# Patient Record
Sex: MALE | Race: WHITE | NOT HISPANIC OR LATINO | Employment: FULL TIME | URBAN - METROPOLITAN AREA
[De-identification: names, ages, dates, MRNs, and addresses within clinical notes are randomized per-mention and may not be internally consistent; named-entity substitution may affect disease eponyms.]

---

## 2023-03-08 DIAGNOSIS — Z00.00 HEALTH CARE MAINTENANCE: Primary | ICD-10-CM

## 2023-03-08 RX ORDER — ALBUTEROL SULFATE 0.83 MG/ML
2.5 SOLUTION RESPIRATORY (INHALATION) EVERY 4 HOURS PRN
COMMUNITY
Start: 2014-06-23 | End: 2023-03-08 | Stop reason: SDUPTHER

## 2023-03-11 RX ORDER — ALBUTEROL SULFATE 0.83 MG/ML
2.5 SOLUTION RESPIRATORY (INHALATION) EVERY 4 HOURS PRN
Qty: 75 ML | Refills: 1 | Status: ON HOLD | OUTPATIENT
Start: 2023-03-11 | End: 2024-03-12 | Stop reason: WASHOUT

## 2023-03-13 DIAGNOSIS — Z00.00 HEALTH CARE MAINTENANCE: Primary | ICD-10-CM

## 2023-03-13 RX ORDER — IPRATROPIUM BROMIDE AND ALBUTEROL SULFATE 2.5; .5 MG/3ML; MG/3ML
3 SOLUTION RESPIRATORY (INHALATION)
COMMUNITY
Start: 2023-03-05 | End: 2023-03-13 | Stop reason: SDUPTHER

## 2023-03-14 RX ORDER — IPRATROPIUM BROMIDE AND ALBUTEROL SULFATE 2.5; .5 MG/3ML; MG/3ML
3 SOLUTION RESPIRATORY (INHALATION)
Qty: 180 ML | Refills: 1 | Status: SHIPPED | OUTPATIENT
Start: 2023-03-14 | End: 2024-02-22 | Stop reason: SDUPTHER

## 2023-05-19 ENCOUNTER — TELEMEDICINE (OUTPATIENT)
Dept: PRIMARY CARE | Facility: CLINIC | Age: 68
End: 2023-05-19
Payer: MEDICARE

## 2023-05-19 DIAGNOSIS — J43.9 PULMONARY EMPHYSEMA, UNSPECIFIED EMPHYSEMA TYPE (MULTI): ICD-10-CM

## 2023-05-19 DIAGNOSIS — E66.01 MORBID OBESITY (MULTI): ICD-10-CM

## 2023-05-19 DIAGNOSIS — E11.9 TYPE 2 DIABETES MELLITUS WITHOUT COMPLICATION, WITHOUT LONG-TERM CURRENT USE OF INSULIN (MULTI): Primary | ICD-10-CM

## 2023-05-19 PROCEDURE — 99442 PR PHYS/QHP TELEPHONE EVALUATION 11-20 MIN: CPT | Performed by: INTERNAL MEDICINE

## 2023-05-19 NOTE — PROGRESS NOTES
Subjective   Patient ID: Alo Glass is a 67 y.o. male who presents for No chief complaint on file..    HPI patient initiated tele health visit this visit was completed via telephone secondary to the restrictions of the COVID-19 pandemic all medical issues were addressed and discussed with patient patient was not otherwise examined if it was felt that the patient needed to be seen in the office they would have been referred to do so patient verbally consented to visit   sick visit no chest pain always somewhat shortness of breath still smoking no polyuria polydipsia struggles with losing weight interested in medication to help with above bowels okay no dysuria    Review of Systems    Objective   There were no vitals taken for this visit.    Physical Examalert and oriented x3 breathing unlabored not otherwise examined    Assessment/Plan impression obesity copd dm   Plan dyspnea pulmonary in an office setting as walking in to a hospital complex is too far for him Cont w trelegy inhaler  Nebulizer use as needed discussed with no tobacco he would like to begin [facility] see EMR after review of laboratory results diet exercise weight loss through nonpharmacological means also with some pain in his feet at bedtime may consider a compounded medication for pain relief May begin with Aspercreme and general foot care and foot soaks good shoe wear elevate legs recheck in office after beginning medication and follow-up on A1c lipids and blood pressure

## 2023-05-20 RX ORDER — TIRZEPATIDE 2.5 MG/.5ML
2.5 INJECTION, SOLUTION SUBCUTANEOUS
Qty: 2 ML | Refills: 1 | Status: SHIPPED | OUTPATIENT
Start: 2023-05-20 | End: 2023-06-22 | Stop reason: SDUPTHER

## 2023-10-26 DIAGNOSIS — M10.9 GOUT, UNSPECIFIED: ICD-10-CM

## 2023-10-26 DIAGNOSIS — Z00.00 ENCOUNTER FOR GENERAL ADULT MEDICAL EXAMINATION WITHOUT ABNORMAL FINDINGS: Primary | ICD-10-CM

## 2023-10-26 DIAGNOSIS — E03.9 HYPOTHYROIDISM, UNSPECIFIED: ICD-10-CM

## 2023-10-26 RX ORDER — INDOMETHACIN 50 MG/1
50 CAPSULE ORAL 2 TIMES DAILY PRN
Qty: 90 CAPSULE | Refills: 0 | Status: SHIPPED | OUTPATIENT
Start: 2023-10-26 | End: 2023-11-17 | Stop reason: SDUPTHER

## 2023-10-26 RX ORDER — ALLOPURINOL 300 MG/1
300 TABLET ORAL DAILY
Qty: 30 TABLET | Refills: 0 | Status: CANCELLED | OUTPATIENT
Start: 2023-10-26 | End: 2023-11-25

## 2023-10-26 RX ORDER — LEVOTHYROXINE SODIUM 125 UG/1
125 TABLET ORAL DAILY
Qty: 30 TABLET | Refills: 0 | Status: CANCELLED | OUTPATIENT
Start: 2023-10-26

## 2023-10-26 RX ORDER — INDOMETHACIN 50 MG/1
50 CAPSULE ORAL
COMMUNITY
End: 2023-10-26 | Stop reason: SDUPTHER

## 2023-10-26 RX ORDER — OLMESARTAN MEDOXOMIL 40 MG/1
40 TABLET ORAL DAILY
Qty: 30 TABLET | Refills: 0 | Status: CANCELLED | OUTPATIENT
Start: 2023-10-26

## 2023-11-08 DIAGNOSIS — M10.9 GOUT, UNSPECIFIED: ICD-10-CM

## 2023-11-08 RX ORDER — ALLOPURINOL 300 MG/1
300 TABLET ORAL DAILY
Qty: 30 TABLET | Refills: 0 | Status: SHIPPED | OUTPATIENT
Start: 2023-11-08 | End: 2023-12-06

## 2023-11-15 DIAGNOSIS — M10.9 GOUT, UNSPECIFIED: ICD-10-CM

## 2023-11-15 DIAGNOSIS — Z00.00 ENCOUNTER FOR GENERAL ADULT MEDICAL EXAMINATION WITHOUT ABNORMAL FINDINGS: ICD-10-CM

## 2023-11-15 DIAGNOSIS — E03.9 HYPOTHYROIDISM, UNSPECIFIED: ICD-10-CM

## 2023-11-16 RX ORDER — OLMESARTAN MEDOXOMIL 40 MG/1
40 TABLET ORAL DAILY
Qty: 30 TABLET | Refills: 0 | Status: SHIPPED | OUTPATIENT
Start: 2023-11-16 | End: 2023-12-15 | Stop reason: SDUPTHER

## 2023-11-16 RX ORDER — ALLOPURINOL 300 MG/1
300 TABLET ORAL DAILY
Qty: 30 TABLET | Refills: 0 | Status: SHIPPED | OUTPATIENT
Start: 2023-11-16 | End: 2024-01-05

## 2023-11-16 RX ORDER — LEVOTHYROXINE SODIUM 125 UG/1
125 TABLET ORAL DAILY
Qty: 30 TABLET | Refills: 0 | Status: SHIPPED | OUTPATIENT
Start: 2023-11-16 | End: 2023-12-15 | Stop reason: SDUPTHER

## 2023-11-17 DIAGNOSIS — Z00.00 ENCOUNTER FOR GENERAL ADULT MEDICAL EXAMINATION WITHOUT ABNORMAL FINDINGS: ICD-10-CM

## 2023-11-18 RX ORDER — INDOMETHACIN 50 MG/1
50 CAPSULE ORAL 2 TIMES DAILY PRN
Qty: 30 CAPSULE | Refills: 0 | Status: SHIPPED | OUTPATIENT
Start: 2023-11-18 | End: 2023-12-15 | Stop reason: SDUPTHER

## 2023-11-20 ENCOUNTER — TELEPHONE (OUTPATIENT)
Dept: PRIMARY CARE | Facility: CLINIC | Age: 68
End: 2023-11-20

## 2023-11-23 RX ORDER — INDOMETHACIN 50 MG/1
CAPSULE ORAL
Qty: 30 CAPSULE | Refills: 1 | OUTPATIENT
Start: 2023-11-23

## 2023-11-24 DIAGNOSIS — E11.9 TYPE 2 DIABETES MELLITUS WITHOUT COMPLICATION, WITHOUT LONG-TERM CURRENT USE OF INSULIN (MULTI): ICD-10-CM

## 2023-11-24 DIAGNOSIS — E66.01 MORBID OBESITY (MULTI): Primary | ICD-10-CM

## 2023-11-28 ENCOUNTER — APPOINTMENT (OUTPATIENT)
Dept: PRIMARY CARE | Facility: CLINIC | Age: 68
End: 2023-11-28
Payer: MEDICARE

## 2023-12-05 DIAGNOSIS — M10.9 GOUT, UNSPECIFIED: ICD-10-CM

## 2023-12-06 RX ORDER — ALLOPURINOL 300 MG/1
300 TABLET ORAL DAILY
Qty: 90 TABLET | Refills: 1 | Status: SHIPPED | OUTPATIENT
Start: 2023-12-06 | End: 2024-05-10 | Stop reason: SDUPTHER

## 2023-12-15 DIAGNOSIS — M10.9 GOUT, UNSPECIFIED: ICD-10-CM

## 2023-12-15 DIAGNOSIS — Z00.00 ENCOUNTER FOR GENERAL ADULT MEDICAL EXAMINATION WITHOUT ABNORMAL FINDINGS: ICD-10-CM

## 2023-12-15 DIAGNOSIS — E03.9 HYPOTHYROIDISM, UNSPECIFIED: ICD-10-CM

## 2023-12-15 RX ORDER — ALLOPURINOL 300 MG/1
300 TABLET ORAL DAILY
Qty: 30 TABLET | Refills: 0 | Status: CANCELLED | OUTPATIENT
Start: 2023-12-15 | End: 2024-01-14

## 2023-12-16 RX ORDER — OLMESARTAN MEDOXOMIL 40 MG/1
40 TABLET ORAL DAILY
Qty: 30 TABLET | Refills: 0 | Status: SHIPPED | OUTPATIENT
Start: 2023-12-16 | End: 2024-02-15

## 2023-12-16 RX ORDER — LEVOTHYROXINE SODIUM 125 UG/1
125 TABLET ORAL DAILY
Qty: 30 TABLET | Refills: 0 | Status: SHIPPED | OUTPATIENT
Start: 2023-12-16 | End: 2024-01-10

## 2023-12-16 RX ORDER — INDOMETHACIN 50 MG/1
50 CAPSULE ORAL 2 TIMES DAILY PRN
Qty: 30 CAPSULE | Refills: 0 | Status: ON HOLD | OUTPATIENT
Start: 2023-12-16 | End: 2024-04-09 | Stop reason: SDUPTHER

## 2023-12-28 DIAGNOSIS — E03.9 HYPOTHYROIDISM, UNSPECIFIED: ICD-10-CM

## 2023-12-28 RX ORDER — LEVOTHYROXINE SODIUM 125 UG/1
125 TABLET ORAL DAILY
Qty: 30 TABLET | Refills: 0 | OUTPATIENT
Start: 2023-12-28

## 2024-01-05 DIAGNOSIS — J43.9 EMPHYSEMA, UNSPECIFIED (MULTI): ICD-10-CM

## 2024-01-05 DIAGNOSIS — M10.9 GOUT, UNSPECIFIED: ICD-10-CM

## 2024-01-05 DIAGNOSIS — E03.9 HYPOTHYROIDISM, UNSPECIFIED: ICD-10-CM

## 2024-01-05 DIAGNOSIS — E11.9 TYPE 2 DIABETES MELLITUS WITHOUT COMPLICATION, WITHOUT LONG-TERM CURRENT USE OF INSULIN (MULTI): ICD-10-CM

## 2024-01-05 RX ORDER — ALBUTEROL SULFATE 90 UG/1
2 AEROSOL, METERED RESPIRATORY (INHALATION) EVERY 4 HOURS PRN
Qty: 8.5 G | Refills: 3 | Status: SHIPPED | OUTPATIENT
Start: 2024-01-05

## 2024-01-05 RX ORDER — ALLOPURINOL 300 MG/1
300 TABLET ORAL DAILY
Qty: 30 TABLET | Refills: 0 | Status: SHIPPED | OUTPATIENT
Start: 2024-01-05 | End: 2024-02-04

## 2024-01-08 ENCOUNTER — TELEMEDICINE (OUTPATIENT)
Dept: PRIMARY CARE | Facility: CLINIC | Age: 69
End: 2024-01-08
Payer: MEDICARE

## 2024-01-08 DIAGNOSIS — G47.33 OSA (OBSTRUCTIVE SLEEP APNEA): ICD-10-CM

## 2024-01-08 DIAGNOSIS — M25.559 HIP PAIN, UNSPECIFIED LATERALITY: ICD-10-CM

## 2024-01-08 DIAGNOSIS — J44.9 CHRONIC OBSTRUCTIVE PULMONARY DISEASE, UNSPECIFIED COPD TYPE (MULTI): ICD-10-CM

## 2024-01-08 DIAGNOSIS — E11.9 TYPE 2 DIABETES MELLITUS WITHOUT COMPLICATION, WITHOUT LONG-TERM CURRENT USE OF INSULIN (MULTI): ICD-10-CM

## 2024-01-08 DIAGNOSIS — I89.0 LYMPHEDEMA: ICD-10-CM

## 2024-01-08 DIAGNOSIS — M10.9 GOUT, UNSPECIFIED CAUSE, UNSPECIFIED CHRONICITY, UNSPECIFIED SITE: Primary | ICD-10-CM

## 2024-01-08 DIAGNOSIS — Z79.899 MEDICATION MANAGEMENT: ICD-10-CM

## 2024-01-08 PROCEDURE — 99443 PR PHYS/QHP TELEPHONE EVALUATION 21-30 MIN: CPT | Performed by: INTERNAL MEDICINE

## 2024-01-08 NOTE — PROGRESS NOTES
Subjective   Patient ID: Alo Glass is a 68 y.o. male who presents for No chief complaint on file..    HPI patient initiated telehealth visit this visit was completed via telephone secondary to the restrictions of the COVID-19 pandemic all medical issues were addressed and discussed with patient patient was not otherwise examined if it was felt that the patient needed to be seen in the office that would have been referred to do so patient verbally consented to visit  Sick visit has had multiple issues with his CPAP machine may need another does not want to have a sleep study no chest pain no shortness of breath had an asthma or COPD flareup still smoking questions about vascular disease has some weeping in his leg given the lymphedema has not had gout but has only been on 300 mg of the allopurinol bowels okay no dysuria    Review of Systems    Objective   There were no vitals taken for this visit.    Physical Exam alert and oriented x 3 breathing unlabored not otherwise examined    Assessment/Plan  impression gout eriberto copd neuropathy hip pain (gabapentin) weeping leg/lymphedema (vascular disease questions?)  Plan new cpap  Continue with 300 mg only of allopurinol  Rx updated  Plan he also has questions about using gabapentin on a as needed basis will follow-up in the office previous blood work orders were reviewed uric acid level was added urine medication screen test was added risk-benefit side effect of the Neurontin reviewed with him and may be prescribed after he is evaluated for that as well as for the Mounjaro as well as the rest of the blood work that had previously been placed in the meantime good diet regular exercise increase water consumption elevate legs local wound care stop smoking altogether and recheck based on above and in office TT 40 cc 21

## 2024-01-10 RX ORDER — TIRZEPATIDE 2.5 MG/.5ML
2.5 INJECTION, SOLUTION SUBCUTANEOUS
Qty: 2 ML | Refills: 0 | Status: ON HOLD | OUTPATIENT
Start: 2024-01-10 | End: 2024-04-09 | Stop reason: SDUPTHER

## 2024-01-10 RX ORDER — LEVOTHYROXINE SODIUM 125 UG/1
125 TABLET ORAL DAILY
Qty: 30 TABLET | Refills: 0 | Status: SHIPPED | OUTPATIENT
Start: 2024-01-10 | End: 2024-05-22

## 2024-01-10 RX ORDER — TIRZEPATIDE 2.5 MG/.5ML
2.5 INJECTION, SOLUTION SUBCUTANEOUS
Qty: 2 ML | Refills: 0 | Status: SHIPPED | OUTPATIENT
Start: 2024-01-10 | End: 2024-03-23 | Stop reason: HOSPADM

## 2024-01-19 DIAGNOSIS — Z00.00 ENCOUNTER FOR GENERAL ADULT MEDICAL EXAMINATION WITHOUT ABNORMAL FINDINGS: ICD-10-CM

## 2024-01-19 RX ORDER — METFORMIN HYDROCHLORIDE 1000 MG/1
1000 TABLET ORAL 2 TIMES DAILY
Qty: 180 TABLET | Refills: 0 | Status: SHIPPED | OUTPATIENT
Start: 2024-01-19

## 2024-01-31 DIAGNOSIS — Z00.00 ENCOUNTER FOR GENERAL ADULT MEDICAL EXAMINATION WITHOUT ABNORMAL FINDINGS: ICD-10-CM

## 2024-02-01 RX ORDER — MOMETASONE FUROATE AND FORMOTEROL FUMARATE DIHYDRATE 100; 5 UG/1; UG/1
2 AEROSOL RESPIRATORY (INHALATION) 2 TIMES DAILY
Qty: 13 G | Refills: 1 | Status: SHIPPED | OUTPATIENT
Start: 2024-02-01 | End: 2024-03-23 | Stop reason: HOSPADM

## 2024-02-13 DIAGNOSIS — Z00.00 ENCOUNTER FOR GENERAL ADULT MEDICAL EXAMINATION WITHOUT ABNORMAL FINDINGS: ICD-10-CM

## 2024-02-15 RX ORDER — OLMESARTAN MEDOXOMIL 40 MG/1
40 TABLET ORAL DAILY
Qty: 30 TABLET | Refills: 0 | Status: SHIPPED | OUTPATIENT
Start: 2024-02-15 | End: 2024-03-02

## 2024-02-20 ENCOUNTER — LAB (OUTPATIENT)
Dept: LAB | Facility: LAB | Age: 69
End: 2024-02-20
Payer: MEDICARE

## 2024-02-20 DIAGNOSIS — M10.9 GOUT, UNSPECIFIED CAUSE, UNSPECIFIED CHRONICITY, UNSPECIFIED SITE: ICD-10-CM

## 2024-02-20 DIAGNOSIS — E66.01 MORBID OBESITY (MULTI): ICD-10-CM

## 2024-02-20 DIAGNOSIS — Z79.899 MEDICATION MANAGEMENT: ICD-10-CM

## 2024-02-20 DIAGNOSIS — E11.9 TYPE 2 DIABETES MELLITUS WITHOUT COMPLICATION, WITHOUT LONG-TERM CURRENT USE OF INSULIN (MULTI): ICD-10-CM

## 2024-02-20 LAB
ALBUMIN SERPL BCP-MCNC: 4.2 G/DL (ref 3.4–5)
ALP SERPL-CCNC: 71 U/L (ref 33–136)
ALT SERPL W P-5'-P-CCNC: 24 U/L (ref 10–52)
ANION GAP SERPL CALC-SCNC: 16 MMOL/L (ref 10–20)
AST SERPL W P-5'-P-CCNC: 20 U/L (ref 9–39)
BILIRUB SERPL-MCNC: 1 MG/DL (ref 0–1.2)
BUN SERPL-MCNC: 22 MG/DL (ref 6–23)
CALCIUM SERPL-MCNC: 9.4 MG/DL (ref 8.6–10.6)
CHLORIDE SERPL-SCNC: 107 MMOL/L (ref 98–107)
CHOLEST SERPL-MCNC: 178 MG/DL (ref 0–199)
CHOLESTEROL/HDL RATIO: 3
CO2 SERPL-SCNC: 27 MMOL/L (ref 21–32)
CREAT SERPL-MCNC: 0.84 MG/DL (ref 0.5–1.3)
EGFRCR SERPLBLD CKD-EPI 2021: >90 ML/MIN/1.73M*2
EST. AVERAGE GLUCOSE BLD GHB EST-MCNC: 120 MG/DL
GLUCOSE SERPL-MCNC: 110 MG/DL (ref 74–99)
HBA1C MFR BLD: 5.8 %
HDLC SERPL-MCNC: 59.5 MG/DL
LDLC SERPL CALC-MCNC: 102 MG/DL
NON HDL CHOLESTEROL: 119 MG/DL (ref 0–149)
POTASSIUM SERPL-SCNC: 4.4 MMOL/L (ref 3.5–5.3)
PROT SERPL-MCNC: 6.4 G/DL (ref 6.4–8.2)
SODIUM SERPL-SCNC: 146 MMOL/L (ref 136–145)
TRIGL SERPL-MCNC: 81 MG/DL (ref 0–149)
URATE SERPL-MCNC: 7.1 MG/DL (ref 4–7.5)
VLDL: 16 MG/DL (ref 0–40)

## 2024-02-20 PROCEDURE — 36415 COLL VENOUS BLD VENIPUNCTURE: CPT

## 2024-02-20 PROCEDURE — 80061 LIPID PANEL: CPT

## 2024-02-20 PROCEDURE — 84550 ASSAY OF BLOOD/URIC ACID: CPT

## 2024-02-20 PROCEDURE — 83036 HEMOGLOBIN GLYCOSYLATED A1C: CPT

## 2024-02-20 PROCEDURE — 80053 COMPREHEN METABOLIC PANEL: CPT

## 2024-02-22 DIAGNOSIS — Z00.00 HEALTH CARE MAINTENANCE: ICD-10-CM

## 2024-02-22 RX ORDER — IPRATROPIUM BROMIDE AND ALBUTEROL SULFATE 2.5; .5 MG/3ML; MG/3ML
3 SOLUTION RESPIRATORY (INHALATION)
Qty: 180 ML | Refills: 1 | Status: SHIPPED | OUTPATIENT
Start: 2024-02-22 | End: 2024-04-16

## 2024-02-29 DIAGNOSIS — Z00.00 ENCOUNTER FOR GENERAL ADULT MEDICAL EXAMINATION WITHOUT ABNORMAL FINDINGS: ICD-10-CM

## 2024-03-02 RX ORDER — OLMESARTAN MEDOXOMIL 40 MG/1
40 TABLET ORAL DAILY
Qty: 30 TABLET | Refills: 0 | Status: SHIPPED | OUTPATIENT
Start: 2024-03-02 | End: 2024-03-23 | Stop reason: HOSPADM

## 2024-03-02 RX ORDER — FLUTICASONE FUROATE, UMECLIDINIUM BROMIDE AND VILANTEROL TRIFENATATE 100; 62.5; 25 UG/1; UG/1; UG/1
POWDER RESPIRATORY (INHALATION)
Qty: 28 EACH | Refills: 1 | Status: SHIPPED | OUTPATIENT
Start: 2024-03-02 | End: 2024-05-10 | Stop reason: SDUPTHER

## 2024-03-08 ENCOUNTER — APPOINTMENT (OUTPATIENT)
Dept: RADIOLOGY | Facility: HOSPITAL | Age: 69
DRG: 871 | End: 2024-03-08
Payer: MEDICARE

## 2024-03-08 ENCOUNTER — APPOINTMENT (OUTPATIENT)
Dept: CARDIOLOGY | Facility: HOSPITAL | Age: 69
DRG: 871 | End: 2024-03-08
Payer: MEDICARE

## 2024-03-08 ENCOUNTER — OFFICE VISIT (OUTPATIENT)
Dept: PRIMARY CARE | Facility: CLINIC | Age: 69
End: 2024-03-08
Payer: MEDICARE

## 2024-03-08 ENCOUNTER — HOSPITAL ENCOUNTER (OUTPATIENT)
Dept: CARDIOLOGY | Facility: HOSPITAL | Age: 69
Discharge: HOME | DRG: 871 | End: 2024-03-08
Payer: MEDICARE

## 2024-03-08 ENCOUNTER — HOSPITAL ENCOUNTER (INPATIENT)
Facility: HOSPITAL | Age: 69
LOS: 15 days | Discharge: NURSING, REHAB OR LONG-TERM CARE | DRG: 871 | End: 2024-03-23
Attending: STUDENT IN AN ORGANIZED HEALTH CARE EDUCATION/TRAINING PROGRAM | Admitting: INTERNAL MEDICINE
Payer: MEDICARE

## 2024-03-08 VITALS
OXYGEN SATURATION: 88 % | SYSTOLIC BLOOD PRESSURE: 143 MMHG | HEART RATE: 105 BPM | DIASTOLIC BLOOD PRESSURE: 76 MMHG | RESPIRATION RATE: 20 BRPM

## 2024-03-08 DIAGNOSIS — T17.500A MUCUS PLUGGING OF BRONCHI: ICD-10-CM

## 2024-03-08 DIAGNOSIS — J96.01 ACUTE HYPOXIC RESPIRATORY FAILURE (MULTI): ICD-10-CM

## 2024-03-08 DIAGNOSIS — I50.21 HEART FAILURE, ACUTE SYSTOLIC (MULTI): ICD-10-CM

## 2024-03-08 DIAGNOSIS — I50.9 ACUTE ON CHRONIC CONGESTIVE HEART FAILURE, UNSPECIFIED HEART FAILURE TYPE (MULTI): ICD-10-CM

## 2024-03-08 DIAGNOSIS — R06.09 DYSPNEA ON EXERTION: ICD-10-CM

## 2024-03-08 DIAGNOSIS — J44.9 CHRONIC OBSTRUCTIVE PULMONARY DISEASE, UNSPECIFIED COPD TYPE (MULTI): Primary | ICD-10-CM

## 2024-03-08 DIAGNOSIS — R09.02 HYPOXIA: ICD-10-CM

## 2024-03-08 DIAGNOSIS — I50.9 ACUTE ON CHRONIC CONGESTIVE HEART FAILURE, UNSPECIFIED HEART FAILURE TYPE (MULTI): Primary | ICD-10-CM

## 2024-03-08 DIAGNOSIS — R94.31 PROLONGED Q-T INTERVAL ON ECG: ICD-10-CM

## 2024-03-08 DIAGNOSIS — I50.9 HEART FAILURE EXACERBATED BY SOTALOL (MULTI): ICD-10-CM

## 2024-03-08 DIAGNOSIS — E11.9 TYPE 2 DIABETES MELLITUS WITHOUT COMPLICATION, WITHOUT LONG-TERM CURRENT USE OF INSULIN (MULTI): ICD-10-CM

## 2024-03-08 DIAGNOSIS — I50.21 SYSTOLIC CHF, ACUTE (MULTI): ICD-10-CM

## 2024-03-08 DIAGNOSIS — R79.89 ELEVATED D-DIMER: ICD-10-CM

## 2024-03-08 LAB
ALBUMIN SERPL BCP-MCNC: 3.7 G/DL (ref 3.4–5)
ALP SERPL-CCNC: 77 U/L (ref 33–136)
ALT SERPL W P-5'-P-CCNC: 20 U/L (ref 10–52)
ANION GAP SERPL CALC-SCNC: 17 MMOL/L (ref 10–20)
AST SERPL W P-5'-P-CCNC: 18 U/L (ref 9–39)
BASOPHILS # BLD AUTO: 0.04 X10*3/UL (ref 0–0.1)
BASOPHILS NFR BLD AUTO: 0.4 %
BILIRUB SERPL-MCNC: 1.1 MG/DL (ref 0–1.2)
BNP SERPL-MCNC: 415 PG/ML (ref 0–99)
BUN SERPL-MCNC: 14 MG/DL (ref 6–23)
CALCIUM SERPL-MCNC: 8.6 MG/DL (ref 8.6–10.3)
CARDIAC TROPONIN I PNL SERPL HS: 71 NG/L (ref 0–20)
CARDIAC TROPONIN I PNL SERPL HS: 73 NG/L (ref 0–20)
CHLORIDE SERPL-SCNC: 106 MMOL/L (ref 98–107)
CO2 SERPL-SCNC: 25 MMOL/L (ref 21–32)
CREAT SERPL-MCNC: 0.68 MG/DL (ref 0.5–1.3)
D DIMER PPP FEU-MCNC: 678 NG/ML FEU
EGFRCR SERPLBLD CKD-EPI 2021: >90 ML/MIN/1.73M*2
EOSINOPHIL # BLD AUTO: 0.07 X10*3/UL (ref 0–0.7)
EOSINOPHIL NFR BLD AUTO: 0.7 %
ERYTHROCYTE [DISTWIDTH] IN BLOOD BY AUTOMATED COUNT: 14 % (ref 11.5–14.5)
FLUAV RNA RESP QL NAA+PROBE: NOT DETECTED
FLUBV RNA RESP QL NAA+PROBE: NOT DETECTED
GLUCOSE BLD MANUAL STRIP-MCNC: 138 MG/DL (ref 74–99)
GLUCOSE SERPL-MCNC: 104 MG/DL (ref 74–99)
HCT VFR BLD AUTO: 57.5 % (ref 41–52)
HGB BLD-MCNC: 18.7 G/DL (ref 13.5–17.5)
IMM GRANULOCYTES # BLD AUTO: 0.04 X10*3/UL (ref 0–0.7)
IMM GRANULOCYTES NFR BLD AUTO: 0.4 % (ref 0–0.9)
LYMPHOCYTES # BLD AUTO: 1.23 X10*3/UL (ref 1.2–4.8)
LYMPHOCYTES NFR BLD AUTO: 11.7 %
MAGNESIUM SERPL-MCNC: 1.79 MG/DL (ref 1.6–2.4)
MCH RBC QN AUTO: 33.8 PG (ref 26–34)
MCHC RBC AUTO-ENTMCNC: 32.5 G/DL (ref 32–36)
MCV RBC AUTO: 104 FL (ref 80–100)
MONOCYTES # BLD AUTO: 0.8 X10*3/UL (ref 0.1–1)
MONOCYTES NFR BLD AUTO: 7.6 %
NEUTROPHILS # BLD AUTO: 8.36 X10*3/UL (ref 1.2–7.7)
NEUTROPHILS NFR BLD AUTO: 79.2 %
NRBC BLD-RTO: 0 /100 WBCS (ref 0–0)
PLATELET # BLD AUTO: 170 X10*3/UL (ref 150–450)
POTASSIUM SERPL-SCNC: 4 MMOL/L (ref 3.5–5.3)
PROT SERPL-MCNC: 6.2 G/DL (ref 6.4–8.2)
RBC # BLD AUTO: 5.53 X10*6/UL (ref 4.5–5.9)
RSV RNA RESP QL NAA+PROBE: NOT DETECTED
SARS-COV-2 RNA RESP QL NAA+PROBE: NOT DETECTED
SODIUM SERPL-SCNC: 144 MMOL/L (ref 136–145)
WBC # BLD AUTO: 10.5 X10*3/UL (ref 4.4–11.3)

## 2024-03-08 PROCEDURE — 99291 CRITICAL CARE FIRST HOUR: CPT | Mod: 25 | Performed by: NURSE PRACTITIONER

## 2024-03-08 PROCEDURE — 83880 ASSAY OF NATRIURETIC PEPTIDE: CPT | Performed by: INTERNAL MEDICINE

## 2024-03-08 PROCEDURE — 3044F HG A1C LEVEL LT 7.0%: CPT | Performed by: INTERNAL MEDICINE

## 2024-03-08 PROCEDURE — 83880 ASSAY OF NATRIURETIC PEPTIDE: CPT | Performed by: NURSE PRACTITIONER

## 2024-03-08 PROCEDURE — 5A09357 ASSISTANCE WITH RESPIRATORY VENTILATION, LESS THAN 24 CONSECUTIVE HOURS, CONTINUOUS POSITIVE AIRWAY PRESSURE: ICD-10-PCS | Performed by: NURSE PRACTITIONER

## 2024-03-08 PROCEDURE — 87637 SARSCOV2&INF A&B&RSV AMP PRB: CPT | Performed by: STUDENT IN AN ORGANIZED HEALTH CARE EDUCATION/TRAINING PROGRAM

## 2024-03-08 PROCEDURE — 1126F AMNT PAIN NOTED NONE PRSNT: CPT | Performed by: INTERNAL MEDICINE

## 2024-03-08 PROCEDURE — 85025 COMPLETE CBC W/AUTO DIFF WBC: CPT | Performed by: NURSE PRACTITIONER

## 2024-03-08 PROCEDURE — 2500000001 HC RX 250 WO HCPCS SELF ADMINISTERED DRUGS (ALT 637 FOR MEDICARE OP): Performed by: NURSE PRACTITIONER

## 2024-03-08 PROCEDURE — 4010F ACE/ARB THERAPY RXD/TAKEN: CPT | Performed by: INTERNAL MEDICINE

## 2024-03-08 PROCEDURE — 3049F LDL-C 100-129 MG/DL: CPT | Performed by: INTERNAL MEDICINE

## 2024-03-08 PROCEDURE — 2500000004 HC RX 250 GENERAL PHARMACY W/ HCPCS (ALT 636 FOR OP/ED): Performed by: NURSE PRACTITIONER

## 2024-03-08 PROCEDURE — 99214 OFFICE O/P EST MOD 30 MIN: CPT | Performed by: INTERNAL MEDICINE

## 2024-03-08 PROCEDURE — 1159F MED LIST DOCD IN RCRD: CPT | Performed by: INTERNAL MEDICINE

## 2024-03-08 PROCEDURE — 1036F TOBACCO NON-USER: CPT | Performed by: INTERNAL MEDICINE

## 2024-03-08 PROCEDURE — 82947 ASSAY GLUCOSE BLOOD QUANT: CPT

## 2024-03-08 PROCEDURE — 36415 COLL VENOUS BLD VENIPUNCTURE: CPT | Performed by: NURSE PRACTITIONER

## 2024-03-08 PROCEDURE — 93005 ELECTROCARDIOGRAM TRACING: CPT

## 2024-03-08 PROCEDURE — 3078F DIAST BP <80 MM HG: CPT | Performed by: INTERNAL MEDICINE

## 2024-03-08 PROCEDURE — 80053 COMPREHEN METABOLIC PANEL: CPT | Performed by: NURSE PRACTITIONER

## 2024-03-08 PROCEDURE — 99223 1ST HOSP IP/OBS HIGH 75: CPT | Performed by: INTERNAL MEDICINE

## 2024-03-08 PROCEDURE — 71046 X-RAY EXAM CHEST 2 VIEWS: CPT

## 2024-03-08 PROCEDURE — 85379 FIBRIN DEGRADATION QUANT: CPT | Performed by: NURSE PRACTITIONER

## 2024-03-08 PROCEDURE — 2500000002 HC RX 250 W HCPCS SELF ADMINISTERED DRUGS (ALT 637 FOR MEDICARE OP, ALT 636 FOR OP/ED): Performed by: NURSE PRACTITIONER

## 2024-03-08 PROCEDURE — 3077F SYST BP >= 140 MM HG: CPT | Performed by: INTERNAL MEDICINE

## 2024-03-08 PROCEDURE — 84484 ASSAY OF TROPONIN QUANT: CPT | Performed by: NURSE PRACTITIONER

## 2024-03-08 PROCEDURE — 1100000001 HC PRIVATE ROOM DAILY

## 2024-03-08 PROCEDURE — 83735 ASSAY OF MAGNESIUM: CPT | Performed by: NURSE PRACTITIONER

## 2024-03-08 PROCEDURE — 96374 THER/PROPH/DIAG INJ IV PUSH: CPT

## 2024-03-08 PROCEDURE — 82805 BLOOD GASES W/O2 SATURATION: CPT | Performed by: NURSE PRACTITIONER

## 2024-03-08 PROCEDURE — 94640 AIRWAY INHALATION TREATMENT: CPT

## 2024-03-08 PROCEDURE — 71046 X-RAY EXAM CHEST 2 VIEWS: CPT | Performed by: RADIOLOGY

## 2024-03-08 RX ORDER — PREDNISONE 20 MG/1
40 TABLET ORAL ONCE
Status: COMPLETED | OUTPATIENT
Start: 2024-03-08 | End: 2024-03-08

## 2024-03-08 RX ORDER — FUROSEMIDE 10 MG/ML
20 INJECTION INTRAMUSCULAR; INTRAVENOUS EVERY 12 HOURS
Status: DISCONTINUED | OUTPATIENT
Start: 2024-03-09 | End: 2024-03-08

## 2024-03-08 RX ORDER — DOCUSATE SODIUM 100 MG/1
100 CAPSULE, LIQUID FILLED ORAL 2 TIMES DAILY
Status: DISCONTINUED | OUTPATIENT
Start: 2024-03-08 | End: 2024-03-09

## 2024-03-08 RX ORDER — LANOLIN ALCOHOL/MO/W.PET/CERES
400 CREAM (GRAM) TOPICAL DAILY
Status: COMPLETED | OUTPATIENT
Start: 2024-03-08 | End: 2024-03-09

## 2024-03-08 RX ORDER — IPRATROPIUM BROMIDE AND ALBUTEROL SULFATE 2.5; .5 MG/3ML; MG/3ML
3 SOLUTION RESPIRATORY (INHALATION) ONCE
Status: COMPLETED | OUTPATIENT
Start: 2024-03-08 | End: 2024-03-08

## 2024-03-08 RX ORDER — POTASSIUM CHLORIDE 20 MEQ/1
20 TABLET, EXTENDED RELEASE ORAL DAILY
Status: DISCONTINUED | OUTPATIENT
Start: 2024-03-09 | End: 2024-03-12

## 2024-03-08 RX ORDER — FUROSEMIDE 10 MG/ML
40 INJECTION INTRAMUSCULAR; INTRAVENOUS ONCE
Status: COMPLETED | OUTPATIENT
Start: 2024-03-08 | End: 2024-03-08

## 2024-03-08 RX ORDER — ENOXAPARIN SODIUM 100 MG/ML
40 INJECTION SUBCUTANEOUS EVERY 12 HOURS SCHEDULED
Status: DISCONTINUED | OUTPATIENT
Start: 2024-03-08 | End: 2024-03-17

## 2024-03-08 RX ADMIN — PREDNISONE 40 MG: 20 TABLET ORAL at 17:22

## 2024-03-08 RX ADMIN — ENOXAPARIN SODIUM 40 MG: 40 INJECTION SUBCUTANEOUS at 21:21

## 2024-03-08 RX ADMIN — IPRATROPIUM BROMIDE AND ALBUTEROL SULFATE 3 ML: .5; 3 SOLUTION RESPIRATORY (INHALATION) at 17:22

## 2024-03-08 RX ADMIN — Medication 400 MG: at 21:21

## 2024-03-08 RX ADMIN — FUROSEMIDE 40 MG: 10 INJECTION, SOLUTION INTRAMUSCULAR; INTRAVENOUS at 17:22

## 2024-03-08 SDOH — SOCIAL STABILITY: SOCIAL INSECURITY: DOES ANYONE TRY TO KEEP YOU FROM HAVING/CONTACTING OTHER FRIENDS OR DOING THINGS OUTSIDE YOUR HOME?: NO

## 2024-03-08 SDOH — SOCIAL STABILITY: SOCIAL INSECURITY: HAVE YOU HAD THOUGHTS OF HARMING ANYONE ELSE?: NO

## 2024-03-08 SDOH — SOCIAL STABILITY: SOCIAL INSECURITY: HAS ANYONE EVER THREATENED TO HURT YOUR FAMILY OR YOUR PETS?: NO

## 2024-03-08 SDOH — SOCIAL STABILITY: SOCIAL INSECURITY: WERE YOU ABLE TO COMPLETE ALL THE BEHAVIORAL HEALTH SCREENINGS?: YES

## 2024-03-08 SDOH — SOCIAL STABILITY: SOCIAL INSECURITY: DO YOU FEEL ANYONE HAS EXPLOITED OR TAKEN ADVANTAGE OF YOU FINANCIALLY OR OF YOUR PERSONAL PROPERTY?: NO

## 2024-03-08 SDOH — SOCIAL STABILITY: SOCIAL INSECURITY: ABUSE: ADULT

## 2024-03-08 SDOH — SOCIAL STABILITY: SOCIAL INSECURITY: ARE THERE ANY APPARENT SIGNS OF INJURIES/BEHAVIORS THAT COULD BE RELATED TO ABUSE/NEGLECT?: NO

## 2024-03-08 SDOH — SOCIAL STABILITY: SOCIAL INSECURITY: DO YOU FEEL UNSAFE GOING BACK TO THE PLACE WHERE YOU ARE LIVING?: NO

## 2024-03-08 SDOH — SOCIAL STABILITY: SOCIAL INSECURITY: ARE YOU OR HAVE YOU BEEN THREATENED OR ABUSED PHYSICALLY, EMOTIONALLY, OR SEXUALLY BY ANYONE?: NO

## 2024-03-08 ASSESSMENT — ACTIVITIES OF DAILY LIVING (ADL)
LACK_OF_TRANSPORTATION: PATIENT DECLINED
HEARING - LEFT EAR: FUNCTIONAL
GROOMING: NEEDS ASSISTANCE
ADEQUATE_TO_COMPLETE_ADL: YES
JUDGMENT_ADEQUATE_SAFELY_COMPLETE_DAILY_ACTIVITIES: YES
LACK_OF_TRANSPORTATION: PATIENT DECLINED
DRESSING YOURSELF: NEEDS ASSISTANCE
BATHING: NEEDS ASSISTANCE
HEARING - RIGHT EAR: FUNCTIONAL
ASSISTIVE_DEVICE: WALKER
FEEDING YOURSELF: NEEDS ASSISTANCE
TOILETING: NEEDS ASSISTANCE
PATIENT'S MEMORY ADEQUATE TO SAFELY COMPLETE DAILY ACTIVITIES?: YES
WALKS IN HOME: NEEDS ASSISTANCE

## 2024-03-08 ASSESSMENT — LIFESTYLE VARIABLES
SUBSTANCE_ABUSE_PAST_12_MONTHS: NO
AUDIT-C TOTAL SCORE: 0
AUDIT-C TOTAL SCORE: 0
HOW OFTEN DO YOU HAVE A DRINK CONTAINING ALCOHOL: NEVER
EVER FELT BAD OR GUILTY ABOUT YOUR DRINKING: NO
HOW OFTEN DO YOU HAVE 6 OR MORE DRINKS ON ONE OCCASION: NEVER
SKIP TO QUESTIONS 9-10: 1
HAVE YOU EVER FELT YOU SHOULD CUT DOWN ON YOUR DRINKING: NO
PRESCIPTION_ABUSE_PAST_12_MONTHS: NO
HAVE PEOPLE ANNOYED YOU BY CRITICIZING YOUR DRINKING: NO
HOW MANY STANDARD DRINKS CONTAINING ALCOHOL DO YOU HAVE ON A TYPICAL DAY: PATIENT DOES NOT DRINK
EVER HAD A DRINK FIRST THING IN THE MORNING TO STEADY YOUR NERVES TO GET RID OF A HANGOVER: NO

## 2024-03-08 ASSESSMENT — COGNITIVE AND FUNCTIONAL STATUS - GENERAL
DAILY ACTIVITIY SCORE: 16
DRESSING REGULAR LOWER BODY CLOTHING: A LOT
TOILETING: A LITTLE
DRESSING REGULAR UPPER BODY CLOTHING: A LOT
DRESSING REGULAR UPPER BODY CLOTHING: A LOT
MOBILITY SCORE: 15
MOVING TO AND FROM BED TO CHAIR: A LITTLE
MOVING FROM LYING ON BACK TO SITTING ON SIDE OF FLAT BED WITH BEDRAILS: A LITTLE
CLIMB 3 TO 5 STEPS WITH RAILING: A LOT
MOVING FROM LYING ON BACK TO SITTING ON SIDE OF FLAT BED WITH BEDRAILS: A LITTLE
HELP NEEDED FOR BATHING: A LOT
DAILY ACTIVITIY SCORE: 16
PATIENT BASELINE BEDBOUND: NO
MOVING TO AND FROM BED TO CHAIR: A LITTLE
WALKING IN HOSPITAL ROOM: A LOT
TURNING FROM BACK TO SIDE WHILE IN FLAT BAD: A LITTLE
STANDING UP FROM CHAIR USING ARMS: A LOT
TURNING FROM BACK TO SIDE WHILE IN FLAT BAD: A LITTLE
DRESSING REGULAR LOWER BODY CLOTHING: A LOT
PERSONAL GROOMING: A LITTLE
CLIMB 3 TO 5 STEPS WITH RAILING: A LOT
MOBILITY SCORE: 15
HELP NEEDED FOR BATHING: A LOT
STANDING UP FROM CHAIR USING ARMS: A LOT
WALKING IN HOSPITAL ROOM: A LOT
PERSONAL GROOMING: A LITTLE
TOILETING: A LITTLE

## 2024-03-08 ASSESSMENT — COLUMBIA-SUICIDE SEVERITY RATING SCALE - C-SSRS
2. HAVE YOU ACTUALLY HAD ANY THOUGHTS OF KILLING YOURSELF?: NO
1. IN THE PAST MONTH, HAVE YOU WISHED YOU WERE DEAD OR WISHED YOU COULD GO TO SLEEP AND NOT WAKE UP?: NO
6. HAVE YOU EVER DONE ANYTHING, STARTED TO DO ANYTHING, OR PREPARED TO DO ANYTHING TO END YOUR LIFE?: NO

## 2024-03-08 ASSESSMENT — PAIN - FUNCTIONAL ASSESSMENT
PAIN_FUNCTIONAL_ASSESSMENT: 0-10
PAIN_FUNCTIONAL_ASSESSMENT: 0-10

## 2024-03-08 ASSESSMENT — PATIENT HEALTH QUESTIONNAIRE - PHQ9
2. FEELING DOWN, DEPRESSED OR HOPELESS: NOT AT ALL
1. LITTLE INTEREST OR PLEASURE IN DOING THINGS: NOT AT ALL
SUM OF ALL RESPONSES TO PHQ9 QUESTIONS 1 & 2: 0

## 2024-03-08 ASSESSMENT — PAIN SCALES - GENERAL
PAINLEVEL_OUTOF10: 0 - NO PAIN
PAINLEVEL_OUTOF10: 0 - NO PAIN

## 2024-03-08 NOTE — ED PROVIDER NOTES
HPI   Chief Complaint   Patient presents with    Shortness of Breath     Pt was seen at his PCP this morning for a follow up and he was noted to be having difficulty breathing. Pt was told to come to the ER because the dr was concerned the patient had fluid on his lungs        68-year-old male presents today with concern for dyspnea.  He is unable to lay down without being acutely short of breath.  He was tachypneic with respirations 25 in triage.  He was hypertensive 140/90.  His pulse ox was 89% on room air and he is typically at 94% per patient.  He was sent in by his primary care physician Dr. Beverly with concern for fluid overload.  It was appreciated that he had audible wheezing.  Patient denies history of pulmonary embolism.  He endorses lower extremity edema.  He typically does not come to the emergency room.  Patient is afraid to lay backwards as he becomes acutely more short of breath.  He denies fever or chills.  He denies cough.  He denies melena or hematochezia.  He denies hematuria or dysuria.  He denies abdominal pain.  He endorses chronic lower extremity edema.      History provided by:  Patient   used: No                        [place] Coma Scale Score: 15                     Patient History   Past Medical History:   Diagnosis Date    Personal history of other specified conditions 01/08/2023    History of impaired glucose tolerance     Past Surgical History:   Procedure Laterality Date    HERNIA REPAIR  11/07/2013    Hernia Repair    OTHER SURGICAL HISTORY  11/07/2013    Oral Surgery     No family history on file.  Social History     Tobacco Use    Smoking status: Never    Smokeless tobacco: Never   Substance Use Topics    Alcohol use: Not on file    Drug use: Not on file       Physical Exam   ED Triage Vitals [03/08/24 1702]   Temperature Heart Rate Respirations BP   36 °C (96.8 °F) 88 (!) 25 (!) 149/104      Pulse Ox Temp src Heart Rate Source Patient Position   (!) 89 % -- --  --      BP Location FiO2 (%)     -- --       Physical Exam  Constitutional:       Appearance: He is obese.   HENT:      Head: Normocephalic and atraumatic.   Cardiovascular:      Rate and Rhythm: Normal rate and regular rhythm.   Pulmonary:      Effort: Respiratory distress present.      Breath sounds: Examination of the right-upper field reveals decreased breath sounds, rhonchi and rales. Examination of the left-upper field reveals decreased breath sounds, rhonchi and rales. Examination of the right-middle field reveals decreased breath sounds, rhonchi and rales. Examination of the left-middle field reveals decreased breath sounds, rhonchi and rales. Examination of the right-lower field reveals decreased breath sounds, rhonchi and rales. Examination of the left-lower field reveals decreased breath sounds, rhonchi and rales. Decreased breath sounds, rhonchi and rales present.   Chest:      Chest wall: No mass, deformity, tenderness, crepitus or edema. There is no dullness to percussion.   Abdominal:      General: Bowel sounds are normal.      Palpations: Abdomen is soft.   Musculoskeletal:         General: Normal range of motion.      Cervical back: Normal range of motion and neck supple.   Skin:     General: Skin is warm.      Capillary Refill: Capillary refill takes less than 2 seconds.   Neurological:      General: No focal deficit present.      Mental Status: He is alert.   Psychiatric:         Mood and Affect: Mood normal.         Behavior: Behavior normal.         ED Course & [facility]   ED Course as of 03/09/24 1904   Fri Mar 08, 2024   1820 This is a 68-year-old male present with chief complaint of shortness of breath.  Upon arrival here was hypoxic 89% on room air been placed on 2 L nasal cannula and had improvement.  States over the past 6 weeks she has had progressive swelling in his lower extremities along with proximal dyspnea and short of breath worse with exertion.  He is on furosemide at home but states he  has not been taking it and just takes it as needed.  On exam shallow breath sounds in lung bases.  Was initially given a DuoNeb and states he had improvement to his shortness of breath. [WL]   1858 XR chest 2 views  Moderate right pleural effusion and right lung base consolidation.  Findings could be due to pneumonia. Other underlying process not  excluded. Follow-up recommended.      Trace left pleural effusion and cardiomegaly. Possible interstitial  edema.   [WL]   1955 Patient here refusing to lie flat   For CT despite stating waiting to have 1 placed on CPAP as he wears this nightly at home anyways but he is refusing.  Calls made to hospitalist service as he is found to have effusions in the lungs along with elevated BNP but cannot rule out pneumonia at this time but has no white count.  Has +3 pitting edema bilateral lower extremities.  Hospital service okay with admission and will defer blood clot prophylaxis to the hospitalist service.  Troponin up at 73 which may be secondary to demand ischemia/hypoxia.  Doing well after given the DuoNeb here and lung sounds improved but distant in lung bases. [WL]      ED Course User Index  [WL] Alo Menon,          Diagnoses as of 03/09/24 1904   Acute on chronic congestive heart failure, unspecified heart failure type (CMS/HCC)   Prolonged Q-T interval on ECG   Dyspnea on exertion   Hypoxia   Elevated d-dimer       Medical Decision Making  Patient was in obvious respiratory distress.  He was tachypneic.  I considered putting patient on a BiPAP but he responded well to nasal cannula at 4 L/min went to 95%.  He seemed to relax after he was placed on oxygen.  He was morbidly obese with a very large pannus and he had bilateral lower extremity dependent edema.  He received 40 mg IV Lasix.  I staffed with attending.  Venous blood gas was ordered.  A VTE D-dimer was ordered to help rule out pulmonary embolism.  VTE D-dimer was borderline I ordered a CT chest.  EKG was  sinus rhythm with first-degree AV block with widening QRS and QT corrected was now 495.  His prior EKG the QT corrected was only 440 ms.  His CT interval was also widened at 210 and patient is in a first-degree AV block.  He has a left axis.  There was no ST elevation or depression.  EKG was interpreted both by myself and attending.  Attending will follow for CT and patient will probably require admission for CHF exacerbation.  The second troponin was stable at 73.  Chest x-ray showed moderate right pleural effusion and right lung base consolidation.  Attending who took over care canceled the CT PE study and admitted patient with same findings of CHF exacerbation and pleural effusion.    Amount and/or Complexity of Data Reviewed  Labs: ordered.     Details: CBC no leukocytosis or left shift hemoglobin hematocrit were 18 and 57.  His magnesium was normal.  Metabolic panel was essentially normal.  His first troponin was elevated to 71.  I ordered a second troponin.  His BNP was elevated to 415 and he received 40 mg IV Lasix.  Radiology: ordered and independent interpretation performed.  ECG/medicine tests: ordered and independent interpretation performed.     Details: EKG was sinus rhythm with first-degree AV block with widening QRS and QT corrected was now 495.  His prior EKG the QT corrected was only 440 ms.  His CT interval was also widened at 210 and patient is in a first-degree AV block.  He has a left axis.  There was no ST elevation or depression.  EKG was interpreted both by myself and attending.        Procedure  Critical Care    Performed by: PARAM Rodriguez-CNP  Authorized by: Alo Menon DO    Critical care provider statement:     Critical care time (minutes):  30    Critical care time was exclusive of:  Separately billable procedures and treating other patients and teaching time    Critical care was necessary to treat or prevent imminent or life-threatening deterioration of the following  conditions:  Respiratory failure    Critical care was time spent personally by me on the following activities:  Blood draw for specimens, development of treatment plan with patient or surrogate, discussions with consultants, discussions with primary provider, examination of patient, interpretation of cardiac output measurements, ordering and performing treatments and interventions, ordering and review of laboratory studies, ordering and review of radiographic studies, pulse oximetry, re-evaluation of patient's condition and review of old charts    Care discussed with: admitting provider         PARAM Rodriguez-ANSHUL  03/08/24 1848       PARAM Rodriguez-CNP  03/09/24 1015          This patient was seen by the resident/midlevel.  I have personally performed a substantive portion of the encounter.  I have seen and examined the patient; agree with the workup, evaluation, MDM, management and diagnosis.  The care plan has been discussed.      I personally saw the patient and made/approved the management plan and take responsibility for the patient management, unless otherwise noted below    History: see ed course  Exam:   Heart: RRR, no murmur, rhonchi  Lungs: Decreased breath sounds in lung bases MDM: see ED course    I independently interpreted all diagnostic tests    I agree with EKG interpretation, if performed.        ED Course as of 03/09/24 1904   Fri Mar 08, 2024   1820 This is a 68-year-old male present with chief complaint of shortness of breath.  Upon arrival here was hypoxic 89% on room air been placed on 2 L nasal cannula and had improvement.  States over the past 6 weeks she has had progressive swelling in his lower extremities along with proximal dyspnea and short of breath worse with exertion.  He is on furosemide at home but states he has not been taking it and just takes it as needed.  On exam shallow breath sounds in lung bases.  Was initially given a DuoNeb and states he had improvement to  his shortness of breath. [WL]   1858 XR chest 2 views  Moderate right pleural effusion and right lung base consolidation.  Findings could be due to pneumonia. Other underlying process not  excluded. Follow-up recommended.      Trace left pleural effusion and cardiomegaly. Possible interstitial  edema.   [WL]   1955 Patient here refusing to lie flat   For CT despite stating waiting to have 1 placed on CPAP as he wears this nightly at home anyways but he is refusing.  Calls made to hospitalist service as he is found to have effusions in the lungs along with elevated BNP but cannot rule out pneumonia at this time but has no white count.  Has +3 pitting edema bilateral lower extremities.  Hospital service okay with admission and will defer blood clot prophylaxis to the hospitalist service.  Troponin up at 73 which may be secondary to demand ischemia/hypoxia.  Doing well after given the DuoNeb here and lung sounds improved but distant in lung bases. [WL]      ED Course User Index  [WL] Alo Menon DO         Diagnoses as of 03/09/24 1904   Acute on chronic congestive heart failure, unspecified heart failure type (CMS/HCC)   Prolonged Q-T interval on ECG   Dyspnea on exertion   Hypoxia   Elevated d-dimer             Patient at bedside and discussed results and they are agreeable with the plan.        Note: This note was dictated by speech recognition. Minor errors in transcription may be present           Alo Menon DO  03/09/24 1904

## 2024-03-08 NOTE — PROGRESS NOTES
Subjective   Patient ID: Alo Glass is a 68 y.o. male who presents for No chief complaint on file..    HPI follow up visit and sick visit has been more short of breath over the past several days starting each day out with pulse oximetry in the 80s has been wearing CPAP still smoking approximately 1 pack/day some recent blood work reviewed has been coughing some appears to have lost some weight from Mounjaro but is actually retaining more fluid in his legs some wheezing some coughing no fever    Review of Systems    Objective   There were no vitals taken for this visit.    Physical Exam vital signs noted alert and oriented x 3 NCAT tachypneic tachycardic no to trace JVD no bruit chest clear to auscultation with decreased breath sounds at the bases minimal wheezing CV regular rate and rhythm S1-S2 without new murmur gallop or rub abdomen obese nontender normal active bowel sounds extremities 1-2+ pitting edema bilaterally left greater than right intact distal pulses    Assessment/Plan    impression cor pulmonale COPD acute CHF other diagnoses HTN hyperlipidemia DM  Plan does not have oxygen at home home saturations have been lower he is symptomatic and breathless despite medication attempts at weight loss still smoking no polyuria polydipsia or hypoglycemic events was able to manage at home until now  advised on going to the hospital directly has ride the same is getting to the office will go directly to [facility] for evaluation imaging blood work for further diuresis perhaps as well as any other inhaler treatment that may be helpful and then will follow-up based on above post visit    Review laboratory results (check)  Tt40 cc21

## 2024-03-09 LAB
ANION GAP SERPL CALC-SCNC: 11 MMOL/L (ref 10–20)
BNP SERPL-MCNC: 440 PG/ML (ref 0–99)
BUN SERPL-MCNC: 17 MG/DL (ref 6–23)
CALCIUM SERPL-MCNC: 8.4 MG/DL (ref 8.6–10.3)
CARDIAC TROPONIN I PNL SERPL HS: 49 NG/L (ref 0–20)
CHLORIDE SERPL-SCNC: 106 MMOL/L (ref 98–107)
CO2 SERPL-SCNC: 29 MMOL/L (ref 21–32)
CREAT SERPL-MCNC: 0.72 MG/DL (ref 0.5–1.3)
EGFRCR SERPLBLD CKD-EPI 2021: >90 ML/MIN/1.73M*2
GLUCOSE BLD MANUAL STRIP-MCNC: 123 MG/DL (ref 74–99)
GLUCOSE BLD MANUAL STRIP-MCNC: 124 MG/DL (ref 74–99)
GLUCOSE BLD MANUAL STRIP-MCNC: 144 MG/DL (ref 74–99)
GLUCOSE SERPL-MCNC: 118 MG/DL (ref 74–99)
MAGNESIUM SERPL-MCNC: 1.93 MG/DL (ref 1.6–2.4)
POTASSIUM SERPL-SCNC: 4 MMOL/L (ref 3.5–5.3)
SODIUM SERPL-SCNC: 142 MMOL/L (ref 136–145)
TSH SERPL-ACNC: 2.64 MIU/L (ref 0.44–3.98)

## 2024-03-09 PROCEDURE — 2500000004 HC RX 250 GENERAL PHARMACY W/ HCPCS (ALT 636 FOR OP/ED): Performed by: INTERNAL MEDICINE

## 2024-03-09 PROCEDURE — 94760 N-INVAS EAR/PLS OXIMETRY 1: CPT

## 2024-03-09 PROCEDURE — 99223 1ST HOSP IP/OBS HIGH 75: CPT | Performed by: NURSE PRACTITIONER

## 2024-03-09 PROCEDURE — 1100000001 HC PRIVATE ROOM DAILY

## 2024-03-09 PROCEDURE — 2500000004 HC RX 250 GENERAL PHARMACY W/ HCPCS (ALT 636 FOR OP/ED): Performed by: NURSE PRACTITIONER

## 2024-03-09 PROCEDURE — 99233 SBSQ HOSP IP/OBS HIGH 50: CPT | Performed by: STUDENT IN AN ORGANIZED HEALTH CARE EDUCATION/TRAINING PROGRAM

## 2024-03-09 PROCEDURE — 2500000001 HC RX 250 WO HCPCS SELF ADMINISTERED DRUGS (ALT 637 FOR MEDICARE OP): Performed by: NURSE PRACTITIONER

## 2024-03-09 PROCEDURE — 36415 COLL VENOUS BLD VENIPUNCTURE: CPT | Performed by: NURSE PRACTITIONER

## 2024-03-09 PROCEDURE — 84484 ASSAY OF TROPONIN QUANT: CPT | Performed by: INTERNAL MEDICINE

## 2024-03-09 PROCEDURE — 82947 ASSAY GLUCOSE BLOOD QUANT: CPT

## 2024-03-09 PROCEDURE — 2500000002 HC RX 250 W HCPCS SELF ADMINISTERED DRUGS (ALT 637 FOR MEDICARE OP, ALT 636 FOR OP/ED): Performed by: NURSE PRACTITIONER

## 2024-03-09 PROCEDURE — 84443 ASSAY THYROID STIM HORMONE: CPT | Performed by: INTERNAL MEDICINE

## 2024-03-09 PROCEDURE — 2500000002 HC RX 250 W HCPCS SELF ADMINISTERED DRUGS (ALT 637 FOR MEDICARE OP, ALT 636 FOR OP/ED): Performed by: INTERNAL MEDICINE

## 2024-03-09 PROCEDURE — 2500000005 HC RX 250 GENERAL PHARMACY W/O HCPCS: Performed by: INTERNAL MEDICINE

## 2024-03-09 PROCEDURE — 2500000001 HC RX 250 WO HCPCS SELF ADMINISTERED DRUGS (ALT 637 FOR MEDICARE OP): Performed by: INTERNAL MEDICINE

## 2024-03-09 PROCEDURE — 94762 N-INVAS EAR/PLS OXIMTRY CONT: CPT

## 2024-03-09 PROCEDURE — 94640 AIRWAY INHALATION TREATMENT: CPT

## 2024-03-09 PROCEDURE — 80048 BASIC METABOLIC PNL TOTAL CA: CPT | Performed by: NURSE PRACTITIONER

## 2024-03-09 PROCEDURE — 83735 ASSAY OF MAGNESIUM: CPT | Performed by: INTERNAL MEDICINE

## 2024-03-09 RX ORDER — IPRATROPIUM BROMIDE AND ALBUTEROL SULFATE 2.5; .5 MG/3ML; MG/3ML
3 SOLUTION RESPIRATORY (INHALATION)
Status: DISCONTINUED | OUTPATIENT
Start: 2024-03-09 | End: 2024-03-09

## 2024-03-09 RX ORDER — ALBUTEROL SULFATE 90 UG/1
2 AEROSOL, METERED RESPIRATORY (INHALATION) EVERY 4 HOURS PRN
Status: DISCONTINUED | OUTPATIENT
Start: 2024-03-09 | End: 2024-03-12

## 2024-03-09 RX ORDER — DEXTROSE 50 % IN WATER (D50W) INTRAVENOUS SYRINGE
25
Status: DISCONTINUED | OUTPATIENT
Start: 2024-03-09 | End: 2024-03-17

## 2024-03-09 RX ORDER — ALLOPURINOL 300 MG/1
300 TABLET ORAL DAILY
Status: DISCONTINUED | OUTPATIENT
Start: 2024-03-09 | End: 2024-03-17

## 2024-03-09 RX ORDER — DEXTROSE MONOHYDRATE 100 MG/ML
0.3 INJECTION, SOLUTION INTRAVENOUS ONCE AS NEEDED
Status: DISCONTINUED | OUTPATIENT
Start: 2024-03-09 | End: 2024-03-17

## 2024-03-09 RX ORDER — INSULIN LISPRO 100 [IU]/ML
0-10 INJECTION, SOLUTION INTRAVENOUS; SUBCUTANEOUS
Status: DISCONTINUED | OUTPATIENT
Start: 2024-03-09 | End: 2024-03-12

## 2024-03-09 RX ORDER — ACETAMINOPHEN 325 MG/1
650 TABLET ORAL EVERY 6 HOURS PRN
Status: DISCONTINUED | OUTPATIENT
Start: 2024-03-09 | End: 2024-03-17

## 2024-03-09 RX ORDER — DOCUSATE SODIUM 100 MG/1
100 CAPSULE, LIQUID FILLED ORAL 2 TIMES DAILY
Status: DISCONTINUED | OUTPATIENT
Start: 2024-03-09 | End: 2024-03-12

## 2024-03-09 RX ORDER — VALSARTAN 160 MG/1
320 TABLET ORAL DAILY
Status: DISCONTINUED | OUTPATIENT
Start: 2024-03-09 | End: 2024-03-17

## 2024-03-09 RX ORDER — ACETAMINOPHEN 160 MG/5ML
650 SOLUTION ORAL EVERY 6 HOURS PRN
Status: DISCONTINUED | OUTPATIENT
Start: 2024-03-09 | End: 2024-03-17

## 2024-03-09 RX ORDER — IPRATROPIUM BROMIDE AND ALBUTEROL SULFATE 2.5; .5 MG/3ML; MG/3ML
3 SOLUTION RESPIRATORY (INHALATION) EVERY 2 HOUR PRN
Status: DISCONTINUED | OUTPATIENT
Start: 2024-03-09 | End: 2024-03-17

## 2024-03-09 RX ORDER — IPRATROPIUM BROMIDE AND ALBUTEROL SULFATE 2.5; .5 MG/3ML; MG/3ML
3 SOLUTION RESPIRATORY (INHALATION)
Status: DISCONTINUED | OUTPATIENT
Start: 2024-03-09 | End: 2024-03-12

## 2024-03-09 RX ORDER — ACETAMINOPHEN 650 MG/1
650 SUPPOSITORY RECTAL EVERY 6 HOURS PRN
Status: DISCONTINUED | OUTPATIENT
Start: 2024-03-09 | End: 2024-03-17

## 2024-03-09 RX ORDER — FUROSEMIDE 10 MG/ML
40 INJECTION INTRAMUSCULAR; INTRAVENOUS ONCE
Status: COMPLETED | OUTPATIENT
Start: 2024-03-09 | End: 2024-03-09

## 2024-03-09 RX ORDER — METFORMIN HYDROCHLORIDE 500 MG/1
1000 TABLET ORAL 2 TIMES DAILY
Status: DISCONTINUED | OUTPATIENT
Start: 2024-03-09 | End: 2024-03-12

## 2024-03-09 RX ORDER — OLMESARTAN MEDOXOMIL 40 MG/1
40 TABLET ORAL DAILY
Status: DISCONTINUED | OUTPATIENT
Start: 2024-03-09 | End: 2024-03-09

## 2024-03-09 RX ORDER — LEVOTHYROXINE SODIUM 125 UG/1
125 TABLET ORAL DAILY
Status: DISCONTINUED | OUTPATIENT
Start: 2024-03-09 | End: 2024-03-17

## 2024-03-09 RX ORDER — FUROSEMIDE 10 MG/ML
80 INJECTION INTRAMUSCULAR; INTRAVENOUS EVERY 8 HOURS
Status: DISCONTINUED | OUTPATIENT
Start: 2024-03-09 | End: 2024-03-11

## 2024-03-09 RX ORDER — FLUTICASONE FUROATE AND VILANTEROL 100; 25 UG/1; UG/1
1 POWDER RESPIRATORY (INHALATION)
Status: DISCONTINUED | OUTPATIENT
Start: 2024-03-09 | End: 2024-03-17

## 2024-03-09 RX ADMIN — EMPAGLIFLOZIN 10 MG: 10 TABLET, FILM COATED ORAL at 13:20

## 2024-03-09 RX ADMIN — METFORMIN HYDROCHLORIDE 1000 MG: 500 TABLET ORAL at 09:01

## 2024-03-09 RX ADMIN — METFORMIN HYDROCHLORIDE 1000 MG: 500 TABLET ORAL at 20:15

## 2024-03-09 RX ADMIN — Medication 2 L/MIN: at 08:31

## 2024-03-09 RX ADMIN — FUROSEMIDE 10 MG/HR: 10 INJECTION, SOLUTION INTRAMUSCULAR; INTRAVENOUS at 04:55

## 2024-03-09 RX ADMIN — IPRATROPIUM BROMIDE AND ALBUTEROL SULFATE 3 ML: 2.5; .5 SOLUTION RESPIRATORY (INHALATION) at 14:13

## 2024-03-09 RX ADMIN — ENOXAPARIN SODIUM 40 MG: 40 INJECTION SUBCUTANEOUS at 09:01

## 2024-03-09 RX ADMIN — IPRATROPIUM BROMIDE AND ALBUTEROL SULFATE 3 ML: 2.5; .5 SOLUTION RESPIRATORY (INHALATION) at 08:31

## 2024-03-09 RX ADMIN — ENOXAPARIN SODIUM 40 MG: 40 INJECTION SUBCUTANEOUS at 20:15

## 2024-03-09 RX ADMIN — FLUTICASONE FUROATE AND VILANTEROL TRIFENATATE 1 PUFF: 100; 25 POWDER RESPIRATORY (INHALATION) at 13:01

## 2024-03-09 RX ADMIN — LEVOTHYROXINE SODIUM 125 MCG: 0.12 TABLET ORAL at 09:00

## 2024-03-09 RX ADMIN — FUROSEMIDE 40 MG: 10 INJECTION, SOLUTION INTRAMUSCULAR; INTRAVENOUS at 12:59

## 2024-03-09 RX ADMIN — IPRATROPIUM BROMIDE AND ALBUTEROL SULFATE 3 ML: 2.5; .5 SOLUTION RESPIRATORY (INHALATION) at 19:41

## 2024-03-09 RX ADMIN — POTASSIUM CHLORIDE 20 MEQ: 1500 TABLET, EXTENDED RELEASE ORAL at 09:01

## 2024-03-09 RX ADMIN — ALLOPURINOL 300 MG: 300 TABLET ORAL at 09:01

## 2024-03-09 RX ADMIN — DOCUSATE SODIUM 100 MG: 100 CAPSULE, LIQUID FILLED ORAL at 20:15

## 2024-03-09 RX ADMIN — Medication 400 MG: at 09:01

## 2024-03-09 RX ADMIN — TIOTROPIUM BROMIDE INHALATION SPRAY 2 PUFF: 3.12 SPRAY, METERED RESPIRATORY (INHALATION) at 13:01

## 2024-03-09 ASSESSMENT — COGNITIVE AND FUNCTIONAL STATUS - GENERAL
HELP NEEDED FOR BATHING: A LOT
DRESSING REGULAR UPPER BODY CLOTHING: A LOT
WALKING IN HOSPITAL ROOM: A LOT
PERSONAL GROOMING: A LITTLE
DAILY ACTIVITIY SCORE: 15
CLIMB 3 TO 5 STEPS WITH RAILING: A LOT
TOILETING: A LITTLE
DRESSING REGULAR LOWER BODY CLOTHING: A LOT
TOILETING: A LITTLE
MOBILITY SCORE: 15
MOBILITY SCORE: 15
WALKING IN HOSPITAL ROOM: A LOT
DRESSING REGULAR UPPER BODY CLOTHING: A LOT
DRESSING REGULAR LOWER BODY CLOTHING: A LOT
STANDING UP FROM CHAIR USING ARMS: A LOT
MOVING FROM LYING ON BACK TO SITTING ON SIDE OF FLAT BED WITH BEDRAILS: A LITTLE
STANDING UP FROM CHAIR USING ARMS: A LOT
MOVING TO AND FROM BED TO CHAIR: A LITTLE
MOVING TO AND FROM BED TO CHAIR: A LITTLE
EATING MEALS: A LITTLE
HELP NEEDED FOR BATHING: A LOT
PERSONAL GROOMING: A LITTLE
MOVING FROM LYING ON BACK TO SITTING ON SIDE OF FLAT BED WITH BEDRAILS: A LITTLE
CLIMB 3 TO 5 STEPS WITH RAILING: A LOT
TURNING FROM BACK TO SIDE WHILE IN FLAT BAD: A LITTLE
DAILY ACTIVITIY SCORE: 15
TURNING FROM BACK TO SIDE WHILE IN FLAT BAD: A LITTLE
EATING MEALS: A LITTLE

## 2024-03-09 ASSESSMENT — ENCOUNTER SYMPTOMS
ALLERGIC/IMMUNOLOGIC NEGATIVE: 1
EYES NEGATIVE: 1
HEMATOLOGIC/LYMPHATIC NEGATIVE: 1
PSYCHIATRIC NEGATIVE: 1
FATIGUE: 1
GASTROINTESTINAL NEGATIVE: 1
NEUROLOGICAL NEGATIVE: 1
ACTIVITY CHANGE: 1
MUSCULOSKELETAL NEGATIVE: 1
ENDOCRINE COMMENTS: PT IS DIABETIC

## 2024-03-09 ASSESSMENT — ACTIVITIES OF DAILY LIVING (ADL): LACK_OF_TRANSPORTATION: PATIENT DECLINED

## 2024-03-09 ASSESSMENT — PAIN SCALES - GENERAL
PAINLEVEL_OUTOF10: 0 - NO PAIN
PAINLEVEL_OUTOF10: 0 - NO PAIN

## 2024-03-09 ASSESSMENT — PAIN - FUNCTIONAL ASSESSMENT: PAIN_FUNCTIONAL_ASSESSMENT: 0-10

## 2024-03-09 NOTE — NURSING NOTE
Dr. Chávez notified of patient with 13 beat run v tach, asymptomatic. /67 HR 85, Resp 22, O2 93, denies chest pain or increased SOB. New orders for labs in AM.

## 2024-03-09 NOTE — PROGRESS NOTES
03/09/24 1044   Discharge Planning   Living Arrangements Alone   Support Systems Friends/neighbors   Assistance Needed alert x3, independent   Type of Residence Private residence   Who is requesting discharge planning? Provider   Home or Post Acute Services None   Patient expects to be discharged to: home NN   Does the patient need discharge transport arranged? Yes   RoundTrip coordination needed? Yes   Housing Stability   In the last 12 months, was there a time when you were not able to pay the mortgage or rent on time? Pt Declined   In the last 12 months, how many places have you lived? 1   In the last 12 months, was there a time when you did not have a steady place to sleep or slept in a shelter (including now)? Pt Declined   Transportation Needs   In the past 12 months, has lack of transportation kept you from medical appointments or from getting medications? Pt Declined   In the past 12 months, has lack of transportation kept you from meetings, work, or from getting things needed for daily living? Pt Declined

## 2024-03-09 NOTE — NURSING NOTE
"Dr. Chávez notified of patient refusal to start Lasix drip tonight. Stated \"he didn't want to be urinating all night and needed to sleep\". No new orders at this time.  "

## 2024-03-09 NOTE — NURSING NOTE
Dr. Chávez notified of patient agreeable to start Lasix drip at this time. Patient is still reluctant and attempting to make excuses of why he doesn't think he wants to. This RN was able to inform him of the importance and how needed the medication is to help him with the edema and increased SOB he is experiencing.

## 2024-03-09 NOTE — PROGRESS NOTES
Alo Glass is a 68 y.o. male on day 1 of admission presenting with Heart failure exacerbated by sotalol (CMS/HCC).    Subjective   Pt is feeling better. He is still requiring O2. He is upset he is on lasix gtt and he has not slept much.       Objective     Physical Exam  Vitals and nursing note reviewed.   Constitutional:       General: He is not in acute distress.     Appearance: Normal appearance. He is not ill-appearing or toxic-appearing.   HENT:      Head: Normocephalic and atraumatic.      Mouth/Throat:      Mouth: Mucous membranes are moist.   Eyes:      Extraocular Movements: Extraocular movements intact.      Conjunctiva/sclera: Conjunctivae normal.      Pupils: Pupils are equal, round, and reactive to light.   Cardiovascular:      Rate and Rhythm: Normal rate and regular rhythm.      Heart sounds: Murmur heard.      No gallop.   Pulmonary:      Effort: Pulmonary effort is normal. No respiratory distress.      Breath sounds: Rhonchi and rales present. No wheezing.   Abdominal:      General: Abdomen is flat. Bowel sounds are normal. There is no distension.      Palpations: Abdomen is soft. There is no mass.      Tenderness: There is no abdominal tenderness.   Musculoskeletal:         General: Swelling present. No tenderness. Normal range of motion.      Cervical back: Normal range of motion and neck supple.      Right lower leg: Edema present.      Left lower leg: Edema present.      Comments: +4 pitting edema in lower extremities    Skin:     General: Skin is warm and dry.   Neurological:      General: No focal deficit present.      Mental Status: He is alert and oriented to person, place, and time. Mental status is at baseline.   Psychiatric:         Mood and Affect: Mood normal.         Behavior: Behavior normal.         Thought Content: Thought content normal.         Judgment: Judgment normal.         Last Recorded Vitals:  BP 90/63   Pulse 90   Temp 36.5 °C (97.7 °F)   Resp 20   Ht 1.803 m  "(5' 10.98\")   Wt (!) 157 kg (347 lb 0.1 oz)   SpO2 90%   BMI 48.42 kg/m²      Scheduled medications:  allopurinol, 300 mg, oral, Daily  docusate sodium, 100 mg, oral, BID  empagliflozin, 10 mg, oral, Daily  enoxaparin, 40 mg, subcutaneous, q12h SHARRON  tiotropium, 2 puff, inhalation, Daily   And  fluticasone furoate-vilanteroL, 1 puff, inhalation, Daily  insulin lispro, 0-10 Units, subcutaneous, Before meals & nightly  ipratropium-albuteroL, 3 mL, nebulization, TID  levothyroxine, 125 mcg, oral, Daily  metFORMIN, 1,000 mg, oral, BID  potassium chloride CR, 20 mEq, oral, Daily  [Held by provider] valsartan, 320 mg, oral, Daily      Continuous medications:  furosemide (Lasix) 500 mg (10 mg/mL) infusion, 10 mg/hr, Last Rate: 10 mg/hr (03/09/24 5753)      PRN medications:  PRN medications: acetaminophen **OR** acetaminophen **OR** acetaminophen, albuterol, dextrose 10 % in water (D10W), dextrose, glucagon, ipratropium-albuteroL, oxygen     Relevant Results:  Results for orders placed or performed during the hospital encounter of 03/08/24 (from the past 24 hour(s))   CBC and Auto Differential   Result Value Ref Range    WBC 10.5 4.4 - 11.3 x10*3/uL    nRBC 0.0 0.0 - 0.0 /100 WBCs    RBC 5.53 4.50 - 5.90 x10*6/uL    Hemoglobin 18.7 (H) 13.5 - 17.5 g/dL    Hematocrit 57.5 (H) 41.0 - 52.0 %     (H) 80 - 100 fL    MCH 33.8 26.0 - 34.0 pg    MCHC 32.5 32.0 - 36.0 g/dL    RDW 14.0 11.5 - 14.5 %    Platelets 170 150 - 450 x10*3/uL    Neutrophils % 79.2 40.0 - 80.0 %    Immature Granulocytes %, Automated 0.4 0.0 - 0.9 %    Lymphocytes % 11.7 13.0 - 44.0 %    Monocytes % 7.6 2.0 - 10.0 %    Eosinophils % 0.7 0.0 - 6.0 %    Basophils % 0.4 0.0 - 2.0 %    Neutrophils Absolute 8.36 (H) 1.20 - 7.70 x10*3/uL    Immature Granulocytes Absolute, Automated 0.04 0.00 - 0.70 x10*3/uL    Lymphocytes Absolute 1.23 1.20 - 4.80 x10*3/uL    Monocytes Absolute 0.80 0.10 - 1.00 x10*3/uL    Eosinophils Absolute 0.07 0.00 - 0.70 x10*3/uL    " Basophils Absolute 0.04 0.00 - 0.10 x10*3/uL   Magnesium   Result Value Ref Range    Magnesium 1.79 1.60 - 2.40 mg/dL   Comprehensive metabolic panel   Result Value Ref Range    Glucose 104 (H) 74 - 99 mg/dL    Sodium 144 136 - 145 mmol/L    Potassium 4.0 3.5 - 5.3 mmol/L    Chloride 106 98 - 107 mmol/L    Bicarbonate 25 21 - 32 mmol/L    Anion Gap 17 10 - 20 mmol/L    Urea Nitrogen 14 6 - 23 mg/dL    Creatinine 0.68 0.50 - 1.30 mg/dL    eGFR >90 >60 mL/min/1.73m*2    Calcium 8.6 8.6 - 10.3 mg/dL    Albumin 3.7 3.4 - 5.0 g/dL    Alkaline Phosphatase 77 33 - 136 U/L    Total Protein 6.2 (L) 6.4 - 8.2 g/dL    AST 18 9 - 39 U/L    Bilirubin, Total 1.1 0.0 - 1.2 mg/dL    ALT 20 10 - 52 U/L   Troponin I, High Sensitivity   Result Value Ref Range    Troponin I, High Sensitivity 71 (HH) 0 - 20 ng/L   B-Type Natriuretic Peptide   Result Value Ref Range     (H) 0 - 99 pg/mL   D-Dimer, VTE Exclusion   Result Value Ref Range    D-Dimer, Quantitative VTE Exclusion 678 (H) <=500 ng/mL FEU   B-type natriuretic peptide   Result Value Ref Range     (H) 0 - 99 pg/mL   Troponin I, High Sensitivity   Result Value Ref Range    Troponin I, High Sensitivity 73 (HH) 0 - 20 ng/L   Sars-CoV-2 and Influenza A/B PCR   Result Value Ref Range    Flu A Result Not Detected Not Detected    Flu B Result Not Detected Not Detected    Coronavirus 2019, PCR Not Detected Not Detected   RSV PCR   Result Value Ref Range    RSV PCR Not Detected Not Detected   POCT GLUCOSE   Result Value Ref Range    POCT Glucose 138 (H) 74 - 99 mg/dL   Basic Metabolic Panel   Result Value Ref Range    Glucose 118 (H) 74 - 99 mg/dL    Sodium 142 136 - 145 mmol/L    Potassium 4.0 3.5 - 5.3 mmol/L    Chloride 106 98 - 107 mmol/L    Bicarbonate 29 21 - 32 mmol/L    Anion Gap 11 10 - 20 mmol/L    Urea Nitrogen 17 6 - 23 mg/dL    Creatinine 0.72 0.50 - 1.30 mg/dL    eGFR >90 >60 mL/min/1.73m*2    Calcium 8.4 (L) 8.6 - 10.3 mg/dL   Magnesium   Result Value Ref Range     Magnesium 1.93 1.60 - 2.40 mg/dL   Troponin I, High Sensitivity   Result Value Ref Range    Troponin I, High Sensitivity 49 (H) 0 - 20 ng/L   TSH   Result Value Ref Range    Thyroid Stimulating Hormone 2.64 0.44 - 3.98 mIU/L   POCT GLUCOSE   Result Value Ref Range    POCT Glucose 123 (H) 74 - 99 mg/dL   POCT GLUCOSE   Result Value Ref Range    POCT Glucose 124 (H) 74 - 99 mg/dL       XR chest 2 views    Result Date: 3/8/2024  Interpreted By:  Jennifer Padilla, STUDY: XR CHEST 2 VIEWS; ;  3/8/2024 5:45 pm   INDICATION: Signs/Symptoms:dyspnea.   COMPARISON: 09/18/2018.   ACCESSION NUMBER(S): [id number]   ORDERING CLINICIAN: BRAYAN CRESPO   TECHNIQUE: Two view chest   FINDINGS: Moderate right pleural effusion and right lung base consolidation. Trace left pleural effusion.. Mild interstitial prominence enlarged cardiac silhouette. Visualized mediastinal contours are within normal limits. No evidence of pneumothorax. No acute osseous finding.       Moderate right pleural effusion and right lung base consolidation. Findings could be due to pneumonia. Other underlying process not excluded. Follow-up recommended.   Trace left pleural effusion and cardiomegaly. Possible interstitial edema.     Signed by: Jennifer Padilla 3/8/2024 6:19 PM Dictation workstation:   [id number]            Assessment/Plan   Principal Problem:    Heart failure exacerbated by sotalol (CMS/HCC)  Active Problems:    Acute on chronic congestive heart failure, unspecified heart failure type (CMS/HCC)    Acute hypoxic respiratory failure 2/2 CHF exacerbation  Unlikely pna  Hx COPD not in exacerbation  - cardio following  - echo  - lasix gtt  - jardiance  - check procal  - home inhalers    HTN  - hold valsartan    DM  - metformin  - jardiance  - ISS    MELISSA  - nocturnal CPAP  - can do nocturnal pulse ox study    Hypothyroid  - Synthroid         Maribel Bell MD  Hospitalist

## 2024-03-09 NOTE — CONSULTS
"Consults  History Of Present Illness:    Alo Glass is a 68 y.o. male from home with h/o obesity, MELISSA on CPAP, COPD, current smoker, type 2 DM, hypertension, lymphedema, moderate aortic stenosis (6/2019) presenting from PCP office with symptoms of increased LE edema, abdominal bloating, progressive SOB over the past year found to be hypoxic in the ER.  CXR shwos moderate right pleural effusion and trace left pleural effusion. . Troponin 71, 73, 49.      Last Recorded Vitals:  Vitals:    03/08/24 2052 03/09/24 0456 03/09/24 0831 03/09/24 0855   BP: 136/87 108/76  104/71   BP Location: Right arm Left arm  Left arm   Patient Position: Sitting Sitting     Pulse: 105 83  84   Resp: 20 20  20   Temp: 36.6 °C (97.9 °F) 36.3 °C (97.3 °F)  36.5 °C (97.7 °F)   TempSrc: Temporal Temporal     SpO2: 94% 94% 95% 93%   Weight: (!) 159 kg (349 lb 13.9 oz) (!) 157 kg (347 lb 0.1 oz)     Height: 1.803 m (5' 10.98\")          Last Labs:  CBC - 3/8/2024:  5:28 PM  10.5 18.7 170    57.5      CMP - 3/9/2024:  6:07 AM  8.4 6.2 18 --- 1.1   _ 3.7 20 77      PTT - No results in last year.  _   _ _     Troponin I, High Sensitivity   Date/Time Value Ref Range Status   03/09/2024 06:07 AM 49 (H) 0 - 20 ng/L Final   03/08/2024 06:27 PM 73 (HH) 0 - 20 ng/L Final     Comment:     Previous result verified on 3/8/2024 1803 on specimen/case [id number] called with component [facility] for procedure Troponin I, High Sensitivity with value 71 ng/L.   03/08/2024 05:28 PM 71 (HH) 0 - 20 ng/L Final     BNP   Date/Time Value Ref Range Status   03/08/2024 05:28  (H) 0 - 99 pg/mL Final   03/08/2024 05:28  (H) 0 - 99 pg/mL Final     Hemoglobin A1C   Date/Time Value Ref Range Status   02/20/2024 10:35 AM 5.8 (H) see below % Final   01/19/2023 10:23 AM 7.3 (A) % Final     Comment:          Diagnosis of Diabetes-Adults   Non-Diabetic: < or = 5.6%   Increased risk for developing diabetes: 5.7-6.4%   Diagnostic of diabetes: > or = " 6.5%  .       Monitoring of Diabetes                Age (y)     Therapeutic Goal (%)   Adults:          >18           <7.0   Pediatrics:    13-18           <7.5                   7-12           <8.0                   0- 6            7.5-8.5   American Diabetes Association. Diabetes Care 33(S1), Jan 2010.     09/10/2021 01:29 PM 7.9 (A) % Final     Comment:          Diagnosis of Diabetes-Adults   Non-Diabetic: < or = 5.6%   Increased risk for developing diabetes: 5.7-6.4%   Diagnostic of diabetes: > or = 6.5%  .       Monitoring of Diabetes                Age (y)     Therapeutic Goal (%)   Adults:          >18           <7.0   Pediatrics:    13-18           <7.5                   7-12           <8.0                   0- 6            7.5-8.5   American Diabetes Association. Diabetes Care 33(S1), Jan 2010.       LDL Calculated   Date/Time Value Ref Range Status   02/20/2024 10:35  (H) <=99 mg/dL Final     Comment:                                 Near   Borderline      AGE      Desirable  Optimal    High     High     Very High     0-19 Y     0 - 109     ---    110-129   >/= 130     ----    20-24 Y     0 - 119     ---    120-159   >/= 160     ----      >24 Y     0 -  99   100-129  130-159   160-189     >/=190       VLDL   Date/Time Value Ref Range Status   02/20/2024 10:35 AM 16 0 - 40 mg/dL Final   09/10/2021 01:29 PM 27 0 - 40 mg/dL Final   09/18/2018 02:26 PM 22 0 - 40 mg/dL Final      Last I/O:  No intake/output data recorded.    Past Cardiology Tests (Last 3 Years):  EKG:  NSR, HR 99bpm, RBBB, left axis deviation     Echo:  6/2019  Low normal EF, moderate stenosis (pk/mn grad 50/26, CHAR 1.1cm)      Past Medical History:  He has a past medical history of Personal history of other specified conditions (01/08/2023).    Past Surgical History:  He has a past surgical history that includes Hernia repair (11/07/2013) and Other surgical history (11/07/2013).      Social History:  He reports that he has never smoked.  He has never used smokeless tobacco. No history on file for alcohol use and drug use.    Family History:  No family history on file.     Allergies:  Patient has no known allergies.    Inpatient Medications:  Scheduled medications   Medication Dose Route Frequency    allopurinol  300 mg oral Daily    docusate sodium  100 mg oral BID    enoxaparin  40 mg subcutaneous q12h SHARRON    tiotropium  2 puff inhalation Daily    And    fluticasone furoate-vilanteroL  1 puff inhalation Daily    insulin lispro  0-10 Units subcutaneous Before meals & nightly    ipratropium-albuteroL  3 mL nebulization TID    levothyroxine  125 mcg oral Daily    metFORMIN  1,000 mg oral BID    potassium chloride CR  20 mEq oral Daily    tirzepatide  2.5 mg subcutaneous Weekly    [Held by provider] valsartan  320 mg oral Daily     PRN medications   Medication    acetaminophen    Or    acetaminophen    Or    acetaminophen    albuterol    dextrose 10 % in water (D10W)    dextrose    glucagon    ipratropium-albuteroL    oxygen     Continuous Medications   Medication Dose Last Rate    furosemide (Lasix) 500 mg (10 mg/mL) infusion  10 mg/hr 10 mg/hr (03/09/24 2412)     Outpatient Medications:  Current Outpatient Medications   Medication Instructions    albuterol 90 mcg/actuation inhaler 2 puffs, inhalation, Every 4 hours PRN    albuterol 2.5 mg, nebulization, Every 4 hours PRN    allopurinol (ZYLOPRIM) 300 mg, oral, Daily    allopurinol (ZYLOPRIM) 300 mg, oral, Daily    Dulera 100-5 mcg/actuation inhaler 2 puffs, inhalation, 2 times daily, Rinse mouth after use.    fluticasone-umeclidin-vilanter (Trelegy Ellipta) 100-62.5-25 mcg blister with device one puff per day    indomethacin (INDOCIN) 50 mg, oral, 2 times daily PRN    ipratropium-albuteroL (Duo-Neb) 0.5-2.5 mg/3 mL nebulizer solution 3 mL, nebulization, 4 times daily RT    levothyroxine (SYNTHROID, LEVOXYL) 125 mcg, oral, Daily    metFORMIN (GLUCOPHAGE) 1,000 mg, oral, 2 times daily    Mounjaro 2.5  mg, subcutaneous, Weekly    Mounjaro 2.5 mg, subcutaneous, Weekly    olmesartan (BENICAR) 40 mg, oral, Daily       Physical Exam:  Constitutional:Awake/Alert/Oriented to person place and time.  Head: Atraumatic, Normocephalic  Eyes: EOMI. MARGO  Neck: + JVD  Cardiovascular: Regular rate and rhythm, S1, S2. No extra heart sounds or murmurs  Respiratory: expiratory wheeze with crackles noted at bases posteriorly   Abdomen: obese, distended, non-tender   Musculoskeletal: ROM intact. Muscle strength grossly intact upper and lower extremities 5/5.   Neurological: CNII-XII intact. Sensation grossly intact  Extremities: BLE lymphedema, 4+ pitting edema BLE with weeping ulcers left leg   Psychiatric: Appropriate mood and affect       Assessment/Plan   67 yo male from home with h/o obesity, MELISSA on CPAP, COPD, current smoker, type 2 DM, hypertension, lymphedema, moderate aortic stenosis (6/2019) presenting from PCP office with symptoms of increased LE edema, abdominal bloating, progressive SOB over the past year found to be hypoxic in the ER.    Assessment:  Acute hypoxic respiratory failure  Acute heart failure (echo pending)  Suspected cor pulmonale   Moderate aortic stenosis on prior echo 6/2019  Chronic lymphedema BLE   Polycythemia   COPD     Plan:  -Agree with lasix drip 10mg/hr-- will give 40mg IV bolus now  -Add jardiance 10mg daily  -Will look to add spironolactone as BP/renal function tolerate  -Hold valsartan given relative hypotension  -Consider adding BB pending echo results and as BP tolerates  -Will obtain an echocardiogram on Monday   -Strict I/O, monitor renal function and electrolytes     Peripheral IV 03/08/24 20 G Right Hand (Active)   Site Assessment Clean;Dry;Intact 03/09/24 0900   Dressing Status Clean;Dry;Occlusive 03/09/24 0900   Number of days: 1       Code Status:  Full Code      PARAM Summers-CNP

## 2024-03-09 NOTE — H&P
"History Of Present Illness  Alo Glass is a 68 y.o. male with history of hypertension, COPD, type 2 diabetes mellitus, severe obesity, and obstructive sleep apnea presenting with SOB.  He says he has been having difficulty breathing for the past several months and it has gotten worse lately.  He was at his PCPs office today and his pulse ox was in the high 80s.  PCP expressed concerns about his dyspnea and advised him to go to the ED. Pt endorses orthopnea, worsening bilateral leg edema, cough productive of clear sputum and wheezing but denies having chest pain, PND, fever or chills.  His labs in the ED were notable for a BNP of 415. CXR showed evidence of heart failure. He says he was prescribed diuretics in the past but he does not take them as prescribed because he does not like going to the bathroom. He mentions that he regularly consumes salty and \"well seasoned\" foods.      Past Medical History  Past Medical History:   Diagnosis Date    Personal history of other specified conditions 01/08/2023    History of impaired glucose tolerance   Benign essential hypertension  COPD  Type 2 diabetes mellitus  Gastroesophageal reflux disease  Gout  Bilateral lower extremity edema  Tobacco use disorder  Hyperlipidemia  Hypothyroidism  Obesity  Thoracic aortic aneurysm  Obstructive sleep apnea  Polycythemia  Lymphedema    Past Surgical History  Past Surgical History:   Procedure Laterality Date    HERNIA REPAIR  11/07/2013    Hernia Repair    OTHER SURGICAL HISTORY  11/07/2013    Oral Surgery        Social History  Problems    · Cigarette nicotine dependence (305.1) (F17.210)   · Current every day smoker (305.1) (F17.200)   · 5/2019, smokes 2 PPD for 40+ years. Going to try to quit on Chantix.   · Daily alcohol use   · 3 Gin Martini's daily   · Daily caffeinated coffee consumption   · 4 cups daily   · Does not use illicit drugs (V49.89) (Z78.9)   · Employment History: (V62.1)   · Living Independently Alone (V60.3)   · " Marital History - Single   · Retired   · Single   · Tobacco use (305.1) (Z72.0)   · History of Tobacco use (305.1) (Z72.0)   · less than 1/2 pack/day; trying to stop    Family History  Mother    · Family history of Alzheimer Disease   · Family history of CABG (V17.49) (Z82.49)   · Family history of coronary artery disease (V17.3) (Z82.49)   · Family history of diabetes mellitus (V18.0) (Z83.3)   · Family history of myocardial infarction (V17.3) (Z82.49)  Maternal Grandfather    · Family history of myocardial infarction (V17.3) (Z82.49)  Maternal Cousin    · Family history of myocardial infarction (V17.3) (Z82.49)     Allergies  Patient has no known allergies.    Review of Systems   Constitutional:  Positive for activity change and fatigue.   HENT: Negative.     Eyes: Negative.    Respiratory:          See HPI   Cardiovascular:         See HPI   Gastrointestinal: Negative.    Endocrine:        Pt is diabetic   Genitourinary: Negative.    Musculoskeletal: Negative.    Skin: Negative.    Allergic/Immunologic: Negative.    Neurological: Negative.    Hematological: Negative.    Psychiatric/Behavioral: Negative.          Physical Exam  Constitutional:       General: He is in acute distress.      Appearance: He is obese. He is not toxic-appearing or diaphoretic.   HENT:      Head: Normocephalic and atraumatic.      Nose: Nose normal.      Mouth/Throat:      Mouth: Mucous membranes are moist.      Pharynx: Oropharynx is clear. No oropharyngeal exudate or posterior oropharyngeal erythema.   Eyes:      General: No scleral icterus.        Right eye: No discharge.         Left eye: No discharge.      Extraocular Movements: Extraocular movements intact.      Conjunctiva/sclera: Conjunctivae normal.   Neck:      Vascular: No carotid bruit.      Comments: (+) JVD  Cardiovascular:      Heart sounds: Murmur heard.      Comments: S1 and S2 with S3 gallop  Pulmonary:      Effort: No respiratory distress.      Breath sounds: Rales  "present. No wheezing or rhonchi.   Abdominal:      General: There is distension.      Palpations: There is no mass.      Tenderness: There is no abdominal tenderness. There is no right CVA tenderness, left CVA tenderness, guarding or rebound.   Musculoskeletal:      Cervical back: Neck supple.      Right lower leg: Edema present.      Left lower leg: Edema present.      Comments: 4+ BLE edema   Lymphadenopathy:      Cervical: No cervical adenopathy.   Skin:     General: Skin is warm and dry.      Findings: No lesion or rash.   Neurological:      General: No focal deficit present.      Mental Status: He is alert and oriented to person, place, and time.   Psychiatric:         Mood and Affect: Mood normal.         Behavior: Behavior normal.          Last Recorded Vitals  Blood pressure 136/87, pulse 105, temperature 36.6 °C (97.9 °F), temperature source Temporal, resp. rate 20, height 1.803 m (5' 10.98\"), weight (!) 159 kg (349 lb 13.9 oz), SpO2 94 %.    Relevant Results   Latest Reference Range & Units 03/08/24 17:28 03/08/24 18:27 03/08/24 21:22   GLUCOSE 74 - 99 mg/dL 104 (H)     SODIUM 136 - 145 mmol/L 144     POTASSIUM 3.5 - 5.3 mmol/L 4.0     CHLORIDE 98 - 107 mmol/L 106     Bicarbonate 21 - 32 mmol/L 25     Anion Gap 10 - 20 mmol/L 17     Blood Urea Nitrogen 6 - 23 mg/dL 14     Creatinine 0.50 - 1.30 mg/dL 0.68     EGFR >60 mL/min/1.73m*2 >90     Calcium 8.6 - 10.3 mg/dL 8.6     Albumin 3.4 - 5.0 g/dL 3.7     Alkaline Phosphatase 33 - 136 U/L 77     ALT 10 - 52 U/L 20     AST 9 - 39 U/L 18     Bilirubin Total 0.0 - 1.2 mg/dL 1.1     Total Protein 6.4 - 8.2 g/dL 6.2 (L)     MAGNESIUM 1.60 - 2.40 mg/dL 1.79     BNP 0 - 99 pg/mL 415 (H)     Troponin I, High Sensitivity 0 - 20 ng/L 71 (HH) 73 (HH)    D-Dimer, Quantitative VTE Exclusion <=500 ng/mL  (H)     WBC 4.4 - 11.3 x10*3/uL 10.5     nRBC 0.0 - 0.0 /100 WBCs 0.0     RBC 4.50 - 5.90 x10*6/uL 5.53     HEMOGLOBIN 13.5 - 17.5 g/dL 18.7 (H)     HEMATOCRIT " 41.0 - 52.0 % 57.5 (H)     MCV 80 - 100 fL 104 (H)     MCH 26.0 - 34.0 pg 33.8     MCHC 32.0 - 36.0 g/dL 32.5     RED CELL DISTRIBUTION WIDTH 11.5 - 14.5 % 14.0     Platelets 150 - 450 x10*3/uL 170     Neutrophils % 40.0 - 80.0 % 79.2     Immature Granulocytes %, Automated 0.0 - 0.9 % 0.4     Lymphocytes % 13.0 - 44.0 % 11.7     Monocytes % 2.0 - 10.0 % 7.6     Eosinophils % 0.0 - 6.0 % 0.7     Basophils % 0.0 - 2.0 % 0.4     Neutrophils Absolute 1.20 - 7.70 x10*3/uL 8.36 (H)     Immature Granulocytes Absolute, Automated 0.00 - 0.70 x10*3/uL 0.04     Lymphocytes Absolute 1.20 - 4.80 x10*3/uL 1.23     Monocytes Absolute 0.10 - 1.00 x10*3/uL 0.80     Eosinophils Absolute 0.00 - 0.70 x10*3/uL 0.07     Basophils Absolute 0.00 - 0.10 x10*3/uL 0.04     Flu A Result Not Detected   Not Detected    Flu B Result Not Detected   Not Detected    RSV PCR Not Detected   Not Detected    Coronavirus 2019, PCR Not Detected   Not Detected    POCT Glucose 74 - 99 mg/dL   138 (H)   (HH): Data is critically high  (H): Data is abnormally high  (L): Data is abnormally low    XR CHEST:   IMPRESSION:  Moderate right pleural effusion and right lung base consolidation.  Findings could be due to pneumonia. Other underlying process not  excluded. Follow-up recommended.      Trace left pleural effusion and cardiomegaly. Possible interstitial  edema.     Assessment/Plan   Heart failure  Admit to telemetry  Check serial troponin levels  Check 2D ECHO  Check TSH  Diurese: will place on IV lasix infusion  Continue ARB  2g Na diet and 1500 ml fluid daily restriction  Dietician consult for help with dietary management of heart failure/volume overload  Daily wts and I/O  Cardiology consulted    Question of pneumonia  As noted on CXR report  Pt clinically does not appear to have pneumonia  His clinical presentation is more consistent with heart failure  Will not treat with antibiotics at this time    Type II DM  Resume home glucose lowering meds  Accu  checks and will add ISS coverage as needed    MELISSA  On nocturnal CPAP    Hypothyroidism  Resume current dose of levothyroxine  Will check TSH    COPD  Counseled on smoking cessation  Resume LABA+LAMA+ICS      I spent 75 minutes in the professional and overall care of this patient.      Obi Chávez MD

## 2024-03-10 ENCOUNTER — APPOINTMENT (OUTPATIENT)
Dept: CARDIOLOGY | Facility: HOSPITAL | Age: 69
DRG: 871 | End: 2024-03-10
Payer: MEDICARE

## 2024-03-10 LAB
ALBUMIN SERPL BCP-MCNC: 3.7 G/DL (ref 3.4–5)
ALP SERPL-CCNC: 64 U/L (ref 33–136)
ALT SERPL W P-5'-P-CCNC: 18 U/L (ref 10–52)
ANION GAP SERPL CALC-SCNC: 14 MMOL/L (ref 10–20)
AST SERPL W P-5'-P-CCNC: 15 U/L (ref 9–39)
BILIRUB SERPL-MCNC: 1.1 MG/DL (ref 0–1.2)
BNP SERPL-MCNC: 171 PG/ML (ref 0–99)
BUN SERPL-MCNC: 18 MG/DL (ref 6–23)
CALCIUM SERPL-MCNC: 9.1 MG/DL (ref 8.6–10.3)
CHLORIDE SERPL-SCNC: 99 MMOL/L (ref 98–107)
CO2 SERPL-SCNC: 34 MMOL/L (ref 21–32)
CREAT SERPL-MCNC: 0.83 MG/DL (ref 0.5–1.3)
EGFRCR SERPLBLD CKD-EPI 2021: >90 ML/MIN/1.73M*2
ERYTHROCYTE [DISTWIDTH] IN BLOOD BY AUTOMATED COUNT: 14.1 % (ref 11.5–14.5)
GLUCOSE BLD MANUAL STRIP-MCNC: 121 MG/DL (ref 74–99)
GLUCOSE BLD MANUAL STRIP-MCNC: 123 MG/DL (ref 74–99)
GLUCOSE BLD MANUAL STRIP-MCNC: 181 MG/DL (ref 74–99)
GLUCOSE BLD MANUAL STRIP-MCNC: 95 MG/DL (ref 74–99)
GLUCOSE SERPL-MCNC: 104 MG/DL (ref 74–99)
HCT VFR BLD AUTO: 57.5 % (ref 41–52)
HGB BLD-MCNC: 18.4 G/DL (ref 13.5–17.5)
MCH RBC QN AUTO: 33.2 PG (ref 26–34)
MCHC RBC AUTO-ENTMCNC: 32 G/DL (ref 32–36)
MCV RBC AUTO: 104 FL (ref 80–100)
NRBC BLD-RTO: 0 /100 WBCS (ref 0–0)
PLATELET # BLD AUTO: 198 X10*3/UL (ref 150–450)
POTASSIUM SERPL-SCNC: 3.5 MMOL/L (ref 3.5–5.3)
PROT SERPL-MCNC: 6.1 G/DL (ref 6.4–8.2)
RBC # BLD AUTO: 5.55 X10*6/UL (ref 4.5–5.9)
SODIUM SERPL-SCNC: 143 MMOL/L (ref 136–145)
WBC # BLD AUTO: 9.2 X10*3/UL (ref 4.4–11.3)

## 2024-03-10 PROCEDURE — 2500000002 HC RX 250 W HCPCS SELF ADMINISTERED DRUGS (ALT 637 FOR MEDICARE OP, ALT 636 FOR OP/ED): Performed by: NURSE PRACTITIONER

## 2024-03-10 PROCEDURE — 2500000001 HC RX 250 WO HCPCS SELF ADMINISTERED DRUGS (ALT 637 FOR MEDICARE OP): Performed by: NURSE PRACTITIONER

## 2024-03-10 PROCEDURE — 36415 COLL VENOUS BLD VENIPUNCTURE: CPT | Performed by: STUDENT IN AN ORGANIZED HEALTH CARE EDUCATION/TRAINING PROGRAM

## 2024-03-10 PROCEDURE — 2500000005 HC RX 250 GENERAL PHARMACY W/O HCPCS: Performed by: INTERNAL MEDICINE

## 2024-03-10 PROCEDURE — 2500000002 HC RX 250 W HCPCS SELF ADMINISTERED DRUGS (ALT 637 FOR MEDICARE OP, ALT 636 FOR OP/ED): Performed by: INTERNAL MEDICINE

## 2024-03-10 PROCEDURE — 85027 COMPLETE CBC AUTOMATED: CPT | Performed by: STUDENT IN AN ORGANIZED HEALTH CARE EDUCATION/TRAINING PROGRAM

## 2024-03-10 PROCEDURE — 84145 PROCALCITONIN (PCT): CPT | Mod: GEALAB | Performed by: STUDENT IN AN ORGANIZED HEALTH CARE EDUCATION/TRAINING PROGRAM

## 2024-03-10 PROCEDURE — 93010 ELECTROCARDIOGRAM REPORT: CPT | Performed by: INTERNAL MEDICINE

## 2024-03-10 PROCEDURE — 93005 ELECTROCARDIOGRAM TRACING: CPT

## 2024-03-10 PROCEDURE — 80053 COMPREHEN METABOLIC PANEL: CPT | Performed by: STUDENT IN AN ORGANIZED HEALTH CARE EDUCATION/TRAINING PROGRAM

## 2024-03-10 PROCEDURE — 2500000001 HC RX 250 WO HCPCS SELF ADMINISTERED DRUGS (ALT 637 FOR MEDICARE OP): Performed by: INTERNAL MEDICINE

## 2024-03-10 PROCEDURE — 2500000004 HC RX 250 GENERAL PHARMACY W/ HCPCS (ALT 636 FOR OP/ED): Performed by: NURSE PRACTITIONER

## 2024-03-10 PROCEDURE — 99232 SBSQ HOSP IP/OBS MODERATE 35: CPT | Performed by: NURSE PRACTITIONER

## 2024-03-10 PROCEDURE — 94762 N-INVAS EAR/PLS OXIMTRY CONT: CPT

## 2024-03-10 PROCEDURE — 83880 ASSAY OF NATRIURETIC PEPTIDE: CPT | Performed by: STUDENT IN AN ORGANIZED HEALTH CARE EDUCATION/TRAINING PROGRAM

## 2024-03-10 PROCEDURE — 94640 AIRWAY INHALATION TREATMENT: CPT

## 2024-03-10 PROCEDURE — 99233 SBSQ HOSP IP/OBS HIGH 50: CPT | Performed by: STUDENT IN AN ORGANIZED HEALTH CARE EDUCATION/TRAINING PROGRAM

## 2024-03-10 PROCEDURE — 94760 N-INVAS EAR/PLS OXIMETRY 1: CPT

## 2024-03-10 PROCEDURE — 82947 ASSAY GLUCOSE BLOOD QUANT: CPT

## 2024-03-10 PROCEDURE — 1100000001 HC PRIVATE ROOM DAILY

## 2024-03-10 PROCEDURE — 2500000004 HC RX 250 GENERAL PHARMACY W/ HCPCS (ALT 636 FOR OP/ED): Performed by: STUDENT IN AN ORGANIZED HEALTH CARE EDUCATION/TRAINING PROGRAM

## 2024-03-10 RX ADMIN — LEVOTHYROXINE SODIUM 125 MCG: 0.12 TABLET ORAL at 09:25

## 2024-03-10 RX ADMIN — POTASSIUM CHLORIDE 20 MEQ: 1500 TABLET, EXTENDED RELEASE ORAL at 09:25

## 2024-03-10 RX ADMIN — ALLOPURINOL 300 MG: 300 TABLET ORAL at 09:26

## 2024-03-10 RX ADMIN — METFORMIN HYDROCHLORIDE 1000 MG: 500 TABLET ORAL at 20:01

## 2024-03-10 RX ADMIN — Medication 2 L/MIN: at 08:43

## 2024-03-10 RX ADMIN — ENOXAPARIN SODIUM 40 MG: 40 INJECTION SUBCUTANEOUS at 09:27

## 2024-03-10 RX ADMIN — EMPAGLIFLOZIN 10 MG: 10 TABLET, FILM COATED ORAL at 09:25

## 2024-03-10 RX ADMIN — FUROSEMIDE 80 MG: 10 INJECTION, SOLUTION INTRAMUSCULAR; INTRAVENOUS at 12:16

## 2024-03-10 RX ADMIN — IPRATROPIUM BROMIDE AND ALBUTEROL SULFATE 3 ML: 2.5; .5 SOLUTION RESPIRATORY (INHALATION) at 08:43

## 2024-03-10 RX ADMIN — METFORMIN HYDROCHLORIDE 1000 MG: 500 TABLET ORAL at 09:26

## 2024-03-10 RX ADMIN — FLUTICASONE FUROATE AND VILANTEROL TRIFENATATE 1 PUFF: 100; 25 POWDER RESPIRATORY (INHALATION) at 06:21

## 2024-03-10 RX ADMIN — ENOXAPARIN SODIUM 40 MG: 40 INJECTION SUBCUTANEOUS at 20:01

## 2024-03-10 RX ADMIN — IPRATROPIUM BROMIDE AND ALBUTEROL SULFATE 3 ML: 2.5; .5 SOLUTION RESPIRATORY (INHALATION) at 14:31

## 2024-03-10 RX ADMIN — IPRATROPIUM BROMIDE AND ALBUTEROL SULFATE 3 ML: 2.5; .5 SOLUTION RESPIRATORY (INHALATION) at 20:50

## 2024-03-10 RX ADMIN — DOCUSATE SODIUM 100 MG: 100 CAPSULE, LIQUID FILLED ORAL at 09:26

## 2024-03-10 RX ADMIN — TIOTROPIUM BROMIDE INHALATION SPRAY 2 PUFF: 3.12 SPRAY, METERED RESPIRATORY (INHALATION) at 06:20

## 2024-03-10 RX ADMIN — FUROSEMIDE 80 MG: 10 INJECTION, SOLUTION INTRAMUSCULAR; INTRAVENOUS at 04:48

## 2024-03-10 ASSESSMENT — COGNITIVE AND FUNCTIONAL STATUS - GENERAL
STANDING UP FROM CHAIR USING ARMS: A LITTLE
PERSONAL GROOMING: A LITTLE
DAILY ACTIVITIY SCORE: 15
MOVING FROM LYING ON BACK TO SITTING ON SIDE OF FLAT BED WITH BEDRAILS: A LITTLE
TOILETING: A LITTLE
DRESSING REGULAR LOWER BODY CLOTHING: A LOT
MOVING TO AND FROM BED TO CHAIR: A LITTLE
MOBILITY SCORE: 18
TURNING FROM BACK TO SIDE WHILE IN FLAT BAD: A LITTLE
WALKING IN HOSPITAL ROOM: A LITTLE
PERSONAL GROOMING: A LITTLE
MOBILITY SCORE: 18
DRESSING REGULAR UPPER BODY CLOTHING: A LOT
MOVING FROM LYING ON BACK TO SITTING ON SIDE OF FLAT BED WITH BEDRAILS: A LITTLE
DAILY ACTIVITIY SCORE: 15
STANDING UP FROM CHAIR USING ARMS: A LITTLE
TOILETING: A LITTLE
MOVING TO AND FROM BED TO CHAIR: A LITTLE
HELP NEEDED FOR BATHING: A LOT
CLIMB 3 TO 5 STEPS WITH RAILING: A LITTLE
DRESSING REGULAR UPPER BODY CLOTHING: A LOT
EATING MEALS: A LITTLE
EATING MEALS: A LITTLE
WALKING IN HOSPITAL ROOM: A LITTLE
CLIMB 3 TO 5 STEPS WITH RAILING: A LITTLE
DRESSING REGULAR LOWER BODY CLOTHING: A LOT
HELP NEEDED FOR BATHING: A LOT
TURNING FROM BACK TO SIDE WHILE IN FLAT BAD: A LITTLE

## 2024-03-10 ASSESSMENT — PAIN SCALES - GENERAL
PAINLEVEL_OUTOF10: 0 - NO PAIN
PAINLEVEL_OUTOF10: 0 - NO PAIN

## 2024-03-10 NOTE — PROGRESS NOTES
Alo Glass is a 68 y.o. male on day 2 of admission presenting with Heart failure exacerbated by sotalol (CMS/HCC).    Subjective   Pt is feeling better. He is still requiring O2. No overnight events.        Objective     Physical Exam  Vitals and nursing note reviewed.   Constitutional:       General: He is not in acute distress.     Appearance: Normal appearance. He is not ill-appearing or toxic-appearing.   HENT:      Head: Normocephalic and atraumatic.      Mouth/Throat:      Mouth: Mucous membranes are moist.   Eyes:      Extraocular Movements: Extraocular movements intact.      Conjunctiva/sclera: Conjunctivae normal.      Pupils: Pupils are equal, round, and reactive to light.   Cardiovascular:      Rate and Rhythm: Normal rate and regular rhythm.      Heart sounds: Murmur heard.      No gallop.   Pulmonary:      Effort: Pulmonary effort is normal. No respiratory distress.      Breath sounds: Rhonchi and rales present. No wheezing.   Abdominal:      General: Abdomen is flat. Bowel sounds are normal. There is no distension.      Palpations: Abdomen is soft. There is no mass.      Tenderness: There is no abdominal tenderness.   Musculoskeletal:         General: Swelling present. No tenderness. Normal range of motion.      Cervical back: Normal range of motion and neck supple.      Right lower leg: Edema present.      Left lower leg: Edema present.      Comments: +4 pitting edema in lower extremities    Skin:     General: Skin is warm and dry.   Neurological:      General: No focal deficit present.      Mental Status: He is alert and oriented to person, place, and time. Mental status is at baseline.   Psychiatric:         Mood and Affect: Mood normal.         Behavior: Behavior normal.         Thought Content: Thought content normal.         Judgment: Judgment normal.         Last Recorded Vitals:  /65 (BP Location: Right arm, Patient Position: Sitting)   Pulse 93   Temp 36.6 °C (97.9 °F)   Resp  "20   Ht 1.803 m (5' 10.98\")   Wt (!) 152 kg (334 lb 10.5 oz)   SpO2 93%   BMI 46.70 kg/m²      Scheduled medications:  allopurinol, 300 mg, oral, Daily  docusate sodium, 100 mg, oral, BID  empagliflozin, 10 mg, oral, Daily  enoxaparin, 40 mg, subcutaneous, q12h SHARRON  tiotropium, 2 puff, inhalation, Daily   And  fluticasone furoate-vilanteroL, 1 puff, inhalation, Daily  furosemide, 80 mg, intravenous, q8h  insulin lispro, 0-10 Units, subcutaneous, Before meals & nightly  ipratropium-albuteroL, 3 mL, nebulization, TID  levothyroxine, 125 mcg, oral, Daily  metFORMIN, 1,000 mg, oral, BID  potassium chloride CR, 20 mEq, oral, Daily  [Held by provider] valsartan, 320 mg, oral, Daily      Continuous medications:       PRN medications:  PRN medications: acetaminophen **OR** acetaminophen **OR** acetaminophen, albuterol, dextrose 10 % in water (D10W), dextrose, glucagon, ipratropium-albuteroL, oxygen     Relevant Results:  Results for orders placed or performed during the hospital encounter of 03/08/24 (from the past 24 hour(s))   POCT GLUCOSE   Result Value Ref Range    POCT Glucose 123 (H) 74 - 99 mg/dL   POCT GLUCOSE   Result Value Ref Range    POCT Glucose 124 (H) 74 - 99 mg/dL   POCT GLUCOSE   Result Value Ref Range    POCT Glucose 144 (H) 74 - 99 mg/dL   POCT GLUCOSE   Result Value Ref Range    POCT Glucose 123 (H) 74 - 99 mg/dL   POCT GLUCOSE   Result Value Ref Range    POCT Glucose 95 74 - 99 mg/dL       XR chest 2 views    Result Date: 3/8/2024  Interpreted By:  Jennifer Padilla, STUDY: XR CHEST 2 VIEWS; ;  3/8/2024 5:45 pm   INDICATION: Signs/Symptoms:dyspnea.   COMPARISON: 09/18/2018.   ACCESSION NUMBER(S): [id number]   ORDERING CLINICIAN: BRAYAN CRESPO   TECHNIQUE: Two view chest   FINDINGS: Moderate right pleural effusion and right lung base consolidation. Trace left pleural effusion.. Mild interstitial prominence enlarged cardiac silhouette. Visualized mediastinal contours are within normal limits. No evidence " of pneumothorax. No acute osseous finding.       Moderate right pleural effusion and right lung base consolidation. Findings could be due to pneumonia. Other underlying process not excluded. Follow-up recommended.   Trace left pleural effusion and cardiomegaly. Possible interstitial edema.     Signed by: Jennifer Padilla 3/8/2024 6:19 PM Dictation workstation:   [id number]            Assessment/Plan   Principal Problem:    Heart failure exacerbated by sotalol (CMS/HCC)  Active Problems:    Acute on chronic congestive heart failure, unspecified heart failure type (CMS/HCC)    Acute hypoxic respiratory failure 2/2 CHF exacerbation  Unlikely pna  Hx COPD not in exacerbation  - supplemental O2 currently on 2L NC  - cardio following  - echo  - change to IV lasix 80mg TID as pt does not want gtt  - jardiance  - check procal  - home inhalers    HTN  - hold valsartan    DM  - metformin  - jardiance  - ISS    MELISSA  - nocturnal CPAP  - can do nocturnal pulse ox study    Hypothyroid  - Synthroid    Dispo: monitor clinically, continue diuresis, wean O2         Maribel Bell MD  Hospitalist

## 2024-03-10 NOTE — NURSING NOTE
"Spend 20 minutes with the patient explaining the changes made to Lasix drip to IVP Lasix. Patient is refusing evening dose of Lasix. I explained the importance of continued Lasix therapy. Still refusing and states \"he will take the am dose as scheduled\".   "

## 2024-03-10 NOTE — PROGRESS NOTES
Subjective Data:  States breathing feels improved with diuresis. Refused to have IV lasix drip run overnight-- was switched to IV push lasix.  Will only agree to once daily lab draw.         Objective Data:  Last Recorded Vitals:  Vitals:    03/10/24 0300 03/10/24 0511 03/10/24 0843 03/10/24 0911   BP:  98/54  97/62   BP Location:  Left arm  Right arm   Patient Position:  Sitting  Sitting   Pulse:  89  94   Resp:  20  20   Temp:  36.6 °C (97.9 °F)     TempSrc:  Temporal     SpO2: 92% 93% 93% 92%   Weight:  (!) 152 kg (334 lb 10.5 oz)     Height:           Last Labs:  CBC - 3/8/2024:  5:28 PM  10.5 18.7 170    57.5      CMP - 3/9/2024:  6:07 AM  8.4 6.2 18 --- 1.1   _ 3.7 20 77      PTT - No results in last year.  _   _ _     TROPHS   Date/Time Value Ref Range Status   03/09/2024 06:07 AM 49 0 - 20 ng/L Final   03/08/2024 06:27 PM 73 0 - 20 ng/L Final     Comment:     Previous result verified on 3/8/2024 1803 on specimen/case [id number] [facility] Troponin I, High Sensitivity with value 71 ng/L.   03/08/2024 05:28 PM 71 0 - 20 ng/L Final     BNP   Date/Time Value Ref Range Status   03/08/2024 05:28  0 - 99 pg/mL Final   03/08/2024 05:28  0 - 99 pg/mL Final     HGBA1C   Date/Time Value Ref Range Status   02/20/2024 10:35 AM 5.8 see below % Final   01/19/2023 10:23 AM 7.3 % Final     Comment:          Diagnosis of Diabetes-Adults   Non-Diabetic: < or = 5.6%   Increased risk for developing diabetes: 5.7-6.4%   Diagnostic of diabetes: > or = 6.5%  .       Monitoring of Diabetes                Age (y)     Therapeutic Goal (%)   Adults:          >18           <7.0   Pediatrics:    13-18           <7.5                   7-12           <8.0                   0- 6            7.5-8.5   American Diabetes Association. Diabetes Care 33(S1), Jan 2010.     09/10/2021 01:29 PM 7.9 % Final     Comment:          Diagnosis of Diabetes-Adults   Non-Diabetic: < or = 5.6%   Increased risk  for developing diabetes: 5.7-6.4%   Diagnostic of diabetes: > or = 6.5%  .       Monitoring of Diabetes                Age (y)     Therapeutic Goal (%)   Adults:          >18           <7.0   Pediatrics:    13-18           <7.5                   7-12           <8.0                   0- 6            7.5-8.5   American Diabetes Association. Diabetes Care 33(S1), Jan 2010.       LDLCALC   Date/Time Value Ref Range Status   02/20/2024 10:35  <=99 mg/dL Final     Comment:                                 Near   Borderline      AGE      Desirable  Optimal    High     High     Very High     0-19 Y     0 - 109     ---    110-129   >/= 130     ----    20-24 Y     0 - 119     ---    120-159   >/= 160     ----      >24 Y     0 -  99   100-129  130-159   160-189     >/=190       VLDL   Date/Time Value Ref Range Status   02/20/2024 10:35 AM 16 0 - 40 mg/dL Final   09/10/2021 01:29 PM 27 0 - 40 mg/dL Final   09/18/2018 02:26 PM 22 0 - 40 mg/dL Final      Last I/O:  I/O last 3 completed shifts:  In: 120 (0.8 mL/kg) [P.O.:120]  Out: - (0 mL/kg)   Weight: 151.8 kg     Past Cardiology Tests (Last 3 Years):  EKG:  NSR with RBBB, PACs      Inpatient Medications:  Scheduled medications   Medication Dose Route Frequency    allopurinol  300 mg oral Daily    docusate sodium  100 mg oral BID    empagliflozin  10 mg oral Daily    enoxaparin  40 mg subcutaneous q12h SHARRON    tiotropium  2 puff inhalation Daily    And    fluticasone furoate-vilanteroL  1 puff inhalation Daily    furosemide  80 mg intravenous q8h    insulin lispro  0-10 Units subcutaneous Before meals & nightly    ipratropium-albuteroL  3 mL nebulization TID    levothyroxine  125 mcg oral Daily    metFORMIN  1,000 mg oral BID    potassium chloride CR  20 mEq oral Daily    [Held by provider] valsartan  320 mg oral Daily     PRN medications   Medication    acetaminophen    Or    acetaminophen    Or    acetaminophen    albuterol    dextrose 10 % in water (D10W)    dextrose     glucagon    ipratropium-albuteroL    oxygen     Continuous Medications   Medication Dose Last Rate       Physical Exam:  Constitutional:Awake/Alert/Oriented to person place and time.  Head: Atraumatic, Normocephalic  Eyes: EOMI. MARGO  Neck: + JVD  Cardiovascular: Regular rate and rhythm, S1, S2. No extra heart sounds or murmurs  Respiratory: expiratory wheeze with crackles noted at bases posteriorly   Abdomen: obese, distended, non-tender   Musculoskeletal: ROM intact. Muscle strength grossly intact upper and lower extremities 5/5.   Neurological: CNII-XII intact. Sensation grossly intact  Extremities: BLE lymphedema, 4+ pitting edema BLE with weeping ulcers left leg   Psychiatric: Appropriate mood and affect     Assessment/Plan   67 yo male from home with h/o obesity, MELISSA on CPAP, COPD, current smoker, type 2 DM, hypertension, lymphedema, moderate aortic stenosis (6/2019) presenting from PCP office with symptoms of increased LE edema, abdominal bloating, progressive SOB over the past year found to be hypoxic in the ER.     Assessment:  Acute hypoxic respiratory failure  Acute heart failure (echo pending)  Suspected cor pulmonale   Moderate aortic stenosis on prior echo 6/2019  Chronic lymphedema BLE   Polycythemia   COPD   NSR with PACs on telemetry      Plan:  -Continue lasix 80mg IV TID   -Start jardiance 10mg daily  -Will look to add spironolactone as BP/renal function tolerate  -Hold valsartan given relative hypotension  -Consider adding BB  BP tolerates to reduce PAC burden, though seemingly asymptomatic   -Will obtain an echocardiogram on Monday   -Strict I/O, monitor renal function and electrolytes     Peripheral IV 03/08/24 20 G Right Hand (Active)   Site Assessment Clean;Dry;Intact 03/10/24 0900   Dressing Status Clean;Dry;Occlusive 03/10/24 0900   Number of days: 2       Code Status:  Full Code      PARAM Summers-CNP

## 2024-03-10 NOTE — NURSING NOTE
Started overnight pulse ox study at 2200 on 3/9/24. Patient on home CPAP unit with no oxygen throughout the study. Patient taken off overnight pulse ox study at 0515. Study in patients chart.

## 2024-03-11 ENCOUNTER — APPOINTMENT (OUTPATIENT)
Dept: RADIOLOGY | Facility: HOSPITAL | Age: 69
DRG: 871 | End: 2024-03-11
Payer: MEDICARE

## 2024-03-11 ENCOUNTER — APPOINTMENT (OUTPATIENT)
Dept: CARDIOLOGY | Facility: HOSPITAL | Age: 69
DRG: 871 | End: 2024-03-11
Payer: MEDICARE

## 2024-03-11 LAB
ALBUMIN SERPL BCP-MCNC: 3.4 G/DL (ref 3.4–5)
ALP SERPL-CCNC: 57 U/L (ref 33–136)
ALT SERPL W P-5'-P-CCNC: 16 U/L (ref 10–52)
ANION GAP BLDA CALCULATED.4IONS-SCNC: 10 MMO/L (ref 10–25)
ANION GAP SERPL CALC-SCNC: 12 MMOL/L (ref 10–20)
AORTIC VALVE MEAN GRADIENT: 26 MMHG
AORTIC VALVE PEAK VELOCITY: 3.63 M/S
AST SERPL W P-5'-P-CCNC: 12 U/L (ref 9–39)
AV PEAK GRADIENT: 52.7 MMHG
AVA (PEAK VEL): 1.01 CM2
AVA (VTI): 1.12 CM2
BASE EXCESS BLDA CALC-SCNC: 4.6 MMOL/L (ref -2–3)
BASOPHILS # BLD AUTO: 0.02 X10*3/UL (ref 0–0.1)
BASOPHILS NFR BLD AUTO: 0.3 %
BILIRUB SERPL-MCNC: 1.2 MG/DL (ref 0–1.2)
BODY TEMPERATURE: ABNORMAL
BUN SERPL-MCNC: 17 MG/DL (ref 6–23)
CA-I BLDA-SCNC: 1.2 MMOL/L (ref 1.1–1.33)
CALCIUM SERPL-MCNC: 8.7 MG/DL (ref 8.6–10.3)
CHLORIDE BLDA-SCNC: 95 MMOL/L (ref 98–107)
CHLORIDE SERPL-SCNC: 100 MMOL/L (ref 98–107)
CO2 SERPL-SCNC: 35 MMOL/L (ref 21–32)
CREAT SERPL-MCNC: 0.71 MG/DL (ref 0.5–1.3)
EGFRCR SERPLBLD CKD-EPI 2021: >90 ML/MIN/1.73M*2
EJECTION FRACTION APICAL 4 CHAMBER: 33.6
EJECTION FRACTION: 33 %
EOSINOPHIL # BLD AUTO: 0.13 X10*3/UL (ref 0–0.7)
EOSINOPHIL NFR BLD AUTO: 2 %
ERYTHROCYTE [DISTWIDTH] IN BLOOD BY AUTOMATED COUNT: 14 % (ref 11.5–14.5)
GLUCOSE BLD MANUAL STRIP-MCNC: 122 MG/DL (ref 74–99)
GLUCOSE BLD MANUAL STRIP-MCNC: 134 MG/DL (ref 74–99)
GLUCOSE BLD MANUAL STRIP-MCNC: 207 MG/DL (ref 74–99)
GLUCOSE BLD MANUAL STRIP-MCNC: 98 MG/DL (ref 74–99)
GLUCOSE BLDA-MCNC: 166 MG/DL (ref 74–99)
GLUCOSE SERPL-MCNC: 104 MG/DL (ref 74–99)
HCO3 BLDA-SCNC: 34.9 MMOL/L (ref 22–26)
HCT VFR BLD AUTO: 53.4 % (ref 41–52)
HCT VFR BLD EST: 61 % (ref 41–52)
HGB BLD-MCNC: 17.4 G/DL (ref 13.5–17.5)
HGB BLDA-MCNC: 20.2 G/DL (ref 13.5–17.5)
IMM GRANULOCYTES # BLD AUTO: 0.02 X10*3/UL (ref 0–0.7)
IMM GRANULOCYTES NFR BLD AUTO: 0.3 % (ref 0–0.9)
INHALED O2 CONCENTRATION: 100 %
LACTATE BLDA-SCNC: 2.6 MMOL/L (ref 0.4–2)
LEFT ATRIUM VOLUME AREA LENGTH INDEX BSA: 32.6 ML/M2
LEFT VENTRICLE INTERNAL DIMENSION DIASTOLE: 5.26 CM (ref 3.5–6)
LEFT VENTRICULAR OUTFLOW TRACT DIAMETER: 2.6 CM
LYMPHOCYTES # BLD AUTO: 1.7 X10*3/UL (ref 1.2–4.8)
LYMPHOCYTES NFR BLD AUTO: 25.8 %
MCH RBC QN AUTO: 33.9 PG (ref 26–34)
MCHC RBC AUTO-ENTMCNC: 32.6 G/DL (ref 32–36)
MCV RBC AUTO: 104 FL (ref 80–100)
MITRAL VALVE E/A RATIO: 2.34
MONOCYTES # BLD AUTO: 0.78 X10*3/UL (ref 0.1–1)
MONOCYTES NFR BLD AUTO: 11.8 %
NEUTROPHILS # BLD AUTO: 3.95 X10*3/UL (ref 1.2–7.7)
NEUTROPHILS NFR BLD AUTO: 59.8 %
NRBC BLD-RTO: 0 /100 WBCS (ref 0–0)
OXYHGB MFR BLDA: 87.2 % (ref 94–98)
PCO2 BLDA: 71 MM HG (ref 38–42)
PH BLDA: 7.3 PH (ref 7.38–7.42)
PLATELET # BLD AUTO: 179 X10*3/UL (ref 150–450)
PO2 BLDA: 65 MM HG (ref 85–95)
POTASSIUM BLDA-SCNC: 3.9 MMOL/L (ref 3.5–5.3)
POTASSIUM SERPL-SCNC: 3.6 MMOL/L (ref 3.5–5.3)
PROCALCITONIN SERPL-MCNC: 0.04 NG/ML
PROT SERPL-MCNC: 5.6 G/DL (ref 6.4–8.2)
RBC # BLD AUTO: 5.14 X10*6/UL (ref 4.5–5.9)
RIGHT VENTRICLE FREE WALL PEAK S': 13.3 CM/S
SAO2 % BLDA: 89 % (ref 94–100)
SODIUM BLDA-SCNC: 136 MMOL/L (ref 136–145)
SODIUM SERPL-SCNC: 143 MMOL/L (ref 136–145)
TRICUSPID ANNULAR PLANE SYSTOLIC EXCURSION: 2.2 CM
WBC # BLD AUTO: 6.6 X10*3/UL (ref 4.4–11.3)

## 2024-03-11 PROCEDURE — 82947 ASSAY GLUCOSE BLOOD QUANT: CPT

## 2024-03-11 PROCEDURE — 5A1945Z RESPIRATORY VENTILATION, 24-96 CONSECUTIVE HOURS: ICD-10-PCS | Performed by: STUDENT IN AN ORGANIZED HEALTH CARE EDUCATION/TRAINING PROGRAM

## 2024-03-11 PROCEDURE — 94762 N-INVAS EAR/PLS OXIMTRY CONT: CPT

## 2024-03-11 PROCEDURE — 2500000002 HC RX 250 W HCPCS SELF ADMINISTERED DRUGS (ALT 637 FOR MEDICARE OP, ALT 636 FOR OP/ED): Performed by: INTERNAL MEDICINE

## 2024-03-11 PROCEDURE — 99233 SBSQ HOSP IP/OBS HIGH 50: CPT | Performed by: PHYSICIAN ASSISTANT

## 2024-03-11 PROCEDURE — 80053 COMPREHEN METABOLIC PANEL: CPT | Performed by: STUDENT IN AN ORGANIZED HEALTH CARE EDUCATION/TRAINING PROGRAM

## 2024-03-11 PROCEDURE — 93005 ELECTROCARDIOGRAM TRACING: CPT

## 2024-03-11 PROCEDURE — 2500000001 HC RX 250 WO HCPCS SELF ADMINISTERED DRUGS (ALT 637 FOR MEDICARE OP): Performed by: NURSE PRACTITIONER

## 2024-03-11 PROCEDURE — 94640 AIRWAY INHALATION TREATMENT: CPT

## 2024-03-11 PROCEDURE — 36600 WITHDRAWAL OF ARTERIAL BLOOD: CPT

## 2024-03-11 PROCEDURE — 71045 X-RAY EXAM CHEST 1 VIEW: CPT

## 2024-03-11 PROCEDURE — 93010 ELECTROCARDIOGRAM REPORT: CPT | Performed by: INTERNAL MEDICINE

## 2024-03-11 PROCEDURE — 0BH17EZ INSERTION OF ENDOTRACHEAL AIRWAY INTO TRACHEA, VIA NATURAL OR ARTIFICIAL OPENING: ICD-10-PCS | Performed by: STUDENT IN AN ORGANIZED HEALTH CARE EDUCATION/TRAINING PROGRAM

## 2024-03-11 PROCEDURE — 94660 CPAP INITIATION&MGMT: CPT

## 2024-03-11 PROCEDURE — 2500000001 HC RX 250 WO HCPCS SELF ADMINISTERED DRUGS (ALT 637 FOR MEDICARE OP): Performed by: INTERNAL MEDICINE

## 2024-03-11 PROCEDURE — 36415 COLL VENOUS BLD VENIPUNCTURE: CPT | Performed by: STUDENT IN AN ORGANIZED HEALTH CARE EDUCATION/TRAINING PROGRAM

## 2024-03-11 PROCEDURE — 84132 ASSAY OF SERUM POTASSIUM: CPT | Performed by: NURSE PRACTITIONER

## 2024-03-11 PROCEDURE — 85025 COMPLETE CBC W/AUTO DIFF WBC: CPT | Performed by: STUDENT IN AN ORGANIZED HEALTH CARE EDUCATION/TRAINING PROGRAM

## 2024-03-11 PROCEDURE — 93306 TTE W/DOPPLER COMPLETE: CPT

## 2024-03-11 PROCEDURE — 93306 TTE W/DOPPLER COMPLETE: CPT | Performed by: STUDENT IN AN ORGANIZED HEALTH CARE EDUCATION/TRAINING PROGRAM

## 2024-03-11 PROCEDURE — 2500000004 HC RX 250 GENERAL PHARMACY W/ HCPCS (ALT 636 FOR OP/ED): Performed by: STUDENT IN AN ORGANIZED HEALTH CARE EDUCATION/TRAINING PROGRAM

## 2024-03-11 PROCEDURE — 2020000001 HC ICU ROOM DAILY

## 2024-03-11 PROCEDURE — 3E033XZ INTRODUCTION OF VASOPRESSOR INTO PERIPHERAL VEIN, PERCUTANEOUS APPROACH: ICD-10-PCS | Performed by: NURSE PRACTITIONER

## 2024-03-11 PROCEDURE — 99291 CRITICAL CARE FIRST HOUR: CPT | Performed by: NURSE PRACTITIONER

## 2024-03-11 PROCEDURE — 2500000004 HC RX 250 GENERAL PHARMACY W/ HCPCS (ALT 636 FOR OP/ED): Performed by: NURSE PRACTITIONER

## 2024-03-11 PROCEDURE — 2500000002 HC RX 250 W HCPCS SELF ADMINISTERED DRUGS (ALT 637 FOR MEDICARE OP, ALT 636 FOR OP/ED): Performed by: NURSE PRACTITIONER

## 2024-03-11 PROCEDURE — 71045 X-RAY EXAM CHEST 1 VIEW: CPT | Performed by: RADIOLOGY

## 2024-03-11 RX ORDER — GUAIFENESIN 600 MG/1
600 TABLET, EXTENDED RELEASE ORAL 2 TIMES DAILY PRN
Status: DISCONTINUED | OUTPATIENT
Start: 2024-03-11 | End: 2024-03-17

## 2024-03-11 RX ORDER — LORAZEPAM 2 MG/ML
1 INJECTION INTRAMUSCULAR ONCE
Status: COMPLETED | OUTPATIENT
Start: 2024-03-11 | End: 2024-03-11

## 2024-03-11 RX ADMIN — ENOXAPARIN SODIUM 40 MG: 40 INJECTION SUBCUTANEOUS at 23:12

## 2024-03-11 RX ADMIN — FUROSEMIDE 20 MG/HR: 10 INJECTION, SOLUTION INTRAMUSCULAR; INTRAVENOUS at 22:41

## 2024-03-11 RX ADMIN — LORAZEPAM 1 MG: 2 INJECTION INTRAMUSCULAR; INTRAVENOUS at 22:28

## 2024-03-11 RX ADMIN — ENOXAPARIN SODIUM 40 MG: 40 INJECTION SUBCUTANEOUS at 09:47

## 2024-03-11 RX ADMIN — GUAIFENESIN 600 MG: 600 TABLET ORAL at 15:34

## 2024-03-11 RX ADMIN — FUROSEMIDE 80 MG: 10 INJECTION, SOLUTION INTRAMUSCULAR; INTRAVENOUS at 04:54

## 2024-03-11 RX ADMIN — INSULIN LISPRO 4 UNITS: 100 INJECTION, SOLUTION INTRAVENOUS; SUBCUTANEOUS at 23:13

## 2024-03-11 RX ADMIN — POTASSIUM CHLORIDE 20 MEQ: 1500 TABLET, EXTENDED RELEASE ORAL at 09:47

## 2024-03-11 RX ADMIN — ALLOPURINOL 300 MG: 300 TABLET ORAL at 09:47

## 2024-03-11 RX ADMIN — PERFLUTREN 1.5 ML OF DILUTION: 6.52 INJECTION, SUSPENSION INTRAVENOUS at 13:46

## 2024-03-11 RX ADMIN — FUROSEMIDE 80 MG: 10 INJECTION, SOLUTION INTRAMUSCULAR; INTRAVENOUS at 13:56

## 2024-03-11 RX ADMIN — IPRATROPIUM BROMIDE AND ALBUTEROL SULFATE 3 ML: 2.5; .5 SOLUTION RESPIRATORY (INHALATION) at 08:06

## 2024-03-11 RX ADMIN — METFORMIN HYDROCHLORIDE 1000 MG: 500 TABLET ORAL at 09:47

## 2024-03-11 RX ADMIN — IPRATROPIUM BROMIDE AND ALBUTEROL SULFATE 3 ML: 2.5; .5 SOLUTION RESPIRATORY (INHALATION) at 15:13

## 2024-03-11 RX ADMIN — TIOTROPIUM BROMIDE INHALATION SPRAY 2 PUFF: 3.12 SPRAY, METERED RESPIRATORY (INHALATION) at 06:08

## 2024-03-11 RX ADMIN — EMPAGLIFLOZIN 10 MG: 10 TABLET, FILM COATED ORAL at 09:00

## 2024-03-11 RX ADMIN — LEVOTHYROXINE SODIUM 125 MCG: 0.12 TABLET ORAL at 09:47

## 2024-03-11 RX ADMIN — FLUTICASONE FUROATE AND VILANTEROL TRIFENATATE 1 PUFF: 100; 25 POWDER RESPIRATORY (INHALATION) at 06:08

## 2024-03-11 RX ADMIN — IPRATROPIUM BROMIDE AND ALBUTEROL SULFATE 3 ML: 2.5; .5 SOLUTION RESPIRATORY (INHALATION) at 20:24

## 2024-03-11 ASSESSMENT — COGNITIVE AND FUNCTIONAL STATUS - GENERAL
STANDING UP FROM CHAIR USING ARMS: A LITTLE
CLIMB 3 TO 5 STEPS WITH RAILING: A LOT
WALKING IN HOSPITAL ROOM: A LOT
TOILETING: A LITTLE
CLIMB 3 TO 5 STEPS WITH RAILING: A LOT
TURNING FROM BACK TO SIDE WHILE IN FLAT BAD: A LITTLE
MOVING FROM LYING ON BACK TO SITTING ON SIDE OF FLAT BED WITH BEDRAILS: A LITTLE
DRESSING REGULAR UPPER BODY CLOTHING: A LITTLE
HELP NEEDED FOR BATHING: A LITTLE
DAILY ACTIVITIY SCORE: 21
DAILY ACTIVITIY SCORE: 19
WALKING IN HOSPITAL ROOM: A LITTLE
MOVING TO AND FROM BED TO CHAIR: A LITTLE
MOBILITY SCORE: 15
DRESSING REGULAR LOWER BODY CLOTHING: A LOT
DRESSING REGULAR UPPER BODY CLOTHING: A LITTLE
DRESSING REGULAR LOWER BODY CLOTHING: A LITTLE
STANDING UP FROM CHAIR USING ARMS: A LOT
MOVING TO AND FROM BED TO CHAIR: A LITTLE
MOBILITY SCORE: 19
HELP NEEDED FOR BATHING: A LITTLE

## 2024-03-11 ASSESSMENT — ACTIVITIES OF DAILY LIVING (ADL): LACK_OF_TRANSPORTATION: PATIENT DECLINED

## 2024-03-11 ASSESSMENT — PAIN SCALES - GENERAL: PAINLEVEL_OUTOF10: 0 - NO PAIN

## 2024-03-11 NOTE — SIGNIFICANT EVENT
Pt desaturated. He is now on 15L HF. Discussed with cardio. Will start lasix gtt, put in lipscomb, hold metformin as pt likely needs LHC during this admission due to echo findings. Will transfer to stepdown for further monitoring.

## 2024-03-11 NOTE — PROGRESS NOTES
Subjective Data:  Patient reports ongoing shortness of breath, nonproductive cough, lower extremity edema, abdominal fullness.     Overnight Events:    No reported acute events overnight. Patient refusing occasional doses of IV Lasix, is concerned he is urinating too much. Refusing lipscomb for I&O or external catheter. Asked to make sure he is using a urinal, will consider it.     Objective Data:  Last Recorded Vitals:  Vitals:    03/11/24 0300 03/11/24 0613 03/11/24 0806 03/11/24 0939   BP:  107/70     BP Location:  Left arm     Patient Position:  Lying     Pulse:  81     Resp:  18     Temp:  36.4 °C (97.5 °F)     TempSrc:  Temporal     SpO2: 92% 93% 93% 92%   Weight:  148 kg (325 lb 2.9 oz)     Height:           Last Labs:  CBC - 3/11/2024:  6:03 AM  6.6 17.4 179    53.4      CMP - 3/11/2024:  6:03 AM  8.7 5.6 12 --- 1.2   _ 3.4 16 57      PTT - No results in last year.  _   _ _     TROPHS   Date/Time Value Ref Range Status   03/09/2024 06:07 AM 49 0 - 20 ng/L Final   03/08/2024 06:27 PM 73 0 - 20 ng/L Final     Comment:     Previous result verified on 3/8/2024 1803 on specimen/case [id number] called with component [facility] for procedure Troponin I, High Sensitivity with value 71 ng/L.   03/08/2024 05:28 PM 71 0 - 20 ng/L Final     BNP   Date/Time Value Ref Range Status   03/10/2024 11:25  0 - 99 pg/mL Final   03/08/2024 05:28  0 - 99 pg/mL Final   03/08/2024 05:28  0 - 99 pg/mL Final     HGBA1C   Date/Time Value Ref Range Status   02/20/2024 10:35 AM 5.8 see below % Final   01/19/2023 10:23 AM 7.3 % Final     Comment:          Diagnosis of Diabetes-Adults   Non-Diabetic: < or = 5.6%   Increased risk for developing diabetes: 5.7-6.4%   Diagnostic of diabetes: > or = 6.5%  .       Monitoring of Diabetes                Age (y)     Therapeutic Goal (%)   Adults:          >18           <7.0   Pediatrics:    13-18           <7.5                   7-12           <8.0                   0- 6             "7.5-8.5   American Diabetes Association. Diabetes Care 33(S1), Jan 2010.     09/10/2021 01:29 PM 7.9 % Final     Comment:          Diagnosis of Diabetes-Adults   Non-Diabetic: < or = 5.6%   Increased risk for developing diabetes: 5.7-6.4%   Diagnostic of diabetes: > or = 6.5%  .       Monitoring of Diabetes                Age (y)     Therapeutic Goal (%)   Adults:          >18           <7.0   Pediatrics:    13-18           <7.5                   7-12           <8.0                   0- 6            7.5-8.5   American Diabetes Association. Diabetes Care 33(S1), Jan 2010.       LDLCALC   Date/Time Value Ref Range Status   02/20/2024 10:35  <=99 mg/dL Final     Comment:                                 Near   Borderline      AGE      Desirable  Optimal    High     High     Very High     0-19 Y     0 - 109     ---    110-129   >/= 130     ----    20-24 Y     0 - 119     ---    120-159   >/= 160     ----      >24 Y     0 -  99   100-129  130-159   160-189     >/=190       VLDL   Date/Time Value Ref Range Status   02/20/2024 10:35 AM 16 0 - 40 mg/dL Final   09/10/2021 01:29 PM 27 0 - 40 mg/dL Final   09/18/2018 02:26 PM 22 0 - 40 mg/dL Final      Last I/O:  I/O last 3 completed shifts:  In: 114.3 (0.8 mL/kg) [P.O.:100; I.V.:14.3 (0.1 mL/kg)]  Out: - (0 mL/kg)   Weight: 147.5 kg     Past Cardiology Tests (Last 3 Years):  EKG:  ECG 12 Lead 03/10/2024 (Preliminary)    Echo:  TTE (06/18/2019):   -Low normal LV systolic function  -Mild concentric LVH  -Doppler flow suggestive of abnormal Left ventricular relaxation  -Normal right ventricular size and function  -Slightly enlarged left atrium.   -Moderate calcific aortic stenosis  -Trace mitral regurg     Ejection Fractions:  No results found for: \"EF\"  Cath:  No results found for this or any previous visit from the past 1095 days.    Stress Test:  No results found for this or any previous visit from the past 1095 days.    Imaging:  CXR (03/08/2024): "   IMPRESSION:  -Moderate right pleural effusion and right lung base consolidation. Findings could be due to pneumonia. Other underlying process not excluded. Follow-up recommended.   -Trace left pleural effusion and cardiomegaly. Possible interstitial edema.        Inpatient Medications:  Scheduled medications   Medication Dose Route Frequency    allopurinol  300 mg oral Daily    docusate sodium  100 mg oral BID    empagliflozin  10 mg oral Daily    enoxaparin  40 mg subcutaneous q12h SHARRON    tiotropium  2 puff inhalation Daily    And    fluticasone furoate-vilanteroL  1 puff inhalation Daily    furosemide  80 mg intravenous q8h    insulin lispro  0-10 Units subcutaneous Before meals & nightly    ipratropium-albuteroL  3 mL nebulization TID    levothyroxine  125 mcg oral Daily    metFORMIN  1,000 mg oral BID    potassium chloride CR  20 mEq oral Daily    [Held by provider] valsartan  320 mg oral Daily     PRN medications   Medication    acetaminophen    Or    acetaminophen    Or    acetaminophen    albuterol    dextrose 10 % in water (D10W)    dextrose    glucagon    guaiFENesin    ipratropium-albuteroL    oxygen     Continuous Medications   Medication Dose Last Rate       Physical Exam:  Physical Exam  Constitutional:       Appearance: Normal appearance.   HENT:      Head: Normocephalic.      Nose: Nose normal.      Mouth/Throat:      Mouth: Mucous membranes are moist.   Eyes:      Conjunctiva/sclera: Conjunctivae normal.   Cardiovascular:      Rate and Rhythm: Normal rate and regular rhythm.      Heart sounds: No murmur heard.  Pulmonary:      Effort: Pulmonary effort is normal.      Breath sounds: Wheezing present.      Comments: Diminished b/l bases.     Abdominal:      General: There is distension.      Palpations: Abdomen is soft.      Tenderness: There is no abdominal tenderness.   Musculoskeletal:      Right lower leg: Edema present.      Left lower leg: Edema present.   Skin:     General: Skin is warm.    Neurological:      General: No focal deficit present.      Mental Status: He is alert. Mental status is at baseline.   Psychiatric:         Mood and Affect: Mood normal.            Assessment/Plan     67 yo male from home with h/o obesity, MELISSA on CPAP, COPD, current smoker, type 2 DM, hypertension, lymphedema, moderate aortic stenosis (6/2019) presenting from PCP office with symptoms of increased LE edema, abdominal bloating, progressive SOB over the past year found to be hypoxic in the ER. Intermittently refusing IV Lasix, I&O not well monitored. Discussed urinating only into urinals, recording how much urine vs how frequent he urinates. Refused return to lasix gtt, lipscomb or external catheter. Originally refusing echo d/t concern for needing to urinate during the testing. Discussed accommodations and ability to use the bathroom in echo if necessary. Patient now agreeable to echocardiogram. Unclear if will continue to refused IV lasix. Discussed duration of hospital stay being dependent on reaching euvolemia and how effective we can diurese effecting the duration. Patient verbalized his understanding.      #Acute hypoxic respiratory failure 2/2 HF  #Acute heart failure (echo pending), presumed diastolic pending repeat echo  #Suspected cor pulmonale   #Moderate aortic stenosis on prior echo 6/2019  #Chronic lymphedema BLE   #Polycythemia   #COPD, w/ current smoking   #NSR with PACs on telemetry  (no afib noted)      -Continue lasix 80mg IV TID as patient has refused lasix gtt   - Continue jardiance 10mg daily  -Start Spironolactone 25mg daily today   -Holding off on BB initiation d/t hypervolemia  -Hold valsartan given relative hypotension  -Consider adding BB  BP tolerates to reduce PAC burden, though seemingly asymptomatic (pending echocardiogram)   -Echo pending   -Strict I/O, monitor renal function and electrolytes; Daily weight (on admission both 150 and 159 recorded, per patient 159 more accurate for known  weight) Down 11kg, No output recorded.   -Will follow    Peripheral IV 03/08/24 20 G Right Hand (Active)   Site Assessment Clean;Dry;Intact 03/11/24 1100   Dressing Status Clean;Dry;Occlusive 03/11/24 1100   Number of days: 3       Code Status:  Full Code    I spent 45 minutes in the professional and overall care of this patient.        Stephanie Dyer PA-C

## 2024-03-11 NOTE — CONSULTS
Spoke with pt about current diet,  has insulin resistance and was going to inquire about a CGM with his PCP, aware of benefits of low sodium diet and fluid restriction. Does not add salt after cooking however does add salt and seasoning when cooking. Discussed no added salt and to look at food labels for sodium content of foods. Pt states understanding. Pt does not have a scale at home, intends to purchase a scale, states has been on Mounjaro for 6months or so and has noted less appetite and eats less portions, typically eats 1 meal/night and a snack/small meal during the day, used to eat 2 meals/day with larger portions. Unsure of weight changes.     Education Documentation  Low sodium diet, taught by Angy Lees RDN, LD at 3/11/2024  1:32 PM.  Learner: Patient  Readiness: Acceptance  Method: Explanation, Handout  Response: Verbalizes Understanding  Comment: Low Sodium Nutrition Therapy handout from Nutrition Care Manual provided        Pt  enjoys Gin Martini's at night time, encouraged to discuss alcohol with cardiologist.   Pt states understands fluid restriction, but does not drink a lot of fluids other than a pot of caffeinated coffee in the morning, Gin Martini at night and sometimes throughout the night a 500mL bottle of water. Encouraged to discuss with cardiologist upon discharge desirable amount of fluid/day.   Will notify Heart Navigator/DM educator about pt's concerns. Pt  has met with Santos holman.

## 2024-03-11 NOTE — SIGNIFICANT EVENT
RT called to room due to patient desat - Could not get sat above 86% on 15L HFNC. Put patient on NRB, finally came up to 93%. Patient then insisted on going to the bathroom and despite staff telling him to use the urinal he went anyway. His sat dropped to 75%. It took him being on NRB and 15 L HFNC to get him back up to 95%. RT got an ABG. Ended up being able to wean patient down to 10L HFNC before bringing him to 2W.

## 2024-03-11 NOTE — NURSING NOTE
Dr. Bell notified and aware patient short of breath even after breathing treatments. Oxygen saturation 88% Respiratory at bedside and placing on High flow oxygen and patient only satting at 87-88%. NonRebreather placed on patient and satting at 95%.  Waiting to transfer upstairs to step down for further treatment. EKG performed and Dr. Bell aware of results.

## 2024-03-11 NOTE — NURSING NOTE
Discussed patient's condition and plan during bedside report with Raquel FARRIS RN. Patient refused lipscomb catheter as ordered by physician, prefers to use bedside commode. Patient expressed interest in changing code status. Dr. Bell notified

## 2024-03-11 NOTE — PROGRESS NOTES
Alo Glass is a 68 y.o. male on day 3 of admission presenting with Heart failure exacerbated by sotalol (CMS/HCC).    Subjective   Pt is complaining of congestion. He is still requiring O2. He wants to know when he can go home.        Objective     Physical Exam  Vitals and nursing note reviewed.   Constitutional:       General: He is not in acute distress.     Appearance: Normal appearance. He is not ill-appearing or toxic-appearing.   HENT:      Head: Normocephalic and atraumatic.      Mouth/Throat:      Mouth: Mucous membranes are moist.   Eyes:      Extraocular Movements: Extraocular movements intact.      Conjunctiva/sclera: Conjunctivae normal.      Pupils: Pupils are equal, round, and reactive to light.      Comments: Right eyelid is swollen   Cardiovascular:      Rate and Rhythm: Normal rate and regular rhythm.      Heart sounds: Murmur heard.      No gallop.   Pulmonary:      Effort: Pulmonary effort is normal. No respiratory distress.      Breath sounds: Rhonchi and rales present. No wheezing.   Abdominal:      General: Abdomen is flat. Bowel sounds are normal. There is no distension.      Palpations: Abdomen is soft. There is no mass.      Tenderness: There is no abdominal tenderness.   Musculoskeletal:         General: Swelling present. No tenderness. Normal range of motion.      Cervical back: Normal range of motion and neck supple.      Right lower leg: Edema present.      Left lower leg: Edema present.      Comments: +3-4 pitting edema in lower extremities    Skin:     General: Skin is warm and dry.   Neurological:      General: No focal deficit present.      Mental Status: He is alert and oriented to person, place, and time. Mental status is at baseline.   Psychiatric:         Mood and Affect: Mood normal.         Behavior: Behavior normal.         Thought Content: Thought content normal.         Judgment: Judgment normal.         Last Recorded Vitals:  /70 (BP Location: Left arm,  "Patient Position: Lying)   Pulse 81   Temp 36.4 °C (97.5 °F) (Temporal)   Resp 18   Ht 1.803 m (5' 10.98\")   Wt 148 kg (325 lb 2.9 oz)   SpO2 92%   BMI 45.37 kg/m²      Scheduled medications:  allopurinol, 300 mg, oral, Daily  docusate sodium, 100 mg, oral, BID  empagliflozin, 10 mg, oral, Daily  enoxaparin, 40 mg, subcutaneous, q12h SHARRON  tiotropium, 2 puff, inhalation, Daily   And  fluticasone furoate-vilanteroL, 1 puff, inhalation, Daily  furosemide, 80 mg, intravenous, q8h  insulin lispro, 0-10 Units, subcutaneous, Before meals & nightly  ipratropium-albuteroL, 3 mL, nebulization, TID  levothyroxine, 125 mcg, oral, Daily  metFORMIN, 1,000 mg, oral, BID  potassium chloride CR, 20 mEq, oral, Daily  [Held by provider] valsartan, 320 mg, oral, Daily      Continuous medications:       PRN medications:  PRN medications: acetaminophen **OR** acetaminophen **OR** acetaminophen, albuterol, dextrose 10 % in water (D10W), dextrose, glucagon, ipratropium-albuteroL, oxygen     Relevant Results:  Results for orders placed or performed during the hospital encounter of 03/08/24 (from the past 24 hour(s))   B-type natriuretic peptide   Result Value Ref Range     (H) 0 - 99 pg/mL   CBC   Result Value Ref Range    WBC 9.2 4.4 - 11.3 x10*3/uL    nRBC 0.0 0.0 - 0.0 /100 WBCs    RBC 5.55 4.50 - 5.90 x10*6/uL    Hemoglobin 18.4 (H) 13.5 - 17.5 g/dL    Hematocrit 57.5 (H) 41.0 - 52.0 %     (H) 80 - 100 fL    MCH 33.2 26.0 - 34.0 pg    MCHC 32.0 32.0 - 36.0 g/dL    RDW 14.1 11.5 - 14.5 %    Platelets 198 150 - 450 x10*3/uL   Comprehensive metabolic panel   Result Value Ref Range    Glucose 104 (H) 74 - 99 mg/dL    Sodium 143 136 - 145 mmol/L    Potassium 3.5 3.5 - 5.3 mmol/L    Chloride 99 98 - 107 mmol/L    Bicarbonate 34 (H) 21 - 32 mmol/L    Anion Gap 14 10 - 20 mmol/L    Urea Nitrogen 18 6 - 23 mg/dL    Creatinine 0.83 0.50 - 1.30 mg/dL    eGFR >90 >60 mL/min/1.73m*2    Calcium 9.1 8.6 - 10.3 mg/dL    Albumin 3.7 " 3.4 - 5.0 g/dL    Alkaline Phosphatase 64 33 - 136 U/L    Total Protein 6.1 (L) 6.4 - 8.2 g/dL    AST 15 9 - 39 U/L    Bilirubin, Total 1.1 0.0 - 1.2 mg/dL    ALT 18 10 - 52 U/L   Procalcitonin   Result Value Ref Range    Procalcitonin 0.04 <=0.07 ng/mL   POCT GLUCOSE   Result Value Ref Range    POCT Glucose 95 74 - 99 mg/dL   POCT GLUCOSE   Result Value Ref Range    POCT Glucose 121 (H) 74 - 99 mg/dL   POCT GLUCOSE   Result Value Ref Range    POCT Glucose 181 (H) 74 - 99 mg/dL   Comprehensive Metabolic Panel   Result Value Ref Range    Glucose 104 (H) 74 - 99 mg/dL    Sodium 143 136 - 145 mmol/L    Potassium 3.6 3.5 - 5.3 mmol/L    Chloride 100 98 - 107 mmol/L    Bicarbonate 35 (H) 21 - 32 mmol/L    Anion Gap 12 10 - 20 mmol/L    Urea Nitrogen 17 6 - 23 mg/dL    Creatinine 0.71 0.50 - 1.30 mg/dL    eGFR >90 >60 mL/min/1.73m*2    Calcium 8.7 8.6 - 10.3 mg/dL    Albumin 3.4 3.4 - 5.0 g/dL    Alkaline Phosphatase 57 33 - 136 U/L    Total Protein 5.6 (L) 6.4 - 8.2 g/dL    AST 12 9 - 39 U/L    Bilirubin, Total 1.2 0.0 - 1.2 mg/dL    ALT 16 10 - 52 U/L   CBC and Auto Differential   Result Value Ref Range    WBC 6.6 4.4 - 11.3 x10*3/uL    nRBC 0.0 0.0 - 0.0 /100 WBCs    RBC 5.14 4.50 - 5.90 x10*6/uL    Hemoglobin 17.4 13.5 - 17.5 g/dL    Hematocrit 53.4 (H) 41.0 - 52.0 %     (H) 80 - 100 fL    MCH 33.9 26.0 - 34.0 pg    MCHC 32.6 32.0 - 36.0 g/dL    RDW 14.0 11.5 - 14.5 %    Platelets 179 150 - 450 x10*3/uL    Neutrophils % 59.8 40.0 - 80.0 %    Immature Granulocytes %, Automated 0.3 0.0 - 0.9 %    Lymphocytes % 25.8 13.0 - 44.0 %    Monocytes % 11.8 2.0 - 10.0 %    Eosinophils % 2.0 0.0 - 6.0 %    Basophils % 0.3 0.0 - 2.0 %    Neutrophils Absolute 3.95 1.20 - 7.70 x10*3/uL    Immature Granulocytes Absolute, Automated 0.02 0.00 - 0.70 x10*3/uL    Lymphocytes Absolute 1.70 1.20 - 4.80 x10*3/uL    Monocytes Absolute 0.78 0.10 - 1.00 x10*3/uL    Eosinophils Absolute 0.13 0.00 - 0.70 x10*3/uL    Basophils Absolute  0.02 0.00 - 0.10 x10*3/uL   POCT GLUCOSE   Result Value Ref Range    POCT Glucose 122 (H) 74 - 99 mg/dL       XR chest 2 views    Result Date: 3/8/2024  Interpreted By:  Jennifer Padilla, STUDY: XR CHEST 2 VIEWS; ;  3/8/2024 5:45 pm   INDICATION: Signs/Symptoms:dyspnea.   COMPARISON: 09/18/2018.   ACCESSION NUMBER(S): [id number]   ORDERING CLINICIAN: BRAYAN CRESPO   TECHNIQUE: Two view chest   FINDINGS: Moderate right pleural effusion and right lung base consolidation. Trace left pleural effusion.. Mild interstitial prominence enlarged cardiac silhouette. Visualized mediastinal contours are within normal limits. No evidence of pneumothorax. No acute osseous finding.       Moderate right pleural effusion and right lung base consolidation. Findings could be due to pneumonia. Other underlying process not excluded. Follow-up recommended.   Trace left pleural effusion and cardiomegaly. Possible interstitial edema.     Signed by: Jennifer Padilla 3/8/2024 6:19 PM Dictation workstation:   [id number]            Assessment/Plan   Principal Problem:    Heart failure exacerbated by sotalol (CMS/HCC)  Active Problems:    Acute on chronic congestive heart failure, unspecified heart failure type (CMS/HCC)    Acute hypoxic respiratory failure 2/2 CHF exacerbation  Unlikely pna  Hx COPD not in exacerbation  - supplemental O2 currently on 2L NC  - cardio following  - echo pending  - change to IV lasix 80mg TID as pt does not want gtt  - jardiance  - procal pending to r/o infection  - home inhalers  -  mucinex    Right eye stye  - warm compresses    HTN  - hold valsartan    DM  - metformin  - jardiance  - ISS    MELISSA  - nocturnal CPAP  - can do nocturnal pulse ox study    Hypothyroid  - Synthroid    Dispo: monitor clinically, continue diuresis, wean O2         Maribel Bell MD  Hospitalist

## 2024-03-11 NOTE — NURSING NOTE
Patient states he feels more short of breath. Oxygen 91% on 4L nasal cannula at rest. HE also states he feels like his heart rate dropped and I did not see this on his telemetry it reads 105 at rest. Dr. Bell notified and aware. Breathing treatment given as well.

## 2024-03-11 NOTE — PROGRESS NOTES
03/11/24 0926   Discharge Planning   Living Arrangements Alone   Support Systems Friends/neighbors   Assistance Needed A&OX3; independent with ADLs with no assistive devices; drives; room air baseline currently on 2L NC; patient has CPAP from home bedside   Type of Residence Private residence   Who is requesting discharge planning? Provider   Home or Post Acute Services None   Patient expects to be discharged to: home no needs   Does the patient need discharge transport arranged? Yes   RoundTrip coordination needed? Yes   Has discharge transport been arranged? No   Housing Stability   In the last 12 months, was there a time when you were not able to pay the mortgage or rent on time? Pt Declined   In the last 12 months, how many places have you lived? 1   In the last 12 months, was there a time when you did not have a steady place to sleep or slept in a shelter (including now)? Pt Declined   Transportation Needs   In the past 12 months, has lack of transportation kept you from medical appointments or from getting medications? Pt Declined   In the past 12 months, has lack of transportation kept you from meetings, work, or from getting things needed for daily living? Pt Declined

## 2024-03-12 ENCOUNTER — APPOINTMENT (OUTPATIENT)
Dept: RADIOLOGY | Facility: HOSPITAL | Age: 69
DRG: 871 | End: 2024-03-12
Payer: MEDICARE

## 2024-03-12 ENCOUNTER — APPOINTMENT (OUTPATIENT)
Dept: CARDIOLOGY | Facility: HOSPITAL | Age: 69
DRG: 871 | End: 2024-03-12
Payer: MEDICARE

## 2024-03-12 ENCOUNTER — APPOINTMENT (OUTPATIENT)
Dept: OPERATING ROOM | Facility: HOSPITAL | Age: 69
DRG: 871 | End: 2024-03-12
Payer: MEDICARE

## 2024-03-12 LAB
ALBUMIN SERPL BCP-MCNC: 4.1 G/DL (ref 3.4–5)
ALP SERPL-CCNC: 73 U/L (ref 33–136)
ALT SERPL W P-5'-P-CCNC: 22 U/L (ref 10–52)
ANION GAP BLDA CALCULATED.4IONS-SCNC: 10 MMO/L (ref 10–25)
ANION GAP SERPL CALC-SCNC: 14 MMOL/L (ref 10–20)
APPEARANCE UR: ABNORMAL
ARTERIAL PATENCY WRIST A: POSITIVE
AST SERPL W P-5'-P-CCNC: 18 U/L (ref 9–39)
ATRIAL RATE: 125 BPM
ATRIAL RATE: 86 BPM
ATRIAL RATE: 95 BPM
ATRIAL RATE: 98 BPM
ATRIAL RATE: 99 BPM
ATRIAL RATE: 99 BPM
BASE EXCESS BLDA CALC-SCNC: 2.8 MMOL/L (ref -2–3)
BASE EXCESS BLDA CALC-SCNC: 4 MMOL/L (ref -2–3)
BASE EXCESS BLDA CALC-SCNC: 7.2 MMOL/L (ref -2–3)
BASE EXCESS BLDA CALC-SCNC: 8.7 MMOL/L (ref -2–3)
BASE EXCESS BLDV CALC-SCNC: 0.4 MMOL/L (ref -2–3)
BASOPHILS # BLD MANUAL: 0 X10*3/UL (ref 0–0.1)
BASOPHILS NFR BLD MANUAL: 0 %
BILIRUB SERPL-MCNC: 1.6 MG/DL (ref 0–1.2)
BILIRUB UR STRIP.AUTO-MCNC: NEGATIVE MG/DL
BODY TEMPERATURE: 37 DEGREES CELSIUS
BODY TEMPERATURE: ABNORMAL
BUN SERPL-MCNC: 20 MG/DL (ref 6–23)
CA-I BLDA-SCNC: 1.21 MMOL/L (ref 1.1–1.33)
CALCIUM SERPL-MCNC: 9.2 MG/DL (ref 8.6–10.3)
CARDIAC TROPONIN I PNL SERPL HS: 133 NG/L (ref 0–20)
CARDIAC TROPONIN I PNL SERPL HS: 146 NG/L (ref 0–20)
CARDIAC TROPONIN I PNL SERPL HS: 89 NG/L (ref 0–20)
CHLORIDE BLDA-SCNC: 96 MMOL/L (ref 98–107)
CHLORIDE SERPL-SCNC: 99 MMOL/L (ref 98–107)
CO2 SERPL-SCNC: 33 MMOL/L (ref 21–32)
COLOR UR: YELLOW
CREAT SERPL-MCNC: 0.79 MG/DL (ref 0.5–1.3)
EGFRCR SERPLBLD CKD-EPI 2021: >90 ML/MIN/1.73M*2
EOSINOPHIL # BLD MANUAL: 0.17 X10*3/UL (ref 0–0.7)
EOSINOPHIL NFR BLD MANUAL: 1 %
ERYTHROCYTE [DISTWIDTH] IN BLOOD BY AUTOMATED COUNT: 13.7 % (ref 11.5–14.5)
FLUAV RNA RESP QL NAA+PROBE: NOT DETECTED
FLUBV RNA RESP QL NAA+PROBE: NOT DETECTED
GLUCOSE BLD MANUAL STRIP-MCNC: 154 MG/DL (ref 74–99)
GLUCOSE BLD MANUAL STRIP-MCNC: 171 MG/DL (ref 74–99)
GLUCOSE BLD MANUAL STRIP-MCNC: 185 MG/DL (ref 74–99)
GLUCOSE BLD MANUAL STRIP-MCNC: 208 MG/DL (ref 74–99)
GLUCOSE BLD MANUAL STRIP-MCNC: 236 MG/DL (ref 74–99)
GLUCOSE BLDA-MCNC: 144 MG/DL (ref 74–99)
GLUCOSE SERPL-MCNC: 132 MG/DL (ref 74–99)
GLUCOSE UR STRIP.AUTO-MCNC: ABNORMAL MG/DL
HCO3 BLDA-SCNC: 31.9 MMOL/L (ref 22–26)
HCO3 BLDA-SCNC: 32.6 MMOL/L (ref 22–26)
HCO3 BLDA-SCNC: 35 MMOL/L (ref 22–26)
HCO3 BLDA-SCNC: 35.8 MMOL/L (ref 22–26)
HCO3 BLDV-SCNC: 26 MMOL/L (ref 22–26)
HCT VFR BLD AUTO: 60.9 % (ref 41–52)
HCT VFR BLD EST: 59 % (ref 41–52)
HGB BLD-MCNC: 19.9 G/DL (ref 13.5–17.5)
HGB BLDA-MCNC: 19.5 G/DL (ref 13.5–17.5)
HOLD SPECIMEN: NORMAL
HYALINE CASTS #/AREA URNS AUTO: ABNORMAL /LPF
IMM GRANULOCYTES # BLD AUTO: 0.09 X10*3/UL (ref 0–0.7)
IMM GRANULOCYTES NFR BLD AUTO: 0.5 % (ref 0–0.9)
INHALED O2 CONCENTRATION: 100 %
INHALED O2 CONCENTRATION: 98 %
KETONES UR STRIP.AUTO-MCNC: NEGATIVE MG/DL
LACTATE BLDA-SCNC: 1.5 MMOL/L (ref 0.4–2)
LACTATE SERPL-SCNC: 1.4 MMOL/L (ref 0.4–2)
LEGIONELLA AG UR QL: NEGATIVE
LEUKOCYTE ESTERASE UR QL STRIP.AUTO: NEGATIVE
LYMPHOCYTES # BLD MANUAL: 0.69 X10*3/UL (ref 1.2–4.8)
LYMPHOCYTES NFR BLD MANUAL: 4 %
MAGNESIUM SERPL-MCNC: 1.86 MG/DL (ref 1.6–2.4)
MCH RBC QN AUTO: 34 PG (ref 26–34)
MCHC RBC AUTO-ENTMCNC: 32.7 G/DL (ref 32–36)
MCV RBC AUTO: 104 FL (ref 80–100)
METAMYELOCYTES # BLD MANUAL: 0.17 X10*3/UL
METAMYELOCYTES NFR BLD MANUAL: 1 %
MONOCYTES # BLD MANUAL: 0.34 X10*3/UL (ref 0.1–1)
MONOCYTES NFR BLD MANUAL: 2 %
MRSA DNA SPEC QL NAA+PROBE: NOT DETECTED
MUCOUS THREADS #/AREA URNS AUTO: ABNORMAL /LPF
NEUTROPHILS # BLD MANUAL: 15.83 X10*3/UL (ref 1.2–7.7)
NEUTS BAND # BLD MANUAL: 4.82 X10*3/UL (ref 0–0.7)
NEUTS BAND NFR BLD MANUAL: 28 %
NEUTS SEG # BLD MANUAL: 11.01 X10*3/UL (ref 1.2–7)
NEUTS SEG NFR BLD MANUAL: 64 %
NITRITE UR QL STRIP.AUTO: NEGATIVE
NRBC BLD-RTO: 0 /100 WBCS (ref 0–0)
OXYHGB MFR BLDA: 77.6 % (ref 94–98)
OXYHGB MFR BLDA: 89 % (ref 94–98)
OXYHGB MFR BLDA: 93.3 % (ref 94–98)
OXYHGB MFR BLDA: 94.4 % (ref 94–98)
OXYHGB MFR BLDV: 77.9 % (ref 45–75)
P AXIS: -24 DEGREES
P AXIS: 104 DEGREES
P AXIS: 107 DEGREES
P AXIS: 114 DEGREES
P AXIS: 49 DEGREES
P AXIS: 82 DEGREES
P OFFSET: 163 MS
P OFFSET: 174 MS
P OFFSET: 179 MS
P OFFSET: 186 MS
P OFFSET: 188 MS
P OFFSET: 188 MS
P ONSET: 103 MS
P ONSET: 103 MS
P ONSET: 106 MS
P ONSET: 118 MS
P ONSET: 119 MS
P ONSET: 160 MS
PCO2 BLDA: 54 MM HG (ref 38–42)
PCO2 BLDA: 62 MM HG (ref 38–42)
PCO2 BLDA: 62 MM HG (ref 38–42)
PCO2 BLDA: 76 MM HG (ref 38–42)
PCO2 BLDV: 44 MM HG (ref 41–51)
PEEP CMH2O: 10 CM H2O
PH BLDA: 7.24 PH (ref 7.38–7.42)
PH BLDA: 7.32 PH (ref 7.38–7.42)
PH BLDA: 7.37 PH (ref 7.38–7.42)
PH BLDA: 7.42 PH (ref 7.38–7.42)
PH BLDV: 7.38 PH (ref 7.33–7.43)
PH UR STRIP.AUTO: 6 [PH]
PHOSPHATE SERPL-MCNC: 4.4 MG/DL (ref 2.5–4.9)
PLATELET # BLD AUTO: 193 X10*3/UL (ref 150–450)
PO2 BLDA: 55 MM HG (ref 85–95)
PO2 BLDA: 66 MM HG (ref 85–95)
PO2 BLDA: 82 MM HG (ref 85–95)
PO2 BLDA: 85 MM HG (ref 85–95)
PO2 BLDV: 50 MM HG (ref 35–45)
POTASSIUM BLDA-SCNC: 3.6 MMOL/L (ref 3.5–5.3)
POTASSIUM SERPL-SCNC: 3.5 MMOL/L (ref 3.5–5.3)
PR INTERVAL: 198 MS
PR INTERVAL: 208 MS
PR INTERVAL: 210 MS
PR INTERVAL: 212 MS
PR INTERVAL: 216 MS
PR INTERVAL: 90 MS
PROT SERPL-MCNC: 6.9 G/DL (ref 6.4–8.2)
PROT UR STRIP.AUTO-MCNC: ABNORMAL MG/DL
Q ONSET: 205 MS
Q ONSET: 205 MS
Q ONSET: 207 MS
Q ONSET: 208 MS
Q ONSET: 225 MS
Q ONSET: 226 MS
QRS COUNT: 14 BEATS
QRS COUNT: 15 BEATS
QRS COUNT: 16 BEATS
QRS COUNT: 16 BEATS
QRS COUNT: 17 BEATS
QRS COUNT: 20 BEATS
QRS DURATION: 144 MS
QRS DURATION: 146 MS
QRS DURATION: 146 MS
QRS DURATION: 148 MS
QRS DURATION: 148 MS
QRS DURATION: 156 MS
QT INTERVAL: 384 MS
QT INTERVAL: 386 MS
QT INTERVAL: 388 MS
QT INTERVAL: 394 MS
QT INTERVAL: 402 MS
QT INTERVAL: 412 MS
QTC CALCULATION(BAZETT): 464 MS
QTC CALCULATION(BAZETT): 495 MS
QTC CALCULATION(BAZETT): 505 MS
QTC CALCULATION(BAZETT): 505 MS
QTC CALCULATION(BAZETT): 525 MS
QTC CALCULATION(BAZETT): 554 MS
QTC FREDERICIA: 437 MS
QTC FREDERICIA: 456 MS
QTC FREDERICIA: 465 MS
QTC FREDERICIA: 468 MS
QTC FREDERICIA: 485 MS
QTC FREDERICIA: 490 MS
R AXIS: -70 DEGREES
R AXIS: -73 DEGREES
R AXIS: -76 DEGREES
R AXIS: -87 DEGREES
R AXIS: 140 DEGREES
R AXIS: 140 DEGREES
RBC # BLD AUTO: 5.86 X10*6/UL (ref 4.5–5.9)
RBC # UR STRIP.AUTO: ABNORMAL /UL
RBC #/AREA URNS AUTO: >20 /HPF
RBC MORPH BLD: ABNORMAL
RSV RNA RESP QL NAA+PROBE: NOT DETECTED
S PNEUM AG UR QL: POSITIVE
SAO2 % BLDA: 78 % (ref 94–100)
SAO2 % BLDA: 91 % (ref 94–100)
SAO2 % BLDA: 96 % (ref 94–100)
SAO2 % BLDA: 97 % (ref 94–100)
SAO2 % BLDV: 86 % (ref 45–75)
SARS-COV-2 RNA RESP QL NAA+PROBE: NOT DETECTED
SODIUM BLDA-SCNC: 138 MMOL/L (ref 136–145)
SODIUM SERPL-SCNC: 142 MMOL/L (ref 136–145)
SP GR UR STRIP.AUTO: 1.01
SPECIMEN DRAWN FROM PATIENT: ABNORMAL
T AXIS: 29 DEGREES
T AXIS: 39 DEGREES
T AXIS: 46 DEGREES
T AXIS: 53 DEGREES
T AXIS: 59 DEGREES
T AXIS: 6 DEGREES
T OFFSET: 397 MS
T OFFSET: 401 MS
T OFFSET: 401 MS
T OFFSET: 411 MS
T OFFSET: 422 MS
T OFFSET: 427 MS
TEST COMMENT: ABNORMAL
TEST COMMENT: ABNORMAL
TIDAL VOLUME: 600 ML
TOTAL CELLS COUNTED BLD: 100
UROBILINOGEN UR STRIP.AUTO-MCNC: <2 MG/DL
VENTILATOR MODE: ABNORMAL
VENTILATOR RATE: 20 BPM
VENTRICULAR RATE: 125 BPM
VENTRICULAR RATE: 86 BPM
VENTRICULAR RATE: 95 BPM
VENTRICULAR RATE: 98 BPM
VENTRICULAR RATE: 99 BPM
VENTRICULAR RATE: 99 BPM
WBC # BLD AUTO: 17.2 X10*3/UL (ref 4.4–11.3)
WBC #/AREA URNS AUTO: ABNORMAL /HPF

## 2024-03-12 PROCEDURE — 71045 X-RAY EXAM CHEST 1 VIEW: CPT

## 2024-03-12 PROCEDURE — 82805 BLOOD GASES W/O2 SATURATION: CPT | Performed by: NURSE PRACTITIONER

## 2024-03-12 PROCEDURE — 87641 MR-STAPH DNA AMP PROBE: CPT | Performed by: NURSE PRACTITIONER

## 2024-03-12 PROCEDURE — 2500000004 HC RX 250 GENERAL PHARMACY W/ HCPCS (ALT 636 FOR OP/ED)

## 2024-03-12 PROCEDURE — 36415 COLL VENOUS BLD VENIPUNCTURE: CPT | Performed by: INTERNAL MEDICINE

## 2024-03-12 PROCEDURE — 87070 CULTURE OTHR SPECIMN AEROBIC: CPT | Mod: GEALAB | Performed by: INTERNAL MEDICINE

## 2024-03-12 PROCEDURE — 87086 URINE CULTURE/COLONY COUNT: CPT | Mod: GEALAB | Performed by: NURSE PRACTITIONER

## 2024-03-12 PROCEDURE — 2500000001 HC RX 250 WO HCPCS SELF ADMINISTERED DRUGS (ALT 637 FOR MEDICARE OP): Performed by: INTERNAL MEDICINE

## 2024-03-12 PROCEDURE — 93010 ELECTROCARDIOGRAM REPORT: CPT | Performed by: STUDENT IN AN ORGANIZED HEALTH CARE EDUCATION/TRAINING PROGRAM

## 2024-03-12 PROCEDURE — 2500000004 HC RX 250 GENERAL PHARMACY W/ HCPCS (ALT 636 FOR OP/ED): Performed by: NURSE PRACTITIONER

## 2024-03-12 PROCEDURE — 2500000005 HC RX 250 GENERAL PHARMACY W/O HCPCS

## 2024-03-12 PROCEDURE — 2500000002 HC RX 250 W HCPCS SELF ADMINISTERED DRUGS (ALT 637 FOR MEDICARE OP, ALT 636 FOR OP/ED): Performed by: NURSE PRACTITIONER

## 2024-03-12 PROCEDURE — 2500000001 HC RX 250 WO HCPCS SELF ADMINISTERED DRUGS (ALT 637 FOR MEDICARE OP): Performed by: NURSE PRACTITIONER

## 2024-03-12 PROCEDURE — 94681 O2 UPTK CO2 OUTP % O2 XTRC: CPT

## 2024-03-12 PROCEDURE — 94799 UNLISTED PULMONARY SVC/PX: CPT

## 2024-03-12 PROCEDURE — 87070 CULTURE OTHR SPECIMN AEROBIC: CPT | Mod: GEALAB | Performed by: NURSE PRACTITIONER

## 2024-03-12 PROCEDURE — 83735 ASSAY OF MAGNESIUM: CPT | Performed by: NURSE PRACTITIONER

## 2024-03-12 PROCEDURE — 80053 COMPREHEN METABOLIC PANEL: CPT | Performed by: STUDENT IN AN ORGANIZED HEALTH CARE EDUCATION/TRAINING PROGRAM

## 2024-03-12 PROCEDURE — 31624 DX BRONCHOSCOPE/LAVAGE: CPT | Performed by: INTERNAL MEDICINE

## 2024-03-12 PROCEDURE — 74018 RADEX ABDOMEN 1 VIEW: CPT | Performed by: RADIOLOGY

## 2024-03-12 PROCEDURE — 36600 WITHDRAWAL OF ARTERIAL BLOOD: CPT

## 2024-03-12 PROCEDURE — 2500000004 HC RX 250 GENERAL PHARMACY W/ HCPCS (ALT 636 FOR OP/ED): Performed by: INTERNAL MEDICINE

## 2024-03-12 PROCEDURE — 2020000001 HC ICU ROOM DAILY

## 2024-03-12 PROCEDURE — 81001 URINALYSIS AUTO W/SCOPE: CPT | Performed by: NURSE PRACTITIONER

## 2024-03-12 PROCEDURE — 2500000002 HC RX 250 W HCPCS SELF ADMINISTERED DRUGS (ALT 637 FOR MEDICARE OP, ALT 636 FOR OP/ED): Performed by: INTERNAL MEDICINE

## 2024-03-12 PROCEDURE — 85007 BL SMEAR W/DIFF WBC COUNT: CPT | Performed by: NURSE PRACTITIONER

## 2024-03-12 PROCEDURE — 94002 VENT MGMT INPAT INIT DAY: CPT

## 2024-03-12 PROCEDURE — 71045 X-RAY EXAM CHEST 1 VIEW: CPT | Performed by: RADIOLOGY

## 2024-03-12 PROCEDURE — 99291 CRITICAL CARE FIRST HOUR: CPT | Performed by: INTERNAL MEDICINE

## 2024-03-12 PROCEDURE — 93005 ELECTROCARDIOGRAM TRACING: CPT

## 2024-03-12 PROCEDURE — 87899 AGENT NOS ASSAY W/OPTIC: CPT | Mod: GEALAB | Performed by: NURSE PRACTITIONER

## 2024-03-12 PROCEDURE — 31720 CLEARANCE OF AIRWAYS: CPT

## 2024-03-12 PROCEDURE — 87102 FUNGUS ISOLATION CULTURE: CPT | Mod: GEALAB | Performed by: INTERNAL MEDICINE

## 2024-03-12 PROCEDURE — 84484 ASSAY OF TROPONIN QUANT: CPT | Performed by: NURSE PRACTITIONER

## 2024-03-12 PROCEDURE — 94760 N-INVAS EAR/PLS OXIMETRY 1: CPT

## 2024-03-12 PROCEDURE — A4217 STERILE WATER/SALINE, 500 ML: HCPCS | Performed by: NURSE PRACTITIONER

## 2024-03-12 PROCEDURE — 74018 RADEX ABDOMEN 1 VIEW: CPT

## 2024-03-12 PROCEDURE — 87040 BLOOD CULTURE FOR BACTERIA: CPT | Mod: GEALAB | Performed by: NURSE PRACTITIONER

## 2024-03-12 PROCEDURE — 99291 CRITICAL CARE FIRST HOUR: CPT | Performed by: STUDENT IN AN ORGANIZED HEALTH CARE EDUCATION/TRAINING PROGRAM

## 2024-03-12 PROCEDURE — 82947 ASSAY GLUCOSE BLOOD QUANT: CPT

## 2024-03-12 PROCEDURE — 0BC38ZZ EXTIRPATION OF MATTER FROM RIGHT MAIN BRONCHUS, VIA NATURAL OR ARTIFICIAL OPENING ENDOSCOPIC: ICD-10-PCS | Performed by: INTERNAL MEDICINE

## 2024-03-12 PROCEDURE — 84100 ASSAY OF PHOSPHORUS: CPT | Performed by: NURSE PRACTITIONER

## 2024-03-12 PROCEDURE — 2500000005 HC RX 250 GENERAL PHARMACY W/O HCPCS: Performed by: NURSE PRACTITIONER

## 2024-03-12 PROCEDURE — 0B9F8ZX DRAINAGE OF RIGHT LOWER LUNG LOBE, VIA NATURAL OR ARTIFICIAL OPENING ENDOSCOPIC, DIAGNOSTIC: ICD-10-PCS | Performed by: INTERNAL MEDICINE

## 2024-03-12 PROCEDURE — C9113 INJ PANTOPRAZOLE SODIUM, VIA: HCPCS | Performed by: NURSE PRACTITIONER

## 2024-03-12 PROCEDURE — 84132 ASSAY OF SERUM POTASSIUM: CPT | Performed by: NURSE PRACTITIONER

## 2024-03-12 PROCEDURE — 85027 COMPLETE CBC AUTOMATED: CPT | Performed by: NURSE PRACTITIONER

## 2024-03-12 PROCEDURE — S4991 NICOTINE PATCH NONLEGEND: HCPCS | Performed by: NURSE PRACTITIONER

## 2024-03-12 PROCEDURE — 87637 SARSCOV2&INF A&B&RSV AMP PRB: CPT | Performed by: NURSE PRACTITIONER

## 2024-03-12 PROCEDURE — 87449 NOS EACH ORGANISM AG IA: CPT | Mod: GEALAB | Performed by: NURSE PRACTITIONER

## 2024-03-12 PROCEDURE — 94640 AIRWAY INHALATION TREATMENT: CPT

## 2024-03-12 PROCEDURE — 83605 ASSAY OF LACTIC ACID: CPT | Performed by: NURSE PRACTITIONER

## 2024-03-12 RX ORDER — DEXMEDETOMIDINE HYDROCHLORIDE 4 UG/ML
INJECTION, SOLUTION INTRAVENOUS
Status: COMPLETED
Start: 2024-03-12 | End: 2024-03-12

## 2024-03-12 RX ORDER — PROPOFOL 10 MG/ML
INJECTION, EMULSION INTRAVENOUS
Status: COMPLETED
Start: 2024-03-12 | End: 2024-03-12

## 2024-03-12 RX ORDER — VANCOMYCIN HYDROCHLORIDE 1 G/20ML
INJECTION, POWDER, LYOPHILIZED, FOR SOLUTION INTRAVENOUS DAILY PRN
Status: DISCONTINUED | OUTPATIENT
Start: 2024-03-12 | End: 2024-03-12

## 2024-03-12 RX ORDER — PROPOFOL 10 MG/ML
5-20 INJECTION, EMULSION INTRAVENOUS CONTINUOUS
Status: DISCONTINUED | OUTPATIENT
Start: 2024-03-12 | End: 2024-03-14

## 2024-03-12 RX ORDER — AZITHROMYCIN 250 MG/1
500 TABLET, FILM COATED ORAL
Status: DISCONTINUED | OUTPATIENT
Start: 2024-03-12 | End: 2024-03-14

## 2024-03-12 RX ORDER — DIAZEPAM 10 MG/1
10 TABLET ORAL EVERY 2 HOUR PRN
Status: DISCONTINUED | OUTPATIENT
Start: 2024-03-12 | End: 2024-03-16

## 2024-03-12 RX ORDER — MULTIVIT-MIN/FERROUS GLUCONATE 9 MG/15 ML
15 LIQUID (ML) ORAL DAILY
Status: DISCONTINUED | OUTPATIENT
Start: 2024-03-12 | End: 2024-03-17

## 2024-03-12 RX ORDER — LORAZEPAM 2 MG/ML
0.5 INJECTION INTRAMUSCULAR EVERY 2 HOUR PRN
Status: DISCONTINUED | OUTPATIENT
Start: 2024-03-12 | End: 2024-03-12

## 2024-03-12 RX ORDER — ASPIRIN 300 MG/1
300 SUPPOSITORY RECTAL ONCE
Status: DISCONTINUED | OUTPATIENT
Start: 2024-03-12 | End: 2024-03-14

## 2024-03-12 RX ORDER — PREDNISONE 20 MG/1
40 TABLET ORAL DAILY
Status: DISCONTINUED | OUTPATIENT
Start: 2024-03-12 | End: 2024-03-18

## 2024-03-12 RX ORDER — ETOMIDATE 2 MG/ML
20 INJECTION INTRAVENOUS ONCE
Status: COMPLETED | OUTPATIENT
Start: 2024-03-12 | End: 2024-03-12

## 2024-03-12 RX ORDER — LORAZEPAM 2 MG/ML
INJECTION INTRAMUSCULAR
Status: COMPLETED
Start: 2024-03-12 | End: 2024-03-12

## 2024-03-12 RX ORDER — CEFEPIME 1 G/50ML
2 INJECTION, SOLUTION INTRAVENOUS EVERY 8 HOURS
Status: DISCONTINUED | OUTPATIENT
Start: 2024-03-12 | End: 2024-03-16

## 2024-03-12 RX ORDER — LORAZEPAM 2 MG/ML
1 INJECTION INTRAMUSCULAR EVERY 2 HOUR PRN
Status: DISCONTINUED | OUTPATIENT
Start: 2024-03-12 | End: 2024-03-12

## 2024-03-12 RX ORDER — PANTOPRAZOLE SODIUM 40 MG/1
40 TABLET, DELAYED RELEASE ORAL
Status: DISCONTINUED | OUTPATIENT
Start: 2024-03-12 | End: 2024-03-12

## 2024-03-12 RX ORDER — NOREPINEPHRINE BITARTRATE 0.03 MG/ML
.01-1 INJECTION, SOLUTION INTRAVENOUS CONTINUOUS
Status: DISCONTINUED | OUTPATIENT
Start: 2024-03-12 | End: 2024-03-13

## 2024-03-12 RX ORDER — PANTOPRAZOLE SODIUM 40 MG/10ML
40 INJECTION, POWDER, LYOPHILIZED, FOR SOLUTION INTRAVENOUS
Status: DISCONTINUED | OUTPATIENT
Start: 2024-03-12 | End: 2024-03-12

## 2024-03-12 RX ORDER — INSULIN LISPRO 100 [IU]/ML
0-10 INJECTION, SOLUTION INTRAVENOUS; SUBCUTANEOUS 4 TIMES DAILY
Status: DISCONTINUED | OUTPATIENT
Start: 2024-03-12 | End: 2024-03-17

## 2024-03-12 RX ORDER — DEXMEDETOMIDINE HYDROCHLORIDE 4 UG/ML
.1-1.5 INJECTION, SOLUTION INTRAVENOUS CONTINUOUS
Status: DISCONTINUED | OUTPATIENT
Start: 2024-03-12 | End: 2024-03-12

## 2024-03-12 RX ORDER — POTASSIUM CHLORIDE 1.5 G/1.58G
20 POWDER, FOR SOLUTION ORAL DAILY
Status: DISCONTINUED | OUTPATIENT
Start: 2024-03-12 | End: 2024-03-17

## 2024-03-12 RX ORDER — EZETIMIBE 10 MG/1
10 TABLET ORAL DAILY
COMMUNITY
Start: 2019-05-13 | End: 2024-04-23 | Stop reason: SDUPTHER

## 2024-03-12 RX ORDER — LANOLIN ALCOHOL/MO/W.PET/CERES
100 CREAM (GRAM) TOPICAL DAILY
Status: DISCONTINUED | OUTPATIENT
Start: 2024-03-12 | End: 2024-03-17

## 2024-03-12 RX ORDER — CLOBETASOL PROPIONATE 0.46 MG/ML
1 SOLUTION TOPICAL NIGHTLY
COMMUNITY
Start: 2023-12-01

## 2024-03-12 RX ORDER — MIDAZOLAM HYDROCHLORIDE 1 MG/ML
4 INJECTION, SOLUTION INTRAMUSCULAR; INTRAVENOUS ONCE
Status: COMPLETED | OUTPATIENT
Start: 2024-03-12 | End: 2024-03-12

## 2024-03-12 RX ORDER — IPRATROPIUM BROMIDE AND ALBUTEROL SULFATE 2.5; .5 MG/3ML; MG/3ML
3 SOLUTION RESPIRATORY (INHALATION)
Status: DISCONTINUED | OUTPATIENT
Start: 2024-03-12 | End: 2024-03-14

## 2024-03-12 RX ORDER — NOREPINEPHRINE BITARTRATE 0.03 MG/ML
INJECTION, SOLUTION INTRAVENOUS
Status: COMPLETED
Start: 2024-03-12 | End: 2024-03-12

## 2024-03-12 RX ORDER — DOCUSATE SODIUM 50 MG/5ML
100 LIQUID ORAL 2 TIMES DAILY
Status: DISCONTINUED | OUTPATIENT
Start: 2024-03-12 | End: 2024-03-17

## 2024-03-12 RX ORDER — DEXMEDETOMIDINE HYDROCHLORIDE 4 UG/ML
0.2 INJECTION, SOLUTION INTRAVENOUS CONTINUOUS
Status: DISCONTINUED | OUTPATIENT
Start: 2024-03-12 | End: 2024-03-12

## 2024-03-12 RX ORDER — MULTIVIT-MIN/IRON FUM/FOLIC AC 7.5 MG-4
1 TABLET ORAL DAILY
Status: DISCONTINUED | OUTPATIENT
Start: 2024-03-12 | End: 2024-03-12

## 2024-03-12 RX ORDER — LORAZEPAM 2 MG/ML
1 INJECTION INTRAMUSCULAR ONCE
Status: COMPLETED | OUTPATIENT
Start: 2024-03-12 | End: 2024-03-12

## 2024-03-12 RX ORDER — IBUPROFEN 200 MG
1 TABLET ORAL DAILY
Status: DISCONTINUED | OUTPATIENT
Start: 2024-03-12 | End: 2024-03-17

## 2024-03-12 RX ORDER — LORAZEPAM 2 MG/ML
2 INJECTION INTRAMUSCULAR EVERY 4 HOURS PRN
Status: DISCONTINUED | OUTPATIENT
Start: 2024-03-12 | End: 2024-03-12

## 2024-03-12 RX ORDER — PANTOPRAZOLE SODIUM 40 MG/10ML
40 INJECTION, POWDER, LYOPHILIZED, FOR SOLUTION INTRAVENOUS DAILY
Status: DISCONTINUED | OUTPATIENT
Start: 2024-03-13 | End: 2024-03-14

## 2024-03-12 RX ORDER — FOLIC ACID 1 MG/1
1 TABLET ORAL DAILY
Status: DISCONTINUED | OUTPATIENT
Start: 2024-03-12 | End: 2024-03-17

## 2024-03-12 RX ADMIN — NOREPINEPHRINE BITARTRATE 0.01 MCG/KG/MIN: 0.03 INJECTION, SOLUTION INTRAVENOUS at 01:30

## 2024-03-12 RX ADMIN — CEFEPIME 2 G: 1 INJECTION, SOLUTION INTRAVENOUS at 11:22

## 2024-03-12 RX ADMIN — PROPOFOL 15 MCG/KG/MIN: 10 INJECTION, EMULSION INTRAVENOUS at 22:42

## 2024-03-12 RX ADMIN — NICOTINE 1 PATCH: 21 PATCH, EXTENDED RELEASE TRANSDERMAL at 08:40

## 2024-03-12 RX ADMIN — THIAMINE HCL TAB 100 MG 100 MG: 100 TAB at 08:40

## 2024-03-12 RX ADMIN — LORAZEPAM 1 MG: 2 INJECTION, SOLUTION INTRAMUSCULAR; INTRAVENOUS at 00:45

## 2024-03-12 RX ADMIN — GUAIFENESIN 600 MG: 600 TABLET ORAL at 22:08

## 2024-03-12 RX ADMIN — IPRATROPIUM BROMIDE AND ALBUTEROL SULFATE 3 ML: 2.5; .5 SOLUTION RESPIRATORY (INHALATION) at 13:50

## 2024-03-12 RX ADMIN — POTASSIUM CHLORIDE 20 MEQ: 1.5 POWDER, FOR SOLUTION ORAL at 08:40

## 2024-03-12 RX ADMIN — AZITHROMYCIN DIHYDRATE 500 MG: 250 TABLET ORAL at 10:53

## 2024-03-12 RX ADMIN — FOLIC ACID 1 MG: 1 TABLET ORAL at 08:41

## 2024-03-12 RX ADMIN — DEXMEDETOMIDINE HYDROCHLORIDE 0.2 MCG/KG/HR: 400 INJECTION INTRAVENOUS at 00:32

## 2024-03-12 RX ADMIN — IPRATROPIUM BROMIDE AND ALBUTEROL SULFATE 3 ML: 2.5; .5 SOLUTION RESPIRATORY (INHALATION) at 08:04

## 2024-03-12 RX ADMIN — DOCUSATE SODIUM 100 MG: 50 LIQUID ORAL at 08:44

## 2024-03-12 RX ADMIN — LORAZEPAM 1 MG: 2 INJECTION INTRAMUSCULAR at 00:45

## 2024-03-12 RX ADMIN — CEFEPIME 2 G: 1 INJECTION, SOLUTION INTRAVENOUS at 17:32

## 2024-03-12 RX ADMIN — INSULIN LISPRO 4 UNITS: 100 INJECTION, SOLUTION INTRAVENOUS; SUBCUTANEOUS at 08:44

## 2024-03-12 RX ADMIN — INSULIN LISPRO 2 UNITS: 100 INJECTION, SOLUTION INTRAVENOUS; SUBCUTANEOUS at 22:08

## 2024-03-12 RX ADMIN — PROPOFOL 10 MCG/KG/MIN: 10 INJECTION, EMULSION INTRAVENOUS at 12:24

## 2024-03-12 RX ADMIN — Medication 100 PERCENT: at 02:15

## 2024-03-12 RX ADMIN — PREDNISONE 40 MG: 20 TABLET ORAL at 10:53

## 2024-03-12 RX ADMIN — NOREPINEPHRINE BITARTRATE 0.15 MCG/KG/MIN: 0.03 INJECTION, SOLUTION INTRAVENOUS at 04:49

## 2024-03-12 RX ADMIN — MIDAZOLAM 4 MG: 1 INJECTION INTRAMUSCULAR; INTRAVENOUS at 02:17

## 2024-03-12 RX ADMIN — INSULIN LISPRO 2 UNITS: 100 INJECTION, SOLUTION INTRAVENOUS; SUBCUTANEOUS at 17:32

## 2024-03-12 RX ADMIN — INSULIN LISPRO 4 UNITS: 100 INJECTION, SOLUTION INTRAVENOUS; SUBCUTANEOUS at 12:17

## 2024-03-12 RX ADMIN — DOCUSATE SODIUM 100 MG: 50 LIQUID ORAL at 22:08

## 2024-03-12 RX ADMIN — PIPERACILLIN SODIUM AND TAZOBACTAM SODIUM 3.38 G: 3; .375 INJECTION, SOLUTION INTRAVENOUS at 08:41

## 2024-03-12 RX ADMIN — PROPOFOL 5 MCG/KG/MIN: 10 INJECTION, EMULSION INTRAVENOUS at 02:26

## 2024-03-12 RX ADMIN — Medication 15 ML: at 09:37

## 2024-03-12 RX ADMIN — IPRATROPIUM BROMIDE AND ALBUTEROL SULFATE 3 ML: 2.5; .5 SOLUTION RESPIRATORY (INHALATION) at 19:54

## 2024-03-12 RX ADMIN — DEXMEDETOMIDINE HYDROCHLORIDE 0.2 MCG/KG/HR: 4 INJECTION, SOLUTION INTRAVENOUS at 00:32

## 2024-03-12 RX ADMIN — ALLOPURINOL 300 MG: 300 TABLET ORAL at 08:40

## 2024-03-12 RX ADMIN — EMPAGLIFLOZIN 10 MG: 10 TABLET, FILM COATED ORAL at 08:41

## 2024-03-12 RX ADMIN — PANTOPRAZOLE SODIUM 40 MG: 40 INJECTION, POWDER, FOR SOLUTION INTRAVENOUS at 08:49

## 2024-03-12 RX ADMIN — LEVOTHYROXINE SODIUM 125 MCG: 0.12 TABLET ORAL at 08:41

## 2024-03-12 RX ADMIN — PIPERACILLIN SODIUM AND TAZOBACTAM SODIUM 3.38 G: 3; .375 INJECTION, SOLUTION INTRAVENOUS at 02:29

## 2024-03-12 RX ADMIN — NOREPINEPHRINE BITARTRATE 0.09 MCG/KG/MIN: 0.03 INJECTION, SOLUTION INTRAVENOUS at 12:24

## 2024-03-12 RX ADMIN — ETOMIDATE 20 MG: 2 INJECTION, SOLUTION INTRAVENOUS at 01:20

## 2024-03-12 RX ADMIN — ENOXAPARIN SODIUM 40 MG: 40 INJECTION SUBCUTANEOUS at 08:40

## 2024-03-12 RX ADMIN — VANCOMYCIN HYDROCHLORIDE 2000 MG: 10 INJECTION, POWDER, LYOPHILIZED, FOR SOLUTION INTRAVENOUS at 03:14

## 2024-03-12 RX ADMIN — ENOXAPARIN SODIUM 40 MG: 40 INJECTION SUBCUTANEOUS at 22:07

## 2024-03-12 ASSESSMENT — PAIN SCALES - GENERAL
PAINLEVEL_OUTOF10: 0 - NO PAIN
PAINLEVEL_OUTOF10: 4
PAINLEVEL_OUTOF10: 0 - NO PAIN

## 2024-03-12 ASSESSMENT — PAIN - FUNCTIONAL ASSESSMENT
PAIN_FUNCTIONAL_ASSESSMENT: CPOT (CRITICAL CARE PAIN OBSERVATION TOOL)

## 2024-03-12 NOTE — CONSULTS
"Vancomycin Dosing by Pharmacy- INITIAL    Alo Glass is a 68 y.o. year old male who Pharmacy has been consulted for vancomycin dosing for pneumonia. Based on the patient's indication and renal status this patient will be dosed based on a goal AUC of 400-600.     Renal function is currently stable.    Visit Vitals  BP (!) 119/99   Pulse 107   Temp 36.9 °C (98.4 °F) (Tympanic)   Resp 18        Lab Results   Component Value Date    CREATININE 0.79 03/12/2024    CREATININE 0.71 03/11/2024    CREATININE 0.83 03/10/2024    CREATININE 0.72 03/09/2024        Patient weight is No results found for: \"PTWEIGHT\"    No results found for: \"CULTURE\"     I/O last 3 completed shifts:  In: 114.3 (0.8 mL/kg) [P.O.:100; I.V.:14.3 (0.1 mL/kg)]  Out: - (0 mL/kg)   Weight: 147.5 kg   [unfilled]    Lab Results   Component Value Date    PATIENTTEMP  03/12/2024      Comment:      NOTE: Patient Results are Not Corrected for Temperature    PATIENTTEMP  03/12/2024      Comment:      NOTE: Patient Results are Not Corrected for Temperature    PATIENTTEMP  03/11/2024      Comment:      NOTE: Patient Results are Not Corrected for Temperature          Assessment/Plan     Patient will be given a loading dose of 2000 mg.  Will initiate vancomycin maintenance,  1250 mg every 12 hours.    This dosing regimen is predicted by [employer] to result in the following pharmacokinetic parameters:<   <Loading dose: 2000 mg at 03:00 03/12/2024.  Regimen: 1250 mg IV every 12 hours.  Start time: 15:00 on 03/12/2024  Exposure target: AUC24 (range)400-600 mg/L.hr   AUC24,ss: 511 mg/L.hr  Probability of AUC24 > 400: 68 %  Ctrough,ss: 13.6 mg/L  Probability of Ctrough,ss > 20: 32 %  Probability of nephrotoxicity (Lodise DAKSHA 2009): 9 %      Follow-up level will be ordered on 3/13 at 5am unless clinically indicated sooner.  Will continue to monitor renal function daily while on vancomycin and order serum creatinine at least every 48 hours if not already " ordered.  Follow for continued vancomycin needs, clinical response, and signs/symptoms of toxicity.       Mónica Durham, PharmD

## 2024-03-12 NOTE — PROGRESS NOTES
Subjective Data:  Patient intubated, sedated, unable to participate in review of systems.     Overnight Events:    Acute desaturation overnight, requiring high flow oxygen, started Lasix gtt and transferred to step down. Was initially placed on BiPAP, was agitated given Ativan and then started on Precedex with concerns for etoh w/d. BiPAP maxed out and patient desaturating, required intubation. Lasix gtt held d/t hypotension requiring pressor support.        Objective Data:  Last Recorded Vitals:  Vitals:    03/12/24 0715 03/12/24 0730 03/12/24 0745 03/12/24 0800   BP: (!) 91/38 90/59 83/58    BP Location:       Patient Position:       Pulse: 98 95 94 92   Resp: 20 20 20 21   Temp:       TempSrc:       SpO2: 94% 95% 94% 96%   Weight:       Height:           Last Labs:  CBC - 3/12/2024: 12:15 AM  17.2 19.9 193    60.9      CMP - 3/12/2024: 12:15 AM  9.2 6.9 18 --- 1.6   4.4 4.1 22 73      PTT - No results in last year.  _   _ _     TROPHS   Date/Time Value Ref Range Status   03/12/2024 07:07  0 - 20 ng/L Final     Comment:     Previous result verified on 3/12/2024 0048 on specimen/case [id number] [facility] Troponin I, High Sensitivity with value 89 ng/L.   03/12/2024 03:08  0 - 20 ng/L Final     Comment:     Previous result verified on 3/12/2024 0048 on specimen/case [id number] called with component [facility] for procedure Troponin I, High Sensitivity with value 89 ng/L.   03/12/2024 12:15 AM 89 0 - 20 ng/L Final     BNP   Date/Time Value Ref Range Status   03/10/2024 11:25  0 - 99 pg/mL Final   03/08/2024 05:28  0 - 99 pg/mL Final   03/08/2024 05:28  0 - 99 pg/mL Final     HGBA1C   Date/Time Value Ref Range Status   02/20/2024 10:35 AM 5.8 see below % Final   01/19/2023 10:23 AM 7.3 % Final     Comment:          Diagnosis of Diabetes-Adults   Non-Diabetic: < or = 5.6%   Increased risk for developing diabetes: 5.7-6.4%   Diagnostic of diabetes: > or  = 6.5%  .       Monitoring of Diabetes                Age (y)     Therapeutic Goal (%)   Adults:          >18           <7.0   Pediatrics:    13-18           <7.5                   7-12           <8.0                   0- 6            7.5-8.5   American Diabetes Association. Diabetes Care 33(S1), Jan 2010.     09/10/2021 01:29 PM 7.9 % Final     Comment:          Diagnosis of Diabetes-Adults   Non-Diabetic: < or = 5.6%   Increased risk for developing diabetes: 5.7-6.4%   Diagnostic of diabetes: > or = 6.5%  .       Monitoring of Diabetes                Age (y)     Therapeutic Goal (%)   Adults:          >18           <7.0   Pediatrics:    13-18           <7.5                   7-12           <8.0                   0- 6            7.5-8.5   American Diabetes Association. Diabetes Care 33(S1), Jan 2010.       LDLCALC   Date/Time Value Ref Range Status   02/20/2024 10:35  <=99 mg/dL Final     Comment:                                 Near   Borderline      AGE      Desirable  Optimal    High     High     Very High     0-19 Y     0 - 109     ---    110-129   >/= 130     ----    20-24 Y     0 - 119     ---    120-159   >/= 160     ----      >24 Y     0 -  99   100-129  130-159   160-189     >/=190       VLDL   Date/Time Value Ref Range Status   02/20/2024 10:35 AM 16 0 - 40 mg/dL Final   09/10/2021 01:29 PM 27 0 - 40 mg/dL Final   09/18/2018 02:26 PM 22 0 - 40 mg/dL Final      Last I/O:  I/O last 3 completed shifts:  In: 942.7 (6.5 mL/kg) [P.O.:100; I.V.:252.7 (1.7 mL/kg); NG/GT:40; IV Piggyback:550]  Out: 1450 (10 mL/kg) [Urine:1400 (0.3 mL/kg/hr); Emesis/NG output:50]  Weight: 145.3 kg     Past Cardiology Tests (Last 3 Years):  EKG:  ECG 12 Lead 03/10/2024 (Preliminary)  ECG 12 lead  (Preliminary)    Echo:  Transthoracic echo (TTE) complete 03/11/2024  CONCLUSIONS:   1. Left ventricular systolic function is moderately decreased with a 30-35% estimated ejection fraction.   2. Basal inferolateral segment is  abnormal.   3. Poorly visualized anatomical structures due to suboptimal image quality.   4. Moderate aortic valve stenosis.   5. There is global hypokinesis of the left ventricle with minor regional variations.     TTE (06/18/2019):   -Low normal LV systolic function  -Mild concentric LVH  -Doppler flow suggestive of abnormal Left ventricular relaxation  -Normal right ventricular size and function  -Slightly enlarged left atrium.   -Moderate calcific aortic stenosis  -Trace mitral regurg     Ejection Fractions:  EF   Date/Time Value Ref Range Status   03/11/2024 02:04 PM 33 %      Cath:  No results found for this or any previous visit from the past 1095 days.    Stress Test:  No results found for this or any previous visit from the past 1095 days.    Imaging:  CXR (03/08/2024):   IMPRESSION:  -Moderate right pleural effusion and right lung base consolidation. Findings could be due to pneumonia. Other underlying process not excluded. Follow-up recommended.   -Trace left pleural effusion and cardiomegaly. Possible interstitial edema.    Xray Abd (03/12/2024):   IMPRESSION:  1.  Enteric tube approximately 3.8 cm above the kathie.  2. Evaluation of the enteric tube is limited and appears looped high in the right upper quadrant however may be related to patient positioning and rotation. Repeat imaging may be considered with proper patient positioning to delineate proper position.  3. Left lower lobe airspace opacities concerning for pneumonia. Possible large right pleural effusion.  4. Paucity of bowel gas noted.    CXR (03/12/2024):   IMPRESSION:  1.  Enteric tube approximately 3.8 cm above the kathie.  2. Evaluation of the enteric tube is limited and appears looped high in the right upper quadrant however may be related to patient positioning and rotation. Repeat imaging may be considered with proper patient positioning to delineate proper position.  3. Left lower lobe airspace opacities concerning for pneumonia.  Possible large right pleural effusion.  4. Paucity of bowel gas noted.    Inpatient Medications:  Scheduled medications   Medication Dose Route Frequency    allopurinol  300 mg oral Daily    aspirin  300 mg rectal Once    docusate sodium  100 mg oral BID    empagliflozin  10 mg oral Daily    enoxaparin  40 mg subcutaneous q12h SHARRON    [Held by provider] tiotropium  2 puff inhalation Daily    And    [Held by provider] fluticasone furoate-vilanteroL  1 puff inhalation Daily    folic acid  1 mg oral Daily    insulin lispro  0-10 Units subcutaneous 4x daily    ipratropium-albuteroL  3 mL nebulization TID    levothyroxine  125 mcg oral Daily    multivitamin with minerals  1 tablet oral Daily    nicotine  1 patch transdermal Daily    pantoprazole  40 mg intravenous BID AC    perflutren lipid microspheres  1 mL of dilution intravenous Once in imaging    piperacillin-tazobactam  3.375 g intravenous q6h    potassium chloride CR  20 mEq oral Daily    thiamine  100 mg oral Daily    [Held by provider] valsartan  320 mg oral Daily    vancomycin  1,250 mg intravenous q12h     PRN medications   Medication    acetaminophen    Or    acetaminophen    Or    acetaminophen    dextrose 10 % in water (D10W)    dextrose    diazePAM    glucagon    guaiFENesin    ipratropium-albuteroL    oxygen    vancomycin     Continuous Medications   Medication Dose Last Rate    [Held by provider] furosemide  20 mg/hr 20 mg/hr (03/11/24 2241)    norepinephrine  0.01-1 mcg/kg/min 0.15 mcg/kg/min (03/12/24 0689)    propofol  5-20 mcg/kg/min 5 mcg/kg/min (03/12/24 0226)       Physical Exam:  Physical Exam  Constitutional:       Comments: Intubated, sedated.    HENT:      Head: Normocephalic.      Mouth/Throat:      Mouth: Mucous membranes are moist.   Cardiovascular:      Rate and Rhythm: Regular rhythm. Tachycardia present.   Pulmonary:      Comments: Coarse breath sounds b/l, intubated.    Abdominal:      General: Bowel sounds are normal. There is  distension.      Palpations: Abdomen is soft.   Musculoskeletal:      Right lower leg: Edema present.      Left lower leg: Edema present.   Skin:     General: Skin is warm.   Neurological:      Comments: Sedate              Assessment/Plan   69 yo male from home with h/o obesity, MELISSA on CPAP, COPD, current smoker, type 2 DM, hypertension, lymphedema, moderate aortic stenosis (6/2019) who presented from PCP office with symptoms of increased LE edema, abdominal bloating, progressive SOB over the past year found to be hypoxic in the ER. On admission was Intermittently refusing IV Lasix, I&O not well monitored. Discussed urinating only into urinals, recording how much urine vs how frequent he urinates. Refused return to lasix gtt, lipscomb or external catheter. Originally refusing echo d/t concern for needing to urinate during the testing. Discussed accommodations and ability to use the bathroom in echo if necessary. Patient was then agreeable to echocardiogram. Echo obtained with newly reduced ejection fraction. Decompensated overnight hypoxic, agitated, required Ativan, Precedex, BiPAP and eventually intubation. Patient seen today in ICU, Diuresis has been held, requiring IV pressor therapy though still hypotensive. Pulm consulted for acute respiratory failure.      #Acute hypoxic respiratory failure 2/2 decompensated heart failure, Pneumonia, COPD w/ current smoking   #Acute decompensated systolic heart failure (new diagnosis)  #Shock state c/f septic (PNA) vs cardiogenic  #Moderate aortic stenosis   #Chronic lymphedema BLE   #Polycythemia   #NSR with PACs on telemetry  (no afib noted)      -Ok for cautious IVF resuscitation as needed in setting of septic shock, can resume IV diuresis pending improvement in BP  -Hold BB, Spironolactone, ARB given acute decompensation  -Echo reviewed with newly reduced LVSF, EF 30-35%, abnormal basal inferolateral segment, global hypokinesis, mod AS  -Strict I/O, monitor renal function  and electrolytes; Daily weight   -Wean Levophed as able, can consider addition of Dopamine if not responsive to resuscitation.   -Will follow        Peripheral IV 03/08/24 20 G Right Hand (Active)   Site Assessment Clean;Dry;Intact 03/12/24 0100   Dressing Type Transparent 03/12/24 0100   Line Status Blood return noted;Flushed;Infusing 03/12/24 0100   Dressing Status Clean;Dry 03/12/24 0100   Number of days: 4       Peripheral IV 03/12/24 18 G Left;Ventral Forearm (Active)   Site Assessment Clean;Dry;Intact 03/12/24 0100   Dressing Type Transparent 03/12/24 0100   Line Status Blood return noted;Flushed;Infusing 03/12/24 0100   Dressing Status Clean;Dry 03/12/24 0100   Number of days: 0       Peripheral IV 03/12/24 Right;Anterior Forearm (Active)   Site Assessment Clean;Dry 03/12/24 0100   Dressing Type Transparent 03/12/24 0100   Line Status Blood return noted;Flushed;Infusing 03/12/24 0100   Dressing Status Clean;Dry 03/12/24 0100   Number of days: 0       ETT  7.5 mm (Active)   Secured at (cm) 25 cm 03/12/24 0420   Measured from Lips 03/12/24 0420   Secured Location Center 03/12/24 0420   Secured by Commercial tube pink 03/12/24 0420   Site Condition Dry 03/12/24 0420   Number of days: 0       NG/OG/Feeding Tube OG - Weld sump 18 Fr Center mouth (Active)   Intake - Flush (mL) 40 mL 03/12/24 0600   Output (mL) 50 mL 03/12/24 0600   Number of days: 0       Urethral Catheter (Active)   Output (mL) 200 mL 03/12/24 0600   Number of days: 0       Code Status:  Full Code    Disposition: Patient requires more than 2 inpatient days. This critically ill patient continues to be at-risk for clinically significant deterioration / failure due to the above mentioned dysfunctional, unstable organ systems.  I have personally identified and managed all complex critical care issues to prevent aforementioned clinical deterioration.  Critical care time is spent at bedside and/or the immediate area and has included, but is not  limited to, the review of diagnostic tests, labs, radiographs, serial assessments of hemodynamics, respiratory status, ventilatory management, and family updates.  Time spent in procedures and teaching are reported separately.   -Critical Care 45 minutes    Code Status: FULL CODE          Stephanie Dyer PA-C

## 2024-03-12 NOTE — PROGRESS NOTES
Alo Glass is a 68 y.o. male on day 3 of admission presenting with Heart failure exacerbated by sotalol (CMS/[facility]).      Subjective   Called to bedside to assess since agitated and initially refusing treatment but would not change code status to DNR-comfort care. Agrees to treatment once this NP at bedside. 1 mg ativan given. Patient becomes lethargic and was transferred to ICU for closer monitoring.   Patient becomes more agitated/alert, kicking and punching staff and was started on Precedex per Dr. Chávez after ativan ineffective. He is a daily drinker; at least 3 martini's per day. Patient placed on BiPAP initially in ICU and on max settings and oxygen saturation began decreasing into the mid 80's. Patient was intubated at bedside. Noted to be hypotensive and pressors were started. Lasix drip stopped temporarily. Stabilized in ICU and started on treatment for potential PNA with sepsis.    Objective     Last Recorded Vitals  /71 (BP Location: Left arm, Patient Position: Lying)   Pulse 97   Temp 36.5 °C (97.7 °F) (Tympanic)   Resp (!) 45   Wt 148 kg (325 lb 2.9 oz)   SpO2 92%   Intake/Output last 3 Shifts:    Intake/Output Summary (Last 24 hours) at 3/11/2024 2321  Last data filed at 3/11/2024 2039  Gross per 24 hour   Intake 100 ml   Output 200 ml   Net -100 ml       Admission Weight  Weight: 150 kg (330 lb) (03/08/24 1702)    Daily Weight  03/11/24 : 148 kg (325 lb 2.9 oz)    Image Results  XR chest 1 view  Narrative: Interpreted By:  Eugene Alvarenga,   STUDY:  XR CHEST 1 VIEW;  3/11/2024 5:55 pm      INDICATION:  Signs/Symptoms:sob.      COMPARISON:  Chest radiograph 03/08/2024      ACCESSION NUMBER(S):  [id number]      ORDERING CLINICIAN:  YAIR BURCIAGA      FINDINGS:          CARDIOMEDIASTINAL SILHOUETTE:  Cardiomediastinal silhouette is stable in size and configuration.      LUNGS:  Slightly improved right-sided pleural effusion which is persistently  moderate. Small left-sided pleural effusion.  No pneumothorax.      ABDOMEN:  No remarkable upper abdominal findings.      BONES:  No acute osseous changes.      Impression: 1.  Slightly improved right-sided pleural effusion which is  persistently moderate.          Signed by: Eugene Alvarenga 3/11/2024 7:19 PM  Dictation workstation:   [id number]  Transthoracic echo (TTE) complete     [facility], [address] [place] [address]                Tel [phone] and Fax [phone]    TRANSTHORACIC ECHOCARDIOGRAM REPORT       Patient Name:      BRAYAN Key Physician:    [id number] Heather Barrett MD  Study Date:        3/11/2024            Ordering Provider:    [id number] RORY GRAVES  MRN/PID:           [id number]             Fellow:  Accession#:        [id number]         Nurse:                Khushbu Holman RN  Date of Birth/Age: 1955 / 68 years Sonographer:          Goldie Woods RDCS  Gender:            M                    Additional Staff:  Height:            177.80 cm            Admit Date:           3/8/2024  Weight:            147.42 kg            Admission Status:     Inpatient -                                                                Routine  BSA / BMI:         2.57 m2 / 46.63      Encounter#:           [id number]                     kg/m2                                          Department Location:  [facility] Non                                                                Invasive  Blood Pressure: 107 /70 mmHg    Study Type:    TRANSTHORACIC ECHO (TTE) COMPLETE  Diagnosis/ICD: Heart failure, unspecified-I50.9; Acute systolic (congestive)                 heart failure (CHF)-I50.21  Indication:    Congestive Heart Failure  CPT Code:      Echo Complete w Full Doppler-76875    Patient History:  Pertinent History: COPD,  HTN, LE Edema, Dyspnea and Elev trop., Elev. BNP,                     Smoker,.    Study Detail: The following Echo studies were performed: 2D, M-Mode, Doppler and                color flow. Technically challenging study due to body habitus.                Definity used as a contrast agent for endocardial border                definition. Total contrast used for this procedure was 1.5 mL via                IV push. The patient was awake.       PHYSICIAN INTERPRETATION:  Left Ventricle: The left ventricular systolic function is moderately decreased, with an estimated ejection fraction of 30-35%. There is global hypokinesis of the left ventricle with minor regional variations. The left ventricular cavity size is normal. Left ventricular diastolic filling was indeterminate.  LV Wall Scoring:  The basal inferolateral segment is akinetic.    Left Atrium: The left atrium is mildly dilated.  Right Ventricle: The right ventricle is normal in size. There is normal right ventricular global systolic function.  Right Atrium: The right atrium is normal in size.  Aortic Valve: The aortic valve was not well visualized. There is evidence of moderate aortic valve stenosis.  There is no evidence of aortic valve regurgitation. The peak instantaneous gradient of the aortic valve is 52.7 mmHg. The mean gradient of the aortic valve is 26.0 mmHg.  Mitral Valve: The mitral valve is mild to moderately thickened. There is mild to moderate mitral annular calcification. There is mild mitral valve regurgitation.  Tricuspid Valve: The tricuspid valve was not well visualized. There is trace to mild tricuspid regurgitation. The right ventricular systolic pressure is unable to be estimated.  Pulmonic Valve: The pulmonic valve is not well visualized. There is physiologic pulmonic valve regurgitation.  Pericardium: There is no pericardial effusion noted.  Aorta: The aortic root is normal.  Systemic Veins: The inferior vena cava appears dilated. There  is less than 50% IVC collapse with inspiration.       CONCLUSIONS:   1. Left ventricular systolic function is moderately decreased with a 30-35% estimated ejection fraction.   2. Basal inferolateral segment is abnormal.   3. Poorly visualized anatomical structures due to suboptimal image quality.   4. Moderate aortic valve stenosis.   5. There is global hypokinesis of the left ventricle with minor regional variations.    QUANTITATIVE DATA SUMMARY:  2D MEASUREMENTS:                            Normal Ranges:  IVSd:          1.14 cm    (0.6-1.1cm)  LVPWd:         1.57 cm    (0.6-1.1cm)  LVIDd:         5.26 cm    (3.9-5.9cm)  LVIDs:         4.41 cm  LV Mass Index: 117.2 g/m2  LV % FS        16.2 %    LA VOLUME:                                Normal Ranges:  LA Vol A4C:        77.9 ml    (22+/-6mL/m2)  LA Vol A2C:        84.8 ml  LA Vol BP:         83.7 ml  LA Vol Index A4C:  30.4ml/m2  LA Vol Index A2C:  33.0 ml/m2  LA Vol Index BP:   32.6 ml/m2  LA Area A4C:       24.1 cm2  LA Area A2C:       24.4 cm2  LA Major Axis A4C: 6.3 cm  LA Major Axis A2C: 6.0 cm  LA Volume Index:   30.5 ml/m2  LA Vol A4C:        71.4 ml  LA Vol A2C:        78.4 ml    RA VOLUME BY A/L METHOD:                        Normal Ranges:  RA Area A4C: 17.0 cm2    AORTA MEASUREMENTS:                     Normal Ranges:  Asc Ao, d: 3.10 cm (2.1-3.4cm)    LV SYSTOLIC FUNCTION BY 2D PLANIMETRY (MOD):                      Normal Ranges:  EF-A4C View: 33.6 % (>=55%)  EF-A2C View: 29.1 %  EF-Biplane:  33.1 %    LV DIASTOLIC FUNCTION:                      Normal Ranges:  MV Peak E: 0.90 m/s (0.7-1.2 m/s)  MV Peak A: 0.39 m/s (0.42-0.7 m/s)  E/A Ratio: 2.34     (1.0-2.2)    MITRAL VALVE:                 Normal Ranges:  MV DT: 99 msec (150-240msec)    AORTIC VALVE:                                     Normal Ranges:  AoV Vmax:                3.63 m/s  (<=1.7m/s)  AoV Peak P.7 mmHg (<20mmHg)  AoV Mean P.0 mmHg (1.7-11.5mmHg)  LVOT  Max Jero:            0.69 m/s  (<=1.1m/s)  AoV VTI:                 60.70 cm  (18-25cm)  LVOT VTI:                12.80 cm  LVOT Diameter:           2.60 cm   (1.8-2.4cm)  AoV Area, VTI:           1.12 cm2  (2.5-5.5cm2)  AoV Area,Vmax:           1.01 cm2  (2.5-4.5cm2)  AoV Dimensionless Index: 0.21       RIGHT VENTRICLE:  RV Basal 3.71 cm  RV Mid   2.98 cm  RV Major 9.7 cm  TAPSE:   22.4 mm  RV s'    0.13 m/s    TRICUSPID VALVE/RVSP:                    Normal Ranges:  IVC Diam: 2.32 cm    PULMONIC VALVE:                       Normal Ranges:  PV Max Jero: 0.7 m/s  (0.6-0.9m/s)  PV Max P.7 mmHg       [id number] Heather Barrett MD  Electronically signed on 3/11/2024 at 4:04:49 PM       Wall Scoring       ** Final (Updated) **  ECG 12 lead  Sinus rhythm with 1st degree AV block with Premature atrial complexes  Left axis deviation  Right bundle branch block  Abnormal ECG  When compared with ECG of 13-MAY-2019 16:22,  Premature atrial complexes are now Present  Right bundle branch block is now Present  ECG 12 Lead  Sinus rhythm with 1st degree AV block with Premature atrial complexes  Left axis deviation  Right bundle branch block  Septal infarct , age undetermined  Abnormal ECG  When compared with ECG of 08-MAR-2024 17:20, (unconfirmed)  Septal infarct is now Present  Nonspecific T wave abnormality, worse in Anterior leads      Physical Exam  Constitutional:       Appearance: In acute distress. obese.   HENT:      Head: Normocephalic and atraumatic.      Pharynx: Oropharynx is clear. Neck:  +JVD/use of accessory muscles  Eyes:      Extraocular Movements: Extraocular movements intact.      Conjunctiva/sclera: Conjunctivae normal.   Cardiovascular:      Rate and Rhythm+tachycardia, regular rhythm.      Pulses: Normal pulses.      Heart sounds: Normal heart sounds.   Pulmonary:      Effort: + respiratory distress.      Breath sounds: + wheezing or rhonchi. +tachypnea & labored breathing. + cyanosis.  Abdominal:      General:  Bowel sounds are present.  +distension.      Palpations: Abdomen is soft. There is no mass.     Extremities: +3 pitting swelling and palpable distal pulses.   Skin:     Coloration: Skin is pale, + mottled.      Findings: No erythema or rash.   Neurological:      Mental Status: He is lethargic initially and then comabtive.  Psych: agitated.     Relevant Results  Scheduled medications  allopurinol, 300 mg, oral, Daily  docusate sodium, 100 mg, oral, BID  empagliflozin, 10 mg, oral, Daily  enoxaparin, 40 mg, subcutaneous, q12h SHARRON  tiotropium, 2 puff, inhalation, Daily   And  fluticasone furoate-vilanteroL, 1 puff, inhalation, Daily  folic acid, 1 mg, oral, Daily  insulin lispro, 0-10 Units, subcutaneous, 4x daily  ipratropium-albuteroL, 3 mL, nebulization, TID  levothyroxine, 125 mcg, oral, Daily  LORazepam, , ,   LORazepam, 1 mg, intravenous, Once  [Held by provider] metFORMIN, 1,000 mg, oral, BID  multivitamin with minerals, 1 tablet, oral, Daily  nicotine, 1 patch, transdermal, Daily  pantoprazole, 40 mg, oral, BID AC   Or  pantoprazole, 40 mg, intravenous, BID AC  perflutren lipid microspheres, 1 mL of dilution, intravenous, Once in imaging  piperacillin-tazobactam, 3.375 g, intravenous, q6h  potassium chloride CR, 20 mEq, oral, Daily  thiamine, 100 mg, oral, Daily  [Held by provider] valsartan, 320 mg, oral, Daily  vancomycin, 15 mg/kg, intravenous, Once      Continuous medications  [Held by provider] dexmedeTOMIDine, 0.1-1.5 mcg/kg/hr  [Held by provider] furosemide, 20 mg/hr, Last Rate: 20 mg/hr (03/11/24 2241)  norepinephrine, 0.01-1 mcg/kg/min, Last Rate: 0.22 mcg/kg/min (03/12/24 0209)  propofol, 5-20 mcg/kg/min, Last Rate: 5 mcg/kg/min (03/12/24 0226)      PRN medications  PRN medications: acetaminophen **OR** acetaminophen **OR** acetaminophen, albuterol, dextrose 10 % in water (D10W), dextrose, glucagon, guaiFENesin, ipratropium-albuteroL, LORazepam, oxygen, vancomycin        Results for orders placed or  performed during the hospital encounter of 03/08/24 (from the past 24 hour(s))   Comprehensive Metabolic Panel   Result Value Ref Range    Glucose 104 (H) 74 - 99 mg/dL    Sodium 143 136 - 145 mmol/L    Potassium 3.6 3.5 - 5.3 mmol/L    Chloride 100 98 - 107 mmol/L    Bicarbonate 35 (H) 21 - 32 mmol/L    Anion Gap 12 10 - 20 mmol/L    Urea Nitrogen 17 6 - 23 mg/dL    Creatinine 0.71 0.50 - 1.30 mg/dL    eGFR >90 >60 mL/min/1.73m*2    Calcium 8.7 8.6 - 10.3 mg/dL    Albumin 3.4 3.4 - 5.0 g/dL    Alkaline Phosphatase 57 33 - 136 U/L    Total Protein 5.6 (L) 6.4 - 8.2 g/dL    AST 12 9 - 39 U/L    Bilirubin, Total 1.2 0.0 - 1.2 mg/dL    ALT 16 10 - 52 U/L   CBC and Auto Differential   Result Value Ref Range    WBC 6.6 4.4 - 11.3 x10*3/uL    nRBC 0.0 0.0 - 0.0 /100 WBCs    RBC 5.14 4.50 - 5.90 x10*6/uL    Hemoglobin 17.4 13.5 - 17.5 g/dL    Hematocrit 53.4 (H) 41.0 - 52.0 %     (H) 80 - 100 fL    MCH 33.9 26.0 - 34.0 pg    MCHC 32.6 32.0 - 36.0 g/dL    RDW 14.0 11.5 - 14.5 %    Platelets 179 150 - 450 x10*3/uL    Neutrophils % 59.8 40.0 - 80.0 %    Immature Granulocytes %, Automated 0.3 0.0 - 0.9 %    Lymphocytes % 25.8 13.0 - 44.0 %    Monocytes % 11.8 2.0 - 10.0 %    Eosinophils % 2.0 0.0 - 6.0 %    Basophils % 0.3 0.0 - 2.0 %    Neutrophils Absolute 3.95 1.20 - 7.70 x10*3/uL    Immature Granulocytes Absolute, Automated 0.02 0.00 - 0.70 x10*3/uL    Lymphocytes Absolute 1.70 1.20 - 4.80 x10*3/uL    Monocytes Absolute 0.78 0.10 - 1.00 x10*3/uL    Eosinophils Absolute 0.13 0.00 - 0.70 x10*3/uL    Basophils Absolute 0.02 0.00 - 0.10 x10*3/uL   POCT GLUCOSE   Result Value Ref Range    POCT Glucose 122 (H) 74 - 99 mg/dL   POCT GLUCOSE   Result Value Ref Range    POCT Glucose 98 74 - 99 mg/dL   Transthoracic echo (TTE) complete   Result Value Ref Range    AV pk surinder 3.63 m/s    AV mn grad 26.0 mmHg    LVOT diam 2.60 cm    LV biplane EF 33 %    MV E/A ratio 2.34     LA vol index A/L 32.6 ml/m2    Tricuspid annular plane  systolic excursion 2.2 cm    RV free wall pk S' 13.30 cm/s    LVIDd 5.26 cm    Aortic Valve Area by Continuity of VTI 1.12 cm2    Aortic Valve Area by Continuity of Peak Velocity 1.01 cm2    AV pk grad 52.7 mmHg    LV A4C EF 33.6    POCT GLUCOSE   Result Value Ref Range    POCT Glucose 134 (H) 74 - 99 mg/dL   POCT GLUCOSE   Result Value Ref Range    POCT Glucose 207 (H) 74 - 99 mg/dL   Blood Gas Arterial Full Panel   Result Value Ref Range    POCT pH, Arterial 7.30 (L) 7.38 - 7.42 pH    POCT pCO2, Arterial 71 (HH) 38 - 42 mm Hg    POCT pO2, Arterial 65 (L) 85 - 95 mm Hg    POCT SO2, Arterial 89 (L) 94 - 100 %    POCT Oxy Hemoglobin, Arterial 87.2 (L) 94.0 - 98.0 %    POCT Hematocrit Calculated, Arterial 61.0 (H) 41.0 - 52.0 %    POCT Sodium, Arterial 136 136 - 145 mmol/L    POCT Potassium, Arterial 3.9 3.5 - 5.3 mmol/L    POCT Chloride, Arterial 95 (L) 98 - 107 mmol/L    POCT Ionized Calcium, Arterial 1.20 1.10 - 1.33 mmol/L    POCT Glucose, Arterial 166 (H) 74 - 99 mg/dL    POCT Lactate, Arterial 2.6 (H) 0.4 - 2.0 mmol/L    POCT Base Excess, Arterial 4.6 (H) -2.0 - 3.0 mmol/L    POCT HCO3 Calculated, Arterial 34.9 (H) 22.0 - 26.0 mmol/L    POCT Hemoglobin, Arterial 20.2 (H) 13.5 - 17.5 g/dL    POCT Anion Gap, Arterial 10 10 - 25 mmo/L    Patient Temperature      FiO2 100 %     XR chest 1 view    Result Date: 3/11/2024  Interpreted By:  Eugene Alvarenga, STUDY: XR CHEST 1 VIEW;  3/11/2024 5:55 pm   INDICATION: Signs/Symptoms:sob.   COMPARISON: Chest radiograph 03/08/2024   ACCESSION NUMBER(S): [id number]   ORDERING CLINICIAN: YAIR BURCIAGA   FINDINGS:     CARDIOMEDIASTINAL SILHOUETTE: Cardiomediastinal silhouette is stable in size and configuration.   LUNGS: Slightly improved right-sided pleural effusion which is persistently moderate. Small left-sided pleural effusion. No pneumothorax.   ABDOMEN: No remarkable upper abdominal findings.   BONES: No acute osseous changes.       1.  Slightly improved right-sided pleural  effusion which is persistently moderate.     Signed by: Eugene Alvarenga 3/11/2024 7:19 PM Dictation workstation:   [id number]    Transthoracic echo (TTE) complete    Result Date: 3/11/2024   [facility], [address] [place] [address]               Tel [phone] and Fax [phone] TRANSTHORACIC ECHOCARDIOGRAM REPORT  Patient Name:      BRAYAN Key Physician:    [id number] Heather Barrett MD Study Date:        3/11/2024            Ordering Provider:    [id number] RORY GRAVES MRN/PID:           [id number]             Fellow: Accession#:        [id number]         Nurse:                Khushbu Holman RN Date of Birth/Age: 1955 / 68 years Sonographer:          Goldie Woods RDCS Gender:            M                    Additional Staff: Height:            177.80 cm            Admit Date:           3/8/2024 Weight:            147.42 kg            Admission Status:     Inpatient -                                                               Routine BSA / BMI:         2.57 m2 / 46.63      Encounter#:           [id number]                    kg/m2                                         Department Location:  [facility] Non                                                               Invasive Blood Pressure: 107 /70 mmHg Study Type:    TRANSTHORACIC ECHO (TTE) COMPLETE Diagnosis/ICD: Heart failure, unspecified-I50.9; Acute systolic (congestive)                heart failure (CHF)-I50.21 Indication:    Congestive Heart Failure CPT Code:      Echo Complete w Full Doppler-44149 Patient History: Pertinent History: COPD, HTN, LE Edema, Dyspnea and Elev trop., Elev. BNP,                    Smoker,. Study Detail: The following Echo studies were performed: 2D, M-Mode, Doppler and               color flow.  Technically challenging study due to body habitus.               Definity used as a contrast agent for endocardial border               definition. Total contrast used for this procedure was 1.5 mL via               IV push. The patient was awake.  PHYSICIAN INTERPRETATION: Left Ventricle: The left ventricular systolic function is moderately decreased, with an estimated ejection fraction of 30-35%. There is global hypokinesis of the left ventricle with minor regional variations. The left ventricular cavity size is normal. Left ventricular diastolic filling was indeterminate. LV Wall Scoring: The basal inferolateral segment is akinetic. Left Atrium: The left atrium is mildly dilated. Right Ventricle: The right ventricle is normal in size. There is normal right ventricular global systolic function. Right Atrium: The right atrium is normal in size. Aortic Valve: The aortic valve was not well visualized. There is evidence of moderate aortic valve stenosis. There is no evidence of aortic valve regurgitation. The peak instantaneous gradient of the aortic valve is 52.7 mmHg. The mean gradient of the aortic valve is 26.0 mmHg. Mitral Valve: The mitral valve is mild to moderately thickened. There is mild to moderate mitral annular calcification. There is mild mitral valve regurgitation. Tricuspid Valve: The tricuspid valve was not well visualized. There is trace to mild tricuspid regurgitation. The right ventricular systolic pressure is unable to be estimated. Pulmonic Valve: The pulmonic valve is not well visualized. There is physiologic pulmonic valve regurgitation. Pericardium: There is no pericardial effusion noted. Aorta: The aortic root is normal. Systemic Veins: The inferior vena cava appears dilated. There is less than 50% IVC collapse with inspiration.  CONCLUSIONS:  1. Left ventricular systolic function is moderately decreased with a 30-35% estimated ejection fraction.  2. Basal inferolateral segment is abnormal.   3. Poorly visualized anatomical structures due to suboptimal image quality.  4. Moderate aortic valve stenosis.  5. There is global hypokinesis of the left ventricle with minor regional variations. QUANTITATIVE DATA SUMMARY: 2D MEASUREMENTS:                           Normal Ranges: IVSd:          1.14 cm    (0.6-1.1cm) LVPWd:         1.57 cm    (0.6-1.1cm) LVIDd:         5.26 cm    (3.9-5.9cm) LVIDs:         4.41 cm LV Mass Index: 117.2 g/m2 LV % FS        16.2 % LA VOLUME:                               Normal Ranges: LA Vol A4C:        77.9 ml    (22+/-6mL/m2) LA Vol A2C:        84.8 ml LA Vol BP:         83.7 ml LA Vol Index A4C:  30.4ml/m2 LA Vol Index A2C:  33.0 ml/m2 LA Vol Index BP:   32.6 ml/m2 LA Area A4C:       24.1 cm2 LA Area A2C:       24.4 cm2 LA Major Axis A4C: 6.3 cm LA Major Axis A2C: 6.0 cm LA Volume Index:   30.5 ml/m2 LA Vol A4C:        71.4 ml LA Vol A2C:        78.4 ml RA VOLUME BY A/L METHOD:                       Normal Ranges: RA Area A4C: 17.0 cm2 AORTA MEASUREMENTS:                    Normal Ranges: Asc Ao, d: 3.10 cm (2.1-3.4cm) LV SYSTOLIC FUNCTION BY 2D PLANIMETRY (MOD):                     Normal Ranges: EF-A4C View: 33.6 % (>=55%) EF-A2C View: 29.1 % EF-Biplane:  33.1 % LV DIASTOLIC FUNCTION:                     Normal Ranges: MV Peak E: 0.90 m/s (0.7-1.2 m/s) MV Peak A: 0.39 m/s (0.42-0.7 m/s) E/A Ratio: 2.34     (1.0-2.2) MITRAL VALVE:                Normal Ranges: MV DT: 99 msec (150-240msec) AORTIC VALVE:                                    Normal Ranges: AoV Vmax:                3.63 m/s  (<=1.7m/s) AoV Peak P.7 mmHg (<20mmHg) AoV Mean P.0 mmHg (1.7-11.5mmHg) LVOT Max Jero:            0.69 m/s  (<=1.1m/s) AoV VTI:                 60.70 cm  (18-25cm) LVOT VTI:                12.80 cm LVOT Diameter:           2.60 cm   (1.8-2.4cm) AoV Area, VTI:           1.12 cm2  (2.5-5.5cm2) AoV Area,Vmax:           1.01 cm2  (2.5-4.5cm2) AoV Dimensionless Index:  0.21  RIGHT VENTRICLE: RV Basal 3.71 cm RV Mid   2.98 cm RV Major 9.7 cm TAPSE:   22.4 mm RV s'    0.13 m/s TRICUSPID VALVE/RVSP:                   Normal Ranges: IVC Diam: 2.32 cm PULMONIC VALVE:                      Normal Ranges: PV Max Jero: 0.7 m/s  (0.6-0.9m/s) PV Max P.7 mmHg  [id number] Heather Barrett MD Electronically signed on 3/11/2024 at 4:04:49 PM  Wall Scoring  ** Final (Updated) **     ECG 12 Lead    Result Date: 3/11/2024  Sinus rhythm with 1st degree AV block with Premature atrial complexes Left axis deviation Right bundle branch block Septal infarct , age undetermined Abnormal ECG When compared with ECG of 08-MAR-2024 17:20, (unconfirmed) Septal infarct is now Present Nonspecific T wave abnormality, worse in Anterior leads     Assessment/Plan   Neuro: Acute Metabolic Encephalopathy  -After ativan  Repeat gas at 0000, 0200, 0330 and AM-F/U    Agitation  1 mg ativan once  Precedex stopped    Resp: Acute Respiratory Failure D/T Fluid Overload, Possible Aspiration VS Gram Neg PNA/Hemoptysis  -Leukocytosis, lymphocytopenia and hypotension  CPAP and on 100% on oxygen switched to Bipap with gas results  Pulmonary already consulted  Transfer to ICU  Morning blood gas-f/u  Intubated and sedated (propofol)  Zosyn and Vanco  Sputum/MRSA/Atypical studies/Repeat COVID/RSV/Flu-Follow up  Blood cultures/repeat ABG after intubation-F/U  PPI BID    Cardiac: CHF Exacerbation  Lasix Drip started now, previously refusing txt; now on hold with     Hypotension  Daily wt/Strict I's/O's   Fluid restriction  Insert lipscomb  Levaphed    DM II  Sliding scale and accucheck Q 6    Daily Alcohol Use  [facility] protocol  Dr. Chávez agreed with starting precedex after 1 mg ativan ineffective    Full Code  Does not want to change code status at this time    Lipscomb Catheter  Lipscomb inserted for strict I's/O's  Lipscomb care care    DVT PX  SCDs  Lovenox    Mónica Sheriff, APRN-CNP  75 min CCT spent at bedside assessing, attempting to stabilize pt.,  place orders and transfer to ICU.

## 2024-03-12 NOTE — CONSULTS
"   Nutrition Assessment    Reason for Assessment: Tube feeding assessment and order    Patient is a 68 y.o. male presenting with: Heart failure exacerbated by sotalol (CMS/HCC),   Past Medical History:   Diagnosis Date    Personal history of other specified conditions 01/08/2023    History of impaired glucose tolerance      Past Surgical History:   Procedure Laterality Date    HERNIA REPAIR  11/07/2013    Hernia Repair    OTHER SURGICAL HISTORY  11/07/2013    Oral Surgery      Nutrition History:  Food and Nutrient History: Pt seen on 3/11/24 by inpatient dietitian for DM and CHF diet education. Pt reported that he started mounjaro about 6 months ago and had been eating less.  Unsure of weight changes since he did not own a scale to monitor.  Nutrition re-consulted today for EN.  Pt with required transfer to ICU with intubation over night, on pressor.  Pt sedated on propofol @ 8.7 ml/hr which provides 230 kcal.  Noted pt also on IV Lasix.  OGT in place to start EN.  Energy Intake: Good > 75 %  No Known Allergies   GI Symptoms: None     Oral Problems: Swallowing difficulty- d/t intubated on MV       Anthropometrics:  Height: 180.3 cm (5' 10.98\")   Weight: 145 kg (319 lb 10.7 oz)   BMI (Calculated): 44.6  IBW/kg (Dietitian Calculated): 78.2 kg  Percent of IBW: 185 %       Weight History:   Wt Readings from Last 10 Encounters:   03/12/24 145 kg (319 lb 10.7 oz)   07/18/21 (!) 164 kg (361 lb)       Weight Change %:  Weight History / % Weight Change: 159 kg (3/8/24) down 8.9% /4 days- suspect possible scale discrepancy and also planned weight loss with diuresis  Significant Weight Loss: Yes          Nutrition Focused Physical Exam Findings:    Subcutaneous Fat Loss:   Orbital Fat Pads: Well nourshed (slightly bulging fat pads)  Buccal Fat Pads: Well nourished (full, rounded cheeks)  Muscle Wasting:  Temporalis: Well nourished (well-defined muscle)  Pectoralis (Clavicular Region): Well nourished (clavicle not " "visible)  Edema:  Edema: +4 severe  Edema Location: generalized  Physical Findings:  Skin: Negative (intact)    Nutrition Significant Labs:  CBC Trend:   Results from last 7 days   Lab Units 03/12/24  0015 03/11/24  0603 03/10/24  1125 03/08/24  1728   WBC AUTO x10*3/uL 17.2* 6.6 9.2 10.5   RBC AUTO x10*6/uL 5.86 5.14 5.55 5.53   HEMOGLOBIN g/dL 19.9* 17.4 18.4* 18.7*   HEMATOCRIT % 60.9* 53.4* 57.5* 57.5*   MCV fL 104* 104* 104* 104*   PLATELETS AUTO x10*3/uL 193 179 198 170    , A1C:  Lab Results   Component Value Date    HGBA1C 5.8 (H) 02/20/2024   , BG POCT trend:   Results from last 7 days   Lab Units 03/12/24  1128 03/12/24  0700 03/12/24  0310 03/11/24 2002 03/11/24  1604   POCT GLUCOSE mg/dL 208* 236* 185* 207* 134*    , Renal Lab Trend:   Results from last 7 days   Lab Units 03/12/24  0015 03/11/24  0603 03/10/24  1125 03/09/24  0607   POTASSIUM mmol/L 3.5 3.6 3.5 4.0   PHOSPHORUS mg/dL 4.4  --   --   --    SODIUM mmol/L 142 143 143 142   MAGNESIUM mg/dL 1.86  --   --  1.93   EGFR mL/min/1.73m*2 >90 >90 >90 >90   BUN mg/dL 20 17 18 17   CREATININE mg/dL 0.79 0.71 0.83 0.72    , Vit D: No results found for: \"VITD25\" , Vit B12: No results found for: \"ELRIWPPI62\"     Nutrition Specific Medications:      Current Facility-Administered Medications:     acetaminophen (Tylenol) tablet 650 mg, 650 mg, oral, q6h PRN **OR** acetaminophen (Tylenol) oral liquid 650 mg, 650 mg, nasogastric tube, q6h PRN **OR** acetaminophen (Tylenol) suppository 650 mg, 650 mg, rectal, q6h PRN, FABIOLA Mcdowell    allopurinol (Zyloprim) tablet 300 mg, 300 mg, oral, Daily, FABIOLA Mcdowell, 300 mg at 03/12/24 0840    aspirin suppository 300 mg, 300 mg, rectal, Once, FABIOLA Mcdowell    azithromycin (Zithromax) tablet 500 mg, 500 mg, oral, q24h SHARRON, Benjamin Hernandez MD, 500 mg at 03/12/24 1053    cefepime (Maxipime) IV 2 g, 2 g, intravenous, q8h, Benjamin Hernandez MD, Stopped at 03/12/24 1152    dextrose 10 % in water (D10W) " infusion, 0.3 g/kg/hr, intravenous, Once PRN, Mónica C Jaziel, APRN-CNP    dextrose 50 % injection 25 g, 25 g, intravenous, q15 min PRN, Mónica C Jaziel, APRN-CNP    diazePAM (Valium) tablet 10 mg, 10 mg, oral, q2h PRN, Benjamin Hernandez MD    docusate sodium (Colace) oral liquid 100 mg, 100 mg, oral, BID, Benjamin Hernandez MD, 100 mg at 03/12/24 0844    empagliflozin (Jardiance) tablet 10 mg, 10 mg, oral, Daily, Mónica C Jaziel, APRN-CNP, 10 mg at 03/12/24 0841    enoxaparin (Lovenox) syringe 40 mg, 40 mg, subcutaneous, q12h SHARRON, Mónica C Jaziel, APRN-CNP, 40 mg at 03/12/24 0840    [Held by provider] tiotropium (Spiriva Respimat) 2.5 mcg/actuation inhaler 2 puff, 2 puff, inhalation, Daily, 2 puff at 03/11/24 0608 **AND** [Held by provider] fluticasone furoate-vilanteroL (Breo Ellipta) 100-25 mcg/dose inhaler 1 puff, 1 puff, inhalation, Daily, Mónica C Jaziel, APRN-CNP, 1 puff at 03/11/24 0608    folic acid (Folvite) tablet 1 mg, 1 mg, oral, Daily, Mónica C Jaziel, APRN-CNP, 1 mg at 03/12/24 0841    glucagon (Glucagen) injection 1 mg, 1 mg, intramuscular, q15 min PRN, Mónica C Jaziel, APRN-CNP    guaiFENesin (Mucinex) 12 hr tablet 600 mg, 600 mg, oral, BID PRN, Mónica C Jaziel, APRN-CNP, 600 mg at 03/11/24 1534    insulin lispro (HumaLOG) injection 0-10 Units, 0-10 Units, subcutaneous, 4x daily, Mónica C Jaziel, APRN-CNP, 4 Units at 03/12/24 1217    ipratropium-albuteroL (Duo-Neb) 0.5-2.5 mg/3 mL nebulizer solution 3 mL, 3 mL, nebulization, q2h PRN, FABIOLA Mcdowell    ipratropium-albuteroL (Duo-Neb) 0.5-2.5 mg/3 mL nebulizer solution 3 mL, 3 mL, nebulization, q6h, Benjamin Hernandez MD    levothyroxine (Synthroid, Levoxyl) tablet 125 mcg, 125 mcg, oral, Daily, FABIOLA Mcdowell, 125 mcg at 03/12/24 0841    multivitamin with iron-minerals 9 mg iron/15 mL liquid 15 mL, 15 mL, oral, Daily, Benjamin Hernandez MD, 15 mL at 03/12/24 0937    nicotine (Nicoderm CQ) 21 mg/24 hr patch 1 patch, 1 patch, transdermal, Daily, FABIOLA Mcdowell, 1 patch at  03/12/24 0840    norepinephrine (Levophed) 8 mg in sodium chloride 0.9% 250 mL (0.032 mg/mL) infusion (premix), 0.01-1 mcg/kg/min, intravenous, Continuous, FABIOLA Mcdowell, Last Rate: 25 mL/hr at 03/12/24 1229, 0.09 mcg/kg/min at 03/12/24 1229    oxygen (O2) therapy, , inhalation, Continuous PRN - O2/gases, Benjamin Hernandez MD    [DISCONTINUED] pantoprazole (ProtoNix) EC tablet 40 mg, 40 mg, oral, BID AC **OR** [START ON 3/13/2024] pantoprazole (ProtoNix) injection 40 mg, 40 mg, intravenous, Daily, Benjamin Hernandez MD    perflutren lipid microspheres (Definity) injection 1 mL of dilution, 1 mL of dilution, intravenous, Once in imaging, FABIOLA Mcdowell    potassium chloride (Klor-Con) packet 20 mEq, 20 mEq, oral, Daily, Benjamin Hernandez MD, 20 mEq at 03/12/24 0840    predniSONE (Deltasone) tablet 40 mg, 40 mg, oral, Daily, Benjamin Hernandez MD, 40 mg at 03/12/24 1053    propofol (Diprivan) infusion, 5-20 mcg/kg/min, intravenous, Continuous, FABIOLA Mcodwell, Last Rate: 8.88 mL/hr at 03/12/24 1229, 10 mcg/kg/min at 03/12/24 1229    thiamine (Vitamin B-1) tablet 100 mg, 100 mg, oral, Daily, FABIOLA Mcdowell, 100 mg at 03/12/24 0840    [Held by provider] valsartan (Diovan) tablet 320 mg, 320 mg, oral, Daily, FABIOLA Mcdowell   Nursing Data Per flowsheet:      Gastrointestinal  Gastrointestinal (WDL): Exceptions to WDL  Abdomen Inspection: Distended, Rounded  Abdominal Tenderness: Nontender  Bowel Sounds: All quadrants  Bowel Sounds (All Quadrants): Hypoactive  Bowel Incontinence: No    Intake/Output Summary (Last 24 hours) at 3/12/2024 1324  Last data filed at 3/12/2024 1230  Gross per 24 hour   Intake 1568.69 ml   Output 1663 ml   Net -94.31 ml       Pain Score: 0 - No pain  Critical-Care Pain Observation Score:  [0-4]         Estimated Needs:      Method for Estimating Needs: 3629-1054 kcal (11-14 kcal/kg)     Method for Estimating Needs: 117-156 g protein (1.5-2 g/kg/IBW)     Method for  Estimating Needs: per MD       Nutrition Diagnosis        Nutrition Diagnosis  Patient has Nutrition Diagnosis: Yes  Diagnosis Status (1): New  Nutrition Diagnosis 1: Increased nutrient needs  Related to (1): respiratory distress with history of CHF requiring intubation on MV  As Evidenced by (1): altered metabolic demand with critical illness  Additional Assessment Information (1): Pt may benefit with permissive hypocaloric feeds with BMI > 30       Nutrition Interventions/Recommendations   Nutrition Prescription:  Individualized Nutrition Prescription Provided for : EN    Nutrition Interventions:   Food and/or Nutrient Delivery Interventions  Interventions: Enteral intake  Goal: Vital HP @ 65 ml/hr x 22 hours= 1430 kcal, 125 g protein, 1195 ml free water in 1430 ml TF.  Additional 50 ml water flush 3 x/day= 1345 ml total free water/24 hours    Total kcal with TF at goal and current volume of propofol = 1660 kcal    Coordination of Care: Anam, RN; Kaylee RN  Nutrition Education:   Education Documentation  No documentation found.         Recommendations:  Start Vital HP @ 25 ml/hr  continuous via OGT x 24 hours.  After 24 hours if blood sugars are acceptable and no major shifts in electrolytes, increase TF by 10 ml/hr every 4-6 hours until goal met at 65 ml/hr. Hold TF 1 hour pre/post synthroid.  Water flush: 50 ml three times/day via OGT  Weights: daily  RFP, Mg daily; replete lytes prn   Swallow eval post extubation          Nutrition Monitoring and Evaluation     Food/Nutrient Related History Monitoring  Monitoring and Evaluation Plan: Enteral and parenteral nutrition intake  Enteral and Parenteral Nutrition Intake: Enteral nutrition intake  Criteria: Pt will tolerate Vital HP @ 65 ml/hr via OGT within 48-72 hours.         Biochemical Data, Medical Tests and Procedures  Monitoring and Evaluation Plan: Electrolyte/renal panel, Glucose/endocrine profile  Electrolyte and Renal Panel: BUN, Calcium, serum, Chloride,  Creatinine, Magnesium, Phosphorus, Potassium, Sodium  Criteria: RFP, Mg wnl  Glucose/Endocrine Profile: Glucose, casual  Criteria: BG level  mg/dL                 Time Spent (min): 60 minutes

## 2024-03-12 NOTE — NURSING NOTE
Since beginning of my shift, pt was uncooperative with nursing care, removing his 10L of o2 and walking to the bathroom after being told multiple times it was unsafe to do so. Educated pt on the importance of wearing his oxygen due to the high amount he is on and the risks of getting out of bed without help from staff. Respiratory placed pt on cpap for the night and his oxygen was 70's-80's. At bedside with respiratory, pt was attempting multiple times to remove mask and stand up out of bed. Mónica Sheriff NP notified and at bedside, see orders. Pt was able to relax after ativan but work of breathing was still resp of 40. During all of this, pt oxygen was maintaining 85-87%. Dr. Chávez to bedside, see orders. Pt was transferred to ICU at 2348 for closer monitoring

## 2024-03-12 NOTE — NURSING NOTE
Clinical Nurse Navigator Documentation     Met with the patient 3/11/2024 to introduce myself. Patient states that he is interested in HF education. I informed him that I wanted to wait until his LISY was read before we reviewed educational materials. Patient transferred to the ICU during night shift and is unavailable today.        Comments: Will reassess patient daily for education appropriateness .

## 2024-03-12 NOTE — SIGNIFICANT EVENT
Patient immediate situation: Bilateral wrist/ankle restraints.  Patient reaction to intervention: tolerated.  Patient's medical and behavioral condition: confused, agitated and sedated.  The need to continue to terminate restraint or seclusion: To prevent patient from hurting self and others and interfering with care.

## 2024-03-12 NOTE — POST-PROCEDURE NOTE
Overhead paged to ICU bed 1 around 0130 for emergent intubation.  ICU team unable to intubate.  Patient being bagged upon arrival.  Patient intubated with GlideScope that was at bedside.  ETCO2 confirmed and oxygen sats increasing.

## 2024-03-12 NOTE — SIGNIFICANT EVENT
EKG reviewed, troponin increasing. Will repeat EKG now that HR normalized.    Suspect Demand Ischemia/Type II MI VS NSTEMI-Aspirin suppository and repeat EKG and troponin. ECHO already completed and cardiology already following.

## 2024-03-13 ENCOUNTER — APPOINTMENT (OUTPATIENT)
Dept: RADIOLOGY | Facility: HOSPITAL | Age: 69
DRG: 871 | End: 2024-03-13
Payer: MEDICARE

## 2024-03-13 LAB
ALBUMIN SERPL BCP-MCNC: 3.3 G/DL (ref 3.4–5)
ANION GAP SERPL CALC-SCNC: 14 MMOL/L (ref 10–20)
BACTERIA UR CULT: NO GROWTH
BASE EXCESS BLDV CALC-SCNC: 10.4 MMOL/L (ref -2–3)
BODY TEMPERATURE: ABNORMAL
BUN SERPL-MCNC: 32 MG/DL (ref 6–23)
CALCIUM SERPL-MCNC: 8.6 MG/DL (ref 8.6–10.3)
CHLORIDE SERPL-SCNC: 102 MMOL/L (ref 98–107)
CO2 SERPL-SCNC: 30 MMOL/L (ref 21–32)
CREAT SERPL-MCNC: 0.84 MG/DL (ref 0.5–1.3)
EGFRCR SERPLBLD CKD-EPI 2021: >90 ML/MIN/1.73M*2
ERYTHROCYTE [DISTWIDTH] IN BLOOD BY AUTOMATED COUNT: 13.9 % (ref 11.5–14.5)
GLUCOSE BLD MANUAL STRIP-MCNC: 140 MG/DL (ref 74–99)
GLUCOSE BLD MANUAL STRIP-MCNC: 155 MG/DL (ref 74–99)
GLUCOSE BLD MANUAL STRIP-MCNC: 156 MG/DL (ref 74–99)
GLUCOSE BLD MANUAL STRIP-MCNC: 158 MG/DL (ref 74–99)
GLUCOSE SERPL-MCNC: 135 MG/DL (ref 74–99)
HCO3 BLDV-SCNC: 35.7 MMOL/L (ref 22–26)
HCT VFR BLD AUTO: 55.6 % (ref 41–52)
HGB BLD-MCNC: 18 G/DL (ref 13.5–17.5)
INHALED O2 CONCENTRATION: 60 %
MAGNESIUM SERPL-MCNC: 2.1 MG/DL (ref 1.6–2.4)
MCH RBC QN AUTO: 33.6 PG (ref 26–34)
MCHC RBC AUTO-ENTMCNC: 32.4 G/DL (ref 32–36)
MCV RBC AUTO: 104 FL (ref 80–100)
OXYHGB MFR BLDV: 89.4 % (ref 45–75)
PCO2 BLDV: 49 MM HG (ref 41–51)
PH BLDV: 7.47 PH (ref 7.33–7.43)
PHOSPHATE SERPL-MCNC: 3.1 MG/DL (ref 2.5–4.9)
PLATELET # BLD AUTO: 209 X10*3/UL (ref 150–450)
PMV BLD AUTO: 10.9 FL (ref 7.5–11.5)
PO2 BLDV: 64 MM HG (ref 35–45)
POTASSIUM SERPL-SCNC: 3.5 MMOL/L (ref 3.5–5.3)
RBC # BLD AUTO: 5.36 X10*6/UL (ref 4.5–5.9)
SAO2 % BLDV: 91 % (ref 45–75)
SODIUM SERPL-SCNC: 142 MMOL/L (ref 136–145)
TEST COMMENT: ABNORMAL
WBC # BLD AUTO: 23.9 X10*3/UL (ref 4.4–11.3)

## 2024-03-13 PROCEDURE — 2500000004 HC RX 250 GENERAL PHARMACY W/ HCPCS (ALT 636 FOR OP/ED): Performed by: NURSE PRACTITIONER

## 2024-03-13 PROCEDURE — 84100 ASSAY OF PHOSPHORUS: CPT | Performed by: INTERNAL MEDICINE

## 2024-03-13 PROCEDURE — S4991 NICOTINE PATCH NONLEGEND: HCPCS | Performed by: NURSE PRACTITIONER

## 2024-03-13 PROCEDURE — 36415 COLL VENOUS BLD VENIPUNCTURE: CPT | Performed by: INTERNAL MEDICINE

## 2024-03-13 PROCEDURE — 2020000001 HC ICU ROOM DAILY

## 2024-03-13 PROCEDURE — 71045 X-RAY EXAM CHEST 1 VIEW: CPT

## 2024-03-13 PROCEDURE — 2500000004 HC RX 250 GENERAL PHARMACY W/ HCPCS (ALT 636 FOR OP/ED): Performed by: INTERNAL MEDICINE

## 2024-03-13 PROCEDURE — 99291 CRITICAL CARE FIRST HOUR: CPT | Performed by: INTERNAL MEDICINE

## 2024-03-13 PROCEDURE — 2500000001 HC RX 250 WO HCPCS SELF ADMINISTERED DRUGS (ALT 637 FOR MEDICARE OP): Performed by: NURSE PRACTITIONER

## 2024-03-13 PROCEDURE — 83735 ASSAY OF MAGNESIUM: CPT | Performed by: INTERNAL MEDICINE

## 2024-03-13 PROCEDURE — 82947 ASSAY GLUCOSE BLOOD QUANT: CPT

## 2024-03-13 PROCEDURE — C9113 INJ PANTOPRAZOLE SODIUM, VIA: HCPCS | Performed by: INTERNAL MEDICINE

## 2024-03-13 PROCEDURE — 2500000002 HC RX 250 W HCPCS SELF ADMINISTERED DRUGS (ALT 637 FOR MEDICARE OP, ALT 636 FOR OP/ED): Performed by: INTERNAL MEDICINE

## 2024-03-13 PROCEDURE — 31720 CLEARANCE OF AIRWAYS: CPT

## 2024-03-13 PROCEDURE — 94003 VENT MGMT INPAT SUBQ DAY: CPT

## 2024-03-13 PROCEDURE — 2500000005 HC RX 250 GENERAL PHARMACY W/O HCPCS: Performed by: INTERNAL MEDICINE

## 2024-03-13 PROCEDURE — 94640 AIRWAY INHALATION TREATMENT: CPT

## 2024-03-13 PROCEDURE — 82805 BLOOD GASES W/O2 SATURATION: CPT | Performed by: INTERNAL MEDICINE

## 2024-03-13 PROCEDURE — 2500000005 HC RX 250 GENERAL PHARMACY W/O HCPCS: Performed by: NURSE PRACTITIONER

## 2024-03-13 PROCEDURE — 94664 DEMO&/EVAL PT USE INHALER: CPT

## 2024-03-13 PROCEDURE — 2500000002 HC RX 250 W HCPCS SELF ADMINISTERED DRUGS (ALT 637 FOR MEDICARE OP, ALT 636 FOR OP/ED): Performed by: NURSE PRACTITIONER

## 2024-03-13 PROCEDURE — 94760 N-INVAS EAR/PLS OXIMETRY 1: CPT

## 2024-03-13 PROCEDURE — 85027 COMPLETE CBC AUTOMATED: CPT | Performed by: INTERNAL MEDICINE

## 2024-03-13 PROCEDURE — 94681 O2 UPTK CO2 OUTP % O2 XTRC: CPT

## 2024-03-13 PROCEDURE — 71045 X-RAY EXAM CHEST 1 VIEW: CPT | Performed by: RADIOLOGY

## 2024-03-13 PROCEDURE — 2500000001 HC RX 250 WO HCPCS SELF ADMINISTERED DRUGS (ALT 637 FOR MEDICARE OP): Performed by: INTERNAL MEDICINE

## 2024-03-13 RX ORDER — FUROSEMIDE 10 MG/ML
40 INJECTION INTRAMUSCULAR; INTRAVENOUS ONCE
Status: COMPLETED | OUTPATIENT
Start: 2024-03-13 | End: 2024-03-13

## 2024-03-13 RX ORDER — NOREPINEPHRINE BITARTRATE 0.03 MG/ML
.01-1 INJECTION, SOLUTION INTRAVENOUS CONTINUOUS
Status: DISCONTINUED | OUTPATIENT
Start: 2024-03-13 | End: 2024-03-14

## 2024-03-13 RX ADMIN — CEFEPIME 2 G: 1 INJECTION, SOLUTION INTRAVENOUS at 17:30

## 2024-03-13 RX ADMIN — INSULIN LISPRO 2 UNITS: 100 INJECTION, SOLUTION INTRAVENOUS; SUBCUTANEOUS at 12:50

## 2024-03-13 RX ADMIN — POTASSIUM CHLORIDE 20 MEQ: 1.5 POWDER, FOR SOLUTION ORAL at 08:01

## 2024-03-13 RX ADMIN — PANTOPRAZOLE SODIUM 40 MG: 40 INJECTION, POWDER, FOR SOLUTION INTRAVENOUS at 08:00

## 2024-03-13 RX ADMIN — FUROSEMIDE 40 MG: 10 INJECTION, SOLUTION INTRAMUSCULAR; INTRAVENOUS at 09:51

## 2024-03-13 RX ADMIN — IPRATROPIUM BROMIDE AND ALBUTEROL SULFATE 3 ML: 2.5; .5 SOLUTION RESPIRATORY (INHALATION) at 01:04

## 2024-03-13 RX ADMIN — IPRATROPIUM BROMIDE AND ALBUTEROL SULFATE 3 ML: 2.5; .5 SOLUTION RESPIRATORY (INHALATION) at 19:18

## 2024-03-13 RX ADMIN — NOREPINEPHRINE BITARTRATE 0.06 MCG/KG/MIN: 0.03 INJECTION, SOLUTION INTRAVENOUS at 01:46

## 2024-03-13 RX ADMIN — ALLOPURINOL 300 MG: 300 TABLET ORAL at 08:01

## 2024-03-13 RX ADMIN — Medication 15 ML: at 08:01

## 2024-03-13 RX ADMIN — THIAMINE HCL TAB 100 MG 100 MG: 100 TAB at 08:01

## 2024-03-13 RX ADMIN — ENOXAPARIN SODIUM 40 MG: 40 INJECTION SUBCUTANEOUS at 08:00

## 2024-03-13 RX ADMIN — ENOXAPARIN SODIUM 40 MG: 40 INJECTION SUBCUTANEOUS at 20:35

## 2024-03-13 RX ADMIN — EMPAGLIFLOZIN 10 MG: 10 TABLET, FILM COATED ORAL at 08:02

## 2024-03-13 RX ADMIN — AZITHROMYCIN DIHYDRATE 500 MG: 250 TABLET ORAL at 08:01

## 2024-03-13 RX ADMIN — DOCUSATE SODIUM 100 MG: 50 LIQUID ORAL at 20:35

## 2024-03-13 RX ADMIN — FUROSEMIDE 40 MG: 10 INJECTION, SOLUTION INTRAMUSCULAR; INTRAVENOUS at 20:35

## 2024-03-13 RX ADMIN — CEFEPIME 2 G: 1 INJECTION, SOLUTION INTRAVENOUS at 01:49

## 2024-03-13 RX ADMIN — IPRATROPIUM BROMIDE AND ALBUTEROL SULFATE 3 ML: 2.5; .5 SOLUTION RESPIRATORY (INHALATION) at 15:09

## 2024-03-13 RX ADMIN — PROPOFOL 10 MCG/KG/MIN: 10 INJECTION, EMULSION INTRAVENOUS at 10:04

## 2024-03-13 RX ADMIN — PROPOFOL 20 MCG/KG/MIN: 10 INJECTION, EMULSION INTRAVENOUS at 20:35

## 2024-03-13 RX ADMIN — ACETAMINOPHEN 650 MG: 650 SOLUTION ORAL at 20:46

## 2024-03-13 RX ADMIN — INSULIN LISPRO 2 UNITS: 100 INJECTION, SOLUTION INTRAVENOUS; SUBCUTANEOUS at 16:31

## 2024-03-13 RX ADMIN — CEFEPIME 2 G: 1 INJECTION, SOLUTION INTRAVENOUS at 09:51

## 2024-03-13 RX ADMIN — NICOTINE 1 PATCH: 21 PATCH, EXTENDED RELEASE TRANSDERMAL at 08:00

## 2024-03-13 RX ADMIN — IPRATROPIUM BROMIDE AND ALBUTEROL SULFATE 3 ML: 2.5; .5 SOLUTION RESPIRATORY (INHALATION) at 07:38

## 2024-03-13 RX ADMIN — NOREPINEPHRINE BITARTRATE 0.02 MCG/KG/MIN: 0.03 INJECTION, SOLUTION INTRAVENOUS at 20:37

## 2024-03-13 RX ADMIN — PREDNISONE 40 MG: 20 TABLET ORAL at 08:01

## 2024-03-13 RX ADMIN — DOCUSATE SODIUM 100 MG: 50 LIQUID ORAL at 08:01

## 2024-03-13 RX ADMIN — LEVOTHYROXINE SODIUM 125 MCG: 0.12 TABLET ORAL at 08:01

## 2024-03-13 RX ADMIN — FOLIC ACID 1 MG: 1 TABLET ORAL at 08:01

## 2024-03-13 RX ADMIN — INSULIN LISPRO 2 UNITS: 100 INJECTION, SOLUTION INTRAVENOUS; SUBCUTANEOUS at 21:05

## 2024-03-13 RX ADMIN — PROPOFOL 20 MCG/KG/MIN: 10 INJECTION, EMULSION INTRAVENOUS at 03:02

## 2024-03-13 RX ADMIN — PROPOFOL 20 MCG/KG/MIN: 10 INJECTION, EMULSION INTRAVENOUS at 15:31

## 2024-03-13 ASSESSMENT — PAIN SCALES - PAIN ASSESSMENT IN ADVANCED DEMENTIA (PAINAD)
CONSOLABILITY: DISTRACTED OR REASSURED BY VOICE/TOUCH
BREATHING: NORMAL

## 2024-03-13 ASSESSMENT — PAIN SCALES - GENERAL
PAINLEVEL_OUTOF10: 0 - NO PAIN
PAINLEVEL_OUTOF10: 0 - NO PAIN
PAINLEVEL_OUTOF10: 5 - MODERATE PAIN

## 2024-03-13 ASSESSMENT — PAIN - FUNCTIONAL ASSESSMENT
PAIN_FUNCTIONAL_ASSESSMENT: CPOT (CRITICAL CARE PAIN OBSERVATION TOOL)

## 2024-03-13 NOTE — CONSULTS
Inpatient consult to Pulmonology  Consult performed by: Benjamin Hernandez MD  Consult ordered by: PARAM Mcdowell-CNP  Reason for consult: critical care, intubated patient  Assessment/Recommendations: 69 yo man w/ h/o chronic systolic heart failure, COPD admitted w/ CHF exacerbation    Neuro - metabolic encephalopathy 2/2 sepsis.  Propofol for sedation.  Daily SATs.  CIWA for possible EtOH withdrawal    CV - acute on chronic systolic heart failure- hold diuretics during fluid resuscitation.  Septic shock on levophed.  Titrate map >65.  IVC >2cm today.    Pulm - acute hypoxic resp failure 2/2 mucus plug, COPD exacerbation - s/p bronch today w/ improvement of mucus plug.  COPD exacerbation - Start prednisone and azithro.  Wean vent per protocol.  SBT per protocol.    GI - start Tfs.  PPI prophylaxis    Renal - no acute issues     Endo - on SSI and jardiance    ID - septic shock 2/2 suspected gram neg PNA w/ acute hypoxic resp failure - on cefepime and azithro.  follow-up cultures.  follow-up BAL    Heme - Lovenox for DVT prophylaxis    Dispo - Pt requires close ICU monitoring for septic shock and mucus plug requiring pressors and mech ventilation.     Critical care time spent -50 minutes.  This does not include time spent for bronchoscopy.          Reason For Consult  Critical care, intubated patient    History Of Present Illness  Alo Glass is a 68 y.o. male presenting with mucus plug.  Pt w/ h/o COPD, DM2, MELISSA admitted w/ worsening dyspnea found to have CHF exacerbation.  Acute worsening last night with a mucus plug requiring intubation, mechanical ventilation and pressors..  Pt unable to provide history due to intubated status.     Past Medical History  Past Medical History:   Diagnosis Date    Personal history of other specified conditions 01/08/2023    History of impaired glucose tolerance       Surgical History  Past Surgical History:   Procedure Laterality Date    HERNIA REPAIR  11/07/2013    Hernia  "Repair    OTHER SURGICAL HISTORY  11/07/2013    Oral Surgery        Social History  Social History     Tobacco Use    Smoking status: Never    Smokeless tobacco: Never       Family History  No family history on file.     Allergies  Patient has no known allergies.    Review of Systems   Reason unable to perform ROS: pt intubated.        Physical Exam  Vitals reviewed.   Constitutional:       Appearance: He is ill-appearing.      Interventions: He is intubated.   HENT:      Head: Normocephalic and atraumatic.   Eyes:      Extraocular Movements: Extraocular movements intact.   Cardiovascular:      Rate and Rhythm: Normal rate and regular rhythm.      Heart sounds: Normal heart sounds.   Pulmonary:      Effort: He is intubated.      Breath sounds: Examination of the right-upper field reveals decreased breath sounds. Examination of the right-middle field reveals decreased breath sounds. Examination of the right-lower field reveals decreased breath sounds. Decreased breath sounds present.   Abdominal:      Palpations: Abdomen is soft.      Tenderness: There is no abdominal tenderness.   Musculoskeletal:      Cervical back: Normal range of motion.   Skin:     General: Skin is warm.   Neurological:      General: No focal deficit present.      Comments: Opens eyes to voice.          Vital Signs  Blood pressure 86/67, pulse 81, temperature 37.2 °C (99 °F), temperature source Temporal, resp. rate 20, height 1.803 m (5' 10.98\"), weight 145 kg (319 lb 10.7 oz), SpO2 93 %.  Oxygen Therapy  SpO2: 93 %  Medical Gas Therapy: Supplemental oxygen  O2 Delivery Method: Endotracheal tube  FiO2 (%): 60 %    Relevant Results  XR chest 1 view 03/12/2024    Narrative  Interpreted By:  Nathan Hoang,  STUDY:  XR CHEST 1 VIEW; 3/12/2024 2:47 pm    INDICATION:  Signs/Symptoms:f/u mucus plug s/p bronch.    COMPARISON:  03/12/2024    ACCESSION NUMBER(S):  [id number]    ORDERING CLINICIAN:  NANDO KHAN    TECHNIQUE:  1 view of the chest was " performed.    FINDINGS:  No pneumothorax. Marked interval improvement in aeration of the right  upper lobe. Endotracheal tube remains in good position, approximately  3 cm above the kathie. The cardiomediastinal silhouette is stable in  appearance. Persistent right lower lobe airspace opacity. Mild  airspace opacity and ground-glass opacity is again noted in the left  lower lobe, unchanged.    Impression  No pneumothorax. Marked interval improvement in aeration of the right  upper lobe.    The remainder of the examination does not appear significantly  changed.    Signed by: Nathan Hoang 3/12/2024 3:39 PM  Dictation workstation:   [id number]    Scheduled medications  allopurinol, 300 mg, oral, Daily  aspirin, 300 mg, rectal, Once  azithromycin, 500 mg, oral, q24h SHARRON  cefepime, 2 g, intravenous, q8h  docusate sodium, 100 mg, oral, BID  empagliflozin, 10 mg, oral, Daily  enoxaparin, 40 mg, subcutaneous, q12h SHARRON  [Held by provider] tiotropium, 2 puff, inhalation, Daily   And  [Held by provider] fluticasone furoate-vilanteroL, 1 puff, inhalation, Daily  folic acid, 1 mg, oral, Daily  insulin lispro, 0-10 Units, subcutaneous, 4x daily  ipratropium-albuteroL, 3 mL, nebulization, q6h  levothyroxine, 125 mcg, oral, Daily  multivitamin with iron-minerals, 15 mL, oral, Daily  nicotine, 1 patch, transdermal, Daily  [START ON 3/13/2024] pantoprazole, 40 mg, intravenous, Daily  perflutren lipid microspheres, 1 mL of dilution, intravenous, Once in imaging  potassium chloride, 20 mEq, oral, Daily  predniSONE, 40 mg, oral, Daily  thiamine, 100 mg, oral, Daily  [Held by provider] valsartan, 320 mg, oral, Daily      Continuous medications  norepinephrine, 0.01-1 mcg/kg/min, Last Rate: 0.06 mcg/kg/min (03/12/24 2000)  propofol, 5-20 mcg/kg/min, Last Rate: 15 mcg/kg/min (03/12/24 2000)      PRN medications  PRN medications: acetaminophen **OR** acetaminophen **OR** acetaminophen, dextrose 10 % in water (D10W), dextrose,  diazePAM, glucagon, guaiFENesin, ipratropium-albuteroL, oxygen    Results for orders placed or performed during the hospital encounter of 03/08/24 (from the past 24 hour(s))   Blood Gas Arterial Full Panel   Result Value Ref Range    POCT pH, Arterial 7.30 (L) 7.38 - 7.42 pH    POCT pCO2, Arterial 71 (HH) 38 - 42 mm Hg    POCT pO2, Arterial 65 (L) 85 - 95 mm Hg    POCT SO2, Arterial 89 (L) 94 - 100 %    POCT Oxy Hemoglobin, Arterial 87.2 (L) 94.0 - 98.0 %    POCT Hematocrit Calculated, Arterial 61.0 (H) 41.0 - 52.0 %    POCT Sodium, Arterial 136 136 - 145 mmol/L    POCT Potassium, Arterial 3.9 3.5 - 5.3 mmol/L    POCT Chloride, Arterial 95 (L) 98 - 107 mmol/L    POCT Ionized Calcium, Arterial 1.20 1.10 - 1.33 mmol/L    POCT Glucose, Arterial 166 (H) 74 - 99 mg/dL    POCT Lactate, Arterial 2.6 (H) 0.4 - 2.0 mmol/L    POCT Base Excess, Arterial 4.6 (H) -2.0 - 3.0 mmol/L    POCT HCO3 Calculated, Arterial 34.9 (H) 22.0 - 26.0 mmol/L    POCT Hemoglobin, Arterial 20.2 (H) 13.5 - 17.5 g/dL    POCT Anion Gap, Arterial 10 10 - 25 mmo/L    Patient Temperature      FiO2 100 %   Blood Gas Arterial Full Panel   Result Value Ref Range    POCT pH, Arterial 7.37 (L) 7.38 - 7.42 pH    POCT pCO2, Arterial 62 (H) 38 - 42 mm Hg    POCT pO2, Arterial 66 (L) 85 - 95 mm Hg    POCT SO2, Arterial 91 (L) 94 - 100 %    POCT Oxy Hemoglobin, Arterial 89.0 (L) 94.0 - 98.0 %    POCT Hematocrit Calculated, Arterial 59.0 (H) 41.0 - 52.0 %    POCT Sodium, Arterial 138 136 - 145 mmol/L    POCT Potassium, Arterial 3.6 3.5 - 5.3 mmol/L    POCT Chloride, Arterial 96 (L) 98 - 107 mmol/L    POCT Ionized Calcium, Arterial 1.21 1.10 - 1.33 mmol/L    POCT Glucose, Arterial 144 (H) 74 - 99 mg/dL    POCT Lactate, Arterial 1.5 0.4 - 2.0 mmol/L    POCT Base Excess, Arterial 7.2 (H) -2.0 - 3.0 mmol/L    POCT HCO3 Calculated, Arterial 35.8 (H) 22.0 - 26.0 mmol/L    POCT Hemoglobin, Arterial 19.5 (H) 13.5 - 17.5 g/dL    POCT Anion Gap, Arterial 10 10 - 25 mmo/L     Patient Temperature      FiO2 100 %   Light Blue Top   Result Value Ref Range    Extra Tube Hold for add-ons.    Red Top   Result Value Ref Range    Extra Tube Hold for add-ons.    Comprehensive Metabolic Panel   Result Value Ref Range    Glucose 132 (H) 74 - 99 mg/dL    Sodium 142 136 - 145 mmol/L    Potassium 3.5 3.5 - 5.3 mmol/L    Chloride 99 98 - 107 mmol/L    Bicarbonate 33 (H) 21 - 32 mmol/L    Anion Gap 14 10 - 20 mmol/L    Urea Nitrogen 20 6 - 23 mg/dL    Creatinine 0.79 0.50 - 1.30 mg/dL    eGFR >90 >60 mL/min/1.73m*2    Calcium 9.2 8.6 - 10.3 mg/dL    Albumin 4.1 3.4 - 5.0 g/dL    Alkaline Phosphatase 73 33 - 136 U/L    Total Protein 6.9 6.4 - 8.2 g/dL    AST 18 9 - 39 U/L    Bilirubin, Total 1.6 (H) 0.0 - 1.2 mg/dL    ALT 22 10 - 52 U/L   CBC and Auto Differential   Result Value Ref Range    WBC 17.2 (H) 4.4 - 11.3 x10*3/uL    nRBC 0.0 0.0 - 0.0 /100 WBCs    RBC 5.86 4.50 - 5.90 x10*6/uL    Hemoglobin 19.9 (H) 13.5 - 17.5 g/dL    Hematocrit 60.9 (H) 41.0 - 52.0 %     (H) 80 - 100 fL    MCH 34.0 26.0 - 34.0 pg    MCHC 32.7 32.0 - 36.0 g/dL    RDW 13.7 11.5 - 14.5 %    Platelets 193 150 - 450 x10*3/uL    Immature Granulocytes %, Automated 0.5 0.0 - 0.9 %    Immature Granulocytes Absolute, Automated 0.09 0.00 - 0.70 x10*3/uL   Troponin I, High Sensitivity   Result Value Ref Range    Troponin I, High Sensitivity 89 (HH) 0 - 20 ng/L   Lactate   Result Value Ref Range    Lactate 1.4 0.4 - 2.0 mmol/L   Manual Differential   Result Value Ref Range    Neutrophils %, Manual 64.0 40.0 - 80.0 %    Bands %, Manual 28.0 0.0 - 5.0 %    Lymphocytes %, Manual 4.0 13.0 - 44.0 %    Monocytes %, Manual 2.0 2.0 - 10.0 %    Eosinophils %, Manual 1.0 0.0 - 6.0 %    Basophils %, Manual 0.0 0.0 - 2.0 %    Metamyelocytes %, Manual 1.0 0.0 - 0.0 %    Seg Neutrophils Absolute, Manual 11.01 (H) 1.20 - 7.00 x10*3/uL    Bands Absolute, Manual 4.82 (H) 0.00 - 0.70 x10*3/uL    Lymphocytes Absolute, Manual 0.69 (L) 1.20 - 4.80  x10*3/uL    Monocytes Absolute, Manual 0.34 0.10 - 1.00 x10*3/uL    Eosinophils Absolute, Manual 0.17 0.00 - 0.70 x10*3/uL    Basophils Absolute, Manual 0.00 0.00 - 0.10 x10*3/uL    Metamyelocytes Absolute, Manual 0.17 0.00 - 0.00 x10*3/uL    Total Cells Counted 100     Neutrophils Absolute, Manual 15.83 (H) 1.20 - 7.70 x10*3/uL    RBC Morphology No significant RBC morphology present    Phosphorus   Result Value Ref Range    Phosphorus 4.4 2.5 - 4.9 mg/dL   Magnesium   Result Value Ref Range    Magnesium 1.86 1.60 - 2.40 mg/dL   ECG 12 lead   Result Value Ref Range    Ventricular Rate 125 BPM    Atrial Rate 125 BPM    DE Interval 90 ms    QRS Duration 146 ms    QT Interval 384 ms    QTC Calculation(Bazett) 554 ms    P Axis -24 degrees    R Axis -87 degrees    T Axis 53 degrees    QRS Count 20 beats    Q Onset 205 ms    P Onset 160 ms    P Offset 188 ms    T Offset 397 ms    QTC Fredericia 490 ms   Blood Gas Arterial   Result Value Ref Range    POCT pH, Arterial 7.24 (LL) 7.38 - 7.42 pH    POCT pCO2, Arterial 76 (HH) 38 - 42 mm Hg    POCT pO2, Arterial 55 (L) 85 - 95 mm Hg    POCT SO2, Arterial 78 (L) 94 - 100 %    POCT Oxy Hemoglobin, Arterial 77.6 (L) 94.0 - 98.0 %    POCT Base Excess, Arterial 2.8 -2.0 - 3.0 mmol/L    POCT HCO3 Calculated, Arterial 32.6 (H) 22.0 - 26.0 mmol/L    Patient Temperature      FiO2 100 %    Test Comment 20/600/100%/+10    Blood Culture    Specimen: Peripheral Venipuncture; Blood culture   Result Value Ref Range    Blood Culture Loaded on Instrument - Culture in progress    Blood Culture    Specimen: Peripheral Venipuncture; Blood culture   Result Value Ref Range    Blood Culture Loaded on Instrument - Culture in progress    Legionella Antigen, Urine    Specimen: Urine   Result Value Ref Range    L. pneumophila Urine Ag Negative Negative   Streptococcus pneumoniae Antigen, Urine    Specimen: Urine   Result Value Ref Range    Streptococcus pneumoniae Ag, Urine Positive (A) Negative   MRSA  Surveillance for Vancomycin De-escalation, PCR    Specimen: Anterior Nares; Swab   Result Value Ref Range    MRSA PCR Not Detected Not Detected   Sars-CoV-2 and Influenza A/B PCR   Result Value Ref Range    Flu A Result Not Detected Not Detected    Flu B Result Not Detected Not Detected    Coronavirus 2019, PCR Not Detected Not Detected   RSV PCR   Result Value Ref Range    RSV PCR Not Detected Not Detected   Urinalysis with Reflex Culture and Microscopic   Result Value Ref Range    Color, Urine Yellow Straw, Yellow    Appearance, Urine Hazy (N) Clear    Specific Gravity, Urine 1.008 1.005 - 1.035    pH, Urine 6.0 5.0, 5.5, 6.0, 6.5, 7.0, 7.5, 8.0    Protein, Urine 30 (1+) (N) NEGATIVE mg/dL    Glucose, Urine >=500 (3+) (A) NEGATIVE mg/dL    Blood, Urine LARGE (3+) (A) NEGATIVE    Ketones, Urine NEGATIVE NEGATIVE mg/dL    Bilirubin, Urine NEGATIVE NEGATIVE    Urobilinogen, Urine <2.0 <2.0 mg/dL    Nitrite, Urine NEGATIVE NEGATIVE    Leukocyte Esterase, Urine NEGATIVE NEGATIVE   Extra Urine Gray Tube   Result Value Ref Range    Extra Tube Hold for add-ons.    Urinalysis Microscopic   Result Value Ref Range    WBC, Urine 6-10 (A) 1-5, NONE /HPF    RBC, Urine >20 (A) NONE, 1-2, 3-5 /HPF    Mucus, Urine 1+ Reference range not established. /LPF    Hyaline Casts, Urine 4+ (A) NONE /LPF   Troponin I, High Sensitivity   Result Value Ref Range    Troponin I, High Sensitivity 133 (HH) 0 - 20 ng/L   POCT GLUCOSE   Result Value Ref Range    POCT Glucose 185 (H) 74 - 99 mg/dL   Blood Gas Arterial   Result Value Ref Range    POCT pH, Arterial 7.32 (L) 7.38 - 7.42 pH    POCT pCO2, Arterial 62 (H) 38 - 42 mm Hg    POCT pO2, Arterial 82 (L) 85 - 95 mm Hg    POCT SO2, Arterial 96 94 - 100 %    POCT Oxy Hemoglobin, Arterial 93.3 (L) 94.0 - 98.0 %    POCT Base Excess, Arterial 4.0 (H) -2.0 - 3.0 mmol/L    POCT HCO3 Calculated, Arterial 31.9 (H) 22.0 - 26.0 mmol/L    Patient Temperature      FiO2 100 %    Test Comment 20/600/100%/+10     Respiratory Culture/Smear    Specimen: SPUTUM; Fluid   Result Value Ref Range    Gram Stain       Gram stain indicates specimen consists of lower respiratory tract secretions.    Gram Stain No predominant organism    ECG 12 lead   Result Value Ref Range    Ventricular Rate 99 BPM    Atrial Rate 99 BPM    ME Interval 212 ms    QRS Duration 148 ms    QT Interval 394 ms    QTC Calculation(Bazett) 505 ms    P Axis 82 degrees    R Axis 140 degrees    T Axis 29 degrees    QRS Count 16 beats    Q Onset 225 ms    P Onset 119 ms    P Offset 188 ms    T Offset 422 ms    QTC Fredericia 465 ms   POCT GLUCOSE   Result Value Ref Range    POCT Glucose 236 (H) 74 - 99 mg/dL   Troponin I, High Sensitivity   Result Value Ref Range    Troponin I, High Sensitivity 146 (HH) 0 - 20 ng/L   Blood Gas Arterial   Result Value Ref Range    POCT pH, Arterial 7.42 7.38 - 7.42 pH    POCT pCO2, Arterial 54 (H) 38 - 42 mm Hg    POCT pO2, Arterial 85 85 - 95 mm Hg    POCT SO2, Arterial 97 94 - 100 %    POCT Oxy Hemoglobin, Arterial 94.4 94.0 - 98.0 %    POCT Base Excess, Arterial 8.7 (H) -2.0 - 3.0 mmol/L    POCT HCO3 Calculated, Arterial 35.0 (H) 22.0 - 26.0 mmol/L    Patient Temperature      FiO2 100 %    Ventilator Mode A/C     Ventilator Rate 20 bpm    Tidal Volume 600 mL    Peep CHM2O 10.0 cm H2O    Site of Arterial Puncture Radial Right     Cullen's Test Positive    Electrocardiogram, 12-lead PRN ACS symptoms   Result Value Ref Range    Ventricular Rate 86 BPM    Atrial Rate 86 BPM    ME Interval 208 ms    QRS Duration 146 ms    QT Interval 388 ms    QTC Calculation(Bazett) 464 ms    P Axis 49 degrees    R Axis 140 degrees    T Axis 6 degrees    QRS Count 14 beats    Q Onset 207 ms    P Onset 103 ms    P Offset 179 ms    T Offset 401 ms    QTC Fredericia 437 ms   POCT GLUCOSE   Result Value Ref Range    POCT Glucose 208 (H) 74 - 99 mg/dL   POCT GLUCOSE   Result Value Ref Range    POCT Glucose 171 (H) 74 - 99 mg/dL   POCT GLUCOSE   Result  Value Ref Range    POCT Glucose 154 (H) 74 - 99 mg/dL         Assessment/Plan     67 yo man w/ h/o chronic systolic heart failure, COPD admitted w/ CHF exacerbation    Neuro - metabolic encephalopathy 2/2 sepsis.  Propofol for sedation.  Daily SATs.  CIWA for possible EtOH withdrawal    CV - acute on chronic systolic heart failure- hold diuretics during fluid resuscitation.  Septic shock on levophed.  Titrate map >65.  IVC >2cm today.    Pulm - acute hypoxic resp failure 2/2 mucus plug, COPD exacerbation - s/p bronch today w/ improvement of mucus plug.  COPD exacerbation - Start prednisone and azithro.  Wean vent per protocol.  SBT per protocol.    GI - start Tfs.  PPI prophylaxis    Renal - no acute issues     Endo - on SSI and jardiance    ID - septic shock 2/2 suspected gram neg PNA w/ acute hypoxic resp failure - on cefepime and azithro.  follow-up cultures.  follow-up BAL    Heme - Lovenox for DVT prophylaxis    Dispo - Pt requires close ICU monitoring for septic shock and mucus plug requiring pressors and mech ventilation.     Critical care time spent -50 minutes.  This does not include time spent for bronchoscopy.    Benjamin Hernandez MD

## 2024-03-13 NOTE — PROGRESS NOTES
03/13/24 0916   Discharge Planning   Living Arrangements Alone   Support Systems Friends/neighbors   Assistance Needed Patient is currently intubated. At baseline patient is A&OX3, independent with ADLs with no assistive devices, drives, is on room air baseline. Patient has CPAP at home.   Type of Residence Private residence   Who is requesting discharge planning? Provider   Home or Post Acute Services Post acute facilities (Rehab/SNF/etc)   Type of Post Acute Facility Services Rehab;Skilled nursing   Patient expects to be discharged to: TBD- Currently intubated   Does the patient need discharge transport arranged? Yes   RoundTrip coordination needed? Yes   Has discharge transport been arranged? No

## 2024-03-13 NOTE — PROGRESS NOTES
03/13/24 0916   Discharge Planning   Living Arrangements Alone   Support Systems Friends/neighbors   Assistance Needed Patient is A&OX3, independent with ADLs with no assistive devices, drives, is on room air baseline (currently on O2). Patient has CPAP from home bedside.   Type of Residence Private residence   Who is requesting discharge planning? Provider   Home or Post Acute Services None   Patient expects to be discharged to: Home no needs   Does the patient need discharge transport arranged? Yes   RoundTrip coordination needed? Yes   Has discharge transport been arranged? No

## 2024-03-13 NOTE — PROGRESS NOTES
Subjective Data:  Patient remains intubated, sedated.     Overnight Events:    No acute events reported overnight.   Sedation weaned this AM, patient able to write though became agitated.      Objective Data:  Last Recorded Vitals:  Vitals:    03/13/24 0730 03/13/24 0745 03/13/24 0800 03/13/24 0815   BP: 110/63 92/79 79/58 92/64   BP Location:   Right arm    Patient Position:   Lying    Pulse: 92 97 87 86   Resp: 21 26 20 20   Temp:   37.3 °C (99.1 °F)    TempSrc:   Temporal    SpO2: 91% 90% 90% 91%   Weight:  144 kg (318 lb 5.5 oz)     Height:           Last Labs:  CBC - 3/13/2024:  5:10 AM  23.9 18.0 209    55.6      CMP - 3/13/2024:  5:10 AM  8.6 6.9 18 --- 1.6   3.1 3.3 22 73      PTT - No results in last year.  _   _ _     TROPHS   Date/Time Value Ref Range Status   03/12/2024 07:07  0 - 20 ng/L Final     Comment:     Previous result verified on 3/12/2024 0048 on specimen/case [id number] called with component TRPHS for procedure Troponin I, High Sensitivity with value 89 ng/L.   03/12/2024 03:08  0 - 20 ng/L Final     Comment:     Previous result verified on 3/12/2024 0048 on specimen/case [id number] called with component TRPHS for procedure Troponin I, High Sensitivity with value 89 ng/L.   03/12/2024 12:15 AM 89 0 - 20 ng/L Final     BNP   Date/Time Value Ref Range Status   03/10/2024 11:25  0 - 99 pg/mL Final   03/08/2024 05:28  0 - 99 pg/mL Final   03/08/2024 05:28  0 - 99 pg/mL Final     HGBA1C   Date/Time Value Ref Range Status   02/20/2024 10:35 AM 5.8 see below % Final   01/19/2023 10:23 AM 7.3 % Final     Comment:          Diagnosis of Diabetes-Adults   Non-Diabetic: < or = 5.6%   Increased risk for developing diabetes: 5.7-6.4%   Diagnostic of diabetes: > or = 6.5%  .       Monitoring of Diabetes                Age (y)     Therapeutic Goal (%)   Adults:          >18           <7.0   Pediatrics:    13-18           <7.5                   7-12           <8.0                    0- 6            7.5-8.5   American Diabetes Association. Diabetes Care 33(S1), Jan 2010.     09/10/2021 01:29 PM 7.9 % Final     Comment:          Diagnosis of Diabetes-Adults   Non-Diabetic: < or = 5.6%   Increased risk for developing diabetes: 5.7-6.4%   Diagnostic of diabetes: > or = 6.5%  .       Monitoring of Diabetes                Age (y)     Therapeutic Goal (%)   Adults:          >18           <7.0   Pediatrics:    13-18           <7.5                   7-12           <8.0                   0- 6            7.5-8.5   American Diabetes Association. Diabetes Care 33(S1), Jan 2010.       LDLCALC   Date/Time Value Ref Range Status   02/20/2024 10:35  <=99 mg/dL Final     Comment:                                 Near   Borderline      AGE      Desirable  Optimal    High     High     Very High     0-19 Y     0 - 109     ---    110-129   >/= 130     ----    20-24 Y     0 - 119     ---    120-159   >/= 160     ----      >24 Y     0 -  99   100-129  130-159   160-189     >/=190       VLDL   Date/Time Value Ref Range Status   02/20/2024 10:35 AM 16 0 - 40 mg/dL Final   09/10/2021 01:29 PM 27 0 - 40 mg/dL Final   09/18/2018 02:26 PM 22 0 - 40 mg/dL Final      Last I/O:  I/O last 3 completed shifts:  In: 2822.8 (19.5 mL/kg) [I.V.:1077.8 (7.5 mL/kg); NG/GT:845; IV Piggyback:900]  Out: 3608 (25 mL/kg) [Urine:3108 (0.6 mL/kg/hr); Emesis/NG output:500]  Weight: 144.4 kg     Past Cardiology Tests (Last 3 Years):  EKG:  Electrocardiogram, 12-lead PRN ACS symptoms 03/12/2024  ECG 12 lead 03/12/2024  ECG 12 lead 03/12/2024  Electrocardiogram, 12-lead PRN ACS symtpoms 03/11/2024  ECG 12 Lead 03/10/2024  ECG 12 lead     Echo:  Transthoracic echo (TTE) complete 03/11/2024  CONCLUSIONS:   1. Left ventricular systolic function is moderately decreased with a 30-35% estimated ejection fraction.   2. Basal inferolateral segment is abnormal.   3. Poorly visualized anatomical structures due to suboptimal image quality.    4. Moderate aortic valve stenosis.   5. There is global hypokinesis of the left ventricle with minor regional variations.      TTE (06/18/2019):   -Low normal LV systolic function  -Mild concentric LVH  -Doppler flow suggestive of abnormal Left ventricular relaxation  -Normal right ventricular size and function  -Slightly enlarged left atrium.   -Moderate calcific aortic stenosis  -Trace mitral regurg     Ejection Fractions:  EF   Date/Time Value Ref Range Status   03/11/2024 02:04 PM 33 %      Cath:  No results found for this or any previous visit from the past 1095 days.    Stress Test:  No results found for this or any previous visit from the past 1095 days.    Imaging:  CXR (03/08/2024):   IMPRESSION:  -Moderate right pleural effusion and right lung base consolidation. Findings could be due to pneumonia. Other underlying process not excluded. Follow-up recommended.   -Trace left pleural effusion and cardiomegaly. Possible interstitial edema.     Xray Abd (03/12/2024):   IMPRESSION:  1.  Enteric tube approximately 3.8 cm above the kathie.  2. Evaluation of the enteric tube is limited and appears looped high in the right upper quadrant however may be related to patient positioning and rotation. Repeat imaging may be considered with proper patient positioning to delineate proper position.  3. Left lower lobe airspace opacities concerning for pneumonia. Possible large right pleural effusion.  4. Paucity of bowel gas noted.     CXR (03/12/2024):   IMPRESSION:  1.  Enteric tube approximately 3.8 cm above the kathie.  2. Evaluation of the enteric tube is limited and appears looped high in the right upper quadrant however may be related to patient positioning and rotation. Repeat imaging may be considered with proper patient positioning to delineate proper position.  3. Left lower lobe airspace opacities concerning for pneumonia. Possible large right pleural effusion.  4. Paucity of bowel gas noted.      Inpatient  Medications:  Scheduled medications   Medication Dose Route Frequency    allopurinol  300 mg oral Daily    aspirin  300 mg rectal Once    azithromycin  500 mg oral q24h SHARRON    cefepime  2 g intravenous q8h    docusate sodium  100 mg oral BID    empagliflozin  10 mg oral Daily    enoxaparin  40 mg subcutaneous q12h SHARRON    [Held by provider] tiotropium  2 puff inhalation Daily    And    [Held by provider] fluticasone furoate-vilanteroL  1 puff inhalation Daily    folic acid  1 mg oral Daily    insulin lispro  0-10 Units subcutaneous 4x daily    ipratropium-albuteroL  3 mL nebulization q6h    levothyroxine  125 mcg oral Daily    multivitamin with iron-minerals  15 mL oral Daily    nicotine  1 patch transdermal Daily    pantoprazole  40 mg intravenous Daily    perflutren lipid microspheres  1 mL of dilution intravenous Once in imaging    potassium chloride  20 mEq oral Daily    predniSONE  40 mg oral Daily    thiamine  100 mg oral Daily    [Held by provider] valsartan  320 mg oral Daily     PRN medications   Medication    acetaminophen    Or    acetaminophen    Or    acetaminophen    dextrose 10 % in water (D10W)    dextrose    diazePAM    glucagon    guaiFENesin    ipratropium-albuteroL    oxygen     Continuous Medications   Medication Dose Last Rate    norepinephrine  0.01-1 mcg/kg/min 0.03 mcg/kg/min (03/13/24 0815)    propofol  5-20 mcg/kg/min Stopped (03/13/24 0815)       Physical Exam:  Physical Exam  Constitutional:       Comments: Calm, sedated.      HENT:      Head: Normocephalic.      Nose: Nose normal.      Mouth/Throat:      Mouth: Mucous membranes are moist.   Cardiovascular:      Rate and Rhythm: Regular rhythm. Tachycardia present.      Heart sounds: No murmur heard.  Pulmonary:      Comments: Coarse breath sounds b/l. Intubated.     Abdominal:      General: There is distension.      Palpations: Abdomen is soft.   Musculoskeletal:      Right lower leg: Edema present.      Left lower leg: Edema present.             Assessment/Plan   67 yo male from home with h/o obesity, MELISSA on CPAP, COPD, current smoker, type 2 DM, hypertension, lymphedema, moderate aortic stenosis (6/2019) who presented from PCP office with symptoms of increased LE edema, abdominal bloating, progressive SOB over the past year found to be hypoxic in the ER. On admission was Intermittently refusing IV Lasix, I&O not well monitored. Discussed urinating only into urinals, recording how much urine vs how frequent he urinates. Refused return to lasix gtt, lipscomb or external catheter. Originally refusing echo d/t concern for needing to urinate during the testing. Discussed accommodations and ability to use the bathroom in echo if necessary. Patient was then agreeable to echocardiogram. Echo obtained with newly reduced ejection fraction. Decompensated overnight hypoxic, agitated, required Ativan, Precedex, BiPAP and eventually intubation. Patient weaned off of IV pressor support, wean trial today w/ agitation. Patient      #Acute hypoxic respiratory failure 2/2 decompensated heart failure, Pneumonia, COPD w/ current smoking, mucous plugging s/p bronchoscopy   #Acute decompensated systolic heart failure (new diagnosis)  #Shock state c/f septic (PNA) in nature, no  #Moderate aortic stenosis   #Chronic lymphedema BLE   #Polycythemia   #NSR with PACs on telemetry  (no afib noted)      -Ok for cautious IVF resuscitation as needed in setting of septic shock, can resume IV diuresis pending improvement in BP.  -Hold BB, Spironolactone, ARB given acute decompensation  -Echo reviewed with newly reduced LVSF, EF 30-35%, abnormal basal inferolateral segment, global hypokinesis, mod AS  -Strict I/O, monitor renal function and electrolytes; Daily weight   -Agree with ongoing diuresis as BP permits  -Will follow     Peripheral IV 03/12/24 18 G Left;Ventral Forearm (Active)   Site Assessment Clean;Dry;Intact 03/13/24 0800   Dressing Status Clean;Dry 03/13/24 0800   Number  of days: 1       Peripheral IV 03/12/24 Right;Anterior Forearm (Active)   Site Assessment Clean;Dry;Intact 03/13/24 0800   Dressing Status Clean;Dry 03/13/24 0800   Number of days: 1       ETT  7.5 mm (Active)   Site Condition Cool;Dry 03/13/24 0800   Number of days: 1       NG/OG/Feeding Tube OG - [place] sump 18 Fr Center mouth (Active)   Intake (mL) 25 mL 03/13/24 0700   Number of days: 1       Urethral Catheter (Active)   Site Assessment Clean;Skin intact 03/13/24 0800   Collection Container Standard drainage bag 03/13/24 0800   Securement Method Securing device (Describe) 03/13/24 0800   Reason for Continuing Urinary Catheterization acute urinary retention 03/13/24 0800   Number of days: 1       Code Status:  Full Code    I spent  minutes in the professional and overall care of this patient.        Stephanie Dyer PA-C

## 2024-03-13 NOTE — CONSULTS
Consults    PALLIATIVE CARE SOCIAL WORK CONSULT:  Frieda RUELAS  CURRENT ATTENDING PROVIDER:  Lou Madrigal DO, Benjamin Hernandez MD    Reason for Consult    Long stay    Introduction to Palliative Care  Spoke with pt's sister Alia.  She lives in [facility].  Pt was intubated and unable to participate in visit.  Staff present:  Frieda Araujo  Palliative care was introduced as a service for patients with serious illness to help with symptoms, assist with goals of care conversations, navigate complex decision making, improve quality of life for patients, and provide support to patients and families.  Support and empathy was provided throughout the encounter.    History of Present Illness  Alo Glass is a 68 y.o. male with past medical history of HTN, COPD, DM, sleep apnea. Pt is presenting with SOB    Caregiving/Caregiver Support  Does the patient require assistance in some or all components of his care, including coordination of medical care?  Prior to admission, no      Medical History  Per H&P.  Per sister, pt received sporadic medical care since the pandemic.     Personal History  Pt is single, no children.  Parents are .  Sister states she is the only family.  Pt was an , retired about ten years ago.     Gnosticism and Importance of Gnosticism:  Scientology, not active    Depression    sister is not aware    Anxiety    sister is not aware      Advance Directives  Surrogate Health Care Decision Maker:  sister Alia  HCPOA no  Living Will no    Code Status                    FULL CODE.  Discussed that if pt goes into cardiac arrest, he will get full measures.  Discussed that DNR may be appropriate depending on pt's ongoing status.    Serious Illness Conversation        Life Limiting Disease:  COPD,  intubated  Disease Specific Information Provided:  reviewed weaning protocols  What is your understanding now of where you are with your illness:  pt unable to state, sister [facility] team has updated  her.  What are your fears, worries, or concerns about the future:  sister is having THR in two weeks, hopes that pt is situated by that time.   What are you hoping for:  return to baseline.   What brings you chelly:  pt enjoys cooking, home.   How much does your family know about your priorities and wishes:  per sister, pt never discussed his health care wishes with her.     Other distressing Symptoms        Sister describes pt as 'surly'    Plan and Social Considerations  Sister saw pt 3/11 prior to his intubation.  Sister is appreciative of supportive calls and updates.     Plan of Care discussed with: team    Thank you for asking Palliative Care to assist with care of this patient.  We will continue to follow  Please contact us for additional questions or concerns.    JESSENIA Cisneros  Palliative Care   Contact Via Epic Secure chat

## 2024-03-13 NOTE — CARE PLAN
The patient's goals for the shift include  unable to state goal    The clinical goals for the shift include Will be able to wean down Levo this shift

## 2024-03-14 LAB
ALBUMIN SERPL BCP-MCNC: 3.1 G/DL (ref 3.4–5)
ANION GAP SERPL CALC-SCNC: 11 MMOL/L (ref 10–20)
ANION GAP SERPL CALC-SCNC: 12 MMOL/L (ref 10–20)
ARTERIAL PATENCY WRIST A: POSITIVE
BASE EXCESS BLDA CALC-SCNC: 14.3 MMOL/L (ref -2–3)
BASE EXCESS BLDV CALC-SCNC: 12.4 MMOL/L (ref -2–3)
BODY TEMPERATURE: ABNORMAL
BODY TEMPERATURE: ABNORMAL
BUN SERPL-MCNC: 36 MG/DL (ref 6–23)
BUN SERPL-MCNC: 38 MG/DL (ref 6–23)
CALCIUM SERPL-MCNC: 8.6 MG/DL (ref 8.6–10.3)
CALCIUM SERPL-MCNC: 8.8 MG/DL (ref 8.6–10.3)
CHLORIDE SERPL-SCNC: 102 MMOL/L (ref 98–107)
CHLORIDE SERPL-SCNC: 104 MMOL/L (ref 98–107)
CO2 SERPL-SCNC: 31 MMOL/L (ref 21–32)
CO2 SERPL-SCNC: 32 MMOL/L (ref 21–32)
CREAT SERPL-MCNC: 0.78 MG/DL (ref 0.5–1.3)
CREAT SERPL-MCNC: 0.83 MG/DL (ref 0.5–1.3)
EGFRCR SERPLBLD CKD-EPI 2021: >90 ML/MIN/1.73M*2
EGFRCR SERPLBLD CKD-EPI 2021: >90 ML/MIN/1.73M*2
ERYTHROCYTE [DISTWIDTH] IN BLOOD BY AUTOMATED COUNT: 14.4 % (ref 11.5–14.5)
GLUCOSE BLD MANUAL STRIP-MCNC: 153 MG/DL (ref 74–99)
GLUCOSE BLD MANUAL STRIP-MCNC: 156 MG/DL (ref 74–99)
GLUCOSE BLD MANUAL STRIP-MCNC: 159 MG/DL (ref 74–99)
GLUCOSE BLD MANUAL STRIP-MCNC: 160 MG/DL (ref 74–99)
GLUCOSE SERPL-MCNC: 148 MG/DL (ref 74–99)
GLUCOSE SERPL-MCNC: 150 MG/DL (ref 74–99)
HCO3 BLDA-SCNC: 41.4 MMOL/L (ref 22–26)
HCO3 BLDV-SCNC: 37.4 MMOL/L (ref 22–26)
HCT VFR BLD AUTO: 52.1 % (ref 41–52)
HGB BLD-MCNC: 16.9 G/DL (ref 13.5–17.5)
INHALED O2 CONCENTRATION: 40 %
INHALED O2 CONCENTRATION: 60 %
MAGNESIUM SERPL-MCNC: 2.38 MG/DL (ref 1.6–2.4)
MAGNESIUM SERPL-MCNC: 2.63 MG/DL (ref 1.6–2.4)
MCH RBC QN AUTO: 33.9 PG (ref 26–34)
MCHC RBC AUTO-ENTMCNC: 32.4 G/DL (ref 32–36)
MCV RBC AUTO: 104 FL (ref 80–100)
NRBC BLD-RTO: 0 /100 WBCS (ref 0–0)
OXYHGB MFR BLDA: 90.9 % (ref 94–98)
OXYHGB MFR BLDV: 93.6 % (ref 45–75)
PCO2 BLDA: 61 MM HG (ref 38–42)
PCO2 BLDV: 48 MM HG (ref 41–51)
PEEP CMH2O: 5 CM H2O
PH BLDA: 7.44 PH (ref 7.38–7.42)
PH BLDV: 7.5 PH (ref 7.33–7.43)
PHOSPHATE SERPL-MCNC: 3.4 MG/DL (ref 2.5–4.9)
PLATELET # BLD AUTO: 187 X10*3/UL (ref 150–450)
PO2 BLDA: 67 MM HG (ref 85–95)
PO2 BLDV: 72 MM HG (ref 35–45)
POTASSIUM SERPL-SCNC: 3.5 MMOL/L (ref 3.5–5.3)
POTASSIUM SERPL-SCNC: 4 MMOL/L (ref 3.5–5.3)
PRESSURE SUPPORT: 5 CM H2O
RBC # BLD AUTO: 4.99 X10*6/UL (ref 4.5–5.9)
SAO2 % BLDA: 93 % (ref 94–100)
SAO2 % BLDV: 96 % (ref 45–75)
SODIUM SERPL-SCNC: 142 MMOL/L (ref 136–145)
SODIUM SERPL-SCNC: 142 MMOL/L (ref 136–145)
SPECIMEN DRAWN FROM PATIENT: ABNORMAL
VENTILATOR MODE: ABNORMAL
WBC # BLD AUTO: 16.6 X10*3/UL (ref 4.4–11.3)

## 2024-03-14 PROCEDURE — 80048 BASIC METABOLIC PNL TOTAL CA: CPT | Mod: CCI | Performed by: INTERNAL MEDICINE

## 2024-03-14 PROCEDURE — 82805 BLOOD GASES W/O2 SATURATION: CPT | Performed by: INTERNAL MEDICINE

## 2024-03-14 PROCEDURE — C9113 INJ PANTOPRAZOLE SODIUM, VIA: HCPCS | Performed by: INTERNAL MEDICINE

## 2024-03-14 PROCEDURE — 2500000002 HC RX 250 W HCPCS SELF ADMINISTERED DRUGS (ALT 637 FOR MEDICARE OP, ALT 636 FOR OP/ED): Performed by: INTERNAL MEDICINE

## 2024-03-14 PROCEDURE — 2020000001 HC ICU ROOM DAILY

## 2024-03-14 PROCEDURE — 2500000001 HC RX 250 WO HCPCS SELF ADMINISTERED DRUGS (ALT 637 FOR MEDICARE OP): Performed by: NURSE PRACTITIONER

## 2024-03-14 PROCEDURE — 2500000004 HC RX 250 GENERAL PHARMACY W/ HCPCS (ALT 636 FOR OP/ED): Performed by: INTERNAL MEDICINE

## 2024-03-14 PROCEDURE — 83735 ASSAY OF MAGNESIUM: CPT | Performed by: INTERNAL MEDICINE

## 2024-03-14 PROCEDURE — S4991 NICOTINE PATCH NONLEGEND: HCPCS | Performed by: NURSE PRACTITIONER

## 2024-03-14 PROCEDURE — 5A09357 ASSISTANCE WITH RESPIRATORY VENTILATION, LESS THAN 24 CONSECUTIVE HOURS, CONTINUOUS POSITIVE AIRWAY PRESSURE: ICD-10-PCS | Performed by: STUDENT IN AN ORGANIZED HEALTH CARE EDUCATION/TRAINING PROGRAM

## 2024-03-14 PROCEDURE — 36415 COLL VENOUS BLD VENIPUNCTURE: CPT | Performed by: INTERNAL MEDICINE

## 2024-03-14 PROCEDURE — 94640 AIRWAY INHALATION TREATMENT: CPT

## 2024-03-14 PROCEDURE — 2500000002 HC RX 250 W HCPCS SELF ADMINISTERED DRUGS (ALT 637 FOR MEDICARE OP, ALT 636 FOR OP/ED): Performed by: NURSE PRACTITIONER

## 2024-03-14 PROCEDURE — 31720 CLEARANCE OF AIRWAYS: CPT

## 2024-03-14 PROCEDURE — 99291 CRITICAL CARE FIRST HOUR: CPT | Performed by: INTERNAL MEDICINE

## 2024-03-14 PROCEDURE — 94681 O2 UPTK CO2 OUTP % O2 XTRC: CPT

## 2024-03-14 PROCEDURE — 2500000005 HC RX 250 GENERAL PHARMACY W/O HCPCS: Performed by: INTERNAL MEDICINE

## 2024-03-14 PROCEDURE — 85027 COMPLETE CBC AUTOMATED: CPT | Performed by: INTERNAL MEDICINE

## 2024-03-14 PROCEDURE — 94760 N-INVAS EAR/PLS OXIMETRY 1: CPT

## 2024-03-14 PROCEDURE — 99233 SBSQ HOSP IP/OBS HIGH 50: CPT | Performed by: PHYSICIAN ASSISTANT

## 2024-03-14 PROCEDURE — 9420000001 HC RT PATIENT EDUCATION 5 MIN

## 2024-03-14 PROCEDURE — 2500000004 HC RX 250 GENERAL PHARMACY W/ HCPCS (ALT 636 FOR OP/ED): Performed by: NURSE PRACTITIONER

## 2024-03-14 PROCEDURE — 94660 CPAP INITIATION&MGMT: CPT

## 2024-03-14 PROCEDURE — 82947 ASSAY GLUCOSE BLOOD QUANT: CPT

## 2024-03-14 PROCEDURE — 2500000001 HC RX 250 WO HCPCS SELF ADMINISTERED DRUGS (ALT 637 FOR MEDICARE OP): Performed by: INTERNAL MEDICINE

## 2024-03-14 PROCEDURE — 94003 VENT MGMT INPAT SUBQ DAY: CPT

## 2024-03-14 PROCEDURE — 80069 RENAL FUNCTION PANEL: CPT | Performed by: INTERNAL MEDICINE

## 2024-03-14 PROCEDURE — 36600 WITHDRAWAL OF ARTERIAL BLOOD: CPT

## 2024-03-14 RX ORDER — IPRATROPIUM BROMIDE AND ALBUTEROL SULFATE 2.5; .5 MG/3ML; MG/3ML
3 SOLUTION RESPIRATORY (INHALATION)
Status: DISCONTINUED | OUTPATIENT
Start: 2024-03-15 | End: 2024-03-17

## 2024-03-14 RX ORDER — BISACODYL 5 MG
10 TABLET, DELAYED RELEASE (ENTERIC COATED) ORAL DAILY PRN
Status: DISCONTINUED | OUTPATIENT
Start: 2024-03-14 | End: 2024-03-17

## 2024-03-14 RX ORDER — METOPROLOL TARTRATE 25 MG/1
25 TABLET, FILM COATED ORAL 2 TIMES DAILY
Status: DISCONTINUED | OUTPATIENT
Start: 2024-03-14 | End: 2024-03-17

## 2024-03-14 RX ORDER — POLYETHYLENE GLYCOL 3350 17 G/17G
17 POWDER, FOR SOLUTION ORAL DAILY
Status: DISCONTINUED | OUTPATIENT
Start: 2024-03-14 | End: 2024-03-17

## 2024-03-14 RX ADMIN — ACETAZOLAMIDE 500 MG: 500 INJECTION, POWDER, LYOPHILIZED, FOR SOLUTION INTRAVENOUS at 20:40

## 2024-03-14 RX ADMIN — ACETAZOLAMIDE 500 MG: 500 INJECTION, POWDER, LYOPHILIZED, FOR SOLUTION INTRAVENOUS at 10:05

## 2024-03-14 RX ADMIN — IPRATROPIUM BROMIDE AND ALBUTEROL SULFATE 3 ML: 2.5; .5 SOLUTION RESPIRATORY (INHALATION) at 07:36

## 2024-03-14 RX ADMIN — ENOXAPARIN SODIUM 40 MG: 40 INJECTION SUBCUTANEOUS at 08:00

## 2024-03-14 RX ADMIN — LEVOTHYROXINE SODIUM 125 MCG: 0.12 TABLET ORAL at 08:00

## 2024-03-14 RX ADMIN — PROPOFOL 20 MCG/KG/MIN: 10 INJECTION, EMULSION INTRAVENOUS at 07:28

## 2024-03-14 RX ADMIN — IPRATROPIUM BROMIDE AND ALBUTEROL SULFATE 3 ML: 2.5; .5 SOLUTION RESPIRATORY (INHALATION) at 19:18

## 2024-03-14 RX ADMIN — AZITHROMYCIN DIHYDRATE 500 MG: 250 TABLET ORAL at 08:00

## 2024-03-14 RX ADMIN — INSULIN LISPRO 2 UNITS: 100 INJECTION, SOLUTION INTRAVENOUS; SUBCUTANEOUS at 07:41

## 2024-03-14 RX ADMIN — THIAMINE HCL TAB 100 MG 100 MG: 100 TAB at 08:00

## 2024-03-14 RX ADMIN — PROPOFOL 20 MCG/KG/MIN: 10 INJECTION, EMULSION INTRAVENOUS at 01:49

## 2024-03-14 RX ADMIN — IPRATROPIUM BROMIDE AND ALBUTEROL SULFATE 3 ML: 2.5; .5 SOLUTION RESPIRATORY (INHALATION) at 13:01

## 2024-03-14 RX ADMIN — METOPROLOL TARTRATE 25 MG: 25 TABLET, FILM COATED ORAL at 20:40

## 2024-03-14 RX ADMIN — DOCUSATE SODIUM 100 MG: 50 LIQUID ORAL at 08:00

## 2024-03-14 RX ADMIN — NICOTINE 1 PATCH: 21 PATCH, EXTENDED RELEASE TRANSDERMAL at 12:58

## 2024-03-14 RX ADMIN — Medication 15 ML: at 08:01

## 2024-03-14 RX ADMIN — POTASSIUM CHLORIDE 20 MEQ: 1.5 POWDER, FOR SOLUTION ORAL at 08:00

## 2024-03-14 RX ADMIN — CEFEPIME 2 G: 1 INJECTION, SOLUTION INTRAVENOUS at 12:55

## 2024-03-14 RX ADMIN — Medication 14 L/MIN: at 10:25

## 2024-03-14 RX ADMIN — FOLIC ACID 1 MG: 1 TABLET ORAL at 08:00

## 2024-03-14 RX ADMIN — PREDNISONE 40 MG: 20 TABLET ORAL at 08:00

## 2024-03-14 RX ADMIN — INSULIN LISPRO 2 UNITS: 100 INJECTION, SOLUTION INTRAVENOUS; SUBCUTANEOUS at 20:40

## 2024-03-14 RX ADMIN — ENOXAPARIN SODIUM 40 MG: 40 INJECTION SUBCUTANEOUS at 20:40

## 2024-03-14 RX ADMIN — Medication: at 22:30

## 2024-03-14 RX ADMIN — PANTOPRAZOLE SODIUM 40 MG: 40 INJECTION, POWDER, FOR SOLUTION INTRAVENOUS at 08:00

## 2024-03-14 RX ADMIN — CEFEPIME 2 G: 1 INJECTION, SOLUTION INTRAVENOUS at 01:50

## 2024-03-14 RX ADMIN — EMPAGLIFLOZIN 10 MG: 10 TABLET, FILM COATED ORAL at 08:00

## 2024-03-14 RX ADMIN — CEFEPIME 2 G: 1 INJECTION, SOLUTION INTRAVENOUS at 18:17

## 2024-03-14 RX ADMIN — ALLOPURINOL 300 MG: 300 TABLET ORAL at 08:00

## 2024-03-14 ASSESSMENT — LIFESTYLE VARIABLES
VISUAL DISTURBANCES: NOT PRESENT
ANXIETY: NO ANXIETY, AT EASE
PAROXYSMAL SWEATS: NO SWEAT VISIBLE
HEADACHE, FULLNESS IN HEAD: NOT PRESENT
AUDITORY DISTURBANCES: NOT PRESENT
TREMOR: NO TREMOR
AGITATION: NORMAL ACTIVITY
NAUSEA AND VOMITING: NO NAUSEA AND NO VOMITING
ORIENTATION AND CLOUDING OF SENSORIUM: ORIENTED AND CAN DO SERIAL ADDITIONS
TOTAL SCORE: 0

## 2024-03-14 ASSESSMENT — PAIN SCALES - GENERAL
PAINLEVEL_OUTOF10: 0 - NO PAIN
PAINLEVEL_OUTOF10: 0 - NO PAIN

## 2024-03-14 ASSESSMENT — PAIN - FUNCTIONAL ASSESSMENT
PAIN_FUNCTIONAL_ASSESSMENT: CPOT (CRITICAL CARE PAIN OBSERVATION TOOL)
PAIN_FUNCTIONAL_ASSESSMENT: 0-10

## 2024-03-14 NOTE — PROGRESS NOTES
"Alo Glass is a 68 y.o. male on day 5 of admission presenting with Heart failure exacerbated by sotalol (CMS/[facility]).    Subjective   Awake and interactive today       Objective     Physical Exam  Vitals reviewed.   Constitutional:       Appearance: He is ill-appearing.      Interventions: He is intubated.   HENT:      Head: Normocephalic and atraumatic.   Eyes:      Extraocular Movements: Extraocular movements intact.   Cardiovascular:      Rate and Rhythm: Normal rate and regular rhythm.      Heart sounds: Normal heart sounds.   Pulmonary:      Effort: Respiratory distress present. He is intubated.      Comments: Coarse breath sounds bilaterally   Abdominal:      Palpations: Abdomen is soft.      Tenderness: There is no abdominal tenderness.   Musculoskeletal:      Cervical back: Normal range of motion.   Skin:     General: Skin is warm.   Neurological:      General: No focal deficit present.      Mental Status: He is alert. Mental status is at baseline.         Last Recorded Vitals  Blood pressure 98/76, pulse 91, temperature 36.8 °C (98.2 °F), temperature source Temporal, resp. rate 25, height 1.803 m (5' 10.98\"), weight 144 kg (318 lb 5.5 oz), SpO2 91 %.  Intake/Output last 3 Shifts:  I/O last 3 completed shifts:  In: 2643 (18.3 mL/kg) [I.V.:1063 (7.4 mL/kg); NG/GT:1130; IV Piggyback:450]  Out: 3428 (23.7 mL/kg) [Urine:2978 (0.6 mL/kg/hr); Emesis/NG output:450]  Weight: 144.4 kg     Relevant Results  Scheduled medications  allopurinol, 300 mg, oral, Daily  aspirin, 300 mg, rectal, Once  azithromycin, 500 mg, oral, q24h SHARRON  cefepime, 2 g, intravenous, q8h  docusate sodium, 100 mg, oral, BID  empagliflozin, 10 mg, oral, Daily  enoxaparin, 40 mg, subcutaneous, q12h SHARRON  [Held by provider] tiotropium, 2 puff, inhalation, Daily   And  [Held by provider] fluticasone furoate-vilanteroL, 1 puff, inhalation, Daily  folic acid, 1 mg, oral, Daily  furosemide, 40 mg, intravenous, Once  insulin lispro, 0-10 Units, " subcutaneous, 4x daily  ipratropium-albuteroL, 3 mL, nebulization, q6h  levothyroxine, 125 mcg, oral, Daily  multivitamin with iron-minerals, 15 mL, oral, Daily  nicotine, 1 patch, transdermal, Daily  pantoprazole, 40 mg, intravenous, Daily  perflutren lipid microspheres, 1 mL of dilution, intravenous, Once in imaging  potassium chloride, 20 mEq, oral, Daily  predniSONE, 40 mg, oral, Daily  thiamine, 100 mg, oral, Daily  [Held by provider] valsartan, 320 mg, oral, Daily      Continuous medications  norepinephrine, 0.01-1 mcg/kg/min  propofol, 5-20 mcg/kg/min, Last Rate: 20 mcg/kg/min (03/13/24 1746)      PRN medications  PRN medications: acetaminophen **OR** acetaminophen **OR** acetaminophen, dextrose 10 % in water (D10W), dextrose, diazePAM, glucagon, guaiFENesin, ipratropium-albuteroL, oxygen    Results for orders placed or performed during the hospital encounter of 03/08/24 (from the past 24 hour(s))   POCT GLUCOSE   Result Value Ref Range    POCT Glucose 154 (H) 74 - 99 mg/dL   CBC   Result Value Ref Range    WBC 23.9 (H) 4.4 - 11.3 x10*3/uL    RBC 5.36 4.50 - 5.90 x10*6/uL    Hemoglobin 18.0 (H) 13.5 - 17.5 g/dL    Hematocrit 55.6 (H) 41.0 - 52.0 %     (H) 80 - 100 fL    MCH 33.6 26.0 - 34.0 pg    MCHC 32.4 32.0 - 36.0 g/dL    RDW 13.9 11.5 - 14.5 %    Platelets 209 150 - 450 x10*3/uL    MPV 10.9 7.5 - 11.5 fL   Renal Function Panel   Result Value Ref Range    Glucose 135 (H) 74 - 99 mg/dL    Sodium 142 136 - 145 mmol/L    Potassium 3.5 3.5 - 5.3 mmol/L    Chloride 102 98 - 107 mmol/L    Bicarbonate 30 21 - 32 mmol/L    Anion Gap 14 10 - 20 mmol/L    Urea Nitrogen 32 (H) 6 - 23 mg/dL    Creatinine 0.84 0.50 - 1.30 mg/dL    eGFR >90 >60 mL/min/1.73m*2    Calcium 8.6 8.6 - 10.3 mg/dL    Phosphorus 3.1 2.5 - 4.9 mg/dL    Albumin 3.3 (L) 3.4 - 5.0 g/dL   Magnesium   Result Value Ref Range    Magnesium 2.10 1.60 - 2.40 mg/dL   Blood Gas Venous   Result Value Ref Range    POCT pH, Venous 7.47 (H) 7.33 - 7.43  pH    POCT pCO2, Venous 49 41 - 51 mm Hg    POCT pO2, Venous 64 (H) 35 - 45 mm Hg    POCT SO2, Venous 91 (H) 45 - 75 %    POCT Oxy Hemoglobin, Venous 89.4 (H) 45.0 - 75.0 %    POCT Base Excess, Venous 10.4 (H) -2.0 - 3.0 mmol/L    POCT HCO3 Calculated, Venous 35.7 (H) 22.0 - 26.0 mmol/L    Patient Temperature      FiO2 60 %    Test Comment 20/600/.60/+5    POCT GLUCOSE   Result Value Ref Range    POCT Glucose 140 (H) 74 - 99 mg/dL   POCT GLUCOSE   Result Value Ref Range    POCT Glucose 158 (H) 74 - 99 mg/dL   POCT GLUCOSE   Result Value Ref Range    POCT Glucose 155 (H) 74 - 99 mg/dL                Assessment/Plan   Principal Problem:    Heart failure exacerbated by sotalol (CMS/[facility])  Active Problems:    Acute on chronic congestive heart failure, unspecified heart failure type (CMS/[facility])    69 yo man w/ h/o chronic systolic heart failure, COPD admitted w/ CHF exacerbation     Neuro - metabolic encephalopathy 2/2 sepsis.  Propofol for sedation.  Daily SATs.  CIWA for possible EtOH withdrawal     CV - acute on chronic systolic heart failure- lasix 40mg IV x 2 today..  Septic shock on levophed.  Titrate map >65.       Pulm - acute hypoxic resp failure 2/2 mucus plug, COPD exacerbation - s/p bronch today w/ improvement of mucus plug.  COPD exacerbation - cont  prednisone and azithro.  Currently 60%.  Wean vent per protocol.  SBT per protocol.     GI - cont  Tfs.  PPI prophylaxis     Renal - no acute issues      Endo - on SSI and jardiance     ID - septic shock 2/2 suspected gram neg PNA w/ acute hypoxic resp failure - on cefepime and azithro.  follow-up cultures.  follow-up BAL     Heme - Lovenox for DVT prophylaxis     Dispo - Pt requires close ICU monitoring for septic shock and mucus plug requiring pressors and mech ventilation.      Critical care time spent - 40 minutes.     Benjamin Hernandez MD

## 2024-03-14 NOTE — CONSULTS
"Reason For Consult  Heart Failure education    History Of Present Illness  Alo Glass is a 68 y.o. male presenting with chest pain, unspecified type.     Past Medical History  He has a past medical history of Personal history of other specified conditions (01/08/2023).    Surgical History  He has a past surgical history that includes Hernia repair (11/07/2013) and Other surgical history (11/07/2013).     Social History  He reports that he has never smoked. He has never used smokeless tobacco. No history on file for alcohol use and drug use.    Family History  No family history on file.     Allergies  Patient has no known allergies.    Last Recorded Vitals  Blood pressure (!) 159/125, pulse 107, temperature 36.9 °C (98.4 °F), resp. rate (!) 31, height 1.803 m (5' 10.98\"), weight 144 kg (317 lb 7.4 oz), SpO2 91 %.    Relevant Results  3/10/2024 EF 30-35%, BNP-740, Troponin-85     Assessment/Plan     CHF Clinical Nurse Navigator Documentation    Patient assessment performed. The patient is alert and oriented but has a poor baseline understanding of Heart Failure. CHF disease education performed by the Clinical Nurse Navigator. Educational emphasis placed on medication compliance, physician appointment follow-up, and recognition of acute HF exacerbation signs and symptoms. We also discussed the physiology/pathology of HF and Atrial Fibrillation, and how medications help treat symptoms. After education, the patient could perform a partial teach-back of the materials reviewed. Education reinforced. He expresses a willingness to take an active role in his health management.     Materials provided and utilized for education include:    Living With Heart Failure Education Booklet   Action Plan for Staying Healthy At Home with Heart Failure Handout   -Dietary Education Handouts   CHF Education Teaching Tool  Heart Failure and Home Exercise Handout, Including a List of Recreation Centers and Public Services   Heart " Failure Navigator Name and Phone Number  Calendar for Recording Daily Weights    Education Provided:    CHF Signs and Symptoms Discussed, and When to Call the Cardiologist    Controlling Heart Failure at Home   Home Exercise and Available Recreation Centers  Home Medication Usage Education  Nutrition Education  Fluid Restriction Education  Daily Weight Education  Follow-up with the Cardiologist after Discharge Education    Comments: Review of acute Heart Failure exacerbation signs and symptoms reinforced after initial education. Referral placed with dietitian and outpatient cardiac rehab.    Santos Velez RN

## 2024-03-14 NOTE — CARE PLAN
The patient's goals for the shift include  get extubated    The clinical goals for the shift include Patient will be extubated today      Problem: Heart Failure  Goal: Improved gas exchange this shift  Outcome: Progressing  Goal: Improved urinary output this shift  Outcome: Progressing  Goal: Reduction in peripheral edema within 24 hours  Outcome: Progressing  Goal: Report improvement of dyspnea/breathlessness this shift  Outcome: Progressing  Goal: Weight from fluid excess reduced over 2-3 days, then stabilize  Outcome: Progressing  Goal: Increase self care and/or family involvement in 24 hours  Outcome: Progressing     Problem: Chronic Conditions and Co-morbidities  Goal: Patient's chronic conditions and co-morbidity symptoms are monitored and maintained or improved  Outcome: Progressing

## 2024-03-14 NOTE — CARE PLAN
The patient's goals for the shift include  intubated  , unable to state goal    The clinical goals for the shift include

## 2024-03-14 NOTE — PROGRESS NOTES
Subjective Data:  Patient resting in bed this AM is s/p extubation.   Reports feeling short of breath with some mild increased work of breathing. Denies any chest pain, pressure. Reviewed extensively all testing done since admission with their results.     Overnight Events:    No acute events reported overnight.      Objective Data:  Last Recorded Vitals:  Vitals:    03/14/24 0700 03/14/24 0715 03/14/24 0730 03/14/24 0745   BP: 101/79 103/74 98/69 98/78   BP Location:       Patient Position:       Pulse: 84 82 83 80   Resp: 18 18 21 21   Temp:       TempSrc:       SpO2: 93% 91% 93% 91%   Weight:       Height:           Last Labs:  CBC - 3/14/2024:  4:05 AM  16.6 16.9 187    52.1      CMP - 3/14/2024:  4:05 AM  8.6 6.9 18 --- 1.6   3.4 3.1 22 73      PTT - No results in last year.  _   _ _     TROPHS   Date/Time Value Ref Range Status   03/12/2024 07:07  0 - 20 ng/L Final     Comment:     Previous result verified on 3/12/2024 0048 on specimen/case [id number] called with component TRPHS for procedure Troponin I, High Sensitivity with value 89 ng/L.   03/12/2024 03:08  0 - 20 ng/L Final     Comment:     Previous result verified on 3/12/2024 0048 on specimen/case [id number] called with component TRPHS for procedure Troponin I, High Sensitivity with value 89 ng/L.   03/12/2024 12:15 AM 89 0 - 20 ng/L Final     BNP   Date/Time Value Ref Range Status   03/10/2024 11:25  0 - 99 pg/mL Final   03/08/2024 05:28  0 - 99 pg/mL Final   03/08/2024 05:28  0 - 99 pg/mL Final     HGBA1C   Date/Time Value Ref Range Status   02/20/2024 10:35 AM 5.8 see below % Final   01/19/2023 10:23 AM 7.3 % Final     Comment:          Diagnosis of Diabetes-Adults   Non-Diabetic: < or = 5.6%   Increased risk for developing diabetes: 5.7-6.4%   Diagnostic of diabetes: > or = 6.5%  .       Monitoring of Diabetes                Age (y)     Therapeutic Goal (%)   Adults:          >18           <7.0   Pediatrics:     13-18           <7.5                   7-12           <8.0                   0- 6            7.5-8.5   American Diabetes Association. Diabetes Care 33(S1), Jan 2010.     09/10/2021 01:29 PM 7.9 % Final     Comment:          Diagnosis of Diabetes-Adults   Non-Diabetic: < or = 5.6%   Increased risk for developing diabetes: 5.7-6.4%   Diagnostic of diabetes: > or = 6.5%  .       Monitoring of Diabetes                Age (y)     Therapeutic Goal (%)   Adults:          >18           <7.0   Pediatrics:    13-18           <7.5                   7-12           <8.0                   0- 6            7.5-8.5   American Diabetes Association. Diabetes Care 33(S1), Jan 2010.       LDLCALC   Date/Time Value Ref Range Status   02/20/2024 10:35  <=99 mg/dL Final     Comment:                                 Near   Borderline      AGE      Desirable  Optimal    High     High     Very High     0-19 Y     0 - 109     ---    110-129   >/= 130     ----    20-24 Y     0 - 119     ---    120-159   >/= 160     ----      >24 Y     0 -  99   100-129  130-159   160-189     >/=190       VLDL   Date/Time Value Ref Range Status   02/20/2024 10:35 AM 16 0 - 40 mg/dL Final   09/10/2021 01:29 PM 27 0 - 40 mg/dL Final   09/18/2018 02:26 PM 22 0 - 40 mg/dL Final      Last I/O:  I/O last 3 completed shifts:  In: 2637.8 (18.3 mL/kg) [I.V.:1007.8 (7 mL/kg); NG/GT:1330; IV Piggyback:300]  Out: 3690 (25.6 mL/kg) [Urine:3690 (0.7 mL/kg/hr)]  Weight: 144 kg     Past Cardiology Tests (Last 3 Years):  EKG:  Electrocardiogram, 12-lead PRN ACS symptoms 03/12/2024  ECG 12 lead 03/12/2024  ECG 12 lead 03/12/2024  Electrocardiogram, 12-lead PRN ACS symtpoms 03/11/2024  ECG 12 Lead 03/10/2024  ECG 12 lead     Echo:  Transthoracic echo (TTE) complete 03/11/2024  CONCLUSIONS:   1. Left ventricular systolic function is moderately decreased with a 30-35% estimated ejection fraction.   2. Basal inferolateral segment is abnormal.   3. Poorly visualized anatomical  structures due to suboptimal image quality.   4. Moderate aortic valve stenosis.   5. There is global hypokinesis of the left ventricle with minor regional variations.      TTE (06/18/2019):   -Low normal LV systolic function  -Mild concentric LVH  -Doppler flow suggestive of abnormal Left ventricular relaxation  -Normal right ventricular size and function  -Slightly enlarged left atrium.   -Moderate calcific aortic stenosis  -Trace mitral regurg     Ejection Fractions:  EF   Date/Time Value Ref Range Status   03/11/2024 02:04 PM 33 %      Cath:  No results found for this or any previous visit from the past 1095 days.    Stress Test:  No results found for this or any previous visit from the past 1095 days.    Imaging:  CXR (03/08/2024):   IMPRESSION:  -Moderate right pleural effusion and right lung base consolidation. Findings could be due to pneumonia. Other underlying process not excluded. Follow-up recommended.   -Trace left pleural effusion and cardiomegaly. Possible interstitial edema.     Xray Abd (03/12/2024):   IMPRESSION:  1.  Enteric tube approximately 3.8 cm above the kathie.  2. Evaluation of the enteric tube is limited and appears looped high in the right upper quadrant however may be related to patient positioning and rotation. Repeat imaging may be considered with proper patient positioning to delineate proper position.  3. Left lower lobe airspace opacities concerning for pneumonia. Possible large right pleural effusion.  4. Paucity of bowel gas noted.     CXR (03/12/2024):   IMPRESSION:  1.  Enteric tube approximately 3.8 cm above the kathie.  2. Evaluation of the enteric tube is limited and appears looped high in the right upper quadrant however may be related to patient positioning and rotation. Repeat imaging may be considered with proper patient positioning to delineate proper position.  3. Left lower lobe airspace opacities concerning for pneumonia. Possible large right pleural effusion.  4.  Paucity of bowel gas noted.      Inpatient Medications:  Scheduled medications   Medication Dose Route Frequency    allopurinol  300 mg oral Daily    aspirin  300 mg rectal Once    azithromycin  500 mg oral q24h SHARRON    cefepime  2 g intravenous q8h    docusate sodium  100 mg oral BID    empagliflozin  10 mg oral Daily    enoxaparin  40 mg subcutaneous q12h SHARRON    [Held by provider] tiotropium  2 puff inhalation Daily    And    [Held by provider] fluticasone furoate-vilanteroL  1 puff inhalation Daily    folic acid  1 mg oral Daily    insulin lispro  0-10 Units subcutaneous 4x daily    ipratropium-albuteroL  3 mL nebulization q6h    levothyroxine  125 mcg oral Daily    multivitamin with iron-minerals  15 mL oral Daily    nicotine  1 patch transdermal Daily    pantoprazole  40 mg intravenous Daily    perflutren lipid microspheres  1 mL of dilution intravenous Once in imaging    potassium chloride  20 mEq oral Daily    predniSONE  40 mg oral Daily    thiamine  100 mg oral Daily    [Held by provider] valsartan  320 mg oral Daily     PRN medications   Medication    acetaminophen    Or    acetaminophen    Or    acetaminophen    dextrose 10 % in water (D10W)    dextrose    diazePAM    glucagon    guaiFENesin    ipratropium-albuteroL    oxygen     Continuous Medications   Medication Dose Last Rate    norepinephrine  0.01-1 mcg/kg/min Stopped (03/14/24 0603)    propofol  5-20 mcg/kg/min 20 mcg/kg/min (03/14/24 0728)       Physical Exam:  Physical Exam  Constitutional:       Appearance: Normal appearance.   HENT:      Head: Normocephalic.      Nose: Nose normal.      Mouth/Throat:      Mouth: Mucous membranes are dry.   Eyes:      Conjunctiva/sclera: Conjunctivae normal.   Cardiovascular:      Rate and Rhythm: Regular rhythm. Tachycardia present.      Heart sounds: No murmur heard.     Comments: Tele:  bpm, single episode of VT (6 seconds).    Pulmonary:      Comments: Increased work of breathing, diminished b/l.   On  HFNC.     Abdominal:      General: There is distension.      Palpations: Abdomen is soft.   Musculoskeletal:      Right lower leg: Edema present.      Left lower leg: Edema present.   Skin:     General: Skin is warm.   Neurological:      General: No focal deficit present.      Mental Status: He is alert. Mental status is at baseline.            Assessment/Plan   69 yo male from home with h/o obesity, MELISSA on CPAP, COPD, current smoker, type 2 DM, hypertension, lymphedema, moderate aortic stenosis (6/2019) who presented from PCP office with symptoms of increased LE edema, abdominal bloating, progressive SOB over the past year found to be hypoxic in the ER. On admission was Intermittently refusing IV Lasix, I&O not well monitored. Discussed urinating only into urinals, recording how much urine vs how frequent he urinates. Refused return to lasix gtt, lipscomb or external catheter. Originally refusing echo d/t concern for needing to urinate during the testing. Discussed accommodations and ability to use the bathroom in echo if necessary. Patient was then agreeable to echocardiogram. Echo obtained with newly reduced ejection fraction. Decompensated overnight hypoxic, agitated, required Ativan, Precedex, BiPAP and eventually intubation. Patient weaned off of IV pressor support (03/13). Able to be extubated successfully today to HFNC. Remains hypervolemic on exam s/p Acetazolamide this AM.        #Acute hypoxic respiratory failure 2/2 decompensated heart failure, Pneumonia, COPD w/ current smoking, mucous plugging s/p bronchoscopy   #Acute decompensated systolic heart failure (new diagnosis)  #Shock state c/f septic (PNA) in nature, no  #Moderate aortic stenosis   #Chronic lymphedema BLE   #Polycythemia   #NSR with PACs on telemetry  (no afib noted)      -Would recommend resumption of IV diuresis,   -Hold BB, Spironolactone, ARB given acute decompensation, resume as able.   -Echo reviewed with newly reduced LVSF, EF 30-35%,  abnormal basal inferolateral segment, global hypokinesis, mod AS. Discussed with patient.   -Strict I/O, monitor renal function and electrolytes; Daily weight (Last 24h -951.5, 159kg on admission 144kg today).   -Will follow     Peripheral IV 03/12/24 18 G Left;Ventral Forearm (Active)   Site Assessment Clean;Dry;Intact 03/13/24 2100   Dressing Status Clean;Dry 03/13/24 2100   Number of days: 2       Peripheral IV 03/13/24 18 G Proximal;Right Forearm (Active)   Site Assessment Clean;Dry;Intact 03/13/24 2100   Dressing Status Clean;Dry 03/13/24 2100   Number of days: 1       Peripheral IV 03/13/24 20 G Right Wrist (Active)   Site Assessment Clean;Dry;Intact 03/13/24 2100   Dressing Status Clean;Dry 03/13/24 2100   Number of days: 1       ETT  7.5 mm (Active)   Secured at (cm) 25 cm 03/14/24 0320   Measured from Lips 03/14/24 0320   Secured Location Center 03/14/24 0320   Secured by Commercial tube pink 03/14/24 0320   Site Condition Dry 03/14/24 0320   Number of days: 2       NG/OG/Feeding Tube OG - [employer] sump 18 Fr Center mouth (Active)   Intake (mL) 45 mL 03/14/24 0600   Intake - Flush (mL) 30 mL 03/14/24 0600   Gastric Aspirate - Residual Returned 2 mL 03/14/24 0600   Number of days: 2       Urethral Catheter (Active)   Output (mL) 150 mL 03/14/24 0600   Number of days: 2       Code Status:  Full Code    I spent 50 minutes in the professional and overall care of this patient.        Stephanie Dyer PA-C

## 2024-03-14 NOTE — PROGRESS NOTES
"Alo Glass is a 68 y.o. male on day 6 of admission presenting with Heart failure exacerbated by sotalol (CMS/HCC).    Subjective   Patient was sitting up in bed awake and alert post extubation on 14L via NC. Stated that he is currently doing okay and has no new issues or complaints or concerns.       Objective     Physical Exam  Constitutional:       General: He is not in acute distress.     Appearance: He is obese. He is ill-appearing.   HENT:      Head: Normocephalic and atraumatic.   Eyes:      Extraocular Movements: Extraocular movements intact.      Conjunctiva/sclera: Conjunctivae normal.   Cardiovascular:      Rate and Rhythm: Normal rate and regular rhythm.      Heart sounds: Normal heart sounds.   Pulmonary:      Breath sounds: Decreased breath sounds and rhonchi present.      Comments: Nasal cannula  Abdominal:      Palpations: Abdomen is soft.      Tenderness: There is no abdominal tenderness.   Skin:     General: Skin is warm and dry.   Neurological:      General: No focal deficit present.      Mental Status: He is alert and oriented to person, place, and time.   Psychiatric:         Attention and Perception: Attention and perception normal.         Mood and Affect: Mood and affect normal.         Speech: Speech normal.         Behavior: Behavior is cooperative.         Thought Content: Thought content normal.         Last Recorded Vitals  Blood pressure (!) 159/125, pulse 107, temperature 36.9 °C (98.4 °F), resp. rate (!) 31, height 1.803 m (5' 10.98\"), weight 144 kg (317 lb 7.4 oz), SpO2 91 %.  Intake/Output last 3 Shifts:  I/O last 3 completed shifts:  In: 2637.8 (18.3 mL/kg) [I.V.:1007.8 (7 mL/kg); NG/GT:1330; IV Piggyback:300]  Out: 3690 (25.6 mL/kg) [Urine:3690 (0.7 mL/kg/hr)]  Weight: 144 kg     Relevant Results  This patient has a urinary catheter   Reason for the urinary catheter remaining today? critically ill patient who need accurate urinary output measurements    Results for orders " placed or performed during the hospital encounter of 03/08/24 (from the past 24 hour(s))   POCT GLUCOSE   Result Value Ref Range    POCT Glucose 155 (H) 74 - 99 mg/dL   POCT GLUCOSE   Result Value Ref Range    POCT Glucose 156 (H) 74 - 99 mg/dL   CBC   Result Value Ref Range    WBC 16.6 (H) 4.4 - 11.3 x10*3/uL    nRBC 0.0 0.0 - 0.0 /100 WBCs    RBC 4.99 4.50 - 5.90 x10*6/uL    Hemoglobin 16.9 13.5 - 17.5 g/dL    Hematocrit 52.1 (H) 41.0 - 52.0 %     (H) 80 - 100 fL    MCH 33.9 26.0 - 34.0 pg    MCHC 32.4 32.0 - 36.0 g/dL    RDW 14.4 11.5 - 14.5 %    Platelets 187 150 - 450 x10*3/uL   Renal Function Panel   Result Value Ref Range    Glucose 148 (H) 74 - 99 mg/dL    Sodium 142 136 - 145 mmol/L    Potassium 3.5 3.5 - 5.3 mmol/L    Chloride 102 98 - 107 mmol/L    Bicarbonate 32 21 - 32 mmol/L    Anion Gap 12 10 - 20 mmol/L    Urea Nitrogen 38 (H) 6 - 23 mg/dL    Creatinine 0.83 0.50 - 1.30 mg/dL    eGFR >90 >60 mL/min/1.73m*2    Calcium 8.6 8.6 - 10.3 mg/dL    Phosphorus 3.4 2.5 - 4.9 mg/dL    Albumin 3.1 (L) 3.4 - 5.0 g/dL   Magnesium   Result Value Ref Range    Magnesium 2.38 1.60 - 2.40 mg/dL   Blood Gas Venous   Result Value Ref Range    POCT pH, Venous 7.50 (H) 7.33 - 7.43 pH    POCT pCO2, Venous 48 41 - 51 mm Hg    POCT pO2, Venous 72 (H) 35 - 45 mm Hg    POCT SO2, Venous 96 (H) 45 - 75 %    POCT Oxy Hemoglobin, Venous 93.6 (H) 45.0 - 75.0 %    POCT Base Excess, Venous 12.4 (H) -2.0 - 3.0 mmol/L    POCT HCO3 Calculated, Venous 37.4 (H) 22.0 - 26.0 mmol/L    Patient Temperature      FiO2 60 %   POCT GLUCOSE   Result Value Ref Range    POCT Glucose 159 (H) 74 - 99 mg/dL   BLOOD GAS ARTERIAL   Result Value Ref Range    POCT pH, Arterial 7.44 (H) 7.38 - 7.42 pH    POCT pCO2, Arterial 61 (H) 38 - 42 mm Hg    POCT pO2, Arterial 67 (L) 85 - 95 mm Hg    POCT SO2, Arterial 93 (L) 94 - 100 %    POCT Oxy Hemoglobin, Arterial 90.9 (L) 94.0 - 98.0 %    POCT Base Excess, Arterial 14.3 (H) -2.0 - 3.0 mmol/L    POCT HCO3  Calculated, Arterial 41.4 (H) 22.0 - 26.0 mmol/L    Patient Temperature      FiO2 40 %    Ventilator Mode CPAP     Peep CHM2O 5.0 cm H2O    Pressure Support 5 cm H2O    Site of Arterial Puncture Radial Right     Cullen's Test Positive    POCT GLUCOSE   Result Value Ref Range    POCT Glucose 153 (H) 74 - 99 mg/dL      XR chest 1 view    Result Date: 3/13/2024  Interpreted By:  Deepali Casillas, STUDY: XR CHEST 1 VIEW;  3/13/2024 2:48 am   INDICATION: Signs/Symptoms:NG tube placement   COMPARISON: Chest x-ray 03/12/2024.   ACCESSION NUMBER(S): [id number]   ORDERING CLINICIAN: JAZMIN CHAEVZ   TECHNIQUE: 2 portable semi-upright frontal views of the chest were obtained.   FINDINGS: An endotracheal tube terminates approximately 4.5 cm above the kathie. The enteric tube terminates at the right upper quadrant, at the expected location of the gastric antrum.   The heart is enlarged. There is vascular congestion.   There is a large right pleural effusion with atelectasis/consolidation at the right lower lobe. No pneumothorax.       1. Cardiomegaly. Vascular congestion. Large right pleural effusion with atelectasis/consolidation at the right lower lobe. Radiographic follow-up to resolution recommended. 2. Enteric tube terminates at the right upper quadrant, at the expected location of the gastric antrum.       MACRO: None.   Signed by: Deepali Casillas 3/13/2024 3:20 AM Dictation workstation:   [id number]    XR chest 1 view    Result Date: 3/12/2024  Interpreted By:  Nathan Hoang, STUDY: XR CHEST 1 VIEW; 3/12/2024 2:47 pm   INDICATION: Signs/Symptoms:f/u mucus plug s/p bronch.   COMPARISON: 03/12/2024   ACCESSION NUMBER(S): [id number]   ORDERING CLINICIAN: NANDO KHAN   TECHNIQUE: 1 view of the chest was performed.   FINDINGS: No pneumothorax. Marked interval improvement in aeration of the right upper lobe. Endotracheal tube remains in good position, approximately 3 cm above the kathie. The cardiomediastinal  silhouette is stable in appearance. Persistent right lower lobe airspace opacity. Mild airspace opacity and ground-glass opacity is again noted in the left lower lobe, unchanged.       No pneumothorax. Marked interval improvement in aeration of the right upper lobe.   The remainder of the examination does not appear significantly changed.   Signed by: Nathan Hoang 3/12/2024 3:39 PM Dictation workstation:   [id number]    Bronchoscopy    Result Date: 3/12/2024  Table formatting from the original result was not included. Impression The main kathie and left lung appeared normal. Moderate, thin and brown secretions present in the right main stem; performed therapeutic aspiration Bronchoalveolar lavage was performed x1 in the RLL Findings The main kathie and left lung appeared normal. Moderate, thin and brown secretions present in the right main stem; secretions were easily removed by therapeutic aspiration and the airway was cleared Bronchoalveolar lavage was performed x1 in the RLL with 60 mL of saline instilled; the fluid appeared clear Recommendation  Imaging- Chest XRay Indication Mucus plugging of bronchi Staff Staff Role No Staff Documented Medications See Anesthesia Record. Preprocedure A history and physical has been performed, and patient medication allergies have been reviewed. The patient's tolerance of previous anesthesia has been reviewed. The risks and benefits of the procedure and the sedation options and risks were discussed with the sister. All questions were answered and informed consent obtained.  Pt intubated in ICU for mucus plug and on mechanical ventilation prior to start of procedure. Details of the Procedure The patient was already under sedation prior to the procedure. The patient's blood pressure, heart rate, level of consciousness, oxygen, respirations, ECG and ETCO2 were monitored throughout the procedure. The patient experienced no blood loss. The scope was introduced through the  endotracheal tube. The procedure was not difficult. The patient tolerated the procedure well. There were no apparent adverse events.  Large size Ambuscope used. Events Procedure Events Event Event Time Specimens * Cannot find log * Procedure Location [facility] [address] [place] [id number] [phone] Referring Provider No referring provider defined for this encounter. Procedure Provider No name on file              Assessment/Plan   Principal Problem:    Heart failure exacerbated by sotalol (CMS/[facility])  Active Problems:    Acute on chronic congestive heart failure, unspecified heart failure type (CMS/[facility])    69 yo man with PMH of chronic systolic heart failure, COPD, DM who is admitted for CHF exacerbation     Neuro   - metabolic encephalopathy 2/2 sepsis.    - s/p propofol for sedation  -CIWA for possible EtOH withdrawal     CV   - acute on chronic systolic heart failure   - Septic shock s/p levophed     Pulm   - acute hypoxic resp failure 2/2 mucus plug, COPD exacerbation   - s/p bronch 03/13 w/ improvement of mucus plug.  COPD exacerbation - cont prednisone   - D/C'd azithro 03/14  -s/p extubation on 03/14     GI   -cont Tfs.    -PPI prophylaxis  -Bowel regimen: miralax every day with dulcolax PRN constipation     Renal  -no acute issues      Endo   -on SSI and jardiance     ID   -septic shock 2/2 suspected gram neg PNA w/ acute hypoxic resp failure - on cefepime and azithro.    - s/p azithro d/c'd 03/14  -follow-up cultures.    -follow-up BAL     Heme   - Lovenox for DVT prophylaxis    Fluids  Electrolytes potassium 20 mEq daily, replete as needed  Nutrition Cardiac and Diabetic diet  GI ppx pantoprazole  DVT ppx: Lovenox  Code Status: Full Code  Abx: cefepime     Dispo - Pt requires close ICU monitoring for septic shock and mucus plug requiring pressors and mech ventilation.          Patient was staffed with Maribel Mai DO, PGY-2    [facility]

## 2024-03-14 NOTE — PROGRESS NOTES
"Palliative Care Social Work Note     Pt has been extubated and is alert.    Met with pt.  Pt reports he is doing okay.  Pt wants his ginger ale, RN to assess swallowing.  Pt unaware of date but otherwise fully oriented.  Discussed role of palliative care.  \"Don't you get involved when people are dying?\"  Reviewed the need to discuss goals of care.  Pt is in agreement and agrees discussion should be tomorrow if he is feeling okay.  Reviewed conversations with his sister Alia.  Pt stated it is fine to update her.    Updated pt's sister Alia.    "

## 2024-03-15 LAB
ALBUMIN SERPL BCP-MCNC: 3.3 G/DL (ref 3.4–5)
ANION GAP SERPL CALC-SCNC: 10 MMOL/L (ref 10–20)
BACTERIA SPEC RESP CULT: ABNORMAL
BACTERIA SPEC RESP CULT: ABNORMAL
BASE EXCESS BLDV CALC-SCNC: 6.2 MMOL/L (ref -2–3)
BNP SERPL-MCNC: 733 PG/ML (ref 0–99)
BODY TEMPERATURE: ABNORMAL
BUN SERPL-MCNC: 38 MG/DL (ref 6–23)
CALCIUM SERPL-MCNC: 9.1 MG/DL (ref 8.6–10.3)
CHLORIDE SERPL-SCNC: 105 MMOL/L (ref 98–107)
CO2 SERPL-SCNC: 30 MMOL/L (ref 21–32)
CREAT SERPL-MCNC: 0.74 MG/DL (ref 0.5–1.3)
EGFRCR SERPLBLD CKD-EPI 2021: >90 ML/MIN/1.73M*2
ERYTHROCYTE [DISTWIDTH] IN BLOOD BY AUTOMATED COUNT: 14.2 % (ref 11.5–14.5)
GLUCOSE BLD MANUAL STRIP-MCNC: 157 MG/DL (ref 74–99)
GLUCOSE BLD MANUAL STRIP-MCNC: 171 MG/DL (ref 74–99)
GLUCOSE BLD MANUAL STRIP-MCNC: 258 MG/DL (ref 74–99)
GLUCOSE SERPL-MCNC: 148 MG/DL (ref 74–99)
GRAM STN SPEC: ABNORMAL
GRAM STN SPEC: ABNORMAL
HCO3 BLDV-SCNC: 32.7 MMOL/L (ref 22–26)
HCT VFR BLD AUTO: 55.3 % (ref 41–52)
HGB BLD-MCNC: 17 G/DL (ref 13.5–17.5)
INHALED O2 CONCENTRATION: 68 %
MAGNESIUM SERPL-MCNC: 2.73 MG/DL (ref 1.6–2.4)
MCH RBC QN AUTO: 33.6 PG (ref 26–34)
MCHC RBC AUTO-ENTMCNC: 30.7 G/DL (ref 32–36)
MCV RBC AUTO: 109 FL (ref 80–100)
NRBC BLD-RTO: 0 /100 WBCS (ref 0–0)
OXYHGB MFR BLDV: 93.2 % (ref 45–75)
PCO2 BLDV: 54 MM HG (ref 41–51)
PH BLDV: 7.39 PH (ref 7.33–7.43)
PHOSPHATE SERPL-MCNC: 4.2 MG/DL (ref 2.5–4.9)
PLATELET # BLD AUTO: 185 X10*3/UL (ref 150–450)
PO2 BLDV: 74 MM HG (ref 35–45)
POTASSIUM SERPL-SCNC: 4.3 MMOL/L (ref 3.5–5.3)
RBC # BLD AUTO: 5.06 X10*6/UL (ref 4.5–5.9)
SAO2 % BLDV: 96 % (ref 45–75)
SODIUM SERPL-SCNC: 141 MMOL/L (ref 136–145)
WBC # BLD AUTO: 9.4 X10*3/UL (ref 4.4–11.3)

## 2024-03-15 PROCEDURE — 36415 COLL VENOUS BLD VENIPUNCTURE: CPT | Performed by: INTERNAL MEDICINE

## 2024-03-15 PROCEDURE — 97165 OT EVAL LOW COMPLEX 30 MIN: CPT | Mod: GO

## 2024-03-15 PROCEDURE — 2500000001 HC RX 250 WO HCPCS SELF ADMINISTERED DRUGS (ALT 637 FOR MEDICARE OP): Performed by: NURSE PRACTITIONER

## 2024-03-15 PROCEDURE — 2500000001 HC RX 250 WO HCPCS SELF ADMINISTERED DRUGS (ALT 637 FOR MEDICARE OP): Performed by: PHYSICIAN ASSISTANT

## 2024-03-15 PROCEDURE — 2500000004 HC RX 250 GENERAL PHARMACY W/ HCPCS (ALT 636 FOR OP/ED): Performed by: NURSE PRACTITIONER

## 2024-03-15 PROCEDURE — 94660 CPAP INITIATION&MGMT: CPT

## 2024-03-15 PROCEDURE — 2500000001 HC RX 250 WO HCPCS SELF ADMINISTERED DRUGS (ALT 637 FOR MEDICARE OP): Performed by: INTERNAL MEDICINE

## 2024-03-15 PROCEDURE — 99291 CRITICAL CARE FIRST HOUR: CPT | Performed by: INTERNAL MEDICINE

## 2024-03-15 PROCEDURE — 83880 ASSAY OF NATRIURETIC PEPTIDE: CPT | Performed by: PHYSICIAN ASSISTANT

## 2024-03-15 PROCEDURE — 2500000002 HC RX 250 W HCPCS SELF ADMINISTERED DRUGS (ALT 637 FOR MEDICARE OP, ALT 636 FOR OP/ED): Performed by: INTERNAL MEDICINE

## 2024-03-15 PROCEDURE — 2500000004 HC RX 250 GENERAL PHARMACY W/ HCPCS (ALT 636 FOR OP/ED): Performed by: INTERNAL MEDICINE

## 2024-03-15 PROCEDURE — 99232 SBSQ HOSP IP/OBS MODERATE 35: CPT | Performed by: PHYSICIAN ASSISTANT

## 2024-03-15 PROCEDURE — S4991 NICOTINE PATCH NONLEGEND: HCPCS | Performed by: NURSE PRACTITIONER

## 2024-03-15 PROCEDURE — 2500000005 HC RX 250 GENERAL PHARMACY W/O HCPCS: Performed by: INTERNAL MEDICINE

## 2024-03-15 PROCEDURE — 2500000002 HC RX 250 W HCPCS SELF ADMINISTERED DRUGS (ALT 637 FOR MEDICARE OP, ALT 636 FOR OP/ED): Performed by: NURSE PRACTITIONER

## 2024-03-15 PROCEDURE — 82947 ASSAY GLUCOSE BLOOD QUANT: CPT

## 2024-03-15 PROCEDURE — 94640 AIRWAY INHALATION TREATMENT: CPT

## 2024-03-15 PROCEDURE — 83735 ASSAY OF MAGNESIUM: CPT | Performed by: INTERNAL MEDICINE

## 2024-03-15 PROCEDURE — 1200000002 HC GENERAL ROOM WITH TELEMETRY DAILY

## 2024-03-15 PROCEDURE — 85027 COMPLETE CBC AUTOMATED: CPT | Performed by: INTERNAL MEDICINE

## 2024-03-15 PROCEDURE — 84100 ASSAY OF PHOSPHORUS: CPT | Performed by: INTERNAL MEDICINE

## 2024-03-15 PROCEDURE — 82805 BLOOD GASES W/O2 SATURATION: CPT | Performed by: INTERNAL MEDICINE

## 2024-03-15 PROCEDURE — 97162 PT EVAL MOD COMPLEX 30 MIN: CPT | Mod: GP

## 2024-03-15 RX ORDER — TALC
3 POWDER (GRAM) TOPICAL ONCE
Status: COMPLETED | OUTPATIENT
Start: 2024-03-15 | End: 2024-03-15

## 2024-03-15 RX ORDER — POTASSIUM CHLORIDE 1.5 G/1.58G
40 POWDER, FOR SOLUTION ORAL ONCE
Status: COMPLETED | OUTPATIENT
Start: 2024-03-15 | End: 2024-03-15

## 2024-03-15 RX ORDER — FUROSEMIDE 10 MG/ML
40 INJECTION INTRAMUSCULAR; INTRAVENOUS ONCE
Status: COMPLETED | OUTPATIENT
Start: 2024-03-15 | End: 2024-03-15

## 2024-03-15 RX ADMIN — CEFEPIME 2 G: 1 INJECTION, SOLUTION INTRAVENOUS at 01:56

## 2024-03-15 RX ADMIN — INSULIN LISPRO 6 UNITS: 100 INJECTION, SOLUTION INTRAVENOUS; SUBCUTANEOUS at 13:36

## 2024-03-15 RX ADMIN — DOCUSATE SODIUM 100 MG: 50 LIQUID ORAL at 20:15

## 2024-03-15 RX ADMIN — POTASSIUM CHLORIDE 40 MEQ: 1.5 POWDER, FOR SOLUTION ORAL at 20:15

## 2024-03-15 RX ADMIN — Medication 10 L/MIN: at 18:55

## 2024-03-15 RX ADMIN — Medication 10 L/MIN: at 23:45

## 2024-03-15 RX ADMIN — Medication 3 MG: at 20:15

## 2024-03-15 RX ADMIN — LEVOTHYROXINE SODIUM 125 MCG: 0.12 TABLET ORAL at 08:07

## 2024-03-15 RX ADMIN — Medication 10 L/MIN: at 21:25

## 2024-03-15 RX ADMIN — ENOXAPARIN SODIUM 40 MG: 40 INJECTION SUBCUTANEOUS at 08:07

## 2024-03-15 RX ADMIN — INSULIN LISPRO 2 UNITS: 100 INJECTION, SOLUTION INTRAVENOUS; SUBCUTANEOUS at 20:27

## 2024-03-15 RX ADMIN — NICOTINE 1 PATCH: 21 PATCH, EXTENDED RELEASE TRANSDERMAL at 08:07

## 2024-03-15 RX ADMIN — FUROSEMIDE 40 MG: 10 INJECTION, SOLUTION INTRAMUSCULAR; INTRAVENOUS at 11:24

## 2024-03-15 RX ADMIN — IPRATROPIUM BROMIDE AND ALBUTEROL SULFATE 3 ML: 2.5; .5 SOLUTION RESPIRATORY (INHALATION) at 11:45

## 2024-03-15 RX ADMIN — ALLOPURINOL 300 MG: 300 TABLET ORAL at 08:05

## 2024-03-15 RX ADMIN — METOPROLOL TARTRATE 25 MG: 25 TABLET, FILM COATED ORAL at 08:05

## 2024-03-15 RX ADMIN — POTASSIUM CHLORIDE 20 MEQ: 1.5 POWDER, FOR SOLUTION ORAL at 08:05

## 2024-03-15 RX ADMIN — CEFEPIME 2 G: 1 INJECTION, SOLUTION INTRAVENOUS at 11:24

## 2024-03-15 RX ADMIN — FOLIC ACID 1 MG: 1 TABLET ORAL at 08:05

## 2024-03-15 RX ADMIN — INSULIN LISPRO 2 UNITS: 100 INJECTION, SOLUTION INTRAVENOUS; SUBCUTANEOUS at 17:44

## 2024-03-15 RX ADMIN — CEFEPIME 2 G: 1 INJECTION, SOLUTION INTRAVENOUS at 17:49

## 2024-03-15 RX ADMIN — EMPAGLIFLOZIN 10 MG: 10 TABLET, FILM COATED ORAL at 08:05

## 2024-03-15 RX ADMIN — THIAMINE HCL TAB 100 MG 100 MG: 100 TAB at 08:07

## 2024-03-15 RX ADMIN — IPRATROPIUM BROMIDE AND ALBUTEROL SULFATE 3 ML: 2.5; .5 SOLUTION RESPIRATORY (INHALATION) at 18:55

## 2024-03-15 RX ADMIN — ENOXAPARIN SODIUM 40 MG: 40 INJECTION SUBCUTANEOUS at 20:15

## 2024-03-15 RX ADMIN — DOCUSATE SODIUM 100 MG: 50 LIQUID ORAL at 08:07

## 2024-03-15 RX ADMIN — Medication 15 ML: at 08:13

## 2024-03-15 RX ADMIN — METOPROLOL TARTRATE 25 MG: 25 TABLET, FILM COATED ORAL at 20:15

## 2024-03-15 RX ADMIN — PREDNISONE 40 MG: 20 TABLET ORAL at 08:05

## 2024-03-15 ASSESSMENT — COGNITIVE AND FUNCTIONAL STATUS - GENERAL
MOVING TO AND FROM BED TO CHAIR: A LITTLE
WALKING IN HOSPITAL ROOM: A LOT
TOILETING: A LOT
TURNING FROM BACK TO SIDE WHILE IN FLAT BAD: A LITTLE
DAILY ACTIVITIY SCORE: 18
DRESSING REGULAR LOWER BODY CLOTHING: A LOT
STANDING UP FROM CHAIR USING ARMS: A LITTLE
MOBILITY SCORE: 16
HELP NEEDED FOR BATHING: A LOT
CLIMB 3 TO 5 STEPS WITH RAILING: A LOT
MOVING FROM LYING ON BACK TO SITTING ON SIDE OF FLAT BED WITH BEDRAILS: A LITTLE

## 2024-03-15 ASSESSMENT — PAIN SCALES - GENERAL
PAINLEVEL_OUTOF10: 0 - NO PAIN

## 2024-03-15 ASSESSMENT — ACTIVITIES OF DAILY LIVING (ADL)
ADL_ASSISTANCE: INDEPENDENT
ADL_ASSISTANCE: INDEPENDENT

## 2024-03-15 ASSESSMENT — LIFESTYLE VARIABLES
TOTAL SCORE: 0
TREMOR: NO TREMOR
VISUAL DISTURBANCES: NOT PRESENT
ORIENTATION AND CLOUDING OF SENSORIUM: ORIENTED AND CAN DO SERIAL ADDITIONS
AGITATION: NORMAL ACTIVITY
HEADACHE, FULLNESS IN HEAD: NOT PRESENT
AUDITORY DISTURBANCES: NOT PRESENT
NAUSEA AND VOMITING: NO NAUSEA AND NO VOMITING
ANXIETY: NO ANXIETY, AT EASE
PAROXYSMAL SWEATS: NO SWEAT VISIBLE

## 2024-03-15 ASSESSMENT — PAIN - FUNCTIONAL ASSESSMENT
PAIN_FUNCTIONAL_ASSESSMENT: 0-10

## 2024-03-15 NOTE — PROGRESS NOTES
Palliative Care Social Work Note     Met with pt.  Pt up in chair, alert and oriented and able to fully participate in visit.  Pt states he is feeling okay.    Pt's plan is to dc home with hired caregivers if necessary.  Pt states he does not have caregivers but will find them.  Discussed potential for SNF.  Pt agreed to PT eval to assist with decision making.  Pt stated multiple times that he does not want to die on a ventilator.  Reviewed code status options in detail.   Pt stated he will remain full code at this time.  Pt is unsure about long term ventilator care if unable to be weaned.  Discussed Advance Directives.  Reviewed that his sister Alia is legally NOK without documents stating otherwise.  Pt stated there are no documents.   Discussed completing LW and/or HCPOA.  Pt agreed that he should consider doing so, agreed to look at the documents.  Advance Directive packet given to pt.  Pt agreed to follow up visits.   Team updated.   Pr's sister Alia updated.

## 2024-03-15 NOTE — PROGRESS NOTES
Occupational Therapy    Evaluation    Patient Name: Alo Glass  MRN: [id number]  Today's Date: 3/15/2024  Time Calculation  Start Time: 1255  Stop Time: 1310  Time Calculation (min): 15 min    Assessment  IP OT Assessment  OT Assessment: Pt presents with SOB, decreased endurance and balance. Pt to benefit from skilled OT services to increase independence with ADL's, transfers, and mobility.  Prognosis: Good  Barriers to Discharge: Decreased caregiver support  Evaluation/Treatment Tolerance: Patient limited by fatigue  Medical Staff Made Aware: Yes  End of Session Communication: Bedside nurse  End of Session Patient Position: Up in chair, Alarm off, not on at start of session (RN aware, no alarm necessary)  Plan:  Treatment Interventions: ADL retraining, Functional transfer training, Endurance training  OT Frequency: 3 times per week  OT Discharge Recommendations: Moderate intensity level of continued care  Equipment Recommended upon Discharge: Wheeled walker  OT Recommended Transfer Status:  (CGA)  OT - OK to Discharge: Yes (per OT POC)    Subjective   Current Problem:  1. Acute on chronic congestive heart failure, unspecified heart failure type (CMS/HCC)  Transthoracic echo (TTE) complete    Transthoracic echo (TTE) complete      2. Prolonged Q-T interval on ECG        3. Dyspnea on exertion        4. Hypoxia        5. Heart failure, acute systolic (CMS/HCC)  Transthoracic echo (TTE) complete    Transthoracic echo (TTE) complete      6. Elevated d-dimer        7. Mucus plugging of bronchi  Bronchoscopy        General:  General  Reason for Referral: Pt is a 67 y/o male who was admitted for SOB  Past Medical History Relevant to Rehab: Benign essential hypertension  COPD  Type 2 diabetes mellitus  Gastroesophageal reflux disease  Gout  Bilateral lower extremity edema  Tobacco use disorder  Hyperlipidemia  Hypothyroidism  Obesity  Thoracic aortic aneurysm  Obstructive sleep apnea  Polycythemia   Lymphedema  Family/Caregiver Present: No  Co-Treatment: PT  Co-Treatment Reason: To maximize pt's outcomes  Prior to Session Communication: Bedside nurse  Patient Position Received: Up in chair, Alarm off, not on at start of session  General Comment: Pt pleasant and agreeable to OT eval. Pt typically not on oxygen at home  Precautions:  Medical Precautions: Fall precautions, Oxygen therapy device and L/min (12 L HF oxygen, telemetry, lipscomb, IV)  Vital Signs:  Heart Rate: 79  SpO2: 93 % (85% with standing activity)  BP: 95/68  Patient Position: Sitting  Pain:  Pain Assessment  Pain Assessment: 0-10  Pain Score: 0 - No pain    Objective   Cognition:  Overall Cognitive Status: Within Functional Limits  Orientation Level: Oriented X4     Home Living:  Type of Home: House  Lives With: Alone  Home Adaptive Equipment: Walker rolling or standard  Home Layout: Two level, Full bath main level, Able to live on main level with bedroom/bathroom  Home Access: Ramped entrance  Bathroom Shower/Tub: Walk-in tub   Prior Function:  Level of [place]: Independent with ADLs and functional transfers, Independent with homemaking with ambulation  ADL Assistance: Independent  Homemaking Assistance: Independent  Ambulatory Assistance: Independent  Prior Function Comments: Pt has a  that comes once a week to help with IADL's     ADL:  ADL Comments: Anticipate min-mod A for LB ADL's  Activity Tolerance:  Endurance: Tolerates less than 10 min exercise with changes in vital signs  Bed Mobility/Transfers: Bed Mobility  Bed Mobility: No (Pt sitting up in the chair)    Transfers  Transfer: Yes  Transfer 1  Transfer From 1: Sit to, Stand to  Transfer to 1: Sit, Stand  Technique 1: Sit to stand, Stand to sit  Transfer Device 1: Walker  Transfer Level of Assistance 1: Contact guard  Trials/Comments 1: retro leaning against chair in standing    Functional Mobility:  Functional Mobility  Functional Mobility Performed: No (Drop in SpO2  with standing)  Sitting Balance:  Static Sitting Balance  Static Sitting-Balance Support: Feet supported, No upper extremity supported  Static Sitting-Level of Assistance: Independent  Standing Balance:  Static Standing Balance  Static Standing-Balance Support: Bilateral upper extremity supported  Static Standing-Level of Assistance:  (CGA-Min A)  Static Standing-Comment/Number of Minutes: pt retro in standing    Sensation:  Light Touch: No apparent deficits  Strength:  Strength Comments: B UE's: 4+/5     Extremities: RUE   RUE : Within Functional Limits and LUE   LUE: Within Functional Limits    Outcome Measures: [facility] Daily Activity  Putting on and taking off regular lower body clothing: A lot  Bathing (including washing, rinsing, drying): A lot  Putting on and taking off regular upper body clothing: None  Toileting, which includes using toilet, bedpan or urinal: A lot  Taking care of personal grooming such as brushing teeth: None  Eating Meals: None  Daily Activity - Total Score: 18      Education Documentation  Body Mechanics, taught by Renate Jenkins, OT at 3/15/2024  2:05 PM.  Learner: Patient  Readiness: Acceptance  Method: Explanation  Response: Verbalizes Understanding, Demonstrated Understanding    Education Comments  No comments found.      Goals:   Encounter Problems       Encounter Problems (Active)       OT Goals       Pt will demonstrate 1-2 energy conservation techniques during ADL tasks and functional mobility        Start:  03/15/24    Expected End:  03/29/24            Pt will increase endurance to tolerate 15min of activity with no more than 1 rest break in order to increase ability to engage in ADL completion.        Start:  03/15/24    Expected End:  03/29/24            Pt to demonstrate transfers with FWW at [facility]        Start:  03/15/24    Expected End:  03/29/24            Pt will demo increased functional mobility to tolerate tasks necessary to complete ADL routine with FWW at [facility]  while keeping SpO2 above 88%       Start:  03/15/24    Expected End:  03/29/24            Pt will demo GOOD static/dynamic standing balance during ADL and functional activity with FWW >5 minutes for improved independence and participation in ADL        Start:  03/15/24    Expected End:  03/29/24            Pt to demonstrate LB dressing, bathing, and toileting with AE as needed at mod I        Start:  03/15/24    Expected End:  03/29/24

## 2024-03-15 NOTE — NURSING NOTE
Skin rounds completed.  Skin to sacral/buttock area is clean, dry and intact.  Preventative mepilex applied to sacral area.  Bilateral heels are intact, boggy.  Waffle boots applied.

## 2024-03-15 NOTE — PROGRESS NOTES
03/15/24 0925   Discharge Planning   Living Arrangements Alone   Support Systems Friends/neighbors   Assistance Needed Patient extubated yesterday. At baseline patient is A&OX3, independent with ADLs with no assistive devices, drives, is on room air baseline. Patient has CPAP at home.   Type of Residence Private residence   Who is requesting discharge planning? Provider   Home or Post Acute Services Post acute facilities (Rehab/SNF/etc)   Type of Post Acute Facility Services Rehab;Skilled nursing   Patient expects to be discharged to: Palliative care to meet with patient today. Patient may need PT/OT evaluations to determine next site of care- TBD by palliative care meeting.   Does the patient need discharge transport arranged? Yes   RoundTrip coordination needed? Yes   Has discharge transport been arranged? No

## 2024-03-15 NOTE — SIGNIFICANT EVENT
Patient had 13 beat runs of V. tach.  No new symptoms.  No significant electrolyte imbalance.  Will continue supportive care.

## 2024-03-15 NOTE — CARE PLAN
The patient's goals for the shift include  get up to  chair    The clinical goals for the shift include Patient will have weight loss by tomorrow morning  Pt sat in chair for the afternoon. Patient is losing weight compared to admit weight.   Problem: Heart Failure  Goal: Improved gas exchange this shift  Outcome: Progressing  Goal: Improved urinary output this shift  Outcome: Progressing  Goal: Reduction in peripheral edema within 24 hours  Outcome: Progressing  Goal: Report improvement of dyspnea/breathlessness this shift  Outcome: Progressing  Goal: Weight from fluid excess reduced over 2-3 days, then stabilize  Outcome: Progressing  Goal: Increase self care and/or family involvement in 24 hours  Outcome: Progressing

## 2024-03-15 NOTE — PROGRESS NOTES
Subjective Data:  Patient reports slowly feeling better. Respiratory status is improving. Denies any chest pain, chest pressure, palpitations, dizziness, orthopnea, edema.      Overnight Events:   Episodes of non-sustained VT overnight.      Objective Data:  Last Recorded Vitals:  Vitals:    03/15/24 0349 03/15/24 0400 03/15/24 0500 03/15/24 0600   BP:  88/59 88/68 96/67   Pulse:  78 76 77   Resp:  25 20 21   Temp:  36.7 °C (98.1 °F)     TempSrc:       SpO2: 92% 94% 93% 90%   Weight:    144 kg (317 lb 0.3 oz)   Height:           Last Labs:  CBC - 3/15/2024:  5:15 AM  9.4 17.0 185    55.3      CMP - 3/15/2024:  5:15 AM  9.1 6.9 18 --- 1.6   4.2 3.3 22 73      PTT - No results in last year.  _   _ _     TROPHS   Date/Time Value Ref Range Status   03/12/2024 07:07  0 - 20 ng/L Final     Comment:     Previous result verified on 3/12/2024 0048 on specimen/case [id number] called with component TRPHS for procedure Troponin I, High Sensitivity with value 89 ng/L.   03/12/2024 03:08  0 - 20 ng/L Final     Comment:     Previous result verified on 3/12/2024 0048 on specimen/case [id number] called with component TRPHS for procedure Troponin I, High Sensitivity with value 89 ng/L.   03/12/2024 12:15 AM 89 0 - 20 ng/L Final     BNP   Date/Time Value Ref Range Status   03/15/2024 05:15  0 - 99 pg/mL Final   03/10/2024 11:25  0 - 99 pg/mL Final     HGBA1C   Date/Time Value Ref Range Status   02/20/2024 10:35 AM 5.8 see below % Final   01/19/2023 10:23 AM 7.3 % Final     Comment:          Diagnosis of Diabetes-Adults   Non-Diabetic: < or = 5.6%   Increased risk for developing diabetes: 5.7-6.4%   Diagnostic of diabetes: > or = 6.5%  .       Monitoring of Diabetes                Age (y)     Therapeutic Goal (%)   Adults:          >18           <7.0   Pediatrics:    13-18           <7.5                   7-12           <8.0                   0- 6            7.5-8.5   American Diabetes Association.  Diabetes Care 33(S1), Jan 2010.     09/10/2021 01:29 PM 7.9 % Final     Comment:          Diagnosis of Diabetes-Adults   Non-Diabetic: < or = 5.6%   Increased risk for developing diabetes: 5.7-6.4%   Diagnostic of diabetes: > or = 6.5%  .       Monitoring of Diabetes                Age (y)     Therapeutic Goal (%)   Adults:          >18           <7.0   Pediatrics:    13-18           <7.5                   7-12           <8.0                   0- 6            7.5-8.5   American Diabetes Association. Diabetes Care 33(S1), Jan 2010.       LDLCALC   Date/Time Value Ref Range Status   02/20/2024 10:35  <=99 mg/dL Final     Comment:                                 Near   Borderline      AGE      Desirable  Optimal    High     High     Very High     0-19 Y     0 - 109     ---    110-129   >/= 130     ----    20-24 Y     0 - 119     ---    120-159   >/= 160     ----      >24 Y     0 -  99   100-129  130-159   160-189     >/=190       VLDL   Date/Time Value Ref Range Status   02/20/2024 10:35 AM 16 0 - 40 mg/dL Final   09/10/2021 01:29 PM 27 0 - 40 mg/dL Final   09/18/2018 02:26 PM 22 0 - 40 mg/dL Final      Last I/O:  I/O last 3 completed shifts:  In: 1994.1 (13.9 mL/kg) [P.O.:720; I.V.:424.1 (2.9 mL/kg); NG/GT:550; IV Piggyback:300]  Out: 3815 (26.5 mL/kg) [Urine:3815 (0.7 mL/kg/hr)]  Weight: 143.8 kg     Past Cardiology Tests (Last 3 Years):  EKG:  Electrocardiogram, 12-lead PRN ACS symptoms 03/12/2024  ECG 12 lead 03/12/2024  ECG 12 lead 03/12/2024  Electrocardiogram, 12-lead PRN ACS symtpoms 03/11/2024  ECG 12 Lead 03/10/2024  ECG 12 lead     Echo:  Transthoracic echo (TTE) complete 03/11/2024  CONCLUSIONS:   1. Left ventricular systolic function is moderately decreased with a 30-35% estimated ejection fraction.   2. Basal inferolateral segment is abnormal.   3. Poorly visualized anatomical structures due to suboptimal image quality.   4. Moderate aortic valve stenosis.   5. There is global hypokinesis of the  left ventricle with minor regional variations.      TTE (06/18/2019):   -Low normal LV systolic function  -Mild concentric LVH  -Doppler flow suggestive of abnormal Left ventricular relaxation  -Normal right ventricular size and function  -Slightly enlarged left atrium.   -Moderate calcific aortic stenosis  -Trace mitral regurg      Ejection Fractions:  EF   Date/Time Value Ref Range Status   03/11/2024 02:04 PM 33 %      Cath:  No results found for this or any previous visit from the past 1095 days.    Stress Test:  No results found for this or any previous visit from the past 1095 days.    Imaging:  CXR (03/08/2024):   IMPRESSION:  -Moderate right pleural effusion and right lung base consolidation. Findings could be due to pneumonia. Other underlying process not excluded. Follow-up recommended.   -Trace left pleural effusion and cardiomegaly. Possible interstitial edema.     Xray Abd (03/12/2024):   IMPRESSION:  1.  Enteric tube approximately 3.8 cm above the kathie.  2. Evaluation of the enteric tube is limited and appears looped high in the right upper quadrant however may be related to patient positioning and rotation. Repeat imaging may be considered with proper patient positioning to delineate proper position.  3. Left lower lobe airspace opacities concerning for pneumonia. Possible large right pleural effusion.  4. Paucity of bowel gas noted.     CXR (03/12/2024):   IMPRESSION:  1.  Enteric tube approximately 3.8 cm above the kathie.  2. Evaluation of the enteric tube is limited and appears looped high in the right upper quadrant however may be related to patient positioning and rotation. Repeat imaging may be considered with proper patient positioning to delineate proper position.  3. Left lower lobe airspace opacities concerning for pneumonia. Possible large right pleural effusion.  4. Paucity of bowel gas noted.      Inpatient Medications:  Scheduled medications   Medication Dose Route Frequency     allopurinol  300 mg oral Daily    cefepime  2 g intravenous q8h    docusate sodium  100 mg oral BID    empagliflozin  10 mg oral Daily    enoxaparin  40 mg subcutaneous q12h SHARRON    tiotropium  2 puff inhalation Daily    And    fluticasone furoate-vilanteroL  1 puff inhalation Daily    folic acid  1 mg oral Daily    insulin lispro  0-10 Units subcutaneous 4x daily    ipratropium-albuteroL  3 mL nebulization TID    levothyroxine  125 mcg oral Daily    metoprolol tartrate  25 mg oral BID    multivitamin with iron-minerals  15 mL oral Daily    nicotine  1 patch transdermal Daily    perflutren lipid microspheres  1 mL of dilution intravenous Once in imaging    polyethylene glycol  17 g oral Daily    potassium chloride  20 mEq oral Daily    predniSONE  40 mg oral Daily    thiamine  100 mg oral Daily    [Held by provider] valsartan  320 mg oral Daily     PRN medications   Medication    acetaminophen    Or    acetaminophen    Or    acetaminophen    bisacodyl    dextrose 10 % in water (D10W)    dextrose    diazePAM    glucagon    guaiFENesin    ipratropium-albuteroL    oxygen    oxygen     Continuous Medications   Medication Dose Last Rate    oxygen           Physical Exam:  Physical Exam  Constitutional:       Appearance: Normal appearance.   HENT:      Head: Normocephalic.      Nose: Nose normal.      Mouth/Throat:      Mouth: Mucous membranes are moist.   Eyes:      Conjunctiva/sclera: Conjunctivae normal.   Cardiovascular:      Rate and Rhythm: Normal rate and regular rhythm.      Heart sounds: No murmur heard.  Pulmonary:      Breath sounds: Wheezing present.   Abdominal:      Palpations: Abdomen is soft.   Musculoskeletal:      Right lower leg: Edema present.      Left lower leg: Edema present.   Neurological:      General: No focal deficit present.      Mental Status: He is alert. Mental status is at baseline.   Psychiatric:         Mood and Affect: Mood normal.         Behavior: Behavior normal.             Assessment/Plan   67 yo male from home with h/o obesity, MELISSA on CPAP, COPD, current smoker, type 2 DM, hypertension, lymphedema, moderate aortic stenosis (6/2019) who presented from PCP office with symptoms of increased LE edema, abdominal bloating, progressive SOB over the past year found to be hypoxic in the ER. On admission was Intermittently refusing IV Lasix, I&O not well monitored. Discussed urinating only into urinals, recording how much urine vs how frequent he urinates. Refused return to lasix gtt, lipscomb or external catheter. Originally refusing echo d/t concern for needing to urinate during the testing. Discussed accommodations and ability to use the bathroom in echo if necessary. Patient was then agreeable to echocardiogram. Echo obtained with newly reduced ejection fraction. Decompensated overnight hypoxic, agitated, required Ativan, Precedex, BiPAP and eventually intubation. Patient weaned off of IV pressor support (03/13). Able to be extubated successfully yesterday to HFNC, diuresing well.      #Acute hypoxic respiratory failure 2/2 decompensated heart failure, Pneumonia, COPD w/ current smoking, mucous plugging s/p bronchoscopy   #Acute decompensated systolic heart failure (new diagnosis)  #Non-sustained Ventricular tachycardia   #Septic shock, resolved   #Moderate aortic stenosis   #Chronic lymphedema BLE   #NSR with PACs on telemetry  (no afib noted)      -Would recommend continuation of IV diuresis  -Resume BB   -Holding Spironolactone, ARB given acute decompensation, resume as able.   -Echo reviewed with newly reduced LVSF, EF 30-35%, abnormal basal inferolateral segment, global hypokinesis, mod AS. Discussed with patient.   -Strict I/O, monitor renal function and electrolytes; Daily weight (Last 24h -1490)  - >171> 733  -Will plan for LHC/RHC for new cardiomyopathy in the future, no plan for today.   -Will follow peripherally over the weekend, see again on Monday.     Peripheral IV  03/12/24 18 G Left;Ventral Forearm (Active)   Site Assessment Clean;Dry;Intact 03/14/24 2100   Dressing Status Clean;Dry;Occlusive 03/14/24 2100   Number of days: 3       Peripheral IV 03/13/24 18 G Proximal;Right Forearm (Active)   Site Assessment Clean;Dry;Intact 03/14/24 2100   Dressing Status Clean;Dry;Occlusive 03/14/24 2100   Number of days: 2       Peripheral IV 03/13/24 20 G Right Wrist (Active)   Site Assessment Clean;Dry;Intact 03/14/24 2100   Dressing Status Clean;Dry;Occlusive 03/14/24 2100   Number of days: 2       Urethral Catheter (Active)   Output (mL) 325 mL 03/15/24 0600   Number of days: 3       Code Status:  Full Code    I spent 50 minutes in the professional and overall care of this patient.        Stephanie Dyer PA-C

## 2024-03-15 NOTE — PROGRESS NOTES
Physical Therapy    Physical Therapy Evaluation    Patient Name: Alo Glass  MRN: [id number]  Today's Date: 3/15/2024   Time Calculation  Start Time: 1254  Stop Time: 1310  Time Calculation (min): 16 min    Assessment/Plan   PT Assessment  PT Assessment Results: Decreased strength, Decreased endurance, Impaired balance, Decreased mobility  Rehab Prognosis: Good  Barriers to Discharge: decreased endurance, O2 demands  Evaluation/Treatment Tolerance:  (Decreased endurance)  Medical Staff Made Aware: Yes  Strengths: Ability to acquire knowledge, Housing layout, Support of Caregivers  Barriers to Participation: Comorbidities  End of Session Communication: Bedside nurse  Assessment Comment: Patient eager to improve however easily desats on 12L O2. Patient would benefit from mod intensity PT intervention.  End of Session Patient Position: Up in chair, Alarm off, not on at start of session (RN aware, no alarm necessary)  IP OR SWING BED PT PLAN  Inpatient or Swing Bed: Inpatient  PT Plan  Treatment/Interventions: Bed mobility, Transfer training, Gait training, Stair training, Balance training, Strengthening, Endurance training  PT Plan: Skilled PT  PT Frequency: 3 times per week  PT Discharge Recommendations: Moderate intensity level of continued care  Equipment Recommended upon Discharge: Wheeled walker  PT Recommended Transfer Status: Assist x2  PT - OK to Discharge: Yes (per PT POC)      Subjective   General Visit Information:  General  Reason for Referral: Impaired functional mobility; heart failure, pneumonia, required intubated, extubated 3/14  Referred By: Lou Madrigal  Past Medical History Relevant to Rehab: Benign essential hypertension  COPD  Type 2 diabetes mellitus  Gastroesophageal reflux disease  Gout  Bilateral lower extremity edema  Tobacco use disorder  Hyperlipidemia  Hypothyroidism  Obesity  Thoracic aortic aneurysm  Obstructive sleep apnea  Polycythemia  Lymphedema  Family/Caregiver Present:  No  Co-Treatment: OT  Co-Treatment Reason: Optimize functional mobility and safety  Prior to Session Communication: Bedside nurse  Patient Position Received: Up in chair, Alarm off, not on at start of session  General Comment: Patient pleasant, cooperative and agreeable to therapy evals  Home Living:  Home Living  Type of Home: House  Lives With: Alone  Home Adaptive Equipment: Walker rolling or standard (delta walker)  Home Layout: Two level, Able to live on main level with bedroom/bathroom  Home Access: Ramped entrance  Bathroom Shower/Tub: Walk-in tub  Prior Level of Function:  Prior Function Per Pt/Caregiver Report  Level of [place]: Independent with ADLs and functional transfers, Independent with homemaking with ambulation  ADL Assistance: Independent  Homemaking Assistance: Independent  Ambulatory Assistance: Independent (delta walker)  Precautions:  Precautions  Medical Precautions: Fall precautions (12 L O2 via NC, tele, lipscomb)  Vital Signs:  Vital Signs  Heart Rate: 79  SpO2: 93 % (with standing activity SpO2 95%)  BP: 95/68  Patient Position: Sitting    Objective   Pain:  Pain Assessment  Pain Assessment: 0-10  Pain Score: 0 - No pain  Cognition:  Cognition  Overall Cognitive Status: Within Functional Limits  Orientation Level: Oriented X4    General Assessments:    Activity Tolerance  Endurance: Tolerates less than 10 min exercise with changes in vital signs    Sensation  Light Touch: No apparent deficits    Strength  Strength Comments: B LE 4+/5    Coordination  Movements are Fluid and Coordinated: Yes    Postural Control  Postural Control: Within Functional Limits    Static Sitting Balance  Static Sitting-Balance Support: No upper extremity supported, Feet supported  Static Sitting-Level of Assistance: Independent    Static Standing Balance  Static Standing-Balance Support: Bilateral upper extremity supported  Static Standing-Level of Assistance: Contact guard  Functional Assessments:       Bed  Mobility  Bed Mobility: No    Transfers  Transfer: Yes  Transfer 1  Transfer From 1: Sit to, Stand to  Transfer to 1: Sit, Stand  Technique 1: Sit to stand, Stand to sit  Transfer Device 1: Walker  Transfer Level of Assistance 1: Contact guard (patient leaning against chair for support)    Ambulation/Gait Training  Ambulation/Gait Training Performed: Yes (marching in place with 2WW CGA B x 5, desat to 85%)    Outcome Measures:  [facility] Basic Mobility  Turning from your back to your side while in a flat bed without using bedrails: A little  Moving from lying on your back to sitting on the side of a flat bed without using bedrails: A little  Moving to and from bed to chair (including a wheelchair): A little  Standing up from a chair using your arms (e.g. wheelchair or bedside chair): A little  To walk in hospital room: A lot  Climbing 3-5 steps with railing: A lot  Basic Mobility - Total Score: 16    Encounter Problems       Encounter Problems (Active)       Balance       STG - Maintains dynamic standing balance without upper extremity support x 5' with dual task  (Progressing)       Start:  03/15/24    Expected End:  03/29/24               Mobility       STG - Patient will ambulate with 2WW 50' SBA  (Progressing)       Start:  03/15/24    Expected End:  03/29/24               PT Transfers       STG - Patient will perform bed mobility independently  (Progressing)       Start:  03/15/24    Expected End:  03/29/24            STG - Patient will transfer sit to and from stand mod I  (Progressing)       Start:  03/15/24    Expected End:  03/29/24               Pain - Adult              Education Documentation  Precautions, taught by Vanita Pichardo, PT at 3/15/2024  1:42 PM.  Learner: Patient  Readiness: Acceptance  Method: Explanation  Response: Verbalizes Understanding    Body Mechanics, taught by Vanita Pichardo, PT at 3/15/2024  1:42 PM.  Learner: Patient  Readiness: Acceptance  Method: Explanation  Response: Verbalizes  Understanding    Mobility Training, taught by Vanita Pichardo, PT at 3/15/2024  1:42 PM.  Learner: Patient  Readiness: Acceptance  Method: Explanation  Response: Verbalizes Understanding    Education Comments  No comments found.

## 2024-03-16 LAB
ALBUMIN SERPL BCP-MCNC: 3.3 G/DL (ref 3.4–5)
ANION GAP SERPL CALC-SCNC: 8 MMOL/L (ref 10–20)
BACTERIA BLD CULT: NORMAL
BACTERIA BLD CULT: NORMAL
BACTERIA SPEC RESP CULT: NORMAL
BUN SERPL-MCNC: 41 MG/DL (ref 6–23)
CALCIUM SERPL-MCNC: 8.8 MG/DL (ref 8.6–10.3)
CHLORIDE SERPL-SCNC: 102 MMOL/L (ref 98–107)
CO2 SERPL-SCNC: 29 MMOL/L (ref 21–32)
CREAT SERPL-MCNC: 0.7 MG/DL (ref 0.5–1.3)
EGFRCR SERPLBLD CKD-EPI 2021: >90 ML/MIN/1.73M*2
ERYTHROCYTE [DISTWIDTH] IN BLOOD BY AUTOMATED COUNT: 13.2 % (ref 11.5–14.5)
GLUCOSE BLD MANUAL STRIP-MCNC: 140 MG/DL (ref 74–99)
GLUCOSE BLD MANUAL STRIP-MCNC: 197 MG/DL (ref 74–99)
GLUCOSE SERPL-MCNC: 130 MG/DL (ref 74–99)
GRAM STN SPEC: NORMAL
GRAM STN SPEC: NORMAL
HCT VFR BLD AUTO: 54.6 % (ref 41–52)
HGB BLD-MCNC: 17.1 G/DL (ref 13.5–17.5)
MAGNESIUM SERPL-MCNC: 2.58 MG/DL (ref 1.6–2.4)
MCH RBC QN AUTO: 33.3 PG (ref 26–34)
MCHC RBC AUTO-ENTMCNC: 31.3 G/DL (ref 32–36)
MCV RBC AUTO: 106 FL (ref 80–100)
NRBC BLD-RTO: 0 /100 WBCS (ref 0–0)
PHOSPHATE SERPL-MCNC: 3.2 MG/DL (ref 2.5–4.9)
PLATELET # BLD AUTO: 198 X10*3/UL (ref 150–450)
POTASSIUM SERPL-SCNC: 4.1 MMOL/L (ref 3.5–5.3)
RBC # BLD AUTO: 5.13 X10*6/UL (ref 4.5–5.9)
SODIUM SERPL-SCNC: 135 MMOL/L (ref 136–145)
WBC # BLD AUTO: 7.6 X10*3/UL (ref 4.4–11.3)

## 2024-03-16 PROCEDURE — 80069 RENAL FUNCTION PANEL: CPT | Performed by: INTERNAL MEDICINE

## 2024-03-16 PROCEDURE — 2500000001 HC RX 250 WO HCPCS SELF ADMINISTERED DRUGS (ALT 637 FOR MEDICARE OP): Performed by: INTERNAL MEDICINE

## 2024-03-16 PROCEDURE — 83735 ASSAY OF MAGNESIUM: CPT | Performed by: INTERNAL MEDICINE

## 2024-03-16 PROCEDURE — 2500000005 HC RX 250 GENERAL PHARMACY W/O HCPCS: Performed by: INTERNAL MEDICINE

## 2024-03-16 PROCEDURE — 99233 SBSQ HOSP IP/OBS HIGH 50: CPT | Performed by: INTERNAL MEDICINE

## 2024-03-16 PROCEDURE — 2500000002 HC RX 250 W HCPCS SELF ADMINISTERED DRUGS (ALT 637 FOR MEDICARE OP, ALT 636 FOR OP/ED): Performed by: NURSE PRACTITIONER

## 2024-03-16 PROCEDURE — S4991 NICOTINE PATCH NONLEGEND: HCPCS | Performed by: NURSE PRACTITIONER

## 2024-03-16 PROCEDURE — 36415 COLL VENOUS BLD VENIPUNCTURE: CPT | Performed by: INTERNAL MEDICINE

## 2024-03-16 PROCEDURE — 2500000001 HC RX 250 WO HCPCS SELF ADMINISTERED DRUGS (ALT 637 FOR MEDICARE OP): Performed by: NURSE PRACTITIONER

## 2024-03-16 PROCEDURE — 2500000004 HC RX 250 GENERAL PHARMACY W/ HCPCS (ALT 636 FOR OP/ED)

## 2024-03-16 PROCEDURE — 82947 ASSAY GLUCOSE BLOOD QUANT: CPT

## 2024-03-16 PROCEDURE — 94640 AIRWAY INHALATION TREATMENT: CPT

## 2024-03-16 PROCEDURE — 2500000002 HC RX 250 W HCPCS SELF ADMINISTERED DRUGS (ALT 637 FOR MEDICARE OP, ALT 636 FOR OP/ED): Performed by: INTERNAL MEDICINE

## 2024-03-16 PROCEDURE — 2500000004 HC RX 250 GENERAL PHARMACY W/ HCPCS (ALT 636 FOR OP/ED): Performed by: INTERNAL MEDICINE

## 2024-03-16 PROCEDURE — 94660 CPAP INITIATION&MGMT: CPT

## 2024-03-16 PROCEDURE — 85027 COMPLETE CBC AUTOMATED: CPT | Performed by: INTERNAL MEDICINE

## 2024-03-16 PROCEDURE — 99232 SBSQ HOSP IP/OBS MODERATE 35: CPT | Performed by: NURSE PRACTITIONER

## 2024-03-16 PROCEDURE — 2500000004 HC RX 250 GENERAL PHARMACY W/ HCPCS (ALT 636 FOR OP/ED): Performed by: NURSE PRACTITIONER

## 2024-03-16 PROCEDURE — 1200000002 HC GENERAL ROOM WITH TELEMETRY DAILY

## 2024-03-16 RX ORDER — CEFTRIAXONE 2 G/50ML
2 INJECTION, SOLUTION INTRAVENOUS EVERY 24 HOURS
Status: DISCONTINUED | OUTPATIENT
Start: 2024-03-16 | End: 2024-03-16

## 2024-03-16 RX ORDER — ASPIRIN 81 MG/1
81 TABLET ORAL DAILY
Status: DISCONTINUED | OUTPATIENT
Start: 2024-03-16 | End: 2024-03-17

## 2024-03-16 RX ORDER — FUROSEMIDE 10 MG/ML
40 INJECTION INTRAMUSCULAR; INTRAVENOUS DAILY
Status: DISCONTINUED | OUTPATIENT
Start: 2024-03-16 | End: 2024-03-16

## 2024-03-16 RX ORDER — CEFTRIAXONE 1 G/50ML
1 INJECTION, SOLUTION INTRAVENOUS EVERY 24 HOURS
Status: COMPLETED | OUTPATIENT
Start: 2024-03-17 | End: 2024-03-18

## 2024-03-16 RX ORDER — FUROSEMIDE 10 MG/ML
40 INJECTION INTRAMUSCULAR; INTRAVENOUS EVERY 24 HOURS
Status: DISCONTINUED | OUTPATIENT
Start: 2024-03-16 | End: 2024-03-18

## 2024-03-16 RX ORDER — PANTOPRAZOLE SODIUM 40 MG/1
40 TABLET, DELAYED RELEASE ORAL
Status: DISCONTINUED | OUTPATIENT
Start: 2024-03-16 | End: 2024-03-17

## 2024-03-16 RX ORDER — TALC
6 POWDER (GRAM) TOPICAL NIGHTLY
Status: DISCONTINUED | OUTPATIENT
Start: 2024-03-16 | End: 2024-03-17

## 2024-03-16 RX ORDER — SENNOSIDES 8.6 MG/1
2 TABLET ORAL NIGHTLY
Status: DISCONTINUED | OUTPATIENT
Start: 2024-03-16 | End: 2024-03-17

## 2024-03-16 RX ADMIN — Medication 10 L/MIN: at 19:56

## 2024-03-16 RX ADMIN — ACETAMINOPHEN 650 MG: 325 TABLET ORAL at 20:08

## 2024-03-16 RX ADMIN — INSULIN LISPRO 2 UNITS: 100 INJECTION, SOLUTION INTRAVENOUS; SUBCUTANEOUS at 20:09

## 2024-03-16 RX ADMIN — IPRATROPIUM BROMIDE AND ALBUTEROL SULFATE 3 ML: 2.5; .5 SOLUTION RESPIRATORY (INHALATION) at 14:56

## 2024-03-16 RX ADMIN — FLUTICASONE FUROATE AND VILANTEROL TRIFENATATE 1 PUFF: 100; 25 POWDER RESPIRATORY (INHALATION) at 08:09

## 2024-03-16 RX ADMIN — DOCUSATE SODIUM 100 MG: 50 LIQUID ORAL at 20:08

## 2024-03-16 RX ADMIN — Medication 15 ML: at 08:10

## 2024-03-16 RX ADMIN — ALLOPURINOL 300 MG: 300 TABLET ORAL at 08:10

## 2024-03-16 RX ADMIN — FOLIC ACID 1 MG: 1 TABLET ORAL at 08:09

## 2024-03-16 RX ADMIN — CEFTRIAXONE SODIUM 2 G: 2 INJECTION, SOLUTION INTRAVENOUS at 11:33

## 2024-03-16 RX ADMIN — PREDNISONE 40 MG: 20 TABLET ORAL at 08:09

## 2024-03-16 RX ADMIN — CEFEPIME 2 G: 1 INJECTION, SOLUTION INTRAVENOUS at 02:27

## 2024-03-16 RX ADMIN — ENOXAPARIN SODIUM 40 MG: 40 INJECTION SUBCUTANEOUS at 20:08

## 2024-03-16 RX ADMIN — SENNOSIDES 17.2 MG: 8.6 TABLET, FILM COATED ORAL at 20:09

## 2024-03-16 RX ADMIN — Medication 6 MG: at 20:09

## 2024-03-16 RX ADMIN — DOCUSATE SODIUM 100 MG: 50 LIQUID ORAL at 08:09

## 2024-03-16 RX ADMIN — PANTOPRAZOLE SODIUM 40 MG: 40 TABLET, DELAYED RELEASE ORAL at 11:33

## 2024-03-16 RX ADMIN — EMPAGLIFLOZIN 10 MG: 10 TABLET, FILM COATED ORAL at 08:10

## 2024-03-16 RX ADMIN — NICOTINE 1 PATCH: 21 PATCH, EXTENDED RELEASE TRANSDERMAL at 08:10

## 2024-03-16 RX ADMIN — METOPROLOL TARTRATE 25 MG: 25 TABLET, FILM COATED ORAL at 08:09

## 2024-03-16 RX ADMIN — ASPIRIN 81 MG: 81 TABLET, COATED ORAL at 11:33

## 2024-03-16 RX ADMIN — ENOXAPARIN SODIUM 40 MG: 40 INJECTION SUBCUTANEOUS at 08:09

## 2024-03-16 RX ADMIN — TIOTROPIUM BROMIDE INHALATION SPRAY 2 PUFF: 3.12 SPRAY, METERED RESPIRATORY (INHALATION) at 08:09

## 2024-03-16 RX ADMIN — POLYETHYLENE GLYCOL 3350 17 G: 17 POWDER, FOR SOLUTION ORAL at 08:10

## 2024-03-16 RX ADMIN — LEVOTHYROXINE SODIUM 125 MCG: 0.12 TABLET ORAL at 05:02

## 2024-03-16 RX ADMIN — POTASSIUM CHLORIDE 20 MEQ: 1.5 POWDER, FOR SOLUTION ORAL at 08:09

## 2024-03-16 RX ADMIN — THIAMINE HCL TAB 100 MG 100 MG: 100 TAB at 08:09

## 2024-03-16 RX ADMIN — METOPROLOL TARTRATE 25 MG: 25 TABLET, FILM COATED ORAL at 20:08

## 2024-03-16 RX ADMIN — FUROSEMIDE 40 MG: 10 INJECTION, SOLUTION INTRAVENOUS at 11:33

## 2024-03-16 ASSESSMENT — PAIN SCALES - GENERAL
PAINLEVEL_OUTOF10: 4
PAINLEVEL_OUTOF10: 0 - NO PAIN
PAINLEVEL_OUTOF10: 0 - NO PAIN

## 2024-03-16 ASSESSMENT — PAIN DESCRIPTION - LOCATION: LOCATION: BACK

## 2024-03-16 ASSESSMENT — PAIN - FUNCTIONAL ASSESSMENT
PAIN_FUNCTIONAL_ASSESSMENT: 0-10
PAIN_FUNCTIONAL_ASSESSMENT: 0-10

## 2024-03-16 NOTE — PROGRESS NOTES
"Alo Glass is a 68 y.o. male on day 8 of admission presenting with Heart failure exacerbated by sotalol (CMS/HCC).    Subjective   Feels better today       Objective     Physical Exam  Vitals reviewed.   Constitutional:       Appearance: He is ill-appearing.   HENT:      Head: Normocephalic and atraumatic.   Eyes:      Extraocular Movements: Extraocular movements intact.   Cardiovascular:      Rate and Rhythm: Normal rate and regular rhythm.      Heart sounds: Normal heart sounds.   Pulmonary:      Effort: Pulmonary effort is normal.      Comments: Coarse breath sounds bilaterally   Abdominal:      Palpations: Abdomen is soft.      Tenderness: There is no abdominal tenderness.   Musculoskeletal:      Cervical back: Normal range of motion.   Skin:     General: Skin is warm.   Neurological:      General: No focal deficit present.      Mental Status: He is alert and oriented to person, place, and time. Mental status is at baseline.   Psychiatric:         Mood and Affect: Mood normal.         Behavior: Behavior normal.         Last Recorded Vitals  Blood pressure 107/69, pulse 79, temperature 36.6 °C (97.9 °F), temperature source Temporal, resp. rate (!) 27, height 1.803 m (5' 10.98\"), weight 142 kg (313 lb 15 oz), SpO2 95 %.  Intake/Output last 3 Shifts:  I/O last 3 completed shifts:  In: 1380 (9.6 mL/kg) [P.O.:980; IV Piggyback:400]  Out: 4050 (28.2 mL/kg) [Urine:4050 (0.8 mL/kg/hr)]  Weight: 143.8 kg     Relevant Results  Scheduled medications  allopurinol, 300 mg, oral, Daily  aspirin, 81 mg, oral, Daily  [START ON 3/17/2024] cefTRIAXone, 1 g, intravenous, q24h  docusate sodium, 100 mg, oral, BID  empagliflozin, 10 mg, oral, Daily  enoxaparin, 40 mg, subcutaneous, q12h SHARRON  tiotropium, 2 puff, inhalation, Daily   And  fluticasone furoate-vilanteroL, 1 puff, inhalation, Daily  folic acid, 1 mg, oral, Daily  furosemide, 40 mg, intravenous, q24h  insulin lispro, 0-10 Units, subcutaneous, 4x " daily  ipratropium-albuteroL, 3 mL, nebulization, TID  levothyroxine, 125 mcg, oral, Daily  melatonin, 6 mg, oral, Nightly  metoprolol tartrate, 25 mg, oral, BID  multivitamin with iron-minerals, 15 mL, oral, Daily  nicotine, 1 patch, transdermal, Daily  pantoprazole, 40 mg, oral, Daily before breakfast  perflutren lipid microspheres, 1 mL of dilution, intravenous, Once in imaging  polyethylene glycol, 17 g, oral, Daily  potassium chloride, 20 mEq, oral, Daily  predniSONE, 40 mg, oral, Daily  sennosides, 2 tablet, oral, Nightly  thiamine, 100 mg, oral, Daily  [Held by provider] valsartan, 320 mg, oral, Daily      Continuous medications  oxygen, , Last Rate: 10 L/min (03/15/24 2125)      PRN medications  PRN medications: acetaminophen **OR** acetaminophen **OR** acetaminophen, bisacodyl, dextrose 10 % in water (D10W), dextrose, diazePAM, glucagon, guaiFENesin, ipratropium-albuteroL, oxygen, oxygen    Results for orders placed or performed during the hospital encounter of 03/08/24 (from the past 24 hour(s))   POCT GLUCOSE   Result Value Ref Range    POCT Glucose 157 (H) 74 - 99 mg/dL   POCT GLUCOSE   Result Value Ref Range    POCT Glucose 171 (H) 74 - 99 mg/dL   CBC   Result Value Ref Range    WBC 7.6 4.4 - 11.3 x10*3/uL    nRBC 0.0 0.0 - 0.0 /100 WBCs    RBC 5.13 4.50 - 5.90 x10*6/uL    Hemoglobin 17.1 13.5 - 17.5 g/dL    Hematocrit 54.6 (H) 41.0 - 52.0 %     (H) 80 - 100 fL    MCH 33.3 26.0 - 34.0 pg    MCHC 31.3 (L) 32.0 - 36.0 g/dL    RDW 13.2 11.5 - 14.5 %    Platelets 198 150 - 450 x10*3/uL   Renal Function Panel   Result Value Ref Range    Glucose 130 (H) 74 - 99 mg/dL    Sodium 135 (L) 136 - 145 mmol/L    Potassium 4.1 3.5 - 5.3 mmol/L    Chloride 102 98 - 107 mmol/L    Bicarbonate 29 21 - 32 mmol/L    Anion Gap 8 (L) 10 - 20 mmol/L    Urea Nitrogen 41 (H) 6 - 23 mg/dL    Creatinine 0.70 0.50 - 1.30 mg/dL    eGFR >90 >60 mL/min/1.73m*2    Calcium 8.8 8.6 - 10.3 mg/dL    Phosphorus 3.2 2.5 - 4.9 mg/dL     Albumin 3.3 (L) 3.4 - 5.0 g/dL   Magnesium   Result Value Ref Range    Magnesium 2.58 (H) 1.60 - 2.40 mg/dL             Assessment/Plan   Principal Problem:    Heart failure exacerbated by sotalol (CMS/[facility])  Active Problems:    Acute on chronic congestive heart failure, unspecified heart failure type (CMS/[facility])    69 yo man w/ h/o chronic systolic heart failure, COPD admitted w/ CHF exacerbation     Neuro - metabolic encephalopathy 2/2 sepsis.  Resolved.        CV - acute on chronic systolic heart failure- Lasix 40mg IV daiily .  Cont metoprolol.  Septic shock - resolved off levophed     Pulm - acute hypoxic resp failure 2/2 mucus plug, COPD exacerbation - s/p bronch today w/ improvement of mucus plug.  COPD exacerbation - cont  prednisone.  extubated Thursday.  Pleural effusion on beside lung ultrasound but poor windows for thora.  Recommend fu CT chest as outaptient.  Currently 6L.  Wean O2 as tolerated.      GI - cont diet     Renal - no acute issues      Endo - on SSI and jardiance     ID - septic shock 2/2 strep PNA w/ acute hypoxic resp failure - on ceftriaxone.  follow-up BAL     Heme - Lovenox for DVT prophylaxis     Dispo - Pt stable for transfer to floor.       I have reviewed and evaluated the most recent data and results, personally examined the patient, and formulated the plan of care as presented above. This patient was critically ill and required continued critical care treatment. Teaching and any separately billable procedures are not included in the time calculation.      Benjamin Hernandez MD

## 2024-03-16 NOTE — PROGRESS NOTES
"Alo Glass is a 68 y.o. male on day 7 of admission presenting with Heart failure exacerbated by sotalol (CMS/HCC).    Subjective   Feels better today       Objective     Physical Exam  Vitals reviewed.   Constitutional:       Appearance: He is ill-appearing.   HENT:      Head: Normocephalic and atraumatic.   Eyes:      Extraocular Movements: Extraocular movements intact.   Cardiovascular:      Rate and Rhythm: Normal rate and regular rhythm.      Heart sounds: Normal heart sounds.   Pulmonary:      Effort: Pulmonary effort is normal.      Comments: Coarse breath sounds bilaterally   Abdominal:      Palpations: Abdomen is soft.      Tenderness: There is no abdominal tenderness.   Musculoskeletal:      Cervical back: Normal range of motion.   Skin:     General: Skin is warm.   Neurological:      General: No focal deficit present.      Mental Status: He is alert and oriented to person, place, and time. Mental status is at baseline.   Psychiatric:         Mood and Affect: Mood normal.         Behavior: Behavior normal.         Last Recorded Vitals  Blood pressure 97/72, pulse 78, temperature 37.1 °C (98.8 °F), resp. rate (!) 29, height 1.803 m (5' 10.98\"), weight 144 kg (317 lb 0.3 oz), SpO2 93 %.  Intake/Output last 3 Shifts:  I/O last 3 completed shifts:  In: 1859.2 (12.9 mL/kg) [P.O.:1400; I.V.:59.2 (0.4 mL/kg); IV Piggyback:400]  Out: 4670 (32.5 mL/kg) [Urine:4670 (0.9 mL/kg/hr)]  Weight: 143.8 kg     Relevant Results  Scheduled medications  allopurinol, 300 mg, oral, Daily  cefepime, 2 g, intravenous, q8h  docusate sodium, 100 mg, oral, BID  empagliflozin, 10 mg, oral, Daily  enoxaparin, 40 mg, subcutaneous, q12h SHARRON  tiotropium, 2 puff, inhalation, Daily   And  fluticasone furoate-vilanteroL, 1 puff, inhalation, Daily  folic acid, 1 mg, oral, Daily  insulin lispro, 0-10 Units, subcutaneous, 4x daily  ipratropium-albuteroL, 3 mL, nebulization, TID  levothyroxine, 125 mcg, oral, Daily  metoprolol tartrate, 25 " mg, oral, BID  multivitamin with iron-minerals, 15 mL, oral, Daily  nicotine, 1 patch, transdermal, Daily  perflutren lipid microspheres, 1 mL of dilution, intravenous, Once in imaging  polyethylene glycol, 17 g, oral, Daily  potassium chloride, 20 mEq, oral, Daily  predniSONE, 40 mg, oral, Daily  thiamine, 100 mg, oral, Daily  [Held by provider] valsartan, 320 mg, oral, Daily      Continuous medications  oxygen, , Last Rate: 10 L/min (03/15/24 2125)      PRN medications  PRN medications: acetaminophen **OR** acetaminophen **OR** acetaminophen, bisacodyl, dextrose 10 % in water (D10W), dextrose, diazePAM, glucagon, guaiFENesin, ipratropium-albuteroL, oxygen, oxygen    Results for orders placed or performed during the hospital encounter of 03/08/24 (from the past 24 hour(s))   CBC   Result Value Ref Range    WBC 9.4 4.4 - 11.3 x10*3/uL    nRBC 0.0 0.0 - 0.0 /100 WBCs    RBC 5.06 4.50 - 5.90 x10*6/uL    Hemoglobin 17.0 13.5 - 17.5 g/dL    Hematocrit 55.3 (H) 41.0 - 52.0 %     (H) 80 - 100 fL    MCH 33.6 26.0 - 34.0 pg    MCHC 30.7 (L) 32.0 - 36.0 g/dL    RDW 14.2 11.5 - 14.5 %    Platelets 185 150 - 450 x10*3/uL   Renal Function Panel   Result Value Ref Range    Glucose 148 (H) 74 - 99 mg/dL    Sodium 141 136 - 145 mmol/L    Potassium 4.3 3.5 - 5.3 mmol/L    Chloride 105 98 - 107 mmol/L    Bicarbonate 30 21 - 32 mmol/L    Anion Gap 10 10 - 20 mmol/L    Urea Nitrogen 38 (H) 6 - 23 mg/dL    Creatinine 0.74 0.50 - 1.30 mg/dL    eGFR >90 >60 mL/min/1.73m*2    Calcium 9.1 8.6 - 10.3 mg/dL    Phosphorus 4.2 2.5 - 4.9 mg/dL    Albumin 3.3 (L) 3.4 - 5.0 g/dL   Magnesium   Result Value Ref Range    Magnesium 2.73 (H) 1.60 - 2.40 mg/dL   Blood Gas Venous   Result Value Ref Range    POCT pH, Venous 7.39 7.33 - 7.43 pH    POCT pCO2, Venous 54 (H) 41 - 51 mm Hg    POCT pO2, Venous 74 (H) 35 - 45 mm Hg    POCT SO2, Venous 96 (H) 45 - 75 %    POCT Oxy Hemoglobin, Venous 93.2 (H) 45.0 - 75.0 %    POCT Base Excess, Venous 6.2  (H) -2.0 - 3.0 mmol/L    POCT HCO3 Calculated, Venous 32.7 (H) 22.0 - 26.0 mmol/L    Patient Temperature      FiO2 68 %   B-type Natriuretic Peptide   Result Value Ref Range     (H) 0 - 99 pg/mL   POCT GLUCOSE   Result Value Ref Range    POCT Glucose 258 (H) 74 - 99 mg/dL   POCT GLUCOSE   Result Value Ref Range    POCT Glucose 157 (H) 74 - 99 mg/dL   POCT GLUCOSE   Result Value Ref Range    POCT Glucose 171 (H) 74 - 99 mg/dL             Assessment/Plan   Principal Problem:    Heart failure exacerbated by sotalol (CMS/[facility])  Active Problems:    Acute on chronic congestive heart failure, unspecified heart failure type (CMS/[facility])    69 yo man w/ h/o chronic systolic heart failure, COPD admitted w/ CHF exacerbation     Neuro - metabolic encephalopathy 2/2 sepsis.  Resolved.    CIWA for possible EtOH withdrawal     CV - acute on chronic systolic heart failure- Lasix 40mg IV x 1 .  Cont metoprolol.  Septic shock - resolved off levophed     Pulm - acute hypoxic resp failure 2/2 mucus plug, COPD exacerbation - s/p bronch today w/ improvement of mucus plug.  COPD exacerbation - cont  prednisone.  extubated Thursday.  Pleural effusion on beside lung ultrasound but poor windows for thora.  Recommend fu CT chest as outaptient.  Currently 10L.  Wean O2 as tolerated.      GI - cont diet     Renal - no acute issues      Endo - on SSI and jardiance     ID - septic shock 2/2 suspected gram neg PNA w/ acute hypoxic resp failure - on cefepime.  follow-up cultures.  follow-up BAL     Heme - Lovenox for DVT prophylaxis     Dispo - Pt stable for transfer to floor.       I have reviewed and evaluated the most recent data and results, personally examined the patient, and formulated the plan of care as presented above. This patient was critically ill and required continued critical care treatment. Teaching and any separately billable procedures are not included in the time calculation.     Billing Provider Critical Care Time: 35  minutes     Benjamin Hernandez MD

## 2024-03-16 NOTE — PROGRESS NOTES
Subjective Data:  Patient resting in bed, complains of fatigue, did not sleep well. He feels his breathing could be better. Denies chest pain.     Overnight Events:         Objective Data:  Last Recorded Vitals:  Vitals:    03/16/24 0500 03/16/24 0600 03/16/24 0700 03/16/24 0842   BP: 87/62 88/61 92/62    Pulse: 62 65 68    Resp: 22 23 22    Temp:       TempSrc:       SpO2: 96% 95% 93%    Weight:    142 kg (313 lb 15 oz)   Height:           Last Labs:  CBC - 3/16/2024:  5:17 AM  7.6 17.1 198    54.6      CMP - 3/16/2024:  5:17 AM  8.8 6.9 18 --- 1.6   3.2 3.3 22 73      PTT - No results in last year.  _   _ _     TROPHS   Date/Time Value Ref Range Status   03/12/2024 07:07  0 - 20 ng/L Final     Comment:     Previous result verified on 3/12/2024 0048 on specimen/case [id number] called with component TRPHS for procedure Troponin I, High Sensitivity with value 89 ng/L.   03/12/2024 03:08  0 - 20 ng/L Final     Comment:     Previous result verified on 3/12/2024 0048 on specimen/case [id number] called with component TRPHS for procedure Troponin I, High Sensitivity with value 89 ng/L.   03/12/2024 12:15 AM 89 0 - 20 ng/L Final     BNP   Date/Time Value Ref Range Status   03/15/2024 05:15  0 - 99 pg/mL Final   03/10/2024 11:25  0 - 99 pg/mL Final     HGBA1C   Date/Time Value Ref Range Status   02/20/2024 10:35 AM 5.8 see below % Final   01/19/2023 10:23 AM 7.3 % Final     Comment:          Diagnosis of Diabetes-Adults   Non-Diabetic: < or = 5.6%   Increased risk for developing diabetes: 5.7-6.4%   Diagnostic of diabetes: > or = 6.5%  .       Monitoring of Diabetes                Age (y)     Therapeutic Goal (%)   Adults:          >18           <7.0   Pediatrics:    13-18           <7.5                   7-12           <8.0                   0- 6            7.5-8.5   American Diabetes Association. Diabetes Care 33(S1), Jan 2010.     09/10/2021 01:29 PM 7.9 % Final     Comment:           Diagnosis of Diabetes-Adults   Non-Diabetic: < or = 5.6%   Increased risk for developing diabetes: 5.7-6.4%   Diagnostic of diabetes: > or = 6.5%  .       Monitoring of Diabetes                Age (y)     Therapeutic Goal (%)   Adults:          >18           <7.0   Pediatrics:    13-18           <7.5                   7-12           <8.0                   0- 6            7.5-8.5   American Diabetes Association. Diabetes Care 33(S1), Jan 2010.       LDLCALC   Date/Time Value Ref Range Status   02/20/2024 10:35  <=99 mg/dL Final     Comment:                                 Near   Borderline      AGE      Desirable  Optimal    High     High     Very High     0-19 Y     0 - 109     ---    110-129   >/= 130     ----    20-24 Y     0 - 119     ---    120-159   >/= 160     ----      >24 Y     0 -  99   100-129  130-159   160-189     >/=190       VLDL   Date/Time Value Ref Range Status   02/20/2024 10:35 AM 16 0 - 40 mg/dL Final   09/10/2021 01:29 PM 27 0 - 40 mg/dL Final   09/18/2018 02:26 PM 22 0 - 40 mg/dL Final      Last I/O:  I/O last 3 completed shifts:  In: 1380 (9.6 mL/kg) [P.O.:980; IV Piggyback:400]  Out: 4050 (28.2 mL/kg) [Urine:4050 (0.8 mL/kg/hr)]  Weight: 143.8 kg     Past Cardiology Tests (Last 3 Years):  EKG:  Echo:  Transthoracic echo (TTE) complete 03/11/2024  1. Left ventricular systolic function is moderately decreased with a 30-35% estimated ejection fraction.   2. Basal inferolateral segment is abnormal.   3. Poorly visualized anatomical structures due to suboptimal image quality.   4. Moderate aortic valve stenosis.   5. There is global hypokinesis of the left ventricle with minor regional variations.      TTE (06/18/2019):   -Low normal LV systolic function  -Mild concentric LVH  -Doppler flow suggestive of abnormal Left ventricular relaxation  -Normal right ventricular size and function  -Slightly enlarged left atrium.   -Moderate calcific aortic stenosis  -Trace mitral regurg      Ejection  Fractions:  EF   Date/Time Value Ref Range Status   03/11/2024 02:04 PM 33 %      Cath:  No results found for this or any previous visit from the past 1095 days.    Stress Test:  No results found for this or any previous visit from the past 1095 days.    Cardiac Imaging:  CXR (03/08/2024):   IMPRESSION:  -Moderate right pleural effusion and right lung base consolidation. Findings could be due to pneumonia. Other underlying process not excluded. Follow-up recommended.   -Trace left pleural effusion and cardiomegaly. Possible interstitial edema.     Xray Abd (03/12/2024):   IMPRESSION:  1.  Enteric tube approximately 3.8 cm above the kathie.  2. Evaluation of the enteric tube is limited and appears looped high in the right upper quadrant however may be related to patient positioning and rotation. Repeat imaging may be considered with proper patient positioning to delineate proper position.  3. Left lower lobe airspace opacities concerning for pneumonia. Possible large right pleural effusion.  4. Paucity of bowel gas noted.     CXR (03/12/2024):   IMPRESSION:  1.  Enteric tube approximately 3.8 cm above the kathie.  2. Evaluation of the enteric tube is limited and appears looped high in the right upper quadrant however may be related to patient positioning and rotation. Repeat imaging may be considered with proper patient positioning to delineate proper position.  3. Left lower lobe airspace opacities concerning for pneumonia. Possible large right pleural effusion.  4. Paucity of bowel gas noted.      Inpatient Medications:  Scheduled medications   Medication Dose Route Frequency    allopurinol  300 mg oral Daily    cefepime  2 g intravenous q8h    docusate sodium  100 mg oral BID    empagliflozin  10 mg oral Daily    enoxaparin  40 mg subcutaneous q12h SHARRON    tiotropium  2 puff inhalation Daily    And    fluticasone furoate-vilanteroL  1 puff inhalation Daily    folic acid  1 mg oral Daily    insulin lispro  0-10  Units subcutaneous 4x daily    ipratropium-albuteroL  3 mL nebulization TID    levothyroxine  125 mcg oral Daily    metoprolol tartrate  25 mg oral BID    multivitamin with iron-minerals  15 mL oral Daily    nicotine  1 patch transdermal Daily    perflutren lipid microspheres  1 mL of dilution intravenous Once in imaging    polyethylene glycol  17 g oral Daily    potassium chloride  20 mEq oral Daily    predniSONE  40 mg oral Daily    thiamine  100 mg oral Daily    [Held by provider] valsartan  320 mg oral Daily     PRN medications   Medication    acetaminophen    Or    acetaminophen    Or    acetaminophen    bisacodyl    dextrose 10 % in water (D10W)    dextrose    diazePAM    glucagon    guaiFENesin    ipratropium-albuteroL    oxygen    oxygen     Continuous Medications   Medication Dose Last Rate    oxygen   10 L/min (03/15/24 2125)       Physical Exam:  Physical Exam  Vitals reviewed.   HENT:      Head: Normocephalic.      Nose: Nose normal.   Eyes:      Pupils: Pupils are equal, round, and reactive to light.   Cardiovascular:      Rate and Rhythm: Normal rate and regular rhythm.   Pulmonary:      Breath sounds: Bilateral rhonchi   Abdominal:      General: Abdomen is flat.      Palpations: Abdomen is soft.   Musculoskeletal:         General: Normal range of motion.      Cervical back: Normal range of motion.   Skin:     General: Skin is warm and dry.   Neurological:      General: No focal deficit present.      Mental Status: He is alert and oriented to person, place, and time.   Psychiatric:         Mood and Affect: Mood normal.       Extremity Bilateral peripheral edema      Assessment/Plan   69 yo male from home with h/o obesity, MELISSA on CPAP, COPD, current smoker, type 2 DM, hypertension, lymphedema, moderate aortic stenosis (6/2019) who presented from PCP office with symptoms of increased LE edema, abdominal bloating, progressive SOB over the past year found to be hypoxic in the ER. On admission was  Intermittently refusing IV Lasix, I&O not well monitored. Discussed urinating only into urinals, recording how much urine vs how frequent he urinates. Refused return to lasix gtt, lipscomb or external catheter. Originally refusing echo d/t concern for needing to urinate during the testing. Discussed accommodations and ability to use the bathroom in echo if necessary. Patient was then agreeable to echocardiogram. Echo obtained with newly reduced ejection fraction. Decompensated overnight hypoxic, agitated, required Ativan, Precedex, BiPAP and eventually intubation. Patient weaned off of IV pressor support (03/13). Able to be extubated successfully (3/14)  to HFNC, diuresing well.      #Acute hypoxic respiratory failure 2/2 decompensated heart failure, Pneumonia, COPD w/ current smoking, mucous plugging s/p bronchoscopy   #Acute decompensated systolic heart failure (new diagnosis)  #Non-sustained Ventricular tachycardia   #Septic shock, resolved   #Moderate aortic stenosis   #Chronic lymphedema BLE   #NSR with PACs on telemetry  (no afib noted)      -Would recommend continuation of IV diuresis  -Continue Metoprolol   -Holding Spironolactone, ARB given acute decompensation, resume as able.   -Echo reviewed with newly reduced LVSF, EF 30-35%, abnormal basal inferolateral segment, global hypokinesis, mod AS. Discussed with patient.   -Strict I/O, monitor renal function and electrolyte  - >171> 733  -Will plan for LHC/RHC for new cardiomyopathy in the future, no plan for today.   -Will follow peripherally over the weekend, see again on Monday.        Peripheral IV 03/12/24 18 G Left;Ventral Forearm (Active)   Site Assessment Clean;Dry 03/16/24 0811   Dressing Status Dry 03/16/24 0811   Number of days: 4       Peripheral IV 03/13/24 18 G Proximal;Right Forearm (Active)   Site Assessment Intact 03/16/24 0811   Dressing Status Clean 03/16/24 0811   Number of days: 3       Peripheral IV 03/13/24 20 G Right Wrist (Active)    Site Assessment Dry 03/16/24 0811   Dressing Status Dry 03/16/24 0811   Number of days: 3       Code Status:  Full Code    I spent minutes in the professional and overall care of this patient.        Jordana Carey, PARAM-CNP

## 2024-03-16 NOTE — PROGRESS NOTES
[facility] Hospitalist Progress Note       [id number]: Please page me for patient care issues.  [id number]: Please page night hospitalist for any issues.     Alo Glass  :  1955(68 y.o.)  MRN:  [id number]  PCP: Omari Schumacher MD      Principal Problem:    Heart failure exacerbated by sotalol (CMS/HCC)  Active Problems:    Acute on chronic congestive heart failure, unspecified heart failure type (CMS/HCC)      Assessment and Plan:   Alo Glass is a 68 y.o. male istory of hypertension, COPD, type 2 diabetes mellitus, severe obesity, and obstructive sleep apnea presenting with SOB.     # ARF w/ hypoxia 2/2 acute CHF  # Cardiogenic and septic shock   # Acute HFrEF (30*35%, 3/9/24) w/ global hypokinesis  #Strep pneumoniae pneumonia (RLL)  # AECOPD  # Acute metabolic encephalopathy, resolved  # Moderate aortic stenosis  # Non-IDDM II w/ hyperglycemia  # Hypothyroidism  # Tobacco abuse  # MELISSA/OHS not on CPAP  # Class  II obesity with serious co morbidities  -CHF orderset, daily weight, strict I/O  -s/p ETT (3/12/24-3/14/24)  -s/p on pressors (3/12/24-3/14)  -abx: Cefepime-> ceftriaxone (last day of antibiotics on 3/19/24)  -PRN IV lasix w/ caution due to hypotension, hold for MAP < 65  - limited GDMT due to risk of hypotension: on aspirin, metoprolol and Jardiance.  Losartan on hold  -Plans for eventual cardiac cath in the future  - recs appreciated: cardiology, pulm/crit      DVT Prophylaxis: Subq Lovenox    Code status: Full Code  Diet: Adult diet Regular, Cardiac, Carb Controlled; 60 gram carb/meal, 30 gram Carb evening snack; 1500 mL fluid; 70 gm fat; 2 - 3 grams Sodium    Disposition:await test results, await consultant recommendations, await clinical improvement, and await placement    Level of MDM:  High    I personally examined the patient and I personally reviewed chart, data, labs radiology reports    Family Communication  Number :   Name of Designated Family Representative:       Total  time spent: 50 minutes, of total time providing counseling or in coordination of care. Total time on this day of visit includes record and documentation review before and after visit including documentation and time not explicitly included on EMR time stamp      Subjective:   Interval History:  HPI  The patient complains of dyspnea  The patient feels their symptoms areimproving  no events or new concerns    Scheduled Meds:allopurinol, 300 mg, oral, Daily  cefepime, 2 g, intravenous, q8h  docusate sodium, 100 mg, oral, BID  empagliflozin, 10 mg, oral, Daily  enoxaparin, 40 mg, subcutaneous, q12h SHARRON  tiotropium, 2 puff, inhalation, Daily   And  fluticasone furoate-vilanteroL, 1 puff, inhalation, Daily  folic acid, 1 mg, oral, Daily  insulin lispro, 0-10 Units, subcutaneous, 4x daily  ipratropium-albuteroL, 3 mL, nebulization, TID  levothyroxine, 125 mcg, oral, Daily  metoprolol tartrate, 25 mg, oral, BID  multivitamin with iron-minerals, 15 mL, oral, Daily  nicotine, 1 patch, transdermal, Daily  perflutren lipid microspheres, 1 mL of dilution, intravenous, Once in imaging  polyethylene glycol, 17 g, oral, Daily  potassium chloride, 20 mEq, oral, Daily  predniSONE, 40 mg, oral, Daily  thiamine, 100 mg, oral, Daily  [Held by provider] valsartan, 320 mg, oral, Daily      Continuous Infusions:oxygen, , Last Rate: 10 L/min (03/15/24 2125)      PRN Meds:PRN medications: acetaminophen **OR** acetaminophen **OR** acetaminophen, bisacodyl, dextrose 10 % in water (D10W), dextrose, diazePAM, glucagon, guaiFENesin, ipratropium-albuteroL, oxygen, oxygen    Review of Systems   All other systems reviewed and are negative.    Interval Pertinent History:  Social History     Tobacco Use    Smoking status: Never    Smokeless tobacco: Never   Substance Use Topics    Alcohol use: Not on file         Objective:   Patient Vitals for the past 24 hrs:   BP Temp Temp src Pulse Resp SpO2 Weight   03/16/24 0700 92/62 -- -- 68 22 93 % --    24 0600 88/61 -- -- 65 23 95 % --   24 0500 87/62 -- -- 62 22 96 % --   24 0400 -- -- -- 65 26 96 % --   24 0300 98/65 -- -- 69 24 93 % --   24 0200 89/68 -- -- 64 (!) 27 96 % --   24 0100 78/54 -- -- 66 17 95 % --   24 0000 84/60 -- -- 68 24 94 % --   03/15/24 2344 -- -- -- -- -- -- 144 kg (317 lb 0.3 oz)   03/15/24 2300 88/ -- -- 66 19 93 % --   03/15/24 2200 92/ -- -- 76 (!) 30 95 % --   03/15/24 2100 99/72 -- -- 80 22 93 % --   03/15/24 2000 92/73 36.6 °C (97.9 °F) Temporal 75 26 -- --   03/15/24 1900 93/70 -- -- 79 24 -- --   03/15/24 1800 97/72 37.1 °C (98.8 °F) -- 78 (!) 29 93 % --   03/15/24 1700 9272 -- -- 70 (!) 27 97 % --   03/15/24 1629 -- -- -- -- -- 98 % --   03/15/24 1600 95/72 -- -- 75 19 93 % --   03/15/24 1500 90/ -- -- 73 24 94 % --   03/15/24 1400 96/70 -- -- 76 21 92 % --   03/15/24 1300 95/68 -- -- 78 21 93 % --   03/15/24 1255 95/68 -- -- 79 -- 93 % --   03/15/24 1254 95/68 -- -- 79 -- 93 % --   03/15/24 1200 111/83 36.9 °C (98.4 °F) -- 82 20 90 % --   03/15/24 1158 -- -- -- -- -- 98 % --   03/15/24 1100 99/85 -- -- 79 (!) 27 90 % --   03/15/24 1000 106/82 -- -- 83 21 91 % --   03/15/24 0900 102/78 -- -- 80 25 91 % --   03/15/24 0800 -- -- -- 90 25 (!) 88 % --       Average, Min, and Max for last 24 hours Vitals:  TEMPERATURE:  Temp  Av.9 °C (98.4 °F)  Min: 36.6 °C (97.9 °F)  Max: 37.1 °C (98.8 °F)    RESPIRATIONS RANGE: Resp  Av.5  Min: 17  Max: 30    PULSE RANGE: Pulse  Av  Min: 62  Max: 90    BLOOD PRESSURE RANGE:  Systolic (24hrs), Av , Min:78 , Max:111   ; Diastolic (24hrs), Av, Min:54, Max:85      PULSE OXIMETRY RANGE: SpO2  Av.6 %  Min: 88 %  Max: 98 %    I/O last 3 completed shifts:  In: 1380 (9.6 mL/kg) [P.O.:980; IV Piggyback:400]  Out: 4050 (28.2 mL/kg) [Urine:4050 (0.8 mL/kg/hr)]  Weight: 143.8 kg   Weight change: 0 kg (0 lb)     Physical Exam  Vitals and nursing note reviewed.   Constitutional:        General: He is not in acute distress.     Appearance: He is morbidly obese. He is ill-appearing (Chronically).      Interventions: Nasal cannula in place.   HENT:      Head: Normocephalic and atraumatic.      Right Ear: External ear normal.      Left Ear: External ear normal.      Nose: Nose normal. No congestion or rhinorrhea.      Mouth/Throat:      Mouth: Mucous membranes are moist.   Eyes:      Extraocular Movements: Extraocular movements intact.      Pupils: Pupils are equal, round, and reactive to light.   Cardiovascular:      Rate and Rhythm: Normal rate and regular rhythm.      Pulses: Normal pulses.      Heart sounds: Normal heart sounds.   Pulmonary:      Effort: Pulmonary effort is normal.      Breath sounds: Rhonchi present.   Abdominal:      General: Abdomen is flat. Bowel sounds are normal.      Palpations: Abdomen is soft.   Musculoskeletal:         General: Normal range of motion.      Cervical back: Normal range of motion and neck supple.      Right lower leg: Edema present.      Left lower leg: Edema present.   Skin:     General: Skin is warm and dry.      Capillary Refill: Capillary refill takes less than 2 seconds.   Neurological:      General: No focal deficit present.      Mental Status: He is alert and oriented to person, place, and time. Mental status is at baseline.   Psychiatric:         Mood and Affect: Mood normal.         Behavior: Behavior is cooperative.         Thought Content: Thought content normal.         Judgment: Judgment normal.         Lab Results   Component Value Date     (L) 03/16/2024    K 4.1 03/16/2024     03/16/2024    CO2 29 03/16/2024    BUN 41 (H) 03/16/2024    CREATININE 0.70 03/16/2024    GLUCOSE 130 (H) 03/16/2024    CALCIUM 8.8 03/16/2024    PROT 6.9 03/12/2024    BILITOT 1.6 (H) 03/12/2024    ALKPHOS 73 03/12/2024    AST 18 03/12/2024    ALT 22 03/12/2024       Lab Results   Component Value Date    WBC 7.6 03/16/2024    HGB 17.1 03/16/2024    HCT  54.6 (H) 03/16/2024     (H) 03/16/2024     03/16/2024    LYMPHOPCT 4.0 03/12/2024    RBC 5.13 03/16/2024    MCH 33.3 03/16/2024    MCHC 31.3 (L) 03/16/2024    RDW 13.2 03/16/2024    CRP 1.62 (A) 10/25/2019       Lab Results   Component Value Date    TSH 2.64 03/09/2024     Lab Results   Component Value Date    LACTATE 1.4 03/12/2024     (H) 03/15/2024       IMAGES:  Encounter Date: 03/08/24   Electrocardiogram, 12-lead PRN ACS symptoms   Result Value    Ventricular Rate 86    Atrial Rate 86    NV Interval 208    QRS Duration 146    QT Interval 388    QTC Calculation(Bazett) 464    P Axis 49    R Axis 140    T Axis 6    QRS Count 14    Q Onset 207    P Onset 103    P Offset 179    T Offset 401    QTC Fredericia 437    Narrative    Sinus rhythm with occasional Premature ventricular complexes  Right bundle branch block  T wave abnormality, consider lateral ischemia  Abnormal ECG  When compared with ECG of 12-MAR-2024 04:06, (unconfirmed)  Premature atrial complexes are no longer Present  T wave inversion now evident in Lateral leads  Confirmed by Heather Barrett ([id number]) on 3/12/2024 10:49:25 AM     XR chest 1 view    Result Date: 3/13/2024  Impression: 1. Cardiomegaly. Vascular congestion. Large right pleural effusion with atelectasis/consolidation at the right lower lobe. Radiographic follow-up to resolution recommended. 2. Enteric tube terminates at the right upper quadrant, at the expected location of the gastric antrum.       MACRO: None.   Signed by: Deepali Casillas 3/13/2024 3:20 AM Dictation workstation:   [id number]    XR chest 1 view    Result Date: 3/11/2024  Impression: 1.  Slightly improved right-sided pleural effusion which is persistently moderate.     Signed by: Eugene Alvarenga 3/11/2024 7:19 PM Dictation workstation:   [id number]    Transthoracic echo (TTE) complete    Result Date: 3/11/2024   [facility], [address] [address]               Tel  [phone] and Fax [phone] TRANSTHORACIC ECHOCARDIOGRAM REPORT  Patient Name:      BRAYAN JIMENEZ NICKI Key Physician:    [id number] Heather Barrett MD Study Date:        3/11/2024            Ordering Provider:    [id number] RORY GRAVES MRN/PID:           [id number]             Fellow: Accession#:        [id number]         Nurse:                Khushbu Holman RN Date of Birth/Age: 1955 / 68 years Sonographer:          Goldie Woods                                                               RD Gender:            M                    Additional Staff: Height:            177.80 cm            Admit Date:           3/8/2024 Weight:            147.42 kg            Admission Status:     Inpatient -                                                               Routine BSA / BMI:         2.57 m2 / 46.63      Encounter#:           [id number]                    kg/m2                                         Department Location:  [facility]                                                               Invasive Blood Pressure: 107 /70 mmHg Study Type:    TRANSTHORACIC ECHO (TTE) COMPLETE Diagnosis/ICD: Heart failure, unspecified-I50.9; Acute systolic (congestive)                heart failure (CHF)-I50.21 Indication:    Congestive Heart Failure CPT Code:      Echo Complete w Full Doppler-[id number] Patient History: Pertinent History: COPD, HTN, LE Edema, Dyspnea and Elev trop., Elev. BNP,                    Smoker,. Study Detail: The following Echo studies were performed: 2D, M-Mode, Doppler and               color flow. Technically challenging study due to body habitus.               Definity used as a contrast agent for endocardial border               definition. Total contrast used for this procedure was 1.5 mL via               IV push. The patient was awake.  PHYSICIAN INTERPRETATION:  Left Ventricle: The left ventricular systolic function is moderately decreased, with an estimated ejection fraction of 30-35%. There is global hypokinesis of the left ventricle with minor regional variations. The left ventricular cavity size is normal. Left ventricular diastolic filling was indeterminate. LV Wall Scoring: The basal inferolateral segment is akinetic. Left Atrium: The left atrium is mildly dilated. Right Ventricle: The right ventricle is normal in size. There is normal right ventricular global systolic function. Right Atrium: The right atrium is normal in size. Aortic Valve: The aortic valve was not well visualized. There is evidence of moderate aortic valve stenosis. There is no evidence of aortic valve regurgitation. The peak instantaneous gradient of the aortic valve is 52.7 mmHg. The mean gradient of the aortic valve is 26.0 mmHg. Mitral Valve: The mitral valve is mild to moderately thickened. There is mild to moderate mitral annular calcification. There is mild mitral valve regurgitation. Tricuspid Valve: The tricuspid valve was not well visualized. There is trace to mild tricuspid regurgitation. The right ventricular systolic pressure is unable to be estimated. Pulmonic Valve: The pulmonic valve is not well visualized. There is physiologic pulmonic valve regurgitation. Pericardium: There is no pericardial effusion noted. Aorta: The aortic root is normal. Systemic Veins: The inferior vena cava appears dilated. There is less than 50% IVC collapse with inspiration.  CONCLUSIONS:  1. Left ventricular systolic function is moderately decreased with a 30-35% estimated ejection fraction.  2. Basal inferolateral segment is abnormal.  3. Poorly visualized anatomical structures due to suboptimal image quality.  4. Moderate aortic valve stenosis.  5. There is global hypokinesis of the left ventricle with minor regional variations. QUANTITATIVE DATA SUMMARY: 2D MEASUREMENTS:                           Normal  Ranges: IVSd:          1.14 cm    (0.6-1.1cm) LVPWd:         1.57 cm    (0.6-1.1cm) LVIDd:         5.26 cm    (3.9-5.9cm) LVIDs:         4.41 cm LV Mass Index: 117.2 g/m2 LV % FS        16.2 % LA VOLUME:                               Normal Ranges: LA Vol A4C:        77.9 ml    (22+/-6mL/m2) LA Vol A2C:        84.8 ml LA Vol BP:         83.7 ml LA Vol Index A4C:  30.4ml/m2 LA Vol Index A2C:  33.0 ml/m2 LA Vol Index BP:   32.6 ml/m2 LA Area A4C:       24.1 cm2 LA Area A2C:       24.4 cm2 LA Major Axis A4C: 6.3 cm LA Major Axis A2C: 6.0 cm LA Volume Index:   30.5 ml/m2 LA Vol A4C:        71.4 ml LA Vol A2C:        78.4 ml RA VOLUME BY A/L METHOD:                       Normal Ranges: RA Area A4C: 17.0 cm2 AORTA MEASUREMENTS:                    Normal Ranges: Asc Ao, d: 3.10 cm (2.1-3.4cm) LV SYSTOLIC FUNCTION BY 2D PLANIMETRY (MOD):                     Normal Ranges: EF-A4C View: 33.6 % (>=55%) EF-A2C View: 29.1 % EF-Biplane:  33.1 % LV DIASTOLIC FUNCTION:                     Normal Ranges: MV Peak E: 0.90 m/s (0.7-1.2 m/s) MV Peak A: 0.39 m/s (0.42-0.7 m/s) E/A Ratio: 2.34     (1.0-2.2) MITRAL VALVE:                Normal Ranges: MV DT: 99 msec (150-240msec) AORTIC VALVE:                                    Normal Ranges: AoV Vmax:                3.63 m/s  (<=1.7m/s) AoV Peak P.7 mmHg (<20mmHg) AoV Mean P.0 mmHg (1.7-11.5mmHg) LVOT Max Jero:            0.69 m/s  (<=1.1m/s) AoV VTI:                 60.70 cm  (18-25cm) LVOT VTI:                12.80 cm LVOT Diameter:           2.60 cm   (1.8-2.4cm) AoV Area, VTI:           1.12 cm2  (2.5-5.5cm2) AoV Area,Vmax:           1.01 cm2  (2.5-4.5cm2) AoV Dimensionless Index: 0.21  RIGHT VENTRICLE: RV Basal 3.71 cm RV Mid   2.98 cm RV Major 9.7 cm TAPSE:   22.4 mm RV s'    0.13 m/s TRICUSPID VALVE/RVSP:                   Normal Ranges: IVC Diam: 2.32 cm PULMONIC VALVE:                      Normal Ranges: PV Max Jero: 0.7 m/s  (0.6-0.9m/s) PV Max  P.7 mmHg  [id number] Heather Barrett MD Electronically signed on 3/11/2024 at 4:04:49 PM  Wall Scoring  ** Final (Updated) **    === 24 ===    XR CHEST 1 VIEW    - Impression -  1. Cardiomegaly. Vascular congestion. Large right pleural effusion  with atelectasis/consolidation at the right lower lobe. Radiographic  follow-up to resolution recommended.  2. Enteric tube terminates at the right upper quadrant, at the  expected location of the gastric antrum.        MACRO:  None.    Signed by: Deepali Casillas 3/13/2024 3:20 AM  Dictation workstation:   [id number]  === 10/02/18 ===    CT HEART CALCIUM SCORING WO CONTRAST    - Impression -  1. Coronary artery calcium score of 164.2*.  2. There is some dependent density in the right mainstem bronchus  probably sequela of aspiration. Scarring or atelectasis at the right  lung base and small focal consolidation/atelectasis anteriorly on the  right.  3. Prominence ascending thoracic aorta up to 3.9 cm.  4. Prominent fat attenuation in the region of the interatrial septum  as described probably due to lipomatosis involvement and appearing  slightly more pronounced since previous study.  5.  view demonstrates prominent paratracheal density in the  upper mediastinum bilaterally not included on CT imaging, may relate  to overlying vascular structures or pronounced mediastinal fat  however correlation with dedicated complete CT chest should be  considered for better evaluation.  *Coronary artery calcium scoring may be helpful in predicting the  risk for future coronary heart disease events.  According to the  [employer] Foundation Clinical Expert Consensus  Task Force, such testing provides important prognostic information in  patients with more than one coronary heart disease risk factor. The  coronary artery calcium score correlates with the annual risk of a  non-fatal myocardial infarction or coronary heart disease death.    Coronary artery score    "         Annual Risk    0-99                             0.4%  100-399                        1.3%  >400                            2.4%    These three \"breakpoints\" correspond to lower, intermediate and high  risk states for future coronary events.  Such information should be  used, along with appropriate clinical judgment, to make decisions  regarding the intensity of risk factor management strategies to treat  blood lipids and to modify other non-lipid coronary risk factors.    Reference: Hellier P et al. Circulation.  2007; 115:402-426  === 03/08/24 ===    XR CHEST 1 VIEW    - Impression -  1. Cardiomegaly. Vascular congestion. Large right pleural effusion  with atelectasis/consolidation at the right lower lobe. Radiographic  follow-up to resolution recommended.  2. Enteric tube terminates at the right upper quadrant, at the  expected location of the gastric antrum.        MACRO:  None.    Signed by: Deepali Casillas 3/13/2024 3:20 AM  Dictation workstation:   [id number]      Additional results of the last 24 hours have been reviewed.    Dictated using [employer] Version 2.4  Proof read however unrecognized voice recognition errors may have occurred     Electronically signed by Lou Madrigal DO on 03/16/24 at 7:48 AM      "

## 2024-03-17 LAB
ALBUMIN SERPL BCP-MCNC: 3.4 G/DL (ref 3.4–5)
ANION GAP SERPL CALC-SCNC: 11 MMOL/L (ref 10–20)
BUN SERPL-MCNC: 41 MG/DL (ref 6–23)
CALCIUM SERPL-MCNC: 8.7 MG/DL (ref 8.6–10.3)
CHLORIDE SERPL-SCNC: 103 MMOL/L (ref 98–107)
CO2 SERPL-SCNC: 28 MMOL/L (ref 21–32)
CREAT SERPL-MCNC: 0.74 MG/DL (ref 0.5–1.3)
EGFRCR SERPLBLD CKD-EPI 2021: >90 ML/MIN/1.73M*2
ERYTHROCYTE [DISTWIDTH] IN BLOOD BY AUTOMATED COUNT: 12.9 % (ref 11.5–14.5)
GLUCOSE BLD MANUAL STRIP-MCNC: 137 MG/DL (ref 74–99)
GLUCOSE BLD MANUAL STRIP-MCNC: 184 MG/DL (ref 74–99)
GLUCOSE BLD MANUAL STRIP-MCNC: 187 MG/DL (ref 74–99)
GLUCOSE BLD MANUAL STRIP-MCNC: 200 MG/DL (ref 74–99)
GLUCOSE SERPL-MCNC: 121 MG/DL (ref 74–99)
HCT VFR BLD AUTO: 55.3 % (ref 41–52)
HGB BLD-MCNC: 17.5 G/DL (ref 13.5–17.5)
MAGNESIUM SERPL-MCNC: 2.53 MG/DL (ref 1.6–2.4)
MCH RBC QN AUTO: 33.5 PG (ref 26–34)
MCHC RBC AUTO-ENTMCNC: 31.6 G/DL (ref 32–36)
MCV RBC AUTO: 106 FL (ref 80–100)
NRBC BLD-RTO: 0 /100 WBCS (ref 0–0)
PHOSPHATE SERPL-MCNC: 3.3 MG/DL (ref 2.5–4.9)
PLATELET # BLD AUTO: 203 X10*3/UL (ref 150–450)
POTASSIUM SERPL-SCNC: 4.1 MMOL/L (ref 3.5–5.3)
RBC # BLD AUTO: 5.23 X10*6/UL (ref 4.5–5.9)
SODIUM SERPL-SCNC: 138 MMOL/L (ref 136–145)
WBC # BLD AUTO: 9 X10*3/UL (ref 4.4–11.3)

## 2024-03-17 PROCEDURE — 2500000001 HC RX 250 WO HCPCS SELF ADMINISTERED DRUGS (ALT 637 FOR MEDICARE OP): Performed by: NURSE PRACTITIONER

## 2024-03-17 PROCEDURE — 2500000004 HC RX 250 GENERAL PHARMACY W/ HCPCS (ALT 636 FOR OP/ED): Performed by: INTERNAL MEDICINE

## 2024-03-17 PROCEDURE — 2500000004 HC RX 250 GENERAL PHARMACY W/ HCPCS (ALT 636 FOR OP/ED): Performed by: NURSE PRACTITIONER

## 2024-03-17 PROCEDURE — 80069 RENAL FUNCTION PANEL: CPT | Performed by: INTERNAL MEDICINE

## 2024-03-17 PROCEDURE — S4991 NICOTINE PATCH NONLEGEND: HCPCS | Performed by: NURSE PRACTITIONER

## 2024-03-17 PROCEDURE — 2500000001 HC RX 250 WO HCPCS SELF ADMINISTERED DRUGS (ALT 637 FOR MEDICARE OP): Performed by: INTERNAL MEDICINE

## 2024-03-17 PROCEDURE — 99232 SBSQ HOSP IP/OBS MODERATE 35: CPT | Performed by: INTERNAL MEDICINE

## 2024-03-17 PROCEDURE — 2500000004 HC RX 250 GENERAL PHARMACY W/ HCPCS (ALT 636 FOR OP/ED)

## 2024-03-17 PROCEDURE — 2500000002 HC RX 250 W HCPCS SELF ADMINISTERED DRUGS (ALT 637 FOR MEDICARE OP, ALT 636 FOR OP/ED): Performed by: NURSE PRACTITIONER

## 2024-03-17 PROCEDURE — 2500000002 HC RX 250 W HCPCS SELF ADMINISTERED DRUGS (ALT 637 FOR MEDICARE OP, ALT 636 FOR OP/ED): Performed by: INTERNAL MEDICINE

## 2024-03-17 PROCEDURE — 36415 COLL VENOUS BLD VENIPUNCTURE: CPT | Performed by: INTERNAL MEDICINE

## 2024-03-17 PROCEDURE — 82947 ASSAY GLUCOSE BLOOD QUANT: CPT

## 2024-03-17 PROCEDURE — 99232 SBSQ HOSP IP/OBS MODERATE 35: CPT | Performed by: NURSE PRACTITIONER

## 2024-03-17 PROCEDURE — 83735 ASSAY OF MAGNESIUM: CPT | Performed by: INTERNAL MEDICINE

## 2024-03-17 PROCEDURE — 94640 AIRWAY INHALATION TREATMENT: CPT

## 2024-03-17 PROCEDURE — 1200000002 HC GENERAL ROOM WITH TELEMETRY DAILY

## 2024-03-17 PROCEDURE — 85027 COMPLETE CBC AUTOMATED: CPT | Performed by: INTERNAL MEDICINE

## 2024-03-17 PROCEDURE — 94760 N-INVAS EAR/PLS OXIMETRY 1: CPT

## 2024-03-17 PROCEDURE — 99233 SBSQ HOSP IP/OBS HIGH 50: CPT | Performed by: INTERNAL MEDICINE

## 2024-03-17 RX ORDER — POLYETHYLENE GLYCOL 3350 17 G/17G
17 POWDER, FOR SOLUTION ORAL 2 TIMES DAILY
Status: DISCONTINUED | OUTPATIENT
Start: 2024-03-17 | End: 2024-03-23 | Stop reason: HOSPADM

## 2024-03-17 RX ORDER — TALC
6 POWDER (GRAM) TOPICAL NIGHTLY
Status: DISCONTINUED | OUTPATIENT
Start: 2024-03-17 | End: 2024-03-23 | Stop reason: HOSPADM

## 2024-03-17 RX ORDER — AMOXICILLIN 250 MG
2 CAPSULE ORAL 2 TIMES DAILY
Status: DISCONTINUED | OUTPATIENT
Start: 2024-03-17 | End: 2024-03-23 | Stop reason: HOSPADM

## 2024-03-17 RX ORDER — BENZONATATE 100 MG/1
200 CAPSULE ORAL 3 TIMES DAILY
Status: DISCONTINUED | OUTPATIENT
Start: 2024-03-17 | End: 2024-03-23 | Stop reason: HOSPADM

## 2024-03-17 RX ORDER — IPRATROPIUM BROMIDE AND ALBUTEROL SULFATE 2.5; .5 MG/3ML; MG/3ML
3 SOLUTION RESPIRATORY (INHALATION) 3 TIMES DAILY
Status: DISCONTINUED | OUTPATIENT
Start: 2024-03-17 | End: 2024-03-18

## 2024-03-17 RX ORDER — ASPIRIN 81 MG/1
81 TABLET ORAL DAILY
Status: DISCONTINUED | OUTPATIENT
Start: 2024-03-17 | End: 2024-03-23 | Stop reason: HOSPADM

## 2024-03-17 RX ORDER — FOLIC ACID 1 MG/1
1 TABLET ORAL DAILY
Status: DISCONTINUED | OUTPATIENT
Start: 2024-03-17 | End: 2024-03-23 | Stop reason: HOSPADM

## 2024-03-17 RX ORDER — IBUPROFEN 200 MG
1 TABLET ORAL DAILY
Status: DISCONTINUED | OUTPATIENT
Start: 2024-03-17 | End: 2024-03-23 | Stop reason: HOSPADM

## 2024-03-17 RX ORDER — IPRATROPIUM BROMIDE AND ALBUTEROL SULFATE 2.5; .5 MG/3ML; MG/3ML
3 SOLUTION RESPIRATORY (INHALATION)
Status: DISCONTINUED | OUTPATIENT
Start: 2024-03-17 | End: 2024-03-17

## 2024-03-17 RX ORDER — GUAIFENESIN/DEXTROMETHORPHAN 100-10MG/5
10 SYRUP ORAL 2 TIMES DAILY
Status: DISCONTINUED | OUTPATIENT
Start: 2024-03-17 | End: 2024-03-23 | Stop reason: HOSPADM

## 2024-03-17 RX ORDER — FLUTICASONE FUROATE AND VILANTEROL 100; 25 UG/1; UG/1
1 POWDER RESPIRATORY (INHALATION)
Status: DISCONTINUED | OUTPATIENT
Start: 2024-03-17 | End: 2024-03-23 | Stop reason: HOSPADM

## 2024-03-17 RX ORDER — ALLOPURINOL 300 MG/1
300 TABLET ORAL DAILY
Status: DISCONTINUED | OUTPATIENT
Start: 2024-03-17 | End: 2024-03-23 | Stop reason: HOSPADM

## 2024-03-17 RX ORDER — METOPROLOL TARTRATE 25 MG/1
25 TABLET, FILM COATED ORAL 2 TIMES DAILY
Status: DISCONTINUED | OUTPATIENT
Start: 2024-03-17 | End: 2024-03-19

## 2024-03-17 RX ORDER — MULTIVIT-MIN/IRON FUM/FOLIC AC 7.5 MG-4
1 TABLET ORAL DAILY
Status: DISCONTINUED | OUTPATIENT
Start: 2024-03-18 | End: 2024-03-23 | Stop reason: HOSPADM

## 2024-03-17 RX ORDER — LEVOTHYROXINE SODIUM 125 UG/1
125 TABLET ORAL DAILY
Status: DISCONTINUED | OUTPATIENT
Start: 2024-03-17 | End: 2024-03-23 | Stop reason: HOSPADM

## 2024-03-17 RX ORDER — INSULIN LISPRO 100 [IU]/ML
0-10 INJECTION, SOLUTION INTRAVENOUS; SUBCUTANEOUS
Status: DISCONTINUED | OUTPATIENT
Start: 2024-03-17 | End: 2024-03-23 | Stop reason: HOSPADM

## 2024-03-17 RX ORDER — ENOXAPARIN SODIUM 100 MG/ML
40 INJECTION SUBCUTANEOUS 2 TIMES DAILY
Status: DISCONTINUED | OUTPATIENT
Start: 2024-03-17 | End: 2024-03-23 | Stop reason: HOSPADM

## 2024-03-17 RX ORDER — IPRATROPIUM BROMIDE AND ALBUTEROL SULFATE 2.5; .5 MG/3ML; MG/3ML
3 SOLUTION RESPIRATORY (INHALATION) EVERY 6 HOURS PRN
Status: DISCONTINUED | OUTPATIENT
Start: 2024-03-17 | End: 2024-03-17

## 2024-03-17 RX ORDER — VALSARTAN 160 MG/1
320 TABLET ORAL DAILY
Status: DISCONTINUED | OUTPATIENT
Start: 2024-03-17 | End: 2024-03-19

## 2024-03-17 RX ORDER — TRAZODONE HYDROCHLORIDE 50 MG/1
25 TABLET ORAL NIGHTLY PRN
Status: DISCONTINUED | OUTPATIENT
Start: 2024-03-17 | End: 2024-03-23 | Stop reason: HOSPADM

## 2024-03-17 RX ORDER — HYDROCODONE BITARTRATE AND HOMATROPINE METHYLBROMIDE ORAL SOLUTION 5; 1.5 MG/5ML; MG/5ML
5 LIQUID ORAL EVERY 6 HOURS PRN
Status: DISCONTINUED | OUTPATIENT
Start: 2024-03-17 | End: 2024-03-23 | Stop reason: HOSPADM

## 2024-03-17 RX ORDER — ACETAMINOPHEN 325 MG/1
650 TABLET ORAL EVERY 6 HOURS PRN
Status: DISCONTINUED | OUTPATIENT
Start: 2024-03-17 | End: 2024-03-23 | Stop reason: HOSPADM

## 2024-03-17 RX ORDER — LANOLIN ALCOHOL/MO/W.PET/CERES
100 CREAM (GRAM) TOPICAL DAILY
Status: DISCONTINUED | OUTPATIENT
Start: 2024-03-17 | End: 2024-03-23 | Stop reason: HOSPADM

## 2024-03-17 RX ORDER — IPRATROPIUM BROMIDE AND ALBUTEROL SULFATE 2.5; .5 MG/3ML; MG/3ML
SOLUTION RESPIRATORY (INHALATION)
Status: DISPENSED
Start: 2024-03-17 | End: 2024-03-18

## 2024-03-17 RX ORDER — IPRATROPIUM BROMIDE AND ALBUTEROL SULFATE 2.5; .5 MG/3ML; MG/3ML
3 SOLUTION RESPIRATORY (INHALATION) EVERY 2 HOUR PRN
Status: DISCONTINUED | OUTPATIENT
Start: 2024-03-17 | End: 2024-03-23 | Stop reason: HOSPADM

## 2024-03-17 RX ADMIN — POLYETHYLENE GLYCOL 3350 17 G: 17 POWDER, FOR SOLUTION ORAL at 08:59

## 2024-03-17 RX ADMIN — ASPIRIN 81 MG: 81 TABLET, COATED ORAL at 08:58

## 2024-03-17 RX ADMIN — BENZONATATE 200 MG: 100 CAPSULE ORAL at 20:57

## 2024-03-17 RX ADMIN — DOCUSATE SODIUM 100 MG: 50 LIQUID ORAL at 08:59

## 2024-03-17 RX ADMIN — METOPROLOL TARTRATE 25 MG: 25 TABLET, FILM COATED ORAL at 08:59

## 2024-03-17 RX ADMIN — GUAIFENESIN 600 MG: 600 TABLET ORAL at 08:58

## 2024-03-17 RX ADMIN — LEVOTHYROXINE SODIUM 125 MCG: 0.12 TABLET ORAL at 05:58

## 2024-03-17 RX ADMIN — POTASSIUM CHLORIDE 20 MEQ: 1.5 POWDER, FOR SOLUTION ORAL at 08:58

## 2024-03-17 RX ADMIN — FOLIC ACID 1 MG: 1 TABLET ORAL at 08:58

## 2024-03-17 RX ADMIN — Medication 15 ML: at 08:58

## 2024-03-17 RX ADMIN — TIOTROPIUM BROMIDE INHALATION SPRAY 2 PUFF: 3.12 SPRAY, METERED RESPIRATORY (INHALATION) at 08:58

## 2024-03-17 RX ADMIN — GUAIFENESIN SYRUP AND DEXTROMETHORPHAN 10 ML: 100; 10 SYRUP ORAL at 20:57

## 2024-03-17 RX ADMIN — IPRATROPIUM BROMIDE AND ALBUTEROL SULFATE 3 ML: 2.5; .5 SOLUTION RESPIRATORY (INHALATION) at 20:45

## 2024-03-17 RX ADMIN — EMPAGLIFLOZIN 10 MG: 10 TABLET, FILM COATED ORAL at 08:58

## 2024-03-17 RX ADMIN — FUROSEMIDE 40 MG: 10 INJECTION, SOLUTION INTRAVENOUS at 10:55

## 2024-03-17 RX ADMIN — FLUTICASONE FUROATE AND VILANTEROL TRIFENATATE 1 PUFF: 100; 25 POWDER RESPIRATORY (INHALATION) at 08:58

## 2024-03-17 RX ADMIN — ENOXAPARIN SODIUM 40 MG: 40 INJECTION SUBCUTANEOUS at 08:58

## 2024-03-17 RX ADMIN — Medication 6 MG: at 20:57

## 2024-03-17 RX ADMIN — THIAMINE HCL TAB 100 MG 100 MG: 100 TAB at 08:59

## 2024-03-17 RX ADMIN — BENZONATATE 200 MG: 100 CAPSULE ORAL at 14:58

## 2024-03-17 RX ADMIN — NICOTINE 1 PATCH: 21 PATCH, EXTENDED RELEASE TRANSDERMAL at 09:00

## 2024-03-17 RX ADMIN — ENOXAPARIN SODIUM 40 MG: 40 INJECTION SUBCUTANEOUS at 20:58

## 2024-03-17 RX ADMIN — PREDNISONE 40 MG: 20 TABLET ORAL at 08:58

## 2024-03-17 RX ADMIN — ALLOPURINOL 300 MG: 300 TABLET ORAL at 08:58

## 2024-03-17 RX ADMIN — GUAIFENESIN SYRUP AND DEXTROMETHORPHAN 10 ML: 100; 10 SYRUP ORAL at 12:48

## 2024-03-17 RX ADMIN — CEFTRIAXONE SODIUM 1 G: 1 INJECTION, SOLUTION INTRAVENOUS at 12:10

## 2024-03-17 RX ADMIN — PANTOPRAZOLE SODIUM 40 MG: 40 TABLET, DELAYED RELEASE ORAL at 06:00

## 2024-03-17 RX ADMIN — IPRATROPIUM BROMIDE AND ALBUTEROL SULFATE 3 ML: 2.5; .5 SOLUTION RESPIRATORY (INHALATION) at 15:01

## 2024-03-17 ASSESSMENT — COGNITIVE AND FUNCTIONAL STATUS - GENERAL
MOBILITY SCORE: 16
HELP NEEDED FOR BATHING: A LOT
STANDING UP FROM CHAIR USING ARMS: A LITTLE
DRESSING REGULAR LOWER BODY CLOTHING: A LOT
DAILY ACTIVITIY SCORE: 18
TOILETING: A LOT
MOVING FROM LYING ON BACK TO SITTING ON SIDE OF FLAT BED WITH BEDRAILS: A LITTLE
TURNING FROM BACK TO SIDE WHILE IN FLAT BAD: A LITTLE
MOVING TO AND FROM BED TO CHAIR: A LITTLE
CLIMB 3 TO 5 STEPS WITH RAILING: A LOT
WALKING IN HOSPITAL ROOM: A LOT

## 2024-03-17 ASSESSMENT — PAIN - FUNCTIONAL ASSESSMENT
PAIN_FUNCTIONAL_ASSESSMENT: 0-10

## 2024-03-17 ASSESSMENT — PAIN SCALES - GENERAL
PAINLEVEL_OUTOF10: 0 - NO PAIN

## 2024-03-17 NOTE — PROGRESS NOTES
Subjective Data:  Mr. Glass is resting sitting up in a chair. States he is feeling better. He does not feel the swelling in his legs is worse. Denies chest pain or heart palpitations.     Overnight Events:       Objective Data:  Last Recorded Vitals:  Vitals:    03/16/24 2000 03/16/24 2100 03/17/24 0000 03/17/24 0400   BP: 103/79  111/80 103/68   Pulse: 83 77 66 66   Resp: 24 20 16 23   Temp: 36.4 °C (97.5 °F)  36.6 °C (97.9 °F) 36.6 °C (97.9 °F)   TempSrc:       SpO2: 92% 91% 94% 92%   Weight:       Height:           Last Labs:  CBC - 3/17/2024:  5:13 AM  9.0 17.5 203    55.3      CMP - 3/17/2024:  5:13 AM  8.7 6.9 18 --- 1.6   3.3 3.4 22 73      PTT - No results in last year.  _   _ _     TROPHS   Date/Time Value Ref Range Status   03/12/2024 07:07  0 - 20 ng/L Final     Comment:     Previous result verified on 3/12/2024 0048 on specimen/case [id number] called with component TRPHS for procedure Troponin I, High Sensitivity with value 89 ng/L.   03/12/2024 03:08  0 - 20 ng/L Final     Comment:     Previous result verified on 3/12/2024 0048 on specimen/case [id number] called with component TRPHS for procedure Troponin I, High Sensitivity with value 89 ng/L.   03/12/2024 12:15 AM 89 0 - 20 ng/L Final     BNP   Date/Time Value Ref Range Status   03/15/2024 05:15  0 - 99 pg/mL Final   03/10/2024 11:25  0 - 99 pg/mL Final     HGBA1C   Date/Time Value Ref Range Status   02/20/2024 10:35 AM 5.8 see below % Final   01/19/2023 10:23 AM 7.3 % Final     Comment:          Diagnosis of Diabetes-Adults   Non-Diabetic: < or = 5.6%   Increased risk for developing diabetes: 5.7-6.4%   Diagnostic of diabetes: > or = 6.5%  .       Monitoring of Diabetes                Age (y)     Therapeutic Goal (%)   Adults:          >18           <7.0   Pediatrics:    13-18           <7.5                   7-12           <8.0                   0- 6            7.5-8.5   American Diabetes Association. Diabetes  Care 33(S1), Jan 2010.     09/10/2021 01:29 PM 7.9 % Final     Comment:          Diagnosis of Diabetes-Adults   Non-Diabetic: < or = 5.6%   Increased risk for developing diabetes: 5.7-6.4%   Diagnostic of diabetes: > or = 6.5%  .       Monitoring of Diabetes                Age (y)     Therapeutic Goal (%)   Adults:          >18           <7.0   Pediatrics:    13-18           <7.5                   7-12           <8.0                   0- 6            7.5-8.5   American Diabetes Association. Diabetes Care 33(S1), Jan 2010.       LDLCALC   Date/Time Value Ref Range Status   02/20/2024 10:35  <=99 mg/dL Final     Comment:                                 Near   Borderline      AGE      Desirable  Optimal    High     High     Very High     0-19 Y     0 - 109     ---    110-129   >/= 130     ----    20-24 Y     0 - 119     ---    120-159   >/= 160     ----      >24 Y     0 -  99   100-129  130-159   160-189     >/=190       VLDL   Date/Time Value Ref Range Status   02/20/2024 10:35 AM 16 0 - 40 mg/dL Final   09/10/2021 01:29 PM 27 0 - 40 mg/dL Final   09/18/2018 02:26 PM 22 0 - 40 mg/dL Final      Last I/O:  I/O last 3 completed shifts:  In: 930 (6.5 mL/kg) [P.O.:780; IV Piggyback:150]  Out: 3675 (25.8 mL/kg) [Urine:3675 (0.7 mL/kg/hr)]  Weight: 142.4 kg     Past Cardiology Tests (Last 3 Years):  EKG:      Echo:  Transthoracic echo (TTE) complete 03/11/2024  1. Left ventricular systolic function is moderately decreased with a 30-35% estimated ejection fraction.   2. Basal inferolateral segment is abnormal.   3. Poorly visualized anatomical structures due to suboptimal image quality.   4. Moderate aortic valve stenosis.   5. There is global hypokinesis of the left ventricle with minor regional variations.      TTE (06/18/2019):   -Low normal LV systolic function  -Mild concentric LVH  -Doppler flow suggestive of abnormal Left ventricular relaxation  -Normal right ventricular size and function  -Slightly enlarged left  atrium.   -Moderate calcific aortic stenosis  -Trace mitral regurg      Ejection Fractions:  EF   Date/Time Value Ref Range Status   03/11/2024 02:04 PM 33 %      Cath:  No results found for this or any previous visit from the past 1095 days.    Stress Test:  No results found for this or any previous visit from the past 1095 days.    Cardiac Imaging:  CXR (03/08/2024):   IMPRESSION:  -Moderate right pleural effusion and right lung base consolidation. Findings could be due to pneumonia. Other underlying process not excluded. Follow-up recommended.   -Trace left pleural effusion and cardiomegaly. Possible interstitial edema.     Xray Abd (03/12/2024):   IMPRESSION:  1.  Enteric tube approximately 3.8 cm above the kathie.  2. Evaluation of the enteric tube is limited and appears looped high in the right upper quadrant however may be related to patient positioning and rotation. Repeat imaging may be considered with proper patient positioning to delineate proper position.  3. Left lower lobe airspace opacities concerning for pneumonia. Possible large right pleural effusion.  4. Paucity of bowel gas noted.     CXR (03/12/2024):   IMPRESSION:  1.  Enteric tube approximately 3.8 cm above the kathie.  2. Evaluation of the enteric tube is limited and appears looped high in the right upper quadrant however may be related to patient positioning and rotation. Repeat imaging may be considered with proper patient positioning to delineate proper position.  3. Left lower lobe airspace opacities concerning for pneumonia. Possible large right pleural effusion.  4. Paucity of bowel gas noted.      Inpatient Medications:  Scheduled medications   Medication Dose Route Frequency    [MAR Hold] allopurinol  300 mg oral Daily    [MAR Hold] aspirin  81 mg oral Daily    cefTRIAXone  1 g intravenous q24h    [MAR Hold] docusate sodium  100 mg oral BID    [MAR Hold] empagliflozin  10 mg oral Daily    [MAR Hold] enoxaparin  40 mg subcutaneous q12h  SHARRON    [MAR Hold] tiotropium  2 puff inhalation Daily    And    [MAR Hold] fluticasone furoate-vilanteroL  1 puff inhalation Daily    [MAR Hold] folic acid  1 mg oral Daily    furosemide  40 mg intravenous q24h    [MAR Hold] insulin lispro  0-10 Units subcutaneous 4x daily    [MAR Hold] ipratropium-albuteroL  3 mL nebulization TID    [MAR Hold] levothyroxine  125 mcg oral Daily    [MAR Hold] melatonin  6 mg oral Nightly    [MAR Hold] metoprolol tartrate  25 mg oral BID    [MAR Hold] multivitamin with iron-minerals  15 mL oral Daily    [MAR Hold] nicotine  1 patch transdermal Daily    [MAR Hold] perflutren lipid microspheres  1 mL of dilution intravenous Once in imaging    [MAR Hold] polyethylene glycol  17 g oral Daily    [MAR Hold] potassium chloride  20 mEq oral Daily    predniSONE  40 mg oral Daily    [MAR Hold] sennosides  2 tablet oral Nightly    [MAR Hold] thiamine  100 mg oral Daily    [Held by provider] valsartan  320 mg oral Daily     PRN medications   Medication    [MAR Hold] acetaminophen    Or    [MAR Hold] acetaminophen    Or    [MAR Hold] acetaminophen    [MAR Hold] bisacodyl    [MAR Hold] dextrose 10 % in water (D10W)    [MAR Hold] dextrose    [MAR Hold] glucagon    [MAR Hold] guaiFENesin    [MAR Hold] ipratropium-albuteroL    oxygen    oxygen     Continuous Medications   Medication Dose Last Rate    oxygen   10 L/min (03/15/24 2125)       Physical Exam:  Physical Exam  Vitals reviewed.   HENT:      Head: Normocephalic.      Nose: Nose normal.   Eyes:      Pupils: Pupils are equal, round, and reactive to light.   Cardiovascular:      Rate and Rhythm: Normal rate and regular rhythm.   Pulmonary:      Effort: Pulmonary effort is normal.      Breath sounds: decreased breath sounds in the bases   Abdominal:      General: Abdomen is flat.      Palpations: Abdomen is soft.   Musculoskeletal:         General: Normal range of motion.      Cervical back: Normal range of motion.   Skin:     General: Skin is  warm and dry.   Neurological:      General: No focal deficit present.      Mental Status: He is alert and oriented to person, place, and time.   Psychiatric:         Mood and Affect: Mood normal.      Extremities bilateral peripheral edema       Assessment/Plan   69 yo male from home with h/o obesity, MELISSA on CPAP, COPD, current smoker, type 2 DM, hypertension, lymphedema, moderate aortic stenosis (6/2019) who presented from PCP office with symptoms of increased LE edema, abdominal bloating, progressive SOB over the past year found to be hypoxic in the ER. On admission was Intermittently refusing IV Lasix, I&O not well monitored. Discussed urinating only into urinals, recording how much urine vs how frequent he urinates. Refused return to lasix gtt, lipscomb or external catheter. Originally refusing echo d/t concern for needing to urinate during the testing. Discussed accommodations and ability to use the bathroom in echo if necessary. Patient was then agreeable to echocardiogram. Echo obtained with newly reduced ejection fraction. Decompensated overnight hypoxic, agitated, required Ativan, Precedex, BiPAP and eventually intubation. Patient weaned off of IV pressor support (03/13). Able to be extubated successfully (3/14)  to HFNC, diuresing well.      #Acute hypoxic respiratory failure 2/2 decompensated heart failure, Pneumonia, COPD w/ current smoking, mucous plugging s/p bronchoscopy   #Acute decompensated systolic heart failure (new diagnosis)  #Non-sustained Ventricular tachycardia   #Septic shock, resolved   #Moderate aortic stenosis   #Chronic lymphedema BLE   #NSR with PACs on telemetry  (no afib noted)      -Would recommend continuation of IV diuresis  -Continue Metoprolol   -Holding Spironolactone, ARB given acute decompensation, resume as able.   -Echo reviewed with newly reduced LVSF, EF 30-35%, abnormal basal inferolateral segment, global hypokinesis, mod AS. Discussed with patient.   -Strict I/O, monitor  renal function and electrolyte  - >171> 733  -Will plan for LHC/RHC for new cardiomyopathy in the future, no plan for today.   -Will follow peripherally over the weekend, see again on Monday.           Peripheral IV 03/12/24 18 G Left;Ventral Forearm (Active)   Site Assessment Clean;Dry;Intact 03/17/24 0900   Dressing Status Clean;Dry 03/17/24 0900   Number of days: 5       Peripheral IV 03/13/24 18 G Proximal;Right Forearm (Active)   Site Assessment Clean;Dry;Intact 03/17/24 0900   Dressing Status Clean;Dry 03/17/24 0900   Number of days: 4       Peripheral IV 03/13/24 20 G Right Wrist (Active)   Site Assessment Clean;Dry;Intact 03/17/24 0900   Dressing Status Clean;Dry 03/17/24 0900   Number of days: 4       Code Status:  Full Code    I spent minutes in the professional and overall care of this patient.        Jordana Carey, APRN-CNP

## 2024-03-17 NOTE — PROGRESS NOTES
"Alo Glass is a 68 y.o. male on day 9 of admission presenting with Heart failure exacerbated by sotalol (CMS/HCC).    Subjective   Feeling ok today.  Coughed up a little blood mixed in his sputum       Objective     Physical Exam  Vitals reviewed.   Constitutional:       Appearance: He is ill-appearing.   HENT:      Head: Normocephalic and atraumatic.   Eyes:      Extraocular Movements: Extraocular movements intact.   Cardiovascular:      Rate and Rhythm: Normal rate and regular rhythm.      Heart sounds: Normal heart sounds.   Pulmonary:      Effort: Pulmonary effort is normal.      Comments: Coarse breath sounds bilaterally   Abdominal:      Palpations: Abdomen is soft.      Tenderness: There is no abdominal tenderness.   Musculoskeletal:      Cervical back: Normal range of motion.      Right lower le+ Pitting Edema present.      Left lower le+ Pitting Edema present.   Skin:     General: Skin is warm.   Neurological:      General: No focal deficit present.      Mental Status: He is alert and oriented to person, place, and time. Mental status is at baseline.   Psychiatric:         Mood and Affect: Mood normal.         Behavior: Behavior normal.         Last Recorded Vitals  Blood pressure 103/68, pulse 66, temperature 36.6 °C (97.9 °F), resp. rate 23, height 1.803 m (5' 10.98\"), weight 142 kg (313 lb 15 oz), SpO2 92 %.  Intake/Output last 3 Shifts:  I/O last 3 completed shifts:  In: 930 (6.5 mL/kg) [P.O.:780; IV Piggyback:150]  Out: 3675 (25.8 mL/kg) [Urine:3675 (0.7 mL/kg/hr)]  Weight: 142.4 kg     Relevant Results  Scheduled medications  allopurinol, 300 mg, oral, Daily  aspirin, 81 mg, oral, Daily  cefTRIAXone, 1 g, intravenous, q24h  docusate sodium, 100 mg, oral, BID  empagliflozin, 10 mg, oral, Daily  enoxaparin, 40 mg, subcutaneous, q12h SHARRON  tiotropium, 2 puff, inhalation, Daily   And  fluticasone furoate-vilanteroL, 1 puff, inhalation, Daily  folic acid, 1 mg, oral, Daily  furosemide, 40 mg, " intravenous, q24h  insulin lispro, 0-10 Units, subcutaneous, 4x daily  ipratropium-albuteroL, 3 mL, nebulization, TID  levothyroxine, 125 mcg, oral, Daily  melatonin, 6 mg, oral, Nightly  metoprolol tartrate, 25 mg, oral, BID  multivitamin with iron-minerals, 15 mL, oral, Daily  nicotine, 1 patch, transdermal, Daily  perflutren lipid microspheres, 1 mL of dilution, intravenous, Once in imaging  polyethylene glycol, 17 g, oral, Daily  potassium chloride, 20 mEq, oral, Daily  predniSONE, 40 mg, oral, Daily  sennosides, 2 tablet, oral, Nightly  thiamine, 100 mg, oral, Daily  [Held by provider] valsartan, 320 mg, oral, Daily      Continuous medications  oxygen, , Last Rate: 10 L/min (03/15/24 2125)      PRN medications  PRN medications: acetaminophen **OR** acetaminophen **OR** acetaminophen, bisacodyl, dextrose 10 % in water (D10W), dextrose, glucagon, guaiFENesin, ipratropium-albuteroL, oxygen, oxygen    Results for orders placed or performed during the hospital encounter of 03/08/24 (from the past 24 hour(s))   POCT GLUCOSE   Result Value Ref Range    POCT Glucose 140 (H) 74 - 99 mg/dL   POCT GLUCOSE   Result Value Ref Range    POCT Glucose 197 (H) 74 - 99 mg/dL   CBC   Result Value Ref Range    WBC 9.0 4.4 - 11.3 x10*3/uL    nRBC 0.0 0.0 - 0.0 /100 WBCs    RBC 5.23 4.50 - 5.90 x10*6/uL    Hemoglobin 17.5 13.5 - 17.5 g/dL    Hematocrit 55.3 (H) 41.0 - 52.0 %     (H) 80 - 100 fL    MCH 33.5 26.0 - 34.0 pg    MCHC 31.6 (L) 32.0 - 36.0 g/dL    RDW 12.9 11.5 - 14.5 %    Platelets 203 150 - 450 x10*3/uL   Renal Function Panel   Result Value Ref Range    Glucose 121 (H) 74 - 99 mg/dL    Sodium 138 136 - 145 mmol/L    Potassium 4.1 3.5 - 5.3 mmol/L    Chloride 103 98 - 107 mmol/L    Bicarbonate 28 21 - 32 mmol/L    Anion Gap 11 10 - 20 mmol/L    Urea Nitrogen 41 (H) 6 - 23 mg/dL    Creatinine 0.74 0.50 - 1.30 mg/dL    eGFR >90 >60 mL/min/1.73m*2    Calcium 8.7 8.6 - 10.3 mg/dL    Phosphorus 3.3 2.5 - 4.9 mg/dL     Albumin 3.4 3.4 - 5.0 g/dL   Magnesium   Result Value Ref Range    Magnesium 2.53 (H) 1.60 - 2.40 mg/dL   POCT GLUCOSE   Result Value Ref Range    POCT Glucose 137 (H) 74 - 99 mg/dL             Assessment/Plan   Principal Problem:    Heart failure exacerbated by sotalol (CMS/[facility])  Active Problems:    Acute on chronic congestive heart failure, unspecified heart failure type (CMS/[facility])    69 yo man w/ h/o chronic systolic heart failure, COPD admitted w/ CHF exacerbation     Neuro - metabolic encephalopathy 2/2 sepsis.  Resolved.        CV - acute on chronic systolic heart failure- Cont Lasix 40mg IV daiily .  Cont metoprolol.  Septic shock - resolved off levophed     Pulm - acute hypoxic resp failure 2/2 mucus plug, COPD exacerbation - s/p bronch today w/ improvement of mucus plug.  COPD exacerbation - cont  prednisone.  Can stop if able to wean to RA.  extubated Thursday.  Pleural effusion on beside lung ultrasound but poor windows for thora.  Recommend fu CT chest as outaptient.  Currently 2L.  Wean O2 as tolerated.      GI - cont diet     Renal - no acute issues      Endo - on SSI and jardiance     ID - septic shock 2/2 strep PNA w/ acute hypoxic resp failure - on ceftriaxone (day 6/7).       Heme - Lovenox for DVT prophylaxis     Dispo - Pt stable for transfer to floor.       I have reviewed and evaluated the most recent data and results, personally examined the patient, and formulated the plan of care as presented above. This patient was critically ill and required continued critical care treatment. Teaching and any separately billable procedures are not included in the time calculation.      Benjamin Hernandez MD

## 2024-03-17 NOTE — PROGRESS NOTES
Physical Therapy                 Therapy Communication Note    Patient Name: Alo Glass  MRN: [id number]  Today's Date: 3/17/2024     Discipline: Physical Therapy    Missed Visit Reason: Missed Visit Reason: Patient refused    Missed Time: Attempt @840 and @1342    Comment: att tx in am, pt up in chair, but stated that he was tired from not sleeping the previous night, and would like to try tx later in the day.  2nd att at tx, pt stated that he was still feeling fatigued after transferring out of ICU, getting up to use the restroom, and was just sitting EOB.  Pt would like to try tx another day

## 2024-03-17 NOTE — CARE PLAN
Problem: Pain - Adult  Goal: Verbalizes/displays adequate comfort level or baseline comfort level  3/17/2024 1711 by Miky Gregory RN  Outcome: Progressing  3/17/2024 1158 by Miky Gregory RN  Flowsheets (Taken 3/17/2024 1158)  Verbalizes/displays adequate comfort level or baseline comfort level:   Encourage patient to monitor pain and request assistance   Assess pain using appropriate pain scale   Administer analgesics based on type and severity of pain and evaluate response   Implement non-pharmacological measures as appropriate and evaluate response   Consider cultural and social influences on pain and pain management   Notify Licensed Independent Practitioner if interventions unsuccessful or patient reports new pain   The patient's goals for the shift include      The clinical goals for the shift include improved pulse ox  Pt up in chair for most of the day, ambulated with walker to br x1. Pt stating his strength has improved.

## 2024-03-17 NOTE — CARE PLAN
Problem: Pain - Adult  Goal: Verbalizes/displays adequate comfort level or baseline comfort level  Flowsheets (Taken 3/17/2024 1155)  Verbalizes/displays adequate comfort level or baseline comfort level:   Encourage patient to monitor pain and request assistance   Assess pain using appropriate pain scale   Administer analgesics based on type and severity of pain and evaluate response   Implement non-pharmacological measures as appropriate and evaluate response   Consider cultural and social influences on pain and pain management   Notify Licensed Independent Practitioner if interventions unsuccessful or patient reports new pain   The patient's goals for the shift include      The clinical goals for the shift include improved respiratory status    Over the shift, the patient did not make progress toward the following goals. Barriers to progression include ***. Recommendations to address these barriers include ***.

## 2024-03-17 NOTE — PROGRESS NOTES
Palliative Care Social Work Note     Met with pt.  Pt reports he is doing okay.  Pt wants information on dc options:  home with hired care vs SNF.  Advised pt [facility] will follow up tomorrow.  Reviewed code status.  Pt states he wishes to remain full code at this time.  Discussed Advance Directives.  Pt states he has not looked at packet yet.  Pt states he is considering the need to do Estate Planning and may defer completion of Advance Directives and do all documents at one time.  Reviewed that without documents, sister is NOK and decision maker if pt is unable to make his own decisions.  Pt confirmed understanding.

## 2024-03-17 NOTE — PROGRESS NOTES
[facility] Hospitalist Progress Note       [phone]: Please page me for patient care issues.  [phone]: Please page night hospitalist for any issues.     Alo Glass  :  1955(68 y.o.)  MRN:  [id number]  PCP: Omari Schumacher MD      Principal Problem:    Heart failure exacerbated by sotalol (CMS/HCC)  Active Problems:    Acute on chronic congestive heart failure, unspecified heart failure type (CMS/HCC)      Assessment and Plan:   Alo Glass is a 68 y.o. male istory of hypertension, COPD, type 2 diabetes mellitus, severe obesity, and obstructive sleep apnea presenting with SOB.     # ARF w/ hypoxia 2/2 acute CHF  # Cardiogenic and septic shock   # Acute HFrEF (30-35%, 3/9/24) w/ global hypokinesis  #Strep pneumoniae pneumonia (RLL)  # AECOPD  # Acute metabolic encephalopathy, resolved  # Moderate aortic stenosis  # Non-IDDM II w/ hyperglycemia  # Hypothyroidism  # Tobacco abuse  # MELISSA/OHS not on CPAP  # Class  II obesity with serious co morbidities  -CHF orderset, daily weight, strict I/O  -s/p ETT (3/12/24-3/14/24)  -s/p on pressors (3/12/24-3/14)  -abx: Cefepime-> ceftriaxone (last day of antibiotics on 3/19/24)  -PRN IV lasix w/ caution due to hypotension, hold for MAP < 65  - limited GDMT due to risk of hypotension: on aspirin, metoprolol and Jardiance.  Losartan on hold  -Plans for eventual cardiac cath in the future  - recs appreciated: cardiology, pulm/crit      DVT Prophylaxis: Subq Lovenox    Code status: Full Code  Diet: Adult diet Regular, Cardiac, Carb Controlled; 60 gram carb/meal, 30 gram Carb evening snack; 1500 mL fluid; 70 gm fat; 2 - 3 grams Sodium    Disposition:await test results, await consultant recommendations, await clinical improvement, and await placement    Level of MDM:  High    I personally examined the patient and I personally reviewed chart, data, labs radiology reports    Family Communication  Number :   Name of Designated Family Representative:       Total  time spent: 35 minutes, of total time providing counseling or in coordination of care. Total time on this day of visit includes record and documentation review before and after visit including documentation and time not explicitly included on EMR time stamp      Subjective:   Interval History:  HPI  The patient complains of dyspnea  The patient feels their symptoms areimproving  no events or new concerns    Scheduled Meds:allopurinol, 300 mg, oral, Daily  aspirin, 81 mg, oral, Daily  cefTRIAXone, 1 g, intravenous, q24h  docusate sodium, 100 mg, oral, BID  empagliflozin, 10 mg, oral, Daily  enoxaparin, 40 mg, subcutaneous, q12h SHARRON  tiotropium, 2 puff, inhalation, Daily   And  fluticasone furoate-vilanteroL, 1 puff, inhalation, Daily  folic acid, 1 mg, oral, Daily  furosemide, 40 mg, intravenous, q24h  insulin lispro, 0-10 Units, subcutaneous, 4x daily  ipratropium-albuteroL, 3 mL, nebulization, TID  levothyroxine, 125 mcg, oral, Daily  melatonin, 6 mg, oral, Nightly  metoprolol tartrate, 25 mg, oral, BID  multivitamin with iron-minerals, 15 mL, oral, Daily  nicotine, 1 patch, transdermal, Daily  pantoprazole, 40 mg, oral, Daily before breakfast  perflutren lipid microspheres, 1 mL of dilution, intravenous, Once in imaging  polyethylene glycol, 17 g, oral, Daily  potassium chloride, 20 mEq, oral, Daily  predniSONE, 40 mg, oral, Daily  sennosides, 2 tablet, oral, Nightly  thiamine, 100 mg, oral, Daily  [Held by provider] valsartan, 320 mg, oral, Daily      Continuous Infusions:oxygen, , Last Rate: 10 L/min (03/15/24 2125)      PRN Meds:PRN medications: acetaminophen **OR** acetaminophen **OR** acetaminophen, bisacodyl, dextrose 10 % in water (D10W), dextrose, glucagon, guaiFENesin, ipratropium-albuteroL, oxygen, oxygen    Review of Systems   All other systems reviewed and are negative.    Interval Pertinent History:  Social History     Tobacco Use    Smoking status: Never    Smokeless tobacco: Never   Substance Use  Topics    Alcohol use: Not on file         Objective:   Patient Vitals for the past 24 hrs:   BP Temp Pulse Resp SpO2   24 0400 103/68 36.6 °C (97.9 °F) 66 23 92 %   24 0000 111/80 36.6 °C (97.9 °F) 66 16 94 %   24 2100 -- -- 77 20 91 %   24 2000 103/79 36.4 °C (97.5 °F) 83 24 92 %   24 1900 -- -- -- -- 95 %   24 1800 -- -- 81 (!) 36 90 %   24 1700 -- -- 75 21 92 %   24 1600 -- -- 73 (!) 30 95 %   24 1500 -- -- 69 (!) 27 (!) 82 %   24 1448 -- -- -- -- 94 %   24 1400 -- -- 76 19 93 %   24 1300 -- -- 79 (!) 27 --   24 1200 107/69 -- 69 (!) 27 95 %   24 1100 -- -- 71 18 94 %   24 1000 -- -- 74 23 94 %         Average, Min, and Max for last 24 hours Vitals:  TEMPERATURE:  Temp  Av.6 °C (97.8 °F)  Min: 36.4 °C (97.5 °F)  Max: 36.6 °C (97.9 °F)    RESPIRATIONS RANGE: Resp  Av.9  Min: 16  Max: 36    PULSE RANGE: Pulse  Av.8  Min: 66  Max: 83    BLOOD PRESSURE RANGE:  Systolic (24hrs), Av , Min:103 , Max:111   ; Diastolic (24hrs), Av, Min:68, Max:80      PULSE OXIMETRY RANGE: SpO2  Av.4 %  Min: 82 %  Max: 95 %    I/O last 3 completed shifts:  In: 930 (6.5 mL/kg) [P.O.:780; IV Piggyback:150]  Out: 3675 (25.8 mL/kg) [Urine:3675 (0.7 mL/kg/hr)]  Weight: 142.4 kg   Weight change: -1.4 kg (-3 lb 1.4 oz)     Physical Exam  Vitals and nursing note reviewed.   Constitutional:       General: He is not in acute distress.     Appearance: He is morbidly obese. He is ill-appearing (Chronically).      Interventions: Nasal cannula in place.   HENT:      Head: Normocephalic and atraumatic.      Right Ear: External ear normal.      Left Ear: External ear normal.      Nose: Nose normal. No congestion or rhinorrhea.      Mouth/Throat:      Mouth: Mucous membranes are moist.   Eyes:      Extraocular Movements: Extraocular movements intact.      Pupils: Pupils are equal, round, and reactive to light.   Cardiovascular:      Rate  and Rhythm: Normal rate and regular rhythm.      Pulses: Normal pulses.      Heart sounds: Normal heart sounds.   Pulmonary:      Effort: Pulmonary effort is normal.      Breath sounds: Rhonchi present.   Abdominal:      General: Abdomen is flat. Bowel sounds are normal.      Palpations: Abdomen is soft.   Musculoskeletal:         General: Normal range of motion.      Cervical back: Normal range of motion and neck supple.      Right lower leg: Edema present.      Left lower leg: Edema present.   Skin:     General: Skin is warm and dry.      Capillary Refill: Capillary refill takes less than 2 seconds.   Neurological:      General: No focal deficit present.      Mental Status: He is alert and oriented to person, place, and time. Mental status is at baseline.   Psychiatric:         Mood and Affect: Mood normal.         Behavior: Behavior is cooperative.         Thought Content: Thought content normal.         Judgment: Judgment normal.         Lab Results   Component Value Date     03/17/2024    K 4.1 03/17/2024     03/17/2024    CO2 28 03/17/2024    BUN 41 (H) 03/17/2024    CREATININE 0.74 03/17/2024    GLUCOSE 121 (H) 03/17/2024    CALCIUM 8.7 03/17/2024    PROT 6.9 03/12/2024    BILITOT 1.6 (H) 03/12/2024    ALKPHOS 73 03/12/2024    AST 18 03/12/2024    ALT 22 03/12/2024       Lab Results   Component Value Date    WBC 9.0 03/17/2024    HGB 17.5 03/17/2024    HCT 55.3 (H) 03/17/2024     (H) 03/17/2024     03/17/2024    LYMPHOPCT 4.0 03/12/2024    RBC 5.23 03/17/2024    MCH 33.5 03/17/2024    MCHC 31.6 (L) 03/17/2024    RDW 12.9 03/17/2024    CRP 1.62 (A) 10/25/2019       Lab Results   Component Value Date    TSH 2.64 03/09/2024     Lab Results   Component Value Date    LACTATE 1.4 03/12/2024     (H) 03/15/2024       IMAGES:  Encounter Date: 03/08/24   Electrocardiogram, 12-lead PRN ACS symptoms   Result Value    Ventricular Rate 86    Atrial Rate 86    AL Interval 208    QRS Duration  146    QT Interval 388    QTC Calculation(Bazett) 464    P Axis 49    R Axis 140    T Axis 6    QRS Count 14    Q Onset 207    P Onset 103    P Offset 179    T Offset 401    QTC Fredericia 437    Narrative    Sinus rhythm with occasional Premature ventricular complexes  Right bundle branch block  T wave abnormality, consider lateral ischemia  Abnormal ECG  When compared with ECG of 12-MAR-2024 04:06, (unconfirmed)  Premature atrial complexes are no longer Present  T wave inversion now evident in Lateral leads  Confirmed by Heather Barrett ([id number]) on 3/12/2024 10:49:25 AM     XR chest 1 view    Result Date: 3/13/2024  Impression: 1. Cardiomegaly. Vascular congestion. Large right pleural effusion with atelectasis/consolidation at the right lower lobe. Radiographic follow-up to resolution recommended. 2. Enteric tube terminates at the right upper quadrant, at the expected location of the gastric antrum.       MACRO: None.   Signed by: Deepali Casillas 3/13/2024 3:20 AM Dictation workstation:   [id number]    XR chest 1 view    Result Date: 3/11/2024  Impression: 1.  Slightly improved right-sided pleural effusion which is persistently moderate.     Signed by: Eugene Alvarenga 3/11/2024 7:19 PM Dictation workstation:   [id number]    Transthoracic echo (TTE) complete    Result Date: 3/11/2024   [facility], [address] [place] [address]               Tel [phone] and Fax [phone] TRANSTHORACIC ECHOCARDIOGRAM REPORT  Patient Name:      BRAYAN Key Physician:    [id number] Heather Barrett MD Study Date:        3/11/2024            Ordering Provider:    [id number] RORY GRAVES MRN/PID:           [id number]             Fellow: Accession#:        [id number]         Nurse:                Khushbu Holman RN Date of Birth/Age: 1955 / 68 years Sonographer:          Goldie  Chuck                                                               [facility] Gender:            M                    Additional Staff: Height:            177.80 cm            Admit Date:           3/8/2024 Weight:            147.42 kg            Admission Status:     Inpatient -                                                               Routine BSA / BMI:         2.57 m2 / 46.63      Encounter#:           [id number]                    kg/m2                                         Department Location:  [facility] Non                                                               Invasive Blood Pressure: 107 /70 mmHg Study Type:    TRANSTHORACIC ECHO (TTE) COMPLETE Diagnosis/ICD: Heart failure, unspecified-I50.9; Acute systolic (congestive)                heart failure (CHF)-I50.21 Indication:    Congestive Heart Failure CPT Code:      Echo Complete w Full Doppler-[id number] Patient History: Pertinent History: COPD, HTN, LE Edema, Dyspnea and Elev trop., Elev. BNP,                    Smoker,. Study Detail: The following Echo studies were performed: 2D, M-Mode, Doppler and               color flow. Technically challenging study due to body habitus.               Definity used as a contrast agent for endocardial border               definition. Total contrast used for this procedure was 1.5 mL via               IV push. The patient was awake.  PHYSICIAN INTERPRETATION: Left Ventricle: The left ventricular systolic function is moderately decreased, with an estimated ejection fraction of 30-35%. There is global hypokinesis of the left ventricle with minor regional variations. The left ventricular cavity size is normal. Left ventricular diastolic filling was indeterminate. LV Wall Scoring: The basal inferolateral segment is akinetic. Left Atrium: The left atrium is mildly dilated. Right Ventricle: The right ventricle is normal in size. There is normal right ventricular global systolic function. Right Atrium: The right atrium  is normal in size. Aortic Valve: The aortic valve was not well visualized. There is evidence of moderate aortic valve stenosis. There is no evidence of aortic valve regurgitation. The peak instantaneous gradient of the aortic valve is 52.7 mmHg. The mean gradient of the aortic valve is 26.0 mmHg. Mitral Valve: The mitral valve is mild to moderately thickened. There is mild to moderate mitral annular calcification. There is mild mitral valve regurgitation. Tricuspid Valve: The tricuspid valve was not well visualized. There is trace to mild tricuspid regurgitation. The right ventricular systolic pressure is unable to be estimated. Pulmonic Valve: The pulmonic valve is not well visualized. There is physiologic pulmonic valve regurgitation. Pericardium: There is no pericardial effusion noted. Aorta: The aortic root is normal. Systemic Veins: The inferior vena cava appears dilated. There is less than 50% IVC collapse with inspiration.  CONCLUSIONS:  1. Left ventricular systolic function is moderately decreased with a 30-35% estimated ejection fraction.  2. Basal inferolateral segment is abnormal.  3. Poorly visualized anatomical structures due to suboptimal image quality.  4. Moderate aortic valve stenosis.  5. There is global hypokinesis of the left ventricle with minor regional variations. QUANTITATIVE DATA SUMMARY: 2D MEASUREMENTS:                           Normal Ranges: IVSd:          1.14 cm    (0.6-1.1cm) LVPWd:         1.57 cm    (0.6-1.1cm) LVIDd:         5.26 cm    (3.9-5.9cm) LVIDs:         4.41 cm LV Mass Index: 117.2 g/m2 LV % FS        16.2 % LA VOLUME:                               Normal Ranges: LA Vol A4C:        77.9 ml    (22+/-6mL/m2) LA Vol A2C:        84.8 ml LA Vol BP:         83.7 ml LA Vol Index A4C:  30.4ml/m2 LA Vol Index A2C:  33.0 ml/m2 LA Vol Index BP:   32.6 ml/m2 LA Area A4C:       24.1 cm2 LA Area A2C:       24.4 cm2 LA Major Axis A4C: 6.3 cm LA Major Axis A2C: 6.0 cm LA Volume Index:    30.5 ml/m2 LA Vol A4C:        71.4 ml LA Vol A2C:        78.4 ml RA VOLUME BY A/L METHOD:                       Normal Ranges: RA Area A4C: 17.0 cm2 AORTA MEASUREMENTS:                    Normal Ranges: Asc Ao, d: 3.10 cm (2.1-3.4cm) LV SYSTOLIC FUNCTION BY 2D PLANIMETRY (MOD):                     Normal Ranges: EF-A4C View: 33.6 % (>=55%) EF-A2C View: 29.1 % EF-Biplane:  33.1 % LV DIASTOLIC FUNCTION:                     Normal Ranges: MV Peak E: 0.90 m/s (0.7-1.2 m/s) MV Peak A: 0.39 m/s (0.42-0.7 m/s) E/A Ratio: 2.34     (1.0-2.2) MITRAL VALVE:                Normal Ranges: MV DT: 99 msec (150-240msec) AORTIC VALVE:                                    Normal Ranges: AoV Vmax:                3.63 m/s  (<=1.7m/s) AoV Peak P.7 mmHg (<20mmHg) AoV Mean P.0 mmHg (1.7-11.5mmHg) LVOT Max Jero:            0.69 m/s  (<=1.1m/s) AoV VTI:                 60.70 cm  (18-25cm) LVOT VTI:                12.80 cm LVOT Diameter:           2.60 cm   (1.8-2.4cm) AoV Area, VTI:           1.12 cm2  (2.5-5.5cm2) AoV Area,Vmax:           1.01 cm2  (2.5-4.5cm2) AoV Dimensionless Index: 0.21  RIGHT VENTRICLE: RV Basal 3.71 cm RV Mid   2.98 cm RV Major 9.7 cm TAPSE:   22.4 mm RV s'    0.13 m/s TRICUSPID VALVE/RVSP:                   Normal Ranges: IVC Diam: 2.32 cm PULMONIC VALVE:                      Normal Ranges: PV Max Jero: 0.7 m/s  (0.6-0.9m/s) PV Max P.7 mmHg  [id number] Heather Barrett MD Electronically signed on 3/11/2024 at 4:04:49 PM  Wall Scoring  ** Final (Updated) **    === 24 ===    XR CHEST 1 VIEW    - Impression -  1. Cardiomegaly. Vascular congestion. Large right pleural effusion  with atelectasis/consolidation at the right lower lobe. Radiographic  follow-up to resolution recommended.  2. Enteric tube terminates at the right upper quadrant, at the  expected location of the gastric antrum.        MACRO:  None.    Signed by: Deepali Casillas 3/13/2024 3:20 AM  Dictation workstation:    "[id number]  === 10/02/18 ===    CT HEART CALCIUM SCORING WO CONTRAST    - Impression -  1. Coronary artery calcium score of 164.2*.  2. There is some dependent density in the right mainstem bronchus  probably sequela of aspiration. Scarring or atelectasis at the right  lung base and small focal consolidation/atelectasis anteriorly on the  right.  3. Prominence ascending thoracic aorta up to 3.9 cm.  4. Prominent fat attenuation in the region of the interatrial septum  as described probably due to lipomatosis involvement and appearing  slightly more pronounced since previous study.  5.  view demonstrates prominent paratracheal density in the  upper mediastinum bilaterally not included on CT imaging, may relate  to overlying vascular structures or pronounced mediastinal fat  however correlation with dedicated complete CT chest should be  considered for better evaluation.  *Coronary artery calcium scoring may be helpful in predicting the  risk for future coronary heart disease events.  According to the  American College [employer] Foundation Clinical Expert Consensus  Task Force, such testing provides important prognostic information in  patients with more than one coronary heart disease risk factor. The  coronary artery calcium score correlates with the annual risk of a  non-fatal myocardial infarction or coronary heart disease death.    Coronary artery score            Annual Risk    0-99                             0.4%  100-399                        1.3%  >400                            2.4%    These three \"breakpoints\" correspond to lower, intermediate and high  risk states for future coronary events.  Such information should be  used, along with appropriate clinical judgment, to make decisions  regarding the intensity of risk factor management strategies to treat  blood lipids and to modify other non-lipid coronary risk factors.    Reference: Anastacia P et al. Circulation.  2007; 115:402-426  === " 03/08/24 ===    XR CHEST 1 VIEW    - Impression -  1. Cardiomegaly. Vascular congestion. Large right pleural effusion  with atelectasis/consolidation at the right lower lobe. Radiographic  follow-up to resolution recommended.  2. Enteric tube terminates at the right upper quadrant, at the  expected location of the gastric antrum.        MACRO:  None.    Signed by: Deepali Casillas 3/13/2024 3:20 AM  Dictation workstation:   [id number]      Additional results of the last 24 hours have been reviewed.    Dictated using [employer] Version 2.4  Proof read however unrecognized voice recognition errors may have occurred     Electronically signed by Lou Madrigal DO on 03/17/24 at 9:18 AM

## 2024-03-17 NOTE — NURSING NOTE
Pt transferred from icu, pt placed on telemetry and oriented to room. Pt has many meds on a mar hold. I don't nor do my coworkers now how to get them unheld. Talked to pharmacy they told me the attending has to unhold  them. Attending notified.

## 2024-03-18 PROBLEM — G47.33 OSA (OBSTRUCTIVE SLEEP APNEA): Status: ACTIVE | Noted: 2024-03-18

## 2024-03-18 PROBLEM — J96.01 ACUTE HYPOXIC RESPIRATORY FAILURE (MULTI): Status: ACTIVE | Noted: 2024-03-18

## 2024-03-18 LAB
ALBUMIN SERPL BCP-MCNC: 3.4 G/DL (ref 3.4–5)
ANION GAP SERPL CALC-SCNC: 11 MMOL/L (ref 10–20)
BUN SERPL-MCNC: 36 MG/DL (ref 6–23)
CALCIUM SERPL-MCNC: 8.9 MG/DL (ref 8.6–10.3)
CHLORIDE SERPL-SCNC: 102 MMOL/L (ref 98–107)
CO2 SERPL-SCNC: 31 MMOL/L (ref 21–32)
CREAT SERPL-MCNC: 0.62 MG/DL (ref 0.5–1.3)
EGFRCR SERPLBLD CKD-EPI 2021: >90 ML/MIN/1.73M*2
ERYTHROCYTE [DISTWIDTH] IN BLOOD BY AUTOMATED COUNT: 12.7 % (ref 11.5–14.5)
GLUCOSE BLD MANUAL STRIP-MCNC: 122 MG/DL (ref 74–99)
GLUCOSE BLD MANUAL STRIP-MCNC: 156 MG/DL (ref 74–99)
GLUCOSE BLD MANUAL STRIP-MCNC: 158 MG/DL (ref 74–99)
GLUCOSE SERPL-MCNC: 115 MG/DL (ref 74–99)
HCT VFR BLD AUTO: 56.8 % (ref 41–52)
HGB BLD-MCNC: 18 G/DL (ref 13.5–17.5)
MAGNESIUM SERPL-MCNC: 2.39 MG/DL (ref 1.6–2.4)
MCH RBC QN AUTO: 33.3 PG (ref 26–34)
MCHC RBC AUTO-ENTMCNC: 31.7 G/DL (ref 32–36)
MCV RBC AUTO: 105 FL (ref 80–100)
NRBC BLD-RTO: 0 /100 WBCS (ref 0–0)
PHOSPHATE SERPL-MCNC: 3.1 MG/DL (ref 2.5–4.9)
PLATELET # BLD AUTO: 224 X10*3/UL (ref 150–450)
POTASSIUM SERPL-SCNC: 4 MMOL/L (ref 3.5–5.3)
RBC # BLD AUTO: 5.41 X10*6/UL (ref 4.5–5.9)
SODIUM SERPL-SCNC: 140 MMOL/L (ref 136–145)
WBC # BLD AUTO: 9 X10*3/UL (ref 4.4–11.3)

## 2024-03-18 PROCEDURE — 2500000004 HC RX 250 GENERAL PHARMACY W/ HCPCS (ALT 636 FOR OP/ED): Performed by: INTERNAL MEDICINE

## 2024-03-18 PROCEDURE — S4991 NICOTINE PATCH NONLEGEND: HCPCS | Performed by: INTERNAL MEDICINE

## 2024-03-18 PROCEDURE — 80069 RENAL FUNCTION PANEL: CPT | Performed by: INTERNAL MEDICINE

## 2024-03-18 PROCEDURE — 94640 AIRWAY INHALATION TREATMENT: CPT

## 2024-03-18 PROCEDURE — 99232 SBSQ HOSP IP/OBS MODERATE 35: CPT | Performed by: INTERNAL MEDICINE

## 2024-03-18 PROCEDURE — 82947 ASSAY GLUCOSE BLOOD QUANT: CPT

## 2024-03-18 PROCEDURE — 97116 GAIT TRAINING THERAPY: CPT | Mod: GP

## 2024-03-18 PROCEDURE — 99233 SBSQ HOSP IP/OBS HIGH 50: CPT | Performed by: PHYSICIAN ASSISTANT

## 2024-03-18 PROCEDURE — 99233 SBSQ HOSP IP/OBS HIGH 50: CPT | Performed by: INTERNAL MEDICINE

## 2024-03-18 PROCEDURE — 36415 COLL VENOUS BLD VENIPUNCTURE: CPT | Performed by: INTERNAL MEDICINE

## 2024-03-18 PROCEDURE — 83735 ASSAY OF MAGNESIUM: CPT | Performed by: INTERNAL MEDICINE

## 2024-03-18 PROCEDURE — 94760 N-INVAS EAR/PLS OXIMETRY 1: CPT

## 2024-03-18 PROCEDURE — 2500000001 HC RX 250 WO HCPCS SELF ADMINISTERED DRUGS (ALT 637 FOR MEDICARE OP): Performed by: INTERNAL MEDICINE

## 2024-03-18 PROCEDURE — 1200000002 HC GENERAL ROOM WITH TELEMETRY DAILY

## 2024-03-18 PROCEDURE — 2500000002 HC RX 250 W HCPCS SELF ADMINISTERED DRUGS (ALT 637 FOR MEDICARE OP, ALT 636 FOR OP/ED): Performed by: INTERNAL MEDICINE

## 2024-03-18 PROCEDURE — 85027 COMPLETE CBC AUTOMATED: CPT | Performed by: INTERNAL MEDICINE

## 2024-03-18 PROCEDURE — 97110 THERAPEUTIC EXERCISES: CPT | Mod: GP

## 2024-03-18 RX ORDER — PREDNISONE 20 MG/1
20 TABLET ORAL DAILY
Status: COMPLETED | OUTPATIENT
Start: 2024-03-19 | End: 2024-03-21

## 2024-03-18 RX ORDER — IPRATROPIUM BROMIDE AND ALBUTEROL SULFATE 2.5; .5 MG/3ML; MG/3ML
3 SOLUTION RESPIRATORY (INHALATION)
Status: DISCONTINUED | OUTPATIENT
Start: 2024-03-18 | End: 2024-03-23 | Stop reason: HOSPADM

## 2024-03-18 RX ORDER — FUROSEMIDE 10 MG/ML
40 INJECTION INTRAMUSCULAR; INTRAVENOUS EVERY 12 HOURS
Status: DISCONTINUED | OUTPATIENT
Start: 2024-03-19 | End: 2024-03-19

## 2024-03-18 RX ORDER — PREDNISONE 10 MG/1
10 TABLET ORAL DAILY
Status: DISCONTINUED | OUTPATIENT
Start: 2024-03-22 | End: 2024-03-23 | Stop reason: HOSPADM

## 2024-03-18 RX ORDER — PREDNISONE 5 MG/1
5 TABLET ORAL DAILY
Status: DISCONTINUED | OUTPATIENT
Start: 2024-03-25 | End: 2024-03-23 | Stop reason: HOSPADM

## 2024-03-18 RX ADMIN — IPRATROPIUM BROMIDE AND ALBUTEROL SULFATE 3 ML: 2.5; .5 SOLUTION RESPIRATORY (INHALATION) at 19:36

## 2024-03-18 RX ADMIN — FUROSEMIDE 40 MG: 10 INJECTION, SOLUTION INTRAVENOUS at 09:56

## 2024-03-18 RX ADMIN — ALLOPURINOL 300 MG: 300 TABLET ORAL at 09:57

## 2024-03-18 RX ADMIN — METOPROLOL TARTRATE 25 MG: 25 TABLET, FILM COATED ORAL at 09:56

## 2024-03-18 RX ADMIN — ENOXAPARIN SODIUM 40 MG: 40 INJECTION SUBCUTANEOUS at 09:58

## 2024-03-18 RX ADMIN — FOLIC ACID 1 MG: 1 TABLET ORAL at 09:57

## 2024-03-18 RX ADMIN — Medication 6 MG: at 21:06

## 2024-03-18 RX ADMIN — PREDNISONE 40 MG: 20 TABLET ORAL at 09:57

## 2024-03-18 RX ADMIN — BENZONATATE 200 MG: 100 CAPSULE ORAL at 21:07

## 2024-03-18 RX ADMIN — IPRATROPIUM BROMIDE AND ALBUTEROL SULFATE 3 ML: 2.5; .5 SOLUTION RESPIRATORY (INHALATION) at 08:42

## 2024-03-18 RX ADMIN — CEFTRIAXONE SODIUM 1 G: 1 INJECTION, SOLUTION INTRAVENOUS at 11:09

## 2024-03-18 RX ADMIN — METOPROLOL TARTRATE 25 MG: 25 TABLET, FILM COATED ORAL at 21:07

## 2024-03-18 RX ADMIN — ASPIRIN 81 MG: 81 TABLET, COATED ORAL at 09:57

## 2024-03-18 RX ADMIN — NICOTINE 1 PATCH: 14 PATCH, EXTENDED RELEASE TRANSDERMAL at 09:57

## 2024-03-18 RX ADMIN — EMPAGLIFLOZIN 10 MG: 10 TABLET, FILM COATED ORAL at 09:57

## 2024-03-18 RX ADMIN — THIAMINE HCL TAB 100 MG 100 MG: 100 TAB at 09:58

## 2024-03-18 RX ADMIN — LEVOTHYROXINE SODIUM 125 MCG: 0.12 TABLET ORAL at 06:28

## 2024-03-18 RX ADMIN — FLUTICASONE FUROATE AND VILANTEROL TRIFENATATE 1 PUFF: 100; 25 POWDER RESPIRATORY (INHALATION) at 11:50

## 2024-03-18 RX ADMIN — BENZONATATE 200 MG: 100 CAPSULE ORAL at 09:57

## 2024-03-18 RX ADMIN — TIOTROPIUM BROMIDE INHALATION SPRAY 2 PUFF: 3.12 SPRAY, METERED RESPIRATORY (INHALATION) at 11:50

## 2024-03-18 RX ADMIN — BENZONATATE 200 MG: 100 CAPSULE ORAL at 15:19

## 2024-03-18 RX ADMIN — Medication 1 TABLET: at 09:57

## 2024-03-18 RX ADMIN — GUAIFENESIN SYRUP AND DEXTROMETHORPHAN 10 ML: 100; 10 SYRUP ORAL at 21:06

## 2024-03-18 RX ADMIN — ENOXAPARIN SODIUM 40 MG: 40 INJECTION SUBCUTANEOUS at 21:06

## 2024-03-18 ASSESSMENT — COGNITIVE AND FUNCTIONAL STATUS - GENERAL
MOVING TO AND FROM BED TO CHAIR: A LOT
CLIMB 3 TO 5 STEPS WITH RAILING: A LOT
WALKING IN HOSPITAL ROOM: A LITTLE
MOVING FROM LYING ON BACK TO SITTING ON SIDE OF FLAT BED WITH BEDRAILS: A LITTLE
CLIMB 3 TO 5 STEPS WITH RAILING: A LOT
DRESSING REGULAR LOWER BODY CLOTHING: A LOT
PERSONAL GROOMING: A LITTLE
STANDING UP FROM CHAIR USING ARMS: A LITTLE
HELP NEEDED FOR BATHING: A LITTLE
TOILETING: A LITTLE
DRESSING REGULAR LOWER BODY CLOTHING: A LOT
MOVING FROM LYING ON BACK TO SITTING ON SIDE OF FLAT BED WITH BEDRAILS: A LITTLE
WALKING IN HOSPITAL ROOM: A LITTLE
MOBILITY SCORE: 17
DRESSING REGULAR UPPER BODY CLOTHING: A LOT
DAILY ACTIVITIY SCORE: 17
MOVING FROM LYING ON BACK TO SITTING ON SIDE OF FLAT BED WITH BEDRAILS: A LITTLE
TURNING FROM BACK TO SIDE WHILE IN FLAT BAD: A LITTLE
MOBILITY SCORE: 17
CLIMB 3 TO 5 STEPS WITH RAILING: A LOT
STANDING UP FROM CHAIR USING ARMS: A LITTLE
DAILY ACTIVITIY SCORE: 17
MOVING TO AND FROM BED TO CHAIR: A LITTLE
TURNING FROM BACK TO SIDE WHILE IN FLAT BAD: A LITTLE
HELP NEEDED FOR BATHING: A LITTLE
WALKING IN HOSPITAL ROOM: A LITTLE
PERSONAL GROOMING: A LITTLE
TURNING FROM BACK TO SIDE WHILE IN FLAT BAD: A LITTLE
STANDING UP FROM CHAIR USING ARMS: A LITTLE
TOILETING: A LITTLE
DRESSING REGULAR UPPER BODY CLOTHING: A LOT
MOVING TO AND FROM BED TO CHAIR: A LITTLE
MOBILITY SCORE: 16

## 2024-03-18 ASSESSMENT — PAIN SCALES - GENERAL
PAINLEVEL_OUTOF10: 0 - NO PAIN

## 2024-03-18 ASSESSMENT — PAIN - FUNCTIONAL ASSESSMENT
PAIN_FUNCTIONAL_ASSESSMENT: 0-10
PAIN_FUNCTIONAL_ASSESSMENT: 0-10

## 2024-03-18 ASSESSMENT — ACTIVITIES OF DAILY LIVING (ADL): LACK_OF_TRANSPORTATION: PATIENT DECLINED

## 2024-03-18 NOTE — PROGRESS NOTES
Physical Therapy    Physical Therapy Treatment    Patient Name: Alo Glass  MRN: [id number]  Today's Date: 3/18/2024  Time Calculation  Start Time: 1330  Stop Time: 1409  Time Calculation (min): 39 min       Assessment/Plan   PT Assessment  PT Assessment Results: Decreased strength, Decreased endurance, Impaired balance, Decreased mobility  Rehab Prognosis: Good  Barriers to Discharge: decreased endurance, O2 demands  Evaluation/Treatment Tolerance: Other (Comment) (Decreased activity tolerance)  Medical Staff Made Aware: Yes  Strengths: Ability to acquire knowledge, Housing layout  Barriers to Participation: Comorbidities  End of Session Communication: Bedside nurse, Care Coordinator, Physician  Assessment Comment: Increased time spent with pateint discussing discharge recommendations, including mod intensity PT intervention as next level of care in order to continue to move towards independent PLOF in supportive environment.  End of Session Patient Position: Up in chair, Alarm off, not on at start of session (RN aware, no alarm necessary)  PT Plan  Inpatient/Swing Bed or Outpatient: Inpatient  PT Plan  Treatment/Interventions: Bed mobility, Transfer training, Gait training, Stair training, Balance training, Strengthening, Endurance training  PT Plan: Skilled PT  PT Frequency: 3 times per week  PT Discharge Recommendations: Moderate intensity level of continued care  Equipment Recommended upon Discharge: Wheeled walker  PT Recommended Transfer Status: Assist x1  PT - OK to Discharge: Yes (per PT POC)      General Visit Information:   PT  Visit  PT Received On: 03/18/24  Response to Previous Treatment: Patient with no complaints from previous session.  General  Reason for Referral: Pt is a 69 y/o male who was admitted for SOB  Referred By: Lou Madrigal  Past Medical History Relevant to Rehab: Benign essential hypertension  COPD  Type 2 diabetes mellitus  Gastroesophageal reflux disease  Gout  Bilateral lower  extremity edema  Tobacco use disorder  Hyperlipidemia  Hypothyroidism  Obesity  Thoracic aortic aneurysm  Obstructive sleep apnea  Polycythemia  Lymphedema  Family/Caregiver Present: No  Prior to Session Communication: Bedside nurse  Patient Position Received: Up in chair, Alarm off, not on at start of session  General Comment: Patient agreeable to PT tx    Subjective   Precautions:  Precautions  Medical Precautions: Fall precautions (tele, external cath, 4L O2)  Vital Signs:   HR 74-80 throughout via tele     Objective   Pain:  Pain Assessment  Pain Assessment: 0-10  Pain Score: 0 - No pain  Cognition:  Cognition  Overall Cognitive Status: Within Functional Limits  Orientation Level: Oriented X4  Postural Control:  Postural Control  Postural Control: Within Functional Limits    Activity Tolerance:  Activity Tolerance  Endurance: Tolerates 10 - 20 min exercise with multiple rests  Treatments:   Patient tolerates standing to walker independently in order to utilize external cath    Therapeutic Exercise  Therapeutic Exercise Performed: Yes  Therapeutic Exercise Activity 1: sitting: ankle pumps B x 30; LAQ B x 15; Marching B x 15    Ambulation/Gait Training  Ambulation/Gait Training Performed: Yes  Ambulation/Gait Training 1  Surface 1: Level tile  Device 1: Rolling walker  Assistance 1: Close supervision  Comments/Distance (ft) 1: 12' x 2  Transfers  Transfer: Yes  Transfer 1  Transfer From 1: Sit to, Stand to  Transfer to 1: Sit, Stand  Technique 1: Sit to stand, Stand to sit  Transfer Device 1: Walker  Transfer Level of Assistance 1: Close supervision         Outcome Measures:  [facility] Basic Mobility  Turning from your back to your side while in a flat bed without using bedrails: A little  Moving from lying on your back to sitting on the side of a flat bed without using bedrails: A little  Moving to and from bed to chair (including a wheelchair): A little  Standing up from a chair using your arms (e.g. wheelchair or  bedside chair): A little  To walk in hospital room: A little  Climbing 3-5 steps with railing: A lot  Basic Mobility - Total Score: 17    Education Documentation  Precautions, taught by Vanita Pichardo PT at 3/18/2024  2:27 PM.  Learner: Patient  Readiness: Acceptance  Method: Explanation  Response: Verbalizes Understanding  Comment: Educated on the importance of B LE elevation for edema control. Educated on continued use of ambulatory device, patient inquired about w/c-at this time do not feel it is necessary and do want to become dependent on w/c    Body Mechanics, taught by Vanita Pichardo PT at 3/18/2024  2:27 PM.  Learner: Patient  Readiness: Acceptance  Method: Explanation  Response: Verbalizes Understanding  Comment: Educated on the importance of B LE elevation for edema control. Educated on continued use of ambulatory device, patient inquired about w/c-at this time do not feel it is necessary and do want to become dependent on w/c    Mobility Training, taught by Vanita Pichardo PT at 3/18/2024  2:27 PM.  Learner: Patient  Readiness: Acceptance  Method: Explanation  Response: Verbalizes Understanding  Comment: Educated on the importance of B LE elevation for edema control. Educated on continued use of ambulatory device, patient inquired about w/c-at this time do not feel it is necessary and do want to become dependent on w/c    Education Comments  No comments found.        OP EDUCATION:       Encounter Problems       Encounter Problems (Active)       Balance       STG - Maintains dynamic standing balance without upper extremity support x 5' with dual task  (Progressing)       Start:  03/15/24    Expected End:  03/18/24               Mobility       STG - Patient will ambulate with 2WW 50' SBA  (Progressing)       Start:  03/15/24    Expected End:  03/18/24               PT Transfers       STG - Patient will perform bed mobility independently  (Progressing)       Start:  03/15/24    Expected End:  03/18/24             STG - Patient will transfer sit to and from stand mod I  (Progressing)       Start:  03/15/24    Expected End:  03/18/24               Pain - Adult

## 2024-03-18 NOTE — PROGRESS NOTES
"Alo Glass is a 68 y.o. male on day 10 of admission presenting with Heart failure exacerbated by sotalol (CMS/[facility]).    Subjective   Patient states his breathing is slowly improving. Complaining about poor sleep. Does not wish to use hospital provided CPAP masks. Inquiring about getting established with cardiology and pulmonology as outpatient.       Objective   GEN: obese man sitting in chair. Speaking full sentences  CV: RRR, no m/g/r  LUNGS: moderate effort, clear bilaterally, no w/r/r  EXT: lower extremity edema          Last Recorded Vitals  Blood pressure 104/68, pulse 71, temperature 36.8 °C (98.2 °F), resp. rate 18, height 1.803 m (5' 11\"), weight 142 kg (312 lb), SpO2 93 %.  Intake/Output last 3 Shifts:  I/O last 3 completed shifts:  In: 50 (0.4 mL/kg) [IV Piggyback:50]  Out: 2825 (20 mL/kg) [Urine:2825 (0.6 mL/kg/hr)]  Weight: 141.5 kg     Relevant Results  Scheduled medications  allopurinol, 300 mg, oral, Daily  aspirin, 81 mg, oral, Daily  benzonatate, 200 mg, oral, TID  dextromethorphan-guaifenesin, 10 mL, oral, BID  empagliflozin, 10 mg, oral, Daily  enoxaparin, 40 mg, subcutaneous, BID  tiotropium, 2 puff, inhalation, Daily   And  fluticasone furoate-vilanteroL, 1 puff, inhalation, Daily  folic acid, 1 mg, oral, Daily  furosemide, 40 mg, intravenous, q24h  insulin lispro, 0-10 Units, subcutaneous, TID with meals  ipratropium-albuteroL, 3 mL, nebulization, TID  levothyroxine, 125 mcg, oral, Daily  melatonin, 6 mg, oral, Nightly  metoprolol tartrate, 25 mg, oral, BID  multivitamin with minerals, 1 tablet, oral, Daily  nicotine, 1 patch, transdermal, Daily  polyethylene glycol, 17 g, oral, BID  predniSONE, 40 mg, oral, Daily  sennosides-docusate sodium, 2 tablet, oral, BID  thiamine, 100 mg, oral, Daily  [Held by provider] valsartan, 320 mg, oral, Daily      Continuous medications     PRN medications  PRN medications: acetaminophen, hydrocodone-homatropine, ipratropium-albuteroL, oxygen, " traZODone    Results for orders placed or performed during the hospital encounter of 03/08/24 (from the past 24 hour(s))   POCT GLUCOSE   Result Value Ref Range    POCT Glucose 187 (H) 74 - 99 mg/dL   CBC   Result Value Ref Range    WBC 9.0 4.4 - 11.3 x10*3/uL    nRBC 0.0 0.0 - 0.0 /100 WBCs    RBC 5.41 4.50 - 5.90 x10*6/uL    Hemoglobin 18.0 (H) 13.5 - 17.5 g/dL    Hematocrit 56.8 (H) 41.0 - 52.0 %     (H) 80 - 100 fL    MCH 33.3 26.0 - 34.0 pg    MCHC 31.7 (L) 32.0 - 36.0 g/dL    RDW 12.7 11.5 - 14.5 %    Platelets 224 150 - 450 x10*3/uL   Renal Function Panel   Result Value Ref Range    Glucose 115 (H) 74 - 99 mg/dL    Sodium 140 136 - 145 mmol/L    Potassium 4.0 3.5 - 5.3 mmol/L    Chloride 102 98 - 107 mmol/L    Bicarbonate 31 21 - 32 mmol/L    Anion Gap 11 10 - 20 mmol/L    Urea Nitrogen 36 (H) 6 - 23 mg/dL    Creatinine 0.62 0.50 - 1.30 mg/dL    eGFR >90 >60 mL/min/1.73m*2    Calcium 8.9 8.6 - 10.3 mg/dL    Phosphorus 3.1 2.5 - 4.9 mg/dL    Albumin 3.4 3.4 - 5.0 g/dL   Magnesium   Result Value Ref Range    Magnesium 2.39 1.60 - 2.40 mg/dL   POCT GLUCOSE   Result Value Ref Range    POCT Glucose 122 (H) 74 - 99 mg/dL   POCT GLUCOSE   Result Value Ref Range    POCT Glucose 158 (H) 74 - 99 mg/dL                                  Assessment/Plan   Principal Problem:    Heart failure exacerbated by sotalol (CMS/[facility])  Active Problems:    Acute on chronic congestive heart failure, unspecified heart failure type (CMS/[facility])    MELISSA (obstructive sleep apnea)    Acute hypoxic respiratory failure (CMS/[facility])    68m initially admitted for decompensated HF in setting of sotalol. He was intubated and underwent bronchoscopy secondary to mucus plugging. O2 requirements down to 3.5L/min.      -Continue O2 supplementation  -Continue diuresis  -Prednisone taper ordered  -Home O2 assessment prior to discharge  -Offered CPAP while inpatient -- he declined due to lack of comfortable masks  -Needs outpatient follow up for MELISSA and  COPD. Unwilling to get repeat PSG  -Will follow up PRN       Angélica Kwong DO  Pulmonary & Critical Care  3/18/2024 4:36 PM

## 2024-03-18 NOTE — PROGRESS NOTES
Recommendation(s):  Daily weight  Fluid restriction defined by Cardiologist  Follow up with DM/CHF navigator    Nutrition Assessment     68 y.o. male adm with Heart failure exacerbated by sotalol (CMS/[facility])     (3/18/24)   Pt eating well, states still has questions regarding discharge(home CPAP, oxygen needs etc.), will notify staff. Pt states he wants to know what his plan is, does not intend to give up his alcohol, briefly discussed the importance of low salt, fluids per cardiologist, and managing BG. Pt did not have the CGM discussed, will ask DM educator to readdress.   ETT(3/12-14)    Energy Intake: Good > 75 %  Food and Nutrient History: Pt seen on 3/11/24 by inpatient dietitian for DM and CHF diet education. Pt reported that he started mounjaro about 6 months ago and had been eating less.  Unsure of weight changes since he did not own a scale to monitor.  Nutrition re-consulted today for EN.  Pt with required transfer to ICU with intubation over night, on pressor.  Pt sedated on propofol @ 8.7 ml/hr which provides 230 kcal.  Noted pt also on IV Lasix.  OGT in place to start EN.    Difficulty chewing/swallowing: none noted    Per Flowsheet Percent Meal intake: 100,100,100,100%  Dietary Orders (From admission, onward)       Start     Ordered    03/14/24 1303  Adult diet Regular, Cardiac, Carb Controlled; 60 gram carb/meal, 30 gram Carb evening snack; 1500 mL fluid; 70 gm fat; 2 - 3 grams Sodium  Diet effective now        Question Answer Comment   Diet type Regular    Diet type Cardiac    Diet type Carb Controlled    Carb diet selection: 60 gram carb/meal, 30 gram Carb evening snack    Dietary fluid restriction / 24h: 1500 mL fluid    Fat restriction: 70 gm fat    Sodium restriction: 2 - 3 grams Sodium        03/14/24 1303                  Feeding assistance level: Independent    Current Facility-Administered Medications:     acetaminophen (Tylenol) tablet 650 mg, 650 mg, oral, q6h PRN, Lou Madrigal DO     allopurinol (Zyloprim) tablet 300 mg, 300 mg, oral, Daily, Senyo Agidi, DO, 300 mg at 03/18/24 0957    aspirin EC tablet 81 mg, 81 mg, oral, Daily, Senyo Agidi, DO, 81 mg at 03/18/24 0957    benzonatate (Tessalon) capsule 200 mg, 200 mg, oral, TID, Senyo Agidi, DO, 200 mg at 03/18/24 0957    dextromethorphan-guaifenesin (Robitussin DM)  mg/5 mL oral liquid 10 mL, 10 mL, oral, BID, Senyo Agidi, DO, 10 mL at 03/17/24 2057    empagliflozin (Jardiance) tablet 10 mg, 10 mg, oral, Daily, Senyo Agidi, DO, 10 mg at 03/18/24 0957    enoxaparin (Lovenox) syringe 40 mg, 40 mg, subcutaneous, BID, Senyo Agidi, DO, 40 mg at 03/18/24 0958    tiotropium (Spiriva Respimat) 2.5 mcg/actuation inhaler 2 puff, 2 puff, inhalation, Daily, 2 puff at 03/18/24 1150 **AND** fluticasone furoate-vilanteroL (Breo Ellipta) 100-25 mcg/dose inhaler 1 puff, 1 puff, inhalation, Daily, Senyo Agidi, DO, 1 puff at 03/18/24 1150    folic acid (Folvite) tablet 1 mg, 1 mg, oral, Daily, Senyo Agidi, DO, 1 mg at 03/18/24 0957    furosemide (Lasix) injection 40 mg, 40 mg, intravenous, q24h, Benjamin Hernandez MD, 40 mg at 03/18/24 0956    hydrocodone-homatropine (Hycodan) 5-1.5 mg/5 mL syrup 5 mL, 5 mL, oral, q6h PRN, Senyo Agidi, DO    insulin lispro (HumaLOG) injection 0-10 Units, 0-10 Units, subcutaneous, TID with meals, Senyo Agidi, DO    ipratropium-albuteroL (Duo-Neb) 0.5-2.5 mg/3 mL nebulizer solution 3 mL, 3 mL, nebulization, q2h PRN, Senrussell Agidi, DO    ipratropium-albuteroL (Duo-Neb) 0.5-2.5 mg/3 mL nebulizer solution 3 mL, 3 mL, nebulization, TID, Senyo Agidi, DO    levothyroxine (Synthroid, Levoxyl) tablet 125 mcg, 125 mcg, oral, Daily, Senyo Agidi, DO, 125 mcg at 03/18/24 0628    melatonin tablet 6 mg, 6 mg, oral, Nightly, Senyo Agidi, DO, 6 mg at 03/17/24 2057    metoprolol tartrate (Lopressor) tablet 25 mg, 25 mg, oral, BID, Senyo Agidi, DO, 25 mg at 03/18/24 0956    multivitamin with minerals 1 tablet, 1 tablet, oral, Daily, Senyo Agidi, DO,  1 tablet at 03/18/24 0957    nicotine (Nicoderm CQ) 14 mg/24 hr patch 1 patch, 1 patch, transdermal, Daily, Senyo Agidi, DO, 1 patch at 03/18/24 0957    oxygen (O2) therapy, , inhalation, Continuous PRN - O2/gases, Senyo Agidi, DO, 4 L/min at 03/18/24 0842    polyethylene glycol (Glycolax, Miralax) packet 17 g, 17 g, oral, BID, Senyo Agidi, DO    predniSONE (Deltasone) tablet 40 mg, 40 mg, oral, Daily, Benjamin Hernandez MD, 40 mg at 03/18/24 0957    sennosides-docusate sodium (Lydia-Colace) 8.6-50 mg per tablet 2 tablet, 2 tablet, oral, BID, Senyo Agidi, DO    thiamine (Vitamin B-1) tablet 100 mg, 100 mg, oral, Daily, Senyo Agidi, DO, 100 mg at 03/18/24 0958    traZODone (Desyrel) tablet 25 mg, 25 mg, oral, Nightly PRN, Senyo Agidi, DO    [Held by provider] valsartan (Diovan) tablet 320 mg, 320 mg, oral, Daily, Senyo Agidi, DO  Results from last 7 days   Lab Units 03/18/24  0715 03/17/24  0513 03/16/24  0517   GLUCOSE mg/dL 115* 121* 130*   SODIUM mmol/L 140 138 135*   POTASSIUM mmol/L 4.0 4.1 4.1   CHLORIDE mmol/L 102 103 102   CO2 mmol/L 31 28 29   BUN mg/dL 36* 41* 41*   CREATININE mg/dL 0.62 0.74 0.70   EGFR mL/min/1.73m*2 >90 >90 >90   CALCIUM mg/dL 8.9 8.7 8.8   PHOSPHORUS mg/dL 3.1 3.3 3.2   MAGNESIUM mg/dL 2.39 2.53* 2.58*     Lab Results   Component Value Date    HGBA1C 5.8 (H) 02/20/2024    HGBA1C 7.3 (A) 01/19/2023    HGBA1C 7.9 (A) 09/10/2021     Results from last 7 days   Lab Units 03/18/24  1119 03/18/24  0721 03/17/24  2023 03/17/24  1636 03/17/24  1057 03/17/24  0747 03/16/24  1940 03/16/24  1713   POCT GLUCOSE mg/dL 158* 122* 187* 184* 200* 137* 197* 140*       Per Flowsheet:  Gastrointestinal  Gastrointestinal (WDL): Within Defined Limits  Abdomen Inspection: Rounded, Soft  Abdominal Tenderness: Soft  Bowel Sounds: All quadrants  Bowel Sounds (All Quadrants): Active  Last BM Date: 03/17/24  Last bowel movement documented: 03/17/24  Past Medical History:   Diagnosis Date    Personal history of other  "specified conditions 01/08/2023    History of impaired glucose tolerance      Past Surgical History:   Procedure Laterality Date    HERNIA REPAIR  11/07/2013    Hernia Repair    OTHER SURGICAL HISTORY  11/07/2013    Oral Surgery      Allergies: Patient has no known allergies.     Anthropometrics:  Height: 180.3 cm (5' 11\")  Weight: 142 kg (312 lb)  BMI (Calculated): 43.53     Ideal Body Weight: 78.2 kg   %IBW: 185 %      Daily Weight  03/17/24 : 142 kg (312 lb)  07/18/21 : (!) 164 kg (361 lb)    Weight History / % Weight Change: 159 kg (3/8/24) down 8.9% /4 days- suspect possible scale discrepancy and also planned weight loss with diuresis  Significant Weight Loss: Yes    Skin: intact (please see nursing/wound notes for further details)  Edema: +2 BUE, +3 RLE, +3 LLE  Pain Score: 0 - No pain    Estimated Nutritional Needs:  Method for Estimating Needs: 8993-4367 kcal (11-14 kcal/kg)    Method for Estimating Needs: 117-156 g protein (1.5-2 g/kg/IBW)    Method for Estimating Needs: per MD    Nutrition Focused Physical Findings: (3/12)  Orbital Fat Pads: Well nourshed (slightly bulging fat pads)  Buccal Fat Pads: Well nourished (full, rounded cheeks)    Temporalis: Well nourished (well-defined muscle)  Pectoralis (Clavicular Region): Well nourished (clavicle not visible)    Nutrition Diagnosis   Resolved  Nutrition Diagnosis 1: Increased nutrient needs  Related to (1): respiratory distress with history of CHF requiring intubation on MV  As Evidenced by (1): altered metabolic demand with critical illness  Additional Assessment Information (1): Pt may benefit with permissive hypocaloric feeds with BMI > 30      Nutrition Interventions/Recommendations   Intervention:  Coordination of Care: Robert ortiz) DM/CHF navigator, Imelda MUELLER, Rocio/Sharon LEIVA    Individualized Nutrition Prescription Provided for : Low Sodium 2g or less, Fluid restriction per cardiologist, Carb controlled diet for BG management    Education " Documentation  Brief review, low sodium importance, BG management       Goals: oral intake >75%       Nutrition Monitoring and Evaluation   Weights  Labs  PO intake      Time Spent (min): 60 minutes

## 2024-03-18 NOTE — PROGRESS NOTES
03/18/24 1519   Discharge Planning   Living Arrangements Alone   Support Systems Friends/neighbors   Assistance Needed Alert and oriented x 3, Normally independent with ADL's, No DME used at home, Room air at baseline, Has CPAP at home   Type of Residence Private residence   Who is requesting discharge planning? Provider   Home or Post Acute Services Post acute facilities (Rehab/SNF/etc)   Type of Post Acute Facility Services Skilled nursing   Patient expects to be discharged to: PT/OT recommending moderate intensity rehanb. PAQN list provided to the patient. Will need to review with patient when availiable, patient had meeting with Diabetic Educator and now has a visitor.   Does the patient need discharge transport arranged? Yes   RoundTrip coordination needed? Yes   Has discharge transport been arranged? No   Financial Resource Strain   How hard is it for you to pay for the very basics like food, housing, medical care, and heating? Pt Declined   Housing Stability   In the last 12 months, was there a time when you were not able to pay the mortgage or rent on time? Pt Declined   In the last 12 months, how many places have you lived? 1   In the last 12 months, was there a time when you did not have a steady place to sleep or slept in a shelter (including now)? Pt Declined   Transportation Needs   In the past 12 months, has lack of transportation kept you from medical appointments or from getting medications? Pt Declined   In the past 12 months, has lack of transportation kept you from meetings, work, or from getting things needed for daily living? Pt Declined   Patient Choice   Provider Choice list and CMS website (https://medicare.gov/care-compare#search) for post-acute Quality and Resource Measure Data were provided and reviewed with: Patient   Patient / Family choosing to utilize agency / facility established prior to hospitalization No

## 2024-03-18 NOTE — CARE PLAN
Problem: Pain - Adult  Goal: Verbalizes/displays adequate comfort level or baseline comfort level  Flowsheets (Taken 3/18/2024 1148)  Verbalizes/displays adequate comfort level or baseline comfort level:   Encourage patient to monitor pain and request assistance   Assess pain using appropriate pain scale     Problem: Safety - Adult  Goal: Free from fall injury  Outcome: Progressing  Flowsheets (Taken 3/18/2024 1149)  Free from fall injury: Instruct family/caregiver on patient safety     Problem: Chronic Conditions and Co-morbidities  Goal: Patient's chronic conditions and co-morbidity symptoms are monitored and maintained or improved  Flowsheets (Taken 3/18/2024 1320)  Care Plan - Patient's Chronic Conditions and Co-Morbidity Symptoms are Monitored and Maintained or Improved: Monitor and assess patient's chronic conditions and comorbid symptoms for stability, deterioration, or improvement     Problem: Heart Failure  Goal: Improved urinary output this shift  Outcome: Progressing  Goal: Reduction in peripheral edema within 24 hours  Flowsheets (Taken 3/18/2024 1149)  Reduction in peripheral edema within 24 hours:   Monitor edema/skin/mucous membrane integrity   SCDs/elevate extremities     Problem: Skin  Goal: Participates in plan/prevention/treatment measures  Outcome: Progressing  Flowsheets (Taken 3/18/2024 1153)  Participates in plan/prevention/treatment measures: Increase activity/out of bed for meals  Goal: Prevent/manage excess moisture  Outcome: Progressing  Goal: Prevent/minimize sheer/friction injuries  Outcome: Progressing  Goal: Promote skin healing  Flowsheets (Taken 3/18/2024 1153)  Promote skin healing: Protective dressings over bony prominences   The patient's goals for the shift include      The clinical goals for the shift include pt to sit up in chair for 4 hours this shift. Met

## 2024-03-18 NOTE — PROGRESS NOTES
Subjective Data:  Patient reports sig orthopnea.   Denies any chest pain, chest pressure, palpitations, dizziness, cough.     Overnight Events:    No acute issues reported overnight.      Objective Data:  Last Recorded Vitals:  Vitals:    03/18/24 0135 03/18/24 0400 03/18/24 0842 03/18/24 0937   BP: 110/71 106/70  108/70   BP Location: Right arm Right arm  Left arm   Patient Position: Sitting Lying  Sitting   Pulse: 63 68  88   Resp: 18 20 18   Temp: 36.2 °C (97.2 °F) 36.3 °C (97.3 °F)  36 °C (96.8 °F)   TempSrc: Temporal Temporal  Temporal   SpO2: 94% 98% 94% 91%   Weight:       Height:           Last Labs:  CBC - 3/18/2024:  7:15 AM  9.0 18.0 224    56.8      CMP - 3/18/2024:  7:15 AM  8.9 6.9 18 --- 1.6   3.1 3.4 22 73      PTT - No results in last year.  _   _ _     TROPHS   Date/Time Value Ref Range Status   03/12/2024 07:07  0 - 20 ng/L Final     Comment:     Previous result verified on 3/12/2024 0048 on specimen/case [id number] called with component TRPHS for procedure Troponin I, High Sensitivity with value 89 ng/L.   03/12/2024 03:08  0 - 20 ng/L Final     Comment:     Previous result verified on 3/12/2024 0048 on specimen/case [id number] called with component TRPHS for procedure Troponin I, High Sensitivity with value 89 ng/L.   03/12/2024 12:15 AM 89 0 - 20 ng/L Final     BNP   Date/Time Value Ref Range Status   03/15/2024 05:15  0 - 99 pg/mL Final   03/10/2024 11:25  0 - 99 pg/mL Final     HGBA1C   Date/Time Value Ref Range Status   02/20/2024 10:35 AM 5.8 see below % Final   01/19/2023 10:23 AM 7.3 % Final     Comment:          Diagnosis of Diabetes-Adults   Non-Diabetic: < or = 5.6%   Increased risk for developing diabetes: 5.7-6.4%   Diagnostic of diabetes: > or = 6.5%  .       Monitoring of Diabetes                Age (y)     Therapeutic Goal (%)   Adults:          >18           <7.0   Pediatrics:    13-18           <7.5                   7-12           <8.0                    0- 6            7.5-8.5   American Diabetes Association. Diabetes Care 33(S1), Jan 2010.     09/10/2021 01:29 PM 7.9 % Final     Comment:          Diagnosis of Diabetes-Adults   Non-Diabetic: < or = 5.6%   Increased risk for developing diabetes: 5.7-6.4%   Diagnostic of diabetes: > or = 6.5%  .       Monitoring of Diabetes                Age (y)     Therapeutic Goal (%)   Adults:          >18           <7.0   Pediatrics:    13-18           <7.5                   7-12           <8.0                   0- 6            7.5-8.5   American Diabetes Association. Diabetes Care 33(S1), Jan 2010.       LDLCALC   Date/Time Value Ref Range Status   02/20/2024 10:35  <=99 mg/dL Final     Comment:                                 Near   Borderline      AGE      Desirable  Optimal    High     High     Very High     0-19 Y     0 - 109     ---    110-129   >/= 130     ----    20-24 Y     0 - 119     ---    120-159   >/= 160     ----      >24 Y     0 -  99   100-129  130-159   160-189     >/=190       VLDL   Date/Time Value Ref Range Status   02/20/2024 10:35 AM 16 0 - 40 mg/dL Final   09/10/2021 01:29 PM 27 0 - 40 mg/dL Final   09/18/2018 02:26 PM 22 0 - 40 mg/dL Final      Last I/O:  I/O last 3 completed shifts:  In: 50 (0.4 mL/kg) [IV Piggyback:50]  Out: 2825 (20 mL/kg) [Urine:2825 (0.6 mL/kg/hr)]  Weight: 141.5 kg     Past Cardiology Tests (Last 3 Years):  EKG:  Electrocardiogram, 12-lead PRN ACS symptoms 03/12/2024  ECG 12 lead 03/12/2024  ECG 12 lead 03/12/2024  Electrocardiogram, 12-lead PRN ACS symtpoms 03/11/2024  ECG 12 Lead 03/10/2024  ECG 12 lead      Echo:  Transthoracic echo (TTE) complete 03/11/2024  CONCLUSIONS:   1. Left ventricular systolic function is moderately decreased with a 30-35% estimated ejection fraction.   2. Basal inferolateral segment is abnormal.   3. Poorly visualized anatomical structures due to suboptimal image quality.   4. Moderate aortic valve stenosis.   5. There is global  hypokinesis of the left ventricle with minor regional variations.      TTE (06/18/2019):   -Low normal LV systolic function  -Mild concentric LVH  -Doppler flow suggestive of abnormal Left ventricular relaxation  -Normal right ventricular size and function  -Slightly enlarged left atrium.   -Moderate calcific aortic stenosis  -Trace mitral regurg       Ejection Fractions:        EF   Date/Time Value Ref Range Status   03/11/2024 02:04 PM 33 %        Cath:  No results found for this or any previous visit from the past 1095 days.     Stress Test:  No results found for this or any previous visit from the past 1095 days.     Imaging:  CXR (03/08/2024):   IMPRESSION:  -Moderate right pleural effusion and right lung base consolidation. Findings could be due to pneumonia. Other underlying process not excluded. Follow-up recommended.   -Trace left pleural effusion and cardiomegaly. Possible interstitial edema.     Xray Abd (03/12/2024):   IMPRESSION:  1.  Enteric tube approximately 3.8 cm above the kathie.  2. Evaluation of the enteric tube is limited and appears looped high in the right upper quadrant however may be related to patient positioning and rotation. Repeat imaging may be considered with proper patient positioning to delineate proper position.  3. Left lower lobe airspace opacities concerning for pneumonia. Possible large right pleural effusion.  4. Paucity of bowel gas noted.     CXR (03/12/2024):   IMPRESSION:  1.  Enteric tube approximately 3.8 cm above the kathie.  2. Evaluation of the enteric tube is limited and appears looped high in the right upper quadrant however may be related to patient positioning and rotation. Repeat imaging may be considered with proper patient positioning to delineate proper position.  3. Left lower lobe airspace opacities concerning for pneumonia. Possible large right pleural effusion.  4. Paucity of bowel gas noted.       Inpatient Medications:  Scheduled medications   Medication  Dose Route Frequency    allopurinol  300 mg oral Daily    aspirin  81 mg oral Daily    benzonatate  200 mg oral TID    cefTRIAXone  1 g intravenous q24h    dextromethorphan-guaifenesin  10 mL oral BID    empagliflozin  10 mg oral Daily    enoxaparin  40 mg subcutaneous BID    tiotropium  2 puff inhalation Daily    And    fluticasone furoate-vilanteroL  1 puff inhalation Daily    folic acid  1 mg oral Daily    furosemide  40 mg intravenous q24h    insulin lispro  0-10 Units subcutaneous TID with meals    ipratropium-albuteroL  3 mL nebulization TID    levothyroxine  125 mcg oral Daily    melatonin  6 mg oral Nightly    metoprolol tartrate  25 mg oral BID    multivitamin with minerals  1 tablet oral Daily    nicotine  1 patch transdermal Daily    polyethylene glycol  17 g oral BID    predniSONE  40 mg oral Daily    sennosides-docusate sodium  2 tablet oral BID    thiamine  100 mg oral Daily    [Held by provider] valsartan  320 mg oral Daily     PRN medications   Medication    acetaminophen    hydrocodone-homatropine    ipratropium-albuteroL    oxygen    traZODone     Continuous Medications   Medication Dose Last Rate       Physical Exam:  Physical Exam  Constitutional:       Appearance: Normal appearance.   HENT:      Head: Normocephalic.      Nose: Nose normal.      Mouth/Throat:      Mouth: Mucous membranes are moist.   Eyes:      Conjunctiva/sclera: Conjunctivae normal.   Cardiovascular:      Rate and Rhythm: Normal rate and regular rhythm.   Musculoskeletal:      Right lower leg: Edema present.      Left lower leg: Edema present.   Neurological:      General: No focal deficit present.      Mental Status: He is alert. Mental status is at baseline.   Psychiatric:         Mood and Affect: Mood normal.            Assessment/Plan   69 yo male from home with h/o obesity, MELISSA on CPAP, COPD, current smoker, type 2 DM, hypertension, lymphedema, moderate aortic stenosis (6/2019) who presented from PCP office with symptoms  of increased LE edema, abdominal bloating, progressive SOB over the past year found to be hypoxic in the ER. On admission was Intermittently refusing IV Lasix, I&O not well monitored. Discussed urinating only into urinals, recording how much urine vs how frequent he urinates. Refused return to lasix gtt, lipscomb or external catheter. Originally refusing echo d/t concern for needing to urinate during the testing. Discussed accommodations and ability to use the bathroom in echo if necessary. Patient was then agreeable to echocardiogram. Echo obtained with newly reduced ejection fraction. Decompensated overnight hypoxic, agitated, required Ativan, Precedex, BiPAP and eventually intubation. Patient weaned off of IV pressor support (03/13). Able to be extubated successfully (3/14)  to [facility]. Diuresis decreased to once daily w/ drop in urine output, remains hypervolemic.      #Acute hypoxic respiratory failure 2/2 decompensated heart failure, Pneumonia, COPD w/ current smoking, mucous plugging s/p bronchoscopy   #Acute decompensated systolic heart failure (new diagnosis)  #Non-sustained Ventricular tachycardia (none further on tele overnight)   #Septic shock, resolved   #Moderate aortic stenosis   #Chronic lymphedema BLE   #NSR with PACs on telemetry  (no afib noted)      -Would recommend continuation of IV diuresis, increased to BID with goal -1 to -2 L daily. Remains hypervolemic on exam.   -Continue Metoprolol, resume GDMT including Valsartan @ decreased dose d/t hypotension as tolerated  -Recommend changing metoprolol tartrate to toprol  -Echo reviewed with newly reduced LVSF, EF 30-35%, abnormal basal inferolateral segment, global hypokinesis, mod AS. Discussed with patient.   -Strict I/O, monitor renal function and electrolyte  - >171> 733  -Will plan for LHC/RHC for new cardiomyopathy prior to d/c.   -Extensive discussion regarding plan of care, multiple questions asked. Patient requests follow up and  discussion with his friend who is a vascular surgeon. May defer cath to outpatient. Requests IV Diuresis be only in the morning not q12h. Discussed 80mg IV tomorrow AM instead of 40 BID, he declines this d/t concern for renal dysfunction  -Will continue to follow        Peripheral IV 03/12/24 18 G Left;Ventral Forearm (Active)   Site Assessment Clean;Dry;Intact 03/18/24 0900   Dressing Status Clean;Dry 03/18/24 0900   Number of days: 6       Peripheral IV 03/13/24 18 G Proximal;Right Forearm (Active)   Site Assessment Clean;Dry;Intact 03/18/24 0900   Dressing Status Clean;Dry 03/18/24 0900   Number of days: 5       Peripheral IV 03/13/24 20 G Right Wrist (Active)   Site Assessment Clean;Dry;Intact 03/18/24 0900   Dressing Status Clean;Dry 03/18/24 0900   Number of days: 5       Code Status:  Full Code    I spent  minutes in the professional and overall care of this patient.        Stephanie Dyer PA-C

## 2024-03-18 NOTE — PROGRESS NOTES
[facility] Hospitalist Progress Note       [phone]: Please page me for patient care issues.  [phone]: Please page night hospitalist for any issues.     Alo Glass  :  1955(68 y.o.)  MRN:  [id number]  PCP: Omari Schumacher MD      Principal Problem:    Heart failure exacerbated by sotalol (CMS/HCC)  Active Problems:    Acute on chronic congestive heart failure, unspecified heart failure type (CMS/HCC)      Assessment and Plan:   Alo Glass is a 68 y.o. male istory of hypertension, COPD, type 2 diabetes mellitus, severe obesity, and obstructive sleep apnea presenting with SOB.     # ARF w/ hypoxia 2/2 acute CHF  # Cardiogenic and septic shock   # Acute HFrEF (30-35%, 3/9/24) w/ global hypokinesis  #Strep pneumoniae pneumonia (RLL)  # AECOPD  # Acute metabolic encephalopathy, resolved  # Moderate aortic stenosis  # Non-IDDM II w/ hyperglycemia  # Hypothyroidism  # Tobacco abuse  # MELISSA/OHS on CPAP  # Class  II obesity with serious co morbidities  -CHF/COPD orderset, daily weight, strict I/O  -s/p ETT (3/12/24-3/14/24)  -s/p on pressors (3/12/24-3/14)  -on steroid taper and bronchodilators  -abx: Cefepime-> ceftriaxone (last day of antibiotics on 3/19/24)  -IV lasix w/ caution due to hypotension, hold for MAP < 65  - limited GDMT due to risk of hypotension: on aspirin, metoprolol and Jardiance.  Losartan on hold  -Plans for eventual cardiac cath in the future  - recs appreciated: cardiology, pulm/crit      DVT Prophylaxis: Subq Lovenox    Code status: Full Code  Diet: Adult diet Regular, Cardiac, Carb Controlled; 60 gram carb/meal, 30 gram Carb evening snack; 1500 mL fluid; 70 gm fat; 2 - 3 grams Sodium    Disposition:await test results, await consultant recommendations, await clinical improvement, and await placement    Level of MDM:  High    I personally examined the patient and I personally reviewed chart, data, labs radiology reports    Family Communication  Number :   Name of Designated  Family Representative:       Total time spent: 35 minutes, of total time providing counseling or in coordination of care. Total time on this day of visit includes record and documentation review before and after visit including documentation and time not explicitly included on EMR time stamp      Subjective:   Interval History:  HPI  The patient complains of dyspnea  The patient feels their symptoms areimproving  no events or new concerns    Scheduled Meds:allopurinol, 300 mg, oral, Daily  aspirin, 81 mg, oral, Daily  benzonatate, 200 mg, oral, TID  dextromethorphan-guaifenesin, 10 mL, oral, BID  empagliflozin, 10 mg, oral, Daily  enoxaparin, 40 mg, subcutaneous, BID  tiotropium, 2 puff, inhalation, Daily   And  fluticasone furoate-vilanteroL, 1 puff, inhalation, Daily  folic acid, 1 mg, oral, Daily  furosemide, 40 mg, intravenous, q24h  insulin lispro, 0-10 Units, subcutaneous, TID with meals  ipratropium-albuteroL, 3 mL, nebulization, TID  levothyroxine, 125 mcg, oral, Daily  melatonin, 6 mg, oral, Nightly  metoprolol tartrate, 25 mg, oral, BID  multivitamin with minerals, 1 tablet, oral, Daily  nicotine, 1 patch, transdermal, Daily  polyethylene glycol, 17 g, oral, BID  predniSONE, 40 mg, oral, Daily  sennosides-docusate sodium, 2 tablet, oral, BID  thiamine, 100 mg, oral, Daily  [Held by provider] valsartan, 320 mg, oral, Daily      Continuous Infusions:     PRN Meds:PRN medications: acetaminophen, hydrocodone-homatropine, ipratropium-albuteroL, oxygen, traZODone    Review of Systems   All other systems reviewed and are negative.    Interval Pertinent History:  Social History     Tobacco Use    Smoking status: Never    Smokeless tobacco: Never   Substance Use Topics    Alcohol use: Not on file         Objective:   Patient Vitals for the past 24 hrs:   BP Temp Temp src Pulse Resp SpO2   03/18/24 0937 108/70 36 °C (96.8 °F) Temporal 88 18 91 %   03/18/24 0842 -- -- -- -- -- 94 %   03/18/24 0400 106/70 36.3 °C  (97.3 °F) Temporal 68 20 98 %   24 0135 110/71 36.2 °C (97.2 °F) Temporal 63 18 94 %   24 2045 -- -- -- -- -- 96 %   24 1900 100/67 36.5 °C (97.7 °F) Temporal 75 20 93 %   24 1501 -- -- -- -- -- 93 %         Average, Min, and Max for last 24 hours Vitals:  TEMPERATURE:  Temp  Av.3 °C (97.3 °F)  Min: 36 °C (96.8 °F)  Max: 36.5 °C (97.7 °F)    RESPIRATIONS RANGE: Resp  Av  Min: 18  Max: 20    PULSE RANGE: Pulse  Av.5  Min: 63  Max: 88    BLOOD PRESSURE RANGE:  Systolic (24hrs), Av , Min:100 , Max:110   ; Diastolic (24hrs), Av, Min:67, Max:71      PULSE OXIMETRY RANGE: SpO2  Av.1 %  Min: 91 %  Max: 98 %    I/O last 3 completed shifts:  In: 50 (0.4 mL/kg) [IV Piggyback:50]  Out: 2825 (20 mL/kg) [Urine:2825 (0.6 mL/kg/hr)]  Weight: 141.5 kg   Weight change: -0.878 kg (-1 lb 15 oz)     Physical Exam  Vitals and nursing note reviewed.   Constitutional:       General: He is not in acute distress.     Appearance: He is morbidly obese. He is ill-appearing (Chronically).      Interventions: Nasal cannula in place.   HENT:      Head: Normocephalic and atraumatic.      Right Ear: External ear normal.      Left Ear: External ear normal.      Nose: Nose normal. No congestion or rhinorrhea.      Mouth/Throat:      Mouth: Mucous membranes are moist.   Eyes:      Extraocular Movements: Extraocular movements intact.      Pupils: Pupils are equal, round, and reactive to light.   Cardiovascular:      Rate and Rhythm: Normal rate and regular rhythm.      Pulses: Normal pulses.      Heart sounds: Normal heart sounds.   Pulmonary:      Effort: Pulmonary effort is normal.      Breath sounds: Rhonchi present.   Abdominal:      General: Abdomen is flat. Bowel sounds are normal.      Palpations: Abdomen is soft.   Musculoskeletal:         General: Normal range of motion.      Cervical back: Normal range of motion and neck supple.      Right lower leg: Edema present.      Left lower leg:  Edema present.   Skin:     General: Skin is warm and dry.      Capillary Refill: Capillary refill takes less than 2 seconds.   Neurological:      General: No focal deficit present.      Mental Status: He is alert and oriented to person, place, and time. Mental status is at baseline.   Psychiatric:         Mood and Affect: Mood normal.         Behavior: Behavior is cooperative.         Thought Content: Thought content normal.         Judgment: Judgment normal.         Lab Results   Component Value Date     03/18/2024    K 4.0 03/18/2024     03/18/2024    CO2 31 03/18/2024    BUN 36 (H) 03/18/2024    CREATININE 0.62 03/18/2024    GLUCOSE 115 (H) 03/18/2024    CALCIUM 8.9 03/18/2024    PROT 6.9 03/12/2024    BILITOT 1.6 (H) 03/12/2024    ALKPHOS 73 03/12/2024    AST 18 03/12/2024    ALT 22 03/12/2024       Lab Results   Component Value Date    WBC 9.0 03/18/2024    HGB 18.0 (H) 03/18/2024    HCT 56.8 (H) 03/18/2024     (H) 03/18/2024     03/18/2024    LYMPHOPCT 4.0 03/12/2024    RBC 5.41 03/18/2024    MCH 33.3 03/18/2024    MCHC 31.7 (L) 03/18/2024    RDW 12.7 03/18/2024    CRP 1.62 (A) 10/25/2019       Lab Results   Component Value Date    TSH 2.64 03/09/2024     Lab Results   Component Value Date    LACTATE 1.4 03/12/2024     (H) 03/15/2024       IMAGES:  Encounter Date: 03/08/24   Electrocardiogram, 12-lead PRN ACS symptoms   Result Value    Ventricular Rate 86    Atrial Rate 86    IA Interval 208    QRS Duration 146    QT Interval 388    QTC Calculation(Bazett) 464    P Axis 49    R Axis 140    T Axis 6    QRS Count 14    Q Onset 207    P Onset 103    P Offset 179    T Offset 401    QTC Fredericia 437    Narrative    Sinus rhythm with occasional Premature ventricular complexes  Right bundle branch block  T wave abnormality, consider lateral ischemia  Abnormal ECG  When compared with ECG of 12-MAR-2024 04:06, (unconfirmed)  Premature atrial complexes are no longer Present  T wave  inversion now evident in Lateral leads  Confirmed by Heather Barrett ([id number]) on 3/12/2024 10:49:25 AM     XR chest 1 view    Result Date: 3/13/2024  Impression: 1. Cardiomegaly. Vascular congestion. Large right pleural effusion with atelectasis/consolidation at the right lower lobe. Radiographic follow-up to resolution recommended. 2. Enteric tube terminates at the right upper quadrant, at the expected location of the gastric antrum.       MACRO: None.   Signed by: Deepali Casillas 3/13/2024 3:20 AM Dictation workstation:   [id number]    XR chest 1 view    Result Date: 3/11/2024  Impression: 1.  Slightly improved right-sided pleural effusion which is persistently moderate.     Signed by: Eugene Alvarenga 3/11/2024 7:19 PM Dictation workstation:   [id number]    Transthoracic echo (TTE) complete    Result Date: 3/11/2024   [facility], [address] [place] [address]               Tel [phone] and Fax [phone] TRANSTHORACIC ECHOCARDIOGRAM REPORT  Patient Name:      BRAYAN Key Physician:    [id number] Heather Barrett MD Study Date:        3/11/2024            Ordering Provider:    [id number] RORY GRAVES MRN/PID:           [id number]             Fellow: Accession#:        [id number]         Nurse:                Khushbu Holman RN Date of Birth/Age: 1955 / 68 years Sonographer:          Goldie Woods RDCS Gender:            M                    Additional Staff: Height:            177.80 cm            Admit Date:           3/8/2024 Weight:            147.42 kg            Admission Status:     Inpatient -                                                               Routine BSA / BMI:         2.57 m2 / 46.63      Encounter#:           [id number]                    kg/m2                                          Department Location:  [facility] Non                                                               Invasive Blood Pressure: 107 /70 mmHg Study Type:    TRANSTHORACIC ECHO (TTE) COMPLETE Diagnosis/ICD: Heart failure, unspecified-I50.9; Acute systolic (congestive)                heart failure (CHF)-I50.21 Indication:    Congestive Heart Failure CPT Code:      Echo Complete w Full Doppler-[id number] Patient History: Pertinent History: COPD, HTN, LE Edema, Dyspnea and Elev trop., Elev. BNP,                    Smoker,. Study Detail: The following Echo studies were performed: 2D, M-Mode, Doppler and               color flow. Technically challenging study due to body habitus.               Definity used as a contrast agent for endocardial border               definition. Total contrast used for this procedure was 1.5 mL via               IV push. The patient was awake.  PHYSICIAN INTERPRETATION: Left Ventricle: The left ventricular systolic function is moderately decreased, with an estimated ejection fraction of 30-35%. There is global hypokinesis of the left ventricle with minor regional variations. The left ventricular cavity size is normal. Left ventricular diastolic filling was indeterminate. LV Wall Scoring: The basal inferolateral segment is akinetic. Left Atrium: The left atrium is mildly dilated. Right Ventricle: The right ventricle is normal in size. There is normal right ventricular global systolic function. Right Atrium: The right atrium is normal in size. Aortic Valve: The aortic valve was not well visualized. There is evidence of moderate aortic valve stenosis. There is no evidence of aortic valve regurgitation. The peak instantaneous gradient of the aortic valve is 52.7 mmHg. The mean gradient of the aortic valve is 26.0 mmHg. Mitral Valve: The mitral valve is mild to moderately thickened. There is mild to moderate mitral annular calcification. There is mild mitral valve regurgitation. Tricuspid  Valve: The tricuspid valve was not well visualized. There is trace to mild tricuspid regurgitation. The right ventricular systolic pressure is unable to be estimated. Pulmonic Valve: The pulmonic valve is not well visualized. There is physiologic pulmonic valve regurgitation. Pericardium: There is no pericardial effusion noted. Aorta: The aortic root is normal. Systemic Veins: The inferior vena cava appears dilated. There is less than 50% IVC collapse with inspiration.  CONCLUSIONS:  1. Left ventricular systolic function is moderately decreased with a 30-35% estimated ejection fraction.  2. Basal inferolateral segment is abnormal.  3. Poorly visualized anatomical structures due to suboptimal image quality.  4. Moderate aortic valve stenosis.  5. There is global hypokinesis of the left ventricle with minor regional variations. QUANTITATIVE DATA SUMMARY: 2D MEASUREMENTS:                           Normal Ranges: IVSd:          1.14 cm    (0.6-1.1cm) LVPWd:         1.57 cm    (0.6-1.1cm) LVIDd:         5.26 cm    (3.9-5.9cm) LVIDs:         4.41 cm LV Mass Index: 117.2 g/m2 LV % FS        16.2 % LA VOLUME:                               Normal Ranges: LA Vol A4C:        77.9 ml    (22+/-6mL/m2) LA Vol A2C:        84.8 ml LA Vol BP:         83.7 ml LA Vol Index A4C:  30.4ml/m2 LA Vol Index A2C:  33.0 ml/m2 LA Vol Index BP:   32.6 ml/m2 LA Area A4C:       24.1 cm2 LA Area A2C:       24.4 cm2 LA Major Axis A4C: 6.3 cm LA Major Axis A2C: 6.0 cm LA Volume Index:   30.5 ml/m2 LA Vol A4C:        71.4 ml LA Vol A2C:        78.4 ml RA VOLUME BY A/L METHOD:                       Normal Ranges: RA Area A4C: 17.0 cm2 AORTA MEASUREMENTS:                    Normal Ranges: Asc Ao, d: 3.10 cm (2.1-3.4cm) LV SYSTOLIC FUNCTION BY 2D PLANIMETRY (MOD):                     Normal Ranges: EF-A4C View: 33.6 % (>=55%) EF-A2C View: 29.1 % EF-Biplane:  33.1 % LV DIASTOLIC FUNCTION:                     Normal Ranges: MV Peak E: 0.90 m/s (0.7-1.2  m/s) MV Peak A: 0.39 m/s (0.42-0.7 m/s) E/A Ratio: 2.34     (1.0-2.2) MITRAL VALVE:                Normal Ranges: MV DT: 99 msec (150-240msec) AORTIC VALVE:                                    Normal Ranges: AoV Vmax:                3.63 m/s  (<=1.7m/s) AoV Peak P.7 mmHg (<20mmHg) AoV Mean P.0 mmHg (1.7-11.5mmHg) LVOT Max Jero:            0.69 m/s  (<=1.1m/s) AoV VTI:                 60.70 cm  (18-25cm) LVOT VTI:                12.80 cm LVOT Diameter:           2.60 cm   (1.8-2.4cm) AoV Area, VTI:           1.12 cm2  (2.5-5.5cm2) AoV Area,Vmax:           1.01 cm2  (2.5-4.5cm2) AoV Dimensionless Index: 0.21  RIGHT VENTRICLE: RV Basal 3.71 cm RV Mid   2.98 cm RV Major 9.7 cm TAPSE:   22.4 mm RV s'    0.13 m/s TRICUSPID VALVE/RVSP:                   Normal Ranges: IVC Diam: 2.32 cm PULMONIC VALVE:                      Normal Ranges: PV Max Jero: 0.7 m/s  (0.6-0.9m/s) PV Max P.7 mmHg  [id number] Heather Barrett MD Electronically signed on 3/11/2024 at 4:04:49 PM  Wall Scoring  ** Final (Updated) **    === 24 ===    XR CHEST 1 VIEW    - Impression -  1. Cardiomegaly. Vascular congestion. Large right pleural effusion  with atelectasis/consolidation at the right lower lobe. Radiographic  follow-up to resolution recommended.  2. Enteric tube terminates at the right upper quadrant, at the  expected location of the gastric antrum.        MACRO:  None.    Signed by: Deepali Casillas 3/13/2024 3:20 AM  Dictation workstation:   [id number]  === 10/02/18 ===    CT HEART CALCIUM SCORING WO CONTRAST    - Impression -  1. Coronary artery calcium score of 164.2*.  2. There is some dependent density in the right mainstem bronchus  probably sequela of aspiration. Scarring or atelectasis at the right  lung base and small focal consolidation/atelectasis anteriorly on the  right.  3. Prominence ascending thoracic aorta up to 3.9 cm.  4. Prominent fat attenuation in the region of the interatrial  "septum  as described probably due to lipomatosis involvement and appearing  slightly more pronounced since previous study.  5.  view demonstrates prominent paratracheal density in the  upper mediastinum bilaterally not included on CT imaging, may relate  to overlying vascular structures or pronounced mediastinal fat  however correlation with dedicated complete CT chest should be  considered for better evaluation.  *Coronary artery calcium scoring may be helpful in predicting the  risk for future coronary heart disease events.  According to the  [employer] Clinical Expert Consensus  Task Force, such testing provides important prognostic information in  patients with more than one coronary heart disease risk factor. The  coronary artery calcium score correlates with the annual risk of a  non-fatal myocardial infarction or coronary heart disease death.    Coronary artery score            Annual Risk    0-99                             0.4%  100-399                        1.3%  >400                            2.4%    These three \"breakpoints\" correspond to lower, intermediate and high  risk states for future coronary events.  Such information should be  used, along with appropriate clinical judgment, to make decisions  regarding the intensity of risk factor management strategies to treat  blood lipids and to modify other non-lipid coronary risk factors.    Reference: [place] P et al. Circulation.  2007; 115:402-426  === 03/08/24 ===    XR CHEST 1 VIEW    - Impression -  1. Cardiomegaly. Vascular congestion. Large right pleural effusion  with atelectasis/consolidation at the right lower lobe. Radiographic  follow-up to resolution recommended.  2. Enteric tube terminates at the right upper quadrant, at the  expected location of the gastric antrum.        MACRO:  None.    Signed by: Deepali Casillas 3/13/2024 3:20 AM  Dictation workstation:   [id number]      Additional results of the " last 24 hours have been reviewed.    Dictated using Synchro Version 2.4  Proof read however unrecognized voice recognition errors may have occurred     Electronically signed by Lou Madrigal DO on 03/18/24 at 2:32 PM

## 2024-03-19 LAB
BNP SERPL-MCNC: 352 PG/ML (ref 0–99)
GLUCOSE BLD MANUAL STRIP-MCNC: 127 MG/DL (ref 74–99)
GLUCOSE BLD MANUAL STRIP-MCNC: 159 MG/DL (ref 74–99)
GLUCOSE BLD MANUAL STRIP-MCNC: 176 MG/DL (ref 74–99)
GLUCOSE BLD MANUAL STRIP-MCNC: 281 MG/DL (ref 74–99)

## 2024-03-19 PROCEDURE — 99232 SBSQ HOSP IP/OBS MODERATE 35: CPT | Performed by: INTERNAL MEDICINE

## 2024-03-19 PROCEDURE — S4991 NICOTINE PATCH NONLEGEND: HCPCS | Performed by: INTERNAL MEDICINE

## 2024-03-19 PROCEDURE — 94760 N-INVAS EAR/PLS OXIMETRY 1: CPT

## 2024-03-19 PROCEDURE — 97530 THERAPEUTIC ACTIVITIES: CPT | Mod: GO,CO,59

## 2024-03-19 PROCEDURE — 99233 SBSQ HOSP IP/OBS HIGH 50: CPT | Performed by: INTERNAL MEDICINE

## 2024-03-19 PROCEDURE — 2500000002 HC RX 250 W HCPCS SELF ADMINISTERED DRUGS (ALT 637 FOR MEDICARE OP, ALT 636 FOR OP/ED): Performed by: INTERNAL MEDICINE

## 2024-03-19 PROCEDURE — 1200000002 HC GENERAL ROOM WITH TELEMETRY DAILY

## 2024-03-19 PROCEDURE — 2500000001 HC RX 250 WO HCPCS SELF ADMINISTERED DRUGS (ALT 637 FOR MEDICARE OP): Performed by: INTERNAL MEDICINE

## 2024-03-19 PROCEDURE — 83880 ASSAY OF NATRIURETIC PEPTIDE: CPT | Performed by: PHYSICIAN ASSISTANT

## 2024-03-19 PROCEDURE — 2500000001 HC RX 250 WO HCPCS SELF ADMINISTERED DRUGS (ALT 637 FOR MEDICARE OP): Performed by: PHYSICIAN ASSISTANT

## 2024-03-19 PROCEDURE — 94640 AIRWAY INHALATION TREATMENT: CPT

## 2024-03-19 PROCEDURE — 36415 COLL VENOUS BLD VENIPUNCTURE: CPT | Performed by: PHYSICIAN ASSISTANT

## 2024-03-19 PROCEDURE — 2500000004 HC RX 250 GENERAL PHARMACY W/ HCPCS (ALT 636 FOR OP/ED): Performed by: INTERNAL MEDICINE

## 2024-03-19 PROCEDURE — 82947 ASSAY GLUCOSE BLOOD QUANT: CPT

## 2024-03-19 PROCEDURE — 99232 SBSQ HOSP IP/OBS MODERATE 35: CPT | Performed by: PHYSICIAN ASSISTANT

## 2024-03-19 PROCEDURE — 97535 SELF CARE MNGMENT TRAINING: CPT | Mod: GO,CO

## 2024-03-19 RX ORDER — VALSARTAN 40 MG/1
40 TABLET ORAL DAILY
Status: DISCONTINUED | OUTPATIENT
Start: 2024-03-19 | End: 2024-03-23 | Stop reason: HOSPADM

## 2024-03-19 RX ORDER — FUROSEMIDE 10 MG/ML
40 INJECTION INTRAMUSCULAR; INTRAVENOUS 2 TIMES DAILY
Status: DISCONTINUED | OUTPATIENT
Start: 2024-03-20 | End: 2024-03-19

## 2024-03-19 RX ORDER — FUROSEMIDE 10 MG/ML
80 INJECTION INTRAMUSCULAR; INTRAVENOUS 2 TIMES DAILY
Status: DISCONTINUED | OUTPATIENT
Start: 2024-03-20 | End: 2024-03-21

## 2024-03-19 RX ORDER — METOPROLOL SUCCINATE 25 MG/1
25 TABLET, EXTENDED RELEASE ORAL DAILY
Status: DISCONTINUED | OUTPATIENT
Start: 2024-03-19 | End: 2024-03-23 | Stop reason: HOSPADM

## 2024-03-19 RX ADMIN — Medication 1 TABLET: at 09:18

## 2024-03-19 RX ADMIN — PREDNISONE 20 MG: 20 TABLET ORAL at 09:18

## 2024-03-19 RX ADMIN — FOLIC ACID 1 MG: 1 TABLET ORAL at 09:18

## 2024-03-19 RX ADMIN — ENOXAPARIN SODIUM 40 MG: 40 INJECTION SUBCUTANEOUS at 09:17

## 2024-03-19 RX ADMIN — SENNOSIDES AND DOCUSATE SODIUM 2 TABLET: 8.6; 5 TABLET ORAL at 22:07

## 2024-03-19 RX ADMIN — ENOXAPARIN SODIUM 40 MG: 40 INJECTION SUBCUTANEOUS at 22:07

## 2024-03-19 RX ADMIN — GUAIFENESIN SYRUP AND DEXTROMETHORPHAN 10 ML: 100; 10 SYRUP ORAL at 09:17

## 2024-03-19 RX ADMIN — BENZONATATE 200 MG: 100 CAPSULE ORAL at 15:39

## 2024-03-19 RX ADMIN — IPRATROPIUM BROMIDE AND ALBUTEROL SULFATE 3 ML: 2.5; .5 SOLUTION RESPIRATORY (INHALATION) at 19:41

## 2024-03-19 RX ADMIN — LEVOTHYROXINE SODIUM 125 MCG: 0.12 TABLET ORAL at 06:08

## 2024-03-19 RX ADMIN — METOPROLOL SUCCINATE 25 MG: 25 TABLET, EXTENDED RELEASE ORAL at 10:36

## 2024-03-19 RX ADMIN — Medication 6 MG: at 22:06

## 2024-03-19 RX ADMIN — EMPAGLIFLOZIN 10 MG: 10 TABLET, FILM COATED ORAL at 09:18

## 2024-03-19 RX ADMIN — GUAIFENESIN SYRUP AND DEXTROMETHORPHAN 10 ML: 100; 10 SYRUP ORAL at 22:05

## 2024-03-19 RX ADMIN — ASPIRIN 81 MG: 81 TABLET, COATED ORAL at 09:18

## 2024-03-19 RX ADMIN — FLUTICASONE FUROATE AND VILANTEROL TRIFENATATE 1 PUFF: 100; 25 POWDER RESPIRATORY (INHALATION) at 09:19

## 2024-03-19 RX ADMIN — IPRATROPIUM BROMIDE AND ALBUTEROL SULFATE 3 ML: 2.5; .5 SOLUTION RESPIRATORY (INHALATION) at 07:00

## 2024-03-19 RX ADMIN — IPRATROPIUM BROMIDE AND ALBUTEROL SULFATE 3 ML: 2.5; .5 SOLUTION RESPIRATORY (INHALATION) at 13:59

## 2024-03-19 RX ADMIN — THIAMINE HCL TAB 100 MG 100 MG: 100 TAB at 09:18

## 2024-03-19 RX ADMIN — ALLOPURINOL 300 MG: 300 TABLET ORAL at 09:18

## 2024-03-19 RX ADMIN — FUROSEMIDE 40 MG: 10 INJECTION, SOLUTION INTRAVENOUS at 06:10

## 2024-03-19 RX ADMIN — TIOTROPIUM BROMIDE INHALATION SPRAY 2 PUFF: 3.12 SPRAY, METERED RESPIRATORY (INHALATION) at 09:18

## 2024-03-19 RX ADMIN — BENZONATATE 200 MG: 100 CAPSULE ORAL at 09:18

## 2024-03-19 RX ADMIN — VALSARTAN 40 MG: 40 TABLET, FILM COATED ORAL at 11:15

## 2024-03-19 RX ADMIN — BENZONATATE 200 MG: 100 CAPSULE ORAL at 22:06

## 2024-03-19 RX ADMIN — POLYETHYLENE GLYCOL 3350 17 G: 17 POWDER, FOR SOLUTION ORAL at 22:06

## 2024-03-19 RX ADMIN — NICOTINE 1 PATCH: 14 PATCH, EXTENDED RELEASE TRANSDERMAL at 09:18

## 2024-03-19 ASSESSMENT — COGNITIVE AND FUNCTIONAL STATUS - GENERAL
MOVING TO AND FROM BED TO CHAIR: A LITTLE
HELP NEEDED FOR BATHING: A LITTLE
PERSONAL GROOMING: A LITTLE
DAILY ACTIVITIY SCORE: 20
DAILY ACTIVITIY SCORE: 20
TOILETING: A LITTLE
WALKING IN HOSPITAL ROOM: A LITTLE
TURNING FROM BACK TO SIDE WHILE IN FLAT BAD: A LITTLE
MOVING FROM LYING ON BACK TO SITTING ON SIDE OF FLAT BED WITH BEDRAILS: A LITTLE
DRESSING REGULAR LOWER BODY CLOTHING: A LITTLE
HELP NEEDED FOR BATHING: A LITTLE
CLIMB 3 TO 5 STEPS WITH RAILING: A LOT
DRESSING REGULAR LOWER BODY CLOTHING: A LITTLE
PERSONAL GROOMING: A LITTLE
MOBILITY SCORE: 17
TOILETING: A LITTLE
STANDING UP FROM CHAIR USING ARMS: A LITTLE

## 2024-03-19 ASSESSMENT — PAIN - FUNCTIONAL ASSESSMENT
PAIN_FUNCTIONAL_ASSESSMENT: 0-10
PAIN_FUNCTIONAL_ASSESSMENT: 0-10

## 2024-03-19 ASSESSMENT — ACTIVITIES OF DAILY LIVING (ADL): HOME_MANAGEMENT_TIME_ENTRY: 13

## 2024-03-19 ASSESSMENT — PAIN SCALES - GENERAL
PAINLEVEL_OUTOF10: 0 - NO PAIN

## 2024-03-19 NOTE — PROGRESS NOTES
Subjective Data:  Patient shortness of breath improved.   Denies any chest pain, chest pressure, palpitations, dizziness, cough, shortness of breath, orthopnea, edema.      Overnight Events:    No acute events reported overnight.      Objective Data:  Last Recorded Vitals:  Vitals:    03/19/24 0545 03/19/24 0627 03/19/24 0700 03/19/24 0800   BP: 119/81      BP Location: Left arm      Patient Position: Sitting      Pulse: 67      Resp: 17      Temp: 36.3 °C (97.3 °F)      TempSrc:       SpO2: (!) 88%  96% 96%   Weight:  140 kg (308 lb 10.3 oz)     Height:           Last Labs:  CBC - 3/18/2024:  7:15 AM  9.0 18.0 224    56.8      CMP - 3/18/2024:  7:15 AM  8.9 6.9 18 --- 1.6   3.1 3.4 22 73      PTT - No results in last year.  _   _ _     TROPHS   Date/Time Value Ref Range Status   03/12/2024 07:07  0 - 20 ng/L Final     Comment:     Previous result verified on 3/12/2024 0048 on specimen/case [id number] called with component TRPHS for procedure Troponin I, High Sensitivity with value 89 ng/L.   03/12/2024 03:08  0 - 20 ng/L Final     Comment:     Previous result verified on 3/12/2024 0048 on specimen/case [id number] called with component TRPHS for procedure Troponin I, High Sensitivity with value 89 ng/L.   03/12/2024 12:15 AM 89 0 - 20 ng/L Final     BNP   Date/Time Value Ref Range Status   03/15/2024 05:15  0 - 99 pg/mL Final   03/10/2024 11:25  0 - 99 pg/mL Final     HGBA1C   Date/Time Value Ref Range Status   02/20/2024 10:35 AM 5.8 see below % Final   01/19/2023 10:23 AM 7.3 % Final     Comment:          Diagnosis of Diabetes-Adults   Non-Diabetic: < or = 5.6%   Increased risk for developing diabetes: 5.7-6.4%   Diagnostic of diabetes: > or = 6.5%  .       Monitoring of Diabetes                Age (y)     Therapeutic Goal (%)   Adults:          >18           <7.0   Pediatrics:    13-18           <7.5                   7-12           <8.0                   0- 6             7.5-8.5   American Diabetes Association. Diabetes Care 33(S1), Jan 2010.     09/10/2021 01:29 PM 7.9 % Final     Comment:          Diagnosis of Diabetes-Adults   Non-Diabetic: < or = 5.6%   Increased risk for developing diabetes: 5.7-6.4%   Diagnostic of diabetes: > or = 6.5%  .       Monitoring of Diabetes                Age (y)     Therapeutic Goal (%)   Adults:          >18           <7.0   Pediatrics:    13-18           <7.5                   7-12           <8.0                   0- 6            7.5-8.5   American Diabetes Association. Diabetes Care 33(S1), Jan 2010.       LDLCALC   Date/Time Value Ref Range Status   02/20/2024 10:35  <=99 mg/dL Final     Comment:                                 Near   Borderline      AGE      Desirable  Optimal    High     High     Very High     0-19 Y     0 - 109     ---    110-129   >/= 130     ----    20-24 Y     0 - 119     ---    120-159   >/= 160     ----      >24 Y     0 -  99   100-129  130-159   160-189     >/=190       VLDL   Date/Time Value Ref Range Status   02/20/2024 10:35 AM 16 0 - 40 mg/dL Final   09/10/2021 01:29 PM 27 0 - 40 mg/dL Final   09/18/2018 02:26 PM 22 0 - 40 mg/dL Final      Last I/O:  I/O last 3 completed shifts:  In: 1811 (12.9 mL/kg) [P.O.:1761; IV Piggyback:50]  Out: 2645 (18.9 mL/kg) [Urine:2645 (0.5 mL/kg/hr)]  Weight: 140 kg     Past Cardiology Tests (Last 3 Years):  EKG:  Electrocardiogram, 12-lead PRN ACS symptoms 03/12/2024  ECG 12 lead 03/12/2024  ECG 12 lead 03/12/2024  Electrocardiogram, 12-lead PRN ACS symtpoms 03/11/2024  ECG 12 Lead 03/10/2024  ECG 12 lead      Echo:  Transthoracic echo (TTE) complete 03/11/2024  CONCLUSIONS:   1. Left ventricular systolic function is moderately decreased with a 30-35% estimated ejection fraction.   2. Basal inferolateral segment is abnormal.   3. Poorly visualized anatomical structures due to suboptimal image quality.   4. Moderate aortic valve stenosis.   5. There is global hypokinesis of the  left ventricle with minor regional variations.      TTE (06/18/2019):   -Low normal LV systolic function  -Mild concentric LVH  -Doppler flow suggestive of abnormal Left ventricular relaxation  -Normal right ventricular size and function  -Slightly enlarged left atrium.   -Moderate calcific aortic stenosis  -Trace mitral regurg       Ejection Fractions:            EF   Date/Time Value Ref Range Status   03/11/2024 02:04 PM 33 %        Cath:  No results found for this or any previous visit from the past 1095 days.     Stress Test:  No results found for this or any previous visit from the past 1095 days.     Imaging:  CXR (03/08/2024):   IMPRESSION:  -Moderate right pleural effusion and right lung base consolidation. Findings could be due to pneumonia. Other underlying process not excluded. Follow-up recommended.   -Trace left pleural effusion and cardiomegaly. Possible interstitial edema.     Xray Abd (03/12/2024):   IMPRESSION:  1.  Enteric tube approximately 3.8 cm above the kathie.  2. Evaluation of the enteric tube is limited and appears looped high in the right upper quadrant however may be related to patient positioning and rotation. Repeat imaging may be considered with proper patient positioning to delineate proper position.  3. Left lower lobe airspace opacities concerning for pneumonia. Possible large right pleural effusion.  4. Paucity of bowel gas noted.     CXR (03/12/2024):   IMPRESSION:  1.  Enteric tube approximately 3.8 cm above the kathie.  2. Evaluation of the enteric tube is limited and appears looped high in the right upper quadrant however may be related to patient positioning and rotation. Repeat imaging may be considered with proper patient positioning to delineate proper position.  3. Left lower lobe airspace opacities concerning for pneumonia. Possible large right pleural effusion.  4. Paucity of bowel gas noted.    Inpatient Medications:  Scheduled medications   Medication Dose Route  Frequency    allopurinol  300 mg oral Daily    aspirin  81 mg oral Daily    benzonatate  200 mg oral TID    dextromethorphan-guaifenesin  10 mL oral BID    empagliflozin  10 mg oral Daily    enoxaparin  40 mg subcutaneous BID    tiotropium  2 puff inhalation Daily    And    fluticasone furoate-vilanteroL  1 puff inhalation Daily    folic acid  1 mg oral Daily    furosemide  40 mg intravenous q12h    insulin lispro  0-10 Units subcutaneous TID with meals    ipratropium-albuteroL  3 mL nebulization TID    levothyroxine  125 mcg oral Daily    melatonin  6 mg oral Nightly    metoprolol tartrate  25 mg oral BID    multivitamin with minerals  1 tablet oral Daily    nicotine  1 patch transdermal Daily    polyethylene glycol  17 g oral BID    predniSONE  20 mg oral Daily    Followed by    [START ON 3/22/2024] predniSONE  10 mg oral Daily    Followed by    [START ON 3/25/2024] predniSONE  5 mg oral Daily    sennosides-docusate sodium  2 tablet oral BID    thiamine  100 mg oral Daily    [Held by provider] valsartan  320 mg oral Daily     PRN medications   Medication    acetaminophen    hydrocodone-homatropine    ipratropium-albuteroL    oxygen    traZODone     Continuous Medications   Medication Dose Last Rate       Physical Exam:  Physical Exam  Constitutional:       Appearance: He is obese.   HENT:      Head: Normocephalic.      Nose: Nose normal.      Mouth/Throat:      Mouth: Mucous membranes are moist.   Eyes:      Conjunctiva/sclera: Conjunctivae normal.   Neck:      Comments: +JVD  Cardiovascular:      Rate and Rhythm: Normal rate and regular rhythm.      Heart sounds: Murmur heard.   Pulmonary:      Effort: Pulmonary effort is normal.      Breath sounds: Normal breath sounds.   Abdominal:      General: There is distension.      Palpations: Abdomen is soft.   Musculoskeletal:      Right lower leg: Edema present.      Left lower leg: Edema present.   Skin:     General: Skin is warm and dry.   Neurological:      General:  No focal deficit present.      Mental Status: He is alert. Mental status is at baseline.   Psychiatric:         Mood and Affect: Mood normal.         Behavior: Behavior normal.            Assessment/Plan   69 yo male from home with h/o obesity, MELISSA on CPAP, COPD, current smoker, type 2 DM, hypertension, lymphedema, moderate aortic stenosis (6/2019) who presented from PCP office with symptoms of increased LE edema, abdominal bloating, progressive SOB over the past year found to be hypoxic in the ER. On admission was Intermittently refusing IV Lasix, I&O not well monitored. Discussed urinating only into urinals, recording how much urine vs how frequent he urinates. Refused return to lasix gtt, lipscomb or external catheter. Originally refusing echo d/t concern for needing to urinate during the testing. Discussed accommodations and ability to use the bathroom in echo if necessary. Patient was then agreeable to echocardiogram. Echo obtained with newly reduced ejection fraction. Decompensated overnight hypoxic, agitated, required Ativan, Precedex, BiPAP and eventually intubation. Patient weaned off of IV pressor support (03/13). Able to be extubated successfully (3/14)  to [facility]. Diuresis decreased to once daily w/ drop in urine output, remains hypervolemic.      #Acute hypoxic respiratory failure 2/2 decompensated heart failure, Pneumonia, COPD w/ current smoking, mucous plugging s/p bronchoscopy   #Acute decompensated systolic heart failure (new diagnosis)  #Non-sustained Ventricular tachycardia (none further on tele overnight)   #Septic shock, resolved   #Moderate aortic stenosis   #Chronic lymphedema BLE   #NSR with PACs on telemetry  (no afib noted)      -Increase lasix to 80mg IV BID scheduled for 6am and 1 PM  -Continue Metoprolol, resume GDMT including Valsartan @ decreased dose d/t hypotension as tolerated  -Echo reviewed with newly reduced LVSF, EF 30-35%, abnormal basal inferolateral segment, global hypokinesis,  mod AS. Discussed with patient.   -Strict I/O, monitor renal function and electrolyte  - >171> 733  -Will plan for LHC/RHC for new cardiomyopathy will be deferred to outpatient per patient request.   -Will continue to follow        Peripheral IV 03/12/24 18 G Left;Ventral Forearm (Active)   Site Assessment Clean;Dry;Intact 03/18/24 2100   Dressing Status Clean;Dry 03/18/24 2100   Number of days: 7       Peripheral IV 03/13/24 18 G Proximal;Right Forearm (Active)   Site Assessment Clean;Dry;Intact 03/18/24 2100   Dressing Status Clean;Dry 03/18/24 2100   Number of days: 6       Peripheral IV 03/13/24 20 G Right Wrist (Active)   Site Assessment Clean;Dry;Intact 03/18/24 2100   Dressing Status Clean;Dry 03/18/24 2100   Number of days: 6       Code Status:  Full Code    I spent  minutes in the professional and overall care of this patient.        Stephanie Dyer PA-C

## 2024-03-19 NOTE — SIGNIFICANT EVENT
Patient on home unit for the night, no oxygen at this time due to RT found patient on room air and sats were in low 90's. Oxygen sats checked after patient had been on cpap greater than 5 minutes and pulse oximetry remained 92%

## 2024-03-19 NOTE — NURSING NOTE
Nurse in to see 68 y.o. male adm with Heart failure exacerbated by sotalol (CMS/[facility]). Patient had requested information regarding a CGM. Patient was sitting in chair talking on phone when nurse arrived. Nurse introduced herself and patient stated he had many questions. Nurse offered to answer questions within her scope of practice. Patient verbalized that he was visited by the CHF nurse and was given materials to read yet did not feel that a plan was given. Nurse explained that nurse is educator and offers information on the condition. He may help him with a plan,yet there are many doctor involved in his overall plan of care. Patient did express frustration that he doesn't feel he is getting the information needed on his diagnosis es. Nurse explained that when he is discharged he will have recommendations in place for whom he should see. Nurse was talking with patient regarding getting a CGM when a friend of his arrived for a visit. Nurse excused herself and will return in am to offer additional information on CGM and diabetes.

## 2024-03-19 NOTE — CARE PLAN
Problem: Pain - Adult  Goal: Verbalizes/displays adequate comfort level or baseline comfort level  Outcome: Progressing     Problem: Safety - Adult  Goal: Free from fall injury  Outcome: Progressing     Problem: Discharge Planning  Goal: Discharge to home or other facility with appropriate resources  Outcome: Progressing     Problem: Heart Failure  Goal: Improved gas exchange this shift  Outcome: Progressing  Goal: Improved urinary output this shift  Outcome: Progressing  Goal: Reduction in peripheral edema within 24 hours  Outcome: Progressing  Goal: Report improvement of dyspnea/breathlessness this shift  Outcome: Progressing  Goal: Weight from fluid excess reduced over 2-3 days, then stabilize  Outcome: Progressing  Goal: Increase self care and/or family involvement in 24 hours  Outcome: Progressing   The patient's goals for the shift include      The clinical goals for the shift include Patient will remain safe throughout this shift.

## 2024-03-19 NOTE — PROGRESS NOTES
Occupational Therapy    Occupational Therapy Treatment    Name: Alo Glass  MRN: [id number]  : 1955  Date: 24  Time Calculation  Start Time: 1447  Stop Time: 1523  Time Calculation (min): 36 min    Assessment:  OT Assessment: Pt continues to present with SOB, decreased endurance and balance. Pt continues to benefit from skilled OT services to increase independence with ADL's, transfers, and mobility. Recommend MOD OT intervention in discharge setting, though pt currently insisting on discharge home with [facility] follow-up.  Prognosis: Good  Barriers to Discharge: Decreased caregiver support  Evaluation/Treatment Tolerance: Patient limited by fatigue  Medical Staff Made Aware: Yes  End of Session Communication: Bedside nurse, Care Coordinator  End of Session Patient Position: Up in chair, Alarm off, not on at start of session (Nurse aware patient is refusing chair alarm.)  Plan:  Treatment Interventions: ADL retraining, Functional transfer training, Endurance training, Patient/family training, Compensatory technique education  OT Frequency: 3 times per week  OT Discharge Recommendations: Moderate intensity level of continued care  Equipment Recommended upon Discharge: Wheeled walker  OT Recommended Transfer Status: Assist of 1  OT - OK to Discharge: Yes (In accord with OT POC.)    Subjective   Previous Visit Info:  OT Last Visit  OT Received On: 24  General:  General  Reason for Referral: Pt is a 69 y/o male who was admitted for SOB  Referred By: Lou Madrigal  Past Medical History Relevant to Rehab: Benign essential hypertension  COPD  Type 2 diabetes mellitus  Gastroesophageal reflux disease  Gout  Bilateral lower extremity edema  Tobacco use disorder  Hyperlipidemia  Hypothyroidism  Obesity  Thoracic aortic aneurysm  Obstructive sleep apnea  Polycythemia  Lymphedema  Family/Caregiver Present: No  Prior to Session Communication: Bedside nurse  Patient Position Received: Up in chair, Alarm off, not  on at start of session  Preferred Learning Style: verbal  General Comment: Patient agreeable to OT treatment and education/instruction relating to energy conservation, work simplification and safety.  Precautions:  Medical Precautions: Fall precautions (4 liters O2 via nasal cannula.)  Vitals:     Pain Assessment:  Pain Assessment  Pain Assessment: 0-10  Pain Score: 0 - No pain     Objective   Activities of Daily Living: Toileting  Toileting Level of Assistance: Distant supervision, Setup  Where Assessed: Toilet    Functional Standing Tolerance:     Bed Mobility/Transfers:      Transfers  Transfer: Yes  Transfer 1  Transfer From 1: Sit to, Stand to  Transfer to 1: Sit, Stand  Technique 1: Sit to stand, Stand to sit  Transfer Device 1:  (delta walker)  Transfer Level of Assistance 1: Close supervision  Trials/Comments 1: pt at times dismissive of instruction.      Functional Mobility:  Functional Mobility  Functional Mobility Performed: Yes  Functional Mobility 1  Surface 1: Level tile  Device 1:  (delta walker)  Assistance 1: Distant supervision  Comments 1: Cues provided for slowed pace and device safety.  Therapy/Activity: Therapeutic Activity  Therapeutic Activity Performed: Yes  Therapeutic Activity 1: Pt provided with instruction in energy conservation and work simplification including review of handout. (Pt also provided with information concerning cellular life alert including potentiometer at his request including discussion of limitations of devices and benefits of caregivers/companions.)    Outcome Measures:  [facility] Daily Activity  Putting on and taking off regular lower body clothing: A little  Bathing (including washing, rinsing, drying): A little  Putting on and taking off regular upper body clothing: None  Toileting, which includes using toilet, bedpan or urinal: A little  Taking care of personal grooming such as brushing teeth: A little  Eating Meals: None  Daily Activity - Total Score: 20    Education  Documentation  Handouts, taught by Sha Alegria OT at 3/19/2024  4:28 PM.  Learner: Patient  Readiness: Acceptance  Method: Explanation, Demonstration, Handout  Response: Verbalizes Understanding, Demonstrated Understanding, Needs Reinforcement    Body Mechanics, taught by Sha Alegria OT at 3/19/2024  4:28 PM.  Learner: Patient  Readiness: Acceptance  Method: Explanation, Demonstration, Handout  Response: Verbalizes Understanding, Demonstrated Understanding, Needs Reinforcement    Precautions, taught by Sha Alegria OT at 3/19/2024  4:28 PM.  Learner: Patient  Readiness: Acceptance  Method: Explanation, Demonstration, Handout  Response: Verbalizes Understanding, Demonstrated Understanding, Needs Reinforcement    ADL Training, taught by Sha Alegria OT at 3/19/2024  4:28 PM.  Learner: Patient  Readiness: Acceptance  Method: Explanation, Demonstration, Handout  Response: Verbalizes Understanding, Demonstrated Understanding, Needs Reinforcement    Education Comments  Pt dismissive of discharge planning, otherwise demonstrated good attention to instruction..    Goals:  Encounter Problems       Encounter Problems (Active)       OT Goals       Pt will demonstrate 1-2 energy conservation techniques during ADL tasks and functional mobility  (Progressing)       Start:  03/15/24    Expected End:  03/18/24            Pt will increase endurance to tolerate 15min of activity with no more than 1 rest break in order to increase ability to engage in ADL completion.  (Progressing)       Start:  03/15/24    Expected End:  03/18/24            Pt to demonstrate transfers with FWW at mod I  (Progressing)       Start:  03/15/24    Expected End:  03/18/24            Pt will demo increased functional mobility to tolerate tasks necessary to complete ADL routine with FWW at mod I while keeping SpO2 above 88% (Progressing)       Start:  03/15/24    Expected End:  03/18/24            Pt will demo GOOD static/dynamic standing balance  during ADL and functional activity with FWW >5 minutes for improved independence and participation in ADL  (Progressing)       Start:  03/15/24    Expected End:  03/18/24            Pt to demonstrate LB dressing, bathing, and toileting with AE as needed at mod I  (Progressing)       Start:  03/15/24    Expected End:  03/18/24

## 2024-03-19 NOTE — PROGRESS NOTES
Palliative Care Social Work Note     Met with pt.  Pt reports he is doing okay.  Pt states he plans to dc by end of the week.  Reviewed role of TCC.  Reviewed GOC that have been discussed.  Pt states he does not plan to complete Advance Directives at this time.  Advised pt to provide copy to any  physician once completed.  Confirmed that pt's wish is to remain FULL CODE.  Discussed palliative care dc at this time.  Pt is in agreement.  Dr Madrigal updated.   Pt's sister Alia updated.

## 2024-03-19 NOTE — PROGRESS NOTES
[facility] Hospitalist Progress Note       [phone]: Please page me for patient care issues.  [phone]: Please page night hospitalist for any issues.     Alo Glass  :  1955(68 y.o.)  MRN:  [id number]  PCP: Omari Schumacher MD      Principal Problem:    Heart failure exacerbated by sotalol (CMS/HCC)  Active Problems:    Acute on chronic congestive heart failure, unspecified heart failure type (CMS/HCC)    MELISSA (obstructive sleep apnea)    Acute hypoxic respiratory failure (CMS/HCC)      Assessment and Plan:   Alo Glass is a 68 y.o. male istory of hypertension, COPD, type 2 diabetes mellitus, severe obesity, and obstructive sleep apnea presenting with SOB.     # ARF w/ hypoxia 2/2 acute CHF  # Cardiogenic and septic shock   # Acute HFrEF (30-35%, 3/9/24) w/ global hypokinesis  #Strep pneumoniae pneumonia (RLL)  # AECOPD  # Acute metabolic encephalopathy, resolved  # Moderate aortic stenosis  # Non-IDDM II w/ hyperglycemia  # Hypothyroidism  # Tobacco abuse  # MELISSA/OHS on CPAP  # Class  II obesity with serious co morbidities  -CHF/COPD orderset, daily weight, strict I/O  -s/p ETT (3/12/24-3/14/24)  -s/p on pressors (3/12/24-3/14)  -on steroid taper and bronchodilators  -abx: Cefepime-> ceftriaxone (last day of antibiotics on 3/19/24)  -IV lasix w/ caution due to hypotension, hold for MAP < 65  - limited GDMT due to risk of hypotension: on aspirin, metoprolol and Jardiance, valsartan.  -Plans for eventual cardiac cath in the future  - recs appreciated: cardiology, pulm/crit      DVT Prophylaxis: Subq Lovenox    Code status: Full Code  Diet: Adult diet Regular, Cardiac, Carb Controlled; 60 gram carb/meal, 30 gram Carb evening snack; 1500 mL fluid; 70 gm fat; 2 - 3 grams Sodium    Disposition:await test results, await consultant recommendations, await clinical improvement, and await placement    Level of MDM:  High    I personally examined the patient and I personally reviewed chart, data, labs  radiology reports    Family Communication  Number :   Name of Designated Family Representative:       Total time spent: 35 minutes, of total time providing counseling or in coordination of care. Total time on this day of visit includes record and documentation review before and after visit including documentation and time not explicitly included on EMR time stamp      Subjective:   Interval History:  HPI  The patient complains of dyspnea  The patient feels their symptoms areimproving  no events or new concerns    Scheduled Meds:allopurinol, 300 mg, oral, Daily  aspirin, 81 mg, oral, Daily  benzonatate, 200 mg, oral, TID  dextromethorphan-guaifenesin, 10 mL, oral, BID  empagliflozin, 10 mg, oral, Daily  enoxaparin, 40 mg, subcutaneous, BID  tiotropium, 2 puff, inhalation, Daily   And  fluticasone furoate-vilanteroL, 1 puff, inhalation, Daily  folic acid, 1 mg, oral, Daily  furosemide, 40 mg, intravenous, q12h  insulin lispro, 0-10 Units, subcutaneous, TID with meals  ipratropium-albuteroL, 3 mL, nebulization, TID  levothyroxine, 125 mcg, oral, Daily  melatonin, 6 mg, oral, Nightly  metoprolol succinate XL, 25 mg, oral, Daily  multivitamin with minerals, 1 tablet, oral, Daily  nicotine, 1 patch, transdermal, Daily  polyethylene glycol, 17 g, oral, BID  predniSONE, 20 mg, oral, Daily   Followed by  [START ON 3/22/2024] predniSONE, 10 mg, oral, Daily   Followed by  [START ON 3/25/2024] predniSONE, 5 mg, oral, Daily  sennosides-docusate sodium, 2 tablet, oral, BID  thiamine, 100 mg, oral, Daily  valsartan, 40 mg, oral, Daily      Continuous Infusions:     PRN Meds:PRN medications: acetaminophen, hydrocodone-homatropine, ipratropium-albuteroL, oxygen, traZODone    Review of Systems   All other systems reviewed and are negative.    Interval Pertinent History:  Social History     Tobacco Use    Smoking status: Never    Smokeless tobacco: Never   Substance Use Topics    Alcohol use: Not on file         Objective:   Patient  Vitals for the past 24 hrs:   BP Temp Temp src Pulse Resp SpO2 Weight   24 1342 96/60 36.3 °C (97.3 °F) Temporal 70 20 91 % --   24 0957 107/67 36.5 °C (97.7 °F) Temporal 75 18 90 % --   24 0800 -- -- -- -- -- 96 % --   24 0700 -- -- -- -- -- 96 % --   24 0627 -- -- -- -- -- -- 140 kg (308 lb 10.3 oz)   24 0545 119/81 36.3 °C (97.3 °F) -- 67 17 (!) 88 % --   24 0305 -- -- -- -- 18 90 % --   24 2300 -- -- -- -- 18 -- --   24 2049 117/73 36.3 °C (97.3 °F) -- 75 17 91 % --   24 1936 -- -- -- -- -- 93 % --   24 1757 91/61 36 °C (96.8 °F) Temporal 78 16 96 % --   24 1552 -- 36.8 °C (98.2 °F) -- -- -- -- --   24 1446 104/68 36 °C (96.8 °F) Temporal 71 18 93 % --         Average, Min, and Max for last 24 hours Vitals:  TEMPERATURE:  Temp  Av.3 °C (97.3 °F)  Min: 36 °C (96.8 °F)  Max: 36.8 °C (98.2 °F)    RESPIRATIONS RANGE: Resp  Av.8  Min: 16  Max: 20    PULSE RANGE: Pulse  Av.7  Min: 67  Max: 78    BLOOD PRESSURE RANGE:  Systolic (24hrs), Av , Min:91 , Max:119   ; Diastolic (24hrs), Av, Min:60, Max:81      PULSE OXIMETRY RANGE: SpO2  Av.4 %  Min: 88 %  Max: 96 %    I/O last 3 completed shifts:  In: 181 (12.9 mL/kg) [P.O.:1761; IV Piggyback:50]  Out: 2645 (18.9 mL/kg) [Urine:2645 (0.5 mL/kg/hr)]  Weight: 140 kg   Weight change: -1.522 kg (-3 lb 5.7 oz)     Physical Exam  Vitals and nursing note reviewed.   Constitutional:       General: He is not in acute distress.     Appearance: He is morbidly obese. He is ill-appearing (Chronically).      Interventions: Nasal cannula in place.   HENT:      Head: Normocephalic and atraumatic.      Right Ear: External ear normal.      Left Ear: External ear normal.      Nose: Nose normal. No congestion or rhinorrhea.      Mouth/Throat:      Mouth: Mucous membranes are moist.   Eyes:      Extraocular Movements: Extraocular movements intact.      Pupils: Pupils are equal, round, and  reactive to light.   Cardiovascular:      Rate and Rhythm: Normal rate and regular rhythm.      Pulses: Normal pulses.      Heart sounds: Normal heart sounds.   Pulmonary:      Effort: Pulmonary effort is normal.      Breath sounds: Rhonchi present.   Abdominal:      General: Abdomen is flat. Bowel sounds are normal.      Palpations: Abdomen is soft.   Musculoskeletal:         General: Normal range of motion.      Cervical back: Normal range of motion and neck supple.      Right lower leg: Edema present.      Left lower leg: Edema present.   Skin:     General: Skin is warm and dry.      Capillary Refill: Capillary refill takes less than 2 seconds.   Neurological:      General: No focal deficit present.      Mental Status: He is alert and oriented to person, place, and time. Mental status is at baseline.   Psychiatric:         Mood and Affect: Mood normal.         Behavior: Behavior is cooperative.         Thought Content: Thought content normal.         Judgment: Judgment normal.         Lab Results   Component Value Date     03/18/2024    K 4.0 03/18/2024     03/18/2024    CO2 31 03/18/2024    BUN 36 (H) 03/18/2024    CREATININE 0.62 03/18/2024    GLUCOSE 115 (H) 03/18/2024    CALCIUM 8.9 03/18/2024    PROT 6.9 03/12/2024    BILITOT 1.6 (H) 03/12/2024    ALKPHOS 73 03/12/2024    AST 18 03/12/2024    ALT 22 03/12/2024       Lab Results   Component Value Date    WBC 9.0 03/18/2024    HGB 18.0 (H) 03/18/2024    HCT 56.8 (H) 03/18/2024     (H) 03/18/2024     03/18/2024    LYMPHOPCT 4.0 03/12/2024    RBC 5.41 03/18/2024    MCH 33.3 03/18/2024    MCHC 31.7 (L) 03/18/2024    RDW 12.7 03/18/2024    CRP 1.62 (A) 10/25/2019       Lab Results   Component Value Date    TSH 2.64 03/09/2024     Lab Results   Component Value Date    LACTATE 1.4 03/12/2024     (H) 03/19/2024       IMAGES:  Encounter Date: 03/08/24   Electrocardiogram, 12-lead PRN ACS symptoms   Result Value    Ventricular Rate 86     Atrial Rate 86    NE Interval 208    QRS Duration 146    QT Interval 388    QTC Calculation(Bazett) 464    P Axis 49    R Axis 140    T Axis 6    QRS Count 14    Q Onset 207    P Onset 103    P Offset 179    T Offset 401    QTC Fredericia 437    Narrative    Sinus rhythm with occasional Premature ventricular complexes  Right bundle branch block  T wave abnormality, consider lateral ischemia  Abnormal ECG  When compared with ECG of 12-MAR-2024 04:06, (unconfirmed)  Premature atrial complexes are no longer Present  T wave inversion now evident in Lateral leads  Confirmed by Heather Barrett ([id number]) on 3/12/2024 10:49:25 AM     XR chest 1 view    Result Date: 3/13/2024  Impression: 1. Cardiomegaly. Vascular congestion. Large right pleural effusion with atelectasis/consolidation at the right lower lobe. Radiographic follow-up to resolution recommended. 2. Enteric tube terminates at the right upper quadrant, at the expected location of the gastric antrum.       MACRO: None.   Signed by: Deepali Casillas 3/13/2024 3:20 AM Dictation workstation:   [id number]    XR chest 1 view    Result Date: 3/11/2024  Impression: 1.  Slightly improved right-sided pleural effusion which is persistently moderate.     Signed by: Eugene Alvarenga 3/11/2024 7:19 PM Dictation workstation:   [id number]    Transthoracic echo (TTE) complete    Result Date: 3/11/2024   [facility], [address] [address]               Tel [phone] and Fax [phone] TRANSTHORACIC ECHOCARDIOGRAM REPORT  Patient Name:      BRAYAN Key Physician:    [id number] Heather Barrett MD Study Date:        3/11/2024            Ordering Provider:    [id number] RORY GRAVES MRN/PID:           [id number]             Fellow: Accession#:        [id number]         Nurse:                Khushbu Holman RN Date of  Birth/Age: 1955 / 68 years Sonographer:          Goldie Woods                                                               RDCS Gender:            M                    Additional Staff: Height:            177.80 cm            Admit Date:           3/8/2024 Weight:            147.42 kg            Admission Status:     Inpatient -                                                               Routine BSA / BMI:         2.57 m2 / 46.63      Encounter#:           [id number]                    kg/m2                                         Department Location:  [facility]                                                               Invasive Blood Pressure: 107 /70 mmHg Study Type:    TRANSTHORACIC ECHO (TTE) COMPLETE Diagnosis/ICD: Heart failure, unspecified-I50.9; Acute systolic (congestive)                heart failure (CHF)-I50.21 Indication:    Congestive Heart Failure CPT Code:      Echo Complete w Full Doppler-[id number] Patient History: Pertinent History: COPD, HTN, LE Edema, Dyspnea and Elev trop., Elev. BNP,                    Smoker,. Study Detail: The following Echo studies were performed: 2D, M-Mode, Doppler and               color flow. Technically challenging study due to body habitus.               Definity used as a contrast agent for endocardial border               definition. Total contrast used for this procedure was 1.5 mL via               IV push. The patient was awake.  PHYSICIAN INTERPRETATION: Left Ventricle: The left ventricular systolic function is moderately decreased, with an estimated ejection fraction of 30-35%. There is global hypokinesis of the left ventricle with minor regional variations. The left ventricular cavity size is normal. Left ventricular diastolic filling was indeterminate. LV Wall Scoring: The basal inferolateral segment is akinetic. Left Atrium: The left atrium is mildly dilated. Right Ventricle: The right ventricle is normal in size. There is normal right  ventricular global systolic function. Right Atrium: The right atrium is normal in size. Aortic Valve: The aortic valve was not well visualized. There is evidence of moderate aortic valve stenosis. There is no evidence of aortic valve regurgitation. The peak instantaneous gradient of the aortic valve is 52.7 mmHg. The mean gradient of the aortic valve is 26.0 mmHg. Mitral Valve: The mitral valve is mild to moderately thickened. There is mild to moderate mitral annular calcification. There is mild mitral valve regurgitation. Tricuspid Valve: The tricuspid valve was not well visualized. There is trace to mild tricuspid regurgitation. The right ventricular systolic pressure is unable to be estimated. Pulmonic Valve: The pulmonic valve is not well visualized. There is physiologic pulmonic valve regurgitation. Pericardium: There is no pericardial effusion noted. Aorta: The aortic root is normal. Systemic Veins: The inferior vena cava appears dilated. There is less than 50% IVC collapse with inspiration.  CONCLUSIONS:  1. Left ventricular systolic function is moderately decreased with a 30-35% estimated ejection fraction.  2. Basal inferolateral segment is abnormal.  3. Poorly visualized anatomical structures due to suboptimal image quality.  4. Moderate aortic valve stenosis.  5. There is global hypokinesis of the left ventricle with minor regional variations. QUANTITATIVE DATA SUMMARY: 2D MEASUREMENTS:                           Normal Ranges: IVSd:          1.14 cm    (0.6-1.1cm) LVPWd:         1.57 cm    (0.6-1.1cm) LVIDd:         5.26 cm    (3.9-5.9cm) LVIDs:         4.41 cm LV Mass Index: 117.2 g/m2 LV % FS        16.2 % LA VOLUME:                               Normal Ranges: LA Vol A4C:        77.9 ml    (22+/-6mL/m2) LA Vol A2C:        84.8 ml LA Vol BP:         83.7 ml LA Vol Index A4C:  30.4ml/m2 LA Vol Index A2C:  33.0 ml/m2 LA Vol Index BP:   32.6 ml/m2 LA Area A4C:       24.1 cm2 LA Area A2C:       24.4 cm2 LA  Major Axis A4C: 6.3 cm LA Major Axis A2C: 6.0 cm LA Volume Index:   30.5 ml/m2 LA Vol A4C:        71.4 ml LA Vol A2C:        78.4 ml RA VOLUME BY A/L METHOD:                       Normal Ranges: RA Area A4C: 17.0 cm2 AORTA MEASUREMENTS:                    Normal Ranges: Asc Ao, d: 3.10 cm (2.1-3.4cm) LV SYSTOLIC FUNCTION BY 2D PLANIMETRY (MOD):                     Normal Ranges: EF-A4C View: 33.6 % (>=55%) EF-A2C View: 29.1 % EF-Biplane:  33.1 % LV DIASTOLIC FUNCTION:                     Normal Ranges: MV Peak E: 0.90 m/s (0.7-1.2 m/s) MV Peak A: 0.39 m/s (0.42-0.7 m/s) E/A Ratio: 2.34     (1.0-2.2) MITRAL VALVE:                Normal Ranges: MV DT: 99 msec (150-240msec) AORTIC VALVE:                                    Normal Ranges: AoV Vmax:                3.63 m/s  (<=1.7m/s) AoV Peak P.7 mmHg (<20mmHg) AoV Mean P.0 mmHg (1.7-11.5mmHg) LVOT Max Jero:            0.69 m/s  (<=1.1m/s) AoV VTI:                 60.70 cm  (18-25cm) LVOT VTI:                12.80 cm LVOT Diameter:           2.60 cm   (1.8-2.4cm) AoV Area, VTI:           1.12 cm2  (2.5-5.5cm2) AoV Area,Vmax:           1.01 cm2  (2.5-4.5cm2) AoV Dimensionless Index: 0.21  RIGHT VENTRICLE: RV Basal 3.71 cm RV Mid   2.98 cm RV Major 9.7 cm TAPSE:   22.4 mm RV s'    0.13 m/s TRICUSPID VALVE/RVSP:                   Normal Ranges: IVC Diam: 2.32 cm PULMONIC VALVE:                      Normal Ranges: PV Max Jero: 0.7 m/s  (0.6-0.9m/s) PV Max P.7 mmHg  [id number] Heather Barrett MD Electronically signed on 3/11/2024 at 4:04:49 PM  Wall Scoring  ** Final (Updated) **    === 24 ===    XR CHEST 1 VIEW    - Impression -  1. Cardiomegaly. Vascular congestion. Large right pleural effusion  with atelectasis/consolidation at the right lower lobe. Radiographic  follow-up to resolution recommended.  2. Enteric tube terminates at the right upper quadrant, at the  expected location of the gastric antrum.        MACRO:  None.    Signed by:  "Deepali Casillas 3/13/2024 3:20 AM  Dictation workstation:   [id number]  === 10/02/18 ===    CT HEART CALCIUM SCORING WO CONTRAST    - Impression -  1. Coronary artery calcium score of 164.2*.  2. There is some dependent density in the right mainstem bronchus  probably sequela of aspiration. Scarring or atelectasis at the right  lung base and small focal consolidation/atelectasis anteriorly on the  right.  3. Prominence ascending thoracic aorta up to 3.9 cm.  4. Prominent fat attenuation in the region of the interatrial septum  as described probably due to lipomatosis involvement and appearing  slightly more pronounced since previous study.  5.  view demonstrates prominent paratracheal density in the  upper mediastinum bilaterally not included on CT imaging, may relate  to overlying vascular structures or pronounced mediastinal fat  however correlation with dedicated complete CT chest should be  considered for better evaluation.  *Coronary artery calcium scoring may be helpful in predicting the  risk for future coronary heart disease events.  According to the  American College of Cardiology Foundation Clinical Expert Consensus  Task Force, such testing provides important prognostic information in  patients with more than one coronary heart disease risk factor. The  coronary artery calcium score correlates with the annual risk of a  non-fatal myocardial infarction or coronary heart disease death.    Coronary artery score            Annual Risk    0-99                             0.4%  100-399                        1.3%  >400                            2.4%    These three \"breakpoints\" correspond to lower, intermediate and high  risk states for future coronary events.  Such information should be  used, along with appropriate clinical judgment, to make decisions  regarding the intensity of risk factor management strategies to treat  blood lipids and to modify other non-lipid coronary risk factors.    Reference: " Anastacia P et al. Circulation.  2007; 115:402-426  === 03/08/24 ===    XR CHEST 1 VIEW    - Impression -  1. Cardiomegaly. Vascular congestion. Large right pleural effusion  with atelectasis/consolidation at the right lower lobe. Radiographic  follow-up to resolution recommended.  2. Enteric tube terminates at the right upper quadrant, at the  expected location of the gastric antrum.        MACRO:  None.    Signed by: Deepali Casillas 3/13/2024 3:20 AM  Dictation workstation:   [id number]      Additional results of the last 24 hours have been reviewed.    Dictated using SignNow Version 2.4  Proof read however unrecognized voice recognition errors may have occurred     Electronically signed by Lou Madrigal DO on 03/19/24 at 1:58 PM

## 2024-03-19 NOTE — CARE PLAN
The patient's goals for the shift include  catching up on sleep    The clinical goals for the shift include Pt will not have a decrease in O2 during shift      Problem: Pain - Adult  Goal: Verbalizes/displays adequate comfort level or baseline comfort level  Outcome: Progressing     Problem: Safety - Adult  Goal: Free from fall injury  Outcome: Progressing     Problem: Discharge Planning  Goal: Discharge to home or other facility with appropriate resources  Outcome: Progressing     Problem: Chronic Conditions and Co-morbidities  Goal: Patient's chronic conditions and co-morbidity symptoms are monitored and maintained or improved  Outcome: Progressing     Problem: Heart Failure  Goal: Improved gas exchange this shift  Outcome: Progressing  Goal: Improved urinary output this shift  Outcome: Progressing  Goal: Reduction in peripheral edema within 24 hours  Outcome: Progressing  Goal: Report improvement of dyspnea/breathlessness this shift  Outcome: Progressing  Goal: Weight from fluid excess reduced over 2-3 days, then stabilize  Outcome: Progressing  Goal: Increase self care and/or family involvement in 24 hours  Outcome: Progressing     Problem: Skin  Goal: Participates in plan/prevention/treatment measures  Outcome: Progressing  Goal: Prevent/manage excess moisture  Outcome: Progressing  Goal: Prevent/minimize sheer/friction injuries  Outcome: Progressing  Goal: Promote/optimize nutrition  Outcome: Progressing  Goal: Promote skin healing  Outcome: Progressing     Problem: Fall/Injury  Goal: Not fall by end of shift  Outcome: Progressing  Goal: Be free from injury by end of the shift  Outcome: Progressing     Problem: Knowledge Deficit  Goal: Patient/family/caregiver demonstrates understanding of disease process, treatment plan, medications, and discharge instructions  Outcome: Progressing     Problem: Pain  Goal: Takes deep breaths with improved pain control throughout the shift  Outcome: Progressing  Goal: Turns in  bed with improved pain control throughout the shift  Outcome: Progressing

## 2024-03-19 NOTE — PROGRESS NOTES
03/19/24 1301   Discharge Planning   Living Arrangements Alone   Support Systems Friends/neighbors   Assistance Needed Alert and oriented x 3, Independent with ADL's, Wheel chair,  3 Wheeled walker, Rollator, Walk in tub/shower, Grab bars, Moved bed to 1st floor, Hasn't been driving but is able to(  transports to appointments), Uses [employer] grocery delivery, and orders out food. CPAP at home   Type of Residence Private residence   Number of Stairs to Enter Residence 0  (Has ramp)   Number of Stairs Within Residence   (Short flight of steps then landing then another short flight of steps and then landing to the upstairs.)   Do you have animals or pets at home? No   Who is requesting discharge planning? Provider   Home or Post Acute Services Post acute facilities (Rehab/SNF/etc);In home services   Type of Post Acute Facility Services Skilled nursing   Type of Home Care Services Home nursing visits;Home OT;Home PT   Patient expects to be discharged to: TBD, PT/OT recommending moderate intensity, PAQN list provided to patient as well as Private Duty agency list. Patient is not medically ready for discharge at this time.   Does the patient need discharge transport arranged? Yes   RoundTrip coordination needed? Yes   Has discharge transport been arranged? No   Patient Choice   Provider Choice list and CMS website (https://medicare.gov/care-compare#search) for post-acute Quality and Resource Measure Data were provided and reviewed with: Patient   Patient / Family choosing to utilize agency / facility established prior to hospitalization No

## 2024-03-20 LAB
GLUCOSE BLD MANUAL STRIP-MCNC: 132 MG/DL (ref 74–99)
GLUCOSE BLD MANUAL STRIP-MCNC: 160 MG/DL (ref 74–99)
GLUCOSE BLD MANUAL STRIP-MCNC: 166 MG/DL (ref 74–99)
GLUCOSE BLD MANUAL STRIP-MCNC: 328 MG/DL (ref 74–99)

## 2024-03-20 PROCEDURE — 1200000002 HC GENERAL ROOM WITH TELEMETRY DAILY

## 2024-03-20 PROCEDURE — 99233 SBSQ HOSP IP/OBS HIGH 50: CPT | Performed by: INTERNAL MEDICINE

## 2024-03-20 PROCEDURE — 2500000005 HC RX 250 GENERAL PHARMACY W/O HCPCS: Performed by: INTERNAL MEDICINE

## 2024-03-20 PROCEDURE — 2500000002 HC RX 250 W HCPCS SELF ADMINISTERED DRUGS (ALT 637 FOR MEDICARE OP, ALT 636 FOR OP/ED): Performed by: INTERNAL MEDICINE

## 2024-03-20 PROCEDURE — 82947 ASSAY GLUCOSE BLOOD QUANT: CPT

## 2024-03-20 PROCEDURE — 2500000004 HC RX 250 GENERAL PHARMACY W/ HCPCS (ALT 636 FOR OP/ED): Performed by: INTERNAL MEDICINE

## 2024-03-20 PROCEDURE — 2500000001 HC RX 250 WO HCPCS SELF ADMINISTERED DRUGS (ALT 637 FOR MEDICARE OP): Performed by: INTERNAL MEDICINE

## 2024-03-20 PROCEDURE — 2500000001 HC RX 250 WO HCPCS SELF ADMINISTERED DRUGS (ALT 637 FOR MEDICARE OP): Performed by: PHYSICIAN ASSISTANT

## 2024-03-20 PROCEDURE — 94640 AIRWAY INHALATION TREATMENT: CPT

## 2024-03-20 PROCEDURE — 94760 N-INVAS EAR/PLS OXIMETRY 1: CPT

## 2024-03-20 PROCEDURE — S4991 NICOTINE PATCH NONLEGEND: HCPCS | Performed by: INTERNAL MEDICINE

## 2024-03-20 PROCEDURE — 99232 SBSQ HOSP IP/OBS MODERATE 35: CPT | Performed by: INTERNAL MEDICINE

## 2024-03-20 PROCEDURE — 94667 MNPJ CHEST WALL 1ST: CPT

## 2024-03-20 PROCEDURE — 2500000004 HC RX 250 GENERAL PHARMACY W/ HCPCS (ALT 636 FOR OP/ED): Performed by: PHYSICIAN ASSISTANT

## 2024-03-20 PROCEDURE — 9420000001 HC RT PATIENT EDUCATION 5 MIN

## 2024-03-20 RX ORDER — METOLAZONE 5 MG/1
5 TABLET ORAL DAILY
Status: DISCONTINUED | OUTPATIENT
Start: 2024-03-21 | End: 2024-03-21

## 2024-03-20 RX ADMIN — ENOXAPARIN SODIUM 40 MG: 40 INJECTION SUBCUTANEOUS at 10:18

## 2024-03-20 RX ADMIN — BENZONATATE 200 MG: 100 CAPSULE ORAL at 21:24

## 2024-03-20 RX ADMIN — TIOTROPIUM BROMIDE INHALATION SPRAY 2 PUFF: 3.12 SPRAY, METERED RESPIRATORY (INHALATION) at 10:43

## 2024-03-20 RX ADMIN — FLUTICASONE FUROATE AND VILANTEROL TRIFENATATE 1 PUFF: 100; 25 POWDER RESPIRATORY (INHALATION) at 10:43

## 2024-03-20 RX ADMIN — BENZONATATE 200 MG: 100 CAPSULE ORAL at 14:23

## 2024-03-20 RX ADMIN — Medication 2 L/MIN: at 08:15

## 2024-03-20 RX ADMIN — POLYETHYLENE GLYCOL 3350 17 G: 17 POWDER, FOR SOLUTION ORAL at 21:26

## 2024-03-20 RX ADMIN — IPRATROPIUM BROMIDE AND ALBUTEROL SULFATE 3 ML: 2.5; .5 SOLUTION RESPIRATORY (INHALATION) at 14:22

## 2024-03-20 RX ADMIN — FOLIC ACID 1 MG: 1 TABLET ORAL at 10:17

## 2024-03-20 RX ADMIN — ALLOPURINOL 300 MG: 300 TABLET ORAL at 10:17

## 2024-03-20 RX ADMIN — SENNOSIDES AND DOCUSATE SODIUM 2 TABLET: 8.6; 5 TABLET ORAL at 21:24

## 2024-03-20 RX ADMIN — GUAIFENESIN SYRUP AND DEXTROMETHORPHAN 10 ML: 100; 10 SYRUP ORAL at 10:17

## 2024-03-20 RX ADMIN — FUROSEMIDE 80 MG: 10 INJECTION, SOLUTION INTRAVENOUS at 06:38

## 2024-03-20 RX ADMIN — FUROSEMIDE 80 MG: 10 INJECTION, SOLUTION INTRAVENOUS at 12:42

## 2024-03-20 RX ADMIN — SENNOSIDES AND DOCUSATE SODIUM 2 TABLET: 8.6; 5 TABLET ORAL at 10:18

## 2024-03-20 RX ADMIN — POLYETHYLENE GLYCOL 3350 17 G: 17 POWDER, FOR SOLUTION ORAL at 10:17

## 2024-03-20 RX ADMIN — VALSARTAN 40 MG: 40 TABLET, FILM COATED ORAL at 10:18

## 2024-03-20 RX ADMIN — BENZONATATE 200 MG: 100 CAPSULE ORAL at 10:17

## 2024-03-20 RX ADMIN — GUAIFENESIN SYRUP AND DEXTROMETHORPHAN 10 ML: 100; 10 SYRUP ORAL at 21:24

## 2024-03-20 RX ADMIN — THIAMINE HCL TAB 100 MG 100 MG: 100 TAB at 10:18

## 2024-03-20 RX ADMIN — METOPROLOL SUCCINATE 25 MG: 25 TABLET, EXTENDED RELEASE ORAL at 10:17

## 2024-03-20 RX ADMIN — IPRATROPIUM BROMIDE AND ALBUTEROL SULFATE 3 ML: 2.5; .5 SOLUTION RESPIRATORY (INHALATION) at 19:56

## 2024-03-20 RX ADMIN — EMPAGLIFLOZIN 10 MG: 10 TABLET, FILM COATED ORAL at 10:17

## 2024-03-20 RX ADMIN — NICOTINE 1 PATCH: 14 PATCH, EXTENDED RELEASE TRANSDERMAL at 10:17

## 2024-03-20 RX ADMIN — Medication 1 TABLET: at 10:17

## 2024-03-20 RX ADMIN — IPRATROPIUM BROMIDE AND ALBUTEROL SULFATE 3 ML: 2.5; .5 SOLUTION RESPIRATORY (INHALATION) at 08:15

## 2024-03-20 RX ADMIN — Medication 6 MG: at 21:24

## 2024-03-20 RX ADMIN — ENOXAPARIN SODIUM 40 MG: 40 INJECTION SUBCUTANEOUS at 21:25

## 2024-03-20 RX ADMIN — LEVOTHYROXINE SODIUM 125 MCG: 0.12 TABLET ORAL at 06:51

## 2024-03-20 RX ADMIN — PREDNISONE 20 MG: 20 TABLET ORAL at 10:17

## 2024-03-20 RX ADMIN — ASPIRIN 81 MG: 81 TABLET, COATED ORAL at 10:17

## 2024-03-20 ASSESSMENT — COGNITIVE AND FUNCTIONAL STATUS - GENERAL
MOVING TO AND FROM BED TO CHAIR: A LITTLE
MOVING TO AND FROM BED TO CHAIR: A LITTLE
TOILETING: A LITTLE
PERSONAL GROOMING: A LITTLE
TOILETING: A LITTLE
TURNING FROM BACK TO SIDE WHILE IN FLAT BAD: A LITTLE
DRESSING REGULAR LOWER BODY CLOTHING: A LITTLE
TURNING FROM BACK TO SIDE WHILE IN FLAT BAD: A LITTLE
DAILY ACTIVITIY SCORE: 20
PERSONAL GROOMING: A LITTLE
MOVING FROM LYING ON BACK TO SITTING ON SIDE OF FLAT BED WITH BEDRAILS: A LITTLE
WALKING IN HOSPITAL ROOM: A LITTLE
CLIMB 3 TO 5 STEPS WITH RAILING: A LOT
MOBILITY SCORE: 17
DAILY ACTIVITIY SCORE: 20
HELP NEEDED FOR BATHING: A LITTLE
STANDING UP FROM CHAIR USING ARMS: A LITTLE
DRESSING REGULAR LOWER BODY CLOTHING: A LITTLE
STANDING UP FROM CHAIR USING ARMS: A LITTLE
MOVING FROM LYING ON BACK TO SITTING ON SIDE OF FLAT BED WITH BEDRAILS: A LITTLE
CLIMB 3 TO 5 STEPS WITH RAILING: A LOT
WALKING IN HOSPITAL ROOM: A LITTLE
MOBILITY SCORE: 17
HELP NEEDED FOR BATHING: A LITTLE

## 2024-03-20 ASSESSMENT — PAIN - FUNCTIONAL ASSESSMENT
PAIN_FUNCTIONAL_ASSESSMENT: 0-10
PAIN_FUNCTIONAL_ASSESSMENT: 0-10

## 2024-03-20 ASSESSMENT — PAIN SCALES - GENERAL
PAINLEVEL_OUTOF10: 0 - NO PAIN
PAINLEVEL_OUTOF10: 0 - NO PAIN

## 2024-03-20 NOTE — PROGRESS NOTES
Occupational Therapy                 Therapy Communication Note    Patient Name: Alo Glass  MRN: [id number]  Today's Date: 3/20/2024     Discipline: Occupational Therapy    Missed Visit Reason: Missed Visit Reason: Other (Comment) (Pt eating lunch and stated now is not a good time. No OT treatment)    Missed Time: Attempt    Time: 11:38

## 2024-03-20 NOTE — PROGRESS NOTES
Subjective Data:  Ongoing shortness of breath.     Overnight Events:    No acute events reported overnight.      Objective Data:  Last Recorded Vitals:  Vitals:    03/20/24 0356 03/20/24 0432 03/20/24 0650 03/20/24 0815   BP:   126/87    BP Location:   Left arm    Patient Position:   Sitting    Pulse:   69    Resp: 18  17    Temp:   36.5 °C (97.7 °F)    TempSrc:       SpO2:   92% 95%   Weight:  138 kg (304 lb 14.3 oz)     Height:           Last Labs:  CBC - 3/18/2024:  7:15 AM  9.0 18.0 224    56.8      CMP - 3/18/2024:  7:15 AM  8.9 6.9 18 --- 1.6   3.1 3.4 22 73      PTT - No results in last year.  _   _ _     TROPHS   Date/Time Value Ref Range Status   03/12/2024 07:07  0 - 20 ng/L Final     Comment:     Previous result verified on 3/12/2024 0048 on specimen/case [id number] called with component TRPHS for procedure Troponin I, High Sensitivity with value 89 ng/L.   03/12/2024 03:08  0 - 20 ng/L Final     Comment:     Previous result verified on 3/12/2024 0048 on specimen/case [id number] called with component TRPHS for procedure Troponin I, High Sensitivity with value 89 ng/L.   03/12/2024 12:15 AM 89 0 - 20 ng/L Final     BNP   Date/Time Value Ref Range Status   03/19/2024 09:24  0 - 99 pg/mL Final   03/15/2024 05:15  0 - 99 pg/mL Final     HGBA1C   Date/Time Value Ref Range Status   02/20/2024 10:35 AM 5.8 see below % Final   01/19/2023 10:23 AM 7.3 % Final     Comment:          Diagnosis of Diabetes-Adults   Non-Diabetic: < or = 5.6%   Increased risk for developing diabetes: 5.7-6.4%   Diagnostic of diabetes: > or = 6.5%  .       Monitoring of Diabetes                Age (y)     Therapeutic Goal (%)   Adults:          >18           <7.0   Pediatrics:    13-18           <7.5                   7-12           <8.0                   0- 6            7.5-8.5   American Diabetes Association. Diabetes Care 33(S1), Jan 2010.     09/10/2021 01:29 PM 7.9 % Final     Comment:           Diagnosis of Diabetes-Adults   Non-Diabetic: < or = 5.6%   Increased risk for developing diabetes: 5.7-6.4%   Diagnostic of diabetes: > or = 6.5%  .       Monitoring of Diabetes                Age (y)     Therapeutic Goal (%)   Adults:          >18           <7.0   Pediatrics:    13-18           <7.5                   7-12           <8.0                   0- 6            7.5-8.5   American Diabetes Association. Diabetes Care 33(S1), Jan 2010.       LDLCALC   Date/Time Value Ref Range Status   02/20/2024 10:35  <=99 mg/dL Final     Comment:                                 Near   Borderline      AGE      Desirable  Optimal    High     High     Very High     0-19 Y     0 - 109     ---    110-129   >/= 130     ----    20-24 Y     0 - 119     ---    120-159   >/= 160     ----      >24 Y     0 -  99   100-129  130-159   160-189     >/=190       VLDL   Date/Time Value Ref Range Status   02/20/2024 10:35 AM 16 0 - 40 mg/dL Final   09/10/2021 01:29 PM 27 0 - 40 mg/dL Final   09/18/2018 02:26 PM 22 0 - 40 mg/dL Final      Last I/O:  I/O last 3 completed shifts:  In: 2280 (16.5 mL/kg) [P.O.:2280]  Out: 1820 (13.2 mL/kg) [Urine:1820 (0.4 mL/kg/hr)]  Weight: 138.3 kg     Past Cardiology Tests (Last 3 Years):  EKG:  Electrocardiogram, 12-lead PRN ACS symptoms 03/12/2024  ECG 12 lead 03/12/2024  ECG 12 lead 03/12/2024  Electrocardiogram, 12-lead PRN ACS symtpoms 03/11/2024  ECG 12 Lead 03/10/2024  ECG 12 lead      Echo:  Transthoracic echo (TTE) complete 03/11/2024  CONCLUSIONS:   1. Left ventricular systolic function is moderately decreased with a 30-35% estimated ejection fraction.   2. Basal inferolateral segment is abnormal.   3. Poorly visualized anatomical structures due to suboptimal image quality.   4. Moderate aortic valve stenosis.   5. There is global hypokinesis of the left ventricle with minor regional variations.      TTE (06/18/2019):   -Low normal LV systolic function  -Mild concentric LVH  -Doppler flow  suggestive of abnormal Left ventricular relaxation  -Normal right ventricular size and function  -Slightly enlarged left atrium.   -Moderate calcific aortic stenosis  -Trace mitral regurg       Ejection Fractions:            EF   Date/Time Value Ref Range Status   03/11/2024 02:04 PM 33 %        Cath:  No results found for this or any previous visit from the past 1095 days.     Stress Test:  No results found for this or any previous visit from the past 1095 days.     Imaging:  CXR (03/08/2024):   IMPRESSION:  -Moderate right pleural effusion and right lung base consolidation. Findings could be due to pneumonia. Other underlying process not excluded. Follow-up recommended.   -Trace left pleural effusion and cardiomegaly. Possible interstitial edema.     Xray Abd (03/12/2024):   IMPRESSION:  1.  Enteric tube approximately 3.8 cm above the kathie.  2. Evaluation of the enteric tube is limited and appears looped high in the right upper quadrant however may be related to patient positioning and rotation. Repeat imaging may be considered with proper patient positioning to delineate proper position.  3. Left lower lobe airspace opacities concerning for pneumonia. Possible large right pleural effusion.  4. Paucity of bowel gas noted.     CXR (03/12/2024):   IMPRESSION:  1.  Enteric tube approximately 3.8 cm above the kathie.  2. Evaluation of the enteric tube is limited and appears looped high in the right upper quadrant however may be related to patient positioning and rotation. Repeat imaging may be considered with proper patient positioning to delineate proper position.  3. Left lower lobe airspace opacities concerning for pneumonia. Possible large right pleural effusion.  4. Paucity of bowel gas noted.    Inpatient Medications:  Scheduled medications   Medication Dose Route Frequency    allopurinol  300 mg oral Daily    aspirin  81 mg oral Daily    benzonatate  200 mg oral TID    dextromethorphan-guaifenesin  10 mL oral  BID    empagliflozin  10 mg oral Daily    enoxaparin  40 mg subcutaneous BID    tiotropium  2 puff inhalation Daily    And    fluticasone furoate-vilanteroL  1 puff inhalation Daily    folic acid  1 mg oral Daily    furosemide  80 mg intravenous BID    insulin lispro  0-10 Units subcutaneous TID with meals    ipratropium-albuteroL  3 mL nebulization TID    levothyroxine  125 mcg oral Daily    melatonin  6 mg oral Nightly    metoprolol succinate XL  25 mg oral Daily    multivitamin with minerals  1 tablet oral Daily    nicotine  1 patch transdermal Daily    polyethylene glycol  17 g oral BID    predniSONE  20 mg oral Daily    Followed by    [START ON 3/22/2024] predniSONE  10 mg oral Daily    Followed by    [START ON 3/25/2024] predniSONE  5 mg oral Daily    sennosides-docusate sodium  2 tablet oral BID    thiamine  100 mg oral Daily    valsartan  40 mg oral Daily     PRN medications   Medication    acetaminophen    hydrocodone-homatropine    ipratropium-albuteroL    oxygen    traZODone     Continuous Medications   Medication Dose Last Rate       Physical Exam:       Assessment/Plan   69 yo male from home with h/o obesity, MELISSA on CPAP, COPD, current smoker, type 2 DM, hypertension, lymphedema, moderate aortic stenosis (6/2019) who presented from PCP office with symptoms of increased LE edema, abdominal bloating, progressive SOB over the past year found to be hypoxic in the ER. On admission was Intermittently refusing IV Lasix, I&O not well monitored. Discussed urinating only into urinals, recording how much urine vs how frequent he urinates. Refused return to lasix gtt, lipscomb or external catheter. Originally refusing echo d/t concern for needing to urinate during the testing. Discussed accommodations and ability to use the bathroom in echo if necessary. Patient was then agreeable to echocardiogram. Echo obtained with newly reduced ejection fraction. Decompensated overnight hypoxic, agitated, required Ativan,  Precedex, BiPAP and eventually intubation. Patient weaned off of IV pressor support (03/13). Able to be extubated successfully (3/14)  to HFNC. Diuresis decreased to once daily w/ drop in urine output, remains hypervolemic.      #Acute hypoxic respiratory failure 2/2 decompensated heart failure, Pneumonia, COPD w/ current smoking, mucous plugging s/p bronchoscopy   #Acute decompensated systolic heart failure (new diagnosis)  #Non-sustained Ventricular tachycardia (none further on tele overnight)   #Septic shock, resolved   #Moderate aortic stenosis   #Chronic lymphedema BLE   #NSR with PACs on telemetry  (no afib noted)      -Continue lasix to 80mg IV BID scheduled for 6am and 1 PM, Start metolazone 5mg daily 30 minutes prior to IV Lasix.   -Continue Metoprolol, resume GDMT including Valsartan @ decreased dose d/t hypotension as tolerated  -Echo reviewed with newly reduced LVSF, EF 30-35%, abnormal basal inferolateral segment, global hypokinesis, mod AS. Discussed with patient.   -Strict I/O, monitor renal function and electrolyte  - >171> 733  -Will plan for LHC/RHC for new cardiomyopathy will be deferred to outpatient per patient request.  -Daily standing weights.    -Will continue to follow        Peripheral IV 03/12/24 18 G Left;Ventral Forearm (Active)   Site Assessment Clean;Dry;Intact 03/19/24 2100   Dressing Status Clean;Dry 03/19/24 2100   Number of days: 8       Peripheral IV 03/13/24 18 G Proximal;Right Forearm (Active)   Site Assessment Clean;Dry;Intact 03/19/24 2100   Dressing Status Clean;Dry 03/19/24 2100   Number of days: 7       Peripheral IV 03/13/24 20 G Right Wrist (Active)   Site Assessment Clean;Dry;Intact 03/19/24 2100   Dressing Status Clean;Dry 03/19/24 2100   Number of days: 7       Code Status:  Full Code    I spent  minutes in the professional and overall care of this patient.        Stephanie Dyer PA-C

## 2024-03-20 NOTE — PROGRESS NOTES
03/20/24 1347   Discharge Planning   Living Arrangements Alone   Support Systems Friends/neighbors   Assistance Needed Alert and oriented x 3, Independent with ADL's, Wheel chair,  3 Wheeled walker, Rollator, Walk in tub/shower, Grab bars, Moved bed to 1st floor, Hasn't been driving but is able to(  transports to appointments), Uses [employer] grocery delivery, and orders out food. CPAP at home   Type of Residence Private residence   Do you have animals or pets at home? No   Who is requesting discharge planning? Provider   Home or Post Acute Services In home services   Type of Home Care Services Home PT;Home nursing visits;Home OT   Patient expects to be discharged to: Patient is agreeable to HHC will need to preference the patient.   Does the patient need discharge transport arranged? No   RoundTrip coordination needed? No   Has discharge transport been arranged? No   Patient Choice   Provider Choice list and CMS website (https://medicare.gov/care-compare#search) for post-acute Quality and Resource Measure Data were provided and reviewed with: Patient

## 2024-03-20 NOTE — PROGRESS NOTES
[facility] Hospitalist Progress Note       [id number]: Please page me for patient care issues.  [phone]: Please page night hospitalist for any issues.     Alo Glass  :  1955(68 y.o.)  MRN:  [id number]  PCP: Omari Schumacher MD      Principal Problem:    Heart failure exacerbated by sotalol (CMS/HCC)  Active Problems:    Acute on chronic congestive heart failure, unspecified heart failure type (CMS/HCC)    MELISSA (obstructive sleep apnea)    Acute hypoxic respiratory failure (CMS/HCC)      Assessment and Plan:   Alo Glass is a 68 y.o. male istory of hypertension, COPD, type 2 diabetes mellitus, severe obesity, and obstructive sleep apnea presenting with SOB.     # ARF w/ hypoxia 2/2 acute CHF  # Cardiogenic and septic shock   # Acute HFrEF (30-35%, 3/9/24) w/ global hypokinesis  #Strep pneumoniae pneumonia (RLL)  # AECOPD  # Acute metabolic encephalopathy, resolved  # Moderate aortic stenosis  # Non-IDDM II w/ hyperglycemia  # Hypothyroidism  # Tobacco abuse  # MELISSA/OHS on CPAP  # Class  II obesity with serious co morbidities  -CHF/COPD orderset, daily weight, strict I/O  -s/p ETT (3/12/24-3/14/24)  -s/p on pressors (3/12/24-3/14)  -on steroid taper and bronchodilators  -abx: Cefepime-> ceftriaxone (last day of antibiotics on 3/19/24)  -IV lasix w/ caution due to hypotension, hold for MAP < 65  - limited GDMT due to risk of hypotension: on aspirin, metoprolol and Jardiance, valsartan.  -Plans for eventual cardiac cath in the future  -CS recs appreciated: cardiology, pulm/crit    Disposition: Patient is not interested in SNF placement, DC home after IV diuretics and cardiology final recs    DVT Prophylaxis: Subq Lovenox    Code status: Full Code  Diet: Adult diet Regular; 1500 mL fluid      Level of MDM:  High    I personally examined the patient and I personally reviewed chart, data, labs radiology reports    Family Communication  Number :   Name of Designated Family Representative:       Total  time spent: 35 minutes, of total time providing counseling or in coordination of care. Total time on this day of visit includes record and documentation review before and after visit including documentation and time not explicitly included on EMR time stamp      Subjective:   Interval History:  HPI  The patient complains of dyspnea  The patient feels their symptoms areimproving  no events or new concerns    Scheduled Meds:allopurinol, 300 mg, oral, Daily  aspirin, 81 mg, oral, Daily  benzonatate, 200 mg, oral, TID  dextromethorphan-guaifenesin, 10 mL, oral, BID  empagliflozin, 10 mg, oral, Daily  enoxaparin, 40 mg, subcutaneous, BID  tiotropium, 2 puff, inhalation, Daily   And  fluticasone furoate-vilanteroL, 1 puff, inhalation, Daily  folic acid, 1 mg, oral, Daily  furosemide, 80 mg, intravenous, BID  insulin lispro, 0-10 Units, subcutaneous, TID with meals  ipratropium-albuteroL, 3 mL, nebulization, TID  levothyroxine, 125 mcg, oral, Daily  melatonin, 6 mg, oral, Nightly  [START ON 3/21/2024] metOLazone, 5 mg, oral, Daily  metoprolol succinate XL, 25 mg, oral, Daily  multivitamin with minerals, 1 tablet, oral, Daily  nicotine, 1 patch, transdermal, Daily  polyethylene glycol, 17 g, oral, BID  predniSONE, 20 mg, oral, Daily   Followed by  [START ON 3/22/2024] predniSONE, 10 mg, oral, Daily   Followed by  [START ON 3/25/2024] predniSONE, 5 mg, oral, Daily  sennosides-docusate sodium, 2 tablet, oral, BID  thiamine, 100 mg, oral, Daily  valsartan, 40 mg, oral, Daily      Continuous Infusions:     PRN Meds:PRN medications: acetaminophen, hydrocodone-homatropine, ipratropium-albuteroL, oxygen, traZODone    Review of Systems   All other systems reviewed and are negative.    Interval Pertinent History:  Social History     Tobacco Use    Smoking status: Never    Smokeless tobacco: Never   Substance Use Topics    Alcohol use: Not on file         Objective:   Patient Vitals for the past 24 hrs:   BP Temp Temp src Pulse Resp  SpO2 Weight   24 1650 -- -- -- -- -- -- 136 kg (298 lb 14.4 oz)   24 1422 -- -- -- -- -- 94 % --   24 1205 164/67 36.6 °C (97.9 °F) Temporal 81 15 96 % --   24 0815 -- -- -- -- -- 95 % --   24 0650 126/87 36.5 °C (97.7 °F) -- 69 17 92 % --   24 0432 -- -- -- -- -- -- 138 kg (304 lb 14.3 oz)   24 0356 -- -- -- -- 18 -- --   24 1941 -- -- -- -- -- 94 % --   24 1920 95/61 36.6 °C (97.9 °F) -- 76 18 94 % --   24 1823 104/59 36.9 °C (98.4 °F) Temporal 81 20 92 % --         Average, Min, and Max for last 24 hours Vitals:  TEMPERATURE:  Temp  Av.7 °C (98 °F)  Min: 36.5 °C (97.7 °F)  Max: 36.9 °C (98.4 °F)    RESPIRATIONS RANGE: Resp  Av.6  Min: 15  Max: 20    PULSE RANGE: Pulse  Av.8  Min: 69  Max: 81    BLOOD PRESSURE RANGE:  Systolic (24hrs), Av , Min:95 , Max:164   ; Diastolic (24hrs), Av, Min:59, Max:87      PULSE OXIMETRY RANGE: SpO2  Av.9 %  Min: 92 %  Max: 96 %    I/O last 3 completed shifts:  In: 0 (16.5 mL/kg) [P.O.:2280]  Out: 1820 (13.2 mL/kg) [Urine:1820 (0.4 mL/kg/hr)]  Weight: 138.3 kg   Weight change: -1.7 kg (-3 lb 12 oz)     Physical Exam  Vitals and nursing note reviewed.   Constitutional:       General: He is not in acute distress.     Appearance: He is morbidly obese. He is ill-appearing (Chronically).      Interventions: Nasal cannula in place.   HENT:      Head: Normocephalic and atraumatic.      Right Ear: External ear normal.      Left Ear: External ear normal.      Nose: Nose normal. No congestion or rhinorrhea.      Mouth/Throat:      Mouth: Mucous membranes are moist.   Eyes:      Extraocular Movements: Extraocular movements intact.      Pupils: Pupils are equal, round, and reactive to light.   Cardiovascular:      Rate and Rhythm: Normal rate and regular rhythm.      Pulses: Normal pulses.      Heart sounds: Normal heart sounds.   Pulmonary:      Effort: Pulmonary effort is normal.      Breath sounds:  Rhonchi present.   Abdominal:      General: Abdomen is flat. Bowel sounds are normal.      Palpations: Abdomen is soft.   Musculoskeletal:         General: Normal range of motion.      Cervical back: Normal range of motion and neck supple.      Right lower leg: Edema present.      Left lower leg: Edema present.   Skin:     General: Skin is warm and dry.      Capillary Refill: Capillary refill takes less than 2 seconds.   Neurological:      General: No focal deficit present.      Mental Status: He is alert and oriented to person, place, and time. Mental status is at baseline.   Psychiatric:         Mood and Affect: Mood normal.         Behavior: Behavior is cooperative.         Thought Content: Thought content normal.         Judgment: Judgment normal.         Lab Results   Component Value Date     03/18/2024    K 4.0 03/18/2024     03/18/2024    CO2 31 03/18/2024    BUN 36 (H) 03/18/2024    CREATININE 0.62 03/18/2024    GLUCOSE 115 (H) 03/18/2024    CALCIUM 8.9 03/18/2024    PROT 6.9 03/12/2024    BILITOT 1.6 (H) 03/12/2024    ALKPHOS 73 03/12/2024    AST 18 03/12/2024    ALT 22 03/12/2024       Lab Results   Component Value Date    WBC 9.0 03/18/2024    HGB 18.0 (H) 03/18/2024    HCT 56.8 (H) 03/18/2024     (H) 03/18/2024     03/18/2024    LYMPHOPCT 4.0 03/12/2024    RBC 5.41 03/18/2024    MCH 33.3 03/18/2024    MCHC 31.7 (L) 03/18/2024    RDW 12.7 03/18/2024    CRP 1.62 (A) 10/25/2019       Lab Results   Component Value Date    TSH 2.64 03/09/2024     Lab Results   Component Value Date    LACTATE 1.4 03/12/2024     (H) 03/19/2024       IMAGES:  Encounter Date: 03/08/24   Electrocardiogram, 12-lead PRN ACS symptoms   Result Value    Ventricular Rate 86    Atrial Rate 86    SD Interval 208    QRS Duration 146    QT Interval 388    QTC Calculation(Bazett) 464    P Axis 49    R Axis 140    T Axis 6    QRS Count 14    Q Onset 207    P Onset 103    P Offset 179    T Offset 401    QTC  Fredericia 437    Narrative    Sinus rhythm with occasional Premature ventricular complexes  Right bundle branch block  T wave abnormality, consider lateral ischemia  Abnormal ECG  When compared with ECG of 12-MAR-2024 04:06, (unconfirmed)  Premature atrial complexes are no longer Present  T wave inversion now evident in Lateral leads  Confirmed by Heather Barrett ([id number]) on 3/12/2024 10:49:25 AM     XR chest 1 view    Result Date: 3/13/2024  Impression: 1. Cardiomegaly. Vascular congestion. Large right pleural effusion with atelectasis/consolidation at the right lower lobe. Radiographic follow-up to resolution recommended. 2. Enteric tube terminates at the right upper quadrant, at the expected location of the gastric antrum.       MACRO: None.   Signed by: Deepali Casillas 3/13/2024 3:20 AM Dictation workstation:   [id number]    XR chest 1 view    Result Date: 3/11/2024  Impression: 1.  Slightly improved right-sided pleural effusion which is persistently moderate.     Signed by: Eugene Alvarenga 3/11/2024 7:19 PM Dictation workstation:   [id number]    Transthoracic echo (TTE) complete    Result Date: 3/11/2024   [facility], [address] [place] [address]               Tel [phone] and Fax [phone] TRANSTHORACIC ECHOCARDIOGRAM REPORT  Patient Name:      BRAYAN Key Physician:    [id number] Heather Barrett MD Study Date:        3/11/2024            Ordering Provider:    [id number] RORY GRAVES MRN/PID:           [id number]             Fellow: Accession#:        [id number]         Nurse:                Khushbu Holman RN Date of Birth/Age: 1955 / 68 years Sonographer:          Goldie Woods RDCS Gender:            M                    Additional Staff: Height:            177.80 cm             Admit Date:           3/8/2024 Weight:            147.42 kg            Admission Status:     Inpatient -                                                               Routine BSA / BMI:         2.57 m2 / 46.63      Encounter#:           [id number]                    kg/m2                                         Department Location:  [facility] Non                                                               Invasive Blood Pressure: 107 /70 mmHg Study Type:    TRANSTHORACIC ECHO (TTE) COMPLETE Diagnosis/ICD: Heart failure, unspecified-I50.9; Acute systolic (congestive)                heart failure (CHF)-I50.21 Indication:    Congestive Heart Failure CPT Code:      Echo Complete w Full Doppler-23334 Patient History: Pertinent History: COPD, HTN, LE Edema, Dyspnea and Elev trop., Elev. BNP,                    Smoker,. Study Detail: The following Echo studies were performed: 2D, M-Mode, Doppler and               color flow. Technically challenging study due to body habitus.               Definity used as a contrast agent for endocardial border               definition. Total contrast used for this procedure was 1.5 mL via               IV push. The patient was awake.  PHYSICIAN INTERPRETATION: Left Ventricle: The left ventricular systolic function is moderately decreased, with an estimated ejection fraction of 30-35%. There is global hypokinesis of the left ventricle with minor regional variations. The left ventricular cavity size is normal. Left ventricular diastolic filling was indeterminate. LV Wall Scoring: The basal inferolateral segment is akinetic. Left Atrium: The left atrium is mildly dilated. Right Ventricle: The right ventricle is normal in size. There is normal right ventricular global systolic function. Right Atrium: The right atrium is normal in size. Aortic Valve: The aortic valve was not well visualized. There is evidence of moderate aortic valve stenosis. There is no evidence of aortic valve  regurgitation. The peak instantaneous gradient of the aortic valve is 52.7 mmHg. The mean gradient of the aortic valve is 26.0 mmHg. Mitral Valve: The mitral valve is mild to moderately thickened. There is mild to moderate mitral annular calcification. There is mild mitral valve regurgitation. Tricuspid Valve: The tricuspid valve was not well visualized. There is trace to mild tricuspid regurgitation. The right ventricular systolic pressure is unable to be estimated. Pulmonic Valve: The pulmonic valve is not well visualized. There is physiologic pulmonic valve regurgitation. Pericardium: There is no pericardial effusion noted. Aorta: The aortic root is normal. Systemic Veins: The inferior vena cava appears dilated. There is less than 50% IVC collapse with inspiration.  CONCLUSIONS:  1. Left ventricular systolic function is moderately decreased with a 30-35% estimated ejection fraction.  2. Basal inferolateral segment is abnormal.  3. Poorly visualized anatomical structures due to suboptimal image quality.  4. Moderate aortic valve stenosis.  5. There is global hypokinesis of the left ventricle with minor regional variations. QUANTITATIVE DATA SUMMARY: 2D MEASUREMENTS:                           Normal Ranges: IVSd:          1.14 cm    (0.6-1.1cm) LVPWd:         1.57 cm    (0.6-1.1cm) LVIDd:         5.26 cm    (3.9-5.9cm) LVIDs:         4.41 cm LV Mass Index: 117.2 g/m2 LV % FS        16.2 % LA VOLUME:                               Normal Ranges: LA Vol A4C:        77.9 ml    (22+/-6mL/m2) LA Vol A2C:        84.8 ml LA Vol BP:         83.7 ml LA Vol Index A4C:  30.4ml/m2 LA Vol Index A2C:  33.0 ml/m2 LA Vol Index BP:   32.6 ml/m2 LA Area A4C:       24.1 cm2 LA Area A2C:       24.4 cm2 LA Major Axis A4C: 6.3 cm LA Major Axis A2C: 6.0 cm LA Volume Index:   30.5 ml/m2 LA Vol A4C:        71.4 ml LA Vol A2C:        78.4 ml RA VOLUME BY A/L METHOD:                       Normal Ranges: RA Area A4C: 17.0 cm2 AORTA  MEASUREMENTS:                    Normal Ranges: Asc Ao, d: 3.10 cm (2.1-3.4cm) LV SYSTOLIC FUNCTION BY 2D PLANIMETRY (MOD):                     Normal Ranges: EF-A4C View: 33.6 % (>=55%) EF-A2C View: 29.1 % EF-Biplane:  33.1 % LV DIASTOLIC FUNCTION:                     Normal Ranges: MV Peak E: 0.90 m/s (0.7-1.2 m/s) MV Peak A: 0.39 m/s (0.42-0.7 m/s) E/A Ratio: 2.34     (1.0-2.2) MITRAL VALVE:                Normal Ranges: MV DT: 99 msec (150-240msec) AORTIC VALVE:                                    Normal Ranges: AoV Vmax:                3.63 m/s  (<=1.7m/s) AoV Peak P.7 mmHg (<20mmHg) AoV Mean P.0 mmHg (1.7-11.5mmHg) LVOT Max Jero:            0.69 m/s  (<=1.1m/s) AoV VTI:                 60.70 cm  (18-25cm) LVOT VTI:                12.80 cm LVOT Diameter:           2.60 cm   (1.8-2.4cm) AoV Area, VTI:           1.12 cm2  (2.5-5.5cm2) AoV Area,Vmax:           1.01 cm2  (2.5-4.5cm2) AoV Dimensionless Index: 0.21  RIGHT VENTRICLE: RV Basal 3.71 cm RV Mid   2.98 cm RV Major 9.7 cm TAPSE:   22.4 mm RV s'    0.13 m/s TRICUSPID VALVE/RVSP:                   Normal Ranges: IVC Diam: 2.32 cm PULMONIC VALVE:                      Normal Ranges: PV Max Jero: 0.7 m/s  (0.6-0.9m/s) PV Max P.7 mmHg  [id number] Heather Barrett MD Electronically signed on 3/11/2024 at 4:04:49 PM  Wall Scoring  ** Final (Updated) **    === 24 ===    XR CHEST 1 VIEW    - Impression -  1. Cardiomegaly. Vascular congestion. Large right pleural effusion  with atelectasis/consolidation at the right lower lobe. Radiographic  follow-up to resolution recommended.  2. Enteric tube terminates at the right upper quadrant, at the  expected location of the gastric antrum.        MACRO:  None.    Signed by: Deepali Casillas 3/13/2024 3:20 AM  Dictation workstation:   [id number]  === 10/02/18 ===    CT HEART CALCIUM SCORING WO CONTRAST    - Impression -  1. Coronary artery calcium score of 164.2*.  2. There is some dependent  "density in the right mainstem bronchus  probably sequela of aspiration. Scarring or atelectasis at the right  lung base and small focal consolidation/atelectasis anteriorly on the  right.  3. Prominence ascending thoracic aorta up to 3.9 cm.  4. Prominent fat attenuation in the region of the interatrial septum  as described probably due to lipomatosis involvement and appearing  slightly more pronounced since previous study.  5.  view demonstrates prominent paratracheal density in the  upper mediastinum bilaterally not included on CT imaging, may relate  to overlying vascular structures or pronounced mediastinal fat  however correlation with dedicated complete CT chest should be  considered for better evaluation.  *Coronary artery calcium scoring may be helpful in predicting the  risk for future coronary heart disease events.  According to the  American College [employer] Foundation Clinical Expert Consensus  Task Force, such testing provides important prognostic information in  patients with more than one coronary heart disease risk factor. The  coronary artery calcium score correlates with the annual risk of a  non-fatal myocardial infarction or coronary heart disease death.    Coronary artery score            Annual Risk    0-99                             0.4%  100-399                        1.3%  >400                            2.4%    These three \"breakpoints\" correspond to lower, intermediate and high  risk states for future coronary events.  Such information should be  used, along with appropriate clinical judgment, to make decisions  regarding the intensity of risk factor management strategies to treat  blood lipids and to modify other non-lipid coronary risk factors.    Reference: Hickman P et al. Circulation.  2007; 115:402-426  === 03/08/24 ===    XR CHEST 1 VIEW    - Impression -  1. Cardiomegaly. Vascular congestion. Large right pleural effusion  with atelectasis/consolidation at the right " lower lobe. Radiographic  follow-up to resolution recommended.  2. Enteric tube terminates at the right upper quadrant, at the  expected location of the gastric antrum.        MACRO:  None.    Signed by: Deepali Casillas 3/13/2024 3:20 AM  Dictation workstation:   [id number]      Additional results of the last 24 hours have been reviewed.    Dictated using [employer] Version 2.4  Proof read however unrecognized voice recognition errors may have occurred     Electronically signed by Lou Madrigal DO on 03/20/24 at 5:09 PM

## 2024-03-20 NOTE — PROGRESS NOTES
"Physical Therapy                 Therapy Communication Note    Patient Name: Alo Glass  MRN: [id number]  Today's Date: 3/20/2024     Discipline: Physical Therapy    Missed Visit Reason: Missed Visit Reason: Patient refused (patient refused stating \"not today. the , mentioned walking tomorrow. No wallking today,\" no tx, RN notified.)    Missed Time: Attempt 1445    Comment: Unable to encourage patient to participate even after instructing him that PT careplan was started 2 days ago and there is a goal to ambulate. \"Not today, tomorrow\" patient also verbalized various concerns and PTA tried to answer questions and re-direct when possible. Many concerns were related to DC and this PTA offered to go follow up with DC planner and see if she could come in and talk with patient and patient stated. \"No, no more today. I want to relax and they will be calling me about my food I have to get that in, and at 4 I watch a show, no more today, no more follow up  with anyone  else today especially the next two hours. \" CALI Chan notified of patient request  "

## 2024-03-20 NOTE — NURSING NOTE
Per Mildred Mahoney PTA, pt requesting that no one enter the room until after 5pm when his TV show is over.

## 2024-03-20 NOTE — CARE PLAN
The patient's goals for the shift include  keep O2 at or above 90% and sleep through the night    The clinical goals for the shift include Pt will stay safe and comfortable during shift      Problem: Pain - Adult  Goal: Verbalizes/displays adequate comfort level or baseline comfort level  Outcome: Progressing     Problem: Safety - Adult  Goal: Free from fall injury  Outcome: Progressing     Problem: Discharge Planning  Goal: Discharge to home or other facility with appropriate resources  Outcome: Progressing     Problem: Chronic Conditions and Co-morbidities  Goal: Patient's chronic conditions and co-morbidity symptoms are monitored and maintained or improved  Outcome: Progressing     Problem: Heart Failure  Goal: Improved gas exchange this shift  Outcome: Progressing  Goal: Improved urinary output this shift  Outcome: Progressing  Goal: Reduction in peripheral edema within 24 hours  Outcome: Progressing  Goal: Report improvement of dyspnea/breathlessness this shift  Outcome: Progressing  Goal: Weight from fluid excess reduced over 2-3 days, then stabilize  Outcome: Progressing  Goal: Increase self care and/or family involvement in 24 hours  Outcome: Progressing     Problem: Skin  Goal: Participates in plan/prevention/treatment measures  Outcome: Progressing  Goal: Prevent/manage excess moisture  Outcome: Progressing  Goal: Prevent/minimize sheer/friction injuries  Outcome: Progressing  Goal: Promote/optimize nutrition  Outcome: Progressing  Goal: Promote skin healing  Outcome: Progressing     Problem: Fall/Injury  Goal: Not fall by end of shift  Outcome: Progressing  Goal: Be free from injury by end of the shift  Outcome: Progressing     Problem: Pain  Goal: Takes deep breaths with improved pain control throughout the shift  Outcome: Progressing  Goal: Turns in bed with improved pain control throughout the shift  Outcome: Progressing

## 2024-03-20 NOTE — CARE PLAN
Problem: Pain - Adult  Goal: Verbalizes/displays adequate comfort level or baseline comfort level  Outcome: Progressing     Problem: Safety - Adult  Goal: Free from fall injury  Outcome: Progressing     Problem: Discharge Planning  Goal: Discharge to home or other facility with appropriate resources  Outcome: Progressing     Problem: Heart Failure  Goal: Reduction in peripheral edema within 24 hours  Outcome: Progressing     Problem: Skin  Goal: Prevent/minimize sheer/friction injuries  Outcome: Progressing     Problem: Fall/Injury  Goal: Not fall by end of shift  Outcome: Progressing     Problem: Knowledge Deficit  Goal: Patient/family/caregiver demonstrates understanding of disease process, treatment plan, medications, and discharge instructions  Outcome: Progressing     Problem: Pain  Goal: Takes deep breaths with improved pain control throughout the shift  Outcome: Progressing   The patient's goals for the shift include  to discharge home with HC therapy    The clinical goals for the shift include Pt will stay safe and comfortable during shift    Over the shift, the patient did not make progress toward the following goals. Barriers to progression include none. Recommendations to address these barriers include none.

## 2024-03-21 LAB
ALBUMIN SERPL BCP-MCNC: 3.1 G/DL (ref 3.4–5)
ANION GAP SERPL CALC-SCNC: 10 MMOL/L (ref 10–20)
BASOPHILS # BLD AUTO: 0.02 X10*3/UL (ref 0–0.1)
BASOPHILS NFR BLD AUTO: 0.3 %
BNP SERPL-MCNC: 224 PG/ML (ref 0–99)
BUN SERPL-MCNC: 28 MG/DL (ref 6–23)
CALCIUM SERPL-MCNC: 8.1 MG/DL (ref 8.6–10.3)
CHLORIDE SERPL-SCNC: 102 MMOL/L (ref 98–107)
CO2 SERPL-SCNC: 31 MMOL/L (ref 21–32)
CREAT SERPL-MCNC: 0.57 MG/DL (ref 0.5–1.3)
EGFRCR SERPLBLD CKD-EPI 2021: >90 ML/MIN/1.73M*2
EOSINOPHIL # BLD AUTO: 0.14 X10*3/UL (ref 0–0.7)
EOSINOPHIL NFR BLD AUTO: 1.9 %
ERYTHROCYTE [DISTWIDTH] IN BLOOD BY AUTOMATED COUNT: 12.9 % (ref 11.5–14.5)
GLUCOSE BLD MANUAL STRIP-MCNC: 126 MG/DL (ref 74–99)
GLUCOSE BLD MANUAL STRIP-MCNC: 160 MG/DL (ref 74–99)
GLUCOSE BLD MANUAL STRIP-MCNC: 208 MG/DL (ref 74–99)
GLUCOSE BLD MANUAL STRIP-MCNC: 304 MG/DL (ref 74–99)
GLUCOSE SERPL-MCNC: 106 MG/DL (ref 74–99)
HCT VFR BLD AUTO: 52 % (ref 41–52)
HGB BLD-MCNC: 16.6 G/DL (ref 13.5–17.5)
IMM GRANULOCYTES # BLD AUTO: 0.08 X10*3/UL (ref 0–0.7)
IMM GRANULOCYTES NFR BLD AUTO: 1.1 % (ref 0–0.9)
LYMPHOCYTES # BLD AUTO: 1.87 X10*3/UL (ref 1.2–4.8)
LYMPHOCYTES NFR BLD AUTO: 25.9 %
MAGNESIUM SERPL-MCNC: 2.35 MG/DL (ref 1.6–2.4)
MCH RBC QN AUTO: 33.5 PG (ref 26–34)
MCHC RBC AUTO-ENTMCNC: 31.9 G/DL (ref 32–36)
MCV RBC AUTO: 105 FL (ref 80–100)
MONOCYTES # BLD AUTO: 0.84 X10*3/UL (ref 0.1–1)
MONOCYTES NFR BLD AUTO: 11.6 %
NEUTROPHILS # BLD AUTO: 4.27 X10*3/UL (ref 1.2–7.7)
NEUTROPHILS NFR BLD AUTO: 59.2 %
NRBC BLD-RTO: 0 /100 WBCS (ref 0–0)
PHOSPHATE SERPL-MCNC: 3.7 MG/DL (ref 2.5–4.9)
PLATELET # BLD AUTO: 236 X10*3/UL (ref 150–450)
POTASSIUM SERPL-SCNC: 3.6 MMOL/L (ref 3.5–5.3)
RBC # BLD AUTO: 4.96 X10*6/UL (ref 4.5–5.9)
SODIUM SERPL-SCNC: 139 MMOL/L (ref 136–145)
WBC # BLD AUTO: 7.2 X10*3/UL (ref 4.4–11.3)

## 2024-03-21 PROCEDURE — 97530 THERAPEUTIC ACTIVITIES: CPT | Mod: GO,CO,59

## 2024-03-21 PROCEDURE — 97116 GAIT TRAINING THERAPY: CPT | Mod: GP,CQ

## 2024-03-21 PROCEDURE — 94640 AIRWAY INHALATION TREATMENT: CPT

## 2024-03-21 PROCEDURE — 2500000004 HC RX 250 GENERAL PHARMACY W/ HCPCS (ALT 636 FOR OP/ED): Performed by: INTERNAL MEDICINE

## 2024-03-21 PROCEDURE — 2500000001 HC RX 250 WO HCPCS SELF ADMINISTERED DRUGS (ALT 637 FOR MEDICARE OP): Performed by: PHYSICIAN ASSISTANT

## 2024-03-21 PROCEDURE — 99233 SBSQ HOSP IP/OBS HIGH 50: CPT | Performed by: INTERNAL MEDICINE

## 2024-03-21 PROCEDURE — 83735 ASSAY OF MAGNESIUM: CPT | Performed by: INTERNAL MEDICINE

## 2024-03-21 PROCEDURE — 1200000002 HC GENERAL ROOM WITH TELEMETRY DAILY

## 2024-03-21 PROCEDURE — 94668 MNPJ CHEST WALL SBSQ: CPT

## 2024-03-21 PROCEDURE — 85025 COMPLETE CBC W/AUTO DIFF WBC: CPT | Performed by: INTERNAL MEDICINE

## 2024-03-21 PROCEDURE — 97535 SELF CARE MNGMENT TRAINING: CPT | Mod: GO,CO

## 2024-03-21 PROCEDURE — 2500000002 HC RX 250 W HCPCS SELF ADMINISTERED DRUGS (ALT 637 FOR MEDICARE OP, ALT 636 FOR OP/ED): Performed by: INTERNAL MEDICINE

## 2024-03-21 PROCEDURE — 80069 RENAL FUNCTION PANEL: CPT | Performed by: INTERNAL MEDICINE

## 2024-03-21 PROCEDURE — 94760 N-INVAS EAR/PLS OXIMETRY 1: CPT

## 2024-03-21 PROCEDURE — S4991 NICOTINE PATCH NONLEGEND: HCPCS | Performed by: INTERNAL MEDICINE

## 2024-03-21 PROCEDURE — 83880 ASSAY OF NATRIURETIC PEPTIDE: CPT | Performed by: PHYSICIAN ASSISTANT

## 2024-03-21 PROCEDURE — 82947 ASSAY GLUCOSE BLOOD QUANT: CPT

## 2024-03-21 PROCEDURE — 2500000004 HC RX 250 GENERAL PHARMACY W/ HCPCS (ALT 636 FOR OP/ED): Performed by: PHYSICIAN ASSISTANT

## 2024-03-21 PROCEDURE — 2500000001 HC RX 250 WO HCPCS SELF ADMINISTERED DRUGS (ALT 637 FOR MEDICARE OP): Performed by: INTERNAL MEDICINE

## 2024-03-21 PROCEDURE — 36415 COLL VENOUS BLD VENIPUNCTURE: CPT | Performed by: INTERNAL MEDICINE

## 2024-03-21 PROCEDURE — 99232 SBSQ HOSP IP/OBS MODERATE 35: CPT | Performed by: INTERNAL MEDICINE

## 2024-03-21 RX ORDER — METOLAZONE 5 MG/1
2.5 TABLET ORAL DAILY
Status: DISCONTINUED | OUTPATIENT
Start: 2024-03-22 | End: 2024-03-23 | Stop reason: HOSPADM

## 2024-03-21 RX ORDER — FUROSEMIDE 40 MG/1
80 TABLET ORAL
Status: DISCONTINUED | OUTPATIENT
Start: 2024-03-22 | End: 2024-03-22

## 2024-03-21 RX ADMIN — VALSARTAN 40 MG: 40 TABLET, FILM COATED ORAL at 08:40

## 2024-03-21 RX ADMIN — EMPAGLIFLOZIN 10 MG: 10 TABLET, FILM COATED ORAL at 08:39

## 2024-03-21 RX ADMIN — FOLIC ACID 1 MG: 1 TABLET ORAL at 08:39

## 2024-03-21 RX ADMIN — IPRATROPIUM BROMIDE AND ALBUTEROL SULFATE 3 ML: 2.5; .5 SOLUTION RESPIRATORY (INHALATION) at 20:29

## 2024-03-21 RX ADMIN — IPRATROPIUM BROMIDE AND ALBUTEROL SULFATE 3 ML: 2.5; .5 SOLUTION RESPIRATORY (INHALATION) at 15:19

## 2024-03-21 RX ADMIN — INSULIN LISPRO 4 UNITS: 100 INJECTION, SOLUTION INTRAVENOUS; SUBCUTANEOUS at 16:00

## 2024-03-21 RX ADMIN — TIOTROPIUM BROMIDE INHALATION SPRAY 2 PUFF: 3.12 SPRAY, METERED RESPIRATORY (INHALATION) at 07:39

## 2024-03-21 RX ADMIN — Medication 6 MG: at 21:03

## 2024-03-21 RX ADMIN — METOPROLOL SUCCINATE 25 MG: 25 TABLET, EXTENDED RELEASE ORAL at 08:39

## 2024-03-21 RX ADMIN — BENZONATATE 200 MG: 100 CAPSULE ORAL at 21:03

## 2024-03-21 RX ADMIN — ENOXAPARIN SODIUM 40 MG: 40 INJECTION SUBCUTANEOUS at 21:04

## 2024-03-21 RX ADMIN — POLYETHYLENE GLYCOL 3350 17 G: 17 POWDER, FOR SOLUTION ORAL at 12:56

## 2024-03-21 RX ADMIN — NICOTINE 1 PATCH: 14 PATCH, EXTENDED RELEASE TRANSDERMAL at 08:39

## 2024-03-21 RX ADMIN — METOLAZONE 5 MG: 5 TABLET ORAL at 07:38

## 2024-03-21 RX ADMIN — LEVOTHYROXINE SODIUM 125 MCG: 0.12 TABLET ORAL at 05:59

## 2024-03-21 RX ADMIN — ASPIRIN 81 MG: 81 TABLET, COATED ORAL at 08:39

## 2024-03-21 RX ADMIN — POLYETHYLENE GLYCOL 3350 17 G: 17 POWDER, FOR SOLUTION ORAL at 21:04

## 2024-03-21 RX ADMIN — ENOXAPARIN SODIUM 40 MG: 40 INJECTION SUBCUTANEOUS at 08:39

## 2024-03-21 RX ADMIN — PREDNISONE 20 MG: 20 TABLET ORAL at 08:39

## 2024-03-21 RX ADMIN — FLUTICASONE FUROATE AND VILANTEROL TRIFENATATE 1 PUFF: 100; 25 POWDER RESPIRATORY (INHALATION) at 07:39

## 2024-03-21 RX ADMIN — FUROSEMIDE 80 MG: 10 INJECTION, SOLUTION INTRAVENOUS at 08:14

## 2024-03-21 RX ADMIN — BENZONATATE 200 MG: 100 CAPSULE ORAL at 08:39

## 2024-03-21 RX ADMIN — IPRATROPIUM BROMIDE AND ALBUTEROL SULFATE 3 ML: 2.5; .5 SOLUTION RESPIRATORY (INHALATION) at 07:37

## 2024-03-21 RX ADMIN — Medication 1 TABLET: at 08:39

## 2024-03-21 RX ADMIN — BENZONATATE 200 MG: 100 CAPSULE ORAL at 15:47

## 2024-03-21 RX ADMIN — GUAIFENESIN SYRUP AND DEXTROMETHORPHAN 10 ML: 100; 10 SYRUP ORAL at 12:56

## 2024-03-21 RX ADMIN — FUROSEMIDE 80 MG: 10 INJECTION, SOLUTION INTRAVENOUS at 12:46

## 2024-03-21 RX ADMIN — SENNOSIDES AND DOCUSATE SODIUM 2 TABLET: 8.6; 5 TABLET ORAL at 21:03

## 2024-03-21 RX ADMIN — ALLOPURINOL 300 MG: 300 TABLET ORAL at 08:39

## 2024-03-21 RX ADMIN — THIAMINE HCL TAB 100 MG 100 MG: 100 TAB at 08:40

## 2024-03-21 RX ADMIN — SENNOSIDES AND DOCUSATE SODIUM 2 TABLET: 8.6; 5 TABLET ORAL at 08:40

## 2024-03-21 RX ADMIN — GUAIFENESIN SYRUP AND DEXTROMETHORPHAN 10 ML: 100; 10 SYRUP ORAL at 21:03

## 2024-03-21 ASSESSMENT — PAIN SCALES - GENERAL
PAINLEVEL_OUTOF10: 0 - NO PAIN

## 2024-03-21 ASSESSMENT — PAIN - FUNCTIONAL ASSESSMENT
PAIN_FUNCTIONAL_ASSESSMENT: 0-10

## 2024-03-21 ASSESSMENT — COGNITIVE AND FUNCTIONAL STATUS - GENERAL
HELP NEEDED FOR BATHING: A LITTLE
STANDING UP FROM CHAIR USING ARMS: A LITTLE
MOVING FROM LYING ON BACK TO SITTING ON SIDE OF FLAT BED WITH BEDRAILS: A LITTLE
TOILETING: A LITTLE
CLIMB 3 TO 5 STEPS WITH RAILING: A LOT
TOILETING: A LITTLE
PERSONAL GROOMING: A LITTLE
PERSONAL GROOMING: A LITTLE
HELP NEEDED FOR BATHING: A LITTLE
WALKING IN HOSPITAL ROOM: A LITTLE
DRESSING REGULAR LOWER BODY CLOTHING: A LITTLE
HELP NEEDED FOR BATHING: A LITTLE
CLIMB 3 TO 5 STEPS WITH RAILING: A LITTLE
MOVING FROM LYING ON BACK TO SITTING ON SIDE OF FLAT BED WITH BEDRAILS: A LITTLE
MOBILITY SCORE: 17
MOVING TO AND FROM BED TO CHAIR: A LITTLE
MOBILITY SCORE: 20
MOVING TO AND FROM BED TO CHAIR: A LITTLE
PERSONAL GROOMING: A LITTLE
DRESSING REGULAR LOWER BODY CLOTHING: A LITTLE
MOVING TO AND FROM BED TO CHAIR: A LITTLE
DRESSING REGULAR LOWER BODY CLOTHING: A LITTLE
MOVING FROM LYING ON BACK TO SITTING ON SIDE OF FLAT BED WITH BEDRAILS: A LITTLE
DAILY ACTIVITIY SCORE: 20
DAILY ACTIVITIY SCORE: 20
MOBILITY SCORE: 20
TURNING FROM BACK TO SIDE WHILE IN FLAT BAD: A LITTLE
DAILY ACTIVITIY SCORE: 20
TOILETING: A LITTLE
TURNING FROM BACK TO SIDE WHILE IN FLAT BAD: A LITTLE
TURNING FROM BACK TO SIDE WHILE IN FLAT BAD: A LITTLE
CLIMB 3 TO 5 STEPS WITH RAILING: A LITTLE

## 2024-03-21 ASSESSMENT — ACTIVITIES OF DAILY LIVING (ADL): HOME_MANAGEMENT_TIME_ENTRY: 16

## 2024-03-21 NOTE — CARE PLAN
Problem: Pain - Adult  Goal: Verbalizes/displays adequate comfort level or baseline comfort level  Outcome: Met     Problem: Safety - Adult  Goal: Free from fall injury  Outcome: Met     Problem: Discharge Planning  Goal: Discharge to home or other facility with appropriate resources  Outcome: Met     Problem: Chronic Conditions and Co-morbidities  Goal: Patient's chronic conditions and co-morbidity symptoms are monitored and maintained or improved  Outcome: Met     Problem: Heart Failure  Goal: Improved gas exchange this shift  Outcome: Met  Goal: Improved urinary output this shift  Outcome: Met  Goal: Reduction in peripheral edema within 24 hours  Outcome: Met  Goal: Report improvement of dyspnea/breathlessness this shift  Outcome: Met  Goal: Weight from fluid excess reduced over 2-3 days, then stabilize  Outcome: Met  Goal: Increase self care and/or family involvement in 24 hours  Outcome: Met     Problem: Skin  Goal: Participates in plan/prevention/treatment measures  Outcome: Met  Goal: Prevent/manage excess moisture  Outcome: Met  Goal: Prevent/minimize sheer/friction injuries  Outcome: Met  Goal: Promote/optimize nutrition  Outcome: Met  Goal: Promote skin healing  Outcome: Met     Problem: Fall/Injury  Goal: Not fall by end of shift  Outcome: Met  Goal: Be free from injury by end of the shift  Outcome: Met     Problem: Pain  Goal: Takes deep breaths with improved pain control throughout the shift  Outcome: Met  Goal: Turns in bed with improved pain control throughout the shift  Outcome: Met     Problem: Knowledge Deficit  Goal: Patient/family/caregiver demonstrates understanding of disease process, treatment plan, medications, and discharge instructions  Outcome: Progressing   The patient's goals for the shift include      The clinical goals for the shift include Pt will remain hemodynamically stable throughout shift

## 2024-03-21 NOTE — PROGRESS NOTES
Subjective Data:  Patient continues to improve.     Overnight Events:    No acute issues reported overnight.      Objective Data:  Last Recorded Vitals:  Vitals:    03/21/24 0833 03/21/24 1010 03/21/24 1405 03/21/24 1519   BP: 116/75 97/61 108/69    BP Location: Left arm      Patient Position:       Pulse: 70 73 77    Resp: 18 18 18    Temp:  36.5 °C (97.7 °F) 36.3 °C (97.3 °F)    TempSrc:       SpO2: 92% 91%  93%   Weight:       Height:           Last Labs:  CBC - 3/21/2024:  5:52 AM  7.2 16.6 236    52.0      CMP - 3/21/2024:  5:52 AM  8.1 6.9 18 --- 1.6   3.7 3.1 22 73      PTT - No results in last year.  _   _ _     TROPHS   Date/Time Value Ref Range Status   03/12/2024 07:07  0 - 20 ng/L Final     Comment:     Previous result verified on 3/12/2024 0048 on specimen/case [id number] called with component [facility] for procedure Troponin I, High Sensitivity with value 89 ng/L.   03/12/2024 03:08  0 - 20 ng/L Final     Comment:     Previous result verified on 3/12/2024 0048 on specimen/case [id number] [facility] Troponin I, High Sensitivity with value 89 ng/L.   03/12/2024 12:15 AM 89 0 - 20 ng/L Final     BNP   Date/Time Value Ref Range Status   03/21/2024 05:52  0 - 99 pg/mL Final   03/19/2024 09:24  0 - 99 pg/mL Final     HGBA1C   Date/Time Value Ref Range Status   02/20/2024 10:35 AM 5.8 see below % Final   01/19/2023 10:23 AM 7.3 % Final     Comment:          Diagnosis of Diabetes-Adults   Non-Diabetic: < or = 5.6%   Increased risk for developing diabetes: 5.7-6.4%   Diagnostic of diabetes: > or = 6.5%  .       Monitoring of Diabetes                Age (y)     Therapeutic Goal (%)   Adults:          >18           <7.0   Pediatrics:    13-18           <7.5                   7-12           <8.0                   0- 6            7.5-8.5   American Diabetes Association. Diabetes Care 33(S1), Jan 2010.     09/10/2021 01:29 PM 7.9 % Final     Comment:           Diagnosis of Diabetes-Adults   Non-Diabetic: < or = 5.6%   Increased risk for developing diabetes: 5.7-6.4%   Diagnostic of diabetes: > or = 6.5%  .       Monitoring of Diabetes                Age (y)     Therapeutic Goal (%)   Adults:          >18           <7.0   Pediatrics:    13-18           <7.5                   7-12           <8.0                   0- 6            7.5-8.5   American Diabetes Association. Diabetes Care 33(S1), Jan 2010.       LDLCALC   Date/Time Value Ref Range Status   02/20/2024 10:35  <=99 mg/dL Final     Comment:                                 Near   Borderline      AGE      Desirable  Optimal    High     High     Very High     0-19 Y     0 - 109     ---    110-129   >/= 130     ----    20-24 Y     0 - 119     ---    120-159   >/= 160     ----      >24 Y     0 -  99   100-129  130-159   160-189     >/=190       VLDL   Date/Time Value Ref Range Status   02/20/2024 10:35 AM 16 0 - 40 mg/dL Final   09/10/2021 01:29 PM 27 0 - 40 mg/dL Final   09/18/2018 02:26 PM 22 0 - 40 mg/dL Final      Last I/O:  I/O last 3 completed shifts:  In: 1070 (7.9 mL/kg) [P.O.:1070]  Out: 200 (1.5 mL/kg) [Urine:200 (0 mL/kg/hr)]  Weight: 135.6 kg     Past Cardiology Tests (Last 3 Years):  EKG:  Electrocardiogram, 12-lead PRN ACS symptoms 03/12/2024  ECG 12 lead 03/12/2024  ECG 12 lead 03/12/2024  Electrocardiogram, 12-lead PRN ACS symtpoms 03/11/2024  ECG 12 Lead 03/10/2024  ECG 12 lead      Echo:  Transthoracic echo (TTE) complete 03/11/2024  CONCLUSIONS:   1. Left ventricular systolic function is moderately decreased with a 30-35% estimated ejection fraction.   2. Basal inferolateral segment is abnormal.   3. Poorly visualized anatomical structures due to suboptimal image quality.   4. Moderate aortic valve stenosis.   5. There is global hypokinesis of the left ventricle with minor regional variations.      TTE (06/18/2019):   -Low normal LV systolic function  -Mild concentric LVH  -Doppler flow  suggestive of abnormal Left ventricular relaxation  -Normal right ventricular size and function  -Slightly enlarged left atrium.   -Moderate calcific aortic stenosis  -Trace mitral regurg       Ejection Fractions:            EF   Date/Time Value Ref Range Status   03/11/2024 02:04 PM 33 %        Cath:  No results found for this or any previous visit from the past 1095 days.     Stress Test:  No results found for this or any previous visit from the past 1095 days.     Imaging:  CXR (03/08/2024):   IMPRESSION:  -Moderate right pleural effusion and right lung base consolidation. Findings could be due to pneumonia. Other underlying process not excluded. Follow-up recommended.   -Trace left pleural effusion and cardiomegaly. Possible interstitial edema.     Xray Abd (03/12/2024):   IMPRESSION:  1.  Enteric tube approximately 3.8 cm above the kathie.  2. Evaluation of the enteric tube is limited and appears looped high in the right upper quadrant however may be related to patient positioning and rotation. Repeat imaging may be considered with proper patient positioning to delineate proper position.  3. Left lower lobe airspace opacities concerning for pneumonia. Possible large right pleural effusion.  4. Paucity of bowel gas noted.     CXR (03/12/2024):   IMPRESSION:  1.  Enteric tube approximately 3.8 cm above the kathie.  2. Evaluation of the enteric tube is limited and appears looped high in the right upper quadrant however may be related to patient positioning and rotation. Repeat imaging may be considered with proper patient positioning to delineate proper position.  3. Left lower lobe airspace opacities concerning for pneumonia. Possible large right pleural effusion.  4. Paucity of bowel gas noted.       Inpatient Medications:  Scheduled medications   Medication Dose Route Frequency    allopurinol  300 mg oral Daily    aspirin  81 mg oral Daily    benzonatate  200 mg oral TID    dextromethorphan-guaifenesin  10 mL  oral BID    empagliflozin  10 mg oral Daily    enoxaparin  40 mg subcutaneous BID    tiotropium  2 puff inhalation Daily    And    fluticasone furoate-vilanteroL  1 puff inhalation Daily    folic acid  1 mg oral Daily    furosemide  80 mg intravenous BID    insulin lispro  0-10 Units subcutaneous TID with meals    ipratropium-albuteroL  3 mL nebulization TID    levothyroxine  125 mcg oral Daily    melatonin  6 mg oral Nightly    metOLazone  5 mg oral Daily    metoprolol succinate XL  25 mg oral Daily    multivitamin with minerals  1 tablet oral Daily    nicotine  1 patch transdermal Daily    polyethylene glycol  17 g oral BID    [START ON 3/22/2024] predniSONE  10 mg oral Daily    Followed by    [START ON 3/25/2024] predniSONE  5 mg oral Daily    sennosides-docusate sodium  2 tablet oral BID    thiamine  100 mg oral Daily    valsartan  40 mg oral Daily     PRN medications   Medication    acetaminophen    hydrocodone-homatropine    ipratropium-albuteroL    oxygen    traZODone     Continuous Medications   Medication Dose Last Rate       Physical Exam:       Assessment/Plan   69 yo male from home with h/o obesity, MELISSA on CPAP, COPD, current smoker, type 2 DM, hypertension, lymphedema, moderate aortic stenosis (6/2019) who presented from PCP office with symptoms of increased LE edema, abdominal bloating, progressive SOB over the past year found to be hypoxic in the ER. On admission was Intermittently refusing IV Lasix, I&O not well monitored. Discussed urinating only into urinals, recording how much urine vs how frequent he urinates. Refused return to lasix gtt, lipscomb or external catheter. Originally refusing echo d/t concern for needing to urinate during the testing. Discussed accommodations and ability to use the bathroom in echo if necessary. Patient was then agreeable to echocardiogram. Echo obtained with newly reduced ejection fraction. Decompensated overnight hypoxic, agitated, required Ativan, Precedex, BiPAP and  eventually intubation. Patient weaned off of IV pressor support (03/13). Able to be extubated successfully (3/14)  to HFNC. Diuresis decreased to once daily w/ drop in urine output, remains hypervolemic.      #Acute hypoxic respiratory failure 2/2 decompensated heart failure, Pneumonia, COPD w/ current smoking, mucous plugging s/p bronchoscopy   #Acute decompensated systolic heart failure (new diagnosis)  #Non-sustained Ventricular tachycardia (none further on tele overnight)   #Septic shock, resolved   #Moderate aortic stenosis   #Chronic lymphedema BLE   #NSR with PACs on telemetry  (no afib noted)      -Decrease Metolazone to 2.5mg 30 minutes prior to lasix tomorrow  -D/c IV Lasix start PO 80mg BID tomorrow.   -Continue Metoprolol, resume GDMT including Valsartan @ decreased dose d/t hypotension as tolerated  -Echo reviewed with newly reduced LVSF, EF 30-35%, abnormal basal inferolateral segment, global hypokinesis, mod AS. Discussed with patient.   -Strict I/O, monitor renal function and electrolyte  - >171> 733  -Will plan for LHC/RHC for new cardiomyopathy will be deferred to outpatient per patient request.  -Daily standing weights.    -Will continue to follow        Peripheral IV 03/13/24 18 G Proximal;Right Forearm (Active)   Site Assessment Clean;Dry;Intact 03/21/24 0833   Dressing Status Clean;Dry;Occlusive 03/21/24 0833   Number of days: 8       Code Status:  Full Code    I spent  minutes in the professional and overall care of this patient.        Stephanie Dyer PA-C

## 2024-03-21 NOTE — CARE PLAN
Problem: Pain  Goal: My pain/discomfort is manageable  Outcome: Progressing     Problem: Safety  Goal: Patient will be injury free during hospitalization  Outcome: Progressing     Problem: Daily Care  Goal: Daily care needs are met  Outcome: Progressing     Problem: Psychosocial Needs  Goal: Demonstrates ability to cope with hospitalization/illness  Outcome: Progressing  Goal: Collaborate with me, my family, and caregiver to identify my specific goals  Outcome: Progressing     Problem: Respiratory  Goal: Minimize anxiety/maximize coping throughout shift  Outcome: Progressing  Goal: No signs of respiratory distress (eg. Use of accessory muscles. Peds grunting)  Outcome: Progressing     Problem: Heart Failure  Goal: Improved urinary output this shift  Outcome: Progressing  Goal: Weight from fluid excess reduced over 2-3 days, then stabilize  Outcome: Progressing     Problem: Fall/Injury  Goal: Not fall by end of shift  Outcome: Progressing  Goal: Pace activities to prevent fatigue by end of the shift  Outcome: Progressing   The patient's goals for the shift include  understanding of medical diagnosis    The clinical goals for the shift include keep pt safe    Over the shift, the patient did not make progress toward the following goals. Barriers to progression include pt is non-compliant with safety measures to prevent falls/injuries. Recommendations to address these barriers include continued re-education.

## 2024-03-21 NOTE — PROGRESS NOTES
Occupational Therapy    Occupational Therapy Treatment    Name: Alo Glass  MRN: [id number]  : 1955  Date: 24  Time Calculation  Start Time: 852  Stop Time: 931  Time Calculation (min): 39 min    Assessment:  OT Assessment: Pt continues to present with improving SOB, decreased overall endurance and balance. Pt receptive to energy conservation and work simplification education, continues to benefit from skilled OT services to increase independence with ADL's, transfers, and mobility. Recommend MOD OT intervention in discharge setting, though pt continues to insist on discharge home with [facility] follow-up.  Prognosis: Good  Barriers to Discharge: Decreased caregiver support  Evaluation/Treatment Tolerance: Patient limited by fatigue  Medical Staff Made Aware: Yes  End of Session Communication: Bedside nurse, PCT/NA/CTA  End of Session Patient Position: Up in chair, Alarm off, not on at start of session (Nurse aware patient is refusing chair alarm.)  Plan:  Treatment Interventions: ADL retraining, Functional transfer training, Endurance training, Patient/family training, Equipment evaluation/education, Compensatory technique education  OT Frequency: 3 times per week  OT Discharge Recommendations: Moderate intensity level of continued care  Equipment Recommended upon Discharge: Wheeled walker  OT Recommended Transfer Status: Assist of 1  OT - OK to Discharge: Yes (In accord with OT POC)    Subjective   Previous Visit Info:  OT Last Visit  OT Received On: 24  General:  General  Reason for Referral: Pt is a 69 y/o male who was admitted for SOB  Referred By: Lou Madrigal  Past Medical History Relevant to Rehab: Benign essential hypertension  COPD  Type 2 diabetes mellitus  Gastroesophageal reflux disease  Gout  Bilateral lower extremity edema  Tobacco use disorder  Hyperlipidemia  Hypothyroidism  Obesity  Thoracic aortic aneurysm  Obstructive sleep apnea  Polycythemia  Lymphedema  Family/Caregiver  Present: No  Prior to Session Communication: Bedside nurse  Patient Position Received: Up in chair, Alarm off, not on at start of session  Preferred Learning Style: verbal  General Comment: Patient agreeable to OT treatment and education/instruction relating to energy conservation, work simplification and safety.  Precautions:  Medical Precautions: Fall precautions  Precautions Comment: pt now on room air  Vitals:     Pain Assessment:  Pain Assessment  Pain Assessment: 0-10  Pain Score: 0 - No pain     Objective   Activities of Daily Living: Grooming  Grooming Level of Assistance: Setup, Close supervision  Grooming Where Assessed: Standing sinkside  Grooming Comments: with tripod walker and sink for support surfaces    LE Dressing  LE Dressing: Yes  LE Dressing Adaptive Equipment: Sock aide, Reacher, Dressing stick  Sock Level of Assistance: Minimum assistance  LE Dressing Where Assessed: Chair  LE Dressing Comments: Pt instructed in use of equipment including wide sock aide d/t petal swelling.    Toileting  Toileting Level of Assistance: Distant supervision, Setup  Where Assessed: Toilet  Toileting Comments: increased time for task including clothing mamagement and use of wall mount grab bar for balance.    Functional Standing Tolerance:     Bed Mobility/Transfers: Transfers  Transfer: Yes  Transfer 1  Transfer From 1: Sit to, Stand to  Transfer to 1: Sit, Stand, Commode-standard, Chair with arms  Technique 1: Sit to stand, Stand to sit  Transfer Device 1:  (delta walker)  Transfer Level of Assistance 1: Close supervision    Therapy/Activity: Therapeutic Activity  Therapeutic Activity Performed: Yes  Therapeutic Activity 1: Pt instruction in energy conservation and work simplification resumed with use of LB dressing tools and discussion of seated versus standing for tasks and activities. Pt able to demonstrate understanding of the use of adaptive tools to ease workload, work sitting when possible, balance of rest  and activity and the use of slowed pace and appropriate breaks to increase activity tolerance.    Outcome Measures:  [facility] Daily Activity  Putting on and taking off regular lower body clothing: A little  Bathing (including washing, rinsing, drying): A little  Putting on and taking off regular upper body clothing: None  Toileting, which includes using toilet, bedpan or urinal: A little  Taking care of personal grooming such as brushing teeth: A little  Eating Meals: None  Daily Activity - Total Score: 20    Education Documentation  Handouts, taught by Sha Alegria OT at 3/21/2024 10:18 AM.  Learner: Patient  Readiness: Acceptance  Method: Explanation, Demonstration  Response: Verbalizes Understanding, Demonstrated Understanding, Needs Reinforcement    Body Mechanics, taught by Sha Alegria OT at 3/21/2024 10:18 AM.  Learner: Patient  Readiness: Acceptance  Method: Explanation, Demonstration  Response: Verbalizes Understanding, Demonstrated Understanding, Needs Reinforcement    Precautions, taught by Sha Alegria OT at 3/21/2024 10:18 AM.  Learner: Patient  Readiness: Acceptance  Method: Explanation, Demonstration  Response: Verbalizes Understanding, Demonstrated Understanding, Needs Reinforcement    Home Exercise Program, taught by Sha Alegria OT at 3/21/2024 10:18 AM.  Learner: Patient  Readiness: Acceptance  Method: Explanation, Demonstration  Response: Verbalizes Understanding, Demonstrated Understanding, Needs Reinforcement    ADL Training, taught by Sha Alegria OT at 3/21/2024 10:18 AM.  Learner: Patient  Readiness: Acceptance  Method: Explanation, Demonstration  Response: Verbalizes Understanding, Demonstrated Understanding, Needs Reinforcement    Education Comments  Pt asked appropriate questions, provided with online resources for equipment used in selfcare including hip kit tools and wide sockaide.    Goals:  Encounter Problems       Encounter Problems (Active)       OT Goals       Pt will  demonstrate 1-2 energy conservation techniques during ADL tasks and functional mobility  (Progressing)       Start:  03/15/24    Expected End:  03/18/24            Pt will increase endurance to tolerate 15min of activity with no more than 1 rest break in order to increase ability to engage in ADL completion.  (Progressing)       Start:  03/15/24    Expected End:  03/18/24            Pt to demonstrate transfers with FWW at mod I  (Progressing)       Start:  03/15/24    Expected End:  03/18/24            Pt will demo increased functional mobility to tolerate tasks necessary to complete ADL routine with FWW at mod I while keeping SpO2 above 88% (Progressing)       Start:  03/15/24    Expected End:  03/18/24            Pt will demo GOOD static/dynamic standing balance during ADL and functional activity with FWW >5 minutes for improved independence and participation in ADL  (Progressing)       Start:  03/15/24    Expected End:  03/18/24            Pt to demonstrate LB dressing, bathing, and toileting with AE as needed at mod I  (Progressing)       Start:  03/15/24    Expected End:  03/18/24

## 2024-03-21 NOTE — PROGRESS NOTES
"    Subjective    Patient reports his breathing is better overall.  He denies chest pain, nausea, vomiting or diarrhea.    Objective    Vitals  Visit Vitals  /69   Pulse 77   Temp 36.3 °C (97.3 °F)   Resp 18   Ht 1.803 m (5' 11\")   Wt 136 kg (299 lb)   SpO2 91%   BMI 41.70 kg/m²   Smoking Status Never   BSA 2.61 m²       Physical Exam   General: Patient is alert. No acute distress.   HEENT: Sclera clear.  CVS: RRR.   Lungs: Mild diminished breath sounds.  Abdomen: Soft.  Nontender.  Bowel sounds present.  Extremities: 2+ bilateral lower extreme edema.    Psychiatric: Cooperative.     IOs    Intake/Output Summary (Last 24 hours) at 3/21/2024 1509  Last data filed at 3/21/2024 1405  Gross per 24 hour   Intake 1110 ml   Output 2400 ml   Net -1290 ml       Labs:   Results from last 72 hours   Lab Units 03/21/24  0552   SODIUM mmol/L 139   POTASSIUM mmol/L 3.6   CHLORIDE mmol/L 102   CO2 mmol/L 31   BUN mg/dL 28*   CREATININE mg/dL 0.57   GLUCOSE mg/dL 106*   CALCIUM mg/dL 8.1*   ANION GAP mmol/L 10   EGFR mL/min/1.73m*2 >90   PHOSPHORUS mg/dL 3.7      Results from last 72 hours   Lab Units 03/21/24  0552   WBC AUTO x10*3/uL 7.2   HEMOGLOBIN g/dL 16.6   HEMATOCRIT % 52.0   PLATELETS AUTO x10*3/uL 236   NEUTROS PCT AUTO % 59.2   LYMPHS PCT AUTO % 25.9   MONOS PCT AUTO % 11.6   EOS PCT AUTO % 1.9      Lab Results   Component Value Date    CALCIUM 8.1 (L) 03/21/2024    PHOS 3.7 03/21/2024      Lab Results   Component Value Date    CRP 1.62 (A) 10/25/2019      [unfilled]       Images  XR chest 1 view  Narrative: Interpreted By:  Deepali Casillas,   STUDY:  XR CHEST 1 VIEW;  3/13/2024 2:48 am      INDICATION:  Signs/Symptoms:NG tube placement      COMPARISON:  Chest x-ray 03/12/2024.      ACCESSION NUMBER(S):  [id number]      ORDERING CLINICIAN:  JAZMIN CHAVEZ      TECHNIQUE:  2 portable semi-upright frontal views of the chest were obtained.      FINDINGS:  An endotracheal tube terminates approximately 4.5 cm " above the kathie.  The enteric tube terminates at the right upper quadrant, at the  expected location of the gastric antrum.      The heart is enlarged. There is vascular congestion.      There is a large right pleural effusion with  atelectasis/consolidation at the right lower lobe. No pneumothorax.      Impression: 1. Cardiomegaly. Vascular congestion. Large right pleural effusion  with atelectasis/consolidation at the right lower lobe. Radiographic  follow-up to resolution recommended.  2. Enteric tube terminates at the right upper quadrant, at the  expected location of the gastric antrum.              MACRO:  None.      Signed by: Deepali Casillas 3/13/2024 3:20 AM  Dictation workstation:   [id number]      Meds  Scheduled medications  allopurinol, 300 mg, oral, Daily  aspirin, 81 mg, oral, Daily  benzonatate, 200 mg, oral, TID  dextromethorphan-guaifenesin, 10 mL, oral, BID  empagliflozin, 10 mg, oral, Daily  enoxaparin, 40 mg, subcutaneous, BID  tiotropium, 2 puff, inhalation, Daily   And  fluticasone furoate-vilanteroL, 1 puff, inhalation, Daily  folic acid, 1 mg, oral, Daily  furosemide, 80 mg, intravenous, BID  insulin lispro, 0-10 Units, subcutaneous, TID with meals  ipratropium-albuteroL, 3 mL, nebulization, TID  levothyroxine, 125 mcg, oral, Daily  melatonin, 6 mg, oral, Nightly  metOLazone, 5 mg, oral, Daily  metoprolol succinate XL, 25 mg, oral, Daily  multivitamin with minerals, 1 tablet, oral, Daily  nicotine, 1 patch, transdermal, Daily  polyethylene glycol, 17 g, oral, BID  [START ON 3/22/2024] predniSONE, 10 mg, oral, Daily   Followed by  [START ON 3/25/2024] predniSONE, 5 mg, oral, Daily  sennosides-docusate sodium, 2 tablet, oral, BID  thiamine, 100 mg, oral, Daily  valsartan, 40 mg, oral, Daily      Continuous medications     PRN medications  PRN medications: acetaminophen, hydrocodone-homatropine, ipratropium-albuteroL, oxygen, traZODone     Assessment and Plan    Alo Glass is a 68  y.o. male with history of hypertension, COPD, type 2 diabetes mellitus, severe obesity, and obstructive sleep apnea, with acute respiratory failure with hypoxia secondary to acute CHF exacerbation, cardiogenic and septic shock, acute systolic CHF exacerbation with EF of 30 to 35% with global hypokinesis, strep pneumoniae pneumonia, COPD exacerbation, acute metabolic encephalopathy, diabetes and hyperglycemia.      Acute hypoxic respiratory failure secondary to acute on chronic systolic and diastolic CHF exacerbation with cardiogenic shock  -Echo on 3/11/2024 with a EF of 30 to 35% and diastolic filling was indeterminate.  -Cardiology following and recommends continue Lasix 80 mg IV twice daily and adding metolazone 5 mg daily on 3/21.  -Continue Jardiance, metoprolol XL 25 mg daily, valsartan.  Limited GDMT due to risk of hypotension.  -Cardiology planning for cardiac cath in the future.  -Monitor.    Acute hypoxic respiratory failure secondary to acute on chronic systolic and diastolic CHF exacerbation, strep pneumoniae pneumonia, COPD exacerbation  -Treating CHF as stated above.  -Continue DuoNebs, Spiriva, Breo, prednisone taper and currently at 20 mg daily and to switch to 10 mg daily on 3/22.  -Patient completed a course of antibiotics.  -Overall appears to be improving.  Patient on room air today.  -Monitor.    Septic shock  -Secondary to pneumonia.  -Resolved.  Patient has completed course of antibiotics.    Diabetes  -Continue Jardiance, SSI.  Patient is on metformin, Mounjaro at home, which are on hold.   -Monitor.    MELISSA  -Continue CPAP with sleep and monitor.    Acute metabolic encephalopathy  -Likely secondary to septic shock.  -Resolved.  -Monitor.    Morbid obesity  -Diet and exercise recommended.    DVT prophylaxis  -Lovenox subcu.

## 2024-03-22 LAB
ALBUMIN SERPL BCP-MCNC: 3.6 G/DL (ref 3.4–5)
ANION GAP SERPL CALC-SCNC: 11 MMOL/L (ref 10–20)
BASOPHILS # BLD AUTO: 0.03 X10*3/UL (ref 0–0.1)
BASOPHILS NFR BLD AUTO: 0.3 %
BUN SERPL-MCNC: 33 MG/DL (ref 6–23)
CALCIUM SERPL-MCNC: 9 MG/DL (ref 8.6–10.3)
CHLORIDE SERPL-SCNC: 91 MMOL/L (ref 98–107)
CO2 SERPL-SCNC: 34 MMOL/L (ref 21–32)
CREAT SERPL-MCNC: 0.73 MG/DL (ref 0.5–1.3)
EGFRCR SERPLBLD CKD-EPI 2021: >90 ML/MIN/1.73M*2
EOSINOPHIL # BLD AUTO: 0.11 X10*3/UL (ref 0–0.7)
EOSINOPHIL NFR BLD AUTO: 1.2 %
ERYTHROCYTE [DISTWIDTH] IN BLOOD BY AUTOMATED COUNT: 13 % (ref 11.5–14.5)
GLUCOSE BLD MANUAL STRIP-MCNC: 143 MG/DL (ref 74–99)
GLUCOSE BLD MANUAL STRIP-MCNC: 171 MG/DL (ref 74–99)
GLUCOSE BLD MANUAL STRIP-MCNC: 227 MG/DL (ref 74–99)
GLUCOSE BLD MANUAL STRIP-MCNC: 294 MG/DL (ref 74–99)
GLUCOSE SERPL-MCNC: 264 MG/DL (ref 74–99)
HCT VFR BLD AUTO: 57.9 % (ref 41–52)
HGB BLD-MCNC: 18.7 G/DL (ref 13.5–17.5)
IMM GRANULOCYTES # BLD AUTO: 0.1 X10*3/UL (ref 0–0.7)
IMM GRANULOCYTES NFR BLD AUTO: 1.1 % (ref 0–0.9)
LYMPHOCYTES # BLD AUTO: 1.97 X10*3/UL (ref 1.2–4.8)
LYMPHOCYTES NFR BLD AUTO: 22.2 %
MAGNESIUM SERPL-MCNC: 2.14 MG/DL (ref 1.6–2.4)
MCH RBC QN AUTO: 33.1 PG (ref 26–34)
MCHC RBC AUTO-ENTMCNC: 32.3 G/DL (ref 32–36)
MCV RBC AUTO: 103 FL (ref 80–100)
MONOCYTES # BLD AUTO: 0.7 X10*3/UL (ref 0.1–1)
MONOCYTES NFR BLD AUTO: 7.9 %
NEUTROPHILS # BLD AUTO: 5.97 X10*3/UL (ref 1.2–7.7)
NEUTROPHILS NFR BLD AUTO: 67.3 %
NRBC BLD-RTO: 0 /100 WBCS (ref 0–0)
PHOSPHATE SERPL-MCNC: 3.4 MG/DL (ref 2.5–4.9)
PLATELET # BLD AUTO: 236 X10*3/UL (ref 150–450)
POTASSIUM SERPL-SCNC: 3.4 MMOL/L (ref 3.5–5.3)
RBC # BLD AUTO: 5.65 X10*6/UL (ref 4.5–5.9)
SODIUM SERPL-SCNC: 133 MMOL/L (ref 136–145)
WBC # BLD AUTO: 8.9 X10*3/UL (ref 4.4–11.3)

## 2024-03-22 PROCEDURE — 94668 MNPJ CHEST WALL SBSQ: CPT

## 2024-03-22 PROCEDURE — 80069 RENAL FUNCTION PANEL: CPT | Performed by: INTERNAL MEDICINE

## 2024-03-22 PROCEDURE — 1200000002 HC GENERAL ROOM WITH TELEMETRY DAILY

## 2024-03-22 PROCEDURE — 94640 AIRWAY INHALATION TREATMENT: CPT

## 2024-03-22 PROCEDURE — S4991 NICOTINE PATCH NONLEGEND: HCPCS | Performed by: INTERNAL MEDICINE

## 2024-03-22 PROCEDURE — 2500000004 HC RX 250 GENERAL PHARMACY W/ HCPCS (ALT 636 FOR OP/ED): Performed by: INTERNAL MEDICINE

## 2024-03-22 PROCEDURE — 2500000002 HC RX 250 W HCPCS SELF ADMINISTERED DRUGS (ALT 637 FOR MEDICARE OP, ALT 636 FOR OP/ED): Performed by: INTERNAL MEDICINE

## 2024-03-22 PROCEDURE — 85025 COMPLETE CBC W/AUTO DIFF WBC: CPT | Performed by: INTERNAL MEDICINE

## 2024-03-22 PROCEDURE — 83735 ASSAY OF MAGNESIUM: CPT | Performed by: INTERNAL MEDICINE

## 2024-03-22 PROCEDURE — 82947 ASSAY GLUCOSE BLOOD QUANT: CPT

## 2024-03-22 PROCEDURE — 2500000005 HC RX 250 GENERAL PHARMACY W/O HCPCS: Performed by: INTERNAL MEDICINE

## 2024-03-22 PROCEDURE — 99232 SBSQ HOSP IP/OBS MODERATE 35: CPT | Performed by: INTERNAL MEDICINE

## 2024-03-22 PROCEDURE — 2500000001 HC RX 250 WO HCPCS SELF ADMINISTERED DRUGS (ALT 637 FOR MEDICARE OP): Performed by: INTERNAL MEDICINE

## 2024-03-22 PROCEDURE — 36415 COLL VENOUS BLD VENIPUNCTURE: CPT | Performed by: INTERNAL MEDICINE

## 2024-03-22 PROCEDURE — 2500000001 HC RX 250 WO HCPCS SELF ADMINISTERED DRUGS (ALT 637 FOR MEDICARE OP): Performed by: PHYSICIAN ASSISTANT

## 2024-03-22 RX ORDER — POTASSIUM CHLORIDE 20 MEQ/1
40 TABLET, EXTENDED RELEASE ORAL ONCE
Status: COMPLETED | OUTPATIENT
Start: 2024-03-22 | End: 2024-03-22

## 2024-03-22 RX ORDER — FUROSEMIDE 40 MG/1
80 TABLET ORAL
Status: DISCONTINUED | OUTPATIENT
Start: 2024-03-22 | End: 2024-03-23 | Stop reason: HOSPADM

## 2024-03-22 RX ADMIN — IPRATROPIUM BROMIDE AND ALBUTEROL SULFATE 3 ML: 2.5; .5 SOLUTION RESPIRATORY (INHALATION) at 14:54

## 2024-03-22 RX ADMIN — FOLIC ACID 1 MG: 1 TABLET ORAL at 08:33

## 2024-03-22 RX ADMIN — IPRATROPIUM BROMIDE AND ALBUTEROL SULFATE 3 ML: 2.5; .5 SOLUTION RESPIRATORY (INHALATION) at 20:44

## 2024-03-22 RX ADMIN — BENZONATATE 200 MG: 100 CAPSULE ORAL at 08:27

## 2024-03-22 RX ADMIN — THIAMINE HCL TAB 100 MG 100 MG: 100 TAB at 08:27

## 2024-03-22 RX ADMIN — TIOTROPIUM BROMIDE INHALATION SPRAY 2 PUFF: 3.12 SPRAY, METERED RESPIRATORY (INHALATION) at 08:35

## 2024-03-22 RX ADMIN — GUAIFENESIN SYRUP AND DEXTROMETHORPHAN 10 ML: 100; 10 SYRUP ORAL at 08:29

## 2024-03-22 RX ADMIN — POTASSIUM CHLORIDE 40 MEQ: 1500 TABLET, EXTENDED RELEASE ORAL at 20:17

## 2024-03-22 RX ADMIN — ASPIRIN 81 MG: 81 TABLET, COATED ORAL at 08:33

## 2024-03-22 RX ADMIN — SALINE NASAL SPRAY 1 SPRAY: 1.5 SOLUTION NASAL at 14:50

## 2024-03-22 RX ADMIN — PREDNISONE 10 MG: 10 TABLET ORAL at 08:27

## 2024-03-22 RX ADMIN — BENZONATATE 200 MG: 100 CAPSULE ORAL at 20:18

## 2024-03-22 RX ADMIN — METOPROLOL SUCCINATE 25 MG: 25 TABLET, EXTENDED RELEASE ORAL at 08:33

## 2024-03-22 RX ADMIN — ENOXAPARIN SODIUM 40 MG: 40 INJECTION SUBCUTANEOUS at 20:17

## 2024-03-22 RX ADMIN — LEVOTHYROXINE SODIUM 125 MCG: 0.12 TABLET ORAL at 08:33

## 2024-03-22 RX ADMIN — NICOTINE 1 PATCH: 14 PATCH, EXTENDED RELEASE TRANSDERMAL at 08:28

## 2024-03-22 RX ADMIN — BENZONATATE 200 MG: 100 CAPSULE ORAL at 15:24

## 2024-03-22 RX ADMIN — FUROSEMIDE 80 MG: 40 TABLET ORAL at 08:33

## 2024-03-22 RX ADMIN — VALSARTAN 40 MG: 40 TABLET, FILM COATED ORAL at 08:27

## 2024-03-22 RX ADMIN — ENOXAPARIN SODIUM 40 MG: 40 INJECTION SUBCUTANEOUS at 08:28

## 2024-03-22 RX ADMIN — POLYETHYLENE GLYCOL 3350 17 G: 17 POWDER, FOR SOLUTION ORAL at 08:28

## 2024-03-22 RX ADMIN — IPRATROPIUM BROMIDE AND ALBUTEROL SULFATE 3 ML: 2.5; .5 SOLUTION RESPIRATORY (INHALATION) at 09:05

## 2024-03-22 RX ADMIN — EMPAGLIFLOZIN 10 MG: 10 TABLET, FILM COATED ORAL at 08:27

## 2024-03-22 RX ADMIN — ALLOPURINOL 300 MG: 300 TABLET ORAL at 08:27

## 2024-03-22 RX ADMIN — Medication 1 TABLET: at 08:27

## 2024-03-22 RX ADMIN — GUAIFENESIN SYRUP AND DEXTROMETHORPHAN 10 ML: 100; 10 SYRUP ORAL at 20:17

## 2024-03-22 RX ADMIN — TRAZODONE HYDROCHLORIDE 25 MG: 50 TABLET ORAL at 20:18

## 2024-03-22 RX ADMIN — METOLAZONE 2.5 MG: 5 TABLET ORAL at 08:32

## 2024-03-22 RX ADMIN — FLUTICASONE FUROATE AND VILANTEROL TRIFENATATE 1 PUFF: 100; 25 POWDER RESPIRATORY (INHALATION) at 07:00

## 2024-03-22 RX ADMIN — Medication 6 MG: at 20:17

## 2024-03-22 RX ADMIN — INSULIN LISPRO 4 UNITS: 100 INJECTION, SOLUTION INTRAVENOUS; SUBCUTANEOUS at 12:18

## 2024-03-22 RX ADMIN — SENNOSIDES AND DOCUSATE SODIUM 2 TABLET: 8.6; 5 TABLET ORAL at 08:27

## 2024-03-22 ASSESSMENT — COGNITIVE AND FUNCTIONAL STATUS - GENERAL
MOVING TO AND FROM BED TO CHAIR: A LITTLE
HELP NEEDED FOR BATHING: A LITTLE
DAILY ACTIVITIY SCORE: 20
PERSONAL GROOMING: A LITTLE
DRESSING REGULAR LOWER BODY CLOTHING: A LITTLE
CLIMB 3 TO 5 STEPS WITH RAILING: A LITTLE
TURNING FROM BACK TO SIDE WHILE IN FLAT BAD: A LITTLE
MOBILITY SCORE: 20
TOILETING: A LITTLE
MOVING FROM LYING ON BACK TO SITTING ON SIDE OF FLAT BED WITH BEDRAILS: A LITTLE

## 2024-03-22 ASSESSMENT — PAIN - FUNCTIONAL ASSESSMENT
PAIN_FUNCTIONAL_ASSESSMENT: 0-10
PAIN_FUNCTIONAL_ASSESSMENT: 0-10

## 2024-03-22 ASSESSMENT — PAIN SCALES - GENERAL
PAINLEVEL_OUTOF10: 0 - NO PAIN
PAINLEVEL_OUTOF10: 0 - NO PAIN

## 2024-03-22 NOTE — CARE PLAN
Problem: Heart Failure  Goal: Improved gas exchange this shift  Outcome: Progressing  Goal: Improved urinary output this shift  Outcome: Progressing  Goal: Reduction in peripheral edema within 24 hours  Outcome: Progressing  Goal: Report improvement of dyspnea/breathlessness this shift  Outcome: Progressing     Problem: Pain  Goal: My pain/discomfort is manageable  Outcome: Met     Problem: Safety  Goal: Patient will be injury free during hospitalization  Outcome: Met  Goal: I will remain free of falls  Outcome: Met     Problem: Daily Care  Goal: Daily care needs are met  Outcome: Met     Problem: Fall/Injury  Goal: Not fall by end of shift  Outcome: Met  Goal: Be free from injury by end of the shift  Outcome: Met  Goal: Verbalize understanding of personal risk factors for fall in the hospital  Outcome: Met  Goal: Verbalize understanding of risk factor reduction measures to prevent injury from fall in the home  Outcome: Met  Goal: Use assistive devices by end of the shift  Outcome: Met  Goal: Pace activities to prevent fatigue by end of the shift  Outcome: Met   The patient's goals for the shift include      The clinical goals for the shift include pt will remain hemodynamically stable throughout shift

## 2024-03-22 NOTE — PROGRESS NOTES
Subjective Data:  Doing better.     Overnight Events:    No acute events reported overnight.      Objective Data:  Last Recorded Vitals:  Vitals:    03/22/24 0450 03/22/24 1002 03/22/24 1100 03/22/24 1400   BP:  85/51  79/51   BP Location:       Patient Position:       Pulse:  (!) 7  83   Resp:  17  20   Temp:  36.2 °C (97.2 °F)  36.4 °C (97.5 °F)   TempSrc:       SpO2:  90% 90% 92%   Weight: 133 kg (294 lb 3.2 oz)      Height:           Last Labs:  CBC - 3/22/2024: 10:18 AM  8.9 18.7 236    57.9      CMP - 3/22/2024: 10:18 AM  9.0 6.9 18 --- 1.6   3.4 3.6 22 73      PTT - No results in last year.  _   _ _     TROPHS   Date/Time Value Ref Range Status   03/12/2024 07:07  0 - 20 ng/L Final     Comment:     Previous result verified on 3/12/2024 0048 on specimen/case [id number] called with component [facility] for procedure Troponin I, High Sensitivity with value 89 ng/L.   03/12/2024 03:08  0 - 20 ng/L Final     Comment:     Previous result verified on 3/12/2024 0048 on specimen/case [id number] called with component [facility] for procedure Troponin I, High Sensitivity with value 89 ng/L.   03/12/2024 12:15 AM 89 0 - 20 ng/L Final     BNP   Date/Time Value Ref Range Status   03/21/2024 05:52  0 - 99 pg/mL Final   03/19/2024 09:24  0 - 99 pg/mL Final     HGBA1C   Date/Time Value Ref Range Status   02/20/2024 10:35 AM 5.8 see below % Final   01/19/2023 10:23 AM 7.3 % Final     Comment:          Diagnosis of Diabetes-Adults   Non-Diabetic: < or = 5.6%   Increased risk for developing diabetes: 5.7-6.4%   Diagnostic of diabetes: > or = 6.5%  .       Monitoring of Diabetes                Age (y)     Therapeutic Goal (%)   Adults:          >18           <7.0   Pediatrics:    13-18           <7.5                   7-12           <8.0                   0- 6            7.5-8.5   American Diabetes Association. Diabetes Care 33(S1), Jan 2010.     09/10/2021 01:29 PM 7.9 % Final     Comment:           Diagnosis of Diabetes-Adults   Non-Diabetic: < or = 5.6%   Increased risk for developing diabetes: 5.7-6.4%   Diagnostic of diabetes: > or = 6.5%  .       Monitoring of Diabetes                Age (y)     Therapeutic Goal (%)   Adults:          >18           <7.0   Pediatrics:    13-18           <7.5                   7-12           <8.0                   0- 6            7.5-8.5   American Diabetes Association. Diabetes Care 33(S1), Jan 2010.       LDLCALC   Date/Time Value Ref Range Status   02/20/2024 10:35  <=99 mg/dL Final     Comment:                                 Near   Borderline      AGE      Desirable  Optimal    High     High     Very High     0-19 Y     0 - 109     ---    110-129   >/= 130     ----    20-24 Y     0 - 119     ---    120-159   >/= 160     ----      >24 Y     0 -  99   100-129  130-159   160-189     >/=190       VLDL   Date/Time Value Ref Range Status   02/20/2024 10:35 AM 16 0 - 40 mg/dL Final   09/10/2021 01:29 PM 27 0 - 40 mg/dL Final   09/18/2018 02:26 PM 22 0 - 40 mg/dL Final      Last I/O:  I/O last 3 completed shifts:  In: 1840 (13.8 mL/kg) [P.O.:1840]  Out: 4000 (30 mL/kg) [Urine:4000 (0.8 mL/kg/hr)]  Weight: 133.4 kg     Past Cardiology Tests (Last 3 Years):    EKG:  Electrocardiogram, 12-lead PRN ACS symptoms 03/12/2024  ECG 12 lead 03/12/2024  ECG 12 lead 03/12/2024  Electrocardiogram, 12-lead PRN ACS symtpoms 03/11/2024  ECG 12 Lead 03/10/2024  ECG 12 lead      Echo:  Transthoracic echo (TTE) complete 03/11/2024  CONCLUSIONS:   1. Left ventricular systolic function is moderately decreased with a 30-35% estimated ejection fraction.   2. Basal inferolateral segment is abnormal.   3. Poorly visualized anatomical structures due to suboptimal image quality.   4. Moderate aortic valve stenosis.   5. There is global hypokinesis of the left ventricle with minor regional variations.      TTE (06/18/2019):   -Low normal LV systolic function  -Mild concentric LVH  -Doppler flow  suggestive of abnormal Left ventricular relaxation  -Normal right ventricular size and function  -Slightly enlarged left atrium.   -Moderate calcific aortic stenosis  -Trace mitral regurg       Ejection Fractions:            EF   Date/Time Value Ref Range Status   03/11/2024 02:04 PM 33 %        Cath:  No results found for this or any previous visit from the past 1095 days.     Stress Test:  No results found for this or any previous visit from the past 1095 days.     Imaging:  CXR (03/08/2024):   IMPRESSION:  -Moderate right pleural effusion and right lung base consolidation. Findings could be due to pneumonia. Other underlying process not excluded. Follow-up recommended.   -Trace left pleural effusion and cardiomegaly. Possible interstitial edema.     Xray Abd (03/12/2024):   IMPRESSION:  1.  Enteric tube approximately 3.8 cm above the kathie.  2. Evaluation of the enteric tube is limited and appears looped high in the right upper quadrant however may be related to patient positioning and rotation. Repeat imaging may be considered with proper patient positioning to delineate proper position.  3. Left lower lobe airspace opacities concerning for pneumonia. Possible large right pleural effusion.  4. Paucity of bowel gas noted.     CXR (03/12/2024):   IMPRESSION:  1.  Enteric tube approximately 3.8 cm above the kathie.  2. Evaluation of the enteric tube is limited and appears looped high in the right upper quadrant however may be related to patient positioning and rotation. Repeat imaging may be considered with proper patient positioning to delineate proper position.  3. Left lower lobe airspace opacities concerning for pneumonia. Possible large right pleural effusion.  4. Paucity of bowel gas noted.  Inpatient Medications:  Scheduled medications   Medication Dose Route Frequency    allopurinol  300 mg oral Daily    aspirin  81 mg oral Daily    benzonatate  200 mg oral TID    dextromethorphan-guaifenesin  10 mL oral  BID    empagliflozin  10 mg oral Daily    enoxaparin  40 mg subcutaneous BID    tiotropium  2 puff inhalation Daily    And    fluticasone furoate-vilanteroL  1 puff inhalation Daily    folic acid  1 mg oral Daily    furosemide  80 mg oral BID with meals    insulin lispro  0-10 Units subcutaneous TID with meals    ipratropium-albuteroL  3 mL nebulization TID    levothyroxine  125 mcg oral Daily    melatonin  6 mg oral Nightly    metOLazone  2.5 mg oral Daily    metoprolol succinate XL  25 mg oral Daily    multivitamin with minerals  1 tablet oral Daily    nicotine  1 patch transdermal Daily    polyethylene glycol  17 g oral BID    predniSONE  10 mg oral Daily    Followed by    [START ON 3/25/2024] predniSONE  5 mg oral Daily    sennosides-docusate sodium  2 tablet oral BID    thiamine  100 mg oral Daily    valsartan  40 mg oral Daily     PRN medications   Medication    acetaminophen    hydrocodone-homatropine    ipratropium-albuteroL    oxygen    traZODone          Assessment/Plan   69 yo male from home with h/o obesity, MELISSA on CPAP, COPD, current smoker, type 2 DM, hypertension, lymphedema, moderate aortic stenosis (6/2019) who presented from PCP office with symptoms of increased LE edema, abdominal bloating, progressive SOB over the past year found to be hypoxic in the ER. On admission was Intermittently refusing IV Lasix, I&O not well monitored. Discussed urinating only into urinals, recording how much urine vs how frequent he urinates. Refused return to lasix gtt, lipscomb or external catheter. Originally refusing echo d/t concern for needing to urinate during the testing. Discussed accommodations and ability to use the bathroom in echo if necessary. Patient was then agreeable to echocardiogram. Echo obtained with newly reduced ejection fraction. Decompensated overnight hypoxic, agitated, required Ativan, Precedex, BiPAP and eventually intubation. Patient weaned off of IV pressor support (03/13). Able to be  extubated successfully (3/14)  to HFNC. Has been diuresing well, weaned to RA.  Diuresis changed to PO today, with ongoing diuresis and improving course.      #Acute hypoxic respiratory failure 2/2 decompensated heart failure, Pneumonia, COPD w/ current smoking, mucous plugging s/p bronchoscopy (resolved)   #Acute decompensated systolic heart failure (improved)  #Moderate aortic stenosis     -At discharge, homegoing medications to include: Lasix 80mg PO BID, ASA 81mg daily, Jardiance 10mg daily, Toprol 25mg daily, Valsartan 40mg daily, Metolazone 2.5mg M, W, F.   -Daily standing weight (Admit 159 kg, today 133kg)  -Strict I&O  -Follow up 2 weeks in the clinic, pending establishment of care closer to home with Dr. Moore 06/05 @ 2PM    Peripheral IV 03/13/24 18 G Proximal;Right Forearm (Active)   Site Assessment Clean;Dry 03/22/24 0900   Dressing Status Clean 03/22/24 0900   Number of days: 9       Code Status:  Full Code    I spent  minutes in the professional and overall care of this patient.        Stephanie Dyer PA-C

## 2024-03-22 NOTE — PROGRESS NOTES
"  Subjective        Objective    Vitals  Visit Vitals  BP 79/51   Pulse 83   Temp 36.4 °C (97.5 °F)   Resp 20   Ht 1.803 m (5' 11\")   Wt 133 kg (294 lb 3.2 oz)   SpO2 93%   BMI 41.03 kg/m²   Smoking Status Never   BSA 2.58 m²       Physical Exam   Physical Exam      IOs    Intake/Output Summary (Last 24 hours) at 3/22/2024 1822  Last data filed at 3/22/2024 1644  Gross per 24 hour   Intake 1130 ml   Output 2500 ml   Net -1370 ml       Labs:   Results from last 72 hours   Lab Units 03/22/24  1018 03/21/24  0552   SODIUM mmol/L 133* 139   POTASSIUM mmol/L 3.4* 3.6   CHLORIDE mmol/L 91* 102   CO2 mmol/L 34* 31   BUN mg/dL 33* 28*   CREATININE mg/dL 0.73 0.57   GLUCOSE mg/dL 264* 106*   CALCIUM mg/dL 9.0 8.1*   ANION GAP mmol/L 11 10   EGFR mL/min/1.73m*2 >90 >90   PHOSPHORUS mg/dL 3.4 3.7      Results from last 72 hours   Lab Units 03/22/24  1018 03/21/24  0552   WBC AUTO x10*3/uL 8.9 7.2   HEMOGLOBIN g/dL 18.7* 16.6   HEMATOCRIT % 57.9* 52.0   PLATELETS AUTO x10*3/uL 236 236   NEUTROS PCT AUTO % 67.3 59.2   LYMPHS PCT AUTO % 22.2 25.9   MONOS PCT AUTO % 7.9 11.6   EOS PCT AUTO % 1.2 1.9      Lab Results   Component Value Date    CALCIUM 9.0 03/22/2024    PHOS 3.4 03/22/2024      Lab Results   Component Value Date    CRP 1.62 (A) 10/25/2019      [unfilled]       Images  XR chest 1 view  Narrative: Interpreted By:  Deepali Casillas,   STUDY:  XR CHEST 1 VIEW;  3/13/2024 2:48 am      INDICATION:  Signs/Symptoms:NG tube placement      COMPARISON:  Chest x-ray 03/12/2024.      ACCESSION NUMBER(S):  [id number]      ORDERING CLINICIAN:  JAZMIN CHAVEZ      TECHNIQUE:  2 portable semi-upright frontal views of the chest were obtained.      FINDINGS:  An endotracheal tube terminates approximately 4.5 cm above the kathie.  The enteric tube terminates at the right upper quadrant, at the  expected location of the gastric antrum.      The heart is enlarged. There is vascular congestion.      There is a large right pleural " effusion with  atelectasis/consolidation at the right lower lobe. No pneumothorax.      Impression: 1. Cardiomegaly. Vascular congestion. Large right pleural effusion  with atelectasis/consolidation at the right lower lobe. Radiographic  follow-up to resolution recommended.  2. Enteric tube terminates at the right upper quadrant, at the  expected location of the gastric antrum.              MACRO:  None.      Signed by: Deepali Casillas 3/13/2024 3:20 AM  Dictation workstation:   [id number]      Meds  Scheduled medications  allopurinol, 300 mg, oral, Daily  aspirin, 81 mg, oral, Daily  benzonatate, 200 mg, oral, TID  dextromethorphan-guaifenesin, 10 mL, oral, BID  empagliflozin, 10 mg, oral, Daily  enoxaparin, 40 mg, subcutaneous, BID  tiotropium, 2 puff, inhalation, Daily   And  fluticasone furoate-vilanteroL, 1 puff, inhalation, Daily  folic acid, 1 mg, oral, Daily  furosemide, 80 mg, oral, BID with meals  insulin lispro, 0-10 Units, subcutaneous, TID with meals  ipratropium-albuteroL, 3 mL, nebulization, TID  levothyroxine, 125 mcg, oral, Daily  melatonin, 6 mg, oral, Nightly  metOLazone, 2.5 mg, oral, Daily  metoprolol succinate XL, 25 mg, oral, Daily  multivitamin with minerals, 1 tablet, oral, Daily  nicotine, 1 patch, transdermal, Daily  polyethylene glycol, 17 g, oral, BID  predniSONE, 10 mg, oral, Daily   Followed by  [START ON 3/25/2024] predniSONE, 5 mg, oral, Daily  sennosides-docusate sodium, 2 tablet, oral, BID  thiamine, 100 mg, oral, Daily  valsartan, 40 mg, oral, Daily      Continuous medications     PRN medications  PRN medications: acetaminophen, hydrocodone-homatropine, ipratropium-albuteroL, oxygen, sodium chloride, traZODone     Assessment and Plan    Alo Glass is a 68 y.o. male with history of hypertension, COPD, type 2 diabetes mellitus, severe obesity, and obstructive sleep apnea, with acute respiratory failure with hypoxia secondary to acute CHF exacerbation, cardiogenic and septic  shock, acute systolic CHF exacerbation with EF of 30 to 35% with global hypokinesis, strep pneumoniae pneumonia, COPD exacerbation, acute metabolic encephalopathy, diabetes and hyperglycemia.       Acute hypoxic respiratory failure secondary to acute on chronic systolic and diastolic CHF exacerbation with cardiogenic shock  -Echo on 3/11/2024 with a EF of 30 to 35% and diastolic filling was indeterminate.  -Cardiology following and recommends changed IV lasix to oral lasix 80mg bid and decreased oral metolazone to 2.5 mg on M,W,F on today, 3/22.   -Continue Jardiance, metoprolol XL 25 mg daily, valsartan.  Limited GDMT due to risk of hypotension.  -Cardiology planning for cardiac cath in the future and will need to follow-up with Dr. Fox in 2 weeks in the clinic pending establishment of care closer to home with Dr. Moore.  -As bp has been on the low side at times today, will continue to monitor and cardiology does not recommend additional IV fluids given patient having CHF.  -Monitor.  -Likely discharge home on Saturday.     Acute hypoxic respiratory failure secondary to acute on chronic systolic and diastolic CHF exacerbation, strep pneumoniae pneumonia, COPD exacerbation  -Treating CHF as stated above.  -Continue DuoNebs, Spiriva, Breo, prednisone taper and currently at 10 mg daily on 3/22.  -Patient completed a course of antibiotics.  -Overall appears to be improving.  Patient on room air again today.  -Monitor.     Septic shock  -Secondary to pneumonia.  -Resolved.  Patient has completed course of antibiotics.     Diabetes  -Continue Jardiance, SSI.  Patient is on metformin, Mounjaro at home, which are on hold.   -Monitor.     MELISSA  -Continue CPAP with sleep and monitor.     Acute metabolic encephalopathy  -Likely secondary to septic shock.  -Resolved.  -Monitor.     Morbid obesity  -Diet and exercise recommended.     DVT prophylaxis  -Lovenox subcu.    Disposition: Likely discharge home on Saturday as  long as BP is stable.

## 2024-03-22 NOTE — NURSING NOTE
"0700  Care assumed form night shift.  Bedside report complete.  Patient viasblt upset stating \" who ever was doing phlebotomy today is not allowed back in my room.    Upon questioning patient states \" she doesn't know what she is doing because it hurt\" .  Unble to provide any details to assist in ID the staff involved.    0800  Patient given synthroid   0845  Morning med pass complete discussed with patient DC plan and procedure.    1003  Patient found to be hypotensive by VS check aid.  Denies any symptoms at this time.  Dr Johnson notified via secure chat.  1347  BP remains low patient insisting he has no sx.  DR johnson aware of hypotenstion  awaiting orders.    1355  Spoke to Dr Vaughn Via secure chat who states hypotenstion does not need to be addressed att his time.  Dr Johnson aware.    1520  Patient sleeping c pap functioning NAD  1545 patient complaining of dry nose Ocean nasal sprya ordered and delivered.  1643  Patient requestiong vasaleen for nose.  Provided with skin protectant as we do not have vasalene.    "

## 2024-03-22 NOTE — CARE PLAN
The patient's goals for the shift include      The clinical goals for the shift include pt will remain hemodynamically stable throughout shift    Over the shift, the patient did not make progress toward the following goals. Barriers to progression include pending. Recommendations to address these barriers include pending.

## 2024-03-22 NOTE — PROGRESS NOTES
03/22/24 1548   Discharge Planning   Living Arrangements Alone   Support Systems Friends/neighbors   Assistance Needed Alert and oriented x 3, Independent with ADL's, Wheel chair,  3 Wheeled walker, Rollator, Walk in tub/shower, Grab bars, Moved bed to 1st floor, Hasn't been driving but is able to(  transports to appointments), Uses [employer] grocery delivery, and orders out food. CPAP at home   Type of Residence Private residence   Number of Stairs to Enter Residence 0   Do you have animals or pets at home? No   Who is requesting discharge planning? Provider   Home or Post Acute Services In home services   Type of Home Care Services Home nursing visits;Home OT;Home PT   Patient expects to be discharged to: Patient agreeable to have [facility] for SN/PT/OT upon discharge. ADOD 3/23/24   Does the patient need discharge transport arranged? No   RoundTrip coordination needed? No   Has discharge transport been arranged? No   Patient Choice   Provider Choice list and CMS website (https://medicare.gov/care-compare#search) for post-acute Quality and Resource Measure Data were provided and reviewed with: Patient   Patient / Family choosing to utilize agency / facility established prior to hospitalization No

## 2024-03-23 ENCOUNTER — DOCUMENTATION (OUTPATIENT)
Dept: HOME HEALTH SERVICES | Facility: HOME HEALTH | Age: 69
End: 2024-03-23
Payer: MEDICARE

## 2024-03-23 ENCOUNTER — HOME HEALTH ADMISSION (OUTPATIENT)
Dept: HOME HEALTH SERVICES | Facility: HOME HEALTH | Age: 69
End: 2024-03-23
Payer: MEDICARE

## 2024-03-23 VITALS
WEIGHT: 292.5 LBS | OXYGEN SATURATION: 93 % | BODY MASS INDEX: 40.95 KG/M2 | TEMPERATURE: 97.2 F | RESPIRATION RATE: 18 BRPM | HEART RATE: 76 BPM | DIASTOLIC BLOOD PRESSURE: 64 MMHG | SYSTOLIC BLOOD PRESSURE: 94 MMHG | HEIGHT: 71 IN

## 2024-03-23 LAB
ALBUMIN SERPL BCP-MCNC: 3.5 G/DL (ref 3.4–5)
ANION GAP SERPL CALC-SCNC: 11 MMOL/L (ref 10–20)
BASOPHILS # BLD AUTO: 0.04 X10*3/UL (ref 0–0.1)
BASOPHILS NFR BLD AUTO: 0.5 %
BUN SERPL-MCNC: 36 MG/DL (ref 6–23)
CALCIUM SERPL-MCNC: 9 MG/DL (ref 8.6–10.3)
CHLORIDE SERPL-SCNC: 92 MMOL/L (ref 98–107)
CO2 SERPL-SCNC: 37 MMOL/L (ref 21–32)
CREAT SERPL-MCNC: 0.7 MG/DL (ref 0.5–1.3)
EGFRCR SERPLBLD CKD-EPI 2021: >90 ML/MIN/1.73M*2
EOSINOPHIL # BLD AUTO: 0.17 X10*3/UL (ref 0–0.7)
EOSINOPHIL NFR BLD AUTO: 2.1 %
ERYTHROCYTE [DISTWIDTH] IN BLOOD BY AUTOMATED COUNT: 13 % (ref 11.5–14.5)
GLUCOSE BLD MANUAL STRIP-MCNC: 145 MG/DL (ref 74–99)
GLUCOSE BLD MANUAL STRIP-MCNC: 187 MG/DL (ref 74–99)
GLUCOSE SERPL-MCNC: 137 MG/DL (ref 74–99)
HCT VFR BLD AUTO: 57.4 % (ref 41–52)
HGB BLD-MCNC: 18.9 G/DL (ref 13.5–17.5)
IMM GRANULOCYTES # BLD AUTO: 0.07 X10*3/UL (ref 0–0.7)
IMM GRANULOCYTES NFR BLD AUTO: 0.8 % (ref 0–0.9)
LYMPHOCYTES # BLD AUTO: 2.15 X10*3/UL (ref 1.2–4.8)
LYMPHOCYTES NFR BLD AUTO: 26 %
MAGNESIUM SERPL-MCNC: 2.25 MG/DL (ref 1.6–2.4)
MCH RBC QN AUTO: 33.2 PG (ref 26–34)
MCHC RBC AUTO-ENTMCNC: 32.9 G/DL (ref 32–36)
MCV RBC AUTO: 101 FL (ref 80–100)
MONOCYTES # BLD AUTO: 0.66 X10*3/UL (ref 0.1–1)
MONOCYTES NFR BLD AUTO: 8 %
NEUTROPHILS # BLD AUTO: 5.18 X10*3/UL (ref 1.2–7.7)
NEUTROPHILS NFR BLD AUTO: 62.6 %
NRBC BLD-RTO: 0 /100 WBCS (ref 0–0)
PHOSPHATE SERPL-MCNC: 4.2 MG/DL (ref 2.5–4.9)
PLATELET # BLD AUTO: 255 X10*3/UL (ref 150–450)
POTASSIUM SERPL-SCNC: 3.5 MMOL/L (ref 3.5–5.3)
RBC # BLD AUTO: 5.69 X10*6/UL (ref 4.5–5.9)
SODIUM SERPL-SCNC: 136 MMOL/L (ref 136–145)
WBC # BLD AUTO: 8.3 X10*3/UL (ref 4.4–11.3)

## 2024-03-23 PROCEDURE — 2500000001 HC RX 250 WO HCPCS SELF ADMINISTERED DRUGS (ALT 637 FOR MEDICARE OP): Performed by: PHYSICIAN ASSISTANT

## 2024-03-23 PROCEDURE — 2500000001 HC RX 250 WO HCPCS SELF ADMINISTERED DRUGS (ALT 637 FOR MEDICARE OP): Performed by: INTERNAL MEDICINE

## 2024-03-23 PROCEDURE — 85025 COMPLETE CBC W/AUTO DIFF WBC: CPT | Performed by: INTERNAL MEDICINE

## 2024-03-23 PROCEDURE — 83735 ASSAY OF MAGNESIUM: CPT | Performed by: INTERNAL MEDICINE

## 2024-03-23 PROCEDURE — 2500000004 HC RX 250 GENERAL PHARMACY W/ HCPCS (ALT 636 FOR OP/ED): Performed by: INTERNAL MEDICINE

## 2024-03-23 PROCEDURE — 2500000005 HC RX 250 GENERAL PHARMACY W/O HCPCS: Performed by: INTERNAL MEDICINE

## 2024-03-23 PROCEDURE — 99232 SBSQ HOSP IP/OBS MODERATE 35: CPT | Performed by: NURSE PRACTITIONER

## 2024-03-23 PROCEDURE — 82947 ASSAY GLUCOSE BLOOD QUANT: CPT

## 2024-03-23 PROCEDURE — 97530 THERAPEUTIC ACTIVITIES: CPT | Mod: GO,CO

## 2024-03-23 PROCEDURE — 99239 HOSP IP/OBS DSCHRG MGMT >30: CPT | Performed by: STUDENT IN AN ORGANIZED HEALTH CARE EDUCATION/TRAINING PROGRAM

## 2024-03-23 PROCEDURE — 36415 COLL VENOUS BLD VENIPUNCTURE: CPT | Performed by: INTERNAL MEDICINE

## 2024-03-23 PROCEDURE — 2500000002 HC RX 250 W HCPCS SELF ADMINISTERED DRUGS (ALT 637 FOR MEDICARE OP, ALT 636 FOR OP/ED): Performed by: INTERNAL MEDICINE

## 2024-03-23 PROCEDURE — S4991 NICOTINE PATCH NONLEGEND: HCPCS | Performed by: INTERNAL MEDICINE

## 2024-03-23 PROCEDURE — 94668 MNPJ CHEST WALL SBSQ: CPT

## 2024-03-23 PROCEDURE — 94640 AIRWAY INHALATION TREATMENT: CPT

## 2024-03-23 PROCEDURE — 80069 RENAL FUNCTION PANEL: CPT | Performed by: INTERNAL MEDICINE

## 2024-03-23 RX ORDER — ASPIRIN 81 MG/1
81 TABLET ORAL DAILY
Qty: 30 TABLET | Refills: 3 | Status: SHIPPED | OUTPATIENT
Start: 2024-03-23

## 2024-03-23 RX ORDER — FUROSEMIDE 80 MG/1
80 TABLET ORAL
Qty: 60 TABLET | Refills: 3 | Status: SHIPPED | OUTPATIENT
Start: 2024-03-23 | End: 2024-04-10 | Stop reason: HOSPADM

## 2024-03-23 RX ORDER — PREDNISONE 10 MG/1
10 TABLET ORAL DAILY
Qty: 2 TABLET | Refills: 0 | Status: SHIPPED | OUTPATIENT
Start: 2024-03-23 | End: 2024-03-25

## 2024-03-23 RX ORDER — BENZONATATE 200 MG/1
200 CAPSULE ORAL 3 TIMES DAILY
Qty: 30 CAPSULE | Refills: 0 | Status: SHIPPED | OUTPATIENT
Start: 2024-03-23 | End: 2024-04-10 | Stop reason: HOSPADM

## 2024-03-23 RX ORDER — LANOLIN ALCOHOL/MO/W.PET/CERES
100 CREAM (GRAM) TOPICAL DAILY
Qty: 30 TABLET | Refills: 3 | Status: SHIPPED | OUTPATIENT
Start: 2024-03-23

## 2024-03-23 RX ORDER — MULTIVIT-MIN/IRON FUM/FOLIC AC 7.5 MG-4
1 TABLET ORAL DAILY
Qty: 30 TABLET | Refills: 3 | Status: SHIPPED | OUTPATIENT
Start: 2024-03-23

## 2024-03-23 RX ORDER — FOLIC ACID 1 MG/1
1 TABLET ORAL DAILY
Qty: 30 TABLET | Refills: 3 | Status: SHIPPED | OUTPATIENT
Start: 2024-03-23

## 2024-03-23 RX ORDER — VALSARTAN 40 MG/1
40 TABLET ORAL DAILY
Qty: 30 TABLET | Refills: 3 | Status: SHIPPED | OUTPATIENT
Start: 2024-03-23 | End: 2024-04-23 | Stop reason: SDUPTHER

## 2024-03-23 RX ORDER — METOLAZONE 2.5 MG/1
TABLET ORAL
Qty: 30 TABLET | Refills: 3 | Status: SHIPPED | OUTPATIENT
Start: 2024-03-24 | End: 2024-04-02 | Stop reason: ALTCHOICE

## 2024-03-23 RX ORDER — PREDNISONE 5 MG/1
5 TABLET ORAL DAILY
Qty: 3 TABLET | Refills: 0 | Status: SHIPPED | OUTPATIENT
Start: 2024-03-25 | End: 2024-03-28

## 2024-03-23 RX ORDER — METOPROLOL SUCCINATE 25 MG/1
25 TABLET, EXTENDED RELEASE ORAL DAILY
Qty: 30 TABLET | Refills: 3 | Status: SHIPPED | OUTPATIENT
Start: 2024-03-23 | End: 2024-04-02 | Stop reason: ALTCHOICE

## 2024-03-23 RX ADMIN — ENOXAPARIN SODIUM 40 MG: 40 INJECTION SUBCUTANEOUS at 09:08

## 2024-03-23 RX ADMIN — PREDNISONE 10 MG: 10 TABLET ORAL at 09:09

## 2024-03-23 RX ADMIN — Medication 1 TABLET: at 09:09

## 2024-03-23 RX ADMIN — FLUTICASONE FUROATE AND VILANTEROL TRIFENATATE 1 PUFF: 100; 25 POWDER RESPIRATORY (INHALATION) at 07:00

## 2024-03-23 RX ADMIN — METOPROLOL SUCCINATE 25 MG: 25 TABLET, EXTENDED RELEASE ORAL at 09:09

## 2024-03-23 RX ADMIN — NICOTINE 1 PATCH: 14 PATCH, EXTENDED RELEASE TRANSDERMAL at 09:09

## 2024-03-23 RX ADMIN — Medication 2 L/MIN: at 07:54

## 2024-03-23 RX ADMIN — THIAMINE HCL TAB 100 MG 100 MG: 100 TAB at 09:09

## 2024-03-23 RX ADMIN — GUAIFENESIN SYRUP AND DEXTROMETHORPHAN 10 ML: 100; 10 SYRUP ORAL at 09:08

## 2024-03-23 RX ADMIN — POLYETHYLENE GLYCOL 3350 17 G: 17 POWDER, FOR SOLUTION ORAL at 09:10

## 2024-03-23 RX ADMIN — FUROSEMIDE 80 MG: 40 TABLET ORAL at 09:09

## 2024-03-23 RX ADMIN — VALSARTAN 40 MG: 40 TABLET, FILM COATED ORAL at 11:36

## 2024-03-23 RX ADMIN — FOLIC ACID 1 MG: 1 TABLET ORAL at 09:08

## 2024-03-23 RX ADMIN — EMPAGLIFLOZIN 10 MG: 10 TABLET, FILM COATED ORAL at 09:08

## 2024-03-23 RX ADMIN — METOLAZONE 2.5 MG: 5 TABLET ORAL at 06:50

## 2024-03-23 RX ADMIN — SENNOSIDES AND DOCUSATE SODIUM 2 TABLET: 8.6; 5 TABLET ORAL at 09:09

## 2024-03-23 RX ADMIN — BENZONATATE 200 MG: 100 CAPSULE ORAL at 09:09

## 2024-03-23 RX ADMIN — TIOTROPIUM BROMIDE INHALATION SPRAY 2 PUFF: 3.12 SPRAY, METERED RESPIRATORY (INHALATION) at 07:00

## 2024-03-23 RX ADMIN — ALLOPURINOL 300 MG: 300 TABLET ORAL at 09:09

## 2024-03-23 RX ADMIN — IPRATROPIUM BROMIDE AND ALBUTEROL SULFATE 3 ML: 2.5; .5 SOLUTION RESPIRATORY (INHALATION) at 07:54

## 2024-03-23 RX ADMIN — LEVOTHYROXINE SODIUM 125 MCG: 0.12 TABLET ORAL at 06:50

## 2024-03-23 RX ADMIN — ASPIRIN 81 MG: 81 TABLET, COATED ORAL at 09:00

## 2024-03-23 ASSESSMENT — PAIN SCALES - GENERAL
PAINLEVEL_OUTOF10: 0 - NO PAIN
PAINLEVEL_OUTOF10: 0 - NO PAIN

## 2024-03-23 ASSESSMENT — COGNITIVE AND FUNCTIONAL STATUS - GENERAL
TOILETING: A LITTLE
CLIMB 3 TO 5 STEPS WITH RAILING: A LITTLE
DAILY ACTIVITIY SCORE: 19
MOVING TO AND FROM BED TO CHAIR: A LITTLE
DRESSING REGULAR LOWER BODY CLOTHING: A LITTLE
DAILY ACTIVITIY SCORE: 21
TURNING FROM BACK TO SIDE WHILE IN FLAT BAD: A LITTLE
TOILETING: A LITTLE
DRESSING REGULAR LOWER BODY CLOTHING: A LITTLE
WALKING IN HOSPITAL ROOM: A LITTLE
HELP NEEDED FOR BATHING: A LITTLE
HELP NEEDED FOR BATHING: A LITTLE
MOBILITY SCORE: 18
PERSONAL GROOMING: A LITTLE
DRESSING REGULAR UPPER BODY CLOTHING: A LITTLE
MOVING FROM LYING ON BACK TO SITTING ON SIDE OF FLAT BED WITH BEDRAILS: A LITTLE
STANDING UP FROM CHAIR USING ARMS: A LITTLE

## 2024-03-23 ASSESSMENT — PAIN - FUNCTIONAL ASSESSMENT
PAIN_FUNCTIONAL_ASSESSMENT: 0-10
PAIN_FUNCTIONAL_ASSESSMENT: 0-10

## 2024-03-23 NOTE — CARE PLAN
The patient's goals for the shift include  sleep through the night    The clinical goals for the shift include patient will follow 1500 fluid restriction this shift    Over the shift, the patient did not make progress toward the following goals. Barriers to progression include CHF, recent ICU admission. Recommendations to address these barriers include medication administration, cpap while sleeping, sleep hygiene.

## 2024-03-23 NOTE — PROGRESS NOTES
Subjective Data:  Mr. Glass is resting sitting up in a chair. Planning on going home today. Complains of general fatigue. Denies chest pain or shortness of breath.     Overnight Events:         Objective Data:  Last Recorded Vitals:  Vitals:    03/22/24 1459 03/22/24 1943 03/22/24 2044 03/23/24 0612   BP:  94/64     Pulse:  76     Resp:  18     Temp:  36.2 °C (97.2 °F)     TempSrc:       SpO2: 93% 93% 93%    Weight:    133 kg (292 lb 8 oz)   Height:           Last Labs:  CBC - 3/23/2024:  7:07 AM  8.3 18.9 255    57.4      CMP - 3/22/2024: 10:18 AM  9.0 6.9 18 --- 1.6   3.4 3.6 22 73      PTT - No results in last year.  _   _ _     TROPHS   Date/Time Value Ref Range Status   03/12/2024 07:07  0 - 20 ng/L Final     Comment:     Previous result verified on 3/12/2024 0048 on specimen/case [id number] called with component [facility] for procedure Troponin I, High Sensitivity with value 89 ng/L.   03/12/2024 03:08  0 - 20 ng/L Final     Comment:     Previous result verified on 3/12/2024 0048 on specimen/case [id number] [facility] Troponin I, High Sensitivity with value 89 ng/L.   03/12/2024 12:15 AM 89 0 - 20 ng/L Final     BNP   Date/Time Value Ref Range Status   03/21/2024 05:52  0 - 99 pg/mL Final   03/19/2024 09:24  0 - 99 pg/mL Final     HGBA1C   Date/Time Value Ref Range Status   02/20/2024 10:35 AM 5.8 see below % Final   01/19/2023 10:23 AM 7.3 % Final     Comment:          Diagnosis of Diabetes-Adults   Non-Diabetic: < or = 5.6%   Increased risk for developing diabetes: 5.7-6.4%   Diagnostic of diabetes: > or = 6.5%  .       Monitoring of Diabetes                Age (y)     Therapeutic Goal (%)   Adults:          >18           <7.0   Pediatrics:    13-18           <7.5                   7-12           <8.0                   0- 6            7.5-8.5   American Diabetes Association. Diabetes Care 33(S1), Jan 2010.     09/10/2021 01:29 PM 7.9 % Final      Comment:          Diagnosis of Diabetes-Adults   Non-Diabetic: < or = 5.6%   Increased risk for developing diabetes: 5.7-6.4%   Diagnostic of diabetes: > or = 6.5%  .       Monitoring of Diabetes                Age (y)     Therapeutic Goal (%)   Adults:          >18           <7.0   Pediatrics:    13-18           <7.5                   7-12           <8.0                   0- 6            7.5-8.5   American Diabetes Association. Diabetes Care 33(S1), Jan 2010.       LDLCALC   Date/Time Value Ref Range Status   02/20/2024 10:35  <=99 mg/dL Final     Comment:                                 Near   Borderline      AGE      Desirable  Optimal    High     High     Very High     0-19 Y     0 - 109     ---    110-129   >/= 130     ----    20-24 Y     0 - 119     ---    120-159   >/= 160     ----      >24 Y     0 -  99   100-129  130-159   160-189     >/=190       VLDL   Date/Time Value Ref Range Status   02/20/2024 10:35 AM 16 0 - 40 mg/dL Final   09/10/2021 01:29 PM 27 0 - 40 mg/dL Final   09/18/2018 02:26 PM 22 0 - 40 mg/dL Final      Last I/O:  I/O last 3 completed shifts:  In: 1370 (10.3 mL/kg) [P.O.:1370]  Out: 2500 (18.8 mL/kg) [Urine:2500 (0.5 mL/kg/hr)]  Weight: 132.7 kg     Past Cardiology Tests (Last 3 Years):  EKG:  Electrocardiogram, 12-lead PRN ACS symptoms 03/12/2024      ECG 12 lead 03/12/2024      ECG 12 lead 03/12/2024      Electrocardiogram, 12-lead PRN ACS symtpoms 03/11/2024      ECG 12 Lead 03/10/2024      ECG 12 lead     Echo:  Transthoracic echo (TTE) complete 03/11/2024  1. Left ventricular systolic function is moderately decreased with a 30-35% estimated ejection fraction.   2. Basal inferolateral segment is abnormal.   3. Poorly visualized anatomical structures due to suboptimal image quality.   4. Moderate aortic valve stenosis.   5. There is global hypokinesis of the left ventricle with minor regional variations.    TTE (06/18/2019):   -Low normal LV systolic function  -Mild concentric  LVH  -Doppler flow suggestive of abnormal Left ventricular relaxation  -Normal right ventricular size and function  -Slightly enlarged left atrium.   -Moderate calcific aortic stenosis  -Trace mitral regurg    Ejection Fractions:  EF   Date/Time Value Ref Range Status   03/11/2024 02:04 PM 33 %      Cath:  No results found for this or any previous visit from the past 1095 days.    Stress Test:  No results found for this or any previous visit from the past 1095 days.    Cardiac Imaging:  XR (03/08/2024):   -Moderate right pleural effusion and right lung base consolidation. Findings could be due to pneumonia. Other underlying process not excluded. Follow-up recommended.   -Trace left pleural effusion and cardiomegaly. Possible interstitial edema.     Xray Abd (03/12/2024):   1.  Enteric tube approximately 3.8 cm above the kathie.  2. Evaluation of the enteric tube is limited and appears looped high in the right upper quadrant however may be related to patient positioning and rotation. Repeat imaging may be considered with proper patient positioning to delineate proper position.  3. Left lower lobe airspace opacities concerning for pneumonia. Possible large right pleural effusion.  4. Paucity of bowel gas noted.     CXR (03/12/2024):   1.  Enteric tube approximately 3.8 cm above the kathie.  2. Evaluation of the enteric tube is limited and appears looped high in the right upper quadrant however may be related to patient positioning and rotation. Repeat imaging may be considered with proper patient positioning to delineate proper position.  3. Left lower lobe airspace opacities concerning for pneumonia. Possible large right pleural effusion.  4. Paucity of bowel gas noted.      Inpatient Medications:  Scheduled medications   Medication Dose Route Frequency    allopurinol  300 mg oral Daily    aspirin  81 mg oral Daily    benzonatate  200 mg oral TID    dextromethorphan-guaifenesin  10 mL oral BID    empagliflozin  10 mg  oral Daily    enoxaparin  40 mg subcutaneous BID    tiotropium  2 puff inhalation Daily    And    fluticasone furoate-vilanteroL  1 puff inhalation Daily    folic acid  1 mg oral Daily    furosemide  80 mg oral BID with meals    insulin lispro  0-10 Units subcutaneous TID with meals    ipratropium-albuteroL  3 mL nebulization TID    levothyroxine  125 mcg oral Daily    melatonin  6 mg oral Nightly    metOLazone  2.5 mg oral Daily    metoprolol succinate XL  25 mg oral Daily    multivitamin with minerals  1 tablet oral Daily    nicotine  1 patch transdermal Daily    polyethylene glycol  17 g oral BID    predniSONE  10 mg oral Daily    Followed by    [START ON 3/25/2024] predniSONE  5 mg oral Daily    sennosides-docusate sodium  2 tablet oral BID    thiamine  100 mg oral Daily    valsartan  40 mg oral Daily     PRN medications   Medication    acetaminophen    hydrocodone-homatropine    ipratropium-albuteroL    oxygen    sodium chloride    traZODone     Continuous Medications   Medication Dose Last Rate       Physical Exam:  Physical Exam  Vitals reviewed.   HENT:      Head: Normocephalic.      Nose: Nose normal.   Eyes:      Pupils: Pupils are equal, round, and reactive to light.   Cardiovascular:      Rate and Rhythm: Normal rate and regular rhythm. 2/6 KEITH   Pulmonary:      Effort: Pulmonary effort is normal.      Breath sounds: Normal breath sounds.   Abdominal:      General: Abdomen is flat.      Palpations: Abdomen is soft.   Musculoskeletal:         General: Normal range of motion.      Cervical back: Normal range of motion.   Skin:     General: Skin is warm and dry.   Neurological:      General: No focal deficit present.      Mental Status: He is alert and oriented to person, place, and time.   Psychiatric:         Mood and Affect: Mood normal.          Assessment/Plan   69 yo male from home with h/o obesity, MELISSA on CPAP, COPD, current smoker, type 2 DM, hypertension, lymphedema, moderate aortic stenosis  (6/2019) who presented from PCP office with symptoms of increased LE edema, abdominal bloating, progressive SOB over the past year found to be hypoxic in the ER. On admission was Intermittently refusing IV Lasix, I&O not well monitored. Discussed urinating only into urinals, recording how much urine vs how frequent he urinates. Refused return to lasix gtt, lipscomb or external catheter. Originally refusing echo d/t concern for needing to urinate during the testing. Discussed accommodations and ability to use the bathroom in echo if necessary. Patient was then agreeable to echocardiogram. Echo obtained with newly reduced ejection fraction. Decompensated overnight hypoxic, agitated, required Ativan, Precedex, BiPAP and eventually intubation. Patient weaned off of IV pressor support (03/13). Able to be extubated successfully (3/14)  to HFNC. Feeling better today, plan on going home. Home medications discussed at length     #Acute hypoxic respiratory failure 2/2 decompensated heart failure, Pneumonia, COPD w/ current smoking, mucous plugging s/p bronchoscopy (resolved)   #Acute decompensated systolic heart failure (improved)  #Moderate aortic stenosis      -At discharge, homegoing medications to include: Lasix 80mg PO BID, ASA 81mg daily, Jardiance 10mg daily, Toprol 25mg daily, Valsartan 40mg daily, Metolazone 2.5mg M, W, F.   -Daily standing weight (Admit 159 kg, today 133kg)  -Strict I&O  -Follow up 2 weeks in the clinic, pending establishment of care closer to home with Dr. Moore 06/05 @ 2PM    Peripheral IV 03/13/24 18 G Proximal;Right Forearm (Active)   Site Assessment Dry;Clean;Intact 03/22/24 2017   Dressing Type Transparent 03/22/24 2017   Line Status Saline locked 03/22/24 2017   Dressing Status Clean;Dry;Occlusive 03/22/24 2017   Number of days: 10       Code Status:  Full Code    I spent  minutes in the professional and overall care of this patient.        Jordana Carey, APRN-CNP

## 2024-03-23 NOTE — PROGRESS NOTES
Occupational Therapy    Occupational Therapy Treatment    Name: Alo Glass  MRN: [id number]  : 1955  Date: 24  Time Calculation  Start Time: 935  Stop Time: 955  Time Calculation (min): 20 min    Assessment:  OT Assessment: Pt continues to present with improving SOB, decreased overall endurance and balance. Pt continues to be receptive to energy conservation and work simplification education, continues to benefit from skilled OT services to increase independence with ADL's, transfers, and mobility. Now recommend LOW OT intervention in discharge setting, with increased supports for discharge to home with [facility] follow-up.  Prognosis: Good  Barriers to Discharge: Decreased caregiver support  Evaluation/Treatment Tolerance: Patient limited by fatigue  Medical Staff Made Aware: Yes  End of Session Communication: Bedside nurse, PCT/NA/CTA  End of Session Patient Position: Bed, 2 rail up, Alarm off, not on at start of session (Nurse aware of no alarm for bed.)  Plan:  Treatment Interventions: ADL retraining, Functional transfer training, Endurance training, Patient/family training, Compensatory technique education  OT Frequency: 3 times per week  OT Discharge Recommendations: Low intensity level of continued care  Equipment Recommended upon Discharge: Wheeled walker  OT Recommended Transfer Status: Stand by assist  OT - OK to Discharge: Yes (In accord with OT POC)    Subjective   Previous Visit Info:  OT Last Visit  OT Received On: 24  General:  General  Reason for Referral: Pt is a 67 y/o male who was admitted for SOB, CHF, Sepsis  Referred By: Lou Madrigal  Past Medical History Relevant to Rehab: Benign essential hypertension  COPD  Type 2 diabetes mellitus  Gastroesophageal reflux disease  Gout  Bilateral lower extremity edema  Tobacco use disorder  Hyperlipidemia  Hypothyroidism  Obesity  Thoracic aortic aneurysm  Obstructive sleep apnea  Polycythemia  Lymphedema  Prior to Session Communication:  Bedside nurse (Nurse reports pt discharging later this am. Okay to treat without restrictions.)  General Comment: Pt receptive to instruction and review of energy conservation education with reveiw of handouts.  Precautions:  Medical Precautions: Fall precautions  Vitals:     Pain Assessment:  Pain Assessment  Pain Assessment: 0-10  Pain Score: 0 - No pain     Objective   Therapy/Activity: Therapeutic Activity  Therapeutic Activity Performed: Yes  Therapeutic Activity 1: Pt receptive to instruction in energy conservation and work simplification including pt report of use of pacing and appropriate breaks in course of morning's self-care activities.Pt reports plans to follow-through with use of instruction following discharge to home and plans to aquire needed adaptive tools.    Outcome Measures:  [facility] Daily Activity  Putting on and taking off regular lower body clothing: A little  Bathing (including washing, rinsing, drying): A little  Putting on and taking off regular upper body clothing: None  Toileting, which includes using toilet, bedpan or urinal: A little  Taking care of personal grooming such as brushing teeth: None  Eating Meals: None  Daily Activity - Total Score: 21    Education Documentation  Handouts, taught by Sha Alegria OT at 3/23/2024 10:42 AM.  Learner: Patient  Readiness: Acceptance  Method: Explanation, Demonstration, Handout  Response: Verbalizes Understanding, Demonstrated Understanding, Needs Reinforcement  Comment: Pt receptive, demonstrates compliance to energy conservation and work simplification instruction.    Body Mechanics, taught by Sha Alegria OT at 3/23/2024 10:42 AM.  Learner: Patient  Readiness: Acceptance  Method: Explanation, Demonstration, Handout  Response: Verbalizes Understanding, Demonstrated Understanding, Needs Reinforcement  Comment: Pt receptive, demonstrates compliance to energy conservation and work simplification instruction.    Precautions, taught by Sha JAY  Raji OT at 3/23/2024 10:42 AM.  Learner: Patient  Readiness: Acceptance  Method: Explanation, Demonstration, Handout  Response: Verbalizes Understanding, Demonstrated Understanding, Needs Reinforcement  Comment: Pt receptive, demonstrates compliance to energy conservation and work simplification instruction.    Home Exercise Program, taught by Sha Alegria OT at 3/23/2024 10:42 AM.  Learner: Patient  Readiness: Acceptance  Method: Explanation, Demonstration, Handout  Response: Verbalizes Understanding, Demonstrated Understanding, Needs Reinforcement  Comment: Pt receptive, demonstrates compliance to energy conservation and work simplification instruction.    ADL Training, taught by Sha Alegria OT at 3/23/2024 10:42 AM.  Learner: Patient  Readiness: Acceptance  Method: Explanation, Demonstration, Handout  Response: Verbalizes Understanding, Demonstrated Understanding, Needs Reinforcement  Comment: Pt receptive, demonstrates compliance to energy conservation and work simplification instruction.    Education Comments  Pt responsive to instruction and education.    Goals:  Encounter Problems       Encounter Problems (Active)       OT Goals       Pt will demonstrate 1-2 energy conservation techniques during ADL tasks and functional mobility  (Met)       Start:  03/15/24    Expected End:  03/18/24    Resolved:  03/23/24         Pt will increase endurance to tolerate 15min of activity with no more than 1 rest break in order to increase ability to engage in ADL completion.  (Met)       Start:  03/15/24    Expected End:  03/18/24    Resolved:  03/23/24         Pt to demonstrate transfers with FWW at mod I  (Progressing)       Start:  03/15/24    Expected End:  03/18/24            Pt will demo increased functional mobility to tolerate tasks necessary to complete ADL routine with FWW at mod I while keeping SpO2 above 88% (Progressing)       Start:  03/15/24    Expected End:  03/18/24            Pt will demo GOOD  static/dynamic standing balance during ADL and functional activity with FWW >5 minutes for improved independence and participation in ADL  (Progressing)       Start:  03/15/24    Expected End:  03/18/24            Pt to demonstrate LB dressing, bathing, and toileting with AE as needed at mod I  (Progressing)       Start:  03/15/24    Expected End:  03/18/24

## 2024-03-23 NOTE — HH CARE COORDINATION
Home Care received a Referral for Nursing, Physical Therapy, and Occupational Therapy. We have processed the referral for a Start of Care on 3/25.     If you have any questions or concerns, please feel free to contact us at [phone]. Follow the prompts, enter your five digit zip code, and you will be directed to your care team on [facility] 2.

## 2024-03-23 NOTE — CARE PLAN
The patient's goals for the shift include      The clinical goals for the shift include To be d/c    Over the shift, the patient did not make progress toward the following goals. Barriers to progression include the pt. Recommendations to address these barriers include educate o discharge inddtructions .

## 2024-03-23 NOTE — NURSING NOTE
Discharge instructions reviewed in full with patient. Education provided on CHF and COPD. New medications and side effects discussed and given in printed form. IV removed, intact. Also discussed medications to stop. Patient upset  pharmacy is closed on weekends and cannot go home with medications. [facility] called and verified medications are ready there. Patient stated a scale was given to him but cannot be found at this time. Unable to verify or find scale. Unit phone number given for any additional questions/concerns.

## 2024-03-23 NOTE — PROGRESS NOTES
Physical Therapy                 Therapy Communication Note    Patient Name: Alo Glass  MRN: [id number]  Today's Date: 3/23/2024     Discipline: Physical Therapy    Missed Visit Reason: Missed Visit Reason: Patient refused    Missed Time: Attempt @1032    Comment: Pt refused PT, stating he didn't want to do it. Pt given different options of activities that he could do.  Pt stated that he would take a print out of exercises, but does not want to do them.  Pt is waiting to be Dc'd.   Nursing staff made aware of pt's decision

## 2024-03-23 NOTE — DISCHARGE SUMMARY
Discharge Diagnosis  Heart failure exacerbated by sotalol (CMS/[facility])    Issues Requiring Follow-Up  Follow up with PCP within 3-5 days of hospital discharge.    Cardiology recommendations:  -At discharge, homegoing medications to include: Lasix 80mg PO BID, ASA 81mg daily, Jardiance 10mg daily, Toprol 25mg daily, Valsartan 40mg daily, Metolazone 2.5mg M, W, F.   -Daily standing weight (Admit 159 kg, today 133kg)  -Strict I&O  -Follow up 2 weeks in the clinic, pending establishment of care closer to home with Dr. Moore 06/05 @ 2PM    Discharge Meds     Your medication list        START taking these medications        Instructions Last Dose Given Next Dose Due   aspirin 81 mg EC tablet      Take 1 tablet (81 mg) by mouth once daily.       benzonatate 200 mg capsule  Commonly known as: Tessalon      Take 1 capsule (200 mg) by mouth 3 times a day for 10 days. Do not crush or chew.       empagliflozin 10 mg  Commonly known as: Jardiance      Take 1 tablet (10 mg) by mouth once daily.       folic acid 1 mg tablet  Commonly known as: Folvite      Take 1 tablet (1 mg) by mouth once daily.       furosemide 80 mg tablet  Commonly known as: Lasix      Take 1 tablet (80 mg) by mouth 2 times a day with meals.       metOLazone 2.5 mg tablet  Commonly known as: Zaroxolyn  Start taking on: March 24, 2024      One tablet by mouth on Monday, Wednesday and on Friday Do not start before March 24, 2024.       metoprolol succinate XL 25 mg 24 hr tablet  Commonly known as: Toprol-XL      Take 1 tablet (25 mg) by mouth once daily. Do not crush or chew.       multivitamin with minerals tablet      Take 1 tablet by mouth once daily.       predniSONE 10 mg tablet  Commonly known as: Deltasone      Take 1 tablet (10 mg) by mouth once daily for 2 doses.       predniSONE 5 mg tablet  Commonly known as: Deltasone  Start taking on: March 25, 2024      Take 1 tablet (5 mg) by mouth once daily for 3 doses. Do not start before March 25, 2024.        thiamine 100 mg tablet  Commonly known as: Vitamin B-1      Take 1 tablet (100 mg) by mouth once daily.       valsartan 40 mg tablet  Commonly known as: Diovan      Take 1 tablet (40 mg) by mouth once daily.              CHANGE how you take these medications        Instructions Last Dose Given Next Dose Due   Mounjaro 2.5 mg/0.5 mL pen injector  Generic drug: tirzepatide  What changed: Another medication with the same name was removed. Continue taking this medication, and follow the directions you see here.      Inject 2.5 mg under the skin 1 (one) time per week.              CONTINUE taking these medications        Instructions Last Dose Given Next Dose Due   albuterol 90 mcg/actuation inhaler      INHALE 2 PUFFS EVERY 4-6 HOURS AS NEEDED.       allopurinol 300 mg tablet  Commonly known as: Zyloprim      TAKE 1 TABLET BY MOUTH ONCE DAILY.       clobetasol 0.05 % external solution  Commonly known as: Temovate           ezetimibe 10 mg tablet  Commonly known as: Zetia           indomethacin 50 mg capsule  Commonly known as: Indocin      Take 1 capsule (50 mg) by mouth 2 times a day as needed for mild pain (1 - 3) or moderate pain (4 - 6).       ipratropium-albuteroL 0.5-2.5 mg/3 mL nebulizer solution  Commonly known as: Duo-Neb      Take 3 mL by nebulization 4 times a day.       levothyroxine 125 mcg tablet  Commonly known as: Synthroid, Levoxyl      TAKE 1 TABLET BY MOUTH EVERY DAY       metFORMIN 1,000 mg tablet  Commonly known as: Glucophage      Take 1 tablet (1,000 mg) by mouth 2 times a day.       olmesartan 40 mg tablet  Commonly known as: BENIcar      take 1 tablet by mouth once daily       Trelegy Ellipta 100-62.5-25 mcg blister with device  Generic drug: fluticasone-umeclidin-vilanter      one puff per day              STOP taking these medications      Dulera 100-5 mcg/actuation inhaler  Generic drug: mometasone-formoterol                  Where to Get Your Medications        These medications were sent  to [facility] - [place], [place] - [address] [address]      Phone: [phone]   aspirin 81 mg EC tablet  benzonatate 200 mg capsule  empagliflozin 10 mg  folic acid 1 mg tablet  furosemide 80 mg tablet  metOLazone 2.5 mg tablet  metoprolol succinate XL 25 mg 24 hr tablet  multivitamin with minerals tablet  predniSONE 10 mg tablet  predniSONE 5 mg tablet  thiamine 100 mg tablet  valsartan 40 mg tablet         Test Results Pending At Discharge  Pending Labs       Order Current Status    VTM Pink Tube Collected (03/12/24 0010)    Extra Tubes In process    Fungal Culture/Smear Preliminary result            Hospital Course  Alo Glass is a 68 y.o. male with history of hypertension, COPD, type 2 diabetes mellitus, severe obesity, and obstructive sleep apnea presenting with SOB. Patient shared that he had worsening of shortness of breath over several months, and PCP had [facility]. On admission, concerning for acute exacerbation of congestive heart failure with imaging with fluid overload. Patient with dietary and medication non compliance. Initially admitted to ICU for septic shock 2/2 strep pneumonia and cardiogenic shock due to acute HFrEF. Seen and examined by the cardiology team, and underwent extensive diuresis.  Patient switched from IV Lasix to oral Lasix today. Did have some episodes of hypotension, which have resolved. He is on a prednisone taper as well due to respiratory issues but on room air. Will be discharged today.    Pertinent Physical Exam At Time of Discharge  Physical Exam  Constitutional:       General: He is not in acute distress.     Appearance: He is obese.   HENT:      Head: Normocephalic and atraumatic.   Eyes:      Conjunctiva/sclera: Conjunctivae normal.      Pupils: Pupils are equal, round, and reactive to light.   Skin:     General: Skin is warm and dry.   Neurological:      General: No focal  deficit present.      Mental Status: He is alert and oriented to person, place, and time.   Psychiatric:         Thought Content: Thought content normal.         Judgment: Judgment normal.         Outpatient Follow-Up  Future Appointments   Date Time Provider Department [place]   4/2/2024  9:00 AM Oscar Fox MD [id number] [facility]   6/5/2024  2:00 PM Triston Moore MD [id number] [facility]         Servando Reynolds MD

## 2024-03-25 ENCOUNTER — HOME CARE VISIT (OUTPATIENT)
Dept: HOME HEALTH SERVICES | Facility: HOME HEALTH | Age: 69
End: 2024-03-25
Payer: MEDICARE

## 2024-03-25 VITALS
RESPIRATION RATE: 18 BRPM | HEART RATE: 50 BPM | OXYGEN SATURATION: 95 % | SYSTOLIC BLOOD PRESSURE: 106 MMHG | TEMPERATURE: 97.9 F | DIASTOLIC BLOOD PRESSURE: 50 MMHG

## 2024-03-25 PROCEDURE — G0299 HHS/HOSPICE OF RN EA 15 MIN: HCPCS

## 2024-03-25 PROCEDURE — 0023 HH SOC

## 2024-03-26 ENCOUNTER — HOME CARE VISIT (OUTPATIENT)
Dept: HOME HEALTH SERVICES | Facility: HOME HEALTH | Age: 69
End: 2024-03-26
Payer: MEDICARE

## 2024-03-26 PROCEDURE — G0152 HHCP-SERV OF OT,EA 15 MIN: HCPCS

## 2024-03-27 ENCOUNTER — TELEMEDICINE (OUTPATIENT)
Dept: PRIMARY CARE | Facility: CLINIC | Age: 69
End: 2024-03-27
Payer: MEDICARE

## 2024-03-27 ENCOUNTER — HOME CARE VISIT (OUTPATIENT)
Dept: HOME HEALTH SERVICES | Facility: HOME HEALTH | Age: 69
End: 2024-03-27
Payer: MEDICARE

## 2024-03-27 VITALS
SYSTOLIC BLOOD PRESSURE: 92 MMHG | DIASTOLIC BLOOD PRESSURE: 60 MMHG | TEMPERATURE: 98.2 F | HEART RATE: 87 BPM | OXYGEN SATURATION: 92 %

## 2024-03-27 VITALS — HEART RATE: 82 BPM | DIASTOLIC BLOOD PRESSURE: 61 MMHG | OXYGEN SATURATION: 90 % | SYSTOLIC BLOOD PRESSURE: 93 MMHG

## 2024-03-27 DIAGNOSIS — I10 BENIGN ESSENTIAL HYPERTENSION: Primary | ICD-10-CM

## 2024-03-27 PROCEDURE — 99213 OFFICE O/P EST LOW 20 MIN: CPT | Performed by: INTERNAL MEDICINE

## 2024-03-27 PROCEDURE — 1111F DSCHRG MED/CURRENT MED MERGE: CPT | Performed by: INTERNAL MEDICINE

## 2024-03-27 PROCEDURE — 1159F MED LIST DOCD IN RCRD: CPT | Performed by: INTERNAL MEDICINE

## 2024-03-27 PROCEDURE — G0151 HHCP-SERV OF PT,EA 15 MIN: HCPCS

## 2024-03-27 SDOH — ECONOMIC STABILITY: HOUSING INSECURITY
HOME SAFETY: VARIOUS AREA RUGS THROUGHOUT LARGE HOME   SOME AREAS ARE IN STAGE OF RECONSTRUCTION , SO THERE ARE SOME VARIATIONS IN FLOORING  2 REPORTED FALLS HAVE HAPPENED IN KITCHEN WHEN PT STATES HE WAS ATTEMPTING TO STABILIZE SELF

## 2024-03-27 SDOH — HEALTH STABILITY: PHYSICAL HEALTH: EXERCISE COMMENTS: SITTING ANKLE PUMPS, LAQ, HIP FLEXION 10 REPS

## 2024-03-27 ASSESSMENT — ACTIVITIES OF DAILY LIVING (ADL)
LAUNDRY: NEEDS ASSISTANCE
TOILETING: 1
AMBULATION ASSISTANCE: 1
AMBULATION ASSISTANCE: STAND BY ASSIST
TOILETING: SUPERVISION
HOUSEKEEPING ASSESSED: 1
PHYSICAL TRANSFERS ASSESSED: 1
LIGHT HOUSEKEEPING: NEEDS ASSISTANCE
DRESSING_UB_CURRENT_FUNCTION: SUPERVISION
LAUNDRY ASSESSED: 1
PREPARING MEALS: NEEDS ASSISTANCE
AMBULATION ASSISTANCE ON FLAT SURFACES: 1
AMBULATION_DISTANCE/DURATION_TOLERATED: 40 FEET
DRESSING_LB_CURRENT_FUNCTION: STAND BY ASSIST
ENTERING_EXITING_HOME: ONE PERSON
BATHING ASSESSED: 1
GROOMING_CURRENT_FUNCTION: INDEPENDENT
GROOMING ASSESSED: 1
CURRENT_FUNCTION: SUPERVISION

## 2024-03-27 ASSESSMENT — ENCOUNTER SYMPTOMS
SUBJECTIVE PAIN PROGRESSION: WAXING AND WANING
PERSON REPORTING PAIN: PATIENT
PAIN LOCATION: RIGHT LEG
PAIN LOCATION: BACK
MUSCLE WEAKNESS: 1
PAIN LOCATION: LEFT LEG
PERSON REPORTING PAIN: PATIENT
PAIN: 1
DENIES PAIN: 1

## 2024-03-27 NOTE — PROGRESS NOTES
Subjective   Patient ID: Alo Glass is a 68 y.o. male who presents for No chief complaint on file..    HPI virtual video visit this visit was completed via audio and visual secondary to the restriction of the covid 19 vaccine  all medical issues were addressed and concerned, patient was not other wise examined if it were needed for the patient to be seen in the office, he would have been referred to do so.  Patient noted of the visit.  Sick visit no cp, sob doing ok except for low bp more recently watching the diet and continues to lose weight with the diuretic able to resume mounjaro  Review of Systems    Objective   There were no vitals taken for this visit.    Physical Exam alert and oriented x 3 ncat  No icterus no jaundice no pallor breathing unlabored  Not otherwise examined    Assessment/Plan impression hypotension  Plan reviewed medication list and continue with same except If bp less than 110 sys or feels poorly, will hold the valsartan x 1-2 days       May need prednisone follow up or Rx; may need CGM system when completed with steady (freestyle riccardo)  Heart rate may be lower with the metoprolol now >60  Fluid intake to 4623-9643 ml  Good diet regular exercise  Follow up next visit

## 2024-03-27 NOTE — HOME HEALTH
Patient is an 68 yr old male adm to [facility] 3-8 to 3-23-24 due to heart failure exac. Patient reports was at Dr visit and sent to ED due to SOB. Patient was in ICU for septic shock, had pneumonia and exac heart failure. PMH HTN, COPD, DM2, sleep apnea. Patient has mod swelling in B feet and reports is better then prior to hospital. PFS Patient lives in home alone, ambulating with rollator approx 1 /1/2 yrs due to unstable on feet, indep with ADLs, Indep with IADLs, has not driven in awhile, patient has groceries delivered, has house keeper to help with cleaning and takes him to Dr mares. Patient has virtual call with  later today and will discuss his low BP and meds. PT 1w1, 2w1, 1w1

## 2024-03-28 ASSESSMENT — ENCOUNTER SYMPTOMS
PAIN SEVERITY GOAL: 0/10
COUGH: 1
FREQUENCY: 1
COUGH CHARACTERISTICS: NON-PRODUCTIVE
FATIGUES EASILY: 1
HIGHEST PAIN SEVERITY IN PAST 24 HOURS: 8/10
PAIN: 1
LOWEST PAIN SEVERITY IN PAST 24 HOURS: 7/10
APPETITE LEVEL: GOOD
LOWER EXTREMITY EDEMA: 1
OCCASIONAL FEELINGS OF UNSTEADINESS: 1
HYPOTENSION: 1
CHANGE IN APPETITE: UNCHANGED
PERSON REPORTING PAIN: PATIENT

## 2024-03-28 ASSESSMENT — LIFESTYLE VARIABLES: SMOKING_STATUS: 1

## 2024-03-28 ASSESSMENT — ACTIVITIES OF DAILY LIVING (ADL): OASIS_M1830: 03

## 2024-03-29 ENCOUNTER — TELEPHONE (OUTPATIENT)
Dept: PRIMARY CARE | Facility: CLINIC | Age: 69
End: 2024-03-29

## 2024-04-01 ENCOUNTER — HOME CARE VISIT (OUTPATIENT)
Dept: HOME HEALTH SERVICES | Facility: HOME HEALTH | Age: 69
End: 2024-04-01
Payer: MEDICARE

## 2024-04-01 VITALS
SYSTOLIC BLOOD PRESSURE: 86 MMHG | TEMPERATURE: 99.6 F | RESPIRATION RATE: 17 BRPM | DIASTOLIC BLOOD PRESSURE: 56 MMHG | HEART RATE: 58 BPM | OXYGEN SATURATION: 92 %

## 2024-04-01 LAB
FUNGUS SPEC CULT: NORMAL
FUNGUS SPEC FUNGUS STN: NORMAL

## 2024-04-01 PROCEDURE — G0300 HHS/HOSPICE OF LPN EA 15 MIN: HCPCS

## 2024-04-02 ENCOUNTER — TELEMEDICINE (OUTPATIENT)
Dept: CARDIOLOGY | Facility: HOSPITAL | Age: 69
End: 2024-04-02
Payer: MEDICARE

## 2024-04-02 DIAGNOSIS — I50.9 HEART FAILURE EXACERBATED BY SOTALOL (MULTI): ICD-10-CM

## 2024-04-02 PROCEDURE — 3044F HG A1C LEVEL LT 7.0%: CPT | Performed by: INTERNAL MEDICINE

## 2024-04-02 PROCEDURE — 4010F ACE/ARB THERAPY RXD/TAKEN: CPT | Performed by: INTERNAL MEDICINE

## 2024-04-02 PROCEDURE — 99213 OFFICE O/P EST LOW 20 MIN: CPT | Performed by: INTERNAL MEDICINE

## 2024-04-02 PROCEDURE — 1159F MED LIST DOCD IN RCRD: CPT | Performed by: INTERNAL MEDICINE

## 2024-04-02 PROCEDURE — 3049F LDL-C 100-129 MG/DL: CPT | Performed by: INTERNAL MEDICINE

## 2024-04-02 PROCEDURE — 1111F DSCHRG MED/CURRENT MED MERGE: CPT | Performed by: INTERNAL MEDICINE

## 2024-04-02 RX ORDER — FUROSEMIDE 80 MG/1
80 TABLET ORAL DAILY
Qty: 60 TABLET | Refills: 3 | Status: CANCELLED
Start: 2024-04-02

## 2024-04-02 ASSESSMENT — ENCOUNTER SYMPTOMS: DEPRESSION: 0

## 2024-04-02 NOTE — PROGRESS NOTES
"Primary Care Physician: Omari Schumacher MD   Date of Visit: 04/02/2024  9:00 AM EDT  Type of Visit: follow up      Chief Complaint:  Chief Complaint   Patient presents with    Follow-up     Patient states he would like to discuss discontinuing the toprol xl altogether.        HPI  Alo Glass 68 y.o. male who presents for post hospital follow up. He asked to change his in person appointment to a virtual visit.    Wants to be off of beta blockers. Reports that he has had episodes of vision issues (intense bright to the point that he cannot read a caption that is there, episodes last ten minutes) and also has been having really intense pain in the neck and shoulders and believes this is due to the beta blocker. Additionally, has had \"full blown episodes of bradycardia,\" where he is checked his pulse rate on a pulse oximeter 40s-50s with his normal heart rate being in the 100s.    Weighing himself every morning, weight has been down, lower extremity edema is better. Reports oxygen saturation at home have been normal until yesterday when he developed a cough, where he had oxygen saturation in the high 80s, on 4/1/2024. Saturations in 90s today.    Weights  3/30: 298  3/31: 296  4/1: 295  4/2: 292.5      Review of Systems   12 point review of systems was obtained in detail and is negative other than that noted above or below.       Past Medical/Surgical History:  Alo has a past medical history of Personal history of other specified conditions (01/08/2023).    Alo has a past surgical history that includes Hernia repair (11/07/2013) and Other surgical history (11/07/2013).    Social/Family History:  Alo reports that he has been smoking cigarettes. He has never used smokeless tobacco. He reports current alcohol use of about 7.0 standard drinks of alcohol per week. He reports that he does not use drugs.    Alo's family history is not on file.    Allergies:  No Known Allergies    Medications:  Current " Outpatient Medications on File Prior to Visit   Medication Sig    albuterol 90 mcg/actuation inhaler INHALE 2 PUFFS EVERY 4-6 HOURS AS NEEDED.    allopurinol (Zyloprim) 300 mg tablet TAKE 1 TABLET BY MOUTH ONCE DAILY.    aspirin 81 mg EC tablet Take 1 tablet (81 mg) by mouth once daily.    benzonatate (Tessalon) 200 mg capsule Take 1 capsule (200 mg) by mouth 3 times a day for 10 days. Do not crush or chew.    clobetasol (Temovate) 0.05 % external solution Apply 1 Application topically once daily at bedtime.    empagliflozin (Jardiance) 10 mg Take 1 tablet (10 mg) by mouth once daily.    ezetimibe (Zetia) 10 mg tablet Take 1 tablet (10 mg) by mouth once daily.    fluticasone-umeclidin-vilanter (Trelegy Ellipta) 100-62.5-25 mcg blister with device one puff per day    folic acid (Folvite) 1 mg tablet Take 1 tablet (1 mg) by mouth once daily.    furosemide (Lasix) 80 mg tablet Take 1 tablet (80 mg) by mouth 2 times a day with meals.    indomethacin (Indocin) 50 mg capsule Take 1 capsule (50 mg) by mouth 2 times a day as needed for mild pain (1 - 3) or moderate pain (4 - 6).    ipratropium-albuteroL (Duo-Neb) 0.5-2.5 mg/3 mL nebulizer solution Take 3 mL by nebulization 4 times a day.    levothyroxine (Synthroid, Levoxyl) 125 mcg tablet TAKE 1 TABLET BY MOUTH EVERY DAY    metFORMIN (Glucophage) 1,000 mg tablet Take 1 tablet (1,000 mg) by mouth 2 times a day.    metOLazone (Zaroxolyn) 2.5 mg tablet One tablet by mouth on Monday, Wednesday and on Friday Do not start before March 24, 2024.    metoprolol succinate XL (Toprol-XL) 25 mg 24 hr tablet Take 1 tablet (25 mg) by mouth once daily. Do not crush or chew.    Mounjaro 2.5 mg/0.5 mL pen injector Inject 2.5 mg under the skin 1 (one) time per week.    multivitamin with minerals tablet Take 1 tablet by mouth once daily.    thiamine (Vitamin B-1) 100 mg tablet Take 1 tablet (100 mg) by mouth once daily.    valsartan (Diovan) 40 mg tablet Take 1 tablet (40 mg) by mouth once  daily.    [] predniSONE (Deltasone) 5 mg tablet Take 1 tablet (5 mg) by mouth once daily for 3 doses. Do not start before 2024.     No current facility-administered medications on file prior to visit.       Objective:   There were no vitals filed for this visit.    Virtual visit  Speaking comfortably, in complete sentences    Labs and Imaging:      Latest Ref Rng & Units 3/15/2024     5:15 AM 3/16/2024     5:17 AM 3/17/2024     5:13 AM 3/18/2024     7:15 AM 3/21/2024     5:52 AM 3/22/2024    10:18 AM 3/23/2024     7:07 AM   Electrolytes   Na 136 - 145 mmol/L 141  135  138  140  139  133  136    K 3.5 - 5.3 mmol/L 4.3  4.1  4.1  4.0  3.6  3.4  3.5    Cl 98 - 107 mmol/L 105  102  103  102  102  91  92    CO2 21 - 32 mmol/L 30  29  28  31  31  34  37    Cr 0.50 - 1.30 mg/dL 0.74  0.70  0.74  0.62  0.57  0.73  0.70    Ca 8.6 - 10.3 mg/dL 9.1  8.8  8.7  8.9  8.1  9.0  9.0    Phos 2.5 - 4.9 mg/dL 4.2  3.2  3.3  3.1  3.7  3.4  4.2          Latest Ref Rng & Units 3/15/2024     5:15 AM 3/16/2024     5:17 AM 3/17/2024     5:13 AM 3/18/2024     7:15 AM 3/21/2024     5:52 AM 3/22/2024    10:18 AM 3/23/2024     7:07 AM   CBC   Hb 13.5 - 17.5 g/dL 17.0  17.1  17.5  18.0  16.6  18.7  18.9    Hct 41.0 - 52.0 % 55.3  54.6  55.3  56.8  52.0  57.9  57.4    MCV 80 - 100 fL 109  106  106  105  105  103  101    RDW 11.5 - 14.5 % 14.2  13.2  12.9  12.7  12.9  13.0  13.0          Latest Ref Rng & Units 9/10/2021     1:29 PM 2023    10:23 AM 2024    10:35 AM 3/8/2024     5:28 PM 3/10/2024    11:25 AM 3/11/2024     6:03 AM 3/12/2024    12:15 AM   Lipids   Chol 0 - 199 mg/dL 225   178        HDL mg/dL 42.5   59.5        LDL <=99 mg/dL 156   102        Trig 0 - 149 mg/dL 133   81        ALT 10 - 52 U/L 26  20  24  20  18  16  22    AST 9 - 39 U/L 15  12  20  18  15  12  18          Latest Ref Rng & Units 2018     2:26 PM 9/10/2021     1:29 PM 2023    10:23 AM 3/9/2024     6:07 AM   Thyroid   T4 Free 0.78 -  1.48 ng/dL 0.97       TSH 0.44 - 3.98 mIU/L 3.26  3.67  2.21  2.64           No data to display                 Lab Results   Component Value Date    HGBA1C 5.8 (H) 02/20/2024    HGBA1C 7.3 (A) 01/19/2023    HGBA1C 7.9 (A) 09/10/2021        The ASCVD Risk score (Mark ZHENG, et al., 2019) failed to calculate for the following reasons:    The valid systolic blood pressure range is 90 to 200 mmHg     Echocardiogram: No results found for this or any previous visit.     Echocardiogram:   ECHOCARDIOGRAM     Narrative  Ordered by an unspecified provider.    Stress Testing: No results found for this or any previous visit from the past 1825 days.    Cardiac Catheterization: No results found for this or any previous visit from the past 1825 days.    Cardiac Scoring: No results found for this or any previous visit from the past 1825 days.    AAA : No results found for this or any previous visit from the past 1825 days.    OTHER: No results found for this or any previous visit from the past 1825 days.        Assessment/Plan:   No diagnosis found.     Alo Glass 68 y.o. male who presents for post hospital follow up:    Chronic systolic heart failure  Hypoxic respiratory failure  Moderate aortic stenosis  Hyperlipidemia    GDMT for HF: valsartan 40 mg daily, empaglifozin 10 mg daily. Hold beta-blocker, he is unwilling to take alternative beta blocker at this point. Reduce furosemide 80 mg daily-will need to weigh himself daily and call if weight goes greater than 3 pounds.    He still needs an ischemic workup; wasn't unwilling to have it done during inpatient. Will decide by the time he has follow up in [facility]-still wants to but hasn't discussed it with a friend of his that is a physician.    Has refused statin and PCSK-9 inhibitors; has been willing to take ezetimibe.     ____________________________________________________________  Oscar Fox MD

## 2024-04-03 ENCOUNTER — HOME CARE VISIT (OUTPATIENT)
Dept: HOME HEALTH SERVICES | Facility: HOME HEALTH | Age: 69
End: 2024-04-03
Payer: MEDICARE

## 2024-04-03 VITALS
DIASTOLIC BLOOD PRESSURE: 60 MMHG | SYSTOLIC BLOOD PRESSURE: 92 MMHG | HEART RATE: 56 BPM | RESPIRATION RATE: 18 BRPM | TEMPERATURE: 98.4 F | OXYGEN SATURATION: 87 %

## 2024-04-03 PROCEDURE — G0151 HHCP-SERV OF PT,EA 15 MIN: HCPCS

## 2024-04-03 PROCEDURE — G0270 MNT SUBS TX FOR CHANGE DX: HCPCS

## 2024-04-03 PROCEDURE — G0158 HHC OT ASSISTANT EA 15: HCPCS | Mod: CO

## 2024-04-03 SDOH — HEALTH STABILITY: PHYSICAL HEALTH: EXERCISE COMMENTS: NO EXERCISES TODAY DUE TO PATIENT HAVING CONCERNS ABOUT VITALS

## 2024-04-03 SDOH — HEALTH STABILITY: MENTAL HEALTH: NUTRITION HISTORY: PT PREVIOUSLY ON REGULAR DIET, NOT FOLLOWING CARB CONTROLLED, LOW SODIUM DIET FOR T2DM AND HTN

## 2024-04-03 ASSESSMENT — ENCOUNTER SYMPTOMS
PAIN: 1
MUSCLE WEAKNESS: 1
PERSON REPORTING PAIN: PATIENT

## 2024-04-03 ASSESSMENT — ACTIVITIES OF DAILY LIVING (ADL)
AMBULATION ASSISTANCE: STAND BY ASSIST
PHYSICAL TRANSFERS ASSESSED: 1
AMBULATION ASSISTANCE: 1
AMBULATION_DISTANCE/DURATION_TOLERATED: 40 X 2
CURRENT_FUNCTION: SUPERVISION
AMBULATION ASSISTANCE ON FLAT SURFACES: 1

## 2024-04-04 ENCOUNTER — HOSPITAL ENCOUNTER (INPATIENT)
Facility: HOSPITAL | Age: 69
LOS: 6 days | Discharge: HOME HEALTH | DRG: 190 | End: 2024-04-10
Attending: INTERNAL MEDICINE | Admitting: INTERNAL MEDICINE
Payer: MEDICARE

## 2024-04-04 ENCOUNTER — APPOINTMENT (OUTPATIENT)
Dept: CARDIOLOGY | Facility: HOSPITAL | Age: 69
DRG: 190 | End: 2024-04-04
Payer: MEDICARE

## 2024-04-04 ENCOUNTER — APPOINTMENT (OUTPATIENT)
Dept: RADIOLOGY | Facility: HOSPITAL | Age: 69
DRG: 190 | End: 2024-04-04
Payer: MEDICARE

## 2024-04-04 DIAGNOSIS — J44.1 COPD EXACERBATION (MULTI): ICD-10-CM

## 2024-04-04 DIAGNOSIS — E11.9 TYPE 2 DIABETES MELLITUS WITHOUT COMPLICATION, WITHOUT LONG-TERM CURRENT USE OF INSULIN (MULTI): ICD-10-CM

## 2024-04-04 DIAGNOSIS — R00.0 TACHYCARDIA: Primary | ICD-10-CM

## 2024-04-04 DIAGNOSIS — I49.1 PAC (PREMATURE ATRIAL CONTRACTION): ICD-10-CM

## 2024-04-04 DIAGNOSIS — I50.9 ACUTE ON CHRONIC CONGESTIVE HEART FAILURE, UNSPECIFIED HEART FAILURE TYPE (MULTI): ICD-10-CM

## 2024-04-04 DIAGNOSIS — G47.33 OSA (OBSTRUCTIVE SLEEP APNEA): ICD-10-CM

## 2024-04-04 DIAGNOSIS — Z00.00 ENCOUNTER FOR GENERAL ADULT MEDICAL EXAMINATION WITHOUT ABNORMAL FINDINGS: ICD-10-CM

## 2024-04-04 DIAGNOSIS — J96.01 ACUTE HYPOXIC RESPIRATORY FAILURE (MULTI): ICD-10-CM

## 2024-04-04 LAB
ALBUMIN SERPL BCP-MCNC: 4.1 G/DL (ref 3.4–5)
ALP SERPL-CCNC: 89 U/L (ref 33–136)
ALT SERPL W P-5'-P-CCNC: 31 U/L (ref 10–52)
ANION GAP SERPL CALC-SCNC: 19 MMOL/L (ref 10–20)
AST SERPL W P-5'-P-CCNC: 39 U/L (ref 9–39)
BASOPHILS # BLD AUTO: 0.03 X10*3/UL (ref 0–0.1)
BASOPHILS NFR BLD AUTO: 0.3 %
BILIRUB SERPL-MCNC: 0.9 MG/DL (ref 0–1.2)
BNP SERPL-MCNC: 158 PG/ML (ref 0–99)
BUN SERPL-MCNC: 35 MG/DL (ref 6–23)
CALCIUM SERPL-MCNC: 9.2 MG/DL (ref 8.6–10.3)
CARDIAC TROPONIN I PNL SERPL HS: 62 NG/L (ref 0–20)
CARDIAC TROPONIN I PNL SERPL HS: 67 NG/L (ref 0–20)
CHLORIDE SERPL-SCNC: 86 MMOL/L (ref 98–107)
CO2 SERPL-SCNC: 32 MMOL/L (ref 21–32)
CREAT SERPL-MCNC: 0.78 MG/DL (ref 0.5–1.3)
EGFRCR SERPLBLD CKD-EPI 2021: >90 ML/MIN/1.73M*2
EOSINOPHIL # BLD AUTO: 0.13 X10*3/UL (ref 0–0.7)
EOSINOPHIL NFR BLD AUTO: 1.4 %
ERYTHROCYTE [DISTWIDTH] IN BLOOD BY AUTOMATED COUNT: 13 % (ref 11.5–14.5)
GLUCOSE SERPL-MCNC: 107 MG/DL (ref 74–99)
HCT VFR BLD AUTO: 55 % (ref 41–52)
HGB BLD-MCNC: 19.1 G/DL (ref 13.5–17.5)
IMM GRANULOCYTES # BLD AUTO: 0.02 X10*3/UL (ref 0–0.7)
IMM GRANULOCYTES NFR BLD AUTO: 0.2 % (ref 0–0.9)
INR PPP: 1 (ref 0.9–1.1)
LYMPHOCYTES # BLD AUTO: 2.1 X10*3/UL (ref 1.2–4.8)
LYMPHOCYTES NFR BLD AUTO: 22.2 %
MAGNESIUM SERPL-MCNC: 2.5 MG/DL (ref 1.6–2.4)
MCH RBC QN AUTO: 33.5 PG (ref 26–34)
MCHC RBC AUTO-ENTMCNC: 34.7 G/DL (ref 32–36)
MCV RBC AUTO: 96 FL (ref 80–100)
MONOCYTES # BLD AUTO: 0.77 X10*3/UL (ref 0.1–1)
MONOCYTES NFR BLD AUTO: 8.1 %
NEUTROPHILS # BLD AUTO: 6.4 X10*3/UL (ref 1.2–7.7)
NEUTROPHILS NFR BLD AUTO: 67.8 %
NRBC BLD-RTO: 0 /100 WBCS (ref 0–0)
PHOSPHATE SERPL-MCNC: 4.2 MG/DL (ref 2.5–4.9)
PLATELET # BLD AUTO: 218 X10*3/UL (ref 150–450)
POTASSIUM SERPL-SCNC: 4.6 MMOL/L (ref 3.5–5.3)
POTASSIUM SERPL-SCNC: 4.6 MMOL/L (ref 3.5–5.3)
PROT SERPL-MCNC: 7 G/DL (ref 6.4–8.2)
PROTHROMBIN TIME: 11.2 SECONDS (ref 9.8–12.8)
RBC # BLD AUTO: 5.71 X10*6/UL (ref 4.5–5.9)
SODIUM SERPL-SCNC: 132 MMOL/L (ref 136–145)
WBC # BLD AUTO: 9.5 X10*3/UL (ref 4.4–11.3)

## 2024-04-04 PROCEDURE — 93005 ELECTROCARDIOGRAM TRACING: CPT

## 2024-04-04 PROCEDURE — 84100 ASSAY OF PHOSPHORUS: CPT | Performed by: STUDENT IN AN ORGANIZED HEALTH CARE EDUCATION/TRAINING PROGRAM

## 2024-04-04 PROCEDURE — 2500000002 HC RX 250 W HCPCS SELF ADMINISTERED DRUGS (ALT 637 FOR MEDICARE OP, ALT 636 FOR OP/ED): Performed by: INTERNAL MEDICINE

## 2024-04-04 PROCEDURE — 71045 X-RAY EXAM CHEST 1 VIEW: CPT | Performed by: RADIOLOGY

## 2024-04-04 PROCEDURE — 99291 CRITICAL CARE FIRST HOUR: CPT | Performed by: INTERNAL MEDICINE

## 2024-04-04 PROCEDURE — 84484 ASSAY OF TROPONIN QUANT: CPT | Performed by: INTERNAL MEDICINE

## 2024-04-04 PROCEDURE — 71275 CT ANGIOGRAPHY CHEST: CPT | Performed by: STUDENT IN AN ORGANIZED HEALTH CARE EDUCATION/TRAINING PROGRAM

## 2024-04-04 PROCEDURE — 83735 ASSAY OF MAGNESIUM: CPT | Performed by: STUDENT IN AN ORGANIZED HEALTH CARE EDUCATION/TRAINING PROGRAM

## 2024-04-04 PROCEDURE — 36415 COLL VENOUS BLD VENIPUNCTURE: CPT | Performed by: INTERNAL MEDICINE

## 2024-04-04 PROCEDURE — 2550000001 HC RX 255 CONTRASTS: Performed by: INTERNAL MEDICINE

## 2024-04-04 PROCEDURE — 85610 PROTHROMBIN TIME: CPT | Performed by: NURSE PRACTITIONER

## 2024-04-04 PROCEDURE — 96374 THER/PROPH/DIAG INJ IV PUSH: CPT

## 2024-04-04 PROCEDURE — 36415 COLL VENOUS BLD VENIPUNCTURE: CPT | Performed by: NURSE PRACTITIONER

## 2024-04-04 PROCEDURE — 71275 CT ANGIOGRAPHY CHEST: CPT

## 2024-04-04 PROCEDURE — 2060000001 HC INTERMEDIATE ICU ROOM DAILY

## 2024-04-04 PROCEDURE — 85025 COMPLETE CBC W/AUTO DIFF WBC: CPT | Performed by: INTERNAL MEDICINE

## 2024-04-04 PROCEDURE — 2500000004 HC RX 250 GENERAL PHARMACY W/ HCPCS (ALT 636 FOR OP/ED): Performed by: INTERNAL MEDICINE

## 2024-04-04 PROCEDURE — 84132 ASSAY OF SERUM POTASSIUM: CPT | Performed by: INTERNAL MEDICINE

## 2024-04-04 PROCEDURE — 94640 AIRWAY INHALATION TREATMENT: CPT

## 2024-04-04 PROCEDURE — 80053 COMPREHEN METABOLIC PANEL: CPT | Performed by: NURSE PRACTITIONER

## 2024-04-04 PROCEDURE — 83880 ASSAY OF NATRIURETIC PEPTIDE: CPT | Performed by: NURSE PRACTITIONER

## 2024-04-04 PROCEDURE — 71045 X-RAY EXAM CHEST 1 VIEW: CPT

## 2024-04-04 PROCEDURE — 84484 ASSAY OF TROPONIN QUANT: CPT | Performed by: NURSE PRACTITIONER

## 2024-04-04 RX ORDER — DEXTROSE 50 % IN WATER (D50W) INTRAVENOUS SYRINGE
12.5
Status: DISCONTINUED | OUTPATIENT
Start: 2024-04-04 | End: 2024-04-10 | Stop reason: HOSPADM

## 2024-04-04 RX ORDER — INSULIN LISPRO 100 [IU]/ML
0-10 INJECTION, SOLUTION INTRAVENOUS; SUBCUTANEOUS
Status: DISCONTINUED | OUTPATIENT
Start: 2024-04-05 | End: 2024-04-10 | Stop reason: HOSPADM

## 2024-04-04 RX ORDER — IPRATROPIUM BROMIDE AND ALBUTEROL SULFATE 2.5; .5 MG/3ML; MG/3ML
3 SOLUTION RESPIRATORY (INHALATION) EVERY 20 MIN
Status: COMPLETED | OUTPATIENT
Start: 2024-04-04 | End: 2024-04-04

## 2024-04-04 RX ORDER — DEXTROSE 50 % IN WATER (D50W) INTRAVENOUS SYRINGE
25
Status: DISCONTINUED | OUTPATIENT
Start: 2024-04-04 | End: 2024-04-10 | Stop reason: HOSPADM

## 2024-04-04 RX ORDER — IPRATROPIUM BROMIDE AND ALBUTEROL SULFATE 2.5; .5 MG/3ML; MG/3ML
3 SOLUTION RESPIRATORY (INHALATION) EVERY 2 HOUR PRN
Status: DISCONTINUED | OUTPATIENT
Start: 2024-04-04 | End: 2024-04-10 | Stop reason: HOSPADM

## 2024-04-04 RX ADMIN — IPRATROPIUM BROMIDE AND ALBUTEROL SULFATE 3 ML: 2.5; .5 SOLUTION RESPIRATORY (INHALATION) at 19:15

## 2024-04-04 RX ADMIN — IPRATROPIUM BROMIDE AND ALBUTEROL SULFATE 3 ML: 2.5; .5 SOLUTION RESPIRATORY (INHALATION) at 18:42

## 2024-04-04 RX ADMIN — IPRATROPIUM BROMIDE AND ALBUTEROL SULFATE 3 ML: 2.5; .5 SOLUTION RESPIRATORY (INHALATION) at 18:50

## 2024-04-04 RX ADMIN — AMIODARONE HYDROCHLORIDE 1 MG/MIN: 1.8 INJECTION, SOLUTION INTRAVENOUS at 18:57

## 2024-04-04 RX ADMIN — AMIODARONE HYDROCHLORIDE 1 MG/MIN: 1.8 INJECTION, SOLUTION INTRAVENOUS at 22:07

## 2024-04-04 RX ADMIN — IOHEXOL 57 ML: 350 INJECTION, SOLUTION INTRAVENOUS at 23:35

## 2024-04-04 ASSESSMENT — COLUMBIA-SUICIDE SEVERITY RATING SCALE - C-SSRS
6. HAVE YOU EVER DONE ANYTHING, STARTED TO DO ANYTHING, OR PREPARED TO DO ANYTHING TO END YOUR LIFE?: NO
2. HAVE YOU ACTUALLY HAD ANY THOUGHTS OF KILLING YOURSELF?: NO
6. HAVE YOU EVER DONE ANYTHING, STARTED TO DO ANYTHING, OR PREPARED TO DO ANYTHING TO END YOUR LIFE?: NO
2. HAVE YOU ACTUALLY HAD ANY THOUGHTS OF KILLING YOURSELF?: NO
1. IN THE PAST MONTH, HAVE YOU WISHED YOU WERE DEAD OR WISHED YOU COULD GO TO SLEEP AND NOT WAKE UP?: NO

## 2024-04-04 ASSESSMENT — ENCOUNTER SYMPTOMS
APPETITE LEVEL: GOOD
CHANGE IN APPETITE: UNCHANGED
HIGHEST PAIN SEVERITY IN PAST 24 HOURS: 9/10
PAIN: 1
PERSON REPORTING PAIN: PATIENT
LOWEST PAIN SEVERITY IN PAST 24 HOURS: 7/10
SUBJECTIVE PAIN PROGRESSION: UNCHANGED

## 2024-04-04 NOTE — ED TRIAGE NOTES
"C/o bradycardia after being placed on a beta blocker, patient states he has stopped taking them on Sunday due to having low HR. Patient had recent 2 week hospital admission where he required intubation, patient unable to recall events leading up to intubation.     States normal resting HR is 100.  States that he has had \"readings with hr in the 30's\"   In triage HR ranged from 50s -110s      Patient appears short of breath, has COPD hx, said recently he is unable to keep his pox above 88%.       Patient denies chest pain, recent travel.     "

## 2024-04-04 NOTE — ED TRIAGE NOTES
This patient was seen in triage.     Vitals are noted.      HPI:  Patient is a 68-year-old male with recent admission to [facility] for 2 weeks for fluid overload, was intubated, medications were adjusted and discharged 2 weeks ago.  Since then was improving, noticed an erratic pulse ox over the last 2 days.  Also with decreased oxygen level, believes that some of his symptoms are due to metoprolol, no history of atrial fibrillation    Focused PE:  Gen: Well-appearing, not in acute distress  Cardiovascular: Irregular  Respiratory: No adventitious lung sounds auscultated.  Abdomen: No reproducible abdominal tenderness upon palpation,  physical exam may be limited by patient positioning sitting up in a chair  Neuro:  Alert and Oriented, speech clear and coherent     Plan:  IV, labs, EKG, imaging pulse ox noted to be showing a pulse between         For the remainder of the patient's workup and ED course, please see the main ED provider note.  We discussed need for diagnostic testing including lab studies and imaging.  We also discussed that they may be asked to wait in the waiting room while these test are pending.  They understand that if they choose to leave without having the testing completed or resulted that we cannot rule out acute life-threatening illnesses and the risks involved to lead to worsening condition, permanent disability or even death.

## 2024-04-04 NOTE — ED PROVIDER NOTES
"HPI     CC: Bradycardia (symptomactic)     HPI: Alo Glass is a 68 y.o. male with a history of HTN, COPD, T2DM, severe obesity, MELISSA, CHF (EF 30-35%) presents with concern about his heart rate and oxygenation.  He was recently admitted early March to [facility] for respiratory failure and cardiogenic and septic shock 2/2 strep pneumonia with COPD exacerbation requiring intubation and Lasix drip.  He was discharged on a prednisone taper as well as Toprol 25 mg daily which she states is a new medication for him.  He went home on 3/23/2024.  He states that following hospital discharge his leg edema has continued to be improved.  He has had intermittent episodes of near syncope and vision going bright/\"wonky\" for about 10 minutes at a time.  He started to check his pulse oximeter and noticed that his heart rate was persistently low, between 30s to 50s.  He also has trouble getting his oxygen to read higher than 88% especially when his heart rate is low.  He stopped taking the metoprolol on Saturday, 5 days ago with only minimal improvement in his heart rate.  He states that his visiting nurses were there yesterday and noticed that his blood pressure, heart rate, and pulse ox were low and they were \"freaking out.\"  He called his doctor but did not hear back until today when he was referred to the ED by Dr. Schumacher.  He is cut back on his smoking but continues to smoke about a pack a day.    ROS: 10-point review of systems was performed and is otherwise negative except as noted in HPI.    Limitations to history: N/A    Independent Historians: Friend at bedside    External Records Reviewed: Prior hospital notes and DC summary    Past Medical History: Noncontributory except per HPI     Past Surgical History: Noncontributory except per HPI     Family History: Reviewed and noncontributory     Social History: Smokes tobacco. Denies ETOH. Denies illicit drugs.    Social Determinants Affecting Care: N/A    No Known " Allergies    Home Meds:   Current Outpatient Medications   Medication Instructions    albuterol 90 mcg/actuation inhaler 2 puffs, inhalation, Every 4 hours PRN    allopurinol (ZYLOPRIM) 300 mg, oral, Daily    aspirin 81 mg, oral, Daily    clobetasol (Temovate) 0.05 % external solution 1 Application, Topical, Nightly    empagliflozin (JARDIANCE) 10 mg, oral, Daily    ezetimibe (ZETIA) 10 mg, oral, Daily    fluticasone-umeclidin-vilanter (Trelegy Ellipta) 100-62.5-25 mcg blister with device one puff per day    folic acid (FOLVITE) 1 mg, oral, Daily    furosemide (LASIX) 80 mg, oral, 2 times daily with meals    indomethacin (INDOCIN) 50 mg, oral, 2 times daily PRN    ipratropium-albuteroL (Duo-Neb) 0.5-2.5 mg/3 mL nebulizer solution 3 mL, nebulization, 4 times daily RT    levothyroxine (SYNTHROID, LEVOXYL) 125 mcg, oral, Daily    metFORMIN (GLUCOPHAGE) 1,000 mg, oral, 2 times daily    Mounjaro 2.5 mg, subcutaneous, Weekly    multivitamin with minerals tablet 1 tablet, oral, Daily    thiamine (VITAMIN B-1) 100 mg, oral, Daily    valsartan (DIOVAN) 40 mg, oral, Daily        Physical Exam     ED Triage Vitals   Temperature Heart Rate Respirations BP   04/04/24 1706 04/04/24 1707 04/04/24 1700 04/04/24 1706   36.7 °C (98.1 °F) (!) 108 (!) 25 (!) 128/102      Pulse Ox Temp src Heart Rate Source Patient Position   04/04/24 1700 -- -- --   (!) 90 %         BP Location FiO2 (%)     -- --               Heart Rate:  [108-118]   Temperature:  [36.7 °C (98.1 °F)]   Respirations:  [16-25]   BP: (128-138)/()   Pulse Ox:  [90 %-96 %]      Physical Exam  Vitals and nursing note reviewed.     CONSTITUTIONAL: Chronically ill appearing, well nourished, in no acute distress.   HENMT: Head atraumatic. Airway patent. Nasal mucosa clear. Mouth with normal mucosa, clear oropharynx. Uvula midline. Neck supple.    EYES: Clear bilaterally, pupils equally round and reactive to light.   CARDIOVASCULAR: Bradycardic, irregular rhythm.  Heart  sounds S1, S2.  No murmurs, rubs or gallops. Normal pulses. Capillary refill < 2 sec.   RESPIRATORY: No increased work of breathing.  Biphasic wheeze with extremely poor air exchange.    GASTROINTESTINAL: Abdomen soft, non-distended, non-tender. No rebound, no guarding. Normal bowel sounds. No palpable masses.  GENITOURINARY:  No CVA tenderness.  MUSCULOSKELETAL: Spine appears normal, range of motion is not limited, no muscle or joint tenderness.  2+ BLE edema with chronic skin changes, no calf TTP or palpable cords.  NEUROLOGICAL: Alert and oriented, no asymmetry, moving all extremities equally.  SKIN: Warm, dry and intact. No rash or notable lesions.  PSYCHIATRIC: Normal mood and affect.  HEME/LYMPH: No adenopathy or splenomegaly.    Diagnostic Results      ECG: ECGs read and interpreted by me. See ED Course, below, for interpretation.    Labs Reviewed   COMPREHENSIVE METABOLIC PANEL - Abnormal       Result Value    Glucose 107 (*)     Sodium 132 (*)     Potassium 4.6      Chloride 86 (*)     Bicarbonate 32      Anion Gap 19      Urea Nitrogen 35 (*)     Creatinine 0.78      eGFR >90      Calcium 9.2      Albumin 4.1      Alkaline Phosphatase 89      Total Protein 7.0      AST 39      Bilirubin, Total 0.9      ALT 31     TROPONIN I, HIGH SENSITIVITY - Abnormal    Troponin I, High Sensitivity 62 (*)     Narrative:     Less than 99th percentile of normal range cutoff-  Female and children under 18 years old <14 ng/L; Male <21 ng/L: Negative  Repeat testing should be performed if clinically indicated.     Female and children under 18 years old 14-50 ng/L; Male 21-50 ng/L:  Consistent with possible cardiac damage and possible increased clinical   risk. Serial measurements may help to assess extent of myocardial damage.     >50 ng/L: Consistent with cardiac damage, increased clinical risk and  myocardial infarction. Serial measurements may help assess extent of   myocardial damage.      NOTE: Children less than 1 year  old may have higher baseline troponin   levels and results should be interpreted in conjunction with the overall   clinical context.     NOTE: Troponin I testing is performed using a different   testing methodology at [facility] than at other   [facility] hospitals. Direct result comparisons should only   be made within the same method.   B-TYPE NATRIURETIC PEPTIDE - Abnormal     (*)     Narrative:        <100 pg/mL - Heart failure unlikely  100-299 pg/mL - Intermediate probability of acute heart                  failure exacerbation. Correlate with clinical                  context and patient history.    >=300 pg/mL - Heart Failure likely. Correlate with clinical                  context and patient history.    BNP testing is performed using different testing methodology at [facility] than at other [facility] hospitals. Direct result comparisons should only be made within the same method.      MAGNESIUM - Abnormal    Magnesium 2.50 (*)    CBC WITH AUTO DIFFERENTIAL - Abnormal    WBC 9.5      nRBC 0.0      RBC 5.71      Hemoglobin 19.1 (*)     Hematocrit 55.0 (*)     MCV 96      MCH 33.5      MCHC 34.7      RDW 13.0      Platelets 218      Neutrophils % 67.8      Immature Granulocytes %, Automated 0.2      Lymphocytes % 22.2      Monocytes % 8.1      Eosinophils % 1.4      Basophils % 0.3      Neutrophils Absolute 6.40      Immature Granulocytes Absolute, Automated 0.02      Lymphocytes Absolute 2.10      Monocytes Absolute 0.77      Eosinophils Absolute 0.13      Basophils Absolute 0.03     TROPONIN I, HIGH SENSITIVITY - Abnormal    Troponin I, High Sensitivity 67 (*)     Narrative:     Less than 99th percentile of normal range cutoff-  Female and children under 18 years old <14 ng/L; Male <21 ng/L: Negative  Repeat testing should be performed if clinically indicated.     Female and children under 18 years old 14-50 ng/L; Male 21-50 ng/L:  Consistent with possible cardiac damage and  possible increased clinical   risk. Serial measurements may help to assess extent of myocardial damage.     >50 ng/L: Consistent with cardiac damage, increased clinical risk and  myocardial infarction. Serial measurements may help assess extent of   myocardial damage.      NOTE: Children less than 1 year old may have higher baseline troponin   levels and results should be interpreted in conjunction with the overall   clinical context.     NOTE: Troponin I testing is performed using a different   testing methodology at [facility] than at other   [facility] hospitals. Direct result comparisons should only   be made within the same method.   PROTIME-INR - Normal    Protime 11.2      INR 1.0     PHOSPHORUS - Normal    Phosphorus 4.2     POTASSIUM - Normal    Potassium 4.6     CBC   BASIC METABOLIC PANEL         XR chest 1 view   Final Result   1.  Dense right basilar airspace disease with small right pleural   effusion. Pneumonia is hind differential.   2. Enlarged cardiac silhouette with pulmonary vascular congestion                  MACRO:   None        Signed by: Julián Peterson 4/4/2024 6:00 PM   Dictation workstation:   [id number]      CT angio chest for pulmonary embolism    (Results Pending)                 Herbie Coma Scale Score: 15                  Procedure  Critical Care    Performed by: Rocio Arias MD  Authorized by: Rocio Arias MD    Critical care provider statement:     Critical care time (minutes):  35    Critical care time was exclusive of:  Separately billable procedures and treating other patients and teaching time    Critical care was necessary to treat or prevent imminent or life-threatening deterioration of the following conditions:  Circulatory failure    Critical care was time spent personally by me on the following activities:  Development of treatment plan with patient or surrogate, discussions with consultants, evaluation of patient's response to treatment,  examination of patient, obtaining history from patient or surrogate, ordering and performing treatments and interventions, ordering and review of laboratory studies, ordering and review of radiographic studies, pulse oximetry, re-evaluation of patient's condition and review of old charts    Care discussed with: admitting provider        ED Course & MDM   Assessment/Plan:   Alo Glass is a 68 y.o. male with a history of HTN, COPD, T2DM, severe obesity, MELISSA, CHF (EF 30-35%) presents with concern about his heart rate and oxygenation being low after being started on metoprolol inpatient during recent admission for respiratory failure and shock requiring intubation.  He is notably tachycardic with frequent PVCs on the monitor which do not appear to be mechanically capturing as his heart rate is consistently in the 50s to 60s.  He is not any respiratory distress but has wheezing throughout his lung fields with very poor air exchange concerning for possible COPD or recurrent CHF exacerbation.  Workup was initiated by triage provider and shows CMP with mild hyponatremia 132, hypochloremia 86, elevated BUN 35 with normal creatinine 0.78, mildly elevated , downtrending troponin of 62, normal coags (CBC pending). Chest x-ray shows dense right basilar airspace disease with small right pleural effusion, on my read appears improved from prior CXR at [facility].     See below for details of ED course and ultimate disposition.    Medications   methylPREDNISolone sod succinate (SOLU-Medrol) injection 125 mg (has no administration in time range)   ipratropium-albuteroL (Duo-Neb) 0.5-2.5 mg/3 mL nebulizer solution 3 mL (3 mL nebulization Given 4/4/24 1915)   amiodarone (Cordarone) 1.8 mg/ml bolus from bag solution 150 mg (150 mg intravenous Bolus from Bag 4/4/24 1859)        ED Course as of 04/04/24 2246   Thu Apr 04, 2024   1833 ECG read interpreted by me showing sinus tachycardia with frequent PACs and PVC, wide-complex  RBBB, left axis deviation.  No significant ST or T wave abnormality largely unchanged from multiple prior ECGs. [CG]   1846 I discussed the case with Dr. Garcia cardiology who recommends amiodarone bolus and drip to help with PAC load.  [CG]   1859 CBC notable for normal WBC 9.5, elevated hemoglobin of 15.1, normal platelets. [CG]   2038 Rpt trop stable.  [CG]   2245 Patient was admitted under Dr. Corona to the stepdown unit. Heart rate has not significantly changed on amiodarone. He is pending CTPE at time of handoff to Dr. Guardado. [CG]      ED Course User Index  [CG] Rocio Arias MD         Diagnoses as of 04/04/24 2246   Tachycardia   PAC (premature atrial contraction)   COPD exacerbation (CMS/HCC)       Disposition:   Admitted to IM (Dr. Corona) team, discussed differential and findings with patient as well as any family members at bedside.      ED Prescriptions    None         Rocio Arias MD  EM/IM/Peds    This note was dictated by speech recognition. Minor errors in transcription may be present.     Rocio Arias MD  04/04/24 2247

## 2024-04-05 ENCOUNTER — APPOINTMENT (OUTPATIENT)
Dept: HOME HEALTH SERVICES | Facility: HOME HEALTH | Age: 69
End: 2024-04-05
Payer: MEDICARE

## 2024-04-05 ENCOUNTER — HOME CARE VISIT (OUTPATIENT)
Dept: HOME HEALTH SERVICES | Facility: HOME HEALTH | Age: 69
End: 2024-04-05
Payer: MEDICARE

## 2024-04-05 LAB
ANION GAP SERPL CALC-SCNC: 16 MMOL/L (ref 10–20)
BUN SERPL-MCNC: 40 MG/DL (ref 6–23)
CALCIUM SERPL-MCNC: 9 MG/DL (ref 8.6–10.3)
CHLORIDE SERPL-SCNC: 87 MMOL/L (ref 98–107)
CO2 SERPL-SCNC: 33 MMOL/L (ref 21–32)
CREAT SERPL-MCNC: 1 MG/DL (ref 0.5–1.3)
EGFRCR SERPLBLD CKD-EPI 2021: 82 ML/MIN/1.73M*2
ERYTHROCYTE [DISTWIDTH] IN BLOOD BY AUTOMATED COUNT: 13.1 % (ref 11.5–14.5)
GLUCOSE BLD MANUAL STRIP-MCNC: 180 MG/DL (ref 74–99)
GLUCOSE BLD MANUAL STRIP-MCNC: 227 MG/DL (ref 74–99)
GLUCOSE BLD MANUAL STRIP-MCNC: 290 MG/DL (ref 74–99)
GLUCOSE SERPL-MCNC: 203 MG/DL (ref 74–99)
HCT VFR BLD AUTO: 55 % (ref 41–52)
HGB BLD-MCNC: 18.5 G/DL (ref 13.5–17.5)
HOLD SPECIMEN: NORMAL
MCH RBC QN AUTO: 33.2 PG (ref 26–34)
MCHC RBC AUTO-ENTMCNC: 33.6 G/DL (ref 32–36)
MCV RBC AUTO: 99 FL (ref 80–100)
NRBC BLD-RTO: 0 /100 WBCS (ref 0–0)
PLATELET # BLD AUTO: 217 X10*3/UL (ref 150–450)
POTASSIUM SERPL-SCNC: 3.1 MMOL/L (ref 3.5–5.3)
RBC # BLD AUTO: 5.58 X10*6/UL (ref 4.5–5.9)
SODIUM SERPL-SCNC: 133 MMOL/L (ref 136–145)
WBC # BLD AUTO: 8.1 X10*3/UL (ref 4.4–11.3)

## 2024-04-05 PROCEDURE — 5A09357 ASSISTANCE WITH RESPIRATORY VENTILATION, LESS THAN 24 CONSECUTIVE HOURS, CONTINUOUS POSITIVE AIRWAY PRESSURE: ICD-10-PCS | Performed by: INTERNAL MEDICINE

## 2024-04-05 PROCEDURE — 94640 AIRWAY INHALATION TREATMENT: CPT

## 2024-04-05 PROCEDURE — 36415 COLL VENOUS BLD VENIPUNCTURE: CPT | Performed by: INTERNAL MEDICINE

## 2024-04-05 PROCEDURE — 2500000005 HC RX 250 GENERAL PHARMACY W/O HCPCS: Performed by: INTERNAL MEDICINE

## 2024-04-05 PROCEDURE — 2500000002 HC RX 250 W HCPCS SELF ADMINISTERED DRUGS (ALT 637 FOR MEDICARE OP, ALT 636 FOR OP/ED): Performed by: NURSE PRACTITIONER

## 2024-04-05 PROCEDURE — 99222 1ST HOSP IP/OBS MODERATE 55: CPT

## 2024-04-05 PROCEDURE — 94660 CPAP INITIATION&MGMT: CPT

## 2024-04-05 PROCEDURE — 94667 MNPJ CHEST WALL 1ST: CPT

## 2024-04-05 PROCEDURE — 2500000002 HC RX 250 W HCPCS SELF ADMINISTERED DRUGS (ALT 637 FOR MEDICARE OP, ALT 636 FOR OP/ED): Performed by: PHARMACIST

## 2024-04-05 PROCEDURE — 2500000004 HC RX 250 GENERAL PHARMACY W/ HCPCS (ALT 636 FOR OP/ED): Performed by: INTERNAL MEDICINE

## 2024-04-05 PROCEDURE — 2060000001 HC INTERMEDIATE ICU ROOM DAILY

## 2024-04-05 PROCEDURE — 80048 BASIC METABOLIC PNL TOTAL CA: CPT | Performed by: INTERNAL MEDICINE

## 2024-04-05 PROCEDURE — 2500000002 HC RX 250 W HCPCS SELF ADMINISTERED DRUGS (ALT 637 FOR MEDICARE OP, ALT 636 FOR OP/ED): Performed by: INTERNAL MEDICINE

## 2024-04-05 PROCEDURE — 85027 COMPLETE CBC AUTOMATED: CPT | Performed by: INTERNAL MEDICINE

## 2024-04-05 PROCEDURE — 92610 EVALUATE SWALLOWING FUNCTION: CPT | Mod: GN

## 2024-04-05 PROCEDURE — 5A0935A ASSISTANCE WITH RESPIRATORY VENTILATION, LESS THAN 24 CONSECUTIVE HOURS, HIGH NASAL FLOW/VELOCITY: ICD-10-PCS | Performed by: INTERNAL MEDICINE

## 2024-04-05 PROCEDURE — 82947 ASSAY GLUCOSE BLOOD QUANT: CPT

## 2024-04-05 PROCEDURE — 2500000001 HC RX 250 WO HCPCS SELF ADMINISTERED DRUGS (ALT 637 FOR MEDICARE OP): Performed by: INTERNAL MEDICINE

## 2024-04-05 RX ORDER — POTASSIUM CHLORIDE 20 MEQ/1
40 TABLET, EXTENDED RELEASE ORAL ONCE
Status: COMPLETED | OUTPATIENT
Start: 2024-04-05 | End: 2024-04-05

## 2024-04-05 RX ORDER — FUROSEMIDE 10 MG/ML
80 INJECTION INTRAMUSCULAR; INTRAVENOUS EVERY 12 HOURS
Status: DISCONTINUED | OUTPATIENT
Start: 2024-04-05 | End: 2024-04-07

## 2024-04-05 RX ORDER — ALBUTEROL SULFATE 0.83 MG/ML
2.5 SOLUTION RESPIRATORY (INHALATION) EVERY 4 HOURS PRN
Status: DISCONTINUED | OUTPATIENT
Start: 2024-04-05 | End: 2024-04-10 | Stop reason: HOSPADM

## 2024-04-05 RX ORDER — FUROSEMIDE 80 MG/1
80 TABLET ORAL
Status: DISCONTINUED | OUTPATIENT
Start: 2024-04-05 | End: 2024-04-10

## 2024-04-05 RX ORDER — VALSARTAN 80 MG/1
40 TABLET ORAL DAILY
Status: DISCONTINUED | OUTPATIENT
Start: 2024-04-05 | End: 2024-04-10 | Stop reason: HOSPADM

## 2024-04-05 RX ORDER — IPRATROPIUM BROMIDE AND ALBUTEROL SULFATE 2.5; .5 MG/3ML; MG/3ML
3 SOLUTION RESPIRATORY (INHALATION)
Status: DISCONTINUED | OUTPATIENT
Start: 2024-04-05 | End: 2024-04-05

## 2024-04-05 RX ORDER — METFORMIN HYDROCHLORIDE 500 MG/1
1000 TABLET ORAL 2 TIMES DAILY
Status: DISCONTINUED | OUTPATIENT
Start: 2024-04-05 | End: 2024-04-05

## 2024-04-05 RX ORDER — LEVOTHYROXINE SODIUM 125 UG/1
125 TABLET ORAL DAILY
Status: DISCONTINUED | OUTPATIENT
Start: 2024-04-05 | End: 2024-04-10 | Stop reason: HOSPADM

## 2024-04-05 RX ORDER — FORMOTEROL FUMARATE DIHYDRATE 20 UG/2ML
20 SOLUTION RESPIRATORY (INHALATION)
Status: DISCONTINUED | OUTPATIENT
Start: 2024-04-05 | End: 2024-04-10 | Stop reason: HOSPADM

## 2024-04-05 RX ORDER — FOLIC ACID 1 MG/1
1 TABLET ORAL DAILY
Status: DISCONTINUED | OUTPATIENT
Start: 2024-04-05 | End: 2024-04-10 | Stop reason: HOSPADM

## 2024-04-05 RX ORDER — IPRATROPIUM BROMIDE AND ALBUTEROL SULFATE 2.5; .5 MG/3ML; MG/3ML
3 SOLUTION RESPIRATORY (INHALATION)
Status: DISCONTINUED | OUTPATIENT
Start: 2024-04-05 | End: 2024-04-07

## 2024-04-05 RX ORDER — ASPIRIN 81 MG/1
81 TABLET ORAL DAILY
Status: DISCONTINUED | OUTPATIENT
Start: 2024-04-05 | End: 2024-04-10 | Stop reason: HOSPADM

## 2024-04-05 RX ORDER — FLUTICASONE FUROATE AND VILANTEROL 200; 25 UG/1; UG/1
1 POWDER RESPIRATORY (INHALATION)
Status: DISCONTINUED | OUTPATIENT
Start: 2024-04-05 | End: 2024-04-05

## 2024-04-05 RX ORDER — EZETIMIBE 10 MG/1
10 TABLET ORAL DAILY
Status: DISCONTINUED | OUTPATIENT
Start: 2024-04-05 | End: 2024-04-10 | Stop reason: HOSPADM

## 2024-04-05 RX ORDER — ALBUTEROL SULFATE 90 UG/1
2 AEROSOL, METERED RESPIRATORY (INHALATION) EVERY 4 HOURS PRN
Status: DISCONTINUED | OUTPATIENT
Start: 2024-04-05 | End: 2024-04-05

## 2024-04-05 RX ORDER — LANOLIN ALCOHOL/MO/W.PET/CERES
100 CREAM (GRAM) TOPICAL DAILY
Status: DISCONTINUED | OUTPATIENT
Start: 2024-04-05 | End: 2024-04-10 | Stop reason: HOSPADM

## 2024-04-05 RX ORDER — ALLOPURINOL 300 MG/1
300 TABLET ORAL DAILY
Status: DISCONTINUED | OUTPATIENT
Start: 2024-04-05 | End: 2024-04-10 | Stop reason: HOSPADM

## 2024-04-05 RX ORDER — BENZONATATE 100 MG/1
200 CAPSULE ORAL 3 TIMES DAILY
Status: DISCONTINUED | OUTPATIENT
Start: 2024-04-05 | End: 2024-04-10 | Stop reason: HOSPADM

## 2024-04-05 RX ORDER — BUDESONIDE 0.5 MG/2ML
0.5 INHALANT ORAL
Status: DISCONTINUED | OUTPATIENT
Start: 2024-04-05 | End: 2024-04-10 | Stop reason: HOSPADM

## 2024-04-05 RX ADMIN — TIOTROPIUM BROMIDE INHALATION SPRAY 2 PUFF: 3.12 SPRAY, METERED RESPIRATORY (INHALATION) at 07:32

## 2024-04-05 RX ADMIN — IPRATROPIUM BROMIDE AND ALBUTEROL SULFATE 3 ML: 2.5; .5 SOLUTION RESPIRATORY (INHALATION) at 14:24

## 2024-04-05 RX ADMIN — Medication 9 L/MIN: at 02:13

## 2024-04-05 RX ADMIN — METHYLPREDNISOLONE SODIUM SUCCINATE 125 MG: 125 INJECTION, POWDER, FOR SOLUTION INTRAMUSCULAR; INTRAVENOUS at 00:19

## 2024-04-05 RX ADMIN — FORMOTEROL FUMARATE DIHYDRATE 20 MCG: 20 SOLUTION RESPIRATORY (INHALATION) at 07:33

## 2024-04-05 RX ADMIN — EZETIMIBE 10 MG: 10 TABLET ORAL at 09:28

## 2024-04-05 RX ADMIN — AMIODARONE HYDROCHLORIDE 0.5 MG/MIN: 1.8 INJECTION, SOLUTION INTRAVENOUS at 20:35

## 2024-04-05 RX ADMIN — EMPAGLIFLOZIN 10 MG: 10 TABLET, FILM COATED ORAL at 09:28

## 2024-04-05 RX ADMIN — IPRATROPIUM BROMIDE AND ALBUTEROL SULFATE 3 ML: 2.5; .5 SOLUTION RESPIRATORY (INHALATION) at 07:33

## 2024-04-05 RX ADMIN — ASPIRIN 81 MG: 81 TABLET, COATED ORAL at 09:28

## 2024-04-05 RX ADMIN — METHYLPREDNISOLONE SODIUM SUCCINATE 40 MG: 40 INJECTION, POWDER, FOR SOLUTION INTRAMUSCULAR; INTRAVENOUS at 20:35

## 2024-04-05 RX ADMIN — ALBUTEROL SULFATE 2.5 MG: 2.5 SOLUTION RESPIRATORY (INHALATION) at 12:22

## 2024-04-05 RX ADMIN — Medication: at 08:00

## 2024-04-05 RX ADMIN — AMIODARONE HYDROCHLORIDE 0.5 MG/MIN: 1.8 INJECTION, SOLUTION INTRAVENOUS at 10:05

## 2024-04-05 RX ADMIN — FOLIC ACID 1 MG: 1 TABLET ORAL at 09:28

## 2024-04-05 RX ADMIN — FORMOTEROL FUMARATE DIHYDRATE 20 MCG: 20 SOLUTION RESPIRATORY (INHALATION) at 21:07

## 2024-04-05 RX ADMIN — BUDESONIDE 0.5 MG: 0.5 INHALANT ORAL at 07:33

## 2024-04-05 RX ADMIN — ALLOPURINOL 300 MG: 300 TABLET ORAL at 09:27

## 2024-04-05 RX ADMIN — INSULIN LISPRO 6 UNITS: 100 INJECTION, SOLUTION INTRAVENOUS; SUBCUTANEOUS at 09:26

## 2024-04-05 RX ADMIN — Medication 9 L/MIN: at 03:37

## 2024-04-05 RX ADMIN — THIAMINE HCL TAB 100 MG 100 MG: 100 TAB at 09:28

## 2024-04-05 RX ADMIN — BUDESONIDE 0.5 MG: 0.5 INHALANT ORAL at 21:08

## 2024-04-05 RX ADMIN — IPRATROPIUM BROMIDE AND ALBUTEROL SULFATE 3 ML: 2.5; .5 SOLUTION RESPIRATORY (INHALATION) at 21:08

## 2024-04-05 RX ADMIN — INSULIN LISPRO 4 UNITS: 100 INJECTION, SOLUTION INTRAVENOUS; SUBCUTANEOUS at 12:29

## 2024-04-05 RX ADMIN — VALSARTAN 40 MG: 80 TABLET, FILM COATED ORAL at 09:28

## 2024-04-05 RX ADMIN — BENZONATATE 200 MG: 100 CAPSULE ORAL at 09:27

## 2024-04-05 RX ADMIN — BENZONATATE 200 MG: 100 CAPSULE ORAL at 20:35

## 2024-04-05 RX ADMIN — POTASSIUM CHLORIDE 40 MEQ: 1500 TABLET, EXTENDED RELEASE ORAL at 09:27

## 2024-04-05 RX ADMIN — INSULIN LISPRO 2 UNITS: 100 INJECTION, SOLUTION INTRAVENOUS; SUBCUTANEOUS at 16:50

## 2024-04-05 SDOH — SOCIAL STABILITY: SOCIAL INSECURITY: HAS ANYONE EVER THREATENED TO HURT YOUR FAMILY OR YOUR PETS?: NO

## 2024-04-05 SDOH — SOCIAL STABILITY: SOCIAL INSECURITY: ARE THERE ANY APPARENT SIGNS OF INJURIES/BEHAVIORS THAT COULD BE RELATED TO ABUSE/NEGLECT?: NO

## 2024-04-05 SDOH — SOCIAL STABILITY: SOCIAL INSECURITY: HAVE YOU HAD THOUGHTS OF HARMING ANYONE ELSE?: NO

## 2024-04-05 SDOH — SOCIAL STABILITY: SOCIAL INSECURITY: DO YOU FEEL ANYONE HAS EXPLOITED OR TAKEN ADVANTAGE OF YOU FINANCIALLY OR OF YOUR PERSONAL PROPERTY?: NO

## 2024-04-05 SDOH — SOCIAL STABILITY: SOCIAL INSECURITY: DO YOU FEEL UNSAFE GOING BACK TO THE PLACE WHERE YOU ARE LIVING?: NO

## 2024-04-05 SDOH — SOCIAL STABILITY: SOCIAL INSECURITY: ABUSE: ADULT

## 2024-04-05 SDOH — SOCIAL STABILITY: SOCIAL INSECURITY: ARE YOU OR HAVE YOU BEEN THREATENED OR ABUSED PHYSICALLY, EMOTIONALLY, OR SEXUALLY BY ANYONE?: NO

## 2024-04-05 SDOH — SOCIAL STABILITY: SOCIAL INSECURITY: DOES ANYONE TRY TO KEEP YOU FROM HAVING/CONTACTING OTHER FRIENDS OR DOING THINGS OUTSIDE YOUR HOME?: NO

## 2024-04-05 ASSESSMENT — COGNITIVE AND FUNCTIONAL STATUS - GENERAL
MOVING TO AND FROM BED TO CHAIR: A LITTLE
WALKING IN HOSPITAL ROOM: A LITTLE
PATIENT BASELINE BEDBOUND: NO
MOBILITY SCORE: 18
TURNING FROM BACK TO SIDE WHILE IN FLAT BAD: A LITTLE
STANDING UP FROM CHAIR USING ARMS: A LITTLE
CLIMB 3 TO 5 STEPS WITH RAILING: A LOT
TOILETING: A LITTLE
DAILY ACTIVITIY SCORE: 23

## 2024-04-05 ASSESSMENT — ENCOUNTER SYMPTOMS
CHILLS: 0
ADENOPATHY: 0
ABDOMINAL DISTENTION: 0
ACTIVITY CHANGE: 1
DYSURIA: 0
ARTHRALGIAS: 1
SHORTNESS OF BREATH: 1
DIFFICULTY URINATING: 0
DIZZINESS: 0
WHEEZING: 1
EYE DISCHARGE: 0
AGITATION: 0
ABDOMINAL PAIN: 0
FATIGUE: 0
PALPITATIONS: 0
COUGH: 0

## 2024-04-05 ASSESSMENT — ACTIVITIES OF DAILY LIVING (ADL)
HEARING - RIGHT EAR: FUNCTIONAL
LACK_OF_TRANSPORTATION: NO
WALKS IN HOME: INDEPENDENT
FEEDING YOURSELF: INDEPENDENT
TOILETING: INDEPENDENT
JUDGMENT_ADEQUATE_SAFELY_COMPLETE_DAILY_ACTIVITIES: YES
BATHING: INDEPENDENT
LACK_OF_TRANSPORTATION: NO
LACK_OF_TRANSPORTATION: NO
PATIENT'S MEMORY ADEQUATE TO SAFELY COMPLETE DAILY ACTIVITIES?: YES
ADEQUATE_TO_COMPLETE_ADL: YES
ASSISTIVE_DEVICE: WALKER
DRESSING YOURSELF: INDEPENDENT
HEARING - LEFT EAR: FUNCTIONAL
GROOMING: INDEPENDENT

## 2024-04-05 ASSESSMENT — LIFESTYLE VARIABLES
HAVE YOU OR SOMEONE ELSE BEEN INJURED AS A RESULT OF YOUR DRINKING: NO
HOW OFTEN DURING THE LAST YEAR HAVE YOU BEEN UNABLE TO REMEMBER WHAT HAPPENED THE NIGHT BEFORE BECAUSE YOU HAD BEEN DRINKING: NEVER
HAS A RELATIVE, FRIEND, DOCTOR, OR ANOTHER HEALTH PROFESSIONAL EXPRESSED CONCERN ABOUT YOUR DRINKING OR SUGGESTED YOU CUT DOWN: NO
AUDIT TOTAL SCORE: 4
AUDIT-C TOTAL SCORE: 4
HOW OFTEN DURING THE LAST YEAR HAVE YOU FAILED TO DO WHAT WAS NORMALLY EXPECTED FROM YOU BECAUSE OF DRINKING: NEVER
AUDIT TOTAL SCORE: 0
HOW OFTEN DO YOU HAVE A DRINK CONTAINING ALCOHOL: 4 OR MORE TIMES A WEEK
HOW OFTEN DURING THE LAST YEAR HAVE YOU HAD A FEELING OF GUILT OR REMORSE AFTER DRINKING: NEVER
SKIP TO QUESTIONS 9-10: 1
HOW OFTEN DO YOU HAVE 6 OR MORE DRINKS ON ONE OCCASION: NEVER
HOW MANY STANDARD DRINKS CONTAINING ALCOHOL DO YOU HAVE ON A TYPICAL DAY: 1 OR 2
HOW OFTEN DURING THE LAST YEAR HAVE YOU NEEDED AN ALCOHOLIC DRINK FIRST THING IN THE MORNING TO GET YOURSELF GOING AFTER A NIGHT OF HEAVY DRINKING: NEVER
AUDIT-C TOTAL SCORE: 4
HOW OFTEN DURING THE LAST YEAR HAVE YOU FOUND THAT YOU WERE NOT ABLE TO STOP DRINKING ONCE YOU HAD STARTED: NEVER

## 2024-04-05 ASSESSMENT — PAIN SCALES - GENERAL
PAINLEVEL_OUTOF10: 0 - NO PAIN

## 2024-04-05 ASSESSMENT — PATIENT HEALTH QUESTIONNAIRE - PHQ9
SUM OF ALL RESPONSES TO PHQ9 QUESTIONS 1 & 2: 0
2. FEELING DOWN, DEPRESSED OR HOPELESS: NOT AT ALL
1. LITTLE INTEREST OR PLEASURE IN DOING THINGS: NOT AT ALL

## 2024-04-05 NOTE — NURSING NOTE
0030 Patient arrived to unit from ED with family . Educated patient on room and call light. Bed locked in lowest position call light within reach. Plan of care ongoing

## 2024-04-05 NOTE — PROGRESS NOTES
04/05/24 1416   Discharge Planning   Living Arrangements Alone   Support Systems Children;Family members   Assistance Needed Independent   Type of Residence Private residence   Number of Stairs to Enter Residence 0   Number of Stairs Within Residence 10   Do you have animals or pets at home? No   Who is requesting discharge planning? Provider   Home or Post Acute Services In home services   Type of Home Care Services Home nursing visits;Home OT;Home PT   Patient expects to be discharged to: Home with [facility]   Does the patient need discharge transport arranged? Yes   RoundTrip coordination needed? Yes   Has discharge transport been arranged? No   Financial Resource Strain   How hard is it for you to pay for the very basics like food, housing, medical care, and heating? Not hard   Housing Stability   In the last 12 months, was there a time when you were not able to pay the mortgage or rent on time? N   In the last 12 months, how many places have you lived? 1   In the last 12 months, was there a time when you did not have a steady place to sleep or slept in a shelter (including now)? N   Transportation Needs   In the past 12 months, has lack of transportation kept you from medical appointments or from getting medications? no   In the past 12 months, has lack of transportation kept you from meetings, work, or from getting things needed for daily living? No   Patient Choice   Provider Choice list and CMS website (https://medicare.gov/care-compare#search) for post-acute Quality and Resource Measure Data were provided and reviewed with: Patient   Patient / Family choosing to utilize agency / facility established prior to hospitalization Yes          Met with patient at bedside and explained my role as care coordinator. He lives in the house alone. He is independent with his care at home. He uses a walker. No oxygen in use at home, no HD. His PCP is Dr. Endy Salcido and he seen him 3 weeks ago. Pharmacy he uses [facility] in [place]. Patient came in with SOB and heart rate up and down. Patient stated he is active with [facility] and would like them back. Provider made aware.

## 2024-04-05 NOTE — H&P
History Of Present Illness  Alo Glass is a 68 y.o. male presenting with change in the heart rate,.  He is a 60-year-old  male, who was discharged from [facility] about a 2 weeks ago, when he was presented there with respiratory distress and he was diagnosed with cardiogenic shock and secondary to sepsis and pneumonia, he was cared for, and he was discharged, and also he is known to have a history of chronic systolic heart failure with ejection fraction of 35%, COPD, type 2 diabetes, obstructive sleep apnea, lymphedema, hypertension, hyperlipidemia, he came to the emergency department, since he has been checking his Pulse Ox, That the Heart Rate was variable, he is here for further management.  He did say that since discharge from the hospital he did feel better, but when he was checking the heart rate it was between 30-50, and also his oxygen level was low, so his nurse called the ambulance so he came to the emergency department,.  He did mention that he was sent home on metoprolol.    .  He is still a smoker.       Past Medical History  He has a past medical history of Personal history of other specified conditions (01/08/2023).    Surgical History  He has a past surgical history that includes Hernia repair (11/07/2013) and Other surgical history (11/07/2013).     Social History  He reports that he has been smoking cigarettes. He has never used smokeless tobacco. He reports current alcohol use of about 7.0 standard drinks of alcohol per week. He reports that he does not use drugs.    Family History  No family history on file.     Allergies  Patient has no known allergies.    Review of Systems   Constitutional:  Positive for activity change. Negative for chills and fatigue.   HENT:  Negative for congestion and dental problem.    Eyes:  Negative for discharge.   Respiratory:  Positive for shortness of breath and wheezing. Negative for cough.    Cardiovascular:  Negative for chest pain,  "palpitations and leg swelling.   Gastrointestinal:  Negative for abdominal distention and abdominal pain.   Endocrine: Negative for cold intolerance.   Genitourinary:  Negative for difficulty urinating and dysuria.   Musculoskeletal:  Positive for arthralgias.   Neurological:  Negative for dizziness.   Hematological:  Negative for adenopathy.   Psychiatric/Behavioral:  Negative for agitation and behavioral problems.         Physical Exam  Constitutional:       General: He is in acute distress.      Appearance: He is obese.   HENT:      Head: Normocephalic and atraumatic.   Cardiovascular:      Rate and Rhythm: Tachycardia present.      Heart sounds: Murmur heard.   Pulmonary:      Breath sounds: Wheezing present. No rhonchi.   Abdominal:      General: There is no distension.      Palpations: Abdomen is soft. There is no mass.   Musculoskeletal:         General: Swelling present.      Cervical back: Normal range of motion and neck supple.   Skin:     Coloration: Skin is not jaundiced.   Neurological:      General: No focal deficit present.   Psychiatric:         Mood and Affect: Mood normal.          Last Recorded Vitals  Blood pressure 141/68, pulse 80, temperature 36.7 °C (98.1 °F), temperature source Temporal, resp. rate 22, height 1.803 m (5' 10.98\"), weight 130 kg (287 lb), SpO2 95 %.    Relevant Results  Results for orders placed or performed during the hospital encounter of 04/04/24 (from the past 96 hour(s))   Comprehensive metabolic panel   Result Value Ref Range    Glucose 107 (H) 74 - 99 mg/dL    Sodium 132 (L) 136 - 145 mmol/L    Potassium 4.6 3.5 - 5.3 mmol/L    Chloride 86 (L) 98 - 107 mmol/L    Bicarbonate 32 21 - 32 mmol/L    Anion Gap 19 10 - 20 mmol/L    Urea Nitrogen 35 (H) 6 - 23 mg/dL    Creatinine 0.78 0.50 - 1.30 mg/dL    eGFR >90 >60 mL/min/1.73m*2    Calcium 9.2 8.6 - 10.3 mg/dL    Albumin 4.1 3.4 - 5.0 g/dL    Alkaline Phosphatase 89 33 - 136 U/L    Total Protein 7.0 6.4 - 8.2 g/dL    AST 39 " 9 - 39 U/L    Bilirubin, Total 0.9 0.0 - 1.2 mg/dL    ALT 31 10 - 52 U/L   Protime-INR   Result Value Ref Range    Protime 11.2 9.8 - 12.8 seconds    INR 1.0 0.9 - 1.1   Troponin I, High Sensitivity   Result Value Ref Range    Troponin I, High Sensitivity 62 (HH) 0 - 20 ng/L   B-Type Natriuretic Peptide   Result Value Ref Range     (H) 0 - 99 pg/mL   Magnesium   Result Value Ref Range    Magnesium 2.50 (H) 1.60 - 2.40 mg/dL   Phosphorus   Result Value Ref Range    Phosphorus 4.2 2.5 - 4.9 mg/dL   Potassium   Result Value Ref Range    Potassium 4.6 3.5 - 5.3 mmol/L   ECG 12 lead   Result Value Ref Range    Ventricular Rate 108 BPM    Atrial Rate 108 BPM    OH Interval 204 ms    QRS Duration 168 ms    QT Interval 450 ms    QTC Calculation(Bazett) 603 ms    R Axis -83 degrees    T Axis 55 degrees    QRS Count 18 beats    Q Onset 202 ms    P Onset 100 ms    P Offset 170 ms    T Offset 427 ms    QTC Fredericia 547 ms   CBC and Auto Differential   Result Value Ref Range    WBC 9.5 4.4 - 11.3 x10*3/uL    nRBC 0.0 0.0 - 0.0 /100 WBCs    RBC 5.71 4.50 - 5.90 x10*6/uL    Hemoglobin 19.1 (H) 13.5 - 17.5 g/dL    Hematocrit 55.0 (H) 41.0 - 52.0 %    MCV 96 80 - 100 fL    MCH 33.5 26.0 - 34.0 pg    MCHC 34.7 32.0 - 36.0 g/dL    RDW 13.0 11.5 - 14.5 %    Platelets 218 150 - 450 x10*3/uL    Neutrophils % 67.8 40.0 - 80.0 %    Immature Granulocytes %, Automated 0.2 0.0 - 0.9 %    Lymphocytes % 22.2 13.0 - 44.0 %    Monocytes % 8.1 2.0 - 10.0 %    Eosinophils % 1.4 0.0 - 6.0 %    Basophils % 0.3 0.0 - 2.0 %    Neutrophils Absolute 6.40 1.20 - 7.70 x10*3/uL    Immature Granulocytes Absolute, Automated 0.02 0.00 - 0.70 x10*3/uL    Lymphocytes Absolute 2.10 1.20 - 4.80 x10*3/uL    Monocytes Absolute 0.77 0.10 - 1.00 x10*3/uL    Eosinophils Absolute 0.13 0.00 - 0.70 x10*3/uL    Basophils Absolute 0.03 0.00 - 0.10 x10*3/uL   Troponin I, High Sensitivity   Result Value Ref Range    Troponin I, High Sensitivity 67 (HH) 0 - 20 ng/L    SST TOP   Result Value Ref Range    Extra Tube Hold for add-ons.    CBC   Result Value Ref Range    WBC 8.1 4.4 - 11.3 x10*3/uL    nRBC 0.0 0.0 - 0.0 /100 WBCs    RBC 5.58 4.50 - 5.90 x10*6/uL    Hemoglobin 18.5 (H) 13.5 - 17.5 g/dL    Hematocrit 55.0 (H) 41.0 - 52.0 %    MCV 99 80 - 100 fL    MCH 33.2 26.0 - 34.0 pg    MCHC 33.6 32.0 - 36.0 g/dL    RDW 13.1 11.5 - 14.5 %    Platelets 217 150 - 450 x10*3/uL   Basic Metabolic Panel   Result Value Ref Range    Glucose 203 (H) 74 - 99 mg/dL    Sodium 133 (L) 136 - 145 mmol/L    Potassium 3.1 (L) 3.5 - 5.3 mmol/L    Chloride 87 (L) 98 - 107 mmol/L    Bicarbonate 33 (H) 21 - 32 mmol/L    Anion Gap 16 10 - 20 mmol/L    Urea Nitrogen 40 (H) 6 - 23 mg/dL    Creatinine 1.00 0.50 - 1.30 mg/dL    eGFR 82 >60 mL/min/1.73m*2    Calcium 9.0 8.6 - 10.3 mg/dL   POCT GLUCOSE   Result Value Ref Range    POCT Glucose 290 (H) 74 - 99 mg/dL   POCT GLUCOSE   Result Value Ref Range    POCT Glucose 227 (H) 74 - 99 mg/dL      ECG 12 lead    Result Date: 4/5/2024  See ED provider note for full interpretation and clinical correlation    CT angio chest for pulmonary embolism    Result Date: 4/4/2024  Interpreted By:  Ishaan Banda, STUDY: CT ANGIO CHEST FOR PULMONARY EMBOLISM;  4/4/2024 11:41 pm   INDICATION: Signs/Symptoms:hypoxic, recent admission.   COMPARISON: 10/02/2018 CT chest without contrast.   ACCESSION NUMBER(S): [id number]   ORDERING CLINICIAN: JANEL NASH   TECHNIQUE: Contiguous axial images of the chest were obtained after the intravenous administration of iodinated contrast using angiographic PE protocol. Coronal and sagittal reformatted images were reconstructed from the axial data. MIP images were created on an independent workstation and reviewed. Dual energy reconstructions were also performed including low energy virtual mono-energetic images and iodine density maps.   FINDINGS: MEDIASTINUM AND LYMPH NODES:  The esophageal wall appears within normal limits.  No  enlarged intrathoracic or axillary lymph nodes by imaging criteria. No pneumomediastinum.   VESSELS:  Normal caliber aorta. Minimal aortic atherosclerosis.  No pulmonary embolism.   HEART: There is moderate cardiomegaly. There is severe aortic valvular calcification.  Moderate coronary artery calcifications. No significant pericardial effusion.   LUNG, AIRWAYS, AND PLEURA: There is generalized volume loss of the right hemithorax was present prior study but slightly progressed which is due to the presence of slightly greater atelectasis in the right middle lobe and right lower lobe. There is new frothy debris of occluding the right lower lobe bronchus and bronchus intermedius. There is diffuse bronchial wall thickening in the right middle and lower lobes with additional debris occluding there segmental and subsegmental bronchi. There is a small focus of subsegmental atelectasis in the medial left lower lobe. There is emphysema.   OSSEOUS STRUCTURES: No acute osseous abnormality.   CHEST WALL SOFT TISSUES: No discernible abnormality.   UPPER ABDOMEN/OTHER: No acute abnormality.       Frothy debris occluding the bronchus intermedius and right lower lobe bronchus and additional multifocal segmental and subsegmental occlusions in the right middle and right lower lobes with diffuse new bronchial wall thickening and worsening atelectasis in both lobes. Findings are most likely related to aspiration with postobstructive atelectasis.   Severe aortic valve calcifications to the extent that we likely result in aortic stenosis. Recommend follow-up echocardiography.   Moderate 4 chamber cardiomegaly and moderate coronary artery calcifications.   Emphysema.   MACRO: None.   Signed by: Ishaan Banda 4/4/2024 11:56 PM Dictation workstation:   [id number]    XR chest 1 view    Result Date: 4/4/2024  Interpreted By:  Julián Petesron, STUDY: XR CHEST 1 VIEW;  4/4/2024 5:55 pm   INDICATION: Signs/Symptoms:sob.   COMPARISON:  03/13/2024   ACCESSION NUMBER(S): [id number]   ORDERING CLINICIAN: JANEL NASH   FINDINGS:         CARDIOMEDIASTINAL SILHOUETTE: There is enlargement cardiac silhouette with vascular congestion but no yolanda edema   LUNGS: There is dense airspace disease at the right lung base with a small right pleural effusion. Mild patchy left basilar airspace disease as well   ABDOMEN: No remarkable upper abdominal findings.   BONES: No acute osseous changes.       1.  Dense right basilar airspace disease with small right pleural effusion. Pneumonia is hind differential. 2. Enlarged cardiac silhouette with pulmonary vascular congestion       MACRO: None   Signed by: Julián Peterson 4/4/2024 6:00 PM Dictation workstation:   [id number]      Medications  allopurinol, 300 mg, oral, Daily  aspirin, 81 mg, oral, Daily  benzonatate, 200 mg, oral, TID  budesonide, 0.5 mg, nebulization, BID  empagliflozin, 10 mg, oral, Daily  ezetimibe, 10 mg, oral, Daily  folic acid, 1 mg, oral, Daily  formoterol, 20 mcg, nebulization, BID  furosemide, 80 mg, intravenous, q12h  [Held by provider] furosemide, 80 mg, oral, BID with meals  insulin lispro, 0-10 Units, subcutaneous, TID with meals  ipratropium-albuteroL, 3 mL, nebulization, 4x daily  ipratropium-albuteroL, 3 mL, nebulization, TID  levothyroxine, 125 mcg, oral, Daily  oxygen, , inhalation, Continuous - Inhalation  oxygen, , inhalation, Continuous - Inhalation  thiamine, 100 mg, oral, Daily  tiotropium, 2 puff, inhalation, Daily  valsartan, 40 mg, oral, Daily       PRN medications: albuterol, dextrose, dextrose, glucagon, glucagon, ipratropium-albuteroL     Assessment/Plan   He is a 68-year-old  male, who is known to history of COPD, still smokes, also known to have history of obesity, obstructive sleep apnea, chronic systolic heart failure with ejection fraction of 30 to 35%, lymphedema, GERD, hypertension, recent admission to [facility] with cardiogenic shock  secondary to pneumonia secondary to sepsis, he came from home, as he was sent to the emergency department by the nurse, as his heart rate was low in the pulse ox was low.  Upon admission to the emergency department he had a workup done which included CT PE, which did not show any pneumonia, also labs, and also his heart rate was high, and he was having PACs, this was discussed with the cardiologist per the advice he was also started on amiodarone.  68-year-old  male he is here because of low heart rate, his metoprolol was already discontinued but now he is tachycardic, he was given amiodarone, cardiology is managing,.  It appears as here he is also fluid overloaded acute on chronic CHF, he is on p.o. Lasix at home, he refuses Lasix,.  COPD he is still a smoker continue with the aerosols and steroids added  Diabetes we will continue the same with the sliding scale.  Hypertension same  DVT prophylaxis       I spent 75 minutes in the professional and overall care of this patient.    Curtis Corona MD

## 2024-04-05 NOTE — PROGRESS NOTES
Speech-Language Pathology    SLP Adult Inpatient Speech-Language Pathology Clinical Swallow Evaluation    Patient Name: Alo Glass  MRN: [id number]  Today's Date: 4/5/2024   Time Calculation  Start Time: 0925  Stop Time: 0955  Time Calculation (min): 30 min         Current Problem:   1. Tachycardia        2. PAC (premature atrial contraction)        3. COPD exacerbation (CMS/HCC)              Recommendations:  Risk for Aspiration: Yes  Additional Recommendations: Dysphagia treatment (Will follow to determine if need for MBSS indicated during course of hospital stay)  Solid Diet Recommendations : Regular (IDDSI Level 7)  Liquid Diet Recommendations: Thin (IDDSI Level 0)  Compensatory Swallowing Strategies: Upright 90 degrees as possible for all oral intake, Remain upright for 20-30 minutes after meals, Alternate solids and liquids, Add moisture to solids  Medication Administration Recommendations: Whole, With Liquid  Follow up treatments: Diet tolerance monitoring, Patient/family education  Dysphagia Goals: Patient will tolerate recommended diet without observed clinical signs of aspiration      Assessment:  Assessment Results: Pt presents with WFL oral swallow, no overt s/s aspiration noted at bedside. Pt at increased risk of laryngeal aspiration given current respiratory and cardiac concerns. Unable to r/o silent aspiration at bedside, however Pt declined MBS to r/o aspiration at this time.  Medical Staff Made Aware: Yes      Plan:  Inpatient/Swing Bed or Outpatient: Inpatient  Treatment/Interventions: Assess diet tolerance  SLP Plan: Skilled SLP  SLP Frequency: 1x per week  Duration: Current admission  SLP Discharge Recommendations: Continue home program upon D/C  Discussed POC: Patient  Discussed Risks/Benefits: Yes  Patient/Caregiver Agreeable: Yes  SLP - OK to Discharge: Yes      Subjective     General Visit Information:  Patient Class: Inpatient  Living Environment: Home  Pt Feedback: Pt reports no  "immediate concerns with PO intake, however Pt c/o sensation of bolus sticking \"at back of throat\" intermittently since approx. last admission.  Reason for Referral: Swallow Eval, concern for aspiration  Referred By: ANSHUL Tillman  Past Medical History Relevant to Rehab: HTN, COPD, DM2, MELISSA, CHF, 1 PPD smoker  Patient Seen During This Visit: Yes  Prior to Session Communication: Bedside nurse  Date of Order: 04/05/24  [employer] Diet: Regular  Current Diet : Regular      Objective     Baseline Assessment:  Respiratory Status: Oxygen via nasal cannula  History of Intubation: Yes  Behavior/Cognition: Alert, Cooperative  Vision: Functional for self-feeding  Hearing: Within Functional Limits  Patient Positioning: Upright in Chair  Baseline Vocal Quality: Normal      Pain:  Pain Score: 0 - No pain       Oral/Motor Assessment:  Oral Hygiene: WFL  Dentition: Adequate/Natural  Oral Motor: Within Functional Limits  Intelligibility: Intelligible  Breath Support: Adequate for speech  Hearing: Within Functional Limits      Consistencies Trialed:  Consistencies Trialed: Yes  Pt trialed meds whole with thin liquids as provided by nsg, and food items from breakfast tray.    Pt took approx 4 small pills, 2-3 medium-large sized pills independently with liquid wash, 2 grapes, and approx 6-8oz thin liquids by cup overall.      Clinical Observations:  Management of Oral Secretions: Adequate  No overt s/s aspiration noted.  Mildly increased SOB noted during speaking and after multiple subsequent PO presentations, subsequent mouth breathing noted.        "

## 2024-04-05 NOTE — CARE PLAN
The patient's goals for the shift include  breathe better.    The clinical goals for the shift include pt to be hemodynamically stable    Over the shift, the patient did not make progress toward the following goals. Barriers to progression include patient non compliant on some of ordered medications. Recommendations to address these barriers include NA.    Problem: Respiratory  Goal: No signs of respiratory distress (eg. Use of accessory muscles. Peds grunting)  Outcome: Not Progressing

## 2024-04-05 NOTE — PROGRESS NOTES
Pharmacy Medication History Review     Spoke to the patient, went over all his medications, patient can not take Metoprolol.  He was switched from Olmesartan to Valsartan and patient wants to know why, Olmesartan worked good.  If patient is put on any other meds he stated that he wants to know everything, Why? Side effects, the purpose..etc        Alo Glass is a 68 y.o. male admitted for Tachycardia. Pharmacy reviewed the patient's [id number] medications and allergies for accuracy.  Prior to Admission Medications   Prescriptions Last Dose Informant   Mounjaro 2.5 mg/0.5 mL pen injector     Sig: Inject 2.5 mg under the skin 1 (one) time per week.   Patient taking differently: Inject 2.5 mg under the skin 1 (one) time per week. On Friday, varies   albuterol 90 mcg/actuation inhaler 4/5/2024    Sig: INHALE 2 PUFFS EVERY 4-6 HOURS AS NEEDED.   allopurinol (Zyloprim) 300 mg tablet 4/4/2024    Sig: TAKE 1 TABLET BY MOUTH ONCE DAILY.   aspirin 81 mg EC tablet 4/4/2024    Sig: Take 1 tablet (81 mg) by mouth once daily.   benzonatate (Tessalon) 200 mg capsule     Sig: Take 1 capsule (200 mg) by mouth 3 times a day for 10 days. Do not crush or chew.   clobetasol (Temovate) 0.05 % external solution     Sig: Apply 1 Application topically once daily at bedtime.   empagliflozin (Jardiance) 10 mg 4/4/2024    Sig: Take 1 tablet (10 mg) by mouth once daily.   ezetimibe (Zetia) 10 mg tablet 4/4/2024    Sig: Take 1 tablet (10 mg) by mouth once daily.   fluticasone-umeclidin-vilanter (Trelegy Ellipta) 100-62.5-25 mcg blister with device 4/4/2024    Sig: one puff per day   folic acid (Folvite) 1 mg tablet 4/4/2024    Sig: Take 1 tablet (1 mg) by mouth once daily.   furosemide (Lasix) 80 mg tablet     Sig: Take 1 tablet (80 mg) by mouth 2 times a day with meals.   Patient taking differently: Take 1 tablet (80 mg) by mouth once daily.   indomethacin (Indocin) 50 mg capsule     Sig: Take 1 capsule (50 mg) by mouth 2 times a  day as needed for mild pain (1 - 3) or moderate pain (4 - 6).   Patient taking differently: Take 1 capsule (50 mg) by mouth 2 times a day as needed for mild pain (1 - 3) or moderate pain (4 - 6). Patient takes when he has a flare up of Gout   ipratropium-albuteroL (Duo-Neb) 0.5-2.5 mg/3 mL nebulizer solution 4/4/2024    Sig: Take 3 mL by nebulization 4 times a day.   levothyroxine (Synthroid, Levoxyl) 125 mcg tablet 4/4/2024    Sig: TAKE 1 TABLET BY MOUTH EVERY DAY   metFORMIN (Glucophage) 1,000 mg tablet Past Week    Sig: Take 1 tablet (1,000 mg) by mouth 2 times a day.   multivitamin with minerals tablet 4/4/2024    Sig: Take 1 tablet by mouth once daily.   predniSONE (Deltasone) 5 mg tablet Not Taking    Sig: Take 1 tablet (5 mg) by mouth once daily for 3 doses. Do not start before March 25, 2024.   Patient not taking: Reported on 4/5/2024   thiamine (Vitamin B-1) 100 mg tablet 4/4/2024    Sig: Take 1 tablet (100 mg) by mouth once daily.   valsartan (Diovan) 40 mg tablet 4/4/2024    Sig: Take 1 tablet (40 mg) by mouth once daily.      Facility-Administered Medications: None       The list below reflectives the updated PTA list. Please review each medication in order reconciliation for additional clarification and justification.    The list below reflectives the updated allergy list. Please review each documented allergy for additional clarification and justification.  Allergies  Reviewed by Rocio Arias MD on 4/4/2024   No Known Allergies         Below are additional concerns with the patient's PTA list.      Isabel Lauren

## 2024-04-05 NOTE — CONSULTS
"Inpatient consult to Cardiology  Consult performed by: True Jacob MD  Consult ordered by: PARAM Truong-CNP      History Of Present Illness:    Alo Glass is a 68 y.o. male h/o newly diagnosed HFrEF (3/2024: 30-35%) (unwilling to take beta blockers, on ARB, SGLT-2), moderate aortic stenosis (3/2024), obesity, MELISSA on CPAP, COPD, current smoker, type 2 DM, hypertension, DLD (refuses statins/PCSK9 inhibitors, on ezetimibe), who was recently discharged for acute systolic heart failure decompensation requiring IV diuresis, presents to the hospital with wheezing and being treated for COPD exacerbation.    He states that over the last couple of days he has developed worsening shortness of breath. He also notes that his pulse oximeter at home has bee reading low heart rates, around 40bpm, and he was really concerned. He denied any fevers or chills. He also states that since starting to take metoprolol, he has developed side effects including blurry vision and feeling \"worse\".    He notably continues to smoke cigarettes, 1 pack per day.     He was discharged on 3/23/24 for acute systolic HF exacerbation, cardiogenic and septic shock 2/2 strep pneumonia with COPD exacerbation. He required lasix drip. He was discharged on MTXL 25mg daily, valsartan 40mg daily, jardiance 10mg daily, aspirin 81mg daily, and lasix 80mg PO BID.       12 point review of systems negative unless stated above.         Last Recorded Vitals:  Vitals:    04/05/24 0337 04/05/24 0601 04/05/24 0732 04/05/24 0851   BP:  118/85  150/78   BP Location:    Right arm   Patient Position:  Lying  Sitting   Pulse:    88   Resp: 25 19  20   Temp:  36.1 °C (97 °F)  36.5 °C (97.7 °F)   TempSrc:  Temporal  Temporal   SpO2: 94% 93% 94% 96%       Last Labs:  CBC - 4/5/2024:  5:58 AM  8.1 18.5 217    55.0      CMP - 4/5/2024:  5:58 AM  9.0 7.0 39 --- 0.9   4.2 4.1 31 89      PTT - No results in last year.  1.0   11.2 _     Troponin I, High " Sensitivity   Date/Time Value Ref Range Status   04/04/2024 06:46 PM 67 (HH) 0 - 20 ng/L Final     Comment:     Previous result verified on 4/4/2024 1757 on specimen/case [id number] called with component TRPHS for procedure Troponin I, High Sensitivity with value 62 ng/L.   04/04/2024 05:20 PM 62 (HH) 0 - 20 ng/L Final   03/12/2024 07:07  (HH) 0 - 20 ng/L Final     Comment:     Previous result verified on 3/12/2024 0048 on specimen/case [id number] called with component TRPHS for procedure Troponin I, High Sensitivity with value 89 ng/L.     BNP   Date/Time Value Ref Range Status   04/04/2024 05:20  (H) 0 - 99 pg/mL Final   03/21/2024 05:52  (H) 0 - 99 pg/mL Final     Hemoglobin A1C   Date/Time Value Ref Range Status   02/20/2024 10:35 AM 5.8 (H) see below % Final   01/19/2023 10:23 AM 7.3 (A) % Final     Comment:          Diagnosis of Diabetes-Adults   Non-Diabetic: < or = 5.6%   Increased risk for developing diabetes: 5.7-6.4%   Diagnostic of diabetes: > or = 6.5%  .       Monitoring of Diabetes                Age (y)     Therapeutic Goal (%)   Adults:          >18           <7.0   Pediatrics:    13-18           <7.5                   7-12           <8.0                   0- 6            7.5-8.5   American Diabetes Association. Diabetes Care 33(S1), Jan 2010.     09/10/2021 01:29 PM 7.9 (A) % Final     Comment:          Diagnosis of Diabetes-Adults   Non-Diabetic: < or = 5.6%   Increased risk for developing diabetes: 5.7-6.4%   Diagnostic of diabetes: > or = 6.5%  .       Monitoring of Diabetes                Age (y)     Therapeutic Goal (%)   Adults:          >18           <7.0   Pediatrics:    13-18           <7.5                   7-12           <8.0                   0- 6            7.5-8.5   American Diabetes Association. Diabetes Care 33(S1), Jan 2010.       LDL Calculated   Date/Time Value Ref Range Status   02/20/2024 10:35  (H) <=99 mg/dL Final     Comment:                                  Near   Borderline      AGE      Desirable  Optimal    High     High     Very High     0-19 Y     0 - 109     ---    110-129   >/= 130     ----    20-24 Y     0 - 119     ---    120-159   >/= 160     ----      >24 Y     0 -  99   100-129  130-159   160-189     >/=190       VLDL   Date/Time Value Ref Range Status   02/20/2024 10:35 AM 16 0 - 40 mg/dL Final   09/10/2021 01:29 PM 27 0 - 40 mg/dL Final   09/18/2018 02:26 PM 22 0 - 40 mg/dL Final      Last I/O:  No intake/output data recorded.    Past Cardiology Tests (Last 3 Years):  EKG:  ECG 12 lead 04/04/2024 (Preliminary)  Sinus tachycardia with PACs vs. Multifocal atrial trachycardia, RBBB (known)    Echo:  Transthoracic echo (TTE) complete 03/11/2024   1. Left ventricular systolic function is moderately decreased with a 30-35% estimated ejection fraction.   2. Basal inferolateral segment is abnormal.   3. Poorly visualized anatomical structures due to suboptimal image quality.   4. Moderate aortic valve stenosis (Vmax 3.63m/s, MG/PG 26/53, CHAR 1.01, DI 0.12)- LVOTI VTI 12.80    5. There is global hypokinesis of the left ventricle with minor regional variations.       Ejection Fractions:  EF   Date/Time Value Ref Range Status   03/11/2024 02:04 PM 33 %      Cath:  No results found for this or any previous visit from the past 1095 days.    Stress Test:  No results found for this or any previous visit from the past 1095 days.    Cardiac Imaging:  CT Angio for PE: 4/4/2024  Frothy debris occluding the bronchus intermedius and right lower lobe  bronchus and additional multifocal segmental and subsegmental  occlusions in the right middle and right lower lobes with diffuse new  bronchial wall thickening and worsening atelectasis in both lobes.  Findings are most likely related to aspiration with postobstructive  atelectasis.    Severe aortic valve calcifications to the extent that we likely  result in aortic stenosis. Recommend follow-up  echocardiography.   Moderate 4 chamber cardiomegaly and moderate coronary artery  calcifications.    CT Coronary calcium 2018:  1. Coronary artery calcium score of 164.2*.   LM: 0.  LAD: 82.7.  LCx: 0.  RCA: 81.5.   Total: 164.2.      Past Medical History:  He has a past medical history of Personal history of other specified conditions (01/08/2023).    Past Surgical History:  He has a past surgical history that includes Hernia repair (11/07/2013) and Other surgical history (11/07/2013).      Social History:  He reports that he has been smoking cigarettes. He has never used smokeless tobacco. He reports current alcohol use of about 7.0 standard drinks of alcohol per week. He reports that he does not use drugs.    Family History:  No family history on file.     Allergies:  Patient has no known allergies.    Inpatient Medications:  Scheduled medications   Medication Dose Route Frequency    allopurinol  300 mg oral Daily    aspirin  81 mg oral Daily    benzonatate  200 mg oral TID    budesonide  0.5 mg nebulization BID    empagliflozin  10 mg oral Daily    ezetimibe  10 mg oral Daily    folic acid  1 mg oral Daily    formoterol  20 mcg nebulization BID    furosemide  80 mg intravenous q12h    [Held by provider] furosemide  80 mg oral BID with meals    insulin lispro  0-10 Units subcutaneous TID with meals    ipratropium-albuteroL  3 mL nebulization 4x daily    ipratropium-albuteroL  3 mL nebulization TID    levothyroxine  125 mcg oral Daily    oxygen   inhalation Continuous - Inhalation    oxygen   inhalation Continuous - Inhalation    thiamine  100 mg oral Daily    tiotropium  2 puff inhalation Daily    valsartan  40 mg oral Daily     PRN medications   Medication    albuterol    dextrose    dextrose    glucagon    glucagon    ipratropium-albuteroL     Continuous Medications   Medication Dose Last Rate    amiodarone  0.5-1 mg/min 0.5 mg/min (04/05/24 1005)     Outpatient Medications:  Current Outpatient Medications    Medication Instructions    albuterol 90 mcg/actuation inhaler 2 puffs, inhalation, Every 4 hours PRN    allopurinol (ZYLOPRIM) 300 mg, oral, Daily    aspirin 81 mg, oral, Daily    clobetasol (Temovate) 0.05 % external solution 1 Application, Topical, Nightly    empagliflozin (JARDIANCE) 10 mg, oral, Daily    ezetimibe (ZETIA) 10 mg, oral, Daily    fluticasone-umeclidin-vilanter (Trelegy Ellipta) 100-62.5-25 mcg blister with device one puff per day    folic acid (FOLVITE) 1 mg, oral, Daily    furosemide (LASIX) 80 mg, oral, 2 times daily with meals    indomethacin (INDOCIN) 50 mg, oral, 2 times daily PRN    ipratropium-albuteroL (Duo-Neb) 0.5-2.5 mg/3 mL nebulizer solution 3 mL, nebulization, 4 times daily RT    levothyroxine (SYNTHROID, LEVOXYL) 125 mcg, oral, Daily    metFORMIN (GLUCOPHAGE) 1,000 mg, oral, 2 times daily    Mounjaro 2.5 mg, subcutaneous, Once Weekly    multivitamin with minerals tablet 1 tablet, oral, Daily    thiamine (VITAMIN B-1) 100 mg, oral, Daily    valsartan (DIOVAN) 40 mg, oral, Daily       Physical Exam:  General: appears comfortable  Neck: Difficult to assess for JVD  Cardiac: Distant heart sounds  Pulmonary: Clear to auscultation bilaterally, normal respiratory effort  Peripheral pulses: intact, 2+  Extremities: 2+ bilateral pitting edema.       Assessment/Plan   Alo Glass is a 68 y.o. male h/o newly diagnosed HFrEF (3/2024: 30-35%) (unwilling to take beta blockers, on ARB, SGLT-2), moderate aortic stenosis (3/2024), obesity, MELISSA on CPAP, COPD, current smoker, type 2 DM, hypertension, DLD (refuses statins/PCSK9 inhibitors, on ezetimibe), who was recently discharged for acute systolic heart failure decompensation requiring IV diuresis, presents to the hospital with wheezing and being treated for COPD exacerbation.    HFrEF: EF is severely reduced based on echocardiogram 3/2024. Etiology of cardiomyopathy is probably a combination of moderate-severe aortic stenosis and potential  "component of ischemia given heavy coronary calcifications seen on CT scan.   Moderate-severe aortic stenosis: Echocardiogram from 3/2024 has certain characteristics that indicate potential severe aortic stenosis, especially a dimensionless index as 0.21 and Vmax approximating 3.7 m/s while a LVOT VTI of only 12 - suggesting low output. This is highly concerning for low flow low gradient aortic stenosis. Mean gradient was 26mmHg in the setting of low EF. Mean gradient was around the same with a normal EF 3 5 years ago, so overall suspicion is that if patient had a normal EF now, mean gradient would probably be closer to 40mmHg, making aortic stenosis severe. CT chest with contrast that was obtained to rule out PE demonstrated severely calcified aortic valve.   Multifocal atrial tachycardia: Likely secondary to COPD exacerbation. On amiodarone IV for rate control.  Coronary calcifications on CT chest  HTN  DLD  Active smoking  Patient concern for \"low heart rate\": we compared his pulse oximeter's heart rate to actual telemetry and pulse oximeter is erroneously reading his HR as low because of his premature beats and arrhythmia. Advised the patient that his pulse oximeter is not reliable for his heart rate as long as he has ectopic beats    Recommendations:  -Agree with management of COPD exacerbation with steroids  -Will defer suspicion of PNA to primary taem  -Once improving from COPD exacerbation, will address cardiac issues, including his aortic stenosis  -Continue IV amiodarone for MAT  -Potassium >4 and Magnesium >2  -Continue jardiance 10mg daily and valsartan 40mg daily for GDMT  -Agree with Ezetia 10mg daily for DLD  -Agree with ASA 81mg daily for now  -Hold off on beta blockers for now given HFrEF and likely moderate to severe aortic stenosis   -Would not aggressively treat hypertension. Excessive vasodilation may worsen gradient across stenotic aortic valve  -The workup for aortic stenosis will likely " include a structural heart consult and C as part of workup    Cardiology will continue to follow    Thank you for this very interesting consult.     Patient was seen and discussed with attending Dr. Radha Jacob MD  PGY-6 Cardiovascular Disease Fellow  [employer]  [facility]        Peripheral IV 04/04/24 20 G Left Forearm (Active)   Site Assessment Clean;Dry;Intact 04/04/24 1958   Dressing Type Transparent 04/04/24 1958   Line Status Blood return noted 04/04/24 1958   Number of days: 1     Code Status:  Full Code    True Jacob MD    =============================  Attending note  =============================    Both the fellow and I have had a face to face encounter with the patient today. I have examined the patient and edited the documented physical examination as necessary.  I personally reviewed the patient's recent labs, medications, orders, EKGs, and pertinent cardiac imaging.  I have reviewed the fellow's encounter note, approve the fellow's documentation and have edited the note to reflect the diagnostic and therapeutic plan.    Alo Glass is a 68 y.o. male h/o newly diagnosed HFrEF (3/2024: 30-35%) (unwilling to take beta blockers, on ARB, SGLT-2), moderate aortic stenosis (3/2024), obesity, MELISAS on CPAP, COPD, current smoker, type 2 DM, hypertension, DLD (refuses statins/PCSK9 inhibitors, on ezetimibe), who was recently discharged for acute systolic heart failure decompensation requiring IV diuresis, presents to the hospital with wheezing and being treated for COPD exacerbation.    He states that over the last couple of days he has developed worsening shortness of breath. He also notes that his pulse oximeter at home has bee reading low heart rates, around 40bpm, and he was really concerned. He denied any fevers or chills. He also states that since starting to take metoprolol, he has developed side effects including blurry  "vision and feeling \"worse\".    He notably continues to smoke cigarettes, 1 pack per day.     He was discharged on 3/23/24 for acute systolic HF exacerbation, cardiogenic and septic shock 2/2 strep pneumonia with COPD exacerbation. He required lasix drip. He was discharged on MTXL 25mg daily, valsartan 40mg daily, jardiance 10mg daily, aspirin 81mg daily, and lasix 80mg PO BID.     12 point review of systems negative unless stated above.       Physical Exam:  General: appears comfortable  Neck: Difficult to assess for JVD  Cardiac: Distant heart sounds  Pulmonary: Clear to auscultation bilaterally, normal respiratory effort  Peripheral pulses: intact, 2+  Extremities: 2+ bilateral pitting edema.       Assessment/Plan   Alo Glass is a 68 y.o. male h/o newly diagnosed HFrEF (3/2024: 30-35%) (unwilling to take beta blockers, on ARB, SGLT-2), moderate aortic stenosis (3/2024), obesity, MELISSA on CPAP, COPD, current smoker, type 2 DM, hypertension, DLD (refuses statins/PCSK9 inhibitors, on ezetimibe), who was recently discharged for acute systolic heart failure decompensation requiring IV diuresis, presents to the hospital with wheezing and being treated for COPD exacerbation.    --HFrEF: EF is severely reduced based on echocardiogram 3/2024. Etiology of cardiomyopathy is probably a combination of moderate-severe aortic stenosis and potential component of ischemia given heavy coronary calcifications seen on CT scan.     --Moderate-severe aortic stenosis: Echocardiogram from 3/2024 has certain characteristics that indicate potential severe aortic stenosis, especially a dimensionless index as 0.21 and Vmax approximating 3.7 m/s while a LVOT VTI of only 12 - suggesting low output. This is highly concerning for low flow low gradient aortic stenosis. Mean gradient was 26mmHg in the setting of low EF. Mean gradient was around the same with a normal EF 3 5 years ago, so overall suspicion is that if patient had a normal EF " "now, mean gradient would probably be closer to 40mmHg, making aortic stenosis severe. CT chest with contrast that was obtained to rule out PE demonstrated severely calcified aortic valve.    --Multifocal atrial tachycardia: Likely secondary to COPD exacerbation. On amiodarone IV for rate control.  --Coronary calcifications on CT chest  --HTN  --DLD  --Active smoking  --Patient concern for \"low heart rate\": we compared his pulse oximeter's heart rate to actual telemetry and pulse oximeter is erroneously reading his HR as low because of his premature beats and arrhythmia. Advised the patient that his pulse oximeter is not reliable for his heart rate as long as he has ectopic beats    Recommendations:  -Agree with management of COPD exacerbation with steroids  -Will defer suspicion of PNA to primary taem  -Once improving from COPD exacerbation, will address cardiac issues, including his aortic stenosis  -Continue IV amiodarone for MAT  -Potassium >4 and Magnesium >2  -Continue jardiance 10mg daily and valsartan 40mg daily for GDMT  -Agree with Ezetia 10mg daily for DLD  -Agree with ASA 81mg daily for now  -Hold off on beta blockers for now given HFrEF and likely moderate to severe aortic stenosis   -Would not aggressively treat hypertension. Excessive vasodilation may worsen gradient across stenotic aortic valve  -The workup for aortic stenosis will likely include a structural heart consult and LHC as part of workup    Cardiology will continue to follow     John Garcia MD    "

## 2024-04-05 NOTE — CARE PLAN
Problem: Respiratory  Goal: Clear secretions with interventions this shift  Outcome: Progressing  Goal: Minimize anxiety/maximize coping throughout shift  Outcome: Progressing  Goal: Minimal/no exertional discomfort or dyspnea this shift  Outcome: Progressing  Goal: No signs of respiratory distress (eg. Use of accessory muscles. Peds grunting)  Outcome: Progressing  Goal: Patent airway maintained this shift  Outcome: Progressing  Goal: Tolerate mechanical ventilation evidenced by VS/agitation level this shift  Outcome: Progressing  Goal: Tolerate pulmonary toileting this shift  Outcome: Progressing  Goal: Verbalize decreased shortness of breath this shift  Outcome: Progressing  Goal: Wean oxygen to maintain O2 saturation per order/standard this shift  Outcome: Progressing  Goal: Increase self care and/or family involvement in next 24 hours  Outcome: Progressing   The patient's goals for the shift include      The clinical goals for the shift include pt safety will be maintained in duration of the shift.    Over the shift, the patient did  make progress toward the following goals. Barriers to progression include SOB during ambulation, BLE edema. Recommendations to address these barriers include encourage patient to deep breathe, complete ADLs in small increments, and compliant to medications.

## 2024-04-06 LAB
ANION GAP SERPL CALC-SCNC: 14 MMOL/L (ref 10–20)
ATRIAL RATE: 108 BPM
BUN SERPL-MCNC: 36 MG/DL (ref 6–23)
CALCIUM SERPL-MCNC: 9.3 MG/DL (ref 8.6–10.3)
CHLORIDE SERPL-SCNC: 89 MMOL/L (ref 98–107)
CO2 SERPL-SCNC: 36 MMOL/L (ref 21–32)
CREAT SERPL-MCNC: 0.73 MG/DL (ref 0.5–1.3)
EGFRCR SERPLBLD CKD-EPI 2021: >90 ML/MIN/1.73M*2
ERYTHROCYTE [DISTWIDTH] IN BLOOD BY AUTOMATED COUNT: 12.9 % (ref 11.5–14.5)
GLUCOSE BLD MANUAL STRIP-MCNC: 147 MG/DL (ref 74–99)
GLUCOSE BLD MANUAL STRIP-MCNC: 186 MG/DL (ref 74–99)
GLUCOSE BLD MANUAL STRIP-MCNC: 234 MG/DL (ref 74–99)
GLUCOSE SERPL-MCNC: 170 MG/DL (ref 74–99)
HCT VFR BLD AUTO: 55.6 % (ref 41–52)
HGB BLD-MCNC: 18.3 G/DL (ref 13.5–17.5)
MCH RBC QN AUTO: 32.6 PG (ref 26–34)
MCHC RBC AUTO-ENTMCNC: 32.9 G/DL (ref 32–36)
MCV RBC AUTO: 99 FL (ref 80–100)
NRBC BLD-RTO: 0 /100 WBCS (ref 0–0)
P OFFSET: 170 MS
P ONSET: 100 MS
PLATELET # BLD AUTO: 235 X10*3/UL (ref 150–450)
POTASSIUM SERPL-SCNC: 3.8 MMOL/L (ref 3.5–5.3)
PR INTERVAL: 204 MS
Q ONSET: 202 MS
QRS COUNT: 18 BEATS
QRS DURATION: 168 MS
QT INTERVAL: 450 MS
QTC CALCULATION(BAZETT): 603 MS
QTC FREDERICIA: 547 MS
R AXIS: -83 DEGREES
RBC # BLD AUTO: 5.61 X10*6/UL (ref 4.5–5.9)
SODIUM SERPL-SCNC: 135 MMOL/L (ref 136–145)
T AXIS: 55 DEGREES
T OFFSET: 427 MS
TSH SERPL-ACNC: 1.26 MIU/L (ref 0.44–3.98)
VENTRICULAR RATE: 108 BPM
WBC # BLD AUTO: 9.5 X10*3/UL (ref 4.4–11.3)

## 2024-04-06 PROCEDURE — 2500000004 HC RX 250 GENERAL PHARMACY W/ HCPCS (ALT 636 FOR OP/ED): Performed by: INTERNAL MEDICINE

## 2024-04-06 PROCEDURE — 2500000001 HC RX 250 WO HCPCS SELF ADMINISTERED DRUGS (ALT 637 FOR MEDICARE OP): Performed by: INTERNAL MEDICINE

## 2024-04-06 PROCEDURE — 2500000002 HC RX 250 W HCPCS SELF ADMINISTERED DRUGS (ALT 637 FOR MEDICARE OP, ALT 636 FOR OP/ED): Performed by: NURSE PRACTITIONER

## 2024-04-06 PROCEDURE — 87205 SMEAR GRAM STAIN: CPT | Mod: AHULAB | Performed by: INTERNAL MEDICINE

## 2024-04-06 PROCEDURE — 2500000005 HC RX 250 GENERAL PHARMACY W/O HCPCS: Performed by: INTERNAL MEDICINE

## 2024-04-06 PROCEDURE — 94668 MNPJ CHEST WALL SBSQ: CPT

## 2024-04-06 PROCEDURE — 2060000001 HC INTERMEDIATE ICU ROOM DAILY

## 2024-04-06 PROCEDURE — 36415 COLL VENOUS BLD VENIPUNCTURE: CPT | Performed by: INTERNAL MEDICINE

## 2024-04-06 PROCEDURE — 2500000002 HC RX 250 W HCPCS SELF ADMINISTERED DRUGS (ALT 637 FOR MEDICARE OP, ALT 636 FOR OP/ED): Performed by: INTERNAL MEDICINE

## 2024-04-06 PROCEDURE — 84443 ASSAY THYROID STIM HORMONE: CPT | Performed by: INTERNAL MEDICINE

## 2024-04-06 PROCEDURE — 85027 COMPLETE CBC AUTOMATED: CPT | Performed by: INTERNAL MEDICINE

## 2024-04-06 PROCEDURE — 82947 ASSAY GLUCOSE BLOOD QUANT: CPT

## 2024-04-06 PROCEDURE — 94640 AIRWAY INHALATION TREATMENT: CPT

## 2024-04-06 PROCEDURE — 94660 CPAP INITIATION&MGMT: CPT

## 2024-04-06 PROCEDURE — 80048 BASIC METABOLIC PNL TOTAL CA: CPT | Performed by: NURSE PRACTITIONER

## 2024-04-06 PROCEDURE — 2500000002 HC RX 250 W HCPCS SELF ADMINISTERED DRUGS (ALT 637 FOR MEDICARE OP, ALT 636 FOR OP/ED): Performed by: PHARMACIST

## 2024-04-06 RX ORDER — FLUTICASONE PROPIONATE 50 MCG
2 SPRAY, SUSPENSION (ML) NASAL DAILY
Status: DISCONTINUED | OUTPATIENT
Start: 2024-04-06 | End: 2024-04-06

## 2024-04-06 RX ORDER — AMIODARONE HYDROCHLORIDE 200 MG/1
400 TABLET ORAL 3 TIMES DAILY
Status: DISCONTINUED | OUTPATIENT
Start: 2024-04-06 | End: 2024-04-09

## 2024-04-06 RX ORDER — FLUTICASONE PROPIONATE 50 MCG
2 SPRAY, SUSPENSION (ML) NASAL
Status: DISCONTINUED | OUTPATIENT
Start: 2024-04-06 | End: 2024-04-10 | Stop reason: HOSPADM

## 2024-04-06 RX ADMIN — FLUTICASONE PROPIONATE 2 SPRAY: 50 SPRAY, METERED NASAL at 14:22

## 2024-04-06 RX ADMIN — IPRATROPIUM BROMIDE AND ALBUTEROL SULFATE 3 ML: 2.5; .5 SOLUTION RESPIRATORY (INHALATION) at 20:48

## 2024-04-06 RX ADMIN — AMIODARONE HYDROCHLORIDE 400 MG: 200 TABLET ORAL at 09:02

## 2024-04-06 RX ADMIN — IPRATROPIUM BROMIDE AND ALBUTEROL SULFATE 3 ML: 2.5; .5 SOLUTION RESPIRATORY (INHALATION) at 07:18

## 2024-04-06 RX ADMIN — LEVOTHYROXINE SODIUM 125 MCG: 125 TABLET ORAL at 08:53

## 2024-04-06 RX ADMIN — AMIODARONE HYDROCHLORIDE 400 MG: 200 TABLET ORAL at 20:49

## 2024-04-06 RX ADMIN — THIAMINE HCL TAB 100 MG 100 MG: 100 TAB at 09:03

## 2024-04-06 RX ADMIN — METHYLPREDNISOLONE SODIUM SUCCINATE 20 MG: 40 INJECTION, POWDER, FOR SOLUTION INTRAMUSCULAR; INTRAVENOUS at 17:13

## 2024-04-06 RX ADMIN — BENZONATATE 200 MG: 100 CAPSULE ORAL at 20:49

## 2024-04-06 RX ADMIN — AMIODARONE HYDROCHLORIDE 400 MG: 200 TABLET ORAL at 15:31

## 2024-04-06 RX ADMIN — FORMOTEROL FUMARATE DIHYDRATE 20 MCG: 20 SOLUTION RESPIRATORY (INHALATION) at 20:47

## 2024-04-06 RX ADMIN — IPRATROPIUM BROMIDE AND ALBUTEROL SULFATE 3 ML: 2.5; .5 SOLUTION RESPIRATORY (INHALATION) at 15:40

## 2024-04-06 RX ADMIN — INSULIN LISPRO 4 UNITS: 100 INJECTION, SOLUTION INTRAVENOUS; SUBCUTANEOUS at 17:13

## 2024-04-06 RX ADMIN — Medication: at 08:00

## 2024-04-06 RX ADMIN — BENZONATATE 200 MG: 100 CAPSULE ORAL at 08:54

## 2024-04-06 RX ADMIN — EZETIMIBE 10 MG: 10 TABLET ORAL at 08:52

## 2024-04-06 RX ADMIN — EMPAGLIFLOZIN 10 MG: 10 TABLET, FILM COATED ORAL at 08:54

## 2024-04-06 RX ADMIN — METHYLPREDNISOLONE SODIUM SUCCINATE 40 MG: 40 INJECTION, POWDER, FOR SOLUTION INTRAMUSCULAR; INTRAVENOUS at 08:54

## 2024-04-06 RX ADMIN — FORMOTEROL FUMARATE DIHYDRATE 20 MCG: 20 SOLUTION RESPIRATORY (INHALATION) at 07:18

## 2024-04-06 RX ADMIN — BUDESONIDE 0.5 MG: 0.5 INHALANT ORAL at 20:47

## 2024-04-06 RX ADMIN — METHYLPREDNISOLONE SODIUM SUCCINATE 20 MG: 40 INJECTION, POWDER, FOR SOLUTION INTRAMUSCULAR; INTRAVENOUS at 23:32

## 2024-04-06 RX ADMIN — BUDESONIDE 0.5 MG: 0.5 INHALANT ORAL at 07:19

## 2024-04-06 RX ADMIN — ASPIRIN 81 MG: 81 TABLET, COATED ORAL at 08:54

## 2024-04-06 RX ADMIN — FOLIC ACID 1 MG: 1 TABLET ORAL at 08:52

## 2024-04-06 RX ADMIN — VALSARTAN 40 MG: 80 TABLET, FILM COATED ORAL at 08:52

## 2024-04-06 RX ADMIN — BENZONATATE 200 MG: 100 CAPSULE ORAL at 15:31

## 2024-04-06 RX ADMIN — INSULIN LISPRO 2 UNITS: 100 INJECTION, SOLUTION INTRAVENOUS; SUBCUTANEOUS at 12:55

## 2024-04-06 RX ADMIN — ALLOPURINOL 300 MG: 300 TABLET ORAL at 08:52

## 2024-04-06 RX ADMIN — FUROSEMIDE 80 MG: 80 TABLET ORAL at 10:20

## 2024-04-06 ASSESSMENT — PAIN SCALES - GENERAL
PAINLEVEL_OUTOF10: 0 - NO PAIN

## 2024-04-06 ASSESSMENT — PAIN - FUNCTIONAL ASSESSMENT
PAIN_FUNCTIONAL_ASSESSMENT: 0-10
PAIN_FUNCTIONAL_ASSESSMENT: 0-10

## 2024-04-06 NOTE — PROGRESS NOTES
"Alo Glass is a 68 y.o. male on day 2 of admission presenting with Tachycardia.    Subjective    Seen and examined, discussed with the patient and one of his friend,  He is up on the chair,.         Objective     Physical Exam  Alert and oriented 3.  HEENT unremarkable.  Neck no JVD.  Heart S1-S2.  Lungs with wheezes.  Abdomen is soft.  Legs edema  Last Recorded Vitals  Blood pressure 118/72, pulse 83, temperature 36.6 °C (97.9 °F), temperature source Temporal, resp. rate 18, height 1.803 m (5' 10.98\"), weight 130 kg (287 lb), SpO2 92 %.  Intake/Output last 3 Shifts:  I/O last 3 completed shifts:  In: 250 (1.9 mL/kg) [P.O.:250]  Out: 400 (3.1 mL/kg) [Urine:400 (0.1 mL/kg/hr)]  Weight: 130.2 kg     Relevant Results  Results for orders placed or performed during the hospital encounter of 04/04/24 (from the past 96 hour(s))   Comprehensive metabolic panel   Result Value Ref Range    Glucose 107 (H) 74 - 99 mg/dL    Sodium 132 (L) 136 - 145 mmol/L    Potassium 4.6 3.5 - 5.3 mmol/L    Chloride 86 (L) 98 - 107 mmol/L    Bicarbonate 32 21 - 32 mmol/L    Anion Gap 19 10 - 20 mmol/L    Urea Nitrogen 35 (H) 6 - 23 mg/dL    Creatinine 0.78 0.50 - 1.30 mg/dL    eGFR >90 >60 mL/min/1.73m*2    Calcium 9.2 8.6 - 10.3 mg/dL    Albumin 4.1 3.4 - 5.0 g/dL    Alkaline Phosphatase 89 33 - 136 U/L    Total Protein 7.0 6.4 - 8.2 g/dL    AST 39 9 - 39 U/L    Bilirubin, Total 0.9 0.0 - 1.2 mg/dL    ALT 31 10 - 52 U/L   Protime-INR   Result Value Ref Range    Protime 11.2 9.8 - 12.8 seconds    INR 1.0 0.9 - 1.1   Troponin I, High Sensitivity   Result Value Ref Range    Troponin I, High Sensitivity 62 (HH) 0 - 20 ng/L   B-Type Natriuretic Peptide   Result Value Ref Range     (H) 0 - 99 pg/mL   Magnesium   Result Value Ref Range    Magnesium 2.50 (H) 1.60 - 2.40 mg/dL   Phosphorus   Result Value Ref Range    Phosphorus 4.2 2.5 - 4.9 mg/dL   Potassium   Result Value Ref Range    Potassium 4.6 3.5 - 5.3 mmol/L   ECG 12 lead "   Result Value Ref Range    Ventricular Rate 108 BPM    Atrial Rate 108 BPM    MS Interval 204 ms    QRS Duration 168 ms    QT Interval 450 ms    QTC Calculation(Bazett) 603 ms    R Axis -83 degrees    T Axis 55 degrees    QRS Count 18 beats    Q Onset 202 ms    P Onset 100 ms    P Offset 170 ms    T Offset 427 ms    QTC Fredericia 547 ms   CBC and Auto Differential   Result Value Ref Range    WBC 9.5 4.4 - 11.3 x10*3/uL    nRBC 0.0 0.0 - 0.0 /100 WBCs    RBC 5.71 4.50 - 5.90 x10*6/uL    Hemoglobin 19.1 (H) 13.5 - 17.5 g/dL    Hematocrit 55.0 (H) 41.0 - 52.0 %    MCV 96 80 - 100 fL    MCH 33.5 26.0 - 34.0 pg    MCHC 34.7 32.0 - 36.0 g/dL    RDW 13.0 11.5 - 14.5 %    Platelets 218 150 - 450 x10*3/uL    Neutrophils % 67.8 40.0 - 80.0 %    Immature Granulocytes %, Automated 0.2 0.0 - 0.9 %    Lymphocytes % 22.2 13.0 - 44.0 %    Monocytes % 8.1 2.0 - 10.0 %    Eosinophils % 1.4 0.0 - 6.0 %    Basophils % 0.3 0.0 - 2.0 %    Neutrophils Absolute 6.40 1.20 - 7.70 x10*3/uL    Immature Granulocytes Absolute, Automated 0.02 0.00 - 0.70 x10*3/uL    Lymphocytes Absolute 2.10 1.20 - 4.80 x10*3/uL    Monocytes Absolute 0.77 0.10 - 1.00 x10*3/uL    Eosinophils Absolute 0.13 0.00 - 0.70 x10*3/uL    Basophils Absolute 0.03 0.00 - 0.10 x10*3/uL   Troponin I, High Sensitivity   Result Value Ref Range    Troponin I, High Sensitivity 67 (HH) 0 - 20 ng/L   SST TOP   Result Value Ref Range    Extra Tube Hold for add-ons.    CBC   Result Value Ref Range    WBC 8.1 4.4 - 11.3 x10*3/uL    nRBC 0.0 0.0 - 0.0 /100 WBCs    RBC 5.58 4.50 - 5.90 x10*6/uL    Hemoglobin 18.5 (H) 13.5 - 17.5 g/dL    Hematocrit 55.0 (H) 41.0 - 52.0 %    MCV 99 80 - 100 fL    MCH 33.2 26.0 - 34.0 pg    MCHC 33.6 32.0 - 36.0 g/dL    RDW 13.1 11.5 - 14.5 %    Platelets 217 150 - 450 x10*3/uL   Basic Metabolic Panel   Result Value Ref Range    Glucose 203 (H) 74 - 99 mg/dL    Sodium 133 (L) 136 - 145 mmol/L    Potassium 3.1 (L) 3.5 - 5.3 mmol/L    Chloride 87 (L) 98 -  107 mmol/L    Bicarbonate 33 (H) 21 - 32 mmol/L    Anion Gap 16 10 - 20 mmol/L    Urea Nitrogen 40 (H) 6 - 23 mg/dL    Creatinine 1.00 0.50 - 1.30 mg/dL    eGFR 82 >60 mL/min/1.73m*2    Calcium 9.0 8.6 - 10.3 mg/dL   POCT GLUCOSE   Result Value Ref Range    POCT Glucose 290 (H) 74 - 99 mg/dL   POCT GLUCOSE   Result Value Ref Range    POCT Glucose 227 (H) 74 - 99 mg/dL   POCT GLUCOSE   Result Value Ref Range    POCT Glucose 180 (H) 74 - 99 mg/dL   CBC   Result Value Ref Range    WBC 9.5 4.4 - 11.3 x10*3/uL    nRBC 0.0 0.0 - 0.0 /100 WBCs    RBC 5.61 4.50 - 5.90 x10*6/uL    Hemoglobin 18.3 (H) 13.5 - 17.5 g/dL    Hematocrit 55.6 (H) 41.0 - 52.0 %    MCV 99 80 - 100 fL    MCH 32.6 26.0 - 34.0 pg    MCHC 32.9 32.0 - 36.0 g/dL    RDW 12.9 11.5 - 14.5 %    Platelets 235 150 - 450 x10*3/uL   Basic Metabolic Panel   Result Value Ref Range    Glucose 170 (H) 74 - 99 mg/dL    Sodium 135 (L) 136 - 145 mmol/L    Potassium 3.8 3.5 - 5.3 mmol/L    Chloride 89 (L) 98 - 107 mmol/L    Bicarbonate 36 (H) 21 - 32 mmol/L    Anion Gap 14 10 - 20 mmol/L    Urea Nitrogen 36 (H) 6 - 23 mg/dL    Creatinine 0.73 0.50 - 1.30 mg/dL    eGFR >90 >60 mL/min/1.73m*2    Calcium 9.3 8.6 - 10.3 mg/dL   POCT GLUCOSE   Result Value Ref Range    POCT Glucose 147 (H) 74 - 99 mg/dL        Medications:  allopurinol, 300 mg, oral, Daily  amiodarone, 400 mg, oral, TID  aspirin, 81 mg, oral, Daily  benzonatate, 200 mg, oral, TID  budesonide, 0.5 mg, nebulization, BID  empagliflozin, 10 mg, oral, Daily  ezetimibe, 10 mg, oral, Daily  folic acid, 1 mg, oral, Daily  formoterol, 20 mcg, nebulization, BID  [Held by provider] furosemide, 80 mg, intravenous, q12h  furosemide, 80 mg, oral, BID with meals  insulin lispro, 0-10 Units, subcutaneous, TID with meals  ipratropium-albuteroL, 3 mL, nebulization, 4x daily  levothyroxine, 125 mcg, oral, Daily  methylPREDNISolone sodium succinate (PF), 20 mg, intravenous, q8h  oxygen, , inhalation, Continuous -  Inhalation  oxygen, , inhalation, Continuous - Inhalation  thiamine, 100 mg, oral, Daily  valsartan, 40 mg, oral, Daily      PRN medications: albuterol, dextrose, dextrose, fluticasone, glucagon, glucagon, ipratropium-albuteroL    ECG 12 lead    Result Date: 4/6/2024  See ED provider note for full interpretation and clinical correlation Confirmed by Karolina Freeman ([id number]) on 4/6/2024 10:10:27 AM    CT angio chest for pulmonary embolism    Result Date: 4/4/2024  Interpreted By:  Ishaan Banda, STUDY: CT ANGIO CHEST FOR PULMONARY EMBOLISM;  4/4/2024 11:41 pm   INDICATION: Signs/Symptoms:hypoxic, recent admission.   COMPARISON: 10/02/2018 CT chest without contrast.   ACCESSION NUMBER(S): [id number]   ORDERING CLINICIAN: JANEL NASH   TECHNIQUE: Contiguous axial images of the chest were obtained after the intravenous administration of iodinated contrast using angiographic PE protocol. Coronal and sagittal reformatted images were reconstructed from the axial data. MIP images were created on an independent workstation and reviewed. Dual energy reconstructions were also performed including low energy virtual mono-energetic images and iodine density maps.   FINDINGS: MEDIASTINUM AND LYMPH NODES:  The esophageal wall appears within normal limits.  No enlarged intrathoracic or axillary lymph nodes by imaging criteria. No pneumomediastinum.   VESSELS:  Normal caliber aorta. Minimal aortic atherosclerosis.  No pulmonary embolism.   HEART: There is moderate cardiomegaly. There is severe aortic valvular calcification.  Moderate coronary artery calcifications. No significant pericardial effusion.   LUNG, AIRWAYS, AND PLEURA: There is generalized volume loss of the right hemithorax was present prior study but slightly progressed which is due to the presence of slightly greater atelectasis in the right middle lobe and right lower lobe. There is new frothy debris of occluding the right lower lobe bronchus and bronchus  intermedius. There is diffuse bronchial wall thickening in the right middle and lower lobes with additional debris occluding there segmental and subsegmental bronchi. There is a small focus of subsegmental atelectasis in the medial left lower lobe. There is emphysema.   OSSEOUS STRUCTURES: No acute osseous abnormality.   CHEST WALL SOFT TISSUES: No discernible abnormality.   UPPER ABDOMEN/OTHER: No acute abnormality.       Frothy debris occluding the bronchus intermedius and right lower lobe bronchus and additional multifocal segmental and subsegmental occlusions in the right middle and right lower lobes with diffuse new bronchial wall thickening and worsening atelectasis in both lobes. Findings are most likely related to aspiration with postobstructive atelectasis.   Severe aortic valve calcifications to the extent that we likely result in aortic stenosis. Recommend follow-up echocardiography.   Moderate 4 chamber cardiomegaly and moderate coronary artery calcifications.   Emphysema.   MACRO: None.   Signed by: Ishaan Banda 4/4/2024 11:56 PM Dictation workstation:   [id number]    XR chest 1 view    Result Date: 4/4/2024  Interpreted By:  Julián Peterson, STUDY: XR CHEST 1 VIEW;  4/4/2024 5:55 pm   INDICATION: Signs/Symptoms:sob.   COMPARISON: 03/13/2024   ACCESSION NUMBER(S): [id number]   ORDERING CLINICIAN: JANEL NASH   FINDINGS:         CARDIOMEDIASTINAL SILHOUETTE: There is enlargement cardiac silhouette with vascular congestion but no yolanda edema   LUNGS: There is dense airspace disease at the right lung base with a small right pleural effusion. Mild patchy left basilar airspace disease as well   ABDOMEN: No remarkable upper abdominal findings.   BONES: No acute osseous changes.       1.  Dense right basilar airspace disease with small right pleural effusion. Pneumonia is hind differential. 2. Enlarged cardiac silhouette with pulmonary vascular congestion       MACRO: None   Signed by: Julián  Kristen 4/4/2024 6:00 PM Dictation workstation:   [id number]      Assessment/Plan   He is a 68-year-old  male, who is known to history of COPD, still smokes, also known to have history of obesity, obstructive sleep apnea, chronic systolic heart failure with ejection fraction of 30 to 35%, lymphedema, GERD, hypertension, recent admission to [facility] with cardiogenic shock secondary to pneumonia secondary to sepsis, he came from home, as he was sent to the emergency department by the nurse, as his heart rate was low in the pulse ox was low.  Upon admission to the emergency department he had a workup done which included CT PE, which did not show any pneumonia, also labs, and also his heart rate was high, and he was having PACs, this was discussed with the cardiologist per the advice he was also started on amiodarone.  68-year-old  male he is here because of low heart rate, his metoprolol was already discontinued but now he is tachycardic, he was given amiodarone, cardiology is managing,.  Acute hypoxia, due to most likely COPD exacerbation and fluid overload no pneumonia no PE  It appears as here he is also fluid overloaded acute on chronic CHF, he is on p.o. Lasix at home, he refuses Lasix,.  I do not see pneumonia also as a result I have not added antibiotics  COPD he is still a smoker continue with the aerosols and steroids added pulmonology is following.      Diabetes we will continue the same with the sliding scale.  Hypertension same  DVT prophylaxis    He has already refused IV Lasix and is under sound like changed to p.o. Lasix for now.    I spent 35 minutes in the professional and overall care of this patient.    Curtis Corona MD

## 2024-04-06 NOTE — PROGRESS NOTES
"Subjective Data:  No acute events   Tele shows rates 80s to 100s  Patient agitated with Lasix dosing  Still looks volume overloaded     Objective Data:  Last Recorded Vitals:  Vitals:    24 2311 24 0400 24 0719 24 0759   BP: 146/88 129/88  118/72   BP Location: Right arm Right arm  Right arm   Patient Position: Sitting Sitting  Sitting   Pulse: 89   83   Resp: 18 18  18   Temp: 36.6 °C (97.8 °F) 36.6 °C (97.9 °F)  36.6 °C (97.9 °F)   TempSrc: Temporal Temporal  Temporal   SpO2: 95% 95% 93% 92%   Weight:       Height:         Medical Gas Therapy: Supplemental oxygen  O2 Delivery Method: Nasal cannula  Weight  Av kg (287 lb)  Min: 130 kg (287 lb)  Max: 130 kg (287 lb)    LABS:  CMP:  Results from last 7 days   Lab Units 24  0558 24  1720   SODIUM mmol/L 135* 133* 132*   POTASSIUM mmol/L 3.8 3.1* 4.6  4.6   CHLORIDE mmol/L 89* 87* 86*   CO2 mmol/L 36* 33* 32   ANION GAP mmol/L 14 16 19   BUN mg/dL 36* 40* 35*   CREATININE mg/dL 0.73 1.00 0.78   EGFR mL/min/1.73m*2 >90 82 >90   MAGNESIUM mg/dL  --   --  2.50*   ALBUMIN g/dL  --   --  4.1   ALT U/L  --   --  31   AST U/L  --   --  39   BILIRUBIN TOTAL mg/dL  --   --  0.9     CBC:  Results from last 7 days   Lab Units 24  0558 24  1846   WBC AUTO x10*3/uL 9.5 8.1 9.5   HEMOGLOBIN g/dL 18.3* 18.5* 19.1*   HEMATOCRIT % 55.6* 55.0* 55.0*   PLATELETS AUTO x10*3/uL 235 217 218   MCV fL 99 99 96     COAG:   Results from last 7 days   Lab Units 24  1720   INR  1.0     ABO: No results found for: \"ABO\"  HEME/ENDO:     CARDIAC:   Results from last 7 days   Lab Units 24  1846 24  1720   TROPHS ng/L 67* 62*   BNP pg/mL  --  158*             Last I/O:    Intake/Output Summary (Last 24 hours) at 2024 0853  Last data filed at 2024 0700  Gross per 24 hour   Intake 1021 ml   Output 400 ml   Net 621 ml     Net IO Since Admission: 621 mL [24 0853]      Imaging Results:  ECG " 12 lead    Result Date: 4/5/2024  See ED provider note for full interpretation and clinical correlation    CT angio chest for pulmonary embolism    Result Date: 4/4/2024  Interpreted By:  Ishaan Banda, STUDY: CT ANGIO CHEST FOR PULMONARY EMBOLISM;  4/4/2024 11:41 pm   INDICATION: Signs/Symptoms:hypoxic, recent admission.   COMPARISON: 10/02/2018 CT chest without contrast.   ACCESSION NUMBER(S): [id number]   ORDERING CLINICIAN: JANEL NASH   TECHNIQUE: Contiguous axial images of the chest were obtained after the intravenous administration of iodinated contrast using angiographic PE protocol. Coronal and sagittal reformatted images were reconstructed from the axial data. MIP images were created on an independent workstation and reviewed. Dual energy reconstructions were also performed including low energy virtual mono-energetic images and iodine density maps.   FINDINGS: MEDIASTINUM AND LYMPH NODES:  The esophageal wall appears within normal limits.  No enlarged intrathoracic or axillary lymph nodes by imaging criteria. No pneumomediastinum.   VESSELS:  Normal caliber aorta. Minimal aortic atherosclerosis.  No pulmonary embolism.   HEART: There is moderate cardiomegaly. There is severe aortic valvular calcification.  Moderate coronary artery calcifications. No significant pericardial effusion.   LUNG, AIRWAYS, AND PLEURA: There is generalized volume loss of the right hemithorax was present prior study but slightly progressed which is due to the presence of slightly greater atelectasis in the right middle lobe and right lower lobe. There is new frothy debris of occluding the right lower lobe bronchus and bronchus intermedius. There is diffuse bronchial wall thickening in the right middle and lower lobes with additional debris occluding there segmental and subsegmental bronchi. There is a small focus of subsegmental atelectasis in the medial left lower lobe. There is emphysema.   OSSEOUS STRUCTURES: No acute  osseous abnormality.   CHEST WALL SOFT TISSUES: No discernible abnormality.   UPPER ABDOMEN/OTHER: No acute abnormality.       Frothy debris occluding the bronchus intermedius and right lower lobe bronchus and additional multifocal segmental and subsegmental occlusions in the right middle and right lower lobes with diffuse new bronchial wall thickening and worsening atelectasis in both lobes. Findings are most likely related to aspiration with postobstructive atelectasis.   Severe aortic valve calcifications to the extent that we likely result in aortic stenosis. Recommend follow-up echocardiography.   Moderate 4 chamber cardiomegaly and moderate coronary artery calcifications.   Emphysema.   MACRO: None.   Signed by: Ishaan Banda 4/4/2024 11:56 PM Dictation workstation:   [id number]    XR chest 1 view    Result Date: 4/4/2024  Interpreted By:  Julián Peterson, STUDY: XR CHEST 1 VIEW;  4/4/2024 5:55 pm   INDICATION: Signs/Symptoms:sob.   COMPARISON: 03/13/2024   ACCESSION NUMBER(S): [id number]   ORDERING CLINICIAN: JANEL NASH   FINDINGS:         CARDIOMEDIASTINAL SILHOUETTE: There is enlargement cardiac silhouette with vascular congestion but no yolanda edema   LUNGS: There is dense airspace disease at the right lung base with a small right pleural effusion. Mild patchy left basilar airspace disease as well   ABDOMEN: No remarkable upper abdominal findings.   BONES: No acute osseous changes.       1.  Dense right basilar airspace disease with small right pleural effusion. Pneumonia is hind differential. 2. Enlarged cardiac silhouette with pulmonary vascular congestion       MACRO: None   Signed by: Julián Peterson 4/4/2024 6:00 PM Dictation workstation:   [id number]          Past Cardiology Tests (Last 3 Years):  EKG:  Results for orders placed during the hospital encounter of 04/04/24    ECG 12 lead (Preliminary)  This result has not been signed. Information might be incomplete.    Narrative  See ED  provider note for full interpretation and clinical correlation      Results for orders placed during the hospital encounter of 03/08/24    Electrocardiogram, 12-lead PRN ACS symptoms    Narrative  Sinus rhythm with occasional Premature ventricular complexes  Right bundle branch block  T wave abnormality, consider lateral ischemia  Abnormal ECG  When compared with ECG of 12-MAR-2024 04:06, (unconfirmed)  Premature atrial complexes are no longer Present  T wave inversion now evident in Lateral leads  Confirmed by Heather Barrett ([id number]) on 3/12/2024 10:49:25 AM      ECG 12 lead    Narrative  Sinus rhythm with 1st degree AV block with occasional Premature ventricular complexes and Premature atrial complexes  Right bundle branch block  Abnormal ECG  When compared with ECG of 12-MAR-2024 00:18, (unconfirmed)  Premature ventricular complexes are now Present  WA interval has increased  Questionable change in QRS axis  T wave inversion now evident in Inferior leads  T wave inversion more evident in Anterior leads  Confirmed by Heather Barrett ([id number]) on 3/12/2024 10:49:36 AM      ECG 12 lead    Narrative  Sinus tachycardia with short WA with Premature atrial complexes with Aberrant conduction  Left axis deviation  Right bundle branch block  Septal infarct , age undetermined  Abnormal ECG  When compared with ECG of 11-MAR-2024 17:37, (unconfirmed)  No significant change was found  Confirmed by Heather Barrett ([id number]) on 3/12/2024 10:49:46 AM      Electrocardiogram, 12-lead PRN ACS symtpoms    Narrative  Sinus rhythm with Premature atrial complexes  Left axis deviation  Right bundle branch block  Abnormal ECG  When compared with ECG of 10-MAR-2024 10:10, (unconfirmed)  Criteria for Septal infarct are no longer Present  T wave inversion less evident in Anterior leads  Confirmed by Andrew Pearson ([id number]) on 3/12/2024 4:00:58 PM      ECG 12 Lead    Narrative  Sinus rhythm with 1st degree AV block with Premature atrial complexes  Left axis  deviation  Right bundle branch block  Septal infarct , age undetermined  Abnormal ECG  When compared with ECG of 08-MAR-2024 17:20, (unconfirmed)  Septal infarct is now Present  Nonspecific T wave abnormality, worse in Anterior leads  Confirmed by Andrew Pearson ([id number]) on 3/12/2024 8:43:29 AM      ECG 12 lead    Narrative  Sinus rhythm with 1st degree AV block with Premature atrial complexes  Left axis deviation  Right bundle branch block  Abnormal ECG  When compared with ECG of 13-MAY-2019 16:22,  Premature atrial complexes are now Present  Right bundle branch block is now Present  See ED provider note for full interpretation and clinical correlation  Confirmed by Jenny Spence ([id number]) on 3/12/2024 10:59:24 AM    Echo:  Results for orders placed during the hospital encounter of 03/08/24    Transthoracic echo (TTE) complete    Narrative  [facility], [address] [place] [address]  Tel [phone] and Fax [phone]    TRANSTHORACIC ECHOCARDIOGRAM REPORT      Patient Name:      BRAYAN Key Physician:    [id number] Heather Barrett MD  Study Date:        3/11/2024            Ordering Provider:    [id number] RORY GRAVES  MRN/PID:           [id number]             Fellow:  Accession#:        [id number]         Nurse:                Khushbu Holman RN  Date of Birth/Age: 1955 / 68 years Sonographer:          Goldie Woods RDCS  Gender:            M                    Additional Staff:  Height:            177.80 cm            Admit Date:           3/8/2024  Weight:            147.42 kg            Admission Status:     Inpatient -  Routine  BSA / BMI:         2.57 m2 / 46.63      Encounter#:           [id number]  kg/m2  Department Location:  [facility] Non  Invasive  Blood Pressure: 107 /70 mmHg    Study Type:    TRANSTHORACIC ECHO (TTE) COMPLETE  Diagnosis/ICD: Heart failure, unspecified-I50.9; Acute systolic (congestive)  heart failure (CHF)-I50.21  Indication:     Congestive Heart Failure  CPT Code:      Echo Complete w Full Doppler-[id number]    Patient History:  Pertinent History: COPD, HTN, LE Edema, Dyspnea and Elev trop., Elev. BNP,  Smoker,.    Study Detail: The following Echo studies were performed: 2D, M-Mode, Doppler and  color flow. Technically challenging study due to body habitus.  Definity used as a contrast agent for endocardial border  definition. Total contrast used for this procedure was 1.5 mL via  IV push. The patient was awake.      PHYSICIAN INTERPRETATION:  Left Ventricle: The left ventricular systolic function is moderately decreased, with an estimated ejection fraction of 30-35%. There is global hypokinesis of the left ventricle with minor regional variations. The left ventricular cavity size is normal. Left ventricular diastolic filling was indeterminate.  LV Wall Scoring:  The basal inferolateral segment is akinetic.    Left Atrium: The left atrium is mildly dilated.  Right Ventricle: The right ventricle is normal in size. There is normal right ventricular global systolic function.  Right Atrium: The right atrium is normal in size.  Aortic Valve: The aortic valve was not well visualized. There is evidence of moderate aortic valve stenosis.  There is no evidence of aortic valve regurgitation. The peak instantaneous gradient of the aortic valve is 52.7 mmHg. The mean gradient of the aortic valve is 26.0 mmHg.  Mitral Valve: The mitral valve is mild to moderately thickened. There is mild to moderate mitral annular calcification. There is mild mitral valve regurgitation.  Tricuspid Valve: The tricuspid valve was not well visualized. There is trace to mild tricuspid regurgitation. The right ventricular systolic pressure is unable to be estimated.  Pulmonic Valve: The pulmonic valve is not well visualized. There is physiologic pulmonic valve regurgitation.  Pericardium: There is no pericardial effusion noted.  Aorta: The aortic root is normal.  Systemic Veins:  The inferior vena cava appears dilated. There is less than 50% IVC collapse with inspiration.      CONCLUSIONS:  1. Left ventricular systolic function is moderately decreased with a 30-35% estimated ejection fraction.  2. Basal inferolateral segment is abnormal.  3. Poorly visualized anatomical structures due to suboptimal image quality.  4. Moderate aortic valve stenosis.  5. There is global hypokinesis of the left ventricle with minor regional variations.    QUANTITATIVE DATA SUMMARY:  2D MEASUREMENTS:  Normal Ranges:  IVSd:          1.14 cm    (0.6-1.1cm)  LVPWd:         1.57 cm    (0.6-1.1cm)  LVIDd:         5.26 cm    (3.9-5.9cm)  LVIDs:         4.41 cm  LV Mass Index: 117.2 g/m2  LV % FS        16.2 %    LA VOLUME:  Normal Ranges:  LA Vol A4C:        77.9 ml    (22+/-6mL/m2)  LA Vol A2C:        84.8 ml  LA Vol BP:         83.7 ml  LA Vol Index A4C:  30.4ml/m2  LA Vol Index A2C:  33.0 ml/m2  LA Vol Index BP:   32.6 ml/m2  LA Area A4C:       24.1 cm2  LA Area A2C:       24.4 cm2  LA Major Axis A4C: 6.3 cm  LA Major Axis A2C: 6.0 cm  LA Volume Index:   30.5 ml/m2  LA Vol A4C:        71.4 ml  LA Vol A2C:        78.4 ml    RA VOLUME BY A/L METHOD:  Normal Ranges:  RA Area A4C: 17.0 cm2    AORTA MEASUREMENTS:  Normal Ranges:  Asc Ao, d: 3.10 cm (2.1-3.4cm)    LV SYSTOLIC FUNCTION BY 2D PLANIMETRY (MOD):  Normal Ranges:  EF-A4C View: 33.6 % (>=55%)  EF-A2C View: 29.1 %  EF-Biplane:  33.1 %    LV DIASTOLIC FUNCTION:  Normal Ranges:  MV Peak E: 0.90 m/s (0.7-1.2 m/s)  MV Peak A: 0.39 m/s (0.42-0.7 m/s)  E/A Ratio: 2.34     (1.0-2.2)    MITRAL VALVE:  Normal Ranges:  MV DT: 99 msec (150-240msec)    AORTIC VALVE:  Normal Ranges:  AoV Vmax:                3.63 m/s  (<=1.7m/s)  AoV Peak P.7 mmHg (<20mmHg)  AoV Mean P.0 mmHg (1.7-11.5mmHg)  LVOT Max Jero:            0.69 m/s  (<=1.1m/s)  AoV VTI:                 60.70 cm  (18-25cm)  LVOT VTI:                12.80 cm  LVOT Diameter:            2.60 cm   (1.8-2.4cm)  AoV Area, VTI:           1.12 cm2  (2.5-5.5cm2)  AoV Area,Vmax:           1.01 cm2  (2.5-4.5cm2)  AoV Dimensionless Index: 0.21      RIGHT VENTRICLE:  RV Basal 3.71 cm  RV Mid   2.98 cm  RV Major 9.7 cm  TAPSE:   22.4 mm  RV s'    0.13 m/s    TRICUSPID VALVE/RVSP:  Normal Ranges:  IVC Diam: 2.32 cm    PULMONIC VALVE:  Normal Ranges:  PV Max Jero: 0.7 m/s  (0.6-0.9m/s)  PV Max P.7 mmHg      [id number] Heather Barrett MD  Electronically signed on 3/11/2024 at 4:04:49 PM      Wall Scoring        ** Final (Updated) **    Ejection Fractions:  LV EF   Date/Time Value Ref Range Status   2024 02:04 PM 33 %      Cath:  No results found for this or any previous visit.    Stress Test:  No results found for this or any previous visit.    Cardiac Imaging:  No results found for this or any previous visit.      Inpatient Medications:  Scheduled medications   Medication Dose Route Frequency    allopurinol  300 mg oral Daily    aspirin  81 mg oral Daily    benzonatate  200 mg oral TID    budesonide  0.5 mg nebulization BID    empagliflozin  10 mg oral Daily    ezetimibe  10 mg oral Daily    folic acid  1 mg oral Daily    formoterol  20 mcg nebulization BID    furosemide  80 mg intravenous q12h    [Held by provider] furosemide  80 mg oral BID with meals    insulin lispro  0-10 Units subcutaneous TID with meals    ipratropium-albuteroL  3 mL nebulization 4x daily    levothyroxine  125 mcg oral Daily    methylPREDNISolone sodium succinate (PF)  40 mg intravenous q12h    oxygen   inhalation Continuous - Inhalation    oxygen   inhalation Continuous - Inhalation    thiamine  100 mg oral Daily    tiotropium  2 puff inhalation Daily    valsartan  40 mg oral Daily     PRN medications   Medication    albuterol    dextrose    dextrose    glucagon    glucagon    ipratropium-albuteroL     Continuous Medications   Medication Dose Last Rate    amiodarone  0.5-1 mg/min 0.5 mg/min (24)       Physical  Exam:  General:  Patient is awake, alert, and oriented.  Patient is in no acute distress.  HEENT:  Pupils equal and reactive.  Normocephalic.  Moist mucosa.    Neck:  No thyromegaly.  Normal Jugular Venous Pressure.  Cardiovascular:  Regular rate and rhythm.  Normal S1 and S2.  Pulmonary:  Clear to auscultation bilaterally.  Abdomen:  Soft. Non-tender.   Non-distended.  Positive bowel sounds.  Lower Extremities:  2+ pedal pulses. No LE edema.  Neurologic:  Cranial nerves intact.  No focal deficit.   Skin: Skin warm and dry, normal skin turgor.   Psychiatric: Normal affect.     Assessment/Plan   Alo Glass is a 68 y.o. male h/o newly diagnosed HFrEF (3/2024: 30-35%) (unwilling to take beta blockers, on ARB, SGLT-2), moderate aortic stenosis (3/2024), obesity, MELISSA on CPAP, COPD, current smoker, type 2 DM, hypertension, DLD (refuses statins/PCSK9 inhibitors, on ezetimibe), who was recently discharged for acute systolic heart failure decompensation requiring IV diuresis, presents to the hospital with wheezing and being treated for COPD exacerbation.     --HFrEF: EF is severely reduced based on echocardiogram 3/2024. Etiology of cardiomyopathy is probably a combination of moderate-severe aortic stenosis and potential component of ischemia given heavy coronary calcifications seen on CT scan.      --Moderate-severe aortic stenosis: Echocardiogram from 3/2024 has certain characteristics that indicate potential severe aortic stenosis, especially a dimensionless index as 0.21 and Vmax approximating 3.7 m/s while a LVOT VTI of only 12 - suggesting low output. This is highly concerning for low flow low gradient aortic stenosis. Mean gradient was 26mmHg in the setting of low EF. Mean gradient was around the same with a normal EF 3 5 years ago, so overall suspicion is that if patient had a normal EF now, mean gradient would probably be closer to 40mmHg, making aortic stenosis severe. CT chest with contrast that was  "obtained to rule out PE demonstrated severely calcified aortic valve.     --Multifocal atrial tachycardia: Likely secondary to COPD exacerbation. On amiodarone IV for rate control.  --Coronary calcifications on CT chest  --HTN  --DLD  --Active smoking  --Patient concern for \"low heart rate\": we compared his pulse oximeter's heart rate to actual telemetry and pulse oximeter is erroneously reading his HR as low because of his premature beats and arrhythmia. Advised the patient that his pulse oximeter is not reliable for his heart rate as long as he has ectopic beats     Recommendations:  -Agree with management of COPD exacerbation with steroids  -Will defer suspicion of PNA to primary taem  -Once improving from COPD exacerbation, will address cardiac issues, including his aortic stenosis  -Continue IV amiodarone for MAT-> transitioned to PO 400mg TID  -Potassium >4 and Magnesium >2  -Continue jardiance 10mg daily and valsartan 40mg daily for GDMT  -Agree with Ezetia 10mg daily for DLD  -Agree with ASA 81mg daily for now  -Hold off on beta blockers for now given HFrEF and likely moderate to severe aortic stenosis   -Would not aggressively treat hypertension. Excessive vasodilation may worsen gradient across stenotic aortic valve  -The workup for aortic stenosis will likely include a structural heart consult and LHC as part of workup       Code Status:  Full Code    I spent 40 minutes in the professional and overall care of this patient.        Alexus Keene, APRN-CNP  "

## 2024-04-06 NOTE — CONSULTS
"Consult Note    Patient is a 68 y.o. male with a past medical history of COPD, hypertension, diabetes, obesity, obstructive sleep apnea-on auto CPAP, congestive heart failure, cardiomyopathy, hypothyroidism, moderate to severe aortic stenosis and tobacco abuse, who was recently discharged from [facility], where he was admitted for respiratory failure necessitating intubation, due to Streptococcal pneumonia, septic and cardiogenic shock, congestive heart failure and a COPD exacerbation, who was admitted to [facility] on April 4, 2024 with shortness of breath, hypoxia, bradycardia, near syncope, hypotension, visual changes and neck and shoulder pain.  In the emergency room he was noted to have a temperature of 36.7, pulse 108, respiratory rate 25, blood pressure 128/102 and oxygen saturation of 90% on an unknown FiO2.  He was found to have a normal white blood count, elevated hematocrit of 55%, low sodium and chloride, elevated BUN, normal liver function test, elevated BNP of 158, elevated troponin and an admission chest x-ray showing cardiomegaly with right lower lobe consolidation, possible right sided volume loss and a small right pleural effusion.  A CT angiogram of the chest showed no pulmonary embolism or adenopathy but did show cardiomegaly, right middle lobe and right lower lobe atelectasis with volume loss, left lower lobe atelectasis, possible debris in the bronchus intermedius and right lower lobe airways and COPD changes he was treated with DuoNeb, Pulmicort, formoterol, Spiriva, Solu-Medrol, Lasix, potassium chloride, Tessalon Perles, and amiodarone, and given auto CPAP (10-15) to be used at night.  Presently, the patient feels \"a little better\".  He denies fevers, chills, sweats, chest pain or weight loss, but does admit to mild shortness of breath at rest, dyspnea on exertion, occasional leg pains, cough productive of white sputum, nasal discharge of yellow secretions, wheezing and " postnasal drip.  He states that he uses albuterol, Trelegy, DuoNebs and auto CPAP at home, but is not on home oxygen and does not have a pulmonary or sleep physician.    Past medical history: As above.    Allergies: Per EMR.    Medications: Per EMR.    Social history:  Cigarettes: Smoked 1/2 to 2 packs a day for 50 years.  Alcohol-moderate.  Pets, hobbies and travel history-noncontributory.  Occupation-.    Family history:  Coronary disease-none.  Coronary disease-mother, grandfather and cousin.  Hypertension-none.  Diabetes-none.  Cancer-grandmother (breast).    Review of systems:  Swallowing problems-denies.  GI-constipation.  -none.  Musculoskeletal-none.  Skin-no rashes.    Visit Vitals  /72 (BP Location: Right arm, Patient Position: Sitting)   Pulse 83   Temp 36.6 °C (97.9 °F) (Temporal)   Resp 18      Physical Exam:  Gen:  Awake, alert and in NAD, sitting up in a chair; on 6 L high flow nasal cannula oxygen.  HEENT:  Normal conjunctivae and pharynx; no sinus tenderness.  Neck:  No thyroid enlargement or adenopathy.  Pulm: Scattered rales and rhonchi without wheezes or E to A changes.    Heart:  RRR versus irregularly irregular, without S3, S4 or murmurs.  Abd:  Soft, non-tender and with positive bowel sounds.  Extrem:  No clubbing; 1+ lower extremity edema.  Skin: Venous stasis changes of the legs.    Scheduled medications  allopurinol, 300 mg, oral, Daily  amiodarone, 400 mg, oral, TID  aspirin, 81 mg, oral, Daily  benzonatate, 200 mg, oral, TID  budesonide, 0.5 mg, nebulization, BID  empagliflozin, 10 mg, oral, Daily  ezetimibe, 10 mg, oral, Daily  folic acid, 1 mg, oral, Daily  formoterol, 20 mcg, nebulization, BID  furosemide, 80 mg, intravenous, q12h  [Held by provider] furosemide, 80 mg, oral, BID with meals  insulin lispro, 0-10 Units, subcutaneous, TID with meals  ipratropium-albuteroL, 3 mL, nebulization, 4x daily  levothyroxine, 125 mcg, oral, Daily  methylPREDNISolone sodium  succinate (PF), 40 mg, intravenous, q12h  oxygen, , inhalation, Continuous - Inhalation  oxygen, , inhalation, Continuous - Inhalation  thiamine, 100 mg, oral, Daily  tiotropium, 2 puff, inhalation, Daily  valsartan, 40 mg, oral, Daily      Continuous medications     PRN medications  PRN medications: albuterol, dextrose, dextrose, glucagon, glucagon, ipratropium-albuteroL     Results for orders placed or performed during the hospital encounter of 04/04/24 (from the past 96 hour(s))   Comprehensive metabolic panel   Result Value Ref Range    Glucose 107 (H) 74 - 99 mg/dL    Sodium 132 (L) 136 - 145 mmol/L    Potassium 4.6 3.5 - 5.3 mmol/L    Chloride 86 (L) 98 - 107 mmol/L    Bicarbonate 32 21 - 32 mmol/L    Anion Gap 19 10 - 20 mmol/L    Urea Nitrogen 35 (H) 6 - 23 mg/dL    Creatinine 0.78 0.50 - 1.30 mg/dL    eGFR >90 >60 mL/min/1.73m*2    Calcium 9.2 8.6 - 10.3 mg/dL    Albumin 4.1 3.4 - 5.0 g/dL    Alkaline Phosphatase 89 33 - 136 U/L    Total Protein 7.0 6.4 - 8.2 g/dL    AST 39 9 - 39 U/L    Bilirubin, Total 0.9 0.0 - 1.2 mg/dL    ALT 31 10 - 52 U/L   Protime-INR   Result Value Ref Range    Protime 11.2 9.8 - 12.8 seconds    INR 1.0 0.9 - 1.1   Troponin I, High Sensitivity   Result Value Ref Range    Troponin I, High Sensitivity 62 (HH) 0 - 20 ng/L   B-Type Natriuretic Peptide   Result Value Ref Range     (H) 0 - 99 pg/mL   Magnesium   Result Value Ref Range    Magnesium 2.50 (H) 1.60 - 2.40 mg/dL   Phosphorus   Result Value Ref Range    Phosphorus 4.2 2.5 - 4.9 mg/dL   Potassium   Result Value Ref Range    Potassium 4.6 3.5 - 5.3 mmol/L   ECG 12 lead   Result Value Ref Range    Ventricular Rate 108 BPM    Atrial Rate 108 BPM    OH Interval 204 ms    QRS Duration 168 ms    QT Interval 450 ms    QTC Calculation(Bazett) 603 ms    R Axis -83 degrees    T Axis 55 degrees    QRS Count 18 beats    Q Onset 202 ms    P Onset 100 ms    P Offset 170 ms    T Offset 427 ms    QTC Fredericia 547 ms   CBC and Auto  Differential   Result Value Ref Range    WBC 9.5 4.4 - 11.3 x10*3/uL    nRBC 0.0 0.0 - 0.0 /100 WBCs    RBC 5.71 4.50 - 5.90 x10*6/uL    Hemoglobin 19.1 (H) 13.5 - 17.5 g/dL    Hematocrit 55.0 (H) 41.0 - 52.0 %    MCV 96 80 - 100 fL    MCH 33.5 26.0 - 34.0 pg    MCHC 34.7 32.0 - 36.0 g/dL    RDW 13.0 11.5 - 14.5 %    Platelets 218 150 - 450 x10*3/uL    Neutrophils % 67.8 40.0 - 80.0 %    Immature Granulocytes %, Automated 0.2 0.0 - 0.9 %    Lymphocytes % 22.2 13.0 - 44.0 %    Monocytes % 8.1 2.0 - 10.0 %    Eosinophils % 1.4 0.0 - 6.0 %    Basophils % 0.3 0.0 - 2.0 %    Neutrophils Absolute 6.40 1.20 - 7.70 x10*3/uL    Immature Granulocytes Absolute, Automated 0.02 0.00 - 0.70 x10*3/uL    Lymphocytes Absolute 2.10 1.20 - 4.80 x10*3/uL    Monocytes Absolute 0.77 0.10 - 1.00 x10*3/uL    Eosinophils Absolute 0.13 0.00 - 0.70 x10*3/uL    Basophils Absolute 0.03 0.00 - 0.10 x10*3/uL   Troponin I, High Sensitivity   Result Value Ref Range    Troponin I, High Sensitivity 67 (HH) 0 - 20 ng/L   SST TOP   Result Value Ref Range    Extra Tube Hold for add-ons.    CBC   Result Value Ref Range    WBC 8.1 4.4 - 11.3 x10*3/uL    nRBC 0.0 0.0 - 0.0 /100 WBCs    RBC 5.58 4.50 - 5.90 x10*6/uL    Hemoglobin 18.5 (H) 13.5 - 17.5 g/dL    Hematocrit 55.0 (H) 41.0 - 52.0 %    MCV 99 80 - 100 fL    MCH 33.2 26.0 - 34.0 pg    MCHC 33.6 32.0 - 36.0 g/dL    RDW 13.1 11.5 - 14.5 %    Platelets 217 150 - 450 x10*3/uL   Basic Metabolic Panel   Result Value Ref Range    Glucose 203 (H) 74 - 99 mg/dL    Sodium 133 (L) 136 - 145 mmol/L    Potassium 3.1 (L) 3.5 - 5.3 mmol/L    Chloride 87 (L) 98 - 107 mmol/L    Bicarbonate 33 (H) 21 - 32 mmol/L    Anion Gap 16 10 - 20 mmol/L    Urea Nitrogen 40 (H) 6 - 23 mg/dL    Creatinine 1.00 0.50 - 1.30 mg/dL    eGFR 82 >60 mL/min/1.73m*2    Calcium 9.0 8.6 - 10.3 mg/dL   POCT GLUCOSE   Result Value Ref Range    POCT Glucose 290 (H) 74 - 99 mg/dL   POCT GLUCOSE   Result Value Ref Range    POCT Glucose 227 (H)  74 - 99 mg/dL   POCT GLUCOSE   Result Value Ref Range    POCT Glucose 180 (H) 74 - 99 mg/dL   CBC   Result Value Ref Range    WBC 9.5 4.4 - 11.3 x10*3/uL    nRBC 0.0 0.0 - 0.0 /100 WBCs    RBC 5.61 4.50 - 5.90 x10*6/uL    Hemoglobin 18.3 (H) 13.5 - 17.5 g/dL    Hematocrit 55.6 (H) 41.0 - 52.0 %    MCV 99 80 - 100 fL    MCH 32.6 26.0 - 34.0 pg    MCHC 32.9 32.0 - 36.0 g/dL    RDW 12.9 11.5 - 14.5 %    Platelets 235 150 - 450 x10*3/uL   Basic Metabolic Panel   Result Value Ref Range    Glucose 170 (H) 74 - 99 mg/dL    Sodium 135 (L) 136 - 145 mmol/L    Potassium 3.8 3.5 - 5.3 mmol/L    Chloride 89 (L) 98 - 107 mmol/L    Bicarbonate 36 (H) 21 - 32 mmol/L    Anion Gap 14 10 - 20 mmol/L    Urea Nitrogen 36 (H) 6 - 23 mg/dL    Creatinine 0.73 0.50 - 1.30 mg/dL    eGFR >90 >60 mL/min/1.73m*2    Calcium 9.3 8.6 - 10.3 mg/dL   POCT GLUCOSE   Result Value Ref Range    POCT Glucose 147 (H) 74 - 99 mg/dL        ECG 12 lead    Result Date: 4/5/2024  See ED provider note for full interpretation and clinical correlation    CT angio chest for pulmonary embolism    Result Date: 4/4/2024  Interpreted By:  Ishaan Banda, STUDY: CT ANGIO CHEST FOR PULMONARY EMBOLISM;  4/4/2024 11:41 pm   INDICATION: Signs/Symptoms:hypoxic, recent admission.   COMPARISON: 10/02/2018 CT chest without contrast.   ACCESSION NUMBER(S): [id number]   ORDERING CLINICIAN: JANEL NASH   TECHNIQUE: Contiguous axial images of the chest were obtained after the intravenous administration of iodinated contrast using angiographic PE protocol. Coronal and sagittal reformatted images were reconstructed from the axial data. MIP images were created on an independent workstation and reviewed. Dual energy reconstructions were also performed including low energy virtual mono-energetic images and iodine density maps.   FINDINGS: MEDIASTINUM AND LYMPH NODES:  The esophageal wall appears within normal limits.  No enlarged intrathoracic or axillary lymph nodes by  imaging criteria. No pneumomediastinum.   VESSELS:  Normal caliber aorta. Minimal aortic atherosclerosis.  No pulmonary embolism.   HEART: There is moderate cardiomegaly. There is severe aortic valvular calcification.  Moderate coronary artery calcifications. No significant pericardial effusion.   LUNG, AIRWAYS, AND PLEURA: There is generalized volume loss of the right hemithorax was present prior study but slightly progressed which is due to the presence of slightly greater atelectasis in the right middle lobe and right lower lobe. There is new frothy debris of occluding the right lower lobe bronchus and bronchus intermedius. There is diffuse bronchial wall thickening in the right middle and lower lobes with additional debris occluding there segmental and subsegmental bronchi. There is a small focus of subsegmental atelectasis in the medial left lower lobe. There is emphysema.   OSSEOUS STRUCTURES: No acute osseous abnormality.   CHEST WALL SOFT TISSUES: No discernible abnormality.   UPPER ABDOMEN/OTHER: No acute abnormality.       Frothy debris occluding the bronchus intermedius and right lower lobe bronchus and additional multifocal segmental and subsegmental occlusions in the right middle and right lower lobes with diffuse new bronchial wall thickening and worsening atelectasis in both lobes. Findings are most likely related to aspiration with postobstructive atelectasis.   Severe aortic valve calcifications to the extent that we likely result in aortic stenosis. Recommend follow-up echocardiography.   Moderate 4 chamber cardiomegaly and moderate coronary artery calcifications.   Emphysema.   MACRO: None.   Signed by: Ishaan Banda 4/4/2024 11:56 PM Dictation workstation:   [id number]    XR chest 1 view    Result Date: 4/4/2024  Interpreted By:  Julián Peterson, STUDY: XR CHEST 1 VIEW;  4/4/2024 5:55 pm   INDICATION: Signs/Symptoms:sob.   COMPARISON: 03/13/2024   ACCESSION NUMBER(S): [id number]    ORDERING CLINICIAN: JANEL NASH   FINDINGS:         CARDIOMEDIASTINAL SILHOUETTE: There is enlargement cardiac silhouette with vascular congestion but no yolanda edema   LUNGS: There is dense airspace disease at the right lung base with a small right pleural effusion. Mild patchy left basilar airspace disease as well   ABDOMEN: No remarkable upper abdominal findings.   BONES: No acute osseous changes.       1.  Dense right basilar airspace disease with small right pleural effusion. Pneumonia is hind differential. 2. Enlarged cardiac silhouette with pulmonary vascular congestion       MACRO: None   Signed by: Julián Peterson 4/4/2024 6:00 PM Dictation workstation:   [id number]    XR chest 1 view    Result Date: 3/13/2024  Interpreted By:  Deepali Casillas, STUDY: XR CHEST 1 VIEW;  3/13/2024 2:48 am   INDICATION: Signs/Symptoms:NG tube placement   COMPARISON: Chest x-ray 03/12/2024.   ACCESSION NUMBER(S): [id number]   ORDERING CLINICIAN: JAZMIN CHAVEZ   TECHNIQUE: 2 portable semi-upright frontal views of the chest were obtained.   FINDINGS: An endotracheal tube terminates approximately 4.5 cm above the kathie. The enteric tube terminates at the right upper quadrant, at the expected location of the gastric antrum.   The heart is enlarged. There is vascular congestion.   There is a large right pleural effusion with atelectasis/consolidation at the right lower lobe. No pneumothorax.       1. Cardiomegaly. Vascular congestion. Large right pleural effusion with atelectasis/consolidation at the right lower lobe. Radiographic follow-up to resolution recommended. 2. Enteric tube terminates at the right upper quadrant, at the expected location of the gastric antrum.       MACRO: None.   Signed by: Deepali Casillas 3/13/2024 3:20 AM Dictation workstation:   [id number]    Electrocardiogram, 12-lead PRN ACS symtpoms    Result Date: 3/12/2024  Sinus rhythm with Premature atrial complexes Left axis deviation Right bundle  branch block Abnormal ECG When compared with ECG of 10-MAR-2024 10:10, (unconfirmed) Criteria for Septal infarct are no longer Present T wave inversion less evident in Anterior leads Confirmed by Andrew Pearson ([id number]) on 3/12/2024 4:00:58 PM    XR chest 1 view    Result Date: 3/12/2024  Interpreted By:  Nathan Hoang, STUDY: XR CHEST 1 VIEW; 3/12/2024 2:47 pm   INDICATION: Signs/Symptoms:f/u mucus plug s/p bronch.   COMPARISON: 03/12/2024   ACCESSION NUMBER(S): [id number]   ORDERING CLINICIAN: NANDO KHAN   TECHNIQUE: 1 view of the chest was performed.   FINDINGS: No pneumothorax. Marked interval improvement in aeration of the right upper lobe. Endotracheal tube remains in good position, approximately 3 cm above the kathie. The cardiomediastinal silhouette is stable in appearance. Persistent right lower lobe airspace opacity. Mild airspace opacity and ground-glass opacity is again noted in the left lower lobe, unchanged.       No pneumothorax. Marked interval improvement in aeration of the right upper lobe.   The remainder of the examination does not appear significantly changed.   Signed by: Nathan Hoang 3/12/2024 3:39 PM Dictation workstation:   [id number]    Bronchoscopy    Result Date: 3/12/2024  Table formatting from the original result was not included. Impression The main kathie and left lung appeared normal. Moderate, thin and brown secretions present in the right main stem; performed therapeutic aspiration Bronchoalveolar lavage was performed x1 in the RLL Findings The main kathie and left lung appeared normal. Moderate, thin and brown secretions present in the right main stem; secretions were easily removed by therapeutic aspiration and the airway was cleared Bronchoalveolar lavage was performed x1 in the RLL with 60 mL of saline instilled; the fluid appeared clear Recommendation  Imaging- Chest XRay Indication Mucus plugging of bronchi Staff Staff Role No Staff Documented Medications See  Anesthesia Record. Preprocedure A history and physical has been performed, and patient medication allergies have been reviewed. The patient's tolerance of previous anesthesia has been reviewed. The risks and benefits of the procedure and the sedation options and risks were discussed with the sister. All questions were answered and informed consent obtained.  Pt intubated in ICU for mucus plug and on mechanical ventilation prior to start of procedure. Details of the Procedure The patient was already under sedation prior to the procedure. The patient's blood pressure, heart rate, level of consciousness, oxygen, respirations, ECG and ETCO2 were monitored throughout the procedure. The patient experienced no blood loss. The scope was introduced through the endotracheal tube. The procedure was not difficult. The patient tolerated the procedure well. There were no apparent adverse events.  Large size Ambuscope used. Events Procedure Events Event Event Time Specimens * Cannot find log * Procedure Location [facility] [address] [address] [phone] Referring Provider No referring provider defined for this encounter. Procedure Provider No name on file     ECG 12 lead    Result Date: 3/12/2024  Sinus rhythm with 1st degree AV block with Premature atrial complexes Left axis deviation Right bundle branch block Abnormal ECG When compared with ECG of 13-MAY-2019 16:22, Premature atrial complexes are now Present Right bundle branch block is now Present See ED provider note for full interpretation and clinical correlation Confirmed by Jenny Spence ([id number]) on 3/12/2024 10:59:24 AM    ECG 12 lead    Result Date: 3/12/2024  Sinus tachycardia with short OR with Premature atrial complexes with Aberrant conduction Left axis deviation Right bundle branch block Septal infarct , age undetermined Abnormal ECG When compared with ECG of 11-MAR-2024 17:37, (unconfirmed)  No significant change was found Confirmed by Heather Barrett ([id number]) on 3/12/2024 10:49:46 AM    ECG 12 lead    Result Date: 3/12/2024  Sinus rhythm with 1st degree AV block with occasional Premature ventricular complexes and Premature atrial complexes Right bundle branch block Abnormal ECG When compared with ECG of 12-MAR-2024 00:18, (unconfirmed) Premature ventricular complexes are now Present DE interval has increased Questionable change in QRS axis T wave inversion now evident in Inferior leads T wave inversion more evident in Anterior leads Confirmed by Heather Barrett ([id number]) on 3/12/2024 10:49:36 AM    Electrocardiogram, 12-lead PRN ACS symptoms    Result Date: 3/12/2024  Sinus rhythm with occasional Premature ventricular complexes Right bundle branch block T wave abnormality, consider lateral ischemia Abnormal ECG When compared with ECG of 12-MAR-2024 04:06, (unconfirmed) Premature atrial complexes are no longer Present T wave inversion now evident in Lateral leads Confirmed by Heather Barrett ([id number]) on 3/12/2024 10:49:25 AM    ECG 12 Lead    Result Date: 3/12/2024  Sinus rhythm with 1st degree AV block with Premature atrial complexes Left axis deviation Right bundle branch block Septal infarct , age undetermined Abnormal ECG When compared with ECG of 08-MAR-2024 17:20, (unconfirmed) Septal infarct is now Present Nonspecific T wave abnormality, worse in Anterior leads Confirmed by Andrew Pearson ([id number]) on 3/12/2024 8:43:29 AM    XR chest 1 view    Result Date: 3/12/2024  Interpreted By:  Jeannette Suarez, STUDY: XR CHEST 1 VIEW; XR ABDOMEN 1 VIEW;  3/12/2024 2:05 am; 3/12/2024 2:07 am   INDICATION: Signs/Symptoms:intubated, ET tube placement and OG placement; Signs/Symptoms:og placement.   COMPARISON: 03/11/2024   ACCESSION NUMBER(S): [id number]; [id number]   ORDERING CLINICIAN: STEFFANY FUNG   FINDINGS: AP radiograph of the chest was provided. AP radiograph of the upper abdomen.   Evaluation is limited due to patient  rotation.   CARDIOMEDIASTINAL SILHOUETTE: Evaluation limited due to rotation with probable enlarged cardiac silhouette in tortuous aorta. Opacification along the right paratracheal region is indeterminate.   Enteric tube terminates approximately 3.8 cm above the kathie.   LUNGS: Left infrarenal consolidative opacities with air bronchograms noted. Probable right pleural effusion and adjacent airspace opacities. The no definite pneumothorax identified.   ABDOMEN: Enteric tube extends inferiorly and looped in the region to the right of the spine at the expected level of the diaphragm. Paucity of bowel gas noted   BONES: Degenerative changes present.       1.  Enteric tube approximately 3.8 cm above the kathie. 2. Evaluation of the enteric tube is limited and appears looped high in the right upper quadrant however may be related to patient positioning and rotation. Repeat imaging may be considered with proper patient positioning to delineate proper position. 3. Left lower lobe airspace opacities concerning for pneumonia. Possible large right pleural effusion. 4. Paucity of bowel gas noted.       MACRO: None   Signed by: Jeannette Suarez 3/12/2024 2:43 AM Dictation workstation:   [id number]    XR abdomen 1 view    Result Date: 3/12/2024  Interpreted By:  Jeannette Suarez, STUDY: XR CHEST 1 VIEW; XR ABDOMEN 1 VIEW;  3/12/2024 2:05 am; 3/12/2024 2:07 am   INDICATION: Signs/Symptoms:intubated, ET tube placement and OG placement; Signs/Symptoms:og placement.   COMPARISON: 03/11/2024   ACCESSION NUMBER(S): [id number]; [id number]   ORDERING CLINICIAN: STEFFANY FUNG   FINDINGS: AP radiograph of the chest was provided. AP radiograph of the upper abdomen.   Evaluation is limited due to patient rotation.   CARDIOMEDIASTINAL SILHOUETTE: Evaluation limited due to rotation with probable enlarged cardiac silhouette in tortuous aorta. Opacification along the right paratracheal region is indeterminate.   Enteric tube terminates  approximately 3.8 cm above the kathie.   LUNGS: Left infrarenal consolidative opacities with air bronchograms noted. Probable right pleural effusion and adjacent airspace opacities. The no definite pneumothorax identified.   ABDOMEN: Enteric tube extends inferiorly and looped in the region to the right of the spine at the expected level of the diaphragm. Paucity of bowel gas noted   BONES: Degenerative changes present.       1.  Enteric tube approximately 3.8 cm above the kathie. 2. Evaluation of the enteric tube is limited and appears looped high in the right upper quadrant however may be related to patient positioning and rotation. Repeat imaging may be considered with proper patient positioning to delineate proper position. 3. Left lower lobe airspace opacities concerning for pneumonia. Possible large right pleural effusion. 4. Paucity of bowel gas noted.       MACRO: None   Signed by: Jeannette Suarez 3/12/2024 2:43 AM Dictation workstation:   [id number]    XR chest 1 view    Result Date: 3/11/2024  Interpreted By:  Eugene Alvarenga, STUDY: XR CHEST 1 VIEW;  3/11/2024 5:55 pm   INDICATION: Signs/Symptoms:sob.   COMPARISON: Chest radiograph 03/08/2024   ACCESSION NUMBER(S): [id number]   ORDERING CLINICIAN: YAIR BURCIAGA   FINDINGS:     CARDIOMEDIASTINAL SILHOUETTE: Cardiomediastinal silhouette is stable in size and configuration.   LUNGS: Slightly improved right-sided pleural effusion which is persistently moderate. Small left-sided pleural effusion. No pneumothorax.   ABDOMEN: No remarkable upper abdominal findings.   BONES: No acute osseous changes.       1.  Slightly improved right-sided pleural effusion which is persistently moderate.     Signed by: Eugene Alvarenga 3/11/2024 7:19 PM Dictation workstation:   [id number]    Transthoracic echo (TTE) complete    Result Date: 3/11/2024   [facility], [address] [address]               Tel [phone] and Fax [phone] TRANSTHORACIC  ECHOCARDIOGRAM REPORT  Patient Name:      BRAYAN JIMENEZ NICKI    Beattie Physician:    [id number] Heather Barrett MD Study Date:        3/11/2024            Ordering Provider:    [id number] RORY GRAVES MRN/PID:           [id number]             Fellow: Accession#:        [id number]         Nurse:                Khushbu Holman RN Date of Birth/Age: 1955 / 68 years Sonographer:          Goldie Woods RDCS Gender:            M                    Additional Staff: Height:            177.80 cm            Admit Date:           3/8/2024 Weight:            147.42 kg            Admission Status:     Inpatient -                                                               Routine BSA / BMI:         2.57 m2 / 46.63      Encounter#:           [id number]                    kg/m2                                         Department Location:  [facility]                                                               Invasive Blood Pressure: 107 /70 mmHg Study Type:    TRANSTHORACIC ECHO (TTE) COMPLETE Diagnosis/ICD: Heart failure, unspecified-I50.9; Acute systolic (congestive)                heart failure (CHF)-I50.21 Indication:    Congestive Heart Failure CPT Code:      Echo Complete w Full Doppler-[id number] Patient History: Pertinent History: COPD, HTN, LE Edema, Dyspnea and Elev trop., Elev. BNP,                    Smoker,. Study Detail: The following Echo studies were performed: 2D, M-Mode, Doppler and               color flow. Technically challenging study due to body habitus.               Definity used as a contrast agent for endocardial border               definition. Total contrast used for this procedure was 1.5 mL via               IV push. The patient was awake.  PHYSICIAN INTERPRETATION: Left Ventricle: The left ventricular systolic  function is moderately decreased, with an estimated ejection fraction of 30-35%. There is global hypokinesis of the left ventricle with minor regional variations. The left ventricular cavity size is normal. Left ventricular diastolic filling was indeterminate. LV Wall Scoring: The basal inferolateral segment is akinetic. Left Atrium: The left atrium is mildly dilated. Right Ventricle: The right ventricle is normal in size. There is normal right ventricular global systolic function. Right Atrium: The right atrium is normal in size. Aortic Valve: The aortic valve was not well visualized. There is evidence of moderate aortic valve stenosis. There is no evidence of aortic valve regurgitation. The peak instantaneous gradient of the aortic valve is 52.7 mmHg. The mean gradient of the aortic valve is 26.0 mmHg. Mitral Valve: The mitral valve is mild to moderately thickened. There is mild to moderate mitral annular calcification. There is mild mitral valve regurgitation. Tricuspid Valve: The tricuspid valve was not well visualized. There is trace to mild tricuspid regurgitation. The right ventricular systolic pressure is unable to be estimated. Pulmonic Valve: The pulmonic valve is not well visualized. There is physiologic pulmonic valve regurgitation. Pericardium: There is no pericardial effusion noted. Aorta: The aortic root is normal. Systemic Veins: The inferior vena cava appears dilated. There is less than 50% IVC collapse with inspiration.  CONCLUSIONS:  1. Left ventricular systolic function is moderately decreased with a 30-35% estimated ejection fraction.  2. Basal inferolateral segment is abnormal.  3. Poorly visualized anatomical structures due to suboptimal image quality.  4. Moderate aortic valve stenosis.  5. There is global hypokinesis of the left ventricle with minor regional variations. QUANTITATIVE DATA SUMMARY: 2D MEASUREMENTS:                           Normal Ranges: IVSd:          1.14 cm    (0.6-1.1cm)  LVPWd:         1.57 cm    (0.6-1.1cm) LVIDd:         5.26 cm    (3.9-5.9cm) LVIDs:         4.41 cm LV Mass Index: 117.2 g/m2 LV % FS        16.2 % LA VOLUME:                               Normal Ranges: LA Vol A4C:        77.9 ml    (22+/-6mL/m2) LA Vol A2C:        84.8 ml LA Vol BP:         83.7 ml LA Vol Index A4C:  30.4ml/m2 LA Vol Index A2C:  33.0 ml/m2 LA Vol Index BP:   32.6 ml/m2 LA Area A4C:       24.1 cm2 LA Area A2C:       24.4 cm2 LA Major Axis A4C: 6.3 cm LA Major Axis A2C: 6.0 cm LA Volume Index:   30.5 ml/m2 LA Vol A4C:        71.4 ml LA Vol A2C:        78.4 ml RA VOLUME BY A/L METHOD:                       Normal Ranges: RA Area A4C: 17.0 cm2 AORTA MEASUREMENTS:                    Normal Ranges: Asc Ao, d: 3.10 cm (2.1-3.4cm) LV SYSTOLIC FUNCTION BY 2D PLANIMETRY (MOD):                     Normal Ranges: EF-A4C View: 33.6 % (>=55%) EF-A2C View: 29.1 % EF-Biplane:  33.1 % LV DIASTOLIC FUNCTION:                     Normal Ranges: MV Peak E: 0.90 m/s (0.7-1.2 m/s) MV Peak A: 0.39 m/s (0.42-0.7 m/s) E/A Ratio: 2.34     (1.0-2.2) MITRAL VALVE:                Normal Ranges: MV DT: 99 msec (150-240msec) AORTIC VALVE:                                    Normal Ranges: AoV Vmax:                3.63 m/s  (<=1.7m/s) AoV Peak P.7 mmHg (<20mmHg) AoV Mean P.0 mmHg (1.7-11.5mmHg) LVOT Max Jero:            0.69 m/s  (<=1.1m/s) AoV VTI:                 60.70 cm  (18-25cm) LVOT VTI:                12.80 cm LVOT Diameter:           2.60 cm   (1.8-2.4cm) AoV Area, VTI:           1.12 cm2  (2.5-5.5cm2) AoV Area,Vmax:           1.01 cm2  (2.5-4.5cm2) AoV Dimensionless Index: 0.21  RIGHT VENTRICLE: RV Basal 3.71 cm RV Mid   2.98 cm RV Major 9.7 cm TAPSE:   22.4 mm RV s'    0.13 m/s TRICUSPID VALVE/RVSP:                   Normal Ranges: IVC Diam: 2.32 cm PULMONIC VALVE:                      Normal Ranges: PV Max Jero: 0.7 m/s  (0.6-0.9m/s) PV Max P.7 mmHg  [id number] Heather Barrett MD  Electronically signed on 3/11/2024 at 4:04:49 PM  Wall Scoring  ** Final (Updated) **     XR chest 2 views    Result Date: 3/8/2024  Interpreted By:  Jennifer Padilla, STUDY: XR CHEST 2 VIEWS; ;  3/8/2024 5:45 pm   INDICATION: Signs/Symptoms:dyspnea.   COMPARISON: 09/18/2018.   ACCESSION NUMBER(S): [id number]   ORDERING CLINICIAN: BRAYAN CRESPO   TECHNIQUE: Two view chest   FINDINGS: Moderate right pleural effusion and right lung base consolidation. Trace left pleural effusion.. Mild interstitial prominence enlarged cardiac silhouette. Visualized mediastinal contours are within normal limits. No evidence of pneumothorax. No acute osseous finding.       Moderate right pleural effusion and right lung base consolidation. Findings could be due to pneumonia. Other underlying process not excluded. Follow-up recommended.   Trace left pleural effusion and cardiomegaly. Possible interstitial edema.     Signed by: Jennifer Padilla 3/8/2024 6:19 PM Dictation workstation:   [id number]    Sputum culture (March 12, 2024)--Strep pneumoniae.    Venous blood gas (March 15, 2024)--pH 7.39, pCO2 54 and HCO3 33.    Echocardiogram (March 11, 2024)--LVEF of 35% with moderate aortic stenosis and hypokinesis of the left ventricle.     Assessment:  68-year-old man with a history of COPD, hypertension, diabetes, obesity, obstructive sleep apnea, congestive heart failure, cardiomyopathy, hypothyroidism, aortic stenosis, tobacco abuse and recent admission for respiratory failure with intubation due to Streptococcus pneumonia, septic and cardiogenic shock, congestive heart failure and a COPD exacerbation, admitted with shortness of breath, hypoxia, bradycardia, near syncope, hypotension, visual changes and neck and shoulder pain, and found to have a normal white blood count, mildly elevated BNP, elevated hematocrit and an abnormal CT angiogram of the chest showing right middle lobe and right lower lobe volume loss, left lower lobe atelectasis, COPD  changes and possible debris in the right sided airways, consistent with possible aspiration pneumonia/pneumonitis, superimposed on a mild COPD exacerbation and mild congestive heart failure and cardiomyopathy, with acute-on-chronic hypercapnic and probable hypoxemic respiratory failure.  There is no evidence of amiodarone lung toxicity.  Hypothyroidism cannot be excluded.  Overall, he appears to be clinically stable without significant bronchospasm.    Recommend:  1.  Continue DuoNeb, budesonide, formoterol, Tessalon, Synthroid, Lasix, and   amiodarone, but discontinue Spiriva.  2.  Continue Solu-Medrol, but decrease dose to 20 mg IV every 8 hours.  3.  Wean FiO2 to keep oxygen saturation approximately 92 to 95%.  3.  Continue incentive spirometry and Acapella treatment.  4.  Sputum for culture, if the patient is able to provide a specimen.  5.  Continue nocturnal auto CPAP.  6.  DVT prophylaxis with subcutaneous heparin or Lovenox.  7.  Begin Flonase 2 sprays each nostril daily for sinus congestion.  8.  Check thyroid function tests.  9.  There is no indication for a bronchoscopy at the present time.  10.  Follow-up with pulmonary medicine and sleep medicine after discharge.    Giovanni Padilla MD

## 2024-04-06 NOTE — CARE PLAN
The patient's goals for the shift include      The clinical goals for the shift include pt to be hemiodynamically stable and compliant witrh treatments    Over the shift, the patient did not make progress toward the following goals. Barriers to progression include NA. Recommendations to address these barriers include NA.

## 2024-04-07 LAB
ANION GAP SERPL CALC-SCNC: 11 MMOL/L (ref 10–20)
BACTERIA SPEC RESP CULT: ABNORMAL
BUN SERPL-MCNC: 45 MG/DL (ref 6–23)
CALCIUM SERPL-MCNC: 9.5 MG/DL (ref 8.6–10.3)
CHLORIDE SERPL-SCNC: 92 MMOL/L (ref 98–107)
CO2 SERPL-SCNC: 41 MMOL/L (ref 21–32)
CREAT SERPL-MCNC: 0.94 MG/DL (ref 0.5–1.3)
EGFRCR SERPLBLD CKD-EPI 2021: 88 ML/MIN/1.73M*2
ERYTHROCYTE [DISTWIDTH] IN BLOOD BY AUTOMATED COUNT: 13 % (ref 11.5–14.5)
GLUCOSE BLD MANUAL STRIP-MCNC: 177 MG/DL (ref 74–99)
GLUCOSE BLD MANUAL STRIP-MCNC: 198 MG/DL (ref 74–99)
GLUCOSE BLD MANUAL STRIP-MCNC: 227 MG/DL (ref 74–99)
GLUCOSE BLD MANUAL STRIP-MCNC: 232 MG/DL (ref 74–99)
GLUCOSE SERPL-MCNC: 160 MG/DL (ref 74–99)
GRAM STN SPEC: ABNORMAL
HCT VFR BLD AUTO: 55.9 % (ref 41–52)
HGB BLD-MCNC: 18 G/DL (ref 13.5–17.5)
MCH RBC QN AUTO: 32.4 PG (ref 26–34)
MCHC RBC AUTO-ENTMCNC: 32.2 G/DL (ref 32–36)
MCV RBC AUTO: 101 FL (ref 80–100)
NRBC BLD-RTO: 0 /100 WBCS (ref 0–0)
PLATELET # BLD AUTO: 225 X10*3/UL (ref 150–450)
POTASSIUM SERPL-SCNC: 3.9 MMOL/L (ref 3.5–5.3)
RBC # BLD AUTO: 5.55 X10*6/UL (ref 4.5–5.9)
SODIUM SERPL-SCNC: 140 MMOL/L (ref 136–145)
WBC # BLD AUTO: 10.5 X10*3/UL (ref 4.4–11.3)

## 2024-04-07 PROCEDURE — 2500000004 HC RX 250 GENERAL PHARMACY W/ HCPCS (ALT 636 FOR OP/ED): Performed by: INTERNAL MEDICINE

## 2024-04-07 PROCEDURE — 2060000001 HC INTERMEDIATE ICU ROOM DAILY

## 2024-04-07 PROCEDURE — 94668 MNPJ CHEST WALL SBSQ: CPT

## 2024-04-07 PROCEDURE — 36415 COLL VENOUS BLD VENIPUNCTURE: CPT | Performed by: INTERNAL MEDICINE

## 2024-04-07 PROCEDURE — 85027 COMPLETE CBC AUTOMATED: CPT | Performed by: INTERNAL MEDICINE

## 2024-04-07 PROCEDURE — 87205 SMEAR GRAM STAIN: CPT | Mod: AHULAB | Performed by: INTERNAL MEDICINE

## 2024-04-07 PROCEDURE — 2500000005 HC RX 250 GENERAL PHARMACY W/O HCPCS: Performed by: INTERNAL MEDICINE

## 2024-04-07 PROCEDURE — 2500000002 HC RX 250 W HCPCS SELF ADMINISTERED DRUGS (ALT 637 FOR MEDICARE OP, ALT 636 FOR OP/ED): Performed by: INTERNAL MEDICINE

## 2024-04-07 PROCEDURE — 82947 ASSAY GLUCOSE BLOOD QUANT: CPT

## 2024-04-07 PROCEDURE — 94640 AIRWAY INHALATION TREATMENT: CPT

## 2024-04-07 PROCEDURE — 2500000001 HC RX 250 WO HCPCS SELF ADMINISTERED DRUGS (ALT 637 FOR MEDICARE OP): Performed by: INTERNAL MEDICINE

## 2024-04-07 PROCEDURE — 82374 ASSAY BLOOD CARBON DIOXIDE: CPT | Performed by: NURSE PRACTITIONER

## 2024-04-07 PROCEDURE — 2500000002 HC RX 250 W HCPCS SELF ADMINISTERED DRUGS (ALT 637 FOR MEDICARE OP, ALT 636 FOR OP/ED): Performed by: NURSE PRACTITIONER

## 2024-04-07 PROCEDURE — 2500000002 HC RX 250 W HCPCS SELF ADMINISTERED DRUGS (ALT 637 FOR MEDICARE OP, ALT 636 FOR OP/ED): Performed by: PHARMACIST

## 2024-04-07 PROCEDURE — 94660 CPAP INITIATION&MGMT: CPT

## 2024-04-07 RX ORDER — ACETAMINOPHEN 325 MG/1
650 TABLET ORAL EVERY 4 HOURS PRN
Status: DISCONTINUED | OUTPATIENT
Start: 2024-04-07 | End: 2024-04-10 | Stop reason: HOSPADM

## 2024-04-07 RX ORDER — ACETAMINOPHEN 650 MG/1
650 SUPPOSITORY RECTAL EVERY 4 HOURS PRN
Status: DISCONTINUED | OUTPATIENT
Start: 2024-04-07 | End: 2024-04-10 | Stop reason: HOSPADM

## 2024-04-07 RX ORDER — ACETAMINOPHEN 160 MG/5ML
650 SOLUTION ORAL EVERY 4 HOURS PRN
Status: DISCONTINUED | OUTPATIENT
Start: 2024-04-07 | End: 2024-04-10 | Stop reason: HOSPADM

## 2024-04-07 RX ORDER — ONDANSETRON HYDROCHLORIDE 2 MG/ML
4 INJECTION, SOLUTION INTRAVENOUS EVERY 8 HOURS PRN
Status: DISCONTINUED | OUTPATIENT
Start: 2024-04-07 | End: 2024-04-10 | Stop reason: HOSPADM

## 2024-04-07 RX ORDER — IPRATROPIUM BROMIDE AND ALBUTEROL SULFATE 2.5; .5 MG/3ML; MG/3ML
3 SOLUTION RESPIRATORY (INHALATION)
Status: DISCONTINUED | OUTPATIENT
Start: 2024-04-08 | End: 2024-04-10 | Stop reason: HOSPADM

## 2024-04-07 RX ORDER — ONDANSETRON 4 MG/1
4 TABLET, FILM COATED ORAL EVERY 8 HOURS PRN
Status: DISCONTINUED | OUTPATIENT
Start: 2024-04-07 | End: 2024-04-10 | Stop reason: HOSPADM

## 2024-04-07 RX ORDER — ENOXAPARIN SODIUM 100 MG/ML
40 INJECTION SUBCUTANEOUS EVERY 12 HOURS SCHEDULED
Status: DISCONTINUED | OUTPATIENT
Start: 2024-04-07 | End: 2024-04-10 | Stop reason: HOSPADM

## 2024-04-07 RX ORDER — TALC
3 POWDER (GRAM) TOPICAL NIGHTLY PRN
Status: DISCONTINUED | OUTPATIENT
Start: 2024-04-07 | End: 2024-04-10 | Stop reason: HOSPADM

## 2024-04-07 RX ORDER — FUROSEMIDE 10 MG/ML
80 INJECTION INTRAMUSCULAR; INTRAVENOUS EVERY 12 HOURS
Status: DISCONTINUED | OUTPATIENT
Start: 2024-04-07 | End: 2024-04-08

## 2024-04-07 RX ADMIN — ALLOPURINOL 300 MG: 300 TABLET ORAL at 08:54

## 2024-04-07 RX ADMIN — LEVOTHYROXINE SODIUM 125 MCG: 125 TABLET ORAL at 08:55

## 2024-04-07 RX ADMIN — IPRATROPIUM BROMIDE AND ALBUTEROL SULFATE 3 ML: 2.5; .5 SOLUTION RESPIRATORY (INHALATION) at 11:37

## 2024-04-07 RX ADMIN — EMPAGLIFLOZIN 10 MG: 10 TABLET, FILM COATED ORAL at 08:58

## 2024-04-07 RX ADMIN — INSULIN LISPRO 2 UNITS: 100 INJECTION, SOLUTION INTRAVENOUS; SUBCUTANEOUS at 18:15

## 2024-04-07 RX ADMIN — FORMOTEROL FUMARATE DIHYDRATE 20 MCG: 20 SOLUTION RESPIRATORY (INHALATION) at 11:36

## 2024-04-07 RX ADMIN — AMIODARONE HYDROCHLORIDE 400 MG: 200 TABLET ORAL at 08:58

## 2024-04-07 RX ADMIN — BENZONATATE 200 MG: 100 CAPSULE ORAL at 15:06

## 2024-04-07 RX ADMIN — THIAMINE HCL TAB 100 MG 100 MG: 100 TAB at 08:54

## 2024-04-07 RX ADMIN — Medication: at 08:00

## 2024-04-07 RX ADMIN — ASPIRIN 81 MG: 81 TABLET, COATED ORAL at 08:58

## 2024-04-07 RX ADMIN — FOLIC ACID 1 MG: 1 TABLET ORAL at 08:54

## 2024-04-07 RX ADMIN — BENZONATATE 200 MG: 100 CAPSULE ORAL at 08:57

## 2024-04-07 RX ADMIN — AMIODARONE HYDROCHLORIDE 400 MG: 200 TABLET ORAL at 20:27

## 2024-04-07 RX ADMIN — FUROSEMIDE 80 MG: 80 TABLET ORAL at 08:55

## 2024-04-07 RX ADMIN — VALSARTAN 40 MG: 80 TABLET, FILM COATED ORAL at 08:54

## 2024-04-07 RX ADMIN — METHYLPREDNISOLONE SODIUM SUCCINATE 20 MG: 40 INJECTION, POWDER, FOR SOLUTION INTRAMUSCULAR; INTRAVENOUS at 18:21

## 2024-04-07 RX ADMIN — EZETIMIBE 10 MG: 10 TABLET ORAL at 08:56

## 2024-04-07 RX ADMIN — BUDESONIDE 0.5 MG: 0.5 INHALANT ORAL at 19:46

## 2024-04-07 RX ADMIN — BENZONATATE 200 MG: 100 CAPSULE ORAL at 20:27

## 2024-04-07 RX ADMIN — AMIODARONE HYDROCHLORIDE 400 MG: 200 TABLET ORAL at 15:06

## 2024-04-07 RX ADMIN — INSULIN LISPRO 2 UNITS: 100 INJECTION, SOLUTION INTRAVENOUS; SUBCUTANEOUS at 09:12

## 2024-04-07 RX ADMIN — FORMOTEROL FUMARATE DIHYDRATE 20 MCG: 20 SOLUTION RESPIRATORY (INHALATION) at 19:46

## 2024-04-07 RX ADMIN — INSULIN LISPRO 4 UNITS: 100 INJECTION, SOLUTION INTRAVENOUS; SUBCUTANEOUS at 12:19

## 2024-04-07 RX ADMIN — METHYLPREDNISOLONE SODIUM SUCCINATE 20 MG: 40 INJECTION, POWDER, FOR SOLUTION INTRAMUSCULAR; INTRAVENOUS at 08:53

## 2024-04-07 RX ADMIN — ENOXAPARIN SODIUM 40 MG: 40 INJECTION SUBCUTANEOUS at 20:27

## 2024-04-07 RX ADMIN — IPRATROPIUM BROMIDE AND ALBUTEROL SULFATE 3 ML: 2.5; .5 SOLUTION RESPIRATORY (INHALATION) at 16:06

## 2024-04-07 RX ADMIN — IPRATROPIUM BROMIDE AND ALBUTEROL SULFATE 3 ML: 2.5; .5 SOLUTION RESPIRATORY (INHALATION) at 19:46

## 2024-04-07 RX ADMIN — BUDESONIDE 0.5 MG: 0.5 INHALANT ORAL at 11:36

## 2024-04-07 RX ADMIN — ENOXAPARIN SODIUM 40 MG: 40 INJECTION SUBCUTANEOUS at 08:58

## 2024-04-07 ASSESSMENT — COGNITIVE AND FUNCTIONAL STATUS - GENERAL
MOBILITY SCORE: 18
WALKING IN HOSPITAL ROOM: A LITTLE
TOILETING: A LITTLE
MOVING TO AND FROM BED TO CHAIR: A LITTLE
DAILY ACTIVITIY SCORE: 23
TURNING FROM BACK TO SIDE WHILE IN FLAT BAD: A LITTLE
STANDING UP FROM CHAIR USING ARMS: A LITTLE
CLIMB 3 TO 5 STEPS WITH RAILING: A LOT

## 2024-04-07 ASSESSMENT — PAIN - FUNCTIONAL ASSESSMENT
PAIN_FUNCTIONAL_ASSESSMENT: 0-10

## 2024-04-07 ASSESSMENT — PAIN SCALES - GENERAL
PAINLEVEL_OUTOF10: 0 - NO PAIN

## 2024-04-07 NOTE — PROGRESS NOTES
"Subjective Data:  No acute events   Tele shows rates 80s to 90s  Patient agitated with Lasix dosing; refuses to take IV changed order to 80 mg twice daily oral-> patient agreed to take IV Lasix twice a day for today.  He shows increased shortness of breath., Still looks volume overloaded     Objective Data:  Last Recorded Vitals:  Vitals:    24 2342 24 0010 24 0500 24 0800   BP: 119/61  132/87 139/79   BP Location: Right arm  Right arm Right arm   Patient Position: Lying  Lying Sitting   Pulse: 90      Resp: 20    Temp: 36.5 °C (97.7 °F)  36.3 °C (97.4 °F) 36.7 °C (98.1 °F)   TempSrc: Temporal  Temporal    SpO2: 94%  93% 92%   Weight:       Height:         Medical Gas Therapy: Supplemental oxygen  O2 Delivery Method: Nasal cannula  Weight  Av kg (287 lb)  Min: 130 kg (287 lb)  Max: 130 kg (287 lb)    LABS:  CMP:  Results from last 7 days   Lab Units 24  1720   SODIUM mmol/L 140 135* 133* 132*   POTASSIUM mmol/L 3.9 3.8 3.1* 4.6  4.6   CHLORIDE mmol/L 92* 89* 87* 86*   CO2 mmol/L 41* 36* 33* 32   ANION GAP mmol/L 11 14 16 19   BUN mg/dL 45* 36* 40* 35*   CREATININE mg/dL 0.94 0.73 1.00 0.78   EGFR mL/min/1.73m*2 88 >90 82 >90   MAGNESIUM mg/dL  --   --   --  2.50*   ALBUMIN g/dL  --   --   --  4.1   ALT U/L  --   --   --  31   AST U/L  --   --   --  39   BILIRUBIN TOTAL mg/dL  --   --   --  0.9       CBC:  Results from last 7 days   Lab Units 24  1846   WBC AUTO x10*3/uL 10.5 9.5 8.1 9.5   HEMOGLOBIN g/dL 18.0* 18.3* 18.5* 19.1*   HEMATOCRIT % 55.9* 55.6* 55.0* 55.0*   PLATELETS AUTO x10*3/uL 225 235 217 218   MCV fL 101* 99 99 96       COAG:   Results from last 7 days   Lab Units 24  1720   INR  1.0       ABO: No results found for: \"ABO\"  HEME/ENDO:  Results from last 7 days   Lab Units 24  0558   TSH mIU/L 1.26      CARDIAC:   Results from last 7 days   Lab Units " 04/04/24  1846 04/04/24  1720   TROPHS ng/L 67* 62*   BNP pg/mL  --  158*               Last I/O:    Intake/Output Summary (Last 24 hours) at 4/7/2024 0907  Last data filed at 4/6/2024 1815  Gross per 24 hour   Intake 635 ml   Output --   Net 635 ml       Net IO Since Admission: 1,256 mL [04/07/24 0907]      Imaging Results:  ECG 12 lead    Result Date: 4/5/2024  See ED provider note for full interpretation and clinical correlation    CT angio chest for pulmonary embolism    Result Date: 4/4/2024  Interpreted By:  Ishaan Banda, STUDY: CT ANGIO CHEST FOR PULMONARY EMBOLISM;  4/4/2024 11:41 pm   INDICATION: Signs/Symptoms:hypoxic, recent admission.   COMPARISON: 10/02/2018 CT chest without contrast.   ACCESSION NUMBER(S): [id number]   ORDERING CLINICIAN: JANEL NASH   TECHNIQUE: Contiguous axial images of the chest were obtained after the intravenous administration of iodinated contrast using angiographic PE protocol. Coronal and sagittal reformatted images were reconstructed from the axial data. MIP images were created on an independent workstation and reviewed. Dual energy reconstructions were also performed including low energy virtual mono-energetic images and iodine density maps.   FINDINGS: MEDIASTINUM AND LYMPH NODES:  The esophageal wall appears within normal limits.  No enlarged intrathoracic or axillary lymph nodes by imaging criteria. No pneumomediastinum.   VESSELS:  Normal caliber aorta. Minimal aortic atherosclerosis.  No pulmonary embolism.   HEART: There is moderate cardiomegaly. There is severe aortic valvular calcification.  Moderate coronary artery calcifications. No significant pericardial effusion.   LUNG, AIRWAYS, AND PLEURA: There is generalized volume loss of the right hemithorax was present prior study but slightly progressed which is due to the presence of slightly greater atelectasis in the right middle lobe and right lower lobe. There is new frothy debris of occluding the right  lower lobe bronchus and bronchus intermedius. There is diffuse bronchial wall thickening in the right middle and lower lobes with additional debris occluding there segmental and subsegmental bronchi. There is a small focus of subsegmental atelectasis in the medial left lower lobe. There is emphysema.   OSSEOUS STRUCTURES: No acute osseous abnormality.   CHEST WALL SOFT TISSUES: No discernible abnormality.   UPPER ABDOMEN/OTHER: No acute abnormality.       Frothy debris occluding the bronchus intermedius and right lower lobe bronchus and additional multifocal segmental and subsegmental occlusions in the right middle and right lower lobes with diffuse new bronchial wall thickening and worsening atelectasis in both lobes. Findings are most likely related to aspiration with postobstructive atelectasis.   Severe aortic valve calcifications to the extent that we likely result in aortic stenosis. Recommend follow-up echocardiography.   Moderate 4 chamber cardiomegaly and moderate coronary artery calcifications.   Emphysema.   MACRO: None.   Signed by: Ishaan Banda 4/4/2024 11:56 PM Dictation workstation:   [id number]    XR chest 1 view    Result Date: 4/4/2024  Interpreted By:  Julián Peterson, STUDY: XR CHEST 1 VIEW;  4/4/2024 5:55 pm   INDICATION: Signs/Symptoms:sob.   COMPARISON: 03/13/2024   ACCESSION NUMBER(S): [id number]   ORDERING CLINICIAN: JANEL NASH   FINDINGS:         CARDIOMEDIASTINAL SILHOUETTE: There is enlargement cardiac silhouette with vascular congestion but no yolanda edema   LUNGS: There is dense airspace disease at the right lung base with a small right pleural effusion. Mild patchy left basilar airspace disease as well   ABDOMEN: No remarkable upper abdominal findings.   BONES: No acute osseous changes.       1.  Dense right basilar airspace disease with small right pleural effusion. Pneumonia is hind differential. 2. Enlarged cardiac silhouette with pulmonary vascular congestion        MACRO: None   Signed by: Julián Peterson 4/4/2024 6:00 PM Dictation workstation:   [id number]          Past Cardiology Tests (Last 3 Years):  EKG:  Results for orders placed during the hospital encounter of 04/04/24    ECG 12 lead (Preliminary)  This result has not been signed. Information might be incomplete.    Narrative  See ED provider note for full interpretation and clinical correlation      Results for orders placed during the hospital encounter of 03/08/24    Electrocardiogram, 12-lead PRN ACS symptoms    Narrative  Sinus rhythm with occasional Premature ventricular complexes  Right bundle branch block  T wave abnormality, consider lateral ischemia  Abnormal ECG  When compared with ECG of 12-MAR-2024 04:06, (unconfirmed)  Premature atrial complexes are no longer Present  T wave inversion now evident in Lateral leads  Confirmed by Heather Barrett ([id number]) on 3/12/2024 10:49:25 AM      ECG 12 lead    Narrative  Sinus rhythm with 1st degree AV block with occasional Premature ventricular complexes and Premature atrial complexes  Right bundle branch block  Abnormal ECG  When compared with ECG of 12-MAR-2024 00:18, (unconfirmed)  Premature ventricular complexes are now Present  WV interval has increased  Questionable change in QRS axis  T wave inversion now evident in Inferior leads  T wave inversion more evident in Anterior leads  Confirmed by Heather Barrett ([id number]) on 3/12/2024 10:49:36 AM      ECG 12 lead    Narrative  Sinus tachycardia with short WV with Premature atrial complexes with Aberrant conduction  Left axis deviation  Right bundle branch block  Septal infarct , age undetermined  Abnormal ECG  When compared with ECG of 11-MAR-2024 17:37, (unconfirmed)  No significant change was found  Confirmed by Heather Barrett ([id number]) on 3/12/2024 10:49:46 AM      Electrocardiogram, 12-lead PRN ACS symtpoms    Narrative  Sinus rhythm with Premature atrial complexes  Left axis deviation  Right bundle branch block  Abnormal  ECG  When compared with ECG of 10-MAR-2024 10:10, (unconfirmed)  Criteria for Septal infarct are no longer Present  T wave inversion less evident in Anterior leads  Confirmed by Andrew Pearson ([id number]) on 3/12/2024 4:00:58 PM      ECG 12 Lead    Narrative  Sinus rhythm with 1st degree AV block with Premature atrial complexes  Left axis deviation  Right bundle branch block  Septal infarct , age undetermined  Abnormal ECG  When compared with ECG of 08-MAR-2024 17:20, (unconfirmed)  Septal infarct is now Present  Nonspecific T wave abnormality, worse in Anterior leads  Confirmed by Andrew Pearson ([id number]) on 3/12/2024 8:43:29 AM      ECG 12 lead    Narrative  Sinus rhythm with 1st degree AV block with Premature atrial complexes  Left axis deviation  Right bundle branch block  Abnormal ECG  When compared with ECG of 13-MAY-2019 16:22,  Premature atrial complexes are now Present  Right bundle branch block is now Present  See ED provider note for full interpretation and clinical correlation  Confirmed by Jenny Spence ([id number]) on 3/12/2024 10:59:24 AM    Echo:  Results for orders placed during the hospital encounter of 03/08/24    Transthoracic echo (TTE) complete    [facility], [address] [place] [address]  Tel [phone] and Fax [phone]    TRANSTHORACIC ECHOCARDIOGRAM REPORT      Patient Name:      BRAYAN Key Physician:    [id number] Heather Barrtet MD  Study Date:        3/11/2024            Ordering Provider:    [id number] RORY GRAVES  MRN/PID:           [id number]             Fellow:  Accession#:        [id number]         Nurse:                Khushbu Holman RN  Date of Birth/Age: 1955 / 68 years Sonographer:          Goldie Woods RDCS  Gender:            M                    Additional Staff:  Height:            177.80 cm            Admit Date:           3/8/2024  Weight:            147.42 kg            Admission Status:     Inpatient  -  Routine  BSA / BMI:         2.57 m2 / 46.63      Encounter#:           [id number]  kg/m2  Department Location:  [facility] Non  Invasive  Blood Pressure: 107 /70 mmHg    Study Type:    TRANSTHORACIC ECHO (TTE) COMPLETE  Diagnosis/ICD: Heart failure, unspecified-I50.9; Acute systolic (congestive)  heart failure (CHF)-I50.21  Indication:    Congestive Heart Failure  CPT Code:      Echo Complete w Full Doppler-[id number]    Patient History:  Pertinent History: COPD, HTN, LE Edema, Dyspnea and Elev trop., Elev. BNP,  Smoker,.    Study Detail: The following Echo studies were performed: 2D, M-Mode, Doppler and  color flow. Technically challenging study due to body habitus.  Definity used as a contrast agent for endocardial border  definition. Total contrast used for this procedure was 1.5 mL via  IV push. The patient was awake.      PHYSICIAN INTERPRETATION:  Left Ventricle: The left ventricular systolic function is moderately decreased, with an estimated ejection fraction of 30-35%. There is global hypokinesis of the left ventricle with minor regional variations. The left ventricular cavity size is normal. Left ventricular diastolic filling was indeterminate.  LV Wall Scoring:  The basal inferolateral segment is akinetic.    Left Atrium: The left atrium is mildly dilated.  Right Ventricle: The right ventricle is normal in size. There is normal right ventricular global systolic function.  Right Atrium: The right atrium is normal in size.  Aortic Valve: The aortic valve was not well visualized. There is evidence of moderate aortic valve stenosis.  There is no evidence of aortic valve regurgitation. The peak instantaneous gradient of the aortic valve is 52.7 mmHg. The mean gradient of the aortic valve is 26.0 mmHg.  Mitral Valve: The mitral valve is mild to moderately thickened. There is mild to moderate mitral annular calcification. There is mild mitral valve regurgitation.  Tricuspid Valve: The tricuspid valve was not well  visualized. There is trace to mild tricuspid regurgitation. The right ventricular systolic pressure is unable to be estimated.  Pulmonic Valve: The pulmonic valve is not well visualized. There is physiologic pulmonic valve regurgitation.  Pericardium: There is no pericardial effusion noted.  Aorta: The aortic root is normal.  Systemic Veins: The inferior vena cava appears dilated. There is less than 50% IVC collapse with inspiration.      CONCLUSIONS:  1. Left ventricular systolic function is moderately decreased with a 30-35% estimated ejection fraction.  2. Basal inferolateral segment is abnormal.  3. Poorly visualized anatomical structures due to suboptimal image quality.  4. Moderate aortic valve stenosis.  5. There is global hypokinesis of the left ventricle with minor regional variations.    QUANTITATIVE DATA SUMMARY:  2D MEASUREMENTS:  Normal Ranges:  IVSd:          1.14 cm    (0.6-1.1cm)  LVPWd:         1.57 cm    (0.6-1.1cm)  LVIDd:         5.26 cm    (3.9-5.9cm)  LVIDs:         4.41 cm  LV Mass Index: 117.2 g/m2  LV % FS        16.2 %    LA VOLUME:  Normal Ranges:  LA Vol A4C:        77.9 ml    (22+/-6mL/m2)  LA Vol A2C:        84.8 ml  LA Vol BP:         83.7 ml  LA Vol Index A4C:  30.4ml/m2  LA Vol Index A2C:  33.0 ml/m2  LA Vol Index BP:   32.6 ml/m2  LA Area A4C:       24.1 cm2  LA Area A2C:       24.4 cm2  LA Major Axis A4C: 6.3 cm  LA Major Axis A2C: 6.0 cm  LA Volume Index:   30.5 ml/m2  LA Vol A4C:        71.4 ml  LA Vol A2C:        78.4 ml    RA VOLUME BY A/L METHOD:  Normal Ranges:  RA Area A4C: 17.0 cm2    AORTA MEASUREMENTS:  Normal Ranges:  Asc Ao, d: 3.10 cm (2.1-3.4cm)    LV SYSTOLIC FUNCTION BY 2D PLANIMETRY (MOD):  Normal Ranges:  EF-A4C View: 33.6 % (>=55%)  EF-A2C View: 29.1 %  EF-Biplane:  33.1 %    LV DIASTOLIC FUNCTION:  Normal Ranges:  MV Peak E: 0.90 m/s (0.7-1.2 m/s)  MV Peak A: 0.39 m/s (0.42-0.7 m/s)  E/A Ratio: 2.34     (1.0-2.2)    MITRAL VALVE:  Normal Ranges:  MV DT: 99 msec  (150-240msec)    AORTIC VALVE:  Normal Ranges:  AoV Vmax:                3.63 m/s  (<=1.7m/s)  AoV Peak P.7 mmHg (<20mmHg)  AoV Mean P.0 mmHg (1.7-11.5mmHg)  LVOT Max Jero:            0.69 m/s  (<=1.1m/s)  AoV VTI:                 60.70 cm  (18-25cm)  LVOT VTI:                12.80 cm  LVOT Diameter:           2.60 cm   (1.8-2.4cm)  AoV Area, VTI:           1.12 cm2  (2.5-5.5cm2)  AoV Area,Vmax:           1.01 cm2  (2.5-4.5cm2)  AoV Dimensionless Index: 0.21      RIGHT VENTRICLE:  RV Basal 3.71 cm  RV Mid   2.98 cm  RV Major 9.7 cm  TAPSE:   22.4 mm  RV s'    0.13 m/s    TRICUSPID VALVE/RVSP:  Normal Ranges:  IVC Diam: 2.32 cm    PULMONIC VALVE:  Normal Ranges:  PV Max Jero: 0.7 m/s  (0.6-0.9m/s)  PV Max P.7 mmHg      [id number] Heather Barrett MD  Electronically signed on 3/11/2024 at 4:04:49 PM      Wall Scoring        ** Final (Updated) **    Ejection Fractions:  LV EF   Date/Time Value Ref Range Status   2024 02:04 PM 33 %      Cath:  No results found for this or any previous visit.    Stress Test:  No results found for this or any previous visit.    Cardiac Imaging:  No results found for this or any previous visit.      Inpatient Medications:  Scheduled medications   Medication Dose Route Frequency    allopurinol  300 mg oral Daily    amiodarone  400 mg oral TID    aspirin  81 mg oral Daily    benzonatate  200 mg oral TID    budesonide  0.5 mg nebulization BID    empagliflozin  10 mg oral Daily    enoxaparin  40 mg subcutaneous q12h SHARRON    ezetimibe  10 mg oral Daily    folic acid  1 mg oral Daily    formoterol  20 mcg nebulization BID    [Held by provider] furosemide  80 mg intravenous q12h    furosemide  80 mg oral BID with meals    insulin lispro  0-10 Units subcutaneous TID with meals    ipratropium-albuteroL  3 mL nebulization 4x daily    levothyroxine  125 mcg oral Daily    methylPREDNISolone sodium succinate (PF)  20 mg intravenous q8h    oxygen   inhalation Continuous  - Inhalation    oxygen   inhalation Continuous - Inhalation    thiamine  100 mg oral Daily    valsartan  40 mg oral Daily     PRN medications   Medication    acetaminophen    Or    acetaminophen    Or    acetaminophen    acetaminophen    Or    acetaminophen    Or    acetaminophen    albuterol    dextrose    dextrose    fluticasone    glucagon    glucagon    ipratropium-albuteroL    melatonin    ondansetron    Or    ondansetron     Continuous Medications   Medication Dose Last Rate       Physical Exam:  General:  Patient is awake, alert, and oriented.  Patient is in no acute distress.  HEENT:  Pupils equal and reactive.  Normocephalic.  Moist mucosa.    Neck:  No thyromegaly.  Normal Jugular Venous Pressure.  Cardiovascular:  Regular rate and rhythm.  Normal S1 and S2.  Pulmonary:  Clear to auscultation bilaterally.  Abdomen:  Soft. Non-tender.   Non-distended.  Positive bowel sounds.  Lower Extremities:  2+ pedal pulses. No LE edema.  Neurologic:  Cranial nerves intact.  No focal deficit.   Skin: Skin warm and dry, normal skin turgor.   Psychiatric: Normal affect.     Assessment/Plan   Alo Glass is a 68 y.o. male h/o newly diagnosed HFrEF (3/2024: 30-35%) (unwilling to take beta blockers, on ARB, SGLT-2), moderate aortic stenosis (3/2024), obesity, MELISSA on CPAP, COPD, current smoker, type 2 DM, hypertension, DLD (refuses statins/PCSK9 inhibitors, on ezetimibe), who was recently discharged for acute systolic heart failure decompensation requiring IV diuresis, presents to the hospital with wheezing and being treated for COPD exacerbation.     --HFrEF: EF is severely reduced based on echocardiogram 3/2024. Etiology of cardiomyopathy is probably a combination of moderate-severe aortic stenosis and potential component of ischemia given heavy coronary calcifications seen on CT scan.      --Moderate-severe aortic stenosis: Echocardiogram from 3/2024 has certain characteristics that indicate potential severe aortic  "stenosis, especially a dimensionless index as 0.21 and Vmax approximating 3.7 m/s while a LVOT VTI of only 12 - suggesting low output. This is highly concerning for low flow low gradient aortic stenosis. Mean gradient was 26mmHg in the setting of low EF. Mean gradient was around the same with a normal EF 3 5 years ago, so overall suspicion is that if patient had a normal EF now, mean gradient would probably be closer to 40mmHg, making aortic stenosis severe. CT chest with contrast that was obtained to rule out PE demonstrated severely calcified aortic valve.     --Multifocal atrial tachycardia: Likely secondary to COPD exacerbation. On amiodarone IV for rate control.  --Coronary calcifications on CT chest  --HTN  --DLD  --Active smoking  --Patient concern for \"low heart rate\": we compared his pulse oximeter's heart rate to actual telemetry and pulse oximeter is erroneously reading his HR as low because of his premature beats and arrhythmia. Advised the patient that his pulse oximeter is not reliable for his heart rate as long as he has ectopic beats     Recommendations:  -Agree with management of COPD exacerbation with steroids  -Will defer suspicion of PNA to primary taem  -Once improving from COPD exacerbation, will address cardiac issues, including his aortic stenosis  -Continue IV amiodarone for MAT-> transitioned to PO 400mg TID  -Potassium >4 and Magnesium >2  -Continue jardiance 10mg daily and valsartan 40mg daily for GDMT  -Agree with Ezetia 10mg daily for DLD  -Agree with ASA 81mg daily for now  -c/w diuresis   -Hold off on beta blockers for now given HFrEF and likely moderate to severe aortic stenosis   -Would not aggressively treat hypertension. Excessive vasodilation may worsen gradient across stenotic aortic valve  -The workup for aortic stenosis will likely include a structural heart consult and LHC as part of workup       Code Status:  Full Code    I spent 40 minutes in the professional and overall " care of this patient.        Alexus Keene, PARAM-CNP

## 2024-04-07 NOTE — PROGRESS NOTES
"Alo Glass is a 68 y.o. male on day 3 of admission presenting with Tachycardia.    Subjective       Seen and examined, discussed with the patient and the staff, he is up on the chair, with oxygen, looks short of breath,     Objective     Physical Exam  Alert oriented 3.  HEENT unremarkable.  Neck no JVD.  Heart S1-S2.  Lungs with wheezes.  Abdomen is soft nontender.  Legs no edema.    Last Recorded Vitals  Blood pressure 139/79, pulse 90, temperature 36.7 °C (98.1 °F), resp. rate 22, height 1.803 m (5' 10.98\"), weight 130 kg (287 lb), SpO2 92 %.  Intake/Output last 3 Shifts:  I/O last 3 completed shifts:  In: 1406 (10.8 mL/kg) [P.O.:600; I.V.:806 (6.2 mL/kg)]  Out: - (0 mL/kg)   Weight: 130.2 kg     Relevant Results  Results for orders placed or performed during the hospital encounter of 04/04/24 (from the past 96 hour(s))   Comprehensive metabolic panel   Result Value Ref Range    Glucose 107 (H) 74 - 99 mg/dL    Sodium 132 (L) 136 - 145 mmol/L    Potassium 4.6 3.5 - 5.3 mmol/L    Chloride 86 (L) 98 - 107 mmol/L    Bicarbonate 32 21 - 32 mmol/L    Anion Gap 19 10 - 20 mmol/L    Urea Nitrogen 35 (H) 6 - 23 mg/dL    Creatinine 0.78 0.50 - 1.30 mg/dL    eGFR >90 >60 mL/min/1.73m*2    Calcium 9.2 8.6 - 10.3 mg/dL    Albumin 4.1 3.4 - 5.0 g/dL    Alkaline Phosphatase 89 33 - 136 U/L    Total Protein 7.0 6.4 - 8.2 g/dL    AST 39 9 - 39 U/L    Bilirubin, Total 0.9 0.0 - 1.2 mg/dL    ALT 31 10 - 52 U/L   Protime-INR   Result Value Ref Range    Protime 11.2 9.8 - 12.8 seconds    INR 1.0 0.9 - 1.1   Troponin I, High Sensitivity   Result Value Ref Range    Troponin I, High Sensitivity 62 (HH) 0 - 20 ng/L   B-Type Natriuretic Peptide   Result Value Ref Range     (H) 0 - 99 pg/mL   Magnesium   Result Value Ref Range    Magnesium 2.50 (H) 1.60 - 2.40 mg/dL   Phosphorus   Result Value Ref Range    Phosphorus 4.2 2.5 - 4.9 mg/dL   Potassium   Result Value Ref Range    Potassium 4.6 3.5 - 5.3 mmol/L   ECG 12 lead "   Result Value Ref Range    Ventricular Rate 108 BPM    Atrial Rate 108 BPM    HI Interval 204 ms    QRS Duration 168 ms    QT Interval 450 ms    QTC Calculation(Bazett) 603 ms    R Axis -83 degrees    T Axis 55 degrees    QRS Count 18 beats    Q Onset 202 ms    P Onset 100 ms    P Offset 170 ms    T Offset 427 ms    QTC Fredericia 547 ms   CBC and Auto Differential   Result Value Ref Range    WBC 9.5 4.4 - 11.3 x10*3/uL    nRBC 0.0 0.0 - 0.0 /100 WBCs    RBC 5.71 4.50 - 5.90 x10*6/uL    Hemoglobin 19.1 (H) 13.5 - 17.5 g/dL    Hematocrit 55.0 (H) 41.0 - 52.0 %    MCV 96 80 - 100 fL    MCH 33.5 26.0 - 34.0 pg    MCHC 34.7 32.0 - 36.0 g/dL    RDW 13.0 11.5 - 14.5 %    Platelets 218 150 - 450 x10*3/uL    Neutrophils % 67.8 40.0 - 80.0 %    Immature Granulocytes %, Automated 0.2 0.0 - 0.9 %    Lymphocytes % 22.2 13.0 - 44.0 %    Monocytes % 8.1 2.0 - 10.0 %    Eosinophils % 1.4 0.0 - 6.0 %    Basophils % 0.3 0.0 - 2.0 %    Neutrophils Absolute 6.40 1.20 - 7.70 x10*3/uL    Immature Granulocytes Absolute, Automated 0.02 0.00 - 0.70 x10*3/uL    Lymphocytes Absolute 2.10 1.20 - 4.80 x10*3/uL    Monocytes Absolute 0.77 0.10 - 1.00 x10*3/uL    Eosinophils Absolute 0.13 0.00 - 0.70 x10*3/uL    Basophils Absolute 0.03 0.00 - 0.10 x10*3/uL   Troponin I, High Sensitivity   Result Value Ref Range    Troponin I, High Sensitivity 67 (HH) 0 - 20 ng/L   SST TOP   Result Value Ref Range    Extra Tube Hold for add-ons.    CBC   Result Value Ref Range    WBC 8.1 4.4 - 11.3 x10*3/uL    nRBC 0.0 0.0 - 0.0 /100 WBCs    RBC 5.58 4.50 - 5.90 x10*6/uL    Hemoglobin 18.5 (H) 13.5 - 17.5 g/dL    Hematocrit 55.0 (H) 41.0 - 52.0 %    MCV 99 80 - 100 fL    MCH 33.2 26.0 - 34.0 pg    MCHC 33.6 32.0 - 36.0 g/dL    RDW 13.1 11.5 - 14.5 %    Platelets 217 150 - 450 x10*3/uL   Basic Metabolic Panel   Result Value Ref Range    Glucose 203 (H) 74 - 99 mg/dL    Sodium 133 (L) 136 - 145 mmol/L    Potassium 3.1 (L) 3.5 - 5.3 mmol/L    Chloride 87 (L) 98 -  107 mmol/L    Bicarbonate 33 (H) 21 - 32 mmol/L    Anion Gap 16 10 - 20 mmol/L    Urea Nitrogen 40 (H) 6 - 23 mg/dL    Creatinine 1.00 0.50 - 1.30 mg/dL    eGFR 82 >60 mL/min/1.73m*2    Calcium 9.0 8.6 - 10.3 mg/dL   POCT GLUCOSE   Result Value Ref Range    POCT Glucose 290 (H) 74 - 99 mg/dL   POCT GLUCOSE   Result Value Ref Range    POCT Glucose 227 (H) 74 - 99 mg/dL   POCT GLUCOSE   Result Value Ref Range    POCT Glucose 180 (H) 74 - 99 mg/dL   CBC   Result Value Ref Range    WBC 9.5 4.4 - 11.3 x10*3/uL    nRBC 0.0 0.0 - 0.0 /100 WBCs    RBC 5.61 4.50 - 5.90 x10*6/uL    Hemoglobin 18.3 (H) 13.5 - 17.5 g/dL    Hematocrit 55.6 (H) 41.0 - 52.0 %    MCV 99 80 - 100 fL    MCH 32.6 26.0 - 34.0 pg    MCHC 32.9 32.0 - 36.0 g/dL    RDW 12.9 11.5 - 14.5 %    Platelets 235 150 - 450 x10*3/uL   Basic Metabolic Panel   Result Value Ref Range    Glucose 170 (H) 74 - 99 mg/dL    Sodium 135 (L) 136 - 145 mmol/L    Potassium 3.8 3.5 - 5.3 mmol/L    Chloride 89 (L) 98 - 107 mmol/L    Bicarbonate 36 (H) 21 - 32 mmol/L    Anion Gap 14 10 - 20 mmol/L    Urea Nitrogen 36 (H) 6 - 23 mg/dL    Creatinine 0.73 0.50 - 1.30 mg/dL    eGFR >90 >60 mL/min/1.73m*2    Calcium 9.3 8.6 - 10.3 mg/dL   TSH   Result Value Ref Range    Thyroid Stimulating Hormone 1.26 0.44 - 3.98 mIU/L   POCT GLUCOSE   Result Value Ref Range    POCT Glucose 147 (H) 74 - 99 mg/dL   POCT GLUCOSE   Result Value Ref Range    POCT Glucose 186 (H) 74 - 99 mg/dL   POCT GLUCOSE   Result Value Ref Range    POCT Glucose 234 (H) 74 - 99 mg/dL   Respiratory Culture/Smear    Specimen: SPUTUM; Fluid   Result Value Ref Range    Respiratory Culture/Smear (A)      Culture not performed. See Gram stain findings. Recollect if clinically indicated.    Gram Stain (A)      Gram stain indicates specimen contains significant salivary contamination.   CBC   Result Value Ref Range    WBC 10.5 4.4 - 11.3 x10*3/uL    nRBC 0.0 0.0 - 0.0 /100 WBCs    RBC 5.55 4.50 - 5.90 x10*6/uL    Hemoglobin 18.0  (H) 13.5 - 17.5 g/dL    Hematocrit 55.9 (H) 41.0 - 52.0 %     (H) 80 - 100 fL    MCH 32.4 26.0 - 34.0 pg    MCHC 32.2 32.0 - 36.0 g/dL    RDW 13.0 11.5 - 14.5 %    Platelets 225 150 - 450 x10*3/uL   Basic Metabolic Panel   Result Value Ref Range    Glucose 160 (H) 74 - 99 mg/dL    Sodium 140 136 - 145 mmol/L    Potassium 3.9 3.5 - 5.3 mmol/L    Chloride 92 (L) 98 - 107 mmol/L    Bicarbonate 41 (HH) 21 - 32 mmol/L    Anion Gap 11 10 - 20 mmol/L    Urea Nitrogen 45 (H) 6 - 23 mg/dL    Creatinine 0.94 0.50 - 1.30 mg/dL    eGFR 88 >60 mL/min/1.73m*2    Calcium 9.5 8.6 - 10.3 mg/dL   POCT GLUCOSE   Result Value Ref Range    POCT Glucose 198 (H) 74 - 99 mg/dL        Medications:  allopurinol, 300 mg, oral, Daily  amiodarone, 400 mg, oral, TID  aspirin, 81 mg, oral, Daily  benzonatate, 200 mg, oral, TID  budesonide, 0.5 mg, nebulization, BID  empagliflozin, 10 mg, oral, Daily  enoxaparin, 40 mg, subcutaneous, q12h SHARRON  ezetimibe, 10 mg, oral, Daily  folic acid, 1 mg, oral, Daily  formoterol, 20 mcg, nebulization, BID  furosemide, 80 mg, intravenous, q12h  [Held by provider] furosemide, 80 mg, oral, BID with meals  insulin lispro, 0-10 Units, subcutaneous, TID with meals  ipratropium-albuteroL, 3 mL, nebulization, 4x daily  levothyroxine, 125 mcg, oral, Daily  methylPREDNISolone sodium succinate (PF), 20 mg, intravenous, q8h  oxygen, , inhalation, Continuous - Inhalation  oxygen, , inhalation, Continuous - Inhalation  thiamine, 100 mg, oral, Daily  valsartan, 40 mg, oral, Daily      PRN medications: acetaminophen **OR** acetaminophen **OR** acetaminophen, acetaminophen **OR** acetaminophen **OR** acetaminophen, albuterol, dextrose, dextrose, fluticasone, glucagon, glucagon, ipratropium-albuteroL, melatonin, ondansetron **OR** ondansetron    ECG 12 lead    Result Date: 4/6/2024  See ED provider note for full interpretation and clinical correlation Confirmed by Karolina Freeman ([id number]) on 4/6/2024 10:10:27 AM    CT angio  chest for pulmonary embolism    Result Date: 4/4/2024  Interpreted By:  Ishaan Banda, STUDY: CT ANGIO CHEST FOR PULMONARY EMBOLISM;  4/4/2024 11:41 pm   INDICATION: Signs/Symptoms:hypoxic, recent admission.   COMPARISON: 10/02/2018 CT chest without contrast.   ACCESSION NUMBER(S): [id number]   ORDERING CLINICIAN: JANEL NASH   TECHNIQUE: Contiguous axial images of the chest were obtained after the intravenous administration of iodinated contrast using angiographic PE protocol. Coronal and sagittal reformatted images were reconstructed from the axial data. MIP images were created on an independent workstation and reviewed. Dual energy reconstructions were also performed including low energy virtual mono-energetic images and iodine density maps.   FINDINGS: MEDIASTINUM AND LYMPH NODES:  The esophageal wall appears within normal limits.  No enlarged intrathoracic or axillary lymph nodes by imaging criteria. No pneumomediastinum.   VESSELS:  Normal caliber aorta. Minimal aortic atherosclerosis.  No pulmonary embolism.   HEART: There is moderate cardiomegaly. There is severe aortic valvular calcification.  Moderate coronary artery calcifications. No significant pericardial effusion.   LUNG, AIRWAYS, AND PLEURA: There is generalized volume loss of the right hemithorax was present prior study but slightly progressed which is due to the presence of slightly greater atelectasis in the right middle lobe and right lower lobe. There is new frothy debris of occluding the right lower lobe bronchus and bronchus intermedius. There is diffuse bronchial wall thickening in the right middle and lower lobes with additional debris occluding there segmental and subsegmental bronchi. There is a small focus of subsegmental atelectasis in the medial left lower lobe. There is emphysema.   OSSEOUS STRUCTURES: No acute osseous abnormality.   CHEST WALL SOFT TISSUES: No discernible abnormality.   UPPER ABDOMEN/OTHER: No acute  abnormality.       Frothy debris occluding the bronchus intermedius and right lower lobe bronchus and additional multifocal segmental and subsegmental occlusions in the right middle and right lower lobes with diffuse new bronchial wall thickening and worsening atelectasis in both lobes. Findings are most likely related to aspiration with postobstructive atelectasis.   Severe aortic valve calcifications to the extent that we likely result in aortic stenosis. Recommend follow-up echocardiography.   Moderate 4 chamber cardiomegaly and moderate coronary artery calcifications.   Emphysema.   MACRO: None.   Signed by: Ishaan Banda 4/4/2024 11:56 PM Dictation workstation:   [id number]    XR chest 1 view    Result Date: 4/4/2024  Interpreted By:  Julián Peterson, STUDY: XR CHEST 1 VIEW;  4/4/2024 5:55 pm   INDICATION: Signs/Symptoms:sob.   COMPARISON: 03/13/2024   ACCESSION NUMBER(S): [id number]   ORDERING CLINICIAN: JANEL NASH   FINDINGS:         CARDIOMEDIASTINAL SILHOUETTE: There is enlargement cardiac silhouette with vascular congestion but no yolanda edema   LUNGS: There is dense airspace disease at the right lung base with a small right pleural effusion. Mild patchy left basilar airspace disease as well   ABDOMEN: No remarkable upper abdominal findings.   BONES: No acute osseous changes.       1.  Dense right basilar airspace disease with small right pleural effusion. Pneumonia is hind differential. 2. Enlarged cardiac silhouette with pulmonary vascular congestion       MACRO: None   Signed by: Julián Peterson 4/4/2024 6:00 PM Dictation workstation:   [id number]      Assessment/Plan   Expand All Collapse All    Alo Glass is a 68 y.o. male on day 2 of admission presenting with Tachycardia.        Subjective    Seen and examined, discussed with the patient and one of his friend,  He is up on the chair,.                 Objective   Physical Exam  Alert and oriented 3.  HEENT unremarkable.  Neck no  "JVD.  Heart S1-S2.  Lungs with wheezes.  Abdomen is soft.  Legs edema  Last Recorded Vitals  Blood pressure 118/72, pulse 83, temperature 36.6 °C (97.9 °F), temperature source Temporal, resp. rate 18, height 1.803 m (5' 10.98\"), weight 130 kg (287 lb), SpO2 92 %.  Intake/Output last 3 Shifts:  I/O last 3 completed shifts:  In: 250 (1.9 mL/kg) [P.O.:250]  Out: 400 (3.1 mL/kg) [Urine:400 (0.1 mL/kg/hr)]  Weight: 130.2 kg      Relevant Results        Results for orders placed or performed during the hospital encounter of 04/04/24 (from the past 96 hour(s))   Comprehensive metabolic panel   Result Value Ref Range     Glucose 107 (H) 74 - 99 mg/dL     Sodium 132 (L) 136 - 145 mmol/L     Potassium 4.6 3.5 - 5.3 mmol/L     Chloride 86 (L) 98 - 107 mmol/L     Bicarbonate 32 21 - 32 mmol/L     Anion Gap 19 10 - 20 mmol/L     Urea Nitrogen 35 (H) 6 - 23 mg/dL     Creatinine 0.78 0.50 - 1.30 mg/dL     eGFR >90 >60 mL/min/1.73m*2     Calcium 9.2 8.6 - 10.3 mg/dL     Albumin 4.1 3.4 - 5.0 g/dL     Alkaline Phosphatase 89 33 - 136 U/L     Total Protein 7.0 6.4 - 8.2 g/dL     AST 39 9 - 39 U/L     Bilirubin, Total 0.9 0.0 - 1.2 mg/dL     ALT 31 10 - 52 U/L   Protime-INR   Result Value Ref Range     Protime 11.2 9.8 - 12.8 seconds     INR 1.0 0.9 - 1.1   Troponin I, High Sensitivity   Result Value Ref Range     Troponin I, High Sensitivity 62 (HH) 0 - 20 ng/L   B-Type Natriuretic Peptide   Result Value Ref Range      (H) 0 - 99 pg/mL   Magnesium   Result Value Ref Range     Magnesium 2.50 (H) 1.60 - 2.40 mg/dL   Phosphorus   Result Value Ref Range     Phosphorus 4.2 2.5 - 4.9 mg/dL   Potassium   Result Value Ref Range     Potassium 4.6 3.5 - 5.3 mmol/L   ECG 12 lead   Result Value Ref Range     Ventricular Rate 108 BPM     Atrial Rate 108 BPM     NV Interval 204 ms     QRS Duration 168 ms     QT Interval 450 ms     QTC Calculation(Bazett) 603 ms     R Axis -83 degrees     T Axis 55 degrees     QRS Count 18 beats     Q Onset " 202 ms     P Onset 100 ms     P Offset 170 ms     T Offset 427 ms     QTC Fredericia 547 ms   CBC and Auto Differential   Result Value Ref Range     WBC 9.5 4.4 - 11.3 x10*3/uL     nRBC 0.0 0.0 - 0.0 /100 WBCs     RBC 5.71 4.50 - 5.90 x10*6/uL     Hemoglobin 19.1 (H) 13.5 - 17.5 g/dL     Hematocrit 55.0 (H) 41.0 - 52.0 %     MCV 96 80 - 100 fL     MCH 33.5 26.0 - 34.0 pg     MCHC 34.7 32.0 - 36.0 g/dL     RDW 13.0 11.5 - 14.5 %     Platelets 218 150 - 450 x10*3/uL     Neutrophils % 67.8 40.0 - 80.0 %     Immature Granulocytes %, Automated 0.2 0.0 - 0.9 %     Lymphocytes % 22.2 13.0 - 44.0 %     Monocytes % 8.1 2.0 - 10.0 %     Eosinophils % 1.4 0.0 - 6.0 %     Basophils % 0.3 0.0 - 2.0 %     Neutrophils Absolute 6.40 1.20 - 7.70 x10*3/uL     Immature Granulocytes Absolute, Automated 0.02 0.00 - 0.70 x10*3/uL     Lymphocytes Absolute 2.10 1.20 - 4.80 x10*3/uL     Monocytes Absolute 0.77 0.10 - 1.00 x10*3/uL     Eosinophils Absolute 0.13 0.00 - 0.70 x10*3/uL     Basophils Absolute 0.03 0.00 - 0.10 x10*3/uL   Troponin I, High Sensitivity   Result Value Ref Range     Troponin I, High Sensitivity 67 (HH) 0 - 20 ng/L   SST TOP   Result Value Ref Range     Extra Tube Hold for add-ons.     CBC   Result Value Ref Range     WBC 8.1 4.4 - 11.3 x10*3/uL     nRBC 0.0 0.0 - 0.0 /100 WBCs     RBC 5.58 4.50 - 5.90 x10*6/uL     Hemoglobin 18.5 (H) 13.5 - 17.5 g/dL     Hematocrit 55.0 (H) 41.0 - 52.0 %     MCV 99 80 - 100 fL     MCH 33.2 26.0 - 34.0 pg     MCHC 33.6 32.0 - 36.0 g/dL     RDW 13.1 11.5 - 14.5 %     Platelets 217 150 - 450 x10*3/uL   Basic Metabolic Panel   Result Value Ref Range     Glucose 203 (H) 74 - 99 mg/dL     Sodium 133 (L) 136 - 145 mmol/L     Potassium 3.1 (L) 3.5 - 5.3 mmol/L     Chloride 87 (L) 98 - 107 mmol/L     Bicarbonate 33 (H) 21 - 32 mmol/L     Anion Gap 16 10 - 20 mmol/L     Urea Nitrogen 40 (H) 6 - 23 mg/dL     Creatinine 1.00 0.50 - 1.30 mg/dL     eGFR 82 >60 mL/min/1.73m*2     Calcium 9.0 8.6 -  10.3 mg/dL   POCT GLUCOSE   Result Value Ref Range     POCT Glucose 290 (H) 74 - 99 mg/dL   POCT GLUCOSE   Result Value Ref Range     POCT Glucose 227 (H) 74 - 99 mg/dL   POCT GLUCOSE   Result Value Ref Range     POCT Glucose 180 (H) 74 - 99 mg/dL   CBC   Result Value Ref Range     WBC 9.5 4.4 - 11.3 x10*3/uL     nRBC 0.0 0.0 - 0.0 /100 WBCs     RBC 5.61 4.50 - 5.90 x10*6/uL     Hemoglobin 18.3 (H) 13.5 - 17.5 g/dL     Hematocrit 55.6 (H) 41.0 - 52.0 %     MCV 99 80 - 100 fL     MCH 32.6 26.0 - 34.0 pg     MCHC 32.9 32.0 - 36.0 g/dL     RDW 12.9 11.5 - 14.5 %     Platelets 235 150 - 450 x10*3/uL   Basic Metabolic Panel   Result Value Ref Range     Glucose 170 (H) 74 - 99 mg/dL     Sodium 135 (L) 136 - 145 mmol/L     Potassium 3.8 3.5 - 5.3 mmol/L     Chloride 89 (L) 98 - 107 mmol/L     Bicarbonate 36 (H) 21 - 32 mmol/L     Anion Gap 14 10 - 20 mmol/L     Urea Nitrogen 36 (H) 6 - 23 mg/dL     Creatinine 0.73 0.50 - 1.30 mg/dL     eGFR >90 >60 mL/min/1.73m*2     Calcium 9.3 8.6 - 10.3 mg/dL   POCT GLUCOSE   Result Value Ref Range     POCT Glucose 147 (H) 74 - 99 mg/dL         Medications:  allopurinol, 300 mg, oral, Daily  amiodarone, 400 mg, oral, TID  aspirin, 81 mg, oral, Daily  benzonatate, 200 mg, oral, TID  budesonide, 0.5 mg, nebulization, BID  empagliflozin, 10 mg, oral, Daily  ezetimibe, 10 mg, oral, Daily  folic acid, 1 mg, oral, Daily  formoterol, 20 mcg, nebulization, BID  [Held by provider] furosemide, 80 mg, intravenous, q12h  furosemide, 80 mg, oral, BID with meals  insulin lispro, 0-10 Units, subcutaneous, TID with meals  ipratropium-albuteroL, 3 mL, nebulization, 4x daily  levothyroxine, 125 mcg, oral, Daily  methylPREDNISolone sodium succinate (PF), 20 mg, intravenous, q8h  oxygen, , inhalation, Continuous - Inhalation  oxygen, , inhalation, Continuous - Inhalation  thiamine, 100 mg, oral, Daily  valsartan, 40 mg, oral, Daily        PRN medications: albuterol, dextrose, dextrose, fluticasone,  glucagon, glucagon, ipratropium-albuteroL     ECG 12 lead     Result Date: 4/6/2024  See ED provider note for full interpretation and clinical correlation Confirmed by Karolina Freeman ([id number]) on 4/6/2024 10:10:27 AM     CT angio chest for pulmonary embolism     Result Date: 4/4/2024  Interpreted By:  Ishaan Banda, STUDY: CT ANGIO CHEST FOR PULMONARY EMBOLISM;  4/4/2024 11:41 pm   INDICATION: Signs/Symptoms:hypoxic, recent admission.   COMPARISON: 10/02/2018 CT chest without contrast.   ACCESSION NUMBER(S): [id number]   ORDERING CLINICIAN: JANEL NASH   TECHNIQUE: Contiguous axial images of the chest were obtained after the intravenous administration of iodinated contrast using angiographic PE protocol. Coronal and sagittal reformatted images were reconstructed from the axial data. MIP images were created on an independent workstation and reviewed. Dual energy reconstructions were also performed including low energy virtual mono-energetic images and iodine density maps.   FINDINGS: MEDIASTINUM AND LYMPH NODES:  The esophageal wall appears within normal limits.  No enlarged intrathoracic or axillary lymph nodes by imaging criteria. No pneumomediastinum.   VESSELS:  Normal caliber aorta. Minimal aortic atherosclerosis.  No pulmonary embolism.   HEART: There is moderate cardiomegaly. There is severe aortic valvular calcification.  Moderate coronary artery calcifications. No significant pericardial effusion.   LUNG, AIRWAYS, AND PLEURA: There is generalized volume loss of the right hemithorax was present prior study but slightly progressed which is due to the presence of slightly greater atelectasis in the right middle lobe and right lower lobe. There is new frothy debris of occluding the right lower lobe bronchus and bronchus intermedius. There is diffuse bronchial wall thickening in the right middle and lower lobes with additional debris occluding there segmental and subsegmental bronchi. There is a small focus  of subsegmental atelectasis in the medial left lower lobe. There is emphysema.   OSSEOUS STRUCTURES: No acute osseous abnormality.   CHEST WALL SOFT TISSUES: No discernible abnormality.   UPPER ABDOMEN/OTHER: No acute abnormality.        Frothy debris occluding the bronchus intermedius and right lower lobe bronchus and additional multifocal segmental and subsegmental occlusions in the right middle and right lower lobes with diffuse new bronchial wall thickening and worsening atelectasis in both lobes. Findings are most likely related to aspiration with postobstructive atelectasis.   Severe aortic valve calcifications to the extent that we likely result in aortic stenosis. Recommend follow-up echocardiography.   Moderate 4 chamber cardiomegaly and moderate coronary artery calcifications.   Emphysema.   MACRO: None.   Signed by: Ishaan Banda 4/4/2024 11:56 PM Dictation workstation:   [id number]     XR chest 1 view     Result Date: 4/4/2024  Interpreted By:  Julián Peterson, STUDY: XR CHEST 1 VIEW;  4/4/2024 5:55 pm   INDICATION: Signs/Symptoms:sob.   COMPARISON: 03/13/2024   ACCESSION NUMBER(S): [id number]   ORDERING CLINICIAN: JANEL NASH   FINDINGS:         CARDIOMEDIASTINAL SILHOUETTE: There is enlargement cardiac silhouette with vascular congestion but no yolanda edema   LUNGS: There is dense airspace disease at the right lung base with a small right pleural effusion. Mild patchy left basilar airspace disease as well   ABDOMEN: No remarkable upper abdominal findings.   BONES: No acute osseous changes.        1.  Dense right basilar airspace disease with small right pleural effusion. Pneumonia is hind differential. 2. Enlarged cardiac silhouette with pulmonary vascular congestion       MACRO: None   Signed by: Julián Peterson 4/4/2024 6:00 PM Dictation workstation:   [id number]              Assessment/Plan []Expand by Default  He is a 68-year-old  male, who is known to history of COPD, still  smokes, also known to have history of obesity, obstructive sleep apnea, chronic systolic heart failure with ejection fraction of 30 to 35%, lymphedema, GERD, hypertension, recent admission to [facility] with cardiogenic shock secondary to pneumonia secondary to sepsis, he came from home, as he was sent to the emergency department by the nurse, as his heart rate was low in the pulse ox was low.  Upon admission to the emergency department he had a workup done which included CT PE, which did not show any pneumonia, also labs, and also his heart rate was high, and he was having PACs, this was discussed with the cardiologist per the advice he was also started on amiodarone.  68-year-old  male he is here because of low heart rate, his metoprolol was already discontinued but now he is tachycardic, he was given amiodarone, cardiology is managing,.  Acute hypoxia, due to most likely COPD exacerbation and fluid overload no pneumonia no PE  It appears as here he is also fluid overloaded acute on chronic CHF, he is on p.o. Lasix at home, he refuses Lasix,.  I do not see pneumonia also as a result I have not added antibiotics  COPD he is still a smoker continue with the aerosols and steroids added pulmonology is following.   Aortic stenosis, again cardiology is following,  I spent 35 minutes in the professional and overall care of this patient.    Curtis Corona MD

## 2024-04-07 NOTE — NURSING NOTE
Mr. Glass refused his  80 MG furosemide that was due today at 4:00 pm. Said he would like to take the furosemide tomorrow, Monday April 8th- one dose at 6 a.m and one at 2:00 pm.     Patient is Attending MD is aware.      Patient feels sodium restricted diet is causing him problems and wants a non restricted N+ diet.     The student just came to me and reported Alo is complaining of textile disturbance, feel's things thart are not there. Says he's just not feeling right. Also said pain in the back of his neck is like a 1 and doesn't want it to get to a 7-8.     His vitals are 120/70 pulse 58, 02 93 percent.

## 2024-04-07 NOTE — PROGRESS NOTES
"Alo Glass is a 68 y.o. male on day 3 of admission presenting with Tachycardia.    Subjective   Feels \"okay\".  Admits to shortness of breath and to a nonproductive cough as well as mild wheezing, but denies pain.  On 2 L nasal cannula oxygen with an oxygen saturation of 90%.  Used CPAP for about 2 hours last night.  TSH was normal.  Sputum culture was rejected because it was saliva.     Objective   Physical Exam  Vitals  Blood pressure 139/79, pulse 90, temperature 36.3 °C (97.4 °F), temperature source Temporal, resp. rate 22, height 1.803 m (5' 10.98\"), weight 130 kg (287 lb), SpO2 92 %.  Intake/Output last 3 Shifts:  I/O last 3 completed shifts:  In: 1406 (10.8 mL/kg) [P.O.:600; I.V.:806 (6.2 mL/kg)]  Out: - (0 mL/kg)   Weight: 130.2 kg     General-awake, alert and in no acute distress, sitting up in a chair.  Lungs-clear, without wheezes, rales or rhonchi.    Heart-regular rate and rhythm versus irregularly irregular.  Abdomen-positive bowel sounds.  Extremities-trace lower extremity edema.  Skin-venous stasis changes of the legs.    Scheduled medications  allopurinol, 300 mg, oral, Daily  amiodarone, 400 mg, oral, TID  aspirin, 81 mg, oral, Daily  benzonatate, 200 mg, oral, TID  budesonide, 0.5 mg, nebulization, BID  empagliflozin, 10 mg, oral, Daily  enoxaparin, 40 mg, subcutaneous, q12h SHARRON  ezetimibe, 10 mg, oral, Daily  folic acid, 1 mg, oral, Daily  formoterol, 20 mcg, nebulization, BID  [Held by provider] furosemide, 80 mg, intravenous, q12h  furosemide, 80 mg, oral, BID with meals  insulin lispro, 0-10 Units, subcutaneous, TID with meals  ipratropium-albuteroL, 3 mL, nebulization, 4x daily  levothyroxine, 125 mcg, oral, Daily  methylPREDNISolone sodium succinate (PF), 20 mg, intravenous, q8h  oxygen, , inhalation, Continuous - Inhalation  oxygen, , inhalation, Continuous - Inhalation  thiamine, 100 mg, oral, Daily  valsartan, 40 mg, oral, Daily      Continuous medications     PRN medications  PRN " medications: acetaminophen **OR** acetaminophen **OR** acetaminophen, acetaminophen **OR** acetaminophen **OR** acetaminophen, albuterol, dextrose, dextrose, fluticasone, glucagon, glucagon, ipratropium-albuteroL, melatonin, ondansetron **OR** ondansetron     Results for orders placed or performed during the hospital encounter of 04/04/24 (from the past 24 hour(s))   POCT GLUCOSE   Result Value Ref Range    POCT Glucose 186 (H) 74 - 99 mg/dL   POCT GLUCOSE   Result Value Ref Range    POCT Glucose 234 (H) 74 - 99 mg/dL   Respiratory Culture/Smear    Specimen: SPUTUM; Fluid   Result Value Ref Range    Respiratory Culture/Smear (A)      Culture not performed. See Gram stain findings. Recollect if clinically indicated.    Gram Stain (A)      Gram stain indicates specimen contains significant salivary contamination.   CBC   Result Value Ref Range    WBC 10.5 4.4 - 11.3 x10*3/uL    nRBC 0.0 0.0 - 0.0 /100 WBCs    RBC 5.55 4.50 - 5.90 x10*6/uL    Hemoglobin 18.0 (H) 13.5 - 17.5 g/dL    Hematocrit 55.9 (H) 41.0 - 52.0 %     (H) 80 - 100 fL    MCH 32.4 26.0 - 34.0 pg    MCHC 32.2 32.0 - 36.0 g/dL    RDW 13.0 11.5 - 14.5 %    Platelets 225 150 - 450 x10*3/uL   Basic Metabolic Panel   Result Value Ref Range    Glucose 160 (H) 74 - 99 mg/dL    Sodium 140 136 - 145 mmol/L    Potassium 3.9 3.5 - 5.3 mmol/L    Chloride 92 (L) 98 - 107 mmol/L    Bicarbonate 41 (HH) 21 - 32 mmol/L    Anion Gap 11 10 - 20 mmol/L    Urea Nitrogen 45 (H) 6 - 23 mg/dL    Creatinine 0.94 0.50 - 1.30 mg/dL    eGFR 88 >60 mL/min/1.73m*2    Calcium 9.5 8.6 - 10.3 mg/dL   POCT GLUCOSE   Result Value Ref Range    POCT Glucose 198 (H) 74 - 99 mg/dL      Assessment:  68-year-old man with COPD, congestive heart failure, cardiomyopathy, aortic stenosis, obstructive sleep apnea and recent admission for acute respiratory failure due to pneumonia, congestive heart failure, Streptococcus pneumonia and COPD exacerbation, admitted with shortness of breath,  hypoxia, bradycardia, hypotension, visual changes and near syncope, and found to have an elevated BNP, elevated hematocrit and an abnormal CT angiogram of the chest showing right middle lobe and right lower lobe volume loss/atelectasis, most likely due to aspiration pneumonia/pneumonitis, in association with a mild COPD exacerbation and mild congestive heart failure, leading to acute-on  -chronic hypercapnic and probable hypoxemic respiratory failure.  He has obstructive sleep apnea but used CPAP minimally last night.  There is no evidence of hypothyroidism.    Recommend:  1.  Continue DuoNeb, budesonide, formoterol, Solu-Medrol, Tessalon,  Synthroid, Lasix, amiodarone and Lovenox.  2.  Keep oxygen saturation approximately 92 to 95%; encourage use of nocturnal CPAP throughout the night.  3.  Home oxygen evaluation prior to discharge.  4.  Incentive spirometry and Acapella treatment.  5.  Repeat sputum culture.  6.  Follow-up with pulmonary and sleep medicine after discharge.    Giovanni Padilla MD

## 2024-04-08 ENCOUNTER — APPOINTMENT (OUTPATIENT)
Dept: CARDIOLOGY | Facility: HOSPITAL | Age: 69
DRG: 190 | End: 2024-04-08
Payer: MEDICARE

## 2024-04-08 ENCOUNTER — HOME CARE VISIT (OUTPATIENT)
Dept: HOME HEALTH SERVICES | Facility: HOME HEALTH | Age: 69
End: 2024-04-08
Payer: MEDICARE

## 2024-04-08 ENCOUNTER — APPOINTMENT (OUTPATIENT)
Dept: RADIOLOGY | Facility: HOSPITAL | Age: 69
DRG: 190 | End: 2024-04-08
Payer: MEDICARE

## 2024-04-08 LAB
ANION GAP SERPL CALC-SCNC: 14 MMOL/L (ref 10–20)
BACTERIA SPEC RESP CULT: ABNORMAL
BNP SERPL-MCNC: 267 PG/ML (ref 0–99)
BUN SERPL-MCNC: 52 MG/DL (ref 6–23)
CALCIUM SERPL-MCNC: 9 MG/DL (ref 8.6–10.3)
CHLORIDE SERPL-SCNC: 90 MMOL/L (ref 98–107)
CO2 SERPL-SCNC: 35 MMOL/L (ref 21–32)
CREAT SERPL-MCNC: 1.07 MG/DL (ref 0.5–1.3)
EGFRCR SERPLBLD CKD-EPI 2021: 76 ML/MIN/1.73M*2
ERYTHROCYTE [DISTWIDTH] IN BLOOD BY AUTOMATED COUNT: 12.9 % (ref 11.5–14.5)
GLUCOSE BLD MANUAL STRIP-MCNC: 160 MG/DL (ref 74–99)
GLUCOSE BLD MANUAL STRIP-MCNC: 214 MG/DL (ref 74–99)
GLUCOSE BLD MANUAL STRIP-MCNC: 274 MG/DL (ref 74–99)
GLUCOSE SERPL-MCNC: 244 MG/DL (ref 74–99)
GRAM STN SPEC: ABNORMAL
HCT VFR BLD AUTO: 57.1 % (ref 41–52)
HGB BLD-MCNC: 18.6 G/DL (ref 13.5–17.5)
MCH RBC QN AUTO: 32.6 PG (ref 26–34)
MCHC RBC AUTO-ENTMCNC: 32.6 G/DL (ref 32–36)
MCV RBC AUTO: 100 FL (ref 80–100)
NRBC BLD-RTO: 0 /100 WBCS (ref 0–0)
PLATELET # BLD AUTO: 270 X10*3/UL (ref 150–450)
POTASSIUM SERPL-SCNC: 5 MMOL/L (ref 3.5–5.3)
RBC # BLD AUTO: 5.71 X10*6/UL (ref 4.5–5.9)
SODIUM SERPL-SCNC: 134 MMOL/L (ref 136–145)
WBC # BLD AUTO: 10.1 X10*3/UL (ref 4.4–11.3)

## 2024-04-08 PROCEDURE — 82947 ASSAY GLUCOSE BLOOD QUANT: CPT

## 2024-04-08 PROCEDURE — 71045 X-RAY EXAM CHEST 1 VIEW: CPT

## 2024-04-08 PROCEDURE — 2500000002 HC RX 250 W HCPCS SELF ADMINISTERED DRUGS (ALT 637 FOR MEDICARE OP, ALT 636 FOR OP/ED): Performed by: PHARMACIST

## 2024-04-08 PROCEDURE — 2500000004 HC RX 250 GENERAL PHARMACY W/ HCPCS (ALT 636 FOR OP/ED): Performed by: NURSE PRACTITIONER

## 2024-04-08 PROCEDURE — 2500000004 HC RX 250 GENERAL PHARMACY W/ HCPCS (ALT 636 FOR OP/ED): Performed by: INTERNAL MEDICINE

## 2024-04-08 PROCEDURE — 93005 ELECTROCARDIOGRAM TRACING: CPT

## 2024-04-08 PROCEDURE — 80048 BASIC METABOLIC PNL TOTAL CA: CPT | Performed by: INTERNAL MEDICINE

## 2024-04-08 PROCEDURE — 2500000005 HC RX 250 GENERAL PHARMACY W/O HCPCS: Performed by: INTERNAL MEDICINE

## 2024-04-08 PROCEDURE — 36415 COLL VENOUS BLD VENIPUNCTURE: CPT | Performed by: INTERNAL MEDICINE

## 2024-04-08 PROCEDURE — 2500000001 HC RX 250 WO HCPCS SELF ADMINISTERED DRUGS (ALT 637 FOR MEDICARE OP): Performed by: INTERNAL MEDICINE

## 2024-04-08 PROCEDURE — 99232 SBSQ HOSP IP/OBS MODERATE 35: CPT

## 2024-04-08 PROCEDURE — 2500000002 HC RX 250 W HCPCS SELF ADMINISTERED DRUGS (ALT 637 FOR MEDICARE OP, ALT 636 FOR OP/ED): Performed by: NURSE PRACTITIONER

## 2024-04-08 PROCEDURE — 83880 ASSAY OF NATRIURETIC PEPTIDE: CPT | Performed by: STUDENT IN AN ORGANIZED HEALTH CARE EDUCATION/TRAINING PROGRAM

## 2024-04-08 PROCEDURE — 71045 X-RAY EXAM CHEST 1 VIEW: CPT | Performed by: RADIOLOGY

## 2024-04-08 PROCEDURE — 94640 AIRWAY INHALATION TREATMENT: CPT

## 2024-04-08 PROCEDURE — 94668 MNPJ CHEST WALL SBSQ: CPT

## 2024-04-08 PROCEDURE — 85027 COMPLETE CBC AUTOMATED: CPT | Performed by: INTERNAL MEDICINE

## 2024-04-08 PROCEDURE — 2500000002 HC RX 250 W HCPCS SELF ADMINISTERED DRUGS (ALT 637 FOR MEDICARE OP, ALT 636 FOR OP/ED): Performed by: INTERNAL MEDICINE

## 2024-04-08 PROCEDURE — 2060000001 HC INTERMEDIATE ICU ROOM DAILY

## 2024-04-08 RX ORDER — DOCUSATE SODIUM 100 MG/1
100 CAPSULE, LIQUID FILLED ORAL 2 TIMES DAILY
Status: DISCONTINUED | OUTPATIENT
Start: 2024-04-08 | End: 2024-04-10 | Stop reason: HOSPADM

## 2024-04-08 RX ORDER — FUROSEMIDE 10 MG/ML
80 INJECTION INTRAMUSCULAR; INTRAVENOUS
Status: DISCONTINUED | OUTPATIENT
Start: 2024-04-09 | End: 2024-04-09

## 2024-04-08 RX ORDER — AMOXICILLIN AND CLAVULANATE POTASSIUM 875; 125 MG/1; MG/1
1 TABLET, FILM COATED ORAL EVERY 12 HOURS SCHEDULED
Status: DISCONTINUED | OUTPATIENT
Start: 2024-04-08 | End: 2024-04-10 | Stop reason: HOSPADM

## 2024-04-08 RX ADMIN — ASPIRIN 81 MG: 81 TABLET, COATED ORAL at 09:16

## 2024-04-08 RX ADMIN — Medication 3 L/MIN: at 20:00

## 2024-04-08 RX ADMIN — AMIODARONE HYDROCHLORIDE 400 MG: 200 TABLET ORAL at 09:16

## 2024-04-08 RX ADMIN — ENOXAPARIN SODIUM 40 MG: 40 INJECTION SUBCUTANEOUS at 09:16

## 2024-04-08 RX ADMIN — IPRATROPIUM BROMIDE AND ALBUTEROL SULFATE 3 ML: 2.5; .5 SOLUTION RESPIRATORY (INHALATION) at 19:32

## 2024-04-08 RX ADMIN — FORMOTEROL FUMARATE DIHYDRATE 20 MCG: 20 SOLUTION RESPIRATORY (INHALATION) at 19:32

## 2024-04-08 RX ADMIN — AMOXICILLIN AND CLAVULANATE POTASSIUM 1 TABLET: 875; 125 TABLET, FILM COATED ORAL at 12:18

## 2024-04-08 RX ADMIN — INSULIN LISPRO 2 UNITS: 100 INJECTION, SOLUTION INTRAVENOUS; SUBCUTANEOUS at 12:00

## 2024-04-08 RX ADMIN — BENZONATATE 200 MG: 100 CAPSULE ORAL at 14:59

## 2024-04-08 RX ADMIN — METHYLPREDNISOLONE SODIUM SUCCINATE 20 MG: 40 INJECTION, POWDER, FOR SOLUTION INTRAMUSCULAR; INTRAVENOUS at 18:01

## 2024-04-08 RX ADMIN — EZETIMIBE 10 MG: 10 TABLET ORAL at 09:16

## 2024-04-08 RX ADMIN — BUDESONIDE 0.5 MG: 0.5 INHALANT ORAL at 19:32

## 2024-04-08 RX ADMIN — BENZONATATE 200 MG: 100 CAPSULE ORAL at 20:18

## 2024-04-08 RX ADMIN — INSULIN LISPRO 4 UNITS: 100 INJECTION, SOLUTION INTRAVENOUS; SUBCUTANEOUS at 09:16

## 2024-04-08 RX ADMIN — LEVOTHYROXINE SODIUM 125 MCG: 125 TABLET ORAL at 06:17

## 2024-04-08 RX ADMIN — Medication: at 08:00

## 2024-04-08 RX ADMIN — AMIODARONE HYDROCHLORIDE 400 MG: 200 TABLET ORAL at 20:18

## 2024-04-08 RX ADMIN — EMPAGLIFLOZIN 10 MG: 10 TABLET, FILM COATED ORAL at 09:16

## 2024-04-08 RX ADMIN — FOLIC ACID 1 MG: 1 TABLET ORAL at 09:16

## 2024-04-08 RX ADMIN — AMOXICILLIN AND CLAVULANATE POTASSIUM 1 TABLET: 875; 125 TABLET, FILM COATED ORAL at 20:18

## 2024-04-08 RX ADMIN — ALLOPURINOL 300 MG: 300 TABLET ORAL at 09:16

## 2024-04-08 RX ADMIN — ENOXAPARIN SODIUM 40 MG: 40 INJECTION SUBCUTANEOUS at 20:18

## 2024-04-08 RX ADMIN — Medication 3 L/MIN: at 20:42

## 2024-04-08 RX ADMIN — METHYLPREDNISOLONE SODIUM SUCCINATE 20 MG: 40 INJECTION, POWDER, FOR SOLUTION INTRAMUSCULAR; INTRAVENOUS at 00:12

## 2024-04-08 RX ADMIN — VALSARTAN 40 MG: 80 TABLET, FILM COATED ORAL at 09:16

## 2024-04-08 RX ADMIN — BENZONATATE 200 MG: 100 CAPSULE ORAL at 09:16

## 2024-04-08 RX ADMIN — FUROSEMIDE 80 MG: 10 INJECTION, SOLUTION INTRAMUSCULAR; INTRAVENOUS at 06:07

## 2024-04-08 RX ADMIN — METHYLPREDNISOLONE SODIUM SUCCINATE 20 MG: 40 INJECTION, POWDER, FOR SOLUTION INTRAMUSCULAR; INTRAVENOUS at 09:16

## 2024-04-08 RX ADMIN — THIAMINE HCL TAB 100 MG 100 MG: 100 TAB at 09:16

## 2024-04-08 RX ADMIN — INSULIN LISPRO 6 UNITS: 100 INJECTION, SOLUTION INTRAVENOUS; SUBCUTANEOUS at 18:01

## 2024-04-08 RX ADMIN — AMIODARONE HYDROCHLORIDE 400 MG: 200 TABLET ORAL at 14:59

## 2024-04-08 RX ADMIN — IPRATROPIUM BROMIDE AND ALBUTEROL SULFATE 3 ML: 2.5; .5 SOLUTION RESPIRATORY (INHALATION) at 14:27

## 2024-04-08 ASSESSMENT — COGNITIVE AND FUNCTIONAL STATUS - GENERAL
MOBILITY SCORE: 23
CLIMB 3 TO 5 STEPS WITH RAILING: A LITTLE
DAILY ACTIVITIY SCORE: 24
CLIMB 3 TO 5 STEPS WITH RAILING: A LITTLE
MOBILITY SCORE: 23
DAILY ACTIVITIY SCORE: 24

## 2024-04-08 ASSESSMENT — PAIN SCALES - GENERAL
PAINLEVEL_OUTOF10: 0 - NO PAIN
PAINLEVEL_OUTOF10: 0 - NO PAIN

## 2024-04-08 ASSESSMENT — PAIN - FUNCTIONAL ASSESSMENT: PAIN_FUNCTIONAL_ASSESSMENT: 0-10

## 2024-04-08 NOTE — PROGRESS NOTES
"Alo Glass is a 68 y.o. male on day 4 of admission presenting with Tachycardia.    Subjective   The patient is angry and frustrated and is requesting transfer to  the [facility].  He denies shortness of breath at rest, but does admit to dyspnea on exertion, cough productive of yellow sputum and to the neck, head and shoulder discomfort.  He also complains of nasal congestion.  On 3 L nasal cannula oxygen during the day.  Used CPAP for approximately 2 hours last night.  Sputum samples x 2 are rejected due to contamination with saliva.     Objective   Physical Exam  Vitals  Blood pressure 126/79, pulse 87, temperature 36.3 °C (97.3 °F), temperature source Temporal, resp. rate 18, height 1.803 m (5' 10.98\"), weight 130 kg (286 lb 6 oz), SpO2 93 %.  Intake/Output last 3 Shifts:  No intake/output data recorded.    General-awake, alert and mildly agitated, sitting up in a chair.  Lungs-clear, without wheezes, rales or rhonchi.  Heart-regular rate and rhythm with ectopic beats.    Abdomen-positive bowel sounds.  Extremities-1+ lower extremity edema.  Skin-venous stasis changes of the legs.    Scheduled medications  allopurinol, 300 mg, oral, Daily  amiodarone, 400 mg, oral, TID  aspirin, 81 mg, oral, Daily  benzonatate, 200 mg, oral, TID  budesonide, 0.5 mg, nebulization, BID  empagliflozin, 10 mg, oral, Daily  enoxaparin, 40 mg, subcutaneous, q12h SHARRON  ezetimibe, 10 mg, oral, Daily  folic acid, 1 mg, oral, Daily  formoterol, 20 mcg, nebulization, BID  furosemide, 80 mg, intravenous, q12h  [Held by provider] furosemide, 80 mg, oral, BID with meals  insulin lispro, 0-10 Units, subcutaneous, TID with meals  ipratropium-albuteroL, 3 mL, nebulization, TID  levothyroxine, 125 mcg, oral, Daily  methylPREDNISolone sodium succinate (PF), 20 mg, intravenous, q8h  oxygen, , inhalation, Continuous - Inhalation  oxygen, , inhalation, Continuous - Inhalation  thiamine, 100 mg, oral, Daily  valsartan, 40 mg, oral, " Daily      Continuous medications     PRN medications  PRN medications: acetaminophen **OR** acetaminophen **OR** acetaminophen, acetaminophen **OR** acetaminophen **OR** acetaminophen, albuterol, dextrose, dextrose, fluticasone, glucagon, glucagon, ipratropium-albuteroL, melatonin, ondansetron **OR** ondansetron     Results for orders placed or performed during the hospital encounter of 04/04/24 (from the past 24 hour(s))   POCT GLUCOSE   Result Value Ref Range    POCT Glucose 227 (H) 74 - 99 mg/dL   Respiratory Culture/Smear    Specimen: SPUTUM; Fluid   Result Value Ref Range    Respiratory Culture/Smear (A)      Culture not performed. See Gram stain findings. Recollect if clinically indicated.    Gram Stain (A)      Gram stain indicates specimen contains significant salivary contamination.   POCT GLUCOSE   Result Value Ref Range    POCT Glucose 177 (H) 74 - 99 mg/dL   POCT GLUCOSE   Result Value Ref Range    POCT Glucose 232 (H) 74 - 99 mg/dL   POCT GLUCOSE   Result Value Ref Range    POCT Glucose 214 (H) 74 - 99 mg/dL      Assessment:  68-year-old man with COPD, congestive heart failure, cardiomyopathy, aortic stenosis, obstructive sleep apnea and recent admission for acute respiratory failure due to pneumonia, congestive heart failure, Streptococcus pneumonia and COPD exacerbation, admitted with shortness of breath, hypoxia, bradycardia, hypotension, visual changes and near syncope, and found to have an elevated BNP, elevated hematocrit and an abnormal CT angiogram of the chest showing right middle lobe and right lower lobe volume loss/atelectasis, most likely due to aspiration pneumonia/pneumonitis, in association with a mild COPD exacerbation and congestive heart failure, leading to acute-on-chronic hypercapnic and hypoxemic respiratory failure.  He is minimally compliant with CPAP.  There is no evidence of hypothyroidism.  There is no bronchospasm presently.  The patient is requesting home oxygen upon  discharge.    Recommend:  1.  Continue DuoNeb, budesonide, formoterol, Solu-Medrol, Tessalon, Synthroid, amiodarone and Lasix.  2.  Keep oxygen saturation approximately 92 to 95%; encourage use of nocturnal CPAP throughout the night.  3.  Home oxygen evaluation prior to discharge.  4.  Continue incentive spirometry and Acapella treatment.  5.  Repeat sputum culture.  6.  Follow-up with his outpatient pulmonologist, Dr. Saad Baker, and with sleep medicine, after discharge.    Giovanni Padilla MD

## 2024-04-08 NOTE — PROGRESS NOTES
"Subjective Data:  He's complaining of diet being low sodium. Would like that removed.   Complaining of receiving IV lasix while being hooked up to pole,  cannot use urinal.    12 point review of systems negative unless stated above.      Objective Data:    Denies worsening SOB.  Last Recorded Vitals:  Vitals:    24 0307 24 0600 24 0829 24 0853   BP: 118/83   126/79   BP Location:    Right arm   Patient Position:    Lying   Pulse: 90   87   Resp: 17   18   Temp: 36.8 °C (98.3 °F)   36.3 °C (97.3 °F)   TempSrc:    Temporal   SpO2: 93%  94% 93%   Weight:  130 kg (286 lb 6 oz)     Height:         Medical Gas Therapy: Supplemental oxygen  O2 Delivery Method: Nasal cannula  Weight  Av kg (286 lb 12.7 oz)  Min: 130 kg (286 lb 6 oz)  Max: 130 kg (287 lb)    LABS:  CMP:  Results from last 7 days   Lab Units 24  1012 2458 24  0558 2458 24  1720   SODIUM mmol/L 134* 140 135* 133* 132*   POTASSIUM mmol/L 5.0 3.9 3.8 3.1* 4.6  4.6   CHLORIDE mmol/L 90* 92* 89* 87* 86*   CO2 mmol/L 35* 41* 36* 33* 32   ANION GAP mmol/L 14 11 14 16 19   BUN mg/dL 52* 45* 36* 40* 35*   CREATININE mg/dL 1.07 0.94 0.73 1.00 0.78   EGFR mL/min/1.73m*2 76 88 >90 82 >90   MAGNESIUM mg/dL  --   --   --   --  2.50*   ALBUMIN g/dL  --   --   --   --  4.1   ALT U/L  --   --   --   --  31   AST U/L  --   --   --   --  39   BILIRUBIN TOTAL mg/dL  --   --   --   --  0.9     CBC:  Results from last 7 days   Lab Units 24  1012 24  0558 24  052458 24  1846   WBC AUTO x10*3/uL 10.1 10.5 9.5 8.1 9.5   HEMOGLOBIN g/dL 18.6* 18.0* 18.3* 18.5* 19.1*   HEMATOCRIT % 57.1* 55.9* 55.6* 55.0* 55.0*   PLATELETS AUTO x10*3/uL 270 225 235 217 218   MCV fL 100 101* 99 99 96     COAG:   Results from last 7 days   Lab Units 24  1720   INR  1.0     ABO: No results found for: \"ABO\"  HEME/ENDO:  Results from last 7 days   Lab Units 24  0558   TSH mIU/L 1.26    "   CARDIAC:   Results from last 7 days   Lab Units 04/04/24  1846 04/04/24  1720   TROPHS ng/L 67* 62*   BNP pg/mL  --  158*             Last I/O:    Intake/Output Summary (Last 24 hours) at 4/8/2024 1129  Last data filed at 4/8/2024 0900  Gross per 24 hour   Intake --   Output 400 ml   Net -400 ml     Net IO Since Admission: 856 mL [04/08/24 1129]      Imaging Results:  ECG 12 lead    Result Date: 4/5/2024  See ED provider note for full interpretation and clinical correlation    CT angio chest for pulmonary embolism    Result Date: 4/4/2024  Interpreted By:  Ishaan Banda, STUDY: CT ANGIO CHEST FOR PULMONARY EMBOLISM;  4/4/2024 11:41 pm   INDICATION: Signs/Symptoms:hypoxic, recent admission.   COMPARISON: 10/02/2018 CT chest without contrast.   ACCESSION NUMBER(S): [id number]   ORDERING CLINICIAN: JANEL NASH   TECHNIQUE: Contiguous axial images of the chest were obtained after the intravenous administration of iodinated contrast using angiographic PE protocol. Coronal and sagittal reformatted images were reconstructed from the axial data. MIP images were created on an independent workstation and reviewed. Dual energy reconstructions were also performed including low energy virtual mono-energetic images and iodine density maps.   FINDINGS: MEDIASTINUM AND LYMPH NODES:  The esophageal wall appears within normal limits.  No enlarged intrathoracic or axillary lymph nodes by imaging criteria. No pneumomediastinum.   VESSELS:  Normal caliber aorta. Minimal aortic atherosclerosis.  No pulmonary embolism.   HEART: There is moderate cardiomegaly. There is severe aortic valvular calcification.  Moderate coronary artery calcifications. No significant pericardial effusion.   LUNG, AIRWAYS, AND PLEURA: There is generalized volume loss of the right hemithorax was present prior study but slightly progressed which is due to the presence of slightly greater atelectasis in the right middle lobe and right lower lobe. There  is new frothy debris of occluding the right lower lobe bronchus and bronchus intermedius. There is diffuse bronchial wall thickening in the right middle and lower lobes with additional debris occluding there segmental and subsegmental bronchi. There is a small focus of subsegmental atelectasis in the medial left lower lobe. There is emphysema.   OSSEOUS STRUCTURES: No acute osseous abnormality.   CHEST WALL SOFT TISSUES: No discernible abnormality.   UPPER ABDOMEN/OTHER: No acute abnormality.       Frothy debris occluding the bronchus intermedius and right lower lobe bronchus and additional multifocal segmental and subsegmental occlusions in the right middle and right lower lobes with diffuse new bronchial wall thickening and worsening atelectasis in both lobes. Findings are most likely related to aspiration with postobstructive atelectasis.   Severe aortic valve calcifications to the extent that we likely result in aortic stenosis. Recommend follow-up echocardiography.   Moderate 4 chamber cardiomegaly and moderate coronary artery calcifications.   Emphysema.   MACRO: None.   Signed by: Ishaan Banda 4/4/2024 11:56 PM Dictation workstation:   [id number]    XR chest 1 view    Result Date: 4/4/2024  Interpreted By:  Julián Peterson, STUDY: XR CHEST 1 VIEW;  4/4/2024 5:55 pm   INDICATION: Signs/Symptoms:sob.   COMPARISON: 03/13/2024   ACCESSION NUMBER(S): [id number]   ORDERING CLINICIAN: JANEL NASH   FINDINGS:         CARDIOMEDIASTINAL SILHOUETTE: There is enlargement cardiac silhouette with vascular congestion but no yolanda edema   LUNGS: There is dense airspace disease at the right lung base with a small right pleural effusion. Mild patchy left basilar airspace disease as well   ABDOMEN: No remarkable upper abdominal findings.   BONES: No acute osseous changes.       1.  Dense right basilar airspace disease with small right pleural effusion. Pneumonia is hind differential. 2. Enlarged cardiac silhouette  with pulmonary vascular congestion       MACRO: None   Signed by: Julián Peterson 4/4/2024 6:00 PM Dictation workstation:   [id number]          Past Cardiology Tests (Last 3 Years):  EKG:  Results for orders placed during the hospital encounter of 04/04/24    ECG 12 lead (Preliminary)  This result has not been signed. Information might be incomplete.    Narrative  See ED provider note for full interpretation and clinical correlation      Results for orders placed during the hospital encounter of 03/08/24    Electrocardiogram, 12-lead PRN ACS symptoms    Narrative  Sinus rhythm with occasional Premature ventricular complexes  Right bundle branch block  T wave abnormality, consider lateral ischemia  Abnormal ECG  When compared with ECG of 12-MAR-2024 04:06, (unconfirmed)  Premature atrial complexes are no longer Present  T wave inversion now evident in Lateral leads  Confirmed by Heather Barrett ([id number]) on 3/12/2024 10:49:25 AM      ECG 12 lead    Narrative  Sinus rhythm with 1st degree AV block with occasional Premature ventricular complexes and Premature atrial complexes  Right bundle branch block  Abnormal ECG  When compared with ECG of 12-MAR-2024 00:18, (unconfirmed)  Premature ventricular complexes are now Present  DE interval has increased  Questionable change in QRS axis  T wave inversion now evident in Inferior leads  T wave inversion more evident in Anterior leads  Confirmed by Heather Barrett ([id number]) on 3/12/2024 10:49:36 AM      ECG 12 lead    Narrative  Sinus tachycardia with short DE with Premature atrial complexes with Aberrant conduction  Left axis deviation  Right bundle branch block  Septal infarct , age undetermined  Abnormal ECG  When compared with ECG of 11-MAR-2024 17:37, (unconfirmed)  No significant change was found  Confirmed by Heather Barrett ([id number]) on 3/12/2024 10:49:46 AM      Electrocardiogram, 12-lead PRN ACS symtpoms    Narrative  Sinus rhythm with Premature atrial complexes  Left axis  deviation  Right bundle branch block  Abnormal ECG  When compared with ECG of 10-MAR-2024 10:10, (unconfirmed)  Criteria for Septal infarct are no longer Present  T wave inversion less evident in Anterior leads  Confirmed by Andrew Pearson ([id number]) on 3/12/2024 4:00:58 PM      ECG 12 Lead    Narrative  Sinus rhythm with 1st degree AV block with Premature atrial complexes  Left axis deviation  Right bundle branch block  Septal infarct , age undetermined  Abnormal ECG  When compared with ECG of 08-MAR-2024 17:20, (unconfirmed)  Septal infarct is now Present  Nonspecific T wave abnormality, worse in Anterior leads  Confirmed by Andrew Pearson ([id number]) on 3/12/2024 8:43:29 AM      ECG 12 lead    Narrative  Sinus rhythm with 1st degree AV block with Premature atrial complexes  Left axis deviation  Right bundle branch block  Abnormal ECG  When compared with ECG of 13-MAY-2019 16:22,  Premature atrial complexes are now Present  Right bundle branch block is now Present  See ED provider note for full interpretation and clinical correlation  Confirmed by Jenny Spence ([id number]) on 3/12/2024 10:59:24 AM    Echo:  Results for orders placed during the hospital encounter of 03/08/24    Transthoracic echo (TTE) complete    [facility], [address] [place] [address]  Tel [phone] and Fax [phone]    TRANSTHORACIC ECHOCARDIOGRAM REPORT      Patient Name:      BRAYAN Key Physician:    [id number] Heather Barrett MD  Study Date:        3/11/2024            Ordering Provider:    [id number] RORY GRAVES  MRN/PID:           [id number]             Fellow:  Accession#:        [id number]         Nurse:                Khushbu Holman RN  Date of Birth/Age: 1955 / 68 years Sonographer:          Goldie Woods RDCS  Gender:            M                    Additional Staff:  Height:            177.80 cm            Admit Date:           3/8/2024  Weight:            147.42 kg             Admission Status:     Inpatient -  Routine  BSA / BMI:         2.57 m2 / 46.63      Encounter#:           [id number]  kg/m2  Department Location:  [facility] Non  Invasive  Blood Pressure: 107 /70 mmHg    Study Type:    TRANSTHORACIC ECHO (TTE) COMPLETE  Diagnosis/ICD: Heart failure, unspecified-I50.9; Acute systolic (congestive)  heart failure (CHF)-I50.21  Indication:    Congestive Heart Failure  CPT Code:      Echo Complete w Full Doppler-73045    Patient History:  Pertinent History: COPD, HTN, LE Edema, Dyspnea and Elev trop., Elev. BNP,  Smoker,.    Study Detail: The following Echo studies were performed: 2D, M-Mode, Doppler and  color flow. Technically challenging study due to body habitus.  Definity used as a contrast agent for endocardial border  definition. Total contrast used for this procedure was 1.5 mL via  IV push. The patient was awake.      PHYSICIAN INTERPRETATION:  Left Ventricle: The left ventricular systolic function is moderately decreased, with an estimated ejection fraction of 30-35%. There is global hypokinesis of the left ventricle with minor regional variations. The left ventricular cavity size is normal. Left ventricular diastolic filling was indeterminate.  LV Wall Scoring:  The basal inferolateral segment is akinetic.    Left Atrium: The left atrium is mildly dilated.  Right Ventricle: The right ventricle is normal in size. There is normal right ventricular global systolic function.  Right Atrium: The right atrium is normal in size.  Aortic Valve: The aortic valve was not well visualized. There is evidence of moderate aortic valve stenosis.  There is no evidence of aortic valve regurgitation. The peak instantaneous gradient of the aortic valve is 52.7 mmHg. The mean gradient of the aortic valve is 26.0 mmHg.  Mitral Valve: The mitral valve is mild to moderately thickened. There is mild to moderate mitral annular calcification. There is mild mitral valve regurgitation.  Tricuspid  Valve: The tricuspid valve was not well visualized. There is trace to mild tricuspid regurgitation. The right ventricular systolic pressure is unable to be estimated.  Pulmonic Valve: The pulmonic valve is not well visualized. There is physiologic pulmonic valve regurgitation.  Pericardium: There is no pericardial effusion noted.  Aorta: The aortic root is normal.  Systemic Veins: The inferior vena cava appears dilated. There is less than 50% IVC collapse with inspiration.      CONCLUSIONS:  1. Left ventricular systolic function is moderately decreased with a 30-35% estimated ejection fraction.  2. Basal inferolateral segment is abnormal.  3. Poorly visualized anatomical structures due to suboptimal image quality.  4. Moderate aortic valve stenosis.  5. There is global hypokinesis of the left ventricle with minor regional variations.    QUANTITATIVE DATA SUMMARY:  2D MEASUREMENTS:  Normal Ranges:  IVSd:          1.14 cm    (0.6-1.1cm)  LVPWd:         1.57 cm    (0.6-1.1cm)  LVIDd:         5.26 cm    (3.9-5.9cm)  LVIDs:         4.41 cm  LV Mass Index: 117.2 g/m2  LV % FS        16.2 %    LA VOLUME:  Normal Ranges:  LA Vol A4C:        77.9 ml    (22+/-6mL/m2)  LA Vol A2C:        84.8 ml  LA Vol BP:         83.7 ml  LA Vol Index A4C:  30.4ml/m2  LA Vol Index A2C:  33.0 ml/m2  LA Vol Index BP:   32.6 ml/m2  LA Area A4C:       24.1 cm2  LA Area A2C:       24.4 cm2  LA Major Axis A4C: 6.3 cm  LA Major Axis A2C: 6.0 cm  LA Volume Index:   30.5 ml/m2  LA Vol A4C:        71.4 ml  LA Vol A2C:        78.4 ml    RA VOLUME BY A/L METHOD:  Normal Ranges:  RA Area A4C: 17.0 cm2    AORTA MEASUREMENTS:  Normal Ranges:  Asc Ao, d: 3.10 cm (2.1-3.4cm)    LV SYSTOLIC FUNCTION BY 2D PLANIMETRY (MOD):  Normal Ranges:  EF-A4C View: 33.6 % (>=55%)  EF-A2C View: 29.1 %  EF-Biplane:  33.1 %    LV DIASTOLIC FUNCTION:  Normal Ranges:  MV Peak E: 0.90 m/s (0.7-1.2 m/s)  MV Peak A: 0.39 m/s (0.42-0.7 m/s)  E/A Ratio: 2.34     (1.0-2.2)    MITRAL  VALVE:  Normal Ranges:  MV DT: 99 msec (150-240msec)    AORTIC VALVE:  Normal Ranges:  AoV Vmax:                3.63 m/s  (<=1.7m/s)  AoV Peak P.7 mmHg (<20mmHg)  AoV Mean P.0 mmHg (1.7-11.5mmHg)  LVOT Max Jero:            0.69 m/s  (<=1.1m/s)  AoV VTI:                 60.70 cm  (18-25cm)  LVOT VTI:                12.80 cm  LVOT Diameter:           2.60 cm   (1.8-2.4cm)  AoV Area, VTI:           1.12 cm2  (2.5-5.5cm2)  AoV Area,Vmax:           1.01 cm2  (2.5-4.5cm2)  AoV Dimensionless Index: 0.21      RIGHT VENTRICLE:  RV Basal 3.71 cm  RV Mid   2.98 cm  RV Major 9.7 cm  TAPSE:   22.4 mm  RV s'    0.13 m/s    TRICUSPID VALVE/RVSP:  Normal Ranges:  IVC Diam: 2.32 cm    PULMONIC VALVE:  Normal Ranges:  PV Max Jero: 0.7 m/s  (0.6-0.9m/s)  PV Max P.7 mmHg      [id number] Heather Barrett MD  Electronically signed on 3/11/2024 at 4:04:49 PM      Wall Scoring        ** Final (Updated) **    Ejection Fractions:  LV EF   Date/Time Value Ref Range Status   2024 02:04 PM 33 %      Cath:  No results found for this or any previous visit.    Stress Test:  No results found for this or any previous visit.    Cardiac Imaging:  No results found for this or any previous visit.      Inpatient Medications:  Scheduled medications   Medication Dose Route Frequency    allopurinol  300 mg oral Daily    amiodarone  400 mg oral TID    amoxicillin-pot clavulanate  1 tablet oral q12h SHARRON    aspirin  81 mg oral Daily    benzonatate  200 mg oral TID    budesonide  0.5 mg nebulization BID    empagliflozin  10 mg oral Daily    enoxaparin  40 mg subcutaneous q12h SHARRON    ezetimibe  10 mg oral Daily    folic acid  1 mg oral Daily    formoterol  20 mcg nebulization BID    [START ON 2024] furosemide  80 mg intravenous BID after breakfast and lunch    [Held by provider] furosemide  80 mg oral BID with meals    insulin lispro  0-10 Units subcutaneous TID with meals    ipratropium-albuteroL  3 mL nebulization TID     levothyroxine  125 mcg oral Daily    methylPREDNISolone sodium succinate (PF)  20 mg intravenous q8h    oxygen   inhalation Continuous - Inhalation    oxygen   inhalation Continuous - Inhalation    thiamine  100 mg oral Daily    valsartan  40 mg oral Daily     PRN medications   Medication    acetaminophen    Or    acetaminophen    Or    acetaminophen    acetaminophen    Or    acetaminophen    Or    acetaminophen    albuterol    dextrose    dextrose    fluticasone    glucagon    glucagon    ipratropium-albuteroL    melatonin    ondansetron    Or    ondansetron     Continuous Medications   Medication Dose Last Rate       Physical Exam:  General:  Patient is awake, alert, and oriented.  Patient is in no acute distress.  HEENT:  Pupils equal and reactive.  Normocephalic.  Moist mucosa.    Neck:  No thyromegaly.  Normal Jugular Venous Pressure.  Cardiovascular:  Regular rate and rhythm.  Normal S1 and S2.  Pulmonary:  Clear to auscultation bilaterally.  Abdomen:  Soft. Non-tender.   Non-distended.  Positive bowel sounds.  Lower Extremities:  2+ pedal pulses. No LE edema.  Neurologic:  Cranial nerves intact.  No focal deficit.   Skin: Skin warm and dry, normal skin turgor.   Psychiatric: Normal affect.     Assessment/Plan   Alo Glass is a 68 y.o. male h/o newly diagnosed HFrEF (3/2024: 30-35%) (unwilling to take beta blockers, on ARB, SGLT-2), moderate aortic stenosis (3/2024), obesity, MELISSA on CPAP, COPD, current smoker, type 2 DM, hypertension, DLD (refuses statins/PCSK9 inhibitors, on ezetimibe), who was recently discharged for acute systolic heart failure decompensation requiring IV diuresis, presents to the hospital with wheezing and being treated for COPD exacerbation and pneumonia..     --HFrEF: EF is severely reduced based on echocardiogram 3/2024. Etiology of cardiomyopathy is probably a combination of moderate-severe aortic stenosis and potential component of ischemia given heavy coronary calcifications  "seen on CT scan.      --Moderate-severe aortic stenosis: Echocardiogram from 3/2024 has certain characteristics that indicate potential severe aortic stenosis, especially a dimensionless index as 0.21 and Vmax approximating 3.7 m/s while a LVOT VTI of only 12 - suggesting low output. This is highly concerning for low flow low gradient aortic stenosis. Mean gradient was 26mmHg in the setting of low EF. Mean gradient was around the same with a normal EF 3 5 years ago, so overall suspicion is that if patient had a normal EF now, mean gradient would probably be closer to 40mmHg, making aortic stenosis severe. CT chest with contrast that was obtained to rule out PE demonstrated severely calcified aortic valve.     --Multifocal atrial tachycardia: Likely secondary to COPD exacerbation. On amiodarone IV for rate control.  --Coronary calcifications on CT chest  --HTN  --DLD  --Active smoking  --Patient concern for \"low heart rate\": we compared his pulse oximeter's heart rate to actual telemetry and pulse oximeter is erroneously reading his HR as low because of his premature beats and arrhythmia. Advised the patient that his pulse oximeter is not reliable for his heart rate as long as he has ectopic beats     Recommendations:  -Agree with management of COPD exacerbation with steroids  -Agree with Augmentin for CAP treatment   -Once improving from hypoxia, will address cardiac issues, including his aortic stenosis  -Continue Amiodarone PO 400mg TID for his MAT. EKG today shows improvement and now in NSR.   -Potassium >4 and Magnesium >2  -Continue jardiance 10mg daily and valsartan 40mg daily for GDMT  -Agree with Ezetia 10mg daily for DLD  -Agree with ASA 81mg daily for now  -CXR today with minimal fluid, unlikely that fluid is causing significant hypoxemia.   -Will repeat BNP  -Hold off on beta blockers for now given HFrEF and likely moderate to severe aortic stenosis   -Will plan on ordering CT TAVR tomorrow  -Would not " aggressively treat hypertension. Excessive vasodilation may worsen gradient across stenotic aortic valve  -The workup for aortic stenosis will likely include a structural heart consult and LHC as part of workup     Code Status:  Full Code    True Jacob MD    ====================================  Attending note  ====================================    Both the fellow and I have had a face to face encounter with the patient today. I have examined the patient and edited the documented physical examination as necessary.  I personally reviewed the patient's recent labs, medications, orders, EKGs, and pertinent cardiac imaging.  I have reviewed the fellow's encounter note, approve the fellow's documentation and have edited the note to reflect the diagnostic and therapeutic plan.    He's complaining of diet being low sodium. Would like that removed.   Complaining of receiving IV lasix while being hooked up to pole,  cannot use urinal.    Physical Exam:  General:  Patient is awake and alert.  Patient is in no acute distress.  HEENT:  Pupils equal and reactive.  Normocephalic.  Moist mucosa.    Neck:  Normal Jugular Venous Pressure at 90 degrees  Cardiovascular:  Regular rate and rhythm.  Normal S1 and S2. Systolic murmur  Pulmonary:  small crackles bilaterally. Prolonged expiration   Abdomen:  Soft. Non-tender.    Lower Extremities:  2+ pedal pulses. Edema ++/++.  Neurologic:  Cranial nerves intact.  No focal deficit.      Assessment/Plan   Alo Glass is a 68 y.o. male h/o newly diagnosed HFrEF (3/2024: 30-35%) (unwilling to take beta blockers, on ARB, SGLT-2), moderate aortic stenosis (3/2024), obesity, MELISSA on CPAP, COPD, current smoker, type 2 DM, hypertension, DLD (refuses statins/PCSK9 inhibitors, on ezetimibe), who was recently discharged for acute systolic heart failure decompensation requiring IV diuresis, presents to the hospital with wheezing and being treated for COPD exacerbation and pneumonia..    "  --HFrEF: EF is severely reduced based on echocardiogram 3/2024. Etiology of cardiomyopathy is probably a combination of moderate or severe aortic stenosis and potential component of ischemia given heavy coronary calcifications seen on CT scan.      --Moderate-severe aortic stenosis: Echocardiogram from 3/2024 has certain characteristics that indicate potential severe aortic stenosis, especially a dimensionless index as 0.21 and Vmax approximating 3.7 m/s while a LVOT VTI of only 12 - suggesting low output. This is highly concerning for low flow low gradient aortic stenosis. Mean gradient was 26mmHg in the setting of low EF. Mean gradient was around the same with a normal EF 3 5 years ago, so overall suspicion is that if patient had a normal EF now, mean gradient would probably be closer to 40mmHg, making aortic stenosis severe. CT chest with contrast that was obtained to rule out PE demonstrated severely calcified aortic valve.     --Multifocal atrial tachycardia: Likely secondary to COPD exacerbation. On amiodarone IV for rate control.  --Coronary calcifications on CT chest  --HTN  --DLD  --Active smoking  --Patient concern for \"low heart rate\": we compared his pulse oximeter's heart rate to actual telemetry and pulse oximeter is erroneously reading his HR as low because of his premature beats and arrhythmia. Advised the patient that his pulse oximeter is not reliable for his heart rate as long as he has ectopic beats     Recommendations:  -Agree with management of COPD exacerbation with steroids  -Agree with Augmentin for CAP treatment   -Once improving from hypoxia, will address cardiac issues, including his aortic stenosis  -Continue Amiodarone PO 400mg TID for his MAT. EKG today shows improvement and now in NSR.   -Potassium >4 and Magnesium >2  -Continue jardiance 10mg daily and valsartan 40mg daily for GDMT  -Agree with Ezetia 10mg daily for DLD  -Agree with ASA 81mg daily for now  -CXR today with minimal " fluid, unlikely that fluid is causing significant hypoxemia.   -Will repeat BNP  -Hold off on beta blockers for now given HFrEF and likely moderate to severe aortic stenosis   -Will plan on ordering CT TAVR  -Would not aggressively treat hypertension. Excessive vasodilation may worsen gradient across stenotic aortic valve  -The workup for aortic stenosis will likely include a structural heart consult and LHC as part of workup     John Garcia MD

## 2024-04-08 NOTE — PROGRESS NOTES
"Alo Glass is a 68 y.o. male on day 4 of admission presenting with Tachycardia.    Subjective       Examined, discussed with the patient and the staff, he had so many questions he was very unhappy with the diet that he has a low-sodium diet, he was very unhappy that he was given IV Lasix early morning, and    Objective     Physical Exam  Alert 23.  With oxygen.  HEENT unremarkable.  Neck no JVD.  Heart S1-S2.  Lungs with wheezes.  Abdomen soft nontender.  Legs edema  Last Recorded Vitals  Blood pressure 126/79, pulse 87, temperature 36.3 °C (97.3 °F), temperature source Temporal, resp. rate 18, height 1.803 m (5' 10.98\"), weight 130 kg (286 lb 6 oz), SpO2 93 %.  Intake/Output last 3 Shifts:  No intake/output data recorded.    Relevant Results  Results for orders placed or performed during the hospital encounter of 04/04/24 (from the past 96 hour(s))   Comprehensive metabolic panel   Result Value Ref Range    Glucose 107 (H) 74 - 99 mg/dL    Sodium 132 (L) 136 - 145 mmol/L    Potassium 4.6 3.5 - 5.3 mmol/L    Chloride 86 (L) 98 - 107 mmol/L    Bicarbonate 32 21 - 32 mmol/L    Anion Gap 19 10 - 20 mmol/L    Urea Nitrogen 35 (H) 6 - 23 mg/dL    Creatinine 0.78 0.50 - 1.30 mg/dL    eGFR >90 >60 mL/min/1.73m*2    Calcium 9.2 8.6 - 10.3 mg/dL    Albumin 4.1 3.4 - 5.0 g/dL    Alkaline Phosphatase 89 33 - 136 U/L    Total Protein 7.0 6.4 - 8.2 g/dL    AST 39 9 - 39 U/L    Bilirubin, Total 0.9 0.0 - 1.2 mg/dL    ALT 31 10 - 52 U/L   Protime-INR   Result Value Ref Range    Protime 11.2 9.8 - 12.8 seconds    INR 1.0 0.9 - 1.1   Troponin I, High Sensitivity   Result Value Ref Range    Troponin I, High Sensitivity 62 (HH) 0 - 20 ng/L   B-Type Natriuretic Peptide   Result Value Ref Range     (H) 0 - 99 pg/mL   Magnesium   Result Value Ref Range    Magnesium 2.50 (H) 1.60 - 2.40 mg/dL   Phosphorus   Result Value Ref Range    Phosphorus 4.2 2.5 - 4.9 mg/dL   Potassium   Result Value Ref Range    Potassium 4.6 3.5 - " 5.3 mmol/L   ECG 12 lead   Result Value Ref Range    Ventricular Rate 108 BPM    Atrial Rate 108 BPM    NJ Interval 204 ms    QRS Duration 168 ms    QT Interval 450 ms    QTC Calculation(Bazett) 603 ms    R Axis -83 degrees    T Axis 55 degrees    QRS Count 18 beats    Q Onset 202 ms    P Onset 100 ms    P Offset 170 ms    T Offset 427 ms    QTC Fredericia 547 ms   CBC and Auto Differential   Result Value Ref Range    WBC 9.5 4.4 - 11.3 x10*3/uL    nRBC 0.0 0.0 - 0.0 /100 WBCs    RBC 5.71 4.50 - 5.90 x10*6/uL    Hemoglobin 19.1 (H) 13.5 - 17.5 g/dL    Hematocrit 55.0 (H) 41.0 - 52.0 %    MCV 96 80 - 100 fL    MCH 33.5 26.0 - 34.0 pg    MCHC 34.7 32.0 - 36.0 g/dL    RDW 13.0 11.5 - 14.5 %    Platelets 218 150 - 450 x10*3/uL    Neutrophils % 67.8 40.0 - 80.0 %    Immature Granulocytes %, Automated 0.2 0.0 - 0.9 %    Lymphocytes % 22.2 13.0 - 44.0 %    Monocytes % 8.1 2.0 - 10.0 %    Eosinophils % 1.4 0.0 - 6.0 %    Basophils % 0.3 0.0 - 2.0 %    Neutrophils Absolute 6.40 1.20 - 7.70 x10*3/uL    Immature Granulocytes Absolute, Automated 0.02 0.00 - 0.70 x10*3/uL    Lymphocytes Absolute 2.10 1.20 - 4.80 x10*3/uL    Monocytes Absolute 0.77 0.10 - 1.00 x10*3/uL    Eosinophils Absolute 0.13 0.00 - 0.70 x10*3/uL    Basophils Absolute 0.03 0.00 - 0.10 x10*3/uL   Troponin I, High Sensitivity   Result Value Ref Range    Troponin I, High Sensitivity 67 (HH) 0 - 20 ng/L   SST TOP   Result Value Ref Range    Extra Tube Hold for add-ons.    CBC   Result Value Ref Range    WBC 8.1 4.4 - 11.3 x10*3/uL    nRBC 0.0 0.0 - 0.0 /100 WBCs    RBC 5.58 4.50 - 5.90 x10*6/uL    Hemoglobin 18.5 (H) 13.5 - 17.5 g/dL    Hematocrit 55.0 (H) 41.0 - 52.0 %    MCV 99 80 - 100 fL    MCH 33.2 26.0 - 34.0 pg    MCHC 33.6 32.0 - 36.0 g/dL    RDW 13.1 11.5 - 14.5 %    Platelets 217 150 - 450 x10*3/uL   Basic Metabolic Panel   Result Value Ref Range    Glucose 203 (H) 74 - 99 mg/dL    Sodium 133 (L) 136 - 145 mmol/L    Potassium 3.1 (L) 3.5 - 5.3 mmol/L     Chloride 87 (L) 98 - 107 mmol/L    Bicarbonate 33 (H) 21 - 32 mmol/L    Anion Gap 16 10 - 20 mmol/L    Urea Nitrogen 40 (H) 6 - 23 mg/dL    Creatinine 1.00 0.50 - 1.30 mg/dL    eGFR 82 >60 mL/min/1.73m*2    Calcium 9.0 8.6 - 10.3 mg/dL   POCT GLUCOSE   Result Value Ref Range    POCT Glucose 290 (H) 74 - 99 mg/dL   POCT GLUCOSE   Result Value Ref Range    POCT Glucose 227 (H) 74 - 99 mg/dL   POCT GLUCOSE   Result Value Ref Range    POCT Glucose 180 (H) 74 - 99 mg/dL   CBC   Result Value Ref Range    WBC 9.5 4.4 - 11.3 x10*3/uL    nRBC 0.0 0.0 - 0.0 /100 WBCs    RBC 5.61 4.50 - 5.90 x10*6/uL    Hemoglobin 18.3 (H) 13.5 - 17.5 g/dL    Hematocrit 55.6 (H) 41.0 - 52.0 %    MCV 99 80 - 100 fL    MCH 32.6 26.0 - 34.0 pg    MCHC 32.9 32.0 - 36.0 g/dL    RDW 12.9 11.5 - 14.5 %    Platelets 235 150 - 450 x10*3/uL   Basic Metabolic Panel   Result Value Ref Range    Glucose 170 (H) 74 - 99 mg/dL    Sodium 135 (L) 136 - 145 mmol/L    Potassium 3.8 3.5 - 5.3 mmol/L    Chloride 89 (L) 98 - 107 mmol/L    Bicarbonate 36 (H) 21 - 32 mmol/L    Anion Gap 14 10 - 20 mmol/L    Urea Nitrogen 36 (H) 6 - 23 mg/dL    Creatinine 0.73 0.50 - 1.30 mg/dL    eGFR >90 >60 mL/min/1.73m*2    Calcium 9.3 8.6 - 10.3 mg/dL   TSH   Result Value Ref Range    Thyroid Stimulating Hormone 1.26 0.44 - 3.98 mIU/L   POCT GLUCOSE   Result Value Ref Range    POCT Glucose 147 (H) 74 - 99 mg/dL   POCT GLUCOSE   Result Value Ref Range    POCT Glucose 186 (H) 74 - 99 mg/dL   POCT GLUCOSE   Result Value Ref Range    POCT Glucose 234 (H) 74 - 99 mg/dL   Respiratory Culture/Smear    Specimen: SPUTUM; Fluid   Result Value Ref Range    Respiratory Culture/Smear (A)      Culture not performed. See Gram stain findings. Recollect if clinically indicated.    Gram Stain (A)      Gram stain indicates specimen contains significant salivary contamination.   CBC   Result Value Ref Range    WBC 10.5 4.4 - 11.3 x10*3/uL    nRBC 0.0 0.0 - 0.0 /100 WBCs    RBC 5.55 4.50 - 5.90  x10*6/uL    Hemoglobin 18.0 (H) 13.5 - 17.5 g/dL    Hematocrit 55.9 (H) 41.0 - 52.0 %     (H) 80 - 100 fL    MCH 32.4 26.0 - 34.0 pg    MCHC 32.2 32.0 - 36.0 g/dL    RDW 13.0 11.5 - 14.5 %    Platelets 225 150 - 450 x10*3/uL   Basic Metabolic Panel   Result Value Ref Range    Glucose 160 (H) 74 - 99 mg/dL    Sodium 140 136 - 145 mmol/L    Potassium 3.9 3.5 - 5.3 mmol/L    Chloride 92 (L) 98 - 107 mmol/L    Bicarbonate 41 (HH) 21 - 32 mmol/L    Anion Gap 11 10 - 20 mmol/L    Urea Nitrogen 45 (H) 6 - 23 mg/dL    Creatinine 0.94 0.50 - 1.30 mg/dL    eGFR 88 >60 mL/min/1.73m*2    Calcium 9.5 8.6 - 10.3 mg/dL   POCT GLUCOSE   Result Value Ref Range    POCT Glucose 198 (H) 74 - 99 mg/dL   POCT GLUCOSE   Result Value Ref Range    POCT Glucose 227 (H) 74 - 99 mg/dL   Respiratory Culture/Smear    Specimen: SPUTUM; Fluid   Result Value Ref Range    Respiratory Culture/Smear (A)      Culture not performed. See Gram stain findings. Recollect if clinically indicated.    Gram Stain (A)      Gram stain indicates specimen contains significant salivary contamination.   POCT GLUCOSE   Result Value Ref Range    POCT Glucose 177 (H) 74 - 99 mg/dL   POCT GLUCOSE   Result Value Ref Range    POCT Glucose 232 (H) 74 - 99 mg/dL   POCT GLUCOSE   Result Value Ref Range    POCT Glucose 214 (H) 74 - 99 mg/dL   CBC   Result Value Ref Range    WBC 10.1 4.4 - 11.3 x10*3/uL    nRBC 0.0 0.0 - 0.0 /100 WBCs    RBC 5.71 4.50 - 5.90 x10*6/uL    Hemoglobin 18.6 (H) 13.5 - 17.5 g/dL    Hematocrit 57.1 (H) 41.0 - 52.0 %     80 - 100 fL    MCH 32.6 26.0 - 34.0 pg    MCHC 32.6 32.0 - 36.0 g/dL    RDW 12.9 11.5 - 14.5 %    Platelets 270 150 - 450 x10*3/uL   Basic Metabolic Panel   Result Value Ref Range    Glucose 244 (H) 74 - 99 mg/dL    Sodium 134 (L) 136 - 145 mmol/L    Potassium 5.0 3.5 - 5.3 mmol/L    Chloride 90 (L) 98 - 107 mmol/L    Bicarbonate 35 (H) 21 - 32 mmol/L    Anion Gap 14 10 - 20 mmol/L    Urea Nitrogen 52 (H) 6 - 23 mg/dL     Creatinine 1.07 0.50 - 1.30 mg/dL    eGFR 76 >60 mL/min/1.73m*2    Calcium 9.0 8.6 - 10.3 mg/dL        Medications:  allopurinol, 300 mg, oral, Daily  amiodarone, 400 mg, oral, TID  amoxicillin-pot clavulanate, 1 tablet, oral, q12h SHARRON  aspirin, 81 mg, oral, Daily  benzonatate, 200 mg, oral, TID  budesonide, 0.5 mg, nebulization, BID  empagliflozin, 10 mg, oral, Daily  enoxaparin, 40 mg, subcutaneous, q12h SHARRON  ezetimibe, 10 mg, oral, Daily  folic acid, 1 mg, oral, Daily  formoterol, 20 mcg, nebulization, BID  furosemide, 80 mg, intravenous, q12h  [Held by provider] furosemide, 80 mg, oral, BID with meals  insulin lispro, 0-10 Units, subcutaneous, TID with meals  ipratropium-albuteroL, 3 mL, nebulization, TID  levothyroxine, 125 mcg, oral, Daily  methylPREDNISolone sodium succinate (PF), 20 mg, intravenous, q8h  oxygen, , inhalation, Continuous - Inhalation  oxygen, , inhalation, Continuous - Inhalation  thiamine, 100 mg, oral, Daily  valsartan, 40 mg, oral, Daily      PRN medications: acetaminophen **OR** acetaminophen **OR** acetaminophen, acetaminophen **OR** acetaminophen **OR** acetaminophen, albuterol, dextrose, dextrose, fluticasone, glucagon, glucagon, ipratropium-albuteroL, melatonin, ondansetron **OR** ondansetron    ECG 12 lead    Result Date: 4/6/2024  See ED provider note for full interpretation and clinical correlation Confirmed by Karolina Freeman ([id number]) on 4/6/2024 10:10:27 AM    CT angio chest for pulmonary embolism    Result Date: 4/4/2024  Interpreted By:  Ishaan Banda, STUDY: CT ANGIO CHEST FOR PULMONARY EMBOLISM;  4/4/2024 11:41 pm   INDICATION: Signs/Symptoms:hypoxic, recent admission.   COMPARISON: 10/02/2018 CT chest without contrast.   ACCESSION NUMBER(S): [id number]   ORDERING CLINICIAN: JANEL NASH   TECHNIQUE: Contiguous axial images of the chest were obtained after the intravenous administration of iodinated contrast using angiographic PE protocol. Coronal and sagittal reformatted  images were reconstructed from the axial data. MIP images were created on an independent workstation and reviewed. Dual energy reconstructions were also performed including low energy virtual mono-energetic images and iodine density maps.   FINDINGS: MEDIASTINUM AND LYMPH NODES:  The esophageal wall appears within normal limits.  No enlarged intrathoracic or axillary lymph nodes by imaging criteria. No pneumomediastinum.   VESSELS:  Normal caliber aorta. Minimal aortic atherosclerosis.  No pulmonary embolism.   HEART: There is moderate cardiomegaly. There is severe aortic valvular calcification.  Moderate coronary artery calcifications. No significant pericardial effusion.   LUNG, AIRWAYS, AND PLEURA: There is generalized volume loss of the right hemithorax was present prior study but slightly progressed which is due to the presence of slightly greater atelectasis in the right middle lobe and right lower lobe. There is new frothy debris of occluding the right lower lobe bronchus and bronchus intermedius. There is diffuse bronchial wall thickening in the right middle and lower lobes with additional debris occluding there segmental and subsegmental bronchi. There is a small focus of subsegmental atelectasis in the medial left lower lobe. There is emphysema.   OSSEOUS STRUCTURES: No acute osseous abnormality.   CHEST WALL SOFT TISSUES: No discernible abnormality.   UPPER ABDOMEN/OTHER: No acute abnormality.       Frothy debris occluding the bronchus intermedius and right lower lobe bronchus and additional multifocal segmental and subsegmental occlusions in the right middle and right lower lobes with diffuse new bronchial wall thickening and worsening atelectasis in both lobes. Findings are most likely related to aspiration with postobstructive atelectasis.   Severe aortic valve calcifications to the extent that we likely result in aortic stenosis. Recommend follow-up echocardiography.   Moderate 4 chamber cardiomegaly  and moderate coronary artery calcifications.   Emphysema.   MACRO: None.   Signed by: Ishaan Banda 4/4/2024 11:56 PM Dictation workstation:   [id number]    XR chest 1 view    Result Date: 4/4/2024  Interpreted By:  Julián Peterson, STUDY: XR CHEST 1 VIEW;  4/4/2024 5:55 pm   INDICATION: Signs/Symptoms:sob.   COMPARISON: 03/13/2024   ACCESSION NUMBER(S): [id number]   ORDERING CLINICIAN: JANEL NASH   FINDINGS:         CARDIOMEDIASTINAL SILHOUETTE: There is enlargement cardiac silhouette with vascular congestion but no yolanda edema   LUNGS: There is dense airspace disease at the right lung base with a small right pleural effusion. Mild patchy left basilar airspace disease as well   ABDOMEN: No remarkable upper abdominal findings.   BONES: No acute osseous changes.       1.  Dense right basilar airspace disease with small right pleural effusion. Pneumonia is hind differential. 2. Enlarged cardiac silhouette with pulmonary vascular congestion       MACRO: None   Signed by: Julián Peterson 4/4/2024 6:00 PM Dictation workstation:   [id number]      Assessment/Plan   He is a 68-year-old  male, who is known to history of COPD, still smokes, also known to have history of obesity, obstructive sleep apnea, chronic systolic heart failure with ejection fraction of 30 to 35%, lymphedema, GERD, hypertension, recent admission to [facility] with cardiogenic shock secondary to pneumonia secondary to sepsis, he came from home, as he was sent to the emergency department by the nurse, as his heart rate was low in the pulse ox was low.  Upon admission to the emergency department he had a workup done which included CT PE, which did not show any pneumonia, also labs, and also his heart rate was high, and he was having PACs, this was discussed with the cardiologist per the advice he was also started on amiodarone.  68-year-old  male he is here because of low heart rate, his metoprolol was already  discontinued but now he is tachycardic, he was given amiodarone, cardiology is managing,.  Acute hypoxia, due to most likely COPD exacerbation and fluid overload no pneumonia no PE  It appears as here he is also fluid overloaded acute on chronic CHF, he is on p.o. Lasix at home, he refuses Lasix,.  I do not see pneumonia also as a result I have not added antibiotics  COPD he is still a smoker continue with the aerosols and steroids added pulmonology is following.   Aortic stenosis, again cardiology is following, is not interested in further management of the aortic stenosis.  Patient was added Augmentin after discussion with the pulmonology and cardiologist,.  He is still needing oxygen.  Discharge plans once cardiology and pulmonology clears he may need to go home on oxygen,  I spent 35 minutes in the professional and overall care of this patient.    Curtis Corona MD

## 2024-04-08 NOTE — PROGRESS NOTES
04/08/24 1205   Discharge Planning   Patient expects to be discharged to: Home with [facility]        Patient is still on 3l NC. Also treated with IV Solumedrol. He is diuresing with Lasix IV. Patient fredi most likely need home oxygen eval. When patient is med ready will go home with [facility].

## 2024-04-08 NOTE — CARE PLAN
Problem: Skin  Goal: Decreased wound size/increased tissue granulation at next dressing change  Outcome: Progressing  Flowsheets (Taken 4/7/2024 2334)  Decreased wound size/increased tissue granulation at next dressing change: Promote sleep for wound healing  Goal: Participates in plan/prevention/treatment measures  Outcome: Progressing  Flowsheets (Taken 4/7/2024 2334)  Participates in plan/prevention/treatment measures: Elevate heels  Goal: Prevent/manage excess moisture  Outcome: Progressing  Flowsheets (Taken 4/7/2024 2334)  Prevent/manage excess moisture: Moisturize dry skin  Goal: Prevent/minimize sheer/friction injuries  Outcome: Progressing  Flowsheets (Taken 4/7/2024 2334)  Prevent/minimize sheer/friction injuries: Turn/reposition every 2 hours/use positioning/transfer devices  Goal: Promote/optimize nutrition  Outcome: Progressing  Goal: Promote skin healing  Outcome: Progressing   The patient's goals for the shift include      The clinical goals for the shift include pt safety will be maintained in duration of the shift.    Over the shift, the patient did  make progress toward the following goals.

## 2024-04-08 NOTE — CARE PLAN
Problem: Respiratory  Goal: Clear secretions with interventions this shift  Outcome: Progressing  Goal: Minimize anxiety/maximize coping throughout shift  Outcome: Progressing  Goal: Minimal/no exertional discomfort or dyspnea this shift  Outcome: Progressing  Goal: No signs of respiratory distress (eg. Use of accessory muscles. Peds grunting)  Outcome: Progressing  Goal: Patent airway maintained this shift  Outcome: Progressing  Goal: Tolerate mechanical ventilation evidenced by VS/agitation level this shift  Outcome: Progressing  Goal: Tolerate pulmonary toileting this shift  Outcome: Progressing  Goal: Verbalize decreased shortness of breath this shift  Outcome: Progressing  Goal: Wean oxygen to maintain O2 saturation per order/standard this shift  Outcome: Progressing  Goal: Increase self care and/or family involvement in next 24 hours  Outcome: Progressing     Problem: Skin  Goal: Decreased wound size/increased tissue granulation at next dressing change  Outcome: Progressing  Goal: Participates in plan/prevention/treatment measures  Outcome: Progressing  Goal: Prevent/manage excess moisture  Outcome: Progressing  Goal: Prevent/minimize sheer/friction injuries  Outcome: Progressing  Goal: Promote/optimize nutrition  Outcome: Progressing  Goal: Promote skin healing  Outcome: Progressing   The patient's goals for the shift include      The clinical goals for the shift include pt will remain free of falls through end of shift    Over the shift, the patient did not make progress toward the following goals. Barriers to progression include . Recommendations to address these barriers include .

## 2024-04-09 ENCOUNTER — TELEMEDICINE (OUTPATIENT)
Dept: PHARMACY | Facility: HOSPITAL | Age: 69
End: 2024-04-09
Payer: MEDICARE

## 2024-04-09 ENCOUNTER — HOME CARE VISIT (OUTPATIENT)
Dept: HOME HEALTH SERVICES | Facility: HOME HEALTH | Age: 69
End: 2024-04-09
Payer: MEDICARE

## 2024-04-09 DIAGNOSIS — I50.9 ACUTE ON CHRONIC CONGESTIVE HEART FAILURE, UNSPECIFIED HEART FAILURE TYPE (MULTI): Primary | ICD-10-CM

## 2024-04-09 LAB
ANION GAP SERPL CALC-SCNC: 11 MMOL/L (ref 10–20)
BUN SERPL-MCNC: 46 MG/DL (ref 6–23)
CALCIUM SERPL-MCNC: 8.6 MG/DL (ref 8.6–10.3)
CHLORIDE SERPL-SCNC: 96 MMOL/L (ref 98–107)
CO2 SERPL-SCNC: 33 MMOL/L (ref 21–32)
CREAT SERPL-MCNC: 0.78 MG/DL (ref 0.5–1.3)
EGFRCR SERPLBLD CKD-EPI 2021: >90 ML/MIN/1.73M*2
ERYTHROCYTE [DISTWIDTH] IN BLOOD BY AUTOMATED COUNT: 12.8 % (ref 11.5–14.5)
GLUCOSE BLD MANUAL STRIP-MCNC: 210 MG/DL (ref 74–99)
GLUCOSE BLD MANUAL STRIP-MCNC: 252 MG/DL (ref 74–99)
GLUCOSE BLD MANUAL STRIP-MCNC: 269 MG/DL (ref 74–99)
GLUCOSE BLD MANUAL STRIP-MCNC: 354 MG/DL (ref 74–99)
GLUCOSE SERPL-MCNC: 166 MG/DL (ref 74–99)
HCT VFR BLD AUTO: 55.7 % (ref 41–52)
HGB BLD-MCNC: 18.1 G/DL (ref 13.5–17.5)
MCH RBC QN AUTO: 32.4 PG (ref 26–34)
MCHC RBC AUTO-ENTMCNC: 32.5 G/DL (ref 32–36)
MCV RBC AUTO: 100 FL (ref 80–100)
NRBC BLD-RTO: 0 /100 WBCS (ref 0–0)
PLATELET # BLD AUTO: 229 X10*3/UL (ref 150–450)
POTASSIUM SERPL-SCNC: 4.2 MMOL/L (ref 3.5–5.3)
RBC # BLD AUTO: 5.59 X10*6/UL (ref 4.5–5.9)
SODIUM SERPL-SCNC: 136 MMOL/L (ref 136–145)
WBC # BLD AUTO: 9.4 X10*3/UL (ref 4.4–11.3)

## 2024-04-09 PROCEDURE — 2500000002 HC RX 250 W HCPCS SELF ADMINISTERED DRUGS (ALT 637 FOR MEDICARE OP, ALT 636 FOR OP/ED): Performed by: NURSE PRACTITIONER

## 2024-04-09 PROCEDURE — 2500000004 HC RX 250 GENERAL PHARMACY W/ HCPCS (ALT 636 FOR OP/ED): Performed by: INTERNAL MEDICINE

## 2024-04-09 PROCEDURE — 94640 AIRWAY INHALATION TREATMENT: CPT

## 2024-04-09 PROCEDURE — 94660 CPAP INITIATION&MGMT: CPT

## 2024-04-09 PROCEDURE — 2500000002 HC RX 250 W HCPCS SELF ADMINISTERED DRUGS (ALT 637 FOR MEDICARE OP, ALT 636 FOR OP/ED): Performed by: PHARMACIST

## 2024-04-09 PROCEDURE — 2500000001 HC RX 250 WO HCPCS SELF ADMINISTERED DRUGS (ALT 637 FOR MEDICARE OP)

## 2024-04-09 PROCEDURE — 2500000002 HC RX 250 W HCPCS SELF ADMINISTERED DRUGS (ALT 637 FOR MEDICARE OP, ALT 636 FOR OP/ED): Performed by: INTERNAL MEDICINE

## 2024-04-09 PROCEDURE — 36415 COLL VENOUS BLD VENIPUNCTURE: CPT | Performed by: NURSE PRACTITIONER

## 2024-04-09 PROCEDURE — G0009 ADMIN PNEUMOCOCCAL VACCINE: HCPCS

## 2024-04-09 PROCEDURE — 2500000001 HC RX 250 WO HCPCS SELF ADMINISTERED DRUGS (ALT 637 FOR MEDICARE OP): Performed by: INTERNAL MEDICINE

## 2024-04-09 PROCEDURE — 90732 PPSV23 VACC 2 YRS+ SUBQ/IM: CPT

## 2024-04-09 PROCEDURE — 2500000004 HC RX 250 GENERAL PHARMACY W/ HCPCS (ALT 636 FOR OP/ED)

## 2024-04-09 PROCEDURE — 94668 MNPJ CHEST WALL SBSQ: CPT

## 2024-04-09 PROCEDURE — 2060000001 HC INTERMEDIATE ICU ROOM DAILY

## 2024-04-09 PROCEDURE — 80048 BASIC METABOLIC PNL TOTAL CA: CPT | Performed by: NURSE PRACTITIONER

## 2024-04-09 PROCEDURE — 99232 SBSQ HOSP IP/OBS MODERATE 35: CPT

## 2024-04-09 PROCEDURE — 99233 SBSQ HOSP IP/OBS HIGH 50: CPT | Performed by: INTERNAL MEDICINE

## 2024-04-09 PROCEDURE — 82947 ASSAY GLUCOSE BLOOD QUANT: CPT

## 2024-04-09 PROCEDURE — 2500000005 HC RX 250 GENERAL PHARMACY W/O HCPCS: Performed by: INTERNAL MEDICINE

## 2024-04-09 PROCEDURE — 85027 COMPLETE CBC AUTOMATED: CPT | Performed by: NURSE PRACTITIONER

## 2024-04-09 RX ORDER — FUROSEMIDE 80 MG/1
80 TABLET ORAL DAILY
Status: DISCONTINUED | OUTPATIENT
Start: 2024-04-09 | End: 2024-04-10 | Stop reason: HOSPADM

## 2024-04-09 RX ORDER — GUAIFENESIN 100 MG/5ML
200 SOLUTION ORAL EVERY 4 HOURS PRN
Status: DISCONTINUED | OUTPATIENT
Start: 2024-04-09 | End: 2024-04-10 | Stop reason: HOSPADM

## 2024-04-09 RX ADMIN — IPRATROPIUM BROMIDE AND ALBUTEROL SULFATE 3 ML: 2.5; .5 SOLUTION RESPIRATORY (INHALATION) at 08:19

## 2024-04-09 RX ADMIN — BENZONATATE 200 MG: 100 CAPSULE ORAL at 14:48

## 2024-04-09 RX ADMIN — DOCUSATE SODIUM 100 MG: 100 CAPSULE, LIQUID FILLED ORAL at 06:19

## 2024-04-09 RX ADMIN — FOLIC ACID 1 MG: 1 TABLET ORAL at 09:44

## 2024-04-09 RX ADMIN — IPRATROPIUM BROMIDE AND ALBUTEROL SULFATE 3 ML: 2.5; .5 SOLUTION RESPIRATORY (INHALATION) at 19:10

## 2024-04-09 RX ADMIN — Medication: at 08:00

## 2024-04-09 RX ADMIN — BUDESONIDE 0.5 MG: 0.5 INHALANT ORAL at 08:19

## 2024-04-09 RX ADMIN — EZETIMIBE 10 MG: 10 TABLET ORAL at 09:44

## 2024-04-09 RX ADMIN — METHYLPREDNISOLONE SODIUM SUCCINATE 20 MG: 40 INJECTION, POWDER, FOR SOLUTION INTRAMUSCULAR; INTRAVENOUS at 14:48

## 2024-04-09 RX ADMIN — Medication 2 L/MIN: at 20:00

## 2024-04-09 RX ADMIN — GUAIFENESIN 200 MG: 200 SOLUTION ORAL at 12:31

## 2024-04-09 RX ADMIN — LEVOTHYROXINE SODIUM 125 MCG: 125 TABLET ORAL at 06:19

## 2024-04-09 RX ADMIN — INSULIN LISPRO 6 UNITS: 100 INJECTION, SOLUTION INTRAVENOUS; SUBCUTANEOUS at 17:29

## 2024-04-09 RX ADMIN — FORMOTEROL FUMARATE DIHYDRATE 20 MCG: 20 SOLUTION RESPIRATORY (INHALATION) at 19:10

## 2024-04-09 RX ADMIN — THIAMINE HCL TAB 100 MG 100 MG: 100 TAB at 09:43

## 2024-04-09 RX ADMIN — BUDESONIDE 0.5 MG: 0.5 INHALANT ORAL at 19:10

## 2024-04-09 RX ADMIN — ENOXAPARIN SODIUM 40 MG: 40 INJECTION SUBCUTANEOUS at 20:35

## 2024-04-09 RX ADMIN — PNEUMOCOCCAL VACCINE POLYVALENT 0.5 ML
25; 25; 25; 25; 25; 25; 25; 25; 25; 25; 25; 25; 25; 25; 25; 25; 25; 25; 25; 25; 25; 25; 25 INJECTION, SOLUTION INTRAMUSCULAR; SUBCUTANEOUS at 20:41

## 2024-04-09 RX ADMIN — IPRATROPIUM BROMIDE AND ALBUTEROL SULFATE 3 ML: 2.5; .5 SOLUTION RESPIRATORY (INHALATION) at 14:04

## 2024-04-09 RX ADMIN — METHYLPREDNISOLONE SODIUM SUCCINATE 20 MG: 40 INJECTION, POWDER, FOR SOLUTION INTRAMUSCULAR; INTRAVENOUS at 21:19

## 2024-04-09 RX ADMIN — AMOXICILLIN AND CLAVULANATE POTASSIUM 1 TABLET: 875; 125 TABLET, FILM COATED ORAL at 20:35

## 2024-04-09 RX ADMIN — AMIODARONE HYDROCHLORIDE 400 MG: 200 TABLET ORAL at 09:44

## 2024-04-09 RX ADMIN — INSULIN LISPRO 4 UNITS: 100 INJECTION, SOLUTION INTRAVENOUS; SUBCUTANEOUS at 12:31

## 2024-04-09 RX ADMIN — FORMOTEROL FUMARATE DIHYDRATE 20 MCG: 20 SOLUTION RESPIRATORY (INHALATION) at 08:19

## 2024-04-09 RX ADMIN — ALLOPURINOL 300 MG: 300 TABLET ORAL at 09:44

## 2024-04-09 RX ADMIN — EMPAGLIFLOZIN 10 MG: 10 TABLET, FILM COATED ORAL at 09:44

## 2024-04-09 RX ADMIN — VALSARTAN 40 MG: 80 TABLET, FILM COATED ORAL at 09:43

## 2024-04-09 RX ADMIN — DOCUSATE SODIUM 100 MG: 100 CAPSULE, LIQUID FILLED ORAL at 09:44

## 2024-04-09 RX ADMIN — ENOXAPARIN SODIUM 40 MG: 40 INJECTION SUBCUTANEOUS at 09:43

## 2024-04-09 RX ADMIN — BENZONATATE 200 MG: 100 CAPSULE ORAL at 09:44

## 2024-04-09 RX ADMIN — METHYLPREDNISOLONE SODIUM SUCCINATE 20 MG: 40 INJECTION, POWDER, FOR SOLUTION INTRAMUSCULAR; INTRAVENOUS at 06:19

## 2024-04-09 RX ADMIN — DOCUSATE SODIUM 100 MG: 100 CAPSULE, LIQUID FILLED ORAL at 20:36

## 2024-04-09 RX ADMIN — BENZONATATE 200 MG: 100 CAPSULE ORAL at 20:36

## 2024-04-09 RX ADMIN — ASPIRIN 81 MG: 81 TABLET, COATED ORAL at 09:44

## 2024-04-09 RX ADMIN — AMOXICILLIN AND CLAVULANATE POTASSIUM 1 TABLET: 875; 125 TABLET, FILM COATED ORAL at 09:44

## 2024-04-09 ASSESSMENT — PAIN - FUNCTIONAL ASSESSMENT
PAIN_FUNCTIONAL_ASSESSMENT: 0-10
PAIN_FUNCTIONAL_ASSESSMENT: 0-10

## 2024-04-09 ASSESSMENT — COGNITIVE AND FUNCTIONAL STATUS - GENERAL
DAILY ACTIVITIY SCORE: 24
CLIMB 3 TO 5 STEPS WITH RAILING: A LITTLE
MOBILITY SCORE: 23

## 2024-04-09 ASSESSMENT — PAIN SCALES - GENERAL
PAINLEVEL_OUTOF10: 0 - NO PAIN

## 2024-04-09 NOTE — PROGRESS NOTES
Alo Glass is a 68 y.o. male who was referred to the Clinical Pharmacy Team to complete a virtual Transitions of Care encounter for discharge medication optimization. The patient was referred for their COPD, HF, diabetes.    Patient expresses much frustration with Dr. Schumacher's office in trying to get new CPAP machine. Also stating he can't sleep in the hospital.    _______________________________________________________________________  PHARMACY ASSESSMENT    Home Pharmacy: [facility]  Meds to beds? Yes - patient also wants  inserts in addition to med guides/monographs    Affordability/Accessibility: no issues  Adherence/Organization: no issues  Adverse Effects: did not tolerate metoprolol    Patient expresses a lot of frustration about meds to beds at [facility] since discharged on a Saturday and [facility] did not have his medication immediately available that weekend either. Patient wants all the drug information handouts he can have since experience with metoprolol.  _______________________________________________________________________  DIABETES ASSESSMENT    CURRENT PHARMACOTHERAPY  - Mounjaro 2.5mg weekly  - Metformin 1000mg BID  - Jardiance 10mg daily (heart failure dosing)    Current A1c 5.8% indicating great control    SECONDARY PREVENTION  - Statin? No, patient refuses  - ACE-I/ARB? Valsartan 40mg  - Aspirin? 81mg daily  _______________________________________________________________________  CHRONIC HEART FAILURE ASSESSMENT  -HTN present/diagnosed: yes    LVEF: 30-35%  eGFR: >90  Beta blocker: refuses  ACEi/ARB: valsartan 40mg (previously on olmesartan, appears tolerated well and possible just changed to valsartan due to hospital formulary?  MRA: none, in previous hospitalization was started on spironolactone 25mg but then held and not restarted all in same hospitalization  SGLT2i: Jardiance 10mg  _______________________________________________________________________  ASTHMA/COPD  ASSESSMENT    CURRENT PHARMACOTHERAPY  - Trelegy 100-62.5-25 one puff once daily  - Duonebs PRN  - Albuterol HFA PRN    SECONDARY PREVENTION  - Influenza: 23  - Pneumococcal: Prevnar 13 2018  - RSV: no record  - COVID: JOSE ANGEL, last got 2023 Moderna update    SMOKING CESSATION  Patient is currently using tobacco products  - PPD 0.5-1 ppd  Not discussed in visit as patient more fixated on care coordination issues    ___________________________________________________________________  PATIENT EDUCATION/GOALS  - Fasting B - 130 mg/dL  - Postprandial BG: less than 180 mg/dL  - A1c: less than 7%  - LDL Goal: < 70mg/dL  - Your blood pressure goal is less than 130/80 mmHg  - Try to integrate exercise and healthy diet into your lifestyle. 150 minutes of moderate exercise per week is recommended.   -Limit salt intake and avoid foods high in sodium such as processed meats, salty snacks, and fast food. The recommended daily maximum sodium intake is less than 2,000mg per day.  -Educated patient on proper inhaler technique and proper inhaler cleaning.  - Answered all patient questions and concerns to best of my ability  _______________________________________________________________________  RECOMMENDATIONS/PLAN  Will provide medication package inserts to patient with meds to beds scripts  Consider adding back spironolactone  Please send prescriptions to [facility] pharmacy for assistance on insurance prior authorization and copay. Prescriptions will be delivered to the patient's bedside prior to discharge with the Meds to Beds program.       Daisy Padron, PharmD    Verbal consent to manage patient's drug therapy was obtained from the patient. They were informed they may decline to participate or withdraw from participation in pharmacy services at any time.

## 2024-04-09 NOTE — CARE PLAN
Problem: Respiratory  Goal: Clear secretions with interventions this shift  Outcome: Progressing  Goal: Minimize anxiety/maximize coping throughout shift  Outcome: Progressing  Goal: Minimal/no exertional discomfort or dyspnea this shift  Outcome: Progressing  Goal: No signs of respiratory distress (eg. Use of accessory muscles. Peds grunting)  Outcome: Progressing  Goal: Patent airway maintained this shift  Outcome: Progressing  Goal: Tolerate mechanical ventilation evidenced by VS/agitation level this shift  Outcome: Progressing  Goal: Tolerate pulmonary toileting this shift  Outcome: Progressing  Goal: Verbalize decreased shortness of breath this shift  Outcome: Progressing  Goal: Wean oxygen to maintain O2 saturation per order/standard this shift  Outcome: Progressing  Goal: Increase self care and/or family involvement in next 24 hours  Outcome: Progressing     Problem: Skin  Goal: Decreased wound size/increased tissue granulation at next dressing change  Outcome: Progressing  Goal: Participates in plan/prevention/treatment measures  Outcome: Progressing  Goal: Prevent/manage excess moisture  Outcome: Progressing  Goal: Prevent/minimize sheer/friction injuries  Outcome: Progressing  Goal: Promote/optimize nutrition  Outcome: Progressing  Goal: Promote skin healing  Outcome: Progressing   The patient's goals for the shift include      The clinical goals for the shift include Pt O2 level will remain above 92% through end of shift    Over the shift, the patient did not make progress toward the following goals. Barriers to progression include . Recommendations to address these barriers include .

## 2024-04-09 NOTE — PROGRESS NOTES
04/09/24 1150   Discharge Planning   Assistance Needed still diuresing w/lasix,repeat BNP, CT TAVR today, work up for aortic stenosis  and left heart cath   Type of Home Care Services Home OT;Home PT;Home nursing visits   Patient expects to be discharged to: home with [facility]   Does the patient need discharge transport arranged? Yes   RoundTrip coordination needed? Yes   Has discharge transport been arranged? No

## 2024-04-09 NOTE — PROGRESS NOTES
Subjective Data:  Sleeping comfortably. He's happy that his oxygen requirement going down with antibiotics.     12 point review of systems negative unless stated above.      Objective Data:    Denies worsening SOB.  Last Recorded Vitals:  Vitals:    24 2335 24 0616 24 0811 24 0819   BP:  115/75 117/77    BP Location:  Right arm Left arm    Patient Position:  Sitting Sitting    Pulse:  77 85    Resp: 17 18 18    Temp:  36.3 °C (97.4 °F) 36.2 °C (97.2 °F)    TempSrc:  Oral Axillary    SpO2:  93% 94% 91%   Weight:  129 kg (285 lb 7.9 oz)     Height:         Medical Gas Therapy: Supplemental oxygen (pt declined O2 increase to 3L to increase SpO2 to ordered goal. RN notified)  O2 Delivery Method: Nasal cannula  Weight  Av kg (286 lb 7.5 oz)  Min: 129 kg (285 lb 7.9 oz)  Max: 130 kg (287 lb)    LABS:  CMP:  Results from last 7 days   Lab Units 24  0607 24  1012 24  0558 24  0558 24  0558 24  1720   SODIUM mmol/L 136 134* 140 135* 133* 132*   POTASSIUM mmol/L 4.2 5.0 3.9 3.8 3.1* 4.6  4.6   CHLORIDE mmol/L 96* 90* 92* 89* 87* 86*   CO2 mmol/L 33* 35* 41* 36* 33* 32   ANION GAP mmol/L 11 14 11 14 16 19   BUN mg/dL 46* 52* 45* 36* 40* 35*   CREATININE mg/dL 0.78 1.07 0.94 0.73 1.00 0.78   EGFR mL/min/1.73m*2 >90 76 88 >90 82 >90   MAGNESIUM mg/dL  --   --   --   --   --  2.50*   ALBUMIN g/dL  --   --   --   --   --  4.1   ALT U/L  --   --   --   --   --  31   AST U/L  --   --   --   --   --  39   BILIRUBIN TOTAL mg/dL  --   --   --   --   --  0.9     CBC:  Results from last 7 days   Lab Units 24  0607 24  1012 24  0558 24  0558 24  0558 24  1846   WBC AUTO x10*3/uL 9.4 10.1 10.5 9.5 8.1 9.5   HEMOGLOBIN g/dL 18.1* 18.6* 18.0* 18.3* 18.5* 19.1*   HEMATOCRIT % 55.7* 57.1* 55.9* 55.6* 55.0* 55.0*   PLATELETS AUTO x10*3/uL 229 270 225 235 217 218   MCV fL 100 100 101* 99 99 96     COAG:   Results from last 7 days   Lab Units  "04/04/24  1720   INR  1.0     ABO: No results found for: \"ABO\"  HEME/ENDO:  Results from last 7 days   Lab Units 04/06/24  0558   TSH mIU/L 1.26      CARDIAC:   Results from last 7 days   Lab Units 04/08/24  1012 04/04/24  1846 04/04/24  1720   TROPHS ng/L  --  67* 62*   BNP pg/mL 267*  --  158*             Last I/O:    Intake/Output Summary (Last 24 hours) at 4/9/2024 1101  Last data filed at 4/8/2024 2017  Gross per 24 hour   Intake --   Output 500 ml   Net -500 ml     Net IO Since Admission: 356 mL [04/09/24 1101]      Imaging Results:  ECG 12 lead    Result Date: 4/5/2024  See ED provider note for full interpretation and clinical correlation    CT angio chest for pulmonary embolism    Result Date: 4/4/2024  Interpreted By:  Ishaan Banda, STUDY: CT ANGIO CHEST FOR PULMONARY EMBOLISM;  4/4/2024 11:41 pm   INDICATION: Signs/Symptoms:hypoxic, recent admission.   COMPARISON: 10/02/2018 CT chest without contrast.   ACCESSION NUMBER(S): [id number]   ORDERING CLINICIAN: JANEL NASH   TECHNIQUE: Contiguous axial images of the chest were obtained after the intravenous administration of iodinated contrast using angiographic PE protocol. Coronal and sagittal reformatted images were reconstructed from the axial data. MIP images were created on an independent workstation and reviewed. Dual energy reconstructions were also performed including low energy virtual mono-energetic images and iodine density maps.   FINDINGS: MEDIASTINUM AND LYMPH NODES:  The esophageal wall appears within normal limits.  No enlarged intrathoracic or axillary lymph nodes by imaging criteria. No pneumomediastinum.   VESSELS:  Normal caliber aorta. Minimal aortic atherosclerosis.  No pulmonary embolism.   HEART: There is moderate cardiomegaly. There is severe aortic valvular calcification.  Moderate coronary artery calcifications. No significant pericardial effusion.   LUNG, AIRWAYS, AND PLEURA: There is generalized volume loss of the right " hemithorax was present prior study but slightly progressed which is due to the presence of slightly greater atelectasis in the right middle lobe and right lower lobe. There is new frothy debris of occluding the right lower lobe bronchus and bronchus intermedius. There is diffuse bronchial wall thickening in the right middle and lower lobes with additional debris occluding there segmental and subsegmental bronchi. There is a small focus of subsegmental atelectasis in the medial left lower lobe. There is emphysema.   OSSEOUS STRUCTURES: No acute osseous abnormality.   CHEST WALL SOFT TISSUES: No discernible abnormality.   UPPER ABDOMEN/OTHER: No acute abnormality.       Frothy debris occluding the bronchus intermedius and right lower lobe bronchus and additional multifocal segmental and subsegmental occlusions in the right middle and right lower lobes with diffuse new bronchial wall thickening and worsening atelectasis in both lobes. Findings are most likely related to aspiration with postobstructive atelectasis.   Severe aortic valve calcifications to the extent that we likely result in aortic stenosis. Recommend follow-up echocardiography.   Moderate 4 chamber cardiomegaly and moderate coronary artery calcifications.   Emphysema.   MACRO: None.   Signed by: Ishaan Banda 4/4/2024 11:56 PM Dictation workstation:   [id number]    XR chest 1 view    Result Date: 4/4/2024  Interpreted By:  Julián Peterson, STUDY: XR CHEST 1 VIEW;  4/4/2024 5:55 pm   INDICATION: Signs/Symptoms:sob.   COMPARISON: 03/13/2024   ACCESSION NUMBER(S): [id number]   ORDERING CLINICIAN: JANEL NASH   FINDINGS:         CARDIOMEDIASTINAL SILHOUETTE: There is enlargement cardiac silhouette with vascular congestion but no yolanda edema   LUNGS: There is dense airspace disease at the right lung base with a small right pleural effusion. Mild patchy left basilar airspace disease as well   ABDOMEN: No remarkable upper abdominal findings.    BONES: No acute osseous changes.       1.  Dense right basilar airspace disease with small right pleural effusion. Pneumonia is hind differential. 2. Enlarged cardiac silhouette with pulmonary vascular congestion       MACRO: None   Signed by: Julián Peterson 4/4/2024 6:00 PM Dictation workstation:   [id number]          Past Cardiology Tests (Last 3 Years):  EKG:  Results for orders placed during the hospital encounter of 04/04/24    ECG 12 lead (Preliminary)  This result has not been signed. Information might be incomplete.    Narrative  See ED provider note for full interpretation and clinical correlation      Results for orders placed during the hospital encounter of 03/08/24    Electrocardiogram, 12-lead PRN ACS symptoms    Narrative  Sinus rhythm with occasional Premature ventricular complexes  Right bundle branch block  T wave abnormality, consider lateral ischemia  Abnormal ECG  When compared with ECG of 12-MAR-2024 04:06, (unconfirmed)  Premature atrial complexes are no longer Present  T wave inversion now evident in Lateral leads  Confirmed by Heather Barrett ([id number]) on 3/12/2024 10:49:25 AM      ECG 12 lead    Narrative  Sinus rhythm with 1st degree AV block with occasional Premature ventricular complexes and Premature atrial complexes  Right bundle branch block  Abnormal ECG  When compared with ECG of 12-MAR-2024 00:18, (unconfirmed)  Premature ventricular complexes are now Present  NY interval has increased  Questionable change in QRS axis  T wave inversion now evident in Inferior leads  T wave inversion more evident in Anterior leads  Confirmed by Heather Barrett ([id number]) on 3/12/2024 10:49:36 AM      ECG 12 lead    Narrative  Sinus tachycardia with short NY with Premature atrial complexes with Aberrant conduction  Left axis deviation  Right bundle branch block  Septal infarct , age undetermined  Abnormal ECG  When compared with ECG of 11-MAR-2024 17:37, (unconfirmed)  No significant change was  found  Confirmed by Heather Barrett ([id number]) on 3/12/2024 10:49:46 AM      Electrocardiogram, 12-lead PRN ACS symtpoms    Narrative  Sinus rhythm with Premature atrial complexes  Left axis deviation  Right bundle branch block  Abnormal ECG  When compared with ECG of 10-MAR-2024 10:10, (unconfirmed)  Criteria for Septal infarct are no longer Present  T wave inversion less evident in Anterior leads  Confirmed by Andrew Pearson ([id number]) on 3/12/2024 4:00:58 PM      ECG 12 Lead    Narrative  Sinus rhythm with 1st degree AV block with Premature atrial complexes  Left axis deviation  Right bundle branch block  Septal infarct , age undetermined  Abnormal ECG  When compared with ECG of 08-MAR-2024 17:20, (unconfirmed)  Septal infarct is now Present  Nonspecific T wave abnormality, worse in Anterior leads  Confirmed by Andrew Pearson ([id number]) on 3/12/2024 8:43:29 AM      ECG 12 lead    Narrative  Sinus rhythm with 1st degree AV block with Premature atrial complexes  Left axis deviation  Right bundle branch block  Abnormal ECG  When compared with ECG of 13-MAY-2019 16:22,  Premature atrial complexes are now Present  Right bundle branch block is now Present  See ED provider note for full interpretation and clinical correlation  Confirmed by Jenny Spence ([id number]) on 3/12/2024 10:59:24 AM    Echo:  Results for orders placed during the hospital encounter of 03/08/24    Transthoracic echo (TTE) [facility], [address] [place] [address]  Tel [phone] and Fax [phone]    TRANSTHORACIC ECHOCARDIOGRAM REPORT      Patient Name:      BRAYAN Key Physician:    [id number] Heather Barrett MD  Study Date:        3/11/2024            Ordering Provider:    [id number] RORY GRAVES  MRN/PID:           [id number]             Fellow:  Accession#:        [id number]         Nurse:                Khushbu Holman RN  Date of Birth/Age: 1955 / 68 years Sonographer:          Goldie  Chuck  [facility]  Gender:            M                    Additional Staff:  Height:            177.80 cm            Admit Date:           3/8/2024  Weight:            147.42 kg            Admission Status:     Inpatient -  Routine  BSA / BMI:         2.57 m2 / 46.63      Encounter#:           [id number]  kg/m2  Department Location:  [facility] Non  Invasive  Blood Pressure: 107 /70 mmHg    Study Type:    TRANSTHORACIC ECHO (TTE) COMPLETE  Diagnosis/ICD: Heart failure, unspecified-I50.9; Acute systolic (congestive)  heart failure (CHF)-I50.21  Indication:    Congestive Heart Failure  CPT Code:      Echo Complete w Full Doppler-81662    Patient History:  Pertinent History: COPD, HTN, LE Edema, Dyspnea and Elev trop., Elev. BNP,  Smoker,.    Study Detail: The following Echo studies were performed: 2D, M-Mode, Doppler and  color flow. Technically challenging study due to body habitus.  Definity used as a contrast agent for endocardial border  definition. Total contrast used for this procedure was 1.5 mL via  IV push. The patient was awake.      PHYSICIAN INTERPRETATION:  Left Ventricle: The left ventricular systolic function is moderately decreased, with an estimated ejection fraction of 30-35%. There is global hypokinesis of the left ventricle with minor regional variations. The left ventricular cavity size is normal. Left ventricular diastolic filling was indeterminate.  LV Wall Scoring:  The basal inferolateral segment is akinetic.    Left Atrium: The left atrium is mildly dilated.  Right Ventricle: The right ventricle is normal in size. There is normal right ventricular global systolic function.  Right Atrium: The right atrium is normal in size.  Aortic Valve: The aortic valve was not well visualized. There is evidence of moderate aortic valve stenosis.  There is no evidence of aortic valve regurgitation. The peak instantaneous gradient of the aortic valve is 52.7 mmHg. The mean gradient of the aortic valve is 26.0  mmHg.  Mitral Valve: The mitral valve is mild to moderately thickened. There is mild to moderate mitral annular calcification. There is mild mitral valve regurgitation.  Tricuspid Valve: The tricuspid valve was not well visualized. There is trace to mild tricuspid regurgitation. The right ventricular systolic pressure is unable to be estimated.  Pulmonic Valve: The pulmonic valve is not well visualized. There is physiologic pulmonic valve regurgitation.  Pericardium: There is no pericardial effusion noted.  Aorta: The aortic root is normal.  Systemic Veins: The inferior vena cava appears dilated. There is less than 50% IVC collapse with inspiration.      CONCLUSIONS:  1. Left ventricular systolic function is moderately decreased with a 30-35% estimated ejection fraction.  2. Basal inferolateral segment is abnormal.  3. Poorly visualized anatomical structures due to suboptimal image quality.  4. Moderate aortic valve stenosis.  5. There is global hypokinesis of the left ventricle with minor regional variations.    QUANTITATIVE DATA SUMMARY:  2D MEASUREMENTS:  Normal Ranges:  IVSd:          1.14 cm    (0.6-1.1cm)  LVPWd:         1.57 cm    (0.6-1.1cm)  LVIDd:         5.26 cm    (3.9-5.9cm)  LVIDs:         4.41 cm  LV Mass Index: 117.2 g/m2  LV % FS        16.2 %    LA VOLUME:  Normal Ranges:  LA Vol A4C:        77.9 ml    (22+/-6mL/m2)  LA Vol A2C:        84.8 ml  LA Vol BP:         83.7 ml  LA Vol Index A4C:  30.4ml/m2  LA Vol Index A2C:  33.0 ml/m2  LA Vol Index BP:   32.6 ml/m2  LA Area A4C:       24.1 cm2  LA Area A2C:       24.4 cm2  LA Major Axis A4C: 6.3 cm  LA Major Axis A2C: 6.0 cm  LA Volume Index:   30.5 ml/m2  LA Vol A4C:        71.4 ml  LA Vol A2C:        78.4 ml    RA VOLUME BY A/L METHOD:  Normal Ranges:  RA Area A4C: 17.0 cm2    AORTA MEASUREMENTS:  Normal Ranges:  Asc Ao, d: 3.10 cm (2.1-3.4cm)    LV SYSTOLIC FUNCTION BY 2D PLANIMETRY (MOD):  Normal Ranges:  EF-A4C View: 33.6 % (>=55%)  EF-A2C View:  29.1 %  EF-Biplane:  33.1 %    LV DIASTOLIC FUNCTION:  Normal Ranges:  MV Peak E: 0.90 m/s (0.7-1.2 m/s)  MV Peak A: 0.39 m/s (0.42-0.7 m/s)  E/A Ratio: 2.34     (1.0-2.2)    MITRAL VALVE:  Normal Ranges:  MV DT: 99 msec (150-240msec)    AORTIC VALVE:  Normal Ranges:  AoV Vmax:                3.63 m/s  (<=1.7m/s)  AoV Peak P.7 mmHg (<20mmHg)  AoV Mean P.0 mmHg (1.7-11.5mmHg)  LVOT Max Jero:            0.69 m/s  (<=1.1m/s)  AoV VTI:                 60.70 cm  (18-25cm)  LVOT VTI:                12.80 cm  LVOT Diameter:           2.60 cm   (1.8-2.4cm)  AoV Area, VTI:           1.12 cm2  (2.5-5.5cm2)  AoV Area,Vmax:           1.01 cm2  (2.5-4.5cm2)  AoV Dimensionless Index: 0.21      RIGHT VENTRICLE:  RV Basal 3.71 cm  RV Mid   2.98 cm  RV Major 9.7 cm  TAPSE:   22.4 mm  RV s'    0.13 m/s    TRICUSPID VALVE/RVSP:  Normal Ranges:  IVC Diam: 2.32 cm    PULMONIC VALVE:  Normal Ranges:  PV Max Jero: 0.7 m/s  (0.6-0.9m/s)  PV Max P.7 mmHg      [id number] Heather Barrett MD  Electronically signed on 3/11/2024 at 4:04:49 PM      Wall Scoring        ** Final (Updated) **    Ejection Fractions:  LV EF   Date/Time Value Ref Range Status   2024 02:04 PM 33 %      Cath:  No results found for this or any previous visit.    Stress Test:  No results found for this or any previous visit.    Cardiac Imaging:  No results found for this or any previous visit.      Inpatient Medications:  Scheduled medications   Medication Dose Route Frequency    allopurinol  300 mg oral Daily    amiodarone  400 mg oral TID    amoxicillin-pot clavulanate  1 tablet oral q12h SHARRON    aspirin  81 mg oral Daily    benzonatate  200 mg oral TID    budesonide  0.5 mg nebulization BID    docusate sodium  100 mg oral BID    empagliflozin  10 mg oral Daily    enoxaparin  40 mg subcutaneous q12h SHARRON    ezetimibe  10 mg oral Daily    folic acid  1 mg oral Daily    formoterol  20 mcg nebulization BID    furosemide  80 mg intravenous  BID after breakfast and lunch    [Held by provider] furosemide  80 mg oral BID with meals    insulin lispro  0-10 Units subcutaneous TID with meals    ipratropium-albuteroL  3 mL nebulization TID    levothyroxine  125 mcg oral Daily    methylPREDNISolone sodium succinate (PF)  20 mg intravenous q8h    oxygen   inhalation Continuous - Inhalation    oxygen   inhalation Continuous - Inhalation    pneumococcal polysaccharide  0.5 mL intramuscular During hospitalization    thiamine  100 mg oral Daily    valsartan  40 mg oral Daily     PRN medications   Medication    acetaminophen    Or    acetaminophen    Or    acetaminophen    acetaminophen    Or    acetaminophen    Or    acetaminophen    albuterol    dextrose    dextrose    fluticasone    glucagon    glucagon    guaiFENesin    ipratropium-albuteroL    melatonin    ondansetron    Or    ondansetron     Continuous Medications   Medication Dose Last Rate       Physical Exam:  General:  Patient is awake, alert, and oriented.  Patient is in no acute distress.  HEENT:  Pupils equal and reactive.  Normocephalic.  Moist mucosa.    Neck:  No thyromegaly.  Normal Jugular Venous Pressure.  Cardiovascular:  Regular rate and rhythm.  Normal S1 and S2.  Pulmonary:  Clear to auscultation bilaterally.  Abdomen:  Soft. Non-tender.   Non-distended.  Positive bowel sounds.  Lower Extremities:  2+ pedal pulses. No LE edema.  Neurologic:  Cranial nerves intact.  No focal deficit.   Skin: Skin warm and dry, normal skin turgor.   Psychiatric: Normal affect.     Assessment/Plan   Alo Glass is a 68 y.o. male h/o newly diagnosed HFrEF (3/2024: 30-35%) (unwilling to take beta blockers, on ARB, SGLT-2), moderate aortic stenosis (3/2024), obesity, MELISSA on CPAP, COPD, current smoker, type 2 DM, hypertension, DLD (refuses statins/PCSK9 inhibitors, on ezetimibe), who was recently discharged for acute systolic heart failure decompensation requiring IV diuresis, presents to the hospital with  "wheezing and being treated for COPD exacerbation and pneumonia..     --HFrEF: EF is severely reduced based on echocardiogram 3/2024. Etiology of cardiomyopathy is probably a combination of moderate-severe aortic stenosis and potential component of ischemia given heavy coronary calcifications seen on CT scan.      --Moderate-severe aortic stenosis: Echocardiogram from 3/2024 has certain characteristics that indicate potential severe aortic stenosis, especially a dimensionless index as 0.21 and Vmax approximating 3.7 m/s while a LVOT VTI of only 12 - suggesting low output. This is highly concerning for low flow low gradient aortic stenosis. Mean gradient was 26mmHg in the setting of low EF. Mean gradient was around the same with a normal EF 3 5 years ago, so overall suspicion is that if patient had a normal EF now, mean gradient would probably be closer to 40mmHg, making aortic stenosis severe. CT chest with contrast that was obtained to rule out PE demonstrated severely calcified aortic valve.     --Multifocal atrial tachycardia: Likely secondary to COPD exacerbation. On amiodarone IV for rate control.  --Coronary calcifications on CT chest  --HTN  --DLD  --Active smoking  --Patient concern for \"low heart rate\": we compared his pulse oximeter's heart rate to actual telemetry and pulse oximeter is erroneously reading his HR as low because of his premature beats and arrhythmia. Advised the patient that his pulse oximeter is not reliable for his heart rate as long as he has ectopic beats     Recommendations:  -Agree with management of COPD exacerbation with steroids  -Agree with Augmentin for CAP treatment   -Stop amiodarone since no longer in MAT  -Potassium >4 and Magnesium >2  -Continue jardiance 10mg daily and valsartan 40mg daily for GDMT  -Agree with Ezetia 10mg daily for DLD  -Agree with ASA 81mg daily for now  -Hold off on beta blockers for now given HFrEF and likely moderate to severe aortic stenosis   -Will " need TAVR evaluation outpatient, please place structural heart referral  -Lasix 80mg PO daily  -Would not aggressively treat hypertension. Excessive vasodilation may worsen gradient across stenotic aortic valve  and LHC as part of workup    Patient ok to be discharged from cardiac standpoint.     Cardiology will sign off    Thank you for this interesting consult     Code Status:  Full Code    True Jacob MD    =======================  Attending note  =======================    Both the fellow and I have had a face to face encounter with the patient today. I have examined the patient and edited the documented physical examination as necessary.  I personally reviewed the patient's recent labs, medications, orders, EKGs, and pertinent cardiac imaging.  I have reviewed the fellow's encounter note, approve the fellow's documentation and have edited the note to reflect the diagnostic and therapeutic plan.    Alo Glass is a 68 y.o. male h/o newly diagnosed HFrEF (3/2024: 30-35%) (unwilling to take beta blockers, on ARB, SGLT-2), moderate aortic stenosis (3/2024), obesity, MELISSA on CPAP, COPD, current smoker, type 2 DM, hypertension, DLD (refuses statins/PCSK9 inhibitors, on ezetimibe), who was recently discharged for acute systolic heart failure decompensation requiring IV diuresis, presents to the hospital with wheezing and being treated for COPD exacerbation and pneumonia..    Physical Exam:  General:  Patient is awake, alert, and oriented.  Patient is in no acute distress.  HEENT:  Pupils equal and reactive.  Normocephalic.  Moist mucosa.    Neck:  No thyromegaly.  Normal Jugular Venous Pressure.  Cardiovascular:  Regular rate and rhythm.  Normal S1 and S2.  Pulmonary:  Clear to auscultation bilaterally.  Abdomen:  Soft. Non-tender.   Non-distended.  Positive bowel sounds.  Lower Extremities:  2+ pedal pulses. No LE edema.  Neurologic:  Cranial nerves intact.  No focal deficit.   Skin: Skin warm and dry, normal  "skin turgor.   Psychiatric: Normal affect.     Assessment/Plan   Alo Glass is a 68 y.o. male h/o newly diagnosed HFrEF (3/2024: 30-35%) (unwilling to take beta blockers, on ARB, SGLT-2), moderate aortic stenosis (3/2024), obesity, MELISSA on CPAP, COPD, current smoker, type 2 DM, hypertension, DLD (refuses statins/PCSK9 inhibitors, on ezetimibe), who was recently discharged for acute systolic heart failure decompensation requiring IV diuresis, presents to the hospital with wheezing and being treated for COPD exacerbation and pneumonia..     --HFrEF: EF is severely reduced based on echocardiogram 3/2024. Etiology of cardiomyopathy is probably a combination of moderate-severe aortic stenosis and potential component of ischemia given heavy coronary calcifications seen on CT scan.      --Moderate-severe aortic stenosis: Echocardiogram from 3/2024 has certain characteristics that indicate potential severe aortic stenosis, especially a dimensionless index as 0.21 and Vmax approximating 3.7 m/s while a LVOT VTI of only 12 - suggesting low output. This is highly concerning for low flow low gradient aortic stenosis. Mean gradient was 26mmHg in the setting of low EF. Mean gradient was around the same with a normal EF 3 5 years ago, so overall suspicion is that if patient had a normal EF now, mean gradient would probably be closer to 40mmHg, making aortic stenosis severe. CT chest with contrast that was obtained to rule out PE demonstrated severely calcified aortic valve.     --Multifocal atrial tachycardia: Likely secondary to COPD exacerbation. On amiodarone IV for rate control.  --Coronary calcifications on CT chest  --HTN  --DLD  --Active smoking  --Patient concern for \"low heart rate\": we compared his pulse oximeter's heart rate to actual telemetry and pulse oximeter is erroneously reading his HR as low because of his premature beats and arrhythmia. Advised the patient that his pulse oximeter is not reliable for his " heart rate as long as he has ectopic beats     Recommendations:  -Agree with management of COPD exacerbation with steroids  -Agree with Augmentin for CAP treatment   -Stop amiodarone since no longer in MAT  -Potassium >4 and Magnesium >2  -Continue jardiance 10mg daily and valsartan 40mg daily for GDMT  -Agree with Ezetia 10mg daily for DLD  -Agree with ASA 81mg daily for now  -Hold off on beta blockers for now given HFrEF and likely moderate to severe aortic stenosis   -Will need TAVR evaluation outpatient, please place structural heart referral  -Lasix 80mg PO daily  -Would not aggressively treat hypertension. Excessive vasodilation may worsen gradient across stenotic aortic valve  and LHC as part of workup    Patient ok to be discharged from cardiac standpoint.     Cardiology will sign off

## 2024-04-09 NOTE — PROGRESS NOTES
"Alo Glass is a 68 y.o. male on day 5 of admission presenting with Tachycardia.    Subjective       Seen and examined, discussed with the patient and the staff, he is up on the chair, he is still on 3 to 4 L of oxygen, he has refused the CT TAVR for me,.      Objective     Physical Exam  Alert oriented 3.  HEENT unremarkable.  Neck no JVD.  Heart S1-S2.  Lungs reduced entry.  Abdomen is soft nontender.  Legs no edema.    Last Recorded Vitals  Blood pressure 113/78, pulse 80, temperature 35.6 °C (96 °F), temperature source Oral, resp. rate 18, height 1.803 m (5' 10.98\"), weight 129 kg (285 lb 7.9 oz), SpO2 90 %.  Intake/Output last 3 Shifts:  I/O last 3 completed shifts:  In: - (0 mL/kg)   Out: 900 (6.9 mL/kg) [Urine:900 (0.2 mL/kg/hr)]  Weight: 129.5 kg     Relevant Results  Results for orders placed or performed during the hospital encounter of 04/04/24 (from the past 96 hour(s))   POCT GLUCOSE   Result Value Ref Range    POCT Glucose 180 (H) 74 - 99 mg/dL   CBC   Result Value Ref Range    WBC 9.5 4.4 - 11.3 x10*3/uL    nRBC 0.0 0.0 - 0.0 /100 WBCs    RBC 5.61 4.50 - 5.90 x10*6/uL    Hemoglobin 18.3 (H) 13.5 - 17.5 g/dL    Hematocrit 55.6 (H) 41.0 - 52.0 %    MCV 99 80 - 100 fL    MCH 32.6 26.0 - 34.0 pg    MCHC 32.9 32.0 - 36.0 g/dL    RDW 12.9 11.5 - 14.5 %    Platelets 235 150 - 450 x10*3/uL   Basic Metabolic Panel   Result Value Ref Range    Glucose 170 (H) 74 - 99 mg/dL    Sodium 135 (L) 136 - 145 mmol/L    Potassium 3.8 3.5 - 5.3 mmol/L    Chloride 89 (L) 98 - 107 mmol/L    Bicarbonate 36 (H) 21 - 32 mmol/L    Anion Gap 14 10 - 20 mmol/L    Urea Nitrogen 36 (H) 6 - 23 mg/dL    Creatinine 0.73 0.50 - 1.30 mg/dL    eGFR >90 >60 mL/min/1.73m*2    Calcium 9.3 8.6 - 10.3 mg/dL   TSH   Result Value Ref Range    Thyroid Stimulating Hormone 1.26 0.44 - 3.98 mIU/L   POCT GLUCOSE   Result Value Ref Range    POCT Glucose 147 (H) 74 - 99 mg/dL   POCT GLUCOSE   Result Value Ref Range    POCT Glucose 186 (H) 74 - 99 " mg/dL   POCT GLUCOSE   Result Value Ref Range    POCT Glucose 234 (H) 74 - 99 mg/dL   Respiratory Culture/Smear    Specimen: SPUTUM; Fluid   Result Value Ref Range    Respiratory Culture/Smear (A)      Culture not performed. See Gram stain findings. Recollect if clinically indicated.    Gram Stain (A)      Gram stain indicates specimen contains significant salivary contamination.   CBC   Result Value Ref Range    WBC 10.5 4.4 - 11.3 x10*3/uL    nRBC 0.0 0.0 - 0.0 /100 WBCs    RBC 5.55 4.50 - 5.90 x10*6/uL    Hemoglobin 18.0 (H) 13.5 - 17.5 g/dL    Hematocrit 55.9 (H) 41.0 - 52.0 %     (H) 80 - 100 fL    MCH 32.4 26.0 - 34.0 pg    MCHC 32.2 32.0 - 36.0 g/dL    RDW 13.0 11.5 - 14.5 %    Platelets 225 150 - 450 x10*3/uL   Basic Metabolic Panel   Result Value Ref Range    Glucose 160 (H) 74 - 99 mg/dL    Sodium 140 136 - 145 mmol/L    Potassium 3.9 3.5 - 5.3 mmol/L    Chloride 92 (L) 98 - 107 mmol/L    Bicarbonate 41 (HH) 21 - 32 mmol/L    Anion Gap 11 10 - 20 mmol/L    Urea Nitrogen 45 (H) 6 - 23 mg/dL    Creatinine 0.94 0.50 - 1.30 mg/dL    eGFR 88 >60 mL/min/1.73m*2    Calcium 9.5 8.6 - 10.3 mg/dL   POCT GLUCOSE   Result Value Ref Range    POCT Glucose 198 (H) 74 - 99 mg/dL   POCT GLUCOSE   Result Value Ref Range    POCT Glucose 227 (H) 74 - 99 mg/dL   Respiratory Culture/Smear    Specimen: SPUTUM; Fluid   Result Value Ref Range    Respiratory Culture/Smear (A)      Culture not performed. See Gram stain findings. Recollect if clinically indicated.    Gram Stain (A)      Gram stain indicates specimen contains significant salivary contamination.   POCT GLUCOSE   Result Value Ref Range    POCT Glucose 177 (H) 74 - 99 mg/dL   POCT GLUCOSE   Result Value Ref Range    POCT Glucose 232 (H) 74 - 99 mg/dL   POCT GLUCOSE   Result Value Ref Range    POCT Glucose 214 (H) 74 - 99 mg/dL   CBC   Result Value Ref Range    WBC 10.1 4.4 - 11.3 x10*3/uL    nRBC 0.0 0.0 - 0.0 /100 WBCs    RBC 5.71 4.50 - 5.90 x10*6/uL     Hemoglobin 18.6 (H) 13.5 - 17.5 g/dL    Hematocrit 57.1 (H) 41.0 - 52.0 %     80 - 100 fL    MCH 32.6 26.0 - 34.0 pg    MCHC 32.6 32.0 - 36.0 g/dL    RDW 12.9 11.5 - 14.5 %    Platelets 270 150 - 450 x10*3/uL   Basic Metabolic Panel   Result Value Ref Range    Glucose 244 (H) 74 - 99 mg/dL    Sodium 134 (L) 136 - 145 mmol/L    Potassium 5.0 3.5 - 5.3 mmol/L    Chloride 90 (L) 98 - 107 mmol/L    Bicarbonate 35 (H) 21 - 32 mmol/L    Anion Gap 14 10 - 20 mmol/L    Urea Nitrogen 52 (H) 6 - 23 mg/dL    Creatinine 1.07 0.50 - 1.30 mg/dL    eGFR 76 >60 mL/min/1.73m*2    Calcium 9.0 8.6 - 10.3 mg/dL   B-type natriuretic peptide   Result Value Ref Range     (H) 0 - 99 pg/mL   POCT GLUCOSE   Result Value Ref Range    POCT Glucose 160 (H) 74 - 99 mg/dL   POCT GLUCOSE   Result Value Ref Range    POCT Glucose 274 (H) 74 - 99 mg/dL   Basic Metabolic Panel   Result Value Ref Range    Glucose 166 (H) 74 - 99 mg/dL    Sodium 136 136 - 145 mmol/L    Potassium 4.2 3.5 - 5.3 mmol/L    Chloride 96 (L) 98 - 107 mmol/L    Bicarbonate 33 (H) 21 - 32 mmol/L    Anion Gap 11 10 - 20 mmol/L    Urea Nitrogen 46 (H) 6 - 23 mg/dL    Creatinine 0.78 0.50 - 1.30 mg/dL    eGFR >90 >60 mL/min/1.73m*2    Calcium 8.6 8.6 - 10.3 mg/dL   CBC   Result Value Ref Range    WBC 9.4 4.4 - 11.3 x10*3/uL    nRBC 0.0 0.0 - 0.0 /100 WBCs    RBC 5.59 4.50 - 5.90 x10*6/uL    Hemoglobin 18.1 (H) 13.5 - 17.5 g/dL    Hematocrit 55.7 (H) 41.0 - 52.0 %     80 - 100 fL    MCH 32.4 26.0 - 34.0 pg    MCHC 32.5 32.0 - 36.0 g/dL    RDW 12.8 11.5 - 14.5 %    Platelets 229 150 - 450 x10*3/uL   POCT GLUCOSE   Result Value Ref Range    POCT Glucose 354 (H) 74 - 99 mg/dL   POCT GLUCOSE   Result Value Ref Range    POCT Glucose 210 (H) 74 - 99 mg/dL        Medications:  allopurinol, 300 mg, oral, Daily  amoxicillin-pot clavulanate, 1 tablet, oral, q12h SHARRON  aspirin, 81 mg, oral, Daily  benzonatate, 200 mg, oral, TID  budesonide, 0.5 mg, nebulization,  BID  docusate sodium, 100 mg, oral, BID  empagliflozin, 10 mg, oral, Daily  enoxaparin, 40 mg, subcutaneous, q12h SHARRON  ezetimibe, 10 mg, oral, Daily  folic acid, 1 mg, oral, Daily  formoterol, 20 mcg, nebulization, BID  [Held by provider] furosemide, 80 mg, oral, BID with meals  furosemide, 80 mg, oral, Daily  insulin lispro, 0-10 Units, subcutaneous, TID with meals  ipratropium-albuteroL, 3 mL, nebulization, TID  levothyroxine, 125 mcg, oral, Daily  methylPREDNISolone sodium succinate (PF), 20 mg, intravenous, q8h  oxygen, , inhalation, Continuous - Inhalation  oxygen, , inhalation, Continuous - Inhalation  pneumococcal polysaccharide, 0.5 mL, intramuscular, During hospitalization  thiamine, 100 mg, oral, Daily  valsartan, 40 mg, oral, Daily      PRN medications: acetaminophen **OR** acetaminophen **OR** acetaminophen, acetaminophen **OR** acetaminophen **OR** acetaminophen, albuterol, dextrose, dextrose, fluticasone, glucagon, glucagon, guaiFENesin, ipratropium-albuteroL, melatonin, ondansetron **OR** ondansetron    Electrocardiogram, 12-lead PRN ACS symptoms    Result Date: 4/9/2024  Atrial fibrillation with rapid ventricular response Left axis deviation Right bundle branch block Abnormal ECG When compared with ECG of 04-APR-2024 17:02, Previous ECG has undetermined rhythm, needs review    ECG 12 lead    Result Date: 4/6/2024  See ED provider note for full interpretation and clinical correlation Confirmed by Karolina Freeman ([id number]) on 4/6/2024 10:10:27 AM    CT angio chest for pulmonary embolism    Result Date: 4/4/2024  Interpreted By:  Ishaan Banda, STUDY: CT ANGIO CHEST FOR PULMONARY EMBOLISM;  4/4/2024 11:41 pm   INDICATION: Signs/Symptoms:hypoxic, recent admission.   COMPARISON: 10/02/2018 CT chest without contrast.   ACCESSION NUMBER(S): [id number]   ORDERING CLINICIAN: JANEL NASH   TECHNIQUE: Contiguous axial images of the chest were obtained after the intravenous administration of iodinated  contrast using angiographic PE protocol. Coronal and sagittal reformatted images were reconstructed from the axial data. MIP images were created on an independent workstation and reviewed. Dual energy reconstructions were also performed including low energy virtual mono-energetic images and iodine density maps.   FINDINGS: MEDIASTINUM AND LYMPH NODES:  The esophageal wall appears within normal limits.  No enlarged intrathoracic or axillary lymph nodes by imaging criteria. No pneumomediastinum.   VESSELS:  Normal caliber aorta. Minimal aortic atherosclerosis.  No pulmonary embolism.   HEART: There is moderate cardiomegaly. There is severe aortic valvular calcification.  Moderate coronary artery calcifications. No significant pericardial effusion.   LUNG, AIRWAYS, AND PLEURA: There is generalized volume loss of the right hemithorax was present prior study but slightly progressed which is due to the presence of slightly greater atelectasis in the right middle lobe and right lower lobe. There is new frothy debris of occluding the right lower lobe bronchus and bronchus intermedius. There is diffuse bronchial wall thickening in the right middle and lower lobes with additional debris occluding there segmental and subsegmental bronchi. There is a small focus of subsegmental atelectasis in the medial left lower lobe. There is emphysema.   OSSEOUS STRUCTURES: No acute osseous abnormality.   CHEST WALL SOFT TISSUES: No discernible abnormality.   UPPER ABDOMEN/OTHER: No acute abnormality.       Frothy debris occluding the bronchus intermedius and right lower lobe bronchus and additional multifocal segmental and subsegmental occlusions in the right middle and right lower lobes with diffuse new bronchial wall thickening and worsening atelectasis in both lobes. Findings are most likely related to aspiration with postobstructive atelectasis.   Severe aortic valve calcifications to the extent that we likely result in aortic  stenosis. Recommend follow-up echocardiography.   Moderate 4 chamber cardiomegaly and moderate coronary artery calcifications.   Emphysema.   MACRO: None.   Signed by: Ishaan Banda 4/4/2024 11:56 PM Dictation workstation:   [id number]    XR chest 1 view    Result Date: 4/4/2024  Interpreted By:  Julián Peterson, STUDY: XR CHEST 1 VIEW;  4/4/2024 5:55 pm   INDICATION: Signs/Symptoms:sob.   COMPARISON: 03/13/2024   ACCESSION NUMBER(S): [id number]   ORDERING CLINICIAN: JANEL NASH   FINDINGS:         CARDIOMEDIASTINAL SILHOUETTE: There is enlargement cardiac silhouette with vascular congestion but no yolanda edema   LUNGS: There is dense airspace disease at the right lung base with a small right pleural effusion. Mild patchy left basilar airspace disease as well   ABDOMEN: No remarkable upper abdominal findings.   BONES: No acute osseous changes.       1.  Dense right basilar airspace disease with small right pleural effusion. Pneumonia is hind differential. 2. Enlarged cardiac silhouette with pulmonary vascular congestion       MACRO: None   Signed by: Julián ePterson 4/4/2024 6:00 PM Dictation workstation:   [id number]      Assessment/Plan      He is a 68-year-old  male, who is known to history of COPD, still smokes, also known to have history of obesity, obstructive sleep apnea, chronic systolic heart failure with ejection fraction of 30 to 35%, lymphedema, GERD, hypertension, recent admission to [facility] with cardiogenic shock secondary to pneumonia secondary to sepsis, he came from home, as he was sent to the emergency department by the nurse, as his heart rate was low in the pulse ox was low.  Upon admission to the emergency department he had a workup done which included CT PE, which did not show any pneumonia, also labs, and also his heart rate was high, and he was having PACs, this was discussed with the cardiologist per the advice he was also started on  amiodarone.  68-year-old  male he is here because of low heart rate, his metoprolol was already discontinued but now he is tachycardic, he was given amiodarone, cardiology is managing,.  Acute hypoxia, due to most likely COPD exacerbation and fluid overload no pneumonia no PE  It appears as here he is also fluid overloaded acute on chronic CHF, he is on p.o. Lasix at home, he refuses Lasix,.  I do not see pneumonia also as a result I have not added antibiotics  COPD he is still a smoker continue with the aerosols and steroids added pulmonology is following.   Aortic stenosis, again cardiology is following, is not interested in further management of the aortic stenosis.  Patient was added Augmentin after discussion with the pulmonology and cardiologist,.  He is still needing oxygen.  Discharge plans once cardiology and pulmonology clears he may need to go home on oxygen, as per cardiology they have cleared him for discharge, also noted that he did refuse CT TAVR,  I spent 35 minutes in the professional and overall care of this patient.    Curtis Corona MD

## 2024-04-09 NOTE — PROGRESS NOTES
Alo Glass is a 68 y.o. male on day 5 of admission presenting with Tachycardia.  Patient with a h/o COPD, MELISSA on APAP, HTN, CHF, cardiomyopathy, moderate to severe aortic stenosis, hypothyroidism, DM, obesity, and tobacco use disorder, recent admission for strep pneumonia c/b respiratory failure requiring intubation and septic shock requiring pressors, who p/w SOB, and near syncope. Found to be hypoxic and also tachycardic. Work up also revealed , and cardiomegaly and RLL opacities due to atelectasis on chest imaging. Received Solu-Medrol, Lasix, amiodarone and admitted to SDU for further management. Pulmonary is consulted to assist with his COPD management.   Subjective   No acute overnight events. Still on 3L via NC.     Feeling fatigue due to not being able to sleep. SOB slightly better. Wheezing has improved. Still has a dry cough. Denies any other complaints.   Objective   Scheduled medications  allopurinol, 300 mg, oral, Daily  amoxicillin-pot clavulanate, 1 tablet, oral, q12h SHARRON  aspirin, 81 mg, oral, Daily  benzonatate, 200 mg, oral, TID  budesonide, 0.5 mg, nebulization, BID  docusate sodium, 100 mg, oral, BID  empagliflozin, 10 mg, oral, Daily  enoxaparin, 40 mg, subcutaneous, q12h SHARRON  ezetimibe, 10 mg, oral, Daily  folic acid, 1 mg, oral, Daily  formoterol, 20 mcg, nebulization, BID  [Held by provider] furosemide, 80 mg, oral, BID with meals  furosemide, 80 mg, oral, Daily  insulin lispro, 0-10 Units, subcutaneous, TID with meals  ipratropium-albuteroL, 3 mL, nebulization, TID  levothyroxine, 125 mcg, oral, Daily  methylPREDNISolone sodium succinate (PF), 20 mg, intravenous, q8h  oxygen, , inhalation, Continuous - Inhalation  oxygen, , inhalation, Continuous - Inhalation  pneumococcal polysaccharide, 0.5 mL, intramuscular, During hospitalization  thiamine, 100 mg, oral, Daily  valsartan, 40 mg, oral, Daily    Continuous medications     PRN medications  PRN medications: acetaminophen **OR**  "acetaminophen **OR** acetaminophen, acetaminophen **OR** acetaminophen **OR** acetaminophen, albuterol, dextrose, dextrose, fluticasone, glucagon, glucagon, guaiFENesin, ipratropium-albuteroL, melatonin, ondansetron **OR** ondansetron   Physical Exam  Constitutional:       General: He is not in acute distress.     Appearance: Normal appearance. He is obese. He is ill-appearing. He is not toxic-appearing.   HENT:      Head: Normocephalic and atraumatic.      Nose:      Comments: On 3L via NC     Mouth/Throat:      Mouth: Mucous membranes are moist.   Eyes:      General: No scleral icterus.     Extraocular Movements: Extraocular movements intact.      Pupils: Pupils are equal, round, and reactive to light.   Cardiovascular:      Rate and Rhythm: Normal rate.      Heart sounds: No murmur heard.     No friction rub. No gallop.   Pulmonary:      Effort: No respiratory distress.      Breath sounds: No wheezing or rales.      Comments: Cleared lung fields b/l with fair/poor air entry but some fatigue with talking. No wheezing.   Abdominal:      General: There is no distension.      Palpations: Abdomen is soft.      Tenderness: There is no abdominal tenderness.   Musculoskeletal:         General: No tenderness.      Cervical back: Neck supple. No rigidity.      Right lower leg: Edema (2+) present.      Left lower leg: Edema (1+) present.   Lymphadenopathy:      Cervical: No cervical adenopathy.   Skin:     General: Skin is warm and dry.      Coloration: Skin is not jaundiced.      Comments: Chronic discoloration b/l  shins.    Neurological:      General: No focal deficit present.      Mental Status: He is alert and oriented to person, place, and time.   Psychiatric:         Mood and Affect: Mood normal.         Behavior: Behavior normal.     Last Recorded Vitals  Blood pressure 113/78, pulse 80, temperature 35.6 °C (96 °F), temperature source Oral, resp. rate 18, height 1.803 m (5' 10.98\"), weight 129 kg (285 lb 7.9 oz), " SpO2 90 %.  Intake/Output last 3 Shifts:  I/O last 3 completed shifts:  In: - (0 mL/kg)   Out: 900 (6.9 mL/kg) [Urine:900 (0.2 mL/kg/hr)]  Weight: 129.5 kg     Relevant Results  Results for orders placed or performed during the hospital encounter of 04/04/24 (from the past 24 hour(s))   POCT GLUCOSE   Result Value Ref Range    POCT Glucose 274 (H) 74 - 99 mg/dL   Basic Metabolic Panel   Result Value Ref Range    Glucose 166 (H) 74 - 99 mg/dL    Sodium 136 136 - 145 mmol/L    Potassium 4.2 3.5 - 5.3 mmol/L    Chloride 96 (L) 98 - 107 mmol/L    Bicarbonate 33 (H) 21 - 32 mmol/L    Anion Gap 11 10 - 20 mmol/L    Urea Nitrogen 46 (H) 6 - 23 mg/dL    Creatinine 0.78 0.50 - 1.30 mg/dL    eGFR >90 >60 mL/min/1.73m*2    Calcium 8.6 8.6 - 10.3 mg/dL   CBC   Result Value Ref Range    WBC 9.4 4.4 - 11.3 x10*3/uL    nRBC 0.0 0.0 - 0.0 /100 WBCs    RBC 5.59 4.50 - 5.90 x10*6/uL    Hemoglobin 18.1 (H) 13.5 - 17.5 g/dL    Hematocrit 55.7 (H) 41.0 - 52.0 %     80 - 100 fL    MCH 32.4 26.0 - 34.0 pg    MCHC 32.5 32.0 - 36.0 g/dL    RDW 12.8 11.5 - 14.5 %    Platelets 229 150 - 450 x10*3/uL   POCT GLUCOSE   Result Value Ref Range    POCT Glucose 354 (H) 74 - 99 mg/dL   POCT GLUCOSE   Result Value Ref Range    POCT Glucose 210 (H) 74 - 99 mg/dL   XR chest 1 view    Result Date: 4/8/2024  Interpreted By:  Ishaan Vernon, STUDY: XR CHEST 1 VIEW;  4/8/2024 11:30 am   INDICATION: Signs/Symptoms:PNA.   COMPARISON: CT scan chest from 04/04/2024. Plain films chest, most recent from 04/04/2024.   ACCESSION NUMBER(S): [id number]   ORDERING CLINICIAN: RAH FROYLAN   TECHNIQUE: Single AP portable view of the chest was obtained.   FINDINGS: MEDIASTINUM/ LUNGS/ ARELIS: No gross residual right pleural effusion. Very mild residual atelectasis at the right base. Left lung is clear. Stable cardiomegaly without gross vascular congestion.   No pneumothorax. No tracheal deviation. No abnormal hilar fullness or gross mass on either side.   BONES: No  lytic or blastic destructive bone lesion.   UPPER ABDOMEN: Grossly intact.       Cardiomegaly without vascular congestion or pleural effusion.   Residual right pleural effusion has cleared.   Mild residual atelectasis at the right lung base, improved.   MACRO: None   Signed by: Ishaan Vernon 4/8/2024 11:49 AM Dictation workstation:   [id number]    Assessment/Plan   Principal Problem:    Tachycardia  68 YOM with a h/o COPD, MELISSA on APAP, HTN, CHF, cardiomyopathy, moderate to severe aortic stenosis, hypothyroidism, DM, obesity, and tobacco use disorder, recent admission for strep pneumonia c/b respiratory failure requiring intubation and septic shock requiring pressors, who p/w SOB, and near syncope. Found to be hypoxic and also tachycardic. Work up also revealed , and cardiomegaly and RLL opacities due to atelectasis on chest imaging. Received Solu-Medrol, Lasix, amiodarone and admitted to SDU for further management. Pulmonary is consulted to assist with his COPD management.     COPD: with acute exacerbation. Overall is improved, but not at baseline yet. Sees Dr. Baker as outpatient      Continue systemic steroids, continue to taper off      Continue Budesonide, Formoterol and Duo-Neb      Resume Trelegy and albuterol on DC      FU with Dr. Baker on after discharge.         Pneumonia: treated for strep pneumonia on recent admission, but now concern for aspiration.       Continue Augmentin      FU imaging 6-8 weeks after DC to ensure resolution      Aspiration precaution        CHF: with possible acute exacerbation      Volume status optimization and CHF management as per primary team    Nicotine dependence:       Smoking cessation    MELISSA:      Continue APAP at nights    Respiratory failure: with hypoxia due to above.       Continue supplemental O2, wean off as tolerates      Home O2 evaluation before DC      BPH with IS, acapella.     DVT prophylaxis with Lovenox.    Pulmonary will  FU while in house.      Marta Thurston MD

## 2024-04-10 ENCOUNTER — PHARMACY VISIT (OUTPATIENT)
Dept: PHARMACY | Facility: CLINIC | Age: 69
End: 2024-04-10
Payer: COMMERCIAL

## 2024-04-10 ENCOUNTER — TELEMEDICINE (OUTPATIENT)
Dept: PRIMARY CARE | Facility: CLINIC | Age: 69
End: 2024-04-10
Payer: MEDICARE

## 2024-04-10 ENCOUNTER — HOME CARE VISIT (OUTPATIENT)
Dept: HOME HEALTH SERVICES | Facility: HOME HEALTH | Age: 69
End: 2024-04-10
Payer: MEDICARE

## 2024-04-10 VITALS
RESPIRATION RATE: 18 BRPM | HEART RATE: 90 BPM | WEIGHT: 290.13 LBS | SYSTOLIC BLOOD PRESSURE: 125 MMHG | HEIGHT: 71 IN | OXYGEN SATURATION: 92 % | TEMPERATURE: 97.5 F | DIASTOLIC BLOOD PRESSURE: 73 MMHG | BODY MASS INDEX: 40.62 KG/M2

## 2024-04-10 DIAGNOSIS — I11.0 HYPERTENSIVE HEART DISEASE WITH CHRONIC COMBINED SYSTOLIC AND DIASTOLIC CONGESTIVE HEART FAILURE (MULTI): ICD-10-CM

## 2024-04-10 DIAGNOSIS — G47.33 OSA ON CPAP: ICD-10-CM

## 2024-04-10 DIAGNOSIS — I50.42 HYPERTENSIVE HEART DISEASE WITH CHRONIC COMBINED SYSTOLIC AND DIASTOLIC CONGESTIVE HEART FAILURE (MULTI): ICD-10-CM

## 2024-04-10 DIAGNOSIS — G47.33 OSA (OBSTRUCTIVE SLEEP APNEA): Primary | ICD-10-CM

## 2024-04-10 LAB
GLUCOSE BLD MANUAL STRIP-MCNC: 173 MG/DL (ref 74–99)
Q ONSET: 228 MS
QRS COUNT: 18 BEATS
QRS DURATION: 160 MS
QT INTERVAL: 354 MS
QTC CALCULATION(BAZETT): 479 MS
QTC FREDERICIA: 433 MS
R AXIS: -82 DEGREES
T AXIS: 53 DEGREES
T OFFSET: 405 MS
VENTRICULAR RATE: 110 BPM

## 2024-04-10 PROCEDURE — 2500000004 HC RX 250 GENERAL PHARMACY W/ HCPCS (ALT 636 FOR OP/ED): Performed by: INTERNAL MEDICINE

## 2024-04-10 PROCEDURE — 1159F MED LIST DOCD IN RCRD: CPT | Performed by: INTERNAL MEDICINE

## 2024-04-10 PROCEDURE — 94761 N-INVAS EAR/PLS OXIMETRY MLT: CPT

## 2024-04-10 PROCEDURE — 94660 CPAP INITIATION&MGMT: CPT

## 2024-04-10 PROCEDURE — 99213 OFFICE O/P EST LOW 20 MIN: CPT | Performed by: INTERNAL MEDICINE

## 2024-04-10 PROCEDURE — 94668 MNPJ CHEST WALL SBSQ: CPT

## 2024-04-10 PROCEDURE — 1111F DSCHRG MED/CURRENT MED MERGE: CPT | Performed by: INTERNAL MEDICINE

## 2024-04-10 PROCEDURE — 2500000001 HC RX 250 WO HCPCS SELF ADMINISTERED DRUGS (ALT 637 FOR MEDICARE OP): Performed by: INTERNAL MEDICINE

## 2024-04-10 PROCEDURE — 82947 ASSAY GLUCOSE BLOOD QUANT: CPT

## 2024-04-10 PROCEDURE — 2500000002 HC RX 250 W HCPCS SELF ADMINISTERED DRUGS (ALT 637 FOR MEDICARE OP, ALT 636 FOR OP/ED): Performed by: INTERNAL MEDICINE

## 2024-04-10 PROCEDURE — RXMED WILLOW AMBULATORY MEDICATION CHARGE

## 2024-04-10 PROCEDURE — 2500000005 HC RX 250 GENERAL PHARMACY W/O HCPCS: Performed by: INTERNAL MEDICINE

## 2024-04-10 PROCEDURE — 2500000002 HC RX 250 W HCPCS SELF ADMINISTERED DRUGS (ALT 637 FOR MEDICARE OP, ALT 636 FOR OP/ED): Performed by: PHARMACIST

## 2024-04-10 PROCEDURE — 94640 AIRWAY INHALATION TREATMENT: CPT

## 2024-04-10 RX ORDER — PREDNISONE 10 MG/1
TABLET ORAL
Qty: 24 TABLET | Refills: 0 | Status: SHIPPED | OUTPATIENT
Start: 2024-04-10 | End: 2024-04-23 | Stop reason: ALTCHOICE

## 2024-04-10 RX ORDER — TIRZEPATIDE 2.5 MG/.5ML
2.5 INJECTION, SOLUTION SUBCUTANEOUS
Start: 2024-04-14 | End: 2024-04-13

## 2024-04-10 RX ORDER — FUROSEMIDE 80 MG/1
80 TABLET ORAL DAILY
Start: 2024-04-10 | End: 2024-04-23 | Stop reason: SDUPTHER

## 2024-04-10 RX ORDER — AMOXICILLIN AND CLAVULANATE POTASSIUM 875; 125 MG/1; MG/1
1 TABLET, FILM COATED ORAL EVERY 12 HOURS SCHEDULED
Qty: 14 TABLET | Refills: 0 | Status: SHIPPED | OUTPATIENT
Start: 2024-04-10 | End: 2024-04-23 | Stop reason: ALTCHOICE

## 2024-04-10 RX ORDER — INDOMETHACIN 50 MG/1
50 CAPSULE ORAL 2 TIMES DAILY PRN
Start: 2024-04-10

## 2024-04-10 RX ADMIN — FOLIC ACID 1 MG: 1 TABLET ORAL at 09:45

## 2024-04-10 RX ADMIN — ALLOPURINOL 300 MG: 300 TABLET ORAL at 09:45

## 2024-04-10 RX ADMIN — FORMOTEROL FUMARATE DIHYDRATE 20 MCG: 20 SOLUTION RESPIRATORY (INHALATION) at 07:21

## 2024-04-10 RX ADMIN — THIAMINE HCL TAB 100 MG 100 MG: 100 TAB at 09:45

## 2024-04-10 RX ADMIN — ASPIRIN 81 MG: 81 TABLET, COATED ORAL at 09:45

## 2024-04-10 RX ADMIN — EZETIMIBE 10 MG: 10 TABLET ORAL at 09:46

## 2024-04-10 RX ADMIN — BUDESONIDE 0.5 MG: 0.5 INHALANT ORAL at 07:21

## 2024-04-10 RX ADMIN — BENZONATATE 200 MG: 100 CAPSULE ORAL at 09:45

## 2024-04-10 RX ADMIN — METHYLPREDNISOLONE SODIUM SUCCINATE 20 MG: 40 INJECTION, POWDER, FOR SOLUTION INTRAMUSCULAR; INTRAVENOUS at 06:48

## 2024-04-10 RX ADMIN — EMPAGLIFLOZIN 10 MG: 10 TABLET, FILM COATED ORAL at 09:46

## 2024-04-10 RX ADMIN — ENOXAPARIN SODIUM 40 MG: 40 INJECTION SUBCUTANEOUS at 09:46

## 2024-04-10 RX ADMIN — VALSARTAN 40 MG: 80 TABLET, FILM COATED ORAL at 09:45

## 2024-04-10 RX ADMIN — Medication 2 L/MIN: at 07:21

## 2024-04-10 RX ADMIN — DOCUSATE SODIUM 100 MG: 100 CAPSULE, LIQUID FILLED ORAL at 09:45

## 2024-04-10 RX ADMIN — AMOXICILLIN AND CLAVULANATE POTASSIUM 1 TABLET: 875; 125 TABLET, FILM COATED ORAL at 09:45

## 2024-04-10 RX ADMIN — LEVOTHYROXINE SODIUM 125 MCG: 125 TABLET ORAL at 06:48

## 2024-04-10 RX ADMIN — IPRATROPIUM BROMIDE AND ALBUTEROL SULFATE 3 ML: 2.5; .5 SOLUTION RESPIRATORY (INHALATION) at 07:21

## 2024-04-10 ASSESSMENT — PAIN - FUNCTIONAL ASSESSMENT: PAIN_FUNCTIONAL_ASSESSMENT: 0-10

## 2024-04-10 ASSESSMENT — PAIN SCALES - GENERAL: PAINLEVEL_OUTOF10: 0 - NO PAIN

## 2024-04-10 NOTE — SIGNIFICANT EVENT
Mr. Glass is a 69yo male admitted with COPD exacerbation, acute respiratory failure, pneumonia, CHF exacerbation. Will require continuous O2 at rest for 2LPM via nasal cannula and 4LPM via nasal cannula while ambulating at discharge.

## 2024-04-10 NOTE — SIGNIFICANT EVENT
Home oxygen evaluation completed. SpO2 at rest on RA 88%. Oxygen initiated at 2LPM nasal cannula to recover SpO2. Resulting SpO2 on 2LPM at rest 92% Patient ambulated on 2LPM with drop in SpO2 to 86% Oxygen increased to 4LPM resulting in SpO2 while ambulating 92% Patient qualifies for continuous oxygen. Will use [facility] [phone].

## 2024-04-10 NOTE — PROGRESS NOTES
Subjective   Patient ID: Alo Glass is a 68 y.o. male who presents for No chief complaint on file..    HPI virtual video visit this visit was completed via audio and visual secondary to the restrictions of the COVID-19 pandemic all medical issues were addressed and discussed with patient patient was not otherwise examined if it was felt that the patient needed to be seen in the office they would have been referred to do so patient verbally consented to visit  Follow-up visit recently discharged from the hospital medication reviewed and reconciled regarding blood pressure medication he had been on beta-blocker he is now on valsartan 40 mg with average blood pressures he is taking Lasix 80 mg and Jardiance 10 mg may not affect the blood pressure but aids with the diuresis and helps with the blood sugar did not sleep well in the hospital with his MELISSA looking for new CPAP machine and mask and settings reviewed no chest pain no shortness of breath still weak from stamina    Review of Systems    Objective   There were no vitals taken for this visit.    Physical Exam alert and oriented x 3 breathing unlabored not otherwise examined    Assessment/Plan    impression MELISSA hypertensive heart disease and pulmonary disease  Plan okay for CPAP [employer] he would like through private company requisition and send he will continue with his current set up until then and follow-up with his medication as well as pulmonary and cardiology and then readvised on diet exercise availability and limitations noted water consumption and restriction noted and discussed and then follow-up and taking home blood pressures any side effect with medication to note continue with diuresis and then recheck 2 to 4 weeks

## 2024-04-10 NOTE — DISCHARGE SUMMARY
Discharge Diagnosis  With hypoxia secondary to COPD exacerbation  Shortness of breath secondary to acute on chronic systolic heart failure with ejection fraction of 30 to 35%.  Shortness of breath secondary to moderate aortic stenosis.  Cardiomyopathy with ejection fraction of 30 to 35%.  Nicotine use disorder.  Recent pneumonia.  Hypertension.      Issues Requiring Follow-Up  Cardiologist next  Pulmonologist      Test Results Pending At Discharge  Pending Labs       No current pending labs.            Hospital Course     68-year-old  male, who is a smoker, known to have COPD, chronic systolic heart failure, obstructive sleep apnea, ejection fraction of 30 to 35%, lymphedema, GERD, moderate aortic stenosis, recent admission to [facility] when he was admitted with cardiogenic shock secondary to pneumonia, he comes to the emergency department because of low heart rate and low oxygen,.  Upon admission he was diagnosed with multifocal atrial tachycardia, started on amiodarone, that was changed to p.o. amiodarone later on it was discontinued.  He was hypoxic upon admission, managed as COPD exacerbation with aerosols and steroids and later on antibiotic was admitted staying by the pulmonologist and he was sent home on 2 L of oxygen at rest and 4 L with exertion and also CPAP and also needs to see the pulmonologist.  He was Diuresed  and he was advised for CT TAVR which he did not want to do he will follow-up with the cardiologist as an outpatient.    Pertinent Physical Exam At Time of Discharge  Physical Exam  .  HEENT.  Neck no JVD.  Heart S1-S2.  Lungs reduced entry.  Abdomen is soft.  Legs edema  Home Medications     Medication List      START taking these medications     amoxicillin-pot clavulanate 875-125 mg tablet; Commonly known as:   Augmentin; Take 1 tablet by mouth every 12 hours for 7 days.     CHANGE how you take these medications     predniSONE 10 mg tablet; Commonly known as: Deltasone;  Take 4 tablets   (40 mg) by mouth once daily for 3 days, THEN 3 tablets (30 mg) once daily   for 2 days, THEN 2 tablets (20 mg) once daily for 2 days, THEN 1 tablet   (10 mg) once daily for 2 days, then stop; Start taking on: April 10, 2024;   What changed: medication strength, See the new instructions.     CONTINUE taking these medications     albuterol 90 mcg/actuation inhaler; INHALE 2 PUFFS EVERY 4-6 HOURS AS   NEEDED.   allopurinol 300 mg tablet; Commonly known as: Zyloprim; TAKE 1 TABLET BY   MOUTH ONCE DAILY.   aspirin 81 mg EC tablet; Take 1 tablet (81 mg) by mouth once daily.   clobetasol 0.05 % external solution; Commonly known as: Temovate   empagliflozin 10 mg; Commonly known as: Jardiance; Take 1 tablet (10 mg)   by mouth once daily.   ezetimibe 10 mg tablet; Commonly known as: Zetia   folic acid 1 mg tablet; Commonly known as: Folvite; Take 1 tablet (1 mg)   by mouth once daily.   furosemide 80 mg tablet; Commonly known as: Lasix; Take 1 tablet (80 mg)   by mouth once daily.   indomethacin 50 mg capsule; Commonly known as: Indocin; Take 1 capsule   (50 mg) by mouth 2 times a day as needed for mild pain (1 - 3) or moderate   pain (4 - 6). Patient takes when he has a flare up of Gout   ipratropium-albuteroL 0.5-2.5 mg/3 mL nebulizer solution; Commonly known   as: Duo-Neb; Take 3 mL by nebulization 4 times a day.   levothyroxine 125 mcg tablet; Commonly known as: Synthroid, Levoxyl;   TAKE 1 TABLET BY MOUTH EVERY DAY   metFORMIN 1,000 mg tablet; Commonly known as: Glucophage; Take 1 tablet   (1,000 mg) by mouth 2 times a day.   Mounjaro 2.5 mg/0.5 mL pen injector; Generic drug: tirzepatide; Inject   2.5 mg under the skin 1 (one) time per week. On Friday, varies; Start   taking on: April 14, 2024   multivitamin with minerals tablet; Take 1 tablet by mouth once daily.   thiamine 100 mg tablet; Commonly known as: Vitamin B-1; Take 1 tablet   (100 mg) by mouth once daily.   Trelegy Ellipta 100-62.5-25 mcg  blister with device; Generic drug:   fluticasone-umeclidin-vilanter; one puff per day   valsartan 40 mg tablet; Commonly known as: Diovan; Take 1 tablet (40 mg)   by mouth once daily.     STOP taking these medications     benzonatate 200 mg capsule; Commonly known as: Tessalon       Outpatient Follow-Up  Future Appointments   Date Time Provider Department Center   4/11/2024 To Be Determined Moni Alamo RN [facility]   4/12/2024 To Be Determined Isha Méndez, PT [facility]   4/15/2024 To Be Determined Janie Schmidt, OT [facility]   4/22/2024 To Be Determined Rosmery Gray RD [facility]   4/23/2024  9:00 AM PARAM Dawson-CNP [id number]   Discharge timing more than 35 minutes    Curtis Corona MD

## 2024-04-10 NOTE — DISCHARGE INSTR - OTHER ORDERS
Patient will need 2LPM nasal cannula continuous and 4LPM while ambulating. [facility] [phone].

## 2024-04-11 ENCOUNTER — TELEPHONE (OUTPATIENT)
Dept: CARDIOLOGY | Facility: HOSPITAL | Age: 69
End: 2024-04-11
Payer: MEDICARE

## 2024-04-11 ENCOUNTER — HOME CARE VISIT (OUTPATIENT)
Dept: HOME HEALTH SERVICES | Facility: HOME HEALTH | Age: 69
End: 2024-04-11
Payer: MEDICARE

## 2024-04-11 VITALS
DIASTOLIC BLOOD PRESSURE: 60 MMHG | HEART RATE: 88 BPM | SYSTOLIC BLOOD PRESSURE: 100 MMHG | OXYGEN SATURATION: 92 % | RESPIRATION RATE: 18 BRPM | TEMPERATURE: 97.9 F

## 2024-04-11 VITALS
RESPIRATION RATE: 18 BRPM | TEMPERATURE: 97.5 F | DIASTOLIC BLOOD PRESSURE: 83 MMHG | OXYGEN SATURATION: 98 % | SYSTOLIC BLOOD PRESSURE: 138 MMHG | HEART RATE: 67 BPM

## 2024-04-11 DIAGNOSIS — I35.0 NONRHEUMATIC AORTIC VALVE STENOSIS: Primary | ICD-10-CM

## 2024-04-11 DIAGNOSIS — E11.9 TYPE 2 DIABETES MELLITUS WITHOUT COMPLICATION, WITHOUT LONG-TERM CURRENT USE OF INSULIN (MULTI): ICD-10-CM

## 2024-04-11 RX ORDER — TIRZEPATIDE 2.5 MG/.5ML
2.5 INJECTION, SOLUTION SUBCUTANEOUS
Qty: 2 ML | Refills: 0 | OUTPATIENT
Start: 2024-04-14

## 2024-04-11 ASSESSMENT — ENCOUNTER SYMPTOMS
DYSPNEA ACTIVITY LEVEL: AFTER AMBULATING 10 - 20 FT
SHORTNESS OF BREATH: 1
DENIES PAIN: 1
CHANGE IN APPETITE: UNCHANGED
LOWER EXTREMITY EDEMA: 1
APPETITE LEVEL: FAIR
FATIGUES EASILY: 1
MUSCLE WEAKNESS: 1
LIMITED RANGE OF MOTION: 1
FREQUENCY: 1
HYPOTENSION: 1
PERSON REPORTING PAIN: PATIENT

## 2024-04-11 ASSESSMENT — ACTIVITIES OF DAILY LIVING (ADL)
OASIS_M1830: 03
ENTERING_EXITING_HOME: SUPERVISION
AMBULATION ASSISTANCE: ONE PERSON
CURRENT_FUNCTION: ONE PERSON

## 2024-04-11 NOTE — TELEPHONE ENCOUNTER
Called regarding Structural Heart referral. He has requested no phone call prior to 9am. Declined to schedule until after being seen & establishes new cardiologist(Dr. Moore). Has appointment with his NP, Azul Dickson on 4/23/24. He is undecided if he plans to stay with  for follow up at this time.

## 2024-04-12 ENCOUNTER — HOME CARE VISIT (OUTPATIENT)
Dept: HOME HEALTH SERVICES | Facility: HOME HEALTH | Age: 69
End: 2024-04-12
Payer: MEDICARE

## 2024-04-12 ENCOUNTER — APPOINTMENT (OUTPATIENT)
Dept: HOME HEALTH SERVICES | Facility: HOME HEALTH | Age: 69
End: 2024-04-12
Payer: MEDICARE

## 2024-04-12 VITALS
OXYGEN SATURATION: 90 % | TEMPERATURE: 98.5 F | HEART RATE: 68 BPM | SYSTOLIC BLOOD PRESSURE: 100 MMHG | DIASTOLIC BLOOD PRESSURE: 70 MMHG | RESPIRATION RATE: 18 BRPM

## 2024-04-12 DIAGNOSIS — E11.9 TYPE 2 DIABETES MELLITUS WITHOUT COMPLICATION, WITHOUT LONG-TERM CURRENT USE OF INSULIN (MULTI): ICD-10-CM

## 2024-04-12 PROCEDURE — G0151 HHCP-SERV OF PT,EA 15 MIN: HCPCS

## 2024-04-12 SDOH — HEALTH STABILITY: PHYSICAL HEALTH: EXERCISE COMMENTS: INSTRUCTED PATIENT IN SITTING EXERCISES ANKLE PUMPS, LAQ, HIP FLEXION, HIP ABDUCTION 10 REPS

## 2024-04-12 ASSESSMENT — ACTIVITIES OF DAILY LIVING (ADL)
AMBULATION ASSISTANCE: STAND BY ASSIST
AMBULATION ASSISTANCE: 1
AMBULATION ASSISTANCE ON FLAT SURFACES: 1
AMBULATION_DISTANCE/DURATION_TOLERATED: 40

## 2024-04-12 ASSESSMENT — ENCOUNTER SYMPTOMS
MUSCLE WEAKNESS: 1
PAIN: 1
PERSON REPORTING PAIN: PATIENT

## 2024-04-13 RX ORDER — TIRZEPATIDE 2.5 MG/.5ML
2.5 INJECTION, SOLUTION SUBCUTANEOUS
Qty: 2 ML | Refills: 0 | Status: SHIPPED | OUTPATIENT
Start: 2024-04-14 | End: 2024-04-17 | Stop reason: SDUPTHER

## 2024-04-13 NOTE — HOME HEALTH
Patient was seen for evaluation following recent hospitalization at [facility] 4-4 to 4-10-24 due to tachycardia. Patient reports has oxygen at home now in case he needs it. Patient reports his legs are weak and would like to get stronger. PT 1w1, 2w3

## 2024-04-16 ENCOUNTER — HOME CARE VISIT (OUTPATIENT)
Dept: HOME HEALTH SERVICES | Facility: HOME HEALTH | Age: 69
End: 2024-04-16
Payer: MEDICARE

## 2024-04-16 VITALS
HEART RATE: 88 BPM | DIASTOLIC BLOOD PRESSURE: 62 MMHG | OXYGEN SATURATION: 93 % | TEMPERATURE: 98.5 F | SYSTOLIC BLOOD PRESSURE: 114 MMHG

## 2024-04-16 DIAGNOSIS — Z00.00 HEALTH CARE MAINTENANCE: ICD-10-CM

## 2024-04-16 PROCEDURE — G0152 HHCP-SERV OF OT,EA 15 MIN: HCPCS

## 2024-04-16 PROCEDURE — G0151 HHCP-SERV OF PT,EA 15 MIN: HCPCS

## 2024-04-16 RX ORDER — IPRATROPIUM BROMIDE AND ALBUTEROL SULFATE 2.5; .5 MG/3ML; MG/3ML
3 SOLUTION RESPIRATORY (INHALATION)
Qty: 180 ML | Refills: 1 | Status: SHIPPED | OUTPATIENT
Start: 2024-04-16

## 2024-04-16 SDOH — HEALTH STABILITY: PHYSICAL HEALTH
EXERCISE COMMENTS: PATIENT STATED HE WAS ONLY INTERESTED IN GETTING HIS LEGS STRONGER. INSTRUCTED IN THERABAND EXERCISES FOR HIP ABD, QUADS AND HAMSTRINGS. INSTRUCTED IN USE AND PLACEMENT OF BLUE THERABAND

## 2024-04-16 ASSESSMENT — ENCOUNTER SYMPTOMS: MUSCLE WEAKNESS: 1

## 2024-04-17 ENCOUNTER — HOME CARE VISIT (OUTPATIENT)
Dept: HOME HEALTH SERVICES | Facility: HOME HEALTH | Age: 69
End: 2024-04-17
Payer: MEDICARE

## 2024-04-17 VITALS
WEIGHT: 295 LBS | HEART RATE: 73 BPM | SYSTOLIC BLOOD PRESSURE: 108 MMHG | RESPIRATION RATE: 18 BRPM | TEMPERATURE: 98.2 F | DIASTOLIC BLOOD PRESSURE: 80 MMHG | OXYGEN SATURATION: 94 % | BODY MASS INDEX: 41.16 KG/M2

## 2024-04-17 VITALS — OXYGEN SATURATION: 99 % | DIASTOLIC BLOOD PRESSURE: 84 MMHG | HEART RATE: 92 BPM | SYSTOLIC BLOOD PRESSURE: 124 MMHG

## 2024-04-17 DIAGNOSIS — E11.9 TYPE 2 DIABETES MELLITUS WITHOUT COMPLICATION, WITHOUT LONG-TERM CURRENT USE OF INSULIN (MULTI): ICD-10-CM

## 2024-04-17 PROCEDURE — G0493 RN CARE EA 15 MIN HH/HOSPICE: HCPCS | Mod: MUE

## 2024-04-17 SDOH — ECONOMIC STABILITY: HOUSING INSECURITY
HOME SAFETY: VARIOUS FLOORINGS THAT PT NEEDS TO ACCESS , SOME AREAS HAD BEEN UNDER CONSTRUCTION AND UNABLE TO BE COMPLETED  STATES HE IS AWARE OF NEED FOR ADDITIONAL GRAB BARS AND HAS PLANS TO PURSUE

## 2024-04-17 SDOH — HEALTH STABILITY: MENTAL HEALTH: SMOKING IN HOME: 1

## 2024-04-17 ASSESSMENT — ENCOUNTER SYMPTOMS
CHANGE IN APPETITE: UNCHANGED
PAIN: 1
PAIN LOCATION: BACK
FATIGUES EASILY: 1
FREQUENCY: 1
LIMITED RANGE OF MOTION: 1
MUSCLE WEAKNESS: 1
APPETITE LEVEL: FAIR
LOWER EXTREMITY EDEMA: 1

## 2024-04-17 ASSESSMENT — ACTIVITIES OF DAILY LIVING (ADL)
CURRENT_FUNCTION: STAND BY ASSIST
PREPARING MEALS: NEEDS ASSISTANCE
HOUSEKEEPING ASSESSED: 1
GROOMING ASSESSED: 1
TOILETING: INDEPENDENT
AMBULATION ASSISTANCE: STAND BY ASSIST
TOILETING: 1
FEEDING: INDEPENDENT
FEEDING ASSESSED: 1
GROOMING_CURRENT_FUNCTION: INDEPENDENT
BATHING_CURRENT_FUNCTION: INDEPENDENT
BATHING ASSESSED: 1
LAUNDRY ASSESSED: 1
LIGHT HOUSEKEEPING: NEEDS ASSISTANCE
LAUNDRY: NEEDS ASSISTANCE

## 2024-04-18 RX ORDER — TIRZEPATIDE 2.5 MG/.5ML
2.5 INJECTION, SOLUTION SUBCUTANEOUS
Qty: 2 ML | Refills: 0 | Status: SHIPPED | OUTPATIENT
Start: 2024-04-21 | End: 2024-06-06

## 2024-04-22 DIAGNOSIS — I50.9 ACUTE ON CHRONIC CONGESTIVE HEART FAILURE, UNSPECIFIED HEART FAILURE TYPE (MULTI): Primary | ICD-10-CM

## 2024-04-23 ENCOUNTER — HOME CARE VISIT (OUTPATIENT)
Dept: HOME HEALTH SERVICES | Facility: HOME HEALTH | Age: 69
End: 2024-04-23
Payer: MEDICARE

## 2024-04-23 ENCOUNTER — OFFICE VISIT (OUTPATIENT)
Dept: CARDIOLOGY | Facility: HOSPITAL | Age: 69
End: 2024-04-23
Payer: MEDICARE

## 2024-04-23 VITALS
WEIGHT: 292 LBS | DIASTOLIC BLOOD PRESSURE: 82 MMHG | HEIGHT: 71 IN | BODY MASS INDEX: 40.88 KG/M2 | SYSTOLIC BLOOD PRESSURE: 132 MMHG | OXYGEN SATURATION: 98 % | HEART RATE: 81 BPM

## 2024-04-23 DIAGNOSIS — I50.9 HEART FAILURE EXACERBATED BY SOTALOL (MULTI): Primary | ICD-10-CM

## 2024-04-23 DIAGNOSIS — E78.5 HYPERLIPIDEMIA, UNSPECIFIED HYPERLIPIDEMIA TYPE: ICD-10-CM

## 2024-04-23 DIAGNOSIS — I50.9 ACUTE ON CHRONIC CONGESTIVE HEART FAILURE, UNSPECIFIED HEART FAILURE TYPE (MULTI): ICD-10-CM

## 2024-04-23 DIAGNOSIS — I10 BENIGN ESSENTIAL HYPERTENSION: ICD-10-CM

## 2024-04-23 PROBLEM — J43.9 COPD WITH EMPHYSEMA (MULTI): Status: ACTIVE | Noted: 2024-04-23

## 2024-04-23 PROBLEM — R06.09 DOE (DYSPNEA ON EXERTION): Status: ACTIVE | Noted: 2024-04-23

## 2024-04-23 PROBLEM — I87.2 VENOUS INSUFFICIENCY (CHRONIC) (PERIPHERAL): Status: ACTIVE | Noted: 2024-04-23

## 2024-04-23 PROBLEM — I25.10 CALCIFICATION OF CORONARY ARTERY: Status: ACTIVE | Noted: 2024-04-23

## 2024-04-23 PROBLEM — I25.84 CALCIFICATION OF CORONARY ARTERY: Status: ACTIVE | Noted: 2024-04-23

## 2024-04-23 PROBLEM — R60.0 BILATERAL EDEMA OF LOWER EXTREMITY: Status: ACTIVE | Noted: 2024-04-23

## 2024-04-23 PROBLEM — I11.0 HYPERTENSIVE HEART DISEASE WITH CONGESTIVE HEART FAILURE (MULTI): Status: ACTIVE | Noted: 2024-04-23

## 2024-04-23 PROBLEM — I89.0 LYMPHEDEMA OF LOWER EXTREMITY: Status: ACTIVE | Noted: 2024-04-23

## 2024-04-23 PROBLEM — E11.9 TYPE 2 DIABETES MELLITUS (MULTI): Status: ACTIVE | Noted: 2021-11-09

## 2024-04-23 LAB
ATRIAL RATE: 98 BPM
P AXIS: -5 DEGREES
P OFFSET: 176 MS
P ONSET: 104 MS
PR INTERVAL: 208 MS
Q ONSET: 208 MS
QRS COUNT: 16 BEATS
QRS DURATION: 164 MS
QT INTERVAL: 370 MS
QTC CALCULATION(BAZETT): 472 MS
QTC FREDERICIA: 435 MS
R AXIS: -72 DEGREES
T AXIS: 41 DEGREES
T OFFSET: 393 MS
VENTRICULAR RATE: 98 BPM

## 2024-04-23 PROCEDURE — 1111F DSCHRG MED/CURRENT MED MERGE: CPT | Performed by: NURSE PRACTITIONER

## 2024-04-23 PROCEDURE — 4010F ACE/ARB THERAPY RXD/TAKEN: CPT | Performed by: NURSE PRACTITIONER

## 2024-04-23 PROCEDURE — 3049F LDL-C 100-129 MG/DL: CPT | Performed by: NURSE PRACTITIONER

## 2024-04-23 PROCEDURE — 1159F MED LIST DOCD IN RCRD: CPT | Performed by: NURSE PRACTITIONER

## 2024-04-23 PROCEDURE — 3075F SYST BP GE 130 - 139MM HG: CPT | Performed by: NURSE PRACTITIONER

## 2024-04-23 PROCEDURE — 3044F HG A1C LEVEL LT 7.0%: CPT | Performed by: NURSE PRACTITIONER

## 2024-04-23 PROCEDURE — 99215 OFFICE O/P EST HI 40 MIN: CPT | Mod: 25 | Performed by: NURSE PRACTITIONER

## 2024-04-23 PROCEDURE — 93005 ELECTROCARDIOGRAM TRACING: CPT | Performed by: NURSE PRACTITIONER

## 2024-04-23 PROCEDURE — 3079F DIAST BP 80-89 MM HG: CPT | Performed by: NURSE PRACTITIONER

## 2024-04-23 PROCEDURE — 1160F RVW MEDS BY RX/DR IN RCRD: CPT | Performed by: NURSE PRACTITIONER

## 2024-04-23 PROCEDURE — 99215 OFFICE O/P EST HI 40 MIN: CPT | Performed by: NURSE PRACTITIONER

## 2024-04-23 PROCEDURE — 93010 ELECTROCARDIOGRAM REPORT: CPT | Performed by: INTERNAL MEDICINE

## 2024-04-23 RX ORDER — VALSARTAN 40 MG/1
40 TABLET ORAL DAILY
Qty: 90 TABLET | Refills: 3 | Status: SHIPPED | OUTPATIENT
Start: 2024-04-23

## 2024-04-23 RX ORDER — EZETIMIBE 10 MG/1
10 TABLET ORAL DAILY
Qty: 90 TABLET | Refills: 3 | Status: SHIPPED | OUTPATIENT
Start: 2024-04-23

## 2024-04-23 RX ORDER — FUROSEMIDE 80 MG/1
80 TABLET ORAL DAILY
Qty: 90 TABLET | Refills: 3 | Status: SHIPPED | OUTPATIENT
Start: 2024-04-23

## 2024-04-23 NOTE — PROGRESS NOTES
Subjective   Alo Glass is a 68 y.o. male.    Chief Complaint:  Heart Failure    Mr. Glass presents to establish cardiology care.     He was recently hospitalized ([facility] 3/2024) for acute systolic heart failure requiring IV diuresis, and again ([facility] 4/2024) for COPD exacerbation and pneumonia. He was also treated for bradycardia/vision changes (taken off metoprolol 25 mg XR) and atrial tachycardia treated with amiodarone.    He has a past medical history significant for MELISSA on CPAP, COPD, nicotine dependence, T2DM, severe obesity, hypertension, hyperlipidemia (intolerant to statins due to myalgias), lymphedema, coronary calcification by chest CT, moderate aortic stenosis (6/2019) and newly diagnosed CHF with an EF of 30-35% (3/2024).     He is seen today in collaboration with Dr. Moore. He comes in to establish cardiology care. He is feeling significantly better since his recent hospitalizations. His weight has remained stable, though he feels he could loose about 5-10 more lbs of fluid. He has also only been taking valsartan and lasix about 5x/week instead of daily. He feels his only limitation to his activity is foot discomfort. He offers no other cardiovascular complaints or concerns today. denies any complaints of chest pain, shortness of breath, lightheadedness, dizziness, palpitations, syncope, orthopnea, paroxysmal nocturnal dyspnea or bleeding concerns.      Smoking: currently <10 cig/day- former heavy >1ppd   Alcohol: daily - 2-3 vodka/wine  Illicit drugs: denies   Caffeine: daily- 4 cups coffee    Social history: retired . single     Surgical history: hernia repair as a child        Review of Systems   All other systems reviewed and are negative.      Objective   Physical Exam  Constitutional:       Appearance: Healthy appearance. In no distress  Pulmonary:      Effort: Pulmonary effort is normal.      Breath sounds: Normal breath sounds.    Cardiovascular:      Normal rate. Regular rhythm. Normal S1. Normal S2.       Murmurs: There is 2/6 systolic murmur.      Carotids: right carotid pulse +2, no bruit heard over the right carotid. left carotid pulse +2, no bruit heard over the left carotid.  Edema:     +1-2 (R>L) lower extremity edema present.   Abdominal:      Palpations: Abdomen is soft.   Musculoskeletal:       Cervical back: Normal range of motion.   Skin:     General: Skin is warm and dry. Normal color and pigmentation   Neurological:      Mental Status: Alert and oriented to person, place and time.   Psychiatric:     Mood and Affect: appropriate mood and appropriate affect.     EKG obtained and reviewed. Normal sinus rhythm. Bifascucular block. Moderate voltage criteria for LVH. Septal infarct, age undetermined. HR 98      Lab Review:   Lab Results   Component Value Date     04/09/2024    K 4.2 04/09/2024    CL 96 (L) 04/09/2024    CO2 33 (H) 04/09/2024    BUN 46 (H) 04/09/2024    CREATININE 0.78 04/09/2024    GLUCOSE 166 (H) 04/09/2024    CALCIUM 8.6 04/09/2024     Lab Results   Component Value Date    WBC 9.4 04/09/2024    HGB 18.1 (H) 04/09/2024    HCT 55.7 (H) 04/09/2024     04/09/2024     04/09/2024     Lab Results   Component Value Date    CHOL 178 02/20/2024    TRIG 81 02/20/2024    HDL 59.5 02/20/2024       Assessment/Plan   Mr. Glass is a pleasant 68 year old  male with past medical history significant for MELISSA on CPAP, COPD, nicotine dependence, T2DM, severe obesity, hypertension, hyperlipidemia (intolerant to statins due to myalgias), lymphedema, coronary calcification by chest CT, moderate aortic stenosis (6/2019) and newly diagnosed CHF with an EF of 30-35% (3/2024). He presents today following two recent hospitalizations. His VS and EKG remain stable today. His weight has remained stable, though feels he could loose 5-10 more lbs of fluid. I will have him continue all medications unchanged. He will  follow up with us in clinic in 3 weeks. Once he is more euvolemic, we will set him up for a right and left heart catheterization to further assess his aortic stenosis and see if we are dealing with any compelling CAD. He knows to call for any concerns.

## 2024-04-24 ENCOUNTER — APPOINTMENT (OUTPATIENT)
Dept: HOME HEALTH SERVICES | Facility: HOME HEALTH | Age: 69
End: 2024-04-24
Payer: MEDICARE

## 2024-04-25 ENCOUNTER — TELEPHONE (OUTPATIENT)
Dept: CARDIOLOGY | Facility: HOSPITAL | Age: 69
End: 2024-04-25
Payer: MEDICARE

## 2024-04-30 ENCOUNTER — HOME CARE VISIT (OUTPATIENT)
Dept: HOME HEALTH SERVICES | Facility: HOME HEALTH | Age: 69
End: 2024-04-30
Payer: MEDICARE

## 2024-04-30 VITALS
DIASTOLIC BLOOD PRESSURE: 60 MMHG | OXYGEN SATURATION: 94 % | SYSTOLIC BLOOD PRESSURE: 114 MMHG | TEMPERATURE: 98.1 F | HEART RATE: 86 BPM | RESPIRATION RATE: 16 BRPM

## 2024-04-30 PROCEDURE — G0151 HHCP-SERV OF PT,EA 15 MIN: HCPCS

## 2024-04-30 PROCEDURE — 0023 HH SOC

## 2024-04-30 SDOH — HEALTH STABILITY: PHYSICAL HEALTH
EXERCISE COMMENTS: PATIENT DECLINED DOING EXERCISES HOWEVER STATES HE KNOWS THE EXERCISES. INSTRUCTED IN SITTING EXERCISES AND STANDING EXERCISES

## 2024-04-30 ASSESSMENT — ENCOUNTER SYMPTOMS
DENIES PAIN: 1
PERSON REPORTING PAIN: PATIENT

## 2024-04-30 ASSESSMENT — ACTIVITIES OF DAILY LIVING (ADL)
AMBULATION ASSISTANCE ON FLAT SURFACES: 1
PHYSICAL TRANSFERS ASSESSED: 1
AMBULATION ASSISTANCE: 1
HOME_HEALTH_OASIS: 00
AMBULATION ASSISTANCE: INDEPENDENT
AMBULATION_DISTANCE/DURATION_TOLERATED: 200
CURRENT_FUNCTION: INDEPENDENT

## 2024-05-02 ASSESSMENT — ACTIVITIES OF DAILY LIVING (ADL): OASIS_M1830: 03

## 2024-05-10 DIAGNOSIS — M10.9 GOUT, UNSPECIFIED: ICD-10-CM

## 2024-05-10 DIAGNOSIS — Z00.00 ENCOUNTER FOR GENERAL ADULT MEDICAL EXAMINATION WITHOUT ABNORMAL FINDINGS: ICD-10-CM

## 2024-05-11 RX ORDER — ALLOPURINOL 300 MG/1
300 TABLET ORAL DAILY
Qty: 90 TABLET | Refills: 1 | Status: SHIPPED | OUTPATIENT
Start: 2024-05-11

## 2024-05-11 RX ORDER — FLUTICASONE FUROATE, UMECLIDINIUM BROMIDE AND VILANTEROL TRIFENATATE 100; 62.5; 25 UG/1; UG/1; UG/1
POWDER RESPIRATORY (INHALATION)
Qty: 28 EACH | Refills: 1 | Status: SHIPPED | OUTPATIENT
Start: 2024-05-11

## 2024-05-14 ENCOUNTER — OFFICE VISIT (OUTPATIENT)
Dept: CARDIOLOGY | Facility: HOSPITAL | Age: 69
End: 2024-05-14
Payer: MEDICARE

## 2024-05-14 VITALS
DIASTOLIC BLOOD PRESSURE: 66 MMHG | BODY MASS INDEX: 43.12 KG/M2 | HEIGHT: 71 IN | SYSTOLIC BLOOD PRESSURE: 101 MMHG | HEART RATE: 99 BPM | OXYGEN SATURATION: 94 % | WEIGHT: 308 LBS

## 2024-05-14 DIAGNOSIS — I50.9 ACUTE ON CHRONIC CONGESTIVE HEART FAILURE, UNSPECIFIED HEART FAILURE TYPE (MULTI): Primary | ICD-10-CM

## 2024-05-14 DIAGNOSIS — R06.09 DOE (DYSPNEA ON EXERTION): ICD-10-CM

## 2024-05-14 DIAGNOSIS — I10 BENIGN ESSENTIAL HYPERTENSION: ICD-10-CM

## 2024-05-14 PROCEDURE — 99214 OFFICE O/P EST MOD 30 MIN: CPT | Performed by: NURSE PRACTITIONER

## 2024-05-14 PROCEDURE — 3049F LDL-C 100-129 MG/DL: CPT | Performed by: NURSE PRACTITIONER

## 2024-05-14 PROCEDURE — 4010F ACE/ARB THERAPY RXD/TAKEN: CPT | Performed by: NURSE PRACTITIONER

## 2024-05-14 PROCEDURE — 3078F DIAST BP <80 MM HG: CPT | Performed by: NURSE PRACTITIONER

## 2024-05-14 PROCEDURE — 3044F HG A1C LEVEL LT 7.0%: CPT | Performed by: NURSE PRACTITIONER

## 2024-05-14 PROCEDURE — 3074F SYST BP LT 130 MM HG: CPT | Performed by: NURSE PRACTITIONER

## 2024-05-14 PROCEDURE — 1159F MED LIST DOCD IN RCRD: CPT | Performed by: NURSE PRACTITIONER

## 2024-05-14 PROCEDURE — 1160F RVW MEDS BY RX/DR IN RCRD: CPT | Performed by: NURSE PRACTITIONER

## 2024-05-14 RX ORDER — METOLAZONE 2.5 MG/1
2.5 TABLET ORAL DAILY
Qty: 30 TABLET | Refills: 11 | Status: SHIPPED | OUTPATIENT
Start: 2024-05-14 | End: 2025-05-14

## 2024-05-14 NOTE — PROGRESS NOTES
Subjective   Alo Glass is a 68 y.o. male.    Chief Complaint:  Heart Failure, Hypertension, and Hyperlipidemia    Mr. Glass returns for a routine 3 week follow up. He is seen in collaboration with Dr. Moore. He unfortunately has put on about 10 more lbs of fluid. He reports compliance with lasix (80 mg twice daily), though he has not been measuring how much fluid he is taking in. He still has some dyspnea on exertion, though denies any chest pain. He offers no other cardiovascular complaints or concerns today. He denies any complaints of chest pain, lightheadedness, dizziness, palpitations, syncope, orthopnea, paroxysmal nocturnal dyspnea or bleeding concerns.          Review of Systems   All other systems reviewed and are negative.      Objective   Physical Exam  Constitutional:       Appearance: Healthy appearance. In no distress  Pulmonary:      Effort: Pulmonary effort is normal.      Breath sounds: Normal breath sounds.   Cardiovascular:      Normal rate. Regular rhythm. Normal S1. Normal S2.       Murmurs: There is 2/6 systolic murmur.      Carotids: right carotid pulse +2, no bruit heard over the right carotid. left carotid pulse +2, no bruit heard over the left carotid.  Edema:     +1-2 (R>L) Peripheral edema present   Abdominal:      Palpations: Abdomen is soft.   Musculoskeletal:       Cervical back: Normal range of motion.   Skin:     General: Skin is warm and dry. Normal color and pigmentation   Neurological:      Mental Status: Alert and oriented to person, place and time.   Psychiatric:     Mood and Affect: appropriate mood and appropriate affect.       Lab Review:   Lab Results   Component Value Date     04/09/2024    K 4.2 04/09/2024    CL 96 (L) 04/09/2024    CO2 33 (H) 04/09/2024    BUN 46 (H) 04/09/2024    CREATININE 0.78 04/09/2024    GLUCOSE 166 (H) 04/09/2024    CALCIUM 8.6 04/09/2024     Lab Results   Component Value Date    WBC 9.4 04/09/2024    HGB 18.1 (H) 04/09/2024     HCT 55.7 (H) 04/09/2024     04/09/2024     04/09/2024     Lab Results   Component Value Date    CHOL 178 02/20/2024    TRIG 81 02/20/2024    HDL 59.5 02/20/2024       Assessment/Plan   Mr. Glass is a 68 year old  male with past medical history significant for MELISSA on CPAP, COPD, nicotine dependence, T2DM, severe obesity, hypertension, hyperlipidemia (intolerant to statins due to myalgias), lymphedema, coronary calcification by chest CT, moderate aortic stenosis (6/2019) and newly diagnosed CHF with an EF of 30-35% (3/2024). He presents today for routine follow up, and unfortunately has put on more water weight despite compliance with lasix. In hopes to get his weight down, we will have him take lasix 120 mg (all at once) every morning. I will also add metolazone for him to take twice this week (Thursday and Saturday). He was told to decreased his fluid intake and salt intake. He will call our office on Monday with an update, and follow up in clinic in 3 weeks. Once he is more euvolemic, we will set him up for a right and left heart catheterization to further assess his aortic stenosis and see if we are dealing with any compelling CAD. He knows to call for any concerns.

## 2024-05-14 NOTE — PATIENT INSTRUCTIONS
Take lasix 120 mg (1.5 tablets) daily - first thing every morning    Take metolazone 2.5 mg (30 min before lasix) on Thursday and Saturday     Call on Monday with an update on your weight- [phone]    Follow up in 3 weeks

## 2024-05-20 ENCOUNTER — TELEPHONE (OUTPATIENT)
Dept: CARDIOLOGY | Facility: CLINIC | Age: 69
End: 2024-05-20
Payer: MEDICARE

## 2024-05-20 DIAGNOSIS — R00.2 PALPITATIONS: Primary | ICD-10-CM

## 2024-05-20 NOTE — TELEPHONE ENCOUNTER
I spoke to patient. His weight is down to 285 lbs. He feels this is a good dry weight. I will have him continue will lasix 120 mg daily. He is also complaining of his HR dropping (per his watch and BP machine). I will have him wear a monitor for 2 weeks to truly capture what his HR is doing. He is currently not on any medications to cause bradycardia. He will follow up with us in clinic in 3 weeks as previously scheduled. He knows to call for any concerns.

## 2024-05-22 DIAGNOSIS — E03.9 HYPOTHYROIDISM, UNSPECIFIED: ICD-10-CM

## 2024-05-22 RX ORDER — LEVOTHYROXINE SODIUM 125 UG/1
125 TABLET ORAL DAILY
Qty: 30 TABLET | Refills: 1 | Status: SHIPPED | OUTPATIENT
Start: 2024-05-22

## 2024-05-23 ENCOUNTER — HOSPITAL ENCOUNTER (OUTPATIENT)
Dept: CARDIOLOGY | Facility: HOSPITAL | Age: 69
Discharge: HOME | End: 2024-05-23
Payer: MEDICARE

## 2024-05-23 DIAGNOSIS — R00.2 PALPITATIONS: ICD-10-CM

## 2024-05-23 PROCEDURE — 93246 EXT ECG>7D<15D RECORDING: CPT

## 2024-06-04 DIAGNOSIS — E11.9 TYPE 2 DIABETES MELLITUS WITHOUT COMPLICATION, WITHOUT LONG-TERM CURRENT USE OF INSULIN (MULTI): ICD-10-CM

## 2024-06-05 ENCOUNTER — APPOINTMENT (OUTPATIENT)
Dept: CARDIOLOGY | Facility: HOSPITAL | Age: 69
End: 2024-06-05
Payer: MEDICARE

## 2024-06-06 RX ORDER — TIRZEPATIDE 2.5 MG/.5ML
2.5 INJECTION, SOLUTION SUBCUTANEOUS
Qty: 2 ML | Refills: 0 | Status: SHIPPED | OUTPATIENT
Start: 2024-06-09

## 2024-06-11 ENCOUNTER — APPOINTMENT (OUTPATIENT)
Dept: CARDIOLOGY | Facility: HOSPITAL | Age: 69
End: 2024-06-11
Payer: MEDICARE

## 2024-06-18 ENCOUNTER — TELEPHONE (OUTPATIENT)
Dept: PRIMARY CARE | Facility: CLINIC | Age: 69
End: 2024-06-18

## 2024-06-24 ENCOUNTER — TELEPHONE (OUTPATIENT)
Dept: CARDIOLOGY | Facility: CLINIC | Age: 69
End: 2024-06-24
Payer: MEDICARE

## 2024-06-24 NOTE — TELEPHONE ENCOUNTER
I spoke to patient and reviewed monitor results. He had <1% VE and 2% SVE. His average HR was 94 bpm. He was previously unable to tolerate metoprolol. He unfortunately cancelled his appointment last week. We will get him rescheduled within the next few weeks to further discuss his plan of care. He is also having trouble with the high dose of lasix, and wants to further discuss that as well. He will continue all medications unchanged at this time given his weight is still up. He knows to call for any concerns.

## 2024-06-25 ENCOUNTER — LAB (OUTPATIENT)
Dept: LAB | Facility: LAB | Age: 69
End: 2024-06-25
Payer: MEDICARE

## 2024-06-25 ENCOUNTER — APPOINTMENT (OUTPATIENT)
Dept: PRIMARY CARE | Facility: CLINIC | Age: 69
End: 2024-06-25
Payer: MEDICARE

## 2024-06-25 VITALS — DIASTOLIC BLOOD PRESSURE: 73 MMHG | WEIGHT: 294 LBS | BODY MASS INDEX: 41 KG/M2 | SYSTOLIC BLOOD PRESSURE: 115 MMHG

## 2024-06-25 DIAGNOSIS — J44.9 CHRONIC OBSTRUCTIVE PULMONARY DISEASE, UNSPECIFIED COPD TYPE (MULTI): ICD-10-CM

## 2024-06-25 DIAGNOSIS — G47.33 OSA (OBSTRUCTIVE SLEEP APNEA): ICD-10-CM

## 2024-06-25 DIAGNOSIS — Z00.00 HEALTH CARE MAINTENANCE: Primary | ICD-10-CM

## 2024-06-25 DIAGNOSIS — E11.9 TYPE 2 DIABETES MELLITUS WITHOUT COMPLICATION, WITHOUT LONG-TERM CURRENT USE OF INSULIN (MULTI): Primary | ICD-10-CM

## 2024-06-25 DIAGNOSIS — E11.9 TYPE 2 DIABETES MELLITUS WITHOUT COMPLICATION, WITHOUT LONG-TERM CURRENT USE OF INSULIN (MULTI): ICD-10-CM

## 2024-06-25 DIAGNOSIS — I10 BENIGN ESSENTIAL HYPERTENSION: ICD-10-CM

## 2024-06-25 LAB
ALBUMIN SERPL BCP-MCNC: 4 G/DL (ref 3.4–5)
ALP SERPL-CCNC: 76 U/L (ref 33–136)
ALT SERPL W P-5'-P-CCNC: 13 U/L (ref 10–52)
ANION GAP SERPL CALC-SCNC: 16 MMOL/L (ref 10–20)
AST SERPL W P-5'-P-CCNC: 17 U/L (ref 9–39)
BILIRUB SERPL-MCNC: 1.1 MG/DL (ref 0–1.2)
BUN SERPL-MCNC: 23 MG/DL (ref 6–23)
CALCIUM SERPL-MCNC: 9.5 MG/DL (ref 8.6–10.6)
CHLORIDE SERPL-SCNC: 100 MMOL/L (ref 98–107)
CO2 SERPL-SCNC: 28 MMOL/L (ref 21–32)
CREAT SERPL-MCNC: 0.67 MG/DL (ref 0.5–1.3)
EGFRCR SERPLBLD CKD-EPI 2021: >90 ML/MIN/1.73M*2
ERYTHROCYTE [DISTWIDTH] IN BLOOD BY AUTOMATED COUNT: 14.1 % (ref 11.5–14.5)
EST. AVERAGE GLUCOSE BLD GHB EST-MCNC: 111 MG/DL
GLUCOSE SERPL-MCNC: 55 MG/DL (ref 74–99)
HBA1C MFR BLD: 5.5 %
HCT VFR BLD AUTO: 54 % (ref 41–52)
HGB BLD-MCNC: 17.4 G/DL (ref 13.5–17.5)
MCH RBC QN AUTO: 33 PG (ref 26–34)
MCHC RBC AUTO-ENTMCNC: 32.2 G/DL (ref 32–36)
MCV RBC AUTO: 103 FL (ref 80–100)
NRBC BLD-RTO: 0 /100 WBCS (ref 0–0)
PLATELET # BLD AUTO: 254 X10*3/UL (ref 150–450)
POTASSIUM SERPL-SCNC: 3.8 MMOL/L (ref 3.5–5.3)
PROT SERPL-MCNC: 6.6 G/DL (ref 6.4–8.2)
RBC # BLD AUTO: 5.27 X10*6/UL (ref 4.5–5.9)
SODIUM SERPL-SCNC: 140 MMOL/L (ref 136–145)
WBC # BLD AUTO: 12.1 X10*3/UL (ref 4.4–11.3)

## 2024-06-25 PROCEDURE — 1159F MED LIST DOCD IN RCRD: CPT | Performed by: INTERNAL MEDICINE

## 2024-06-25 PROCEDURE — 80053 COMPREHEN METABOLIC PANEL: CPT

## 2024-06-25 PROCEDURE — 3074F SYST BP LT 130 MM HG: CPT | Performed by: INTERNAL MEDICINE

## 2024-06-25 PROCEDURE — 3049F LDL-C 100-129 MG/DL: CPT | Performed by: INTERNAL MEDICINE

## 2024-06-25 PROCEDURE — 4010F ACE/ARB THERAPY RXD/TAKEN: CPT | Performed by: INTERNAL MEDICINE

## 2024-06-25 PROCEDURE — 1160F RVW MEDS BY RX/DR IN RCRD: CPT | Performed by: INTERNAL MEDICINE

## 2024-06-25 PROCEDURE — G0439 PPPS, SUBSEQ VISIT: HCPCS | Performed by: INTERNAL MEDICINE

## 2024-06-25 PROCEDURE — 99213 OFFICE O/P EST LOW 20 MIN: CPT | Performed by: INTERNAL MEDICINE

## 2024-06-25 PROCEDURE — 3078F DIAST BP <80 MM HG: CPT | Performed by: INTERNAL MEDICINE

## 2024-06-25 PROCEDURE — 1170F FXNL STATUS ASSESSED: CPT | Performed by: INTERNAL MEDICINE

## 2024-06-25 PROCEDURE — 36415 COLL VENOUS BLD VENIPUNCTURE: CPT

## 2024-06-25 PROCEDURE — 99397 PER PM REEVAL EST PAT 65+ YR: CPT | Performed by: INTERNAL MEDICINE

## 2024-06-25 PROCEDURE — 83036 HEMOGLOBIN GLYCOSYLATED A1C: CPT

## 2024-06-25 PROCEDURE — 85027 COMPLETE CBC AUTOMATED: CPT

## 2024-06-25 PROCEDURE — 3044F HG A1C LEVEL LT 7.0%: CPT | Performed by: INTERNAL MEDICINE

## 2024-06-25 RX ORDER — BLOOD-GLUCOSE,RECEIVER,CONT
EACH MISCELLANEOUS
Qty: 1 EACH | Refills: 0 | Status: SHIPPED | OUTPATIENT
Start: 2024-06-25

## 2024-06-25 RX ORDER — BLOOD-GLUCOSE SENSOR
EACH MISCELLANEOUS
Qty: 3 EACH | Refills: 0 | Status: SHIPPED | OUTPATIENT
Start: 2024-06-25

## 2024-06-25 NOTE — PROGRESS NOTES
Subjective   Patient ID: Alo Glass is a 68 y.o. male who presents for No chief complaint on file..    HPI follow-up visit and cpe no chest pain no shortness of breath whenever he takes the furosemide he has greater urgency loses about 10 pounds when he takes it with the metolazone but is unable to do that on an every day basis no hypoglycemic episodes blood pressure is running low at home bowel somewhat okay    Past medical history noted and unchanged    Medication noted and unchanged recently but prior multiple changes     Family history mother had passed away    Allergies no known drug allergies    Social history discussed with tobacco discussed with alcohol    Prevention limited exercise some prior blood work and studies reviewed No recent colonoscopy   depression screen not depressed  Review of Systems    Objective   There were no vitals taken for this visit.    Physical Exam vital signs noted alert and oriented x 3 NCAT PERRLA EOMI nares clear discharge  OP benign no JVD or bruit chest clear to auscultation no wheezingCV regular rate and rhythm S1-S2 without skip abdomen obese soft nont nabs ls spine normal curvature neg slr extremities no clubbing cyanosis there is 1+ edema deep and pitting on the right and trace to 1+ on the left intact distal pulses walking with rollator abdomen much less overall girth    Assessment/Plan    impression general medical examination hypertension with CHF COPD MELISSA diabetes thrush  Plan consider colon screening when breathing is better with colonoscopy or cologuard may also have some thrush on the tongue may just be dryness given furosemide use but after the blood sugars have returned May consider clotrimazole troches for may consider continuous glucose monitor he is interested check lipid hemoglobin advised on long-term blood sugar test also on the Jardiance advised on potassium balance check comprehensive panel advised on glucose potassium and kidney function as well as  calcium and liver function tests with diet plus or salt good water consumption follow-up with cardiology would like glucose monitor advised on the potassium and the furosemide 120 mg dosing and follow-up based on above TT 50 cc 26  Follow-up

## 2024-07-02 ENCOUNTER — OFFICE VISIT (OUTPATIENT)
Dept: CARDIOLOGY | Facility: HOSPITAL | Age: 69
End: 2024-07-02
Payer: MEDICARE

## 2024-07-02 VITALS
BODY MASS INDEX: 41.24 KG/M2 | HEIGHT: 71 IN | SYSTOLIC BLOOD PRESSURE: 105 MMHG | HEART RATE: 94 BPM | WEIGHT: 294.6 LBS | DIASTOLIC BLOOD PRESSURE: 70 MMHG

## 2024-07-02 DIAGNOSIS — I10 BENIGN ESSENTIAL HYPERTENSION: Primary | ICD-10-CM

## 2024-07-02 DIAGNOSIS — I11.0 HYPERTENSIVE HEART DISEASE WITH CONGESTIVE HEART FAILURE, UNSPECIFIED HEART FAILURE TYPE (MULTI): ICD-10-CM

## 2024-07-02 PROCEDURE — 3049F LDL-C 100-129 MG/DL: CPT | Performed by: INTERNAL MEDICINE

## 2024-07-02 PROCEDURE — 3044F HG A1C LEVEL LT 7.0%: CPT | Performed by: INTERNAL MEDICINE

## 2024-07-02 PROCEDURE — 93005 ELECTROCARDIOGRAM TRACING: CPT | Performed by: INTERNAL MEDICINE

## 2024-07-02 PROCEDURE — 3074F SYST BP LT 130 MM HG: CPT | Performed by: INTERNAL MEDICINE

## 2024-07-02 PROCEDURE — 99214 OFFICE O/P EST MOD 30 MIN: CPT | Performed by: INTERNAL MEDICINE

## 2024-07-02 PROCEDURE — 4010F ACE/ARB THERAPY RXD/TAKEN: CPT | Performed by: INTERNAL MEDICINE

## 2024-07-02 PROCEDURE — 3078F DIAST BP <80 MM HG: CPT | Performed by: INTERNAL MEDICINE

## 2024-07-02 PROCEDURE — 1160F RVW MEDS BY RX/DR IN RCRD: CPT | Performed by: INTERNAL MEDICINE

## 2024-07-02 PROCEDURE — 1159F MED LIST DOCD IN RCRD: CPT | Performed by: INTERNAL MEDICINE

## 2024-07-02 RX ORDER — POTASSIUM CHLORIDE 20 MEQ/1
20 TABLET, EXTENDED RELEASE ORAL DAILY
Qty: 90 TABLET | Refills: 3 | Status: SHIPPED | OUTPATIENT
Start: 2024-07-02 | End: 2025-07-02

## 2024-07-02 NOTE — PROGRESS NOTES
Subjective:  Patient returns for a 2-month follow-up.  He is a 68-year-old gentleman with a very patent complex past medical and cardiovascular history as outlined in our initial consult note by Azul Dickson on 4/23/2024.  He also struggles with morbid obesity he comes back in today for follow-up.    He generally feels he is doing reasonably well.  He has been adjusting his diuretic to avoid excessive diuresis.  He has gotten his weight down to about 290.  He denies any clear angina.  He denies any palpitations or stroke symptomatology.  He denies any clear presyncope or syncope.    He is taking his medications compliantly, but is not willing to retry any beta-blocker therapy due to the bradycardia that it provoked.  He also was truly statin intolerant.    I reviewed my concern with him regarding his coronary disease, cardiomyopathy, and aortic stenosis.  He is not interested in any type of further aggressive evaluation at this time as he would not be willing to undergo any aggressive surgical or less invasive interventions at this time.    Objective:   Gen: Alert, usual self.  HEENT: Unchanged.  Lungs: Mildly diminished air exchange.  Cardiac: Normal S1 and S2 with 2/6 systolic murmur.  Abdomen: Obese and protuberant but nontender.  Extremities: Diminished edema.  Skin: No acute rash.  Neuro: No gross focal motor deficit.    Impression/plan:  Patient is reasonably comfortable with his clinical status at this time.  He is anxious to try to escalate his activity level and hopefully lose some weight.  He cannot tolerate more aggressive diuresis, but I suspect we may be able to get his weight down slowly with more moderate diuresis.    I certainly can understand and respect his decision not to proceed with any aggressive evaluation or treatment at this time.    I will see him back in follow-up in 2 months, and he will keep me updated regarding his progress.  He knows to call if he develops significant worsening of his  symptomatology and wishes to proceed with a more aggressive evaluation of his cardiovascular and valvular status.    Patient instructions:    Continue to adjust your diuretics to try to get your weight down slowly.    Begin potassium at 20 mEq daily.    Return to clinic in 2 months.

## 2024-07-14 ASSESSMENT — ACTIVITIES OF DAILY LIVING (ADL)
DOING_HOUSEWORK: INDEPENDENT
DRESSING: INDEPENDENT
TAKING_MEDICATION: INDEPENDENT
MANAGING_FINANCES: INDEPENDENT
BATHING: INDEPENDENT
GROCERY_SHOPPING: INDEPENDENT

## 2024-07-14 ASSESSMENT — ENCOUNTER SYMPTOMS
DEPRESSION: 0
LOSS OF SENSATION IN FEET: 0
OCCASIONAL FEELINGS OF UNSTEADINESS: 0

## 2024-07-14 ASSESSMENT — PATIENT HEALTH QUESTIONNAIRE - PHQ9
1. LITTLE INTEREST OR PLEASURE IN DOING THINGS: NOT AT ALL
2. FEELING DOWN, DEPRESSED OR HOPELESS: NOT AT ALL
SUM OF ALL RESPONSES TO PHQ9 QUESTIONS 1 AND 2: 0

## 2024-07-22 ENCOUNTER — APPOINTMENT (OUTPATIENT)
Dept: PRIMARY CARE | Facility: CLINIC | Age: 69
End: 2024-07-22
Payer: MEDICARE

## 2024-07-22 DIAGNOSIS — I95.9 HYPOTENSION, UNSPECIFIED HYPOTENSION TYPE: ICD-10-CM

## 2024-07-22 DIAGNOSIS — K12.2 ORAL INFECTION: ICD-10-CM

## 2024-07-22 DIAGNOSIS — Z00.00 HEALTH CARE MAINTENANCE: Primary | ICD-10-CM

## 2024-07-22 DIAGNOSIS — I89.0 LYMPHEDEMA OF BOTH LOWER EXTREMITIES: ICD-10-CM

## 2024-07-22 PROCEDURE — 3044F HG A1C LEVEL LT 7.0%: CPT | Performed by: INTERNAL MEDICINE

## 2024-07-22 PROCEDURE — 99213 OFFICE O/P EST LOW 20 MIN: CPT | Performed by: INTERNAL MEDICINE

## 2024-07-22 PROCEDURE — 3049F LDL-C 100-129 MG/DL: CPT | Performed by: INTERNAL MEDICINE

## 2024-07-22 PROCEDURE — 4010F ACE/ARB THERAPY RXD/TAKEN: CPT | Performed by: INTERNAL MEDICINE

## 2024-07-22 RX ORDER — CEPHALEXIN 500 MG/1
500 CAPSULE ORAL 2 TIMES DAILY PRN
Qty: 14 CAPSULE | Refills: 0 | Status: SHIPPED | OUTPATIENT
Start: 2024-07-22 | End: 2024-07-29

## 2024-07-22 RX ORDER — AMOXICILLIN 500 MG/1
500 TABLET, FILM COATED ORAL 3 TIMES DAILY PRN
Qty: 15 TABLET | Refills: 0 | Status: SHIPPED | OUTPATIENT
Start: 2024-07-22 | End: 2024-08-06

## 2024-07-22 NOTE — PROGRESS NOTES
Subjective   Patient ID: Alo Glass is a 68 y.o. male who presents for No chief complaint on file..    HPI virtual video visit this visit was completed via audio and visual secondary to the restrictions of the covid 19 pandemic.  All medical issues were addressed and discussed with patient patient was not otherwise examined if it was felt that the patient needs to be seen in the office to be evaluated.  Patient verbally consented to  visit   sick visit and follow up no cp, sob some mouth pain  Has had some lower bps at home no cp or sob change prefers no to use the lasix  No fainting  No other side effects  D/w covid and symptoms in the future  Review of Systems    Objective   There were no vitals taken for this visit.    Physical Exam alert and oriented x 3 breathing unlabored not otherwise examined    Assessment/Plan  impression oral dental lymphedema blood pressure low  plan discuss with amoxil for oral dental infection until sees dds  Ok for cephalexin 500 mg bid to have on had secondary to inability to leave the home  D/w bp medication and values for being low and holding medicine when bp below 110 sys consistently d/w lymphedema and elevation of legs and lymphedema pump then recheck after d/w bw  Pneumatic compression garment?   Finish note with hpi and plan  Loss of taste and smell  D/w labs  D/w diuretics prefers not to take  tt25''

## 2024-07-30 DIAGNOSIS — E03.9 HYPOTHYROIDISM, UNSPECIFIED: ICD-10-CM

## 2024-07-30 DIAGNOSIS — E11.9 TYPE 2 DIABETES MELLITUS WITHOUT COMPLICATION, WITHOUT LONG-TERM CURRENT USE OF INSULIN (MULTI): ICD-10-CM

## 2024-07-30 DIAGNOSIS — R94.31 PROLONGED Q-T INTERVAL ON ECG: ICD-10-CM

## 2024-08-01 RX ORDER — FOLIC ACID 1 MG/1
1 TABLET ORAL DAILY
Qty: 30 TABLET | Refills: 3 | Status: SHIPPED | OUTPATIENT
Start: 2024-08-01

## 2024-08-01 RX ORDER — LEVOTHYROXINE SODIUM 125 UG/1
125 TABLET ORAL DAILY
Qty: 30 TABLET | Refills: 1 | Status: SHIPPED | OUTPATIENT
Start: 2024-08-01

## 2024-08-01 RX ORDER — TIRZEPATIDE 2.5 MG/.5ML
2.5 INJECTION, SOLUTION SUBCUTANEOUS
Qty: 2 ML | Refills: 0 | Status: SHIPPED | OUTPATIENT
Start: 2024-08-04

## 2024-08-27 DIAGNOSIS — E11.9 TYPE 2 DIABETES MELLITUS WITHOUT COMPLICATION, WITHOUT LONG-TERM CURRENT USE OF INSULIN (MULTI): ICD-10-CM

## 2024-08-27 DIAGNOSIS — N39.0 URINARY TRACT INFECTION WITHOUT HEMATURIA, SITE UNSPECIFIED: Primary | ICD-10-CM

## 2024-08-27 RX ORDER — TIRZEPATIDE 2.5 MG/.5ML
2.5 INJECTION, SOLUTION SUBCUTANEOUS
Qty: 2 ML | Refills: 0 | Status: SHIPPED | OUTPATIENT
Start: 2024-08-27

## 2024-08-28 ENCOUNTER — LAB (OUTPATIENT)
Dept: LAB | Facility: LAB | Age: 69
End: 2024-08-28
Payer: MEDICARE

## 2024-08-28 DIAGNOSIS — N39.0 URINARY TRACT INFECTION WITHOUT HEMATURIA, SITE UNSPECIFIED: ICD-10-CM

## 2024-08-28 LAB
APPEARANCE UR: CLEAR
BILIRUB UR STRIP.AUTO-MCNC: NEGATIVE MG/DL
COLOR UR: ABNORMAL
GLUCOSE UR STRIP.AUTO-MCNC: ABNORMAL MG/DL
KETONES UR STRIP.AUTO-MCNC: ABNORMAL MG/DL
LEUKOCYTE ESTERASE UR QL STRIP.AUTO: NEGATIVE
NITRITE UR QL STRIP.AUTO: NEGATIVE
PH UR STRIP.AUTO: 5 [PH]
PROT UR STRIP.AUTO-MCNC: NEGATIVE MG/DL
RBC # UR STRIP.AUTO: NEGATIVE /UL
SP GR UR STRIP.AUTO: 1.01
UROBILINOGEN UR STRIP.AUTO-MCNC: NORMAL MG/DL

## 2024-08-28 PROCEDURE — 81003 URINALYSIS AUTO W/O SCOPE: CPT

## 2024-09-03 ENCOUNTER — TELEPHONE (OUTPATIENT)
Dept: PRIMARY CARE | Facility: CLINIC | Age: 69
End: 2024-09-03

## 2024-09-03 RX ORDER — AMOXICILLIN AND CLAVULANATE POTASSIUM 875; 125 MG/1; MG/1
875 TABLET, FILM COATED ORAL 2 TIMES DAILY
Qty: 14 TABLET | Refills: 0 | Status: ON HOLD | OUTPATIENT
Start: 2024-09-03 | End: 2024-09-10

## 2024-09-04 ENCOUNTER — APPOINTMENT (OUTPATIENT)
Dept: RADIOLOGY | Facility: HOSPITAL | Age: 69
DRG: 870 | End: 2024-09-04
Payer: MEDICARE

## 2024-09-04 ENCOUNTER — APPOINTMENT (OUTPATIENT)
Dept: CARDIOLOGY | Facility: HOSPITAL | Age: 69
DRG: 870 | End: 2024-09-04
Payer: MEDICARE

## 2024-09-04 ENCOUNTER — HOSPITAL ENCOUNTER (INPATIENT)
Facility: HOSPITAL | Age: 69
End: 2024-09-04
Attending: EMERGENCY MEDICINE | Admitting: SURGERY
Payer: MEDICARE

## 2024-09-04 DIAGNOSIS — R57.9 SHOCK (MULTI): ICD-10-CM

## 2024-09-04 DIAGNOSIS — I50.20 HEART FAILURE WITH REDUCED EJECTION FRACTION (MULTI): ICD-10-CM

## 2024-09-04 DIAGNOSIS — R57.9 SHOCK, UNSPECIFIED (MULTI): ICD-10-CM

## 2024-09-04 DIAGNOSIS — J96.01 ACUTE RESPIRATORY FAILURE WITH HYPOXIA AND HYPERCAPNIA (MULTI): Primary | ICD-10-CM

## 2024-09-04 DIAGNOSIS — G93.41 ACUTE METABOLIC ENCEPHALOPATHY: ICD-10-CM

## 2024-09-04 DIAGNOSIS — R06.09 OTHER FORMS OF DYSPNEA: ICD-10-CM

## 2024-09-04 DIAGNOSIS — J96.02 ACUTE RESPIRATORY FAILURE WITH HYPOXIA AND HYPERCAPNIA (MULTI): Primary | ICD-10-CM

## 2024-09-04 DIAGNOSIS — G47.33 OSA (OBSTRUCTIVE SLEEP APNEA): ICD-10-CM

## 2024-09-04 DIAGNOSIS — I50.21 SYSTOLIC CHF, ACUTE (MULTI): ICD-10-CM

## 2024-09-04 DIAGNOSIS — R33.9 URINARY RETENTION: ICD-10-CM

## 2024-09-04 DIAGNOSIS — Z13.6 ENCOUNTER FOR SCREENING FOR CARDIOVASCULAR DISORDERS: ICD-10-CM

## 2024-09-04 LAB
ALBUMIN SERPL BCP-MCNC: 2.4 G/DL (ref 3.4–5)
ALP SERPL-CCNC: 88 U/L (ref 33–136)
ALT SERPL W P-5'-P-CCNC: 11 U/L (ref 10–52)
AMMONIA PLAS-SCNC: 79 UMOL/L (ref 16–53)
AMPHETAMINES UR QL SCN: ABNORMAL
ANION GAP BLDA CALCULATED.4IONS-SCNC: 10 MMO/L (ref 10–25)
ANION GAP BLDV CALCULATED.4IONS-SCNC: 9 MMOL/L (ref 10–25)
ANION GAP BLDV CALCULATED.4IONS-SCNC: ABNORMAL MMOL/L
ANION GAP SERPL CALC-SCNC: 16 MMOL/L (ref 10–20)
APAP SERPL-MCNC: <10 UG/ML
APPEARANCE UR: CLEAR
AST SERPL W P-5'-P-CCNC: 11 U/L (ref 9–39)
BARBITURATES UR QL SCN: ABNORMAL
BASE EXCESS BLDA CALC-SCNC: -8.9 MMOL/L (ref -2–3)
BASE EXCESS BLDV CALC-SCNC: -3.6 MMOL/L (ref -2–3)
BASE EXCESS BLDV CALC-SCNC: ABNORMAL MMOL/L
BASOPHILS # BLD AUTO: 0.07 X10*3/UL (ref 0–0.1)
BASOPHILS NFR BLD AUTO: 0.3 %
BENZODIAZ UR QL SCN: ABNORMAL
BILIRUB DIRECT SERPL-MCNC: 0.2 MG/DL (ref 0–0.3)
BILIRUB SERPL-MCNC: 0.6 MG/DL (ref 0–1.2)
BILIRUB UR STRIP.AUTO-MCNC: NEGATIVE MG/DL
BNP SERPL-MCNC: 1155 PG/ML (ref 0–99)
BODY TEMPERATURE: 37 DEGREES CELSIUS
BODY TEMPERATURE: 37 DEGREES CELSIUS
BODY TEMPERATURE: ABNORMAL
BUN SERPL-MCNC: 43 MG/DL (ref 6–23)
BZE UR QL SCN: ABNORMAL
CA-I BLDA-SCNC: 1.17 MMOL/L (ref 1.1–1.33)
CA-I BLDV-SCNC: 1.08 MMOL/L (ref 1.1–1.33)
CA-I BLDV-SCNC: 1.11 MMOL/L (ref 1.1–1.33)
CALCIUM SERPL-MCNC: 7.1 MG/DL (ref 8.6–10.3)
CANNABINOIDS UR QL SCN: ABNORMAL
CARDIAC TROPONIN I PNL SERPL HS: 69 NG/L (ref 0–20)
CARDIAC TROPONIN I PNL SERPL HS: 99 NG/L (ref 0–20)
CHLORIDE BLDA-SCNC: 97 MMOL/L (ref 98–107)
CHLORIDE BLDV-SCNC: 91 MMOL/L (ref 98–107)
CHLORIDE BLDV-SCNC: 92 MMOL/L (ref 98–107)
CHLORIDE SERPL-SCNC: 94 MMOL/L (ref 98–107)
CO2 SERPL-SCNC: 26 MMOL/L (ref 21–32)
COLOR UR: YELLOW
CREAT SERPL-MCNC: 1.93 MG/DL (ref 0.5–1.3)
EGFRCR SERPLBLD CKD-EPI 2021: 37 ML/MIN/1.73M*2
EOSINOPHIL # BLD AUTO: 0 X10*3/UL (ref 0–0.7)
EOSINOPHIL NFR BLD AUTO: 0 %
ERYTHROCYTE [DISTWIDTH] IN BLOOD BY AUTOMATED COUNT: 14 % (ref 11.5–14.5)
ETHANOL SERPL-MCNC: <10 MG/DL
FENTANYL+NORFENTANYL UR QL SCN: ABNORMAL
GLUCOSE BLD MANUAL STRIP-MCNC: 193 MG/DL (ref 74–99)
GLUCOSE BLD MANUAL STRIP-MCNC: 194 MG/DL (ref 74–99)
GLUCOSE BLD MANUAL STRIP-MCNC: 216 MG/DL (ref 74–99)
GLUCOSE BLDA-MCNC: 189 MG/DL (ref 74–99)
GLUCOSE BLDV-MCNC: 179 MG/DL (ref 74–99)
GLUCOSE BLDV-MCNC: ABNORMAL MG/DL
GLUCOSE SERPL-MCNC: 400 MG/DL (ref 74–99)
GLUCOSE UR STRIP.AUTO-MCNC: ABNORMAL MG/DL
HCO3 BLDA-SCNC: 25.6 MMOL/L (ref 22–26)
HCO3 BLDV-SCNC: 30.6 MMOL/L (ref 22–26)
HCO3 BLDV-SCNC: ABNORMAL MMOL/L
HCT VFR BLD AUTO: 66.8 % (ref 41–52)
HCT VFR BLD EST: 56 % (ref 41–52)
HCT VFR BLD EST: 57 % (ref 41–52)
HCT VFR BLD EST: 66 % (ref 41–52)
HGB BLD-MCNC: 20.5 G/DL (ref 13.5–17.5)
HGB BLDA-MCNC: 19.1 G/DL (ref 13.5–17.5)
HGB BLDV-MCNC: 18.8 G/DL (ref 13.5–17.5)
HGB BLDV-MCNC: 21.9 G/DL (ref 13.5–17.5)
HYALINE CASTS #/AREA URNS AUTO: ABNORMAL /LPF
IMM GRANULOCYTES # BLD AUTO: 0.36 X10*3/UL (ref 0–0.7)
IMM GRANULOCYTES NFR BLD AUTO: 1.6 % (ref 0–0.9)
INHALED O2 CONCENTRATION: 100 %
INHALED O2 CONCENTRATION: 100 %
INHALED O2 CONCENTRATION: 21 %
INR PPP: 1.1 (ref 0.9–1.1)
KETONES UR STRIP.AUTO-MCNC: NEGATIVE MG/DL
LACTATE BLDA-SCNC: 3 MMOL/L (ref 0.4–2)
LACTATE BLDV-SCNC: 2.4 MMOL/L (ref 0.4–2)
LACTATE BLDV-SCNC: 2.9 MMOL/L (ref 0.4–2)
LACTATE BLDV-SCNC: 3.3 MMOL/L (ref 0.4–2)
LEUKOCYTE ESTERASE UR QL STRIP.AUTO: NEGATIVE
LIPASE SERPL-CCNC: 12 U/L (ref 9–82)
LYMPHOCYTES # BLD AUTO: 0.57 X10*3/UL (ref 1.2–4.8)
LYMPHOCYTES NFR BLD AUTO: 2.5 %
MAGNESIUM SERPL-MCNC: 2.2 MG/DL (ref 1.6–2.4)
MCH RBC QN AUTO: 33 PG (ref 26–34)
MCHC RBC AUTO-ENTMCNC: 30.7 G/DL (ref 32–36)
MCV RBC AUTO: 108 FL (ref 80–100)
METHADONE UR QL SCN: ABNORMAL
MONOCYTES # BLD AUTO: 2.13 X10*3/UL (ref 0.1–1)
MONOCYTES NFR BLD AUTO: 9.3 %
MUCOUS THREADS #/AREA URNS AUTO: ABNORMAL /LPF
NEUTROPHILS # BLD AUTO: 19.85 X10*3/UL (ref 1.2–7.7)
NEUTROPHILS NFR BLD AUTO: 86.3 %
NITRITE UR QL STRIP.AUTO: NEGATIVE
NRBC BLD-RTO: 0 /100 WBCS (ref 0–0)
OPIATES UR QL SCN: ABNORMAL
OXYCODONE+OXYMORPHONE UR QL SCN: ABNORMAL
OXYHGB MFR BLDA: 93.8 % (ref 94–98)
OXYHGB MFR BLDV: 45.4 % (ref 45–75)
OXYHGB MFR BLDV: 78.2 % (ref 45–75)
PCO2 BLDA: 97 MM HG (ref 38–42)
PCO2 BLDV: 101 MM HG (ref 41–51)
PCO2 BLDV: >125 MM HG (ref 41–51)
PCP UR QL SCN: ABNORMAL
PH BLDA: 7 PH (ref 7.38–7.42)
PH BLDV: 6.87 PH (ref 7.33–7.43)
PH BLDV: 7.09 PH (ref 7.33–7.43)
PH UR STRIP.AUTO: 5.5 [PH]
PLATELET # BLD AUTO: 349 X10*3/UL (ref 150–450)
PO2 BLDA: 160 MM HG (ref 85–95)
PO2 BLDV: 26 MM HG (ref 35–45)
PO2 BLDV: 57 MM HG (ref 35–45)
POTASSIUM BLDA-SCNC: 5 MMOL/L (ref 3.5–5.3)
POTASSIUM BLDV-SCNC: 5.1 MMOL/L (ref 3.5–5.3)
POTASSIUM BLDV-SCNC: 5.5 MMOL/L (ref 3.5–5.3)
POTASSIUM SERPL-SCNC: 5.2 MMOL/L (ref 3.5–5.3)
PROT SERPL-MCNC: 4.4 G/DL (ref 6.4–8.2)
PROT SERPL-MCNC: 4.4 G/DL (ref 6.4–8.2)
PROT UR STRIP.AUTO-MCNC: ABNORMAL MG/DL
PROTHROMBIN TIME: 12.4 SECONDS (ref 9.8–12.8)
RBC # BLD AUTO: 6.21 X10*6/UL (ref 4.5–5.9)
RBC # UR STRIP.AUTO: ABNORMAL /UL
RBC #/AREA URNS AUTO: >20 /HPF
SALICYLATES SERPL-MCNC: <3 MG/DL
SAO2 % BLDA: 100 % (ref 94–100)
SAO2 % BLDV: 47 % (ref 45–75)
SAO2 % BLDV: 83 % (ref 45–75)
SODIUM BLDA-SCNC: 128 MMOL/L (ref 136–145)
SODIUM BLDV-SCNC: 125 MMOL/L (ref 136–145)
SODIUM BLDV-SCNC: 128 MMOL/L (ref 136–145)
SODIUM SERPL-SCNC: 131 MMOL/L (ref 136–145)
SP GR UR STRIP.AUTO: 1.03
UROBILINOGEN UR STRIP.AUTO-MCNC: NORMAL MG/DL
WBC # BLD AUTO: 23 X10*3/UL (ref 4.4–11.3)
WBC #/AREA URNS AUTO: ABNORMAL /HPF

## 2024-09-04 PROCEDURE — 80053 COMPREHEN METABOLIC PANEL: CPT | Performed by: EMERGENCY MEDICINE

## 2024-09-04 PROCEDURE — 96365 THER/PROPH/DIAG IV INF INIT: CPT | Mod: 59

## 2024-09-04 PROCEDURE — 74177 CT ABD & PELVIS W/CONTRAST: CPT

## 2024-09-04 PROCEDURE — 71260 CT THORAX DX C+: CPT | Performed by: STUDENT IN AN ORGANIZED HEALTH CARE EDUCATION/TRAINING PROGRAM

## 2024-09-04 PROCEDURE — 71045 X-RAY EXAM CHEST 1 VIEW: CPT

## 2024-09-04 PROCEDURE — 2500000004 HC RX 250 GENERAL PHARMACY W/ HCPCS (ALT 636 FOR OP/ED): Performed by: EMERGENCY MEDICINE

## 2024-09-04 PROCEDURE — 83690 ASSAY OF LIPASE: CPT | Performed by: EMERGENCY MEDICINE

## 2024-09-04 PROCEDURE — 99291 CRITICAL CARE FIRST HOUR: CPT | Mod: 25 | Performed by: EMERGENCY MEDICINE

## 2024-09-04 PROCEDURE — 70450 CT HEAD/BRAIN W/O DYE: CPT

## 2024-09-04 PROCEDURE — 2500000005 HC RX 250 GENERAL PHARMACY W/O HCPCS: Performed by: STUDENT IN AN ORGANIZED HEALTH CARE EDUCATION/TRAINING PROGRAM

## 2024-09-04 PROCEDURE — 83605 ASSAY OF LACTIC ACID: CPT | Performed by: EMERGENCY MEDICINE

## 2024-09-04 PROCEDURE — 83615 LACTATE (LD) (LDH) ENZYME: CPT

## 2024-09-04 PROCEDURE — 87077 CULTURE AEROBIC IDENTIFY: CPT | Mod: AHULAB

## 2024-09-04 PROCEDURE — 71045 X-RAY EXAM CHEST 1 VIEW: CPT | Performed by: STUDENT IN AN ORGANIZED HEALTH CARE EDUCATION/TRAINING PROGRAM

## 2024-09-04 PROCEDURE — 87040 BLOOD CULTURE FOR BACTERIA: CPT | Mod: AHULAB | Performed by: EMERGENCY MEDICINE

## 2024-09-04 PROCEDURE — 36415 COLL VENOUS BLD VENIPUNCTURE: CPT | Performed by: EMERGENCY MEDICINE

## 2024-09-04 PROCEDURE — 99291 CRITICAL CARE FIRST HOUR: CPT

## 2024-09-04 PROCEDURE — 2500000005 HC RX 250 GENERAL PHARMACY W/O HCPCS

## 2024-09-04 PROCEDURE — 36620 INSERTION CATHETER ARTERY: CPT

## 2024-09-04 PROCEDURE — 84155 ASSAY OF PROTEIN SERUM: CPT

## 2024-09-04 PROCEDURE — 89051 BODY FLUID CELL COUNT: CPT

## 2024-09-04 PROCEDURE — 80307 DRUG TEST PRSMV CHEM ANLYZR: CPT | Performed by: STUDENT IN AN ORGANIZED HEALTH CARE EDUCATION/TRAINING PROGRAM

## 2024-09-04 PROCEDURE — 82435 ASSAY OF BLOOD CHLORIDE: CPT | Performed by: EMERGENCY MEDICINE

## 2024-09-04 PROCEDURE — 83986 ASSAY PH BODY FLUID NOS: CPT | Mod: AHULAB

## 2024-09-04 PROCEDURE — 82435 ASSAY OF BLOOD CHLORIDE: CPT | Performed by: HOSPITALIST

## 2024-09-04 PROCEDURE — 70450 CT HEAD/BRAIN W/O DYE: CPT | Performed by: STUDENT IN AN ORGANIZED HEALTH CARE EDUCATION/TRAINING PROGRAM

## 2024-09-04 PROCEDURE — 83615 LACTATE (LD) (LDH) ENZYME: CPT | Mod: AHULAB

## 2024-09-04 PROCEDURE — 0BH17EZ INSERTION OF ENDOTRACHEAL AIRWAY INTO TRACHEA, VIA NATURAL OR ARTIFICIAL OPENING: ICD-10-PCS | Performed by: EMERGENCY MEDICINE

## 2024-09-04 PROCEDURE — 2550000001 HC RX 255 CONTRASTS: Performed by: EMERGENCY MEDICINE

## 2024-09-04 PROCEDURE — 85610 PROTHROMBIN TIME: CPT | Performed by: EMERGENCY MEDICINE

## 2024-09-04 PROCEDURE — 2500000004 HC RX 250 GENERAL PHARMACY W/ HCPCS (ALT 636 FOR OP/ED)

## 2024-09-04 PROCEDURE — 2500000004 HC RX 250 GENERAL PHARMACY W/ HCPCS (ALT 636 FOR OP/ED): Performed by: STUDENT IN AN ORGANIZED HEALTH CARE EDUCATION/TRAINING PROGRAM

## 2024-09-04 PROCEDURE — 99292 CRITICAL CARE ADDL 30 MIN: CPT | Performed by: EMERGENCY MEDICINE

## 2024-09-04 PROCEDURE — 93005 ELECTROCARDIOGRAM TRACING: CPT

## 2024-09-04 PROCEDURE — 80320 DRUG SCREEN QUANTALCOHOLS: CPT | Performed by: EMERGENCY MEDICINE

## 2024-09-04 PROCEDURE — 3E043XZ INTRODUCTION OF VASOPRESSOR INTO CENTRAL VEIN, PERCUTANEOUS APPROACH: ICD-10-PCS | Performed by: EMERGENCY MEDICINE

## 2024-09-04 PROCEDURE — 84484 ASSAY OF TROPONIN QUANT: CPT | Performed by: EMERGENCY MEDICINE

## 2024-09-04 PROCEDURE — 80143 DRUG ASSAY ACETAMINOPHEN: CPT | Performed by: EMERGENCY MEDICINE

## 2024-09-04 PROCEDURE — 84132 ASSAY OF SERUM POTASSIUM: CPT | Performed by: EMERGENCY MEDICINE

## 2024-09-04 PROCEDURE — 85025 COMPLETE CBC W/AUTO DIFF WBC: CPT | Performed by: EMERGENCY MEDICINE

## 2024-09-04 PROCEDURE — 36556 INSERT NON-TUNNEL CV CATH: CPT | Performed by: EMERGENCY MEDICINE

## 2024-09-04 PROCEDURE — 37799 UNLISTED PX VASCULAR SURGERY: CPT

## 2024-09-04 PROCEDURE — 71045 X-RAY EXAM CHEST 1 VIEW: CPT | Performed by: RADIOLOGY

## 2024-09-04 PROCEDURE — 82140 ASSAY OF AMMONIA: CPT | Performed by: EMERGENCY MEDICINE

## 2024-09-04 PROCEDURE — 5A1955Z RESPIRATORY VENTILATION, GREATER THAN 96 CONSECUTIVE HOURS: ICD-10-PCS | Performed by: EMERGENCY MEDICINE

## 2024-09-04 PROCEDURE — 82945 GLUCOSE OTHER FLUID: CPT | Mod: AHULAB

## 2024-09-04 PROCEDURE — 74177 CT ABD & PELVIS W/CONTRAST: CPT | Performed by: STUDENT IN AN ORGANIZED HEALTH CARE EDUCATION/TRAINING PROGRAM

## 2024-09-04 PROCEDURE — 82947 ASSAY GLUCOSE BLOOD QUANT: CPT

## 2024-09-04 PROCEDURE — 96374 THER/PROPH/DIAG INJ IV PUSH: CPT | Mod: 59

## 2024-09-04 PROCEDURE — 83880 ASSAY OF NATRIURETIC PEPTIDE: CPT | Performed by: EMERGENCY MEDICINE

## 2024-09-04 PROCEDURE — 2500000002 HC RX 250 W HCPCS SELF ADMINISTERED DRUGS (ALT 637 FOR MEDICARE OP, ALT 636 FOR OP/ED): Performed by: STUDENT IN AN ORGANIZED HEALTH CARE EDUCATION/TRAINING PROGRAM

## 2024-09-04 PROCEDURE — 82248 BILIRUBIN DIRECT: CPT | Performed by: EMERGENCY MEDICINE

## 2024-09-04 PROCEDURE — 2500000002 HC RX 250 W HCPCS SELF ADMINISTERED DRUGS (ALT 637 FOR MEDICARE OP, ALT 636 FOR OP/ED)

## 2024-09-04 PROCEDURE — 2500000005 HC RX 250 GENERAL PHARMACY W/O HCPCS: Performed by: EMERGENCY MEDICINE

## 2024-09-04 PROCEDURE — 87070 CULTURE OTHR SPECIMN AEROBIC: CPT | Mod: AHULAB

## 2024-09-04 PROCEDURE — 31500 INSERT EMERGENCY AIRWAY: CPT | Performed by: EMERGENCY MEDICINE

## 2024-09-04 PROCEDURE — 2020000001 HC ICU ROOM DAILY

## 2024-09-04 PROCEDURE — 87081 CULTURE SCREEN ONLY: CPT | Mod: AHULAB | Performed by: STUDENT IN AN ORGANIZED HEALTH CARE EDUCATION/TRAINING PROGRAM

## 2024-09-04 PROCEDURE — 84132 ASSAY OF SERUM POTASSIUM: CPT | Performed by: HOSPITALIST

## 2024-09-04 PROCEDURE — 94002 VENT MGMT INPAT INIT DAY: CPT

## 2024-09-04 PROCEDURE — 94640 AIRWAY INHALATION TREATMENT: CPT

## 2024-09-04 PROCEDURE — 84157 ASSAY OF PROTEIN OTHER: CPT | Mod: AHULAB

## 2024-09-04 PROCEDURE — 87205 SMEAR GRAM STAIN: CPT | Mod: AHULAB

## 2024-09-04 PROCEDURE — 02HV33Z INSERTION OF INFUSION DEVICE INTO SUPERIOR VENA CAVA, PERCUTANEOUS APPROACH: ICD-10-PCS | Performed by: EMERGENCY MEDICINE

## 2024-09-04 PROCEDURE — 87102 FUNGUS ISOLATION CULTURE: CPT | Mod: AHULAB

## 2024-09-04 PROCEDURE — 96375 TX/PRO/DX INJ NEW DRUG ADDON: CPT | Mod: 59

## 2024-09-04 PROCEDURE — 83735 ASSAY OF MAGNESIUM: CPT | Performed by: EMERGENCY MEDICINE

## 2024-09-04 PROCEDURE — 81003 URINALYSIS AUTO W/O SCOPE: CPT | Performed by: EMERGENCY MEDICINE

## 2024-09-04 PROCEDURE — 87086 URINE CULTURE/COLONY COUNT: CPT | Mod: AHULAB | Performed by: EMERGENCY MEDICINE

## 2024-09-04 RX ORDER — DEXTROSE 50 % IN WATER (D50W) INTRAVENOUS SYRINGE
25
Status: ACTIVE | OUTPATIENT
Start: 2024-09-04

## 2024-09-04 RX ORDER — FENTANYL CITRATE-0.9 % NACL/PF 10 MCG/ML
0-200 PLASTIC BAG, INJECTION (ML) INTRAVENOUS CONTINUOUS
Status: DISPENSED | OUTPATIENT
Start: 2024-09-04

## 2024-09-04 RX ORDER — HEPARIN SODIUM 5000 [USP'U]/ML
7500 INJECTION, SOLUTION INTRAVENOUS; SUBCUTANEOUS EVERY 8 HOURS SCHEDULED
Status: DISCONTINUED | OUTPATIENT
Start: 2024-09-04 | End: 2024-09-06

## 2024-09-04 RX ORDER — VANCOMYCIN HYDROCHLORIDE 1 G/200ML
1000 INJECTION, SOLUTION INTRAVENOUS EVERY 24 HOURS
Status: DISCONTINUED | OUTPATIENT
Start: 2024-09-05 | End: 2024-09-04 | Stop reason: DRUGHIGH

## 2024-09-04 RX ORDER — PANTOPRAZOLE SODIUM 40 MG/1
40 TABLET, DELAYED RELEASE ORAL
Status: DISCONTINUED | OUTPATIENT
Start: 2024-09-05 | End: 2024-09-04

## 2024-09-04 RX ORDER — FLUTICASONE FUROATE AND VILANTEROL 100; 25 UG/1; UG/1
1 POWDER RESPIRATORY (INHALATION)
Status: DISCONTINUED | OUTPATIENT
Start: 2024-09-05 | End: 2024-09-04 | Stop reason: CLARIF

## 2024-09-04 RX ORDER — PANTOPRAZOLE SODIUM 40 MG/10ML
40 INJECTION, POWDER, LYOPHILIZED, FOR SOLUTION INTRAVENOUS
Status: DISCONTINUED | OUTPATIENT
Start: 2024-09-05 | End: 2024-09-04

## 2024-09-04 RX ORDER — DEXTROSE 50 % IN WATER (D50W) INTRAVENOUS SYRINGE
12.5
Status: ACTIVE | OUTPATIENT
Start: 2024-09-04

## 2024-09-04 RX ORDER — NALOXONE HYDROCHLORIDE 1 MG/ML
INJECTION INTRAMUSCULAR; INTRAVENOUS; SUBCUTANEOUS CODE/TRAUMA/SEDATION MEDICATION
Status: COMPLETED | OUTPATIENT
Start: 2024-09-04 | End: 2024-09-04

## 2024-09-04 RX ORDER — FENTANYL CITRATE 50 UG/ML
INJECTION, SOLUTION INTRAMUSCULAR; INTRAVENOUS
Status: COMPLETED
Start: 2024-09-04 | End: 2024-09-04

## 2024-09-04 RX ORDER — PANTOPRAZOLE SODIUM 40 MG/10ML
40 INJECTION, POWDER, LYOPHILIZED, FOR SOLUTION INTRAVENOUS 2 TIMES DAILY
Status: DISCONTINUED | OUTPATIENT
Start: 2024-09-05 | End: 2024-09-06

## 2024-09-04 RX ORDER — FORMOTEROL FUMARATE DIHYDRATE 20 UG/2ML
20 SOLUTION RESPIRATORY (INHALATION)
Status: DISCONTINUED | OUTPATIENT
Start: 2024-09-04 | End: 2024-09-05

## 2024-09-04 RX ORDER — MIDAZOLAM HYDROCHLORIDE 1 MG/ML
INJECTION INTRAMUSCULAR; INTRAVENOUS
Status: COMPLETED
Start: 2024-09-04 | End: 2024-09-04

## 2024-09-04 RX ORDER — IPRATROPIUM BROMIDE AND ALBUTEROL SULFATE 2.5; .5 MG/3ML; MG/3ML
3 SOLUTION RESPIRATORY (INHALATION)
Status: DISPENSED | OUTPATIENT
Start: 2024-09-04

## 2024-09-04 RX ORDER — BUDESONIDE 0.5 MG/2ML
0.5 INHALANT ORAL
Status: DISCONTINUED | OUTPATIENT
Start: 2024-09-04 | End: 2024-09-05

## 2024-09-04 RX ORDER — INSULIN LISPRO 100 [IU]/ML
0-5 INJECTION, SOLUTION INTRAVENOUS; SUBCUTANEOUS EVERY 4 HOURS
Status: DISCONTINUED | OUTPATIENT
Start: 2024-09-04 | End: 2024-09-04

## 2024-09-04 RX ORDER — VANCOMYCIN HYDROCHLORIDE 1 G/20ML
INJECTION, POWDER, LYOPHILIZED, FOR SOLUTION INTRAVENOUS DAILY PRN
Status: DISCONTINUED | OUTPATIENT
Start: 2024-09-04 | End: 2024-09-08

## 2024-09-04 RX ORDER — INSULIN LISPRO 100 [IU]/ML
0-10 INJECTION, SOLUTION INTRAVENOUS; SUBCUTANEOUS EVERY 4 HOURS
Status: DISPENSED | OUTPATIENT
Start: 2024-09-04

## 2024-09-04 RX ORDER — PANTOPRAZOLE SODIUM 40 MG/10ML
80 INJECTION, POWDER, LYOPHILIZED, FOR SOLUTION INTRAVENOUS ONCE
Status: COMPLETED | OUTPATIENT
Start: 2024-09-04 | End: 2024-09-04

## 2024-09-04 RX ORDER — NOREPINEPHRINE BITARTRATE/D5W 8 MG/250ML
0-.2 PLASTIC BAG, INJECTION (ML) INTRAVENOUS CONTINUOUS
Status: DISPENSED | OUTPATIENT
Start: 2024-09-04

## 2024-09-04 RX ORDER — FENTANYL CITRATE-0.9 % NACL/PF 10 MCG/ML
PLASTIC BAG, INJECTION (ML) INTRAVENOUS
Status: COMPLETED
Start: 2024-09-04 | End: 2024-09-04

## 2024-09-04 RX ORDER — NOREPINEPHRINE BITARTRATE/D5W 8 MG/250ML
PLASTIC BAG, INJECTION (ML) INTRAVENOUS
Status: COMPLETED
Start: 2024-09-04 | End: 2024-09-04

## 2024-09-04 SDOH — SOCIAL STABILITY: SOCIAL INSECURITY: DOES ANYONE TRY TO KEEP YOU FROM HAVING/CONTACTING OTHER FRIENDS OR DOING THINGS OUTSIDE YOUR HOME?: UNABLE TO ASSESS

## 2024-09-04 SDOH — SOCIAL STABILITY: SOCIAL INSECURITY: ABUSE: ADULT

## 2024-09-04 SDOH — SOCIAL STABILITY: SOCIAL INSECURITY: DO YOU FEEL ANYONE HAS EXPLOITED OR TAKEN ADVANTAGE OF YOU FINANCIALLY OR OF YOUR PERSONAL PROPERTY?: UNABLE TO ASSESS

## 2024-09-04 SDOH — SOCIAL STABILITY: SOCIAL INSECURITY: ARE YOU OR HAVE YOU BEEN THREATENED OR ABUSED PHYSICALLY, EMOTIONALLY, OR SEXUALLY BY ANYONE?: UNABLE TO ASSESS

## 2024-09-04 SDOH — SOCIAL STABILITY: SOCIAL INSECURITY: DO YOU FEEL UNSAFE GOING BACK TO THE PLACE WHERE YOU ARE LIVING?: UNABLE TO ASSESS

## 2024-09-04 SDOH — SOCIAL STABILITY: SOCIAL INSECURITY: WERE YOU ABLE TO COMPLETE ALL THE BEHAVIORAL HEALTH SCREENINGS?: NO

## 2024-09-04 SDOH — SOCIAL STABILITY: SOCIAL INSECURITY: HAVE YOU HAD THOUGHTS OF HARMING ANYONE ELSE?: UNABLE TO ASSESS

## 2024-09-04 SDOH — SOCIAL STABILITY: SOCIAL INSECURITY: HAS ANYONE EVER THREATENED TO HURT YOUR FAMILY OR YOUR PETS?: UNABLE TO ASSESS

## 2024-09-04 SDOH — SOCIAL STABILITY: SOCIAL INSECURITY: ARE THERE ANY APPARENT SIGNS OF INJURIES/BEHAVIORS THAT COULD BE RELATED TO ABUSE/NEGLECT?: UNABLE TO ASSESS

## 2024-09-04 SDOH — SOCIAL STABILITY: SOCIAL INSECURITY: HAVE YOU HAD ANY THOUGHTS OF HARMING ANYONE ELSE?: UNABLE TO ASSESS

## 2024-09-04 ASSESSMENT — ACTIVITIES OF DAILY LIVING (ADL)
DRESSING YOURSELF: UNABLE TO ASSESS
HEARING - RIGHT EAR: UNABLE TO ASSESS
GROOMING: UNABLE TO ASSESS
FEEDING YOURSELF: UNABLE TO ASSESS
JUDGMENT_ADEQUATE_SAFELY_COMPLETE_DAILY_ACTIVITIES: UNABLE TO ASSESS
HEARING - LEFT EAR: UNABLE TO ASSESS
PATIENT'S MEMORY ADEQUATE TO SAFELY COMPLETE DAILY ACTIVITIES?: UNABLE TO ASSESS
TOILETING: UNABLE TO ASSESS
ADEQUATE_TO_COMPLETE_ADL: UNABLE TO ASSESS
BATHING: UNABLE TO ASSESS
WALKS IN HOME: UNABLE TO ASSESS

## 2024-09-04 ASSESSMENT — COGNITIVE AND FUNCTIONAL STATUS - GENERAL
DRESSING REGULAR UPPER BODY CLOTHING: TOTAL
TURNING FROM BACK TO SIDE WHILE IN FLAT BAD: TOTAL
MOVING TO AND FROM BED TO CHAIR: TOTAL
WALKING IN HOSPITAL ROOM: TOTAL
DAILY ACTIVITIY SCORE: 6
EATING MEALS: TOTAL
PERSONAL GROOMING: TOTAL
HELP NEEDED FOR BATHING: TOTAL
MOBILITY SCORE: 6
STANDING UP FROM CHAIR USING ARMS: TOTAL
PATIENT BASELINE BEDBOUND: UNABLE TO ASSESS AT THIS TIME
CLIMB 3 TO 5 STEPS WITH RAILING: TOTAL
DRESSING REGULAR LOWER BODY CLOTHING: TOTAL
TOILETING: TOTAL
MOVING FROM LYING ON BACK TO SITTING ON SIDE OF FLAT BED WITH BEDRAILS: TOTAL

## 2024-09-04 ASSESSMENT — COLUMBIA-SUICIDE SEVERITY RATING SCALE - C-SSRS
6. HAVE YOU EVER DONE ANYTHING, STARTED TO DO ANYTHING, OR PREPARED TO DO ANYTHING TO END YOUR LIFE?: NO
2. HAVE YOU ACTUALLY HAD ANY THOUGHTS OF KILLING YOURSELF?: NO
1. IN THE PAST MONTH, HAVE YOU WISHED YOU WERE DEAD OR WISHED YOU COULD GO TO SLEEP AND NOT WAKE UP?: NO

## 2024-09-04 ASSESSMENT — LIFESTYLE VARIABLES
AUDIT-C TOTAL SCORE: -1
AUDIT-C TOTAL SCORE: -1
HOW MANY STANDARD DRINKS CONTAINING ALCOHOL DO YOU HAVE ON A TYPICAL DAY: PATIENT UNABLE TO ANSWER
HOW OFTEN DO YOU HAVE 6 OR MORE DRINKS ON ONE OCCASION: PATIENT UNABLE TO ANSWER
HOW OFTEN DO YOU HAVE A DRINK CONTAINING ALCOHOL: PATIENT UNABLE TO ANSWER
SKIP TO QUESTIONS 9-10: 0

## 2024-09-04 NOTE — ED TRIAGE NOTES
PT presented to ED unresponsive found at his home by a . EMS arrived and the patient had a weak thready carotid pulse. EMS gave narcan (unknown amount) and the patient was still unresponsive. GCS of 3. . Hypotensive. IO in right shoulder. PMH of diabetes, CHF, COPD. Initiated rapid response and sepsis protocol. MD at bedside.

## 2024-09-05 ENCOUNTER — APPOINTMENT (OUTPATIENT)
Dept: CARDIOLOGY | Facility: HOSPITAL | Age: 69
DRG: 870 | End: 2024-09-05
Payer: MEDICARE

## 2024-09-05 ENCOUNTER — APPOINTMENT (OUTPATIENT)
Dept: RADIOLOGY | Facility: HOSPITAL | Age: 69
DRG: 870 | End: 2024-09-05
Payer: MEDICARE

## 2024-09-05 ENCOUNTER — TELEPHONE (OUTPATIENT)
Dept: UROLOGY | Facility: HOSPITAL | Age: 69
End: 2024-09-05
Payer: MEDICARE

## 2024-09-05 LAB
ALBUMIN SERPL BCP-MCNC: 3 G/DL (ref 3.4–5)
AMMONIA PLAS-SCNC: 38 UMOL/L (ref 16–53)
ANION GAP BLDA CALCULATED.4IONS-SCNC: 10 MMO/L (ref 10–25)
ANION GAP BLDA CALCULATED.4IONS-SCNC: 10 MMO/L (ref 10–25)
ANION GAP BLDA CALCULATED.4IONS-SCNC: 9 MMO/L (ref 10–25)
ANION GAP BLDA CALCULATED.4IONS-SCNC: 9 MMO/L (ref 10–25)
ANION GAP SERPL CALC-SCNC: 12 MMOL/L (ref 10–20)
ATRIAL RATE: 91 BPM
BACTERIA UR CULT: NO GROWTH
BASE EXCESS BLDA CALC-SCNC: -0.1 MMOL/L (ref -2–3)
BASE EXCESS BLDA CALC-SCNC: -0.5 MMOL/L (ref -2–3)
BASE EXCESS BLDA CALC-SCNC: -0.7 MMOL/L (ref -2–3)
BASE EXCESS BLDA CALC-SCNC: -2.4 MMOL/L (ref -2–3)
BASOPHILS NFR FLD MANUAL: 0 %
BLASTS NFR FLD MANUAL: 0 %
BODY TEMPERATURE: 37 DEGREES CELSIUS
BUN SERPL-MCNC: 44 MG/DL (ref 6–23)
CA-I BLDA-SCNC: 1.15 MMOL/L (ref 1.1–1.33)
CA-I BLDA-SCNC: 1.16 MMOL/L (ref 1.1–1.33)
CA-I BLDA-SCNC: 1.17 MMOL/L (ref 1.1–1.33)
CA-I BLDA-SCNC: 1.18 MMOL/L (ref 1.1–1.33)
CALCIUM SERPL-MCNC: 8.2 MG/DL (ref 8.6–10.3)
CHLORIDE BLDA-SCNC: 100 MMOL/L (ref 98–107)
CHLORIDE BLDA-SCNC: 97 MMOL/L (ref 98–107)
CHLORIDE BLDA-SCNC: 98 MMOL/L (ref 98–107)
CHLORIDE BLDA-SCNC: 98 MMOL/L (ref 98–107)
CHLORIDE SERPL-SCNC: 100 MMOL/L (ref 98–107)
CLARITY FLD: ABNORMAL
CO2 SERPL-SCNC: 30 MMOL/L (ref 21–32)
COLOR FLD: ABNORMAL
CREAT SERPL-MCNC: 1.62 MG/DL (ref 0.5–1.3)
EGFRCR SERPLBLD CKD-EPI 2021: 46 ML/MIN/1.73M*2
EOSINOPHIL NFR FLD MANUAL: 0 %
ERYTHROCYTE [DISTWIDTH] IN BLOOD BY AUTOMATED COUNT: 13.6 % (ref 11.5–14.5)
GLUCOSE BLD MANUAL STRIP-MCNC: 109 MG/DL (ref 74–99)
GLUCOSE BLD MANUAL STRIP-MCNC: 123 MG/DL (ref 74–99)
GLUCOSE BLD MANUAL STRIP-MCNC: 132 MG/DL (ref 74–99)
GLUCOSE BLD MANUAL STRIP-MCNC: 157 MG/DL (ref 74–99)
GLUCOSE BLD MANUAL STRIP-MCNC: 164 MG/DL (ref 74–99)
GLUCOSE BLDA-MCNC: 129 MG/DL (ref 74–99)
GLUCOSE BLDA-MCNC: 132 MG/DL (ref 74–99)
GLUCOSE BLDA-MCNC: 156 MG/DL (ref 74–99)
GLUCOSE BLDA-MCNC: 174 MG/DL (ref 74–99)
GLUCOSE FLD-MCNC: <10 MG/DL
GLUCOSE SERPL-MCNC: 128 MG/DL (ref 74–99)
HCO3 BLDA-SCNC: 27.3 MMOL/L (ref 22–26)
HCO3 BLDA-SCNC: 28.7 MMOL/L (ref 22–26)
HCO3 BLDA-SCNC: 29.1 MMOL/L (ref 22–26)
HCO3 BLDA-SCNC: 29.6 MMOL/L (ref 22–26)
HCT VFR BLD AUTO: 58.2 % (ref 41–52)
HCT VFR BLD EST: 52 % (ref 41–52)
HCT VFR BLD EST: 55 % (ref 41–52)
HCT VFR BLD EST: 56 % (ref 41–52)
HCT VFR BLD EST: 56 % (ref 41–52)
HGB BLD-MCNC: 18.5 G/DL (ref 13.5–17.5)
HGB BLDA-MCNC: 17.4 G/DL (ref 13.5–17.5)
HGB BLDA-MCNC: 18.3 G/DL (ref 13.5–17.5)
HGB BLDA-MCNC: 18.7 G/DL (ref 13.5–17.5)
HGB BLDA-MCNC: 18.7 G/DL (ref 13.5–17.5)
IMMATURE GRANULOCYTES IN FLUID: 0 %
INHALED O2 CONCENTRATION: 60 %
INHALED O2 CONCENTRATION: 70 %
LACTATE BLDA-SCNC: 1 MMOL/L (ref 0.4–2)
LACTATE BLDA-SCNC: 1.2 MMOL/L (ref 0.4–2)
LACTATE BLDA-SCNC: 1.3 MMOL/L (ref 0.4–2)
LACTATE BLDA-SCNC: 1.4 MMOL/L (ref 0.4–2)
LDH FLD L TO P-CCNC: 1024 U/L
LDH SERPL L TO P-CCNC: 127 U/L (ref 84–246)
LYMPHOCYTES NFR FLD MANUAL: 3 %
MAGNESIUM SERPL-MCNC: 2.4 MG/DL (ref 1.6–2.4)
MCH RBC QN AUTO: 33 PG (ref 26–34)
MCHC RBC AUTO-ENTMCNC: 31.8 G/DL (ref 32–36)
MCV RBC AUTO: 104 FL (ref 80–100)
MONOS+MACROS NFR FLD MANUAL: 0 %
NEUTROPHILS NFR FLD MANUAL: 97 %
NRBC BLD-RTO: 0 /100 WBCS (ref 0–0)
OTHER CELLS NFR FLD MANUAL: 0 %
OXYHGB MFR BLDA: 93.1 % (ref 94–98)
OXYHGB MFR BLDA: 93.4 % (ref 94–98)
OXYHGB MFR BLDA: 93.9 % (ref 94–98)
OXYHGB MFR BLDA: 94.7 % (ref 94–98)
P AXIS: 17 DEGREES
P OFFSET: 152 MS
P ONSET: 91 MS
PCO2 BLDA: 53 MM HG (ref 38–42)
PCO2 BLDA: 64 MM HG (ref 38–42)
PCO2 BLDA: 69 MM HG (ref 38–42)
PCO2 BLDA: 78 MM HG (ref 38–42)
PH BLDA: 7.18 PH (ref 7.38–7.42)
PH BLDA: 7.24 PH (ref 7.38–7.42)
PH BLDA: 7.26 PH (ref 7.38–7.42)
PH BLDA: 7.32 PH (ref 7.38–7.42)
PH FLD: 7.92 [PH]
PHOSPHATE SERPL-MCNC: 5.5 MG/DL (ref 2.5–4.9)
PLASMA CELLS NFR FLD MANUAL: 0 %
PLATELET # BLD AUTO: 298 X10*3/UL (ref 150–450)
PO2 BLDA: 76 MM HG (ref 85–95)
PO2 BLDA: 77 MM HG (ref 85–95)
PO2 BLDA: 84 MM HG (ref 85–95)
PO2 BLDA: 93 MM HG (ref 85–95)
POTASSIUM BLDA-SCNC: 4.3 MMOL/L (ref 3.5–5.3)
POTASSIUM BLDA-SCNC: 4.3 MMOL/L (ref 3.5–5.3)
POTASSIUM BLDA-SCNC: 4.4 MMOL/L (ref 3.5–5.3)
POTASSIUM BLDA-SCNC: 4.4 MMOL/L (ref 3.5–5.3)
POTASSIUM SERPL-SCNC: 4.5 MMOL/L (ref 3.5–5.3)
PR INTERVAL: 238 MS
PROT FLD-MCNC: 4 G/DL
Q ONSET: 210 MS
QRS COUNT: 15 BEATS
QRS DURATION: 158 MS
QT INTERVAL: 418 MS
QTC CALCULATION(BAZETT): 514 MS
QTC FREDERICIA: 480 MS
R AXIS: -57 DEGREES
RBC # BLD AUTO: 5.61 X10*6/UL (ref 4.5–5.9)
RBC # FLD AUTO: ABNORMAL /UL
SAO2 % BLDA: 96 % (ref 94–100)
SAO2 % BLDA: 96 % (ref 94–100)
SAO2 % BLDA: 97 % (ref 94–100)
SAO2 % BLDA: 97 % (ref 94–100)
SODIUM BLDA-SCNC: 131 MMOL/L (ref 136–145)
SODIUM BLDA-SCNC: 132 MMOL/L (ref 136–145)
SODIUM BLDA-SCNC: 132 MMOL/L (ref 136–145)
SODIUM BLDA-SCNC: 133 MMOL/L (ref 136–145)
SODIUM SERPL-SCNC: 137 MMOL/L (ref 136–145)
T AXIS: 114 DEGREES
T OFFSET: 419 MS
TOTAL CELLS COUNTED FLD: 100
VANCOMYCIN SERPL-MCNC: 14.3 UG/ML (ref 5–20)
VENTRICULAR RATE: 91 BPM
WBC # BLD AUTO: 22.7 X10*3/UL (ref 4.4–11.3)
WBC # FLD AUTO: ABNORMAL /UL

## 2024-09-05 PROCEDURE — 71045 X-RAY EXAM CHEST 1 VIEW: CPT

## 2024-09-05 PROCEDURE — 2500000002 HC RX 250 W HCPCS SELF ADMINISTERED DRUGS (ALT 637 FOR MEDICARE OP, ALT 636 FOR OP/ED)

## 2024-09-05 PROCEDURE — 99222 1ST HOSP IP/OBS MODERATE 55: CPT | Performed by: INTERNAL MEDICINE

## 2024-09-05 PROCEDURE — 2500000004 HC RX 250 GENERAL PHARMACY W/ HCPCS (ALT 636 FOR OP/ED)

## 2024-09-05 PROCEDURE — 94681 O2 UPTK CO2 OUTP % O2 XTRC: CPT

## 2024-09-05 PROCEDURE — 93005 ELECTROCARDIOGRAM TRACING: CPT

## 2024-09-05 PROCEDURE — 82435 ASSAY OF BLOOD CHLORIDE: CPT

## 2024-09-05 PROCEDURE — 80202 ASSAY OF VANCOMYCIN: CPT

## 2024-09-05 PROCEDURE — 94640 AIRWAY INHALATION TREATMENT: CPT

## 2024-09-05 PROCEDURE — 84132 ASSAY OF SERUM POTASSIUM: CPT

## 2024-09-05 PROCEDURE — 80069 RENAL FUNCTION PANEL: CPT

## 2024-09-05 PROCEDURE — 94003 VENT MGMT INPAT SUBQ DAY: CPT

## 2024-09-05 PROCEDURE — 71045 X-RAY EXAM CHEST 1 VIEW: CPT | Performed by: RADIOLOGY

## 2024-09-05 PROCEDURE — 84295 ASSAY OF SERUM SODIUM: CPT

## 2024-09-05 PROCEDURE — 2500000002 HC RX 250 W HCPCS SELF ADMINISTERED DRUGS (ALT 637 FOR MEDICARE OP, ALT 636 FOR OP/ED): Performed by: STUDENT IN AN ORGANIZED HEALTH CARE EDUCATION/TRAINING PROGRAM

## 2024-09-05 PROCEDURE — 82140 ASSAY OF AMMONIA: CPT

## 2024-09-05 PROCEDURE — 2500000005 HC RX 250 GENERAL PHARMACY W/O HCPCS: Performed by: STUDENT IN AN ORGANIZED HEALTH CARE EDUCATION/TRAINING PROGRAM

## 2024-09-05 PROCEDURE — 31624 DX BRONCHOSCOPE/LAVAGE: CPT | Performed by: HOSPITALIST

## 2024-09-05 PROCEDURE — 37799 UNLISTED PX VASCULAR SURGERY: CPT

## 2024-09-05 PROCEDURE — 2020000001 HC ICU ROOM DAILY

## 2024-09-05 PROCEDURE — 2500000005 HC RX 250 GENERAL PHARMACY W/O HCPCS

## 2024-09-05 PROCEDURE — 82947 ASSAY GLUCOSE BLOOD QUANT: CPT

## 2024-09-05 PROCEDURE — 32551 INSERTION OF CHEST TUBE: CPT | Performed by: HOSPITALIST

## 2024-09-05 PROCEDURE — 0BJ08ZZ INSPECTION OF TRACHEOBRONCHIAL TREE, VIA NATURAL OR ARTIFICIAL OPENING ENDOSCOPIC: ICD-10-PCS | Performed by: HOSPITALIST

## 2024-09-05 PROCEDURE — 83735 ASSAY OF MAGNESIUM: CPT

## 2024-09-05 PROCEDURE — 85027 COMPLETE CBC AUTOMATED: CPT

## 2024-09-05 PROCEDURE — 99291 CRITICAL CARE FIRST HOUR: CPT

## 2024-09-05 PROCEDURE — 99222 1ST HOSP IP/OBS MODERATE 55: CPT

## 2024-09-05 PROCEDURE — 2500000001 HC RX 250 WO HCPCS SELF ADMINISTERED DRUGS (ALT 637 FOR MEDICARE OP)

## 2024-09-05 PROCEDURE — 93010 ELECTROCARDIOGRAM REPORT: CPT | Performed by: INTERNAL MEDICINE

## 2024-09-05 RX ORDER — LANOLIN ALCOHOL/MO/W.PET/CERES
100 CREAM (GRAM) TOPICAL DAILY
Status: DISPENSED | OUTPATIENT
Start: 2024-09-05

## 2024-09-05 RX ORDER — LORAZEPAM 2 MG/ML
2 INJECTION INTRAMUSCULAR EVERY 5 MIN PRN
Status: ACTIVE | OUTPATIENT
Start: 2024-09-05

## 2024-09-05 RX ORDER — LEVOTHYROXINE SODIUM 125 UG/1
125 TABLET ORAL DAILY
Status: DISPENSED | OUTPATIENT
Start: 2024-09-05

## 2024-09-05 RX ORDER — DEXMEDETOMIDINE HYDROCHLORIDE 4 UG/ML
0-1.5 INJECTION, SOLUTION INTRAVENOUS CONTINUOUS
Status: DISCONTINUED | OUTPATIENT
Start: 2024-09-05 | End: 2024-09-05

## 2024-09-05 RX ORDER — MULTIVIT-MIN/IRON FUM/FOLIC AC 7.5 MG-4
1 TABLET ORAL DAILY
Status: DISPENSED | OUTPATIENT
Start: 2024-09-05

## 2024-09-05 RX ORDER — PHENOBARBITAL SODIUM 65 MG/ML
32.5 INJECTION, SOLUTION INTRAMUSCULAR; INTRAVENOUS EVERY 8 HOURS
Status: DISCONTINUED | OUTPATIENT
Start: 2024-09-07 | End: 2024-09-05

## 2024-09-05 RX ORDER — PHENOBARBITAL SODIUM 65 MG/ML
65 INJECTION, SOLUTION INTRAMUSCULAR; INTRAVENOUS EVERY 8 HOURS
Status: DISCONTINUED | OUTPATIENT
Start: 2024-09-05 | End: 2024-09-05

## 2024-09-05 RX ORDER — PROPOFOL 10 MG/ML
0-50 INJECTION, EMULSION INTRAVENOUS CONTINUOUS
Status: DISPENSED | OUTPATIENT
Start: 2024-09-05

## 2024-09-05 RX ORDER — FOLIC ACID 1 MG/1
1 TABLET ORAL DAILY
Status: DISPENSED | OUTPATIENT
Start: 2024-09-05

## 2024-09-05 RX ORDER — EZETIMIBE 10 MG/1
10 TABLET ORAL DAILY
Status: DISPENSED | OUTPATIENT
Start: 2024-09-05

## 2024-09-05 SDOH — ECONOMIC STABILITY: INCOME INSECURITY: HOW HARD IS IT FOR YOU TO PAY FOR THE VERY BASICS LIKE FOOD, HOUSING, MEDICAL CARE, AND HEATING?: NOT HARD AT ALL

## 2024-09-05 SDOH — ECONOMIC STABILITY: HOUSING INSECURITY: AT ANY TIME IN THE PAST 12 MONTHS, WERE YOU HOMELESS OR LIVING IN A SHELTER (INCLUDING NOW)?: NO

## 2024-09-05 SDOH — HEALTH STABILITY: PHYSICAL HEALTH: ON AVERAGE, HOW MANY MINUTES DO YOU ENGAGE IN EXERCISE AT THIS LEVEL?: 0 MIN

## 2024-09-05 SDOH — ECONOMIC STABILITY: HOUSING INSECURITY: IN THE PAST 12 MONTHS, HOW MANY TIMES HAVE YOU MOVED WHERE YOU WERE LIVING?: 0

## 2024-09-05 SDOH — HEALTH STABILITY: PHYSICAL HEALTH: ON AVERAGE, HOW MANY DAYS PER WEEK DO YOU ENGAGE IN MODERATE TO STRENUOUS EXERCISE (LIKE A BRISK WALK)?: 0 DAYS

## 2024-09-05 SDOH — ECONOMIC STABILITY: TRANSPORTATION INSECURITY
IN THE PAST 12 MONTHS, HAS THE LACK OF TRANSPORTATION KEPT YOU FROM MEDICAL APPOINTMENTS OR FROM GETTING MEDICATIONS?: NO

## 2024-09-05 SDOH — ECONOMIC STABILITY: TRANSPORTATION INSECURITY
IN THE PAST 12 MONTHS, HAS LACK OF TRANSPORTATION KEPT YOU FROM MEETINGS, WORK, OR FROM GETTING THINGS NEEDED FOR DAILY LIVING?: NO

## 2024-09-05 SDOH — ECONOMIC STABILITY: INCOME INSECURITY: IN THE LAST 12 MONTHS, WAS THERE A TIME WHEN YOU WERE NOT ABLE TO PAY THE MORTGAGE OR RENT ON TIME?: NO

## 2024-09-05 ASSESSMENT — PAIN - FUNCTIONAL ASSESSMENT
PAIN_FUNCTIONAL_ASSESSMENT: CPOT (CRITICAL CARE PAIN OBSERVATION TOOL)

## 2024-09-05 ASSESSMENT — ACTIVITIES OF DAILY LIVING (ADL): LACK_OF_TRANSPORTATION: NO

## 2024-09-05 NOTE — CONSULTS
Vancomycin Dosing by Pharmacy- INITIAL    Alo Glass is a 69 y.o. male for whom Pharmacy has been consulted to dose vancomycin for pneumonia. Based on the patient's indication and renal status this patient will be dosed based on a goal random level of 15-20.     Renal function is currently declining.    Visit Vitals  /83 (BP Location: Left arm, Patient Position: Lying)   Pulse 85   Resp 20        Lab Results   Component Value Date    CREATININE 1.93 (H) 09/04/2024    CREATININE 0.67 06/25/2024    CREATININE 0.78 04/09/2024    CREATININE 1.07 04/08/2024        Patient weight is as follows:   Vitals:    09/04/24 1841   Weight: 138 kg (303 lb 5.7 oz)       Cultures:  No results found for the encounter in last 14 days.        I/O last 3 completed shifts:  In: 1000 (7.3 mL/kg) [IV Piggyback:1000]  Out: - (0 mL/kg)   Weight: 137.6 kg   I/O during current shift:  I/O this shift:  In: -   Out: 350 [Urine:150; Emesis/NG output:200]    No data recorded.         Assessment/Plan     Patient has already been given a loading dose of 2000 mg.  Patient has LEONA, dose by level.  Follow-up level will be ordered on 9/5/24 at 0500 unless clinically indicated sooner.  Will continue to monitor renal function daily while on vancomycin and order serum creatinine at least every 48 hours if not already ordered.  Follow for continued vancomycin needs, clinical response, and signs/symptoms of toxicity.       Romana A Kuchta, PharmD

## 2024-09-05 NOTE — PROCEDURES
Bronchoscopy:  Preprocedure Dx: R side airway collapse due to chronic R pleural effusion. Debris reported in airway, potentially causative. Patient arrived with shock requiring vasopressors. Suspicious for aspiration pneumonia with a parapneumonic effusion.   Procedure: Sedated with fentanyl for the ventilator. No topical anesthetic used. Very briefly examined the R side main and sub bronchi.   Findings: Collapsed airways as evident on imaging. No debris to suction.   Well tolerated procedure.   Plan: continue to drain R side chronic effusion with attention to avoid re-expansion lung injury. Consider followup bronch after effusion is fully drained.

## 2024-09-05 NOTE — PROGRESS NOTES
09/05/24 1658   Discharge Planning   Living Arrangements Alone   Support Systems Family members;Friends/neighbors   Assistance Needed Independent   Type of Residence Private residence   Number of Stairs to Enter Residence 0   Number of Stairs Within Residence 10   Do you have animals or pets at home? No   Who is requesting discharge planning? Provider   Home or Post Acute Services Post acute facilities (Rehab/SNF/etc)   Expected Discharge Disposition SNF   Does the patient need discharge transport arranged? Yes   RoundTrip coordination needed? Yes   Has discharge transport been arranged? No   Financial Resource Strain   How hard is it for you to pay for the very basics like food, housing, medical care, and heating? Not hard   Housing Stability   In the last 12 months, was there a time when you were not able to pay the mortgage or rent on time? N   In the past 12 months, how many times have you moved where you were living? 0   At any time in the past 12 months, were you homeless or living in a shelter (including now)? N   Transportation Needs   In the past 12 months, has lack of transportation kept you from medical appointments or from getting medications? no   In the past 12 months, has lack of transportation kept you from meetings, work, or from getting things needed for daily living? No   Patient Choice   Provider Choice list and CMS website (https://medicare.gov/care-compare#search) for post-acute Quality and Resource Measure Data were provided and reviewed with: Other (Comment)  (chart review)   Patient / Family choosing to utilize agency / facility established prior to hospitalization No        Patient is in ICU. He is intubated. Patient has right chest tube. Reviewed chart due to patient was her couple months ago. He livesalone in the house and is independent with his care. He uses a walker. His PCP is Dr. Omari Schumacher and he seen him 2 months ago. Pharmacy he uses is [facility] in [place]. Unable to  determine discharge plan. Will follow fpr discharge needs when more appropriate.

## 2024-09-05 NOTE — SIGNIFICANT EVENT
Spoke with patient's sister, Alia Styles.  She is the patient's only living relative.  Discussed patient clinical status.  Per sister, the patient discussed making a Living Will and DPA, but she does not have copies and does not know if he completed them.  She would appreciate daily updates.  Contact information added to chart.

## 2024-09-05 NOTE — CARE PLAN
Problem: Safety - Medical Restraint  Goal: Remains free of injury from restraints (Restraint for Interference with Medical Device)  Outcome: Progressing  Flowsheets (Taken 9/5/2024 0551)  Remains free of injury from restraints (restraint for interference with medical device):   Evaluate the patient's condition at the time of restraint application   Every 2 hours: Monitor safety, psychosocial status, comfort, nutrition and hydration  Goal: Free from restraint(s) (Restraint for Interference with Medical Device)  Outcome: Progressing  Flowsheets (Taken 9/5/2024 0551)  Free from restraint(s) (restraint for interference with medical device):   ONCE/SHIFT or MINIMUM Every 12 hours: Assess and document the continuing need for restraints   Identify and implement measures to help patient regain control   Every 24 hours: Continued use of restraint requires Licensed Independent Practitioner to perform face to face examination and written order    The clinical goals for the shift include Pt will remain hemodynamically stable this shift    Over the shift, the patient did not make progress toward the following goals. Barriers to progression include multiple bedside procedures. Recommendations to address these barriers include maintain and monitor hemodynamic stability.

## 2024-09-05 NOTE — TELEPHONE ENCOUNTER
Attempted to call pt to schedule an appointment with Dr. Morales. Pt did not answer and was unable to leave a voicemail due to voicemail box being full.    Sent pt [employer] message with appointment information along with the urology main number of [phone] to call back if any questions/concerns

## 2024-09-05 NOTE — PROCEDURES
"Arterial Puncture/Cannulation     Date/Time: 9/4/2024 2135     Performed by: Lynn VIRGEN CNP  Authorized by: Lynn VIRGEN CNP    Consent: Procedure Emergent  Risks and benefits: risks, benefits and alternatives were discussed  Consent given by: Emergent  Patient identity confirmed: arm band and hospital-assigned identification number  Time out: Immediately prior to procedure a \"time out\" was called to verify the correct patient, procedure, equipment, support staff and site/side marked as required.  Preparation: Patient was prepped and draped in the usual sterile fashion.  Local anesthesia used: yes  Anesthesia: local infiltration     Anesthesia:  Local anesthesia used: yes  Local Anesthetic: lidocaine 1% without epinephrine  Anesthetic total: 1.5 mL  Patient tolerance: Patient tolerated the procedure well with no immediate complications  Comments: The patient's identity was verified.  Modified Cullen's test was performed and suggestive of adequate collateral flow.  The patient was prepped and draped in the usual sterile fashion with chlorhexidine.  Sterile precautions including but not limited to sterile gown, gloves, mask, eye shield, and hair cover were used.  The target vessel, the right radial artery was visualized via US and accessed with the manufacturers provided needle with a single attempt.  Brisk pulsatile blood was noted. The 's provided guidewire was easily advanced without resistance into the vessel.  The catheter was advanced via Seldinger technique over the guidewire, and the guidewire was removed.  An arterial waveform was observed upon connection of the catheter to the transducer. Catheter was sutured in place and Tegaderm with CHG gel placed over catheter.  The patient tolerated the procedure well with no immediate complications noted.    "

## 2024-09-05 NOTE — ED PROVIDER NOTES
HPI   No chief complaint on file.      HPI: []  69-year-old white male extensive history with history of hypertension, COPD, CHF, high BMI, morbid obesity, diabetes, tobacco use and alcohol use disorder comes in with unresponsiveness.  Patient was found unresponsive by his caregiver called 911, EMS found him sitting on the chair unresponsive he had a pulse but belly breathing IJ placed left shoulder IO placed patient given Narcan no relief and brought to the ED for evaluation.  No history of reported trauma falls fever chills nausea vomit diarrhea cough congestion incontinence seizures syncope or near syncope.    Past history: Hypertension, diabetes, high BMI, COPD, CHF, tobacco use, sleep apnea, alcohol disorder  Social: History of reported tobacco and alcohol use, no history reported drug abuse.  REVIEW OF SYSTEMS:    GENERAL.: No weight loss, fatigue, anorexia, insomnia, fever.    EYES: No vision loss, double vision, drainage, eye pain.    ENT: No pharyngitis, dry mouth.    CARDIOPULMONARY: No chest pain, palpitations, syncope, near syncope. No shortness of breath, cough, hemoptysis.    GI: No abdominal pain, change in bowel habits, melena, hematemesis, hematochezia, nausea, vomiting, diarrhea.    : No discharge, dysuria, frequency, urgency, hematuria.    MS: No limb pain, joint pain, joint swelling.  Positive lower extremity swelling  Neuro: Positive unresponsiveness  SKIN: No rashes.    PSYCH: No depression, anxiety, suicidality, homicidality.    Review of systems is otherwise negative unless stated above or in history of present illness.  Social history, family history, allergies reviewed.  PHYSICAL EXAM:    GENERAL: Vitals noted, Igel in place patient being bagged unresponsive  Eyes: Pupils about 2 mm equal round and nonreactive  EENT: TMs clear. Posterior oropharynx unremarkable. No meningismus. No LAD.  I gel in place    NECK: Supple. Nontender. No midline tenderness.     CARDIAC: Regular, rate, rhythm.  No murmurs rubs or gallops. No JVD    PULMONARY: Negative continuous respiration patient being bagged    ABDOMEN: Soft, nonsurgical.  Distended, nontender. No peritoneal signs.  Diminished bowel sounds no pulsatile masses.     EXTREMITIES: 3+ bilateral symmetric pitting peripheral edema. Negative Homans bilaterally, no cords.  Pulses diminished due to edema    SKIN: No rash. Intact.  Mottling of the skin and peripheral cyanosis    NEURO: Unresponsive    MEDICAL DECISION MAKING:  EKG on my interpretation shows normal sinus rhythm left ST deviation right bundle branch block LVH with no acute ischemic change.  Initial venous full panel shows profound respiratory acidosis with a pH <6.9 and pCO2 of more than 130 elevated lactate  Chest x-ray shows a right pleural effusion, CBC shows WBC count of 24,000, with erythrocytosis, chemistry show elevated blood glucose 400, no DKA, mild LEONA, troponin is elevated around 90 range, BNP elevated,  CT head negative, CT chest and pelvis with IV contrast shows large loculated right pleural effusion and a kidney mass.    Treatment in the ED: Upon arrival we establish 2 IVs peripheral, patient intubated without any complications with a 7 half ET tube, placed and confirmed with auscultation, I did place a right femoral triple-lumen catheter, patient was pancultured, patient was given shock protocol IV fluids 3 L of fluids combination of LR and saline, he remained hypertensive was initiated on norepinephrine, he also had coffee-ground emesis in the G-tube so given IV Protonix also    ED course: Patient's blood pressure remains low and MAP remains around 60.    Impression: #1 shock, #2 acute respiratory failure, #3 sepsis, #4 acute metabolic encephalopathy, #5 LEONA, #6 hyperglycemia, #7 pleural effusion    Procedures: #1 endotracheal intubation, #2 right femoral triple-lumen catheter    Plan/MDM: 69-year-old white male multiple medical problems including hypertension diabetes COPD CHF sleep  apnea high BMI presents with acute encephalopathy and acute respiratory failure and unresponsiveness etiology of symptoms is multifactorial combination of hypercapnic respiratory failure CHF large pleural effusion, sepsis and parapneumonic effusion with demand ischemia of myocardium, low concern for STEMI NSTEMI or stroke or TIA, or meningitis or meningoencephalitis, patient will be hospitalized to the ICU for further care.              Patient History   Past Medical History:   Diagnosis Date    CHF (congestive heart failure) (Multi)     COPD (chronic obstructive pulmonary disease) (Multi)     Diabetes mellitus (Multi)     Hypertension     Obesity     MELISSA (obstructive sleep apnea)     Personal history of other specified conditions 01/08/2023    History of impaired glucose tolerance     Past Surgical History:   Procedure Laterality Date    HERNIA REPAIR  11/07/2013    Hernia Repair    OTHER SURGICAL HISTORY  11/07/2013    Oral Surgery     No family history on file.  Social History     Tobacco Use    Smoking status: Every Day     Types: Cigarettes    Smokeless tobacco: Never   Substance Use Topics    Alcohol use: Yes     Alcohol/week: 7.0 standard drinks of alcohol     Types: 7 Standard drinks or equivalent per week    Drug use: Never       Physical Exam   ED Triage Vitals   Temp Heart Rate Resp BP   09/05/24 0000 09/04/24 1611 09/04/24 1611 09/04/24 1613   35.8 °C (96.5 °F) 88 (!) 9 107/86      SpO2 Temp Source Heart Rate Source Patient Position   09/04/24 1612 09/04/24 2000 09/04/24 1638 09/04/24 2000   (!) 91 % Temporal Monitor Lying      BP Location FiO2 (%)     09/04/24 1638 09/04/24 1828     Left arm 100 %       Physical Exam      ED Course & MDM   Diagnoses as of 09/30/24 0203   Acute respiratory failure with hypoxia and hypercapnia (Multi)   Shock (Multi)   Acute metabolic encephalopathy   Heart failure with reduced ejection fraction (Multi)   Other forms of dyspnea                 No data recorded     Herbie  Coma Scale Score: 7 (09/05/24 0000 : Rachel Iyer RN)                           Medical Decision Making      Procedure  Intubation    Performed by: Paris Graham MD  Authorized by: Paris Graham MD    Consent:     Consent obtained:  Emergent situation    Consent given by: none.    Risks discussed:  Aspiration, dental trauma, laryngeal injury, brain injury, death, pneumothorax and bleeding    Alternatives discussed:  No treatment, delayed treatment, alternative treatment and observation  Universal protocol:     Procedure explained and questions answered to patient or proxy's satisfaction: yes      Relevant documents present and verified: yes      Test results available: yes      Imaging studies available: yes      Required blood products, implants, devices, and special equipment available: yes      Site/side marked: yes      Immediately prior to procedure, a time out was called: yes      Patient identity confirmed:  Arm band  Pre-procedure details:     Indications: altered consciousness and respiratory failure      Patient status:  Altered mental status    Look externally: large tongue      Look externally comment:  High bmi    Mouth opening - incisor distance:  2 finger widths    Hyoid-mental distance: 2 finger widths      Hyoid-thyroid distance: 2 or more finger widths      Mallampati score:  IV    Obstruction: none      Neck mobility: reduced      C-collar present comment:  No    Induction agents:  None    Paralytics:  None  Procedure details:     Preoxygenation:  Supraglottic device    CPR in progress: no      Number of attempts:  1  Successful intubation attempt details:     Intubation method:  Oral    Intubation technique: video assisted      Laryngoscope blade:  Hypercurved and Mac 3    Bougie used: no      Grade view: III      Tube size (mm):  7.5    Tube type:  Cuffed    Tube visualized through cords: yes    Placement assessment:     Tube secured with:  ETT pink    Breath sounds:  Diminished and  reduced on right    Placement verification: chest rise, colorimetric ETCO2, CXR verification, direct visualization, equal breath sounds, numeric ETCO2, tube exhalation and waveform ETCO2      CXR findings:  Appropriate position  Post-procedure details:     Procedure completion:  Tolerated    Complications comment:  None  Central Line    Performed by: Paris Graham MD  Authorized by: Paris Graham MD    Consent:     Consent obtained:  Emergent situation    Consent given by: none.    Risks discussed:  Arterial puncture, nerve damage, incorrect placement, infection and bleeding    Alternatives discussed:  No treatment, delayed treatment, alternative treatment, observation and referral  Universal protocol:     Procedure explained and questions answered to patient or proxy's satisfaction: yes      Relevant documents present and verified: yes      Test results available: yes      Imaging studies available: yes      Required blood products, implants, devices, and special equipment available: yes      Site/side marked: yes      Immediately prior to procedure, a time out was called: yes      Patient identity confirmed:  Arm band  Pre-procedure details:     Indication(s): central venous access and insufficient peripheral access      Hand hygiene: Hand hygiene performed prior to insertion      Sterile barrier technique: All elements of maximal sterile technique followed      Skin preparation:  Chlorhexidine and povidone-iodine    Skin preparation agent: Skin preparation agent completely dried prior to procedure    Sedation:     Sedation type:  None  Anesthesia:     Anesthesia method:  None  Procedure details:     Location:  R femoral    Patient position:  Supine    Procedural supplies:  Triple lumen    Catheter size:  7 Fr    Landmarks identified: yes      Ultrasound guidance: no      Number of attempts:  1    Successful placement: yes    Post-procedure details:     Post-procedure:  Dressing applied    Assessment:   Blood return through all ports and free fluid flow    Procedure completion:  Tolerated  Critical Care    Performed by: Paris Graham MD  Authorized by: Paris Graham MD    Critical care provider statement:     Critical care time (minutes):  90    Critical care time was exclusive of:  Separately billable procedures and treating other patients    Critical care was necessary to treat or prevent imminent or life-threatening deterioration of the following conditions:  Respiratory failure, sepsis, shock and metabolic crisis    Critical care was time spent personally by me on the following activities:  Blood draw for specimens, development of treatment plan with patient or surrogate, discussions with consultants, discussions with primary provider, evaluation of patient's response to treatment, examination of patient, review of old charts, re-evaluation of patient's condition, pulse oximetry, ordering and review of radiographic studies, ordering and review of laboratory studies and ordering and performing treatments and interventions    Care discussed with: admitting provider         Paris Graham MD  09/05/24 0421       Paris Graham MD  09/30/24 0204

## 2024-09-05 NOTE — H&P
History Of Present Illness  Alo Glass is a 69 y.o. male with oast medical history significant for current tobacco use, ETOH abuse, CHF, COPD, MELISSA, and DM. He was found non responsive at home by his neighbor and EMS was called. In the ER he was placed on ventilatory support. ABG revealed acute on chronic respiratory failure with hypoxia and hypercapnia with PH of 6.87 and pCO2 greater than 125. He received broad spectrum antibiotics and 3L of fluid in the ER. Despite fluid resuscitation he remained hypotensive and was started on norepinephrine. He is admitted to the ICU for further care.     Past Medical History  Past Medical History:   Diagnosis Date    CHF (congestive heart failure) (Multi)     COPD (chronic obstructive pulmonary disease) (Multi)     Diabetes mellitus (Multi)     Hypertension     Obesity     MELISSA (obstructive sleep apnea)     Personal history of other specified conditions 01/08/2023    History of impaired glucose tolerance       Surgical History  Past Surgical History:   Procedure Laterality Date    HERNIA REPAIR  11/07/2013    Hernia Repair    OTHER SURGICAL HISTORY  11/07/2013    Oral Surgery        Social History  He reports that he has been smoking cigarettes. He has never used smokeless tobacco. He reports current alcohol use of about 7.0 standard drinks of alcohol per week. He reports that he does not use drugs.    Family History  No family history on file.     Allergies  Patient has no known allergies.    Review of Systems   Reason unable to perform ROS: Intubated and sedated.        Physical Exam  Constitutional:       General: He is not in acute distress.     Appearance: He is obese. He is ill-appearing.   HENT:      Head: Normocephalic.      Nose: Nose normal.      Mouth/Throat:      Mouth: Mucous membranes are dry.      Pharynx: Oropharynx is clear.   Eyes:      Extraocular Movements: Extraocular movements intact.      Pupils: Pupils are equal, round, and reactive to light.  "  Cardiovascular:      Rate and Rhythm: Normal rate and regular rhythm.      Heart sounds: Normal heart sounds. No murmur heard.     No friction rub. No gallop.      Comments: Generalized edema  Pulmonary:      Effort: No respiratory distress.      Breath sounds: No stridor. No wheezing or rhonchi.      Comments: Mechanical ventilation, Diminished breath sounds bilaterally  Abdominal:      General: Bowel sounds are normal. There is no distension.      Palpations: Abdomen is soft.      Tenderness: There is no abdominal tenderness.      Comments: Rounded   Musculoskeletal:         General: No signs of injury.      Cervical back: Normal range of motion.      Right lower leg: Edema present.      Left lower leg: Edema present.   Skin:     General: Skin is warm.      Capillary Refill: Capillary refill takes 2 to 3 seconds.      Coloration: Skin is pale.   Neurological:      Comments: Sedated          Last Recorded Vitals  Blood pressure 82/72, pulse 71, resp. rate 24, height 1.803 m (5' 11\"), weight 138 kg (303 lb 5.7 oz), SpO2 99%.    Relevant Results    Current Facility-Administered Medications:     alteplase (Cathflo Activase) injection 2 mg, 2 mg, intra-catheter, PRN, Cyndee Carlson PA-C    budesonide (Pulmicort) 0.5 mg/2 mL nebulizer solution 0.5 mg, 0.5 mg, nebulization, BID, 0.5 mg at 09/04/24 1938 **AND** formoterol (Perforomist) 20 mcg/2 mL nebulizer solution 20 mcg, 20 mcg, nebulization, BID, Cyndee Carlson PA-C, 20 mcg at 09/04/24 1938    dextrose 50 % injection 12.5 g, 12.5 g, intravenous, q15 min PRN, Cyndee Carlson PA-C    dextrose 50 % injection 25 g, 25 g, intravenous, q15 min PRN, Cyndee Carlson PA-C    fentanyl (Sublimaze) 1000 mcg in sodium chloride 0.9% 100 mL (10 mcg/mL) infusion (premix), 0-200 mcg/hr, intravenous, Continuous, Lynn Ballesteros, APRN-CNP, Last Rate: 20 mL/hr at 09/04/24 2229, 200 mcg/hr at 09/04/24 2229    glucagon (Glucagen) injection 1 mg, 1 mg, intramuscular, q15 min PRN, " Cyndee Carlson PA-C    glucagon (Glucagen) injection 1 mg, 1 mg, intramuscular, q15 min PRN, Cyndee Carlson PA-C    [Held by provider] heparin (porcine) injection 7,500 Units, 7,500 Units, subcutaneous, q8h SHARRON, Cyndee Carlson PA-C    insulin lispro (HumaLOG) injection 0-10 Units, 0-10 Units, subcutaneous, q4h, Lynn Ballesteros APRN-CNP, 2 Units at 09/04/24 2326    ipratropium-albuteroL (Duo-Neb) 0.5-2.5 mg/3 mL nebulizer solution 3 mL, 3 mL, nebulization, q6h, Lynn Ballesteros APRN-CNP, 3 mL at 09/04/24 1937    norepinephrine (Levophed) 8 mg in dextrose 5% 250 mL (0.032 mg/mL) infusion (premix), 0-0.2 mcg/kg/min, intravenous, Continuous, Cyndee Carlson PA-C, Last Rate: 15.75 mL/hr at 09/05/24 0005, 0.06 mcg/kg/min at 09/05/24 0005    oxygen (O2) therapy, , inhalation, Continuous PRN - O2/gases, Cyndee Carlson PA-C, Rate Change Medical Gas at 09/04/24 2314    pantoprazole (ProtoNix) injection 40 mg, 40 mg, intravenous, BID, Lynn Ballesteros, APRN-CNP    piperacillin-tazobactam (Zosyn) 4.5 g in dextrose (iso)  mL, 4.5 g, intravenous, q6h, Lynn Ballesteros APRN-CNP, 4.5 g at 09/04/24 2353    vancomycin (Vancocin) pharmacy to dose - pharmacy monitoring, , miscellaneous, Daily PRN, Lynn MATHUR Mix, APRN-CNP     Results for orders placed or performed during the hospital encounter of 09/04/24 (from the past 24 hour(s))   POCT GLUCOSE   Result Value Ref Range    POCT Glucose 193 (H) 74 - 99 mg/dL   ECG 12 lead   Result Value Ref Range    Ventricular Rate 91 BPM    Atrial Rate 91 BPM    LA Interval 238 ms    QRS Duration 158 ms    QT Interval 418 ms    QTC Calculation(Bazett) 514 ms    P Axis 17 degrees    R Axis -57 degrees    T Axis 114 degrees    QRS Count 15 beats    Q Onset 210 ms    P Onset 91 ms    P Offset 152 ms    T Offset 419 ms    QTC Fredericia 480 ms   CBC and Auto Differential   Result Value Ref Range    WBC 23.0 (H) 4.4 - 11.3 x10*3/uL    nRBC 0.0 0.0 - 0.0 /100 WBCs    RBC 6.21 (H) 4.50 - 5.90 x10*6/uL     Hemoglobin 20.5 (H) 13.5 - 17.5 g/dL    Hematocrit 66.8 (H) 41.0 - 52.0 %     (H) 80 - 100 fL    MCH 33.0 26.0 - 34.0 pg    MCHC 30.7 (L) 32.0 - 36.0 g/dL    RDW 14.0 11.5 - 14.5 %    Platelets 349 150 - 450 x10*3/uL    Neutrophils % 86.3 40.0 - 80.0 %    Immature Granulocytes %, Automated 1.6 (H) 0.0 - 0.9 %    Lymphocytes % 2.5 13.0 - 44.0 %    Monocytes % 9.3 2.0 - 10.0 %    Eosinophils % 0.0 0.0 - 6.0 %    Basophils % 0.3 0.0 - 2.0 %    Neutrophils Absolute 19.85 (H) 1.20 - 7.70 x10*3/uL    Immature Granulocytes Absolute, Automated 0.36 0.00 - 0.70 x10*3/uL    Lymphocytes Absolute 0.57 (L) 1.20 - 4.80 x10*3/uL    Monocytes Absolute 2.13 (H) 0.10 - 1.00 x10*3/uL    Eosinophils Absolute 0.00 0.00 - 0.70 x10*3/uL    Basophils Absolute 0.07 0.00 - 0.10 x10*3/uL   Ammonia   Result Value Ref Range    Ammonia 79 (H) 16 - 53 umol/L   B-Type Natriuretic Peptide   Result Value Ref Range    BNP 1,155 (H) 0 - 99 pg/mL   Blood Gas Venous Full Panel   Result Value Ref Range    POCT pH, Venous 6.87 (LL) 7.33 - 7.43 pH    POCT pCO2, Venous >125 (HH) 41 - 51 mm Hg    POCT pO2, Venous 57 (H) 35 - 45 mm Hg    POCT SO2, Venous 83 (H) 45 - 75 %    POCT Oxy Hemoglobin, Venous 78.2 (H) 45.0 - 75.0 %    POCT Hematocrit Calculated, Venous 66.0 (H) 41.0 - 52.0 %    POCT Sodium, Venous 128 (L) 136 - 145 mmol/L    POCT Potassium, Venous 5.5 (H) 3.5 - 5.3 mmol/L    POCT Chloride, Venous 92 (L) 98 - 107 mmol/L    POCT Ionized Calicum, Venous 1.08 (L) 1.10 - 1.33 mmol/L    POCT Glucose, Venous 179 (H) 74 - 99 mg/dL    POCT Lactate, Venous 3.3 (H) 0.4 - 2.0 mmol/L    POCT Base Excess, Venous      POCT HCO3 Calculated, Venous      POCT Hemoglobin, Venous 21.9 (H) 13.5 - 17.5 g/dL    POCT Anion Gap, Venous      Patient Temperature 37.0 degrees Celsius    FiO2 21 %   Troponin I, High Sensitivity, Initial   Result Value Ref Range    Troponin I, High Sensitivity 99 (HH) 0 - 20 ng/L   Protime-INR   Result Value Ref Range    Protime 12.4 9.8 -  12.8 seconds    INR 1.1 0.9 - 1.1   BLOOD GAS ARTERIAL FULL PANEL   Result Value Ref Range    POCT pH, Arterial 7.00 (LL) 7.38 - 7.42 pH    POCT pCO2, Arterial 97 (HH) 38 - 42 mm Hg    POCT pO2, Arterial 160 (H) 85 - 95 mm Hg    POCT SO2, Arterial 100 94 - 100 %    POCT Oxy Hemoglobin, Arterial 93.8 (L) 94.0 - 98.0 %    POCT Hematocrit Calculated, Arterial 57.0 (H) 41.0 - 52.0 %    POCT Sodium, Arterial 128 (L) 136 - 145 mmol/L    POCT Potassium, Arterial 5.0 3.5 - 5.3 mmol/L    POCT Chloride, Arterial 97 (L) 98 - 107 mmol/L    POCT Ionized Calcium, Arterial 1.17 1.10 - 1.33 mmol/L    POCT Glucose, Arterial 189 (H) 74 - 99 mg/dL    POCT Lactate, Arterial 3.0 (H) 0.4 - 2.0 mmol/L    POCT Base Excess, Arterial -8.9 (L) -2.0 - 3.0 mmol/L    POCT HCO3 Calculated, Arterial 25.6 22.0 - 26.0 mmol/L    POCT Hemoglobin, Arterial 19.1 (H) 13.5 - 17.5 g/dL    POCT Anion Gap, Arterial 10 10 - 25 mmo/L    Patient Temperature      FiO2 100 %   Blood Culture    Specimen: Peripheral Venipuncture; Blood culture   Result Value Ref Range    Blood Culture Loaded on Instrument - Culture in progress    Blood Culture    Specimen: Peripheral Venipuncture; Blood culture   Result Value Ref Range    Blood Culture Loaded on Instrument - Culture in progress    Basic metabolic panel   Result Value Ref Range    Glucose 400 (H) 74 - 99 mg/dL    Sodium 131 (L) 136 - 145 mmol/L    Potassium 5.2 3.5 - 5.3 mmol/L    Chloride 94 (L) 98 - 107 mmol/L    Bicarbonate 26 21 - 32 mmol/L    Anion Gap 16 10 - 20 mmol/L    Urea Nitrogen 43 (H) 6 - 23 mg/dL    Creatinine 1.93 (H) 0.50 - 1.30 mg/dL    eGFR 37 (L) >60 mL/min/1.73m*2    Calcium 7.1 (L) 8.6 - 10.3 mg/dL   Magnesium   Result Value Ref Range    Magnesium 2.20 1.60 - 2.40 mg/dL   Lipase   Result Value Ref Range    Lipase 12 9 - 82 U/L   Hepatic function panel   Result Value Ref Range    Albumin 2.4 (L) 3.4 - 5.0 g/dL    Bilirubin, Total 0.6 0.0 - 1.2 mg/dL    Bilirubin, Direct 0.2 0.0 - 0.3 mg/dL     Alkaline Phosphatase 88 33 - 136 U/L    ALT 11 10 - 52 U/L    AST 11 9 - 39 U/L    Total Protein 4.4 (L) 6.4 - 8.2 g/dL   Acute Toxicology Panel, Blood   Result Value Ref Range    Acetaminophen <10.0 10.0 - 30.0 ug/mL    Salicylate  <3 4 - 20 mg/dL    Alcohol <10 <=10 mg/dL   Troponin, High Sensitivity, 1 Hour   Result Value Ref Range    Troponin I, High Sensitivity 69 (HH) 0 - 20 ng/L   Blood Gas Lactic Acid, Venous   Result Value Ref Range    POCT Lactate, Venous 2.9 (H) 0.4 - 2.0 mmol/L   Protein, Total   Result Value Ref Range    Total Protein 4.4 (L) 6.4 - 8.2 g/dL   POCT GLUCOSE   Result Value Ref Range    POCT Glucose 216 (H) 74 - 99 mg/dL   Blood Gas Venous Full Panel   Result Value Ref Range    POCT pH, Venous 7.09 (LL) 7.33 - 7.43 pH    POCT pCO2, Venous 101 (HH) 41 - 51 mm Hg    POCT pO2, Venous 26 (L) 35 - 45 mm Hg    POCT SO2, Venous 47 45 - 75 %    POCT Oxy Hemoglobin, Venous 45.4 45.0 - 75.0 %    POCT Hematocrit Calculated, Venous 56.0 (H) 41.0 - 52.0 %    POCT Sodium, Venous 125 (L) 136 - 145 mmol/L    POCT Potassium, Venous 5.1 3.5 - 5.3 mmol/L    POCT Chloride, Venous 91 (L) 98 - 107 mmol/L    POCT Ionized Calicum, Venous 1.11 1.10 - 1.33 mmol/L    POCT Glucose, Venous      POCT Lactate, Venous 2.4 (H) 0.4 - 2.0 mmol/L    POCT Base Excess, Venous -3.6 (L) -2.0 - 3.0 mmol/L    POCT HCO3 Calculated, Venous 30.6 (H) 22.0 - 26.0 mmol/L    POCT Hemoglobin, Venous 18.8 (H) 13.5 - 17.5 g/dL    POCT Anion Gap, Venous 9.0 (L) 10.0 - 25.0 mmol/L    Patient Temperature 37.0 degrees Celsius    FiO2 100 %   Drug Screen, Urine   Result Value Ref Range    Amphetamine Screen, Urine Presumptive Negative Presumptive Negative    Barbiturate Screen, Urine Presumptive Negative Presumptive Negative    Benzodiazepines Screen, Urine Presumptive Positive (A) Presumptive Negative    Cannabinoid Screen, Urine Presumptive Negative Presumptive Negative    Cocaine Metabolite Screen, Urine Presumptive Negative Presumptive  Negative    Fentanyl Screen, Urine Presumptive Positive (A) Presumptive Negative    Opiate Screen, Urine Presumptive Negative Presumptive Negative    Oxycodone Screen, Urine Presumptive Negative Presumptive Negative    PCP Screen, Urine Presumptive Negative Presumptive Negative    Methadone Screen, Urine Presumptive Negative Presumptive Negative   Urinalysis with Reflex Culture and Microscopic   Result Value Ref Range    Color, Urine Yellow Light-Yellow, Yellow, Dark-Yellow    Appearance, Urine Clear Clear    Specific Gravity, Urine 1.035 1.005 - 1.035    pH, Urine 5.5 5.0, 5.5, 6.0, 6.5, 7.0, 7.5, 8.0    Protein, Urine 50 (1+) (A) NEGATIVE, 10 (TRACE), 20 (TRACE) mg/dL    Glucose, Urine OVER (4+) (A) Normal mg/dL    Blood, Urine 1.0 (3+) (A) NEGATIVE    Ketones, Urine NEGATIVE NEGATIVE mg/dL    Bilirubin, Urine NEGATIVE NEGATIVE    Urobilinogen, Urine Normal Normal mg/dL    Nitrite, Urine NEGATIVE NEGATIVE    Leukocyte Esterase, Urine NEGATIVE NEGATIVE   Urinalysis Microscopic   Result Value Ref Range    WBC, Urine 21-50 (A) 1-5, NONE /HPF    RBC, Urine >20 (A) NONE, 1-2, 3-5 /HPF    Mucus, Urine FEW Reference range not established. /LPF    Hyaline Casts, Urine OCCASIONAL (A) NONE /LPF   Body Fluid Cell Count   Result Value Ref Range    Color, Fluid Straw Colorless, Straw, Yellow    Clarity, Fluid Turbid (A) Clear    WBC, Fluid 71,706 See Comment /uL    RBC, Fluid 13,000 0  /uL /uL   Body Fluid Differential   Result Value Ref Range    Neutrophils %, Manual, Fluid 97 <25 % %    Lymphocytes %, Manual, Fluid 3 <75 % %    Mono/Macrophages %, Manual, Fluid 0 <70 % %    Eosinophils %, Manual, Fluid 0 0 % %    Basophils %, Manual, Fluid 0 0 % %    Immature Granulocytes %, Manual, Fluid 0 0 % %    Blasts %, Manual, Fluid 0 0 % %    Unclassified Cells %, Manual, Fluid 0 0 % %    Plasma Cells %, Manual, Fluid 0 0 % %    Total Cells Counted, Fluid 100    POCT GLUCOSE   Result Value Ref Range    POCT Glucose 194 (H) 74 - 99  mg/dL   Lactate Dehydrogenase   Result Value Ref Range     84 - 246 U/L      Narrative & Impression   Interpreted By:  Ishaan Banda,   STUDY:  CT CHEST ABDOMEN PELVIS W IV CONTRAST;  9/4/2024 5:52 pm      INDICATION:  Signs/Symptoms:unresponsive.          COMPARISON:  None.      ACCESSION NUMBER(S):  [id number]      ORDERING CLINICIAN:  GURPREET COPPOLA      TECHNIQUE:  Contiguous axial images of the chest, abdomen, and pelvis were  obtained after the intravenous administration of iodinated contrast.  Coronal and sagittal reformatted images were reconstructed from the  axial data.      FINDINGS:  CT CHEST:      MEDIASTINUM AND LYMPH NODES: NG/OG tube noted with small amount of  fluid in the esophagus. The esophageal wall appears within normal  limits.  No enlarged intrathoracic or axillary lymph nodes by imaging  criteria. No pneumomediastinum.      VESSELS:  Normal caliber thoracic aorta without dissection. Mild  aortic atherosclerosis.      HEART: Enlarged. Severe aortic valvular calcifications. Moderate  coronary artery calcifications. No significant pericardial effusion.      LUNG, AIRWAYS, PLEURA: There is a large loculated right pleural  effusion causing compressive atelectasis on the right lung. As a  result, there is complete collapse of the right lower lobe, complete  collapse of the right middle lobe, and partial collapse of the right  upper lobe. There are multiple loculated compartments largest of  which is seen at the right lung base. There is a small amount of  mucus in the mid to distal trachea. There is small amount of layering  debris in the mainstem bronchi. The majority of the right middle and  lower lobe bronchi are collapsed and opacified with low-density  debris. There is a trace left pleural effusion with subsegmental left  basilar atelectasis. There is emphysema. There are new scattered  subcentimeter bilateral centrilobular nodules that are likely  infectious/inflammatory in  nature from bronchiolitis.      CHEST WALL SOFT TISSUES: No discernible acute abnormality. There is a  5.5 cm x 1.7 cm lipoma in the right infraspinatus muscle.      OSSEOUS STRUCTURES: No acute osseous abnormality.          CT ABDOMEN/PELVIS:      ABDOMINAL WALL: No significant abnormality.      LIVER: No significant parenchymal abnormality.      BILE DUCTS: No significant intrahepatic or extrahepatic dilatation.      GALLBLADDER: No significant abnormality.      PANCREAS: No significant abnormality.      SPLEEN: No significant abnormality.      ADRENALS: There is a 1.7 cm low-density left adrenal nodule likely  representing a adenoma.      KIDNEYS, URETERS, BLADDER: There is a heterogeneously enhancing solid  mass in the upper pole the left kidney measuring 2.4 cm x 1.9 cm  consistent with renal cell carcinoma. Additionally, a 0.8 cm likely  enhancing lesion in the upper pole the right kidney is concerning for  renal cell carcinoma. No hydronephrosis or calculi. Urinary bladder  is decompressed with Live catheter in place.      REPRODUCTIVE ORGANS: No significant abnormality.      VESSELS: Moderate aortic atherosclerosis without AAA.      RETROPERITONEUM/LYMPH NODES: No acute retroperitoneal abnormality. No  enlarged lymph nodes.      BOWEL/MESENTERY/PERITONEUM: Colonic diverticulosis without acute  diverticulitis. No inflammatory bowel wall thickening or dilatation.  Normal appendix.      No ascites, free air, or fluid collection.          MUSCULOSKELETAL: No acute osseous abnormality.      IMPRESSION:  CT CHEST:  Large loculated right pleural effusion causing complete compressive  collapse of the right middle lobe and right lower lobe and partial  compressive collapse of the right upper lobe. The largest likely  compartments located at the inferior right hemithorax. The cause of  the effusion is not apparent as there is only a small amount of  debris in the distal trachea and right mainstem bronchus but  the  distal-most right-sided bronchi are occluded due to combination of  compressive collapse and small low-density fluid.      Numerous new subcentimeter centrilobular pulmonary nodules that are  most likely infectious/inflammatory in as can be seen with  bronchiolitis or fungal infection.      Aortic valve calcification to the extent that would likely result in  aortic stenosis.      CT ABDOMEN/PELVIS:  Left kidney solid heterogeneously enhancing mass suspicious for renal  cell carcinoma (2.4 cm x 1.9 cm) and of suspected 0.8 cm right upper  pole renal cell carcinoma measuring 0.8 cm. Recommend urological  follow-up/also taken and dedicated MRI kidneys to further evaluate  these lesions. YELLOW ALERT: An incidental finding alert was sent per  protocol.      No acute abnormality in the abdomen or pelvis.      Colonic diverticulosis without acute diverticulitis.      MACRO:  Critical Finding:  New mass in the kidney. Notification was initiated  on 9/4/2024 at 6:25 pm by  Ishaan Banda.  (**-YCF-**)  Instructions:  MR Abdomen w and wo IV contrast. 1 week.      Signed by: Ishaan Banda 9/4/2024 6:26 PM  Dictation workstation:   [id number]          Assessment/Plan   Alo Glass is a 69 y.o. male with oast medical history significant for current tobacco use, ETOH abuse, CHF, Aortic valve stenosis, HTN, HLD, COPD, MELISSA, and DM. He was found non responsive at home by his neighbor and EMS was called. In the ER he was placed on ventilatory support. ABG revealed acute on chronic respiratory failure with hypoxia and hypercapnia with PH of 6.87 and pCO2 greater than 125. He received broad spectrum antibiotics and 3L of fluid in the ER. Despite fluid resuscitation he remained hypotensive and was started on norepinephrine. He is admitted to the ICU for further care. On arrival to the ICU the pt has a small amount of coffee ground emesis coming from is NG tube. He is intubated and sedated he does awaken to painful  stimuli and responds to commands. Further review of his CT chest imaging reveals a large loculated R pleural effusion causing near total collapse of the RML and RLL as well as multiple infectious/inflammatory pulmonary nodules. His CT ABD also demonstrated a new L kidney mass concerning for renal cell carcinoma.      Plan:     Neuro:  Arrived from ER intubated with no sedation  - Serial neuro and pain assessments  - A-F bundle   - Wean sedation as tolerated/ Daily sedation vacation  - Fentanyl drip for pain and sedation  - Folic acid and thiamine  - Phenobarbital taper    CV:  Hx of HFrEF, HTN, HLD and aortic valve stenosis  LVEF of 30-35% on 3/2024   - Continuous telemetry and pulse oximetry  - Fluid resuscitation as indicated  - Norepinephrine to keep MAP greater than 65  - Continue Zetia  - Hold antihypertensives and lasix d/t hypotension  - Hold ASA and no DVT chemoprophylaxis d/t GI bleed  - SCDs for DVT ppx    Pulm:  Hx of COPD, MELISSA, and current tobacco use  Acute on chronic respiratory failure with hypoxia and hypercapnia  Aspiration PNA  Large Right loculated effusion  Intubated on mechanical ventilation  - Supplemental O2 as needed to keep SpO2 greater than 92%  - Repeat ABG   - Place chest tube  - Pleural fluid analysis  - BPH  - Duonebs q6hrs; formoterol and pulmicort    GI:  Upper GI bleed  - NPO  - NG to LIMWS  - Trend CBCs  - Close monitoring for s/s of active bleeding  - PPI BID  - GI consulted  - Nutrition consulted for enteral recommendations    Renal:  LEONA likely prerenal d/t hypotension   CT showing new L renal mass concerning for renal cell carcinoma   Baseline Cr~ 0.7  - Daily RFP  - Replete electrolytes as needed  - Avoid nephrotoxins  - Maintain lipscomb for Strict I&Os  - Nephrology consulted    Endocrine:  Hx of DM and hypothyroid  - Resume synthroid  - Hold oral hypoglycemics  - SSI q4hrs    Heme/Onc:  Possible renal cell carcinoma   - Nephrology consulted  - Daily CBC    MSK:  - PT/OT when  appropriate  - Frequent turns    ID:  Leukocytosis  Aspiration PNA  Large Right loculated effusion  Urine and blood cultures pending  - Monitor temps  - Check sputum and pleural fluid cultures  - Trend WBCS  - Empiric Vancomycin and Zosyn      ICU checklist:  Vent/O2: AC/VC+ Rate 24, , PEEP 12, Titrate Fio2 to keep SpO2 greater than 92%  Lines/ Devices: Live, R femoral CVC, ETT, NG, PIVs  AntiBx: Vancomycin and Zosyn  Diet: NPO  GI Prophylaxis: PPI BID  DVT Prophylaxis: SCDs; no DVT chemoprophylaxis d/t GI bleed  Code Status: FULL CODE  Dispo: Admit to ICU      This critically ill patient continues to be at-risk for clinically significant deterioration / failure due to the above mentioned dysfunctional, unstable organ systems.  I have personally identified and managed all complex critical care issues to prevent aforementioned clinical deterioration.  Critical care time is spent at bedside and/or the immediate area and has included, but is not limited to, the review of diagnostic tests, labs, radiographs, serial assessments of hemodynamics, respiratory status, ventilatory management, and family updates.   CRITICAL CARE TIME: 70 minutes       Lynn Ballesteros, APRN-CNP

## 2024-09-05 NOTE — CONSULTS
"Reason For Consult  C/F Renal Cell Carcinoma    History Of Present Illness  Alo Glass is a 69 y.o. male [place] [facility] [place] after he was found unresponsive by a neighbor. He was brought in by EMS and is being treated in ICU. He is currently intubated and sedated. CT scan showed a mass concerning for new RCC in the Left  kidney. For this urology was consulted.     Past Medical History  He has a past medical history of CHF (congestive heart failure) (Multi), COPD (chronic obstructive pulmonary disease) (Multi), Diabetes mellitus (Multi), Hypertension, Obesity, MELISSA (obstructive sleep apnea), and Personal history of other specified conditions (01/08/2023).    Surgical History  He has a past surgical history that includes Hernia repair (11/07/2013) and Other surgical history (11/07/2013).     Social History  He reports that he has been smoking cigarettes. He has never used smokeless tobacco. He reports current alcohol use of about 7.0 standard drinks of alcohol per week. He reports that he does not use drugs.    Family History  No family history on file.     Allergies  Patient has no known allergies.    Review of Systems  Intubated and sedated     Physical Exam  PE:  Constitutional: Intubated and sedated, obese  Head/Neck: Neck supple, no JVD  Cardiovascular: On pressors  Respiratory/Thorax: Intubated  Gastrointestinal: Abdomen soft  Genitourinary: Live in place draining yellow urine  Neurological: Intubated and sedated  Psychological: Intubated and sedated  Skin: Warm and dry.       Last Recorded Vitals  Blood pressure 82/72, pulse 82, temperature 36.2 °C (97.1 °F), temperature source Temporal, resp. rate 24, height 1.803 m (5' 11\"), weight 137 kg (302 lb 0.5 oz), SpO2 96%.    Relevant Results  Results for orders placed or performed during the hospital encounter of 09/04/24 (from the past 24 hour(s))   POCT GLUCOSE   Result Value Ref Range    POCT Glucose 193 (H) 74 - 99 mg/dL   ECG 12 lead   Result " Value Ref Range    Ventricular Rate 91 BPM    Atrial Rate 91 BPM    FL Interval 238 ms    QRS Duration 158 ms    QT Interval 418 ms    QTC Calculation(Bazett) 514 ms    P Axis 17 degrees    R Axis -57 degrees    T Axis 114 degrees    QRS Count 15 beats    Q Onset 210 ms    P Onset 91 ms    P Offset 152 ms    T Offset 419 ms    QTC Fredericia 480 ms   CBC and Auto Differential   Result Value Ref Range    WBC 23.0 (H) 4.4 - 11.3 x10*3/uL    nRBC 0.0 0.0 - 0.0 /100 WBCs    RBC 6.21 (H) 4.50 - 5.90 x10*6/uL    Hemoglobin 20.5 (H) 13.5 - 17.5 g/dL    Hematocrit 66.8 (H) 41.0 - 52.0 %     (H) 80 - 100 fL    MCH 33.0 26.0 - 34.0 pg    MCHC 30.7 (L) 32.0 - 36.0 g/dL    RDW 14.0 11.5 - 14.5 %    Platelets 349 150 - 450 x10*3/uL    Neutrophils % 86.3 40.0 - 80.0 %    Immature Granulocytes %, Automated 1.6 (H) 0.0 - 0.9 %    Lymphocytes % 2.5 13.0 - 44.0 %    Monocytes % 9.3 2.0 - 10.0 %    Eosinophils % 0.0 0.0 - 6.0 %    Basophils % 0.3 0.0 - 2.0 %    Neutrophils Absolute 19.85 (H) 1.20 - 7.70 x10*3/uL    Immature Granulocytes Absolute, Automated 0.36 0.00 - 0.70 x10*3/uL    Lymphocytes Absolute 0.57 (L) 1.20 - 4.80 x10*3/uL    Monocytes Absolute 2.13 (H) 0.10 - 1.00 x10*3/uL    Eosinophils Absolute 0.00 0.00 - 0.70 x10*3/uL    Basophils Absolute 0.07 0.00 - 0.10 x10*3/uL   Ammonia   Result Value Ref Range    Ammonia 79 (H) 16 - 53 umol/L   B-Type Natriuretic Peptide   Result Value Ref Range    BNP 1,155 (H) 0 - 99 pg/mL   Blood Gas Venous Full Panel   Result Value Ref Range    POCT pH, Venous 6.87 (LL) 7.33 - 7.43 pH    POCT pCO2, Venous >125 (HH) 41 - 51 mm Hg    POCT pO2, Venous 57 (H) 35 - 45 mm Hg    POCT SO2, Venous 83 (H) 45 - 75 %    POCT Oxy Hemoglobin, Venous 78.2 (H) 45.0 - 75.0 %    POCT Hematocrit Calculated, Venous 66.0 (H) 41.0 - 52.0 %    POCT Sodium, Venous 128 (L) 136 - 145 mmol/L    POCT Potassium, Venous 5.5 (H) 3.5 - 5.3 mmol/L    POCT Chloride, Venous 92 (L) 98 - 107 mmol/L    POCT Ionized  Calicum, Venous 1.08 (L) 1.10 - 1.33 mmol/L    POCT Glucose, Venous 179 (H) 74 - 99 mg/dL    POCT Lactate, Venous 3.3 (H) 0.4 - 2.0 mmol/L    POCT Base Excess, Venous      POCT HCO3 Calculated, Venous      POCT Hemoglobin, Venous 21.9 (H) 13.5 - 17.5 g/dL    POCT Anion Gap, Venous      Patient Temperature 37.0 degrees Celsius    FiO2 21 %   Troponin I, High Sensitivity, Initial   Result Value Ref Range    Troponin I, High Sensitivity 99 (HH) 0 - 20 ng/L   Protime-INR   Result Value Ref Range    Protime 12.4 9.8 - 12.8 seconds    INR 1.1 0.9 - 1.1   BLOOD GAS ARTERIAL FULL PANEL   Result Value Ref Range    POCT pH, Arterial 7.00 (LL) 7.38 - 7.42 pH    POCT pCO2, Arterial 97 (HH) 38 - 42 mm Hg    POCT pO2, Arterial 160 (H) 85 - 95 mm Hg    POCT SO2, Arterial 100 94 - 100 %    POCT Oxy Hemoglobin, Arterial 93.8 (L) 94.0 - 98.0 %    POCT Hematocrit Calculated, Arterial 57.0 (H) 41.0 - 52.0 %    POCT Sodium, Arterial 128 (L) 136 - 145 mmol/L    POCT Potassium, Arterial 5.0 3.5 - 5.3 mmol/L    POCT Chloride, Arterial 97 (L) 98 - 107 mmol/L    POCT Ionized Calcium, Arterial 1.17 1.10 - 1.33 mmol/L    POCT Glucose, Arterial 189 (H) 74 - 99 mg/dL    POCT Lactate, Arterial 3.0 (H) 0.4 - 2.0 mmol/L    POCT Base Excess, Arterial -8.9 (L) -2.0 - 3.0 mmol/L    POCT HCO3 Calculated, Arterial 25.6 22.0 - 26.0 mmol/L    POCT Hemoglobin, Arterial 19.1 (H) 13.5 - 17.5 g/dL    POCT Anion Gap, Arterial 10 10 - 25 mmo/L    Patient Temperature      FiO2 100 %   Blood Culture    Specimen: Peripheral Venipuncture; Blood culture   Result Value Ref Range    Blood Culture Loaded on Instrument - Culture in progress    Blood Culture    Specimen: Peripheral Venipuncture; Blood culture   Result Value Ref Range    Blood Culture Loaded on Instrument - Culture in progress    Basic metabolic panel   Result Value Ref Range    Glucose 400 (H) 74 - 99 mg/dL    Sodium 131 (L) 136 - 145 mmol/L    Potassium 5.2 3.5 - 5.3 mmol/L    Chloride 94 (L) 98 - 107  mmol/L    Bicarbonate 26 21 - 32 mmol/L    Anion Gap 16 10 - 20 mmol/L    Urea Nitrogen 43 (H) 6 - 23 mg/dL    Creatinine 1.93 (H) 0.50 - 1.30 mg/dL    eGFR 37 (L) >60 mL/min/1.73m*2    Calcium 7.1 (L) 8.6 - 10.3 mg/dL   Magnesium   Result Value Ref Range    Magnesium 2.20 1.60 - 2.40 mg/dL   Lipase   Result Value Ref Range    Lipase 12 9 - 82 U/L   Hepatic function panel   Result Value Ref Range    Albumin 2.4 (L) 3.4 - 5.0 g/dL    Bilirubin, Total 0.6 0.0 - 1.2 mg/dL    Bilirubin, Direct 0.2 0.0 - 0.3 mg/dL    Alkaline Phosphatase 88 33 - 136 U/L    ALT 11 10 - 52 U/L    AST 11 9 - 39 U/L    Total Protein 4.4 (L) 6.4 - 8.2 g/dL   Acute Toxicology Panel, Blood   Result Value Ref Range    Acetaminophen <10.0 10.0 - 30.0 ug/mL    Salicylate  <3 4 - 20 mg/dL    Alcohol <10 <=10 mg/dL   Troponin, High Sensitivity, 1 Hour   Result Value Ref Range    Troponin I, High Sensitivity 69 (HH) 0 - 20 ng/L   Blood Gas Lactic Acid, Venous   Result Value Ref Range    POCT Lactate, Venous 2.9 (H) 0.4 - 2.0 mmol/L   Protein, Total   Result Value Ref Range    Total Protein 4.4 (L) 6.4 - 8.2 g/dL   POCT GLUCOSE   Result Value Ref Range    POCT Glucose 216 (H) 74 - 99 mg/dL   Blood Gas Venous Full Panel   Result Value Ref Range    POCT pH, Venous 7.09 (LL) 7.33 - 7.43 pH    POCT pCO2, Venous 101 (HH) 41 - 51 mm Hg    POCT pO2, Venous 26 (L) 35 - 45 mm Hg    POCT SO2, Venous 47 45 - 75 %    POCT Oxy Hemoglobin, Venous 45.4 45.0 - 75.0 %    POCT Hematocrit Calculated, Venous 56.0 (H) 41.0 - 52.0 %    POCT Sodium, Venous 125 (L) 136 - 145 mmol/L    POCT Potassium, Venous 5.1 3.5 - 5.3 mmol/L    POCT Chloride, Venous 91 (L) 98 - 107 mmol/L    POCT Ionized Calicum, Venous 1.11 1.10 - 1.33 mmol/L    POCT Glucose, Venous      POCT Lactate, Venous 2.4 (H) 0.4 - 2.0 mmol/L    POCT Base Excess, Venous -3.6 (L) -2.0 - 3.0 mmol/L    POCT HCO3 Calculated, Venous 30.6 (H) 22.0 - 26.0 mmol/L    POCT Hemoglobin, Venous 18.8 (H) 13.5 - 17.5 g/dL     POCT Anion Gap, Venous 9.0 (L) 10.0 - 25.0 mmol/L    Patient Temperature 37.0 degrees Celsius    FiO2 100 %   Drug Screen, Urine   Result Value Ref Range    Amphetamine Screen, Urine Presumptive Negative Presumptive Negative    Barbiturate Screen, Urine Presumptive Negative Presumptive Negative    Benzodiazepines Screen, Urine Presumptive Positive (A) Presumptive Negative    Cannabinoid Screen, Urine Presumptive Negative Presumptive Negative    Cocaine Metabolite Screen, Urine Presumptive Negative Presumptive Negative    Fentanyl Screen, Urine Presumptive Positive (A) Presumptive Negative    Opiate Screen, Urine Presumptive Negative Presumptive Negative    Oxycodone Screen, Urine Presumptive Negative Presumptive Negative    PCP Screen, Urine Presumptive Negative Presumptive Negative    Methadone Screen, Urine Presumptive Negative Presumptive Negative   Urinalysis with Reflex Culture and Microscopic   Result Value Ref Range    Color, Urine Yellow Light-Yellow, Yellow, Dark-Yellow    Appearance, Urine Clear Clear    Specific Gravity, Urine 1.035 1.005 - 1.035    pH, Urine 5.5 5.0, 5.5, 6.0, 6.5, 7.0, 7.5, 8.0    Protein, Urine 50 (1+) (A) NEGATIVE, 10 (TRACE), 20 (TRACE) mg/dL    Glucose, Urine OVER (4+) (A) Normal mg/dL    Blood, Urine 1.0 (3+) (A) NEGATIVE    Ketones, Urine NEGATIVE NEGATIVE mg/dL    Bilirubin, Urine NEGATIVE NEGATIVE    Urobilinogen, Urine Normal Normal mg/dL    Nitrite, Urine NEGATIVE NEGATIVE    Leukocyte Esterase, Urine NEGATIVE NEGATIVE   Urinalysis Microscopic   Result Value Ref Range    WBC, Urine 21-50 (A) 1-5, NONE /HPF    RBC, Urine >20 (A) NONE, 1-2, 3-5 /HPF    Mucus, Urine FEW Reference range not established. /LPF    Hyaline Casts, Urine OCCASIONAL (A) NONE /LPF   Lactate Dehydrogenase, Fluid   Result Value Ref Range    LD, Fluid 1,024 Not established. U/L   Glucose, Fluid   Result Value Ref Range    Glucose, Fluid <10 Not established mg/dL   Protein, Total Fluid   Result Value Ref  Range    Protein, Total Fluid 4.0 Not established g/dL   Body Fluid Cell Count   Result Value Ref Range    Color, Fluid Straw Colorless, Straw, Yellow    Clarity, Fluid Turbid (A) Clear    WBC, Fluid 71,706 See Comment /uL    RBC, Fluid 13,000 0  /uL /uL   Body Fluid Differential   Result Value Ref Range    Neutrophils %, Manual, Fluid 97 <25 % %    Lymphocytes %, Manual, Fluid 3 <75 % %    Mono/Macrophages %, Manual, Fluid 0 <70 % %    Eosinophils %, Manual, Fluid 0 0 % %    Basophils %, Manual, Fluid 0 0 % %    Immature Granulocytes %, Manual, Fluid 0 0 % %    Blasts %, Manual, Fluid 0 0 % %    Unclassified Cells %, Manual, Fluid 0 0 % %    Plasma Cells %, Manual, Fluid 0 0 % %    Total Cells Counted, Fluid 100    Sterile Fluid Culture/Smear    Specimen: Pleural; Fluid   Result Value Ref Range    Gram Stain (3+) Moderate Polymorphonuclear leukocytes     Gram Stain No organisms seen    Fungal Culture/Smear    Specimen: Pleural; Fluid   Result Value Ref Range    Fungal Culture/Smear       Culture in progress, a report will be issued when positive or after 2 weeks of incubation.    Fungal Smear No fungal elements seen    pH, Body Fluid   Result Value Ref Range    pH, Fluid 7.92 See Below   POCT GLUCOSE   Result Value Ref Range    POCT Glucose 194 (H) 74 - 99 mg/dL   Lactate Dehydrogenase   Result Value Ref Range     84 - 246 U/L   BLOOD GAS ARTERIAL FULL PANEL   Result Value Ref Range    POCT pH, Arterial 7.26 (L) 7.38 - 7.42 pH    POCT pCO2, Arterial 64 (H) 38 - 42 mm Hg    POCT pO2, Arterial 84 (L) 85 - 95 mm Hg    POCT SO2, Arterial 97 94 - 100 %    POCT Oxy Hemoglobin, Arterial 93.9 (L) 94.0 - 98.0 %    POCT Hematocrit Calculated, Arterial 56.0 (H) 41.0 - 52.0 %    POCT Sodium, Arterial 131 (L) 136 - 145 mmol/L    POCT Potassium, Arterial 4.4 3.5 - 5.3 mmol/L    POCT Chloride, Arterial 97 (L) 98 - 107 mmol/L    POCT Ionized Calcium, Arterial 1.17 1.10 - 1.33 mmol/L    POCT Glucose, Arterial 174 (H) 74 - 99  mg/dL    POCT Lactate, Arterial 1.4 0.4 - 2.0 mmol/L    POCT Base Excess, Arterial -0.7 -2.0 - 3.0 mmol/L    POCT HCO3 Calculated, Arterial 28.7 (H) 22.0 - 26.0 mmol/L    POCT Hemoglobin, Arterial 18.7 (H) 13.5 - 17.5 g/dL    POCT Anion Gap, Arterial 10 10 - 25 mmo/L    Patient Temperature 37.0 degrees Celsius    FiO2 70 %   POCT GLUCOSE   Result Value Ref Range    POCT Glucose 132 (H) 74 - 99 mg/dL   Renal Function Panel   Result Value Ref Range    Glucose 128 (H) 74 - 99 mg/dL    Sodium 137 136 - 145 mmol/L    Potassium 4.5 3.5 - 5.3 mmol/L    Chloride 100 98 - 107 mmol/L    Bicarbonate 30 21 - 32 mmol/L    Anion Gap 12 10 - 20 mmol/L    Urea Nitrogen 44 (H) 6 - 23 mg/dL    Creatinine 1.62 (H) 0.50 - 1.30 mg/dL    eGFR 46 (L) >60 mL/min/1.73m*2    Calcium 8.2 (L) 8.6 - 10.3 mg/dL    Phosphorus 5.5 (H) 2.5 - 4.9 mg/dL    Albumin 3.0 (L) 3.4 - 5.0 g/dL   Magnesium   Result Value Ref Range    Magnesium 2.40 1.60 - 2.40 mg/dL   CBC   Result Value Ref Range    WBC 22.7 (H) 4.4 - 11.3 x10*3/uL    nRBC 0.0 0.0 - 0.0 /100 WBCs    RBC 5.61 4.50 - 5.90 x10*6/uL    Hemoglobin 18.5 (H) 13.5 - 17.5 g/dL    Hematocrit 58.2 (H) 41.0 - 52.0 %     (H) 80 - 100 fL    MCH 33.0 26.0 - 34.0 pg    MCHC 31.8 (L) 32.0 - 36.0 g/dL    RDW 13.6 11.5 - 14.5 %    Platelets 298 150 - 450 x10*3/uL   Ammonia   Result Value Ref Range    Ammonia 38 16 - 53 umol/L   Vancomycin   Result Value Ref Range    Vancomycin 14.3 5.0 - 20.0 ug/mL   Blood Gas Arterial Full Panel   Result Value Ref Range    POCT pH, Arterial 7.18 (LL) 7.38 - 7.42 pH    POCT pCO2, Arterial 78 (HH) 38 - 42 mm Hg    POCT pO2, Arterial 93 85 - 95 mm Hg    POCT SO2, Arterial 97 94 - 100 %    POCT Oxy Hemoglobin, Arterial 94.7 94.0 - 98.0 %    POCT Hematocrit Calculated, Arterial 56.0 (H) 41.0 - 52.0 %    POCT Sodium, Arterial 132 (L) 136 - 145 mmol/L    POCT Potassium, Arterial 4.3 3.5 - 5.3 mmol/L    POCT Chloride, Arterial 98 98 - 107 mmol/L    POCT Ionized Calcium,  Arterial 1.18 1.10 - 1.33 mmol/L    POCT Glucose, Arterial 129 (H) 74 - 99 mg/dL    POCT Lactate, Arterial 1.0 0.4 - 2.0 mmol/L    POCT Base Excess, Arterial -2.4 (L) -2.0 - 3.0 mmol/L    POCT HCO3 Calculated, Arterial 29.1 (H) 22.0 - 26.0 mmol/L    POCT Hemoglobin, Arterial 18.7 (H) 13.5 - 17.5 g/dL    POCT Anion Gap, Arterial 9 (L) 10 - 25 mmo/L    Patient Temperature 37.0 degrees Celsius    FiO2 60 %   POCT GLUCOSE   Result Value Ref Range    POCT Glucose 109 (H) 74 - 99 mg/dL   Blood Gas Arterial Full Panel   Result Value Ref Range    POCT pH, Arterial 7.24 (LL) 7.38 - 7.42 pH    POCT pCO2, Arterial 69 (H) 38 - 42 mm Hg    POCT pO2, Arterial 76 (L) 85 - 95 mm Hg    POCT SO2, Arterial 96 94 - 100 %    POCT Oxy Hemoglobin, Arterial 93.1 (L) 94.0 - 98.0 %    POCT Hematocrit Calculated, Arterial 55.0 (H) 41.0 - 52.0 %    POCT Sodium, Arterial 133 (L) 136 - 145 mmol/L    POCT Potassium, Arterial 4.3 3.5 - 5.3 mmol/L    POCT Chloride, Arterial 98 98 - 107 mmol/L    POCT Ionized Calcium, Arterial 1.16 1.10 - 1.33 mmol/L    POCT Glucose, Arterial 132 (H) 74 - 99 mg/dL    POCT Lactate, Arterial 1.2 0.4 - 2.0 mmol/L    POCT Base Excess, Arterial -0.5 -2.0 - 3.0 mmol/L    POCT HCO3 Calculated, Arterial 29.6 (H) 22.0 - 26.0 mmol/L    POCT Hemoglobin, Arterial 18.3 (H) 13.5 - 17.5 g/dL    POCT Anion Gap, Arterial 10 10 - 25 mmo/L    Patient Temperature 37.0 degrees Celsius    FiO2 60 %   POCT GLUCOSE   Result Value Ref Range    POCT Glucose 123 (H) 74 - 99 mg/dL     XR chest 1 view   Final Result   There appears to be increased aeration at the right base suggesting   diminishing pleural density within the right hemithorax in   association with the chest tube. Infiltrate persists on the right.        MACRO:   none        Signed by: Saad Cronin 9/5/2024 8:18 AM   Dictation workstation:   [id number]      XR chest 1 view   Final Result   1.  Enteric tube is coursing along the midline of the mediastinum in   the expected  course of the esophagus, although the distal tip is not   well visualized due to overlying cardiomediastinal silhouette and   underpenetration, and may be outside of the field-of-view of the   study. Endotracheal tube projects 4.5 cm superior to the kathie.   2. Redemonstration of the large loculated right-sided pleural   effusion with the associated atelectasis/consolidation, similar in   appearance to prior exam.                  MACRO:   None        Signed by: Cande Keating 9/5/2024 2:08 AM   Dictation workstation:   [id number]      CT head wo IV contrast   Final Result   No acute intracranial abnormality.             MACRO:   None.        Signed by: Ishaan Banda 9/4/2024 6:07 PM   Dictation workstation:   [id number]      CT chest abdomen pelvis w IV contrast   Final Result   CT CHEST:   Large loculated right pleural effusion causing complete compressive   collapse of the right middle lobe and right lower lobe and partial   compressive collapse of the right upper lobe. The largest likely   compartments located at the inferior right hemithorax. The cause of   the effusion is not apparent as there is only a small amount of   debris in the distal trachea and right mainstem bronchus but the   distal-most right-sided bronchi are occluded due to combination of   compressive collapse and small low-density fluid.        Numerous new subcentimeter centrilobular pulmonary nodules that are   most likely infectious/inflammatory in as can be seen with   bronchiolitis or fungal infection.        Aortic valve calcification to the extent that would likely result in   aortic stenosis.        CT ABDOMEN/PELVIS:   Left kidney solid heterogeneously enhancing mass suspicious for renal   cell carcinoma (2.4 cm x 1.9 cm) and of suspected 0.8 cm right upper   pole renal cell carcinoma measuring 0.8 cm. Recommend urological   follow-up/also taken and dedicated MRI kidneys to further evaluate   these lesions. YELLOW ALERT:  An incidental finding alert was sent per   protocol.        No acute abnormality in the abdomen or pelvis.        Colonic diverticulosis without acute diverticulitis.        MACRO:   Critical Finding:  New mass in the kidney. Notification was initiated   on 9/4/2024 at 6:25 pm by  Ishaan Banda.  (**-YCF-**)   Instructions:  MR Abdomen w and wo IV contrast. 1 week.        Signed by: Ishaan Banda 9/4/2024 6:26 PM   Dictation workstation:   [id number]      XR chest 1 view   Final Result   Interval placement of an endotracheal tube. The tip is above the   kathie. Interval placement of a nasogastric tube. It appears to   terminate below the diaphragm.        The exam is extremely limited.        MACRO:   none        Signed by: Rosalie Ellison 9/4/2024 4:43 PM   Dictation workstation:   [id number]            Assessment/Plan     Plan: Possible RCC    Discussed with Dr. Cordova, no indication for urgent urologic intervention at this time. Would recommend outpatient follow up with Dr. Morales once clinically progressing to continue work-up. Urology to sign off at this time. Please call with any questions or concerns.    I spent 60 minutes in the professional and overall care of this patient.      Giuliano Dela Cruz PA-C

## 2024-09-05 NOTE — PROGRESS NOTES
09/05/24 5345   Discharge Planning   Support Systems Family members;Friends/neighbors   Assistance Needed TBD     Spoke with Sheyla, friend and  via phone. Sheyla stated that patient had strained relationship with patient. Sheyla shared that patient spoke often about making her the HCPOA, but she is unsure if he ever took steps to complete it. Per Sheyla, she has spoke with sister about patient's wishes that he had verbalized to her. Sheyla understands that patient's sister is decision maker at this time. IGNACIO will follow.    SW called sister, left VM.

## 2024-09-05 NOTE — CARE PLAN
The patient's goals for the shift include      The clinical goals for the shift include Pt will remain hemodynamically stable this shift    Problem: Skin  Goal: Decreased wound size/increased tissue granulation at next dressing change  Flowsheets (Taken 9/5/2024 0249)  Decreased wound size/increased tissue granulation at next dressing change:   Promote sleep for wound healing   Protective dressings over bony prominences   Utilize specialty bed per algorithm  Goal: Participates in plan/prevention/treatment measures  Flowsheets (Taken 9/5/2024 0249)  Participates in plan/prevention/treatment measures:   Discuss with provider PT/OT consult   Elevate heels   Increase activity/out of bed for meals  Goal: Prevent/manage excess moisture  Flowsheets (Taken 9/5/2024 0249)  Prevent/manage excess moisture:   Cleanse incontinence/protect with barrier cream   Monitor for/manage infection if present   Follow provider orders for dressing changes   Use wicking fabric (obtain order)   Moisturize dry skin  Goal: Prevent/minimize sheer/friction injuries  Flowsheets (Taken 9/5/2024 0249)  Prevent/minimize sheer/friction injuries:   Complete micro-shifts as needed if patient unable. Adjust patient position to relieve pressure points, not a full turn   Increase activity/out of bed for meals   Use pull sheet   HOB 30 degrees or less   Turn/reposition every 2 hours/use positioning/transfer devices   Utilize specialty bed per algorithm  Goal: Promote/optimize nutrition  Flowsheets (Taken 9/5/2024 0249)  Promote/optimize nutrition:   Assist with feeding   Monitor/record intake including meals   Consume > 50% meals/supplements   Offer water/supplements/favorite foods   Discuss with provider if NPO > 2 days   Reassess MST if dietician not consulted  Goal: Promote skin healing  Flowsheets (Taken 9/5/2024 0249)  Promote skin healing:   Assess skin/pad under line(s)/device(s)   Protective dressings over bony prominences   Turn/reposition every 2  hours/use positioning/transfer devices   Ensure correct size (line/device) and apply per  instructions   Rotate device position/do not position patient on device

## 2024-09-05 NOTE — CONSULTS
Reason For Consult  Upper GI Bleed/ Coffee ground NG output     History Of Present Illness  Alo Glass is a 69 y.o. male with h/o current tobacco use, ETOH abuse, CHF, COPD, MELISSA, aortic stenosis, DM, morbid obesity, [place] [facility] [place] un[place] at [place] by his neighbor and EMS was called. In the ER he was placed on ventilatory support. ABG revealed hypoxia and hypercapnia with PH of 6.87 and pCO2 greater than 125. He received broad spectrum antibiotics and 3L of fluid in the ER. He currently on norepinephrine, Precedex and Fentanyl drip. He was found with large pleural effusion, chest tube placed. GI consulted for upper GI bleeding. Pt had OG placed to suction and drained bloody material. No melena or hematochezia. H&H stable. Pt is intubated, the history obtained. No reports of GI endoscopies in [place]. He was previously seen by hepatology for evaluation for hepatic steatosis.    Past Medical History  He has a past medical history of CHF (congestive heart failure) (Multi), COPD (chronic obstructive pulmonary disease) (Multi), Diabetes mellitus (Multi), Hypertension, Obesity, MELISSA (obstructive sleep apnea), and Personal history of other specified conditions (01/08/2023).    Surgical History  He has a past surgical history that includes Hernia repair (11/07/2013) and Other surgical history (11/07/2013).     Social History  He reports that he has been smoking cigarettes. He has never used smokeless tobacco. He reports current alcohol use of about 7.0 standard drinks of alcohol per week. He reports that he does not use drugs.    Family History  No family history on file.     Allergies  Patient has no known allergies.    Review of Systems  Unable to review,pt is intubated and sedated     Physical Exam  Physical Exam  Vitals reviewed.   Constitutional:       Appearance: He is obese.   HENT:      Head: Normocephalic.   Cardiovascular:      Rate and Rhythm: Normal rate and regular rhythm.      Heart  "sounds: Normal heart sounds.   Pulmonary:      Comments: intubated  Abdominal:      General: Bowel sounds are normal. There is distension.      Palpations: Abdomen is soft.      Comments: No grimacing with palpation   Musculoskeletal:         General: Swelling present.      Right lower leg: Edema present.      Left lower leg: Edema present.   Skin:     General: Skin is warm and dry.   Neurological:      Mental Status: He is unresponsive.            Last Recorded Vitals  Blood pressure 82/72, pulse 84, temperature 36.2 °C (97.2 °F), temperature source Temporal, resp. rate 24, height 1.803 m (5' 11\"), weight 137 kg (302 lb 0.5 oz), SpO2 99%.    Relevant Results      Scheduled medications  ezetimibe, 10 mg, oral, Daily  folic acid, 1 mg, oral, Daily  [Held by provider] heparin (porcine), 7,500 Units, subcutaneous, q8h SHARRON  insulin lispro, 0-10 Units, subcutaneous, q4h  ipratropium-albuteroL, 3 mL, nebulization, q6h  levothyroxine, 125 mcg, oral, Daily  multivitamin with minerals, 1 tablet, oral, Daily  pantoprazole, 40 mg, intravenous, BID  piperacillin-tazobactam, 4.5 g, intravenous, q6h  thiamine, 100 mg, oral, Daily  vancomycin, 1,750 mg, intravenous, Once      Continuous medications  fentaNYL, 0-200 mcg/hr, Last Rate: 150 mcg/hr (09/05/24 1000)  norepinephrine, 0-0.2 mcg/kg/min, Last Rate: 0.06 mcg/kg/min (09/05/24 1352)  propofol, 0-50 mcg/kg/min, Last Rate: 10 mcg/kg/min (09/05/24 1200)      PRN medications  PRN medications: alteplase, dextrose, dextrose, glucagon, glucagon, LORazepam, oxygen, vancomycin  ECG 12 lead    Result Date: 9/5/2024  Sinus rhythm with 1st degree AV block with frequent Premature ventricular complexes Left axis deviation Left ventricular hypertrophy with QRS widening and repolarization abnormality ( R in aVL , Faheem product ) Inferior infarct , age undetermined Abnormal ECG When compared with ECG of 23-APR-2024 09:01, Premature ventricular complexes are now Present FL interval has " increased (RBBB and left anterior fascicular block) is no longer Present Inferior infarct is now Present Confirmed by Andera Bentley ([id number]) on 9/5/2024 10:01:05 AM    XR chest 1 view    Result Date: 9/5/2024  Interpreted By:  Saad Cronin, STUDY: XR CHEST 1 VIEW; 9/5/2024 4:57 am   INDICATION: CLINICAL INFORMATION: Signs/Symptoms:Pig tail Chest Tube replacement.   COMPARISON: 09/04/2024   ACCESSION NUMBER(S): [id number]   ORDERING CLINICIAN: ANDRY ABRAHAM   TECHNIQUE: Portable chest one view.   FINDINGS: The cardiac silhouette is quite prominent suggesting cardiomegaly. The tube and line placement is unchanged.  There is a decrease in the right-sided pleural-based density is well as the right-sided parenchymal densities. The pulmonary vasculature is slightly indistinct suggesting mild pulmonary vascular congestion.       There appears to be increased aeration at the right base suggesting diminishing pleural density within the right hemithorax in association with the chest tube. Infiltrate persists on the right.   MACRO: none   Signed by: Saad Cronin 9/5/2024 8:18 AM Dictation workstation:   [id number]    XR chest 1 view    Result Date: 9/5/2024  Interpreted By:  Cande Keating, STUDY: XR CHEST 1 VIEW;  9/4/2024 11:10 pm   INDICATION: Signs/Symptoms:Chest tube placement.     COMPARISON: Radiographs of the chest dated 09/04/2024; CT of the chest dated 09/04/2024   ACCESSION NUMBER(S): [id number]   ORDERING CLINICIAN: ANDRY ABRAHAM   FINDINGS: AP radiograph of the chest was provided.   Endotracheal tube projects 4.4 cm superior to the kathie. Enteric tube appears to course along the midline expected course of the esophagus, although the distal tip is not well visualized due to underpenetration and overlying structures.   CARDIOMEDIASTINAL SILHOUETTE: Cardiomediastinal silhouette is enlarged, similar to prior exam.   LUNGS: Redemonstration of the large right-sided pleural effusion with associated  atelectasis/consolidation, similar in appearance to prior radiographs. No new consolidation or pleural effusion is present in the left lung.   ABDOMEN: No remarkable upper abdominal findings.   BONES: No acute osseous changes.       1.  Enteric tube is coursing along the midline of the mediastinum in the expected course of the esophagus, although the distal tip is not well visualized due to overlying cardiomediastinal silhouette and underpenetration, and may be outside of the field-of-view of the study. Endotracheal tube projects 4.5 cm superior to the kathie. 2. Redemonstration of the large loculated right-sided pleural effusion with the associated atelectasis/consolidation, similar in appearance to prior exam.       MACRO: None   Signed by: Cande Keating 9/5/2024 2:08 AM Dictation workstation:   [id number]    CT chest abdomen pelvis w IV contrast    Result Date: 9/4/2024  Interpreted By:  Ishaan Banda, STUDY: CT CHEST ABDOMEN PELVIS W IV CONTRAST;  9/4/2024 5:52 pm   INDICATION: Signs/Symptoms:unresponsive.     COMPARISON: None.   ACCESSION NUMBER(S): [id number]   ORDERING CLINICIAN: GURPREET COPPOLA   TECHNIQUE: Contiguous axial images of the chest, abdomen, and pelvis were obtained after the intravenous administration of iodinated contrast. Coronal and sagittal reformatted images were reconstructed from the axial data.   FINDINGS: CT CHEST:   MEDIASTINUM AND LYMPH NODES: NG/OG tube noted with small amount of fluid in the esophagus. The esophageal wall appears within normal limits.  No enlarged intrathoracic or axillary lymph nodes by imaging criteria. No pneumomediastinum.   VESSELS:  Normal caliber thoracic aorta without dissection. Mild aortic atherosclerosis.   HEART: Enlarged. Severe aortic valvular calcifications. Moderate coronary artery calcifications. No significant pericardial effusion.   LUNG, AIRWAYS, PLEURA: There is a large loculated right pleural effusion causing compressive  atelectasis on the right lung. As a result, there is complete collapse of the right lower lobe, complete collapse of the right middle lobe, and partial collapse of the right upper lobe. There are multiple loculated compartments largest of which is seen at the right lung base. There is a small amount of mucus in the mid to distal trachea. There is small amount of layering debris in the mainstem bronchi. The majority of the right middle and lower lobe bronchi are collapsed and opacified with low-density debris. There is a trace left pleural effusion with subsegmental left basilar atelectasis. There is emphysema. There are new scattered subcentimeter bilateral centrilobular nodules that are likely infectious/inflammatory in nature from bronchiolitis.   CHEST WALL SOFT TISSUES: No discernible acute abnormality. There is a 5.5 cm x 1.7 cm lipoma in the right infraspinatus muscle.   OSSEOUS STRUCTURES: No acute osseous abnormality.     CT ABDOMEN/PELVIS:   ABDOMINAL WALL: No significant abnormality.   LIVER: No significant parenchymal abnormality.   BILE DUCTS: No significant intrahepatic or extrahepatic dilatation.   GALLBLADDER: No significant abnormality.   PANCREAS: No significant abnormality.   SPLEEN: No significant abnormality.   ADRENALS: There is a 1.7 cm low-density left adrenal nodule likely representing a adenoma.   KIDNEYS, URETERS, BLADDER: There is a heterogeneously enhancing solid mass in the upper pole the left kidney measuring 2.4 cm x 1.9 cm consistent with renal cell carcinoma. Additionally, a 0.8 cm likely enhancing lesion in the upper pole the right kidney is concerning for renal cell carcinoma. No hydronephrosis or calculi. Urinary bladder is decompressed with Live catheter in place.   REPRODUCTIVE ORGANS: No significant abnormality.   VESSELS: Moderate aortic atherosclerosis without AAA.   RETROPERITONEUM/LYMPH NODES: No acute retroperitoneal abnormality. No enlarged lymph nodes.    BOWEL/MESENTERY/PERITONEUM: Colonic diverticulosis without acute diverticulitis. No inflammatory bowel wall thickening or dilatation. Normal appendix.   No ascites, free air, or fluid collection.     MUSCULOSKELETAL: No acute osseous abnormality.       CT CHEST: Large loculated right pleural effusion causing complete compressive collapse of the right middle lobe and right lower lobe and partial compressive collapse of the right upper lobe. The largest likely compartments located at the inferior right hemithorax. The cause of the effusion is not apparent as there is only a small amount of debris in the distal trachea and right mainstem bronchus but the distal-most right-sided bronchi are occluded due to combination of compressive collapse and small low-density fluid.   Numerous new subcentimeter centrilobular pulmonary nodules that are most likely infectious/inflammatory in as can be seen with bronchiolitis or fungal infection.   Aortic valve calcification to the extent that would likely result in aortic stenosis.   CT ABDOMEN/PELVIS: Left kidney solid heterogeneously enhancing mass suspicious for renal cell carcinoma (2.4 cm x 1.9 cm) and of suspected 0.8 cm right upper pole renal cell carcinoma measuring 0.8 cm. Recommend urological follow-up/also taken and dedicated MRI kidneys to further evaluate these lesions. YELLOW ALERT: An incidental finding alert was sent per protocol.   No acute abnormality in the abdomen or pelvis.   Colonic diverticulosis without acute diverticulitis.   MACRO: Critical Finding:  New mass in the kidney. Notification was initiated on 9/4/2024 at 6:25 pm by  Ishaan Banda.  (**-YCF-**) Instructions:  MR Abdomen w and wo IV contrast. 1 week.   Signed by: Ishaan Banda 9/4/2024 6:26 PM Dictation workstation:   [id number]    CT head wo IV contrast    Result Date: 9/4/2024  Interpreted By:  Ishaan Banda, STUDY: CT HEAD WO IV CONTRAST;  9/4/2024 5:52 pm   INDICATION:  Signs/Symptoms:unrespoonsive.     COMPARISON: None.   ACCESSION NUMBER(S): [id number]   ORDERING CLINICIAN: GURPREET COPPOLA   TECHNIQUE: Noncontrast axial CT images of head were obtained with coronal and sagittal reconstructed images.   FINDINGS: BRAIN PARENCHYMA: Mild periventricular and subcortical hemispheric white matter hypodensities are most compatible with chronic small vessel ischemic disease. No acute intraparenchymal hemorrhage or parenchymal evidence of acute large territory ischemic infarct. Gray-white matter distinction is preserved. No mass-effect.   VENTRICLES and EXTRA-AXIAL SPACES:  No acute extra-axial or intraventricular hemorrhage. No effacement of cerebral sulci. The ventricles and sulci are age-concordant.   PARANASAL SINUSES/MASTOIDS:  No hemorrhage or air-fluid levels within the visualized paranasal sinuses. The mastoids are well aerated.   CALVARIUM/ORBITS:  No skull fracture.  The orbits and globes are intact to the extent visualized.   EXTRACRANIAL SOFT TISSUES: No discernible abnormality.       No acute intracranial abnormality.     MACRO: None.   Signed by: Ishaan Banda 9/4/2024 6:07 PM Dictation workstation:   [id number]    XR chest 1 view    Result Date: 9/4/2024  Interpreted By:  Rosalie Ellison, STUDY: XR CHEST 1 VIEW;  9/4/2024 4:33 pm   INDICATION: Signs/Symptoms:intubation.   COMPARISON: 04/08/2024   ACCESSION NUMBER(S): [id number]   ORDERING CLINICIAN: GURPREET COPPOLA   TECHNIQUE: A single portable image of the chest was obtained.   FINDINGS: Resolution is limited. The right portion of the chest is not entirely included.   The patient is rotated. The heart size is uncertain.   There appears to be elevated right hemidiaphragm and right-sided infiltration.   An endotracheal tube terminates above the kathie. A nasogastric tube terminates below the diaphragm.   COMPARISON OF FINDING: The nasogastric tube was placed in the interval. The endotracheal tube was placed in the  interval.       Interval placement of an endotracheal tube. The tip is above the kathie. Interval placement of a nasogastric tube. It appears to terminate below the diaphragm.   The exam is extremely limited.   MACRO: none   Signed by: Rosalie Ellison 9/4/2024 4:43 PM Dictation workstation:   [id number] /  Results for orders placed or performed during the hospital encounter of 09/04/24 (from the past 24 hour(s))   POCT GLUCOSE   Result Value Ref Range    POCT Glucose 193 (H) 74 - 99 mg/dL   ECG 12 lead   Result Value Ref Range    Ventricular Rate 91 BPM    Atrial Rate 91 BPM    OK Interval 238 ms    QRS Duration 158 ms    QT Interval 418 ms    QTC Calculation(Bazett) 514 ms    P Axis 17 degrees    R Axis -57 degrees    T Axis 114 degrees    QRS Count 15 beats    Q Onset 210 ms    P Onset 91 ms    P Offset 152 ms    T Offset 419 ms    QTC Fredericia 480 ms   CBC and Auto Differential   Result Value Ref Range    WBC 23.0 (H) 4.4 - 11.3 x10*3/uL    nRBC 0.0 0.0 - 0.0 /100 WBCs    RBC 6.21 (H) 4.50 - 5.90 x10*6/uL    Hemoglobin 20.5 (H) 13.5 - 17.5 g/dL    Hematocrit 66.8 (H) 41.0 - 52.0 %     (H) 80 - 100 fL    MCH 33.0 26.0 - 34.0 pg    MCHC 30.7 (L) 32.0 - 36.0 g/dL    RDW 14.0 11.5 - 14.5 %    Platelets 349 150 - 450 x10*3/uL    Neutrophils % 86.3 40.0 - 80.0 %    Immature Granulocytes %, Automated 1.6 (H) 0.0 - 0.9 %    Lymphocytes % 2.5 13.0 - 44.0 %    Monocytes % 9.3 2.0 - 10.0 %    Eosinophils % 0.0 0.0 - 6.0 %    Basophils % 0.3 0.0 - 2.0 %    Neutrophils Absolute 19.85 (H) 1.20 - 7.70 x10*3/uL    Immature Granulocytes Absolute, Automated 0.36 0.00 - 0.70 x10*3/uL    Lymphocytes Absolute 0.57 (L) 1.20 - 4.80 x10*3/uL    Monocytes Absolute 2.13 (H) 0.10 - 1.00 x10*3/uL    Eosinophils Absolute 0.00 0.00 - 0.70 x10*3/uL    Basophils Absolute 0.07 0.00 - 0.10 x10*3/uL   Ammonia   Result Value Ref Range    Ammonia 79 (H) 16 - 53 umol/L   B-Type Natriuretic Peptide   Result Value Ref Range    BNP 1,155 (H) 0 -  99 pg/mL   Blood Gas Venous Full Panel   Result Value Ref Range    POCT pH, Venous 6.87 (LL) 7.33 - 7.43 pH    POCT pCO2, Venous >125 (HH) 41 - 51 mm Hg    POCT pO2, Venous 57 (H) 35 - 45 mm Hg    POCT SO2, Venous 83 (H) 45 - 75 %    POCT Oxy Hemoglobin, Venous 78.2 (H) 45.0 - 75.0 %    POCT Hematocrit Calculated, Venous 66.0 (H) 41.0 - 52.0 %    POCT Sodium, Venous 128 (L) 136 - 145 mmol/L    POCT Potassium, Venous 5.5 (H) 3.5 - 5.3 mmol/L    POCT Chloride, Venous 92 (L) 98 - 107 mmol/L    POCT Ionized Calicum, Venous 1.08 (L) 1.10 - 1.33 mmol/L    POCT Glucose, Venous 179 (H) 74 - 99 mg/dL    POCT Lactate, Venous 3.3 (H) 0.4 - 2.0 mmol/L    POCT Base Excess, Venous      POCT HCO3 Calculated, Venous      POCT Hemoglobin, Venous 21.9 (H) 13.5 - 17.5 g/dL    POCT Anion Gap, Venous      Patient Temperature 37.0 degrees Celsius    FiO2 21 %   Troponin I, High Sensitivity, Initial   Result Value Ref Range    Troponin I, High Sensitivity 99 (HH) 0 - 20 ng/L   Protime-INR   Result Value Ref Range    Protime 12.4 9.8 - 12.8 seconds    INR 1.1 0.9 - 1.1   BLOOD GAS ARTERIAL FULL PANEL   Result Value Ref Range    POCT pH, Arterial 7.00 (LL) 7.38 - 7.42 pH    POCT pCO2, Arterial 97 (HH) 38 - 42 mm Hg    POCT pO2, Arterial 160 (H) 85 - 95 mm Hg    POCT SO2, Arterial 100 94 - 100 %    POCT Oxy Hemoglobin, Arterial 93.8 (L) 94.0 - 98.0 %    POCT Hematocrit Calculated, Arterial 57.0 (H) 41.0 - 52.0 %    POCT Sodium, Arterial 128 (L) 136 - 145 mmol/L    POCT Potassium, Arterial 5.0 3.5 - 5.3 mmol/L    POCT Chloride, Arterial 97 (L) 98 - 107 mmol/L    POCT Ionized Calcium, Arterial 1.17 1.10 - 1.33 mmol/L    POCT Glucose, Arterial 189 (H) 74 - 99 mg/dL    POCT Lactate, Arterial 3.0 (H) 0.4 - 2.0 mmol/L    POCT Base Excess, Arterial -8.9 (L) -2.0 - 3.0 mmol/L    POCT HCO3 Calculated, Arterial 25.6 22.0 - 26.0 mmol/L    POCT Hemoglobin, Arterial 19.1 (H) 13.5 - 17.5 g/dL    POCT Anion Gap, Arterial 10 10 - 25 mmo/L    Patient  Temperature      FiO2 100 %   Blood Culture    Specimen: Peripheral Venipuncture; Blood culture   Result Value Ref Range    Blood Culture Loaded on Instrument - Culture in progress    Blood Culture    Specimen: Peripheral Venipuncture; Blood culture   Result Value Ref Range    Blood Culture Loaded on Instrument - Culture in progress    Basic metabolic panel   Result Value Ref Range    Glucose 400 (H) 74 - 99 mg/dL    Sodium 131 (L) 136 - 145 mmol/L    Potassium 5.2 3.5 - 5.3 mmol/L    Chloride 94 (L) 98 - 107 mmol/L    Bicarbonate 26 21 - 32 mmol/L    Anion Gap 16 10 - 20 mmol/L    Urea Nitrogen 43 (H) 6 - 23 mg/dL    Creatinine 1.93 (H) 0.50 - 1.30 mg/dL    eGFR 37 (L) >60 mL/min/1.73m*2    Calcium 7.1 (L) 8.6 - 10.3 mg/dL   Magnesium   Result Value Ref Range    Magnesium 2.20 1.60 - 2.40 mg/dL   Lipase   Result Value Ref Range    Lipase 12 9 - 82 U/L   Hepatic function panel   Result Value Ref Range    Albumin 2.4 (L) 3.4 - 5.0 g/dL    Bilirubin, Total 0.6 0.0 - 1.2 mg/dL    Bilirubin, Direct 0.2 0.0 - 0.3 mg/dL    Alkaline Phosphatase 88 33 - 136 U/L    ALT 11 10 - 52 U/L    AST 11 9 - 39 U/L    Total Protein 4.4 (L) 6.4 - 8.2 g/dL   Acute Toxicology Panel, Blood   Result Value Ref Range    Acetaminophen <10.0 10.0 - 30.0 ug/mL    Salicylate  <3 4 - 20 mg/dL    Alcohol <10 <=10 mg/dL   Troponin, High Sensitivity, 1 Hour   Result Value Ref Range    Troponin I, High Sensitivity 69 (HH) 0 - 20 ng/L   Blood Gas Lactic Acid, Venous   Result Value Ref Range    POCT Lactate, Venous 2.9 (H) 0.4 - 2.0 mmol/L   Protein, Total   Result Value Ref Range    Total Protein 4.4 (L) 6.4 - 8.2 g/dL   POCT GLUCOSE   Result Value Ref Range    POCT Glucose 216 (H) 74 - 99 mg/dL   Blood Gas Venous Full Panel   Result Value Ref Range    POCT pH, Venous 7.09 (LL) 7.33 - 7.43 pH    POCT pCO2, Venous 101 (HH) 41 - 51 mm Hg    POCT pO2, Venous 26 (L) 35 - 45 mm Hg    POCT SO2, Venous 47 45 - 75 %    POCT Oxy Hemoglobin, Venous 45.4 45.0 -  75.0 %    POCT Hematocrit Calculated, Venous 56.0 (H) 41.0 - 52.0 %    POCT Sodium, Venous 125 (L) 136 - 145 mmol/L    POCT Potassium, Venous 5.1 3.5 - 5.3 mmol/L    POCT Chloride, Venous 91 (L) 98 - 107 mmol/L    POCT Ionized Calicum, Venous 1.11 1.10 - 1.33 mmol/L    POCT Glucose, Venous      POCT Lactate, Venous 2.4 (H) 0.4 - 2.0 mmol/L    POCT Base Excess, Venous -3.6 (L) -2.0 - 3.0 mmol/L    POCT HCO3 Calculated, Venous 30.6 (H) 22.0 - 26.0 mmol/L    POCT Hemoglobin, Venous 18.8 (H) 13.5 - 17.5 g/dL    POCT Anion Gap, Venous 9.0 (L) 10.0 - 25.0 mmol/L    Patient Temperature 37.0 degrees Celsius    FiO2 100 %   Drug Screen, Urine   Result Value Ref Range    Amphetamine Screen, Urine Presumptive Negative Presumptive Negative    Barbiturate Screen, Urine Presumptive Negative Presumptive Negative    Benzodiazepines Screen, Urine Presumptive Positive (A) Presumptive Negative    Cannabinoid Screen, Urine Presumptive Negative Presumptive Negative    Cocaine Metabolite Screen, Urine Presumptive Negative Presumptive Negative    Fentanyl Screen, Urine Presumptive Positive (A) Presumptive Negative    Opiate Screen, Urine Presumptive Negative Presumptive Negative    Oxycodone Screen, Urine Presumptive Negative Presumptive Negative    PCP Screen, Urine Presumptive Negative Presumptive Negative    Methadone Screen, Urine Presumptive Negative Presumptive Negative   Urinalysis with Reflex Culture and Microscopic   Result Value Ref Range    Color, Urine Yellow Light-Yellow, Yellow, Dark-Yellow    Appearance, Urine Clear Clear    Specific Gravity, Urine 1.035 1.005 - 1.035    pH, Urine 5.5 5.0, 5.5, 6.0, 6.5, 7.0, 7.5, 8.0    Protein, Urine 50 (1+) (A) NEGATIVE, 10 (TRACE), 20 (TRACE) mg/dL    Glucose, Urine OVER (4+) (A) Normal mg/dL    Blood, Urine 1.0 (3+) (A) NEGATIVE    Ketones, Urine NEGATIVE NEGATIVE mg/dL    Bilirubin, Urine NEGATIVE NEGATIVE    Urobilinogen, Urine Normal Normal mg/dL    Nitrite, Urine NEGATIVE NEGATIVE     Leukocyte Esterase, Urine NEGATIVE NEGATIVE   Urinalysis Microscopic   Result Value Ref Range    WBC, Urine 21-50 (A) 1-5, NONE /HPF    RBC, Urine >20 (A) NONE, 1-2, 3-5 /HPF    Mucus, Urine FEW Reference range not established. /LPF    Hyaline Casts, Urine OCCASIONAL (A) NONE /LPF   Lactate Dehydrogenase, Fluid   Result Value Ref Range    LD, Fluid 1,024 Not established. U/L   Glucose, Fluid   Result Value Ref Range    Glucose, Fluid <10 Not established mg/dL   Protein, Total Fluid   Result Value Ref Range    Protein, Total Fluid 4.0 Not established g/dL   Body Fluid Cell Count   Result Value Ref Range    Color, Fluid Straw Colorless, Straw, Yellow    Clarity, Fluid Turbid (A) Clear    WBC, Fluid 71,706 See Comment /uL    RBC, Fluid 13,000 0  /uL /uL   Body Fluid Differential   Result Value Ref Range    Neutrophils %, Manual, Fluid 97 <25 % %    Lymphocytes %, Manual, Fluid 3 <75 % %    Mono/Macrophages %, Manual, Fluid 0 <70 % %    Eosinophils %, Manual, Fluid 0 0 % %    Basophils %, Manual, Fluid 0 0 % %    Immature Granulocytes %, Manual, Fluid 0 0 % %    Blasts %, Manual, Fluid 0 0 % %    Unclassified Cells %, Manual, Fluid 0 0 % %    Plasma Cells %, Manual, Fluid 0 0 % %    Total Cells Counted, Fluid 100    Sterile Fluid Culture/Smear    Specimen: Pleural; Fluid   Result Value Ref Range    Gram Stain (3+) Moderate Polymorphonuclear leukocytes     Gram Stain No organisms seen    Fungal Culture/Smear    Specimen: Pleural; Fluid   Result Value Ref Range    Fungal Culture/Smear       Culture in progress, a report will be issued when positive or after 2 weeks of incubation.    Fungal Smear No fungal elements seen    pH, Body Fluid   Result Value Ref Range    pH, Fluid 7.92 See Below   POCT GLUCOSE   Result Value Ref Range    POCT Glucose 194 (H) 74 - 99 mg/dL   Lactate Dehydrogenase   Result Value Ref Range     84 - 246 U/L   BLOOD GAS ARTERIAL FULL PANEL   Result Value Ref Range    POCT pH, Arterial 7.26  (L) 7.38 - 7.42 pH    POCT pCO2, Arterial 64 (H) 38 - 42 mm Hg    POCT pO2, Arterial 84 (L) 85 - 95 mm Hg    POCT SO2, Arterial 97 94 - 100 %    POCT Oxy Hemoglobin, Arterial 93.9 (L) 94.0 - 98.0 %    POCT Hematocrit Calculated, Arterial 56.0 (H) 41.0 - 52.0 %    POCT Sodium, Arterial 131 (L) 136 - 145 mmol/L    POCT Potassium, Arterial 4.4 3.5 - 5.3 mmol/L    POCT Chloride, Arterial 97 (L) 98 - 107 mmol/L    POCT Ionized Calcium, Arterial 1.17 1.10 - 1.33 mmol/L    POCT Glucose, Arterial 174 (H) 74 - 99 mg/dL    POCT Lactate, Arterial 1.4 0.4 - 2.0 mmol/L    POCT Base Excess, Arterial -0.7 -2.0 - 3.0 mmol/L    POCT HCO3 Calculated, Arterial 28.7 (H) 22.0 - 26.0 mmol/L    POCT Hemoglobin, Arterial 18.7 (H) 13.5 - 17.5 g/dL    POCT Anion Gap, Arterial 10 10 - 25 mmo/L    Patient Temperature 37.0 degrees Celsius    FiO2 70 %   POCT GLUCOSE   Result Value Ref Range    POCT Glucose 132 (H) 74 - 99 mg/dL   Renal Function Panel   Result Value Ref Range    Glucose 128 (H) 74 - 99 mg/dL    Sodium 137 136 - 145 mmol/L    Potassium 4.5 3.5 - 5.3 mmol/L    Chloride 100 98 - 107 mmol/L    Bicarbonate 30 21 - 32 mmol/L    Anion Gap 12 10 - 20 mmol/L    Urea Nitrogen 44 (H) 6 - 23 mg/dL    Creatinine 1.62 (H) 0.50 - 1.30 mg/dL    eGFR 46 (L) >60 mL/min/1.73m*2    Calcium 8.2 (L) 8.6 - 10.3 mg/dL    Phosphorus 5.5 (H) 2.5 - 4.9 mg/dL    Albumin 3.0 (L) 3.4 - 5.0 g/dL   Magnesium   Result Value Ref Range    Magnesium 2.40 1.60 - 2.40 mg/dL   CBC   Result Value Ref Range    WBC 22.7 (H) 4.4 - 11.3 x10*3/uL    nRBC 0.0 0.0 - 0.0 /100 WBCs    RBC 5.61 4.50 - 5.90 x10*6/uL    Hemoglobin 18.5 (H) 13.5 - 17.5 g/dL    Hematocrit 58.2 (H) 41.0 - 52.0 %     (H) 80 - 100 fL    MCH 33.0 26.0 - 34.0 pg    MCHC 31.8 (L) 32.0 - 36.0 g/dL    RDW 13.6 11.5 - 14.5 %    Platelets 298 150 - 450 x10*3/uL   Ammonia   Result Value Ref Range    Ammonia 38 16 - 53 umol/L   Vancomycin   Result Value Ref Range    Vancomycin 14.3 5.0 - 20.0 ug/mL    Blood Gas Arterial Full Panel   Result Value Ref Range    POCT pH, Arterial 7.18 (LL) 7.38 - 7.42 pH    POCT pCO2, Arterial 78 (HH) 38 - 42 mm Hg    POCT pO2, Arterial 93 85 - 95 mm Hg    POCT SO2, Arterial 97 94 - 100 %    POCT Oxy Hemoglobin, Arterial 94.7 94.0 - 98.0 %    POCT Hematocrit Calculated, Arterial 56.0 (H) 41.0 - 52.0 %    POCT Sodium, Arterial 132 (L) 136 - 145 mmol/L    POCT Potassium, Arterial 4.3 3.5 - 5.3 mmol/L    POCT Chloride, Arterial 98 98 - 107 mmol/L    POCT Ionized Calcium, Arterial 1.18 1.10 - 1.33 mmol/L    POCT Glucose, Arterial 129 (H) 74 - 99 mg/dL    POCT Lactate, Arterial 1.0 0.4 - 2.0 mmol/L    POCT Base Excess, Arterial -2.4 (L) -2.0 - 3.0 mmol/L    POCT HCO3 Calculated, Arterial 29.1 (H) 22.0 - 26.0 mmol/L    POCT Hemoglobin, Arterial 18.7 (H) 13.5 - 17.5 g/dL    POCT Anion Gap, Arterial 9 (L) 10 - 25 mmo/L    Patient Temperature 37.0 degrees Celsius    FiO2 60 %   POCT GLUCOSE   Result Value Ref Range    POCT Glucose 109 (H) 74 - 99 mg/dL   Blood Gas Arterial Full Panel   Result Value Ref Range    POCT pH, Arterial 7.24 (LL) 7.38 - 7.42 pH    POCT pCO2, Arterial 69 (H) 38 - 42 mm Hg    POCT pO2, Arterial 76 (L) 85 - 95 mm Hg    POCT SO2, Arterial 96 94 - 100 %    POCT Oxy Hemoglobin, Arterial 93.1 (L) 94.0 - 98.0 %    POCT Hematocrit Calculated, Arterial 55.0 (H) 41.0 - 52.0 %    POCT Sodium, Arterial 133 (L) 136 - 145 mmol/L    POCT Potassium, Arterial 4.3 3.5 - 5.3 mmol/L    POCT Chloride, Arterial 98 98 - 107 mmol/L    POCT Ionized Calcium, Arterial 1.16 1.10 - 1.33 mmol/L    POCT Glucose, Arterial 132 (H) 74 - 99 mg/dL    POCT Lactate, Arterial 1.2 0.4 - 2.0 mmol/L    POCT Base Excess, Arterial -0.5 -2.0 - 3.0 mmol/L    POCT HCO3 Calculated, Arterial 29.6 (H) 22.0 - 26.0 mmol/L    POCT Hemoglobin, Arterial 18.3 (H) 13.5 - 17.5 g/dL    POCT Anion Gap, Arterial 10 10 - 25 mmo/L    Patient Temperature 37.0 degrees Celsius    FiO2 60 %   POCT GLUCOSE   Result Value Ref Range     POCT Glucose 123 (H) 74 - 99 mg/dL   Blood Gas Arterial Full Panel   Result Value Ref Range    POCT pH, Arterial 7.32 (L) 7.38 - 7.42 pH    POCT pCO2, Arterial 53 (H) 38 - 42 mm Hg    POCT pO2, Arterial 77 (L) 85 - 95 mm Hg    POCT SO2, Arterial 96 94 - 100 %    POCT Oxy Hemoglobin, Arterial 93.4 (L) 94.0 - 98.0 %    POCT Hematocrit Calculated, Arterial 52.0 41.0 - 52.0 %    POCT Sodium, Arterial 132 (L) 136 - 145 mmol/L    POCT Potassium, Arterial 4.4 3.5 - 5.3 mmol/L    POCT Chloride, Arterial 100 98 - 107 mmol/L    POCT Ionized Calcium, Arterial 1.15 1.10 - 1.33 mmol/L    POCT Glucose, Arterial 156 (H) 74 - 99 mg/dL    POCT Lactate, Arterial 1.3 0.4 - 2.0 mmol/L    POCT Base Excess, Arterial -0.1 -2.0 - 3.0 mmol/L    POCT HCO3 Calculated, Arterial 27.3 (H) 22.0 - 26.0 mmol/L    POCT Hemoglobin, Arterial 17.4 13.5 - 17.5 g/dL    POCT Anion Gap, Arterial 9 (L) 10 - 25 mmo/L    Patient Temperature 37.0 degrees Celsius    FiO2 60 %          Assessment/Plan     69 y.o. male with h/o current tobacco and ETOH use, CHF, COPD, MELISSA, aortic stenosis, DM, morbid obesity, presented after he was found unresponsive, he was placed on ventilatory support. ABG revealed hypoxia and hypercapnia, CT showed large pleural effusion, s/p chest tube placement. GI consulted for bloody output from OGT. Suspect secondary to trauma form OG placement, possibly intubation, other differentials include PUD, gastritis, esophagitis, malignancy. Do not suspect significant GI blood loss    - recommend ot monitor H&H  - continue twice daily PPI  - monitor for bleeding  -could consider EGD if bleeding persist and unstable H&H      I spent 35 minutes in the professional and overall care of this patient.      Samara Camargo, APRN-CNP

## 2024-09-05 NOTE — CONSULTS
CONSULT: NEPHROLOGY SERVICE    REASON FOR CONSULT: LEONA, kidney mass   Admit Date: 9/4/2024  4:06 PM       HPI: Patient is a 69 y.o. male admitted 9/4/2024  with h/o obesity, tobacco use, ETOH abuse, CHF, COPD, MELISSA, and T2D. He was found non responsive at home by his neighbor, eventually getting intubated, placed on pressor, vent support, started on ATB, s/p bronchoscopy because of R airway collapse, chronic R pleural effusion.    Consulted because of incidental finding of L kidney mass, concerning for malignancy  Also with LEONA, Scr 1.9 on admission, better this am, UA dirty, culture pending     Past Medical History:   Diagnosis Date    CHF (congestive heart failure) (Multi)     COPD (chronic obstructive pulmonary disease) (Multi)     Diabetes mellitus (Multi)     Hypertension     Obesity     MELISSA (obstructive sleep apnea)     Personal history of other specified conditions 01/08/2023    History of impaired glucose tolerance     Allergies: Patient has no known allergies.     Past Surgical History:   Procedure Laterality Date    HERNIA REPAIR  11/07/2013    Hernia Repair    OTHER SURGICAL HISTORY  11/07/2013    Oral Surgery       No family history on file.    Social History  He reports that he has been smoking cigarettes. He has never used smokeless tobacco. He reports current alcohol use of about 7.0 standard drinks of alcohol per week. He reports that he does not use drugs.    Review of Systems  Unable to get     CURRENT HOSP MEDS:    Current Facility-Administered Medications:     alteplase (Cathflo Activase) injection 2 mg, 2 mg, intra-catheter, PRN, Cyndee Carlson PA-C    budesonide (Pulmicort) 0.5 mg/2 mL nebulizer solution 0.5 mg, 0.5 mg, nebulization, BID, 0.5 mg at 09/05/24 0744 **AND** formoterol (Perforomist) 20 mcg/2 mL nebulizer solution 20 mcg, 20 mcg, nebulization, BID, Cyndee Carlosn PA-C, 20 mcg at 09/05/24 0744    dexmedeTOMIDine (Precedex) 400 mcg in 100 mL (4 mcg/mL) sodium chloride 0.9% infusion,  0-1.5 mcg/kg/hr, intravenous, Continuous, Amy Welsh APRN-CNP, Last Rate: 6.85 mL/hr at 09/05/24 0843, 0.2 mcg/kg/hr at 09/05/24 0843    dextrose 50 % injection 12.5 g, 12.5 g, intravenous, q15 min PRN, Cyndee Carlson PA-C    dextrose 50 % injection 25 g, 25 g, intravenous, q15 min PRN, Cyndee Carlson PA-C    ezetimibe (Zetia) tablet 10 mg, 10 mg, oral, Daily, Lynn Ballesteros APRN-CNP, 10 mg at 09/05/24 0943    fentanyl (Sublimaze) 1000 mcg in sodium chloride 0.9% 100 mL (10 mcg/mL) infusion (premix), 0-200 mcg/hr, intravenous, Continuous, Lynn Ballesteros APRN-CNP, Last Rate: 15 mL/hr at 09/05/24 1000, 150 mcg/hr at 09/05/24 1000    folic acid (Folvite) tablet 1 mg, 1 mg, oral, Daily, Lynn Ballesteros APRN-CNP, 1 mg at 09/05/24 0910    glucagon (Glucagen) injection 1 mg, 1 mg, intramuscular, q15 min PRN, Cyndee Carlson PA-C    glucagon (Glucagen) injection 1 mg, 1 mg, intramuscular, q15 min PRN, Cyndee Carlson PA-C    [Held by provider] heparin (porcine) injection 7,500 Units, 7,500 Units, subcutaneous, q8h [facility], Cyndee Carlson PA-C    insulin lispro (HumaLOG) injection 0-10 Units, 0-10 Units, subcutaneous, q4h, Lynn Ballesteros APRN-CNP, 2 Units at 09/04/24 2326    ipratropium-albuteroL (Duo-Neb) 0.5-2.5 mg/3 mL nebulizer solution 3 mL, 3 mL, nebulization, q6h, Lynn Ballesteros, APRN-CNP, 3 mL at 09/05/24 0744    levothyroxine (Synthroid, Levoxyl) tablet 125 mcg, 125 mcg, oral, Daily, Lynn Ballesteros, APRN-CNP, 125 mcg at 09/05/24 0910    LORazepam (Ativan) injection 2 mg, 2 mg, intravenous, q5 min PRN, Lynn MATHUR Mix, APRN-CNP    multivitamin with minerals 1 tablet, 1 tablet, oral, Daily, Lynn Ballesteros, APRN-CNP, 1 tablet at 09/05/24 0910    norepinephrine (Levophed) 8 mg in dextrose 5% 250 mL (0.032 mg/mL) infusion (premix), 0-0.2 mcg/kg/min, intravenous, Continuous, Cyndee Carlson PA-C, Last Rate: 5.25 mL/hr at 09/05/24 0640, 0.02 mcg/kg/min at 09/05/24 0640    oxygen (O2) therapy, , inhalation, Continuous PRN -  "O2/gases, Cyndee Carlson PA-C, 60 percent at 09/05/24 0744    pantoprazole (ProtoNix) injection 40 mg, 40 mg, intravenous, BID, Lynn M Mix, APRN-CNP, 40 mg at 09/05/24 0910    PHENobarbital (Luminal) injection 65 mg, 65 mg, intravenous, q8h, 65 mg at 09/05/24 0910 **FOLLOWED BY** [START ON 9/7/2024] PHENobarbital (Luminal) injection 32.5 mg, 32.5 mg, intravenous, q8h, Lynn M Mix, APRN-CNP    piperacillin-tazobactam (Zosyn) 4.5 g in dextrose (iso)  mL, 4.5 g, intravenous, q6h, Lynn M Mix, APRN-CNP, Stopped at 09/05/24 0542    thiamine (Vitamin B-1) tablet 100 mg, 100 mg, oral, Daily, Lynn M Mix, APRN-CNP, 100 mg at 09/05/24 0910    vancomycin (Vancocin) in % 5 dextrose 500 mL IV 1,750 mg, 1,750 mg, intravenous, Once, Elsy Dejesus, PharmD    vancomycin (Vancocin) pharmacy to dose - pharmacy monitoring, , miscellaneous, Daily PRN, Lynn M Mix, APRN-CNP     PHYSICAL EXAM:  BP 82/72   Pulse 75   Temp 36.2 °C (97.1 °F) (Temporal)   Resp 24   Ht 1.803 m (5' 11\")   Wt 137 kg (302 lb 0.5 oz)   SpO2 97%   BMI 42.12 kg/m²     Intake/Output Summary (Last 24 hours) at 9/5/2024 1115  Last data filed at 9/5/2024 1100  Gross per 24 hour   Intake 1000 ml   Output 3825 ml   Net -2825 ml     Gen: obese, intubated, non communicative   Neck: No JVD  Cardiac: RRR  Resp: decrease BS  Abd: Soft, non tender, +BS, non distended   Ext: +1 edema   Access: none   Neuro: unable to assess   Peripheral Pulses: weak peripheral pulses.  Skin: Skin color, texture, turgor normal, no suspicious rashes or lesions.    LABS:   Results for orders placed or performed during the hospital encounter of 09/04/24 (from the past 24 hour(s))   POCT GLUCOSE   Result Value Ref Range    POCT Glucose 193 (H) 74 - 99 mg/dL   ECG 12 lead   Result Value Ref Range    Ventricular Rate 91 BPM    Atrial Rate 91 BPM    NE Interval 238 ms    QRS Duration 158 ms    QT Interval 418 ms    QTC Calculation(Bazett) 514 ms    P Axis 17 degrees    R Axis -57 " degrees    T Gobler 114 degrees    QRS Count 15 beats    Q Onset 210 ms    P Onset 91 ms    P Offset 152 ms    T Offset 419 ms    QTC Fredericia 480 ms   CBC and Auto Differential   Result Value Ref Range    WBC 23.0 (H) 4.4 - 11.3 x10*3/uL    nRBC 0.0 0.0 - 0.0 /100 WBCs    RBC 6.21 (H) 4.50 - 5.90 x10*6/uL    Hemoglobin 20.5 (H) 13.5 - 17.5 g/dL    Hematocrit 66.8 (H) 41.0 - 52.0 %     (H) 80 - 100 fL    MCH 33.0 26.0 - 34.0 pg    MCHC 30.7 (L) 32.0 - 36.0 g/dL    RDW 14.0 11.5 - 14.5 %    Platelets 349 150 - 450 x10*3/uL    Neutrophils % 86.3 40.0 - 80.0 %    Immature Granulocytes %, Automated 1.6 (H) 0.0 - 0.9 %    Lymphocytes % 2.5 13.0 - 44.0 %    Monocytes % 9.3 2.0 - 10.0 %    Eosinophils % 0.0 0.0 - 6.0 %    Basophils % 0.3 0.0 - 2.0 %    Neutrophils Absolute 19.85 (H) 1.20 - 7.70 x10*3/uL    Immature Granulocytes Absolute, Automated 0.36 0.00 - 0.70 x10*3/uL    Lymphocytes Absolute 0.57 (L) 1.20 - 4.80 x10*3/uL    Monocytes Absolute 2.13 (H) 0.10 - 1.00 x10*3/uL    Eosinophils Absolute 0.00 0.00 - 0.70 x10*3/uL    Basophils Absolute 0.07 0.00 - 0.10 x10*3/uL   Ammonia   Result Value Ref Range    Ammonia 79 (H) 16 - 53 umol/L   B-Type Natriuretic Peptide   Result Value Ref Range    BNP 1,155 (H) 0 - 99 pg/mL   Blood Gas Venous Full Panel   Result Value Ref Range    POCT pH, Venous 6.87 (LL) 7.33 - 7.43 pH    POCT pCO2, Venous >125 (HH) 41 - 51 mm Hg    POCT pO2, Venous 57 (H) 35 - 45 mm Hg    POCT SO2, Venous 83 (H) 45 - 75 %    POCT Oxy Hemoglobin, Venous 78.2 (H) 45.0 - 75.0 %    POCT Hematocrit Calculated, Venous 66.0 (H) 41.0 - 52.0 %    POCT Sodium, Venous 128 (L) 136 - 145 mmol/L    POCT Potassium, Venous 5.5 (H) 3.5 - 5.3 mmol/L    POCT Chloride, Venous 92 (L) 98 - 107 mmol/L    POCT Ionized Calicum, Venous 1.08 (L) 1.10 - 1.33 mmol/L    POCT Glucose, Venous 179 (H) 74 - 99 mg/dL    POCT Lactate, Venous 3.3 (H) 0.4 - 2.0 mmol/L    POCT Base Excess, Venous      POCT HCO3 Calculated, Venous       POCT Hemoglobin, Venous 21.9 (H) 13.5 - 17.5 g/dL    POCT Anion Gap, Venous      Patient Temperature 37.0 degrees Celsius    FiO2 21 %   Troponin I, High Sensitivity, Initial   Result Value Ref Range    Troponin I, High Sensitivity 99 (HH) 0 - 20 ng/L   Protime-INR   Result Value Ref Range    Protime 12.4 9.8 - 12.8 seconds    INR 1.1 0.9 - 1.1   BLOOD GAS ARTERIAL FULL PANEL   Result Value Ref Range    POCT pH, Arterial 7.00 (LL) 7.38 - 7.42 pH    POCT pCO2, Arterial 97 (HH) 38 - 42 mm Hg    POCT pO2, Arterial 160 (H) 85 - 95 mm Hg    POCT SO2, Arterial 100 94 - 100 %    POCT Oxy Hemoglobin, Arterial 93.8 (L) 94.0 - 98.0 %    POCT Hematocrit Calculated, Arterial 57.0 (H) 41.0 - 52.0 %    POCT Sodium, Arterial 128 (L) 136 - 145 mmol/L    POCT Potassium, Arterial 5.0 3.5 - 5.3 mmol/L    POCT Chloride, Arterial 97 (L) 98 - 107 mmol/L    POCT Ionized Calcium, Arterial 1.17 1.10 - 1.33 mmol/L    POCT Glucose, Arterial 189 (H) 74 - 99 mg/dL    POCT Lactate, Arterial 3.0 (H) 0.4 - 2.0 mmol/L    POCT Base Excess, Arterial -8.9 (L) -2.0 - 3.0 mmol/L    POCT HCO3 Calculated, Arterial 25.6 22.0 - 26.0 mmol/L    POCT Hemoglobin, Arterial 19.1 (H) 13.5 - 17.5 g/dL    POCT Anion Gap, Arterial 10 10 - 25 mmo/L    Patient Temperature      FiO2 100 %   Blood Culture    Specimen: Peripheral Venipuncture; Blood culture   Result Value Ref Range    Blood Culture Loaded on Instrument - Culture in progress    Blood Culture    Specimen: Peripheral Venipuncture; Blood culture   Result Value Ref Range    Blood Culture Loaded on Instrument - Culture in progress    Basic metabolic panel   Result Value Ref Range    Glucose 400 (H) 74 - 99 mg/dL    Sodium 131 (L) 136 - 145 mmol/L    Potassium 5.2 3.5 - 5.3 mmol/L    Chloride 94 (L) 98 - 107 mmol/L    Bicarbonate 26 21 - 32 mmol/L    Anion Gap 16 10 - 20 mmol/L    Urea Nitrogen 43 (H) 6 - 23 mg/dL    Creatinine 1.93 (H) 0.50 - 1.30 mg/dL    eGFR 37 (L) >60 mL/min/1.73m*2    Calcium 7.1 (L) 8.6 -  10.3 mg/dL   Magnesium   Result Value Ref Range    Magnesium 2.20 1.60 - 2.40 mg/dL   Lipase   Result Value Ref Range    Lipase 12 9 - 82 U/L   Hepatic function panel   Result Value Ref Range    Albumin 2.4 (L) 3.4 - 5.0 g/dL    Bilirubin, Total 0.6 0.0 - 1.2 mg/dL    Bilirubin, Direct 0.2 0.0 - 0.3 mg/dL    Alkaline Phosphatase 88 33 - 136 U/L    ALT 11 10 - 52 U/L    AST 11 9 - 39 U/L    Total Protein 4.4 (L) 6.4 - 8.2 g/dL   Acute Toxicology Panel, Blood   Result Value Ref Range    Acetaminophen <10.0 10.0 - 30.0 ug/mL    Salicylate  <3 4 - 20 mg/dL    Alcohol <10 <=10 mg/dL   Troponin, High Sensitivity, 1 Hour   Result Value Ref Range    Troponin I, High Sensitivity 69 (HH) 0 - 20 ng/L   Blood Gas Lactic Acid, Venous   Result Value Ref Range    POCT Lactate, Venous 2.9 (H) 0.4 - 2.0 mmol/L   Protein, Total   Result Value Ref Range    Total Protein 4.4 (L) 6.4 - 8.2 g/dL   POCT GLUCOSE   Result Value Ref Range    POCT Glucose 216 (H) 74 - 99 mg/dL   Blood Gas Venous Full Panel   Result Value Ref Range    POCT pH, Venous 7.09 (LL) 7.33 - 7.43 pH    POCT pCO2, Venous 101 (HH) 41 - 51 mm Hg    POCT pO2, Venous 26 (L) 35 - 45 mm Hg    POCT SO2, Venous 47 45 - 75 %    POCT Oxy Hemoglobin, Venous 45.4 45.0 - 75.0 %    POCT Hematocrit Calculated, Venous 56.0 (H) 41.0 - 52.0 %    POCT Sodium, Venous 125 (L) 136 - 145 mmol/L    POCT Potassium, Venous 5.1 3.5 - 5.3 mmol/L    POCT Chloride, Venous 91 (L) 98 - 107 mmol/L    POCT Ionized Calicum, Venous 1.11 1.10 - 1.33 mmol/L    POCT Glucose, Venous      POCT Lactate, Venous 2.4 (H) 0.4 - 2.0 mmol/L    POCT Base Excess, Venous -3.6 (L) -2.0 - 3.0 mmol/L    POCT HCO3 Calculated, Venous 30.6 (H) 22.0 - 26.0 mmol/L    POCT Hemoglobin, Venous 18.8 (H) 13.5 - 17.5 g/dL    POCT Anion Gap, Venous 9.0 (L) 10.0 - 25.0 mmol/L    Patient Temperature 37.0 degrees Celsius    FiO2 100 %   Drug Screen, Urine   Result Value Ref Range    Amphetamine Screen, Urine Presumptive Negative  Presumptive Negative    Barbiturate Screen, Urine Presumptive Negative Presumptive Negative    Benzodiazepines Screen, Urine Presumptive Positive (A) Presumptive Negative    Cannabinoid Screen, Urine Presumptive Negative Presumptive Negative    Cocaine Metabolite Screen, Urine Presumptive Negative Presumptive Negative    Fentanyl Screen, Urine Presumptive Positive (A) Presumptive Negative    Opiate Screen, Urine Presumptive Negative Presumptive Negative    Oxycodone Screen, Urine Presumptive Negative Presumptive Negative    PCP Screen, Urine Presumptive Negative Presumptive Negative    Methadone Screen, Urine Presumptive Negative Presumptive Negative   Urinalysis with Reflex Culture and Microscopic   Result Value Ref Range    Color, Urine Yellow Light-Yellow, Yellow, Dark-Yellow    Appearance, Urine Clear Clear    Specific Gravity, Urine 1.035 1.005 - 1.035    pH, Urine 5.5 5.0, 5.5, 6.0, 6.5, 7.0, 7.5, 8.0    Protein, Urine 50 (1+) (A) NEGATIVE, 10 (TRACE), 20 (TRACE) mg/dL    Glucose, Urine OVER (4+) (A) Normal mg/dL    Blood, Urine 1.0 (3+) (A) NEGATIVE    Ketones, Urine NEGATIVE NEGATIVE mg/dL    Bilirubin, Urine NEGATIVE NEGATIVE    Urobilinogen, Urine Normal Normal mg/dL    Nitrite, Urine NEGATIVE NEGATIVE    Leukocyte Esterase, Urine NEGATIVE NEGATIVE   Urinalysis Microscopic   Result Value Ref Range    WBC, Urine 21-50 (A) 1-5, NONE /HPF    RBC, Urine >20 (A) NONE, 1-2, 3-5 /HPF    Mucus, Urine FEW Reference range not established. /LPF    Hyaline Casts, Urine OCCASIONAL (A) NONE /LPF   Lactate Dehydrogenase, Fluid   Result Value Ref Range    LD, Fluid 1,024 Not established. U/L   Glucose, Fluid   Result Value Ref Range    Glucose, Fluid <10 Not established mg/dL   Protein, Total Fluid   Result Value Ref Range    Protein, Total Fluid 4.0 Not established g/dL   Body Fluid Cell Count   Result Value Ref Range    Color, Fluid Straw Colorless, Straw, Yellow    Clarity, Fluid Turbid (A) Clear    WBC, Fluid 71,706  See Comment /uL    RBC, Fluid 13,000 0  /uL /uL   Body Fluid Differential   Result Value Ref Range    Neutrophils %, Manual, Fluid 97 <25 % %    Lymphocytes %, Manual, Fluid 3 <75 % %    Mono/Macrophages %, Manual, Fluid 0 <70 % %    Eosinophils %, Manual, Fluid 0 0 % %    Basophils %, Manual, Fluid 0 0 % %    Immature Granulocytes %, Manual, Fluid 0 0 % %    Blasts %, Manual, Fluid 0 0 % %    Unclassified Cells %, Manual, Fluid 0 0 % %    Plasma Cells %, Manual, Fluid 0 0 % %    Total Cells Counted, Fluid 100    Sterile Fluid Culture/Smear    Specimen: Pleural; Fluid   Result Value Ref Range    Gram Stain (3+) Moderate Polymorphonuclear leukocytes     Gram Stain No organisms seen    Fungal Culture/Smear    Specimen: Pleural; Fluid   Result Value Ref Range    Fungal Culture/Smear       Culture in progress, a report will be issued when positive or after 2 weeks of incubation.    Fungal Smear No fungal elements seen    pH, Body Fluid   Result Value Ref Range    pH, Fluid 7.92 See Below   POCT GLUCOSE   Result Value Ref Range    POCT Glucose 194 (H) 74 - 99 mg/dL   Lactate Dehydrogenase   Result Value Ref Range     84 - 246 U/L   BLOOD GAS ARTERIAL FULL PANEL   Result Value Ref Range    POCT pH, Arterial 7.26 (L) 7.38 - 7.42 pH    POCT pCO2, Arterial 64 (H) 38 - 42 mm Hg    POCT pO2, Arterial 84 (L) 85 - 95 mm Hg    POCT SO2, Arterial 97 94 - 100 %    POCT Oxy Hemoglobin, Arterial 93.9 (L) 94.0 - 98.0 %    POCT Hematocrit Calculated, Arterial 56.0 (H) 41.0 - 52.0 %    POCT Sodium, Arterial 131 (L) 136 - 145 mmol/L    POCT Potassium, Arterial 4.4 3.5 - 5.3 mmol/L    POCT Chloride, Arterial 97 (L) 98 - 107 mmol/L    POCT Ionized Calcium, Arterial 1.17 1.10 - 1.33 mmol/L    POCT Glucose, Arterial 174 (H) 74 - 99 mg/dL    POCT Lactate, Arterial 1.4 0.4 - 2.0 mmol/L    POCT Base Excess, Arterial -0.7 -2.0 - 3.0 mmol/L    POCT HCO3 Calculated, Arterial 28.7 (H) 22.0 - 26.0 mmol/L    POCT Hemoglobin, Arterial 18.7 (H)  13.5 - 17.5 g/dL    POCT Anion Gap, Arterial 10 10 - 25 mmo/L    Patient Temperature 37.0 degrees Celsius    FiO2 70 %   POCT GLUCOSE   Result Value Ref Range    POCT Glucose 132 (H) 74 - 99 mg/dL   Renal Function Panel   Result Value Ref Range    Glucose 128 (H) 74 - 99 mg/dL    Sodium 137 136 - 145 mmol/L    Potassium 4.5 3.5 - 5.3 mmol/L    Chloride 100 98 - 107 mmol/L    Bicarbonate 30 21 - 32 mmol/L    Anion Gap 12 10 - 20 mmol/L    Urea Nitrogen 44 (H) 6 - 23 mg/dL    Creatinine 1.62 (H) 0.50 - 1.30 mg/dL    eGFR 46 (L) >60 mL/min/1.73m*2    Calcium 8.2 (L) 8.6 - 10.3 mg/dL    Phosphorus 5.5 (H) 2.5 - 4.9 mg/dL    Albumin 3.0 (L) 3.4 - 5.0 g/dL   Magnesium   Result Value Ref Range    Magnesium 2.40 1.60 - 2.40 mg/dL   CBC   Result Value Ref Range    WBC 22.7 (H) 4.4 - 11.3 x10*3/uL    nRBC 0.0 0.0 - 0.0 /100 WBCs    RBC 5.61 4.50 - 5.90 x10*6/uL    Hemoglobin 18.5 (H) 13.5 - 17.5 g/dL    Hematocrit 58.2 (H) 41.0 - 52.0 %     (H) 80 - 100 fL    MCH 33.0 26.0 - 34.0 pg    MCHC 31.8 (L) 32.0 - 36.0 g/dL    RDW 13.6 11.5 - 14.5 %    Platelets 298 150 - 450 x10*3/uL   Ammonia   Result Value Ref Range    Ammonia 38 16 - 53 umol/L   Vancomycin   Result Value Ref Range    Vancomycin 14.3 5.0 - 20.0 ug/mL   Blood Gas Arterial Full Panel   Result Value Ref Range    POCT pH, Arterial 7.18 (LL) 7.38 - 7.42 pH    POCT pCO2, Arterial 78 (HH) 38 - 42 mm Hg    POCT pO2, Arterial 93 85 - 95 mm Hg    POCT SO2, Arterial 97 94 - 100 %    POCT Oxy Hemoglobin, Arterial 94.7 94.0 - 98.0 %    POCT Hematocrit Calculated, Arterial 56.0 (H) 41.0 - 52.0 %    POCT Sodium, Arterial 132 (L) 136 - 145 mmol/L    POCT Potassium, Arterial 4.3 3.5 - 5.3 mmol/L    POCT Chloride, Arterial 98 98 - 107 mmol/L    POCT Ionized Calcium, Arterial 1.18 1.10 - 1.33 mmol/L    POCT Glucose, Arterial 129 (H) 74 - 99 mg/dL    POCT Lactate, Arterial 1.0 0.4 - 2.0 mmol/L    POCT Base Excess, Arterial -2.4 (L) -2.0 - 3.0 mmol/L    POCT HCO3 Calculated,  Arterial 29.1 (H) 22.0 - 26.0 mmol/L    POCT Hemoglobin, Arterial 18.7 (H) 13.5 - 17.5 g/dL    POCT Anion Gap, Arterial 9 (L) 10 - 25 mmo/L    Patient Temperature 37.0 degrees Celsius    FiO2 60 %   POCT GLUCOSE   Result Value Ref Range    POCT Glucose 109 (H) 74 - 99 mg/dL   Blood Gas Arterial Full Panel   Result Value Ref Range    POCT pH, Arterial 7.24 (LL) 7.38 - 7.42 pH    POCT pCO2, Arterial 69 (H) 38 - 42 mm Hg    POCT pO2, Arterial 76 (L) 85 - 95 mm Hg    POCT SO2, Arterial 96 94 - 100 %    POCT Oxy Hemoglobin, Arterial 93.1 (L) 94.0 - 98.0 %    POCT Hematocrit Calculated, Arterial 55.0 (H) 41.0 - 52.0 %    POCT Sodium, Arterial 133 (L) 136 - 145 mmol/L    POCT Potassium, Arterial 4.3 3.5 - 5.3 mmol/L    POCT Chloride, Arterial 98 98 - 107 mmol/L    POCT Ionized Calcium, Arterial 1.16 1.10 - 1.33 mmol/L    POCT Glucose, Arterial 132 (H) 74 - 99 mg/dL    POCT Lactate, Arterial 1.2 0.4 - 2.0 mmol/L    POCT Base Excess, Arterial -0.5 -2.0 - 3.0 mmol/L    POCT HCO3 Calculated, Arterial 29.6 (H) 22.0 - 26.0 mmol/L    POCT Hemoglobin, Arterial 18.3 (H) 13.5 - 17.5 g/dL    POCT Anion Gap, Arterial 10 10 - 25 mmo/L    Patient Temperature 37.0 degrees Celsius    FiO2 60 %       DATA:   Diagnostic tests reviewed for today's visit:    Labs and meds    ASSESSMENT AND PLAN:  - LEONA: baseline Scr up to 0.9  LEONA in context of hemodynamic instability, shock, better with lowering Scr and non oliguric  L Kidney mass, concerning for cell carcinoma   BP: better, on norepi drip  Lytes and acid base: acceptable   Chronic Polycythemia: due to COPD, renal cell ca?    PLAN:  - consult urology for L renal mass concerning for malignancy  - resolving LEONA, Supportive care, target MAP >65, dose vanco with levels, following labs     Greatly appreciate the opportunity to assist in the care of this patient. Will continue to follow.     Signature: Wes Lorenzo MD  Division of Nephrology and Hypertension

## 2024-09-05 NOTE — PROGRESS NOTES
Vancomycin Dosing by Pharmacy- FOLLOW UP    Alo Glass is a 69 y.o. year old male who Pharmacy has been consulted for vancomycin dosing for pneumonia. Based on the patient's indication and renal status this patient is being dosed based on a goal AUC of 400-600.     Renal function is currently improving.    Current vancomycin dose: 2000 mg given once    Most recent random level: 14.3 mcg/mL    Visit Vitals  BP 82/72   Pulse 84   Temp 36.2 °C (97.2 °F) (Temporal)   Resp 24        Lab Results   Component Value Date    CREATININE 1.62 (H) 2024    CREATININE 1.93 (H) 2024    CREATININE 0.67 2024    CREATININE 0.78 2024        Patient weight is as follows:   Vitals:    24 0555   Weight: 137 kg (302 lb 0.5 oz)       Cultures:  No results found for the encounter in last 14 days.       I/O last 3 completed shifts:  In: 1000 (7.3 mL/kg) [IV Piggyback:1000]  Out: 2905 (21.2 mL/kg) [Urine:705 (0.1 mL/kg/hr); Emesis/NG output:200; Chest Tube:2000]  Weight: 137 kg   I/O during current shift:  No intake/output data recorded.    Temp (24hrs), Av.1 °C (96.9 °F), Min:35.8 °C (96.5 °F), Max:36.2 °C (97.2 °F)      Assessment/Plan    Within goal random/trough level. Dosing by level d/t LEONA. Will give 1750 mg once today.  The next level will be obtained on  at 0500. May be obtained sooner if clinically indicated.   Will continue to monitor renal function daily while on vancomycin and order serum creatinine at least every 48 hours if not already ordered.  Follow for continued vancomycin needs, clinical response, and signs/symptoms of toxicity.       Elsy Dejesus, RandiD

## 2024-09-05 NOTE — CARE PLAN
The patient's goals for the shift include      The clinical goals for the shift include Pt will remain hemodynamically stable this shift      Problem: Skin  Goal: Decreased wound size/increased tissue granulation at next dressing change  9/5/2024 0251 by Rachel Iyer RN  Outcome: Progressing  9/5/2024 0249 by Rachel Iyer RN  Flowsheets (Taken 9/5/2024 0249)  Decreased wound size/increased tissue granulation at next dressing change:   Promote sleep for wound healing   Protective dressings over bony prominences   Utilize specialty bed per algorithm  Goal: Participates in plan/prevention/treatment measures  9/5/2024 0251 by Rachel Iyer RN  Outcome: Progressing  9/5/2024 0249 by Rachel Iyer RN  Flowsheets (Taken 9/5/2024 0249)  Participates in plan/prevention/treatment measures:   Discuss with provider PT/OT consult   Elevate heels   Increase activity/out of bed for meals  Goal: Prevent/manage excess moisture  9/5/2024 0251 by Rachel Iyer RN  Outcome: Progressing  9/5/2024 0249 by Rachel Iyer RN  Flowsheets (Taken 9/5/2024 0249)  Prevent/manage excess moisture:   Cleanse incontinence/protect with barrier cream   Monitor for/manage infection if present   Follow provider orders for dressing changes   Use wicking fabric (obtain order)   Moisturize dry skin  Goal: Prevent/minimize sheer/friction injuries  9/5/2024 0251 by Rachel Iyer RN  Outcome: Progressing  9/5/2024 0249 by Rachel Iyer RN  Flowsheets (Taken 9/5/2024 0249)  Prevent/minimize sheer/friction injuries:   Complete micro-shifts as needed if patient unable. Adjust patient position to relieve pressure points, not a full turn   Increase activity/out of bed for meals   Use pull sheet   HOB 30 degrees or less   Turn/reposition every 2 hours/use positioning/transfer devices   Utilize specialty bed per algorithm  Goal: Promote/optimize nutrition  9/5/2024 0251 by Rachel Iyer RN  Outcome: Progressing  9/5/2024 0249 by Rachel Iyer RN  Flowsheets (Taken 9/5/2024  0249)  Promote/optimize nutrition:   Assist with feeding   Monitor/record intake including meals   Consume > 50% meals/supplements   Offer water/supplements/favorite foods   Discuss with provider if NPO > 2 days   Reassess MST if dietician not consulted  Goal: Promote skin healing  9/5/2024 0251 by Rachel Iyer RN  Outcome: Progressing  9/5/2024 0249 by Rachel Iyer RN  Flowsheets (Taken 9/5/2024 0249)  Promote skin healing:   Assess skin/pad under line(s)/device(s)   Protective dressings over bony prominences   Turn/reposition every 2 hours/use positioning/transfer devices   Ensure correct size (line/device) and apply per  instructions   Rotate device position/do not position patient on device

## 2024-09-05 NOTE — PROGRESS NOTES
"Alo Glass is a 69 y.o. male on day 1 of admission presenting with Acute respiratory failure with hypoxia and hypercapnia (Multi).    Subjective   Intubated and sedated.  CT opened to drain with 400 mL removed.  Remains on water seal.       Objective     Physical Exam  Constitutional:       Appearance: He is obese. He is ill-appearing.   Eyes:      Pupils: Pupils are equal, round, and reactive to light.      Comments: Pupils 2.0 mm   Cardiovascular:      Rate and Rhythm: Normal rate and regular rhythm.      Heart sounds: Normal heart sounds.      Comments: Telemetry appears to have AV block with PACs  Pulmonary:      Breath sounds: Rhonchi present.      Comments: Rhonchi throughout.  Diminished RLL.  Abdominal:      General: Bowel sounds are normal. There is distension.   Genitourinary:     Comments: Live draining clear yellow urine  Musculoskeletal:      Right lower leg: Edema present.      Left lower leg: Edema present.      Comments: +4 B/L LE pitting edema   Neurological:      Comments: + gag and corneal reflex         Last Recorded Vitals  Blood pressure 82/72, pulse 84, temperature 36.2 °C (97.2 °F), temperature source Temporal, resp. rate 24, height 1.803 m (5' 11\"), weight 137 kg (302 lb 0.5 oz), SpO2 96%.  Intake/Output last 3 Shifts:  I/O last 3 completed shifts:  In: 1000 (7.3 mL/kg) [IV Piggyback:1000]  Out: 2905 (21.2 mL/kg) [Urine:705 (0.1 mL/kg/hr); Emesis/NG output:200; Chest Tube:2000]  Weight: 137 kg     Relevant Results  Scheduled medications  budesonide, 0.5 mg, nebulization, BID   And  formoterol, 20 mcg, nebulization, BID  ezetimibe, 10 mg, oral, Daily  folic acid, 1 mg, oral, Daily  [Held by provider] heparin (porcine), 7,500 Units, subcutaneous, q8h SHARRON  insulin lispro, 0-10 Units, subcutaneous, q4h  ipratropium-albuteroL, 3 mL, nebulization, q6h  levothyroxine, 125 mcg, oral, Daily  multivitamin with minerals, 1 tablet, oral, Daily  pantoprazole, 40 mg, intravenous, " BID  PHENobarbital, 65 mg, intravenous, q8h   Followed by  [START ON 9/7/2024] PHENobarbital, 32.5 mg, intravenous, q8h  piperacillin-tazobactam, 4.5 g, intravenous, q6h  thiamine, 100 mg, oral, Daily      Continuous medications  fentaNYL, 0-200 mcg/hr, Last Rate: 200 mcg/hr (09/05/24 0340)  norepinephrine, 0-0.2 mcg/kg/min, Last Rate: 0.02 mcg/kg/min (09/05/24 0640)      PRN medications  PRN medications: alteplase, dextrose, dextrose, glucagon, glucagon, LORazepam, oxygen, vancomycin  Results for orders placed or performed during the hospital encounter of 09/04/24 (from the past 24 hour(s))   POCT GLUCOSE   Result Value Ref Range    POCT Glucose 193 (H) 74 - 99 mg/dL   ECG 12 lead   Result Value Ref Range    Ventricular Rate 91 BPM    Atrial Rate 91 BPM    OR Interval 238 ms    QRS Duration 158 ms    QT Interval 418 ms    QTC Calculation(Bazett) 514 ms    P Axis 17 degrees    R Axis -57 degrees    T Axis 114 degrees    QRS Count 15 beats    Q Onset 210 ms    P Onset 91 ms    P Offset 152 ms    T Offset 419 ms    QTC Fredericia 480 ms   CBC and Auto Differential   Result Value Ref Range    WBC 23.0 (H) 4.4 - 11.3 x10*3/uL    nRBC 0.0 0.0 - 0.0 /100 WBCs    RBC 6.21 (H) 4.50 - 5.90 x10*6/uL    Hemoglobin 20.5 (H) 13.5 - 17.5 g/dL    Hematocrit 66.8 (H) 41.0 - 52.0 %     (H) 80 - 100 fL    MCH 33.0 26.0 - 34.0 pg    MCHC 30.7 (L) 32.0 - 36.0 g/dL    RDW 14.0 11.5 - 14.5 %    Platelets 349 150 - 450 x10*3/uL    Neutrophils % 86.3 40.0 - 80.0 %    Immature Granulocytes %, Automated 1.6 (H) 0.0 - 0.9 %    Lymphocytes % 2.5 13.0 - 44.0 %    Monocytes % 9.3 2.0 - 10.0 %    Eosinophils % 0.0 0.0 - 6.0 %    Basophils % 0.3 0.0 - 2.0 %    Neutrophils Absolute 19.85 (H) 1.20 - 7.70 x10*3/uL    Immature Granulocytes Absolute, Automated 0.36 0.00 - 0.70 x10*3/uL    Lymphocytes Absolute 0.57 (L) 1.20 - 4.80 x10*3/uL    Monocytes Absolute 2.13 (H) 0.10 - 1.00 x10*3/uL    Eosinophils Absolute 0.00 0.00 - 0.70 x10*3/uL     Basophils Absolute 0.07 0.00 - 0.10 x10*3/uL   Ammonia   Result Value Ref Range    Ammonia 79 (H) 16 - 53 umol/L   B-Type Natriuretic Peptide   Result Value Ref Range    BNP 1,155 (H) 0 - 99 pg/mL   Blood Gas Venous Full Panel   Result Value Ref Range    POCT pH, Venous 6.87 (LL) 7.33 - 7.43 pH    POCT pCO2, Venous >125 (HH) 41 - 51 mm Hg    POCT pO2, Venous 57 (H) 35 - 45 mm Hg    POCT SO2, Venous 83 (H) 45 - 75 %    POCT Oxy Hemoglobin, Venous 78.2 (H) 45.0 - 75.0 %    POCT Hematocrit Calculated, Venous 66.0 (H) 41.0 - 52.0 %    POCT Sodium, Venous 128 (L) 136 - 145 mmol/L    POCT Potassium, Venous 5.5 (H) 3.5 - 5.3 mmol/L    POCT Chloride, Venous 92 (L) 98 - 107 mmol/L    POCT Ionized Calicum, Venous 1.08 (L) 1.10 - 1.33 mmol/L    POCT Glucose, Venous 179 (H) 74 - 99 mg/dL    POCT Lactate, Venous 3.3 (H) 0.4 - 2.0 mmol/L    POCT Base Excess, Venous      POCT HCO3 Calculated, Venous      POCT Hemoglobin, Venous 21.9 (H) 13.5 - 17.5 g/dL    POCT Anion Gap, Venous      Patient Temperature 37.0 degrees Celsius    FiO2 21 %   Troponin I, High Sensitivity, Initial   Result Value Ref Range    Troponin I, High Sensitivity 99 (HH) 0 - 20 ng/L   Protime-INR   Result Value Ref Range    Protime 12.4 9.8 - 12.8 seconds    INR 1.1 0.9 - 1.1   BLOOD GAS ARTERIAL FULL PANEL   Result Value Ref Range    POCT pH, Arterial 7.00 (LL) 7.38 - 7.42 pH    POCT pCO2, Arterial 97 (HH) 38 - 42 mm Hg    POCT pO2, Arterial 160 (H) 85 - 95 mm Hg    POCT SO2, Arterial 100 94 - 100 %    POCT Oxy Hemoglobin, Arterial 93.8 (L) 94.0 - 98.0 %    POCT Hematocrit Calculated, Arterial 57.0 (H) 41.0 - 52.0 %    POCT Sodium, Arterial 128 (L) 136 - 145 mmol/L    POCT Potassium, Arterial 5.0 3.5 - 5.3 mmol/L    POCT Chloride, Arterial 97 (L) 98 - 107 mmol/L    POCT Ionized Calcium, Arterial 1.17 1.10 - 1.33 mmol/L    POCT Glucose, Arterial 189 (H) 74 - 99 mg/dL    POCT Lactate, Arterial 3.0 (H) 0.4 - 2.0 mmol/L    POCT Base Excess, Arterial -8.9 (L) -2.0 -  3.0 mmol/L    POCT HCO3 Calculated, Arterial 25.6 22.0 - 26.0 mmol/L    POCT Hemoglobin, Arterial 19.1 (H) 13.5 - 17.5 g/dL    POCT Anion Gap, Arterial 10 10 - 25 mmo/L    Patient Temperature      FiO2 100 %   Blood Culture    Specimen: Peripheral Venipuncture; Blood culture   Result Value Ref Range    Blood Culture Loaded on Instrument - Culture in progress    Blood Culture    Specimen: Peripheral Venipuncture; Blood culture   Result Value Ref Range    Blood Culture Loaded on Instrument - Culture in progress    Basic metabolic panel   Result Value Ref Range    Glucose 400 (H) 74 - 99 mg/dL    Sodium 131 (L) 136 - 145 mmol/L    Potassium 5.2 3.5 - 5.3 mmol/L    Chloride 94 (L) 98 - 107 mmol/L    Bicarbonate 26 21 - 32 mmol/L    Anion Gap 16 10 - 20 mmol/L    Urea Nitrogen 43 (H) 6 - 23 mg/dL    Creatinine 1.93 (H) 0.50 - 1.30 mg/dL    eGFR 37 (L) >60 mL/min/1.73m*2    Calcium 7.1 (L) 8.6 - 10.3 mg/dL   Magnesium   Result Value Ref Range    Magnesium 2.20 1.60 - 2.40 mg/dL   Lipase   Result Value Ref Range    Lipase 12 9 - 82 U/L   Hepatic function panel   Result Value Ref Range    Albumin 2.4 (L) 3.4 - 5.0 g/dL    Bilirubin, Total 0.6 0.0 - 1.2 mg/dL    Bilirubin, Direct 0.2 0.0 - 0.3 mg/dL    Alkaline Phosphatase 88 33 - 136 U/L    ALT 11 10 - 52 U/L    AST 11 9 - 39 U/L    Total Protein 4.4 (L) 6.4 - 8.2 g/dL   Acute Toxicology Panel, Blood   Result Value Ref Range    Acetaminophen <10.0 10.0 - 30.0 ug/mL    Salicylate  <3 4 - 20 mg/dL    Alcohol <10 <=10 mg/dL   Troponin, High Sensitivity, 1 Hour   Result Value Ref Range    Troponin I, High Sensitivity 69 (HH) 0 - 20 ng/L   Blood Gas Lactic Acid, Venous   Result Value Ref Range    POCT Lactate, Venous 2.9 (H) 0.4 - 2.0 mmol/L   Protein, Total   Result Value Ref Range    Total Protein 4.4 (L) 6.4 - 8.2 g/dL   POCT GLUCOSE   Result Value Ref Range    POCT Glucose 216 (H) 74 - 99 mg/dL   Blood Gas Venous Full Panel   Result Value Ref Range    POCT pH, Venous 7.09  (LL) 7.33 - 7.43 pH    POCT pCO2, Venous 101 (HH) 41 - 51 mm Hg    POCT pO2, Venous 26 (L) 35 - 45 mm Hg    POCT SO2, Venous 47 45 - 75 %    POCT Oxy Hemoglobin, Venous 45.4 45.0 - 75.0 %    POCT Hematocrit Calculated, Venous 56.0 (H) 41.0 - 52.0 %    POCT Sodium, Venous 125 (L) 136 - 145 mmol/L    POCT Potassium, Venous 5.1 3.5 - 5.3 mmol/L    POCT Chloride, Venous 91 (L) 98 - 107 mmol/L    POCT Ionized Calicum, Venous 1.11 1.10 - 1.33 mmol/L    POCT Glucose, Venous      POCT Lactate, Venous 2.4 (H) 0.4 - 2.0 mmol/L    POCT Base Excess, Venous -3.6 (L) -2.0 - 3.0 mmol/L    POCT HCO3 Calculated, Venous 30.6 (H) 22.0 - 26.0 mmol/L    POCT Hemoglobin, Venous 18.8 (H) 13.5 - 17.5 g/dL    POCT Anion Gap, Venous 9.0 (L) 10.0 - 25.0 mmol/L    Patient Temperature 37.0 degrees Celsius    FiO2 100 %   Drug Screen, Urine   Result Value Ref Range    Amphetamine Screen, Urine Presumptive Negative Presumptive Negative    Barbiturate Screen, Urine Presumptive Negative Presumptive Negative    Benzodiazepines Screen, Urine Presumptive Positive (A) Presumptive Negative    Cannabinoid Screen, Urine Presumptive Negative Presumptive Negative    Cocaine Metabolite Screen, Urine Presumptive Negative Presumptive Negative    Fentanyl Screen, Urine Presumptive Positive (A) Presumptive Negative    Opiate Screen, Urine Presumptive Negative Presumptive Negative    Oxycodone Screen, Urine Presumptive Negative Presumptive Negative    PCP Screen, Urine Presumptive Negative Presumptive Negative    Methadone Screen, Urine Presumptive Negative Presumptive Negative   Urinalysis with Reflex Culture and Microscopic   Result Value Ref Range    Color, Urine Yellow Light-Yellow, Yellow, Dark-Yellow    Appearance, Urine Clear Clear    Specific Gravity, Urine 1.035 1.005 - 1.035    pH, Urine 5.5 5.0, 5.5, 6.0, 6.5, 7.0, 7.5, 8.0    Protein, Urine 50 (1+) (A) NEGATIVE, 10 (TRACE), 20 (TRACE) mg/dL    Glucose, Urine OVER (4+) (A) Normal mg/dL    Blood, Urine  1.0 (3+) (A) NEGATIVE    Ketones, Urine NEGATIVE NEGATIVE mg/dL    Bilirubin, Urine NEGATIVE NEGATIVE    Urobilinogen, Urine Normal Normal mg/dL    Nitrite, Urine NEGATIVE NEGATIVE    Leukocyte Esterase, Urine NEGATIVE NEGATIVE   Urinalysis Microscopic   Result Value Ref Range    WBC, Urine 21-50 (A) 1-5, NONE /HPF    RBC, Urine >20 (A) NONE, 1-2, 3-5 /HPF    Mucus, Urine FEW Reference range not established. /LPF    Hyaline Casts, Urine OCCASIONAL (A) NONE /LPF   Lactate Dehydrogenase, Fluid   Result Value Ref Range    LD, Fluid 1,024 Not established. U/L   Glucose, Fluid   Result Value Ref Range    Glucose, Fluid <10 Not established mg/dL   Protein, Total Fluid   Result Value Ref Range    Protein, Total Fluid 4.0 Not established g/dL   Body Fluid Cell Count   Result Value Ref Range    Color, Fluid Straw Colorless, Straw, Yellow    Clarity, Fluid Turbid (A) Clear    WBC, Fluid 71,706 See Comment /uL    RBC, Fluid 13,000 0  /uL /uL   Body Fluid Differential   Result Value Ref Range    Neutrophils %, Manual, Fluid 97 <25 % %    Lymphocytes %, Manual, Fluid 3 <75 % %    Mono/Macrophages %, Manual, Fluid 0 <70 % %    Eosinophils %, Manual, Fluid 0 0 % %    Basophils %, Manual, Fluid 0 0 % %    Immature Granulocytes %, Manual, Fluid 0 0 % %    Blasts %, Manual, Fluid 0 0 % %    Unclassified Cells %, Manual, Fluid 0 0 % %    Plasma Cells %, Manual, Fluid 0 0 % %    Total Cells Counted, Fluid 100    pH, Body Fluid   Result Value Ref Range    pH, Fluid 7.92 See Below   POCT GLUCOSE   Result Value Ref Range    POCT Glucose 194 (H) 74 - 99 mg/dL   Lactate Dehydrogenase   Result Value Ref Range     84 - 246 U/L   BLOOD GAS ARTERIAL FULL PANEL   Result Value Ref Range    POCT pH, Arterial 7.26 (L) 7.38 - 7.42 pH    POCT pCO2, Arterial 64 (H) 38 - 42 mm Hg    POCT pO2, Arterial 84 (L) 85 - 95 mm Hg    POCT SO2, Arterial 97 94 - 100 %    POCT Oxy Hemoglobin, Arterial 93.9 (L) 94.0 - 98.0 %    POCT Hematocrit Calculated,  Arterial 56.0 (H) 41.0 - 52.0 %    POCT Sodium, Arterial 131 (L) 136 - 145 mmol/L    POCT Potassium, Arterial 4.4 3.5 - 5.3 mmol/L    POCT Chloride, Arterial 97 (L) 98 - 107 mmol/L    POCT Ionized Calcium, Arterial 1.17 1.10 - 1.33 mmol/L    POCT Glucose, Arterial 174 (H) 74 - 99 mg/dL    POCT Lactate, Arterial 1.4 0.4 - 2.0 mmol/L    POCT Base Excess, Arterial -0.7 -2.0 - 3.0 mmol/L    POCT HCO3 Calculated, Arterial 28.7 (H) 22.0 - 26.0 mmol/L    POCT Hemoglobin, Arterial 18.7 (H) 13.5 - 17.5 g/dL    POCT Anion Gap, Arterial 10 10 - 25 mmo/L    Patient Temperature 37.0 degrees Celsius    FiO2 70 %   POCT GLUCOSE   Result Value Ref Range    POCT Glucose 132 (H) 74 - 99 mg/dL   Renal Function Panel   Result Value Ref Range    Glucose 128 (H) 74 - 99 mg/dL    Sodium 137 136 - 145 mmol/L    Potassium 4.5 3.5 - 5.3 mmol/L    Chloride 100 98 - 107 mmol/L    Bicarbonate 30 21 - 32 mmol/L    Anion Gap 12 10 - 20 mmol/L    Urea Nitrogen 44 (H) 6 - 23 mg/dL    Creatinine 1.62 (H) 0.50 - 1.30 mg/dL    eGFR 46 (L) >60 mL/min/1.73m*2    Calcium 8.2 (L) 8.6 - 10.3 mg/dL    Phosphorus 5.5 (H) 2.5 - 4.9 mg/dL    Albumin 3.0 (L) 3.4 - 5.0 g/dL   Magnesium   Result Value Ref Range    Magnesium 2.40 1.60 - 2.40 mg/dL   CBC   Result Value Ref Range    WBC 22.7 (H) 4.4 - 11.3 x10*3/uL    nRBC 0.0 0.0 - 0.0 /100 WBCs    RBC 5.61 4.50 - 5.90 x10*6/uL    Hemoglobin 18.5 (H) 13.5 - 17.5 g/dL    Hematocrit 58.2 (H) 41.0 - 52.0 %     (H) 80 - 100 fL    MCH 33.0 26.0 - 34.0 pg    MCHC 31.8 (L) 32.0 - 36.0 g/dL    RDW 13.6 11.5 - 14.5 %    Platelets 298 150 - 450 x10*3/uL   Ammonia   Result Value Ref Range    Ammonia 38 16 - 53 umol/L   Vancomycin   Result Value Ref Range    Vancomycin 14.3 5.0 - 20.0 ug/mL     XR chest 1 view    Result Date: 9/5/2024  Interpreted By:  Cande Keating, STUDY: XR CHEST 1 VIEW;  9/4/2024 11:10 pm   INDICATION: Signs/Symptoms:Chest tube placement.     COMPARISON: Radiographs of the chest dated  09/04/2024; CT of the chest dated 09/04/2024   ACCESSION NUMBER(S): [id number]   ORDERING CLINICIAN: ANDRY ABRAHAM   FINDINGS: AP radiograph of the chest was provided.   Endotracheal tube projects 4.4 cm superior to the kathie. Enteric tube appears to course along the midline expected course of the esophagus, although the distal tip is not well visualized due to underpenetration and overlying structures.   CARDIOMEDIASTINAL SILHOUETTE: Cardiomediastinal silhouette is enlarged, similar to prior exam.   LUNGS: Redemonstration of the large right-sided pleural effusion with associated atelectasis/consolidation, similar in appearance to prior radiographs. No new consolidation or pleural effusion is present in the left lung.   ABDOMEN: No remarkable upper abdominal findings.   BONES: No acute osseous changes.       1.  Enteric tube is coursing along the midline of the mediastinum in the expected course of the esophagus, although the distal tip is not well visualized due to overlying cardiomediastinal silhouette and underpenetration, and may be outside of the field-of-view of the study. Endotracheal tube projects 4.5 cm superior to the kathie. 2. Redemonstration of the large loculated right-sided pleural effusion with the associated atelectasis/consolidation, similar in appearance to prior exam.       MACRO: None   Signed by: Cande Keating 9/5/2024 2:08 AM Dictation workstation:   [id number]    CT chest abdomen pelvis w IV contrast    Result Date: 9/4/2024  Interpreted By:  Ishaan Banda, STUDY: CT CHEST ABDOMEN PELVIS W IV CONTRAST;  9/4/2024 5:52 pm   INDICATION: Signs/Symptoms:unresponsive.     COMPARISON: None.   ACCESSION NUMBER(S): [id number]   ORDERING CLINICIAN: GURPREET COPPOLA   TECHNIQUE: Contiguous axial images of the chest, abdomen, and pelvis were obtained after the intravenous administration of iodinated contrast. Coronal and sagittal reformatted images were reconstructed from the axial data.    FINDINGS: CT CHEST:   MEDIASTINUM AND LYMPH NODES: NG/OG tube noted with small amount of fluid in the esophagus. The esophageal wall appears within normal limits.  No enlarged intrathoracic or axillary lymph nodes by imaging criteria. No pneumomediastinum.   VESSELS:  Normal caliber thoracic aorta without dissection. Mild aortic atherosclerosis.   HEART: Enlarged. Severe aortic valvular calcifications. Moderate coronary artery calcifications. No significant pericardial effusion.   LUNG, AIRWAYS, PLEURA: There is a large loculated right pleural effusion causing compressive atelectasis on the right lung. As a result, there is complete collapse of the right lower lobe, complete collapse of the right middle lobe, and partial collapse of the right upper lobe. There are multiple loculated compartments largest of which is seen at the right lung base. There is a small amount of mucus in the mid to distal trachea. There is small amount of layering debris in the mainstem bronchi. The majority of the right middle and lower lobe bronchi are collapsed and opacified with low-density debris. There is a trace left pleural effusion with subsegmental left basilar atelectasis. There is emphysema. There are new scattered subcentimeter bilateral centrilobular nodules that are likely infectious/inflammatory in nature from bronchiolitis.   CHEST WALL SOFT TISSUES: No discernible acute abnormality. There is a 5.5 cm x 1.7 cm lipoma in the right infraspinatus muscle.   OSSEOUS STRUCTURES: No acute osseous abnormality.     CT ABDOMEN/PELVIS:   ABDOMINAL WALL: No significant abnormality.   LIVER: No significant parenchymal abnormality.   BILE DUCTS: No significant intrahepatic or extrahepatic dilatation.   GALLBLADDER: No significant abnormality.   PANCREAS: No significant abnormality.   SPLEEN: No significant abnormality.   ADRENALS: There is a 1.7 cm low-density left adrenal nodule likely representing a adenoma.   KIDNEYS, URETERS, BLADDER:  There is a heterogeneously enhancing solid mass in the upper pole the left kidney measuring 2.4 cm x 1.9 cm consistent with renal cell carcinoma. Additionally, a 0.8 cm likely enhancing lesion in the upper pole the right kidney is concerning for renal cell carcinoma. No hydronephrosis or calculi. Urinary bladder is decompressed with Live catheter in place.   REPRODUCTIVE ORGANS: No significant abnormality.   VESSELS: Moderate aortic atherosclerosis without AAA.   RETROPERITONEUM/LYMPH NODES: No acute retroperitoneal abnormality. No enlarged lymph nodes.   BOWEL/MESENTERY/PERITONEUM: Colonic diverticulosis without acute diverticulitis. No inflammatory bowel wall thickening or dilatation. Normal appendix.   No ascites, free air, or fluid collection.     MUSCULOSKELETAL: No acute osseous abnormality.       CT CHEST: Large loculated right pleural effusion causing complete compressive collapse of the right middle lobe and right lower lobe and partial compressive collapse of the right upper lobe. The largest likely compartments located at the inferior right hemithorax. The cause of the effusion is not apparent as there is only a small amount of debris in the distal trachea and right mainstem bronchus but the distal-most right-sided bronchi are occluded due to combination of compressive collapse and small low-density fluid.   Numerous new subcentimeter centrilobular pulmonary nodules that are most likely infectious/inflammatory in as can be seen with bronchiolitis or fungal infection.   Aortic valve calcification to the extent that would likely result in aortic stenosis.   CT ABDOMEN/PELVIS: Left kidney solid heterogeneously enhancing mass suspicious for renal cell carcinoma (2.4 cm x 1.9 cm) and of suspected 0.8 cm right upper pole renal cell carcinoma measuring 0.8 cm. Recommend urological follow-up/also taken and dedicated MRI kidneys to further evaluate these lesions. YELLOW ALERT: An incidental finding alert was sent  per protocol.   No acute abnormality in the abdomen or pelvis.   Colonic diverticulosis without acute diverticulitis.   MACRO: Critical Finding:  New mass in the kidney. Notification was initiated on 9/4/2024 at 6:25 pm by  Ishaan Banda.  (**-YCF-**) Instructions:  MR Abdomen w and wo IV contrast. 1 week.   Signed by: Ishaan Banda 9/4/2024 6:26 PM Dictation workstation:   [id number]    CT head wo IV contrast    Result Date: 9/4/2024  Interpreted By:  Ishaan Banda, STUDY: CT HEAD WO IV CONTRAST;  9/4/2024 5:52 pm   INDICATION: Signs/Symptoms:unrespoonsive.     COMPARISON: None.   ACCESSION NUMBER(S): [id number]   ORDERING CLINICIAN: GURPREET COPPOLA   TECHNIQUE: Noncontrast axial CT images of head were obtained with coronal and sagittal reconstructed images.   FINDINGS: BRAIN PARENCHYMA: Mild periventricular and subcortical hemispheric white matter hypodensities are most compatible with chronic small vessel ischemic disease. No acute intraparenchymal hemorrhage or parenchymal evidence of acute large territory ischemic infarct. Gray-white matter distinction is preserved. No mass-effect.   VENTRICLES and EXTRA-AXIAL SPACES:  No acute extra-axial or intraventricular hemorrhage. No effacement of cerebral sulci. The ventricles and sulci are age-concordant.   PARANASAL SINUSES/MASTOIDS:  No hemorrhage or air-fluid levels within the visualized paranasal sinuses. The mastoids are well aerated.   CALVARIUM/ORBITS:  No skull fracture.  The orbits and globes are intact to the extent visualized.   EXTRACRANIAL SOFT TISSUES: No discernible abnormality.       No acute intracranial abnormality.     MACRO: None.   Signed by: Ishaan Banda 9/4/2024 6:07 PM Dictation workstation:   [id number]    XR chest 1 view    Result Date: 9/4/2024  Interpreted By:  Rosalie Ellison, STUDY: XR CHEST 1 VIEW;  9/4/2024 4:33 pm   INDICATION: Signs/Symptoms:intubation.   COMPARISON: 04/08/2024   ACCESSION NUMBER(S): [id number]    ORDERING CLINICIAN: GURPREET COPPOLA   TECHNIQUE: A single portable image of the chest was obtained.   FINDINGS: Resolution is limited. The right portion of the chest is not entirely included.   The patient is rotated. The heart size is uncertain.   There appears to be elevated right hemidiaphragm and right-sided infiltration.   An endotracheal tube terminates above the kathie. A nasogastric tube terminates below the diaphragm.   COMPARISON OF FINDING: The nasogastric tube was placed in the interval. The endotracheal tube was placed in the interval.       Interval placement of an endotracheal tube. The tip is above the kathie. Interval placement of a nasogastric tube. It appears to terminate below the diaphragm.   The exam is extremely limited.   MACRO: none   Signed by: Rosalie Ellison 9/4/2024 4:43 PM Dictation workstation:   [id number]    ECG 12 lead    Result Date: 9/4/2024  Sinus rhythm with 1st degree AV block with frequent Premature ventricular complexes Left axis deviation Left ventricular hypertrophy with QRS widening and repolarization abnormality ( R in aVL , [place] product ) Inferior infarct , age undetermined Abnormal ECG When compared with ECG of 23-APR-2024 09:01, Premature ventricular complexes are now Present LA interval has increased (RBBB and left anterior fascicular block) is no longer Present Inferior infarct is now Present        This patient has a central line   Reason for the central line remaining today? Hemodynamic monitoring    This patient has a urinary catheter   Reason for the urinary catheter remaining today? critically ill patient who need accurate urinary output measurements    This patient is intubated   Reason for patient to remain intubated today? they have continued cardiopulmonary lability/instability             Assessment/Plan   Assessment & Plan  Acute respiratory failure with hypoxia and hypercapnia (Multi)    Alo Glass is a 69 y.o. male with past medical history  significant for current tobacco use, ETOH abuse, CHF, Aortic valve stenosis, HTN, HLD, COPD, MELISSA, and DM. He was found non responsive at home by his neighbor and EMS was called. In the ER he was placed on ventilatory support. ABG revealed acute on chronic respiratory failure with hypoxia and hypercapnia with PH of 6.87 and pCO2 greater than 125. He received broad spectrum antibiotics and 3L of fluid in the ER. Despite fluid resuscitation he remained hypotensive and was started on norepinephrine. He is admitted to the ICU for further care. On arrival to the ICU the pt has a small amount of coffee ground emesis coming from is NG tube. He is intubated and sedated he does awaken to painful stimuli and responds to commands. Further review of his CT chest imaging reveals a large loculated R pleural effusion causing near total collapse of the RML and RLL as well as multiple infectious/inflammatory pulmonary nodules. His CT ABD also demonstrated a new L kidney mass concerning for renal cell carcinoma.     Plan:      Neuro:  H/O EtOH use  Intubated and sedated  - Serial neuro and pain assessments  - A-F bundle   - Wean sedation as tolerated/ Daily sedation vacation  - Fentanyl drip for pain and sedation- wean to 50 mcg/kg  - Folic acid and thiamine  - Discontinue Phenobarbital taper and add Propofol     CV:  Hx of HFrEF, HTN, HLD and moderate to severe aortic valve stenosis  LVEF of 30-35% on 3/2024   - Continuous telemetry and pulse oximetry  - Fluid resuscitation as indicated  - Norepinephrine to keep MAP greater than 65  - Continue Zetia  - Hold antihypertensives and lasix d/t hypotension  - Hold ASA and no DVT chemoprophylaxis d/t GI bleed  - SCDs for DVT ppx     Pulm:  Hx of COPD, MELISSA, and current tobacco use  Acute on chronic respiratory failure with hypoxia and hypercapnia  Aspiration PNA  Large Right loculated exudative effusion likely r/t to PNA vs. malignancy  Intubated on mechanical ventilation  - Supplemental O2  as needed to keep SpO2 greater than 92%  - Repeat ABG   - BPH  - Duonebs q6hrs; formoterol and pulmicort     GI:  Upper GI bleed  - NPO  - NG to LIMWS  - Trend CBCs  - Close monitoring for s/s of active bleeding  - PPI BID  - Nutrition consulted for enteral recommendations  - GI consulted       Renal:  LEONA likely prerenal d/t hypotension   CT showing new L renal mass concerning for renal cell carcinoma   Baseline Cr~ 0.7-1.00  - Daily RFP  - Replete electrolytes as needed  - Avoid nephrotoxins  - Maintain lipscomb for Strict I&Os  - Nephrology consulted     Endocrine:  Hx of NIDDM and hypothyroid  - Continue Synthroid  - Hold oral hypoglycemics  - SSI q4hrs     Heme/Onc:  C/F renal cell carcinoma   Erythrocytosis- appears chronic, likely d/t hypoxemia vs. SGLT2 Inhibitor vs. malignancy vs. other  - Urology consulted  - Daily CBC     MSK:  - PT/OT when appropriate  - Frequent turns     ID:  Leukocytosis  Aspiration PNA  Large Right loculated exudative effusion likely r/t to PNA vs. malignancy  Urine, sputum, pleural fluid and blood cultures pending  - Monitor temps  - Trend WBCS  - Empiric Vancomycin and Zosyn     ICU Checklist  Analgesia: Fentanyl gtt  MV: AC/PS RR 24, PEEP 8  Sedation: Fentanyl gtt, Propofol gtt  PT/OT:  Will order when appropriate  Diet:  NPO, Dietician consulted  Restraints: B/L UE soft wrist restraints  Pressors: Levophed gtt  Antibiotics: Vancomycin, Zosyn  Lines: R pleural CT, R radial arterial line, Right femoral CVC, OG, Lipscomb, ETT, PIV x 2  DVT Prophylaxis:  SCDs, hold AC d/t c/f UGIB  GI Prophylaxis: Protonix  Code Status: Full Code  Disposition:  Remain in ICU      I spent 60 minutes in the professional and overall care of this critically ill patient.      Amy Welsh, APRN-CNP

## 2024-09-05 NOTE — PROCEDURES
Chest Tube Insertion    Date/Time: 9/5/2024 4:15 AM    Performed by: Urbano Gutierrez MD  Authorized by: Urbano Gutierrez MD    Consent:     Consent obtained:  Emergent situation  Pre-procedure details:     Skin preparation:  Chlorhexidine    Preparation: Patient was prepped and draped in the usual sterile fashion    Sedation:     Sedation type:  Deep  Anesthesia:     Anesthesia method:  None  Procedure details:     Placement location:  R lateral    Ultrasound guidance: no      Tension pneumothorax: no      Tube connected to:  Water seal    Drainage characteristics:  Yellow and cloudy  Post-procedure details:     Procedure completion:  Tolerated  Comments:      Previous chest tube dislodged when the patient was being positioned for care. Replaced the chest tube along the same tract after sterilizing the area.

## 2024-09-05 NOTE — PROCEDURES
Chest Tube Insertion    Date/Time: 9/5/2024 1:21 AM    Performed by: Urbano Gutierrez MD  Authorized by: Urbano Gutierrez MD    Consent:     Consent obtained:  Emergent situation  Sedation:     Sedation type:  Deep  Anesthesia:     Anesthesia method:  Local infiltration    Local anesthetic:  Lidocaine 1% w/o epi  Procedure details:     Placement location:  R lateral    Tube size (Georgian): 14.    Ultrasound guidance: yes      Tension pneumothorax: no      Tube connected to:  Suction    Drainage characteristics:  Cloudy and yellow  Post-procedure details:     Procedure completion:  Tolerated

## 2024-09-05 NOTE — SIGNIFICANT EVENT
Discussed care with friend JR Jesus. Relays Mr. Glass talked with him about his health and healthcare.   [phone]     Ann Marie identified Ms. Alia Glass as Mr. Glass's sister, with whom he has a poor relationship.   [phone]

## 2024-09-06 ENCOUNTER — APPOINTMENT (OUTPATIENT)
Dept: RADIOLOGY | Facility: HOSPITAL | Age: 69
DRG: 870 | End: 2024-09-06
Payer: MEDICARE

## 2024-09-06 ENCOUNTER — APPOINTMENT (OUTPATIENT)
Dept: CARDIOLOGY | Facility: HOSPITAL | Age: 69
DRG: 870 | End: 2024-09-06
Payer: MEDICARE

## 2024-09-06 LAB
ALBUMIN SERPL BCP-MCNC: 2.6 G/DL (ref 3.4–5)
ANION GAP BLDA CALCULATED.4IONS-SCNC: 7 MMO/L (ref 10–25)
ANION GAP SERPL CALC-SCNC: 13 MMOL/L (ref 10–20)
ATRIAL RATE: 77 BPM
ATRIAL RATE: 94 BPM
BASE EXCESS BLDA CALC-SCNC: 5 MMOL/L (ref -2–3)
BODY TEMPERATURE: 37 DEGREES CELSIUS
BUN SERPL-MCNC: 41 MG/DL (ref 6–23)
CA-I BLDA-SCNC: 1.1 MMOL/L (ref 1.1–1.33)
CALCIUM SERPL-MCNC: 8 MG/DL (ref 8.6–10.3)
CHLORIDE BLDA-SCNC: 101 MMOL/L (ref 98–107)
CHLORIDE SERPL-SCNC: 99 MMOL/L (ref 98–107)
CO2 SERPL-SCNC: 30 MMOL/L (ref 21–32)
CREAT SERPL-MCNC: 1.13 MG/DL (ref 0.5–1.3)
EGFRCR SERPLBLD CKD-EPI 2021: 70 ML/MIN/1.73M*2
ERYTHROCYTE [DISTWIDTH] IN BLOOD BY AUTOMATED COUNT: 13.7 % (ref 11.5–14.5)
FUNGUS SPEC CULT: NORMAL
FUNGUS SPEC FUNGUS STN: NORMAL
GLUCOSE BLD MANUAL STRIP-MCNC: 125 MG/DL (ref 74–99)
GLUCOSE BLD MANUAL STRIP-MCNC: 135 MG/DL (ref 74–99)
GLUCOSE BLD MANUAL STRIP-MCNC: 151 MG/DL (ref 74–99)
GLUCOSE BLD MANUAL STRIP-MCNC: 152 MG/DL (ref 74–99)
GLUCOSE BLD MANUAL STRIP-MCNC: 153 MG/DL (ref 74–99)
GLUCOSE BLD MANUAL STRIP-MCNC: 176 MG/DL (ref 74–99)
GLUCOSE BLD MANUAL STRIP-MCNC: 180 MG/DL (ref 74–99)
GLUCOSE BLDA-MCNC: 145 MG/DL (ref 74–99)
GLUCOSE SERPL-MCNC: 143 MG/DL (ref 74–99)
HCO3 BLDA-SCNC: 27.9 MMOL/L (ref 22–26)
HCT VFR BLD AUTO: 52.1 % (ref 41–52)
HCT VFR BLD EST: 50 % (ref 41–52)
HGB BLD-MCNC: 17.2 G/DL (ref 13.5–17.5)
HGB BLDA-MCNC: 16.7 G/DL (ref 13.5–17.5)
INHALED O2 CONCENTRATION: 60 %
LACTATE BLDA-SCNC: 1.3 MMOL/L (ref 0.4–2)
MAGNESIUM SERPL-MCNC: 2.5 MG/DL (ref 1.6–2.4)
MCH RBC QN AUTO: 32.6 PG (ref 26–34)
MCHC RBC AUTO-ENTMCNC: 33 G/DL (ref 32–36)
MCV RBC AUTO: 99 FL (ref 80–100)
NRBC BLD-RTO: 0 /100 WBCS (ref 0–0)
OXYHGB MFR BLDA: 96.6 % (ref 94–98)
P AXIS: 61 DEGREES
P AXIS: 74 DEGREES
P OFFSET: 142 MS
P OFFSET: 163 MS
P ONSET: 79 MS
P ONSET: 81 MS
PC PRESSURE CONTROL: 24 CM H2O
PCO2 BLDA: 35 MM HG (ref 38–42)
PEEP CMH2O: 8 CM H2O
PH BLDA: 7.51 PH (ref 7.38–7.42)
PHOSPHATE SERPL-MCNC: 2.3 MG/DL (ref 2.5–4.9)
PLATELET # BLD AUTO: 317 X10*3/UL (ref 150–450)
PO2 BLDA: 99 MM HG (ref 85–95)
POTASSIUM BLDA-SCNC: 3.7 MMOL/L (ref 3.5–5.3)
POTASSIUM SERPL-SCNC: 4.2 MMOL/L (ref 3.5–5.3)
PR INTERVAL: 260 MS
PR INTERVAL: 262 MS
Q ONSET: 210 MS
Q ONSET: 211 MS
QRS COUNT: 13 BEATS
QRS COUNT: 16 BEATS
QRS DURATION: 140 MS
QRS DURATION: 148 MS
QT INTERVAL: 386 MS
QT INTERVAL: 410 MS
QTC CALCULATION(BAZETT): 463 MS
QTC CALCULATION(BAZETT): 482 MS
QTC FREDERICIA: 445 MS
QTC FREDERICIA: 448 MS
R AXIS: -73 DEGREES
R AXIS: -75 DEGREES
RBC # BLD AUTO: 5.27 X10*6/UL (ref 4.5–5.9)
SAO2 % BLDA: 98 % (ref 94–100)
SODIUM BLDA-SCNC: 132 MMOL/L (ref 136–145)
SODIUM SERPL-SCNC: 138 MMOL/L (ref 136–145)
T AXIS: 100 DEGREES
T AXIS: 82 DEGREES
T OFFSET: 403 MS
T OFFSET: 416 MS
TIDAL VOLUME: 24 ML
VANCOMYCIN SERPL-MCNC: 17.9 UG/ML (ref 5–20)
VENTILATOR MODE: ABNORMAL
VENTILATOR RATE: 24 BPM
VENTRICULAR RATE: 77 BPM
VENTRICULAR RATE: 94 BPM
WBC # BLD AUTO: 17.2 X10*3/UL (ref 4.4–11.3)

## 2024-09-06 PROCEDURE — 94003 VENT MGMT INPAT SUBQ DAY: CPT

## 2024-09-06 PROCEDURE — 82435 ASSAY OF BLOOD CHLORIDE: CPT

## 2024-09-06 PROCEDURE — 99232 SBSQ HOSP IP/OBS MODERATE 35: CPT | Performed by: INTERNAL MEDICINE

## 2024-09-06 PROCEDURE — 37799 UNLISTED PX VASCULAR SURGERY: CPT

## 2024-09-06 PROCEDURE — 80202 ASSAY OF VANCOMYCIN: CPT

## 2024-09-06 PROCEDURE — 2500000005 HC RX 250 GENERAL PHARMACY W/O HCPCS

## 2024-09-06 PROCEDURE — 80069 RENAL FUNCTION PANEL: CPT

## 2024-09-06 PROCEDURE — 2500000005 HC RX 250 GENERAL PHARMACY W/O HCPCS: Performed by: STUDENT IN AN ORGANIZED HEALTH CARE EDUCATION/TRAINING PROGRAM

## 2024-09-06 PROCEDURE — 2500000004 HC RX 250 GENERAL PHARMACY W/ HCPCS (ALT 636 FOR OP/ED)

## 2024-09-06 PROCEDURE — 2500000001 HC RX 250 WO HCPCS SELF ADMINISTERED DRUGS (ALT 637 FOR MEDICARE OP): Performed by: INTERNAL MEDICINE

## 2024-09-06 PROCEDURE — 94640 AIRWAY INHALATION TREATMENT: CPT

## 2024-09-06 PROCEDURE — 71045 X-RAY EXAM CHEST 1 VIEW: CPT | Performed by: RADIOLOGY

## 2024-09-06 PROCEDURE — 2020000001 HC ICU ROOM DAILY

## 2024-09-06 PROCEDURE — 85027 COMPLETE CBC AUTOMATED: CPT

## 2024-09-06 PROCEDURE — 2500000004 HC RX 250 GENERAL PHARMACY W/ HCPCS (ALT 636 FOR OP/ED): Performed by: NURSE PRACTITIONER

## 2024-09-06 PROCEDURE — 99291 CRITICAL CARE FIRST HOUR: CPT

## 2024-09-06 PROCEDURE — 93010 ELECTROCARDIOGRAM REPORT: CPT | Performed by: INTERNAL MEDICINE

## 2024-09-06 PROCEDURE — 82947 ASSAY GLUCOSE BLOOD QUANT: CPT

## 2024-09-06 PROCEDURE — 2500000004 HC RX 250 GENERAL PHARMACY W/ HCPCS (ALT 636 FOR OP/ED): Performed by: INTERNAL MEDICINE

## 2024-09-06 PROCEDURE — 2500000002 HC RX 250 W HCPCS SELF ADMINISTERED DRUGS (ALT 637 FOR MEDICARE OP, ALT 636 FOR OP/ED)

## 2024-09-06 PROCEDURE — 2500000001 HC RX 250 WO HCPCS SELF ADMINISTERED DRUGS (ALT 637 FOR MEDICARE OP)

## 2024-09-06 PROCEDURE — 94681 O2 UPTK CO2 OUTP % O2 XTRC: CPT

## 2024-09-06 PROCEDURE — 83735 ASSAY OF MAGNESIUM: CPT

## 2024-09-06 PROCEDURE — 84132 ASSAY OF SERUM POTASSIUM: CPT

## 2024-09-06 PROCEDURE — 71045 X-RAY EXAM CHEST 1 VIEW: CPT

## 2024-09-06 PROCEDURE — 93005 ELECTROCARDIOGRAM TRACING: CPT

## 2024-09-06 RX ORDER — AMOXICILLIN 250 MG
1 CAPSULE ORAL NIGHTLY
Status: DISPENSED | OUTPATIENT
Start: 2024-09-06

## 2024-09-06 RX ORDER — ENOXAPARIN SODIUM 100 MG/ML
40 INJECTION SUBCUTANEOUS EVERY 12 HOURS SCHEDULED
Status: DISPENSED | OUTPATIENT
Start: 2024-09-06

## 2024-09-06 RX ORDER — PANTOPRAZOLE SODIUM 40 MG/10ML
40 INJECTION, POWDER, LYOPHILIZED, FOR SOLUTION INTRAVENOUS DAILY
Status: DISPENSED | OUTPATIENT
Start: 2024-09-07

## 2024-09-06 RX ORDER — POLYETHYLENE GLYCOL 3350 17 G/17G
17 POWDER, FOR SOLUTION ORAL DAILY
Status: DISPENSED | OUTPATIENT
Start: 2024-09-06

## 2024-09-06 RX ORDER — ASPIRIN 81 MG/1
81 TABLET ORAL DAILY
Status: DISPENSED | OUTPATIENT
Start: 2024-09-06

## 2024-09-06 ASSESSMENT — PAIN - FUNCTIONAL ASSESSMENT
PAIN_FUNCTIONAL_ASSESSMENT: CPOT (CRITICAL CARE PAIN OBSERVATION TOOL)

## 2024-09-06 ASSESSMENT — PAIN SCALES - GENERAL: PAINLEVEL_OUTOF10: 0 - NO PAIN

## 2024-09-06 NOTE — PROGRESS NOTES
Vancomycin Dosing by Pharmacy- FOLLOW UP    Alo Glass is a 69 y.o. year old male who Pharmacy has been consulted for vancomycin dosing for pneumonia. Based on the patient's indication and renal status this patient is being dosed based on a goal AUC of 400-600.     Renal function is currently improving.    Current vancomycin dose: 1750 mg given once    Most recent random level: 17.9 mcg/mL    Visit Vitals  BP 82/72   Pulse 82   Temp 37.1 °C (98.8 °F)   Resp 24        Lab Results   Component Value Date    CREATININE 1.13 2024    CREATININE 1.62 (H) 2024    CREATININE 1.93 (H) 2024    CREATININE 0.67 2024        Patient weight is as follows:   Vitals:    24 0600   Weight: 136 kg (299 lb 13.2 oz)       Cultures:  No results found for the encounter in last 14 days.       I/O last 3 completed shifts:  In: 1560 (11.4 mL/kg) [I.V.:360 (2.6 mL/kg); IV Piggyback:1200]  Out: 4520 (33 mL/kg) [Urine:1620 (0.3 mL/kg/hr); Emesis/NG output:200; Chest Tube:2700]  Weight: 137 kg   I/O during current shift:  I/O this shift:  In: 1469.4 [I.V.:869.4; IV Piggyback:600]  Out: 1230 [Urine:880; Chest Tube:350]    Temp (24hrs), Av.5 °C (97.7 °F), Min:36.2 °C (97.1 °F), Max:37.1 °C (98.8 °F)      Assessment/Plan    Within goal random/trough level. Give 1750 mg once today.  The next level will be obtained on  at 0500. May be obtained sooner if clinically indicated.   Will continue to monitor renal function daily while on vancomycin and order serum creatinine at least every 48 hours if not already ordered.  Follow for continued vancomycin needs, clinical response, and signs/symptoms of toxicity.       Elsy Dejesus, PharmD

## 2024-09-06 NOTE — PROGRESS NOTES
09/06/24 1704   Discharge Planning   Expected Discharge Disposition   (TBD)        Patient remains in ICU. He is intubated and sedated. Unable to determine discharge plan yet. Will continue to follow for discharge needs.

## 2024-09-06 NOTE — CONSULTS
"Nutrition Assessment Note  Nutrition Assessment      Reason for Assessment  Reason for Assessment: Provider consult order, Tube feeding assessment and order, Tube feeding recommendations    Pt admitted after being found non responsive at home, intubated in ED.   PMH includes etoh abuse, CHF, COPD, DM, MELISSA, obesity.     Dietary Orders (From admission, onward)       Start     Ordered    09/06/24 1130  Enteral feeding with NPO NG (nasogastric tube); 10 (start TF @ 10 ml/hr. advance by10 ml every six hours as tolerated to goal rate 60 ml/hr.); 60; Water; Tap water; Every 6 hours  Diet effective now        Question Answer Comment   Tube feeding formula: Vital 1.5    Feeding route: NG (nasogastric tube)    Tube feeding continuous rate (mL/hr): 10 start TF @ 10 ml/hr. advance by10 ml every six hours as tolerated to goal rate 60 ml/hr.   Tube feeding flush (mL): 60    Flush type: Water    Water type: Tap water    Flush frequency: Every 6 hours        09/06/24 1630                      Anthropometrics:  Height: 180.3 cm (5' 10.98\")  Weight: 136 kg (299 lb 13.2 oz)  BMI (Calculated): 41.84    Weight Change  Weight History / % Weight Change: 159 kg 3/8/24, 145 kg 3/12/24,  132 kg 4/23/24, 133 kg 6/25/24(pt on lasix PTA, noted 3/8/24 admit to OSH for HF with IV diuresis this admission, on oral lasix at home PTA)  Significant Weight Loss: No     IBW/kg (Dietitian Calculated): 78.2 kg  Percent of IBW: 174 %     Scheduled medications  aspirin, 81 mg, oral, Daily  enoxaparin, 40 mg, subcutaneous, q12h SHARRON  ezetimibe, 10 mg, oral, Daily  folic acid, 1 mg, oral, Daily  insulin lispro, 0-10 Units, subcutaneous, q4h  ipratropium-albuteroL, 3 mL, nebulization, q6h  levothyroxine, 125 mcg, oral, Daily  multivitamin with minerals, 1 tablet, oral, Daily  [START ON 9/7/2024] pantoprazole, 40 mg, intravenous, Daily  piperacillin-tazobactam, 4.5 g, intravenous, q6h  thiamine, 100 mg, oral, Daily  vancomycin, 1,750 mg, intravenous, " "Once      Continuous medications  fentaNYL, 0-200 mcg/hr, Last Rate: 50 mcg/hr (09/06/24 1141)  norepinephrine, 0-0.2 mcg/kg/min, Last Rate: 0.04 mcg/kg/min (09/06/24 1415)  propofol, 0-50 mcg/kg/min, Last Rate: 20 mcg/kg/min (09/06/24 1124)      PRN medications  PRN medications: alteplase, dextrose, dextrose, glucagon, glucagon, LORazepam, oxygen, vancomycin       Latest Reference Range & Units 09/06/24 05:23   GLUCOSE 74 - 99 mg/dL 143 (H)   SODIUM 136 - 145 mmol/L 138   POTASSIUM 3.5 - 5.3 mmol/L 4.2   CHLORIDE 98 - 107 mmol/L 99   Bicarbonate 21 - 32 mmol/L 30   Anion Gap 10 - 20 mmol/L 13   Blood Urea Nitrogen 6 - 23 mg/dL 41 (H)   Creatinine 0.50 - 1.30 mg/dL 1.13   EGFR >60 mL/min/1.73m*2 70   Calcium 8.6 - 10.3 mg/dL 8.0 (L)   PHOSPHORUS 2.5 - 4.9 mg/dL 2.3 (L)   Albumin 3.4 - 5.0 g/dL 2.6 (L)   MAGNESIUM 1.60 - 2.40 mg/dL 2.50 (H)   (H): Data is abnormally high  (L): Data is abnormally low      Energy Needs:  Calculated Energy Needs Using Equations  Height: 180.3 cm (5' 10.98\")  Weight Used for Equation Calculations: 136 kg (299 lb 13.2 oz)  [facility] Equation (Critically Ill Patients): 2536  Minute Ventilation (L/min): 15.8 L/min  [facility] Equation (Overweight or Obese Patients): 2147  Equation Chosen to Use by RD: [facility] [address]  Activity Factor: 1.2  Stress Factor: 1.1  Total Energy Needs: 3350  Temp: 37.1 °C (98.8 °F)         Estimated Protein Needs  Total Protein Estimated Needs (g): 155 g  Total Protein Estimated Needs (g/kg): 2 g/kg (IBW)  Method for Estimating Needs: 115-155 g/day protein (1.5-2.0 g/kg IBW)    Estimated Fluid Needs  Method for Estimating Needs: per MD         Nutrition Focused Physical Findings:  Subcutaneous Fat Loss  Orbital Fat Pads: Well nourished (slightly bulging fat pads)  Buccal Fat Pads: Well nourished (full, rounded cheeks)    Muscle Wasting  Temporalis: Well nourished (well-defined muscle)  Pectoralis (Clavicular Region): Well nourished (clavicle not " visible)  Deltoid/Trapezius: Well nourished (rounded appearance at arm, shoulder, neck)    Edema  Edema: +4 severe         Physical Findings (Nutrition Deficiency/Toxicity)  Skin: Negative (reddened per RN)       Nutrition Diagnosis   Malnutrition Diagnosis  Patient has Malnutrition Diagnosis: No    Patient has Nutrition Diagnosis: Yes  Nutrition Diagnosis 1: Inadequate oral intake  Diagnosis Status (1): New  Related to (1): intubation  As Evidenced by (1): NPO         Nutrition Interventions/Recommendations   Nutrition Prescription  Individualized Nutrition Prescription Provided for : Vital 1.5 @ goal rate 60 ml/hr will provide 2160 kcal/day, 97 g/d protein, 1100 ml free water; propofol @ 16.44 ml/hr provides 434 kcal/day  - TF as ordered   - adjust water flushes as indicated   - RFP, Mg daily; replete prn   - hold TF for one hour pre and post synthroid administration   - add bowel regimen prn   - strict I/O and daily wts     Food and/or Nutrient Delivery Interventions        Enteral Intake: Other (Comment)  Goal: increase TF rate as tolerated to goal rate 60 ml/hr          Additional Interventions: bowel regimen prn         Coordination of Nutrition Care by a Nutrition Professional  Collaboration and Referral of Nutrition Care: Collaboration by nutrition professional with other providers    Education Documentation  No documentation found.      Nutrition Monitoring and Evaluation   Food and Nutrient Related History         Enteral and Parenteral Nutrition Intake: Enteral nutrition formula/solution, Enteral nutrition intake  Criteria: TF tolerance at goal rate vital 1.5 @ 60 ml/hr         Anthropometrics: Body Composition/Growth/Weight History  Weight: Weight change  Criteria: daily wts, monitor trend         Biochemical Data, Medical Tests and Procedures  Electrolyte and Renal Panel: BUN, Sodium, Calcium, serum, Chloride, Creatinine, Magnesium, Phosphorus, Potassium  Criteria: daily    Gastrointestinal Profile:  Alanine aminotransferase (ALT), Alkaline phosphatase, Bilirubin, total, Aspartate aminotransferase (AST)  Criteria: as per MD    Glucose/Endocrine Profile: Glucose, casual  Criteria: daily, or as per MD    Nutritional Anemia Profile: Hematocrit, Hemoglobin, Iron, serum  Criteria: as per MD    Vitamin Profile: Vitamin D, 25 hydroxy  Criteria: as indicated    Nutrition Focused Physical Findings  Adipose: Other (Comment)  Criteria: monitor NFPE    Bones: Other (Comment)  Criteria: monitor skeletal integrity    Digestive System: Abdominal distension, Increased appetite, Nausea, Vomiting, Anorexia, Ascites, Constipation, Decrease in appetite, Diarrhea, Early satiety, Other (Comment)  Criteria: monitor GI function closely    Muscles: Muscle atrophy  Criteria: monitor NFPE    Skin: Other (Comment)  Criteria: monitor skin integrity closely              Follow Up  Time Spent (min): 60 minutes  Last Date of Nutrition Visit: 09/06/24  Nutrition Follow-Up Needed?: Dietitian to reassess per policy  Follow up Comment: TF, intubated; HEIDI

## 2024-09-06 NOTE — PROGRESS NOTES
Pharmacy Medication History Review    Alo Glass is a 69 y.o. male admitted for Acute respiratory failure with hypoxia and hypercapnia (Multi). Pharmacy reviewed the patient's [id number] medications and allergies for accuracy.    The list below reflectives the updated PTA list. Please review each medication in order reconciliation for additional clarification and justification.  Prior to Admission Medications   Prescriptions Last Dose Informant     Dexcom G7  misc       Sig: Use as instructed   Dexcom G7 Sensor device       Sig: Use as directed   albuterol 90 mcg/actuation inhaler       Sig: INHALE 2 PUFFS EVERY 4-6 HOURS AS NEEDED.   Patient taking differently: Inhale 2 puffs every 4 hours if needed for shortness of breath.   allopurinol (Zyloprim) 300 mg tablet LAST FILL      Sig: Take 1 tablet (300 mg) by mouth once daily.              8/17/24   amoxicillin-pot clavulanate (Augmentin) 875-125 mg tablet LAST FILL  9/3/24      Sig: Take 1 tablet (875 mg) by mouth 2 times a day for 7 days.   aspirin 81 mg EC tablet       Sig: Take 1 tablet (81 mg) by mouth once daily.   empagliflozin (Jardiance) 10 mg LAST FILL  7/31/24      Sig: Take 1 tablet (10 mg) by mouth once daily.   ezetimibe (Zetia) 10 mg tablet LAST FILL  7/31/24      Sig: Take 1 tablet (10 mg) by mouth once daily.   fluticasone-umeclidin-vilanter (Trelegy Ellipta) 100-62.5-25 mcg blister with device       Sig: one puff per day   folic acid (Folvite) 1 mg tablet LAST FILL  8/2/24      Sig: Take 1 tablet (1 mg) by mouth once daily.   furosemide (Lasix) 80 mg tablet       Sig: Take 1 tablet (80 mg) by mouth once daily.   ipratropium-albuteroL (Duo-Neb) 0.5-2.5 mg/3 mL nebulizer solution       Sig: Take 3 mL by nebulization 4 times a day.   levothyroxine (Synthroid, Levoxyl) 125 mcg tablet LAST FILL   8/2/24      Sig: Take 1 Tablet by mouth every day   multivitamin with minerals tablet       Sig: Take 1 tablet by mouth once daily.    oxygen (O2) gas therapy       Sig: Inhale 2 L/min continuously. 2L nc at rest and 4L nc with ambulation  Indications: Oxygen therapy   potassium chloride CR (Klor-Con M20) 20 mEq ER tablet LAST FILL  7/2/24       Sig: Take 1 tablet (20 mEq) by mouth once daily. Do not crush or chew.   thiamine (Vitamin B-1) 100 mg tablet       Sig: Take 1 tablet (100 mg) by mouth once daily.   tirzepatide (Mounjaro) 2.5 mg/0.5 mL pen injector LAST FILL  8/9/24      Sig: Inject 2.5 mg under the skin 1 (one) time per week.   valsartan (Diovan) 40 mg tablet       Sig: Take 1 tablet (40 mg) by mouth once daily.      Facility-Administered Medications: None      Allergies  Reviewed by Shereen Koo on 9/6/2024   No Known Allergies         Below are additional concerns with the patient's PTA list.  The following updates were made to the Prior to Admission medication list:     Source of Information:     Medications ADDED:   N/A  Medications CHANGED:  N/A  Medications REMOVED:   N/A  Medications NOT TAKING:   N/A    Allergy reviewed : N/A    Comments: Pharmacy provided most recent fills, medication names and doses.     Shereen Koo

## 2024-09-06 NOTE — NURSING NOTE
A [employer] Device was used to determine this patient's fluid responsiveness. A value of +7 ms indicates a patient is fluid responsive, whereas any reading less than +7 ms indicates fluid to not be an effective intervention.     Vitals:    09/06/24 1315   BP:    Pulse: 79   Resp: 24   Temp:    SpO2: 99%       The device reading of  +4  indicates this patient is not fluid responsive.

## 2024-09-06 NOTE — PROGRESS NOTES
Nephrology Consult Progress Note    Admit Date: 9/4/2024    Interval history:  Remains intubated on MV    CURRENT MEDICATIONS:    Current Facility-Administered Medications:     alteplase (Cathflo Activase) injection 2 mg, 2 mg, intra-catheter, PRN, Cyndee Carlson PA-C    aspirin EC tablet 81 mg, 81 mg, oral, Daily, FABIOLA Nicolas    dextrose 50 % injection 12.5 g, 12.5 g, intravenous, q15 min PRN, Cyndee Carlson PA-C    dextrose 50 % injection 25 g, 25 g, intravenous, q15 min PRN, Cyndee Carlson PA-C    enoxaparin (Lovenox) syringe 40 mg, 40 mg, subcutaneous, q12h SHARRON, FABIOLA Nicolas    ezetimibe (Zetia) tablet 10 mg, 10 mg, oral, Daily, PARAM Serrano-CNP, 10 mg at 09/06/24 0826    fentanyl (Sublimaze) 1000 mcg in sodium chloride 0.9% 100 mL (10 mcg/mL) infusion (premix), 0-200 mcg/hr, intravenous, Continuous, PARAM Serrano-CNP, Last Rate: 5 mL/hr at 09/06/24 1141, 50 mcg/hr at 09/06/24 1141    folic acid (Folvite) tablet 1 mg, 1 mg, oral, Daily, Lynn Ballesteros APRHI-CNP, 1 mg at 09/06/24 0826    glucagon (Glucagen) injection 1 mg, 1 mg, intramuscular, q15 min PRN, Cyndee Carlson PA-C    glucagon (Glucagen) injection 1 mg, 1 mg, intramuscular, q15 min PRN, Cyndee Carlson PA-C    insulin lispro (HumaLOG) injection 0-10 Units, 0-10 Units, subcutaneous, q4h, Lynn Ballesteros APRN-CNP, 2 Units at 09/06/24 0835    ipratropium-albuteroL (Duo-Neb) 0.5-2.5 mg/3 mL nebulizer solution 3 mL, 3 mL, nebulization, q6h, Lynn Ballesteros APRN-CNP, 3 mL at 09/06/24 0743    levothyroxine (Synthroid, Levoxyl) tablet 125 mcg, 125 mcg, oral, Daily, Lynn M Mix, APRN-CNP, 125 mcg at 09/06/24 0826    LORazepam (Ativan) injection 2 mg, 2 mg, intravenous, q5 min PRN, Lynn M Mix, APRN-CNP    multivitamin with minerals 1 tablet, 1 tablet, oral, Daily, Lynn M Mix, APRN-CNP, 1 tablet at 09/06/24 0826    norepinephrine (Levophed) 8 mg in dextrose 5% 250 mL (0.032 mg/mL) infusion (premix), 0-0.2 mcg/kg/min,  "intravenous, Continuous, yCndee Carlson PA-C, Last Rate: 21 mL/hr at 09/06/24 1002, 0.08 mcg/kg/min at 09/06/24 1002    oxygen (O2) therapy, , inhalation, Continuous PRN - O2/gases, FABIOLA Nicolas, 50 percent at 09/06/24 1129    [START ON 9/7/2024] pantoprazole (ProtoNix) injection 40 mg, 40 mg, intravenous, Daily, Christiano Patricia MD    piperacillin-tazobactam (Zosyn) 4.5 g in dextrose (iso)  mL, 4.5 g, intravenous, q6h, FABIOLA Serrano, Stopped at 09/06/24 1033    propofol (Diprivan) infusion, 0-50 mcg/kg/min, intravenous, Continuous, FABIOLA Nicolas, Last Rate: 16.44 mL/hr at 09/06/24 1124, 20 mcg/kg/min at 09/06/24 1124    thiamine (Vitamin B-1) tablet 100 mg, 100 mg, oral, Daily, PARAM Serrano-CNP, 100 mg at 09/06/24 0826    vancomycin (Vancocin) in % 5 dextrose 500 mL IV 1,750 mg, 1,750 mg, intravenous, Once, Elsy Dejesus, PharmD    vancomycin (Vancocin) pharmacy to dose - pharmacy monitoring, , miscellaneous, Daily PRN, Lynn Ballesteros APRN-ANSHUL       Intake/Output Summary (Last 24 hours) at 9/6/2024 1145  Last data filed at 9/6/2024 1002  Gross per 24 hour   Intake 2029.42 ml   Output 1985 ml   Net 44.42 ml       PHYSICAL EXAM:  BP 82/72   Pulse 82   Temp 37.1 °C (98.8 °F)   Resp 24   Ht 1.803 m (5' 11\")   Wt 136 kg (299 lb 13.2 oz)   SpO2 99%   BMI 41.82 kg/m²     Intake/Output Summary (Last 24 hours) at 9/6/2024 1145  Last data filed at 9/6/2024 1002  Gross per 24 hour   Intake 2029.42 ml   Output 1985 ml   Net 44.42 ml     Gen: obese, intubated, non communicative   Neck: No JVD  Cardiac: RRR  Resp: decrease BS  Abd: Soft, non tender, +BS, non distended   Ext: +1 edema   Access: none   Neuro: unable to assess   Peripheral Pulses: weak peripheral pulses.  Skin: Skin color, texture, turgor normal, no suspicious rashes or lesions.        Labs:  Results for orders placed or performed during the hospital encounter of 09/04/24 (from the past 24 hour(s))   POCT GLUCOSE "   Result Value Ref Range    POCT Glucose 123 (H) 74 - 99 mg/dL   Blood Gas Arterial Full Panel   Result Value Ref Range    POCT pH, Arterial 7.32 (L) 7.38 - 7.42 pH    POCT pCO2, Arterial 53 (H) 38 - 42 mm Hg    POCT pO2, Arterial 77 (L) 85 - 95 mm Hg    POCT SO2, Arterial 96 94 - 100 %    POCT Oxy Hemoglobin, Arterial 93.4 (L) 94.0 - 98.0 %    POCT Hematocrit Calculated, Arterial 52.0 41.0 - 52.0 %    POCT Sodium, Arterial 132 (L) 136 - 145 mmol/L    POCT Potassium, Arterial 4.4 3.5 - 5.3 mmol/L    POCT Chloride, Arterial 100 98 - 107 mmol/L    POCT Ionized Calcium, Arterial 1.15 1.10 - 1.33 mmol/L    POCT Glucose, Arterial 156 (H) 74 - 99 mg/dL    POCT Lactate, Arterial 1.3 0.4 - 2.0 mmol/L    POCT Base Excess, Arterial -0.1 -2.0 - 3.0 mmol/L    POCT HCO3 Calculated, Arterial 27.3 (H) 22.0 - 26.0 mmol/L    POCT Hemoglobin, Arterial 17.4 13.5 - 17.5 g/dL    POCT Anion Gap, Arterial 9 (L) 10 - 25 mmo/L    Patient Temperature 37.0 degrees Celsius    FiO2 60 %   POCT GLUCOSE   Result Value Ref Range    POCT Glucose 164 (H) 74 - 99 mg/dL   POCT GLUCOSE   Result Value Ref Range    POCT Glucose 157 (H) 74 - 99 mg/dL   POCT GLUCOSE   Result Value Ref Range    POCT Glucose 176 (H) 74 - 99 mg/dL   Electrocardiogram, 12-lead PRN ACS symptoms   Result Value Ref Range    Ventricular Rate 94 BPM    Atrial Rate 94 BPM    TX Interval 262 ms    QRS Duration 148 ms    QT Interval 386 ms    QTC Calculation(Bazett) 482 ms    P Axis 74 degrees    R Axis -75 degrees    T Axis 82 degrees    QRS Count 16 beats    Q Onset 210 ms    P Onset 79 ms    P Offset 142 ms    T Offset 403 ms    QTC Fredericia 448 ms   POCT GLUCOSE   Result Value Ref Range    POCT Glucose 153 (H) 74 - 99 mg/dL   POCT GLUCOSE   Result Value Ref Range    POCT Glucose 151 (H) 74 - 99 mg/dL   Renal Function Panel   Result Value Ref Range    Glucose 143 (H) 74 - 99 mg/dL    Sodium 138 136 - 145 mmol/L    Potassium 4.2 3.5 - 5.3 mmol/L    Chloride 99 98 - 107 mmol/L     Bicarbonate 30 21 - 32 mmol/L    Anion Gap 13 10 - 20 mmol/L    Urea Nitrogen 41 (H) 6 - 23 mg/dL    Creatinine 1.13 0.50 - 1.30 mg/dL    eGFR 70 >60 mL/min/1.73m*2    Calcium 8.0 (L) 8.6 - 10.3 mg/dL    Phosphorus 2.3 (L) 2.5 - 4.9 mg/dL    Albumin 2.6 (L) 3.4 - 5.0 g/dL   Magnesium   Result Value Ref Range    Magnesium 2.50 (H) 1.60 - 2.40 mg/dL   CBC   Result Value Ref Range    WBC 17.2 (H) 4.4 - 11.3 x10*3/uL    nRBC 0.0 0.0 - 0.0 /100 WBCs    RBC 5.27 4.50 - 5.90 x10*6/uL    Hemoglobin 17.2 13.5 - 17.5 g/dL    Hematocrit 52.1 (H) 41.0 - 52.0 %    MCV 99 80 - 100 fL    MCH 32.6 26.0 - 34.0 pg    MCHC 33.0 32.0 - 36.0 g/dL    RDW 13.7 11.5 - 14.5 %    Platelets 317 150 - 450 x10*3/uL   Vancomycin   Result Value Ref Range    Vancomycin 17.9 5.0 - 20.0 ug/mL   POCT GLUCOSE   Result Value Ref Range    POCT Glucose 152 (H) 74 - 99 mg/dL   Blood Gas Arterial Full Panel   Result Value Ref Range    POCT pH, Arterial 7.51 (H) 7.38 - 7.42 pH    POCT pCO2, Arterial 35 (L) 38 - 42 mm Hg    POCT pO2, Arterial 99 (H) 85 - 95 mm Hg    POCT SO2, Arterial 98 94 - 100 %    POCT Oxy Hemoglobin, Arterial 96.6 94.0 - 98.0 %    POCT Hematocrit Calculated, Arterial 50.0 41.0 - 52.0 %    POCT Sodium, Arterial 132 (L) 136 - 145 mmol/L    POCT Potassium, Arterial 3.7 3.5 - 5.3 mmol/L    POCT Chloride, Arterial 101 98 - 107 mmol/L    POCT Ionized Calcium, Arterial 1.10 1.10 - 1.33 mmol/L    POCT Glucose, Arterial 145 (H) 74 - 99 mg/dL    POCT Lactate, Arterial 1.3 0.4 - 2.0 mmol/L    POCT Base Excess, Arterial 5.0 (H) -2.0 - 3.0 mmol/L    POCT HCO3 Calculated, Arterial 27.9 (H) 22.0 - 26.0 mmol/L    POCT Hemoglobin, Arterial 16.7 13.5 - 17.5 g/dL    POCT Anion Gap, Arterial 7 (L) 10 - 25 mmo/L    Patient Temperature 37.0 degrees Celsius    FiO2 60 %    Ventilator Mode A/C PC     Ventilator Rate 24 bpm    Tidal Volume 24 mL    PC Pressure Control 24.0 cm H2O    Peep CHM2O 8.0 cm H2O        DATA:   Diagnostic tests reviewed for today's  visit:    New labs and imaging     Assessment and Plan:  Pt with h/o obesity, tobacco use, ETOH abuse, CHF, COPD, MELISSA, and T2D. He was found non responsive at home by his neighbor, eventually getting intubated, placed on pressor, vent support, started on ATB, s/p bronchoscopy because of R airway collapse, chronic R pleural effusion.    - LEONA: baseline baseline Scr up to 0.9  LEONA in context of hemodynamic instability, shock, better with lowering Scr close to baseline and non oliguric  L Kidney mass, concerning for cell carcinoma, urology to follow in OP setting    BP: better, on norepi drip  Lytes and acid base: acceptable, +hypercapnia   Chronic Polycythemia: due to COPD, renal cell Ca?     PLAN:  - resolved LEONA, Supportive care, target MAP >65, dose vanco with levels. Will sign off     Will continue to follow.     Signature: Wes Lorenzo MD

## 2024-09-06 NOTE — PROGRESS NOTES
"Alo Glass is a 69 y.o. male on day 2 of admission presenting with Acute respiratory failure with hypoxia and hypercapnia (Multi).    Subjective   Patient remains intubated and sedated.  Levophed gtt at 0.09.  R pleural chest tube with 500 mL purulent fluid drained overnight.       Objective     Physical Exam  Constitutional:       Appearance: He is obese. He is ill-appearing.   Eyes:      Pupils: Pupils are equal, round, and reactive to light.   Cardiovascular:      Rate and Rhythm: Normal rate and regular rhythm.      Heart sounds: Normal heart sounds.   Pulmonary:      Comments: RLL diminished  Abdominal:      General: Bowel sounds are normal. There is distension.      Palpations: Abdomen is soft.   Genitourinary:     Comments: Live draining clear yellow urine  Musculoskeletal:      Right lower leg: Edema present.      Left lower leg: Edema present.      Comments: +4 B/L LE edema   Skin:     General: Skin is warm and dry.      Capillary Refill: Capillary refill takes less than 2 seconds.   Neurological:      Comments: +gag, responds to pain         Last Recorded Vitals  Blood pressure 82/72, pulse 82, temperature 37.1 °C (98.8 °F), resp. rate 24, height 1.803 m (5' 11\"), weight 136 kg (299 lb 13.2 oz), SpO2 98%.  Intake/Output last 3 Shifts:  I/O last 3 completed shifts:  In: 2029.4 (14.9 mL/kg) [I.V.:1229.4 (9 mL/kg); IV Piggyback:800]  Out: 5750 (42.3 mL/kg) [Urine:2500 (0.5 mL/kg/hr); Emesis/NG output:200; Chest Tube:3050]  Weight: 136 kg     Relevant Results  Scheduled medications  ezetimibe, 10 mg, oral, Daily  folic acid, 1 mg, oral, Daily  [Held by provider] heparin (porcine), 7,500 Units, subcutaneous, q8h SHARRON  insulin lispro, 0-10 Units, subcutaneous, q4h  ipratropium-albuteroL, 3 mL, nebulization, q6h  levothyroxine, 125 mcg, oral, Daily  multivitamin with minerals, 1 tablet, oral, Daily  pantoprazole, 40 mg, intravenous, BID  piperacillin-tazobactam, 4.5 g, intravenous, q6h  thiamine, 100 mg, " oral, Daily  vancomycin, 1,750 mg, intravenous, Once      Continuous medications  fentaNYL, 0-200 mcg/hr, Last Rate: 50 mcg/hr (09/06/24 0100)  norepinephrine, 0-0.2 mcg/kg/min, Last Rate: 0.09 mcg/kg/min (09/06/24 0400)  propofol, 0-50 mcg/kg/min, Last Rate: 15 mcg/kg/min (09/06/24 0137)      PRN medications  PRN medications: alteplase, dextrose, dextrose, glucagon, glucagon, LORazepam, oxygen, vancomycin  Results for orders placed or performed during the hospital encounter of 09/04/24 (from the past 24 hour(s))   Blood Gas Arterial Full Panel   Result Value Ref Range    POCT pH, Arterial 7.18 (LL) 7.38 - 7.42 pH    POCT pCO2, Arterial 78 (HH) 38 - 42 mm Hg    POCT pO2, Arterial 93 85 - 95 mm Hg    POCT SO2, Arterial 97 94 - 100 %    POCT Oxy Hemoglobin, Arterial 94.7 94.0 - 98.0 %    POCT Hematocrit Calculated, Arterial 56.0 (H) 41.0 - 52.0 %    POCT Sodium, Arterial 132 (L) 136 - 145 mmol/L    POCT Potassium, Arterial 4.3 3.5 - 5.3 mmol/L    POCT Chloride, Arterial 98 98 - 107 mmol/L    POCT Ionized Calcium, Arterial 1.18 1.10 - 1.33 mmol/L    POCT Glucose, Arterial 129 (H) 74 - 99 mg/dL    POCT Lactate, Arterial 1.0 0.4 - 2.0 mmol/L    POCT Base Excess, Arterial -2.4 (L) -2.0 - 3.0 mmol/L    POCT HCO3 Calculated, Arterial 29.1 (H) 22.0 - 26.0 mmol/L    POCT Hemoglobin, Arterial 18.7 (H) 13.5 - 17.5 g/dL    POCT Anion Gap, Arterial 9 (L) 10 - 25 mmo/L    Patient Temperature 37.0 degrees Celsius    FiO2 60 %   POCT GLUCOSE   Result Value Ref Range    POCT Glucose 109 (H) 74 - 99 mg/dL   Blood Gas Arterial Full Panel   Result Value Ref Range    POCT pH, Arterial 7.24 (LL) 7.38 - 7.42 pH    POCT pCO2, Arterial 69 (H) 38 - 42 mm Hg    POCT pO2, Arterial 76 (L) 85 - 95 mm Hg    POCT SO2, Arterial 96 94 - 100 %    POCT Oxy Hemoglobin, Arterial 93.1 (L) 94.0 - 98.0 %    POCT Hematocrit Calculated, Arterial 55.0 (H) 41.0 - 52.0 %    POCT Sodium, Arterial 133 (L) 136 - 145 mmol/L    POCT Potassium, Arterial 4.3 3.5 - 5.3  mmol/L    POCT Chloride, Arterial 98 98 - 107 mmol/L    POCT Ionized Calcium, Arterial 1.16 1.10 - 1.33 mmol/L    POCT Glucose, Arterial 132 (H) 74 - 99 mg/dL    POCT Lactate, Arterial 1.2 0.4 - 2.0 mmol/L    POCT Base Excess, Arterial -0.5 -2.0 - 3.0 mmol/L    POCT HCO3 Calculated, Arterial 29.6 (H) 22.0 - 26.0 mmol/L    POCT Hemoglobin, Arterial 18.3 (H) 13.5 - 17.5 g/dL    POCT Anion Gap, Arterial 10 10 - 25 mmo/L    Patient Temperature 37.0 degrees Celsius    FiO2 60 %   ECG 12 lead   Result Value Ref Range    Ventricular Rate 77 BPM    Atrial Rate 77 BPM    IL Interval 260 ms    QRS Duration 140 ms    QT Interval 410 ms    QTC Calculation(Bazett) 463 ms    P Axis 61 degrees    R Axis -73 degrees    T Axis 100 degrees    QRS Count 13 beats    Q Onset 211 ms    P Onset 81 ms    P Offset 163 ms    T Offset 416 ms    QTC Fredericia 445 ms   POCT GLUCOSE   Result Value Ref Range    POCT Glucose 123 (H) 74 - 99 mg/dL   Blood Gas Arterial Full Panel   Result Value Ref Range    POCT pH, Arterial 7.32 (L) 7.38 - 7.42 pH    POCT pCO2, Arterial 53 (H) 38 - 42 mm Hg    POCT pO2, Arterial 77 (L) 85 - 95 mm Hg    POCT SO2, Arterial 96 94 - 100 %    POCT Oxy Hemoglobin, Arterial 93.4 (L) 94.0 - 98.0 %    POCT Hematocrit Calculated, Arterial 52.0 41.0 - 52.0 %    POCT Sodium, Arterial 132 (L) 136 - 145 mmol/L    POCT Potassium, Arterial 4.4 3.5 - 5.3 mmol/L    POCT Chloride, Arterial 100 98 - 107 mmol/L    POCT Ionized Calcium, Arterial 1.15 1.10 - 1.33 mmol/L    POCT Glucose, Arterial 156 (H) 74 - 99 mg/dL    POCT Lactate, Arterial 1.3 0.4 - 2.0 mmol/L    POCT Base Excess, Arterial -0.1 -2.0 - 3.0 mmol/L    POCT HCO3 Calculated, Arterial 27.3 (H) 22.0 - 26.0 mmol/L    POCT Hemoglobin, Arterial 17.4 13.5 - 17.5 g/dL    POCT Anion Gap, Arterial 9 (L) 10 - 25 mmo/L    Patient Temperature 37.0 degrees Celsius    FiO2 60 %   POCT GLUCOSE   Result Value Ref Range    POCT Glucose 164 (H) 74 - 99 mg/dL   POCT GLUCOSE   Result Value  Ref Range    POCT Glucose 157 (H) 74 - 99 mg/dL   POCT GLUCOSE   Result Value Ref Range    POCT Glucose 176 (H) 74 - 99 mg/dL   Electrocardiogram, 12-lead PRN ACS symptoms   Result Value Ref Range    Ventricular Rate 94 BPM    Atrial Rate 94 BPM    DC Interval 262 ms    QRS Duration 148 ms    QT Interval 386 ms    QTC Calculation(Bazett) 482 ms    P Axis 74 degrees    R Axis -75 degrees    T Axis 82 degrees    QRS Count 16 beats    Q Onset 210 ms    P Onset 79 ms    P Offset 142 ms    T Offset 403 ms    QTC Fredericia 448 ms   POCT GLUCOSE   Result Value Ref Range    POCT Glucose 153 (H) 74 - 99 mg/dL   POCT GLUCOSE   Result Value Ref Range    POCT Glucose 151 (H) 74 - 99 mg/dL   Renal Function Panel   Result Value Ref Range    Glucose 143 (H) 74 - 99 mg/dL    Sodium 138 136 - 145 mmol/L    Potassium 4.2 3.5 - 5.3 mmol/L    Chloride 99 98 - 107 mmol/L    Bicarbonate 30 21 - 32 mmol/L    Anion Gap 13 10 - 20 mmol/L    Urea Nitrogen 41 (H) 6 - 23 mg/dL    Creatinine 1.13 0.50 - 1.30 mg/dL    eGFR 70 >60 mL/min/1.73m*2    Calcium 8.0 (L) 8.6 - 10.3 mg/dL    Phosphorus 2.3 (L) 2.5 - 4.9 mg/dL    Albumin 2.6 (L) 3.4 - 5.0 g/dL   Magnesium   Result Value Ref Range    Magnesium 2.50 (H) 1.60 - 2.40 mg/dL   CBC   Result Value Ref Range    WBC 17.2 (H) 4.4 - 11.3 x10*3/uL    nRBC 0.0 0.0 - 0.0 /100 WBCs    RBC 5.27 4.50 - 5.90 x10*6/uL    Hemoglobin 17.2 13.5 - 17.5 g/dL    Hematocrit 52.1 (H) 41.0 - 52.0 %    MCV 99 80 - 100 fL    MCH 32.6 26.0 - 34.0 pg    MCHC 33.0 32.0 - 36.0 g/dL    RDW 13.7 11.5 - 14.5 %    Platelets 317 150 - 450 x10*3/uL   Vancomycin   Result Value Ref Range    Vancomycin 17.9 5.0 - 20.0 ug/mL     Electrocardiogram, 12-lead PRN ACS symptoms    Result Date: 9/6/2024  Sinus rhythm with 1st degree AV block with Premature atrial complexes Left axis deviation Right bundle branch block Inferior infarct (cited on or before 04-SEP-2024) Anterior infarct , age undetermined T wave abnormality, consider lateral  ischemia Abnormal ECG When compared with ECG of 05-SEP-2024 10:29, (unconfirmed) Premature atrial complexes are now Present    ECG 12 lead    Result Date: 9/6/2024  Sinus rhythm with 1st degree AV block Left axis deviation Right bundle branch block Inferior infarct (cited on or before 04-SEP-2024) Abnormal ECG When compared with ECG of 04-SEP-2024 16:24, Premature ventricular complexes are no longer Present Right bundle branch block is now Present    ECG 12 lead    Result Date: 9/5/2024  Sinus rhythm with 1st degree AV block with frequent Premature ventricular complexes Left axis deviation Left ventricular hypertrophy with QRS widening and repolarization abnormality ( R in aVL , [place] product ) Inferior infarct , age undetermined Abnormal ECG When compared with ECG of 23-APR-2024 09:01, Premature ventricular complexes are now Present WI interval has increased (RBBB and left anterior fascicular block) is no longer Present Inferior infarct is now Present Confirmed by Andrea Bentley ([id number]) on 9/5/2024 10:01:05 AM    XR chest 1 view    Result Date: 9/5/2024  Interpreted By:  Saad Cronin, STUDY: XR CHEST 1 VIEW; 9/5/2024 4:57 am   INDICATION: CLINICAL INFORMATION: Signs/Symptoms:Pig tail Chest Tube replacement.   COMPARISON: 09/04/2024   ACCESSION NUMBER(S): [id number]   ORDERING CLINICIAN: ANDRY ABRAHAM   TECHNIQUE: Portable chest one view.   FINDINGS: The cardiac silhouette is quite prominent suggesting cardiomegaly. The tube and line placement is unchanged.  There is a decrease in the right-sided pleural-based density is well as the right-sided parenchymal densities. The pulmonary vasculature is slightly indistinct suggesting mild pulmonary vascular congestion.       There appears to be increased aeration at the right base suggesting diminishing pleural density within the right hemithorax in association with the chest tube. Infiltrate persists on the right.   MACRO: none   Signed by: Saad Cronin 9/5/2024 8:18 AM  Dictation workstation:   [id number]    XR chest 1 view    Result Date: 9/5/2024  Interpreted By:  Cande Keating, STUDY: XR CHEST 1 VIEW;  9/4/2024 11:10 pm   INDICATION: Signs/Symptoms:Chest tube placement.     COMPARISON: Radiographs of the chest dated 09/04/2024; CT of the chest dated 09/04/2024   ACCESSION NUMBER(S): [id number]   ORDERING CLINICIAN: ANDRY ABRAHAM   FINDINGS: AP radiograph of the chest was provided.   Endotracheal tube projects 4.4 cm superior to the kathie. Enteric tube appears to course along the midline expected course of the esophagus, although the distal tip is not well visualized due to underpenetration and overlying structures.   CARDIOMEDIASTINAL SILHOUETTE: Cardiomediastinal silhouette is enlarged, similar to prior exam.   LUNGS: Redemonstration of the large right-sided pleural effusion with associated atelectasis/consolidation, similar in appearance to prior radiographs. No new consolidation or pleural effusion is present in the left lung.   ABDOMEN: No remarkable upper abdominal findings.   BONES: No acute osseous changes.       1.  Enteric tube is coursing along the midline of the mediastinum in the expected course of the esophagus, although the distal tip is not well visualized due to overlying cardiomediastinal silhouette and underpenetration, and may be outside of the field-of-view of the study. Endotracheal tube projects 4.5 cm superior to the kathie. 2. Redemonstration of the large loculated right-sided pleural effusion with the associated atelectasis/consolidation, similar in appearance to prior exam.       MACRO: None   Signed by: Cande Keating 9/5/2024 2:08 AM Dictation workstation:   [id number]    CT chest abdomen pelvis w IV contrast    Result Date: 9/4/2024  Interpreted By:  Ishaan Banda, STUDY: CT CHEST ABDOMEN PELVIS W IV CONTRAST;  9/4/2024 5:52 pm   INDICATION: Signs/Symptoms:unresponsive.     COMPARISON: None.   ACCESSION NUMBER(S): [id number]    ORDERING CLINICIAN: GURPREET COPPOLA   TECHNIQUE: Contiguous axial images of the chest, abdomen, and pelvis were obtained after the intravenous administration of iodinated contrast. Coronal and sagittal reformatted images were reconstructed from the axial data.   FINDINGS: CT CHEST:   MEDIASTINUM AND LYMPH NODES: NG/OG tube noted with small amount of fluid in the esophagus. The esophageal wall appears within normal limits.  No enlarged intrathoracic or axillary lymph nodes by imaging criteria. No pneumomediastinum.   VESSELS:  Normal caliber thoracic aorta without dissection. Mild aortic atherosclerosis.   HEART: Enlarged. Severe aortic valvular calcifications. Moderate coronary artery calcifications. No significant pericardial effusion.   LUNG, AIRWAYS, PLEURA: There is a large loculated right pleural effusion causing compressive atelectasis on the right lung. As a result, there is complete collapse of the right lower lobe, complete collapse of the right middle lobe, and partial collapse of the right upper lobe. There are multiple loculated compartments largest of which is seen at the right lung base. There is a small amount of mucus in the mid to distal trachea. There is small amount of layering debris in the mainstem bronchi. The majority of the right middle and lower lobe bronchi are collapsed and opacified with low-density debris. There is a trace left pleural effusion with subsegmental left basilar atelectasis. There is emphysema. There are new scattered subcentimeter bilateral centrilobular nodules that are likely infectious/inflammatory in nature from bronchiolitis.   CHEST WALL SOFT TISSUES: No discernible acute abnormality. There is a 5.5 cm x 1.7 cm lipoma in the right infraspinatus muscle.   OSSEOUS STRUCTURES: No acute osseous abnormality.     CT ABDOMEN/PELVIS:   ABDOMINAL WALL: No significant abnormality.   LIVER: No significant parenchymal abnormality.   BILE DUCTS: No significant intrahepatic or  extrahepatic dilatation.   GALLBLADDER: No significant abnormality.   PANCREAS: No significant abnormality.   SPLEEN: No significant abnormality.   ADRENALS: There is a 1.7 cm low-density left adrenal nodule likely representing a adenoma.   KIDNEYS, URETERS, BLADDER: There is a heterogeneously enhancing solid mass in the upper pole the left kidney measuring 2.4 cm x 1.9 cm consistent with renal cell carcinoma. Additionally, a 0.8 cm likely enhancing lesion in the upper pole the right kidney is concerning for renal cell carcinoma. No hydronephrosis or calculi. Urinary bladder is decompressed with Live catheter in place.   REPRODUCTIVE ORGANS: No significant abnormality.   VESSELS: Moderate aortic atherosclerosis without AAA.   RETROPERITONEUM/LYMPH NODES: No acute retroperitoneal abnormality. No enlarged lymph nodes.   BOWEL/MESENTERY/PERITONEUM: Colonic diverticulosis without acute diverticulitis. No inflammatory bowel wall thickening or dilatation. Normal appendix.   No ascites, free air, or fluid collection.     MUSCULOSKELETAL: No acute osseous abnormality.       CT CHEST: Large loculated right pleural effusion causing complete compressive collapse of the right middle lobe and right lower lobe and partial compressive collapse of the right upper lobe. The largest likely compartments located at the inferior right hemithorax. The cause of the effusion is not apparent as there is only a small amount of debris in the distal trachea and right mainstem bronchus but the distal-most right-sided bronchi are occluded due to combination of compressive collapse and small low-density fluid.   Numerous new subcentimeter centrilobular pulmonary nodules that are most likely infectious/inflammatory in as can be seen with bronchiolitis or fungal infection.   Aortic valve calcification to the extent that would likely result in aortic stenosis.   CT ABDOMEN/PELVIS: Left kidney solid heterogeneously enhancing mass suspicious for  renal cell carcinoma (2.4 cm x 1.9 cm) and of suspected 0.8 cm right upper pole renal cell carcinoma measuring 0.8 cm. Recommend urological follow-up/also taken and dedicated MRI kidneys to further evaluate these lesions. YELLOW ALERT: An incidental finding alert was sent per protocol.   No acute abnormality in the abdomen or pelvis.   Colonic diverticulosis without acute diverticulitis.   MACRO: Critical Finding:  New mass in the kidney. Notification was initiated on 9/4/2024 at 6:25 pm by  Ishaan Banda.  (**-YCF-**) Instructions:  MR Abdomen w and wo IV contrast. 1 week.   Signed by: Ishaan Banda 9/4/2024 6:26 PM Dictation workstation:   [id number]    CT head wo IV contrast    Result Date: 9/4/2024  Interpreted By:  Ishaan Banda, STUDY: CT HEAD WO IV CONTRAST;  9/4/2024 5:52 pm   INDICATION: Signs/Symptoms:unrespoonsive.     COMPARISON: None.   ACCESSION NUMBER(S): [id number]   ORDERING CLINICIAN: GURPREET COPPOLA   TECHNIQUE: Noncontrast axial CT images of head were obtained with coronal and sagittal reconstructed images.   FINDINGS: BRAIN PARENCHYMA: Mild periventricular and subcortical hemispheric white matter hypodensities are most compatible with chronic small vessel ischemic disease. No acute intraparenchymal hemorrhage or parenchymal evidence of acute large territory ischemic infarct. Gray-white matter distinction is preserved. No mass-effect.   VENTRICLES and EXTRA-AXIAL SPACES:  No acute extra-axial or intraventricular hemorrhage. No effacement of cerebral sulci. The ventricles and sulci are age-concordant.   PARANASAL SINUSES/MASTOIDS:  No hemorrhage or air-fluid levels within the visualized paranasal sinuses. The mastoids are well aerated.   CALVARIUM/ORBITS:  No skull fracture.  The orbits and globes are intact to the extent visualized.   EXTRACRANIAL SOFT TISSUES: No discernible abnormality.       No acute intracranial abnormality.     MACRO: None.   Signed by: Ishaan Banda 9/4/2024  6:07 PM Dictation workstation:   [id number]    XR chest 1 view    Result Date: 9/4/2024  Interpreted By:  Rosalie Ellison, STUDY: XR CHEST 1 VIEW;  9/4/2024 4:33 pm   INDICATION: Signs/Symptoms:intubation.   COMPARISON: 04/08/2024   ACCESSION NUMBER(S): [id number]   ORDERING CLINICIAN: GURPREET COPPOLA   TECHNIQUE: A single portable image of the chest was obtained.   FINDINGS: Resolution is limited. The right portion of the chest is not entirely included.   The patient is rotated. The heart size is uncertain.   There appears to be elevated right hemidiaphragm and right-sided infiltration.   An endotracheal tube terminates above the kathie. A nasogastric tube terminates below the diaphragm.   COMPARISON OF FINDING: The nasogastric tube was placed in the interval. The endotracheal tube was placed in the interval.       Interval placement of an endotracheal tube. The tip is above the kathie. Interval placement of a nasogastric tube. It appears to terminate below the diaphragm.   The exam is extremely limited.   MACRO: none   Signed by: Rosalie Ellison 9/4/2024 4:43 PM Dictation workstation:   [id number]            Assessment/Plan   Assessment & Plan  Acute respiratory failure with hypoxia and hypercapnia (Multi)    Alo Glass is a 69 y.o. male with past medical history significant for current tobacco use, ETOH abuse, CHF, Aortic valve stenosis, HTN, HLD, COPD, MELISSA, and DM. He was found non responsive at home by his neighbor and EMS was called. In the ER he was placed on ventilatory support. ABG revealed acute on chronic respiratory failure with hypoxia and hypercapnia with PH of 6.87 and pCO2 greater than 125. He received broad spectrum antibiotics and 3L of fluid in the ER. Despite fluid resuscitation he remained hypotensive and was started on norepinephrine. He is admitted to the ICU for further care. On arrival to the ICU the pt has a small amount of coffee ground emesis coming from is NG tube. He is intubated  and sedated he does awaken to painful stimuli and responds to commands. Further review of his CT chest imaging reveals a large loculated R pleural effusion causing near total collapse of the RML and RLL as well as multiple infectious/inflammatory pulmonary nodules. His CT ABD also demonstrated a new L kidney mass concerning for renal cell carcinoma.     Plan:      Neuro:  H/O EtOH use  Intubated and sedated  - Serial neuro and pain assessments  - A-F bundle   - Wean sedation as tolerated/ Daily sedation vacation  - Fentanyl gtt for pain   - Propofol gtt for sedation  - Folic acid and thiamine    CV:  Hx of HFrEF, HTN, HLD and moderate to severe aortic valve stenosis  LVEF of 30-35% on 3/2024   Shock- likely septic i/s/o PNA  NSVT seen on telemetry monitor likely r/t hypotension and HF  - Echocardiogram  - Continuous telemetry and pulse oximetry  - Fluid resuscitation as indicated  - Norepinephrine to keep MAP greater than 65  - Continue Zetia  - Hold antihypertensives and lasix d/t hypotension  - Hold ASA and no DVT chemoprophylaxis d/t GI bleed- consider restarting today  - SCDs for DVT ppx     Pulm:  Hx of COPD, MELISSA, and current tobacco use  Acute on chronic respiratory failure with hypoxia and hypercapnia  Aspiration PNA  Large Right loculated exudative effusion likely r/t to PNA vs. malignancy  Intubated on mechanical ventilation  - CXR today  - Repeat ABG   - Supplemental O2 as needed to keep SpO2 greater than 92%  - BPH  - Duonebs q6hrs; formoterol and pulmicort     GI:  Upper GI bleed  - NPO  - NG to LIMWS  - Trend CBCs  - Close monitoring for s/s of active bleeding  - PPI BID  - Nutrition consulted for enteral recommendations  - GI consulted     Renal:  LEONA likely prerenal d/t hypotension   CT showing new L renal mass concerning for renal cell carcinoma   Baseline Cr~ 0.7-1.00  - Daily RFP  - Replete electrolytes as needed  - Avoid nephrotoxins  - Maintain lipscomb for Strict I&Os  - Nephrology consulted      Endocrine:  Hx of NIDDM and hypothyroid  - Continue Synthroid  - Hold oral hypoglycemics  - SSI q4hrs     Heme/Onc:  C/F renal cell carcinoma   Erythrocytosis- appears chronic, likely d/t hypoxemia vs. SGLT2 Inhibitor vs. malignancy vs. Other  - Daily CBC  - Urology consulted    MSK:  - PT/OT when appropriate  - Frequent turns     ID:  Leukocytosis  Aspiration PNA  Large Right loculated exudative effusion likely r/t to PNA vs. malignancy  Urine, pleural fluid and blood cultures pending  Sputum culture with abundant mixed gram positive and gram negative bacteria  - Monitor temps  - Trend WBCS  - Empiric Vancomycin and Zosyn     ICU Checklist  Analgesia: Fentanyl gtt  MV: AC/PS RR 24, PEEP 8  Sedation: Fentanyl gtt, Propofol gtt  PT/OT:  Will order when appropriate  Diet:  NPO, Dietician consulted  Restraints: B/L UE soft wrist restraints  Pressors: Levophed gtt  Antibiotics: Vancomycin, Zosyn  Lines: R pleural CT, R radial arterial line, Right femoral CVC, OG, Live, ETT, PIV x 2  DVT Prophylaxis:  SCDs, hold AC d/t c/f UGIB  GI Prophylaxis: Protonix  Code Status: Full Code  Disposition:  Remain in ICU       I spent 60 minutes in the professional and overall care of this critically ill  patient.      Amy Welsh, APRN-CNP

## 2024-09-06 NOTE — CARE PLAN
Problem: Skin  Goal: Decreased wound size/increased tissue granulation at next dressing change  Outcome: Progressing  Goal: Participates in plan/prevention/treatment measures  Outcome: Progressing  Goal: Prevent/manage excess moisture  Outcome: Progressing  Goal: Prevent/minimize sheer/friction injuries  9/6/2024 0139 by Mercedes Ruiz RN  Outcome: Progressing  9/6/2024 0139 by Mercedes Ruiz RN  Flowsheets (Taken 9/6/2024 0139)  Prevent/minimize sheer/friction injuries: Turn/reposition every 2 hours/use positioning/transfer devices  Goal: Promote/optimize nutrition  Outcome: Progressing  Goal: Promote skin healing  Outcome: Progressing     Problem: Safety - Medical Restraint  Goal: Remains free of injury from restraints (Restraint for Interference with Medical Device)  Outcome: Progressing  Goal: Free from restraint(s) (Restraint for Interference with Medical Device)  Outcome: Progressing

## 2024-09-07 ENCOUNTER — APPOINTMENT (OUTPATIENT)
Dept: RADIOLOGY | Facility: HOSPITAL | Age: 69
DRG: 870 | End: 2024-09-07
Payer: MEDICARE

## 2024-09-07 LAB
ALBUMIN SERPL BCP-MCNC: 2.5 G/DL (ref 3.4–5)
ANION GAP SERPL CALC-SCNC: 12 MMOL/L (ref 10–20)
BACTERIA SPEC RESP CULT: ABNORMAL
BACTERIA SPEC RESP CULT: ABNORMAL
BUN SERPL-MCNC: 31 MG/DL (ref 6–23)
CALCIUM SERPL-MCNC: 8.1 MG/DL (ref 8.6–10.3)
CHLORIDE SERPL-SCNC: 100 MMOL/L (ref 98–107)
CO2 SERPL-SCNC: 29 MMOL/L (ref 21–32)
CREAT SERPL-MCNC: 0.82 MG/DL (ref 0.5–1.3)
EGFRCR SERPLBLD CKD-EPI 2021: >90 ML/MIN/1.73M*2
ERYTHROCYTE [DISTWIDTH] IN BLOOD BY AUTOMATED COUNT: 13.9 % (ref 11.5–14.5)
GLUCOSE BLD MANUAL STRIP-MCNC: 154 MG/DL (ref 74–99)
GLUCOSE BLD MANUAL STRIP-MCNC: 170 MG/DL (ref 74–99)
GLUCOSE BLD MANUAL STRIP-MCNC: 180 MG/DL (ref 74–99)
GLUCOSE BLD MANUAL STRIP-MCNC: 184 MG/DL (ref 74–99)
GLUCOSE BLD MANUAL STRIP-MCNC: 192 MG/DL (ref 74–99)
GLUCOSE SERPL-MCNC: 155 MG/DL (ref 74–99)
GRAM STN SPEC: ABNORMAL
GRAM STN SPEC: ABNORMAL
HCT VFR BLD AUTO: 50.3 % (ref 41–52)
HGB BLD-MCNC: 16.5 G/DL (ref 13.5–17.5)
MAGNESIUM SERPL-MCNC: 2.6 MG/DL (ref 1.6–2.4)
MCH RBC QN AUTO: 32.4 PG (ref 26–34)
MCHC RBC AUTO-ENTMCNC: 32.8 G/DL (ref 32–36)
MCV RBC AUTO: 99 FL (ref 80–100)
NRBC BLD-RTO: 0 /100 WBCS (ref 0–0)
PHOSPHATE SERPL-MCNC: 3 MG/DL (ref 2.5–4.9)
PLATELET # BLD AUTO: 253 X10*3/UL (ref 150–450)
POTASSIUM SERPL-SCNC: 4.3 MMOL/L (ref 3.5–5.3)
RBC # BLD AUTO: 5.09 X10*6/UL (ref 4.5–5.9)
SODIUM SERPL-SCNC: 137 MMOL/L (ref 136–145)
STAPHYLOCOCCUS SPEC CULT: NORMAL
VANCOMYCIN SERPL-MCNC: 17.7 UG/ML (ref 5–20)
WBC # BLD AUTO: 11.3 X10*3/UL (ref 4.4–11.3)

## 2024-09-07 PROCEDURE — 37799 UNLISTED PX VASCULAR SURGERY: CPT

## 2024-09-07 PROCEDURE — 2500000001 HC RX 250 WO HCPCS SELF ADMINISTERED DRUGS (ALT 637 FOR MEDICARE OP)

## 2024-09-07 PROCEDURE — 2500000004 HC RX 250 GENERAL PHARMACY W/ HCPCS (ALT 636 FOR OP/ED)

## 2024-09-07 PROCEDURE — 71045 X-RAY EXAM CHEST 1 VIEW: CPT

## 2024-09-07 PROCEDURE — 2500000005 HC RX 250 GENERAL PHARMACY W/O HCPCS: Performed by: STUDENT IN AN ORGANIZED HEALTH CARE EDUCATION/TRAINING PROGRAM

## 2024-09-07 PROCEDURE — 2500000001 HC RX 250 WO HCPCS SELF ADMINISTERED DRUGS (ALT 637 FOR MEDICARE OP): Performed by: INTERNAL MEDICINE

## 2024-09-07 PROCEDURE — 94681 O2 UPTK CO2 OUTP % O2 XTRC: CPT

## 2024-09-07 PROCEDURE — 94640 AIRWAY INHALATION TREATMENT: CPT

## 2024-09-07 PROCEDURE — 2500000005 HC RX 250 GENERAL PHARMACY W/O HCPCS

## 2024-09-07 PROCEDURE — 99291 CRITICAL CARE FIRST HOUR: CPT | Performed by: STUDENT IN AN ORGANIZED HEALTH CARE EDUCATION/TRAINING PROGRAM

## 2024-09-07 PROCEDURE — 2500000004 HC RX 250 GENERAL PHARMACY W/ HCPCS (ALT 636 FOR OP/ED): Performed by: ANESTHESIOLOGY

## 2024-09-07 PROCEDURE — 94003 VENT MGMT INPAT SUBQ DAY: CPT

## 2024-09-07 PROCEDURE — 2500000002 HC RX 250 W HCPCS SELF ADMINISTERED DRUGS (ALT 637 FOR MEDICARE OP, ALT 636 FOR OP/ED)

## 2024-09-07 PROCEDURE — 2500000004 HC RX 250 GENERAL PHARMACY W/ HCPCS (ALT 636 FOR OP/ED): Performed by: INTERNAL MEDICINE

## 2024-09-07 PROCEDURE — 80202 ASSAY OF VANCOMYCIN: CPT

## 2024-09-07 PROCEDURE — 2500000004 HC RX 250 GENERAL PHARMACY W/ HCPCS (ALT 636 FOR OP/ED): Performed by: NURSE PRACTITIONER

## 2024-09-07 PROCEDURE — 82947 ASSAY GLUCOSE BLOOD QUANT: CPT

## 2024-09-07 PROCEDURE — 83735 ASSAY OF MAGNESIUM: CPT

## 2024-09-07 PROCEDURE — 80069 RENAL FUNCTION PANEL: CPT

## 2024-09-07 PROCEDURE — 2020000001 HC ICU ROOM DAILY

## 2024-09-07 PROCEDURE — 85027 COMPLETE CBC AUTOMATED: CPT

## 2024-09-07 ASSESSMENT — PAIN SCALES - GENERAL
PAINLEVEL_OUTOF10: 0 - NO PAIN
PAINLEVEL_OUTOF10: 0 - NO PAIN

## 2024-09-07 ASSESSMENT — COGNITIVE AND FUNCTIONAL STATUS - GENERAL
DRESSING REGULAR UPPER BODY CLOTHING: TOTAL
CLIMB 3 TO 5 STEPS WITH RAILING: TOTAL
MOBILITY SCORE: 6
EATING MEALS: TOTAL
MOVING FROM LYING ON BACK TO SITTING ON SIDE OF FLAT BED WITH BEDRAILS: TOTAL
MOVING TO AND FROM BED TO CHAIR: TOTAL
PERSONAL GROOMING: TOTAL
WALKING IN HOSPITAL ROOM: TOTAL
TOILETING: TOTAL
TURNING FROM BACK TO SIDE WHILE IN FLAT BAD: TOTAL
DAILY ACTIVITIY SCORE: 6
HELP NEEDED FOR BATHING: TOTAL
DRESSING REGULAR LOWER BODY CLOTHING: TOTAL
STANDING UP FROM CHAIR USING ARMS: TOTAL

## 2024-09-07 ASSESSMENT — PAIN - FUNCTIONAL ASSESSMENT
PAIN_FUNCTIONAL_ASSESSMENT: CPOT (CRITICAL CARE PAIN OBSERVATION TOOL)
PAIN_FUNCTIONAL_ASSESSMENT: CPOT (CRITICAL CARE PAIN OBSERVATION TOOL)

## 2024-09-07 NOTE — CARE PLAN
The patient's goals for the shift include      The clinical goals for the shift include pt will remain HDS throughout this shift    Over the shift, the patient did not make progress toward the following goals. Barriers to progression include sedation/intubation. Recommendations to address these barriers include q 2 hour turns.

## 2024-09-07 NOTE — PROGRESS NOTES
"Alo Glass is a 69 y.o. male on day 3 of admission presenting with Acute respiratory failure with hypoxia and hypercapnia (Multi).    Subjective         Objective     Physical Exam  Constitutional:       Appearance: He is obese. He is ill-appearing.      Comments: Elderly man in bed, intubated and sedated.   Pulmonary:      Comments: Breath sounds present, course breathing in R side.  Abdominal:      General: Bowel sounds are normal.   Musculoskeletal:      Right lower leg: Edema present.      Left lower leg: Edema present.   Skin:     General: Skin is warm and dry.      Capillary Refill: Capillary refill takes less than 2 seconds.         Last Recorded Vitals  Blood pressure 82/72, pulse 67, temperature 36 °C (96.8 °F), temperature source Temporal, resp. rate 24, height 1.803 m (5' 10.98\"), weight 137 kg (301 lb 9.4 oz), SpO2 97%.  Intake/Output last 3 Shifts:  I/O last 3 completed shifts:  In: 2901.4 (21.2 mL/kg) [I.V.:1724.4 (12.6 mL/kg); NG/GT:77; IV Piggyback:1100]  Out: 3040 (22.2 mL/kg) [Urine:2450 (0.5 mL/kg/hr); Chest Tube:590]  Weight: 136.8 kg     Relevant Results  Scheduled medications  aspirin, 81 mg, oral, Daily  enoxaparin, 40 mg, subcutaneous, q12h SHARRON  ezetimibe, 10 mg, oral, Daily  folic acid, 1 mg, oral, Daily  insulin lispro, 0-10 Units, subcutaneous, q4h  ipratropium-albuteroL, 3 mL, nebulization, q6h  levothyroxine, 125 mcg, oral, Daily  methylPREDNISolone sodium succinate (PF), 40 mg, intravenous, q8h  multivitamin with minerals, 1 tablet, oral, Daily  pantoprazole, 40 mg, intravenous, Daily  piperacillin-tazobactam, 4.5 g, intravenous, q6h  polyethylene glycol, 17 g, nasogastric tube, Daily  sennosides-docusate sodium, 1 tablet, oral, Nightly  thiamine, 100 mg, oral, Daily  vancomycin, 1,250 mg, intravenous, q12h      Continuous medications  fentaNYL, 0-200 mcg/hr, Last Rate: 50 mcg/hr (09/07/24 0800)  norepinephrine, 0-0.2 mcg/kg/min, Last Rate: 0.01 mcg/kg/min (09/07/24 " 0801)  propofol, 0-50 mcg/kg/min, Last Rate: 25 mcg/kg/min (09/07/24 1008)      PRN medications  PRN medications: alteplase, dextrose, dextrose, glucagon, glucagon, LORazepam, oxygen, vancomycin    LABS:  CMP:  Results from last 7 days   Lab Units 09/07/24  0247 09/06/24 0523 09/05/24 0459 09/04/24  1803   SODIUM mmol/L 137 138 137 131*   POTASSIUM mmol/L 4.3 4.2 4.5 5.2   CHLORIDE mmol/L 100 99 100 94*   CO2 mmol/L 29 30 30 26   ANION GAP mmol/L 12 13 12 16   BUN mg/dL 31* 41* 44* 43*   CREATININE mg/dL 0.82 1.13 1.62* 1.93*   EGFR mL/min/1.73m*2 >90 70 46* 37*   MAGNESIUM mg/dL 2.60* 2.50* 2.40 2.20   ALBUMIN g/dL 2.5* 2.6* 3.0* 2.4*   ALT U/L  --   --   --  11   AST U/L  --   --   --  11   BILIRUBIN TOTAL mg/dL  --   --   --  0.6   LIPASE U/L  --   --   --  12     CBC:  Results from last 7 days   Lab Units 09/07/24 0247 09/06/24 0523 09/05/24 0459 09/04/24  1701   WBC AUTO x10*3/uL 11.3 17.2* 22.7* 23.0*   HEMOGLOBIN g/dL 16.5 17.2 18.5* 20.5*   HEMATOCRIT % 50.3 52.1* 58.2* 66.8*   PLATELETS AUTO x10*3/uL 253 317 298 349   MCV fL 99 99 104* 108*     COAG:   Results from last 7 days   Lab Units 09/04/24  1702   INR  1.1     HEME/ENDO:     CARDIAC:   Results from last 7 days   Lab Units 09/04/24  1803 09/04/24  1701   TROPHS ng/L 69* 99*            This patient has a central line   Reason for the central line remaining today? Parenteral medication    This patient has a urinary catheter   Reason for the urinary catheter remaining today? critically ill patient who need accurate urinary output measurements    This patient is intubated   Reason for patient to remain intubated today? they have upper airway obstruction or edema             Assessment/Plan   Assessment & Plan  Acute respiratory failure with hypoxia and hypercapnia (Multi)    Alo Glass is a 68 y/o male with a pmhx of CHF, aortic valve stenosis, HLD COPD, MELISSA, T2DM. He was found unresponsive at his home and was transferred to the ED by EMS. ABG  in the ED showed respiratory failure with hypoxia and hypercapnia. He received broad spectrum abx and 3L of fluid at that time. He was still hypotensive despite the fluid resuscitation and was given norepinephrine. He was then transferred to the ICU and had a small amount of coffee ground emesis coming from his NGT. He was then intubated and sedated but awakens to stimuli. His CT showed a large loculated R pleural effusion, causing near total collapse of the RML and RLL and multiple infectious/inflammatory nodules. Also, a left kidney mass was identified, concerning for renal cell carcinoma.     Neuro:  H/O EtOH use  Intubated and sedated  - Serial neuro and pain assessments  - A-F bundle   - Wean sedation as tolerated/ Daily sedation vacation  - Fentanyl gtt for pain   - Propofol gtt for sedation  - Folic acid and thiamine     CV:  # HFrEF, HTN, HLD and moderate to severe aortic valve stenosis  # Shock- likely septic i/s/o PNA  LVEF of 30-35% on 3/2024   NSVT seen on telemetry monitor likely r/t hypotension and HF  - Continuous tele and NIBP  - Fluid resuscitation as indicated  - Titrate vasopressors for MAP > 65  - Hold antihypertensives and lasix d/t hypotension  - Cont home ASA/zetia  - Repeat TTE    Pulm:  Acute on chronic respiratory failure with hypoxia and hypercapnia  Aspiration PNA  Large Right loculated exudative effusion likely r/t to PNA vs. malignancy  Intubated on mechanical ventilation  Hx of COPD, MELISSA, and current tobacco use  - Wean FiO2 to SpO2 > 92%  - Maintain and monitor CT for following:     -> Correct setting, system patency     -> Quantity, quality of drainage -> monitor output q4     -> New or worsening air leak, subcutaneous emphysema -> notify ICU/CTS DOUG  - CXR daily while CT in place  - Maintain to water seal  - Duonebs q6hrs; formoterol and pulmicort. BPH     GI:  #Upper GI bleed  - NPO  - NG to LIWS  - Nutrition consulted for enteral recommendations  - GI signed off -> continue PPI  BID, consider EGD     Renal:  #LEONA, likely prerenal d/t hypotension, resolved  #CT showing new L renal mass concerning for renal cell carcinoma   Baseline Cr~ 0.7-1.00  - Daily RFP  - Replete electrolytes as needed  - Avoid nephrotoxins  - Maintain Live for Strict I&Os  - Follow up with Dr. Morales when discharged     Endocrine:  # NIDDM and hypothyroid  - Continue Synthroid  - POCT BG, SSI q4hrs     Heme/Onc:  # Erythrocytosis- appears chronic, likely d/t hypoxemia vs. SGLT2 Inhibitor vs. malignancy vs. Other  - Daily CBC  - DVTppx: SCDs, Lovenox     MSK:  - PT/OT when appropriate  - Frequent turns     ID:  #Leukocytosis  #Aspiration PNA  #Large Right loculated exudative effusion likely r/t to PNA vs. malignancy  Pleural fluid Cx pending  Urine, BCX NGTD  Sputum culture with abundant Stenotrophomonas maltophilia group  - Trend WBCs  - Empiric Vancomycin and Zosyn    Dispo: ICU       NU MONSON    Friend/Family Contacts:  Close friend  Ann Marie, familiar with patient's health concerns: [phone]  Sister Alia Glass, ?estranged per notes: [phone]    PHYSICAL EXAM  Gen:  Chronically ill appearing older adult gentleman laying in ICU bed, intubated, sedated  CV:  NSR  Pulm:  Intubated, coarse bilaterally  GI:  Soft, nondistended  MSK/Skin: BLE 2+ edema with dependent erythema noted bilaterally, wrinkles on dorsum of feet, no ulcerations noted    I was present with the DOUG student who participated in the documentation of this note. I personally saw and evaluated the patient and performed my own history and examination. I discussed the case with the DOUG student. I have reviewed, verified, and revised the note as necessary and agree with the content and plan as written by the DOUG student.    Cyndee Carlson PA-C    This critically ill patient continues to be at-risk for clinically significant deterioration / failure due to the above mentioned dysfunctional, unstable organ systems.  I have personally  identified and managed all complex critical care issues in an effort to prevent aforementioned clinical deterioration.  Critical care time is spent at bedside and/or the immediate area and has included, but is not limited to, the review of diagnostic tests, labs, radiographs, serial assessments of hemodynamics, respiratory status, ventilatory management, and family updates.  Time spent in procedures and teaching are reported separately.    CRITICAL CARE TIME: 60 minutes

## 2024-09-07 NOTE — CONSULTS
Vancomycin Dosing by Pharmacy- FOLLOW UP    Alo Glass is a 69 y.o. year old male who Pharmacy has been consulted for vancomycin dosing for pneumonia. Based on the patient's indication and renal status this patient is being dosed based on a goal AUC of 400-600.     Renal function is currently improving.    Current vancomycin dose: dosing by level    Most recent random level: 17.7 mcg/mL    Visit Vitals  BP 82/72   Pulse 62   Temp 36.6 °C (97.9 °F)   Resp 24        Lab Results   Component Value Date    CREATININE 0.82 2024    CREATININE 1.13 2024    CREATININE 1.62 (H) 2024    CREATININE 1.93 (H) 2024        Patient weight is as follows:   Vitals:    24 0529   Weight: 137 kg (301 lb 9.4 oz)       Cultures:  Susceptibility data for the encounter in last 14 days.  Collected Specimen Info Organism   24 Fluid from Tracheal Aspirate Stenotrophomonas maltophilia group        I/O last 3 completed shifts:  In: 2901.4 (21.2 mL/kg) [I.V.:1724.4 (12.6 mL/kg); NG/GT:77; IV Piggyback:1100]  Out: 3040 (22.2 mL/kg) [Urine:2450 (0.5 mL/kg/hr); Chest Tube:590]  Weight: 136.8 kg   I/O during current shift:  No intake/output data recorded.    Temp (24hrs), Av.7 °C (98 °F), Min:36.6 °C (97.9 °F), Max:36.7 °C (98.1 °F)      Assessment/Plan    Within goal random/trough level. Orders placed for new vancomycin regimen of 1250 mg every 12 hours to begin at 0900 .    This dosing regimen is predicted by [employer] to result in the following pharmacokinetic parameters:  Exposure target: AUC24 (range)400-600 mg/L.hr   AUC24,ss: 462 mg/L.hr  Probability of AUC24 > 400: 96 %  Ctrough,ss: 13.8 mg/L  Probability of Ctrough,ss > 20: 0 %  Probability of nephrotoxicity (Lodise DAKSHA ): 9 %    The next level will be obtained on  at am labs. May be obtained sooner if clinically indicated.   Will continue to monitor renal function daily while on vancomycin and order serum creatinine at least every 48  hours if not already ordered.  Follow for continued vancomycin needs, clinical response, and signs/symptoms of toxicity.       Nelly Ruiz, RPh

## 2024-09-08 ENCOUNTER — APPOINTMENT (OUTPATIENT)
Dept: RADIOLOGY | Facility: HOSPITAL | Age: 69
DRG: 870 | End: 2024-09-08
Payer: MEDICARE

## 2024-09-08 LAB
ALBUMIN SERPL BCP-MCNC: 2.4 G/DL (ref 3.4–5)
ANION GAP SERPL CALC-SCNC: 13 MMOL/L (ref 10–20)
BACTERIA BLD CULT: NORMAL
BACTERIA BLD CULT: NORMAL
BACTERIA FLD CULT: NORMAL
BUN SERPL-MCNC: 35 MG/DL (ref 6–23)
CALCIUM SERPL-MCNC: 7.8 MG/DL (ref 8.6–10.3)
CHLORIDE SERPL-SCNC: 101 MMOL/L (ref 98–107)
CO2 SERPL-SCNC: 26 MMOL/L (ref 21–32)
CREAT SERPL-MCNC: 0.82 MG/DL (ref 0.5–1.3)
EGFRCR SERPLBLD CKD-EPI 2021: >90 ML/MIN/1.73M*2
ERYTHROCYTE [DISTWIDTH] IN BLOOD BY AUTOMATED COUNT: 13.8 % (ref 11.5–14.5)
GLUCOSE BLD MANUAL STRIP-MCNC: 187 MG/DL (ref 74–99)
GLUCOSE BLD MANUAL STRIP-MCNC: 195 MG/DL (ref 74–99)
GLUCOSE BLD MANUAL STRIP-MCNC: 231 MG/DL (ref 74–99)
GLUCOSE BLD MANUAL STRIP-MCNC: 248 MG/DL (ref 74–99)
GLUCOSE BLD MANUAL STRIP-MCNC: 295 MG/DL (ref 74–99)
GLUCOSE SERPL-MCNC: 187 MG/DL (ref 74–99)
GRAM STN SPEC: NORMAL
GRAM STN SPEC: NORMAL
HCT VFR BLD AUTO: 48.3 % (ref 41–52)
HGB BLD-MCNC: 16.5 G/DL (ref 13.5–17.5)
MAGNESIUM SERPL-MCNC: 2.7 MG/DL (ref 1.6–2.4)
MCH RBC QN AUTO: 32.5 PG (ref 26–34)
MCHC RBC AUTO-ENTMCNC: 34.2 G/DL (ref 32–36)
MCV RBC AUTO: 95 FL (ref 80–100)
NRBC BLD-RTO: 0 /100 WBCS (ref 0–0)
PHOSPHATE SERPL-MCNC: 3.3 MG/DL (ref 2.5–4.9)
PLATELET # BLD AUTO: 268 X10*3/UL (ref 150–450)
POTASSIUM SERPL-SCNC: 4 MMOL/L (ref 3.5–5.3)
RBC # BLD AUTO: 5.08 X10*6/UL (ref 4.5–5.9)
SODIUM SERPL-SCNC: 136 MMOL/L (ref 136–145)
VANCOMYCIN SERPL-MCNC: 22.2 UG/ML (ref 5–20)
WBC # BLD AUTO: 10.3 X10*3/UL (ref 4.4–11.3)

## 2024-09-08 PROCEDURE — 71045 X-RAY EXAM CHEST 1 VIEW: CPT

## 2024-09-08 PROCEDURE — 85027 COMPLETE CBC AUTOMATED: CPT

## 2024-09-08 PROCEDURE — 94003 VENT MGMT INPAT SUBQ DAY: CPT

## 2024-09-08 PROCEDURE — 83735 ASSAY OF MAGNESIUM: CPT

## 2024-09-08 PROCEDURE — 82947 ASSAY GLUCOSE BLOOD QUANT: CPT

## 2024-09-08 PROCEDURE — 2500000001 HC RX 250 WO HCPCS SELF ADMINISTERED DRUGS (ALT 637 FOR MEDICARE OP)

## 2024-09-08 PROCEDURE — 80202 ASSAY OF VANCOMYCIN: CPT

## 2024-09-08 PROCEDURE — 2500000004 HC RX 250 GENERAL PHARMACY W/ HCPCS (ALT 636 FOR OP/ED)

## 2024-09-08 PROCEDURE — 2500000001 HC RX 250 WO HCPCS SELF ADMINISTERED DRUGS (ALT 637 FOR MEDICARE OP): Performed by: INTERNAL MEDICINE

## 2024-09-08 PROCEDURE — 2020000001 HC ICU ROOM DAILY

## 2024-09-08 PROCEDURE — 99291 CRITICAL CARE FIRST HOUR: CPT | Performed by: STUDENT IN AN ORGANIZED HEALTH CARE EDUCATION/TRAINING PROGRAM

## 2024-09-08 PROCEDURE — 80069 RENAL FUNCTION PANEL: CPT

## 2024-09-08 PROCEDURE — 2500000004 HC RX 250 GENERAL PHARMACY W/ HCPCS (ALT 636 FOR OP/ED): Performed by: NURSE PRACTITIONER

## 2024-09-08 PROCEDURE — 94640 AIRWAY INHALATION TREATMENT: CPT

## 2024-09-08 PROCEDURE — 37799 UNLISTED PX VASCULAR SURGERY: CPT

## 2024-09-08 PROCEDURE — 2500000002 HC RX 250 W HCPCS SELF ADMINISTERED DRUGS (ALT 637 FOR MEDICARE OP, ALT 636 FOR OP/ED)

## 2024-09-08 PROCEDURE — 2500000004 HC RX 250 GENERAL PHARMACY W/ HCPCS (ALT 636 FOR OP/ED): Performed by: STUDENT IN AN ORGANIZED HEALTH CARE EDUCATION/TRAINING PROGRAM

## 2024-09-08 PROCEDURE — 2500000005 HC RX 250 GENERAL PHARMACY W/O HCPCS

## 2024-09-08 PROCEDURE — 2500000004 HC RX 250 GENERAL PHARMACY W/ HCPCS (ALT 636 FOR OP/ED): Performed by: ANESTHESIOLOGY

## 2024-09-08 PROCEDURE — 94681 O2 UPTK CO2 OUTP % O2 XTRC: CPT

## 2024-09-08 PROCEDURE — 2500000004 HC RX 250 GENERAL PHARMACY W/ HCPCS (ALT 636 FOR OP/ED): Performed by: INTERNAL MEDICINE

## 2024-09-08 RX ORDER — SULFAMETHOXAZOLE AND TRIMETHOPRIM 400; 80 MG/1; MG/1
3 TABLET ORAL 3 TIMES DAILY
Status: DISCONTINUED | OUTPATIENT
Start: 2024-09-08 | End: 2024-09-08

## 2024-09-08 RX ORDER — DEXMEDETOMIDINE HYDROCHLORIDE 4 UG/ML
INJECTION, SOLUTION INTRAVENOUS
Status: COMPLETED
Start: 2024-09-08 | End: 2024-09-08

## 2024-09-08 RX ORDER — LEVOFLOXACIN 5 MG/ML
750 INJECTION, SOLUTION INTRAVENOUS EVERY 24 HOURS
Status: DISPENSED | OUTPATIENT
Start: 2024-09-08

## 2024-09-08 RX ORDER — DEXMEDETOMIDINE HYDROCHLORIDE 4 UG/ML
0-1.5 INJECTION, SOLUTION INTRAVENOUS CONTINUOUS
Status: DISPENSED | OUTPATIENT
Start: 2024-09-08

## 2024-09-08 ASSESSMENT — COGNITIVE AND FUNCTIONAL STATUS - GENERAL
PERSONAL GROOMING: TOTAL
MOBILITY SCORE: 6
CLIMB 3 TO 5 STEPS WITH RAILING: TOTAL
EATING MEALS: TOTAL
DAILY ACTIVITIY SCORE: 6
DRESSING REGULAR UPPER BODY CLOTHING: TOTAL
TURNING FROM BACK TO SIDE WHILE IN FLAT BAD: TOTAL
DRESSING REGULAR UPPER BODY CLOTHING: TOTAL
STANDING UP FROM CHAIR USING ARMS: TOTAL
HELP NEEDED FOR BATHING: TOTAL
TOILETING: TOTAL
MOVING FROM LYING ON BACK TO SITTING ON SIDE OF FLAT BED WITH BEDRAILS: TOTAL
MOVING TO AND FROM BED TO CHAIR: TOTAL
MOVING FROM LYING ON BACK TO SITTING ON SIDE OF FLAT BED WITH BEDRAILS: TOTAL
WALKING IN HOSPITAL ROOM: TOTAL
EATING MEALS: TOTAL
HELP NEEDED FOR BATHING: TOTAL
PERSONAL GROOMING: TOTAL
EATING MEALS: TOTAL
MOVING TO AND FROM BED TO CHAIR: TOTAL
DAILY ACTIVITIY SCORE: 6
WALKING IN HOSPITAL ROOM: TOTAL
DAILY ACTIVITIY SCORE: 6
DRESSING REGULAR LOWER BODY CLOTHING: TOTAL
PERSONAL GROOMING: TOTAL
STANDING UP FROM CHAIR USING ARMS: TOTAL
DRESSING REGULAR LOWER BODY CLOTHING: TOTAL
MOVING TO AND FROM BED TO CHAIR: TOTAL
MOBILITY SCORE: 6
WALKING IN HOSPITAL ROOM: TOTAL
MOBILITY SCORE: 6
TURNING FROM BACK TO SIDE WHILE IN FLAT BAD: TOTAL
DRESSING REGULAR UPPER BODY CLOTHING: TOTAL
TOILETING: TOTAL
CLIMB 3 TO 5 STEPS WITH RAILING: TOTAL
CLIMB 3 TO 5 STEPS WITH RAILING: TOTAL
DRESSING REGULAR LOWER BODY CLOTHING: TOTAL
TOILETING: TOTAL
HELP NEEDED FOR BATHING: TOTAL
MOVING FROM LYING ON BACK TO SITTING ON SIDE OF FLAT BED WITH BEDRAILS: TOTAL
STANDING UP FROM CHAIR USING ARMS: TOTAL
TURNING FROM BACK TO SIDE WHILE IN FLAT BAD: TOTAL

## 2024-09-08 ASSESSMENT — PAIN SCALES - GENERAL
PAINLEVEL_OUTOF10: 0 - NO PAIN
PAINLEVEL_OUTOF10: 0 - NO PAIN

## 2024-09-08 NOTE — CARE PLAN
The patient's goals for the shift include  weaning from sedation    The clinical goals for the shift include Pt will remain HDS for shift    Over the shift, the patient did make progress toward the following goals. Patient tolerated SBT, weaned from some sedation.

## 2024-09-08 NOTE — PROGRESS NOTES
"Aol Glass is a 69 y.o. male on day 4 of admission presenting with Acute respiratory failure with hypoxia and hypercapnia (Multi).    Subjective   No acute changes overnight. Plan for SBT today.    Objective     Physical Exam  Constitutional:       Appearance: He is obese. He is ill-appearing.      Comments: Chronically ill appearing older adult man laying in bed, intubated, sedated   Cardiovascular:      Comments: NSR  Pulmonary:      Comments: Course breath sounds bilaterally  Musculoskeletal:      Comments: BLE 2+ edema with dependent erythema noted bilaterally, wrinkles on dorsum of feet, no ulcerations noted   Skin:     General: Skin is warm and dry.      Capillary Refill: Capillary refill takes less than 2 seconds.         Last Recorded Vitals  Blood pressure 82/72, pulse 55, temperature 36.9 °C (98.4 °F), resp. rate 24, height 1.803 m (5' 10.98\"), weight 137 kg (302 lb 11.1 oz), SpO2 98%.  Intake/Output last 3 Shifts:  I/O last 3 completed shifts:  In: 2488.5 (18.1 mL/kg) [I.V.:1048.5 (7.6 mL/kg); NG/GT:240; IV Piggyback:1200]  Out: 2070 (15.1 mL/kg) [Urine:1910 (0.4 mL/kg/hr); Chest Tube:160]  Weight: 137.3 kg     Relevant Results  Scheduled medications  aspirin, 81 mg, oral, Daily  enoxaparin, 40 mg, subcutaneous, q12h SHARRON  ezetimibe, 10 mg, oral, Daily  folic acid, 1 mg, oral, Daily  insulin lispro, 0-10 Units, subcutaneous, q4h  ipratropium-albuteroL, 3 mL, nebulization, q6h  lactated Ringer's, 1,000 mL, intravenous, Once  levothyroxine, 125 mcg, oral, Daily  methylPREDNISolone sodium succinate (PF), 40 mg, intravenous, q8h  multivitamin with minerals, 1 tablet, oral, Daily  pantoprazole, 40 mg, intravenous, Daily  piperacillin-tazobactam, 4.5 g, intravenous, q6h  polyethylene glycol, 17 g, nasogastric tube, Daily  sennosides-docusate sodium, 1 tablet, oral, Nightly  thiamine, 100 mg, oral, Daily  vancomycin, 1,250 mg, intravenous, q12h      Continuous medications  fentaNYL, 0-200 mcg/hr, Last " Rate: 50 mcg/hr (09/08/24 0551)  norepinephrine, 0-0.2 mcg/kg/min, Last Rate: 0.01 mcg/kg/min (09/08/24 0551)  propofol, 0-50 mcg/kg/min, Last Rate: 20 mcg/kg/min (09/08/24 0551)      PRN medications  PRN medications: alteplase, dextrose, dextrose, glucagon, glucagon, LORazepam, oxygen, vancomycin    LABS:  CMP:  Results from last 7 days   Lab Units 09/08/24  0346 09/07/24 0247 09/06/24 0523 09/05/24 0459 09/04/24  1803   SODIUM mmol/L 136 137 138 137 131*   POTASSIUM mmol/L 4.0 4.3 4.2 4.5 5.2   CHLORIDE mmol/L 101 100 99 100 94*   CO2 mmol/L 26 29 30 30 26   ANION GAP mmol/L 13 12 13 12 16   BUN mg/dL 35* 31* 41* 44* 43*   CREATININE mg/dL 0.82 0.82 1.13 1.62* 1.93*   EGFR mL/min/1.73m*2 >90 >90 70 46* 37*   MAGNESIUM mg/dL 2.70* 2.60* 2.50* 2.40 2.20   ALBUMIN g/dL 2.4* 2.5* 2.6* 3.0* 2.4*   ALT U/L  --   --   --   --  11   AST U/L  --   --   --   --  11   BILIRUBIN TOTAL mg/dL  --   --   --   --  0.6   LIPASE U/L  --   --   --   --  12     CBC:  Results from last 7 days   Lab Units 09/08/24  0346 09/07/24  0247 09/06/24 0523 09/05/24 0459 09/04/24  1701   WBC AUTO x10*3/uL 10.3 11.3 17.2* 22.7* 23.0*   HEMOGLOBIN g/dL 16.5 16.5 17.2 18.5* 20.5*   HEMATOCRIT % 48.3 50.3 52.1* 58.2* 66.8*   PLATELETS AUTO x10*3/uL 268 253 317 298 349   MCV fL 95 99 99 104* 108*     COAG:   Results from last 7 days   Lab Units 09/04/24  1702   INR  1.1     HEME/ENDO:     CARDIAC:   Results from last 7 days   Lab Units 09/04/24  1803 09/04/24  1701   TROPHS ng/L 69* 99*             Assessment/Plan   Assessment & Plan  Acute respiratory failure with hypoxia and hypercapnia (Multi)  Alo Glass is a 70 y/o male with a pmhx of CHF, aortic valve stenosis, HLD COPD, MELISSA, T2DM. He was found unresponsive at his home and was transferred to the ED by EMS. ABG in the ED showed respiratory failure with hypoxia and hypercapnia. He received broad spectrum abx and 3L of fluid at that time. He was still hypotensive despite the fluid  resuscitation and was given norepinephrine. He was then transferred to the ICU and had a small amount of coffee ground emesis coming from his NGT. He was then intubated and sedated but awakens to stimuli. His CT showed a large loculated R pleural effusion, causing near total collapse of the RML and RLL and multiple infectious/inflammatory nodules. Also, a left kidney mass was identified, concerning for renal cell carcinoma.   Neuro:  # H/O EtOH use  # Sedated on propofol  - Serial neuro and pain assessments  - A-F bundle   - Wean sedation as tolerated/ Daily sedation vacation  - Fentanyl gtt for pain  - Propofol gtt for sedation  - Folic acid and thiamine     CV:  # HFrEF, HTN, HLD and moderate to severe aortic valve stenosis  # Shock- likely septic i/s/o PNA  3/2024 LVEF 30-35%  - Continuous tele and NIBP  - Fluid resuscitation as indicated  - Titrate vasopressors for MAP >65  - Stress dose steroids  - Hold antihypertensives and lasix d/t hypotension  - Cont home ASA/zetia  - TTE ordered     Pulm:  # Acute on chronic respiratory failure with hypoxia and hypercapnia requiring mechanical ventilation  # Aspiration PNA  # Large Right loculated exudative effusion likely r/t to PNA vs. malignancy  # H/O COPD, MELISSA, and current tobacco use  - SBT today  - Wean FiO2 to SpO2 > 92%  - Maintain and monitor CT for following:     -> Correct setting, system patency     -> Quantity, quality of drainage -> monitor output q4     -> New or worsening air leak, subcutaneous emphysema -> notify ICU/CTS DOUG  - CXR daily while CT in place  - Place CT to suction  - Duonebs q6hrs; formoterol and pulmicort. BPH     GI:  #Upper GI bleed - no overt s/s of continued bleeding  - NPO  - NG to LIWS  - Nutrition following   -> Cont TF for now. Hold if sxs aspiration  - GI signed off  -> Continue PPI BID, consider EGD once stable     Renal:  # LEONA, likely prerenal d/t hypotension (baseline Cr 0.7-1.0) - resolved  # Lt renal mass - incidental finding  on CT, suspicion for RCC  - Daily RFP  - Replete electrolytes as needed  - Avoid nephrotoxins  - Maintain Live for Strict I&Os  - Follow up with Dr. Morales when discharged (scheduled)     Endocrine:  # NIDDM  # Hypothyroidism  - Home Synthroid  - POCT BG, SSI q4hrs     Heme/Onc:  # Erythrocytosis- appears chronic, likely d/t hypoxemia vs. SGLT2 Inhibitor vs. malignancy vs. Other  - Daily CBC  - DVTppx: SCDs, Lovenox     MSK:  - PT/OT when appropriate  - Frequent turns     ID:  #Aspiration PNA  #Large Right loculated exudative effusion likely r/t to PNA vs. malignancy  Pleural fluid Cx no growth (final), fungal culture in progress  Urine, BCX no growth  Sputum culture (+) abundant Stenotrophomonas maltophilia - susceptibilities back  - Trend temps, WBCs  - Bactrim  - ID consulted     Dispo: ICU           NU MONSON    PHYSICAL EXAM  Gen:  Chronically ill appearing gentleman appearing older than stated age, laying in ICU bed, attempts to open eyes to voice  CV:  NSR  Pulm:  Intubated, coarse bilaterally  GI:  Soft, nondistended, obese  MSK/Skin: Intact, warm, dry, mildly edematous    I was present with the DOUG student who participated in the documentation of this note. I personally saw and evaluated the patient and performed my own history and examination. I discussed the case with the DOUG student. I have reviewed, verified, and revised the note as necessary and agree with the content and plan as written by the DOUG student.    This critically ill patient continues to be at-risk for clinically significant deterioration / failure due to the above mentioned dysfunctional, unstable organ systems.  I have personally identified and managed all complex critical care issues in an effort to prevent aforementioned clinical deterioration.  Critical care time is spent at bedside and/or the immediate area and has included, but is not limited to, the review of diagnostic tests, labs, radiographs, serial assessments of  hemodynamics, respiratory status, ventilatory management, and family updates.  Time spent in procedures and teaching are reported separately.    CRITICAL CARE TIME: 90 minutes      Cyndee Carlson PA-C

## 2024-09-08 NOTE — ASSESSMENT & PLAN NOTE
Alo Glass is a 68 y/o male with a pmhx of CHF, aortic valve stenosis, HLD COPD, MELISSA, T2DM. He was found unresponsive at his home and was transferred to the ED by EMS. ABG in the ED showed respiratory failure with hypoxia and hypercapnia. He received broad spectrum abx and 3L of fluid at that time. He was still hypotensive despite the fluid resuscitation and was given norepinephrine. He was then transferred to the ICU and had a small amount of coffee ground emesis coming from his NGT. He was then intubated and sedated but awakens to stimuli. His CT showed a large loculated R pleural effusion, causing near total collapse of the RML and RLL and multiple infectious/inflammatory nodules. Also, a left kidney mass was identified, concerning for renal cell carcinoma.

## 2024-09-08 NOTE — CARE PLAN
The patient's goals for the shift include      The clinical goals for the shift include patient will wean off levophed drip    Over the shift, the patient did not make progress toward the following goals. Barriers to progression include intubation. Recommendations to address these barriers include q 2 hour turns and promoting nutrition.

## 2024-09-08 NOTE — CONSULTS
"INFECTIOUS DISEASE INPATIENT INITIAL CONSULTATION    Referred By: Cyndee Carlson    Reason For Consult:  Stenotrophomonas PNA    HPI:  This is a 69 y.o. male with PMH of alcohol abuse, heart failure, COPD, MELISSA, DM II who presented to the hospital after being found down at home by neighbor.    History not able to be obtained from patient as is sedated/intubated. History from chart review as noted below.    Unknown down time. Was intubated on arrival to ED. In shock and on Levophed. There was possible GIB. Had a large loculated left pleural effusion and chest tube was placed on 9/4. There is also a left renal mass concerning for possible RCC. No fevers while here. WBC was 22K on admission and is down to 10-12 last couple days. Was on IV Vanc/Zosyn. Pleural fluid gram stain was negative and no growth noted. Had a tracheal aspirate sent for culture which grew Strenotrophomonas from 9/4. MRSA nares negative. Blood cx and urine cx negative from 9/4 as well.     Allergies:  Patient has no known allergies.     Vitals (Last 24 Hours):  Heart Rate:  []   Temp:  [36.7 °C (98.1 °F)-36.9 °C (98.4 °F)]   Resp:  [17-31]   Height:  [180.3 cm (5' 10.98\")]   Weight:  [137 kg (302 lb 11.1 oz)]   SpO2:  [92 %-100 %]      PHYSICAL EXAM:  Gen - intubated, sedated, obese  Heart - tachy and regular, no murmurs  Lungs - coarse right side with chest tube present - atrium with purulence in the 1st portion of chamber and then more serosanguinous after  Abd - soft, no ttp, BS present   - Live present  Skin - no rash    MEDS:    Current Facility-Administered Medications:     alteplase (Cathflo Activase) injection 2 mg, 2 mg, intra-catheter, PRN, Cyndee Carlson PA-C    aspirin EC tablet 81 mg, 81 mg, oral, Daily, Amy Welsh, APRN-CNP, 81 mg at 09/08/24 0815    dexmedeTOMIDine (Precedex) 400 mcg in 100 mL (4 mcg/mL) sodium chloride 0.9% infusion, 0-1.5 mcg/kg/hr, intravenous, Continuous, Christiano Patricia MD, Last Rate: 27.4 mL/hr " at 09/08/24 1503, 0.8 mcg/kg/hr at 09/08/24 1503    dextrose 50 % injection 12.5 g, 12.5 g, intravenous, q15 min PRN, Cyndee Carlson PA-C    dextrose 50 % injection 25 g, 25 g, intravenous, q15 min PRN, Cyndee Carlson PA-C    enoxaparin (Lovenox) syringe 40 mg, 40 mg, subcutaneous, q12h SHARRON, Amy Welsh, PARAM-CNP, 40 mg at 09/08/24 0830    ezetimibe (Zetia) tablet 10 mg, 10 mg, oral, Daily, Lynn Ballesteros APRN-CNP, 10 mg at 09/08/24 0815    fentanyl (Sublimaze) 1000 mcg in sodium chloride 0.9% 100 mL (10 mcg/mL) infusion (premix), 0-200 mcg/hr, intravenous, Continuous, PARAM Serrano-CNP, Last Rate: 5 mL/hr at 09/08/24 1000, 50 mcg/hr at 09/08/24 1000    folic acid (Folvite) tablet 1 mg, 1 mg, oral, Daily, PARAM Serrano-CNP, 1 mg at 09/08/24 0814    glucagon (Glucagen) injection 1 mg, 1 mg, intramuscular, q15 min PRN, Cyndee Carlson PA-C    glucagon (Glucagen) injection 1 mg, 1 mg, intramuscular, q15 min PRN, Cyndee Carlson PA-C    insulin lispro (HumaLOG) injection 0-10 Units, 0-10 Units, subcutaneous, q4h, Lynn Ballesteros APRN-CNP, 4 Units at 09/08/24 1113    ipratropium-albuteroL (Duo-Neb) 0.5-2.5 mg/3 mL nebulizer solution 3 mL, 3 mL, nebulization, q6h, Lynn Ballesteros APRN-CNP, 3 mL at 09/08/24 0816    levoFLOXacin (Levaquin) 750 mg in dextrose 5%  mL, 750 mg, intravenous, q24h, Elijah Busby MD, Last Rate: 100 mL/hr at 09/08/24 1326, 750 mg at 09/08/24 1326    levothyroxine (Synthroid, Levoxyl) tablet 125 mcg, 125 mcg, oral, Daily, Lynn MATHUR Mix, APRN-CNP, 125 mcg at 09/08/24 0815    LORazepam (Ativan) injection 2 mg, 2 mg, intravenous, q5 min PRN, Lynn MATHUR Mix, APRN-CNP    methylPREDNISolone sod succinate (SOLU-Medrol) 40 mg/mL injection 40 mg, 40 mg, intravenous, q8h, Taylor Lovelace APRN-CNP, 40 mg at 09/08/24 1109    multivitamin with minerals 1 tablet, 1 tablet, oral, Daily, Lynn Ballesteros, APRN-CNP, 1 tablet at 09/08/24 0814    norepinephrine (Levophed) 8 mg in dextrose 5% 250 mL  (0.032 mg/mL) infusion (premix), 0-0.2 mcg/kg/min, intravenous, Continuous, Cyndee Carlson PA-C, Stopped at 09/08/24 1000    oxygen (O2) therapy, , inhalation, Continuous PRN - O2/gases, FABIOLA Nicolas, 40 percent at 09/08/24 0829    pantoprazole (ProtoNix) injection 40 mg, 40 mg, intravenous, Daily, Christiano Patricia MD, 40 mg at 09/08/24 0830    polyethylene glycol (Glycolax, Miralax) packet 17 g, 17 g, nasogastric tube, Daily, Yaakov L Pack, DO, 17 g at 09/08/24 0826    propofol (Diprivan) infusion, 0-50 mcg/kg/min, intravenous, Continuous, FABIOLA Nicolas, Last Rate: 4.11 mL/hr at 09/08/24 1208, 5 mcg/kg/min at 09/08/24 1208    sennosides-docusate sodium (Lydia-Colace) 8.6-50 mg per tablet 1 tablet, 1 tablet, oral, Nightly, Yaakov L Pack, DO, 1 tablet at 09/07/24 2121    thiamine (Vitamin B-1) tablet 100 mg, 100 mg, oral, Daily, FABIOLA Serrano, 100 mg at 09/08/24 0823     LABS:  Lab Results   Component Value Date    WBC 10.3 09/08/2024    HGB 16.5 09/08/2024    HCT 48.3 09/08/2024    MCV 95 09/08/2024     09/08/2024      Results from last 72 hours   Lab Units 09/08/24  0346   SODIUM mmol/L 136   POTASSIUM mmol/L 4.0   CHLORIDE mmol/L 101   CO2 mmol/L 26   BUN mg/dL 35*   CREATININE mg/dL 0.82   GLUCOSE mg/dL 187*   CALCIUM mg/dL 7.8*   ANION GAP mmol/L 13   EGFR mL/min/1.73m*2 >90     Results from last 72 hours   Lab Units 09/08/24  0346   ALBUMIN g/dL 2.4*     Estimated Creatinine Clearance: 120.3 mL/min (by C-G formula based on SCr of 0.82 mg/dL).     IMAGING:  CXR 9/8  Impression:     Basilar pneumothorax on the right appears similar to the prior study  in the relative volume of pleural fluid and pneumothorax appears  unchanged.    Collapse of the right lower lobe and right middle lobe    Pulmonary vascular congestion is most notable in the left lung     CXR 9/6  Impression:     No change in the right hydropneumothorax which may be loculated with  chest tube on the right in the  mid hemithorax.      Small left pleural effusion with left basilar infiltrate/atelectasis.     CT Head 9/4  Impression:     No acute intracranial abnormality.     CT C/A/P 9/4  Impression:     CT CHEST:  Large loculated right pleural effusion causing complete compressive  collapse of the right middle lobe and right lower lobe and partial  compressive collapse of the right upper lobe. The largest likely  compartments located at the inferior right hemithorax. The cause of  the effusion is not apparent as there is only a small amount of  debris in the distal trachea and right mainstem bronchus but the  distal-most right-sided bronchi are occluded due to combination of  compressive collapse and small low-density fluid.    Numerous new subcentimeter centrilobular pulmonary nodules that are  most likely infectious/inflammatory in as can be seen with  bronchiolitis or fungal infection.    Aortic valve calcification to the extent that would likely result in  aortic stenosis.      CT ABDOMEN/PELVIS:  Left kidney solid heterogeneously enhancing mass suspicious for renal  cell carcinoma (2.4 cm x 1.9 cm) and of suspected 0.8 cm right upper  pole renal cell carcinoma measuring 0.8 cm. Recommend urological  follow-up/also taken and dedicated MRI kidneys to further evaluate  these lesions. YELLOW ALERT: An incidental finding alert was sent per  protocol.    No acute abnormality in the abdomen or pelvis.    Colonic diverticulosis without acute diverticulitis.       ASSESSMENT/PLAN:    Acute Hypoxic/Hypercapnic Respiratory Failure - intubated 9/4  PNA due to Stenotrophomonas maltophilia  Right Loculated Pleural Effusion - s/p chest tube 9/4, 71K WBCs neutrophilic with pH 7.9. Glucose <10 and exudate by Light's criteria. Consistent with an empyema, drainage was purulent at first and more serosanguinous now.    IV Levofloxacin 750mg/day.    Monitoring for adverse effects of abx such as rash/itching/diarrhea.    Will follow. D/w ICU  team    Elijah Busby MD  [facility]  Office #[phone]

## 2024-09-08 NOTE — PROGRESS NOTES
Vancomycin Dosing by Pharmacy- FOLLOW UP    Alo Glass is a 69 y.o. year old male who Pharmacy has been consulted for vancomycin dosing for pneumonia. Based on the patient's indication and renal status this patient is being dosed based on a goal AUC of 400-600.     Renal function is currently stable.    Current vancomycin dose: 1250 mg given every 12 hours    Estimated vancomycin AUC on current dose: 536 mg/L.hr     Visit Vitals  BP 82/72   Pulse 55   Temp 36.9 °C (98.4 °F)   Resp 24        Lab Results   Component Value Date    CREATININE 0.82 2024    CREATININE 0.82 2024    CREATININE 1.13 2024    CREATININE 1.62 (H) 2024        Patient weight is as follows:   Vitals:    24 0600   Weight: 137 kg (302 lb 11.1 oz)       Cultures:  Susceptibility data for the encounter in last 14 days.  Collected Specimen Info Organism Levofloxacin Minocycline Trimethoprim/Sulfamethoxazole   24 Fluid from Tracheal Aspirate Stenotrophomonas maltophilia group  S  S  S     Normal throat li           I/O last 3 completed shifts:  In: 2488.5 (18.1 mL/kg) [I.V.:1048.5 (7.6 mL/kg); NG/GT:240; IV Piggyback:1200]  Out: 2070 (15.1 mL/kg) [Urine:1910 (0.4 mL/kg/hr); Chest Tube:160]  Weight: 137.3 kg   I/O during current shift:  No intake/output data recorded.    Temp (24hrs), Av.6 °C (97.9 °F), Min:36 °C (96.8 °F), Max:36.9 °C (98.4 °F)      Assessment/Plan    Within goal AUC range. Continue current vancomycin regimen.    This dosing regimen is predicted by [employer] to result in the following pharmacokinetic parameters:  Exposure target: AUC24 (range)400-600 mg/L.hr   AUC24,ss: 536 mg/L.hr  Probability of AUC24 > 400: 100 %  Ctrough,ss: 17.4 mg/L  Probability of Ctrough,ss > 20: 10 %  Probability of nephrotoxicity (Lodise DAKSHA ): 13 %    The next level will be obtained on  at 0500. May be obtained sooner if clinically indicated.   Will continue to monitor renal function daily while on  vancomycin and order serum creatinine at least every 48 hours if not already ordered.  Follow for continued vancomycin needs, clinical response, and signs/symptoms of toxicity.       Nelly Ruiz, RPh

## 2024-09-09 ENCOUNTER — APPOINTMENT (OUTPATIENT)
Dept: RADIOLOGY | Facility: HOSPITAL | Age: 69
DRG: 870 | End: 2024-09-09
Payer: MEDICARE

## 2024-09-09 VITALS
SYSTOLIC BLOOD PRESSURE: 86 MMHG | OXYGEN SATURATION: 100 % | HEIGHT: 71 IN | TEMPERATURE: 97.2 F | DIASTOLIC BLOOD PRESSURE: 72 MMHG | RESPIRATION RATE: 25 BRPM | HEART RATE: 59 BPM | WEIGHT: 302.69 LBS | BODY MASS INDEX: 42.38 KG/M2

## 2024-09-09 LAB
ALBUMIN SERPL BCP-MCNC: 2.5 G/DL (ref 3.4–5)
ANION GAP BLDA CALCULATED.4IONS-SCNC: 10 MMO/L (ref 10–25)
ANION GAP BLDA CALCULATED.4IONS-SCNC: 5 MMO/L (ref 10–25)
ANION GAP BLDA CALCULATED.4IONS-SCNC: 9 MMO/L (ref 10–25)
ANION GAP SERPL CALC-SCNC: 12 MMOL/L (ref 10–20)
BASE EXCESS BLDA CALC-SCNC: 0.3 MMOL/L (ref -2–3)
BASE EXCESS BLDA CALC-SCNC: 2.5 MMOL/L (ref -2–3)
BASE EXCESS BLDA CALC-SCNC: 5.2 MMOL/L (ref -2–3)
BODY TEMPERATURE: 37 DEGREES CELSIUS
BUN SERPL-MCNC: 45 MG/DL (ref 6–23)
CA-I BLDA-SCNC: 1.01 MMOL/L (ref 1.1–1.33)
CA-I BLDA-SCNC: 1.14 MMOL/L (ref 1.1–1.33)
CA-I BLDA-SCNC: 1.14 MMOL/L (ref 1.1–1.33)
CALCIUM SERPL-MCNC: 8.2 MG/DL (ref 8.6–10.3)
CHLORIDE BLDA-SCNC: 102 MMOL/L (ref 98–107)
CHLORIDE BLDA-SCNC: 103 MMOL/L (ref 98–107)
CHLORIDE BLDA-SCNC: 105 MMOL/L (ref 98–107)
CHLORIDE SERPL-SCNC: 104 MMOL/L (ref 98–107)
CO2 SERPL-SCNC: 25 MMOL/L (ref 21–32)
CREAT SERPL-MCNC: 0.9 MG/DL (ref 0.5–1.3)
EGFRCR SERPLBLD CKD-EPI 2021: >90 ML/MIN/1.73M*2
ERYTHROCYTE [DISTWIDTH] IN BLOOD BY AUTOMATED COUNT: 13.8 % (ref 11.5–14.5)
FUNGUS SPEC CULT: NORMAL
FUNGUS SPEC FUNGUS STN: NORMAL
GLUCOSE BLD MANUAL STRIP-MCNC: 209 MG/DL (ref 74–99)
GLUCOSE BLD MANUAL STRIP-MCNC: 232 MG/DL (ref 74–99)
GLUCOSE BLD MANUAL STRIP-MCNC: 265 MG/DL (ref 74–99)
GLUCOSE BLD MANUAL STRIP-MCNC: 287 MG/DL (ref 74–99)
GLUCOSE BLD MANUAL STRIP-MCNC: 314 MG/DL (ref 74–99)
GLUCOSE BLDA-MCNC: 234 MG/DL (ref 74–99)
GLUCOSE BLDA-MCNC: 263 MG/DL (ref 74–99)
GLUCOSE BLDA-MCNC: 284 MG/DL (ref 74–99)
GLUCOSE SERPL-MCNC: 306 MG/DL (ref 74–99)
HCO3 BLDA-SCNC: 23.6 MMOL/L (ref 22–26)
HCO3 BLDA-SCNC: 28.5 MMOL/L (ref 22–26)
HCO3 BLDA-SCNC: 30.6 MMOL/L (ref 22–26)
HCT VFR BLD AUTO: 53.5 % (ref 41–52)
HCT VFR BLD EST: 51 % (ref 41–52)
HCT VFR BLD EST: 53 % (ref 41–52)
HCT VFR BLD EST: 60 % (ref 41–52)
HGB BLD-MCNC: 18.1 G/DL (ref 13.5–17.5)
HGB BLDA-MCNC: 16.9 G/DL (ref 13.5–17.5)
HGB BLDA-MCNC: 17.7 G/DL (ref 13.5–17.5)
HGB BLDA-MCNC: 19.9 G/DL (ref 13.5–17.5)
INHALED O2 CONCENTRATION: 40 %
INHALED O2 CONCENTRATION: 40 %
INHALED O2 CONCENTRATION: 70 %
LACTATE BLDA-SCNC: 1.1 MMOL/L (ref 0.4–2)
LACTATE BLDA-SCNC: 1.2 MMOL/L (ref 0.4–2)
LACTATE BLDA-SCNC: 1.3 MMOL/L (ref 0.4–2)
MAGNESIUM SERPL-MCNC: 2.8 MG/DL (ref 1.6–2.4)
MCH RBC QN AUTO: 32 PG (ref 26–34)
MCHC RBC AUTO-ENTMCNC: 33.8 G/DL (ref 32–36)
MCV RBC AUTO: 95 FL (ref 80–100)
NRBC BLD-RTO: 0 /100 WBCS (ref 0–0)
OXYHGB MFR BLDA: 93.1 % (ref 94–98)
OXYHGB MFR BLDA: 93.6 % (ref 94–98)
OXYHGB MFR BLDA: 95.6 % (ref 94–98)
PC PRESSURE CONTROL: 24 CM H2O
PCO2 BLDA: 34 MM HG (ref 38–42)
PCO2 BLDA: 45 MM HG (ref 38–42)
PCO2 BLDA: 47 MM HG (ref 38–42)
PEEP CMH2O: 5 CM H2O
PEEP CMH2O: 5 CM H2O
PH BLDA: 7.39 PH (ref 7.38–7.42)
PH BLDA: 7.44 PH (ref 7.38–7.42)
PH BLDA: 7.45 PH (ref 7.38–7.42)
PHOSPHATE SERPL-MCNC: 3 MG/DL (ref 2.5–4.9)
PLATELET # BLD AUTO: 245 X10*3/UL (ref 150–450)
PO2 BLDA: 81 MM HG (ref 85–95)
PO2 BLDA: 82 MM HG (ref 85–95)
PO2 BLDA: 86 MM HG (ref 85–95)
POTASSIUM BLDA-SCNC: 3.7 MMOL/L (ref 3.5–5.3)
POTASSIUM BLDA-SCNC: 4.5 MMOL/L (ref 3.5–5.3)
POTASSIUM BLDA-SCNC: 5 MMOL/L (ref 3.5–5.3)
POTASSIUM SERPL-SCNC: 4.4 MMOL/L (ref 3.5–5.3)
PRESSURE SUPPORT: 5 CM H2O
RBC # BLD AUTO: 5.65 X10*6/UL (ref 4.5–5.9)
SAO2 % BLDA: 96 % (ref 94–100)
SAO2 % BLDA: 96 % (ref 94–100)
SAO2 % BLDA: 97 % (ref 94–100)
SODIUM BLDA-SCNC: 133 MMOL/L (ref 136–145)
SODIUM BLDA-SCNC: 135 MMOL/L (ref 136–145)
SODIUM BLDA-SCNC: 136 MMOL/L (ref 136–145)
SODIUM SERPL-SCNC: 137 MMOL/L (ref 136–145)
VENTILATOR MODE: ABNORMAL
VENTILATOR MODE: ABNORMAL
VENTILATOR RATE: 24 BPM
WBC # BLD AUTO: 12.3 X10*3/UL (ref 4.4–11.3)

## 2024-09-09 PROCEDURE — 99222 1ST HOSP IP/OBS MODERATE 55: CPT | Performed by: NURSE PRACTITIONER

## 2024-09-09 PROCEDURE — 2500000001 HC RX 250 WO HCPCS SELF ADMINISTERED DRUGS (ALT 637 FOR MEDICARE OP): Performed by: INTERNAL MEDICINE

## 2024-09-09 PROCEDURE — 37799 UNLISTED PX VASCULAR SURGERY: CPT

## 2024-09-09 PROCEDURE — 2500000002 HC RX 250 W HCPCS SELF ADMINISTERED DRUGS (ALT 637 FOR MEDICARE OP, ALT 636 FOR OP/ED)

## 2024-09-09 PROCEDURE — 2500000001 HC RX 250 WO HCPCS SELF ADMINISTERED DRUGS (ALT 637 FOR MEDICARE OP)

## 2024-09-09 PROCEDURE — 94003 VENT MGMT INPAT SUBQ DAY: CPT

## 2024-09-09 PROCEDURE — 2500000004 HC RX 250 GENERAL PHARMACY W/ HCPCS (ALT 636 FOR OP/ED): Performed by: ANESTHESIOLOGY

## 2024-09-09 PROCEDURE — 2500000005 HC RX 250 GENERAL PHARMACY W/O HCPCS: Performed by: ANESTHESIOLOGY

## 2024-09-09 PROCEDURE — 94640 AIRWAY INHALATION TREATMENT: CPT

## 2024-09-09 PROCEDURE — 71045 X-RAY EXAM CHEST 1 VIEW: CPT

## 2024-09-09 PROCEDURE — 82947 ASSAY GLUCOSE BLOOD QUANT: CPT

## 2024-09-09 PROCEDURE — 80069 RENAL FUNCTION PANEL: CPT

## 2024-09-09 PROCEDURE — 2500000002 HC RX 250 W HCPCS SELF ADMINISTERED DRUGS (ALT 637 FOR MEDICARE OP, ALT 636 FOR OP/ED): Performed by: INTERNAL MEDICINE

## 2024-09-09 PROCEDURE — 2500000005 HC RX 250 GENERAL PHARMACY W/O HCPCS: Performed by: INTERNAL MEDICINE

## 2024-09-09 PROCEDURE — 99291 CRITICAL CARE FIRST HOUR: CPT | Performed by: ANESTHESIOLOGY

## 2024-09-09 PROCEDURE — 99292 CRITICAL CARE ADDL 30 MIN: CPT | Performed by: ANESTHESIOLOGY

## 2024-09-09 PROCEDURE — 71045 X-RAY EXAM CHEST 1 VIEW: CPT | Performed by: RADIOLOGY

## 2024-09-09 PROCEDURE — 84132 ASSAY OF SERUM POTASSIUM: CPT | Performed by: ANESTHESIOLOGY

## 2024-09-09 PROCEDURE — 2500000004 HC RX 250 GENERAL PHARMACY W/ HCPCS (ALT 636 FOR OP/ED): Performed by: INTERNAL MEDICINE

## 2024-09-09 PROCEDURE — 83735 ASSAY OF MAGNESIUM: CPT

## 2024-09-09 PROCEDURE — 2500000004 HC RX 250 GENERAL PHARMACY W/ HCPCS (ALT 636 FOR OP/ED): Performed by: NURSE PRACTITIONER

## 2024-09-09 PROCEDURE — 2500000004 HC RX 250 GENERAL PHARMACY W/ HCPCS (ALT 636 FOR OP/ED)

## 2024-09-09 PROCEDURE — 3E0L317 INTRODUCTION OF OTHER THROMBOLYTIC INTO PLEURAL CAVITY, PERCUTANEOUS APPROACH: ICD-10-PCS | Performed by: NURSE PRACTITIONER

## 2024-09-09 PROCEDURE — 2500000004 HC RX 250 GENERAL PHARMACY W/ HCPCS (ALT 636 FOR OP/ED): Performed by: STUDENT IN AN ORGANIZED HEALTH CARE EDUCATION/TRAINING PROGRAM

## 2024-09-09 PROCEDURE — 84132 ASSAY OF SERUM POTASSIUM: CPT | Performed by: INTERNAL MEDICINE

## 2024-09-09 PROCEDURE — 84132 ASSAY OF SERUM POTASSIUM: CPT | Performed by: NURSE PRACTITIONER

## 2024-09-09 PROCEDURE — 94681 O2 UPTK CO2 OUTP % O2 XTRC: CPT

## 2024-09-09 PROCEDURE — 2020000001 HC ICU ROOM DAILY

## 2024-09-09 PROCEDURE — 71045 X-RAY EXAM CHEST 1 VIEW: CPT | Performed by: STUDENT IN AN ORGANIZED HEALTH CARE EDUCATION/TRAINING PROGRAM

## 2024-09-09 PROCEDURE — 2500000005 HC RX 250 GENERAL PHARMACY W/O HCPCS

## 2024-09-09 PROCEDURE — 85027 COMPLETE CBC AUTOMATED: CPT

## 2024-09-09 RX ORDER — IPRATROPIUM BROMIDE AND ALBUTEROL SULFATE 2.5; .5 MG/3ML; MG/3ML
3 SOLUTION RESPIRATORY (INHALATION)
Status: DISCONTINUED | OUTPATIENT
Start: 2024-09-10 | End: 2024-09-25 | Stop reason: HOSPADM

## 2024-09-09 RX ORDER — IPRATROPIUM BROMIDE AND ALBUTEROL SULFATE 2.5; .5 MG/3ML; MG/3ML
3 SOLUTION RESPIRATORY (INHALATION) EVERY 2 HOUR PRN
Status: DISCONTINUED | OUTPATIENT
Start: 2024-09-09 | End: 2024-09-25 | Stop reason: HOSPADM

## 2024-09-09 RX ORDER — FENTANYL CITRATE-0.9 % NACL/PF 10 MCG/ML
25 PLASTIC BAG, INJECTION (ML) INTRAVENOUS CONTINUOUS
Status: DISCONTINUED | OUTPATIENT
Start: 2024-09-09 | End: 2024-09-09

## 2024-09-09 RX ORDER — NOREPINEPHRINE BITARTRATE/D5W 8 MG/250ML
0-.5 PLASTIC BAG, INJECTION (ML) INTRAVENOUS CONTINUOUS
Status: DISCONTINUED | OUTPATIENT
Start: 2024-09-09 | End: 2024-09-14

## 2024-09-09 ASSESSMENT — PAIN - FUNCTIONAL ASSESSMENT: PAIN_FUNCTIONAL_ASSESSMENT: CPOT (CRITICAL CARE PAIN OBSERVATION TOOL)

## 2024-09-09 NOTE — PROGRESS NOTES
ASSESSMENT     Probable respiratory failure due to severe PNA resulting in a loculated pleural effusion and ultimate septic shock with MSOD.  Slowly improving but with persistent right side effusion.  Further concern is for kidney masses that are morphologically consistent with renal cell carcinoma.    PLAN    Decrease fentanyl to 25 mcg and titrate precedex as needed  Continue vent wean  Favor having thoracic surgery weigh-in prior to extubation  Levaquin as is pending Dr. Davidson' recommendation  Solumedrol decreased to q12  TFs as is    Will reach out to his sister, Alia Styles later this morning [phone]      REASON FOR ADMISSION     68 yo with MMP including CHF, aortic valve stenosis, COPD, and MELISSA who was found unresponsive at his home and was transferred to the ED by EMS.  ABG in the ED showed hypoxia and hypercapnia prompting intubation and initial need for levophed despite fluid resuscitation and empiric abx.  CT scan revealed changes concerning for a large loculated right side effusion resulting in compressive changes.  Additionally he was noted to have kidney masses in both left and right sides concerning for renal cell carcinoma.  Since his admission to the ICU he has been slowly making progress but remains in critical condition    TODAYS EVENTS    He is awake, alert but expresses that he is not doing well as he remains intubated.  He is no longer requiring levophed.  Reportedly he tolerated a course of CPAP/PS yesterday.  His CxR looks about the same as it did yesterday with persistent yet improved effusion.  His sputum culture resulted on 9/7 revealed Stenotrophomonas for which is on levofloxacin as he has a mild leukocytosis.  He remains on solumedrol.  ECHO from March of this year revealed aortic stenosis with and EF of 30-35%.  He continues to make urine as his Cr is stable at 0.9.  He is tolerating TFs.       This critically ill patient continues to be at-risk for clinically significant  "deterioration / failure due to the above mentioned dysfunctional, unstable organ systems.  I have personally identified and managed all complex critical care issues with efforts to prevent aforementioned clinical deterioration.  Critical care time is spent at bedside and/or the immediate area and has included, but is not limited to, the review of diagnostic tests, labs, radiographs, serial assessments of hemodynamics, respiratory status, ventilatory management, and family updates.  Time spent in procedures and teaching are reported separately.    CRITICAL CARE TIME: 105 min 1. Acute resp failure, hypoxia and hypercarbia 2. Loculated effusion 3. Stenotrophomonas PNA 4. Sepsis     Central line needed due vasopressor, multiple IV medications or poor PIV access  Sedation vacation indicated and complete  Urinary catheter in place to monitor accurate I's and O's and for further critical care management  Restraints needed to prevent self-harm.      Last Recorded Vitals  Blood pressure 93/76, pulse 75, temperature 36.2 °C (97.2 °F), temperature source Temporal, resp. rate 22, height 1.803 m (5' 10.98\"), weight 135 kg (297 lb 13.5 oz), SpO2 96%.    Relevant Results     Results for orders placed or performed during the hospital encounter of 09/04/24 (from the past 24 hour(s))   POCT GLUCOSE   Result Value Ref Range    POCT Glucose 187 (H) 74 - 99 mg/dL   POCT GLUCOSE   Result Value Ref Range    POCT Glucose 231 (H) 74 - 99 mg/dL   POCT GLUCOSE   Result Value Ref Range    POCT Glucose 295 (H) 74 - 99 mg/dL   POCT GLUCOSE   Result Value Ref Range    POCT Glucose 248 (H) 74 - 99 mg/dL   POCT GLUCOSE   Result Value Ref Range    POCT Glucose 287 (H) 74 - 99 mg/dL   CBC   Result Value Ref Range    WBC 12.3 (H) 4.4 - 11.3 x10*3/uL    nRBC 0.0 0.0 - 0.0 /100 WBCs    RBC 5.65 4.50 - 5.90 x10*6/uL    Hemoglobin 18.1 (H) 13.5 - 17.5 g/dL    Hematocrit 53.5 (H) 41.0 - 52.0 %    MCV 95 80 - 100 fL    MCH 32.0 26.0 - 34.0 pg    MCHC 33.8 " 32.0 - 36.0 g/dL    RDW 13.8 11.5 - 14.5 %    Platelets 245 150 - 450 x10*3/uL   Magnesium   Result Value Ref Range    Magnesium 2.80 (H) 1.60 - 2.40 mg/dL   Renal Function Panel   Result Value Ref Range    Glucose 306 (H) 74 - 99 mg/dL    Sodium 137 136 - 145 mmol/L    Potassium 4.4 3.5 - 5.3 mmol/L    Chloride 104 98 - 107 mmol/L    Bicarbonate 25 21 - 32 mmol/L    Anion Gap 12 10 - 20 mmol/L    Urea Nitrogen 45 (H) 6 - 23 mg/dL    Creatinine 0.90 0.50 - 1.30 mg/dL    eGFR >90 >60 mL/min/1.73m*2    Calcium 8.2 (L) 8.6 - 10.3 mg/dL    Phosphorus 3.0 2.5 - 4.9 mg/dL    Albumin 2.5 (L) 3.4 - 5.0 g/dL     Scheduled medications  aspirin, 81 mg, oral, Daily  enoxaparin, 40 mg, subcutaneous, q12h SHARRON  ezetimibe, 10 mg, oral, Daily  folic acid, 1 mg, oral, Daily  insulin lispro, 0-10 Units, subcutaneous, q4h  ipratropium-albuteroL, 3 mL, nebulization, q6h  levoFLOXacin, 750 mg, intravenous, q24h  levothyroxine, 125 mcg, oral, Daily  methylPREDNISolone sodium succinate (PF), 40 mg, intravenous, q12h  multivitamin with minerals, 1 tablet, oral, Daily  pantoprazole, 40 mg, intravenous, Daily  polyethylene glycol, 17 g, nasogastric tube, Daily  sennosides-docusate sodium, 1 tablet, oral, Nightly  thiamine, 100 mg, oral, Daily      Continuous medications  dexmedeTOMIDine, 0-1.5 mcg/kg/hr, Last Rate: 0.5 mcg/kg/hr (09/09/24 0700)  fentaNYL, 25 mcg/hr      PRN medications  PRN medications: alteplase, dextrose, dextrose, glucagon, glucagon, LORazepam, oxygen

## 2024-09-09 NOTE — PROGRESS NOTES
"  INFECTIOUS DISEASE DAILY PROGRESS NOTE    SUBJECTIVE:    No overnight issues. Is off Levophed. Still intubated on ventilator. No fevers.    OBJECTIVE:  VITALS (Last 24 Hours)  BP 93/76   Pulse 75   Temp 36.2 °C (97.2 °F) (Temporal)   Resp 22   Ht 1.803 m (5' 10.98\")   Wt 135 kg (297 lb 13.5 oz)   SpO2 96%   BMI 41.56 kg/m²     PHYSICAL EXAM:  Gen - intubated, sedated, obese  Heart - tachy and regular, no murmurs  Lungs - coarse right side with chest tube present, new atrium and no output yet now  Abd - soft, no ttp, BS present   - Live present  Skin - no rash    ABX: IV Levofloxacin    LABS:  Lab Results   Component Value Date    WBC 12.3 (H) 09/09/2024    HGB 18.1 (H) 09/09/2024    HCT 53.5 (H) 09/09/2024    MCV 95 09/09/2024     09/09/2024     Lab Results   Component Value Date    GLUCOSE 306 (H) 09/09/2024    CALCIUM 8.2 (L) 09/09/2024     09/09/2024    K 4.4 09/09/2024    CO2 25 09/09/2024     09/09/2024    BUN 45 (H) 09/09/2024    CREATININE 0.90 09/09/2024     Results from last 72 hours   Lab Units 09/09/24  0503   ALBUMIN g/dL 2.5*     Estimated Creatinine Clearance: 108.7 mL/min (by C-G formula based on SCr of 0.9 mg/dL).    ASSESSMENT/PLAN:     Acute Hypoxic/Hypercapnic Respiratory Failure - intubated 9/4  PNA due to Stenotrophomonas maltophilia  Right Loculated Pleural Effusion - s/p chest tube 9/4, 71K WBCs neutrophilic with pH 7.9. Glucose <10 and exudate by Light's criteria. Consistent with an empyema, drainage was purulent at first and more serosanguinous now.     IV Levofloxacin 750mg/day - can be PO via OG tube if needed.    Monitoring for adverse effects of abx such as rash/itching/diarrhea.    Will follow. Thanks! D/w Dr. Rolando Busby MD  [facility]  Office #[phone]      "

## 2024-09-09 NOTE — CONSULTS
Reason For Consult  Loculated effusion    History Of Present Illness  Alo Glass is a 69 y.o. male presenting with Acute respiratory failure with hypoxia and hypercapnia  medical history significant for current tobacco use, ETOH abuse, CHF, COPD, MELISSA, and DM. He was found non responsive at home by his neighbor and EMS was called. In the ER he was placed on ventilatory support. He was admitted to ICU s/p Bronchoscopy Suspicious for aspiration pneumonia with a parapneumonic effusion.  A pigtail ct was placed 1.5L chunky purulent drainage.     He currently remains in ICU intubated off pressors and minimal ventilator support. Chest x-ray with persistent effusion for which we are consulted for.       Past Medical History  He has a past medical history of CHF (congestive heart failure) (Multi), COPD (chronic obstructive pulmonary disease) (Multi), Diabetes mellitus (Multi), Hypertension, Obesity, MELISSA (obstructive sleep apnea), and Personal history of other specified conditions (01/08/2023).    Surgical History  He has a past surgical history that includes Hernia repair (11/07/2013) and Other surgical history (11/07/2013).     Social History  He reports that he has been smoking cigarettes. He has never used smokeless tobacco. He reports current alcohol use of about 7.0 standard drinks of alcohol per week. He reports that he does not use drugs.    Family History  No family history on file.     Allergies  Patient has no known allergies.    Review of Systems  RENETTA     Physical Exam  Physical Exam  Vitals reviewed.   Constitutional:       General: He is not in acute distress.     Appearance: He is obese. He is ill-appearing.   Cardiovascular:      Rate and Rhythm: Normal rate and regular rhythm.   Pulmonary:      Comments: Intubated  Equal chest rise  Right pigtail chest tube- new atrium this am  Abdominal:      Palpations: Abdomen is soft.   Skin:     General: Skin is warm and dry.   Neurological:      Comments: sedated  "          Last Recorded Vitals  Blood pressure 94/80, pulse 61, temperature 36.2 °C (97.2 °F), temperature source Temporal, resp. rate 24, height 1.803 m (5' 10.98\"), weight 135 kg (297 lb 13.5 oz), SpO2 99%.    Relevant Results  LABS:  CMP:  Results from last 7 days   Lab Units 09/09/24  0503 09/08/24  0346 09/07/24  0247 09/06/24  0523 09/05/24  0459 09/04/24  1803   SODIUM mmol/L 137 136 137 138 137 131*   POTASSIUM mmol/L 4.4 4.0 4.3 4.2 4.5 5.2   CHLORIDE mmol/L 104 101 100 99 100 94*   CO2 mmol/L 25 26 29 30 30 26   ANION GAP mmol/L 12 13 12 13 12 16   BUN mg/dL 45* 35* 31* 41* 44* 43*   CREATININE mg/dL 0.90 0.82 0.82 1.13 1.62* 1.93*   EGFR mL/min/1.73m*2 >90 >90 >90 70 46* 37*   MAGNESIUM mg/dL 2.80* 2.70* 2.60* 2.50* 2.40 2.20   ALBUMIN g/dL 2.5* 2.4* 2.5* 2.6* 3.0* 2.4*   ALT U/L  --   --   --   --   --  11   AST U/L  --   --   --   --   --  11   BILIRUBIN TOTAL mg/dL  --   --   --   --   --  0.6   LIPASE U/L  --   --   --   --   --  12     CBC:  Results from last 7 days   Lab Units 09/09/24  0503 09/08/24  0346 09/07/24  0247 09/06/24  0523 09/05/24  0459 09/04/24  1701   WBC AUTO x10*3/uL 12.3* 10.3 11.3 17.2* 22.7* 23.0*   HEMOGLOBIN g/dL 18.1* 16.5 16.5 17.2 18.5* 20.5*   HEMATOCRIT % 53.5* 48.3 50.3 52.1* 58.2* 66.8*   PLATELETS AUTO x10*3/uL 245 268 253 317 298 349   MCV fL 95 95 99 99 104* 108*     COAG:   Results from last 7 days   Lab Units 09/04/24  1702   INR  1.1     HEME/ENDO:     CARDIAC:   Results from last 7 days   Lab Units 09/04/24  1803 09/04/24  1701   TROPHS ng/L 69* 99*     XR chest 1 view   Final Result   Right-sided infrahilar pigtail catheter and moderate   hydropneumothorax, grossly unchanged.        Suspected worsening CHF with stable to mildly increased interstitial   edema and possible new trace left pleural effusion.        MACRO   None        Signed by: Klaus Villafana 9/9/2024 8:17 AM   Dictation workstation:   [id number]      XR chest 1 view   Final Result   Basilar " pneumothorax on the right appears similar to the prior study   in the relative volume of pleural fluid and pneumothorax appears   unchanged.        Collapse of the right lower lobe and right middle lobe        Pulmonary vascular congestion is most notable in the left lung        MACRO:   None.        Signed by: Corrina Reyes 9/8/2024 12:32 PM   Dictation workstation:   [id number]      XR chest 1 view   Final Result   Right pleural effusion is decreased from the prior study but the   right basilar pneumothorax appears increased.        MACRO:   None.        Signed by: Corrina Reyes 9/8/2024 12:30 PM   Dictation workstation:   [id number]      XR chest 1 view   Final Result   No change in the right hydropneumothorax which may be loculated with   chest tube on the right in the mid hemithorax.        Small left pleural effusion with left basilar infiltrate/atelectasis.        Signed by: Eloisa Landa 9/6/2024 9:20 AM   Dictation workstation:   [id number]      XR chest 1 view   Final Result   There appears to be increased aeration at the right base suggesting   diminishing pleural density within the right hemithorax in   association with the chest tube. Infiltrate persists on the right.        MACRO:   none        Signed by: Saad Cronin 9/5/2024 8:18 AM   Dictation workstation:   [id number]      XR chest 1 view   Final Result   1.  Enteric tube is coursing along the midline of the mediastinum in   the expected course of the esophagus, although the distal tip is not   well visualized due to overlying cardiomediastinal silhouette and   underpenetration, and may be outside of the field-of-view of the   study. Endotracheal tube projects 4.5 cm superior to the kathie.   2. Redemonstration of the large loculated right-sided pleural   effusion with the associated atelectasis/consolidation, similar in   appearance to prior exam.                  MACRO:   None        Signed by: Cande Keating 9/5/2024 2:08 AM    Dictation workstation:   [id number]      CT head wo IV contrast   Final Result   No acute intracranial abnormality.             MACRO:   None.        Signed by: Ishaan Banda 9/4/2024 6:07 PM   Dictation workstation:   [id number]      CT chest abdomen pelvis w IV contrast   Final Result   CT CHEST:   Large loculated right pleural effusion causing complete compressive   collapse of the right middle lobe and right lower lobe and partial   compressive collapse of the right upper lobe. The largest likely   compartments located at the inferior right hemithorax. The cause of   the effusion is not apparent as there is only a small amount of   debris in the distal trachea and right mainstem bronchus but the   distal-most right-sided bronchi are occluded due to combination of   compressive collapse and small low-density fluid.        Numerous new subcentimeter centrilobular pulmonary nodules that are   most likely infectious/inflammatory in as can be seen with   bronchiolitis or fungal infection.        Aortic valve calcification to the extent that would likely result in   aortic stenosis.        CT ABDOMEN/PELVIS:   Left kidney solid heterogeneously enhancing mass suspicious for renal   cell carcinoma (2.4 cm x 1.9 cm) and of suspected 0.8 cm right upper   pole renal cell carcinoma measuring 0.8 cm. Recommend urological   follow-up/also taken and dedicated MRI kidneys to further evaluate   these lesions. YELLOW ALERT: An incidental finding alert was sent per   protocol.        No acute abnormality in the abdomen or pelvis.        Colonic diverticulosis without acute diverticulitis.        MACRO:   Critical Finding:  New mass in the kidney. Notification was initiated   on 9/4/2024 at 6:25 pm by  Ishaan Banda.  (**-YCF-**)   Instructions:  MR Abdomen w and wo IV contrast. 1 week.        Signed by: Ishaan Banda 9/4/2024 6:26 PM   Dictation workstation:   [id number]      XR chest 1 view   Final Result    Interval placement of an endotracheal tube. The tip is above the   kathie. Interval placement of a nasogastric tube. It appears to   terminate below the diaphragm.        The exam is extremely limited.        MACRO:   none        Signed by: Rosalie Ellison 9/4/2024 4:43 PM   Dictation workstation:   [id number]      XR chest 1 view    (Results Pending)          Assessment/Plan   Alo Glass is a 69 y.o. male presenting with Acute respiratory failure with hypoxia and hypercapnia  medical history significant for current tobacco use, ETOH abuse, CHF, COPD, MELISSA, and DM.    Loculated effusion  Full dose Lytic therapy today  Maintain CT to suction  Daily cxr while ct in place    Discussed with Dr Watson and Dr Marshall all are in agreement with current plan    Time spent on the assessment of patient, gathering and interpreting data, review of medical record/patient history, personally reviewing radiographic imaging and formulation of this note. With greater than 50% spent in personal discussion with patient/ family.  Time: 60 minutes      Shaila Morales, APRN-CNP

## 2024-09-09 NOTE — SIGNIFICANT EVENT
At 1445 tPA/Dornase administered to patient.    Patient intubated, nursing advised to adjusting position every 30 min: turn side to side during 2 hour instillation.  Reviewed red flag symptoms that warrant emergency evaluation including severe pain or dyspnea.  Bedside RN updated on plan of care.  All questions addressed.    Total amount of fluid instilled: 150cc  Amount in atrium prior to instillation: 15 cc     1600: Hypotensive: CT placed to suction- gush of air 700 yellow->serous sang purulent drainage intermittent air leak   Stat CXR and ABG   CXR negative PTX  ABG stable     Shaila Morales, APRN-CNP

## 2024-09-09 NOTE — CARE PLAN
The patient's goals for the shift include  RENETTA    The clinical goals for the shift include Pt will be HDS over night      Problem: Skin  Goal: Decreased wound size/increased tissue granulation at next dressing change  Outcome: Progressing  Flowsheets (Taken 9/8/2024 2259)  Decreased wound size/increased tissue granulation at next dressing change:   Protective dressings over bony prominences   Utilize specialty bed per algorithm  Goal: Participates in plan/prevention/treatment measures  Outcome: Progressing  Flowsheets (Taken 9/8/2024 2259)  Participates in plan/prevention/treatment measures:   Increase activity/out of bed for meals   Elevate heels  Goal: Prevent/manage excess moisture  Outcome: Progressing  Flowsheets (Taken 9/8/2024 2259)  Prevent/manage excess moisture:   Moisturize dry skin   Follow provider orders for dressing changes   Monitor for/manage infection if present   Cleanse incontinence/protect with barrier cream  Goal: Prevent/minimize sheer/friction injuries  Outcome: Progressing  Flowsheets (Taken 9/8/2024 2259)  Prevent/minimize sheer/friction injuries:   Utilize specialty bed per algorithm   Turn/reposition every 2 hours/use positioning/transfer devices   HOB 30 degrees or less   Complete micro-shifts as needed if patient unable. Adjust patient position to relieve pressure points, not a full turn   Increase activity/out of bed for meals   Use pull sheet  Goal: Promote/optimize nutrition  Outcome: Progressing  Flowsheets (Taken 9/8/2024 2259)  Promote/optimize nutrition:   Monitor/record intake including meals   Offer water/supplements/favorite foods   Assist with feeding  Goal: Promote skin healing  Outcome: Progressing  Flowsheets (Taken 9/8/2024 2259)  Promote skin healing:   Rotate device position/do not position patient on device   Ensure correct size (line/device) and apply per  instructions   Assess skin/pad under line(s)/device(s)   Protective dressings over bony prominences    Turn/reposition every 2 hours/use positioning/transfer devices     Problem: Diabetes  Goal: Achieve decreasing blood glucose levels by end of shift  Outcome: Progressing  Goal: Increase stability of blood glucose readings by end of shift  Outcome: Progressing  Goal: Decrease in ketones present in urine by end of shift  Outcome: Progressing  Goal: Maintain electrolyte levels within acceptable range throughout shift  Outcome: Progressing  Goal: Maintain glucose levels >70mg/dl to <250mg/dl throughout shift  Outcome: Progressing  Goal: No changes in neurological exam by end of shift  Outcome: Progressing  Goal: Learn about and adhere to nutrition recommendations by end of shift  Outcome: Progressing  Goal: Vital signs within normal range for age by end of shift  Outcome: Progressing  Goal: Increase self care and/or family involovement by end of shift  Outcome: Progressing  Goal: Receive DSME education by end of shift  Outcome: Progressing     Problem: Mechanical Ventilation  Goal: Patient Will Maintain Patent Airway  Outcome: Progressing  Goal: Oral health is maintained or improved  Outcome: Progressing  Goal: Tracheostomy will be managed safely  Outcome: Progressing  Goal: ET tube will be managed safely  Outcome: Progressing  Goal: Ability to express needs and understand communication  Outcome: Progressing  Goal: Mobility/activity is maintained at optimum level for patient  Outcome: Progressing

## 2024-09-10 ENCOUNTER — APPOINTMENT (OUTPATIENT)
Dept: RADIOLOGY | Facility: HOSPITAL | Age: 69
DRG: 870 | End: 2024-09-10
Payer: MEDICARE

## 2024-09-10 LAB
ALBUMIN SERPL BCP-MCNC: 2.3 G/DL (ref 3.4–5)
ANION GAP BLDA CALCULATED.4IONS-SCNC: 5 MMO/L (ref 10–25)
ANION GAP SERPL CALC-SCNC: 13 MMOL/L (ref 10–20)
BASE EXCESS BLDA CALC-SCNC: 5.7 MMOL/L (ref -2–3)
BODY TEMPERATURE: 37 DEGREES CELSIUS
BUN SERPL-MCNC: 37 MG/DL (ref 6–23)
CA-I BLDA-SCNC: 1.15 MMOL/L (ref 1.1–1.33)
CALCIUM SERPL-MCNC: 7.7 MG/DL (ref 8.6–10.3)
CHLORIDE BLDA-SCNC: 104 MMOL/L (ref 98–107)
CHLORIDE SERPL-SCNC: 104 MMOL/L (ref 98–107)
CO2 SERPL-SCNC: 29 MMOL/L (ref 21–32)
CREAT SERPL-MCNC: 0.69 MG/DL (ref 0.5–1.3)
EGFRCR SERPLBLD CKD-EPI 2021: >90 ML/MIN/1.73M*2
ERYTHROCYTE [DISTWIDTH] IN BLOOD BY AUTOMATED COUNT: 13.7 % (ref 11.5–14.5)
ERYTHROCYTE [DISTWIDTH] IN BLOOD BY AUTOMATED COUNT: 14.1 % (ref 11.5–14.5)
GLUCOSE BLD MANUAL STRIP-MCNC: 121 MG/DL (ref 74–99)
GLUCOSE BLD MANUAL STRIP-MCNC: 188 MG/DL (ref 74–99)
GLUCOSE BLD MANUAL STRIP-MCNC: 210 MG/DL (ref 74–99)
GLUCOSE BLD MANUAL STRIP-MCNC: 215 MG/DL (ref 74–99)
GLUCOSE BLD MANUAL STRIP-MCNC: 223 MG/DL (ref 74–99)
GLUCOSE BLD MANUAL STRIP-MCNC: 246 MG/DL (ref 74–99)
GLUCOSE BLD MANUAL STRIP-MCNC: 259 MG/DL (ref 74–99)
GLUCOSE BLDA-MCNC: 230 MG/DL (ref 74–99)
GLUCOSE SERPL-MCNC: 187 MG/DL (ref 74–99)
HCO3 BLDA-SCNC: 31.2 MMOL/L (ref 22–26)
HCT VFR BLD AUTO: 56.2 % (ref 41–52)
HCT VFR BLD AUTO: 62.1 % (ref 41–52)
HCT VFR BLD EST: 56 % (ref 41–52)
HGB BLD-MCNC: 18.2 G/DL (ref 13.5–17.5)
HGB BLD-MCNC: 19.8 G/DL (ref 13.5–17.5)
HGB BLDA-MCNC: 18.7 G/DL (ref 13.5–17.5)
INHALED O2 CONCENTRATION: 80 %
LACTATE BLDA-SCNC: 1.3 MMOL/L (ref 0.4–2)
MCH RBC QN AUTO: 32.2 PG (ref 26–34)
MCH RBC QN AUTO: 32.3 PG (ref 26–34)
MCHC RBC AUTO-ENTMCNC: 31.9 G/DL (ref 32–36)
MCHC RBC AUTO-ENTMCNC: 32.4 G/DL (ref 32–36)
MCV RBC AUTO: 100 FL (ref 80–100)
MCV RBC AUTO: 101 FL (ref 80–100)
NRBC BLD-RTO: 0 /100 WBCS (ref 0–0)
NRBC BLD-RTO: 0 /100 WBCS (ref 0–0)
OXYHGB MFR BLDA: 95.5 % (ref 94–98)
PCO2 BLDA: 46 MM HG (ref 38–42)
PH BLDA: 7.44 PH (ref 7.38–7.42)
PHOSPHATE SERPL-MCNC: 2.3 MG/DL (ref 2.5–4.9)
PLATELET # BLD AUTO: 270 X10*3/UL (ref 150–450)
PLATELET # BLD AUTO: 328 X10*3/UL (ref 150–450)
PO2 BLDA: 105 MM HG (ref 85–95)
POTASSIUM BLDA-SCNC: 4.6 MMOL/L (ref 3.5–5.3)
POTASSIUM SERPL-SCNC: 4.7 MMOL/L (ref 3.5–5.3)
RBC # BLD AUTO: 5.65 X10*6/UL (ref 4.5–5.9)
RBC # BLD AUTO: 6.13 X10*6/UL (ref 4.5–5.9)
SAO2 % BLDA: 98 % (ref 94–100)
SODIUM BLDA-SCNC: 136 MMOL/L (ref 136–145)
SODIUM SERPL-SCNC: 141 MMOL/L (ref 136–145)
WBC # BLD AUTO: 15.9 X10*3/UL (ref 4.4–11.3)
WBC # BLD AUTO: 22 X10*3/UL (ref 4.4–11.3)

## 2024-09-10 PROCEDURE — 37799 UNLISTED PX VASCULAR SURGERY: CPT

## 2024-09-10 PROCEDURE — 2500000005 HC RX 250 GENERAL PHARMACY W/O HCPCS: Performed by: INTERNAL MEDICINE

## 2024-09-10 PROCEDURE — 2500000004 HC RX 250 GENERAL PHARMACY W/ HCPCS (ALT 636 FOR OP/ED): Performed by: INTERNAL MEDICINE

## 2024-09-10 PROCEDURE — 2500000002 HC RX 250 W HCPCS SELF ADMINISTERED DRUGS (ALT 637 FOR MEDICARE OP, ALT 636 FOR OP/ED): Performed by: INTERNAL MEDICINE

## 2024-09-10 PROCEDURE — 2500000005 HC RX 250 GENERAL PHARMACY W/O HCPCS

## 2024-09-10 PROCEDURE — 37799 UNLISTED PX VASCULAR SURGERY: CPT | Performed by: ANESTHESIOLOGY

## 2024-09-10 PROCEDURE — 71045 X-RAY EXAM CHEST 1 VIEW: CPT

## 2024-09-10 PROCEDURE — 84132 ASSAY OF SERUM POTASSIUM: CPT | Performed by: INTERNAL MEDICINE

## 2024-09-10 PROCEDURE — 94668 MNPJ CHEST WALL SBSQ: CPT

## 2024-09-10 PROCEDURE — 2500000004 HC RX 250 GENERAL PHARMACY W/ HCPCS (ALT 636 FOR OP/ED): Mod: JZ | Performed by: NURSE PRACTITIONER

## 2024-09-10 PROCEDURE — 2500000004 HC RX 250 GENERAL PHARMACY W/ HCPCS (ALT 636 FOR OP/ED): Performed by: ANESTHESIOLOGY

## 2024-09-10 PROCEDURE — 2500000002 HC RX 250 W HCPCS SELF ADMINISTERED DRUGS (ALT 637 FOR MEDICARE OP, ALT 636 FOR OP/ED)

## 2024-09-10 PROCEDURE — 31720 CLEARANCE OF AIRWAYS: CPT

## 2024-09-10 PROCEDURE — 85027 COMPLETE CBC AUTOMATED: CPT | Performed by: ANESTHESIOLOGY

## 2024-09-10 PROCEDURE — 94640 AIRWAY INHALATION TREATMENT: CPT

## 2024-09-10 PROCEDURE — 2500000001 HC RX 250 WO HCPCS SELF ADMINISTERED DRUGS (ALT 637 FOR MEDICARE OP): Performed by: NURSE PRACTITIONER

## 2024-09-10 PROCEDURE — 84132 ASSAY OF SERUM POTASSIUM: CPT | Performed by: ANESTHESIOLOGY

## 2024-09-10 PROCEDURE — 71045 X-RAY EXAM CHEST 1 VIEW: CPT | Performed by: RADIOLOGY

## 2024-09-10 PROCEDURE — 2500000005 HC RX 250 GENERAL PHARMACY W/O HCPCS: Performed by: ANESTHESIOLOGY

## 2024-09-10 PROCEDURE — 82947 ASSAY GLUCOSE BLOOD QUANT: CPT

## 2024-09-10 PROCEDURE — 2500000001 HC RX 250 WO HCPCS SELF ADMINISTERED DRUGS (ALT 637 FOR MEDICARE OP)

## 2024-09-10 PROCEDURE — 2020000001 HC ICU ROOM DAILY

## 2024-09-10 PROCEDURE — 85027 COMPLETE CBC AUTOMATED: CPT

## 2024-09-10 PROCEDURE — 94667 MNPJ CHEST WALL 1ST: CPT

## 2024-09-10 PROCEDURE — 99291 CRITICAL CARE FIRST HOUR: CPT | Performed by: ANESTHESIOLOGY

## 2024-09-10 PROCEDURE — 2500000004 HC RX 250 GENERAL PHARMACY W/ HCPCS (ALT 636 FOR OP/ED)

## 2024-09-10 PROCEDURE — 99292 CRITICAL CARE ADDL 30 MIN: CPT | Performed by: ANESTHESIOLOGY

## 2024-09-10 RX ORDER — NYSTATIN 100000 [USP'U]/ML
5 SUSPENSION ORAL 4 TIMES DAILY
Status: DISCONTINUED | OUTPATIENT
Start: 2024-09-10 | End: 2024-09-25 | Stop reason: HOSPADM

## 2024-09-10 RX ORDER — AMOXICILLIN 250 MG
2 CAPSULE ORAL 2 TIMES DAILY
Status: DISCONTINUED | OUTPATIENT
Start: 2024-09-10 | End: 2024-09-25 | Stop reason: HOSPADM

## 2024-09-10 ASSESSMENT — PAIN - FUNCTIONAL ASSESSMENT: PAIN_FUNCTIONAL_ASSESSMENT: 0-10

## 2024-09-10 ASSESSMENT — PAIN SCALES - GENERAL: PAINLEVEL_OUTOF10: 0 - NO PAIN

## 2024-09-10 NOTE — RESEARCH NOTES
Artificial Intelligence Monitoring in Nursing (AIMS Nursing) Study    Principle Investigator - Dr. Adam Rosario  Research Coordinator - Jadon Berrios RN     Patient Name - Alo Glass  Date - 9/10/2024 9:16 AM  Location - [facility] 4th Floor    Alo Glass was approached by Jadon Berrios RN to talk about participating in the AIMS Nursing Study. The patient was not able to be approached, a research coordinator will come back at a later time. Study protocol was followed and patient was given study contact information.     Jadon Berrios RN

## 2024-09-10 NOTE — SIGNIFICANT EVENT
Alo Glass is a 69 y.o. male presenting with Acute respiratory failure with hypoxia and hypercapnia  medical history significant for current tobacco use, ETOH abuse, CHF, COPD, MELISSA, and DM    09/09: full dose lytic therapy total 1250cc out in 24 hours    09/10:  At 1400 tPA/Dornase administered to patient.  Patient and nursing team advised regarding recommendations including adjusting position every 15-30 min (lay on side, sit, stand, walk, etc) during 2 hour instillation and verbalized understanding.  Reviewed red flag symptoms that warrant emergency evaluation including severe pain or dyspnea.  Bedside RN updated on plan of care.  All questions addressed.    Total amount of fluid instilled: 150cc  Amount in atrium prior to instillation: 1250 cc     Loculated effusion  Full dose Lytic therapy again today  Maintain CT to suction  Daily cxr while ct in place     Discussed with Dr Watson and Dr Marshall all are in agreement with current plan  Shaila Morales, APRN-CNP    Time spent on the assessment of patient, gathering and interpreting data, review of medical record/patient history, personally reviewing radiographic imaging and formulation of this note. With greater than 50% spent in personal discussion with patient/ family.    Time 20 minutes

## 2024-09-10 NOTE — PROGRESS NOTES
09/10/24 1514   Discharge Planning   Expected Discharge Disposition SNF         Patient remains in ICU. He was extubated last night. Patient is on Airvo. He still has right chest tube. He is on pressor and on IV abx. ID is following. Still not appropriate for discharge plan. Will follow for discharge needs.

## 2024-09-10 NOTE — PROGRESS NOTES
ASSESSMENT     Probable respiratory failure due to severe PNA (Stenotrophomonas) resulting in a loculated pleural effusion/empyema.  Extubated last night and is now on Airvo requiring low dose levophed and some precedex.  He remains critically ill    PLAN    Precedex as needed  Wean levo  Wean Airvo as tolerates  Aggressive BPH  Zosyn added to Levaquin   Keep solumedrol at q12  TFs as is    Updated his sister Alia Styles this morning at [phone] as I did yesterday.  I also just confirmed with his friend, Dr. Andrea Tilley at [phone], that the patient does not have advance directives identifying a DPOAH.         REASON FOR ADMISSION     68 yo with MMP including CHF, aortic valve stenosis, COPD, and MELISSA who was found unresponsive at his home and was transferred to the ED by EMS.  ABG in the ED showed hypoxia and hypercapnia prompting intubation and initial need for levophed despite fluid resuscitation and empiric abx.  CT scan revealed changes concerning for a large loculated right side effusion resulting in compressive changes.  Additionally he was noted to have kidney masses in both left and right sides concerning for renal cell carcinoma.  Since his admission to the ICU he has been slowly making progress but remains in critical condition    TODAYS EVENTS    He was extubated yesterday (on low dose levo) and is now on Airvo at 80% with flow of 60 l/min.  His last ABG was good with a PaO2 of 105.  On exam he is unable to effectively communicate but will state that his is having difficulties breathing but is oxygenating and ventilating adequately (on some precedex due to his anxiety and history of frequent ETOH use as per his sister).  He has continued secretions which are being suctioned periodically by RT.  His CxR looks a bit better than yesterday to my eyes.  His WBC has been trending upward as zosyn was empirically added to his levaquin (cultures only positive for  Stenotrophomonas as of now).  His Cr is  "stable down to 0.69 as he continues to make good urine.  He continues to tolerate TFs.       This critically ill patient continues to be at-risk for clinically significant deterioration / failure due to the above mentioned dysfunctional, unstable organ systems.  I have personally identified and managed all complex critical care issues with efforts to prevent aforementioned clinical deterioration.  Critical care time is spent at bedside and/or the immediate area and has included, but is not limited to, the review of diagnostic tests, labs, radiographs, serial assessments of hemodynamics, respiratory status, ventilatory management, and family updates.  Time spent in procedures and teaching are reported separately.    CRITICAL CARE TIME: 75 min 1. Acute resp failure, hypoxia and hypercarbia 2. Loculated effusion 3. Stenotrophomonas PNA 4. Septic shock     Central line needed due vasopressor, multiple IV medications or poor PIV access  Sedation vacation n/a  Urinary catheter in place to monitor accurate I's and O's and for further critical care management  Restraints n/a      Last Recorded Vitals  Blood pressure 85/66, pulse 83, temperature 35.8 °C (96.4 °F), temperature source Temporal, resp. rate 25, height 1.803 m (5' 10.98\"), weight 135 kg (297 lb 13.5 oz), SpO2 93%.    Relevant Results     Results for orders placed or performed during the hospital encounter of 09/04/24 (from the past 24 hour(s))   POCT GLUCOSE   Result Value Ref Range    POCT Glucose 265 (H) 74 - 99 mg/dL   Blood Gas Arterial Full Panel   Result Value Ref Range    POCT pH, Arterial 7.39 7.38 - 7.42 pH    POCT pCO2, Arterial 47 (H) 38 - 42 mm Hg    POCT pO2, Arterial 81 (L) 85 - 95 mm Hg    POCT SO2, Arterial 96 94 - 100 %    POCT Oxy Hemoglobin, Arterial 93.6 (L) 94.0 - 98.0 %    POCT Hematocrit Calculated, Arterial 53.0 (H) 41.0 - 52.0 %    POCT Sodium, Arterial 136 136 - 145 mmol/L    POCT Potassium, Arterial 4.5 3.5 - 5.3 mmol/L    POCT " Chloride, Arterial 103 98 - 107 mmol/L    POCT Ionized Calcium, Arterial 1.14 1.10 - 1.33 mmol/L    POCT Glucose, Arterial 234 (H) 74 - 99 mg/dL    POCT Lactate, Arterial 1.2 0.4 - 2.0 mmol/L    POCT Base Excess, Arterial 2.5 -2.0 - 3.0 mmol/L    POCT HCO3 Calculated, Arterial 28.5 (H) 22.0 - 26.0 mmol/L    POCT Hemoglobin, Arterial 17.7 (H) 13.5 - 17.5 g/dL    POCT Anion Gap, Arterial 9 (L) 10 - 25 mmo/L    Patient Temperature 37.0 degrees Celsius    FiO2 40 %    Ventilator Mode CPAP     Peep CHM2O 5.0 cm H2O    Pressure Support 5 cm H2O   POCT GLUCOSE   Result Value Ref Range    POCT Glucose 232 (H) 74 - 99 mg/dL   POCT GLUCOSE   Result Value Ref Range    POCT Glucose 209 (H) 74 - 99 mg/dL   Blood Gas Arterial Full Panel   Result Value Ref Range    POCT pH, Arterial 7.44 (H) 7.38 - 7.42 pH    POCT pCO2, Arterial 45 (H) 38 - 42 mm Hg    POCT pO2, Arterial 82 (L) 85 - 95 mm Hg    POCT SO2, Arterial 96 94 - 100 %    POCT Oxy Hemoglobin, Arterial 93.1 (L) 94.0 - 98.0 %    POCT Hematocrit Calculated, Arterial 60.0 (H) 41.0 - 52.0 %    POCT Sodium, Arterial 133 (L) 136 - 145 mmol/L    POCT Potassium, Arterial 5.0 3.5 - 5.3 mmol/L    POCT Chloride, Arterial 102 98 - 107 mmol/L    POCT Ionized Calcium, Arterial 1.14 1.10 - 1.33 mmol/L    POCT Glucose, Arterial 263 (H) 74 - 99 mg/dL    POCT Lactate, Arterial 1.3 0.4 - 2.0 mmol/L    POCT Base Excess, Arterial 5.2 (H) -2.0 - 3.0 mmol/L    POCT HCO3 Calculated, Arterial 30.6 (H) 22.0 - 26.0 mmol/L    POCT Hemoglobin, Arterial 19.9 (H) 13.5 - 17.5 g/dL    POCT Anion Gap, Arterial 5 (L) 10 - 25 mmo/L    Patient Temperature 37.0 degrees Celsius    FiO2 70 %   Blood Gas Arterial Full Panel   Result Value Ref Range    POCT pH, Arterial 7.44 (H) 7.38 - 7.42 pH    POCT pCO2, Arterial 46 (H) 38 - 42 mm Hg    POCT pO2, Arterial 105 (H) 85 - 95 mm Hg    POCT SO2, Arterial 98 94 - 100 %    POCT Oxy Hemoglobin, Arterial 95.5 94.0 - 98.0 %    POCT Hematocrit Calculated, Arterial 56.0 (H)  41.0 - 52.0 %    POCT Sodium, Arterial 136 136 - 145 mmol/L    POCT Potassium, Arterial 4.6 3.5 - 5.3 mmol/L    POCT Chloride, Arterial 104 98 - 107 mmol/L    POCT Ionized Calcium, Arterial 1.15 1.10 - 1.33 mmol/L    POCT Glucose, Arterial 230 (H) 74 - 99 mg/dL    POCT Lactate, Arterial 1.3 0.4 - 2.0 mmol/L    POCT Base Excess, Arterial 5.7 (H) -2.0 - 3.0 mmol/L    POCT HCO3 Calculated, Arterial 31.2 (H) 22.0 - 26.0 mmol/L    POCT Hemoglobin, Arterial 18.7 (H) 13.5 - 17.5 g/dL    POCT Anion Gap, Arterial 5 (L) 10 - 25 mmo/L    Patient Temperature 37.0 degrees Celsius    FiO2 80 %   CBC   Result Value Ref Range    WBC 15.9 (H) 4.4 - 11.3 x10*3/uL    nRBC 0.0 0.0 - 0.0 /100 WBCs    RBC 5.65 4.50 - 5.90 x10*6/uL    Hemoglobin 18.2 (H) 13.5 - 17.5 g/dL    Hematocrit 56.2 (H) 41.0 - 52.0 %     80 - 100 fL    MCH 32.2 26.0 - 34.0 pg    MCHC 32.4 32.0 - 36.0 g/dL    RDW 13.7 11.5 - 14.5 %    Platelets 270 150 - 450 x10*3/uL   Renal Function Panel   Result Value Ref Range    Glucose 187 (H) 74 - 99 mg/dL    Sodium 141 136 - 145 mmol/L    Potassium 4.7 3.5 - 5.3 mmol/L    Chloride 104 98 - 107 mmol/L    Bicarbonate 29 21 - 32 mmol/L    Anion Gap 13 10 - 20 mmol/L    Urea Nitrogen 37 (H) 6 - 23 mg/dL    Creatinine 0.69 0.50 - 1.30 mg/dL    eGFR >90 >60 mL/min/1.73m*2    Calcium 7.7 (L) 8.6 - 10.3 mg/dL    Phosphorus 2.3 (L) 2.5 - 4.9 mg/dL    Albumin 2.3 (L) 3.4 - 5.0 g/dL   POCT GLUCOSE   Result Value Ref Range    POCT Glucose 223 (H) 74 - 99 mg/dL     Scheduled medications  aspirin, 81 mg, oral, Daily  enoxaparin, 40 mg, subcutaneous, q12h SHARRON  ezetimibe, 10 mg, oral, Daily  folic acid, 1 mg, oral, Daily  insulin lispro, 0-10 Units, subcutaneous, q4h  ipratropium-albuteroL, 3 mL, nebulization, 4x daily  levoFLOXacin, 750 mg, intravenous, q24h  levothyroxine, 125 mcg, oral, Daily  methylPREDNISolone sodium succinate (PF), 40 mg, intravenous, q12h  multivitamin with minerals, 1 tablet, oral, Daily  pantoprazole, 40 mg,  intravenous, Daily  piperacillin-tazobactam, 3.375 g, intravenous, q6h  polyethylene glycol, 17 g, nasogastric tube, Daily  sennosides-docusate sodium, 1 tablet, oral, Nightly  thiamine, 100 mg, oral, Daily      Continuous medications  dexmedeTOMIDine, 0-1.5 mcg/kg/hr, Last Rate: 0.3 mcg/kg/hr (09/10/24 0331)  norepinephrine, 0-0.5 mcg/kg/min, Last Rate: 0.01 mcg/kg/min (09/10/24 0004)      PRN medications  PRN medications: alteplase, dextrose, dextrose, glucagon, glucagon, ipratropium-albuteroL, LORazepam, oxygen, oxygen

## 2024-09-10 NOTE — CARE PLAN
The patient's goals for the shift include      The clinical goals for the shift include Pt will be HDS over night

## 2024-09-11 ENCOUNTER — TELEPHONE (OUTPATIENT)
Dept: PHARMACY | Facility: HOSPITAL | Age: 69
End: 2024-09-11
Payer: MEDICARE

## 2024-09-11 ENCOUNTER — APPOINTMENT (OUTPATIENT)
Dept: RADIOLOGY | Facility: HOSPITAL | Age: 69
DRG: 870 | End: 2024-09-11
Payer: MEDICARE

## 2024-09-11 LAB
ALBUMIN SERPL BCP-MCNC: 2.4 G/DL (ref 3.4–5)
ALP SERPL-CCNC: 69 U/L (ref 33–136)
ALT SERPL W P-5'-P-CCNC: 21 U/L (ref 10–52)
ANION GAP SERPL CALC-SCNC: 9 MMOL/L (ref 10–20)
AST SERPL W P-5'-P-CCNC: 15 U/L (ref 9–39)
BILIRUB SERPL-MCNC: 1 MG/DL (ref 0–1.2)
BUN SERPL-MCNC: 38 MG/DL (ref 6–23)
CALCIUM SERPL-MCNC: 8.1 MG/DL (ref 8.6–10.3)
CHLORIDE SERPL-SCNC: 104 MMOL/L (ref 98–107)
CO2 SERPL-SCNC: 36 MMOL/L (ref 21–32)
CREAT SERPL-MCNC: 1 MG/DL (ref 0.5–1.3)
EGFRCR SERPLBLD CKD-EPI 2021: 81 ML/MIN/1.73M*2
ERYTHROCYTE [DISTWIDTH] IN BLOOD BY AUTOMATED COUNT: 13.9 % (ref 11.5–14.5)
GLUCOSE BLD MANUAL STRIP-MCNC: 151 MG/DL (ref 74–99)
GLUCOSE BLD MANUAL STRIP-MCNC: 157 MG/DL (ref 74–99)
GLUCOSE BLD MANUAL STRIP-MCNC: 158 MG/DL (ref 74–99)
GLUCOSE BLD MANUAL STRIP-MCNC: 170 MG/DL (ref 74–99)
GLUCOSE BLD MANUAL STRIP-MCNC: 171 MG/DL (ref 74–99)
GLUCOSE BLD MANUAL STRIP-MCNC: 175 MG/DL (ref 74–99)
GLUCOSE SERPL-MCNC: 166 MG/DL (ref 74–99)
HCT VFR BLD AUTO: 61.1 % (ref 41–52)
HGB BLD-MCNC: 19.3 G/DL (ref 13.5–17.5)
MCH RBC QN AUTO: 32 PG (ref 26–34)
MCHC RBC AUTO-ENTMCNC: 31.6 G/DL (ref 32–36)
MCV RBC AUTO: 101 FL (ref 80–100)
NRBC BLD-RTO: 0 /100 WBCS (ref 0–0)
PHOSPHATE SERPL-MCNC: 3.3 MG/DL (ref 2.5–4.9)
PLATELET # BLD AUTO: 307 X10*3/UL (ref 150–450)
POTASSIUM SERPL-SCNC: 5.3 MMOL/L (ref 3.5–5.3)
PROT SERPL-MCNC: 4.9 G/DL (ref 6.4–8.2)
RBC # BLD AUTO: 6.04 X10*6/UL (ref 4.5–5.9)
SODIUM SERPL-SCNC: 144 MMOL/L (ref 136–145)
WBC # BLD AUTO: 18 X10*3/UL (ref 4.4–11.3)

## 2024-09-11 PROCEDURE — 2500000004 HC RX 250 GENERAL PHARMACY W/ HCPCS (ALT 636 FOR OP/ED): Performed by: INTERNAL MEDICINE

## 2024-09-11 PROCEDURE — 82947 ASSAY GLUCOSE BLOOD QUANT: CPT

## 2024-09-11 PROCEDURE — 80053 COMPREHEN METABOLIC PANEL: CPT | Performed by: ANESTHESIOLOGY

## 2024-09-11 PROCEDURE — 2500000002 HC RX 250 W HCPCS SELF ADMINISTERED DRUGS (ALT 637 FOR MEDICARE OP, ALT 636 FOR OP/ED)

## 2024-09-11 PROCEDURE — 84100 ASSAY OF PHOSPHORUS: CPT | Performed by: ANESTHESIOLOGY

## 2024-09-11 PROCEDURE — 2500000001 HC RX 250 WO HCPCS SELF ADMINISTERED DRUGS (ALT 637 FOR MEDICARE OP): Performed by: ANESTHESIOLOGY

## 2024-09-11 PROCEDURE — 2500000001 HC RX 250 WO HCPCS SELF ADMINISTERED DRUGS (ALT 637 FOR MEDICARE OP)

## 2024-09-11 PROCEDURE — 9420000001 HC RT PATIENT EDUCATION 5 MIN

## 2024-09-11 PROCEDURE — 94668 MNPJ CHEST WALL SBSQ: CPT

## 2024-09-11 PROCEDURE — 2500000005 HC RX 250 GENERAL PHARMACY W/O HCPCS

## 2024-09-11 PROCEDURE — 2500000004 HC RX 250 GENERAL PHARMACY W/ HCPCS (ALT 636 FOR OP/ED): Performed by: ANESTHESIOLOGY

## 2024-09-11 PROCEDURE — 2500000004 HC RX 250 GENERAL PHARMACY W/ HCPCS (ALT 636 FOR OP/ED): Performed by: NURSE PRACTITIONER

## 2024-09-11 PROCEDURE — 85027 COMPLETE CBC AUTOMATED: CPT | Performed by: ANESTHESIOLOGY

## 2024-09-11 PROCEDURE — 99291 CRITICAL CARE FIRST HOUR: CPT | Performed by: ANESTHESIOLOGY

## 2024-09-11 PROCEDURE — 2500000005 HC RX 250 GENERAL PHARMACY W/O HCPCS: Performed by: ANESTHESIOLOGY

## 2024-09-11 PROCEDURE — 37799 UNLISTED PX VASCULAR SURGERY: CPT | Performed by: ANESTHESIOLOGY

## 2024-09-11 PROCEDURE — 2500000004 HC RX 250 GENERAL PHARMACY W/ HCPCS (ALT 636 FOR OP/ED)

## 2024-09-11 PROCEDURE — 99292 CRITICAL CARE ADDL 30 MIN: CPT | Performed by: ANESTHESIOLOGY

## 2024-09-11 PROCEDURE — 71045 X-RAY EXAM CHEST 1 VIEW: CPT

## 2024-09-11 PROCEDURE — 2020000001 HC ICU ROOM DAILY

## 2024-09-11 PROCEDURE — 2500000001 HC RX 250 WO HCPCS SELF ADMINISTERED DRUGS (ALT 637 FOR MEDICARE OP): Performed by: NURSE PRACTITIONER

## 2024-09-11 PROCEDURE — 2500000002 HC RX 250 W HCPCS SELF ADMINISTERED DRUGS (ALT 637 FOR MEDICARE OP, ALT 636 FOR OP/ED): Performed by: INTERNAL MEDICINE

## 2024-09-11 PROCEDURE — 94640 AIRWAY INHALATION TREATMENT: CPT

## 2024-09-11 PROCEDURE — 71045 X-RAY EXAM CHEST 1 VIEW: CPT | Performed by: STUDENT IN AN ORGANIZED HEALTH CARE EDUCATION/TRAINING PROGRAM

## 2024-09-11 RX ORDER — KETOROLAC TROMETHAMINE 30 MG/ML
15 INJECTION, SOLUTION INTRAMUSCULAR; INTRAVENOUS ONCE
Status: COMPLETED | OUTPATIENT
Start: 2024-09-11 | End: 2024-09-11

## 2024-09-11 RX ORDER — FUROSEMIDE 10 MG/ML
60 INJECTION INTRAMUSCULAR; INTRAVENOUS ONCE
Status: COMPLETED | OUTPATIENT
Start: 2024-09-11 | End: 2024-09-11

## 2024-09-11 ASSESSMENT — PAIN SCALES - GENERAL
PAINLEVEL_OUTOF10: 0 - NO PAIN

## 2024-09-11 ASSESSMENT — PAIN - FUNCTIONAL ASSESSMENT
PAIN_FUNCTIONAL_ASSESSMENT: 0-10

## 2024-09-11 NOTE — RESEARCH NOTES
Artificial Intelligence Monitoring in Nursing (AIMS Nursing) Study    Principle Investigator - Dr. Adam Rosario  Research Coordinator - Pamela Loco, ODREEN     Patient Name - Alo Glass  Date - 9/11/2024 10:35 AM  Location - [facility] 4th floor ICU    Aol Glass was approached by Pamela Loco RRT to talk about participating in the AIMS Nursing Study. The consent form was signed by the patient and they were given a copy for their records. Study protocol was followed and patient was given study contact information.     Pamela Loco, RRT

## 2024-09-11 NOTE — TREATMENT PLAN
69 year old male presenting with PNA, empyema, sepsis. Initially intubated with guarded prognosis, now improving.    Urology contacted for CT scan results on 9/4:  Left kidney solid heterogeneously enhancing mass suspicious for renal  cell carcinoma (2.4 cm x 1.9 cm) and of suspected 0.8 cm right upper  pole renal cell carcinoma measuring 0.8 cm.    Creatinine and h/h stable.    Reached out to Dr. Morales- patient is able to follow up with him for further work up outpatient when more medically stable.  No urgent urologic interventions needed at this time.

## 2024-09-11 NOTE — TELEPHONE ENCOUNTER
I reviewed the telephone encounter and agree with the student’s findings and plans as written. Case discussed with student.    Keren Brower, PharmD

## 2024-09-11 NOTE — TELEPHONE ENCOUNTER
Population Health: Outreach by Ambulatory Pharmacy Team    Payor: [employer] [place]  Reason: Adherence  Medication: Valsartan  Outcome: No Answer/Invalid Number-- No voicemail box    MYRTLE DINGELDEIN

## 2024-09-11 NOTE — PROGRESS NOTES
09/11/24 1218   Discharge Planning   Support Systems Family members   Expected Discharge Disposition SNF     Spoke with patient's sister via phone. Sister and patient have complicated relationship. IGNACIO and Alia discussed relationship history. Patient reported that she has been getting daily updates from MD, which she is grateful for. Alia stated that she does not want to make any decisions surrounding discharge planning until patient can participate- she has been in this position before and it created more drama. Alia also requested that SW get HPOA paperwork signed when patient is alert and oriented. Sister has patient's contact information and advised to reach out with any needs. SW will follow.

## 2024-09-11 NOTE — PROGRESS NOTES
"  INFECTIOUS DISEASE DAILY PROGRESS NOTE    SUBJECTIVE:    Still on a low dose of Levophed. On Airvo at 70% FiO2 today, improved form 85% yesterday. No fevers. WBC went up to 22 last night and down to 18 now.    OBJECTIVE:  VITALS (Last 24 Hours)  BP 85/66   Pulse 97   Temp 36 °C (96.8 °F) (Temporal)   Resp 22   Ht 1.803 m (5' 10.98\")   Wt 133 kg (292 lb 8.8 oz)   SpO2 94%   BMI 40.82 kg/m²     PHYSICAL EXAM:  Gen - on Airvo  Heart - tachy and regular  Lungs - chest tube right side with serosang output  Abd - soft, no ttp, BS present   - Live present  Skin - no rash    ABX: IV Zosyn and IV Levofloxacin    LABS:  Lab Results   Component Value Date    WBC 18.0 (H) 09/11/2024    HGB 19.3 (H) 09/11/2024    HCT 61.1 (H) 09/11/2024     (H) 09/11/2024     09/11/2024     Lab Results   Component Value Date    GLUCOSE 166 (H) 09/11/2024    CALCIUM 8.1 (L) 09/11/2024     09/11/2024    K 5.3 09/11/2024    CO2 36 (H) 09/11/2024     09/11/2024    BUN 38 (H) 09/11/2024    CREATININE 1.00 09/11/2024     Results from last 72 hours   Lab Units 09/11/24  0447   ALK PHOS U/L 69   BILIRUBIN TOTAL mg/dL 1.0   PROTEIN TOTAL g/dL 4.9*   ALT U/L 21   AST U/L 15   ALBUMIN g/dL 2.4*     Estimated Creatinine Clearance: 97 mL/min (by C-G formula based on SCr of 1 mg/dL).    ASSESSMENT/PLAN:     Acute Hypoxic/Hypercapnic Respiratory Failure - intubated 9/4. Extubated but on Airvo with hypoxia still.  Septic Shock - ongoing, acute life threatening condition needing IV abx  PNA due to Stenotrophomonas maltophilia  Right Loculated Pleural Effusion - s/p chest tube 9/4, 71K WBCs neutrophilic with pH 7.9. Glucose <10 and exudate by Light's criteria. Consistent with an empyema. No growth.     IV Zosyn and Levofloxacin still.    Monitoring for adverse effects of abx such as rash/itching/diarrhea - none.    Will follow. Thanks! D/w Dr. Rolando Busby MD  [facility]  Office " #[phone]

## 2024-09-11 NOTE — SIGNIFICANT EVENT
Alo Glass is a 69 y.o. male presenting with Acute respiratory failure with hypoxia and hypercapnia  medical history significant for current tobacco use, ETOH abuse, CHF, COPD, MELISSA, and DM     09/09: full dose lytic therapy total 1250cc out in 24 hours     09/10:  At 1400 tPA/Dornase administered to patient.  Patient and nursing team advised regarding recommendations including adjusting position every 15-30 min (lay on side, sit, stand, walk, etc) during 2 hour instillation and verbalized understanding.  Reviewed red flag symptoms that warrant emergency evaluation including severe pain or dyspnea.  Bedside RN updated on plan of care.  All questions addressed.     Total amount of fluid instilled: 150cc  Amount in atrium prior to instillation: 1250 cc   Total out s/p instillation: 750cc  sanguinous drainage    9/11: CXR reviewed effusion improved, PTX resolved- No need for additional lytic therapy today.   Repeat CXR in AM       Loculated effusion- improved    Maintain CT to suction  Daily cxr while ct in place     Discussed with Dr Watson and Dr Marshall all are in agreement with current plan  PARAM Gar-CNP     Time spent on the assessment of patient, gathering and interpreting data, review of medical record/patient history, personally reviewing radiographic imaging and formulation of this note. With greater than 50% spent in personal discussion with patient/ family.     Time 20 minutes

## 2024-09-11 NOTE — CARE PLAN
Problem: Skin  Goal: Prevent/manage excess moisture  Outcome: Progressing  Flowsheets (Taken 9/11/2024 0548)  Prevent/manage excess moisture: Cleanse incontinence/protect with barrier cream  Goal: Prevent/minimize sheer/friction injuries  Outcome: Progressing  Flowsheets (Taken 9/11/2024 0548)  Prevent/minimize sheer/friction injuries: Turn/reposition every 2 hours/use positioning/transfer devices  Goal: Promote skin healing  Outcome: Met  Flowsheets (Taken 9/10/2024 1825 by Katelynn Garner RN)  Promote skin healing:   Protective dressings over bony prominences   Rotate device position/do not position patient on device   Assess skin/pad under line(s)/device(s)     Problem: Pain - Adult  Goal: Verbalizes/displays adequate comfort level or baseline comfort level  Outcome: Met  Flowsheets (Taken 9/11/2024 0548)  Verbalizes/displays adequate comfort level or baseline comfort level: Encourage patient to monitor pain and request assistance     Problem: Safety - Adult  Goal: Free from fall injury  Outcome: Met  Flowsheets (Taken 9/11/2024 0548)  Free from fall injury: Based on caregiver fall risk screen, instruct family/caregiver to ask for assistance with transferring infant if caregiver noted to have fall risk factors

## 2024-09-11 NOTE — PROGRESS NOTES
"ASSESSMENT     Probable respiratory failure due to severe PNA (Stenotrophomonas) resulting in empyema.  He remains critically ill but is making significant progress.     PLAN    Wean Precedex off at least for daytime  Wean levo  Wean Airvo as tolerates  Aggressive BPH  Continue Zosyn, Levaquin and solumedrol as is  Lasix  Clears    I consulted Dr. Cordova today (I spoke with the surgical DOUG covering his service) for evaluation of the renal masses    Updated his sister Alia Styles this morning ([phone]) as I did yesterday.      REASON FOR ADMISSION     70 yo with MMP including CHF, aortic valve stenosis, COPD, and MELISSA who was found unresponsive at his home and was transferred to the ED by EMS.  ABG in the ED showed hypoxia and hypercapnia prompting intubation and initial need for levophed despite fluid resuscitation and empiric abx.  CT scan revealed changes concerning for a large loculated right side effusion resulting in compressive changes.  Additionally he was noted to have kidney masses in both left and right sides concerning for renal cell carcinoma.  Since his admission to the ICU he has been slowly making progress but remains in critical condition    TODAYS EVENTS    He is making some significant progress this morning as he is much more awake, alert and engaging.  His FiO2 is slowly being weaned as he is now down to 80%.  He remains only on a low dose of levophed.  His urine output is decreasing a bit as his Cr is up to 1.0.  He continues to have a leukocytosis as he remains on zosyn, levaquin and steroids. I started him on clears this morning as he \"passed\" his bedside swallow evaluation.       This critically ill patient continues to be at-risk for clinically significant deterioration / failure due to the above mentioned dysfunctional, unstable organ systems.  I have personally identified and managed all complex critical care issues with efforts to prevent aforementioned clinical deterioration.  " "Critical care time is spent at bedside and/or the immediate area and has included, but is not limited to, the review of diagnostic tests, labs, radiographs, serial assessments of hemodynamics, respiratory status, ventilatory management, and family updates.  Time spent in procedures and teaching are reported separately.    CRITICAL CARE TIME: 75 min 1. Acute resp failure, hypoxia and hypercarbia 2. Loculated effusion 3. Stenotrophomonas PNA 4. Septic shock     Central line needed due vasopressor, multiple IV medications or poor PIV access  Sedation vacation n/a  Urinary catheter in place to monitor accurate I's and O's and for further critical care management  Restraints n/a      Last Recorded Vitals  Blood pressure 85/66, pulse 95, temperature 36.2 °C (97.2 °F), temperature source Temporal, resp. rate (!) 30, height 1.803 m (5' 10.98\"), weight 133 kg (292 lb 8.8 oz), SpO2 97%.    Relevant Results     Results for orders placed or performed during the hospital encounter of 09/04/24 (from the past 24 hour(s))   POCT GLUCOSE   Result Value Ref Range    POCT Glucose 215 (H) 74 - 99 mg/dL   POCT GLUCOSE   Result Value Ref Range    POCT Glucose 121 (H) 74 - 99 mg/dL   CBC   Result Value Ref Range    WBC 22.0 (H) 4.4 - 11.3 x10*3/uL    nRBC 0.0 0.0 - 0.0 /100 WBCs    RBC 6.13 (H) 4.50 - 5.90 x10*6/uL    Hemoglobin 19.8 (H) 13.5 - 17.5 g/dL    Hematocrit 62.1 (H) 41.0 - 52.0 %     (H) 80 - 100 fL    MCH 32.3 26.0 - 34.0 pg    MCHC 31.9 (L) 32.0 - 36.0 g/dL    RDW 14.1 11.5 - 14.5 %    Platelets 328 150 - 450 x10*3/uL   POCT GLUCOSE   Result Value Ref Range    POCT Glucose 188 (H) 74 - 99 mg/dL   POCT GLUCOSE   Result Value Ref Range    POCT Glucose 171 (H) 74 - 99 mg/dL   POCT GLUCOSE   Result Value Ref Range    POCT Glucose 157 (H) 74 - 99 mg/dL   Comprehensive Metabolic Panel   Result Value Ref Range    Glucose 166 (H) 74 - 99 mg/dL    Sodium 144 136 - 145 mmol/L    Potassium 5.3 3.5 - 5.3 mmol/L    Chloride 104 98 " - 107 mmol/L    Bicarbonate 36 (H) 21 - 32 mmol/L    Anion Gap 9 (L) 10 - 20 mmol/L    Urea Nitrogen 38 (H) 6 - 23 mg/dL    Creatinine 1.00 0.50 - 1.30 mg/dL    eGFR 81 >60 mL/min/1.73m*2    Calcium 8.1 (L) 8.6 - 10.3 mg/dL    Albumin 2.4 (L) 3.4 - 5.0 g/dL    Alkaline Phosphatase 69 33 - 136 U/L    Total Protein 4.9 (L) 6.4 - 8.2 g/dL    AST 15 9 - 39 U/L    Bilirubin, Total 1.0 0.0 - 1.2 mg/dL    ALT 21 10 - 52 U/L   Phosphorus   Result Value Ref Range    Phosphorus 3.3 2.5 - 4.9 mg/dL   CBC   Result Value Ref Range    WBC 18.0 (H) 4.4 - 11.3 x10*3/uL    nRBC 0.0 0.0 - 0.0 /100 WBCs    RBC 6.04 (H) 4.50 - 5.90 x10*6/uL    Hemoglobin 19.3 (H) 13.5 - 17.5 g/dL    Hematocrit 61.1 (H) 41.0 - 52.0 %     (H) 80 - 100 fL    MCH 32.0 26.0 - 34.0 pg    MCHC 31.6 (L) 32.0 - 36.0 g/dL    RDW 13.9 11.5 - 14.5 %    Platelets 307 150 - 450 x10*3/uL   POCT GLUCOSE   Result Value Ref Range    POCT Glucose 175 (H) 74 - 99 mg/dL     Scheduled medications  aspirin, 81 mg, oral, Daily  enoxaparin, 40 mg, subcutaneous, q12h SHARRON  ezetimibe, 10 mg, oral, Daily  folic acid, 1 mg, oral, Daily  furosemide, 60 mg, intravenous, Once  insulin lispro, 0-10 Units, subcutaneous, q4h  ipratropium-albuteroL, 3 mL, nebulization, 4x daily  levoFLOXacin, 750 mg, intravenous, q24h  levothyroxine, 125 mcg, oral, Daily  [START ON 9/12/2024] methylPREDNISolone sodium succinate (PF), 40 mg, intravenous, q24h  multivitamin with minerals, 1 tablet, oral, Daily  nystatin, 5 mL, Swish & Swallow, 4x daily  pantoprazole, 40 mg, intravenous, Daily  piperacillin-tazobactam, 3.375 g, intravenous, q6h  polyethylene glycol, 17 g, nasogastric tube, Daily  sennosides-docusate sodium, 2 tablet, oral, BID  thiamine, 100 mg, oral, Daily      Continuous medications  dexmedeTOMIDine, 0-1.5 mcg/kg/hr, Last Rate: 0.22 mcg/kg/hr (09/11/24 0818)  norepinephrine, 0-0.5 mcg/kg/min, Last Rate: 0.03 mcg/kg/min (09/11/24 0925)      PRN medications  PRN medications:  alteplase, dextrose, dextrose, glucagon, glucagon, ipratropium-albuteroL, LORazepam, oxygen

## 2024-09-11 NOTE — PROGRESS NOTES
09/11/24 1547   Discharge Planning   Expected Discharge Disposition   (TBD)         Patient remains in ICU. Revisited patient at bedside. Patient is on airvo. He is still on pressor.  Patient not in condition to make decisions on placement or discharge needs. SW called patient's sister but she wants to wait to make decisions on SNF. Will continue to follow for discharge needs when more appropriate and when patient is able to communicate his needs.

## 2024-09-12 ENCOUNTER — APPOINTMENT (OUTPATIENT)
Dept: RADIOLOGY | Facility: HOSPITAL | Age: 69
DRG: 870 | End: 2024-09-12
Payer: MEDICARE

## 2024-09-12 LAB
ALBUMIN SERPL BCP-MCNC: 2.3 G/DL (ref 3.4–5)
ANION GAP BLDV CALCULATED.4IONS-SCNC: -4 MMOL/L (ref 10–25)
ANION GAP SERPL CALC-SCNC: 11 MMOL/L (ref 10–20)
BASE EXCESS BLDV CALC-SCNC: 13 MMOL/L (ref -2–3)
BODY TEMPERATURE: 37 DEGREES CELSIUS
BUN SERPL-MCNC: 41 MG/DL (ref 6–23)
CA-I BLDV-SCNC: 1.14 MMOL/L (ref 1.1–1.33)
CALCIUM SERPL-MCNC: 8 MG/DL (ref 8.6–10.3)
CHLORIDE BLDV-SCNC: 100 MMOL/L (ref 98–107)
CHLORIDE SERPL-SCNC: 101 MMOL/L (ref 98–107)
CO2 SERPL-SCNC: 36 MMOL/L (ref 21–32)
CREAT SERPL-MCNC: 0.98 MG/DL (ref 0.5–1.3)
EGFRCR SERPLBLD CKD-EPI 2021: 83 ML/MIN/1.73M*2
ERYTHROCYTE [DISTWIDTH] IN BLOOD BY AUTOMATED COUNT: 13.9 % (ref 11.5–14.5)
GLUCOSE BLD MANUAL STRIP-MCNC: 130 MG/DL (ref 74–99)
GLUCOSE BLD MANUAL STRIP-MCNC: 147 MG/DL (ref 74–99)
GLUCOSE BLD MANUAL STRIP-MCNC: 165 MG/DL (ref 74–99)
GLUCOSE BLD MANUAL STRIP-MCNC: 169 MG/DL (ref 74–99)
GLUCOSE BLD MANUAL STRIP-MCNC: 185 MG/DL (ref 74–99)
GLUCOSE BLD MANUAL STRIP-MCNC: 214 MG/DL (ref 74–99)
GLUCOSE BLDV-MCNC: 200 MG/DL (ref 74–99)
GLUCOSE SERPL-MCNC: 148 MG/DL (ref 74–99)
HCO3 BLDV-SCNC: 44.1 MMOL/L (ref 22–26)
HCT VFR BLD AUTO: 58.7 % (ref 41–52)
HCT VFR BLD EST: 59 % (ref 41–52)
HGB BLD-MCNC: 18.3 G/DL (ref 13.5–17.5)
HGB BLDV-MCNC: 19.5 G/DL (ref 13.5–17.5)
INHALED O2 CONCENTRATION: 21 %
LACTATE BLDV-SCNC: 1.6 MMOL/L (ref 0.4–2)
MAGNESIUM SERPL-MCNC: 2.4 MG/DL (ref 1.6–2.4)
MCH RBC QN AUTO: 31.9 PG (ref 26–34)
MCHC RBC AUTO-ENTMCNC: 31.2 G/DL (ref 32–36)
MCV RBC AUTO: 102 FL (ref 80–100)
NRBC BLD-RTO: 0 /100 WBCS (ref 0–0)
OXYHGB MFR BLDV: 67.7 % (ref 45–75)
PCO2 BLDV: 78 MM HG (ref 41–51)
PH BLDV: 7.36 PH (ref 7.33–7.43)
PHOSPHATE SERPL-MCNC: 3.2 MG/DL (ref 2.5–4.9)
PLATELET # BLD AUTO: 235 X10*3/UL (ref 150–450)
PO2 BLDV: 42 MM HG (ref 35–45)
POTASSIUM BLDV-SCNC: 4.7 MMOL/L (ref 3.5–5.3)
POTASSIUM SERPL-SCNC: 4.7 MMOL/L (ref 3.5–5.3)
RBC # BLD AUTO: 5.73 X10*6/UL (ref 4.5–5.9)
SAO2 % BLDV: 69 % (ref 45–75)
SODIUM BLDV-SCNC: 135 MMOL/L (ref 136–145)
SODIUM SERPL-SCNC: 143 MMOL/L (ref 136–145)
WBC # BLD AUTO: 13.2 X10*3/UL (ref 4.4–11.3)

## 2024-09-12 PROCEDURE — 71045 X-RAY EXAM CHEST 1 VIEW: CPT | Performed by: STUDENT IN AN ORGANIZED HEALTH CARE EDUCATION/TRAINING PROGRAM

## 2024-09-12 PROCEDURE — 2020000001 HC ICU ROOM DAILY

## 2024-09-12 PROCEDURE — 94640 AIRWAY INHALATION TREATMENT: CPT

## 2024-09-12 PROCEDURE — 2500000004 HC RX 250 GENERAL PHARMACY W/ HCPCS (ALT 636 FOR OP/ED)

## 2024-09-12 PROCEDURE — 2500000001 HC RX 250 WO HCPCS SELF ADMINISTERED DRUGS (ALT 637 FOR MEDICARE OP): Performed by: NURSE PRACTITIONER

## 2024-09-12 PROCEDURE — 2500000004 HC RX 250 GENERAL PHARMACY W/ HCPCS (ALT 636 FOR OP/ED): Performed by: INTERNAL MEDICINE

## 2024-09-12 PROCEDURE — 94668 MNPJ CHEST WALL SBSQ: CPT

## 2024-09-12 PROCEDURE — 80069 RENAL FUNCTION PANEL: CPT | Performed by: ANESTHESIOLOGY

## 2024-09-12 PROCEDURE — 2500000002 HC RX 250 W HCPCS SELF ADMINISTERED DRUGS (ALT 637 FOR MEDICARE OP, ALT 636 FOR OP/ED)

## 2024-09-12 PROCEDURE — 82947 ASSAY GLUCOSE BLOOD QUANT: CPT

## 2024-09-12 PROCEDURE — 83735 ASSAY OF MAGNESIUM: CPT | Performed by: NURSE PRACTITIONER

## 2024-09-12 PROCEDURE — 2500000001 HC RX 250 WO HCPCS SELF ADMINISTERED DRUGS (ALT 637 FOR MEDICARE OP): Performed by: SURGERY

## 2024-09-12 PROCEDURE — 36415 COLL VENOUS BLD VENIPUNCTURE: CPT | Performed by: SURGERY

## 2024-09-12 PROCEDURE — 2500000001 HC RX 250 WO HCPCS SELF ADMINISTERED DRUGS (ALT 637 FOR MEDICARE OP)

## 2024-09-12 PROCEDURE — 5A09357 ASSISTANCE WITH RESPIRATORY VENTILATION, LESS THAN 24 CONSECUTIVE HOURS, CONTINUOUS POSITIVE AIRWAY PRESSURE: ICD-10-PCS | Performed by: SURGERY

## 2024-09-12 PROCEDURE — 9420000001 HC RT PATIENT EDUCATION 5 MIN

## 2024-09-12 PROCEDURE — 94660 CPAP INITIATION&MGMT: CPT

## 2024-09-12 PROCEDURE — 84132 ASSAY OF SERUM POTASSIUM: CPT | Performed by: SURGERY

## 2024-09-12 PROCEDURE — 2500000004 HC RX 250 GENERAL PHARMACY W/ HCPCS (ALT 636 FOR OP/ED): Performed by: ANESTHESIOLOGY

## 2024-09-12 PROCEDURE — 2500000005 HC RX 250 GENERAL PHARMACY W/O HCPCS: Performed by: HOSPITALIST

## 2024-09-12 PROCEDURE — 71045 X-RAY EXAM CHEST 1 VIEW: CPT

## 2024-09-12 PROCEDURE — 5A0945A ASSISTANCE WITH RESPIRATORY VENTILATION, 24-96 CONSECUTIVE HOURS, HIGH NASAL FLOW/VELOCITY: ICD-10-PCS | Performed by: SURGERY

## 2024-09-12 PROCEDURE — 2500000005 HC RX 250 GENERAL PHARMACY W/O HCPCS: Performed by: SURGERY

## 2024-09-12 PROCEDURE — 2500000004 HC RX 250 GENERAL PHARMACY W/ HCPCS (ALT 636 FOR OP/ED): Performed by: NURSE PRACTITIONER

## 2024-09-12 PROCEDURE — 37799 UNLISTED PX VASCULAR SURGERY: CPT | Performed by: ANESTHESIOLOGY

## 2024-09-12 PROCEDURE — 2500000002 HC RX 250 W HCPCS SELF ADMINISTERED DRUGS (ALT 637 FOR MEDICARE OP, ALT 636 FOR OP/ED): Performed by: INTERNAL MEDICINE

## 2024-09-12 PROCEDURE — 99291 CRITICAL CARE FIRST HOUR: CPT | Performed by: SURGERY

## 2024-09-12 PROCEDURE — 85027 COMPLETE CBC AUTOMATED: CPT | Performed by: ANESTHESIOLOGY

## 2024-09-12 RX ORDER — FUROSEMIDE 10 MG/ML
40 INJECTION INTRAMUSCULAR; INTRAVENOUS ONCE
Status: COMPLETED | OUTPATIENT
Start: 2024-09-12 | End: 2024-09-12

## 2024-09-12 RX ORDER — LEVOFLOXACIN 750 MG/1
750 TABLET ORAL
Status: DISCONTINUED | OUTPATIENT
Start: 2024-09-12 | End: 2024-09-18

## 2024-09-12 RX ORDER — PREDNISONE 20 MG/1
20 TABLET ORAL DAILY
Status: DISCONTINUED | OUTPATIENT
Start: 2024-09-12 | End: 2024-09-21

## 2024-09-12 ASSESSMENT — PAIN SCALES - GENERAL
PAINLEVEL_OUTOF10: 0 - NO PAIN

## 2024-09-12 ASSESSMENT — COGNITIVE AND FUNCTIONAL STATUS - GENERAL
MOBILITY SCORE: 6
WALKING IN HOSPITAL ROOM: TOTAL
MOVING TO AND FROM BED TO CHAIR: TOTAL
STANDING UP FROM CHAIR USING ARMS: TOTAL
MOVING FROM LYING ON BACK TO SITTING ON SIDE OF FLAT BED WITH BEDRAILS: TOTAL
CLIMB 3 TO 5 STEPS WITH RAILING: TOTAL
TURNING FROM BACK TO SIDE WHILE IN FLAT BAD: TOTAL

## 2024-09-12 ASSESSMENT — PAIN - FUNCTIONAL ASSESSMENT
PAIN_FUNCTIONAL_ASSESSMENT: 0-10

## 2024-09-12 NOTE — CARE PLAN
Problem: Nutrition  Goal: Less than 5 days NPO/clear liquids  Outcome: Met  Goal: Adequate PO fluid intake  Outcome: Met     Problem: Mechanical Ventilation  Goal: Patient Will Maintain Patent Airway  Outcome: Met

## 2024-09-12 NOTE — PROGRESS NOTES
Care Coordinator Note:    Plan:patient remains on airvo 60%. Levophed off. ID following- remains on iv zosyn and levofloxacin. R chest tube and Iv steroids. NMR. PT OT pending    Status: inpatient  Payor: [employer] mcare  Disposition: PT O tpending. ? SNF placemenet. Will need auth.   Barrier: CT. Airvo. ID recs  ADOD: ?weekend    Tennille Mala T[facility]     09/12/24 1436   Discharge Planning   Expected Discharge Disposition SNF  (PT OT pending)

## 2024-09-12 NOTE — CARE PLAN
Problem: Skin  Goal: Decreased wound size/increased tissue granulation at next dressing change  Outcome: Progressing  Goal: Prevent/manage excess moisture  Outcome: Progressing  Goal: Prevent/minimize sheer/friction injuries  Outcome: Progressing  Goal: Promote/optimize nutrition  Outcome: Progressing     Problem: Nutrition  Goal: Oral intake greater than 50%  Outcome: Progressing  Goal: Oral intake greater 75%  Outcome: Progressing  Goal: Consume prescribed supplement  Outcome: Progressing  Goal: Nutrition support goals are met within 48 hrs  Outcome: Progressing  Goal: Nutrition support is meeting 75% of nutrient needs  Outcome: Progressing  Goal: Tube feed tolerance  Outcome: Progressing  Goal: BG  mg/dL  Outcome: Progressing  Goal: Lab values WNL  Outcome: Progressing  Goal: Electrolytes WNL  Outcome: Progressing  Goal: Promote healing  Outcome: Progressing  Goal: Maintain stable weight  Outcome: Progressing  Goal: Reduce weight from edema/fluid  Outcome: Progressing  Goal: Gradual weight gain  Outcome: Progressing  Goal: Improve ostomy output  Outcome: Progressing     Problem: Diabetes  Goal: Achieve decreasing blood glucose levels by end of shift  Outcome: Progressing  Goal: Increase stability of blood glucose readings by end of shift  Outcome: Progressing  Goal: Decrease in ketones present in urine by end of shift  Outcome: Progressing  Goal: Maintain electrolyte levels within acceptable range throughout shift  Outcome: Progressing  Goal: Maintain glucose levels >70mg/dl to <250mg/dl throughout shift  Outcome: Progressing  Goal: No changes in neurological exam by end of shift  Outcome: Progressing  Goal: Learn about and adhere to nutrition recommendations by end of shift  Outcome: Progressing  Goal: Vital signs within normal range for age by end of shift  Outcome: Progressing  Goal: Increase self care and/or family involovement by end of shift  Outcome: Progressing  Goal: Receive DSME education by end  of shift  Outcome: Progressing   The patient's goals for the shift include    The clinical goals for the shift include Pt will remain off of levo throughout this shift

## 2024-09-12 NOTE — CARE PLAN
Problem: Skin  Goal: Decreased wound size/increased tissue granulation at next dressing change  Outcome: Progressing  Goal: Prevent/manage excess moisture  Outcome: Progressing  Flowsheets (Taken 9/12/2024 1811)  Prevent/manage excess moisture:   Monitor for/manage infection if present   Moisturize dry skin   Cleanse incontinence/protect with barrier cream  Goal: Prevent/minimize sheer/friction injuries  Outcome: Progressing  Flowsheets (Taken 9/12/2024 1811)  Prevent/minimize sheer/friction injuries:   Use pull sheet   HOB 30 degrees or less   Turn/reposition every 2 hours/use positioning/transfer devices  Goal: Promote/optimize nutrition  Outcome: Progressing  Flowsheets (Taken 9/12/2024 1811)  Promote/optimize nutrition:   Assist with feeding   Monitor/record intake including meals

## 2024-09-12 NOTE — SIGNIFICANT EVENT
Alo Glass is a 69 y.o. male presenting with Acute respiratory failure with hypoxia and hypercapnia  medical history significant for current tobacco use, ETOH abuse, CHF, COPD, MELISSA, and DM     09/09: full dose lytic therapy total 1250cc out in 24 hours     09/10:  At 1400 tPA/Dornase administered to patient.  Patient and nursing team advised regarding recommendations including adjusting position every 15-30 min (lay on side, sit, stand, walk, etc) during 2 hour instillation and verbalized understanding.  Reviewed red flag symptoms that warrant emergency evaluation including severe pain or dyspnea.  Bedside RN updated on plan of care.  All questions addressed.     Total amount of fluid instilled: 150cc  Amount in atrium prior to instillation: 1250 cc   Total out s/p instillation: 750cc  sanguinous drainage     9/11: CXR reviewed effusion improved, PTX resolved- No need for additional lytic therapy today.   Repeat CXR in AM       9/12: CXR reviewed effusion improved, no ptx, Chest tube with minimal output overnight, w/o air leak.    Loculated effusion- improved   Ct  to waterseal today repeat cxr in AM if stable will plan for ct removal.   Daily cxr while ct in place     Discussed with Dr Watson and Dr Johns all are in agreement with current plan  Shaila Morales, PARAM-CNP    Time spent on the assessment of patient, gathering and interpreting data, review of medical record/patient history, personally reviewing radiographic imaging and formulation of this note. With greater than 50% spent in personal discussion with patient/ family.    Time: 15 minutes

## 2024-09-12 NOTE — PROGRESS NOTES
"Nutrition Follow-up Note     Pt extubated 9/9, remains on HFNC/BiPap.   Minimal oral intake per nursing.     History:  Food and Nutrient History  Food and Nutrient History: diet upgraded today to regular diet    Dietary Orders (From admission, onward)       Start     Ordered    09/12/24 1207  Adult diet Regular  Diet effective now        Question:  Diet type  Answer:  Regular    09/12/24 1206                    Anthropometrics:  Height: 180.3 cm (5' 10.98\")  Weight: 129 kg (284 lb 6.3 oz)  BMI (Calculated): 39.68      Weight Change  Weight History / % Weight Change: 136 kg 9/6/24 per RDN consult; 159 kg 3/8/24, 145 kg 3/12/24,  132 kg 4/23/24, 133 kg 6/25/24(pt on lasix PTA, noted 3/8/24 admit to OSH for HF with IV diuresis this admission, on oral lasix at home PTA)  Significant Weight Loss: No (aggressive diuresis this admission most likely etiology of 7 kg wt decrease)      IBW/kg (Dietitian Calculated): 78.2 kg  Percent of IBW: 174 %     Scheduled medications  aspirin, 81 mg, oral, Daily  enoxaparin, 40 mg, subcutaneous, q12h SHARRON  ezetimibe, 10 mg, oral, Daily  folic acid, 1 mg, oral, Daily  insulin lispro, 0-10 Units, subcutaneous, q4h  ipratropium-albuteroL, 3 mL, nebulization, 4x daily  levoFLOXacin, 750 mg, oral, q24h SHARRON  levothyroxine, 125 mcg, oral, Daily  multivitamin with minerals, 1 tablet, oral, Daily  nystatin, 5 mL, Swish & Swallow, 4x daily  pantoprazole, 40 mg, intravenous, Daily  piperacillin-tazobactam, 3.375 g, intravenous, q6h  polyethylene glycol, 17 g, nasogastric tube, Daily  predniSONE, 20 mg, oral, Daily  sennosides-docusate sodium, 2 tablet, oral, BID  thiamine, 100 mg, oral, Daily      Continuous medications  dexmedeTOMIDine, 0-1.5 mcg/kg/hr, Last Rate: 0.3 mcg/kg/hr (09/12/24 1555)  norepinephrine, 0-0.5 mcg/kg/min, Last Rate: 0 mcg/kg/min (09/12/24 1001)      PRN medications  PRN medications: alteplase, dextrose, dextrose, glucagon, glucagon, ipratropium-albuteroL, LORazepam, oxygen, " "oxygen, oxygen       Latest Reference Range & Units 09/12/24 03:06   GLUCOSE 74 - 99 mg/dL 148 (H)   SODIUM 136 - 145 mmol/L 143   POTASSIUM 3.5 - 5.3 mmol/L 4.7   CHLORIDE 98 - 107 mmol/L 101   Bicarbonate 21 - 32 mmol/L 36 (H)   Anion Gap 10 - 20 mmol/L 11   Blood Urea Nitrogen 6 - 23 mg/dL 41 (H)   Creatinine 0.50 - 1.30 mg/dL 0.98   EGFR >60 mL/min/1.73m*2 83   Calcium 8.6 - 10.3 mg/dL 8.0 (L)   PHOSPHORUS 2.5 - 4.9 mg/dL 3.2   Albumin 3.4 - 5.0 g/dL 2.3 (L)   MAGNESIUM 1.60 - 2.40 mg/dL 2.40   (H): Data is abnormally high  (L): Data is abnormally low      I/O last 3 completed shifts:  In: 3431.7 (26.6 mL/kg) [P.O.:1360; I.V.:1621.7 (12.6 mL/kg); IV Piggyback:450]  Out: 5365 (41.6 mL/kg) [Urine:5070 (1.1 mL/kg/hr); Chest Tube:295]  Weight: 129 kg   I/O this shift:  In: 444 [P.O.:444]  Out: 1305 [Urine:1285; Chest Tube:20]      Energy Needs:  Calculated Energy Needs Using Equations  Height: 180.3 cm (5' 10.98\")  Weight Used for Equation Calculations: 129 kg (284 lb 6.3 oz)  [facility] Equation (Critically Ill Patients): 2138  Minute Ventilation (L/min): 12.3 L/min  Sky[facility] Equation (Overweight or Obese Patients): 2077  Equation Chosen to Use by RD: Myla Crow  Activity Factor: 1.2  Stress Factor: 1.2  Total Energy Needs: 3000  Temp: 35.8 °C (96.4 °F)    Estimated Energy Needs  Method for Estimating Needs: 0481-3889 kcal/day    Estimated Protein Needs  Total Protein Estimated Needs (g): 155 g  Total Protein Estimated Needs (g/kg): 2 g/kg (IBW)  Method for Estimating Needs: 115-155 g/day protein (1.5-2.0 g/kg IBW)    Estimated Fluid Needs  Method for Estimating Needs: per MD         Nutrition Focused Physical Findings:  Subcutaneous Fat Loss  Orbital Fat Pads: Mild-Moderate (slight dark circles and slight hollowing) (NFPE per visualization, RRT and RN at bedside , noted extensive edema)  Buccal Fat Pads: Well nourished (full, rounded cheeks)  Triceps: Well nourished (ample fat tissue)    Muscle " Wasting  Temporalis: Mild-Moderate (slight depression)  Pectoralis (Clavicular Region): Well nourished (clavicle not visible)  Deltoid/Trapezius: Well nourished (rounded appearance at arm, shoulder, neck)  Interosseous: Well nourished (muscle bulges)    Edema  Edema: +4 severe  Edema Location: generalized         Physical Findings (Nutrition Deficiency/Toxicity)  Skin: Negative       Nutrition Diagnosis   Malnutrition Diagnosis  Patient has Malnutrition Diagnosis: No    Patient has Nutrition Diagnosis: Yes  Nutrition Diagnosis 1: Inadequate oral intake  Diagnosis Status (1): Ongoing  Related to (1): respiratory status, increased oxygen demands  As Evidenced by (1): oral intake less than 50% of meals per reporte  Additional Assessment Information (1): pt was receiving vital 1.5 @ 65 ml/hr while intubated, extubated 9/9 overnight                                         Nutrition Interventions/Recommendations   Nutrition Prescription  Individualized Nutrition Prescription Provided for : pt at increased aspiration risk 2/2 increased oxygen demands. suggest cautiously continue oral diet as ordered/tolerated, however suggest downgrade to NPO status if indicated  - add ensure plus TID with meals   - strict I/O and daily wts, monitor wt trend   - suggest change bowel regimen from scheduled to prn only, noted x 3 BM today so far per documentation   - RFP, Mg daily; replete prn     Food and/or Nutrient Delivery Interventions  Interventions: Meals and snacks    Meals and Snacks: Other (Comment)  Goal: oral diet per MD      Coordination of Nutrition Care by a Nutrition Professional  Collaboration and Referral of Nutrition Care: Collaboration by nutrition professional with other providers    Education Documentation  No documentation found.        Nutrition Monitoring and Evaluation   Food and Nutrient Related History  Energy Intake: Estimated energy intake  Criteria: greater than 75% of meals    Fluid Intake: Estimated fluid  intake  Criteria: monitor closely, at least 75% of daily fluid recommendations         Anthropometrics: Body Composition/Growth/Weight History  Weight: Weight change  Criteria: daily wts, monitor trend      Biochemical Data, Medical Tests and Procedures  Electrolyte and Renal Panel: BUN, Sodium, Calcium, serum, Chloride, Creatinine, Magnesium, Phosphorus, Potassium  Criteria: daily    Gastrointestinal Profile: Alanine aminotransferase (ALT), Alkaline phosphatase, Bilirubin, total, Aspartate aminotransferase (AST)  Criteria: as per MD    Glucose/Endocrine Profile: Glucose, casual  Criteria: daily, or as per MD    Nutritional Anemia Profile: Hematocrit, Hemoglobin, Iron, serum  Criteria: as per MD    Vitamin Profile: Vitamin D, 25 hydroxy  Criteria: as indicated    Nutrition Focused Physical Findings  Adipose: Other (Comment)  Criteria: monitor NFPE    Bones: Other (Comment)  Criteria: monitor skeletal integrity    Digestive System: Abdominal distension, Increased appetite, Nausea, Vomiting, Anorexia, Ascites, Constipation, Decrease in appetite, Diarrhea, Early satiety, Other (Comment)  Criteria: monitor GI function closely    Muscles: Muscle atrophy  Criteria: monitor NFPE    Skin: Other (Comment)  Criteria: monitor skin itegrity closely         Follow Up  Time Spent (min): 60 minutes  Last Date of Nutrition Visit: 09/12/24  Nutrition Follow-Up Needed?: Dietitian to reassess per policy  Follow up Comment: oral intake, HFNC/BiPap; HEIDI

## 2024-09-12 NOTE — SIGNIFICANT EVENT
"Asked by RN to come to room per patient's request for \"palliative measures\" . Lengthy discussion related to his current condition , including a brief review of his hospital course to date. There has been some improvement in his condition based upon overall reduced need for additional oxygen concentration. At present he is unable to clearly define what his goals are. He related a pre hospitalization poor performance status. He stated he enjoys being outside but can walk less than 100 feet at a time. He relates he has no family ( did not mention sister Alia) He mentioned friend and stated that he believed they would like him to improve. Affect flat poor eye contact. Soft voice Appears to be be overwhelmed with current situation. Conversion reaction . Will re approach in AM if persistent in request for Palliative medicine will obtain consult.   Osman Mensah MD    "

## 2024-09-12 NOTE — CARE PLAN
Problem: Skin  Goal: Decreased wound size/increased tissue granulation at next dressing change  Outcome: Progressing  Flowsheets (Taken 9/8/2024 2259 by Thiago Bower RN)  Decreased wound size/increased tissue granulation at next dressing change:   Protective dressings over bony prominences   Utilize specialty bed per algorithm  Goal: Participates in plan/prevention/treatment measures  Outcome: Met  Flowsheets (Taken 9/10/2024 1825 by Katelynn Garner, RN)  Participates in plan/prevention/treatment measures: Elevate heels  Goal: Prevent/manage excess moisture  Outcome: Progressing  Flowsheets (Taken 9/11/2024 0548)  Prevent/manage excess moisture: Cleanse incontinence/protect with barrier cream  Goal: Prevent/minimize sheer/friction injuries  Outcome: Progressing  Flowsheets (Taken 9/11/2024 0548)  Prevent/minimize sheer/friction injuries: Turn/reposition every 2 hours/use positioning/transfer devices  Goal: Promote/optimize nutrition  Outcome: Progressing  Flowsheets (Taken 9/10/2024 1825 by Katelynn Garner, RN)  Promote/optimize nutrition: Monitor/record intake including meals

## 2024-09-12 NOTE — PROGRESS NOTES
"  INFECTIOUS DISEASE DAILY PROGRESS NOTE    SUBJECTIVE:    No new issues. Is off Levophed this morning. MAPs are OK with systolic BP around 80-90 range. Airvo down to 60% FiO2 now.    OBJECTIVE:  VITALS (Last 24 Hours)  BP 83/59   Pulse 79   Temp 35.7 °C (96.2 °F) (Temporal)   Resp 24   Ht 1.803 m (5' 10.98\")   Wt 129 kg (284 lb 6.3 oz)   SpO2 95%   BMI 39.68 kg/m²     PHYSICAL EXAM:  Gen - NAD, Airvo 60% FiO2  Lungs - chest tube right side with serosang output  Abd - soft, no ttp, BS present   - Live present  Skin - no rash    ABX: IV Zosyn and IV Levofloxacin    LABS:  Lab Results   Component Value Date    WBC 13.2 (H) 09/12/2024    HGB 18.3 (H) 09/12/2024    HCT 58.7 (H) 09/12/2024     (H) 09/12/2024     09/12/2024     Lab Results   Component Value Date    GLUCOSE 148 (H) 09/12/2024    CALCIUM 8.0 (L) 09/12/2024     09/12/2024    K 4.7 09/12/2024    CO2 36 (H) 09/12/2024     09/12/2024    BUN 41 (H) 09/12/2024    CREATININE 0.98 09/12/2024     Results from last 72 hours   Lab Units 09/12/24  0306 09/11/24  0447   ALK PHOS U/L  --  69   BILIRUBIN TOTAL mg/dL  --  1.0   PROTEIN TOTAL g/dL  --  4.9*   ALT U/L  --  21   AST U/L  --  15   ALBUMIN g/dL 2.3* 2.4*     Estimated Creatinine Clearance: 97.4 mL/min (by C-G formula based on SCr of 0.98 mg/dL).    ASSESSMENT/PLAN:     Acute Hypoxic/Hypercapnic Respiratory Failure - intubated 9/4. Extubated but on Airvo with hypoxia still. Is improving slowly with less O2 needs last few days.  Septic Shock - hypotension present but overall shock state seems to be resolving. Off Levophed this morning.  PNA due to Stenotrophomonas maltophilia  Right Loculated Pleural Effusion - s/p chest tube 9/4, 71K WBCs neutrophilic with pH 7.9. Glucose <10 and exudate by Light's criteria. Consistent with an empyema. No growth.     IV Zosyn and Levofloxacin still.    Monitoring for adverse effects of abx such as rash/itching/diarrhea - none present.    Will " follow. Thanks! D/w Dr. Nat Busby MD  [facility]  Office #[phone]

## 2024-09-13 ENCOUNTER — APPOINTMENT (OUTPATIENT)
Dept: RADIOLOGY | Facility: HOSPITAL | Age: 69
DRG: 870 | End: 2024-09-13
Payer: MEDICARE

## 2024-09-13 ENCOUNTER — APPOINTMENT (OUTPATIENT)
Dept: CARDIOLOGY | Facility: HOSPITAL | Age: 69
DRG: 870 | End: 2024-09-13
Payer: MEDICARE

## 2024-09-13 LAB
ALBUMIN SERPL BCP-MCNC: 2.3 G/DL (ref 3.4–5)
ANION GAP SERPL CALC-SCNC: 10 MMOL/L (ref 10–20)
ATRIAL RATE: 63 BPM
BUN SERPL-MCNC: 41 MG/DL (ref 6–23)
CALCIUM SERPL-MCNC: 8.1 MG/DL (ref 8.6–10.3)
CHLORIDE SERPL-SCNC: 99 MMOL/L (ref 98–107)
CO2 SERPL-SCNC: 37 MMOL/L (ref 21–32)
CREAT SERPL-MCNC: 0.87 MG/DL (ref 0.5–1.3)
EGFRCR SERPLBLD CKD-EPI 2021: >90 ML/MIN/1.73M*2
ERYTHROCYTE [DISTWIDTH] IN BLOOD BY AUTOMATED COUNT: 13.4 % (ref 11.5–14.5)
GLUCOSE BLD MANUAL STRIP-MCNC: 150 MG/DL (ref 74–99)
GLUCOSE BLD MANUAL STRIP-MCNC: 161 MG/DL (ref 74–99)
GLUCOSE BLD MANUAL STRIP-MCNC: 169 MG/DL (ref 74–99)
GLUCOSE BLD MANUAL STRIP-MCNC: 195 MG/DL (ref 74–99)
GLUCOSE BLD MANUAL STRIP-MCNC: 233 MG/DL (ref 74–99)
GLUCOSE BLD MANUAL STRIP-MCNC: 247 MG/DL (ref 74–99)
GLUCOSE SERPL-MCNC: 172 MG/DL (ref 74–99)
HCT VFR BLD AUTO: 57.6 % (ref 41–52)
HGB BLD-MCNC: 18.2 G/DL (ref 13.5–17.5)
MCH RBC QN AUTO: 32.6 PG (ref 26–34)
MCHC RBC AUTO-ENTMCNC: 31.6 G/DL (ref 32–36)
MCV RBC AUTO: 103 FL (ref 80–100)
NRBC BLD-RTO: 0 /100 WBCS (ref 0–0)
P AXIS: -13 DEGREES
P OFFSET: 150 MS
P ONSET: 71 MS
PHOSPHATE SERPL-MCNC: 2.8 MG/DL (ref 2.5–4.9)
PLATELET # BLD AUTO: 195 X10*3/UL (ref 150–450)
POTASSIUM SERPL-SCNC: 4.4 MMOL/L (ref 3.5–5.3)
PR INTERVAL: 250 MS
Q ONSET: 196 MS
QRS COUNT: 11 BEATS
QRS DURATION: 156 MS
QT INTERVAL: 438 MS
QTC CALCULATION(BAZETT): 448 MS
QTC FREDERICIA: 445 MS
R AXIS: -74 DEGREES
RBC # BLD AUTO: 5.59 X10*6/UL (ref 4.5–5.9)
SODIUM SERPL-SCNC: 142 MMOL/L (ref 136–145)
T AXIS: 38 DEGREES
T OFFSET: 415 MS
VENTRICULAR RATE: 63 BPM
WBC # BLD AUTO: 11.4 X10*3/UL (ref 4.4–11.3)

## 2024-09-13 PROCEDURE — 71045 X-RAY EXAM CHEST 1 VIEW: CPT | Performed by: RADIOLOGY

## 2024-09-13 PROCEDURE — 99232 SBSQ HOSP IP/OBS MODERATE 35: CPT | Performed by: NURSE PRACTITIONER

## 2024-09-13 PROCEDURE — 2500000005 HC RX 250 GENERAL PHARMACY W/O HCPCS: Performed by: SURGERY

## 2024-09-13 PROCEDURE — 2500000004 HC RX 250 GENERAL PHARMACY W/ HCPCS (ALT 636 FOR OP/ED): Performed by: INTERNAL MEDICINE

## 2024-09-13 PROCEDURE — 80069 RENAL FUNCTION PANEL: CPT | Performed by: SURGERY

## 2024-09-13 PROCEDURE — 94660 CPAP INITIATION&MGMT: CPT

## 2024-09-13 PROCEDURE — 93010 ELECTROCARDIOGRAM REPORT: CPT | Performed by: INTERNAL MEDICINE

## 2024-09-13 PROCEDURE — 94640 AIRWAY INHALATION TREATMENT: CPT

## 2024-09-13 PROCEDURE — 71045 X-RAY EXAM CHEST 1 VIEW: CPT

## 2024-09-13 PROCEDURE — 2500000004 HC RX 250 GENERAL PHARMACY W/ HCPCS (ALT 636 FOR OP/ED)

## 2024-09-13 PROCEDURE — 2020000001 HC ICU ROOM DAILY

## 2024-09-13 PROCEDURE — 85027 COMPLETE CBC AUTOMATED: CPT | Performed by: SURGERY

## 2024-09-13 PROCEDURE — 2500000002 HC RX 250 W HCPCS SELF ADMINISTERED DRUGS (ALT 637 FOR MEDICARE OP, ALT 636 FOR OP/ED)

## 2024-09-13 PROCEDURE — 2500000005 HC RX 250 GENERAL PHARMACY W/O HCPCS: Performed by: HOSPITALIST

## 2024-09-13 PROCEDURE — 2500000001 HC RX 250 WO HCPCS SELF ADMINISTERED DRUGS (ALT 637 FOR MEDICARE OP): Performed by: SURGERY

## 2024-09-13 PROCEDURE — 37799 UNLISTED PX VASCULAR SURGERY: CPT | Performed by: SURGERY

## 2024-09-13 PROCEDURE — 93005 ELECTROCARDIOGRAM TRACING: CPT

## 2024-09-13 PROCEDURE — 99292 CRITICAL CARE ADDL 30 MIN: CPT | Performed by: SURGERY

## 2024-09-13 PROCEDURE — 2500000002 HC RX 250 W HCPCS SELF ADMINISTERED DRUGS (ALT 637 FOR MEDICARE OP, ALT 636 FOR OP/ED): Performed by: INTERNAL MEDICINE

## 2024-09-13 PROCEDURE — 2500000001 HC RX 250 WO HCPCS SELF ADMINISTERED DRUGS (ALT 637 FOR MEDICARE OP): Performed by: NURSE PRACTITIONER

## 2024-09-13 PROCEDURE — 99291 CRITICAL CARE FIRST HOUR: CPT | Performed by: SURGERY

## 2024-09-13 PROCEDURE — 2500000004 HC RX 250 GENERAL PHARMACY W/ HCPCS (ALT 636 FOR OP/ED): Performed by: SURGERY

## 2024-09-13 PROCEDURE — 2500000004 HC RX 250 GENERAL PHARMACY W/ HCPCS (ALT 636 FOR OP/ED): Performed by: ANESTHESIOLOGY

## 2024-09-13 PROCEDURE — 94669 MECHANICAL CHEST WALL OSCILL: CPT

## 2024-09-13 PROCEDURE — 2500000004 HC RX 250 GENERAL PHARMACY W/ HCPCS (ALT 636 FOR OP/ED): Performed by: NURSE PRACTITIONER

## 2024-09-13 PROCEDURE — 82947 ASSAY GLUCOSE BLOOD QUANT: CPT

## 2024-09-13 RX ORDER — FUROSEMIDE 10 MG/ML
40 INJECTION INTRAMUSCULAR; INTRAVENOUS ONCE
Status: COMPLETED | OUTPATIENT
Start: 2024-09-13 | End: 2024-09-13

## 2024-09-13 RX ORDER — MORPHINE SULFATE 2 MG/ML
2 INJECTION, SOLUTION INTRAMUSCULAR; INTRAVENOUS EVERY 4 HOURS PRN
Status: DISCONTINUED | OUTPATIENT
Start: 2024-09-13 | End: 2024-09-14

## 2024-09-13 RX ORDER — FUROSEMIDE 10 MG/ML
60 INJECTION INTRAMUSCULAR; INTRAVENOUS ONCE
Status: COMPLETED | OUTPATIENT
Start: 2024-09-13 | End: 2024-09-13

## 2024-09-13 RX ORDER — GUAIFENESIN 100 MG/5ML
200 SOLUTION ORAL 3 TIMES DAILY
Status: DISCONTINUED | OUTPATIENT
Start: 2024-09-13 | End: 2024-09-18

## 2024-09-13 ASSESSMENT — PAIN SCALES - GENERAL
PAINLEVEL_OUTOF10: 0 - NO PAIN

## 2024-09-13 ASSESSMENT — COGNITIVE AND FUNCTIONAL STATUS - GENERAL
EATING MEALS: TOTAL
WALKING IN HOSPITAL ROOM: TOTAL
CLIMB 3 TO 5 STEPS WITH RAILING: TOTAL
TOILETING: TOTAL
MOVING TO AND FROM BED TO CHAIR: TOTAL
DRESSING REGULAR UPPER BODY CLOTHING: TOTAL
PERSONAL GROOMING: TOTAL
MOBILITY SCORE: 6
TURNING FROM BACK TO SIDE WHILE IN FLAT BAD: TOTAL
MOVING FROM LYING ON BACK TO SITTING ON SIDE OF FLAT BED WITH BEDRAILS: TOTAL
HELP NEEDED FOR BATHING: TOTAL
DRESSING REGULAR LOWER BODY CLOTHING: TOTAL
DAILY ACTIVITIY SCORE: 6
STANDING UP FROM CHAIR USING ARMS: TOTAL

## 2024-09-13 ASSESSMENT — PAIN - FUNCTIONAL ASSESSMENT
PAIN_FUNCTIONAL_ASSESSMENT: 0-10

## 2024-09-13 NOTE — PROGRESS NOTES
"Alo Glass is a 69 y.o. male on day 9 of admission presenting with Acute respiratory failure with hypoxia and hypercapnia (Multi).    Subjective   Some difficulty this morning with adequate oxygenation on airvo and CPAP but with some pulmonary hygiene and repositioning we were able to maintain the patient on airvo throughout the remainder of the day.  He was more alert and conversant but still having some issues with mental clarity per family. Strong cough, productive of yellow sputum. Chest tube removed today per thoracic surgery. Continued diuresis.    Patient has been considering a palliative approach to his care, but at this time we are continuing full support.  This evening we are planning to clarify his wishes regarding reintubation if if becomes necessary.     Last Recorded Vitals  Blood pressure 90/66, pulse 67, temperature 36.2 °C (97.2 °F), temperature source Temporal, resp. rate 22, height 1.803 m (5' 10.98\"), weight 129 kg (283 lb 8.2 oz), SpO2 96%.  Intake/Output last 3 Shifts:  I/O last 3 completed shifts:  In: 1453.6 (11.3 mL/kg) [P.O.:1394; I.V.:59.6 (0.5 mL/kg)]  Out: 4715 (36.7 mL/kg) [Urine:4685 (1 mL/kg/hr); Chest Tube:30]  Weight: 128.6 kg     Relevant Results  Scheduled medications  aspirin, 81 mg, oral, Daily  enoxaparin, 40 mg, subcutaneous, q12h SHARRON  ezetimibe, 10 mg, oral, Daily  folic acid, 1 mg, oral, Daily  guaiFENesin, 200 mg, oral, TID  insulin lispro, 0-10 Units, subcutaneous, q4h  ipratropium-albuteroL, 3 mL, nebulization, 4x daily  levoFLOXacin, 750 mg, oral, q24h SHARRON  levothyroxine, 125 mcg, oral, Daily  multivitamin with minerals, 1 tablet, oral, Daily  nystatin, 5 mL, Swish & Swallow, 4x daily  pantoprazole, 40 mg, intravenous, Daily  piperacillin-tazobactam, 3.375 g, intravenous, q6h  polyethylene glycol, 17 g, nasogastric tube, Daily  predniSONE, 20 mg, oral, Daily  sennosides-docusate sodium, 2 tablet, oral, BID  thiamine, 100 mg, oral, Daily      Continuous " medications  dexmedeTOMIDine, 0-1.5 mcg/kg/hr, Last Rate: Stopped (09/13/24 1033)  norepinephrine, 0-0.5 mcg/kg/min, Last Rate: 0 mcg/kg/min (09/12/24 1001)      PRN medications  PRN medications: alteplase, dextrose, dextrose, glucagon, glucagon, ipratropium-albuteroL, LORazepam, morphine, oxygen, oxygen    Results for orders placed or performed during the hospital encounter of 09/04/24 (from the past 24 hour(s))   POCT GLUCOSE   Result Value Ref Range    POCT Glucose 147 (H) 74 - 99 mg/dL   POCT GLUCOSE   Result Value Ref Range    POCT Glucose 150 (H) 74 - 99 mg/dL   Electrocardiogram, 12-lead PRN ACS symptoms   Result Value Ref Range    Ventricular Rate 63 BPM    Atrial Rate 63 BPM    MA Interval 250 ms    QRS Duration 156 ms    QT Interval 438 ms    QTC Calculation(Bazett) 448 ms    P Axis -13 degrees    R Axis -74 degrees    T Axis 38 degrees    QRS Count 11 beats    Q Onset 196 ms    P Onset 71 ms    P Offset 150 ms    T Offset 415 ms    QTC Fredericia 445 ms   POCT GLUCOSE   Result Value Ref Range    POCT Glucose 161 (H) 74 - 99 mg/dL   Renal Function Panel   Result Value Ref Range    Glucose 172 (H) 74 - 99 mg/dL    Sodium 142 136 - 145 mmol/L    Potassium 4.4 3.5 - 5.3 mmol/L    Chloride 99 98 - 107 mmol/L    Bicarbonate 37 (H) 21 - 32 mmol/L    Anion Gap 10 10 - 20 mmol/L    Urea Nitrogen 41 (H) 6 - 23 mg/dL    Creatinine 0.87 0.50 - 1.30 mg/dL    eGFR >90 >60 mL/min/1.73m*2    Calcium 8.1 (L) 8.6 - 10.3 mg/dL    Phosphorus 2.8 2.5 - 4.9 mg/dL    Albumin 2.3 (L) 3.4 - 5.0 g/dL   CBC   Result Value Ref Range    WBC 11.4 (H) 4.4 - 11.3 x10*3/uL    nRBC 0.0 0.0 - 0.0 /100 WBCs    RBC 5.59 4.50 - 5.90 x10*6/uL    Hemoglobin 18.2 (H) 13.5 - 17.5 g/dL    Hematocrit 57.6 (H) 41.0 - 52.0 %     (H) 80 - 100 fL    MCH 32.6 26.0 - 34.0 pg    MCHC 31.6 (L) 32.0 - 36.0 g/dL    RDW 13.4 11.5 - 14.5 %    Platelets 195 150 - 450 x10*3/uL   POCT GLUCOSE   Result Value Ref Range    POCT Glucose 169 (H) 74 - 99 mg/dL    POCT GLUCOSE   Result Value Ref Range    POCT Glucose 195 (H) 74 - 99 mg/dL   POCT GLUCOSE   Result Value Ref Range    POCT Glucose 247 (H) 74 - 99 mg/dL         This patient has a urinary catheter   Reason for the urinary catheter remaining today? critically ill patient who need accurate urinary output measurements               Assessment/Plan   Assessment & Plan  Acute respiratory failure with hypoxia and hypercapnia (Multi)  Alo Glass is a 68 y/o male with a pmhx of CHF, aortic valve stenosis, HLD COPD, MELISSA, T2DM. He was found unresponsive at his home and was transferred to the ED by EMS. ABG in the ED showed respiratory failure with hypoxia and hypercapnia. He received broad spectrum abx and 3L of fluid at that time. He was still hypotensive despite the fluid resuscitation and was given norepinephrine. He was then transferred to the ICU and had a small amount of coffee ground emesis coming from his NGT. He was then intubated and sedated but awakens to stimuli. His CT showed a large loculated R pleural effusion, causing near total collapse of the RML and RLL and multiple infectious/inflammatory nodules. Also, a left kidney mass was identified, concerning for renal cell carcinoma.     Sputum cx with stenotrophomonas, placed back on zosyn as WBC started to climb again.  Currently downtrending. Weaning slowly off oxygen support, seems to have stabilized on airvo today after some productive coughing and repositioning. I am concerned we may lose some ground overnight if he declines CPAP but we will see. Precedex remains off to ensure we are able to have lucid goals of care conversations if needed. Nutritional intake and status remains poor. May need TPN soon.     Updated his sister at the bedside      REASON FOR ADMISSION     68 yo with MMP including CHF, aortic valve stenosis, COPD, and MELISSA who was found unresponsive at his home and was transferred to the ED by EMS.  ABG in the ED showed hypoxia and  hypercapnia prompting intubation and initial need for levophed despite fluid resuscitation and empiric abx.  CT scan revealed changes concerning for a large loculated right side effusion resulting in compressive changes.  Additionally he was noted to have kidney masses in both left and right sides concerning for renal cell carcinoma.  Since his admission to the ICU he has been slowly making progress but remains in critical condition.    I spent 80 minutes in the professional and overall care of this patient.      Mónica Johns MD

## 2024-09-13 NOTE — PROGRESS NOTES
"  INFECTIOUS DISEASE DAILY PROGRESS NOTE    SUBJECTIVE:    On BiPAP this morning. Remains off pressors although BP is low, systolics 70-80. On 60% FiO2 via BiPAP. No fever. WBC is down to 11.4.     OBJECTIVE:  VITALS (Last 24 Hours)  BP 81/69   Pulse 71   Temp 35.7 °C (96.2 °F) (Temporal)   Resp (!) 33   Ht 1.803 m (5' 10.98\")   Wt 129 kg (283 lb 8.2 oz)   SpO2 94%   BMI 39.56 kg/m²     PHYSICAL EXAM:  Gen - BiPAP  Lungs - chest tube right side with serosang output  Abd - soft, no ttp, BS present   - Live present  Skin - no rash    ABX: IV Zosyn and IV Levofloxacin    LABS:  Lab Results   Component Value Date    WBC 11.4 (H) 09/13/2024    HGB 18.2 (H) 09/13/2024    HCT 57.6 (H) 09/13/2024     (H) 09/13/2024     09/13/2024     Lab Results   Component Value Date    GLUCOSE 172 (H) 09/13/2024    CALCIUM 8.1 (L) 09/13/2024     09/13/2024    K 4.4 09/13/2024    CO2 37 (H) 09/13/2024    CL 99 09/13/2024    BUN 41 (H) 09/13/2024    CREATININE 0.87 09/13/2024     Results from last 72 hours   Lab Units 09/13/24  0854 09/12/24  0306 09/11/24  0447   ALK PHOS U/L  --   --  69   BILIRUBIN TOTAL mg/dL  --   --  1.0   PROTEIN TOTAL g/dL  --   --  4.9*   ALT U/L  --   --  21   AST U/L  --   --  15   ALBUMIN g/dL 2.3*   < > 2.4*    < > = values in this interval not displayed.     Estimated Creatinine Clearance: 109.7 mL/min (by C-G formula based on SCr of 0.87 mg/dL).    IMAGING:  CXR 9/13  Impression:     Improved aeration of the right lung base. Residual hazy right basilar  opacities likely a combination of residual pleural effusion and  basilar atelectasis.    Limited evaluation of the left lung base due to underpenetration.     ASSESSMENT/PLAN:     Acute Hypoxic/Hypercapnic Respiratory Failure - overall better but still needing support now with NIPPV and 60% FiO2  Hypotension - remains off pressors last couple days but BP is low  PNA due to Stenotrophomonas maltophilia. Resolving  Right Loculated " Pleural Effusion - s/p chest tube 9/4, 71K WBCs neutrophilic with pH 7.9. Glucose <10 and exudate by Light's criteria. Consistent with an empyema. No growth. Resolving     IV Zosyn and Levofloxacin still. Overall respiratory status still seems a bit tenuous and has hypotension still. CXR is looking a lot better and seems we have achieved adequate source control over the empyema/PNA.    Monitoring for adverse effects of abx such as rash/itching/diarrhea - none present.    Will follow peripherally over the weekend. Please call Dr. Regalado with questions. Thanks! D/w Dr. Nat Busby MD  [facility]  Office #[phone]

## 2024-09-13 NOTE — PROGRESS NOTES
Occupational Therapy                 Therapy Communication Note    Patient Name: Alo Glass  MRN: [id number]  Department: [place] [address] ICU  [facility]  Today's Date: 9/13/2024     Discipline: Occupational Therapy    Missed Visit Reason: Missed Visit Reason: Patient placed on medical hold (Per RN, pt not medically appropriate for OT eval this date d/t respiratory distress. RN deferring any bed mobility at this time. Will hold OT eval at this time until pt medically appropriate to participate.)    Missed Time: Attempt

## 2024-09-13 NOTE — NURSING NOTE
RN attempted to give pt a sip of water, pt began choking, water suctioned out of pt's mouth. Nat ZAPATA made aware, RN holding PO meds at this time, provider aware

## 2024-09-13 NOTE — CARE PLAN
Problem: Skin  Goal: Decreased wound size/increased tissue granulation at next dressing change  Outcome: Progressing  Goal: Prevent/manage excess moisture  Outcome: Progressing  Goal: Prevent/minimize sheer/friction injuries  Outcome: Progressing  Goal: Promote/optimize nutrition  Outcome: Progressing     Problem: Nutrition  Goal: Oral intake greater than 50%  Outcome: Progressing  Goal: Oral intake greater 75%  Outcome: Progressing  Goal: Consume prescribed supplement  Outcome: Progressing  Goal: Nutrition support goals are met within 48 hrs  Outcome: Progressing  Goal: Nutrition support is meeting 75% of nutrient needs  Outcome: Progressing  Goal: Tube feed tolerance  Outcome: Progressing  Goal: BG  mg/dL  Outcome: Progressing  Goal: Lab values WNL  Outcome: Progressing  Goal: Electrolytes WNL  Outcome: Progressing  Goal: Promote healing  Outcome: Progressing  Goal: Maintain stable weight  Outcome: Progressing  Goal: Reduce weight from edema/fluid  Outcome: Progressing  Goal: Gradual weight gain  Outcome: Progressing  Goal: Improve ostomy output  Outcome: Progressing     Problem: Chronic Conditions and Co-morbidities  Goal: Patient's chronic conditions and co-morbidity symptoms are monitored and maintained or improved  Outcome: Progressing     Problem: Diabetes  Goal: Achieve decreasing blood glucose levels by end of shift  Outcome: Progressing  Goal: Increase stability of blood glucose readings by end of shift  Outcome: Progressing  Goal: Decrease in ketones present in urine by end of shift  Outcome: Progressing  Goal: Maintain electrolyte levels within acceptable range throughout shift  Outcome: Progressing  Goal: Maintain glucose levels >70mg/dl to <250mg/dl throughout shift  Outcome: Progressing  Goal: No changes in neurological exam by end of shift  Outcome: Progressing  Goal: Learn about and adhere to nutrition recommendations by end of shift  Outcome: Progressing  Goal: Vital signs within normal  range for age by end of shift  Outcome: Progressing  Goal: Increase self care and/or family involovement by end of shift  Outcome: Progressing  Goal: Receive DSME education by end of shift  Outcome: Progressing  The clinical goals for the shift include Pt will remain hemodynamically stable through this shift

## 2024-09-13 NOTE — PROGRESS NOTES
09/13/24 1315   Discharge Planning   Expected Discharge Disposition SNF         Patient remains in ICU. He is alternating between Airvo and Bi-pap. He still has right chest tube. ID is following for IV abx for PNA. Waiting for PT/OT to eval patient. Will continue to follow patient for discharge needs.    1410   Spoke with patient at bedside about discharge plan. He agreed he will have to go to SNF and agreed to have SNF list for choices. SNF list printed from [facility] according to insurance acceptance and geographic location and given to patient for choices.

## 2024-09-13 NOTE — SIGNIFICANT EVENT
Pt desaturation to 81% despite increasing Airvo FiO2 to 100%.  Patient placed on Bipap 14/8 with improvement MD notified.

## 2024-09-13 NOTE — PROGRESS NOTES
"Alo Glass is a 69 y.o. male on day 8 of admission presenting with Acute respiratory failure with hypoxia and hypercapnia (Multi).    Subjective   Weaning down airvo today but did require some time on CPAP due to tachypnea and increased WOB.  Unclear if this may have been related to taking off his precedex drip.  Tolerating diuresis well. Still not eating much. Weaning steroids.       Last Recorded Vitals  Blood pressure 77/61, pulse 77, temperature 35.8 °C (96.4 °F), temperature source Temporal, resp. rate 25, height 1.803 m (5' 10.98\"), weight 129 kg (284 lb 6.3 oz), SpO2 98%.  Intake/Output last 3 Shifts:  I/O last 3 completed shifts:  In: 3248.7 (25.2 mL/kg) [P.O.:1804; I.V.:1094.7 (8.5 mL/kg); IV Piggyback:350]  Out: 5940 (46 mL/kg) [Urine:5900 (1.3 mL/kg/hr); Chest Tube:40]  Weight: 129 kg     Relevant Results               This patient has a central line   Reason for the central line remaining today? Parenteral medication    This patient has a urinary catheter   Reason for the urinary catheter remaining today? critically ill patient who need accurate urinary output measurements               Assessment/Plan   Assessment & Plan  Acute respiratory failure with hypoxia and hypercapnia (Multi)      Sputum cx with stenotrophomonas, placed back on zosyn as WBC started to climb again.  Currently downtrending. Weaning slowly off oxygen support, did require a bit of time on CPAP today but otherwise was on airvo 50-70%Fi02. Took off of precedex for a bit as well, unclear if this may have contributed to his respiratory distress. Not taking in much nutrition. Daily diuresis with good response.    PLAN    Maintain precedex for now to encourage good pulmonary status  Wean Airvo as tolerates  Aggressive BPH  Continue Zosyn, Levaquin  Decrease steroid to 20 prednisone daily  Lasix  Reg diet    REASON FOR ADMISSION     70 yo with MMP including CHF, aortic valve stenosis, COPD, and MELISSA who was found unresponsive at his " home and was transferred to the ED by EMS.  ABG in the ED showed hypoxia and hypercapnia prompting intubation and initial need for levophed despite fluid resuscitation and empiric abx.  CT scan revealed changes concerning for a large loculated right side effusion resulting in compressive changes.  Additionally he was noted to have kidney masses in both left and right sides concerning for renal cell carcinoma.  Since his admission to the ICU he has been slowly making progress but remains in critical condition.    I spent 50 minutes in the professional and overall care of this patient.      Mónica Johns MD

## 2024-09-13 NOTE — PROGRESS NOTES
"Alo Glass is a 69 y.o. male on day 9 of admission presenting with Acute respiratory failure with hypoxia and hypercapnia (Multi)    Subjective   Remains on Airvo Ct to waterseal since yesterday MICHELET  Objective     Physical Exam  Vitals reviewed.   Constitutional:       General: He is not in acute distress.     Appearance: He is obese. He is ill-appearing and diaphoretic.   Cardiovascular:      Rate and Rhythm: Normal rate and regular rhythm.   Pulmonary:      Breath sounds: Stridor present.      Comments: Weak wet cough  Ct w/o air leak or crepitus  Genitourinary:     Comments: Indwelling lipscomb  Neurological:      General: No focal deficit present.      Mental Status: He is alert. Mental status is at baseline.   Psychiatric:         Mood and Affect: Mood normal.         Behavior: Behavior normal.       Last Recorded Vitals   Blood pressure 98/75, pulse 71, temperature 35.7 °C (96.2 °F), temperature source Temporal, resp. rate 26, height 1.803 m (5' 10.98\"), weight 129 kg (283 lb 8.2 oz), SpO2 95%.    Intake/Output Last 3 Shifts  I/O last 3 completed shifts:  In: 2127.6 (16.5 mL/kg) [P.O.:1914; I.V.:113.6 (0.9 mL/kg); IV Piggyback:100]  Out: 3665 (28.5 mL/kg) [Urine:3635 (0.8 mL/kg/hr); Chest Tube:30]  Weight: 128.6 kg     Lines  CVC 09/04/24 Right Femoral (Active)   Placement Date/Time: 09/04/24 1715   Earliest Known Present: 09/04/24  Orientation: Right  Location: Femoral   Number of days: 8       Urethral Catheter 16 Fr. (Active)   Placement Date/Time: 09/04/24 1642   Tube Size (Fr.): 16 Fr.  Catheter Balloon Size: 10 mL  Urine Returned: Yes   Number of days: 8       Chest Tube 1 Right Pleural  (Active)   Placement Date/Time: 09/05/24 0358   Placed by: dr rios  Hand Hygiene Completed: Yes  Tube Number: 1  Chest Tube Orientation: Right  Chest Tube Location: Pleural  Chest Tube Drain Tube Size (Fr): (c)    Number of days: 8        Recent Labs  CMP:  Results from last 7 days   Lab Units 09/13/24  0854 " ----- Message from Nora Pereira sent at 12/3/2020  1:02 PM CST -----  Calling concerning needing a copy of RX for her glasses. States she lost her glasses. Please call patient @ [phone]. thanks     09/12/24  0306 09/11/24  0447 09/10/24  0436 09/09/24  0503 09/08/24  0346 09/07/24  0247   SODIUM mmol/L 142 143 144 141 137 136 137   POTASSIUM mmol/L 4.4 4.7 5.3 4.7 4.4 4.0 4.3   CHLORIDE mmol/L 99 101 104 104 104 101 100   CO2 mmol/L 37* 36* 36* 29 25 26 29   ANION GAP mmol/L 10 11 9* 13 12 13 12   BUN mg/dL 41* 41* 38* 37* 45* 35* 31*   CREATININE mg/dL 0.87 0.98 1.00 0.69 0.90 0.82 0.82   EGFR mL/min/1.73m*2 >90 83 81 >90 >90 >90 >90   MAGNESIUM mg/dL  --  2.40  --   --  2.80* 2.70* 2.60*   ALBUMIN g/dL 2.3* 2.3* 2.4* 2.3* 2.5* 2.4* 2.5*   ALT U/L  --   --  21  --   --   --   --    AST U/L  --   --  15  --   --   --   --    BILIRUBIN TOTAL mg/dL  --   --  1.0  --   --   --   --      CBC:  Results from last 7 days   Lab Units 09/13/24  0854 09/12/24  0306 09/11/24  0447 09/10/24  1741 09/10/24  0436 09/09/24  0503 09/08/24  0346 09/07/24  0247   WBC AUTO x10*3/uL 11.4* 13.2* 18.0* 22.0* 15.9* 12.3* 10.3 11.3   HEMOGLOBIN g/dL 18.2* 18.3* 19.3* 19.8* 18.2* 18.1* 16.5 16.5   HEMATOCRIT % 57.6* 58.7* 61.1* 62.1* 56.2* 53.5* 48.3 50.3   PLATELETS AUTO x10*3/uL 195 235 307 328 270 245 268 253   MCV fL 103* 102* 101* 101* 100 95 95 99     COAG:     HEME/ENDO:     CARDIAC:        Imaging  XR chest 1 view   Final Result   Improved aeration of the right lung base. Residual hazy right basilar   opacities likely a combination of residual pleural effusion and   basilar atelectasis.        Limited evaluation of the left lung base due to underpenetration.        MACRO:   None        Signed by: Rosa Hemphill 9/13/2024 4:48 AM   Dictation workstation:   [id number]      XR chest 1 view   Final Result   1. No significant interval change from 09/11/2024 chest x-ray.        Signed by: Donte Shoemaker 9/12/2024 8:18 AM   Dictation workstation:   [id number]      XR chest 1 view   Final Result   Stable to slightly increased right pleural effusion with atelectasis.   Stable right pleural catheter.        MACRO   None        Signed  by: Klaus Villafana 9/11/2024 8:21 AM   Dictation workstation:   [id number]      XR chest 1 view   Final Result   Cardiomegaly.        NG tube in place. ET tube has been removed.        Stable pleural drainage catheter at the base of the right chest.   Small stable lateral right basilar pneumothorax.        Mild bibasilar atelectasis.        MACRO:   None        Signed by: Ishaan Vernon 9/10/2024 8:13 AM   Dictation workstation:   [id number]      XR chest 1 view   Final Result   Decreased size of right basilar pneumothorax since 5:21 a.m. the same   day with pigtail catheter in place.        MACRO:   None.        Signed by: Daisy Sosa 9/9/2024 4:44 PM   Dictation workstation:   [id number]      XR chest 1 view   Final Result   Right-sided infrahilar pigtail catheter and moderate   hydropneumothorax, grossly unchanged.        Suspected worsening CHF with stable to mildly increased interstitial   edema and possible new trace left pleural effusion.        MACRO   None        Signed by: Klaus Villafana 9/9/2024 8:17 AM   Dictation workstation:   [id number]      XR chest 1 view   Final Result   Basilar pneumothorax on the right appears similar to the prior study   in the relative volume of pleural fluid and pneumothorax appears   unchanged.        Collapse of the right lower lobe and right middle lobe        Pulmonary vascular congestion is most notable in the left lung        MACRO:   None.        Signed by: Corrina Reyes 9/8/2024 12:32 PM   Dictation workstation:   [id number]      XR chest 1 view   Final Result   Right pleural effusion is decreased from the prior study but the   right basilar pneumothorax appears increased.        MACRO:   None.        Signed by: Corrina Reyes 9/8/2024 12:30 PM   Dictation workstation:   [id number]      XR chest 1 view   Final Result   No change in the right hydropneumothorax which may be loculated with   chest tube on the right in the mid hemithorax.        Small left  pleural effusion with left basilar infiltrate/atelectasis.        Signed by: Eloisa Landa 9/6/2024 9:20 AM   Dictation workstation:   [id number]      XR chest 1 view   Final Result   There appears to be increased aeration at the right base suggesting   diminishing pleural density within the right hemithorax in   association with the chest tube. Infiltrate persists on the right.        MACRO:   none        Signed by: Saad Cronin 9/5/2024 8:18 AM   Dictation workstation:   [id number]      XR chest 1 view   Final Result   1.  Enteric tube is coursing along the midline of the mediastinum in   the expected course of the esophagus, although the distal tip is not   well visualized due to overlying cardiomediastinal silhouette and   underpenetration, and may be outside of the field-of-view of the   study. Endotracheal tube projects 4.5 cm superior to the kathie.   2. Redemonstration of the large loculated right-sided pleural   effusion with the associated atelectasis/consolidation, similar in   appearance to prior exam.                  MACRO:   None        Signed by: Cande Keating 9/5/2024 2:08 AM   Dictation workstation:   [id number]      CT head wo IV contrast   Final Result   No acute intracranial abnormality.             MACRO:   None.        Signed by: Ishaan Banda 9/4/2024 6:07 PM   Dictation workstation:   [id number]      CT chest abdomen pelvis w IV contrast   Final Result   CT CHEST:   Large loculated right pleural effusion causing complete compressive   collapse of the right middle lobe and right lower lobe and partial   compressive collapse of the right upper lobe. The largest likely   compartments located at the inferior right hemithorax. The cause of   the effusion is not apparent as there is only a small amount of   debris in the distal trachea and right mainstem bronchus but the   distal-most right-sided bronchi are occluded due to combination of   compressive collapse and small  low-density fluid.        Numerous new subcentimeter centrilobular pulmonary nodules that are   most likely infectious/inflammatory in as can be seen with   bronchiolitis or fungal infection.        Aortic valve calcification to the extent that would likely result in   aortic stenosis.        CT ABDOMEN/PELVIS:   Left kidney solid heterogeneously enhancing mass suspicious for renal   cell carcinoma (2.4 cm x 1.9 cm) and of suspected 0.8 cm right upper   pole renal cell carcinoma measuring 0.8 cm. Recommend urological   follow-up/also taken and dedicated MRI kidneys to further evaluate   these lesions. YELLOW ALERT: An incidental finding alert was sent per   protocol.        No acute abnormality in the abdomen or pelvis.        Colonic diverticulosis without acute diverticulitis.        MACRO:   Critical Finding:  New mass in the kidney. Notification was initiated   on 9/4/2024 at 6:25 pm by  Ishaan Banda.  (**-YCF-**)   Instructions:  MR Abdomen w and wo IV contrast. 1 week.        Signed by: Ishaan Banda 9/4/2024 6:26 PM   Dictation workstation:   [id number]      XR chest 1 view   Final Result   Interval placement of an endotracheal tube. The tip is above the   kathie. Interval placement of a nasogastric tube. It appears to   terminate below the diaphragm.        The exam is extremely limited.        MACRO:   none        Signed by: Rosalie Ellison 9/4/2024 4:43 PM   Dictation workstation:   [id number]           Medications  Scheduled medications  aspirin, 81 mg, oral, Daily  enoxaparin, 40 mg, subcutaneous, q12h SHARRON  ezetimibe, 10 mg, oral, Daily  folic acid, 1 mg, oral, Daily  guaiFENesin, 200 mg, oral, TID  insulin lispro, 0-10 Units, subcutaneous, q4h  ipratropium-albuteroL, 3 mL, nebulization, 4x daily  levoFLOXacin, 750 mg, oral, q24h SHARRON  levothyroxine, 125 mcg, oral, Daily  multivitamin with minerals, 1 tablet, oral, Daily  nystatin, 5 mL, Swish & Swallow, 4x daily  pantoprazole, 40 mg, intravenous,  Daily  piperacillin-tazobactam, 3.375 g, intravenous, q6h  polyethylene glycol, 17 g, nasogastric tube, Daily  predniSONE, 20 mg, oral, Daily  sennosides-docusate sodium, 2 tablet, oral, BID  thiamine, 100 mg, oral, Daily      Continuous medications  dexmedeTOMIDine, 0-1.5 mcg/kg/hr, Last Rate: Stopped (09/13/24 1033)  norepinephrine, 0-0.5 mcg/kg/min, Last Rate: 0 mcg/kg/min (09/12/24 1001)      PRN medications  PRN medications: alteplase, dextrose, dextrose, glucagon, glucagon, ipratropium-albuteroL, LORazepam, morphine, oxygen, oxygen    Assessment/Plan   Principal Problem:    Acute respiratory failure with hypoxia and hypercapnia (Multi)    Alo Glass is a 69 y.o. male presenting with Acute respiratory failure with hypoxia and hypercapnia  medical history significant for current tobacco use, ETOH abuse, CHF, COPD, MELISSA, and DM       Loculated effusion- improved    09/09: full dose lytic therapy total 1250cc out in 24 hours     09/10:  At 1400 tPA/Dornase administered to patient.  Patient and nursing team advised regarding recommendations including adjusting position every 15-30 min (lay on side, sit, stand, walk, etc) during 2 hour instillation and verbalized understanding.  Reviewed red flag symptoms that warrant emergency evaluation including severe pain or dyspnea.  Bedside RN updated on plan of care.  All questions addressed.     Total amount of fluid instilled: 150cc  Amount in atrium prior to instillation: 1250 cc   Total out s/p instillation: 750cc  sanguinous drainage     9/11: CXR reviewed effusion improved, PTX resolved- No need for additional lytic therapy today.   Repeat CXR in AM       9/12: CXR reviewed effusion improved, no ptx, Chest tube with minimal output overnight, w/o air leak.     9/13: CXR stable with Ct to waterseal since yesterday  Chest x-ray reviewed.  Chest tube negative for airleak. Site cleansed with chlorhexidine, suture removed, chest tube was removed without difficulty, dry  clean dressing applied, site care instructions given to patient. No other complaints.  Repeat chest x-ray ordered for 4 hours        Discussed with Dr Watson and Dr Johns all are in agreement with current plan  FABIOLA Gar    Time spent on the assessment of patient, gathering and interpreting data, review of medical record/patient history, personally reviewing radiographic imaging and formulation of this note. With greater than 50% spent in personal discussion with patient/ family.  Time 35 minutes    FABIOLA Gar

## 2024-09-13 NOTE — ASSESSMENT & PLAN NOTE
Alo Glass is a 70 y/o male with a pmhx of CHF, aortic valve stenosis, HLD COPD, MELISSA, T2DM. He was found unresponsive at his home and was transferred to the ED by EMS. ABG in the ED showed respiratory failure with hypoxia and hypercapnia. He received broad spectrum abx and 3L of fluid at that time. He was still hypotensive despite the fluid resuscitation and was given norepinephrine. He was then transferred to the ICU and had a small amount of coffee ground emesis coming from his NGT. He was then intubated and sedated but awakens to stimuli. His CT showed a large loculated R pleural effusion, causing near total collapse of the RML and RLL and multiple infectious/inflammatory nodules. Also, a left kidney mass was identified, concerning for renal cell carcinoma.

## 2024-09-13 NOTE — CARE PLAN
The patient's goals for the shift include      The clinical goals for the shift include Pt will remain hemodynamically stable entire shift.      Problem: Skin  Goal: Decreased wound size/increased tissue granulation at next dressing change  Outcome: Progressing  Flowsheets (Taken 9/12/2024 2310)  Decreased wound size/increased tissue granulation at next dressing change:   Promote sleep for wound healing   Protective dressings over bony prominences   Utilize specialty bed per algorithm  Goal: Prevent/manage excess moisture  Outcome: Progressing  Flowsheets (Taken 9/12/2024 2310)  Prevent/manage excess moisture:   Cleanse incontinence/protect with barrier cream   Monitor for/manage infection if present   Follow provider orders for dressing changes   Use wicking fabric (obtain order)   Moisturize dry skin  Goal: Prevent/minimize sheer/friction injuries  Outcome: Progressing  Flowsheets (Taken 9/12/2024 2310)  Prevent/minimize sheer/friction injuries:   Complete micro-shifts as needed if patient unable. Adjust patient position to relieve pressure points, not a full turn   Increase activity/out of bed for meals   Use pull sheet   HOB 30 degrees or less   Turn/reposition every 2 hours/use positioning/transfer devices   Utilize specialty bed per algorithm  Goal: Promote/optimize nutrition  Outcome: Progressing  Flowsheets (Taken 9/12/2024 2310)  Promote/optimize nutrition:   Assist with feeding   Monitor/record intake including meals   Consume > 50% meals/supplements   Offer water/supplements/favorite foods   Discuss with provider if NPO > 2 days   Reassess MST if dietician not consulted     Problem: Nutrition  Goal: Oral intake greater than 50%  Outcome: Progressing  Goal: Oral intake greater 75%  Outcome: Progressing  Goal: Consume prescribed supplement  Outcome: Progressing  Goal: Nutrition support goals are met within 48 hrs  Outcome: Progressing  Goal: Nutrition support is meeting 75% of nutrient needs  Outcome:  Progressing  Goal: Tube feed tolerance  Outcome: Progressing  Goal: BG  mg/dL  Outcome: Progressing  Goal: Lab values WNL  Outcome: Progressing  Goal: Electrolytes WNL  Outcome: Progressing  Goal: Promote healing  Outcome: Progressing  Goal: Maintain stable weight  Outcome: Progressing  Goal: Reduce weight from edema/fluid  Outcome: Progressing  Goal: Gradual weight gain  Outcome: Progressing  Goal: Improve ostomy output  Outcome: Progressing     Problem: Discharge Planning  Goal: Discharge to home or other facility with appropriate resources  Outcome: Progressing  Flowsheets (Taken 9/12/2024 2310)  Discharge to home or other facility with appropriate resources:   Identify barriers to discharge with patient and caregiver   Arrange for needed discharge resources and transportation as appropriate   Identify discharge learning needs (meds, wound care, etc)   Arrange for interpreters to assist at discharge as needed   Refer to discharge planning if patient needs post-hospital services based on physician order or complex needs related to functional status, cognitive ability or social support system     Problem: Mechanical Ventilation  Goal: Oral health is maintained or improved  Outcome: Progressing  Flowsheets (Taken 9/12/2024 2310)  Oral Health is Maintained or Improved:   Assess and monitor condition of lips and mouth and perform oral care per hospital policy   Perform oral care with an oral swab   Apply water-based moisturizer to lips   Suction oral pharynx  Goal: Tracheostomy will be managed safely  Outcome: Met  Note: N/A no trach  Goal: ET tube will be managed safely  Outcome: Met  Note: N/A no ETT  Goal: Ability to express needs and understand communication  Outcome: Progressing  Flowsheets (Taken 9/12/2024 2310)  Ability to express needs and understand communication:   Assess and monitor patient's ability to understand information and communicate   Encourage patient to communicate   Implement interventions  to promote patient's ability to communicate  Goal: Mobility/activity is maintained at optimum level for patient  Outcome: Progressing  Flowsheets (Taken 9/12/2024 2310)  Mobility/Activity is Maintained at Optimum Level for Patient: Place patient in sitting position if BP is stable     Problem: Knowledge Deficit  Goal: Patient/family/caregiver demonstrates understanding of disease process, treatment plan, medications, and discharge instructions  Outcome: Progressing  Flowsheets (Taken 9/12/2024 2310)  Patient/family/caregiver demonstrates understanding of disease process, treatment plan, medications, and discharge instructions:   Complete learning assessment and assess knowledge base   Provide teaching via preferred learning methods   Provide teaching at level of understanding

## 2024-09-13 NOTE — PROGRESS NOTES
Physical Therapy                 Therapy Communication Note    Patient Name: Alo Glass  MRN: [id number]  Department: [place] [address] ICU  [facility]  Today's Date: 9/13/2024     Discipline: Physical Therapy    Missed Visit Reason: Missed Visit Reason: Patient placed on medical hold (Per discussion with RN, pt not appropriate for any mobility at this time due to respiratory distress. Will hold for today.)    Missed Time: Hold    Comment:

## 2024-09-13 NOTE — SIGNIFICANT EVENT
"Alo Glass is a 69 y.o. male with past medical history of  hypertension, COPD, CHF, EF 30-35%high BMI, morbid obesity, diabetes, tobacco use and alcohol use disorder.  He was found unresponsive by a neighbor on 9/4/24 and EMS was called. He was intubated in the field for respiratory depression but did not lose pulse.  He was in profound acidosis, right loculated pleural effusion with near collapse of RML and RLL. PNA, leukocytosis, mild LEONA, elevated troponin and BNP.  He was also determined to be septic.  Lastly imaging showed new left kidney mass c/f renal cell CA. He underwent pleurodesis on 9/9. He was extubated on 9/9 and on Airvo.  Patient asked for \"palliative measures\" citing decline in quality of life prior to admission.     Palliative care consulted at patient's request but prior to seeing patient this AM, Dr. Johns and nurse spoke with patient and he is no longer interested in meeting with palliative care.      Palliative remains available should patient and/or team decide we may be helpful        "

## 2024-09-14 LAB
GLUCOSE BLD MANUAL STRIP-MCNC: 135 MG/DL (ref 74–99)
GLUCOSE BLD MANUAL STRIP-MCNC: 139 MG/DL (ref 74–99)
GLUCOSE BLD MANUAL STRIP-MCNC: 152 MG/DL (ref 74–99)
GLUCOSE BLD MANUAL STRIP-MCNC: 159 MG/DL (ref 74–99)
GLUCOSE BLD MANUAL STRIP-MCNC: 217 MG/DL (ref 74–99)
GLUCOSE BLD MANUAL STRIP-MCNC: 268 MG/DL (ref 74–99)
MAGNESIUM SERPL-MCNC: 2.2 MG/DL (ref 1.6–2.4)

## 2024-09-14 PROCEDURE — 94640 AIRWAY INHALATION TREATMENT: CPT

## 2024-09-14 PROCEDURE — 2500000004 HC RX 250 GENERAL PHARMACY W/ HCPCS (ALT 636 FOR OP/ED): Performed by: NURSE PRACTITIONER

## 2024-09-14 PROCEDURE — 2500000004 HC RX 250 GENERAL PHARMACY W/ HCPCS (ALT 636 FOR OP/ED): Performed by: SURGERY

## 2024-09-14 PROCEDURE — 2500000002 HC RX 250 W HCPCS SELF ADMINISTERED DRUGS (ALT 637 FOR MEDICARE OP, ALT 636 FOR OP/ED)

## 2024-09-14 PROCEDURE — 2500000001 HC RX 250 WO HCPCS SELF ADMINISTERED DRUGS (ALT 637 FOR MEDICARE OP): Performed by: NURSE PRACTITIONER

## 2024-09-14 PROCEDURE — 99233 SBSQ HOSP IP/OBS HIGH 50: CPT | Performed by: INTERNAL MEDICINE

## 2024-09-14 PROCEDURE — 99223 1ST HOSP IP/OBS HIGH 75: CPT | Performed by: INTERNAL MEDICINE

## 2024-09-14 PROCEDURE — 2500000002 HC RX 250 W HCPCS SELF ADMINISTERED DRUGS (ALT 637 FOR MEDICARE OP, ALT 636 FOR OP/ED): Performed by: SURGERY

## 2024-09-14 PROCEDURE — 2500000004 HC RX 250 GENERAL PHARMACY W/ HCPCS (ALT 636 FOR OP/ED): Performed by: ANESTHESIOLOGY

## 2024-09-14 PROCEDURE — 2500000005 HC RX 250 GENERAL PHARMACY W/O HCPCS: Performed by: HOSPITALIST

## 2024-09-14 PROCEDURE — 83735 ASSAY OF MAGNESIUM: CPT | Performed by: SURGERY

## 2024-09-14 PROCEDURE — 2500000001 HC RX 250 WO HCPCS SELF ADMINISTERED DRUGS (ALT 637 FOR MEDICARE OP): Performed by: SURGERY

## 2024-09-14 PROCEDURE — 94669 MECHANICAL CHEST WALL OSCILL: CPT

## 2024-09-14 PROCEDURE — 36415 COLL VENOUS BLD VENIPUNCTURE: CPT | Performed by: SURGERY

## 2024-09-14 PROCEDURE — 82947 ASSAY GLUCOSE BLOOD QUANT: CPT

## 2024-09-14 PROCEDURE — 2500000001 HC RX 250 WO HCPCS SELF ADMINISTERED DRUGS (ALT 637 FOR MEDICARE OP)

## 2024-09-14 PROCEDURE — 94668 MNPJ CHEST WALL SBSQ: CPT

## 2024-09-14 PROCEDURE — 93010 ELECTROCARDIOGRAM REPORT: CPT | Performed by: INTERNAL MEDICINE

## 2024-09-14 PROCEDURE — 2500000004 HC RX 250 GENERAL PHARMACY W/ HCPCS (ALT 636 FOR OP/ED)

## 2024-09-14 PROCEDURE — 2060000001 HC INTERMEDIATE ICU ROOM DAILY

## 2024-09-14 PROCEDURE — 2500000001 HC RX 250 WO HCPCS SELF ADMINISTERED DRUGS (ALT 637 FOR MEDICARE OP): Performed by: ANESTHESIOLOGY

## 2024-09-14 PROCEDURE — 2500000002 HC RX 250 W HCPCS SELF ADMINISTERED DRUGS (ALT 637 FOR MEDICARE OP, ALT 636 FOR OP/ED): Performed by: INTERNAL MEDICINE

## 2024-09-14 PROCEDURE — 2500000004 HC RX 250 GENERAL PHARMACY W/ HCPCS (ALT 636 FOR OP/ED): Performed by: INTERNAL MEDICINE

## 2024-09-14 PROCEDURE — 99291 CRITICAL CARE FIRST HOUR: CPT | Performed by: SURGERY

## 2024-09-14 PROCEDURE — 99292 CRITICAL CARE ADDL 30 MIN: CPT | Performed by: SURGERY

## 2024-09-14 ASSESSMENT — COGNITIVE AND FUNCTIONAL STATUS - GENERAL
HELP NEEDED FOR BATHING: TOTAL
CLIMB 3 TO 5 STEPS WITH RAILING: TOTAL
STANDING UP FROM CHAIR USING ARMS: TOTAL
WALKING IN HOSPITAL ROOM: TOTAL
HELP NEEDED FOR BATHING: TOTAL
EATING MEALS: TOTAL
MOVING TO AND FROM BED TO CHAIR: TOTAL
TURNING FROM BACK TO SIDE WHILE IN FLAT BAD: TOTAL
DAILY ACTIVITIY SCORE: 6
DRESSING REGULAR LOWER BODY CLOTHING: TOTAL
DRESSING REGULAR UPPER BODY CLOTHING: TOTAL
WALKING IN HOSPITAL ROOM: TOTAL
MOBILITY SCORE: 6
CLIMB 3 TO 5 STEPS WITH RAILING: TOTAL
STANDING UP FROM CHAIR USING ARMS: TOTAL
DRESSING REGULAR LOWER BODY CLOTHING: TOTAL
TOILETING: TOTAL
PERSONAL GROOMING: TOTAL
EATING MEALS: TOTAL
MOBILITY SCORE: 6
TOILETING: TOTAL
MOVING FROM LYING ON BACK TO SITTING ON SIDE OF FLAT BED WITH BEDRAILS: TOTAL
TURNING FROM BACK TO SIDE WHILE IN FLAT BAD: TOTAL
DRESSING REGULAR UPPER BODY CLOTHING: TOTAL
PERSONAL GROOMING: TOTAL
DAILY ACTIVITIY SCORE: 6
MOVING FROM LYING ON BACK TO SITTING ON SIDE OF FLAT BED WITH BEDRAILS: TOTAL
MOVING TO AND FROM BED TO CHAIR: TOTAL

## 2024-09-14 ASSESSMENT — PAIN SCALES - GENERAL
PAINLEVEL_OUTOF10: 0 - NO PAIN

## 2024-09-14 ASSESSMENT — PAIN - FUNCTIONAL ASSESSMENT
PAIN_FUNCTIONAL_ASSESSMENT: 0-10
PAIN_FUNCTIONAL_ASSESSMENT: 0-10

## 2024-09-14 NOTE — ASSESSMENT & PLAN NOTE
Alo Glass is a 70 y/o male with a pmhx of CHF, aortic valve stenosis, HLD COPD, MELISSA, T2DM. He was found unresponsive at his home and was transferred to the ED by EMS. ABG in the ED showed respiratory failure with hypoxia and hypercapnia. He received broad spectrum abx and 3L of fluid at that time. He was still hypotensive despite the fluid resuscitation and was given norepinephrine. He was then transferred to the ICU and had a small amount of coffee ground emesis coming from his NGT. He was then intubated and sedated but awakens to stimuli. His CT showed a large loculated R pleural effusion, causing near total collapse of the RML and RLL and multiple infectious/inflammatory nodules. Also, a left kidney mass was identified, concerning for renal cell carcinoma.  Urology team was contacted this admission per prior notes regarding this finding.

## 2024-09-14 NOTE — CONSULTS
Inpatient consult to Pulmonology  Consult performed by: Marta Thurston MD  Consult ordered by: Mónica Johns MD    Reason For Consult  h/o emphysema, establish care, admit with pneumonia   History Of Present Illness  Alo Glass is a 69 y.o. male with h/o CHF, aortic valve stenosis, DLP, T2DM, COPD, MELISSA, who was brought in by EMS after being found unresponsiveness at home. Work up revealed respiratory failure with hypoxia and hypercapnia, also hypotensive. Received IVF, antibiotics, but given persistent hypotension started on Levophed and and admitted to ICU. In the ICU found to have coffee ground emesis, subsequently was intubated. CT chest showed R loculated pleural effusion and multiple nodules. Sputum culture +ve for stenotrophomonas. Patient overall improved and was subsequently extubated. Post extubation initially had high oxygen requirements, but now is improving. Pulmonary is consulted to assess with his care.    States he p/w a gradual onset, progressive SOB for couple of months. Only with exertion. FERRELL after walking 2 city block or climbing 2 FOS. Associated with wheezing but no CP. +ve orthopnea and LE edema. No PND. Also complains of a cough, productive of tan sputum and hemoptysis (now is resolved).   Full ROS as below.   Past Medical History  Past Medical History:   Diagnosis Date    CHF (congestive heart failure) (Multi)     COPD (chronic obstructive pulmonary disease) (Multi)     Diabetes mellitus (Multi)     Hypertension     Obesity     MELISSA (obstructive sleep apnea)     Personal history of other specified conditions 01/08/2023    History of impaired glucose tolerance   Surgical History  Past Surgical History:   Procedure Laterality Date    HERNIA REPAIR  11/07/2013    Hernia Repair    OTHER SURGICAL HISTORY  11/07/2013    Oral Surgery   Social History  2 PPD x 50 year h/o smoking, worked as a , no known exposures.   Social History     Tobacco Use    Smoking status: Every Day      Types: Cigarettes    Smokeless tobacco: Never   Substance Use Topics    Alcohol use: Yes     Alcohol/week: 7.0 standard drinks of alcohol     Types: 7 Standard drinks or equivalent per week    Drug use: Never   Family History  +ve for DM.   Current Outpatient Medications   Medication Instructions    albuterol 90 mcg/actuation inhaler 2 puffs, inhalation, Every 4 hours PRN    allopurinol (ZYLOPRIM) 300 mg, oral, Daily    aspirin 81 mg, oral, Daily    Dexcom G7  misc Use as instructed    Dexcom G7 Sensor device Use as directed    empagliflozin (JARDIANCE) 10 mg, oral, Daily    ezetimibe (ZETIA) 10 mg, oral, Daily    fluticasone-umeclidin-vilanter (Trelegy Ellipta) 100-62.5-25 mcg blister with device one puff per day    folic acid (FOLVITE) 1 mg, oral, Daily    furosemide (LASIX) 80 mg, oral, Daily    ipratropium-albuteroL (Duo-Neb) 0.5-2.5 mg/3 mL nebulizer solution 3 mL, nebulization, 4 times daily RT    levothyroxine (SYNTHROID, LEVOXYL) 125 mcg, oral, Daily    Mounjaro 2.5 mg, subcutaneous, Once Weekly    multivitamin with minerals tablet 1 tablet, oral, Daily    oxygen (O2) 2 L/min, inhalation, Continuous, 2L nc at rest and 4L nc with ambulation    potassium chloride CR (Klor-Con M20) 20 mEq ER tablet 20 mEq, oral, Daily, Do not crush or chew.    thiamine (VITAMIN B-1) 100 mg, oral, Daily    valsartan (DIOVAN) 40 mg, oral, Daily   Allergies  Patient has no known allergies.  Review of Systems   Constitutional:  Positive for fatigue and unexpected weight change. Negative for appetite change, chills and fever.   HENT:  Positive for congestion and postnasal drip. Negative for ear pain, rhinorrhea, sinus pressure, sinus pain, sneezing and sore throat.    Eyes:  Negative for pain, redness, itching and visual disturbance.   Respiratory:  Positive for cough, chest tightness, shortness of breath and wheezing.    Cardiovascular:  Positive for chest pain and leg swelling. Negative for palpitations.    Gastrointestinal:  Negative for abdominal pain, constipation, diarrhea, nausea and vomiting.   Genitourinary:  Negative for dysuria, frequency and hematuria.   Musculoskeletal:  Positive for arthralgias, back pain and neck pain. Negative for myalgias.   Skin:  Negative for pallor, rash and wound.   Neurological:  Positive for syncope. Negative for dizziness, seizures, light-headedness and headaches.   Psychiatric/Behavioral:  Positive for confusion and dysphoric mood. The patient is nervous/anxious.    Scheduled medications  aspirin, 81 mg, oral, Daily  enoxaparin, 40 mg, subcutaneous, q12h HSARRON  ezetimibe, 10 mg, oral, Daily  folic acid, 1 mg, oral, Daily  guaiFENesin, 200 mg, oral, TID  insulin lispro, 0-10 Units, subcutaneous, q4h  ipratropium-albuteroL, 3 mL, nebulization, 4x daily  levoFLOXacin, 750 mg, oral, q24h SHARRON  levothyroxine, 125 mcg, oral, Daily  multivitamin with minerals, 1 tablet, oral, Daily  nystatin, 5 mL, Swish & Swallow, 4x daily  pantoprazole, 40 mg, intravenous, Daily  piperacillin-tazobactam, 3.375 g, intravenous, q6h  polyethylene glycol, 17 g, nasogastric tube, Daily  predniSONE, 20 mg, oral, Daily  sennosides-docusate sodium, 2 tablet, oral, BID  thiamine, 100 mg, oral, Daily    Continuous medications     PRN medications  PRN medications: dextrose, dextrose, glucagon, glucagon, ipratropium-albuteroL, oxygen, oxygen   Physical Exam  Constitutional:       Appearance: He is obese. He is ill-appearing. He is not toxic-appearing.   HENT:      Head: Normocephalic and atraumatic.      Nose:      Comments: On 7L.      Mouth/Throat:      Mouth: Mucous membranes are moist.      Comments: Mallampati 2.   Eyes:      General: No scleral icterus.     Extraocular Movements: Extraocular movements intact.      Pupils: Pupils are equal, round, and reactive to light.   Cardiovascular:      Rate and Rhythm: Normal rate and regular rhythm.      Heart sounds: No murmur heard.     No friction rub. No gallop.  "  Pulmonary:      Effort: Respiratory distress present.      Breath sounds: No wheezing or rales.      Comments: Clear lung fields b/l with fair air entry. In mild respiratory distress while talking.  Abdominal:      General: Bowel sounds are normal. There is no distension.      Palpations: Abdomen is soft.      Tenderness: There is no abdominal tenderness.   Musculoskeletal:      Cervical back: Normal range of motion and neck supple. No rigidity or tenderness.      Right lower leg: Edema (2+) present.      Left lower leg: Edema (1+) present.   Lymphadenopathy:      Cervical: No cervical adenopathy.   Skin:     General: Skin is warm and dry.      Coloration: Skin is not jaundiced.      Comments: Chronic discoloration b/l LE edema.    Neurological:      General: No focal deficit present.      Mental Status: He is alert and oriented to person, place, and time.      Cranial Nerves: No cranial nerve deficit.      Motor: Weakness (generalized weakness) present.   Psychiatric:         Mood and Affect: Mood normal.         Behavior: Behavior normal.   Vital Signs  Blood pressure 122/83, pulse 91, temperature 36.3 °C (97.4 °F), temperature source Temporal, resp. rate 24, height 1.803 m (5' 10.98\"), weight 125 kg (275 lb 9.2 oz), SpO2 94%.  Oxygen Therapy  SpO2: 94 %  Medical Gas Therapy: Supplemental oxygen  Medical Gas Delivery Method: High flow nasal cannula  FiO2 (%): 40 %  Relevant Results  XR chest 1 view 09/13/2024    Narrative  Interpreted By:  Tania Knowles,  STUDY:  XR CHEST 1 VIEW;  9/13/2024 4:11 pm    INDICATION:  Signs/Symptoms:post chest tube removal.    COMPARISON:  09/13/2024    ACCESSION NUMBER(S):  [id number]    ORDERING CLINICIAN:  RODOLFO LOPEZ    FINDINGS:  The study is limited due to patient's body habitus and poor  inspiration previously seen right-sided chest tube has been removed.  No gross pneumothorax is noted.    Blunting of both costophrenic angles is seen, which most likely is  related to " pleural effusions. The heart is enlarged. Bibasilar  streaky densities are seen.    Impression  No gross pneumothorax.    Bilateral pleural effusions.    Cardiomegaly.    Bibasilar streaky densities, which may be related to atelectasis or  infiltrates.        MACRO:  None    Signed by: Tania Knowles 9/13/2024 4:40 PM  Dictation workstation:   [id number]    Results for orders placed or performed during the hospital encounter of 09/04/24 (from the past 24 hour(s))   POCT GLUCOSE   Result Value Ref Range    POCT Glucose 233 (H) 74 - 99 mg/dL   POCT GLUCOSE   Result Value Ref Range    POCT Glucose 135 (H) 74 - 99 mg/dL   POCT GLUCOSE   Result Value Ref Range    POCT Glucose 152 (H) 74 - 99 mg/dL   POCT GLUCOSE   Result Value Ref Range    POCT Glucose 139 (H) 74 - 99 mg/dL   POCT GLUCOSE   Result Value Ref Range    POCT Glucose 217 (H) 74 - 99 mg/dL   POCT GLUCOSE   Result Value Ref Range    POCT Glucose 268 (H) 74 - 99 mg/dL   Assessment/Plan   69 YOM with h/o CHF, aortic valve stenosis, DLP, T2DM, COPD, MELISSA, who was brought in by EMS after being found unresponsiveness at home. Work up revealed respiratory failure with hypoxia and hypercapnia, also hypotensive. Received IVF, antibiotics, but given persistent hypotension started on Levophed and and admitted to ICU. In the ICU found to have coffee ground emesis, subsequently was intubated. CT chest showed R loculated pleural effusion and multiple nodules. Sputum culture +ve for stenotrophomonas. Patient overall improved and was subsequently extubated. Post extubation initially had high oxygen requirements, but now is improving. Pulmonary is consulted to assess with his care.  .    Respiratory failure: acute with hypoxia and hypercarbia, requiring MV, now improved.       Continue supplemental O2, wean off as tolerates       Home O2 evaluation before DC       BPH with IS, Acapella       Management of the individual causes as below    COPD: PFT in 2019 showed 54%, FEV1  32-->46%, RV/TLC 63%, TLC 85%, and DLCO 77%, overall associated with stage III COPD. On triple therapy as outpatient        Continue Prednisone taper       Continue Duo-neb       FU with pulmonary after DC. Patient needs to make an appointment with a new pulmonologist as currently does not have a pulmonary provider.        Resume Trelegy and albuterol on DC    MELISSA:        Continue BiPAP at nights       Resume APAP 10-15 at nights on DC    Pleural effusion: large R sided, likely parapneumonic, S/p chest tube placement. Analysis showed neutrophilic predominant effusion, cultures negative. CT is now removed.       Will monitor for now    Pneumonia: RML,         Continue Levaquin.     DVT prophylaxis:    Thank you for the consult.  Pulmonary will FU while in house.     Marta Thurston MD

## 2024-09-14 NOTE — PROGRESS NOTES
"Alo Glass is a 69 y.o. male on day 10 of admission presenting with Acute respiratory failure with hypoxia and hypercapnia (Multi).    Subjective   Overall status is much improved this morning.  Has remained on airvo since yesterday afternoon, was on 60%/60L and now down to 50%/50L. Precedex has also been off and his mental clarity is much better although at times he still appears to be having difficulty processing complex decisions.  He expressed strong wishes to go home for a few hours and take a bath in his home tub.  I discussed the realistic challenges associated with his request and he initially did not understand why he could not get a \"home pass\" for a few hours as he has oxygen at home and someone who is able to help him.  After further discussion he seemed to comprehend that his respiratory status is still very tenuous and his oxygen requirements are too high for home systems.  Additionally his physical deconditioning is much more significant than he realizes.  We negotiated a compromise to transfer to step down as one of his primary complaints was the persistent beeping and noise associated with ICU.  He is an appropriate step down candidate so I agreed, with the understanding I will continue to keep an eye on him and he will continue to work aggressively with respiratory therapy.    We also spoke about his goals of care.  He wishes to remain full code at the current time.  He does not have a decision on a specific HCPOA, so at this time it continues to default to his sister Rafael as his NOK.  In the event of significant incapacitation, he would not want to live in a dependent state or in a facility. His long term goals include independent living and quality of life over quantity.      Last Recorded Vitals  Blood pressure 122/83, pulse 91, temperature 36.3 °C (97.4 °F), temperature source Temporal, resp. rate 24, height 1.803 m (5' 10.98\"), weight 125 kg (275 lb 9.2 oz), SpO2 97%.  Intake/Output " last 3 Shifts:  I/O last 3 completed shifts:  In: 1009.6 (8.1 mL/kg) [P.O.:950; I.V.:59.6 (0.5 mL/kg)]  Out: 3460 (27.7 mL/kg) [Urine:3450 (0.8 mL/kg/hr); Chest Tube:10]  Weight: 125 kg     Relevant Results  Scheduled medications  aspirin, 81 mg, oral, Daily  enoxaparin, 40 mg, subcutaneous, q12h SHARRON  ezetimibe, 10 mg, oral, Daily  folic acid, 1 mg, oral, Daily  guaiFENesin, 200 mg, oral, TID  insulin lispro, 0-10 Units, subcutaneous, q4h  ipratropium-albuteroL, 3 mL, nebulization, 4x daily  levoFLOXacin, 750 mg, oral, q24h SHARRON  levothyroxine, 125 mcg, oral, Daily  multivitamin with minerals, 1 tablet, oral, Daily  nystatin, 5 mL, Swish & Swallow, 4x daily  pantoprazole, 40 mg, intravenous, Daily  piperacillin-tazobactam, 3.375 g, intravenous, q6h  polyethylene glycol, 17 g, nasogastric tube, Daily  predniSONE, 20 mg, oral, Daily  sennosides-docusate sodium, 2 tablet, oral, BID  thiamine, 100 mg, oral, Daily      Continuous medications  dexmedeTOMIDine, 0-1.5 mcg/kg/hr, Last Rate: Stopped (09/13/24 1033)  norepinephrine, 0-0.5 mcg/kg/min, Last Rate: 0 mcg/kg/min (09/12/24 1001)      PRN medications  PRN medications: alteplase, dextrose, dextrose, glucagon, glucagon, ipratropium-albuteroL, LORazepam, morphine, oxygen, oxygen    Results for orders placed or performed during the hospital encounter of 09/04/24 (from the past 24 hour(s))   POCT GLUCOSE   Result Value Ref Range    POCT Glucose 195 (H) 74 - 99 mg/dL   POCT GLUCOSE   Result Value Ref Range    POCT Glucose 247 (H) 74 - 99 mg/dL   POCT GLUCOSE   Result Value Ref Range    POCT Glucose 233 (H) 74 - 99 mg/dL   POCT GLUCOSE   Result Value Ref Range    POCT Glucose 135 (H) 74 - 99 mg/dL   POCT GLUCOSE   Result Value Ref Range    POCT Glucose 152 (H) 74 - 99 mg/dL   POCT GLUCOSE   Result Value Ref Range    POCT Glucose 139 (H) 74 - 99 mg/dL         This patient has a urinary catheter   Reason for the urinary catheter remaining today? critically ill patient who need  accurate urinary output measurements               Assessment/Plan   Assessment & Plan  Acute respiratory failure with hypoxia and hypercapnia (Multi)  Alo Glass is a 68 y/o male with a pmhx of CHF, aortic valve stenosis, HLD COPD, MELISSA, T2DM. He was found unresponsive at his home and was transferred to the ED by EMS. ABG in the ED showed respiratory failure with hypoxia and hypercapnia. He received broad spectrum abx and 3L of fluid at that time. He was still hypotensive despite the fluid resuscitation and was given norepinephrine. He was then transferred to the ICU and had a small amount of coffee ground emesis coming from his NGT. He was then intubated and sedated but awakens to stimuli. His CT showed a large loculated R pleural effusion, causing near total collapse of the RML and RLL and multiple infectious/inflammatory nodules. Also, a left kidney mass was identified, concerning for renal cell carcinoma.  Urology team was contacted this admission per prior notes regarding this finding.    Sputum cx with stenotrophomonas, placed back on zosyn as WBC started to climb again.  Currently downtrending. ID following, final duration of abx TBD but will need a least a few more days.  Weaning slowly off oxygen support--now on 50%/50L, continues to have good productive cough. Nutritional intake and status remains poor--has agreed to try protein shakes with each meal. Will order physical therapy today. Will need to plan for rehab on discharge.            REASON FOR ADMISSION     68 yo with MMP including CHF, aortic valve stenosis, COPD, and MELISSA who was found unresponsive at his home and was transferred to the ED by EMS.  ABG in the ED showed hypoxia and hypercapnia prompting intubation and initial need for levophed despite fluid resuscitation and empiric abx.  CT scan revealed changes concerning for a large loculated right side effusion resulting in compressive changes.  Additionally he was noted to have kidney masses  in both left and right sides concerning for renal cell carcinoma.  Since his admission to the ICU he has been slowly making progress but remains in critical condition.    I spent 70 minutes in the professional and overall care of this patient.      Mónica Johns MD

## 2024-09-14 NOTE — PROGRESS NOTES
09/14/24 1211   Discharge Planning   Expected Discharge Disposition SNF        Revisited patient to see if he made choices for SNF. Unable to locate list that I gave patient yesterday. List reprinted and gave to patient for reviewing and choices.

## 2024-09-14 NOTE — PROGRESS NOTES
Physical Therapy                 Therapy Communication Note    Patient Name: Alo Glass  MRN: [id number]  Department: [facility]  ICU  Room: 429/[facility]  Today's Date: 9/14/2024     Discipline: Physical Therapy    Missed Visit Reason: Missed Visit Reason: Patient placed on medical hold (Spoke to RN. Trialed activity with nsg this date. Weakness/significant decreased tolerance noted. Nsg requesting pt to remain at bed level at this time. PT activity deferred until medically appropriate to participate.)    Missed Time: Attempt    Comment:

## 2024-09-14 NOTE — PROGRESS NOTES
Alo Glass is a 69 y.o. male on day 10 of admission presenting with Acute respiratory failure with hypoxia and hypercapnia (Multi).    Subjective   Patient transferred from intensive care unit to general medicine service and to stay in stepdown since he is on high flow 50 L oxygen 50%.  Family members are in the room.  Patient is comfortable, able to communicate ..   69-year-old gentleman with history of diastolic heart failure with acute exacerbation, acute hypoxic hypercapnic respiratory failure required intubation, hypertension, hyperlipidemia, obstructive sleep apnea COPD and type 2 diabetes mellitus was admitted in an unresponsive state because of significant hypercapnia and hypoxia and he was intubated and subsequently has been extubated and is on currently Airvo high flow 650% / 50 L oxygen.  He was also noted to have right hydropneumothorax and has a chest tube.  Has been on antibiotics.  Sputum culture is positive for stenotrophomonas.  Patient is being continued on IV Zosyn.      Objective     Physical Exam  GENERAL:  Alert, moderate distress, cooperative, on Airvo high flow oxygen 50% / 50 L.  SKIN:  Skin color, texture, turgor normal. No rashes or lesions.  OROPHARYNX:  Lips, mucosa, and tongue are normal.Teeth and gums, normal. Oropharynx normal.  NECK:  No jugulovenous distention, No carotid bruits, Carotid pulse normal contour,   LUNGS: Limited examination because of patient's body habitus and difficulty examining, however he has generalized diminished air exchange anteriorly with decreased breath sounds right mid and lower lung fields with intermittent wheezing.  Right chest tube.  CARDIAC: Currently in sinus rhythm, normal S1 and S2; no rubs, murmurs, or gallops  ABDOMEN:  Abdomen soft, large and, non-tender, BS normal, No masses or organomegaly  EXTREMETIES:  Extremities normal, no deformities, 2+ edema, no clubbing or skin discoloration. Good capillary refill., No ulcers  NEURO:  Alert,  "oriented X 3,. Non-focal.  limited exam.  Last Recorded Vitals  Blood pressure 122/83, pulse 91, temperature 36.3 °C (97.4 °F), temperature source Temporal, resp. rate 24, height 1.803 m (5' 10.98\"), weight 125 kg (275 lb 9.2 oz), SpO2 94%.  Intake/Output last 3 Shifts:  I/O last 3 completed shifts:  In: 1009.6 (8.1 mL/kg) [P.O.:950; I.V.:59.6 (0.5 mL/kg)]  Out: 3460 (27.7 mL/kg) [Urine:3450 (0.8 mL/kg/hr); Chest Tube:10]  Weight: 125 kg     Relevant Results  Scheduled medications  aspirin, 81 mg, oral, Daily  enoxaparin, 40 mg, subcutaneous, q12h SHARRON  ezetimibe, 10 mg, oral, Daily  folic acid, 1 mg, oral, Daily  guaiFENesin, 200 mg, oral, TID  insulin lispro, 0-10 Units, subcutaneous, q4h  ipratropium-albuteroL, 3 mL, nebulization, 4x daily  levoFLOXacin, 750 mg, oral, q24h SHARRON  levothyroxine, 125 mcg, oral, Daily  multivitamin with minerals, 1 tablet, oral, Daily  nystatin, 5 mL, Swish & Swallow, 4x daily  pantoprazole, 40 mg, intravenous, Daily  piperacillin-tazobactam, 3.375 g, intravenous, q6h  polyethylene glycol, 17 g, nasogastric tube, Daily  predniSONE, 20 mg, oral, Daily  sennosides-docusate sodium, 2 tablet, oral, BID  thiamine, 100 mg, oral, Daily      Continuous medications     PRN medications  PRN medications: dextrose, dextrose, glucagon, glucagon, ipratropium-albuteroL, oxygen, oxygen       Results for orders placed or performed during the hospital encounter of 09/04/24 (from the past 96 hour(s))   CBC   Result Value Ref Range    WBC 22.0 (H) 4.4 - 11.3 x10*3/uL    nRBC 0.0 0.0 - 0.0 /100 WBCs    RBC 6.13 (H) 4.50 - 5.90 x10*6/uL    Hemoglobin 19.8 (H) 13.5 - 17.5 g/dL    Hematocrit 62.1 (H) 41.0 - 52.0 %     (H) 80 - 100 fL    MCH 32.3 26.0 - 34.0 pg    MCHC 31.9 (L) 32.0 - 36.0 g/dL    RDW 14.1 11.5 - 14.5 %    Platelets 328 150 - 450 x10*3/uL   POCT GLUCOSE   Result Value Ref Range    POCT Glucose 188 (H) 74 - 99 mg/dL   POCT GLUCOSE   Result Value Ref Range    POCT Glucose 171 (H) 74 - 99 " mg/dL   POCT GLUCOSE   Result Value Ref Range    POCT Glucose 157 (H) 74 - 99 mg/dL   Comprehensive Metabolic Panel   Result Value Ref Range    Glucose 166 (H) 74 - 99 mg/dL    Sodium 144 136 - 145 mmol/L    Potassium 5.3 3.5 - 5.3 mmol/L    Chloride 104 98 - 107 mmol/L    Bicarbonate 36 (H) 21 - 32 mmol/L    Anion Gap 9 (L) 10 - 20 mmol/L    Urea Nitrogen 38 (H) 6 - 23 mg/dL    Creatinine 1.00 0.50 - 1.30 mg/dL    eGFR 81 >60 mL/min/1.73m*2    Calcium 8.1 (L) 8.6 - 10.3 mg/dL    Albumin 2.4 (L) 3.4 - 5.0 g/dL    Alkaline Phosphatase 69 33 - 136 U/L    Total Protein 4.9 (L) 6.4 - 8.2 g/dL    AST 15 9 - 39 U/L    Bilirubin, Total 1.0 0.0 - 1.2 mg/dL    ALT 21 10 - 52 U/L   Phosphorus   Result Value Ref Range    Phosphorus 3.3 2.5 - 4.9 mg/dL   CBC   Result Value Ref Range    WBC 18.0 (H) 4.4 - 11.3 x10*3/uL    nRBC 0.0 0.0 - 0.0 /100 WBCs    RBC 6.04 (H) 4.50 - 5.90 x10*6/uL    Hemoglobin 19.3 (H) 13.5 - 17.5 g/dL    Hematocrit 61.1 (H) 41.0 - 52.0 %     (H) 80 - 100 fL    MCH 32.0 26.0 - 34.0 pg    MCHC 31.6 (L) 32.0 - 36.0 g/dL    RDW 13.9 11.5 - 14.5 %    Platelets 307 150 - 450 x10*3/uL   POCT GLUCOSE   Result Value Ref Range    POCT Glucose 175 (H) 74 - 99 mg/dL   POCT GLUCOSE   Result Value Ref Range    POCT Glucose 170 (H) 74 - 99 mg/dL   POCT GLUCOSE   Result Value Ref Range    POCT Glucose 158 (H) 74 - 99 mg/dL   POCT GLUCOSE   Result Value Ref Range    POCT Glucose 151 (H) 74 - 99 mg/dL   POCT GLUCOSE   Result Value Ref Range    POCT Glucose 130 (H) 74 - 99 mg/dL   Renal Function Panel   Result Value Ref Range    Glucose 148 (H) 74 - 99 mg/dL    Sodium 143 136 - 145 mmol/L    Potassium 4.7 3.5 - 5.3 mmol/L    Chloride 101 98 - 107 mmol/L    Bicarbonate 36 (H) 21 - 32 mmol/L    Anion Gap 11 10 - 20 mmol/L    Urea Nitrogen 41 (H) 6 - 23 mg/dL    Creatinine 0.98 0.50 - 1.30 mg/dL    eGFR 83 >60 mL/min/1.73m*2    Calcium 8.0 (L) 8.6 - 10.3 mg/dL    Phosphorus 3.2 2.5 - 4.9 mg/dL    Albumin 2.3 (L) 3.4 -  5.0 g/dL   CBC   Result Value Ref Range    WBC 13.2 (H) 4.4 - 11.3 x10*3/uL    nRBC 0.0 0.0 - 0.0 /100 WBCs    RBC 5.73 4.50 - 5.90 x10*6/uL    Hemoglobin 18.3 (H) 13.5 - 17.5 g/dL    Hematocrit 58.7 (H) 41.0 - 52.0 %     (H) 80 - 100 fL    MCH 31.9 26.0 - 34.0 pg    MCHC 31.2 (L) 32.0 - 36.0 g/dL    RDW 13.9 11.5 - 14.5 %    Platelets 235 150 - 450 x10*3/uL   Magnesium   Result Value Ref Range    Magnesium 2.40 1.60 - 2.40 mg/dL   POCT GLUCOSE   Result Value Ref Range    POCT Glucose 185 (H) 74 - 99 mg/dL   POCT GLUCOSE   Result Value Ref Range    POCT Glucose 165 (H) 74 - 99 mg/dL   POCT GLUCOSE   Result Value Ref Range    POCT Glucose 169 (H) 74 - 99 mg/dL   Blood Gas Venous Full Panel   Result Value Ref Range    POCT pH, Venous 7.36 7.33 - 7.43 pH    POCT pCO2, Venous 78 (HH) 41 - 51 mm Hg    POCT pO2, Venous 42 35 - 45 mm Hg    POCT SO2, Venous 69 45 - 75 %    POCT Oxy Hemoglobin, Venous 67.7 45.0 - 75.0 %    POCT Hematocrit Calculated, Venous 59.0 (H) 41.0 - 52.0 %    POCT Sodium, Venous 135 (L) 136 - 145 mmol/L    POCT Potassium, Venous 4.7 3.5 - 5.3 mmol/L    POCT Chloride, Venous 100 98 - 107 mmol/L    POCT Ionized Calicum, Venous 1.14 1.10 - 1.33 mmol/L    POCT Glucose, Venous 200 (H) 74 - 99 mg/dL    POCT Lactate, Venous 1.6 0.4 - 2.0 mmol/L    POCT Base Excess, Venous 13.0 (H) -2.0 - 3.0 mmol/L    POCT HCO3 Calculated, Venous 44.1 (H) 22.0 - 26.0 mmol/L    POCT Hemoglobin, Venous 19.5 (H) 13.5 - 17.5 g/dL    POCT Anion Gap, Venous -4.0 (L) 10.0 - 25.0 mmol/L    Patient Temperature 37.0 degrees Celsius    FiO2 21 %   POCT GLUCOSE   Result Value Ref Range    POCT Glucose 214 (H) 74 - 99 mg/dL   POCT GLUCOSE   Result Value Ref Range    POCT Glucose 147 (H) 74 - 99 mg/dL   POCT GLUCOSE   Result Value Ref Range    POCT Glucose 150 (H) 74 - 99 mg/dL   Electrocardiogram, 12-lead PRN ACS symptoms   Result Value Ref Range    Ventricular Rate 63 BPM    Atrial Rate 63 BPM    KY Interval 250 ms    QRS  Duration 156 ms    QT Interval 438 ms    QTC Calculation(Bazett) 448 ms    P Axis -13 degrees    R Axis -74 degrees    T Axis 38 degrees    QRS Count 11 beats    Q Onset 196 ms    P Onset 71 ms    P Offset 150 ms    T Offset 415 ms    QTC Fredericia 445 ms   POCT GLUCOSE   Result Value Ref Range    POCT Glucose 161 (H) 74 - 99 mg/dL   Renal Function Panel   Result Value Ref Range    Glucose 172 (H) 74 - 99 mg/dL    Sodium 142 136 - 145 mmol/L    Potassium 4.4 3.5 - 5.3 mmol/L    Chloride 99 98 - 107 mmol/L    Bicarbonate 37 (H) 21 - 32 mmol/L    Anion Gap 10 10 - 20 mmol/L    Urea Nitrogen 41 (H) 6 - 23 mg/dL    Creatinine 0.87 0.50 - 1.30 mg/dL    eGFR >90 >60 mL/min/1.73m*2    Calcium 8.1 (L) 8.6 - 10.3 mg/dL    Phosphorus 2.8 2.5 - 4.9 mg/dL    Albumin 2.3 (L) 3.4 - 5.0 g/dL   CBC   Result Value Ref Range    WBC 11.4 (H) 4.4 - 11.3 x10*3/uL    nRBC 0.0 0.0 - 0.0 /100 WBCs    RBC 5.59 4.50 - 5.90 x10*6/uL    Hemoglobin 18.2 (H) 13.5 - 17.5 g/dL    Hematocrit 57.6 (H) 41.0 - 52.0 %     (H) 80 - 100 fL    MCH 32.6 26.0 - 34.0 pg    MCHC 31.6 (L) 32.0 - 36.0 g/dL    RDW 13.4 11.5 - 14.5 %    Platelets 195 150 - 450 x10*3/uL   POCT GLUCOSE   Result Value Ref Range    POCT Glucose 169 (H) 74 - 99 mg/dL   POCT GLUCOSE   Result Value Ref Range    POCT Glucose 195 (H) 74 - 99 mg/dL   POCT GLUCOSE   Result Value Ref Range    POCT Glucose 247 (H) 74 - 99 mg/dL   POCT GLUCOSE   Result Value Ref Range    POCT Glucose 233 (H) 74 - 99 mg/dL   POCT GLUCOSE   Result Value Ref Range    POCT Glucose 135 (H) 74 - 99 mg/dL   POCT GLUCOSE   Result Value Ref Range    POCT Glucose 152 (H) 74 - 99 mg/dL   POCT GLUCOSE   Result Value Ref Range    POCT Glucose 139 (H) 74 - 99 mg/dL   POCT GLUCOSE   Result Value Ref Range    POCT Glucose 217 (H) 74 - 99 mg/dL      XR chest 1 view    Result Date: 9/13/2024  Interpreted By:  Tania Knowles, STUDY: XR CHEST 1 VIEW;  9/13/2024 4:11 pm   INDICATION: Signs/Symptoms:post chest tube removal.    COMPARISON: 09/13/2024   ACCESSION NUMBER(S): [id number]   ORDERING CLINICIAN: RODOLFO LOPEZ   FINDINGS: The study is limited due to patient's body habitus and poor inspiration previously seen right-sided chest tube has been removed. No gross pneumothorax is noted.   Blunting of both costophrenic angles is seen, which most likely is related to pleural effusions. The heart is enlarged. Bibasilar streaky densities are seen.       No gross pneumothorax.   Bilateral pleural effusions.   Cardiomegaly.   Bibasilar streaky densities, which may be related to atelectasis or infiltrates.       MACRO: None   Signed by: Tania Knowles 9/13/2024 4:40 PM Dictation workstation:   [id number]    Electrocardiogram, 12-lead PRN ACS symptoms    Result Date: 9/13/2024  Sinus rhythm with 1st degree AV block with occasional Premature ventricular complexes and Premature atrial complexes Left axis deviation Right bundle branch block Inferior infarct (cited on or before 04-SEP-2024) Anteroseptal infarct (cited on or before 23-APR-2024) Abnormal ECG When compared with ECG of 06-SEP-2024 00:20, Premature ventricular complexes are now Present Vent. rate has decreased BY  31 BPM Questionable change in initial forces of Septal leads T wave inversion less evident in Anterolateral leads    XR chest 1 view    Result Date: 9/13/2024  Interpreted By:  Rosa Hemphill, STUDY: XR CHEST 1 VIEW;  9/13/2024 4:31 am   INDICATION: Signs/Symptoms:CT in place.   COMPARISON: Multiple prior studies, the most recent 09/12/2024   ACCESSION NUMBER(S): [id number]   ORDERING CLINICIAN: RODOLFO LOPEZ   FINDINGS:     CARDIOMEDIASTINAL SILHOUETTE: Stable enlarged cardiac silhouette.   LUNGS: Stable position of pigtail catheter overlying the right lung base. There is improved aeration of the right lung base. Mild residual hazy right basilar opacity, likely combination of small residual pleural effusion and basilar consolidation. Limited evaluation of the left  lung base due to underpenetration. This is not significantly changed and left pleural effusion or basilar consolidation not excluded. No pneumothorax.   ABDOMEN: No remarkable upper abdominal findings.   BONES: No acute osseous abnormality.       Improved aeration of the right lung base. Residual hazy right basilar opacities likely a combination of residual pleural effusion and basilar atelectasis.   Limited evaluation of the left lung base due to underpenetration.   MACRO: None   Signed by: Rosa Hemphill 9/13/2024 4:48 AM Dictation workstation:   [id number]    XR chest 1 view    Result Date: 9/12/2024  Interpreted By:  Donte Shoemaker, STUDY: XR CHEST 1 VIEW; 9/12/2024 5:23 am   INDICATION: Signs/Symptoms:CT in place.   COMPARISON: None   ACCESSION NUMBER(S): [id number]   ORDERING CLINICIAN: RODOLFO LOPEZ   TECHNIQUE: 1 view of the chest was performed.   FINDINGS: Right pigtail catheter in similar location. Stable layering bilateral pleural effusion with surrounding atelectasis. Stable cardiomegaly. Bilateral shoulder joint degenerative changes.       1. No significant interval change from 09/11/2024 chest x-ray.   Signed by: Donte Shoemaker 9/12/2024 8:18 AM Dictation workstation:   [id number]    XR chest 1 view    Result Date: 9/11/2024  Interpreted By:  Klaus Villafana, STUDY: XR CHEST 1 VIEW; 9/11/2024 5:42 am   INDICATION: Signs/Symptoms:CT in place   COMPARISON: Radiographs 09/10/2024   ACCESSION NUMBER(S): [id number]   ORDERING CLINICIAN: RODOLFO LOPEZ   TECHNIQUE: Single frontal view of the chest performed.   FINDINGS: LINES AND DEVICES: Remote endotracheal tube. Stable right pigtail pleural catheter near right lung base.   LUNGS: Stable hazy opacities at the lung base and additional airspace opacities extending to right mid lung, stable to mildly increased. No pneumothorax. Left lung is clear.   CARDIOMEDIASTINAL SILHOUETTE: Stable cardiomegaly.       Stable to slightly increased right pleural  effusion with atelectasis. Stable right pleural catheter.   MACRO None   Signed by: Klaus Villafana 9/11/2024 8:21 AM Dictation workstation:   [id number]    XR chest 1 view    Result Date: 9/10/2024  Interpreted By:  Ishaan Vernon, STUDY: XR CHEST 1 VIEW;  9/10/2024 5:34 am   INDICATION: Signs/Symptoms:Eval chest tube placement, Rt effusion.   COMPARISON: CT scan chest from 09/04/2024. Chest x-ray from 09/09/2024.   ACCESSION NUMBER(S): [id number]   ORDERING CLINICIAN: RICH KAPLAN   TECHNIQUE: Single AP portable view of the chest was obtained.   FINDINGS: MEDIASTINUM/ LUNGS/ ARELIS: Previous ET tube has been removed. There is an NG tube in place with distal port just beyond the GE junction and distal tip in the proximal stomach. Pigtail pleural catheter at the right base is stable. There is a small grossly stable lateral right basilar pneumothorax. There is mild bibasilar atelectasis. Cardiomegaly without gross vascular congestion. No pneumothorax on the left. No tracheal deviation. No abnormal hilar fullness or gross mass on either side.   BONES: No lytic or blastic destructive bone lesion.   UPPER ABDOMEN: Grossly intact.       Cardiomegaly.   NG tube in place. ET tube has been removed.   Stable pleural drainage catheter at the base of the right chest. Small stable lateral right basilar pneumothorax.   Mild bibasilar atelectasis.   MACRO: None   Signed by: Ishaan Vernon 9/10/2024 8:13 AM Dictation workstation:   [id number]    XR chest 1 view    Result Date: 9/9/2024  Interpreted By:  Daisy Sosa, STUDY: XR CHEST 1 VIEW;  9/9/2024 4:39 pm   INDICATION: Signs/Symptoms:s/p lytic therapy.     COMPARISON: 5:21 a.m. the same day   ACCESSION NUMBER(S): [id number]   ORDERING CLINICIAN: RODOLFO LOPEZ   FINDINGS: ETT, NG tube and right lower chest pigtail catheter remain in place. Additional presumed overlying monitoring/support devices again seen. Small right basilar pneumothorax decreased in size. Persistent  bibasilar opacities. The cardiomediastinal silhouette remains enlarged.       Decreased size of right basilar pneumothorax since 5:21 a.m. the same day with pigtail catheter in place.   MACRO: None.   Signed by: Daisy Sosa 9/9/2024 4:44 PM Dictation workstation:   [id number]    XR chest 1 view    Result Date: 9/9/2024  Interpreted By:  Klaus Villafana, STUDY: XR CHEST 1 VIEW; 9/9/2024 5:32 am   INDICATION: Signs/Symptoms:Eval chest tube placement, Rt effusion   COMPARISON: Radiographs 09/08/2024   ACCESSION NUMBER(S): [id number]   ORDERING CLINICIAN: RICH KAPLAN   TECHNIQUE: Single frontal view of the chest performed.   FINDINGS: LINES AND DEVICES: The pigtail of right-sided pleural drainage catheter overlies the right infrahilar region, grossly unchanged. Tip of ETT approximately 6.6 cm above the kathie, previously 7.7 cm. NG tube courses below the diaphragm, limited assessment.   LUNGS: Moderate-size right-sided hydropneumothorax appears grossly unchanged. Stable to slightly increased interstitial opacities on the left. Question new trace left pleural effusion with atelectasis.   CARDIOMEDIASTINAL SILHOUETTE: Stable cardiomegaly.       Right-sided infrahilar pigtail catheter and moderate hydropneumothorax, grossly unchanged.   Suspected worsening CHF with stable to mildly increased interstitial edema and possible new trace left pleural effusion.   MACRO None   Signed by: Klaus Villafana 9/9/2024 8:17 AM Dictation workstation:   [id number]    XR chest 1 view    Result Date: 9/8/2024  Interpreted By:  Corrina Reyes, STUDY: XR CHEST 1 VIEW;  9/8/2024 6:39 am   INDICATION: Signs/Symptoms:Eval chest tube placement, Rt effusion.     COMPARISON: 09/07/2024   ACCESSION NUMBER(S): [id number]   ORDERING CLINICIAN: RICH KAPLAN   TECHNIQUE: Portable AP semi upright   FINDINGS: Endotracheal tube tip projects between 7 and 8 cm above the kathie. Nasogastric tube is no longer identified. Right pleural pigtail  catheter projects at mid right hemithorax and is similar in position to the prior study. Relative volume of basilar pneumothorax and pleural fluid appears similar to the prior study. Right lower lobe and right middle lobe are collapsed. Right upper lobe variation is similar to the prior study. Left lung shows some vascular congestion. Minimal atelectasis is present at the left lung base. No change in the osseous structures is noted.       Basilar pneumothorax on the right appears similar to the prior study in the relative volume of pleural fluid and pneumothorax appears unchanged.   Collapse of the right lower lobe and right middle lobe   Pulmonary vascular congestion is most notable in the left lung   MACRO: None.   Signed by: Corrina Reyes 9/8/2024 12:32 PM Dictation workstation:   [id number]    XR chest 1 view    Result Date: 9/8/2024  Interpreted By:  Corrina Reyes, STUDY: XR CHEST 1 VIEW;  9/7/2024 5:28 pm   INDICATION: Signs/Symptoms:Eval chest tube placement, Rt pleural effusion.     COMPARISON: 09/06/2024   ACCESSION NUMBER(S): [id number]   ORDERING CLINICIAN: RICH KAPLAN   TECHNIQUE: Portable AP upright   FINDINGS: Endotracheal tube tip projects about 6 cm above the kathie. NG tube extends below the diaphragm and beyond the image. Right pleural pigtail catheter projects at mid right hemithorax and appears unchanged in position.   Pneumothorax at the right thoracic base appears larger than on the prior study. The amount of pleural fluid appears decreased. Mild atelectasis left lung base is slightly improved. Heart is unchanged in size. No changes noted in the osseous structures.       Right pleural effusion is decreased from the prior study but the right basilar pneumothorax appears increased.   MACRO: None.   Signed by: Corrina Reyes 9/8/2024 12:30 PM Dictation workstation:   [id number]    Electrocardiogram, 12-lead PRN ACS symptoms    Result Date: 9/6/2024  Sinus rhythm with 1st degree AV  block with Premature atrial complexes Left axis deviation Right bundle branch block Inferior infarct (cited on or before 04-SEP-2024) Anterior infarct , age undetermined T wave abnormality, consider lateral ischemia Abnormal ECG When compared with ECG of 05-SEP-2024 10:29, (unconfirmed) Premature atrial complexes are now Present Confirmed by Shady Bill ([id number]) on 9/6/2024 1:59:56 PM    XR chest 1 view    Result Date: 9/6/2024  Interpreted By:  Eloisa Landa, STUDY: XR CHEST 1 VIEW 9/6/2024 9:05 am   INDICATION: Signs/Symptoms:R pleural effusion   COMPARISON: 09/05/2024   ACCESSION NUMBER(S): [id number]   ORDERING CLINICIAN: PERRI DELACRUZ   TECHNIQUE: AP view of the chest at bedside in the erect position   FINDINGS: A right chest tube is once again noted within the mid right hemithorax with a stable right hydropneumothorax seen within the mid and lower right hemithorax.   The tip of the endotracheal tube remains satisfactorily positioned at a distance 3 cm above kathie with nasogastric tube descending below diaphragm.   There is small left pleural effusion and left basilar atelectasis. The heart remains enlarged.       No change in the right hydropneumothorax which may be loculated with chest tube on the right in the mid hemithorax.   Small left pleural effusion with left basilar infiltrate/atelectasis.   Signed by: Eloisa Landa 9/6/2024 9:20 AM Dictation workstation:   [id number]    ECG 12 lead    Result Date: 9/6/2024  Sinus rhythm with 1st degree AV block Left axis deviation Right bundle branch block Inferior infarct (cited on or before 04-SEP-2024) Abnormal ECG When compared with ECG of 04-SEP-2024 16:24, Premature ventricular complexes are no longer Present Right bundle branch block is now Present    ECG 12 lead    Result Date: 9/5/2024  Sinus rhythm with 1st degree AV block with frequent Premature ventricular complexes Left axis deviation Left ventricular hypertrophy with QRS widening and repolarization  abnormality ( R in aVL , Faheem product ) Inferior infarct , age undetermined Abnormal ECG When compared with ECG of 23-APR-2024 09:01, Premature ventricular complexes are now Present MD interval has increased (RBBB and left anterior fascicular block) is no longer Present Inferior infarct is now Present Confirmed by Andrea Bentley ([id number]) on 9/5/2024 10:01:05 AM    XR chest 1 view    Result Date: 9/5/2024  Interpreted By:  Saad Cronin, STUDY: XR CHEST 1 VIEW; 9/5/2024 4:57 am   INDICATION: CLINICAL INFORMATION: Signs/Symptoms:Pig tail Chest Tube replacement.   COMPARISON: 09/04/2024   ACCESSION NUMBER(S): [id number]   ORDERING CLINICIAN: ANDRY ABRAHAM   TECHNIQUE: Portable chest one view.   FINDINGS: The cardiac silhouette is quite prominent suggesting cardiomegaly. The tube and line placement is unchanged.  There is a decrease in the right-sided pleural-based density is well as the right-sided parenchymal densities. The pulmonary vasculature is slightly indistinct suggesting mild pulmonary vascular congestion.       There appears to be increased aeration at the right base suggesting diminishing pleural density within the right hemithorax in association with the chest tube. Infiltrate persists on the right.   MACRO: none   Signed by: Saad Cronin 9/5/2024 8:18 AM Dictation workstation:   [id number]    XR chest 1 view    Result Date: 9/5/2024  Interpreted By:  Cande Keating, STUDY: XR CHEST 1 VIEW;  9/4/2024 11:10 pm   INDICATION: Signs/Symptoms:Chest tube placement.     COMPARISON: Radiographs of the chest dated 09/04/2024; CT of the chest dated 09/04/2024   ACCESSION NUMBER(S): [id number]   ORDERING CLINICIAN: ANDRY ABRAHAM   FINDINGS: AP radiograph of the chest was provided.   Endotracheal tube projects 4.4 cm superior to the kathie. Enteric tube appears to course along the midline expected course of the esophagus, although the distal tip is not well visualized due to underpenetration and overlying  structures.   CARDIOMEDIASTINAL SILHOUETTE: Cardiomediastinal silhouette is enlarged, similar to prior exam.   LUNGS: Redemonstration of the large right-sided pleural effusion with associated atelectasis/consolidation, similar in appearance to prior radiographs. No new consolidation or pleural effusion is present in the left lung.   ABDOMEN: No remarkable upper abdominal findings.   BONES: No acute osseous changes.       1.  Enteric tube is coursing along the midline of the mediastinum in the expected course of the esophagus, although the distal tip is not well visualized due to overlying cardiomediastinal silhouette and underpenetration, and may be outside of the field-of-view of the study. Endotracheal tube projects 4.5 cm superior to the kathie. 2. Redemonstration of the large loculated right-sided pleural effusion with the associated atelectasis/consolidation, similar in appearance to prior exam.       MACRO: None   Signed by: Cande Keating 9/5/2024 2:08 AM Dictation workstation:   [id number]    CT chest abdomen pelvis w IV contrast    Result Date: 9/4/2024  Interpreted By:  Ishaan Banda, STUDY: CT CHEST ABDOMEN PELVIS W IV CONTRAST;  9/4/2024 5:52 pm   INDICATION: Signs/Symptoms:unresponsive.     COMPARISON: None.   ACCESSION NUMBER(S): [id number]   ORDERING CLINICIAN: GURPREET COPPOLA   TECHNIQUE: Contiguous axial images of the chest, abdomen, and pelvis were obtained after the intravenous administration of iodinated contrast. Coronal and sagittal reformatted images were reconstructed from the axial data.   FINDINGS: CT CHEST:   MEDIASTINUM AND LYMPH NODES: NG/OG tube noted with small amount of fluid in the esophagus. The esophageal wall appears within normal limits.  No enlarged intrathoracic or axillary lymph nodes by imaging criteria. No pneumomediastinum.   VESSELS:  Normal caliber thoracic aorta without dissection. Mild aortic atherosclerosis.   HEART: Enlarged. Severe aortic valvular  calcifications. Moderate coronary artery calcifications. No significant pericardial effusion.   LUNG, AIRWAYS, PLEURA: There is a large loculated right pleural effusion causing compressive atelectasis on the right lung. As a result, there is complete collapse of the right lower lobe, complete collapse of the right middle lobe, and partial collapse of the right upper lobe. There are multiple loculated compartments largest of which is seen at the right lung base. There is a small amount of mucus in the mid to distal trachea. There is small amount of layering debris in the mainstem bronchi. The majority of the right middle and lower lobe bronchi are collapsed and opacified with low-density debris. There is a trace left pleural effusion with subsegmental left basilar atelectasis. There is emphysema. There are new scattered subcentimeter bilateral centrilobular nodules that are likely infectious/inflammatory in nature from bronchiolitis.   CHEST WALL SOFT TISSUES: No discernible acute abnormality. There is a 5.5 cm x 1.7 cm lipoma in the right infraspinatus muscle.   OSSEOUS STRUCTURES: No acute osseous abnormality.     CT ABDOMEN/PELVIS:   ABDOMINAL WALL: No significant abnormality.   LIVER: No significant parenchymal abnormality.   BILE DUCTS: No significant intrahepatic or extrahepatic dilatation.   GALLBLADDER: No significant abnormality.   PANCREAS: No significant abnormality.   SPLEEN: No significant abnormality.   ADRENALS: There is a 1.7 cm low-density left adrenal nodule likely representing a adenoma.   KIDNEYS, URETERS, BLADDER: There is a heterogeneously enhancing solid mass in the upper pole the left kidney measuring 2.4 cm x 1.9 cm consistent with renal cell carcinoma. Additionally, a 0.8 cm likely enhancing lesion in the upper pole the right kidney is concerning for renal cell carcinoma. No hydronephrosis or calculi. Urinary bladder is decompressed with Live catheter in place.   REPRODUCTIVE ORGANS: No  significant abnormality.   VESSELS: Moderate aortic atherosclerosis without AAA.   RETROPERITONEUM/LYMPH NODES: No acute retroperitoneal abnormality. No enlarged lymph nodes.   BOWEL/MESENTERY/PERITONEUM: Colonic diverticulosis without acute diverticulitis. No inflammatory bowel wall thickening or dilatation. Normal appendix.   No ascites, free air, or fluid collection.     MUSCULOSKELETAL: No acute osseous abnormality.       CT CHEST: Large loculated right pleural effusion causing complete compressive collapse of the right middle lobe and right lower lobe and partial compressive collapse of the right upper lobe. The largest likely compartments located at the inferior right hemithorax. The cause of the effusion is not apparent as there is only a small amount of debris in the distal trachea and right mainstem bronchus but the distal-most right-sided bronchi are occluded due to combination of compressive collapse and small low-density fluid.   Numerous new subcentimeter centrilobular pulmonary nodules that are most likely infectious/inflammatory in as can be seen with bronchiolitis or fungal infection.   Aortic valve calcification to the extent that would likely result in aortic stenosis.   CT ABDOMEN/PELVIS: Left kidney solid heterogeneously enhancing mass suspicious for renal cell carcinoma (2.4 cm x 1.9 cm) and of suspected 0.8 cm right upper pole renal cell carcinoma measuring 0.8 cm. Recommend urological follow-up/also taken and dedicated MRI kidneys to further evaluate these lesions. YELLOW ALERT: An incidental finding alert was sent per protocol.   No acute abnormality in the abdomen or pelvis.   Colonic diverticulosis without acute diverticulitis.   MACRO: Critical Finding:  New mass in the kidney. Notification was initiated on 9/4/2024 at 6:25 pm by  Ishaan Banda.  (**-YCF-**) Instructions:  MR Abdomen w and wo IV contrast. 1 week.   Signed by: Ishaan Banda 9/4/2024 6:26 PM Dictation workstation:    [id number]    CT head wo IV contrast    Result Date: 9/4/2024  Interpreted By:  Ishaan Banda, STUDY: CT HEAD WO IV CONTRAST;  9/4/2024 5:52 pm   INDICATION: Signs/Symptoms:unrespoonsive.     COMPARISON: None.   ACCESSION NUMBER(S): [id number]   ORDERING CLINICIAN: GURPREET COPPOLA   TECHNIQUE: Noncontrast axial CT images of head were obtained with coronal and sagittal reconstructed images.   FINDINGS: BRAIN PARENCHYMA: Mild periventricular and subcortical hemispheric white matter hypodensities are most compatible with chronic small vessel ischemic disease. No acute intraparenchymal hemorrhage or parenchymal evidence of acute large territory ischemic infarct. Gray-white matter distinction is preserved. No mass-effect.   VENTRICLES and EXTRA-AXIAL SPACES:  No acute extra-axial or intraventricular hemorrhage. No effacement of cerebral sulci. The ventricles and sulci are age-concordant.   PARANASAL SINUSES/MASTOIDS:  No hemorrhage or air-fluid levels within the visualized paranasal sinuses. The mastoids are well aerated.   CALVARIUM/ORBITS:  No skull fracture.  The orbits and globes are intact to the extent visualized.   EXTRACRANIAL SOFT TISSUES: No discernible abnormality.       No acute intracranial abnormality.     MACRO: None.   Signed by: Ishaan Banda 9/4/2024 6:07 PM Dictation workstation:   [id number]    XR chest 1 view    Result Date: 9/4/2024  Interpreted By:  Rosaile Ellison, STUDY: XR CHEST 1 VIEW;  9/4/2024 4:33 pm   INDICATION: Signs/Symptoms:intubation.   COMPARISON: 04/08/2024   ACCESSION NUMBER(S): [id number]   ORDERING CLINICIAN: GURPREET COPPOLA   TECHNIQUE: A single portable image of the chest was obtained.   FINDINGS: Resolution is limited. The right portion of the chest is not entirely included.   The patient is rotated. The heart size is uncertain.   There appears to be elevated right hemidiaphragm and right-sided infiltration.   An endotracheal tube terminates above the kathie. A  nasogastric tube terminates below the diaphragm.   COMPARISON OF FINDING: The nasogastric tube was placed in the interval. The endotracheal tube was placed in the interval.       Interval placement of an endotracheal tube. The tip is above the kathie. Interval placement of a nasogastric tube. It appears to terminate below the diaphragm.   The exam is extremely limited.   MACRO: none   Signed by: Rosalie Ellison 9/4/2024 4:43 PM Dictation workstation:   [id number]               Assessment/Plan   Assessment & Plan  Acute respiratory failure with hypoxia and hypercapnia (Multi)  67-year-old gentleman with history of chronic diastolic heart failure, COPD, obstructive sleep apnea, obesity, type 2 diabetes mellitus, hypertension, hypothyroidism and hyperlipidemia was admitted with acute respiratory failure with hypoxia and hypercapnia and noted to have a right loculated pleural effusion and bilateral lower lobe lung infiltrate and right hydropneumothorax.  He was hypotensive on admission and required 3 L of fluid to resuscitate and admitted to intensive care unit and was intubated because of acute respiratory failure and remained intubated for several days.  Currently he is extubated and on high flow oxygen as mentioned above.  He had significant leukocytosis of 22,000 on admission and his sputum culture was positive for stenotrophomonas.  Patient is being continued on IV Zosyn for next few days.  Patient is transferred out of ICU to general medical service.    1.  Acute hypoxic hypercapnic respiratory failure with hypotension and right hydropneumothorax and bilateral infiltrate and positive for pneumonia.  Initially intubated for several days and since then he has been extubated and currently on high flow Airvo 50% / 50 L oxygen  Continued on IV Zosyn for another 5 days  Leukocytosis is improving    2.  Patient had hydropneumothorax on admission and has chest tube .    3.  Hypotension has resolved    4.  COPD and  obstructive sleep apnea aerosol treatment and currently on Airvo with 50% oxygen/50 L    Continue aerosol treatment and currently patient on prednisone 20 mg daily.    5.  Patient is currently full code however we had a discussion about DNR status and patient and family will discuss and let me know in next 24 hours.    Reviewed all labs patient's medical record and imaging studies and discussed the plan of treatment with the patient and his family members were present at the time of examination in the room.       I spent 55 minutes in the professional and overall care of this patient.      Noble Gatica MD

## 2024-09-14 NOTE — PROGRESS NOTES
Occupational Therapy                 Therapy Communication Note    Patient Name: Alo Glass  MRN: [id number]  Department: [facility]  ICU  Room: [facility]  Today's Date: 9/14/2024     Discipline: Occupational Therapy    Missed Visit Reason: Missed Visit Reason: Patient placed on medical hold (Spoke to RN. Trialed activity with nsg this date. Weakness/significant decreased tolerance noted. Nsg requesting pt to remain at bed level at this time. OT activity deferred until medically appropriate to participate.)    Missed Time: Attempt

## 2024-09-15 VITALS
OXYGEN SATURATION: 98 % | HEART RATE: 88 BPM | SYSTOLIC BLOOD PRESSURE: 119 MMHG | RESPIRATION RATE: 20 BRPM | BODY MASS INDEX: 39.2 KG/M2 | DIASTOLIC BLOOD PRESSURE: 75 MMHG | WEIGHT: 279.98 LBS | TEMPERATURE: 97.5 F | HEIGHT: 71 IN

## 2024-09-15 LAB
ATRIAL RATE: 77 BPM
GLUCOSE BLD MANUAL STRIP-MCNC: 108 MG/DL (ref 74–99)
GLUCOSE BLD MANUAL STRIP-MCNC: 130 MG/DL (ref 74–99)
GLUCOSE BLD MANUAL STRIP-MCNC: 140 MG/DL (ref 74–99)
GLUCOSE BLD MANUAL STRIP-MCNC: 148 MG/DL (ref 74–99)
GLUCOSE BLD MANUAL STRIP-MCNC: 175 MG/DL (ref 74–99)
GLUCOSE BLD MANUAL STRIP-MCNC: 201 MG/DL (ref 74–99)
P AXIS: 61 DEGREES
P OFFSET: 163 MS
P ONSET: 81 MS
PR INTERVAL: 260 MS
Q ONSET: 211 MS
QRS COUNT: 13 BEATS
QRS DURATION: 140 MS
QT INTERVAL: 410 MS
QTC CALCULATION(BAZETT): 463 MS
QTC FREDERICIA: 445 MS
R AXIS: -73 DEGREES
T AXIS: 100 DEGREES
T OFFSET: 416 MS
VENTRICULAR RATE: 77 BPM

## 2024-09-15 PROCEDURE — 2500000002 HC RX 250 W HCPCS SELF ADMINISTERED DRUGS (ALT 637 FOR MEDICARE OP, ALT 636 FOR OP/ED): Performed by: SURGERY

## 2024-09-15 PROCEDURE — 94669 MECHANICAL CHEST WALL OSCILL: CPT

## 2024-09-15 PROCEDURE — 2500000004 HC RX 250 GENERAL PHARMACY W/ HCPCS (ALT 636 FOR OP/ED): Performed by: SURGERY

## 2024-09-15 PROCEDURE — 2500000001 HC RX 250 WO HCPCS SELF ADMINISTERED DRUGS (ALT 637 FOR MEDICARE OP): Performed by: SURGERY

## 2024-09-15 PROCEDURE — 94640 AIRWAY INHALATION TREATMENT: CPT

## 2024-09-15 PROCEDURE — 2500000005 HC RX 250 GENERAL PHARMACY W/O HCPCS: Performed by: SURGERY

## 2024-09-15 PROCEDURE — 2500000001 HC RX 250 WO HCPCS SELF ADMINISTERED DRUGS (ALT 637 FOR MEDICARE OP)

## 2024-09-15 PROCEDURE — 99233 SBSQ HOSP IP/OBS HIGH 50: CPT | Performed by: INTERNAL MEDICINE

## 2024-09-15 PROCEDURE — 82947 ASSAY GLUCOSE BLOOD QUANT: CPT

## 2024-09-15 PROCEDURE — 99232 SBSQ HOSP IP/OBS MODERATE 35: CPT | Performed by: INTERNAL MEDICINE

## 2024-09-15 PROCEDURE — 97165 OT EVAL LOW COMPLEX 30 MIN: CPT | Mod: GO

## 2024-09-15 PROCEDURE — 97162 PT EVAL MOD COMPLEX 30 MIN: CPT | Mod: GP

## 2024-09-15 PROCEDURE — 2060000001 HC INTERMEDIATE ICU ROOM DAILY

## 2024-09-15 PROCEDURE — 97535 SELF CARE MNGMENT TRAINING: CPT | Mod: GO

## 2024-09-15 PROCEDURE — 94668 MNPJ CHEST WALL SBSQ: CPT

## 2024-09-15 PROCEDURE — 97112 NEUROMUSCULAR REEDUCATION: CPT | Mod: GP

## 2024-09-15 PROCEDURE — 2500000005 HC RX 250 GENERAL PHARMACY W/O HCPCS: Performed by: INTERNAL MEDICINE

## 2024-09-15 ASSESSMENT — COGNITIVE AND FUNCTIONAL STATUS - GENERAL
CLIMB 3 TO 5 STEPS WITH RAILING: TOTAL
WALKING IN HOSPITAL ROOM: TOTAL
CLIMB 3 TO 5 STEPS WITH RAILING: TOTAL
HELP NEEDED FOR BATHING: TOTAL
WALKING IN HOSPITAL ROOM: TOTAL
EATING MEALS: TOTAL
DAILY ACTIVITIY SCORE: 6
DRESSING REGULAR LOWER BODY CLOTHING: TOTAL
TURNING FROM BACK TO SIDE WHILE IN FLAT BAD: A LOT
WALKING IN HOSPITAL ROOM: TOTAL
EATING MEALS: TOTAL
MOBILITY SCORE: 6
STANDING UP FROM CHAIR USING ARMS: TOTAL
PERSONAL GROOMING: TOTAL
PERSONAL GROOMING: A LITTLE
MOBILITY SCORE: 8
MOVING FROM LYING ON BACK TO SITTING ON SIDE OF FLAT BED WITH BEDRAILS: A LOT
HELP NEEDED FOR BATHING: TOTAL
DAILY ACTIVITIY SCORE: 21
DRESSING REGULAR LOWER BODY CLOTHING: TOTAL
DRESSING REGULAR UPPER BODY CLOTHING: TOTAL
STANDING UP FROM CHAIR USING ARMS: TOTAL
EATING MEALS: A LITTLE
MOVING FROM LYING ON BACK TO SITTING ON SIDE OF FLAT BED WITH BEDRAILS: A LOT
MOBILITY SCORE: 8
WALKING IN HOSPITAL ROOM: TOTAL
DRESSING REGULAR UPPER BODY CLOTHING: TOTAL
STANDING UP FROM CHAIR USING ARMS: TOTAL
MOVING TO AND FROM BED TO CHAIR: TOTAL
HELP NEEDED FOR BATHING: TOTAL
DAILY ACTIVITIY SCORE: 6
DAILY ACTIVITIY SCORE: 6
STANDING UP FROM CHAIR USING ARMS: TOTAL
MOVING FROM LYING ON BACK TO SITTING ON SIDE OF FLAT BED WITH BEDRAILS: A LOT
STANDING UP FROM CHAIR USING ARMS: TOTAL
EATING MEALS: TOTAL
DAILY ACTIVITIY SCORE: 6
TURNING FROM BACK TO SIDE WHILE IN FLAT BAD: TOTAL
TOILETING: TOTAL
DRESSING REGULAR LOWER BODY CLOTHING: TOTAL
MOBILITY SCORE: 8
TURNING FROM BACK TO SIDE WHILE IN FLAT BAD: A LOT
DRESSING REGULAR LOWER BODY CLOTHING: TOTAL
DRESSING REGULAR LOWER BODY CLOTHING: TOTAL
TOILETING: TOTAL
HELP NEEDED FOR BATHING: A LOT
CLIMB 3 TO 5 STEPS WITH RAILING: TOTAL
MOVING FROM LYING ON BACK TO SITTING ON SIDE OF FLAT BED WITH BEDRAILS: A LOT
MOBILITY SCORE: 8
TOILETING: TOTAL
EATING MEALS: TOTAL
PERSONAL GROOMING: TOTAL
PERSONAL GROOMING: TOTAL
EATING MEALS: A LITTLE
TOILETING: TOTAL
MOVING TO AND FROM BED TO CHAIR: TOTAL
TOILETING: TOTAL
HELP NEEDED FOR BATHING: TOTAL
DRESSING REGULAR UPPER BODY CLOTHING: A LOT
CLIMB 3 TO 5 STEPS WITH RAILING: TOTAL
MOVING TO AND FROM BED TO CHAIR: TOTAL
DRESSING REGULAR UPPER BODY CLOTHING: TOTAL
PERSONAL GROOMING: TOTAL
MOVING TO AND FROM BED TO CHAIR: TOTAL
DRESSING REGULAR UPPER BODY CLOTHING: A LITTLE
DAILY ACTIVITIY SCORE: 12
DRESSING REGULAR UPPER BODY CLOTHING: TOTAL
MOVING TO AND FROM BED TO CHAIR: TOTAL
WALKING IN HOSPITAL ROOM: TOTAL
TURNING FROM BACK TO SIDE WHILE IN FLAT BAD: A LOT
CLIMB 3 TO 5 STEPS WITH RAILING: TOTAL
MOVING FROM LYING ON BACK TO SITTING ON SIDE OF FLAT BED WITH BEDRAILS: TOTAL
TURNING FROM BACK TO SIDE WHILE IN FLAT BAD: A LOT
PERSONAL GROOMING: A LITTLE

## 2024-09-15 ASSESSMENT — PAIN SCALES - GENERAL
PAINLEVEL_OUTOF10: 0 - NO PAIN

## 2024-09-15 ASSESSMENT — PAIN - FUNCTIONAL ASSESSMENT
PAIN_FUNCTIONAL_ASSESSMENT: 0-10

## 2024-09-15 ASSESSMENT — ACTIVITIES OF DAILY LIVING (ADL)
HOME_MANAGEMENT_TIME_ENTRY: 10
ADL_ASSISTANCE: INDEPENDENT
ADL_ASSISTANCE: INDEPENDENT

## 2024-09-15 NOTE — ASSESSMENT & PLAN NOTE
67-year-old gentleman with history of chronic diastolic heart failure, COPD, obstructive sleep apnea, obesity, type 2 diabetes mellitus, hypertension, hypothyroidism and hyperlipidemia was admitted with acute respiratory failure with hypoxia and hypercapnia and noted to have a right loculated pleural effusion and bilateral lower lobe lung infiltrate and right hydropneumothorax.  He was hypotensive on admission and required 3 L of fluid to resuscitate and admitted to intensive care unit and was intubated because of acute respiratory failure and remained intubated for several days.  Currently he is extubated and on high flow oxygen as mentioned above.  He had significant leukocytosis of 22,000 on admission and his sputum culture was positive for stenotrophomonas.  Patient is being continued on IV Zosyn for next few days.  Patient is transferred out of ICU to general medical service.    1.  Acute hypoxic hypercapnic respiratory failure with hypotension and right hydropneumothorax and bilateral infiltrate and positive for pneumonia.  Initially intubated for several days and since then he has been extubated and was on high flow Airvo 50% / 50 L oxygen.  Improving and currently is on 6 L nasal cannula.  Continued on IV Zosyn for another 5 days  Leukocytosis is improving    2.  Patient had hydropneumothorax on admission and had chest tube, chest tube has been removed since then..    3.  Hypotension has resolved    4.  COPD and obstructive sleep apnea aerosol treatment and currently on Airvo with 50% oxygen/50 L    Continue aerosol treatment and currently patient on prednisone 20 mg daily.    5.  Patient would like to be DNR/DNI.    Reviewed all labs patient's medical record and imaging studies and discussed the plan of treatment with the patient and his family members were present at the time of examination in the room.

## 2024-09-15 NOTE — PROGRESS NOTES
Attempted to call pt to schedule an appointment with Dr. Morales. Pt did not answer and was unable to leave a voicemail due to voicemail box being full.    Sent pt [employer] message with appointment information along with the urology main number of [phone] to call back if any questions/concerns

## 2024-09-15 NOTE — PROGRESS NOTES
Occupational Therapy    Evaluation/Treatment    Patient Name: Alo Glass  MRN: [id number]  Department: [facility]   Room: [address] [place] [address]  Today's Date: 09/15/24  Time Calculation  Start Time: 1112  Stop Time: 1150  Time Calculation (min): 38 min     Assessment:  OT Assessment: Pt requiring total assist supine<>sit and max A to maintain seated balance during activities. Pt VSS with SP02 89-93% and HR:  throughout. Pt presenting with deficits in activity tolerance, balance, cognition, and functional ambulation requiring high intensity in order to return to PLOF.  Prognosis: Good  Barriers to Discharge: Decreased caregiver support  Evaluation/Treatment Tolerance: Patient tolerated treatment well, Patient limited by fatigue  Medical Staff Made Aware: Yes  End of Session Communication: Bedside nurse  End of Session Patient Position: Bed, 3 rail up, Alarm on  OT Assessment Results: Decreased ADL status, Decreased upper extremity range of motion, Decreased upper extremity strength, Decreased safe judgment during ADL, Decreased cognition, Decreased endurance, Decreased sensation, Decreased IADLs  Prognosis: Good  Barriers to Discharge: Decreased caregiver support  Evaluation/Treatment Tolerance: Patient tolerated treatment well, Patient limited by fatigue  Medical Staff Made Aware: Yes  Strengths: Ability to acquire knowledge, Attitude of self, Housing layout, Insight into problems  Barriers to Participation: Support of Caregivers, Comorbidities  Plan:  Treatment Interventions: ADL retraining, Functional transfer training, UE strengthening/ROM, Endurance training, Cognitive reorientation, Compensatory technique education  OT Frequency: 4 times per week  OT Discharge Recommendations: High intensity level of continued care  Equipment Recommended upon Discharge: Other (comment) (TBD)  OT Recommended Transfer Status: Maximum assist, Assist of 2  OT - OK to Discharge: Yes  Treatment Interventions: ADL retraining, Functional  transfer training, UE strengthening/ROM, Endurance training, Cognitive reorientation, Compensatory technique education    Subjective   Current Problem:  1. Acute respiratory failure with hypoxia and hypercapnia (Multi)  Chest Tube Insertion    Chest Tube Insertion    Chest Tube Insertion    Chest Tube Insertion      2. Shock (Multi)        3. Acute metabolic encephalopathy        4. Heart failure with reduced ejection fraction (Multi)  Transthoracic Echo (TTE) Complete    CANCELED: Transthoracic Echo (TTE) Limited    CANCELED: Transthoracic Echo (TTE) Limited      5. Other forms of dyspnea  Transthoracic Echo (TTE) Complete      6. Shock, unspecified (Multi)  CANCELED: Transthoracic Echo (TTE) Limited    CANCELED: Transthoracic Echo (TTE) Limited        General:   OT Received On: 09/15/24  General  Reason for Referral: Alo Glass is a 69 y.o. male he was found non responsive at home by his neighbor and EMS was called. In the ER he was placed on ventilatory support. He received broad spectrum antibiotics and 3L of fluid in the ER. Despite fluid resuscitation he remained hypotensive and was started on norepinephrine. He is admitted to the ICU for further care.  Referred By: Mónica Johns MD  Past Medical History Relevant to Rehab:   Past Medical History:   Diagnosis Date    CHF (congestive heart failure) (Multi)     COPD (chronic obstructive pulmonary disease) (Multi)     Diabetes mellitus (Multi)     Hypertension     Obesity     MELISSA (obstructive sleep apnea)     Personal history of other specified conditions 01/08/2023    History of impaired glucose tolerance     Co-Treatment: PT  Co-Treatment Reason: Co-eval to maximize safety and participation  Prior to Session Communication: Bedside nurse  Patient Position Received: Bed, 3 rail up, Alarm on  Preferred Learning Style: auditory, kinesthetic, verbal, visual, written  General Comment: Pt agreeable to OT eval., cleared by RN  Precautions:  Medical Precautions:  "Fall precautions, Oxygen therapy device and L/min (8L high flow, lipscomb)      Pain:  Pain Assessment  Pain Assessment: 0-10  0-10 (Numeric) Pain Score: 0 - No pain    Objective   Cognition:  Overall Cognitive Status: Impaired  Orientation Level: Disoriented to time (pt stating it was \"November\")  Processing Speed: Delayed    Home Living:  Type of Home: House  Lives With: Alone  Home Adaptive Equipment: Other (Comment) (Delta Walker)  Home Layout: One level  Home Access: Ramped entrance  Bathroom Shower/Tub: Tub/shower unit, Walk-in tub  Bathroom Toilet: Standard  Bathroom Equipment: Grab bars in shower  Prior Function:  Level of [place]: Independent with ADLs and functional transfers, Independent with homemaking with ambulation, Needs assistance with homemaking  Receives Help From:  (Hired )  ADL Assistance: Independent  Ambulatory Assistance: Independent (with AD)  Hand Dominance: Right  Prior Function Comments: pt reports indep with ADLs and most IADLs, -driving recently, uses transportation services/friends for rides, makes his own meals, has a  for  cleaning and laundry, hx of 2 reported falls     ADL:  Grooming Assistance: Minimal  Grooming Deficit: Increased time to complete, Verbal cueing, Teeth care, Wash/dry face  UE Dressing Assistance: Maximal  UE Dressing Deficit: Thread RUE, Thread LUE  Activities of Daily Living: Grooming  Grooming Level of Assistance: Minimum assistance  Grooming Where Assessed: Bed level  Grooming Comments: Pt with min A to wash face seated EOB; pt requiring min A to sequence task and assist to open tooth paste while in bed    UE Dressing  UE Dressing Level of Assistance: Maximum assistance  UE Dressing Where Assessed: Edge of bed  UE Dressing Comments: max A to don new gown and thread B UE's due to weakness  Activity Tolerance:  Endurance: Tolerates less than 10 min exercise with changes in vital signs  Functional Standing Tolerance:     Bed " Mobility/Transfers: Bed Mobility  Bed Mobility: Yes  Bed Mobility 1  Bed Mobility 1: Supine to sitting, Sitting to supine  Level of Assistance 1: Dependent, Moderate verbal cues, +2  Bed Mobility Comments 1: Total assist to lift B LE in/out of bed with cues to complete, total assist at trunk with cues to push up from the bed with the L UE    Transfers  Transfer: No (*NT due to safety concern and pt displaying impaired sitting balance)    Sitting Balance:  Static Sitting Balance  Static Sitting-Balance Support: No upper extremity supported, Bilateral upper extremity supported, Feet supported  Static Sitting-Level of Assistance: Maximum assistance  Static Sitting-Comment/Number of Minutes: pt able to sit EOB with CGA-min A with BUE support and max A with no UE support during tasks due to weakness  Dynamic Sitting Balance  Dynamic Sitting-Balance Support: Feet supported, Left upper extremity supported  Dynamic Sitting-Level of Assistance: Maximum assistance  Dynamic Sitting-Balance: Lateral lean, Reaching for objects, Reaching across midline  Dynamic Sitting-Comments: Pt requiring max A at trunk for balance during dynamic sitting activities     Vision:Vision - Basic Assessment  Current Vision: Other (Comment) (pt reports wearing glasses as needed)  Sensation:  Light Touch: Partial deficits in the LLE, Partial deficits in the RLE  Strength:  Strength Comments: B UE's grossly 2+/5 MMT     Perception:  Inattention/Neglect: Appears intact  Coordination:  Movements are Fluid and Coordinated: Yes   Hand Function:  Hand Function  Gross Grasp: Functional  Coordination: Functional  Extremities: RUE   RUE : Exceptions to WFL (AROM R/L UE less than 90 degrees shoulder flexion) and LUE   LUE: Exceptions to WFL    Outcome Measures: [facility] Daily Activity  Putting on and taking off regular lower body clothing: Total  Bathing (including washing, rinsing, drying): A lot  Putting on and taking off regular upper body clothing: A  lot  Toileting, which includes using toilet, bedpan or urinal: Total  Taking care of personal grooming such as brushing teeth: A little  Eating Meals: A little  Daily Activity - Total Score: 12      Education Documentation  Body Mechanics, taught by Antonia Barber OT at 9/15/2024 12:21 PM.  Learner: Patient  Readiness: Acceptance  Method: Explanation, Demonstration  Response: Needs Reinforcement  Comment: Pt with decreased cognition, requiring review of education.    ADL Training, taught by Antonia Barber OT at 9/15/2024 12:21 PM.  Learner: Patient  Readiness: Acceptance  Method: Explanation, Demonstration  Response: Needs Reinforcement  Comment: Pt with decreased cognition, requiring review of education.    Education Comments  No comments found.           Goals:  Encounter Problems       Encounter Problems (Active)       ADLs       Patient will perform UB and LB bathing with modified independent level of assistance.       Start:  09/15/24    Expected End:  09/29/24            Patient with complete upper body dressing with modified independent level of assistance donning and doffing all UE clothes with PRN adaptive equipment.       Start:  09/15/24    Expected End:  09/29/24            Patient with complete lower body dressing with modified independent level of assistance donning and doffing all LE clothes  with PRN adaptive equipment.       Start:  09/15/24    Expected End:  09/29/24            Patient will feed self with modified independent level of assistance.       Start:  09/15/24    Expected End:  09/29/24            Patient will complete daily grooming tasks brushing teeth and washing face/hair with modified independent level of assistance.       Start:  09/15/24    Expected End:  09/29/24            Patient will complete toileting including hygiene clothing management/hygiene with modified independent level of assistance.       Start:  09/15/24    Expected End:  09/29/24               COGNITION/SAFETY        Patient will demonstrated orientation x 4.       Start:  09/15/24    Expected End:  09/29/24       ORIENTATION            TRANSFERS       Patient will perform bed mobility modified independent level of assistance in order to improve safety and independence with mobility       Start:  09/15/24    Expected End:  09/29/24

## 2024-09-15 NOTE — PROGRESS NOTES
Physical Therapy    Physical Therapy Evaluation & Treatment    Patient Name: Alo Glass  MRN: [id number]  Department: [facility]   Room: [facility]  Today's Date: 9/15/2024   Time Calculation  Start Time: 1113  Stop Time: 1142  Time Calculation (min): 29 min    Assessment/Plan   PT Assessment  PT Assessment Results: Decreased strength, Decreased range of motion, Decreased endurance, Impaired balance, Decreased mobility, Decreased coordination, Decreased cognition, Impaired judgement, Decreased safety awareness, Impaired sensation  Rehab Prognosis: Fair  Barriers to Discharge: Needing 2 person assist for all mobility  Evaluation/Treatment Tolerance: Patient limited by fatigue, Treatment limited secondary to medical complications (Comment)  Medical Staff Made Aware: Yes  Strengths: Housing layout, Premorbid level of function  Barriers to Participation: Comorbidities  End of Session Communication: Bedside nurse, Care Coordinator  Assessment Comment: PT eval complete. Pt limited in participation, limited in strength, and limited in endurance. Needing increased hands on assist for all OOB at this time. Needing continued PT for addressing functional deficits.  End of Session Patient Position: Bed, 3 rail up, Alarm on   IP OR SWING BED PT PLAN  Inpatient or Swing Bed: Inpatient  PT Plan  Treatment/Interventions: Bed mobility, Transfer training, Balance training, Neuromuscular re-education, Strengthening, Endurance training, Range of motion, Therapeutic exercise, Therapeutic activity, Home exercise program, Positioning, Postural re-education  PT Plan: Ongoing PT  PT Frequency: 4 times per week  PT Discharge Recommendations: Moderate intensity level of continued care  Equipment Recommended upon Discharge: Other (comment) (TBD)  PT Recommended Transfer Status: Total assist  PT - OK to Discharge: Yes (Per PT POC)      Subjective     General Visit Information:  General  Reason for Referral: Impaired Mobility: Pt is a 69 y.o.  male found non responsive at home by his neighbor and EMS was called.  Referred By: Mónica Johns MD  Past Medical History Relevant to Rehab:   Past Medical History:   Diagnosis Date    CHF (congestive heart failure) (Multi)     COPD (chronic obstructive pulmonary disease) (Multi)     Diabetes mellitus (Multi)     Hypertension     Obesity     MELSISA (obstructive sleep apnea)     Personal history of other specified conditions 01/08/2023    History of impaired glucose tolerance     Past Surgical History:   Procedure Laterality Date    HERNIA REPAIR  11/07/2013    Hernia Repair    OTHER SURGICAL HISTORY  11/07/2013    Oral Surgery       Co-Treatment: OT  Co-Treatment Reason: Co-eval to maximize safety and participation  Prior to Session Communication: Bedside nurse  Patient Position Received: Bed, 3 rail up, Alarm on  Preferred Learning Style: auditory, kinesthetic, verbal, visual, written  General Comment: Pt agreeable to PT eval., cleared by RN, Pt appears confused throughout session, but pleasant. Noted FERRELL with high flow O2 throughout session.  Home Living:  Home Living  Type of Home: House  Lives With: Alone  Home Adaptive Equipment: Other (Comment) (Delta Walker)  Home Layout: One level  Home Access: Ramped entrance  Bathroom Shower/Tub: Tub/shower unit, Walk-in tub  Bathroom Toilet: Standard  Bathroom Equipment: Grab bars in shower  Prior Level of Function:  Prior Function Per Pt/Caregiver Report  Level of [place]: Independent with ADLs and functional transfers, Independent with homemaking with ambulation, Needs assistance with homemaking  Receives Help From:  (Hired )  ADL Assistance: Independent  Ambulatory Assistance: Independent (with AD)  Hand Dominance: Right  Prior Function Comments: pt reports indep with ADLs and most IADLs, -driving recently, uses transportation services/friends for rides, makes his own meals, has a  for  cleaning and laundry, hx of 2 reported  "falls  Precautions:  Precautions  Hearing/Visual Limitations: \"I have glasses, but I don't wear them\"  Medical Precautions: Fall precautions, Oxygen therapy device and L/min (8L high flow, lipscomb)  Precautions Comment: Confusion, hx of falls      Vital Signs Comment: Noted HR of  throughout session, SpO2 89% at EOB, but 90-93 when at rest supine.    Objective   Pain:  Pain Assessment  Pain Assessment: 0-10  0-10 (Numeric) Pain Score: 0 - No pain  Cognition:  Cognition  Overall Cognitive Status: Impaired  Arousal/Alertness: Delayed responses to stimuli  Orientation Level: Disoriented to time (pt stating it was \"November\")  Following Commands: Follows one step commands with repetition (Needing increased time and repetition for command follow.)  Safety Judgment: Decreased awareness of need for assistance  Problem Solving: Assistance required to identify errors made  Attention: Exceptions to WFL  Divided Attention: Impaired  Sustained Attention: Impaired  Memory: Exceptions to WFL  Long-Term Memory: Impaired  Short-Term Memory: Impaired  Working Memory: Impaired  Safety/Judgement: Exceptions to WFL  Unable to Self-Monitor and Self-Correct Consistently: Moderate  Insight: Moderate  Impulsive: Mildly  Processing Speed: Delayed    General Assessments:  General Observation  General Observation: Pt cooperative to a point, grossly deconditioned, needing 2 staff for minimal mobility this date.               Activity Tolerance  Endurance: Tolerates 10 - 20 min exercise with multiple rests    Sensation  Light Touch: Partial deficits in the RLE, Partial deficits in the LLE  Sensation Comment: Reports chronic BLE numbness    Strength  Strength Comments: Needing increased hands on assist for all mobility  Strength  Strength Comments: Needing increased hands on assist for all mobility    Perception  Inattention/Neglect: Appears intact      Coordination  Movements are Fluid and Coordinated: No  Coordination Comment: Noted " decreased coordination for BUE grasping tasks and coordination of extremities for bed mobility.    Postural Control  Postural Control: Impaired  Trunk Control: Noted heavy retropulsive lean at EOB, especially with LUE usage for self care.    Static Sitting Balance  Static Sitting-Balance Support: Feet supported, Bilateral upper extremity supported  Static Sitting-Level of Assistance: Minimum assistance  Dynamic Sitting Balance  Dynamic Sitting-Balance Support: Feet supported, Right upper extremity supported  Dynamic Sitting-Level of Assistance: Moderate assistance  Dynamic Sitting-Balance: Reaching for objects, Forward lean, Lateral lean, Trunk control activities    Static Standing Balance  Static Standing-Comment/Number of Minutes: Unable to perform at this time due to gross deconditioning.  Functional Assessments:  Bed Mobility  Bed Mobility: Yes  Bed Mobility 1  Bed Mobility 1: Supine to sitting, Sitting to supine  Level of Assistance 1: Dependent, Moderate verbal cues, +2  Bed Mobility Comments 1: Total assist to lift B LE in/out of bed with cues to complete, total assist at trunk with cues to push up from the bed with the L UE    Transfers  Transfer: No (*NT due to safety concern and pt displaying impaired sitting balance)    Ambulation/Gait Training  Ambulation/Gait Training Performed: No    Stairs  Stairs: No  Extremity/Trunk Assessments:  RUE   RUE : Exceptions to WFL (AROM R/L UE less than 90 degrees shoulder flexion)  LUE   LUE: Exceptions to WFL  RLE   RLE : Exceptions to WFL (noted 50% of AROM, grossly deconditioned: MMT </= 2+/5)  LLE   LLE : Exceptions to WFL (noted 50% of AROM, grossly deconditioned: MMT </= 2+/5)  Treatments:  Other Activity  Other Activity Performed: Yes  Other Activity 1: Increased education to pt about plan of care, progression with therapy, and participation. Increased education with cuing and return demo of HEP: EOB partial LAQ, AP, Toe tapping x10 BLE.  Increased dynamic trunk  stabilization both in sitting and standing.  Outcome Measures:  [facility] Basic Mobility  Turning from your back to your side while in a flat bed without using bedrails: Total  Moving from lying on your back to sitting on the side of a flat bed without using bedrails: Total  Moving to and from bed to chair (including a wheelchair): Total  Standing up from a chair using your arms (e.g. wheelchair or bedside chair): Total  To walk in hospital room: Total  Climbing 3-5 steps with railing: Total  Basic Mobility - Total Score: 6    Encounter Problems       Encounter Problems (Active)       Balance       STG - Maintains static sitting balance with upper extremity support At CGA, safely, without LOB, device PRN        Start:  09/15/24    Expected End:  09/29/24       INTERVENTIONS:  1. Practice sitting on the edge of a bed/mat with minimal support.  2. Educate patient about maintining total hip precautions while maintaining balance.  3. Educate patient about pressure relief.  4. Educate patient about use of assistive device.         STG - Maintains dynamic sitting balance with upper extremity support At Min Ax1, safely, without LOB, device PRN        Start:  09/15/24    Expected End:  09/29/24       INTERVENTIONS:  1. Practice sitting on the edge of a bed/mat with minimal support.  2. Educate patient about maintining total hip precautions while maintaining balance.  3. Educate patient about pressure relief.  4. Educate patient about use of assistive device.            Mobility       Endurance - Pt to tolerate >/= 20 minutes therex, theract, gait and/or NMR with </= 5 minutes of rest breaks        Start:  09/15/24    Expected End:  09/29/24               PT Transfers       STG - Patient will perform bed mobility  At CGA, safely, without LOB, device PRN        Start:  09/15/24    Expected End:  09/29/24            STG - Patient will transfer sit to and from stand At Mod Ax1, safely, without LOB, device PRN        Start:   09/15/24    Expected End:  09/29/24               Pain - Adult          Safety       Pt will verbalize and apply safety awareness and precautions 100% of time throughout entire session         Start:  09/15/24    Expected End:  09/29/24                   Education Documentation  Home Exercise Program, taught by Mason Callaway PT at 9/15/2024  2:09 PM.  Learner: Patient  Readiness: Nonacceptance  Method: Explanation, Demonstration  Response: Needs Reinforcement  Comment: Unsure of pt ability to accept or retain any information, noted confusion throughout session with processing delay    Precautions, taught by Mason Callaway PT at 9/15/2024  2:09 PM.  Learner: Patient  Readiness: Nonacceptance  Method: Explanation, Demonstration  Response: Needs Reinforcement  Comment: Unsure of pt ability to accept or retain any information, noted confusion throughout session with processing delay    Body Mechanics, taught by Mason Callaway PT at 9/15/2024  2:09 PM.  Learner: Patient  Readiness: Nonacceptance  Method: Explanation, Demonstration  Response: Needs Reinforcement  Comment: Unsure of pt ability to accept or retain any information, noted confusion throughout session with processing delay    Mobility Training, taught by Mason Callaway PT at 9/15/2024  2:09 PM.  Learner: Patient  Readiness: Nonacceptance  Method: Explanation, Demonstration  Response: Needs Reinforcement  Comment: Unsure of pt ability to accept or retain any information, noted confusion throughout session with processing delay    Education Comments  No comments found.

## 2024-09-15 NOTE — PROGRESS NOTES
"Sean Glass is a 69 y.o. male on day 11 of admission presenting with Acute respiratory failure with hypoxia and hypercapnia (Multi).    Subjective   Patient seen and examined, significant improvement in bilateral leg currently he is on 6 L nasal cannula and comfortable.  Patient's daughter) was in the room.    Blood sugars are stable.  No chest pain and breathing is satisfactory, chest tube has been removed.    Overall significant improvement in general condition.  Also had discussion about DNR status and patient would like to be DNR DNI.           Objective     Physical Exam  GENERAL:  Alert, moderate distress, cooperative, on Airvo high flow oxygen 50% / 50 L.  SKIN:  Skin color, texture, turgor normal. No rashes or lesions.  OROPHARYNX:  Lips, mucosa, and tongue are normal.Teeth and gums, normal. Oropharynx normal.  NECK:  No jugulovenous distention, No carotid bruits, Carotid pulse normal contour,   LUNGS: Limited examination because of patient's body habitus and difficulty examining, however he has generalized diminished air exchange anteriorly with decreased breath sounds right mid and lower lung fields with intermittent wheezing.  Right chest tube has been removed.  CARDIAC: Currently in sinus rhythm, normal S1 and S2; no rubs, murmurs, or gallops  ABDOMEN:  Abdomen soft, large and, non-tender, BS normal, No masses or organomegaly  EXTREMETIES:  Extremities normal, no deformities, 2+ edema, no clubbing or skin discoloration. Good capillary refill., No ulcers  NEURO:  Alert, oriented X 3,. Non-focal.  limited exam.    Last Recorded Vitals  Blood pressure 114/73, pulse 87, temperature 36.9 °C (98.4 °F), temperature source Temporal, resp. rate 22, height 1.803 m (5' 10.98\"), weight 127 kg (279 lb 12.2 oz), SpO2 93%.  Intake/Output last 3 Shifts:  I/O last 3 completed shifts:  In: 460 (3.6 mL/kg) [P.O.:460]  Out: 1010 (8 mL/kg) [Urine:1010 (0.2 mL/kg/hr)]  Weight: 126.9 kg     Relevant Results     " Scheduled medications  aspirin, 81 mg, oral, Daily  enoxaparin, 40 mg, subcutaneous, q12h SHARRON  ezetimibe, 10 mg, oral, Daily  folic acid, 1 mg, oral, Daily  guaiFENesin, 200 mg, oral, TID  insulin lispro, 0-10 Units, subcutaneous, q4h  ipratropium-albuteroL, 3 mL, nebulization, 4x daily  levoFLOXacin, 750 mg, oral, q24h SHARRON  levothyroxine, 125 mcg, oral, Daily  multivitamin with minerals, 1 tablet, oral, Daily  nystatin, 5 mL, Swish & Swallow, 4x daily  pantoprazole, 40 mg, intravenous, Daily  piperacillin-tazobactam, 3.375 g, intravenous, q6h  polyethylene glycol, 17 g, nasogastric tube, Daily  predniSONE, 20 mg, oral, Daily  sennosides-docusate sodium, 2 tablet, oral, BID  thiamine, 100 mg, oral, Daily      Continuous medications     PRN medications  PRN medications: dextrose, dextrose, glucagon, glucagon, ipratropium-albuteroL, oxygen, oxygen      Results for orders placed or performed during the hospital encounter of 09/04/24 (from the past 96 hour(s))   POCT GLUCOSE   Result Value Ref Range    POCT Glucose 170 (H) 74 - 99 mg/dL   POCT GLUCOSE   Result Value Ref Range    POCT Glucose 158 (H) 74 - 99 mg/dL   POCT GLUCOSE   Result Value Ref Range    POCT Glucose 151 (H) 74 - 99 mg/dL   POCT GLUCOSE   Result Value Ref Range    POCT Glucose 130 (H) 74 - 99 mg/dL   Renal Function Panel   Result Value Ref Range    Glucose 148 (H) 74 - 99 mg/dL    Sodium 143 136 - 145 mmol/L    Potassium 4.7 3.5 - 5.3 mmol/L    Chloride 101 98 - 107 mmol/L    Bicarbonate 36 (H) 21 - 32 mmol/L    Anion Gap 11 10 - 20 mmol/L    Urea Nitrogen 41 (H) 6 - 23 mg/dL    Creatinine 0.98 0.50 - 1.30 mg/dL    eGFR 83 >60 mL/min/1.73m*2    Calcium 8.0 (L) 8.6 - 10.3 mg/dL    Phosphorus 3.2 2.5 - 4.9 mg/dL    Albumin 2.3 (L) 3.4 - 5.0 g/dL   CBC   Result Value Ref Range    WBC 13.2 (H) 4.4 - 11.3 x10*3/uL    nRBC 0.0 0.0 - 0.0 /100 WBCs    RBC 5.73 4.50 - 5.90 x10*6/uL    Hemoglobin 18.3 (H) 13.5 - 17.5 g/dL    Hematocrit 58.7 (H) 41.0 - 52.0 %      (H) 80 - 100 fL    MCH 31.9 26.0 - 34.0 pg    MCHC 31.2 (L) 32.0 - 36.0 g/dL    RDW 13.9 11.5 - 14.5 %    Platelets 235 150 - 450 x10*3/uL   Magnesium   Result Value Ref Range    Magnesium 2.40 1.60 - 2.40 mg/dL   POCT GLUCOSE   Result Value Ref Range    POCT Glucose 185 (H) 74 - 99 mg/dL   POCT GLUCOSE   Result Value Ref Range    POCT Glucose 165 (H) 74 - 99 mg/dL   POCT GLUCOSE   Result Value Ref Range    POCT Glucose 169 (H) 74 - 99 mg/dL   Blood Gas Venous Full Panel   Result Value Ref Range    POCT pH, Venous 7.36 7.33 - 7.43 pH    POCT pCO2, Venous 78 (HH) 41 - 51 mm Hg    POCT pO2, Venous 42 35 - 45 mm Hg    POCT SO2, Venous 69 45 - 75 %    POCT Oxy Hemoglobin, Venous 67.7 45.0 - 75.0 %    POCT Hematocrit Calculated, Venous 59.0 (H) 41.0 - 52.0 %    POCT Sodium, Venous 135 (L) 136 - 145 mmol/L    POCT Potassium, Venous 4.7 3.5 - 5.3 mmol/L    POCT Chloride, Venous 100 98 - 107 mmol/L    POCT Ionized Calicum, Venous 1.14 1.10 - 1.33 mmol/L    POCT Glucose, Venous 200 (H) 74 - 99 mg/dL    POCT Lactate, Venous 1.6 0.4 - 2.0 mmol/L    POCT Base Excess, Venous 13.0 (H) -2.0 - 3.0 mmol/L    POCT HCO3 Calculated, Venous 44.1 (H) 22.0 - 26.0 mmol/L    POCT Hemoglobin, Venous 19.5 (H) 13.5 - 17.5 g/dL    POCT Anion Gap, Venous -4.0 (L) 10.0 - 25.0 mmol/L    Patient Temperature 37.0 degrees Celsius    FiO2 21 %   POCT GLUCOSE   Result Value Ref Range    POCT Glucose 214 (H) 74 - 99 mg/dL   POCT GLUCOSE   Result Value Ref Range    POCT Glucose 147 (H) 74 - 99 mg/dL   POCT GLUCOSE   Result Value Ref Range    POCT Glucose 150 (H) 74 - 99 mg/dL   Electrocardiogram, 12-lead PRN ACS symptoms   Result Value Ref Range    Ventricular Rate 63 BPM    Atrial Rate 63 BPM    TX Interval 250 ms    QRS Duration 156 ms    QT Interval 438 ms    QTC Calculation(Bazett) 448 ms    P Axis -13 degrees    R Axis -74 degrees    T Axis 38 degrees    QRS Count 11 beats    Q Onset 196 ms    P Onset 71 ms    P Offset 150 ms    T Offset 415  ms    DANNY Woodson 445 ms   POCT GLUCOSE   Result Value Ref Range    POCT Glucose 161 (H) 74 - 99 mg/dL   Renal Function Panel   Result Value Ref Range    Glucose 172 (H) 74 - 99 mg/dL    Sodium 142 136 - 145 mmol/L    Potassium 4.4 3.5 - 5.3 mmol/L    Chloride 99 98 - 107 mmol/L    Bicarbonate 37 (H) 21 - 32 mmol/L    Anion Gap 10 10 - 20 mmol/L    Urea Nitrogen 41 (H) 6 - 23 mg/dL    Creatinine 0.87 0.50 - 1.30 mg/dL    eGFR >90 >60 mL/min/1.73m*2    Calcium 8.1 (L) 8.6 - 10.3 mg/dL    Phosphorus 2.8 2.5 - 4.9 mg/dL    Albumin 2.3 (L) 3.4 - 5.0 g/dL   CBC   Result Value Ref Range    WBC 11.4 (H) 4.4 - 11.3 x10*3/uL    nRBC 0.0 0.0 - 0.0 /100 WBCs    RBC 5.59 4.50 - 5.90 x10*6/uL    Hemoglobin 18.2 (H) 13.5 - 17.5 g/dL    Hematocrit 57.6 (H) 41.0 - 52.0 %     (H) 80 - 100 fL    MCH 32.6 26.0 - 34.0 pg    MCHC 31.6 (L) 32.0 - 36.0 g/dL    RDW 13.4 11.5 - 14.5 %    Platelets 195 150 - 450 x10*3/uL   POCT GLUCOSE   Result Value Ref Range    POCT Glucose 169 (H) 74 - 99 mg/dL   POCT GLUCOSE   Result Value Ref Range    POCT Glucose 195 (H) 74 - 99 mg/dL   POCT GLUCOSE   Result Value Ref Range    POCT Glucose 247 (H) 74 - 99 mg/dL   POCT GLUCOSE   Result Value Ref Range    POCT Glucose 233 (H) 74 - 99 mg/dL   POCT GLUCOSE   Result Value Ref Range    POCT Glucose 135 (H) 74 - 99 mg/dL   POCT GLUCOSE   Result Value Ref Range    POCT Glucose 152 (H) 74 - 99 mg/dL   POCT GLUCOSE   Result Value Ref Range    POCT Glucose 139 (H) 74 - 99 mg/dL   POCT GLUCOSE   Result Value Ref Range    POCT Glucose 217 (H) 74 - 99 mg/dL   POCT GLUCOSE   Result Value Ref Range    POCT Glucose 268 (H) 74 - 99 mg/dL   Magnesium   Result Value Ref Range    Magnesium 2.20 1.60 - 2.40 mg/dL   POCT GLUCOSE   Result Value Ref Range    POCT Glucose 159 (H) 74 - 99 mg/dL   POCT GLUCOSE   Result Value Ref Range    POCT Glucose 108 (H) 74 - 99 mg/dL   POCT GLUCOSE   Result Value Ref Range    POCT Glucose 130 (H) 74 - 99 mg/dL   POCT GLUCOSE    Result Value Ref Range    POCT Glucose 148 (H) 74 - 99 mg/dL      XR chest 1 view    Result Date: 9/13/2024  Interpreted By:  Tania Knowles, STUDY: XR CHEST 1 VIEW;  9/13/2024 4:11 pm   INDICATION: Signs/Symptoms:post chest tube removal.   COMPARISON: 09/13/2024   ACCESSION NUMBER(S): [id number]   ORDERING CLINICIAN: RODOLFO LOPEZ   FINDINGS: The study is limited due to patient's body habitus and poor inspiration previously seen right-sided chest tube has been removed. No gross pneumothorax is noted.   Blunting of both costophrenic angles is seen, which most likely is related to pleural effusions. The heart is enlarged. Bibasilar streaky densities are seen.       No gross pneumothorax.   Bilateral pleural effusions.   Cardiomegaly.   Bibasilar streaky densities, which may be related to atelectasis or infiltrates.       MACRO: None   Signed by: Tania Knowles 9/13/2024 4:40 PM Dictation workstation:   [id number]    Electrocardiogram, 12-lead PRN ACS symptoms    Result Date: 9/13/2024  Sinus rhythm with 1st degree AV block with occasional Premature ventricular complexes and Premature atrial complexes Left axis deviation Right bundle branch block Inferior infarct (cited on or before 04-SEP-2024) Anteroseptal infarct (cited on or before 23-APR-2024) Abnormal ECG When compared with ECG of 06-SEP-2024 00:20, Premature ventricular complexes are now Present Vent. rate has decreased BY  31 BPM Questionable change in initial forces of Septal leads T wave inversion less evident in Anterolateral leads    XR chest 1 view    Result Date: 9/13/2024  Interpreted By:  Rosa Hemphill, STUDY: XR CHEST 1 VIEW;  9/13/2024 4:31 am   INDICATION: Signs/Symptoms:CT in place.   COMPARISON: Multiple prior studies, the most recent 09/12/2024   ACCESSION NUMBER(S): [id number]   ORDERING CLINICIAN: RODOLFO LOPEZ   FINDINGS:     CARDIOMEDIASTINAL SILHOUETTE: Stable enlarged cardiac silhouette.   LUNGS: Stable position of pigtail  catheter overlying the right lung base. There is improved aeration of the right lung base. Mild residual hazy right basilar opacity, likely combination of small residual pleural effusion and basilar consolidation. Limited evaluation of the left lung base due to underpenetration. This is not significantly changed and left pleural effusion or basilar consolidation not excluded. No pneumothorax.   ABDOMEN: No remarkable upper abdominal findings.   BONES: No acute osseous abnormality.       Improved aeration of the right lung base. Residual hazy right basilar opacities likely a combination of residual pleural effusion and basilar atelectasis.   Limited evaluation of the left lung base due to underpenetration.   MACRO: None   Signed by: Rosa Hemphill 9/13/2024 4:48 AM Dictation workstation:   [id number]    XR chest 1 view    Result Date: 9/12/2024  Interpreted By:  Donte Shoemaker, STUDY: XR CHEST 1 VIEW; 9/12/2024 5:23 am   INDICATION: Signs/Symptoms:CT in place.   COMPARISON: None   ACCESSION NUMBER(S): [id number]   ORDERING CLINICIAN: RODOLFO LOPEZ   TECHNIQUE: 1 view of the chest was performed.   FINDINGS: Right pigtail catheter in similar location. Stable layering bilateral pleural effusion with surrounding atelectasis. Stable cardiomegaly. Bilateral shoulder joint degenerative changes.       1. No significant interval change from 09/11/2024 chest x-ray.   Signed by: Donte Shoemaker 9/12/2024 8:18 AM Dictation workstation:   [id number]    XR chest 1 view    Result Date: 9/11/2024  Interpreted By:  Klaus Villafana, STUDY: XR CHEST 1 VIEW; 9/11/2024 5:42 am   INDICATION: Signs/Symptoms:CT in place   COMPARISON: Radiographs 09/10/2024   ACCESSION NUMBER(S): [id number]   ORDERING CLINICIAN: RODOLFO LOPEZ   TECHNIQUE: Single frontal view of the chest performed.   FINDINGS: LINES AND DEVICES: Remote endotracheal tube. Stable right pigtail pleural catheter near right lung base.   LUNGS: Stable hazy opacities at  the lung base and additional airspace opacities extending to right mid lung, stable to mildly increased. No pneumothorax. Left lung is clear.   CARDIOMEDIASTINAL SILHOUETTE: Stable cardiomegaly.       Stable to slightly increased right pleural effusion with atelectasis. Stable right pleural catheter.   MACRO None   Signed by: Klaus Villafana 9/11/2024 8:21 AM Dictation workstation:   [id number]    XR chest 1 view    Result Date: 9/10/2024  Interpreted By:  Ishaan Vernon, STUDY: XR CHEST 1 VIEW;  9/10/2024 5:34 am   INDICATION: Signs/Symptoms:Eval chest tube placement, Rt effusion.   COMPARISON: CT scan chest from 09/04/2024. Chest x-ray from 09/09/2024.   ACCESSION NUMBER(S): [id number]   ORDERING CLINICIAN: RICH KAPLAN   TECHNIQUE: Single AP portable view of the chest was obtained.   FINDINGS: MEDIASTINUM/ LUNGS/ ARELIS: Previous ET tube has been removed. There is an NG tube in place with distal port just beyond the GE junction and distal tip in the proximal stomach. Pigtail pleural catheter at the right base is stable. There is a small grossly stable lateral right basilar pneumothorax. There is mild bibasilar atelectasis. Cardiomegaly without gross vascular congestion. No pneumothorax on the left. No tracheal deviation. No abnormal hilar fullness or gross mass on either side.   BONES: No lytic or blastic destructive bone lesion.   UPPER ABDOMEN: Grossly intact.       Cardiomegaly.   NG tube in place. ET tube has been removed.   Stable pleural drainage catheter at the base of the right chest. Small stable lateral right basilar pneumothorax.   Mild bibasilar atelectasis.   MACRO: None   Signed by: Ishaan Vernon 9/10/2024 8:13 AM Dictation workstation:   [id number]    XR chest 1 view    Result Date: 9/9/2024  Interpreted By:  Daisy Sosa, STUDY: XR CHEST 1 VIEW;  9/9/2024 4:39 pm   INDICATION: Signs/Symptoms:s/p lytic therapy.     COMPARISON: 5:21 a.m. the same day   ACCESSION NUMBER(S): [id number]    ORDERING CLINICIAN: RODOLFO LOPEZ   FINDINGS: ETT, NG tube and right lower chest pigtail catheter remain in place. Additional presumed overlying monitoring/support devices again seen. Small right basilar pneumothorax decreased in size. Persistent bibasilar opacities. The cardiomediastinal silhouette remains enlarged.       Decreased size of right basilar pneumothorax since 5:21 a.m. the same day with pigtail catheter in place.   MACRO: None.   Signed by: Daisy Sosa 9/9/2024 4:44 PM Dictation workstation:   [id number]    XR chest 1 view    Result Date: 9/9/2024  Interpreted By:  Klaus Villafana, STUDY: XR CHEST 1 VIEW; 9/9/2024 5:32 am   INDICATION: Signs/Symptoms:Eval chest tube placement, Rt effusion   COMPARISON: Radiographs 09/08/2024   ACCESSION NUMBER(S): [id number]   ORDERING CLINICIAN: RICH KAPLAN   TECHNIQUE: Single frontal view of the chest performed.   FINDINGS: LINES AND DEVICES: The pigtail of right-sided pleural drainage catheter overlies the right infrahilar region, grossly unchanged. Tip of ETT approximately 6.6 cm above the kathie, previously 7.7 cm. NG tube courses below the diaphragm, limited assessment.   LUNGS: Moderate-size right-sided hydropneumothorax appears grossly unchanged. Stable to slightly increased interstitial opacities on the left. Question new trace left pleural effusion with atelectasis.   CARDIOMEDIASTINAL SILHOUETTE: Stable cardiomegaly.       Right-sided infrahilar pigtail catheter and moderate hydropneumothorax, grossly unchanged.   Suspected worsening CHF with stable to mildly increased interstitial edema and possible new trace left pleural effusion.   MACRO None   Signed by: Klaus Villafana 9/9/2024 8:17 AM Dictation workstation:   [id number]    XR chest 1 view    Result Date: 9/8/2024  Interpreted By:  Corrina Reyes, STUDY: XR CHEST 1 VIEW;  9/8/2024 6:39 am   INDICATION: Signs/Symptoms:Eval chest tube placement, Rt effusion.     COMPARISON: 09/07/2024    ACCESSION NUMBER(S): [id number]   ORDERING CLINICIAN: RICH KAPLAN   TECHNIQUE: Portable AP semi upright   FINDINGS: Endotracheal tube tip projects between 7 and 8 cm above the kathie. Nasogastric tube is no longer identified. Right pleural pigtail catheter projects at mid right hemithorax and is similar in position to the prior study. Relative volume of basilar pneumothorax and pleural fluid appears similar to the prior study. Right lower lobe and right middle lobe are collapsed. Right upper lobe variation is similar to the prior study. Left lung shows some vascular congestion. Minimal atelectasis is present at the left lung base. No change in the osseous structures is noted.       Basilar pneumothorax on the right appears similar to the prior study in the relative volume of pleural fluid and pneumothorax appears unchanged.   Collapse of the right lower lobe and right middle lobe   Pulmonary vascular congestion is most notable in the left lung   MACRO: None.   Signed by: Corrina Reyes 9/8/2024 12:32 PM Dictation workstation:   [id number]    XR chest 1 view    Result Date: 9/8/2024  Interpreted By:  Corrina Reyes, STUDY: XR CHEST 1 VIEW;  9/7/2024 5:28 pm   INDICATION: Signs/Symptoms:Eval chest tube placement, Rt pleural effusion.     COMPARISON: 09/06/2024   ACCESSION NUMBER(S): [id number]   ORDERING CLINICIAN: RICH KAPLAN   TECHNIQUE: Portable AP upright   FINDINGS: Endotracheal tube tip projects about 6 cm above the kathie. NG tube extends below the diaphragm and beyond the image. Right pleural pigtail catheter projects at mid right hemithorax and appears unchanged in position.   Pneumothorax at the right thoracic base appears larger than on the prior study. The amount of pleural fluid appears decreased. Mild atelectasis left lung base is slightly improved. Heart is unchanged in size. No changes noted in the osseous structures.       Right pleural effusion is decreased from the prior study but  the right basilar pneumothorax appears increased.   MACRO: None.   Signed by: Corrina Reyes 9/8/2024 12:30 PM Dictation workstation:   [id number]    Electrocardiogram, 12-lead PRN ACS symptoms    Result Date: 9/6/2024  Sinus rhythm with 1st degree AV block with Premature atrial complexes Left axis deviation Right bundle branch block Inferior infarct (cited on or before 04-SEP-2024) Anterior infarct , age undetermined T wave abnormality, consider lateral ischemia Abnormal ECG When compared with ECG of 05-SEP-2024 10:29, (unconfirmed) Premature atrial complexes are now Present Confirmed by Shady Bill ([id number]) on 9/6/2024 1:59:56 PM    XR chest 1 view    Result Date: 9/6/2024  Interpreted By:  Eloisa Landa, STUDY: XR CHEST 1 VIEW 9/6/2024 9:05 am   INDICATION: Signs/Symptoms:R pleural effusion   COMPARISON: 09/05/2024   ACCESSION NUMBER(S): [id number]   ORDERING CLINICIAN: PERRI DELACRUZ   TECHNIQUE: AP view of the chest at bedside in the erect position   FINDINGS: A right chest tube is once again noted within the mid right hemithorax with a stable right hydropneumothorax seen within the mid and lower right hemithorax.   The tip of the endotracheal tube remains satisfactorily positioned at a distance 3 cm above kathie with nasogastric tube descending below diaphragm.   There is small left pleural effusion and left basilar atelectasis. The heart remains enlarged.       No change in the right hydropneumothorax which may be loculated with chest tube on the right in the mid hemithorax.   Small left pleural effusion with left basilar infiltrate/atelectasis.   Signed by: Eloisa Landa 9/6/2024 9:20 AM Dictation workstation:   [id number]    ECG 12 lead    Result Date: 9/6/2024  Sinus rhythm with 1st degree AV block Left axis deviation Right bundle branch block Inferior infarct (cited on or before 04-SEP-2024) Abnormal ECG When compared with ECG of 04-SEP-2024 16:24, Premature ventricular complexes are no longer Present  Right bundle branch block is now Present    ECG 12 lead    Result Date: 9/5/2024  Sinus rhythm with 1st degree AV block with frequent Premature ventricular complexes Left axis deviation Left ventricular hypertrophy with QRS widening and repolarization abnormality ( R in aVL , [place] product ) Inferior infarct , age undetermined Abnormal ECG When compared with ECG of 23-APR-2024 09:01, Premature ventricular complexes are now Present WA interval has increased (RBBB and left anterior fascicular block) is no longer Present Inferior infarct is now Present Confirmed by Andrea Bentley ([id number]) on 9/5/2024 10:01:05 AM    XR chest 1 view    Result Date: 9/5/2024  Interpreted By:  Saad Cronin, STUDY: XR CHEST 1 VIEW; 9/5/2024 4:57 am   INDICATION: CLINICAL INFORMATION: Signs/Symptoms:Pig tail Chest Tube replacement.   COMPARISON: 09/04/2024   ACCESSION NUMBER(S): [id number]   ORDERING CLINICIAN: ANDRY ABRAHAM   TECHNIQUE: Portable chest one view.   FINDINGS: The cardiac silhouette is quite prominent suggesting cardiomegaly. The tube and line placement is unchanged.  There is a decrease in the right-sided pleural-based density is well as the right-sided parenchymal densities. The pulmonary vasculature is slightly indistinct suggesting mild pulmonary vascular congestion.       There appears to be increased aeration at the right base suggesting diminishing pleural density within the right hemithorax in association with the chest tube. Infiltrate persists on the right.   MACRO: none   Signed by: Saad Cronin 9/5/2024 8:18 AM Dictation workstation:   [id number]    XR chest 1 view    Result Date: 9/5/2024  Interpreted By:  Cande Keating, STUDY: XR CHEST 1 VIEW;  9/4/2024 11:10 pm   INDICATION: Signs/Symptoms:Chest tube placement.     COMPARISON: Radiographs of the chest dated 09/04/2024; CT of the chest dated 09/04/2024   ACCESSION NUMBER(S): [id number]   ORDERING CLINICIAN: ANDRY ABRAHAM   FINDINGS: AP radiograph of the  chest was provided.   Endotracheal tube projects 4.4 cm superior to the kathie. Enteric tube appears to course along the midline expected course of the esophagus, although the distal tip is not well visualized due to underpenetration and overlying structures.   CARDIOMEDIASTINAL SILHOUETTE: Cardiomediastinal silhouette is enlarged, similar to prior exam.   LUNGS: Redemonstration of the large right-sided pleural effusion with associated atelectasis/consolidation, similar in appearance to prior radiographs. No new consolidation or pleural effusion is present in the left lung.   ABDOMEN: No remarkable upper abdominal findings.   BONES: No acute osseous changes.       1.  Enteric tube is coursing along the midline of the mediastinum in the expected course of the esophagus, although the distal tip is not well visualized due to overlying cardiomediastinal silhouette and underpenetration, and may be outside of the field-of-view of the study. Endotracheal tube projects 4.5 cm superior to the kathie. 2. Redemonstration of the large loculated right-sided pleural effusion with the associated atelectasis/consolidation, similar in appearance to prior exam.       MACRO: None   Signed by: Cande Keating 9/5/2024 2:08 AM Dictation workstation:   [id number]    CT chest abdomen pelvis w IV contrast    Result Date: 9/4/2024  Interpreted By:  Ishaan Banda, STUDY: CT CHEST ABDOMEN PELVIS W IV CONTRAST;  9/4/2024 5:52 pm   INDICATION: Signs/Symptoms:unresponsive.     COMPARISON: None.   ACCESSION NUMBER(S): [id number]   ORDERING CLINICIAN: GURPREET COPPOLA   TECHNIQUE: Contiguous axial images of the chest, abdomen, and pelvis were obtained after the intravenous administration of iodinated contrast. Coronal and sagittal reformatted images were reconstructed from the axial data.   FINDINGS: CT CHEST:   MEDIASTINUM AND LYMPH NODES: NG/OG tube noted with small amount of fluid in the esophagus. The esophageal wall appears within  normal limits.  No enlarged intrathoracic or axillary lymph nodes by imaging criteria. No pneumomediastinum.   VESSELS:  Normal caliber thoracic aorta without dissection. Mild aortic atherosclerosis.   HEART: Enlarged. Severe aortic valvular calcifications. Moderate coronary artery calcifications. No significant pericardial effusion.   LUNG, AIRWAYS, PLEURA: There is a large loculated right pleural effusion causing compressive atelectasis on the right lung. As a result, there is complete collapse of the right lower lobe, complete collapse of the right middle lobe, and partial collapse of the right upper lobe. There are multiple loculated compartments largest of which is seen at the right lung base. There is a small amount of mucus in the mid to distal trachea. There is small amount of layering debris in the mainstem bronchi. The majority of the right middle and lower lobe bronchi are collapsed and opacified with low-density debris. There is a trace left pleural effusion with subsegmental left basilar atelectasis. There is emphysema. There are new scattered subcentimeter bilateral centrilobular nodules that are likely infectious/inflammatory in nature from bronchiolitis.   CHEST WALL SOFT TISSUES: No discernible acute abnormality. There is a 5.5 cm x 1.7 cm lipoma in the right infraspinatus muscle.   OSSEOUS STRUCTURES: No acute osseous abnormality.     CT ABDOMEN/PELVIS:   ABDOMINAL WALL: No significant abnormality.   LIVER: No significant parenchymal abnormality.   BILE DUCTS: No significant intrahepatic or extrahepatic dilatation.   GALLBLADDER: No significant abnormality.   PANCREAS: No significant abnormality.   SPLEEN: No significant abnormality.   ADRENALS: There is a 1.7 cm low-density left adrenal nodule likely representing a adenoma.   KIDNEYS, URETERS, BLADDER: There is a heterogeneously enhancing solid mass in the upper pole the left kidney measuring 2.4 cm x 1.9 cm consistent with renal cell carcinoma.  Additionally, a 0.8 cm likely enhancing lesion in the upper pole the right kidney is concerning for renal cell carcinoma. No hydronephrosis or calculi. Urinary bladder is decompressed with Live catheter in place.   REPRODUCTIVE ORGANS: No significant abnormality.   VESSELS: Moderate aortic atherosclerosis without AAA.   RETROPERITONEUM/LYMPH NODES: No acute retroperitoneal abnormality. No enlarged lymph nodes.   BOWEL/MESENTERY/PERITONEUM: Colonic diverticulosis without acute diverticulitis. No inflammatory bowel wall thickening or dilatation. Normal appendix.   No ascites, free air, or fluid collection.     MUSCULOSKELETAL: No acute osseous abnormality.       CT CHEST: Large loculated right pleural effusion causing complete compressive collapse of the right middle lobe and right lower lobe and partial compressive collapse of the right upper lobe. The largest likely compartments located at the inferior right hemithorax. The cause of the effusion is not apparent as there is only a small amount of debris in the distal trachea and right mainstem bronchus but the distal-most right-sided bronchi are occluded due to combination of compressive collapse and small low-density fluid.   Numerous new subcentimeter centrilobular pulmonary nodules that are most likely infectious/inflammatory in as can be seen with bronchiolitis or fungal infection.   Aortic valve calcification to the extent that would likely result in aortic stenosis.   CT ABDOMEN/PELVIS: Left kidney solid heterogeneously enhancing mass suspicious for renal cell carcinoma (2.4 cm x 1.9 cm) and of suspected 0.8 cm right upper pole renal cell carcinoma measuring 0.8 cm. Recommend urological follow-up/also taken and dedicated MRI kidneys to further evaluate these lesions. YELLOW ALERT: An incidental finding alert was sent per protocol.   No acute abnormality in the abdomen or pelvis.   Colonic diverticulosis without acute diverticulitis.   MACRO: Critical Finding:   New mass in the kidney. Notification was initiated on 9/4/2024 at 6:25 pm by  Ishaan Banda.  (**-YCF-**) Instructions:  MR Abdomen w and wo IV contrast. 1 week.   Signed by: Ishaan Banda 9/4/2024 6:26 PM Dictation workstation:   [id number]    CT head wo IV contrast    Result Date: 9/4/2024  Interpreted By:  Ishaan Banda, STUDY: CT HEAD WO IV CONTRAST;  9/4/2024 5:52 pm   INDICATION: Signs/Symptoms:unrespoonsive.     COMPARISON: None.   ACCESSION NUMBER(S): [id number]   ORDERING CLINICIAN: GURPREET COPPOLA   TECHNIQUE: Noncontrast axial CT images of head were obtained with coronal and sagittal reconstructed images.   FINDINGS: BRAIN PARENCHYMA: Mild periventricular and subcortical hemispheric white matter hypodensities are most compatible with chronic small vessel ischemic disease. No acute intraparenchymal hemorrhage or parenchymal evidence of acute large territory ischemic infarct. Gray-white matter distinction is preserved. No mass-effect.   VENTRICLES and EXTRA-AXIAL SPACES:  No acute extra-axial or intraventricular hemorrhage. No effacement of cerebral sulci. The ventricles and sulci are age-concordant.   PARANASAL SINUSES/MASTOIDS:  No hemorrhage or air-fluid levels within the visualized paranasal sinuses. The mastoids are well aerated.   CALVARIUM/ORBITS:  No skull fracture.  The orbits and globes are intact to the extent visualized.   EXTRACRANIAL SOFT TISSUES: No discernible abnormality.       No acute intracranial abnormality.     MACRO: None.   Signed by: Ishaan Banda 9/4/2024 6:07 PM Dictation workstation:   [id number]    XR chest 1 view    Result Date: 9/4/2024  Interpreted By:  Rosalie Ellison, STUDY: XR CHEST 1 VIEW;  9/4/2024 4:33 pm   INDICATION: Signs/Symptoms:intubation.   COMPARISON: 04/08/2024   ACCESSION NUMBER(S): [id number]   ORDERING CLINICIAN: GURPREET COPPOLA   TECHNIQUE: A single portable image of the chest was obtained.   FINDINGS: Resolution is limited. The right  portion of the chest is not entirely included.   The patient is rotated. The heart size is uncertain.   There appears to be elevated right hemidiaphragm and right-sided infiltration.   An endotracheal tube terminates above the kathie. A nasogastric tube terminates below the diaphragm.   COMPARISON OF FINDING: The nasogastric tube was placed in the interval. The endotracheal tube was placed in the interval.       Interval placement of an endotracheal tube. The tip is above the kathie. Interval placement of a nasogastric tube. It appears to terminate below the diaphragm.   The exam is extremely limited.   MACRO: none   Signed by: Rosalie Ellison 9/4/2024 4:43 PM Dictation workstation:   [id number]      Results for orders placed or performed during the hospital encounter of 09/04/24 (from the past 96 hour(s))   POCT GLUCOSE   Result Value Ref Range    POCT Glucose 170 (H) 74 - 99 mg/dL   POCT GLUCOSE   Result Value Ref Range    POCT Glucose 158 (H) 74 - 99 mg/dL   POCT GLUCOSE   Result Value Ref Range    POCT Glucose 151 (H) 74 - 99 mg/dL   POCT GLUCOSE   Result Value Ref Range    POCT Glucose 130 (H) 74 - 99 mg/dL   Renal Function Panel   Result Value Ref Range    Glucose 148 (H) 74 - 99 mg/dL    Sodium 143 136 - 145 mmol/L    Potassium 4.7 3.5 - 5.3 mmol/L    Chloride 101 98 - 107 mmol/L    Bicarbonate 36 (H) 21 - 32 mmol/L    Anion Gap 11 10 - 20 mmol/L    Urea Nitrogen 41 (H) 6 - 23 mg/dL    Creatinine 0.98 0.50 - 1.30 mg/dL    eGFR 83 >60 mL/min/1.73m*2    Calcium 8.0 (L) 8.6 - 10.3 mg/dL    Phosphorus 3.2 2.5 - 4.9 mg/dL    Albumin 2.3 (L) 3.4 - 5.0 g/dL   CBC   Result Value Ref Range    WBC 13.2 (H) 4.4 - 11.3 x10*3/uL    nRBC 0.0 0.0 - 0.0 /100 WBCs    RBC 5.73 4.50 - 5.90 x10*6/uL    Hemoglobin 18.3 (H) 13.5 - 17.5 g/dL    Hematocrit 58.7 (H) 41.0 - 52.0 %     (H) 80 - 100 fL    MCH 31.9 26.0 - 34.0 pg    MCHC 31.2 (L) 32.0 - 36.0 g/dL    RDW 13.9 11.5 - 14.5 %    Platelets 235 150 - 450 x10*3/uL    Magnesium   Result Value Ref Range    Magnesium 2.40 1.60 - 2.40 mg/dL   POCT GLUCOSE   Result Value Ref Range    POCT Glucose 185 (H) 74 - 99 mg/dL   POCT GLUCOSE   Result Value Ref Range    POCT Glucose 165 (H) 74 - 99 mg/dL   POCT GLUCOSE   Result Value Ref Range    POCT Glucose 169 (H) 74 - 99 mg/dL   Blood Gas Venous Full Panel   Result Value Ref Range    POCT pH, Venous 7.36 7.33 - 7.43 pH    POCT pCO2, Venous 78 (HH) 41 - 51 mm Hg    POCT pO2, Venous 42 35 - 45 mm Hg    POCT SO2, Venous 69 45 - 75 %    POCT Oxy Hemoglobin, Venous 67.7 45.0 - 75.0 %    POCT Hematocrit Calculated, Venous 59.0 (H) 41.0 - 52.0 %    POCT Sodium, Venous 135 (L) 136 - 145 mmol/L    POCT Potassium, Venous 4.7 3.5 - 5.3 mmol/L    POCT Chloride, Venous 100 98 - 107 mmol/L    POCT Ionized Calicum, Venous 1.14 1.10 - 1.33 mmol/L    POCT Glucose, Venous 200 (H) 74 - 99 mg/dL    POCT Lactate, Venous 1.6 0.4 - 2.0 mmol/L    POCT Base Excess, Venous 13.0 (H) -2.0 - 3.0 mmol/L    POCT HCO3 Calculated, Venous 44.1 (H) 22.0 - 26.0 mmol/L    POCT Hemoglobin, Venous 19.5 (H) 13.5 - 17.5 g/dL    POCT Anion Gap, Venous -4.0 (L) 10.0 - 25.0 mmol/L    Patient Temperature 37.0 degrees Celsius    FiO2 21 %   POCT GLUCOSE   Result Value Ref Range    POCT Glucose 214 (H) 74 - 99 mg/dL   POCT GLUCOSE   Result Value Ref Range    POCT Glucose 147 (H) 74 - 99 mg/dL   POCT GLUCOSE   Result Value Ref Range    POCT Glucose 150 (H) 74 - 99 mg/dL   Electrocardiogram, 12-lead PRN ACS symptoms   Result Value Ref Range    Ventricular Rate 63 BPM    Atrial Rate 63 BPM    GA Interval 250 ms    QRS Duration 156 ms    QT Interval 438 ms    QTC Calculation(Bazett) 448 ms    P Axis -13 degrees    R Axis -74 degrees    T Axis 38 degrees    QRS Count 11 beats    Q Onset 196 ms    P Onset 71 ms    P Offset 150 ms    T Offset 415 ms    QTC Fredericia 445 ms   POCT GLUCOSE   Result Value Ref Range    POCT Glucose 161 (H) 74 - 99 mg/dL   Renal Function Panel   Result Value  Ref Range    Glucose 172 (H) 74 - 99 mg/dL    Sodium 142 136 - 145 mmol/L    Potassium 4.4 3.5 - 5.3 mmol/L    Chloride 99 98 - 107 mmol/L    Bicarbonate 37 (H) 21 - 32 mmol/L    Anion Gap 10 10 - 20 mmol/L    Urea Nitrogen 41 (H) 6 - 23 mg/dL    Creatinine 0.87 0.50 - 1.30 mg/dL    eGFR >90 >60 mL/min/1.73m*2    Calcium 8.1 (L) 8.6 - 10.3 mg/dL    Phosphorus 2.8 2.5 - 4.9 mg/dL    Albumin 2.3 (L) 3.4 - 5.0 g/dL   CBC   Result Value Ref Range    WBC 11.4 (H) 4.4 - 11.3 x10*3/uL    nRBC 0.0 0.0 - 0.0 /100 WBCs    RBC 5.59 4.50 - 5.90 x10*6/uL    Hemoglobin 18.2 (H) 13.5 - 17.5 g/dL    Hematocrit 57.6 (H) 41.0 - 52.0 %     (H) 80 - 100 fL    MCH 32.6 26.0 - 34.0 pg    MCHC 31.6 (L) 32.0 - 36.0 g/dL    RDW 13.4 11.5 - 14.5 %    Platelets 195 150 - 450 x10*3/uL   POCT GLUCOSE   Result Value Ref Range    POCT Glucose 169 (H) 74 - 99 mg/dL   POCT GLUCOSE   Result Value Ref Range    POCT Glucose 195 (H) 74 - 99 mg/dL   POCT GLUCOSE   Result Value Ref Range    POCT Glucose 247 (H) 74 - 99 mg/dL   POCT GLUCOSE   Result Value Ref Range    POCT Glucose 233 (H) 74 - 99 mg/dL   POCT GLUCOSE   Result Value Ref Range    POCT Glucose 135 (H) 74 - 99 mg/dL   POCT GLUCOSE   Result Value Ref Range    POCT Glucose 152 (H) 74 - 99 mg/dL   POCT GLUCOSE   Result Value Ref Range    POCT Glucose 139 (H) 74 - 99 mg/dL   POCT GLUCOSE   Result Value Ref Range    POCT Glucose 217 (H) 74 - 99 mg/dL   POCT GLUCOSE   Result Value Ref Range    POCT Glucose 268 (H) 74 - 99 mg/dL   Magnesium   Result Value Ref Range    Magnesium 2.20 1.60 - 2.40 mg/dL   POCT GLUCOSE   Result Value Ref Range    POCT Glucose 159 (H) 74 - 99 mg/dL   POCT GLUCOSE   Result Value Ref Range    POCT Glucose 108 (H) 74 - 99 mg/dL   POCT GLUCOSE   Result Value Ref Range    POCT Glucose 130 (H) 74 - 99 mg/dL   POCT GLUCOSE   Result Value Ref Range    POCT Glucose 148 (H) 74 - 99 mg/dL    XR chest 1 view    Result Date: 9/13/2024  Interpreted By:  Tania Knowles, STUDY: XR  CHEST 1 VIEW;  9/13/2024 4:11 pm   INDICATION: Signs/Symptoms:post chest tube removal.   COMPARISON: 09/13/2024   ACCESSION NUMBER(S): [id number]   ORDERING CLINICIAN: RODOLFO LOPEZ   FINDINGS: The study is limited due to patient's body habitus and poor inspiration previously seen right-sided chest tube has been removed. No gross pneumothorax is noted.   Blunting of both costophrenic angles is seen, which most likely is related to pleural effusions. The heart is enlarged. Bibasilar streaky densities are seen.       No gross pneumothorax.   Bilateral pleural effusions.   Cardiomegaly.   Bibasilar streaky densities, which may be related to atelectasis or infiltrates.       MACRO: None   Signed by: Tania Knowles 9/13/2024 4:40 PM Dictation workstation:   [id number]    Electrocardiogram, 12-lead PRN ACS symptoms    Result Date: 9/13/2024  Sinus rhythm with 1st degree AV block with occasional Premature ventricular complexes and Premature atrial complexes Left axis deviation Right bundle branch block Inferior infarct (cited on or before 04-SEP-2024) Anteroseptal infarct (cited on or before 23-APR-2024) Abnormal ECG When compared with ECG of 06-SEP-2024 00:20, Premature ventricular complexes are now Present Vent. rate has decreased BY  31 BPM Questionable change in initial forces of Septal leads T wave inversion less evident in Anterolateral leads    XR chest 1 view    Result Date: 9/13/2024  Interpreted By:  Rosa Hemphill, STUDY: XR CHEST 1 VIEW;  9/13/2024 4:31 am   INDICATION: Signs/Symptoms:CT in place.   COMPARISON: Multiple prior studies, the most recent 09/12/2024   ACCESSION NUMBER(S): [id number]   ORDERING CLINICIAN: RODOLFO LOPEZ   FINDINGS:     CARDIOMEDIASTINAL SILHOUETTE: Stable enlarged cardiac silhouette.   LUNGS: Stable position of pigtail catheter overlying the right lung base. There is improved aeration of the right lung base. Mild residual hazy right basilar opacity, likely combination of  small residual pleural effusion and basilar consolidation. Limited evaluation of the left lung base due to underpenetration. This is not significantly changed and left pleural effusion or basilar consolidation not excluded. No pneumothorax.   ABDOMEN: No remarkable upper abdominal findings.   BONES: No acute osseous abnormality.       Improved aeration of the right lung base. Residual hazy right basilar opacities likely a combination of residual pleural effusion and basilar atelectasis.   Limited evaluation of the left lung base due to underpenetration.   MACRO: None   Signed by: Rosa Hemphill 9/13/2024 4:48 AM Dictation workstation:   [id number]    XR chest 1 view    Result Date: 9/12/2024  Interpreted By:  Donte Shoemaker, STUDY: XR CHEST 1 VIEW; 9/12/2024 5:23 am   INDICATION: Signs/Symptoms:CT in place.   COMPARISON: None   ACCESSION NUMBER(S): [id number]   ORDERING CLINICIAN: RODOLFO LOPEZ   TECHNIQUE: 1 view of the chest was performed.   FINDINGS: Right pigtail catheter in similar location. Stable layering bilateral pleural effusion with surrounding atelectasis. Stable cardiomegaly. Bilateral shoulder joint degenerative changes.       1. No significant interval change from 09/11/2024 chest x-ray.   Signed by: Donte Shoemaker 9/12/2024 8:18 AM Dictation workstation:   [id number]    XR chest 1 view    Result Date: 9/11/2024  Interpreted By:  Klaus Villafana, STUDY: XR CHEST 1 VIEW; 9/11/2024 5:42 am   INDICATION: Signs/Symptoms:CT in place   COMPARISON: Radiographs 09/10/2024   ACCESSION NUMBER(S): [id number]   ORDERING CLINICIAN: RODOLFO LOPEZ   TECHNIQUE: Single frontal view of the chest performed.   FINDINGS: LINES AND DEVICES: Remote endotracheal tube. Stable right pigtail pleural catheter near right lung base.   LUNGS: Stable hazy opacities at the lung base and additional airspace opacities extending to right mid lung, stable to mildly increased. No pneumothorax. Left lung is clear.    CARDIOMEDIASTINAL SILHOUETTE: Stable cardiomegaly.       Stable to slightly increased right pleural effusion with atelectasis. Stable right pleural catheter.   MACRO None   Signed by: Klaus Villafana 9/11/2024 8:21 AM Dictation workstation:   [id number]    XR chest 1 view    Result Date: 9/10/2024  Interpreted By:  Ishaan Vernon, STUDY: XR CHEST 1 VIEW;  9/10/2024 5:34 am   INDICATION: Signs/Symptoms:Eval chest tube placement, Rt effusion.   COMPARISON: CT scan chest from 09/04/2024. Chest x-ray from 09/09/2024.   ACCESSION NUMBER(S): [id number]   ORDERING CLINICIAN: RICH KAPLAN   TECHNIQUE: Single AP portable view of the chest was obtained.   FINDINGS: MEDIASTINUM/ LUNGS/ ARELIS: Previous ET tube has been removed. There is an NG tube in place with distal port just beyond the GE junction and distal tip in the proximal stomach. Pigtail pleural catheter at the right base is stable. There is a small grossly stable lateral right basilar pneumothorax. There is mild bibasilar atelectasis. Cardiomegaly without gross vascular congestion. No pneumothorax on the left. No tracheal deviation. No abnormal hilar fullness or gross mass on either side.   BONES: No lytic or blastic destructive bone lesion.   UPPER ABDOMEN: Grossly intact.       Cardiomegaly.   NG tube in place. ET tube has been removed.   Stable pleural drainage catheter at the base of the right chest. Small stable lateral right basilar pneumothorax.   Mild bibasilar atelectasis.   MACRO: None   Signed by: Ishaan Vernon 9/10/2024 8:13 AM Dictation workstation:   [id number]    XR chest 1 view    Result Date: 9/9/2024  Interpreted By:  Daisy Sosa, STUDY: XR CHEST 1 VIEW;  9/9/2024 4:39 pm   INDICATION: Signs/Symptoms:s/p lytic therapy.     COMPARISON: 5:21 a.m. the same day   ACCESSION NUMBER(S): [id number]   ORDERING CLINICIAN: RODOLFO LOPEZ   FINDINGS: ETT, NG tube and right lower chest pigtail catheter remain in place. Additional presumed overlying  monitoring/support devices again seen. Small right basilar pneumothorax decreased in size. Persistent bibasilar opacities. The cardiomediastinal silhouette remains enlarged.       Decreased size of right basilar pneumothorax since 5:21 a.m. the same day with pigtail catheter in place.   MACRO: None.   Signed by: Daisy Sosa 9/9/2024 4:44 PM Dictation workstation:   [id number]    XR chest 1 view    Result Date: 9/9/2024  Interpreted By:  Klaus Villafana, STUDY: XR CHEST 1 VIEW; 9/9/2024 5:32 am   INDICATION: Signs/Symptoms:Eval chest tube placement, Rt effusion   COMPARISON: Radiographs 09/08/2024   ACCESSION NUMBER(S): [id number]   ORDERING CLINICIAN: RICH KAPLAN   TECHNIQUE: Single frontal view of the chest performed.   FINDINGS: LINES AND DEVICES: The pigtail of right-sided pleural drainage catheter overlies the right infrahilar region, grossly unchanged. Tip of ETT approximately 6.6 cm above the kathie, previously 7.7 cm. NG tube courses below the diaphragm, limited assessment.   LUNGS: Moderate-size right-sided hydropneumothorax appears grossly unchanged. Stable to slightly increased interstitial opacities on the left. Question new trace left pleural effusion with atelectasis.   CARDIOMEDIASTINAL SILHOUETTE: Stable cardiomegaly.       Right-sided infrahilar pigtail catheter and moderate hydropneumothorax, grossly unchanged.   Suspected worsening CHF with stable to mildly increased interstitial edema and possible new trace left pleural effusion.   MACRO None   Signed by: Klaus Villafana 9/9/2024 8:17 AM Dictation workstation:   [id number]    XR chest 1 view    Result Date: 9/8/2024  Interpreted By:  Corrina Reyes, STUDY: XR CHEST 1 VIEW;  9/8/2024 6:39 am   INDICATION: Signs/Symptoms:Eval chest tube placement, Rt effusion.     COMPARISON: 09/07/2024   ACCESSION NUMBER(S): [id number]   ORDERING CLINICIAN: RICH KAPLAN   TECHNIQUE: Portable AP semi upright   FINDINGS: Endotracheal tube tip  projects between 7 and 8 cm above the kathie. Nasogastric tube is no longer identified. Right pleural pigtail catheter projects at mid right hemithorax and is similar in position to the prior study. Relative volume of basilar pneumothorax and pleural fluid appears similar to the prior study. Right lower lobe and right middle lobe are collapsed. Right upper lobe variation is similar to the prior study. Left lung shows some vascular congestion. Minimal atelectasis is present at the left lung base. No change in the osseous structures is noted.       Basilar pneumothorax on the right appears similar to the prior study in the relative volume of pleural fluid and pneumothorax appears unchanged.   Collapse of the right lower lobe and right middle lobe   Pulmonary vascular congestion is most notable in the left lung   MACRO: None.   Signed by: Corrina Reyes 9/8/2024 12:32 PM Dictation workstation:   [id number]    XR chest 1 view    Result Date: 9/8/2024  Interpreted By:  Corrina Reyes, STUDY: XR CHEST 1 VIEW;  9/7/2024 5:28 pm   INDICATION: Signs/Symptoms:Eval chest tube placement, Rt pleural effusion.     COMPARISON: 09/06/2024   ACCESSION NUMBER(S): [id number]   ORDERING CLINICIAN: RICH KAPLAN   TECHNIQUE: Portable AP upright   FINDINGS: Endotracheal tube tip projects about 6 cm above the kathie. NG tube extends below the diaphragm and beyond the image. Right pleural pigtail catheter projects at mid right hemithorax and appears unchanged in position.   Pneumothorax at the right thoracic base appears larger than on the prior study. The amount of pleural fluid appears decreased. Mild atelectasis left lung base is slightly improved. Heart is unchanged in size. No changes noted in the osseous structures.       Right pleural effusion is decreased from the prior study but the right basilar pneumothorax appears increased.   MACRO: None.   Signed by: Corrina Reyes 9/8/2024 12:30 PM Dictation workstation:    [id number]    Electrocardiogram, 12-lead PRN ACS symptoms    Result Date: 9/6/2024  Sinus rhythm with 1st degree AV block with Premature atrial complexes Left axis deviation Right bundle branch block Inferior infarct (cited on or before 04-SEP-2024) Anterior infarct , age undetermined T wave abnormality, consider lateral ischemia Abnormal ECG When compared with ECG of 05-SEP-2024 10:29, (unconfirmed) Premature atrial complexes are now Present Confirmed by Shady Bill ([id number]) on 9/6/2024 1:59:56 PM    XR chest 1 view    Result Date: 9/6/2024  Interpreted By:  Eloisa Landa, STUDY: XR CHEST 1 VIEW 9/6/2024 9:05 am   INDICATION: Signs/Symptoms:R pleural effusion   COMPARISON: 09/05/2024   ACCESSION NUMBER(S): [id number]   ORDERING CLINICIAN: PERRI DELACRUZ   TECHNIQUE: AP view of the chest at bedside in the erect position   FINDINGS: A right chest tube is once again noted within the mid right hemithorax with a stable right hydropneumothorax seen within the mid and lower right hemithorax.   The tip of the endotracheal tube remains satisfactorily positioned at a distance 3 cm above kathie with nasogastric tube descending below diaphragm.   There is small left pleural effusion and left basilar atelectasis. The heart remains enlarged.       No change in the right hydropneumothorax which may be loculated with chest tube on the right in the mid hemithorax.   Small left pleural effusion with left basilar infiltrate/atelectasis.   Signed by: Eloisa Landa 9/6/2024 9:20 AM Dictation workstation:   [id number]    ECG 12 lead    Result Date: 9/6/2024  Sinus rhythm with 1st degree AV block Left axis deviation Right bundle branch block Inferior infarct (cited on or before 04-SEP-2024) Abnormal ECG When compared with ECG of 04-SEP-2024 16:24, Premature ventricular complexes are no longer Present Right bundle branch block is now Present    ECG 12 lead    Result Date: 9/5/2024  Sinus rhythm with 1st degree AV block with frequent  Premature ventricular complexes Left axis deviation Left ventricular hypertrophy with QRS widening and repolarization abnormality ( R in aVL , Mystic product ) Inferior infarct , age undetermined Abnormal ECG When compared with ECG of 23-APR-2024 09:01, Premature ventricular complexes are now Present CA interval has increased (RBBB and left anterior fascicular block) is no longer Present Inferior infarct is now Present Confirmed by Andrea Bentley ([id number]) on 9/5/2024 10:01:05 AM    XR chest 1 view    Result Date: 9/5/2024  Interpreted By:  Saad Cronin, STUDY: XR CHEST 1 VIEW; 9/5/2024 4:57 am   INDICATION: CLINICAL INFORMATION: Signs/Symptoms:Pig tail Chest Tube replacement.   COMPARISON: 09/04/2024   ACCESSION NUMBER(S): [id number]   ORDERING CLINICIAN: ANDRY ABRAHAM   TECHNIQUE: Portable chest one view.   FINDINGS: The cardiac silhouette is quite prominent suggesting cardiomegaly. The tube and line placement is unchanged.  There is a decrease in the right-sided pleural-based density is well as the right-sided parenchymal densities. The pulmonary vasculature is slightly indistinct suggesting mild pulmonary vascular congestion.       There appears to be increased aeration at the right base suggesting diminishing pleural density within the right hemithorax in association with the chest tube. Infiltrate persists on the right.   MACRO: none   Signed by: Saad Cronin 9/5/2024 8:18 AM Dictation workstation:   [id number]    XR chest 1 view    Result Date: 9/5/2024  Interpreted By:  Cande Keating, STUDY: XR CHEST 1 VIEW;  9/4/2024 11:10 pm   INDICATION: Signs/Symptoms:Chest tube placement.     COMPARISON: Radiographs of the chest dated 09/04/2024; CT of the chest dated 09/04/2024   ACCESSION NUMBER(S): [id number]   ORDERING CLINICIAN: ANDRY ABRAHAM   FINDINGS: AP radiograph of the chest was provided.   Endotracheal tube projects 4.4 cm superior to the kathie. Enteric tube appears to course along the midline  expected course of the esophagus, although the distal tip is not well visualized due to underpenetration and overlying structures.   CARDIOMEDIASTINAL SILHOUETTE: Cardiomediastinal silhouette is enlarged, similar to prior exam.   LUNGS: Redemonstration of the large right-sided pleural effusion with associated atelectasis/consolidation, similar in appearance to prior radiographs. No new consolidation or pleural effusion is present in the left lung.   ABDOMEN: No remarkable upper abdominal findings.   BONES: No acute osseous changes.       1.  Enteric tube is coursing along the midline of the mediastinum in the expected course of the esophagus, although the distal tip is not well visualized due to overlying cardiomediastinal silhouette and underpenetration, and may be outside of the field-of-view of the study. Endotracheal tube projects 4.5 cm superior to the kathie. 2. Redemonstration of the large loculated right-sided pleural effusion with the associated atelectasis/consolidation, similar in appearance to prior exam.       MACRO: None   Signed by: Cande Keating 9/5/2024 2:08 AM Dictation workstation:   [id number]    CT chest abdomen pelvis w IV contrast    Result Date: 9/4/2024  Interpreted By:  Ishaan Banda, STUDY: CT CHEST ABDOMEN PELVIS W IV CONTRAST;  9/4/2024 5:52 pm   INDICATION: Signs/Symptoms:unresponsive.     COMPARISON: None.   ACCESSION NUMBER(S): [id number]   ORDERING CLINICIAN: GURPREET COPPOLA   TECHNIQUE: Contiguous axial images of the chest, abdomen, and pelvis were obtained after the intravenous administration of iodinated contrast. Coronal and sagittal reformatted images were reconstructed from the axial data.   FINDINGS: CT CHEST:   MEDIASTINUM AND LYMPH NODES: NG/OG tube noted with small amount of fluid in the esophagus. The esophageal wall appears within normal limits.  No enlarged intrathoracic or axillary lymph nodes by imaging criteria. No pneumomediastinum.   VESSELS:  Normal  caliber thoracic aorta without dissection. Mild aortic atherosclerosis.   HEART: Enlarged. Severe aortic valvular calcifications. Moderate coronary artery calcifications. No significant pericardial effusion.   LUNG, AIRWAYS, PLEURA: There is a large loculated right pleural effusion causing compressive atelectasis on the right lung. As a result, there is complete collapse of the right lower lobe, complete collapse of the right middle lobe, and partial collapse of the right upper lobe. There are multiple loculated compartments largest of which is seen at the right lung base. There is a small amount of mucus in the mid to distal trachea. There is small amount of layering debris in the mainstem bronchi. The majority of the right middle and lower lobe bronchi are collapsed and opacified with low-density debris. There is a trace left pleural effusion with subsegmental left basilar atelectasis. There is emphysema. There are new scattered subcentimeter bilateral centrilobular nodules that are likely infectious/inflammatory in nature from bronchiolitis.   CHEST WALL SOFT TISSUES: No discernible acute abnormality. There is a 5.5 cm x 1.7 cm lipoma in the right infraspinatus muscle.   OSSEOUS STRUCTURES: No acute osseous abnormality.     CT ABDOMEN/PELVIS:   ABDOMINAL WALL: No significant abnormality.   LIVER: No significant parenchymal abnormality.   BILE DUCTS: No significant intrahepatic or extrahepatic dilatation.   GALLBLADDER: No significant abnormality.   PANCREAS: No significant abnormality.   SPLEEN: No significant abnormality.   ADRENALS: There is a 1.7 cm low-density left adrenal nodule likely representing a adenoma.   KIDNEYS, URETERS, BLADDER: There is a heterogeneously enhancing solid mass in the upper pole the left kidney measuring 2.4 cm x 1.9 cm consistent with renal cell carcinoma. Additionally, a 0.8 cm likely enhancing lesion in the upper pole the right kidney is concerning for renal cell carcinoma. No  hydronephrosis or calculi. Urinary bladder is decompressed with Live catheter in place.   REPRODUCTIVE ORGANS: No significant abnormality.   VESSELS: Moderate aortic atherosclerosis without AAA.   RETROPERITONEUM/LYMPH NODES: No acute retroperitoneal abnormality. No enlarged lymph nodes.   BOWEL/MESENTERY/PERITONEUM: Colonic diverticulosis without acute diverticulitis. No inflammatory bowel wall thickening or dilatation. Normal appendix.   No ascites, free air, or fluid collection.     MUSCULOSKELETAL: No acute osseous abnormality.       CT CHEST: Large loculated right pleural effusion causing complete compressive collapse of the right middle lobe and right lower lobe and partial compressive collapse of the right upper lobe. The largest likely compartments located at the inferior right hemithorax. The cause of the effusion is not apparent as there is only a small amount of debris in the distal trachea and right mainstem bronchus but the distal-most right-sided bronchi are occluded due to combination of compressive collapse and small low-density fluid.   Numerous new subcentimeter centrilobular pulmonary nodules that are most likely infectious/inflammatory in as can be seen with bronchiolitis or fungal infection.   Aortic valve calcification to the extent that would likely result in aortic stenosis.   CT ABDOMEN/PELVIS: Left kidney solid heterogeneously enhancing mass suspicious for renal cell carcinoma (2.4 cm x 1.9 cm) and of suspected 0.8 cm right upper pole renal cell carcinoma measuring 0.8 cm. Recommend urological follow-up/also taken and dedicated MRI kidneys to further evaluate these lesions. YELLOW ALERT: An incidental finding alert was sent per protocol.   No acute abnormality in the abdomen or pelvis.   Colonic diverticulosis without acute diverticulitis.   MACRO: Critical Finding:  New mass in the kidney. Notification was initiated on 9/4/2024 at 6:25 pm by  Ishaan Banda.  (**-YCF-**) Instructions:   MR Abdomen w and wo IV contrast. 1 week.   Signed by: Ishaan Banda 9/4/2024 6:26 PM Dictation workstation:   [id number]    CT head wo IV contrast    Result Date: 9/4/2024  Interpreted By:  Ishaan Banda, STUDY: CT HEAD WO IV CONTRAST;  9/4/2024 5:52 pm   INDICATION: Signs/Symptoms:unrespoonsive.     COMPARISON: None.   ACCESSION NUMBER(S): [id number]   ORDERING CLINICIAN: GURPREET COPPOLA   TECHNIQUE: Noncontrast axial CT images of head were obtained with coronal and sagittal reconstructed images.   FINDINGS: BRAIN PARENCHYMA: Mild periventricular and subcortical hemispheric white matter hypodensities are most compatible with chronic small vessel ischemic disease. No acute intraparenchymal hemorrhage or parenchymal evidence of acute large territory ischemic infarct. Gray-white matter distinction is preserved. No mass-effect.   VENTRICLES and EXTRA-AXIAL SPACES:  No acute extra-axial or intraventricular hemorrhage. No effacement of cerebral sulci. The ventricles and sulci are age-concordant.   PARANASAL SINUSES/MASTOIDS:  No hemorrhage or air-fluid levels within the visualized paranasal sinuses. The mastoids are well aerated.   CALVARIUM/ORBITS:  No skull fracture.  The orbits and globes are intact to the extent visualized.   EXTRACRANIAL SOFT TISSUES: No discernible abnormality.       No acute intracranial abnormality.     MACRO: None.   Signed by: Ishaan Banda 9/4/2024 6:07 PM Dictation workstation:   [id number]    XR chest 1 view    Result Date: 9/4/2024  Interpreted By:  Rosalie Ellison, STUDY: XR CHEST 1 VIEW;  9/4/2024 4:33 pm   INDICATION: Signs/Symptoms:intubation.   COMPARISON: 04/08/2024   ACCESSION NUMBER(S): [id number]   ORDERING CLINICIAN: GURPREET COPPOLA   TECHNIQUE: A single portable image of the chest was obtained.   FINDINGS: Resolution is limited. The right portion of the chest is not entirely included.   The patient is rotated. The heart size is uncertain.   There appears to be  elevated right hemidiaphragm and right-sided infiltration.   An endotracheal tube terminates above the kathie. A nasogastric tube terminates below the diaphragm.   COMPARISON OF FINDING: The nasogastric tube was placed in the interval. The endotracheal tube was placed in the interval.       Interval placement of an endotracheal tube. The tip is above the kathie. Interval placement of a nasogastric tube. It appears to terminate below the diaphragm.   The exam is extremely limited.   MACRO: none   Signed by: Rosalie Ellison 9/4/2024 4:43 PM Dictation workstation:   [id number]             Assessment/Plan   Assessment & Plan  Acute respiratory failure with hypoxia and hypercapnia (Multi)  67-year-old gentleman with history of chronic diastolic heart failure, COPD, obstructive sleep apnea, obesity, type 2 diabetes mellitus, hypertension, hypothyroidism and hyperlipidemia was admitted with acute respiratory failure with hypoxia and hypercapnia and noted to have a right loculated pleural effusion and bilateral lower lobe lung infiltrate and right hydropneumothorax.  He was hypotensive on admission and required 3 L of fluid to resuscitate and admitted to intensive care unit and was intubated because of acute respiratory failure and remained intubated for several days.  Currently he is extubated and on high flow oxygen as mentioned above.  He had significant leukocytosis of 22,000 on admission and his sputum culture was positive for stenotrophomonas.  Patient is being continued on IV Zosyn for next few days.  Patient is transferred out of ICU to general medical service.    1.  Acute hypoxic hypercapnic respiratory failure with hypotension and right hydropneumothorax and bilateral infiltrate and positive for pneumonia.  Initially intubated for several days and since then he has been extubated and was on high flow Airvo 50% / 50 L oxygen.  Improving and currently is on 6 L nasal cannula.  Continued on IV Zosyn for another 5  days  Leukocytosis is improving    2.  Patient had hydropneumothorax on admission and had chest tube, chest tube has been removed since then..    3.  Hypotension has resolved    4.  COPD and obstructive sleep apnea aerosol treatment and currently on Airvo with 50% oxygen/50 L    Continue aerosol treatment and currently patient on prednisone 20 mg daily.    5.  Patient would like to be DNR/DNI.    Reviewed all labs patient's medical record and imaging studies and discussed the plan of treatment with the patient and his family members were present at the time of examination in the room.       I spent 35 minutes in the professional and overall care of this patient.      Noble Gatica MD

## 2024-09-15 NOTE — ASSESSMENT & PLAN NOTE
67-year-old gentleman with history of chronic diastolic heart failure, COPD, obstructive sleep apnea, obesity, type 2 diabetes mellitus, hypertension, hypothyroidism and hyperlipidemia was admitted with acute respiratory failure with hypoxia and hypercapnia and noted to have a right loculated pleural effusion and bilateral lower lobe lung infiltrate and right hydropneumothorax.  He was hypotensive on admission and required 3 L of fluid to resuscitate and admitted to intensive care unit and was intubated because of acute respiratory failure and remained intubated for several days.  Currently he is extubated and on high flow oxygen as mentioned above.  He had significant leukocytosis of 22,000 on admission and his sputum culture was positive for stenotrophomonas.  Patient is being continued on IV Zosyn for next few days.  Patient is transferred out of ICU to general medical service.    1.  Acute hypoxic hypercapnic respiratory failure with hypotension and right hydropneumothorax and bilateral infiltrate and positive for pneumonia.  Initially intubated for several days and since then he has been extubated and currently on high flow Airvo 50% / 50 L oxygen  Continued on IV Zosyn for another 5 days  Leukocytosis is improving    2.  Patient had hydropneumothorax on admission and has chest tube .    3.  Hypotension has resolved    4.  COPD and obstructive sleep apnea aerosol treatment and currently on Airvo with 50% oxygen/50 L    Continue aerosol treatment and currently patient on prednisone 20 mg daily.    5.  Patient is currently full code however we had a discussion about DNR status and patient and family will discuss and let me know in next 24 hours.    Reviewed all labs patient's medical record and imaging studies and discussed the plan of treatment with the patient and his family members were present at the time of examination in the room.

## 2024-09-16 ENCOUNTER — APPOINTMENT (OUTPATIENT)
Dept: RADIOLOGY | Facility: HOSPITAL | Age: 69
DRG: 870 | End: 2024-09-16
Payer: MEDICARE

## 2024-09-16 LAB
ALBUMIN SERPL BCP-MCNC: 2.5 G/DL (ref 3.4–5)
ANION GAP SERPL CALC-SCNC: 7 MMOL/L (ref 10–20)
BUN SERPL-MCNC: 17 MG/DL (ref 6–23)
CALCIUM SERPL-MCNC: 7.8 MG/DL (ref 8.6–10.3)
CHLORIDE SERPL-SCNC: 98 MMOL/L (ref 98–107)
CO2 SERPL-SCNC: 40 MMOL/L (ref 21–32)
CREAT SERPL-MCNC: 0.48 MG/DL (ref 0.5–1.3)
EGFRCR SERPLBLD CKD-EPI 2021: >90 ML/MIN/1.73M*2
ERYTHROCYTE [DISTWIDTH] IN BLOOD BY AUTOMATED COUNT: 13.2 % (ref 11.5–14.5)
GLUCOSE BLD MANUAL STRIP-MCNC: 141 MG/DL (ref 74–99)
GLUCOSE BLD MANUAL STRIP-MCNC: 147 MG/DL (ref 74–99)
GLUCOSE BLD MANUAL STRIP-MCNC: 159 MG/DL (ref 74–99)
GLUCOSE BLD MANUAL STRIP-MCNC: 207 MG/DL (ref 74–99)
GLUCOSE BLD MANUAL STRIP-MCNC: 216 MG/DL (ref 74–99)
GLUCOSE BLD MANUAL STRIP-MCNC: 227 MG/DL (ref 74–99)
GLUCOSE SERPL-MCNC: 131 MG/DL (ref 74–99)
HCT VFR BLD AUTO: 54.8 % (ref 41–52)
HGB BLD-MCNC: 17.2 G/DL (ref 13.5–17.5)
MCH RBC QN AUTO: 32.2 PG (ref 26–34)
MCHC RBC AUTO-ENTMCNC: 31.4 G/DL (ref 32–36)
MCV RBC AUTO: 103 FL (ref 80–100)
NRBC BLD-RTO: 0 /100 WBCS (ref 0–0)
PHOSPHATE SERPL-MCNC: 1.7 MG/DL (ref 2.5–4.9)
PLATELET # BLD AUTO: 191 X10*3/UL (ref 150–450)
POTASSIUM SERPL-SCNC: 3.6 MMOL/L (ref 3.5–5.3)
RBC # BLD AUTO: 5.34 X10*6/UL (ref 4.5–5.9)
SODIUM SERPL-SCNC: 141 MMOL/L (ref 136–145)
WBC # BLD AUTO: 10 X10*3/UL (ref 4.4–11.3)

## 2024-09-16 PROCEDURE — 82947 ASSAY GLUCOSE BLOOD QUANT: CPT

## 2024-09-16 PROCEDURE — 2500000001 HC RX 250 WO HCPCS SELF ADMINISTERED DRUGS (ALT 637 FOR MEDICARE OP): Performed by: SURGERY

## 2024-09-16 PROCEDURE — 94660 CPAP INITIATION&MGMT: CPT

## 2024-09-16 PROCEDURE — 2060000001 HC INTERMEDIATE ICU ROOM DAILY

## 2024-09-16 PROCEDURE — 97110 THERAPEUTIC EXERCISES: CPT | Mod: GO

## 2024-09-16 PROCEDURE — 2500000004 HC RX 250 GENERAL PHARMACY W/ HCPCS (ALT 636 FOR OP/ED): Performed by: SURGERY

## 2024-09-16 PROCEDURE — 97535 SELF CARE MNGMENT TRAINING: CPT | Mod: GO

## 2024-09-16 PROCEDURE — 2500000002 HC RX 250 W HCPCS SELF ADMINISTERED DRUGS (ALT 637 FOR MEDICARE OP, ALT 636 FOR OP/ED): Performed by: SURGERY

## 2024-09-16 PROCEDURE — 80069 RENAL FUNCTION PANEL: CPT | Performed by: NURSE PRACTITIONER

## 2024-09-16 PROCEDURE — 2500000005 HC RX 250 GENERAL PHARMACY W/O HCPCS: Performed by: INTERNAL MEDICINE

## 2024-09-16 PROCEDURE — 94640 AIRWAY INHALATION TREATMENT: CPT

## 2024-09-16 PROCEDURE — 36415 COLL VENOUS BLD VENIPUNCTURE: CPT | Performed by: NURSE PRACTITIONER

## 2024-09-16 PROCEDURE — 99232 SBSQ HOSP IP/OBS MODERATE 35: CPT | Performed by: INTERNAL MEDICINE

## 2024-09-16 PROCEDURE — 2500000001 HC RX 250 WO HCPCS SELF ADMINISTERED DRUGS (ALT 637 FOR MEDICARE OP)

## 2024-09-16 PROCEDURE — 85027 COMPLETE CBC AUTOMATED: CPT | Performed by: NURSE PRACTITIONER

## 2024-09-16 PROCEDURE — 99233 SBSQ HOSP IP/OBS HIGH 50: CPT | Performed by: INTERNAL MEDICINE

## 2024-09-16 PROCEDURE — 94668 MNPJ CHEST WALL SBSQ: CPT

## 2024-09-16 ASSESSMENT — COGNITIVE AND FUNCTIONAL STATUS - GENERAL
TURNING FROM BACK TO SIDE WHILE IN FLAT BAD: A LOT
MOVING FROM LYING ON BACK TO SITTING ON SIDE OF FLAT BED WITH BEDRAILS: A LOT
MOBILITY SCORE: 8
MOBILITY SCORE: 8
DAILY ACTIVITIY SCORE: 6
MOBILITY SCORE: 8
STANDING UP FROM CHAIR USING ARMS: TOTAL
CLIMB 3 TO 5 STEPS WITH RAILING: TOTAL
TOILETING: TOTAL
WALKING IN HOSPITAL ROOM: TOTAL
DRESSING REGULAR LOWER BODY CLOTHING: TOTAL
MOVING FROM LYING ON BACK TO SITTING ON SIDE OF FLAT BED WITH BEDRAILS: A LOT
PERSONAL GROOMING: TOTAL
STANDING UP FROM CHAIR USING ARMS: TOTAL
DRESSING REGULAR LOWER BODY CLOTHING: TOTAL
STANDING UP FROM CHAIR USING ARMS: TOTAL
MOVING FROM LYING ON BACK TO SITTING ON SIDE OF FLAT BED WITH BEDRAILS: A LOT
WALKING IN HOSPITAL ROOM: TOTAL
DRESSING REGULAR UPPER BODY CLOTHING: TOTAL
EATING MEALS: TOTAL
DRESSING REGULAR UPPER BODY CLOTHING: TOTAL
STANDING UP FROM CHAIR USING ARMS: TOTAL
MOVING FROM LYING ON BACK TO SITTING ON SIDE OF FLAT BED WITH BEDRAILS: A LOT
WALKING IN HOSPITAL ROOM: TOTAL
DRESSING REGULAR UPPER BODY CLOTHING: TOTAL
MOVING TO AND FROM BED TO CHAIR: TOTAL
PERSONAL GROOMING: TOTAL
TURNING FROM BACK TO SIDE WHILE IN FLAT BAD: A LOT
HELP NEEDED FOR BATHING: TOTAL
CLIMB 3 TO 5 STEPS WITH RAILING: TOTAL
PERSONAL GROOMING: TOTAL
PERSONAL GROOMING: A LITTLE
HELP NEEDED FOR BATHING: TOTAL
DRESSING REGULAR LOWER BODY CLOTHING: TOTAL
WALKING IN HOSPITAL ROOM: TOTAL
DAILY ACTIVITIY SCORE: 6
DRESSING REGULAR LOWER BODY CLOTHING: TOTAL
EATING MEALS: TOTAL
EATING MEALS: A LITTLE
MOBILITY SCORE: 8
DRESSING REGULAR LOWER BODY CLOTHING: TOTAL
DAILY ACTIVITIY SCORE: 6
EATING MEALS: TOTAL
TURNING FROM BACK TO SIDE WHILE IN FLAT BAD: A LOT
MOBILITY SCORE: 8
MOVING TO AND FROM BED TO CHAIR: TOTAL
DRESSING REGULAR UPPER BODY CLOTHING: TOTAL
MOBILITY SCORE: 8
STANDING UP FROM CHAIR USING ARMS: TOTAL
TOILETING: TOTAL
CLIMB 3 TO 5 STEPS WITH RAILING: TOTAL
WALKING IN HOSPITAL ROOM: TOTAL
DAILY ACTIVITIY SCORE: 6
HELP NEEDED FOR BATHING: TOTAL
MOVING FROM LYING ON BACK TO SITTING ON SIDE OF FLAT BED WITH BEDRAILS: A LOT
HELP NEEDED FOR BATHING: TOTAL
DRESSING REGULAR UPPER BODY CLOTHING: A LOT
TURNING FROM BACK TO SIDE WHILE IN FLAT BAD: A LOT
CLIMB 3 TO 5 STEPS WITH RAILING: TOTAL
MOVING FROM LYING ON BACK TO SITTING ON SIDE OF FLAT BED WITH BEDRAILS: A LOT
WALKING IN HOSPITAL ROOM: TOTAL
DAILY ACTIVITIY SCORE: 12
PERSONAL GROOMING: TOTAL
TURNING FROM BACK TO SIDE WHILE IN FLAT BAD: A LOT
WALKING IN HOSPITAL ROOM: TOTAL
HELP NEEDED FOR BATHING: TOTAL
TOILETING: TOTAL
EATING MEALS: TOTAL
MOVING FROM LYING ON BACK TO SITTING ON SIDE OF FLAT BED WITH BEDRAILS: A LOT
TOILETING: TOTAL
DRESSING REGULAR UPPER BODY CLOTHING: TOTAL
TOILETING: TOTAL
DRESSING REGULAR LOWER BODY CLOTHING: TOTAL
MOVING TO AND FROM BED TO CHAIR: TOTAL
DRESSING REGULAR LOWER BODY CLOTHING: TOTAL
CLIMB 3 TO 5 STEPS WITH RAILING: TOTAL
MOVING TO AND FROM BED TO CHAIR: TOTAL
MOBILITY SCORE: 8
MOVING TO AND FROM BED TO CHAIR: TOTAL
HELP NEEDED FOR BATHING: TOTAL
EATING MEALS: TOTAL
PERSONAL GROOMING: TOTAL
DAILY ACTIVITIY SCORE: 6
TOILETING: TOTAL
EATING MEALS: TOTAL
CLIMB 3 TO 5 STEPS WITH RAILING: TOTAL
STANDING UP FROM CHAIR USING ARMS: TOTAL
HELP NEEDED FOR BATHING: TOTAL
PERSONAL GROOMING: TOTAL
PERSONAL GROOMING: TOTAL
TURNING FROM BACK TO SIDE WHILE IN FLAT BAD: A LOT
HELP NEEDED FOR BATHING: A LOT
EATING MEALS: TOTAL
MOVING TO AND FROM BED TO CHAIR: TOTAL
TOILETING: TOTAL
TURNING FROM BACK TO SIDE WHILE IN FLAT BAD: A LOT
DRESSING REGULAR LOWER BODY CLOTHING: TOTAL
STANDING UP FROM CHAIR USING ARMS: TOTAL
DRESSING REGULAR UPPER BODY CLOTHING: TOTAL
DRESSING REGULAR UPPER BODY CLOTHING: TOTAL
CLIMB 3 TO 5 STEPS WITH RAILING: TOTAL
TOILETING: TOTAL
DAILY ACTIVITIY SCORE: 6
DAILY ACTIVITIY SCORE: 6
MOVING TO AND FROM BED TO CHAIR: TOTAL

## 2024-09-16 ASSESSMENT — PAIN SCALES - GENERAL
PAINLEVEL_OUTOF10: 0 - NO PAIN

## 2024-09-16 ASSESSMENT — ENCOUNTER SYMPTOMS
WOUND: 0
UNEXPECTED WEIGHT CHANGE: 1
APPETITE CHANGE: 0
DIZZINESS: 0
SHORTNESS OF BREATH: 1
PALPITATIONS: 0
NERVOUS/ANXIOUS: 1
BACK PAIN: 1
EYE REDNESS: 0
CONSTIPATION: 0
HEMATURIA: 0
ARTHRALGIAS: 1
SEIZURES: 0
CHEST TIGHTNESS: 1
CHILLS: 0
SINUS PRESSURE: 0
EYE PAIN: 0
FATIGUE: 1
SINUS PAIN: 0
CONFUSION: 1
VOMITING: 0
WHEEZING: 1
DIARRHEA: 0
SORE THROAT: 0
DYSPHORIC MOOD: 1
FREQUENCY: 0
NAUSEA: 0
FEVER: 0
HEADACHES: 0
COUGH: 1
LIGHT-HEADEDNESS: 0
EYE ITCHING: 0
DYSURIA: 0
NECK PAIN: 1
RHINORRHEA: 0
MYALGIAS: 0
ABDOMINAL PAIN: 0

## 2024-09-16 ASSESSMENT — PAIN - FUNCTIONAL ASSESSMENT
PAIN_FUNCTIONAL_ASSESSMENT: 0-10

## 2024-09-16 ASSESSMENT — ACTIVITIES OF DAILY LIVING (ADL): HOME_MANAGEMENT_TIME_ENTRY: 18

## 2024-09-16 NOTE — PROGRESS NOTES
"  INFECTIOUS DISEASE DAILY PROGRESS NOTE    SUBJECTIVE:    No overnight issues. WBC is normal now. No fevers. Off NIPPV and Airvo. Still with fairly high O2 needs though on 13-14L HFNC.    OBJECTIVE:  VITALS (Last 24 Hours)  /71 (BP Location: Left arm, Patient Position: Lying)   Pulse 88   Temp 36.8 °C (98.2 °F) (Oral)   Resp 22   Ht 1.803 m (5' 10.98\")   Wt 127 kg (279 lb 15.8 oz)   SpO2 92%   BMI 39.07 kg/m²     PHYSICAL EXAM:  Gen - on 13-14L HFNC  Lungs - chest tube since removed  Abd - soft, no ttp, BS present   - Live present  Skin - no rash    ABX: IV Zosyn and PO Levofloxacin    LABS:  Lab Results   Component Value Date    WBC 10.0 09/16/2024    HGB 17.2 09/16/2024    HCT 54.8 (H) 09/16/2024     (H) 09/16/2024     09/16/2024     Lab Results   Component Value Date    GLUCOSE 131 (H) 09/16/2024    CALCIUM 7.8 (L) 09/16/2024     09/16/2024    K 3.6 09/16/2024    CO2 40 (HH) 09/16/2024    CL 98 09/16/2024    BUN 17 09/16/2024    CREATININE 0.48 (L) 09/16/2024     Results from last 72 hours   Lab Units 09/16/24  0647   ALBUMIN g/dL 2.5*     Estimated Creatinine Clearance: 125 mL/min (A) (by C-G formula based on SCr of 0.48 mg/dL (L)).    IMAGING:  CXR 9/13  Impression:     Improved aeration of the right lung base. Residual hazy right basilar  opacities likely a combination of residual pleural effusion and  basilar atelectasis.    Limited evaluation of the left lung base due to underpenetration.     ASSESSMENT/PLAN:     Acute Hypoxic/Hypercapnic Respiratory Failure - resolving, on HFNC now  Hypotension - resolved  PNA due to Stenotrophomonas maltophilia. Resolving  Right Loculated Pleural Effusion - s/p chest tube 9/4, 71K WBCs neutrophilic with pH 7.9. Glucose <10 and exudate by Light's criteria. Consistent with an empyema. No growth. Resolving and chest tube out.     IV Zosyn and PO Levofloxacin. Overall seems better. No fever and WBC is normalized. Still on a lot of O2 " requirements although not Airvo or NIPPV.    Will keep abx for now pending some more improvement in hypoxia but might be done soon.    Monitoring for adverse effects of abx such as rash/itching/diarrhea - none at this time.    Will follow. Thanks!     Elijah Busby MD  [facility]  Office #[phone]

## 2024-09-16 NOTE — PROGRESS NOTES
09/16/24 1606   Discharge Planning   Expected Discharge Disposition SNF     SW met with patient and family at bedside. SW and patient discussed HPOA and Living Will, patient signed both. Patient identified sister, Alia as HPOA and friend Lowell Bri as 1st alternate. Paperwork placed in chart and family provided with copies.

## 2024-09-16 NOTE — PROGRESS NOTES
Alo Glass is a 69 y.o. male on day 12 of admission presenting with Acute respiratory failure with hypoxia and hypercapnia (Multi).  with h/o CHF, aortic valve stenosis, DLP, T2DM, COPD, MELISSA, who was brought in by EMS after being found unresponsiveness at home. Work up revealed respiratory failure with hypoxia and hypercapnia, also hypotensive. Received IVF, antibiotics, but given persistent hypotension started on Levophed and and admitted to ICU. In the ICU found to have coffee ground emesis, subsequently was intubated. CT chest showed R loculated pleural effusion and multiple nodules. Sputum culture +ve for stenotrophomonas. Patient overall improved and was subsequently extubated. Post extubation initially had high oxygen requirements, but now is improving. Pulmonary is consulted to assist with his care.   Subjective   No acute overnight events. O2 requirements improved to 6L    Overall is feeling better today. SOB improved. Still has a cough, denies sputum production, wheezing or any pains.   Objective   Scheduled medications  aspirin, 81 mg, oral, Daily  enoxaparin, 40 mg, subcutaneous, q12h SHARRON  ezetimibe, 10 mg, oral, Daily  folic acid, 1 mg, oral, Daily  guaiFENesin, 200 mg, oral, TID  insulin lispro, 0-10 Units, subcutaneous, q4h  ipratropium-albuteroL, 3 mL, nebulization, 4x daily  levoFLOXacin, 750 mg, oral, q24h SHARRON  levothyroxine, 125 mcg, oral, Daily  multivitamin with minerals, 1 tablet, oral, Daily  nystatin, 5 mL, Swish & Swallow, 4x daily  pantoprazole, 40 mg, intravenous, Daily  piperacillin-tazobactam, 3.375 g, intravenous, q6h  polyethylene glycol, 17 g, nasogastric tube, Daily  predniSONE, 20 mg, oral, Daily  sennosides-docusate sodium, 2 tablet, oral, BID  thiamine, 100 mg, oral, Daily     Continuous medications    PRN medications  PRN medications: dextrose, dextrose, glucagon, glucagon, ipratropium-albuteroL, oxygen, oxygen   Physical Exam  Constitutional:       General: He is not in  "acute distress.     Appearance: He is obese. He is ill-appearing. He is not toxic-appearing.   HENT:      Head: Normocephalic and atraumatic.      Nose:      Comments: On 6L NC     Mouth/Throat:      Mouth: Mucous membranes are moist.      Comments: Mallampati 2.   Eyes:      General: No scleral icterus.     Extraocular Movements: Extraocular movements intact.      Pupils: Pupils are equal, round, and reactive to light.   Cardiovascular:      Rate and Rhythm: Normal rate and regular rhythm.      Heart sounds: No murmur heard.     No friction rub. No gallop.   Pulmonary:      Effort: Pulmonary effort is normal. No respiratory distress.      Breath sounds: No wheezing or rales.      Comments: Clear lung fields b/l with fair air entry.  Abdominal:      General: Bowel sounds are normal. There is no distension.      Palpations: Abdomen is soft.      Tenderness: There is no abdominal tenderness.   Musculoskeletal:      Cervical back: Normal range of motion and neck supple. No rigidity or tenderness.      Right lower leg: Edema (2+) present.      Left lower leg: Edema (1+) present.   Lymphadenopathy:      Cervical: No cervical adenopathy.   Skin:     General: Skin is warm and dry.      Coloration: Skin is not jaundiced.      Comments: Chronic discoloration b/l LE edema.     Neurological:      General: No focal deficit present.      Mental Status: He is alert and oriented to person, place, and time.      Cranial Nerves: No cranial nerve deficit.      Motor: Weakness (generalized weakness now is improved.) present.   Psychiatric:         Mood and Affect: Mood normal.         Behavior: Behavior normal.     Last Recorded Vitals  Blood pressure 133/67, pulse 88, temperature 36.8 °C (98.3 °F), temperature source Oral, resp. rate 20, height 1.803 m (5' 10.98\"), weight 127 kg (279 lb 15.8 oz), SpO2 (!) 86%.  Intake/Output last 3 Shifts:  I/O last 3 completed shifts:  In: 860 (6.8 mL/kg) [P.O.:860]  Out: 2310 (18.2 mL/kg) " [Urine:2310 (0.5 mL/kg/hr)]  Weight: 127 kg   Relevant Results  Latest labs and imaging reviewed.     Assessment/Plan   Assessment & Plan  Acute respiratory failure with hypoxia and hypercapnia (Multi)    69 YOM with h/o CHF, aortic valve stenosis, DLP, T2DM, COPD, MELISSA, who was brought in by EMS after being found unresponsiveness at home. Work up revealed respiratory failure with hypoxia and hypercapnia, also hypotensive. Received IVF, antibiotics, but given persistent hypotension started on Levophed and and admitted to ICU. In the ICU found to have coffee ground emesis, subsequently was intubated. CT chest showed R loculated pleural effusion and multiple nodules. Sputum culture +ve for stenotrophomonas. Patient overall improved and was subsequently extubated. Post extubation initially had high oxygen requirements, but now is improving. Pulmonary is consulted to assist with his care.  .     Respiratory failure: acute with hypoxia and hypercarbia, requiring MV, now continues to improve.       Continue supplemental O2, wean off as tolerates       Home O2 evaluation before DC       BPH with IS, Acapella       Management of the individual causes as below     COPD: PFT in 2019 showed 54%, FEV1 32-->46%, RV/TLC 63%, TLC 85%, and DLCO 77%, overall associated with stage III COPD. On triple therapy as outpatient        Continue Prednisone taper       Continue Duo-neb       FU with pulmonary after DC. Patient needs to make an appointment with a new pulmonologist as currently does not have a pulmonary provider.        Resume Trelegy and albuterol on DC     MELISSA:        Continue BiPAP at nights       Resume APAP 10-15 at nights on DC     Pleural effusion: large R sided, likely parapneumonic, S/p chest tube placement. Analysis showed neutrophilic predominant effusion, cultures negative. CT is now removed.       Will monitor for now     Pneumonia: RML,         Continue Levaquin.      DVT prophylaxis: Lovenox     Pulmonary will FU  while in house.     Marta Thurston MD

## 2024-09-16 NOTE — ASSESSMENT & PLAN NOTE
67-year-old gentleman with history of chronic diastolic heart failure, COPD, obstructive sleep apnea, obesity, type 2 diabetes mellitus, hypertension, hypothyroidism and hyperlipidemia was admitted with acute respiratory failure with hypoxia and hypercapnia and noted to have a right loculated pleural effusion and bilateral lower lobe lung infiltrate and right hydropneumothorax.  He was hypotensive on admission and required 3 L of fluid to resuscitate and admitted to intensive care unit and was intubated because of acute respiratory failure and remained intubated for several days.  Currently he is extubated and on high flow oxygen as mentioned above.  He had significant leukocytosis of 22,000 on admission and his sputum culture was positive for stenotrophomonas.  Patient is being continued on IV Zosyn for next few days.     1.  Acute hypoxic hypercapnic respiratory failure with hypotension and right hydropneumothorax and bilateral infiltrate and positive for pneumonia.  Initially intubated for several days and since then he has been extubated and is on high flow Airvo 50% / 15 L oxygen.  Improving and currently is on 6 L nasal cannula.  Continued on IV Zosyn for another 5 days  Leukocytosis is improving    2.  Patient had hydropneumothorax on admission and had chest tube, chest tube has been removed since then..    3.  Hypotension has resolved    4.  COPD and obstructive sleep apnea aerosol treatment and currently on Airvo with 50% oxygen/15 L    Continue aerosol treatment and currently patient on prednisone 20 mg daily.    5.  Diabetes mellitus type 2, continue sliding scale insulin, patient was on Mounjaro at home prior to admission.    6.  Patient would like to be DNR/DNI.    Reviewed all labs patient's medical record and imaging studies and discussed the plan of treatment with the patient and his family members were present at the time of examination in the room.

## 2024-09-16 NOTE — PROGRESS NOTES
"Alo Glass is a 69 y.o. male on day 12 of admission presenting with Acute respiratory failure with hypoxia and hypercapnia (Multi).    Subjective   Patient seen and examined, he stated that he is still moderately short of breath, he is currently on high flow oxygen 15 L with 50% and is still saturating around 86% therefore oxygen will be adjusted accordingly and pulmonary is on the board.  He is to continue with IV antibiotics for few more days as well as continue with aerosol treatment every 4 hours and as needed and on steroids.  His blood sugars are stable and is on sliding scale insulin.  Other comorbid conditions include obstructive sleep apnea chronic respiratory failure, COPD, hypertension and obesity.  Patient is DNR/DNI.       Objective     Physical Exam  GENERAL:  Alert, moderate distress, cooperative, on 15L/50L high flow.  SKIN:  Skin color, texture, turgor normal. No rashes or lesions.  OROPHARYNX:  Lips, mucosa, and tongue are normal.Teeth and gums, normal. Oropharynx normal.  NECK:  No jugulovenous distention, No carotid bruits, Carotid pulse normal contour,   LUNGS: Limited examination because of patient's body habitus and difficulty examining, however he has generalized diminished air exchange anteriorly with decreased breath sounds right mid and lower lung fields with intermittent wheezing.  Right chest tube has been removed.  CARDIAC: Currently in sinus rhythm, normal S1 and S2; no rubs, murmurs, or gallops  ABDOMEN:  Abdomen soft, large and, non-tender, BS normal, No masses or organomegaly  EXTREMETIES:  Extremities normal, no deformities, 2+ edema, no clubbing or skin discoloration. Good capillary refill., No ulcers  NEURO:  Alert, oriented X 3,. Non-focal.  limited exam.       Last Recorded Vitals  Blood pressure 133/67, pulse 88, temperature 36.8 °C (98.3 °F), temperature source Oral, resp. rate 20, height 1.803 m (5' 10.98\"), weight 127 kg (279 lb 15.8 oz), SpO2 (!) 86%.  Intake/Output " last 3 Shifts:  I/O last 3 completed shifts:  In: 860 (6.8 mL/kg) [P.O.:860]  Out: 2310 (18.2 mL/kg) [Urine:2310 (0.5 mL/kg/hr)]  Weight: 127 kg     Relevant Results    Scheduled medications  aspirin, 81 mg, oral, Daily  enoxaparin, 40 mg, subcutaneous, q12h SHARRON  ezetimibe, 10 mg, oral, Daily  folic acid, 1 mg, oral, Daily  guaiFENesin, 200 mg, oral, TID  insulin lispro, 0-10 Units, subcutaneous, q4h  ipratropium-albuteroL, 3 mL, nebulization, 4x daily  levoFLOXacin, 750 mg, oral, q24h SHARRON  levothyroxine, 125 mcg, oral, Daily  multivitamin with minerals, 1 tablet, oral, Daily  nystatin, 5 mL, Swish & Swallow, 4x daily  pantoprazole, 40 mg, intravenous, Daily  piperacillin-tazobactam, 3.375 g, intravenous, q6h  polyethylene glycol, 17 g, nasogastric tube, Daily  predniSONE, 20 mg, oral, Daily  sennosides-docusate sodium, 2 tablet, oral, BID  thiamine, 100 mg, oral, Daily      Continuous medications     PRN medications  PRN medications: dextrose, dextrose, glucagon, glucagon, ipratropium-albuteroL, oxygen, oxygen        This patient has a urinary catheter   Reason for the urinary catheter remaining today? urinary retention/bladder outlet obstruction, acute or chronic  Results for orders placed or performed during the hospital encounter of 09/04/24 (from the past 96 hour(s))   POCT GLUCOSE   Result Value Ref Range    POCT Glucose 214 (H) 74 - 99 mg/dL   POCT GLUCOSE   Result Value Ref Range    POCT Glucose 147 (H) 74 - 99 mg/dL   POCT GLUCOSE   Result Value Ref Range    POCT Glucose 150 (H) 74 - 99 mg/dL   Electrocardiogram, 12-lead PRN ACS symptoms   Result Value Ref Range    Ventricular Rate 63 BPM    Atrial Rate 63 BPM    ME Interval 250 ms    QRS Duration 156 ms    QT Interval 438 ms    QTC Calculation(Bazett) 448 ms    P Axis -13 degrees    R Axis -74 degrees    T Axis 38 degrees    QRS Count 11 beats    Q Onset 196 ms    P Onset 71 ms    P Offset 150 ms    T Offset 415 ms    QTC Fredericia 445 ms   POCT GLUCOSE    Result Value Ref Range    POCT Glucose 161 (H) 74 - 99 mg/dL   Renal Function Panel   Result Value Ref Range    Glucose 172 (H) 74 - 99 mg/dL    Sodium 142 136 - 145 mmol/L    Potassium 4.4 3.5 - 5.3 mmol/L    Chloride 99 98 - 107 mmol/L    Bicarbonate 37 (H) 21 - 32 mmol/L    Anion Gap 10 10 - 20 mmol/L    Urea Nitrogen 41 (H) 6 - 23 mg/dL    Creatinine 0.87 0.50 - 1.30 mg/dL    eGFR >90 >60 mL/min/1.73m*2    Calcium 8.1 (L) 8.6 - 10.3 mg/dL    Phosphorus 2.8 2.5 - 4.9 mg/dL    Albumin 2.3 (L) 3.4 - 5.0 g/dL   CBC   Result Value Ref Range    WBC 11.4 (H) 4.4 - 11.3 x10*3/uL    nRBC 0.0 0.0 - 0.0 /100 WBCs    RBC 5.59 4.50 - 5.90 x10*6/uL    Hemoglobin 18.2 (H) 13.5 - 17.5 g/dL    Hematocrit 57.6 (H) 41.0 - 52.0 %     (H) 80 - 100 fL    MCH 32.6 26.0 - 34.0 pg    MCHC 31.6 (L) 32.0 - 36.0 g/dL    RDW 13.4 11.5 - 14.5 %    Platelets 195 150 - 450 x10*3/uL   Electrocardiogram, 12-lead PRN ACS symptoms   Result Value Ref Range    Ventricular Rate 87 BPM    Atrial Rate 87 BPM    AL Interval 228 ms    QRS Duration 158 ms    QT Interval 416 ms    QTC Calculation(Bazett) 500 ms    P Axis -23 degrees    R Axis -81 degrees    T Axis 51 degrees    QRS Count 14 beats    Q Onset 223 ms    P Onset 109 ms    P Offset 186 ms    T Offset 431 ms    QTC Fredericia 470 ms   POCT GLUCOSE   Result Value Ref Range    POCT Glucose 169 (H) 74 - 99 mg/dL   POCT GLUCOSE   Result Value Ref Range    POCT Glucose 195 (H) 74 - 99 mg/dL   POCT GLUCOSE   Result Value Ref Range    POCT Glucose 247 (H) 74 - 99 mg/dL   POCT GLUCOSE   Result Value Ref Range    POCT Glucose 233 (H) 74 - 99 mg/dL   POCT GLUCOSE   Result Value Ref Range    POCT Glucose 135 (H) 74 - 99 mg/dL   POCT GLUCOSE   Result Value Ref Range    POCT Glucose 152 (H) 74 - 99 mg/dL   POCT GLUCOSE   Result Value Ref Range    POCT Glucose 139 (H) 74 - 99 mg/dL   POCT GLUCOSE   Result Value Ref Range    POCT Glucose 217 (H) 74 - 99 mg/dL   POCT GLUCOSE   Result Value Ref Range     POCT Glucose 268 (H) 74 - 99 mg/dL   Magnesium   Result Value Ref Range    Magnesium 2.20 1.60 - 2.40 mg/dL   POCT GLUCOSE   Result Value Ref Range    POCT Glucose 159 (H) 74 - 99 mg/dL   POCT GLUCOSE   Result Value Ref Range    POCT Glucose 108 (H) 74 - 99 mg/dL   POCT GLUCOSE   Result Value Ref Range    POCT Glucose 130 (H) 74 - 99 mg/dL   POCT GLUCOSE   Result Value Ref Range    POCT Glucose 148 (H) 74 - 99 mg/dL   POCT GLUCOSE   Result Value Ref Range    POCT Glucose 175 (H) 74 - 99 mg/dL   POCT GLUCOSE   Result Value Ref Range    POCT Glucose 201 (H) 74 - 99 mg/dL   POCT GLUCOSE   Result Value Ref Range    POCT Glucose 140 (H) 74 - 99 mg/dL   POCT GLUCOSE   Result Value Ref Range    POCT Glucose 141 (H) 74 - 99 mg/dL   Renal Function Panel   Result Value Ref Range    Glucose 131 (H) 74 - 99 mg/dL    Sodium 141 136 - 145 mmol/L    Potassium 3.6 3.5 - 5.3 mmol/L    Chloride 98 98 - 107 mmol/L    Bicarbonate 40 (HH) 21 - 32 mmol/L    Anion Gap 7 (L) 10 - 20 mmol/L    Urea Nitrogen 17 6 - 23 mg/dL    Creatinine 0.48 (L) 0.50 - 1.30 mg/dL    eGFR >90 >60 mL/min/1.73m*2    Calcium 7.8 (L) 8.6 - 10.3 mg/dL    Phosphorus 1.7 (L) 2.5 - 4.9 mg/dL    Albumin 2.5 (L) 3.4 - 5.0 g/dL   CBC   Result Value Ref Range    WBC 10.0 4.4 - 11.3 x10*3/uL    nRBC 0.0 0.0 - 0.0 /100 WBCs    RBC 5.34 4.50 - 5.90 x10*6/uL    Hemoglobin 17.2 13.5 - 17.5 g/dL    Hematocrit 54.8 (H) 41.0 - 52.0 %     (H) 80 - 100 fL    MCH 32.2 26.0 - 34.0 pg    MCHC 31.4 (L) 32.0 - 36.0 g/dL    RDW 13.2 11.5 - 14.5 %    Platelets 191 150 - 450 x10*3/uL   POCT GLUCOSE   Result Value Ref Range    POCT Glucose 147 (H) 74 - 99 mg/dL   POCT GLUCOSE   Result Value Ref Range    POCT Glucose 159 (H) 74 - 99 mg/dL      Electrocardiogram, 12-lead PRN ACS symptoms    Result Date: 9/16/2024  Sinus rhythm with 1st degree AV block with Premature atrial complexes Right bundle branch block Left anterior fascicular block  Bifascicular block  Anteroseptal infarct  (cited on or before 23-APR-2024) Abnormal ECG When compared with ECG of 13-SEP-2024 02:49, (unconfirmed) Premature ventricular complexes are no longer Present QT has lengthened    ECG 12 lead    Result Date: 9/15/2024  Sinus rhythm with 1st degree AV block Left axis deviation Right bundle branch block Inferior infarct (cited on or before 04-SEP-2024) Abnormal ECG When compared with ECG of 04-SEP-2024 16:24, Premature ventricular complexes are no longer Present Right bundle branch block is now Present Confirmed by Dimitri Murillo ([id number]) on 9/15/2024 2:23:29 PM    XR chest 1 view    Result Date: 9/13/2024  Interpreted By:  Tania Knowles, STUDY: XR CHEST 1 VIEW;  9/13/2024 4:11 pm   INDICATION: Signs/Symptoms:post chest tube removal.   COMPARISON: 09/13/2024   ACCESSION NUMBER(S): [id number]   ORDERING CLINICIAN: RODOLFO LOPEZ   FINDINGS: The study is limited due to patient's body habitus and poor inspiration previously seen right-sided chest tube has been removed. No gross pneumothorax is noted.   Blunting of both costophrenic angles is seen, which most likely is related to pleural effusions. The heart is enlarged. Bibasilar streaky densities are seen.       No gross pneumothorax.   Bilateral pleural effusions.   Cardiomegaly.   Bibasilar streaky densities, which may be related to atelectasis or infiltrates.       MACRO: None   Signed by: Tania Knowles 9/13/2024 4:40 PM Dictation workstation:   [id number]    Electrocardiogram, 12-lead PRN ACS symptoms    Result Date: 9/13/2024  Sinus rhythm with 1st degree AV block with occasional Premature ventricular complexes and Premature atrial complexes Left axis deviation Right bundle branch block Inferior infarct (cited on or before 04-SEP-2024) Anteroseptal infarct (cited on or before 23-APR-2024) Abnormal ECG When compared with ECG of 06-SEP-2024 00:20, Premature ventricular complexes are now Present Vent. rate has decreased BY  31 BPM Questionable change in initial  forces of Septal leads T wave inversion less evident in Anterolateral leads    XR chest 1 view    Result Date: 9/13/2024  Interpreted By:  Rosa Hemphill, STUDY: XR CHEST 1 VIEW;  9/13/2024 4:31 am   INDICATION: Signs/Symptoms:CT in place.   COMPARISON: Multiple prior studies, the most recent 09/12/2024   ACCESSION NUMBER(S): [id number]   ORDERING CLINICIAN: RODOLFO LOPEZ   FINDINGS:     CARDIOMEDIASTINAL SILHOUETTE: Stable enlarged cardiac silhouette.   LUNGS: Stable position of pigtail catheter overlying the right lung base. There is improved aeration of the right lung base. Mild residual hazy right basilar opacity, likely combination of small residual pleural effusion and basilar consolidation. Limited evaluation of the left lung base due to underpenetration. This is not significantly changed and left pleural effusion or basilar consolidation not excluded. No pneumothorax.   ABDOMEN: No remarkable upper abdominal findings.   BONES: No acute osseous abnormality.       Improved aeration of the right lung base. Residual hazy right basilar opacities likely a combination of residual pleural effusion and basilar atelectasis.   Limited evaluation of the left lung base due to underpenetration.   MACRO: None   Signed by: Rosa Hemphill 9/13/2024 4:48 AM Dictation workstation:   [id number]    XR chest 1 view    Result Date: 9/12/2024  Interpreted By:  Donte Shoemaker, STUDY: XR CHEST 1 VIEW; 9/12/2024 5:23 am   INDICATION: Signs/Symptoms:CT in place.   COMPARISON: None   ACCESSION NUMBER(S): [id number]   ORDERING CLINICIAN: RODOLFO LOPEZ   TECHNIQUE: 1 view of the chest was performed.   FINDINGS: Right pigtail catheter in similar location. Stable layering bilateral pleural effusion with surrounding atelectasis. Stable cardiomegaly. Bilateral shoulder joint degenerative changes.       1. No significant interval change from 09/11/2024 chest x-ray.   Signed by: Donte Shoemaker 9/12/2024 8:18 AM Dictation workstation:    [id number]    XR chest 1 view    Result Date: 9/11/2024  Interpreted By:  Klaus Villafana, STUDY: XR CHEST 1 VIEW; 9/11/2024 5:42 am   INDICATION: Signs/Symptoms:CT in place   COMPARISON: Radiographs 09/10/2024   ACCESSION NUMBER(S): [id number]   ORDERING CLINICIAN: RODOLFO LOPEZ   TECHNIQUE: Single frontal view of the chest performed.   FINDINGS: LINES AND DEVICES: Remote endotracheal tube. Stable right pigtail pleural catheter near right lung base.   LUNGS: Stable hazy opacities at the lung base and additional airspace opacities extending to right mid lung, stable to mildly increased. No pneumothorax. Left lung is clear.   CARDIOMEDIASTINAL SILHOUETTE: Stable cardiomegaly.       Stable to slightly increased right pleural effusion with atelectasis. Stable right pleural catheter.   MACRO None   Signed by: Klaus Villafana 9/11/2024 8:21 AM Dictation workstation:   [id number]    XR chest 1 view    Result Date: 9/10/2024  Interpreted By:  Ishaan Vernon, STUDY: XR CHEST 1 VIEW;  9/10/2024 5:34 am   INDICATION: Signs/Symptoms:Eval chest tube placement, Rt effusion.   COMPARISON: CT scan chest from 09/04/2024. Chest x-ray from 09/09/2024.   ACCESSION NUMBER(S): [id number]   ORDERING CLINICIAN: RICH KAPLAN   TECHNIQUE: Single AP portable view of the chest was obtained.   FINDINGS: MEDIASTINUM/ LUNGS/ ARELIS: Previous ET tube has been removed. There is an NG tube in place with distal port just beyond the GE junction and distal tip in the proximal stomach. Pigtail pleural catheter at the right base is stable. There is a small grossly stable lateral right basilar pneumothorax. There is mild bibasilar atelectasis. Cardiomegaly without gross vascular congestion. No pneumothorax on the left. No tracheal deviation. No abnormal hilar fullness or gross mass on either side.   BONES: No lytic or blastic destructive bone lesion.   UPPER ABDOMEN: Grossly intact.       Cardiomegaly.   NG tube in place. ET tube has been  removed.   Stable pleural drainage catheter at the base of the right chest. Small stable lateral right basilar pneumothorax.   Mild bibasilar atelectasis.   MACRO: None   Signed by: Ishaan Vernon 9/10/2024 8:13 AM Dictation workstation:   [id number]    XR chest 1 view    Result Date: 9/9/2024  Interpreted By:  Daisy Sosa, STUDY: XR CHEST 1 VIEW;  9/9/2024 4:39 pm   INDICATION: Signs/Symptoms:s/p lytic therapy.     COMPARISON: 5:21 a.m. the same day   ACCESSION NUMBER(S): [id number]   ORDERING CLINICIAN: RODOLFO LOPEZ   FINDINGS: ETT, NG tube and right lower chest pigtail catheter remain in place. Additional presumed overlying monitoring/support devices again seen. Small right basilar pneumothorax decreased in size. Persistent bibasilar opacities. The cardiomediastinal silhouette remains enlarged.       Decreased size of right basilar pneumothorax since 5:21 a.m. the same day with pigtail catheter in place.   MACRO: None.   Signed by: Daisy Sosa 9/9/2024 4:44 PM Dictation workstation:   [id number]    XR chest 1 view    Result Date: 9/9/2024  Interpreted By:  Klaus Villafana, STUDY: XR CHEST 1 VIEW; 9/9/2024 5:32 am   INDICATION: Signs/Symptoms:Eval chest tube placement, Rt effusion   COMPARISON: Radiographs 09/08/2024   ACCESSION NUMBER(S): [id number]   ORDERING CLINICIAN: RICH KAPLAN   TECHNIQUE: Single frontal view of the chest performed.   FINDINGS: LINES AND DEVICES: The pigtail of right-sided pleural drainage catheter overlies the right infrahilar region, grossly unchanged. Tip of ETT approximately 6.6 cm above the kathie, previously 7.7 cm. NG tube courses below the diaphragm, limited assessment.   LUNGS: Moderate-size right-sided hydropneumothorax appears grossly unchanged. Stable to slightly increased interstitial opacities on the left. Question new trace left pleural effusion with atelectasis.   CARDIOMEDIASTINAL SILHOUETTE: Stable cardiomegaly.       Right-sided infrahilar pigtail  catheter and moderate hydropneumothorax, grossly unchanged.   Suspected worsening CHF with stable to mildly increased interstitial edema and possible new trace left pleural effusion.   MACRO None   Signed by: Klaus Villafana 9/9/2024 8:17 AM Dictation workstation:   [id number]    XR chest 1 view    Result Date: 9/8/2024  Interpreted By:  Corrina Reyes, STUDY: XR CHEST 1 VIEW;  9/8/2024 6:39 am   INDICATION: Signs/Symptoms:Eval chest tube placement, Rt effusion.     COMPARISON: 09/07/2024   ACCESSION NUMBER(S): [id number]   ORDERING CLINICIAN: RICH KAPLAN   TECHNIQUE: Portable AP semi upright   FINDINGS: Endotracheal tube tip projects between 7 and 8 cm above the kathie. Nasogastric tube is no longer identified. Right pleural pigtail catheter projects at mid right hemithorax and is similar in position to the prior study. Relative volume of basilar pneumothorax and pleural fluid appears similar to the prior study. Right lower lobe and right middle lobe are collapsed. Right upper lobe variation is similar to the prior study. Left lung shows some vascular congestion. Minimal atelectasis is present at the left lung base. No change in the osseous structures is noted.       Basilar pneumothorax on the right appears similar to the prior study in the relative volume of pleural fluid and pneumothorax appears unchanged.   Collapse of the right lower lobe and right middle lobe   Pulmonary vascular congestion is most notable in the left lung   MACRO: None.   Signed by: Corrina Reyes 9/8/2024 12:32 PM Dictation workstation:   [id number]    XR chest 1 view    Result Date: 9/8/2024  Interpreted By:  Corrina Reyes, STUDY: XR CHEST 1 VIEW;  9/7/2024 5:28 pm   INDICATION: Signs/Symptoms:Eval chest tube placement, Rt pleural effusion.     COMPARISON: 09/06/2024   ACCESSION NUMBER(S): [id number]   ORDERING CLINICIAN: RICH KAPLAN   TECHNIQUE: Portable AP upright   FINDINGS: Endotracheal tube tip projects about 6 cm  above the kathie. NG tube extends below the diaphragm and beyond the image. Right pleural pigtail catheter projects at mid right hemithorax and appears unchanged in position.   Pneumothorax at the right thoracic base appears larger than on the prior study. The amount of pleural fluid appears decreased. Mild atelectasis left lung base is slightly improved. Heart is unchanged in size. No changes noted in the osseous structures.       Right pleural effusion is decreased from the prior study but the right basilar pneumothorax appears increased.   MACRO: None.   Signed by: Corrina Reyes 9/8/2024 12:30 PM Dictation workstation:   [id number]    Electrocardiogram, 12-lead PRN ACS symptoms    Result Date: 9/6/2024  Sinus rhythm with 1st degree AV block with Premature atrial complexes Left axis deviation Right bundle branch block Inferior infarct (cited on or before 04-SEP-2024) Anterior infarct , age undetermined T wave abnormality, consider lateral ischemia Abnormal ECG When compared with ECG of 05-SEP-2024 10:29, (unconfirmed) Premature atrial complexes are now Present Confirmed by Shady Bill ([id number]) on 9/6/2024 1:59:56 PM    XR chest 1 view    Result Date: 9/6/2024  Interpreted By:  Eloisa Landa, STUDY: XR CHEST 1 VIEW 9/6/2024 9:05 am   INDICATION: Signs/Symptoms:R pleural effusion   COMPARISON: 09/05/2024   ACCESSION NUMBER(S): [id number]   ORDERING CLINICIAN: PERRI DELACRUZ   TECHNIQUE: AP view of the chest at bedside in the erect position   FINDINGS: A right chest tube is once again noted within the mid right hemithorax with a stable right hydropneumothorax seen within the mid and lower right hemithorax.   The tip of the endotracheal tube remains satisfactorily positioned at a distance 3 cm above kathie with nasogastric tube descending below diaphragm.   There is small left pleural effusion and left basilar atelectasis. The heart remains enlarged.       No change in the right hydropneumothorax which may be  loculated with chest tube on the right in the mid hemithorax.   Small left pleural effusion with left basilar infiltrate/atelectasis.   Signed by: Eloisa Landa 9/6/2024 9:20 AM Dictation workstation:   [id number]    ECG 12 lead    Result Date: 9/5/2024  Sinus rhythm with 1st degree AV block with frequent Premature ventricular complexes Left axis deviation Left ventricular hypertrophy with QRS widening and repolarization abnormality ( R in aVL , Starlight product ) Inferior infarct , age undetermined Abnormal ECG When compared with ECG of 23-APR-2024 09:01, Premature ventricular complexes are now Present VT interval has increased (RBBB and left anterior fascicular block) is no longer Present Inferior infarct is now Present Confirmed by Andrea Bentley ([id number]) on 9/5/2024 10:01:05 AM    XR chest 1 view    Result Date: 9/5/2024  Interpreted By:  Saad Cronin, STUDY: XR CHEST 1 VIEW; 9/5/2024 4:57 am   INDICATION: CLINICAL INFORMATION: Signs/Symptoms:Pig tail Chest Tube replacement.   COMPARISON: 09/04/2024   ACCESSION NUMBER(S): [id number]   ORDERING CLINICIAN: ANDRY ABRAHAM   TECHNIQUE: Portable chest one view.   FINDINGS: The cardiac silhouette is quite prominent suggesting cardiomegaly. The tube and line placement is unchanged.  There is a decrease in the right-sided pleural-based density is well as the right-sided parenchymal densities. The pulmonary vasculature is slightly indistinct suggesting mild pulmonary vascular congestion.       There appears to be increased aeration at the right base suggesting diminishing pleural density within the right hemithorax in association with the chest tube. Infiltrate persists on the right.   MACRO: none   Signed by: Saad Cronin 9/5/2024 8:18 AM Dictation workstation:   [id number]    XR chest 1 view    Result Date: 9/5/2024  Interpreted By:  Cande Keating, STUDY: XR CHEST 1 VIEW;  9/4/2024 11:10 pm   INDICATION: Signs/Symptoms:Chest tube placement.     COMPARISON:  Radiographs of the chest dated 09/04/2024; CT of the chest dated 09/04/2024   ACCESSION NUMBER(S): [id number]   ORDERING CLINICIAN: ANDRY ABRAHAM   FINDINGS: AP radiograph of the chest was provided.   Endotracheal tube projects 4.4 cm superior to the kathie. Enteric tube appears to course along the midline expected course of the esophagus, although the distal tip is not well visualized due to underpenetration and overlying structures.   CARDIOMEDIASTINAL SILHOUETTE: Cardiomediastinal silhouette is enlarged, similar to prior exam.   LUNGS: Redemonstration of the large right-sided pleural effusion with associated atelectasis/consolidation, similar in appearance to prior radiographs. No new consolidation or pleural effusion is present in the left lung.   ABDOMEN: No remarkable upper abdominal findings.   BONES: No acute osseous changes.       1.  Enteric tube is coursing along the midline of the mediastinum in the expected course of the esophagus, although the distal tip is not well visualized due to overlying cardiomediastinal silhouette and underpenetration, and may be outside of the field-of-view of the study. Endotracheal tube projects 4.5 cm superior to the kathie. 2. Redemonstration of the large loculated right-sided pleural effusion with the associated atelectasis/consolidation, similar in appearance to prior exam.       MACRO: None   Signed by: Cande Keating 9/5/2024 2:08 AM Dictation workstation:   [id number]    CT chest abdomen pelvis w IV contrast    Result Date: 9/4/2024  Interpreted By:  Ishaan Banda, STUDY: CT CHEST ABDOMEN PELVIS W IV CONTRAST;  9/4/2024 5:52 pm   INDICATION: Signs/Symptoms:unresponsive.     COMPARISON: None.   ACCESSION NUMBER(S): [id number]   ORDERING CLINICIAN: GURPREET COPPOLA   TECHNIQUE: Contiguous axial images of the chest, abdomen, and pelvis were obtained after the intravenous administration of iodinated contrast. Coronal and sagittal reformatted images were  reconstructed from the axial data.   FINDINGS: CT CHEST:   MEDIASTINUM AND LYMPH NODES: NG/OG tube noted with small amount of fluid in the esophagus. The esophageal wall appears within normal limits.  No enlarged intrathoracic or axillary lymph nodes by imaging criteria. No pneumomediastinum.   VESSELS:  Normal caliber thoracic aorta without dissection. Mild aortic atherosclerosis.   HEART: Enlarged. Severe aortic valvular calcifications. Moderate coronary artery calcifications. No significant pericardial effusion.   LUNG, AIRWAYS, PLEURA: There is a large loculated right pleural effusion causing compressive atelectasis on the right lung. As a result, there is complete collapse of the right lower lobe, complete collapse of the right middle lobe, and partial collapse of the right upper lobe. There are multiple loculated compartments largest of which is seen at the right lung base. There is a small amount of mucus in the mid to distal trachea. There is small amount of layering debris in the mainstem bronchi. The majority of the right middle and lower lobe bronchi are collapsed and opacified with low-density debris. There is a trace left pleural effusion with subsegmental left basilar atelectasis. There is emphysema. There are new scattered subcentimeter bilateral centrilobular nodules that are likely infectious/inflammatory in nature from bronchiolitis.   CHEST WALL SOFT TISSUES: No discernible acute abnormality. There is a 5.5 cm x 1.7 cm lipoma in the right infraspinatus muscle.   OSSEOUS STRUCTURES: No acute osseous abnormality.     CT ABDOMEN/PELVIS:   ABDOMINAL WALL: No significant abnormality.   LIVER: No significant parenchymal abnormality.   BILE DUCTS: No significant intrahepatic or extrahepatic dilatation.   GALLBLADDER: No significant abnormality.   PANCREAS: No significant abnormality.   SPLEEN: No significant abnormality.   ADRENALS: There is a 1.7 cm low-density left adrenal nodule likely representing a  adenoma.   KIDNEYS, URETERS, BLADDER: There is a heterogeneously enhancing solid mass in the upper pole the left kidney measuring 2.4 cm x 1.9 cm consistent with renal cell carcinoma. Additionally, a 0.8 cm likely enhancing lesion in the upper pole the right kidney is concerning for renal cell carcinoma. No hydronephrosis or calculi. Urinary bladder is decompressed with Live catheter in place.   REPRODUCTIVE ORGANS: No significant abnormality.   VESSELS: Moderate aortic atherosclerosis without AAA.   RETROPERITONEUM/LYMPH NODES: No acute retroperitoneal abnormality. No enlarged lymph nodes.   BOWEL/MESENTERY/PERITONEUM: Colonic diverticulosis without acute diverticulitis. No inflammatory bowel wall thickening or dilatation. Normal appendix.   No ascites, free air, or fluid collection.     MUSCULOSKELETAL: No acute osseous abnormality.       CT CHEST: Large loculated right pleural effusion causing complete compressive collapse of the right middle lobe and right lower lobe and partial compressive collapse of the right upper lobe. The largest likely compartments located at the inferior right hemithorax. The cause of the effusion is not apparent as there is only a small amount of debris in the distal trachea and right mainstem bronchus but the distal-most right-sided bronchi are occluded due to combination of compressive collapse and small low-density fluid.   Numerous new subcentimeter centrilobular pulmonary nodules that are most likely infectious/inflammatory in as can be seen with bronchiolitis or fungal infection.   Aortic valve calcification to the extent that would likely result in aortic stenosis.   CT ABDOMEN/PELVIS: Left kidney solid heterogeneously enhancing mass suspicious for renal cell carcinoma (2.4 cm x 1.9 cm) and of suspected 0.8 cm right upper pole renal cell carcinoma measuring 0.8 cm. Recommend urological follow-up/also taken and dedicated MRI kidneys to further evaluate these lesions. YELLOW  ALERT: An incidental finding alert was sent per protocol.   No acute abnormality in the abdomen or pelvis.   Colonic diverticulosis without acute diverticulitis.   MACRO: Critical Finding:  New mass in the kidney. Notification was initiated on 9/4/2024 at 6:25 pm by  Ishaan Banda.  (**-[facility]-**) Instructions:  MR Abdomen w and wo IV contrast. 1 week.   Signed by: Ishaan Banda 9/4/2024 6:26 PM Dictation workstation:   [id number]    CT head wo IV contrast    Result Date: 9/4/2024  Interpreted By:  Ishaan Banda, STUDY: CT HEAD WO IV CONTRAST;  9/4/2024 5:52 pm   INDICATION: Signs/Symptoms:unrespoonsive.     COMPARISON: None.   ACCESSION NUMBER(S): [id number]   ORDERING CLINICIAN: GURPREET COPPOLA   TECHNIQUE: Noncontrast axial CT images of head were obtained with coronal and sagittal reconstructed images.   FINDINGS: BRAIN PARENCHYMA: Mild periventricular and subcortical hemispheric white matter hypodensities are most compatible with chronic small vessel ischemic disease. No acute intraparenchymal hemorrhage or parenchymal evidence of acute large territory ischemic infarct. Gray-white matter distinction is preserved. No mass-effect.   VENTRICLES and EXTRA-AXIAL SPACES:  No acute extra-axial or intraventricular hemorrhage. No effacement of cerebral sulci. The ventricles and sulci are age-concordant.   PARANASAL SINUSES/MASTOIDS:  No hemorrhage or air-fluid levels within the visualized paranasal sinuses. The mastoids are well aerated.   CALVARIUM/ORBITS:  No skull fracture.  The orbits and globes are intact to the extent visualized.   EXTRACRANIAL SOFT TISSUES: No discernible abnormality.       No acute intracranial abnormality.     MACRO: None.   Signed by: Ishaan Banda 9/4/2024 6:07 PM Dictation workstation:   [id number]    XR chest 1 view    Result Date: 9/4/2024  Interpreted By:  Rosalie Ellison, STUDY: XR CHEST 1 VIEW;  9/4/2024 4:33 pm   INDICATION: Signs/Symptoms:intubation.   COMPARISON:  04/08/2024   ACCESSION NUMBER(S): [id number]   ORDERING CLINICIAN: GURPREET COPPOLA   TECHNIQUE: A single portable image of the chest was obtained.   FINDINGS: Resolution is limited. The right portion of the chest is not entirely included.   The patient is rotated. The heart size is uncertain.   There appears to be elevated right hemidiaphragm and right-sided infiltration.   An endotracheal tube terminates above the kathie. A nasogastric tube terminates below the diaphragm.   COMPARISON OF FINDING: The nasogastric tube was placed in the interval. The endotracheal tube was placed in the interval.       Interval placement of an endotracheal tube. The tip is above the kathie. Interval placement of a nasogastric tube. It appears to terminate below the diaphragm.   The exam is extremely limited.   MACRO: none   Signed by: Rosalie Ellison 9/4/2024 4:43 PM Dictation workstation:   [id number]             Assessment/Plan   Assessment & Plan  Acute respiratory failure with hypoxia and hypercapnia (Multi)  67-year-old gentleman with history of chronic diastolic heart failure, COPD, obstructive sleep apnea, obesity, type 2 diabetes mellitus, hypertension, hypothyroidism and hyperlipidemia was admitted with acute respiratory failure with hypoxia and hypercapnia and noted to have a right loculated pleural effusion and bilateral lower lobe lung infiltrate and right hydropneumothorax.  He was hypotensive on admission and required 3 L of fluid to resuscitate and admitted to intensive care unit and was intubated because of acute respiratory failure and remained intubated for several days.  Currently he is extubated and on high flow oxygen as mentioned above.  He had significant leukocytosis of 22,000 on admission and his sputum culture was positive for stenotrophomonas.  Patient is being continued on IV Zosyn for next few days.     1.  Acute hypoxic hypercapnic respiratory failure with hypotension and right hydropneumothorax and  bilateral infiltrate and positive for pneumonia.  Initially intubated for several days and since then he has been extubated and is on high flow Airvo 50% / 15 L oxygen.  Improving and currently is on 6 L nasal cannula.  Continued on IV Zosyn for another 5 days  Leukocytosis is improving    2.  Patient had hydropneumothorax on admission and had chest tube, chest tube has been removed since then..    3.  Hypotension has resolved    4.  COPD and obstructive sleep apnea aerosol treatment and currently on Airvo with 50% oxygen/15 L    Continue aerosol treatment and currently patient on prednisone 20 mg daily.    5.  Diabetes mellitus type 2, continue sliding scale insulin, patient was on Mounjaro at home prior to admission.    6.  Patient would like to be DNR/DNI.    Reviewed all labs patient's medical record and imaging studies and discussed the plan of treatment with the patient and his family members were present at the time of examination in the room.         I spent 35 minutes in the professional and overall care of this patient.      Noble Gatica MD

## 2024-09-16 NOTE — PROGRESS NOTES
Occupational Therapy    OT Treatment    Patient Name: Alo Glass  MRN: [id number]  Department: [facility] A   Room: [facility]414-A  Today's Date: 9/16/2024  Time Calculation  Start Time: 1325  Stop Time: 1351  Time Calculation (min): 26 min        Assessment:  OT Assessment: Pt displaying deficits in overall strength, ROM, activity tolerance, transfers, and functional mobility impairing ability to complete ADLs with indep. Pt requires skilled OT in order to facilitate indep with ADLs/IADLs.  HR: 101  SP02: 90-93%    Prognosis: Good  Barriers to Discharge: Decreased caregiver support  Evaluation/Treatment Tolerance: Patient tolerated treatment well, Patient limited by fatigue  Medical Staff Made Aware: Yes  End of Session Communication: Bedside nurse  End of Session Patient Position: Bed, 3 rail up, Alarm on  OT Assessment Results: Decreased ADL status, Decreased upper extremity range of motion, Decreased upper extremity strength, Decreased safe judgment during ADL, Decreased cognition, Decreased endurance, Decreased sensation, Decreased IADLs  Prognosis: Good  Barriers to Discharge: Decreased caregiver support  Evaluation/Treatment Tolerance: Patient tolerated treatment well, Patient limited by fatigue  Medical Staff Made Aware: Yes  Strengths: Ability to acquire knowledge, Coping skills  Barriers to Participation: Capable of completing ADLs semi/independent  Plan:  Treatment Interventions: ADL retraining, Functional transfer training, UE strengthening/ROM, Endurance training, Fine motor coordination activities, Compensatory technique education  OT Frequency: 4 times per week  OT Discharge Recommendations: High intensity level of continued care  Equipment Recommended upon Discharge: Other (comment) (TBD)  OT Recommended Transfer Status: Maximum assist, Assist of 2  OT - OK to Discharge: Yes  Treatment Interventions: ADL retraining, Functional transfer training, UE strengthening/ROM, Endurance training, Fine motor  "coordination activities, Compensatory technique education    Subjective   Previous Visit Info:  OT Last Visit  OT Received On: 09/16/24  General:  General  Reason for Referral: Impaired Mobility: Pt is a 69 y.o. male found non responsive at home by his neighbor and EMS was called.  Referred By: Mónica Johns MD  Past Medical History Relevant to Rehab:   Past Medical History:   Diagnosis Date    CHF (congestive heart failure) (Multi)     COPD (chronic obstructive pulmonary disease) (Multi)     Diabetes mellitus (Multi)     Hypertension     Obesity     MELISSA (obstructive sleep apnea)     Personal history of other specified conditions 01/08/2023    History of impaired glucose tolerance       Family/Caregiver Present: Yes (pt's sister and NORMAN from [place] present at start of session)  Prior to Session Communication: Bedside nurse  Patient Position Received: Bed, 3 rail up, Alarm on  Preferred Learning Style: auditory, kinesthetic, verbal, visual, written  General Comment: Pt agreeable to OT, Pt stating \"today has been a rough day\"  Precautions:  Medical Precautions: Fall precautions, Oxygen therapy device and L/min (15L high flow)    Pain:  Pain Assessment  Pain Assessment: 0-10  0-10 (Numeric) Pain Score: 0 - No pain    Objective       Coordination:  Movements are Fluid and Coordinated: No  Activities of Daily Living: Grooming  Grooming Level of Assistance: Minimum assistance  Grooming Where Assessed: Bed level  Grooming Comments: Pt requesting assist to apply tooth paste onto brush due to decreased strength, pt requires encouragement to complete; pt able to wash face in bed  Functional Standing Tolerance:     Bed Mobility/Transfers: Bed Mobility  Bed Mobility:  (Pt declining to complete this date due to fatigue and feeling SOB, SP02 90-93%)       Therapy/Activity: Therapeutic Exercise  Therapeutic Exercise Performed: Yes  Therapeutic Exercise Activity 1: Pt instructed to complete shoulder flexion/extension, wrist " flexion/extension, digit flexion/extension, shoulder protraction/retraction 1x10 reps to address deficits in strength/ROM.       RUE   RUE : Exceptions to WFL (decreased shoulder ROM, less than 90 degrees) and MAURICIOE   GABINO: Exceptions to WFL (decreased shoulder ROM, less than 90 degrees)  Outcome Measures:[facility] Daily Activity  Putting on and taking off regular lower body clothing: Total  Bathing (including washing, rinsing, drying): A lot  Putting on and taking off regular upper body clothing: A lot  Toileting, which includes using toilet, bedpan or urinal: Total  Taking care of personal grooming such as brushing teeth: A little  Eating Meals: A little  Daily Activity - Total Score: 12    Education Documentation  Body Mechanics, taught by Antonia Barber OT at 9/16/2024  2:46 PM.  Learner: Patient  Readiness: Acceptance  Method: Explanation, Demonstration  Response: Needs Reinforcement, Verbalizes Understanding, Demonstrated Understanding    ADL Training, taught by Antonia Barber OT at 9/16/2024  2:46 PM.  Learner: Patient  Readiness: Acceptance  Method: Explanation, Demonstration  Response: Needs Reinforcement, Verbalizes Understanding, Demonstrated Understanding    Education Comments  No comments found.      Goals:  Encounter Problems       Encounter Problems (Active)       ADLs       Patient will perform UB and LB bathing with modified independent level of assistance. (Progressing)       Start:  09/15/24    Expected End:  09/29/24            Patient with complete upper body dressing with modified independent level of assistance donning and doffing all UE clothes with PRN adaptive equipment. (Progressing)       Start:  09/15/24    Expected End:  09/29/24            Patient with complete lower body dressing with modified independent level of assistance donning and doffing all LE clothes  with PRN adaptive equipment. (Progressing)       Start:  09/15/24    Expected End:  09/29/24            Patient will feed self with  modified independent level of assistance. (Progressing)       Start:  09/15/24    Expected End:  09/29/24            Patient will complete daily grooming tasks brushing teeth and washing face/hair with modified independent level of assistance. (Progressing)       Start:  09/15/24    Expected End:  09/29/24            Patient will complete toileting including hygiene clothing management/hygiene with modified independent level of assistance. (Progressing)       Start:  09/15/24    Expected End:  09/29/24               COGNITION/SAFETY       Patient will demonstrated orientation x 4. (Progressing)       Start:  09/15/24    Expected End:  09/29/24       ORIENTATION            TRANSFERS       Patient will perform bed mobility modified independent level of assistance in order to improve safety and independence with mobility (Progressing)       Start:  09/15/24    Expected End:  09/29/24

## 2024-09-16 NOTE — PROGRESS NOTES
Physical Therapy                 Therapy Communication Note    Patient Name: Alo Glass  MRN: [id number]  Department: [facility] 4  Room: [address] [place] [address]  Today's Date: 9/16/2024     Discipline: Physical Therapy    Missed Visit Reason: Missed Visit Reason: Reschedule (Pt currently in room with nursing and social work, discussing legal documents for care and setting up proxies. Needing increased time for completion of session, not available for PT at this time.)    Missed Time: Attempt

## 2024-09-17 ENCOUNTER — APPOINTMENT (OUTPATIENT)
Dept: CARDIOLOGY | Facility: HOSPITAL | Age: 69
DRG: 870 | End: 2024-09-17
Payer: MEDICARE

## 2024-09-17 ENCOUNTER — APPOINTMENT (OUTPATIENT)
Dept: RADIOLOGY | Facility: HOSPITAL | Age: 69
DRG: 870 | End: 2024-09-17
Payer: MEDICARE

## 2024-09-17 LAB
ALBUMIN SERPL BCP-MCNC: 2.3 G/DL (ref 3.4–5)
ANION GAP SERPL CALC-SCNC: <7 MMOL/L (ref 10–20)
BUN SERPL-MCNC: 16 MG/DL (ref 6–23)
CALCIUM SERPL-MCNC: 7.5 MG/DL (ref 8.6–10.3)
CHLORIDE SERPL-SCNC: 99 MMOL/L (ref 98–107)
CO2 SERPL-SCNC: 42 MMOL/L (ref 21–32)
CREAT SERPL-MCNC: 0.46 MG/DL (ref 0.5–1.3)
EGFRCR SERPLBLD CKD-EPI 2021: >90 ML/MIN/1.73M*2
ERYTHROCYTE [DISTWIDTH] IN BLOOD BY AUTOMATED COUNT: 13.2 % (ref 11.5–14.5)
FUNGUS SPEC CULT: NORMAL
FUNGUS SPEC FUNGUS STN: NORMAL
GLUCOSE BLD MANUAL STRIP-MCNC: 114 MG/DL (ref 74–99)
GLUCOSE BLD MANUAL STRIP-MCNC: 136 MG/DL (ref 74–99)
GLUCOSE BLD MANUAL STRIP-MCNC: 191 MG/DL (ref 74–99)
GLUCOSE BLD MANUAL STRIP-MCNC: 209 MG/DL (ref 74–99)
GLUCOSE BLD MANUAL STRIP-MCNC: 231 MG/DL (ref 74–99)
GLUCOSE BLD MANUAL STRIP-MCNC: 251 MG/DL (ref 74–99)
GLUCOSE SERPL-MCNC: 100 MG/DL (ref 74–99)
HCT VFR BLD AUTO: 50.7 % (ref 41–52)
HGB BLD-MCNC: 15.9 G/DL (ref 13.5–17.5)
LACTATE SERPL-SCNC: 1.1 MMOL/L (ref 0.4–2)
MCH RBC QN AUTO: 32.5 PG (ref 26–34)
MCHC RBC AUTO-ENTMCNC: 31.4 G/DL (ref 32–36)
MCV RBC AUTO: 104 FL (ref 80–100)
NRBC BLD-RTO: 0 /100 WBCS (ref 0–0)
PHOSPHATE SERPL-MCNC: 1.9 MG/DL (ref 2.5–4.9)
PLATELET # BLD AUTO: 185 X10*3/UL (ref 150–450)
POTASSIUM SERPL-SCNC: 3.7 MMOL/L (ref 3.5–5.3)
RBC # BLD AUTO: 4.89 X10*6/UL (ref 4.5–5.9)
SODIUM SERPL-SCNC: 143 MMOL/L (ref 136–145)
WBC # BLD AUTO: 9.1 X10*3/UL (ref 4.4–11.3)

## 2024-09-17 PROCEDURE — 2500000004 HC RX 250 GENERAL PHARMACY W/ HCPCS (ALT 636 FOR OP/ED): Performed by: SURGERY

## 2024-09-17 PROCEDURE — 2500000001 HC RX 250 WO HCPCS SELF ADMINISTERED DRUGS (ALT 637 FOR MEDICARE OP): Performed by: SURGERY

## 2024-09-17 PROCEDURE — 97530 THERAPEUTIC ACTIVITIES: CPT | Mod: GP,CQ

## 2024-09-17 PROCEDURE — 2060000001 HC INTERMEDIATE ICU ROOM DAILY

## 2024-09-17 PROCEDURE — 94640 AIRWAY INHALATION TREATMENT: CPT

## 2024-09-17 PROCEDURE — 94660 CPAP INITIATION&MGMT: CPT

## 2024-09-17 PROCEDURE — 2500000005 HC RX 250 GENERAL PHARMACY W/O HCPCS: Performed by: INTERNAL MEDICINE

## 2024-09-17 PROCEDURE — 85027 COMPLETE CBC AUTOMATED: CPT | Performed by: NURSE PRACTITIONER

## 2024-09-17 PROCEDURE — 2500000001 HC RX 250 WO HCPCS SELF ADMINISTERED DRUGS (ALT 637 FOR MEDICARE OP)

## 2024-09-17 PROCEDURE — 9420000001 HC RT PATIENT EDUCATION 5 MIN

## 2024-09-17 PROCEDURE — 93005 ELECTROCARDIOGRAM TRACING: CPT

## 2024-09-17 PROCEDURE — 2500000002 HC RX 250 W HCPCS SELF ADMINISTERED DRUGS (ALT 637 FOR MEDICARE OP, ALT 636 FOR OP/ED): Performed by: SURGERY

## 2024-09-17 PROCEDURE — 71045 X-RAY EXAM CHEST 1 VIEW: CPT

## 2024-09-17 PROCEDURE — 93010 ELECTROCARDIOGRAM REPORT: CPT | Performed by: INTERNAL MEDICINE

## 2024-09-17 PROCEDURE — 83605 ASSAY OF LACTIC ACID: CPT | Performed by: INTERNAL MEDICINE

## 2024-09-17 PROCEDURE — 82947 ASSAY GLUCOSE BLOOD QUANT: CPT

## 2024-09-17 PROCEDURE — 94669 MECHANICAL CHEST WALL OSCILL: CPT

## 2024-09-17 PROCEDURE — 36415 COLL VENOUS BLD VENIPUNCTURE: CPT | Performed by: INTERNAL MEDICINE

## 2024-09-17 PROCEDURE — 97110 THERAPEUTIC EXERCISES: CPT | Mod: GP,CQ

## 2024-09-17 PROCEDURE — 80069 RENAL FUNCTION PANEL: CPT | Performed by: NURSE PRACTITIONER

## 2024-09-17 PROCEDURE — 97535 SELF CARE MNGMENT TRAINING: CPT | Mod: GO

## 2024-09-17 PROCEDURE — 94668 MNPJ CHEST WALL SBSQ: CPT

## 2024-09-17 PROCEDURE — 36415 COLL VENOUS BLD VENIPUNCTURE: CPT | Performed by: NURSE PRACTITIONER

## 2024-09-17 PROCEDURE — 2500000002 HC RX 250 W HCPCS SELF ADMINISTERED DRUGS (ALT 637 FOR MEDICARE OP, ALT 636 FOR OP/ED): Performed by: INTERNAL MEDICINE

## 2024-09-17 PROCEDURE — 71045 X-RAY EXAM CHEST 1 VIEW: CPT | Performed by: RADIOLOGY

## 2024-09-17 PROCEDURE — 99232 SBSQ HOSP IP/OBS MODERATE 35: CPT | Performed by: INTERNAL MEDICINE

## 2024-09-17 PROCEDURE — 2500000004 HC RX 250 GENERAL PHARMACY W/ HCPCS (ALT 636 FOR OP/ED): Performed by: INTERNAL MEDICINE

## 2024-09-17 PROCEDURE — 99233 SBSQ HOSP IP/OBS HIGH 50: CPT | Performed by: INTERNAL MEDICINE

## 2024-09-17 RX ORDER — LORAZEPAM 2 MG/ML
1 INJECTION INTRAMUSCULAR ONCE
Status: COMPLETED | OUTPATIENT
Start: 2024-09-18 | End: 2024-09-18

## 2024-09-17 RX ORDER — TAMSULOSIN HYDROCHLORIDE 0.4 MG/1
0.4 CAPSULE ORAL DAILY
Status: DISCONTINUED | OUTPATIENT
Start: 2024-09-17 | End: 2024-09-25 | Stop reason: HOSPADM

## 2024-09-17 ASSESSMENT — ACTIVITIES OF DAILY LIVING (ADL): HOME_MANAGEMENT_TIME_ENTRY: 12

## 2024-09-17 ASSESSMENT — COGNITIVE AND FUNCTIONAL STATUS - GENERAL
WALKING IN HOSPITAL ROOM: TOTAL
HELP NEEDED FOR BATHING: TOTAL
DRESSING REGULAR LOWER BODY CLOTHING: A LOT
TOILETING: TOTAL
DRESSING REGULAR UPPER BODY CLOTHING: A LOT
STANDING UP FROM CHAIR USING ARMS: TOTAL
DAILY ACTIVITIY SCORE: 9
MOVING FROM LYING ON BACK TO SITTING ON SIDE OF FLAT BED WITH BEDRAILS: A LOT
HELP NEEDED FOR BATHING: A LOT
TURNING FROM BACK TO SIDE WHILE IN FLAT BAD: A LOT
TURNING FROM BACK TO SIDE WHILE IN FLAT BAD: A LOT
STANDING UP FROM CHAIR USING ARMS: TOTAL
CLIMB 3 TO 5 STEPS WITH RAILING: A LOT
MOBILITY SCORE: 8
MOVING FROM LYING ON BACK TO SITTING ON SIDE OF FLAT BED WITH BEDRAILS: A LOT
TURNING FROM BACK TO SIDE WHILE IN FLAT BAD: A LOT
DRESSING REGULAR LOWER BODY CLOTHING: A LOT
HELP NEEDED FOR BATHING: TOTAL
DRESSING REGULAR UPPER BODY CLOTHING: A LOT
DAILY ACTIVITIY SCORE: 11
PERSONAL GROOMING: A LOT
WALKING IN HOSPITAL ROOM: TOTAL
MOVING TO AND FROM BED TO CHAIR: TOTAL
CLIMB 3 TO 5 STEPS WITH RAILING: TOTAL
MOVING TO AND FROM BED TO CHAIR: TOTAL
WALKING IN HOSPITAL ROOM: A LOT
MOBILITY SCORE: 10
TOILETING: TOTAL
EATING MEALS: TOTAL
EATING MEALS: A LITTLE
STANDING UP FROM CHAIR USING ARMS: TOTAL
DAILY ACTIVITIY SCORE: 12
PERSONAL GROOMING: A LOT
MOVING TO AND FROM BED TO CHAIR: TOTAL
MOBILITY SCORE: 8
PERSONAL GROOMING: A LITTLE
EATING MEALS: A LITTLE
TOILETING: TOTAL
CLIMB 3 TO 5 STEPS WITH RAILING: TOTAL
MOVING FROM LYING ON BACK TO SITTING ON SIDE OF FLAT BED WITH BEDRAILS: A LOT
DRESSING REGULAR LOWER BODY CLOTHING: TOTAL
DRESSING REGULAR UPPER BODY CLOTHING: A LOT

## 2024-09-17 ASSESSMENT — PAIN SCALES - GENERAL
PAINLEVEL_OUTOF10: 0 - NO PAIN

## 2024-09-17 ASSESSMENT — PAIN - FUNCTIONAL ASSESSMENT
PAIN_FUNCTIONAL_ASSESSMENT: 0-10

## 2024-09-17 NOTE — PROGRESS NOTES
Alo Glass is a 69 y.o. male on day 13 of admission presenting with Acute respiratory failure with hypoxia and hypercapnia (Multi).  Patient with h/o CHF, aortic valve stenosis, DLP, T2DM, COPD, MELISSA, who was brought in by EMS after being found unresponsiveness at home. Work up revealed respiratory failure with hypoxia and hypercapnia, also hypotensive. Received IVF, antibiotics, but given persistent hypotension started on Levophed and and admitted to ICU. In the ICU found to have coffee ground emesis, subsequently was intubated. CT chest showed R loculated pleural effusion and multiple nodules. Sputum culture +ve for stenotrophomonas. Patient overall improved and was subsequently extubated. Post extubation initially had high oxygen requirements, but now is improving. Pulmonary is consulted to assist with his care.   Subjective   No acute overnight events. O2 requirements improved to 10L.     Overall is feeling OK. SOB stable. Cough improved. Also complains of a mild wheezing. Denies any pains.   Objective   Scheduled medications  aspirin, 81 mg, oral, Daily  enoxaparin, 40 mg, subcutaneous, q12h SHARRON  ezetimibe, 10 mg, oral, Daily  folic acid, 1 mg, oral, Daily  guaiFENesin, 200 mg, oral, TID  insulin lispro, 0-10 Units, subcutaneous, q4h  ipratropium-albuteroL, 3 mL, nebulization, 4x daily  levoFLOXacin, 750 mg, oral, q24h SHARRON  levothyroxine, 125 mcg, oral, Daily  multivitamin with minerals, 1 tablet, oral, Daily  nystatin, 5 mL, Swish & Swallow, 4x daily  pantoprazole, 40 mg, intravenous, Daily  piperacillin-tazobactam, 3.375 g, intravenous, q6h  polyethylene glycol, 17 g, nasogastric tube, Daily  predniSONE, 20 mg, oral, Daily  sennosides-docusate sodium, 2 tablet, oral, BID  thiamine, 100 mg, oral, Daily     Continuous medications    PRN medications  PRN medications: dextrose, dextrose, glucagon, glucagon, ipratropium-albuteroL, oxygen, oxygen   Physical Exam  Constitutional:       General: He is not in  "acute distress.     Appearance: He is obese. He is ill-appearing. He is not toxic-appearing.   HENT:      Head: Normocephalic and atraumatic.      Nose:      Comments: On 10L NC     Mouth/Throat:      Mouth: Mucous membranes are moist.      Comments: Mallampati 2.   Eyes:      General: No scleral icterus.     Extraocular Movements: Extraocular movements intact.      Pupils: Pupils are equal, round, and reactive to light.   Cardiovascular:      Rate and Rhythm: Normal rate and regular rhythm.      Heart sounds: No murmur heard.     No friction rub. No gallop.   Pulmonary:      Effort: Pulmonary effort is normal. No respiratory distress.      Breath sounds: No wheezing or rales.      Comments: Clear lung fields b/l with fair air entry.  Abdominal:      General: Bowel sounds are normal. There is no distension.      Palpations: Abdomen is soft.      Tenderness: There is no abdominal tenderness.   Musculoskeletal:      Cervical back: Normal range of motion and neck supple. No rigidity or tenderness.      Right lower leg: Edema (2+) present.      Left lower leg: Edema (1+) present.   Lymphadenopathy:      Cervical: No cervical adenopathy.   Skin:     General: Skin is warm and dry.      Coloration: Skin is not jaundiced.      Comments: Chronic discoloration b/l LE edema.     Neurological:      General: No focal deficit present.      Mental Status: He is alert and oriented to person, place, and time.      Cranial Nerves: No cranial nerve deficit.      Motor: Weakness (generalized weakness now is improved.) present.   Psychiatric:         Mood and Affect: Mood normal.         Behavior: Behavior normal.     Last Recorded Vitals  Blood pressure 108/69, pulse 77, temperature 36.4 °C (97.6 °F), temperature source Oral, resp. rate 17, height 1.803 m (5' 10.98\"), weight 125 kg (276 lb 3.8 oz), SpO2 98%.  Intake/Output last 3 Shifts:  I/O last 3 completed shifts:  In: 120 (1 mL/kg) [P.O.:120]  Out: 2225 (17.8 mL/kg) [Urine:2225 " (0.5 mL/kg/hr)]  Weight: 125.3 kg   Relevant Results  Latest labs and imaging reviewed.     Assessment/Plan   Assessment & Plan  Acute respiratory failure with hypoxia and hypercapnia (Multi)    69 YOM with h/o CHF, aortic valve stenosis, DLP, T2DM, COPD, MELISSA, who was brought in by EMS after being found unresponsiveness at home. Work up revealed respiratory failure with hypoxia and hypercapnia, also hypotensive. Received IVF, antibiotics, but given persistent hypotension started on Levophed and and admitted to ICU. In the ICU found to have coffee ground emesis, subsequently was intubated. CT chest showed R loculated pleural effusion and multiple nodules. Sputum culture +ve for stenotrophomonas. Patient overall improved and was subsequently extubated. Post extubation initially had high oxygen requirements, but now is improving. Pulmonary is consulted to assist with his care.  .     Respiratory failure: acute with hypoxia and hypercarbia, requiring MV, now overall improved but worsened today.       CXR in am.        Continue supplemental O2, wean off as tolerates       Home O2 evaluation before DC       BPH with IS, Acapella       Management of the individual causes as below     COPD: PFT in 2019 showed 54%, FEV1 32-->46%, RV/TLC 63%, TLC 85%, and DLCO 77%, overall associated with stage III COPD. On triple therapy as outpatient        Continue Prednisone taper       Continue Duo-neb       FU with pulmonary after DC. Patient needs to make an appointment with a new pulmonologist as currently does not have a pulmonary provider.        Resume Trelegy and albuterol on DC     MELISSA:        Continue BiPAP at nights       Resume APAP 10-15 at nights on DC     Pleural effusion: large R sided, likely parapneumonic, S/p chest tube placement. Analysis showed neutrophilic predominant effusion, cultures negative. CT is now removed. CXR today stable to slightly progressed.       Will monitor for now     Pneumonia: RML,          Continue Levaquin.      DVT prophylaxis: Lovenox     Pulmonary will FU while in house.     Marta Thurston MD

## 2024-09-17 NOTE — PROGRESS NOTES
"Alo Glass is a 69 y.o. male on day 13 of admission presenting with Acute respiratory failure with hypoxia and hypercapnia (Multi).    Subjective   Patient seen and examined.  No new changes repeat chest x-ray revealed small right pleural effusion with some infiltrate but improved.  Currently on 12 L nasal cannula oxygen.  Blood sugars are stable.  No chest pain.  Continued on Levaquin and Zosyn for ongoing pneumonia to be completed in the next few days.  Once oxygen requirements improved the patient will be discharged to skilled nursing facility.       Objective     Physical Exam  GENERAL:  Alert, moderate distress, cooperative, on 15L/50L high flow.  SKIN:  Skin color, texture, turgor normal. No rashes or lesions.  OROPHARYNX:  Lips, mucosa, and tongue are normal.Teeth and gums, normal. Oropharynx normal.  NECK:  No jugulovenous distention, No carotid bruits, Carotid pulse normal contour,   LUNGS: Limited examination because of patient's body habitus and difficulty examining, however he has generalized diminished air exchange anteriorly with decreased breath sounds right mid and lower lung fields with intermittent wheezing.  Right chest tube has been removed.  CARDIAC: Currently in sinus rhythm, normal S1 and S2; no rubs, murmurs, or gallops  ABDOMEN:  Abdomen soft, large and, non-tender, BS normal, No masses or organomegaly  EXTREMETIES:  Extremities normal, no deformities, 2+ edema, no clubbing or skin discoloration. Good capillary refill., No ulcers  NEURO:  Alert, oriented X 3,. Non-focal.  limited exam.     Last Recorded Vitals  Blood pressure 95/63, pulse 73, temperature 36 °C (96.8 °F), resp. rate 17, height 1.803 m (5' 10.98\"), weight 125 kg (276 lb 3.8 oz), SpO2 97%.  Intake/Output last 3 Shifts:  I/O last 3 completed shifts:  In: 120 (1 mL/kg) [P.O.:120]  Out: 2225 (17.8 mL/kg) [Urine:2225 (0.5 mL/kg/hr)]  Weight: 125.3 kg     Relevant Results   Scheduled medications  acetaZOLAMIDE, 500 mg, " intravenous, Once  aspirin, 81 mg, oral, Daily  enoxaparin, 40 mg, subcutaneous, q12h SHARRON  ezetimibe, 10 mg, oral, Daily  folic acid, 1 mg, oral, Daily  guaiFENesin, 200 mg, oral, TID  insulin lispro, 0-10 Units, subcutaneous, q4h  ipratropium-albuteroL, 3 mL, nebulization, 4x daily  levoFLOXacin, 750 mg, oral, q24h SHARRON  levothyroxine, 125 mcg, oral, Daily  multivitamin with minerals, 1 tablet, oral, Daily  nystatin, 5 mL, Swish & Swallow, 4x daily  pantoprazole, 40 mg, intravenous, Daily  piperacillin-tazobactam, 3.375 g, intravenous, q6h  polyethylene glycol, 17 g, nasogastric tube, Daily  predniSONE, 20 mg, oral, Daily  sennosides-docusate sodium, 2 tablet, oral, BID  thiamine, 100 mg, oral, Daily      Continuous medications     PRN medications  PRN medications: dextrose, dextrose, glucagon, glucagon, ipratropium-albuteroL, oxygen, oxygen     Results for orders placed or performed during the hospital encounter of 09/04/24 (from the past 24 hour(s))   POCT GLUCOSE   Result Value Ref Range    POCT Glucose 159 (H) 74 - 99 mg/dL   POCT GLUCOSE   Result Value Ref Range    POCT Glucose 227 (H) 74 - 99 mg/dL   POCT GLUCOSE   Result Value Ref Range    POCT Glucose 207 (H) 74 - 99 mg/dL   POCT GLUCOSE   Result Value Ref Range    POCT Glucose 216 (H) 74 - 99 mg/dL   Lactate   Result Value Ref Range    Lactate 1.1 0.4 - 2.0 mmol/L   POCT GLUCOSE   Result Value Ref Range    POCT Glucose 114 (H) 74 - 99 mg/dL   Renal Function Panel   Result Value Ref Range    Glucose 100 (H) 74 - 99 mg/dL    Sodium 143 136 - 145 mmol/L    Potassium 3.7 3.5 - 5.3 mmol/L    Chloride 99 98 - 107 mmol/L    Bicarbonate 42 (HH) 21 - 32 mmol/L    Anion Gap <7 (L) 10 - 20 mmol/L    Urea Nitrogen 16 6 - 23 mg/dL    Creatinine 0.46 (L) 0.50 - 1.30 mg/dL    eGFR >90 >60 mL/min/1.73m*2    Calcium 7.5 (L) 8.6 - 10.3 mg/dL    Phosphorus 1.9 (L) 2.5 - 4.9 mg/dL    Albumin 2.3 (L) 3.4 - 5.0 g/dL   CBC   Result Value Ref Range    WBC 9.1 4.4 - 11.3 x10*3/uL     nRBC 0.0 0.0 - 0.0 /100 WBCs    RBC 4.89 4.50 - 5.90 x10*6/uL    Hemoglobin 15.9 13.5 - 17.5 g/dL    Hematocrit 50.7 41.0 - 52.0 %     (H) 80 - 100 fL    MCH 32.5 26.0 - 34.0 pg    MCHC 31.4 (L) 32.0 - 36.0 g/dL    RDW 13.2 11.5 - 14.5 %    Platelets 185 150 - 450 x10*3/uL   POCT GLUCOSE   Result Value Ref Range    POCT Glucose 136 (H) 74 - 99 mg/dL    XR chest 1 view    Result Date: 9/17/2024  Interpreted By:  Ishaan Vernon, STUDY: XR CHEST 1 VIEW;  9/17/2024 5:41 am   INDICATION: Signs/Symptoms:Increase O2 requirements. Tnx..   COMPARISON: CT scan from 09/04/2024.   ACCESSION NUMBER(S): [id number]   ORDERING CLINICIAN: PARISA ZARAGOZA   TECHNIQUE: Single AP portable view of the chest was obtained.   FINDINGS: MEDIASTINUM/ LUNGS/ ARELIS:   Stable cardiomegaly. No gross vascular congestion. Small stable left pleural effusion. Medial left basilar atelectasis/infiltrate, stable to slightly progressed. Pleural and parenchymal opacities at the right base, mildly progressed. No pneumothorax. No tracheal deviation. No abnormal hilar fullness or gross mass on either side.   BONES: No lytic or blastic destructive bone lesion.   UPPER ABDOMEN: Grossly intact.       Cardiomegaly.   Stable small left pleural effusion with left basilar atelectasis/pneumonia, stable to slightly progressed.   Pleural and parenchymal opacities at the right base, mildly progressed.   MACRO: None   Signed by: Ishaan Vernon 9/17/2024 8:16 AM Dictation workstation:   [id number]    Electrocardiogram, 12-lead PRN ACS symptoms    Result Date: 9/16/2024  Sinus rhythm with 1st degree AV block with Premature atrial complexes Right bundle branch block Left anterior fascicular block  Bifascicular block  Anteroseptal infarct (cited on or before 23-APR-2024) Abnormal ECG When compared with ECG of 13-SEP-2024 02:49, (unconfirmed) Premature ventricular complexes are no longer Present QT has lengthened    ECG 12 lead    Result Date: 9/15/2024  Sinus  rhythm with 1st degree AV block Left axis deviation Right bundle branch block Inferior infarct (cited on or before 04-SEP-2024) Abnormal ECG When compared with ECG of 04-SEP-2024 16:24, Premature ventricular complexes are no longer Present Right bundle branch block is now Present Confirmed by Dimitri Murillo ([id number]) on 9/15/2024 2:23:29 PM    XR chest 1 view    Result Date: 9/13/2024  Interpreted By:  Tania Knowles, STUDY: XR CHEST 1 VIEW;  9/13/2024 4:11 pm   INDICATION: Signs/Symptoms:post chest tube removal.   COMPARISON: 09/13/2024   ACCESSION NUMBER(S): [id number]   ORDERING CLINICIAN: RODOLFO LOPEZ   FINDINGS: The study is limited due to patient's body habitus and poor inspiration previously seen right-sided chest tube has been removed. No gross pneumothorax is noted.   Blunting of both costophrenic angles is seen, which most likely is related to pleural effusions. The heart is enlarged. Bibasilar streaky densities are seen.       No gross pneumothorax.   Bilateral pleural effusions.   Cardiomegaly.   Bibasilar streaky densities, which may be related to atelectasis or infiltrates.       MACRO: None   Signed by: Tania Knowles 9/13/2024 4:40 PM Dictation workstation:   [id number]    Electrocardiogram, 12-lead PRN ACS symptoms    Result Date: 9/13/2024  Sinus rhythm with 1st degree AV block with occasional Premature ventricular complexes and Premature atrial complexes Left axis deviation Right bundle branch block Inferior infarct (cited on or before 04-SEP-2024) Anteroseptal infarct (cited on or before 23-APR-2024) Abnormal ECG When compared with ECG of 06-SEP-2024 00:20, Premature ventricular complexes are now Present Vent. rate has decreased BY  31 BPM Questionable change in initial forces of Septal leads T wave inversion less evident in Anterolateral leads    XR chest 1 view    Result Date: 9/13/2024  Interpreted By:  Rosa Hemphill, STUDY: XR CHEST 1 VIEW;  9/13/2024 4:31 am   INDICATION: Signs/Symptoms:CT  in place.   COMPARISON: Multiple prior studies, the most recent 09/12/2024   ACCESSION NUMBER(S): [id number]   ORDERING CLINICIAN: RODOLFO LOPEZ   FINDINGS:     CARDIOMEDIASTINAL SILHOUETTE: Stable enlarged cardiac silhouette.   LUNGS: Stable position of pigtail catheter overlying the right lung base. There is improved aeration of the right lung base. Mild residual hazy right basilar opacity, likely combination of small residual pleural effusion and basilar consolidation. Limited evaluation of the left lung base due to underpenetration. This is not significantly changed and left pleural effusion or basilar consolidation not excluded. No pneumothorax.   ABDOMEN: No remarkable upper abdominal findings.   BONES: No acute osseous abnormality.       Improved aeration of the right lung base. Residual hazy right basilar opacities likely a combination of residual pleural effusion and basilar atelectasis.   Limited evaluation of the left lung base due to underpenetration.   MACRO: None   Signed by: Rosa Hemphill 9/13/2024 4:48 AM Dictation workstation:   [id number]    XR chest 1 view    Result Date: 9/12/2024  Interpreted By:  Donte Shoemaker, STUDY: XR CHEST 1 VIEW; 9/12/2024 5:23 am   INDICATION: Signs/Symptoms:CT in place.   COMPARISON: None   ACCESSION NUMBER(S): [id number]   ORDERING CLINICIAN: RODOLFO LOPEZ   TECHNIQUE: 1 view of the chest was performed.   FINDINGS: Right pigtail catheter in similar location. Stable layering bilateral pleural effusion with surrounding atelectasis. Stable cardiomegaly. Bilateral shoulder joint degenerative changes.       1. No significant interval change from 09/11/2024 chest x-ray.   Signed by: Donte Shoemaker 9/12/2024 8:18 AM Dictation workstation:   [id number]    XR chest 1 view    Result Date: 9/11/2024  Interpreted By:  Klaus Villafana, STUDY: XR CHEST 1 VIEW; 9/11/2024 5:42 am   INDICATION: Signs/Symptoms:CT in place   COMPARISON: Radiographs 09/10/2024   ACCESSION  NUMBER(S): [id number]   ORDERING CLINICIAN: RODOLFO LOPEZ   TECHNIQUE: Single frontal view of the chest performed.   FINDINGS: LINES AND DEVICES: Remote endotracheal tube. Stable right pigtail pleural catheter near right lung base.   LUNGS: Stable hazy opacities at the lung base and additional airspace opacities extending to right mid lung, stable to mildly increased. No pneumothorax. Left lung is clear.   CARDIOMEDIASTINAL SILHOUETTE: Stable cardiomegaly.       Stable to slightly increased right pleural effusion with atelectasis. Stable right pleural catheter.   MACRO None   Signed by: Klaus Villafana 9/11/2024 8:21 AM Dictation workstation:   [id number]    XR chest 1 view    Result Date: 9/10/2024  Interpreted By:  Ishaan Vernon, STUDY: XR CHEST 1 VIEW;  9/10/2024 5:34 am   INDICATION: Signs/Symptoms:Eval chest tube placement, Rt effusion.   COMPARISON: CT scan chest from 09/04/2024. Chest x-ray from 09/09/2024.   ACCESSION NUMBER(S): [id number]   ORDERING CLINICIAN: RICH KAPLAN   TECHNIQUE: Single AP portable view of the chest was obtained.   FINDINGS: MEDIASTINUM/ LUNGS/ ARELIS: Previous ET tube has been removed. There is an NG tube in place with distal port just beyond the GE junction and distal tip in the proximal stomach. Pigtail pleural catheter at the right base is stable. There is a small grossly stable lateral right basilar pneumothorax. There is mild bibasilar atelectasis. Cardiomegaly without gross vascular congestion. No pneumothorax on the left. No tracheal deviation. No abnormal hilar fullness or gross mass on either side.   BONES: No lytic or blastic destructive bone lesion.   UPPER ABDOMEN: Grossly intact.       Cardiomegaly.   NG tube in place. ET tube has been removed.   Stable pleural drainage catheter at the base of the right chest. Small stable lateral right basilar pneumothorax.   Mild bibasilar atelectasis.   MACRO: None   Signed by: Ishaan Vernon 9/10/2024 8:13 AM Dictation  workstation:   [id number]    XR chest 1 view    Result Date: 9/9/2024  Interpreted By:  Daisy Sosa, STUDY: XR CHEST 1 VIEW;  9/9/2024 4:39 pm   INDICATION: Signs/Symptoms:s/p lytic therapy.     COMPARISON: 5:21 a.m. the same day   ACCESSION NUMBER(S): [id number]   ORDERING CLINICIAN: RODOLFO LOPEZ   FINDINGS: ETT, NG tube and right lower chest pigtail catheter remain in place. Additional presumed overlying monitoring/support devices again seen. Small right basilar pneumothorax decreased in size. Persistent bibasilar opacities. The cardiomediastinal silhouette remains enlarged.       Decreased size of right basilar pneumothorax since 5:21 a.m. the same day with pigtail catheter in place.   MACRO: None.   Signed by: Daisy Sosa 9/9/2024 4:44 PM Dictation workstation:   [id number]    XR chest 1 view    Result Date: 9/9/2024  Interpreted By:  Klaus Villafana, STUDY: XR CHEST 1 VIEW; 9/9/2024 5:32 am   INDICATION: Signs/Symptoms:Eval chest tube placement, Rt effusion   COMPARISON: Radiographs 09/08/2024   ACCESSION NUMBER(S): [id number]   ORDERING CLINICIAN: RICH KAPLAN   TECHNIQUE: Single frontal view of the chest performed.   FINDINGS: LINES AND DEVICES: The pigtail of right-sided pleural drainage catheter overlies the right infrahilar region, grossly unchanged. Tip of ETT approximately 6.6 cm above the kathie, previously 7.7 cm. NG tube courses below the diaphragm, limited assessment.   LUNGS: Moderate-size right-sided hydropneumothorax appears grossly unchanged. Stable to slightly increased interstitial opacities on the left. Question new trace left pleural effusion with atelectasis.   CARDIOMEDIASTINAL SILHOUETTE: Stable cardiomegaly.       Right-sided infrahilar pigtail catheter and moderate hydropneumothorax, grossly unchanged.   Suspected worsening CHF with stable to mildly increased interstitial edema and possible new trace left pleural effusion.   MACRO None   Signed by: Klaus Villafana  9/9/2024 8:17 AM Dictation workstation:   [id number]    XR chest 1 view    Result Date: 9/8/2024  Interpreted By:  Corrina Reyes, STUDY: XR CHEST 1 VIEW;  9/8/2024 6:39 am   INDICATION: Signs/Symptoms:Eval chest tube placement, Rt effusion.     COMPARISON: 09/07/2024   ACCESSION NUMBER(S): [id number]   ORDERING CLINICIAN: RICH KAPLAN   TECHNIQUE: Portable AP semi upright   FINDINGS: Endotracheal tube tip projects between 7 and 8 cm above the kathie. Nasogastric tube is no longer identified. Right pleural pigtail catheter projects at mid right hemithorax and is similar in position to the prior study. Relative volume of basilar pneumothorax and pleural fluid appears similar to the prior study. Right lower lobe and right middle lobe are collapsed. Right upper lobe variation is similar to the prior study. Left lung shows some vascular congestion. Minimal atelectasis is present at the left lung base. No change in the osseous structures is noted.       Basilar pneumothorax on the right appears similar to the prior study in the relative volume of pleural fluid and pneumothorax appears unchanged.   Collapse of the right lower lobe and right middle lobe   Pulmonary vascular congestion is most notable in the left lung   MACRO: None.   Signed by: Corrina Reyes 9/8/2024 12:32 PM Dictation workstation:   [id number]    XR chest 1 view    Result Date: 9/8/2024  Interpreted By:  Corrina Reyes, STUDY: XR CHEST 1 VIEW;  9/7/2024 5:28 pm   INDICATION: Signs/Symptoms:Eval chest tube placement, Rt pleural effusion.     COMPARISON: 09/06/2024   ACCESSION NUMBER(S): [id number]   ORDERING CLINICIAN: RICH KAPLAN   TECHNIQUE: Portable AP upright   FINDINGS: Endotracheal tube tip projects about 6 cm above the kathie. NG tube extends below the diaphragm and beyond the image. Right pleural pigtail catheter projects at mid right hemithorax and appears unchanged in position.   Pneumothorax at the right thoracic base  appears larger than on the prior study. The amount of pleural fluid appears decreased. Mild atelectasis left lung base is slightly improved. Heart is unchanged in size. No changes noted in the osseous structures.       Right pleural effusion is decreased from the prior study but the right basilar pneumothorax appears increased.   MACRO: None.   Signed by: Corrina Reyes 9/8/2024 12:30 PM Dictation workstation:   [id number]    Electrocardiogram, 12-lead PRN ACS symptoms    Result Date: 9/6/2024  Sinus rhythm with 1st degree AV block with Premature atrial complexes Left axis deviation Right bundle branch block Inferior infarct (cited on or before 04-SEP-2024) Anterior infarct , age undetermined T wave abnormality, consider lateral ischemia Abnormal ECG When compared with ECG of 05-SEP-2024 10:29, (unconfirmed) Premature atrial complexes are now Present Confirmed by Shady Bill ([id number]) on 9/6/2024 1:59:56 PM    XR chest 1 view    Result Date: 9/6/2024  Interpreted By:  Eloisa Landa, STUDY: XR CHEST 1 VIEW 9/6/2024 9:05 am   INDICATION: Signs/Symptoms:R pleural effusion   COMPARISON: 09/05/2024   ACCESSION NUMBER(S): [id number]   ORDERING CLINICIAN: PERRI DELACRUZ   TECHNIQUE: AP view of the chest at bedside in the erect position   FINDINGS: A right chest tube is once again noted within the mid right hemithorax with a stable right hydropneumothorax seen within the mid and lower right hemithorax.   The tip of the endotracheal tube remains satisfactorily positioned at a distance 3 cm above kathie with nasogastric tube descending below diaphragm.   There is small left pleural effusion and left basilar atelectasis. The heart remains enlarged.       No change in the right hydropneumothorax which may be loculated with chest tube on the right in the mid hemithorax.   Small left pleural effusion with left basilar infiltrate/atelectasis.   Signed by: Eloisa Landa 9/6/2024 9:20 AM Dictation workstation:   [id number]    ECG 12  lead    Result Date: 9/5/2024  Sinus rhythm with 1st degree AV block with frequent Premature ventricular complexes Left axis deviation Left ventricular hypertrophy with QRS widening and repolarization abnormality ( R in aVL , Faheem product ) Inferior infarct , age undetermined Abnormal ECG When compared with ECG of 23-APR-2024 09:01, Premature ventricular complexes are now Present SD interval has increased (RBBB and left anterior fascicular block) is no longer Present Inferior infarct is now Present Confirmed by Andrea Bentley ([id number]) on 9/5/2024 10:01:05 AM    XR chest 1 view    Result Date: 9/5/2024  Interpreted By:  Saad Cronin, STUDY: XR CHEST 1 VIEW; 9/5/2024 4:57 am   INDICATION: CLINICAL INFORMATION: Signs/Symptoms:Pig tail Chest Tube replacement.   COMPARISON: 09/04/2024   ACCESSION NUMBER(S): [id number]   ORDERING CLINICIAN: ANDRY ABRAHAM   TECHNIQUE: Portable chest one view.   FINDINGS: The cardiac silhouette is quite prominent suggesting cardiomegaly. The tube and line placement is unchanged.  There is a decrease in the right-sided pleural-based density is well as the right-sided parenchymal densities. The pulmonary vasculature is slightly indistinct suggesting mild pulmonary vascular congestion.       There appears to be increased aeration at the right base suggesting diminishing pleural density within the right hemithorax in association with the chest tube. Infiltrate persists on the right.   MACRO: none   Signed by: Saad Cronin 9/5/2024 8:18 AM Dictation workstation:   [id number]    XR chest 1 view    Result Date: 9/5/2024  Interpreted By:  Cande Keating, STUDY: XR CHEST 1 VIEW;  9/4/2024 11:10 pm   INDICATION: Signs/Symptoms:Chest tube placement.     COMPARISON: Radiographs of the chest dated 09/04/2024; CT of the chest dated 09/04/2024   ACCESSION NUMBER(S): [id number]   ORDERING CLINICIAN: ANDRY ABRAHAM   FINDINGS: AP radiograph of the chest was provided.   Endotracheal tube projects  4.4 cm superior to the kathie. Enteric tube appears to course along the midline expected course of the esophagus, although the distal tip is not well visualized due to underpenetration and overlying structures.   CARDIOMEDIASTINAL SILHOUETTE: Cardiomediastinal silhouette is enlarged, similar to prior exam.   LUNGS: Redemonstration of the large right-sided pleural effusion with associated atelectasis/consolidation, similar in appearance to prior radiographs. No new consolidation or pleural effusion is present in the left lung.   ABDOMEN: No remarkable upper abdominal findings.   BONES: No acute osseous changes.       1.  Enteric tube is coursing along the midline of the mediastinum in the expected course of the esophagus, although the distal tip is not well visualized due to overlying cardiomediastinal silhouette and underpenetration, and may be outside of the field-of-view of the study. Endotracheal tube projects 4.5 cm superior to the kathie. 2. Redemonstration of the large loculated right-sided pleural effusion with the associated atelectasis/consolidation, similar in appearance to prior exam.       MACRO: None   Signed by: Cande Keating 9/5/2024 2:08 AM Dictation workstation:   [id number]    CT chest abdomen pelvis w IV contrast    Result Date: 9/4/2024  Interpreted By:  Ishaan Banda, STUDY: CT CHEST ABDOMEN PELVIS W IV CONTRAST;  9/4/2024 5:52 pm   INDICATION: Signs/Symptoms:unresponsive.     COMPARISON: None.   ACCESSION NUMBER(S): [id number]   ORDERING CLINICIAN: GURPREET COPPOLA   TECHNIQUE: Contiguous axial images of the chest, abdomen, and pelvis were obtained after the intravenous administration of iodinated contrast. Coronal and sagittal reformatted images were reconstructed from the axial data.   FINDINGS: CT CHEST:   MEDIASTINUM AND LYMPH NODES: NG/OG tube noted with small amount of fluid in the esophagus. The esophageal wall appears within normal limits.  No enlarged intrathoracic or  axillary lymph nodes by imaging criteria. No pneumomediastinum.   VESSELS:  Normal caliber thoracic aorta without dissection. Mild aortic atherosclerosis.   HEART: Enlarged. Severe aortic valvular calcifications. Moderate coronary artery calcifications. No significant pericardial effusion.   LUNG, AIRWAYS, PLEURA: There is a large loculated right pleural effusion causing compressive atelectasis on the right lung. As a result, there is complete collapse of the right lower lobe, complete collapse of the right middle lobe, and partial collapse of the right upper lobe. There are multiple loculated compartments largest of which is seen at the right lung base. There is a small amount of mucus in the mid to distal trachea. There is small amount of layering debris in the mainstem bronchi. The majority of the right middle and lower lobe bronchi are collapsed and opacified with low-density debris. There is a trace left pleural effusion with subsegmental left basilar atelectasis. There is emphysema. There are new scattered subcentimeter bilateral centrilobular nodules that are likely infectious/inflammatory in nature from bronchiolitis.   CHEST WALL SOFT TISSUES: No discernible acute abnormality. There is a 5.5 cm x 1.7 cm lipoma in the right infraspinatus muscle.   OSSEOUS STRUCTURES: No acute osseous abnormality.     CT ABDOMEN/PELVIS:   ABDOMINAL WALL: No significant abnormality.   LIVER: No significant parenchymal abnormality.   BILE DUCTS: No significant intrahepatic or extrahepatic dilatation.   GALLBLADDER: No significant abnormality.   PANCREAS: No significant abnormality.   SPLEEN: No significant abnormality.   ADRENALS: There is a 1.7 cm low-density left adrenal nodule likely representing a adenoma.   KIDNEYS, URETERS, BLADDER: There is a heterogeneously enhancing solid mass in the upper pole the left kidney measuring 2.4 cm x 1.9 cm consistent with renal cell carcinoma. Additionally, a 0.8 cm likely enhancing lesion  in the upper pole the right kidney is concerning for renal cell carcinoma. No hydronephrosis or calculi. Urinary bladder is decompressed with Live catheter in place.   REPRODUCTIVE ORGANS: No significant abnormality.   VESSELS: Moderate aortic atherosclerosis without AAA.   RETROPERITONEUM/LYMPH NODES: No acute retroperitoneal abnormality. No enlarged lymph nodes.   BOWEL/MESENTERY/PERITONEUM: Colonic diverticulosis without acute diverticulitis. No inflammatory bowel wall thickening or dilatation. Normal appendix.   No ascites, free air, or fluid collection.     MUSCULOSKELETAL: No acute osseous abnormality.       CT CHEST: Large loculated right pleural effusion causing complete compressive collapse of the right middle lobe and right lower lobe and partial compressive collapse of the right upper lobe. The largest likely compartments located at the inferior right hemithorax. The cause of the effusion is not apparent as there is only a small amount of debris in the distal trachea and right mainstem bronchus but the distal-most right-sided bronchi are occluded due to combination of compressive collapse and small low-density fluid.   Numerous new subcentimeter centrilobular pulmonary nodules that are most likely infectious/inflammatory in as can be seen with bronchiolitis or fungal infection.   Aortic valve calcification to the extent that would likely result in aortic stenosis.   CT ABDOMEN/PELVIS: Left kidney solid heterogeneously enhancing mass suspicious for renal cell carcinoma (2.4 cm x 1.9 cm) and of suspected 0.8 cm right upper pole renal cell carcinoma measuring 0.8 cm. Recommend urological follow-up/also taken and dedicated MRI kidneys to further evaluate these lesions. YELLOW ALERT: An incidental finding alert was sent per protocol.   No acute abnormality in the abdomen or pelvis.   Colonic diverticulosis without acute diverticulitis.   MACRO: Critical Finding:  New mass in the kidney. Notification was  initiated on 9/4/2024 at 6:25 pm by  Ishaan Banda.  (**-YCF-**) Instructions:  MR Abdomen w and wo IV contrast. 1 week.   Signed by: Ishaan Banda 9/4/2024 6:26 PM Dictation workstation:   [id number]    CT head wo IV contrast    Result Date: 9/4/2024  Interpreted By:  Ishaan Banda, STUDY: CT HEAD WO IV CONTRAST;  9/4/2024 5:52 pm   INDICATION: Signs/Symptoms:unrespoonsive.     COMPARISON: None.   ACCESSION NUMBER(S): [id number]   ORDERING CLINICIAN: GURPREET COPPOLA   TECHNIQUE: Noncontrast axial CT images of head were obtained with coronal and sagittal reconstructed images.   FINDINGS: BRAIN PARENCHYMA: Mild periventricular and subcortical hemispheric white matter hypodensities are most compatible with chronic small vessel ischemic disease. No acute intraparenchymal hemorrhage or parenchymal evidence of acute large territory ischemic infarct. Gray-white matter distinction is preserved. No mass-effect.   VENTRICLES and EXTRA-AXIAL SPACES:  No acute extra-axial or intraventricular hemorrhage. No effacement of cerebral sulci. The ventricles and sulci are age-concordant.   PARANASAL SINUSES/MASTOIDS:  No hemorrhage or air-fluid levels within the visualized paranasal sinuses. The mastoids are well aerated.   CALVARIUM/ORBITS:  No skull fracture.  The orbits and globes are intact to the extent visualized.   EXTRACRANIAL SOFT TISSUES: No discernible abnormality.       No acute intracranial abnormality.     MACRO: None.   Signed by: Ishaan Banda 9/4/2024 6:07 PM Dictation workstation:   [id number]    XR chest 1 view    Result Date: 9/4/2024  Interpreted By:  Rosalie Ellison, STUDY: XR CHEST 1 VIEW;  9/4/2024 4:33 pm   INDICATION: Signs/Symptoms:intubation.   COMPARISON: 04/08/2024   ACCESSION NUMBER(S): [id number]   ORDERING CLINICIAN: GURPREET COPPOLA   TECHNIQUE: A single portable image of the chest was obtained.   FINDINGS: Resolution is limited. The right portion of the chest is not entirely included.    The patient is rotated. The heart size is uncertain.   There appears to be elevated right hemidiaphragm and right-sided infiltration.   An endotracheal tube terminates above the kathie. A nasogastric tube terminates below the diaphragm.   COMPARISON OF FINDING: The nasogastric tube was placed in the interval. The endotracheal tube was placed in the interval.       Interval placement of an endotracheal tube. The tip is above the kathie. Interval placement of a nasogastric tube. It appears to terminate below the diaphragm.   The exam is extremely limited.   MACRO: none   Signed by: Rosalie Ellison 9/4/2024 4:43 PM Dictation workstation:   [id number]             Assessment/Plan   Assessment & Plan  Acute respiratory failure with hypoxia and hypercapnia (Multi)  67-year-old gentleman with history of chronic diastolic heart failure, COPD, obstructive sleep apnea, obesity, type 2 diabetes mellitus, hypertension, hypothyroidism and hyperlipidemia was admitted with acute respiratory failure with hypoxia and hypercapnia and noted to have a right loculated pleural effusion and bilateral lower lobe lung infiltrate and right hydropneumothorax.  He was hypotensive on admission and required 3 L of fluid to resuscitate and admitted to intensive care unit and was intubated because of acute respiratory failure and remained intubated for several days.  Currently he is extubated and on high flow oxygen as mentioned above.  He had significant leukocytosis of 22,000 on admission and his sputum culture was positive for stenotrophomonas.  Patient is being continued on IV Zosyn for next few days.     1.  Acute hypoxic hypercapnic respiratory failure with hypotension and right hydropneumothorax and bilateral infiltrate and positive for pneumonia.Sputum culture was positive for stenotrophomonas.   Initially intubated for several days and since then he has been extubated and is on high flow Airvo 50% / 15 L oxygen.  Improving and currently is  on 6 L nasal cannula.  Continued on IV Zosyn for another 5 days  Leukocytosis is improving/      2.  Patient had hydropneumothorax on admission and had chest tube, chest tube has been removed since then..    3.  Hypotension has resolved    4.  COPD and obstructive sleep apnea aerosol treatment and currently on Airvo with 50% oxygen/15 L    Continue aerosol treatment and currently patient on prednisone 20 mg daily.    5.  Diabetes mellitus type 2, continue sliding scale insulin, patient was on Mounjaro at home prior to admission.    6.  Patient  DNR/DNI.    Reviewed all labs patient's medical record and imaging studies and discussed the plan of treatment with the patient..  Addendum: I was called in the evening around 7 PM stating that patient had developed V. tach however his EKG revealed complete right bundle branch block with tachycardia but no acute changes and no evidence of ventricular tachycardia, patient will be observed closely.     I spent 35 minutes in the professional and overall care of this patient.      Noble Gatica MD

## 2024-09-17 NOTE — PROGRESS NOTES
Alo Glass is a 69 y.o. male on day 12 of admission presenting with Acute respiratory failure with hypoxia and hypercapnia (Multi).  Patient with h/o CHF, aortic valve stenosis, DLP, T2DM, COPD, MELISSA, who was brought in by EMS after being found unresponsiveness at home. Work up revealed respiratory failure with hypoxia and hypercapnia, also hypotensive. Received IVF, antibiotics, but given persistent hypotension started on Levophed and and admitted to ICU. In the ICU found to have coffee ground emesis, subsequently was intubated. CT chest showed R loculated pleural effusion and multiple nodules. Sputum culture +ve for stenotrophomonas. Patient overall improved and was subsequently extubated. Post extubation initially had high oxygen requirements, but now is improving. Pulmonary is consulted to assist with his care.   Subjective   No acute overnight events. O2 requirements increased to 12 L.     Overall is feeling OK. SOB stable. Still has a cough productive of brown sputum. Also mild wheezing. Denies any pains.   Objective   Scheduled medications  aspirin, 81 mg, oral, Daily  enoxaparin, 40 mg, subcutaneous, q12h SHARRON  ezetimibe, 10 mg, oral, Daily  folic acid, 1 mg, oral, Daily  guaiFENesin, 200 mg, oral, TID  insulin lispro, 0-10 Units, subcutaneous, q4h  ipratropium-albuteroL, 3 mL, nebulization, 4x daily  levoFLOXacin, 750 mg, oral, q24h SHARRON  levothyroxine, 125 mcg, oral, Daily  multivitamin with minerals, 1 tablet, oral, Daily  nystatin, 5 mL, Swish & Swallow, 4x daily  pantoprazole, 40 mg, intravenous, Daily  piperacillin-tazobactam, 3.375 g, intravenous, q6h  polyethylene glycol, 17 g, nasogastric tube, Daily  predniSONE, 20 mg, oral, Daily  sennosides-docusate sodium, 2 tablet, oral, BID  thiamine, 100 mg, oral, Daily     Continuous medications    PRN medications  PRN medications: dextrose, dextrose, glucagon, glucagon, ipratropium-albuteroL, oxygen, oxygen   Physical Exam  Constitutional:        "General: He is not in acute distress.     Appearance: He is obese. He is ill-appearing. He is not toxic-appearing.   HENT:      Head: Normocephalic and atraumatic.      Nose:      Comments: On 12L NC     Mouth/Throat:      Mouth: Mucous membranes are moist.      Comments: Mallampati 2.   Eyes:      General: No scleral icterus.     Extraocular Movements: Extraocular movements intact.      Pupils: Pupils are equal, round, and reactive to light.   Cardiovascular:      Rate and Rhythm: Normal rate and regular rhythm.      Heart sounds: No murmur heard.     No friction rub. No gallop.   Pulmonary:      Effort: Pulmonary effort is normal. No respiratory distress.      Breath sounds: No wheezing or rales.      Comments: Clear lung fields b/l with fair air entry.  Abdominal:      General: Bowel sounds are normal. There is no distension.      Palpations: Abdomen is soft.      Tenderness: There is no abdominal tenderness.   Musculoskeletal:      Cervical back: Normal range of motion and neck supple. No rigidity or tenderness.      Right lower leg: Edema (2+) present.      Left lower leg: Edema (1+) present.   Lymphadenopathy:      Cervical: No cervical adenopathy.   Skin:     General: Skin is warm and dry.      Coloration: Skin is not jaundiced.      Comments: Chronic discoloration b/l LE edema.     Neurological:      General: No focal deficit present.      Mental Status: He is alert and oriented to person, place, and time.      Cranial Nerves: No cranial nerve deficit.      Motor: Weakness (generalized weakness now is improved.) present.   Psychiatric:         Mood and Affect: Mood normal.         Behavior: Behavior normal.     Last Recorded Vitals  Blood pressure 115/68, pulse 85, temperature 36.3 °C (97.3 °F), temperature source Oral, resp. rate 18, height 1.803 m (5' 10.98\"), weight 127 kg (279 lb 15.8 oz), SpO2 94%.  Intake/Output last 3 Shifts:  I/O last 3 completed shifts:  In: 400 (3.1 mL/kg) [P.O.:400]  Out: 1700 " (13.4 mL/kg) [Urine:1700 (0.4 mL/kg/hr)]  Weight: 127 kg   Relevant Results  Latest labs and imaging reviewed.     Assessment/Plan   Assessment & Plan  Acute respiratory failure with hypoxia and hypercapnia (Multi)    69 YOM with h/o CHF, aortic valve stenosis, DLP, T2DM, COPD, MELISSA, who was brought in by EMS after being found unresponsiveness at home. Work up revealed respiratory failure with hypoxia and hypercapnia, also hypotensive. Received IVF, antibiotics, but given persistent hypotension started on Levophed and and admitted to ICU. In the ICU found to have coffee ground emesis, subsequently was intubated. CT chest showed R loculated pleural effusion and multiple nodules. Sputum culture +ve for stenotrophomonas. Patient overall improved and was subsequently extubated. Post extubation initially had high oxygen requirements, but now is improving. Pulmonary is consulted to assist with his care.  .     Respiratory failure: acute with hypoxia and hypercarbia, requiring MV, now overall improved but worsened today.       CXR in am.        Continue supplemental O2, wean off as tolerates       Home O2 evaluation before DC       BPH with IS, Acapella       Management of the individual causes as below     COPD: PFT in 2019 showed 54%, FEV1 32-->46%, RV/TLC 63%, TLC 85%, and DLCO 77%, overall associated with stage III COPD. On triple therapy as outpatient        Continue Prednisone taper       Continue Duo-neb       FU with pulmonary after DC. Patient needs to make an appointment with a new pulmonologist as currently does not have a pulmonary provider.        Resume Trelegy and albuterol on DC     MELISSA:        Continue BiPAP at nights       Resume APAP 10-15 at nights on DC     Pleural effusion: large R sided, likely parapneumonic, S/p chest tube placement. Analysis showed neutrophilic predominant effusion, cultures negative. CT is now removed.       Will monitor for now     Pneumonia: RML,         Continue Levaquin.       DVT prophylaxis: Lovenox     Pulmonary will FU while in house.     Marta Thurston MD

## 2024-09-17 NOTE — ASSESSMENT & PLAN NOTE
67-year-old gentleman with history of chronic diastolic heart failure, COPD, obstructive sleep apnea, obesity, type 2 diabetes mellitus, hypertension, hypothyroidism and hyperlipidemia was admitted with acute respiratory failure with hypoxia and hypercapnia and noted to have a right loculated pleural effusion and bilateral lower lobe lung infiltrate and right hydropneumothorax.  He was hypotensive on admission and required 3 L of fluid to resuscitate and admitted to intensive care unit and was intubated because of acute respiratory failure and remained intubated for several days.  Currently he is extubated and on high flow oxygen as mentioned above.  He had significant leukocytosis of 22,000 on admission and his sputum culture was positive for stenotrophomonas.  Patient is being continued on IV Zosyn for next few days.     1.  Acute hypoxic hypercapnic respiratory failure with hypotension and right hydropneumothorax and bilateral infiltrate and positive for pneumonia.Sputum culture was positive for stenotrophomonas.   Initially intubated for several days and since then he has been extubated and is on high flow Airvo 50% / 15 L oxygen.  Improving and currently is on 6 L nasal cannula.  Continued on IV Zosyn for another 5 days  Leukocytosis is improving/      2.  Patient had hydropneumothorax on admission and had chest tube, chest tube has been removed since then..    3.  Hypotension has resolved    4.  COPD and obstructive sleep apnea aerosol treatment and currently on Airvo with 50% oxygen/15 L    Continue aerosol treatment and currently patient on prednisone 20 mg daily.    5.  Diabetes mellitus type 2, continue sliding scale insulin, patient was on Mounjaro at home prior to admission.    6.  Patient  DNR/DNI.    Reviewed all labs patient's medical record and imaging studies and discussed the plan of treatment with the patient..

## 2024-09-17 NOTE — PROGRESS NOTES
"  INFECTIOUS DISEASE DAILY PROGRESS NOTE    SUBJECTIVE:    No overnight issues. Getting vest therapy this morning. On 11L NC.    OBJECTIVE:  VITALS (Last 24 Hours)  BP 95/63   Pulse 73   Temp 36 °C (96.8 °F)   Resp 17   Ht 1.803 m (5' 10.98\")   Wt 125 kg (276 lb 3.8 oz)   SpO2 97%   BMI 38.54 kg/m²     PHYSICAL EXAM:  Gen - on 11L NC, vest therapy ongoing  Abd - soft, no ttp, BS present  Skin - no rash    ABX: IV Zosyn and PO Levofloxacin    LABS:  Lab Results   Component Value Date    WBC 9.1 09/17/2024    HGB 15.9 09/17/2024    HCT 50.7 09/17/2024     (H) 09/17/2024     09/17/2024     Lab Results   Component Value Date    GLUCOSE 100 (H) 09/17/2024    CALCIUM 7.5 (L) 09/17/2024     09/17/2024    K 3.7 09/17/2024    CO2 42 (HH) 09/17/2024    CL 99 09/17/2024    BUN 16 09/17/2024    CREATININE 0.46 (L) 09/17/2024     Results from last 72 hours   Lab Units 09/17/24  0502   ALBUMIN g/dL 2.3*     Estimated Creatinine Clearance: 125 mL/min (A) (by C-G formula based on SCr of 0.46 mg/dL (L)).    IMAGING:  CXR 9/13  Impression:     Improved aeration of the right lung base. Residual hazy right basilar  opacities likely a combination of residual pleural effusion and  basilar atelectasis.    Limited evaluation of the left lung base due to underpenetration.     ASSESSMENT/PLAN:     PNA due to Stenotrophomonas maltophilia. Resolving  Right Loculated Pleural Effusion - s/p chest tube 9/4, 71K WBCs neutrophilic with pH 7.9. Glucose <10 and exudate by Light's criteria. Consistent with an empyema. No growth. Resolving and chest tube out.     He continues to improve slowly. IV Zosyn and PO Levofloxacin maybe another 1-2 days depending on continued clinical improvement.    Monitoring for adverse effects of abx such as rash/itching/diarrhea - none present.    Will follow. Thanks!     Elijah Busby MD  [facility]  Office #[phone]      "

## 2024-09-17 NOTE — CARE PLAN
The patient's goals for the shift include Pt. will have a safe, restful and uneventful evening    The clinical goals for the shift include Pt. will have a decrease in oxygen demand this shift      Problem: Skin  Goal: Decreased wound size/increased tissue granulation at next dressing change  Outcome: Progressing  Goal: Prevent/manage excess moisture  Outcome: Progressing  Goal: Prevent/minimize sheer/friction injuries  Outcome: Progressing  Goal: Promote/optimize nutrition  Outcome: Progressing     Problem: Nutrition  Goal: Oral intake greater than 50%  Outcome: Progressing  Goal: Oral intake greater 75%  Outcome: Progressing  Goal: Consume prescribed supplement  Outcome: Progressing  Goal: Nutrition support goals are met within 48 hrs  Outcome: Progressing  Goal: Nutrition support is meeting 75% of nutrient needs  Outcome: Progressing  Goal: Tube feed tolerance  Outcome: Progressing  Goal: BG  mg/dL  Outcome: Progressing  Goal: Lab values WNL  Outcome: Progressing  Goal: Electrolytes WNL  Outcome: Progressing  Goal: Promote healing  Outcome: Progressing  Goal: Maintain stable weight  Outcome: Progressing  Goal: Reduce weight from edema/fluid  Outcome: Progressing  Goal: Gradual weight gain  Outcome: Progressing  Goal: Improve ostomy output  Outcome: Progressing     Problem: Discharge Planning  Goal: Discharge to home or other facility with appropriate resources  Outcome: Progressing     Problem: Chronic Conditions and Co-morbidities  Goal: Patient's chronic conditions and co-morbidity symptoms are monitored and maintained or improved  Outcome: Progressing     Problem: Diabetes  Goal: Achieve decreasing blood glucose levels by end of shift  Outcome: Progressing  Goal: Increase stability of blood glucose readings by end of shift  Outcome: Progressing  Goal: Decrease in ketones present in urine by end of shift  Outcome: Progressing  Goal: Maintain electrolyte levels within acceptable range throughout  shift  Outcome: Progressing  Goal: Maintain glucose levels >70mg/dl to <250mg/dl throughout shift  Outcome: Progressing  Goal: No changes in neurological exam by end of shift  Outcome: Progressing  Goal: Learn about and adhere to nutrition recommendations by end of shift  Outcome: Progressing  Goal: Vital signs within normal range for age by end of shift  Outcome: Progressing  Goal: Increase self care and/or family involovement by end of shift  Outcome: Progressing  Goal: Receive DSME education by end of shift  Outcome: Progressing     Problem: Knowledge Deficit  Goal: Patient/family/caregiver demonstrates understanding of disease process, treatment plan, medications, and discharge instructions  Outcome: Progressing     Problem: Mechanical Ventilation  Goal: Oral health is maintained or improved  Outcome: Progressing  Goal: Ability to express needs and understand communication  Outcome: Progressing  Goal: Mobility/activity is maintained at optimum level for patient  Outcome: Progressing     Problem: Fall/Injury  Goal: Not fall by end of shift  Outcome: Progressing  Goal: Be free from injury by end of the shift  Outcome: Progressing  Goal: Verbalize understanding of personal risk factors for fall in the hospital  Outcome: Progressing  Goal: Verbalize understanding of risk factor reduction measures to prevent injury from fall in the home  Outcome: Progressing  Goal: Use assistive devices by end of the shift  Outcome: Progressing  Goal: Pace activities to prevent fatigue by end of the shift  Outcome: Progressing

## 2024-09-17 NOTE — NURSING NOTE
Four eye skin check done with Shaea CTA, changed LFA skin tear dressing and placed mepilex on coccyx for protection, otherwise skin is intact

## 2024-09-17 NOTE — PROGRESS NOTES
09/17/24 1553   Discharge Planning   Expected Discharge Disposition Long Term     Met with patient and friend at bedside. Discussed potential referral to LTACH. Patient agreeable, but would like to be closer to home- referrals sent to 1. [facility] 2. [facility]. [facility] can accept, pending bed availability. SW will follow.

## 2024-09-17 NOTE — PROGRESS NOTES
Care Coordinator Note:    Plan: patient in with PNA with empyema and septic shock. R chest is now removed. On 11 liters o2. ID following IV zosyn and oral levo contd.  PT OT rec MOD. Accepted at [facility].     Status: inpatient  Payor: [employer]  Disposition: NEW SNF. Accept[facility] at [facility]- they can take up to 12 liters. NP would like referrals sent to LTACH if unable to wean down o2. SW to discuss with families and get FOC. WILL NEED AUTH.   Barrier:  High o2 demand, AUTH  ADOD: 1-2 days    Tennille Medeiros T[facility]      09/17/24 5400   Discharge Planning   Expected Discharge Disposition SNF

## 2024-09-17 NOTE — CARE PLAN
The patient's goals for the shift include Pt. will have a safe, restful and uneventful evening    The clinical goals for the shift include Pt. will have adequate oxygenation this shift      Problem: Skin  Goal: Decreased wound size/increased tissue granulation at next dressing change  Outcome: Progressing  Goal: Prevent/manage excess moisture  Outcome: Progressing  Goal: Prevent/minimize sheer/friction injuries  Outcome: Progressing  Goal: Promote/optimize nutrition  Outcome: Progressing     Problem: Nutrition  Goal: Oral intake greater than 50%  Outcome: Progressing  Goal: Oral intake greater 75%  Outcome: Progressing  Goal: Consume prescribed supplement  Outcome: Progressing  Goal: Nutrition support goals are met within 48 hrs  Outcome: Progressing  Goal: Nutrition support is meeting 75% of nutrient needs  Outcome: Progressing  Goal: Tube feed tolerance  Outcome: Progressing  Goal: BG  mg/dL  Outcome: Progressing  Goal: Lab values WNL  Outcome: Progressing  Goal: Electrolytes WNL  Outcome: Progressing  Goal: Promote healing  Outcome: Progressing  Goal: Maintain stable weight  Outcome: Progressing  Goal: Reduce weight from edema/fluid  Outcome: Progressing  Goal: Gradual weight gain  Outcome: Progressing  Goal: Improve ostomy output  Outcome: Progressing     Problem: Discharge Planning  Goal: Discharge to home or other facility with appropriate resources  Outcome: Progressing     Problem: Chronic Conditions and Co-morbidities  Goal: Patient's chronic conditions and co-morbidity symptoms are monitored and maintained or improved  Outcome: Progressing     Problem: Diabetes  Goal: Achieve decreasing blood glucose levels by end of shift  Outcome: Progressing  Goal: Increase stability of blood glucose readings by end of shift  Outcome: Progressing  Goal: Decrease in ketones present in urine by end of shift  Outcome: Progressing  Goal: Maintain electrolyte levels within acceptable range throughout shift  Outcome:  Progressing  Goal: Maintain glucose levels >70mg/dl to <250mg/dl throughout shift  Outcome: Progressing  Goal: No changes in neurological exam by end of shift  Outcome: Progressing  Goal: Learn about and adhere to nutrition recommendations by end of shift  Outcome: Progressing  Goal: Vital signs within normal range for age by end of shift  Outcome: Progressing  Goal: Increase self care and/or family involovement by end of shift  Outcome: Progressing  Goal: Receive DSME education by end of shift  Outcome: Progressing     Problem: Knowledge Deficit  Goal: Patient/family/caregiver demonstrates understanding of disease process, treatment plan, medications, and discharge instructions  Outcome: Progressing     Problem: Mechanical Ventilation  Goal: Oral health is maintained or improved  Outcome: Progressing  Goal: Ability to express needs and understand communication  Outcome: Progressing  Goal: Mobility/activity is maintained at optimum level for patient  Outcome: Progressing

## 2024-09-17 NOTE — PROGRESS NOTES
Occupational Therapy    OT Treatment    Patient Name: Alo Glass  MRN: [id number]  Department: [place] [id number]  Room: [facility]414-A  Today's Date: 9/17/2024  Time Calculation  Start Time: 0944  Stop Time: 0956  Time Calculation (min): 12 min        Assessment:  OT Assessment: Pt agreeable to bed level ADLs with encouragement. Pt reports increased fatigue from working with PT this morning, not agreeable to EOB activity at this time. Pt completing grooming tasks with set-up and min A. Continues to demo decreased endurance, strength and coordination and is quick to fatigue. Would recommend continued therapy at discharge to return to baseline function.  Prognosis: Good  Barriers to Discharge: Decreased caregiver support  Evaluation/Treatment Tolerance: Patient tolerated treatment well, Patient limited by fatigue  Medical Staff Made Aware: Yes  End of Session Communication: PCT/NA/CTA  End of Session Patient Position: Bed, 3 rail up, Alarm on  OT Assessment Results: Decreased ADL status, Decreased upper extremity range of motion, Decreased upper extremity strength, Decreased safe judgment during ADL, Decreased cognition, Decreased endurance, Decreased sensation, Decreased IADLs  Prognosis: Good  Barriers to Discharge: Decreased caregiver support  Evaluation/Treatment Tolerance: Patient tolerated treatment well, Patient limited by fatigue  Medical Staff Made Aware: Yes  Strengths: Ability to acquire knowledge, Coping skills  Plan:  Treatment Interventions: ADL retraining, Functional transfer training, UE strengthening/ROM, Endurance training, Fine motor coordination activities, Compensatory technique education  OT Frequency: 4 times per week  OT Discharge Recommendations: High intensity level of continued care  Equipment Recommended upon Discharge: Other (comment) (TBD)  OT Recommended Transfer Status: Maximum assist, Assist of 2  OT - OK to Discharge: Yes  Treatment Interventions: ADL retraining, Functional transfer training,  UE strengthening/ROM, Endurance training, Fine motor coordination activities, Compensatory technique education    Subjective   Previous Visit Info:  OT Last Visit  OT Received On: 09/17/24  General:  General  Reason for Referral: Impaired Mobility: Pt is a 69 y.o. male found non responsive at home by his neighbor and EMS was called.  Past Medical History Relevant to Rehab:   Past Medical History:   Diagnosis Date    CHF (congestive heart failure) (Multi)     COPD (chronic obstructive pulmonary disease) (Multi)     Diabetes mellitus (Multi)     Hypertension     Obesity     MELISSA (obstructive sleep apnea)     Personal history of other specified conditions 01/08/2023    History of impaired glucose tolerance       Prior to Session Communication: Bedside nurse  Patient Position Received: Bed, 3 rail up, Alarm on  Preferred Learning Style: auditory, kinesthetic, verbal, visual, written  General Comment: pt supine in bed upon entry, agreeable to therapy  Precautions:  Medical Precautions: Fall precautions, Oxygen therapy device and L/min  Precautions Comment: Confusion, hx of falls    Vital Signs (Past 2hrs)        Date/Time Vitals Session Patient Position Pulse Resp SpO2 BP MAP (mmHg)    09/17/24 0846 During PT  --  --  --  --  --  --                         Pain:  Pain Assessment  Pain Assessment: 0-10  0-10 (Numeric) Pain Score: 0 - No pain    Objective    Cognition:  Cognition  Overall Cognitive Status: Within Functional Limits       Activities of Daily Living: Grooming  Grooming Level of Assistance: Minimum assistance, Setup  Grooming Where Assessed: Bed level  Grooming Comments: pt needing assist with opening toothbrush and toothpaste, as well as reapplying cap to toothpaste due to decreased coordination and strength. Pt completed oral hygiene and facial hygiene when provided with wash cloth.       Bed Mobility/Transfers: Bed Mobility  Bed Mobility: No (pt declined)       Therapy/Activity: Therapeutic  Exercise  Therapeutic Exercise Activity 1:  (Educated pt on completion of BUE exercises, pt declined performing them during session but verbalized understanding.)           Outcome Measures:[facility] Daily Activity  Putting on and taking off regular lower body clothing: Total  Bathing (including washing, rinsing, drying): A lot  Putting on and taking off regular upper body clothing: A lot  Toileting, which includes using toilet, bedpan or urinal: Total  Taking care of personal grooming such as brushing teeth: A little  Eating Meals: A little  Daily Activity - Total Score: 12        Education Documentation  Body Mechanics, taught by Angy Wright OT at 9/17/2024 10:31 AM.  Learner: Patient  Readiness: Acceptance  Method: Explanation  Response: Verbalizes Understanding    ADL Training, taught by Angy Wright OT at 9/17/2024 10:31 AM.  Learner: Patient  Readiness: Acceptance  Method: Explanation  Response: Verbalizes Understanding    Education Comments  No comments found.        OP EDUCATION:       Goals:  Encounter Problems       Encounter Problems (Active)       ADLs       Patient will perform UB and LB bathing with modified independent level of assistance. (Not Progressing)       Start:  09/15/24    Expected End:  09/29/24            Patient with complete upper body dressing with modified independent level of assistance donning and doffing all UE clothes with PRN adaptive equipment. (Not Progressing)       Start:  09/15/24    Expected End:  09/29/24            Patient with complete lower body dressing with modified independent level of assistance donning and doffing all LE clothes  with PRN adaptive equipment. (Not Progressing)       Start:  09/15/24    Expected End:  09/29/24            Patient will feed self with modified independent level of assistance. (Progressing)       Start:  09/15/24    Expected End:  09/29/24            Patient will complete daily grooming tasks brushing teeth and washing face/hair with  modified independent level of assistance. (Progressing)       Start:  09/15/24    Expected End:  09/29/24            Patient will complete toileting including hygiene clothing management/hygiene with modified independent level of assistance. (Not Progressing)       Start:  09/15/24    Expected End:  09/29/24               COGNITION/SAFETY       Patient will demonstrated orientation x 4. (Progressing)       Start:  09/15/24    Expected End:  09/29/24       ORIENTATION            TRANSFERS       Patient will perform bed mobility modified independent level of assistance in order to improve safety and independence with mobility (Not Progressing)       Start:  09/15/24    Expected End:  09/29/24

## 2024-09-18 DIAGNOSIS — E03.9 HYPOTHYROIDISM, UNSPECIFIED: ICD-10-CM

## 2024-09-18 LAB
ALBUMIN SERPL BCP-MCNC: 3 G/DL (ref 3.4–5)
ANION GAP BLDA CALCULATED.4IONS-SCNC: 3 MMO/L (ref 10–25)
ANION GAP BLDV CALCULATED.4IONS-SCNC: 3 MMOL/L (ref 10–25)
ANION GAP SERPL CALC-SCNC: 8 MMOL/L (ref 10–20)
ATRIAL RATE: 91 BPM
BASE EXCESS BLDA CALC-SCNC: 3.7 MMOL/L (ref -2–3)
BASE EXCESS BLDV CALC-SCNC: 5.2 MMOL/L (ref -2–3)
BNP SERPL-MCNC: 639 PG/ML (ref 0–99)
BODY TEMPERATURE: 37 DEGREES CELSIUS
BODY TEMPERATURE: 37 DEGREES CELSIUS
BUN SERPL-MCNC: 16 MG/DL (ref 6–23)
CA-I BLDA-SCNC: 1.19 MMOL/L (ref 1.1–1.33)
CA-I BLDV-SCNC: 1.1 MMOL/L (ref 1.1–1.33)
CALCIUM SERPL-MCNC: 8 MG/DL (ref 8.6–10.3)
CHLORIDE BLDA-SCNC: 99 MMOL/L (ref 98–107)
CHLORIDE BLDV-SCNC: 97 MMOL/L (ref 98–107)
CHLORIDE SERPL-SCNC: 101 MMOL/L (ref 98–107)
CO2 SERPL-SCNC: 36 MMOL/L (ref 21–32)
CREAT SERPL-MCNC: 0.41 MG/DL (ref 0.5–1.3)
EGFRCR SERPLBLD CKD-EPI 2021: >90 ML/MIN/1.73M*2
ERYTHROCYTE [DISTWIDTH] IN BLOOD BY AUTOMATED COUNT: 13.3 % (ref 11.5–14.5)
GLUCOSE BLD MANUAL STRIP-MCNC: 106 MG/DL (ref 74–99)
GLUCOSE BLD MANUAL STRIP-MCNC: 122 MG/DL (ref 74–99)
GLUCOSE BLD MANUAL STRIP-MCNC: 127 MG/DL (ref 74–99)
GLUCOSE BLD MANUAL STRIP-MCNC: 243 MG/DL (ref 74–99)
GLUCOSE BLD MANUAL STRIP-MCNC: 374 MG/DL (ref 74–99)
GLUCOSE BLDA-MCNC: 134 MG/DL (ref 74–99)
GLUCOSE BLDV-MCNC: 135 MG/DL (ref 74–99)
GLUCOSE SERPL-MCNC: 104 MG/DL (ref 74–99)
HCO3 BLDA-SCNC: 35.2 MMOL/L (ref 22–26)
HCO3 BLDV-SCNC: 33.7 MMOL/L (ref 22–26)
HCT VFR BLD AUTO: 55.7 % (ref 41–52)
HCT VFR BLD EST: 50 % (ref 41–52)
HCT VFR BLD EST: 53 % (ref 41–52)
HGB BLD-MCNC: 17.1 G/DL (ref 13.5–17.5)
HGB BLDA-MCNC: 17.5 G/DL (ref 13.5–17.5)
HGB BLDV-MCNC: 16.5 G/DL (ref 13.5–17.5)
INHALED O2 CONCENTRATION: 60 %
INHALED O2 CONCENTRATION: 60 %
LACTATE BLDA-SCNC: 0.7 MMOL/L (ref 0.4–2)
LACTATE BLDV-SCNC: 1.4 MMOL/L (ref 0.4–2)
MCH RBC QN AUTO: 32.5 PG (ref 26–34)
MCHC RBC AUTO-ENTMCNC: 30.7 G/DL (ref 32–36)
MCV RBC AUTO: 106 FL (ref 80–100)
NRBC BLD-RTO: 0 /100 WBCS (ref 0–0)
OXYHGB MFR BLDA: 95.3 % (ref 94–98)
OXYHGB MFR BLDV: 73.7 % (ref 45–75)
P AXIS: -10 DEGREES
P OFFSET: 158 MS
P ONSET: 88 MS
PCO2 BLDA: 84 MM HG (ref 38–42)
PCO2 BLDV: 64 MM HG (ref 41–51)
PH BLDA: 7.23 PH (ref 7.38–7.42)
PH BLDV: 7.33 PH (ref 7.33–7.43)
PHOSPHATE SERPL-MCNC: 2.9 MG/DL (ref 2.5–4.9)
PLATELET # BLD AUTO: 214 X10*3/UL (ref 150–450)
PO2 BLDA: 114 MM HG (ref 85–95)
PO2 BLDV: 39 MM HG (ref 35–45)
POTASSIUM BLDA-SCNC: 3.7 MMOL/L (ref 3.5–5.3)
POTASSIUM BLDV-SCNC: 3.7 MMOL/L (ref 3.5–5.3)
POTASSIUM SERPL-SCNC: 3.8 MMOL/L (ref 3.5–5.3)
PR INTERVAL: 234 MS
Q ONSET: 205 MS
QRS COUNT: 15 BEATS
QRS DURATION: 162 MS
QT INTERVAL: 426 MS
QTC CALCULATION(BAZETT): 523 MS
QTC FREDERICIA: 489 MS
R AXIS: -83 DEGREES
RBC # BLD AUTO: 5.26 X10*6/UL (ref 4.5–5.9)
SAO2 % BLDA: 98 % (ref 94–100)
SAO2 % BLDV: 76 % (ref 45–75)
SODIUM BLDA-SCNC: 133 MMOL/L (ref 136–145)
SODIUM BLDV-SCNC: 130 MMOL/L (ref 136–145)
SODIUM SERPL-SCNC: 141 MMOL/L (ref 136–145)
T AXIS: 62 DEGREES
T OFFSET: 418 MS
VENTRICULAR RATE: 91 BPM
WBC # BLD AUTO: 11.1 X10*3/UL (ref 4.4–11.3)

## 2024-09-18 PROCEDURE — 2500000005 HC RX 250 GENERAL PHARMACY W/O HCPCS: Performed by: INTERNAL MEDICINE

## 2024-09-18 PROCEDURE — 84132 ASSAY OF SERUM POTASSIUM: CPT | Performed by: NURSE PRACTITIONER

## 2024-09-18 PROCEDURE — 99232 SBSQ HOSP IP/OBS MODERATE 35: CPT | Performed by: INTERNAL MEDICINE

## 2024-09-18 PROCEDURE — 99233 SBSQ HOSP IP/OBS HIGH 50: CPT | Performed by: INTERNAL MEDICINE

## 2024-09-18 PROCEDURE — 84132 ASSAY OF SERUM POTASSIUM: CPT | Performed by: INTERNAL MEDICINE

## 2024-09-18 PROCEDURE — 2500000004 HC RX 250 GENERAL PHARMACY W/ HCPCS (ALT 636 FOR OP/ED): Performed by: SURGERY

## 2024-09-18 PROCEDURE — 94660 CPAP INITIATION&MGMT: CPT

## 2024-09-18 PROCEDURE — 85027 COMPLETE CBC AUTOMATED: CPT | Performed by: NURSE PRACTITIONER

## 2024-09-18 PROCEDURE — 80069 RENAL FUNCTION PANEL: CPT | Performed by: NURSE PRACTITIONER

## 2024-09-18 PROCEDURE — 94668 MNPJ CHEST WALL SBSQ: CPT

## 2024-09-18 PROCEDURE — 94669 MECHANICAL CHEST WALL OSCILL: CPT

## 2024-09-18 PROCEDURE — 2500000001 HC RX 250 WO HCPCS SELF ADMINISTERED DRUGS (ALT 637 FOR MEDICARE OP): Performed by: INTERNAL MEDICINE

## 2024-09-18 PROCEDURE — 99232 SBSQ HOSP IP/OBS MODERATE 35: CPT | Performed by: NURSE PRACTITIONER

## 2024-09-18 PROCEDURE — 2500000004 HC RX 250 GENERAL PHARMACY W/ HCPCS (ALT 636 FOR OP/ED): Performed by: INTERNAL MEDICINE

## 2024-09-18 PROCEDURE — 36415 COLL VENOUS BLD VENIPUNCTURE: CPT | Performed by: NURSE PRACTITIONER

## 2024-09-18 PROCEDURE — 83880 ASSAY OF NATRIURETIC PEPTIDE: CPT | Performed by: NURSE PRACTITIONER

## 2024-09-18 PROCEDURE — 94640 AIRWAY INHALATION TREATMENT: CPT

## 2024-09-18 PROCEDURE — 2060000001 HC INTERMEDIATE ICU ROOM DAILY

## 2024-09-18 PROCEDURE — 2500000001 HC RX 250 WO HCPCS SELF ADMINISTERED DRUGS (ALT 637 FOR MEDICARE OP): Performed by: SURGERY

## 2024-09-18 PROCEDURE — 82947 ASSAY GLUCOSE BLOOD QUANT: CPT

## 2024-09-18 PROCEDURE — 2500000002 HC RX 250 W HCPCS SELF ADMINISTERED DRUGS (ALT 637 FOR MEDICARE OP, ALT 636 FOR OP/ED): Performed by: SURGERY

## 2024-09-18 PROCEDURE — 2500000004 HC RX 250 GENERAL PHARMACY W/ HCPCS (ALT 636 FOR OP/ED): Performed by: NURSE PRACTITIONER

## 2024-09-18 PROCEDURE — 97110 THERAPEUTIC EXERCISES: CPT | Mod: GP,CQ

## 2024-09-18 RX ORDER — LORAZEPAM 2 MG/ML
0.5 INJECTION INTRAMUSCULAR ONCE
Status: DISCONTINUED | OUTPATIENT
Start: 2024-09-18 | End: 2024-09-18

## 2024-09-18 RX ORDER — LEVOFLOXACIN 5 MG/ML
750 INJECTION, SOLUTION INTRAVENOUS EVERY 24 HOURS
Status: DISCONTINUED | OUTPATIENT
Start: 2024-09-18 | End: 2024-09-19

## 2024-09-18 RX ORDER — LORAZEPAM 0.5 MG/1
0.5 TABLET ORAL ONCE
Status: COMPLETED | OUTPATIENT
Start: 2024-09-18 | End: 2024-09-18

## 2024-09-18 ASSESSMENT — COGNITIVE AND FUNCTIONAL STATUS - GENERAL
STANDING UP FROM CHAIR USING ARMS: TOTAL
DRESSING REGULAR LOWER BODY CLOTHING: A LOT
MOBILITY SCORE: 10
MOVING FROM LYING ON BACK TO SITTING ON SIDE OF FLAT BED WITH BEDRAILS: A LOT
MOVING FROM LYING ON BACK TO SITTING ON SIDE OF FLAT BED WITH BEDRAILS: A LOT
STANDING UP FROM CHAIR USING ARMS: TOTAL
MOBILITY SCORE: 7
CLIMB 3 TO 5 STEPS WITH RAILING: A LOT
TURNING FROM BACK TO SIDE WHILE IN FLAT BAD: TOTAL
WALKING IN HOSPITAL ROOM: TOTAL
HELP NEEDED FOR BATHING: TOTAL
TURNING FROM BACK TO SIDE WHILE IN FLAT BAD: A LOT
PERSONAL GROOMING: A LOT
DRESSING REGULAR UPPER BODY CLOTHING: A LOT
MOVING TO AND FROM BED TO CHAIR: TOTAL
CLIMB 3 TO 5 STEPS WITH RAILING: TOTAL
MOVING TO AND FROM BED TO CHAIR: TOTAL
TOILETING: TOTAL
WALKING IN HOSPITAL ROOM: A LOT
EATING MEALS: TOTAL
DAILY ACTIVITIY SCORE: 9

## 2024-09-18 ASSESSMENT — PAIN SCALES - GENERAL
PAINLEVEL_OUTOF10: 0 - NO PAIN
PAINLEVEL_OUTOF10: 0 - NO PAIN
PAINLEVEL_OUTOF10: 3
PAINLEVEL_OUTOF10: 0 - NO PAIN

## 2024-09-18 ASSESSMENT — PAIN - FUNCTIONAL ASSESSMENT
PAIN_FUNCTIONAL_ASSESSMENT: 0-10
PAIN_FUNCTIONAL_ASSESSMENT: CPOT (CRITICAL CARE PAIN OBSERVATION TOOL)
PAIN_FUNCTIONAL_ASSESSMENT: 0-10

## 2024-09-18 NOTE — PROGRESS NOTES
Medicine NP follow up note    Subjective:  Seen earlier.   Was off bipap and on 10 L NC, drowsy but oriented x 3.   Some hematuria noted, suspect traumatic.     Vitals (Last 24 Hours):  Heart Rate:  [63-95]   Temp:  [36.4 °C (97.5 °F)-37 °C (98.6 °F)]   Resp:  [19-24]   BP: ()/(49-78)   Weight:  [127 kg (279 lb 15.8 oz)]   SpO2:  [79 %-100 %]     I have reviewed all imaging reports and labs pertinent to this visit / presenting problem    PHYSICAL EXAM:  Constitutional: drowsy, NAD  Eyes: no icterus  ENMT: mucous membranes moist  Head/Neck: difficult to assess JVD  Respiratory/Thorax: coarse/diminished BS bilaterally, on 10 L NC  Cardiovascular: RRR  Gastrointestinal: obese, ND/S/NT  : Live in place - bloody urine in the bag  Musculoskeletal: no joint swelling  Extremities: 1-2 + BLE edema  Neurological: drowsy, FRANKLIN   Skin: warm and dry, maculopapular rash to chest area   Psych: calm    MEDS:  Scheduled meds  aspirin, 81 mg, oral, Daily  enoxaparin, 40 mg, subcutaneous, q12h SHARRON  ezetimibe, 10 mg, oral, Daily  folic acid, 1 mg, oral, Daily  insulin lispro, 0-10 Units, subcutaneous, q4h  ipratropium-albuteroL, 3 mL, nebulization, 4x daily  levoFLOXacin, 750 mg, intravenous, q24h  levothyroxine, 125 mcg, oral, Daily  multivitamin with minerals, 1 tablet, oral, Daily  nystatin, 5 mL, Swish & Swallow, 4x daily  pantoprazole, 40 mg, intravenous, Daily  piperacillin-tazobactam, 3.375 g, intravenous, q6h  polyethylene glycol, 17 g, nasogastric tube, Daily  predniSONE, 20 mg, oral, Daily  sennosides-docusate sodium, 2 tablet, oral, BID  tamsulosin, 0.4 mg, oral, Daily  thiamine, 100 mg, oral, Daily    PRN meds  PRN medications: dextrose, dextrose, glucagon, glucagon, ipratropium-albuteroL, oxygen, oxygen    ASSESSMENT/PLAN:  69-year-old man with history of obesity, HFrEF, aortic stenosis, HLD (intolerant to statins due to myalgias), DMT2, COPD, MELISSA on CPAP, lymphedema, hypothyroidism, admitted with sepsis,  hypoxic/hypercapnic respiratory failure requiring intubation, CT showed a large loculated R pleural effusion, causing near total collapse of the RML and RLL and multiple infectious/inflammatory nodules, sputum cx with stenotrophomonas.     Acute hypoxic/hypercapnic respiratory failure  PNA due to Stenotrophomonas maltophilia  Right empyema, hydropneumothorax - s/p chest tube placement and removal  COPD  MELISSA, possible OHS   Chronic HFrEF  -IV Zosyn / Levofloxacin, ID following   -Bipap per pulmonary, bronchial hygiene   -ST for a swallow eval   -consider IV Lasix if BP permits   -continue prodnisone taper, nebulizers   -wean O2 as able     Hypotension   -monitor BP, remains soft     Hematuria   -suspect traumatic etiology   -continue lipscomb for now, TOV when more alert/ambulatory     Left kidney mass concerning for renal cell carcinoma  -seen by urology, outpatient follow up with Dr. Morales    .Other comorbidities as above  -continue medications as ordered and adjust based on clinical course     VTE / GI prophylaxis   -subcutaneous Lovenox, PPI, bowel regimen in place     Discharge planning  -[facility] planning     Discussed with Dr. Gatica and the interdisciplinary team     PARAM Calero-CNP

## 2024-09-18 NOTE — PROGRESS NOTES
Physical Therapy    Physical Therapy Treatment    Patient Name: Alo Glass  MRN: [id number]  Department: [facility] A   Room: [facility]414  Today's Date: 9/18/2024  Time Calculation  Start Time: 1414  Stop Time: 1432  Time Calculation (min): 18 min         Assessment/Plan   PT Assessment  PT Assessment Results: Decreased strength, Decreased range of motion, Decreased endurance, Impaired balance, Decreased mobility, Decreased coordination, Decreased cognition, Impaired judgement, Decreased safety awareness, Impaired sensation  Rehab Prognosis: Fair  End of Session Communication: Bedside nurse  Assessment Comment: Pt has limited activity tolerance. He requires frequent rest breaks to complete activities. Pain in L knee limiting movement as well.  End of Session Patient Position: Bed, 3 rail up, Alarm on  PT Plan  Inpatient/Swing Bed or Outpatient: Inpatient  PT Plan  Treatment/Interventions: Therapeutic exercise  PT Plan: Ongoing PT  PT Frequency: 4 times per week  PT Discharge Recommendations: Moderate intensity level of continued care  Equipment Recommended upon Discharge:  (TBD)  PT Recommended Transfer Status: Total assist  PT - OK to Discharge: Yes (per POC)      General Visit Information:   PT  Visit  PT Received On: 09/18/24  General  Reason for Referral: Impaired Mobility: Pt is a 69 y.o. male found non responsive at home by his neighbor and EMS was called.  Referred By: Mónica Johns MD  Family/Caregiver Present: No  Prior to Session Communication: Bedside nurse  Patient Position Received: Bed, 3 rail up, Alarm on  Preferred Learning Style: auditory, kinesthetic, verbal, written  General Comment: Pt agreeable to participate in ther ex. Second person needed, and not available to assist with bed mobility and other functional activites.    Subjective   Precautions:  Precautions  Medical Precautions: Fall precautions, Oxygen therapy device and L/min (11L high flow via NC)    Vital Signs (Past 2hrs)        Date/Time  Vitals Session Patient Position Pulse Resp SpO2 BP MAP (mmHg)    09/18/24 1414 --  --  82  --  92 %  108/77  --     09/18/24 1443 --  --  --  --  98 %  --  --     09/18/24 1500 --  --  81  --  --  --  --                         Objective   Pain:  Pain Assessment  Pain Assessment: 0-10  0-10 (Numeric) Pain Score: 3  Pain Type: Chronic pain  Pain Location: Knee  Pain Orientation: Left  Pain Interventions: Warm pack  Cognition:  Cognition  Orientation Level: Oriented X4  Coordination:  Movements are Fluid and Coordinated: Yes    Activity Tolerance:  Activity Tolerance  Endurance: Tolerates 10 - 20 min exercise with multiple rests  Treatments:  Therapeutic Exercise  Therapeutic Exercise Performed: Yes  Therapeutic Exercise Activity 1: Pt instructed in supine ther ex as follows. AP, GS, QS, AROM.  HS, SAQ, HA, SLR, AAROM.  On BLE, x 15 reps each. L knee range limited by pain. Cues needed to correct techinque to assure max benefits.    Outcome Measures:  [facility] Basic Mobility  Turning from your back to your side while in a flat bed without using bedrails: A lot  Moving from lying on your back to sitting on the side of a flat bed without using bedrails: Total  Moving to and from bed to chair (including a wheelchair): Total  Standing up from a chair using your arms (e.g. wheelchair or bedside chair): Total  To walk in hospital room: Total  Climbing 3-5 steps with railing: Total  Basic Mobility - Total Score: 7    Education Documentation  Home Exercise Program, taught by Blaine Anderson PTA at 9/18/2024  3:22 PM.  Learner: Patient  Readiness: Acceptance  Method: Explanation, Demonstration  Response: Demonstrated Understanding, Verbalizes Understanding    Precautions, taught by Blaine Anderson PTA at 9/18/2024  3:22 PM.  Learner: Patient  Readiness: Acceptance  Method: Explanation, Demonstration  Response: Demonstrated Understanding, Verbalizes Understanding    Body Mechanics, taught by Blaine Anderson PTA at 9/18/2024  3:22  PM.  Learner: Patient  Readiness: Acceptance  Method: Explanation, Demonstration  Response: Demonstrated Understanding, Verbalizes Understanding    Mobility Training, taught by Blaine Anderson PTA at 9/18/2024  3:22 PM.  Learner: Patient  Readiness: Acceptance  Method: Explanation, Demonstration  Response: Demonstrated Understanding, Verbalizes Understanding    Education Comments  No comments found.        OP EDUCATION:       Encounter Problems       Encounter Problems (Active)       Balance       STG - Maintains static sitting balance with upper extremity support At CGA, safely, without LOB, device PRN  (Progressing)       Start:  09/15/24    Expected End:  09/29/24       INTERVENTIONS:  1. Practice sitting on the edge of a bed/mat with minimal support.  2. Educate patient about maintining total hip precautions while maintaining balance.  3. Educate patient about pressure relief.  4. Educate patient about use of assistive device.         STG - Maintains dynamic sitting balance with upper extremity support At Min Ax1, safely, without LOB, device PRN  (Progressing)       Start:  09/15/24    Expected End:  09/29/24       INTERVENTIONS:  1. Practice sitting on the edge of a bed/mat with minimal support.  2. Educate patient about maintining total hip precautions while maintaining balance.  3. Educate patient about pressure relief.  4. Educate patient about use of assistive device.            Mobility       Endurance - Pt to tolerate >/= 20 minutes therex, theract, gait and/or NMR with </= 5 minutes of rest breaks  (Progressing)       Start:  09/15/24    Expected End:  09/29/24               PT Transfers       STG - Patient will perform bed mobility  At CGA, safely, without LOB, device PRN  (Progressing)       Start:  09/15/24    Expected End:  09/29/24            STG - Patient will transfer sit to and from stand At Mod Ax1, safely, without LOB, device PRN  (Progressing)       Start:  09/15/24    Expected End:  09/29/24                Pain - Adult          Safety       Pt will verbalize and apply safety awareness and precautions 100% of time throughout entire session   (Progressing)       Start:  09/15/24    Expected End:  09/29/24

## 2024-09-18 NOTE — PROGRESS NOTES
09/18/24 1205   Discharge Planning   Expected Discharge Disposition Long Term        Patient   was agitated last night and required Ativan. He is on Bipap currently. Waiting on acceptance from [facility]. He will need auth when medically cleared. Will continue to follow for discharge needs.

## 2024-09-18 NOTE — PROGRESS NOTES
Alo Glass is a 69 y.o. male on day 14 of admission presenting with Acute respiratory failure with hypoxia and hypercapnia (Multi).  Patient with h/o CHF, aortic valve stenosis, DLP, T2DM, COPD, MELISSA, who was brought in by EMS after being found unresponsiveness at home. Work up revealed respiratory failure with hypoxia and hypercapnia, also hypotensive. Received IVF, antibiotics, but given persistent hypotension started on Levophed and and admitted to ICU. In the ICU found to have coffee ground emesis, subsequently was intubated. CT chest showed R loculated pleural effusion and multiple nodules. Sputum culture +ve for stenotrophomonas. Patient overall improved and was subsequently extubated. Post extubation initially had high oxygen requirements, but now is improving. Pulmonary is consulted to assist with his care.   Subjective   Worsening mental and respiratory status earlier this am. ABG showed 7.23/84. Placed on BiPAP 14/8. Of note did not wear BiPAP last night, also agitated, received Ativan.     The patient is currently drowsy and cannot provide history.   Objective   Scheduled medications  aspirin, 81 mg, oral, Daily  enoxaparin, 40 mg, subcutaneous, q12h SHARRON  ezetimibe, 10 mg, oral, Daily  folic acid, 1 mg, oral, Daily  guaiFENesin, 200 mg, oral, TID  insulin lispro, 0-10 Units, subcutaneous, q4h  ipratropium-albuteroL, 3 mL, nebulization, 4x daily  levoFLOXacin, 750 mg, oral, q24h SHARRON  levothyroxine, 125 mcg, oral, Daily  multivitamin with minerals, 1 tablet, oral, Daily  nystatin, 5 mL, Swish & Swallow, 4x daily  pantoprazole, 40 mg, intravenous, Daily  piperacillin-tazobactam, 3.375 g, intravenous, q6h  polyethylene glycol, 17 g, nasogastric tube, Daily  predniSONE, 20 mg, oral, Daily  sennosides-docusate sodium, 2 tablet, oral, BID  thiamine, 100 mg, oral, Daily  Continuous medications  PRN medications  PRN medications: dextrose, dextrose, glucagon, glucagon, ipratropium-albuteroL, oxygen, oxygen  "  Physical Exam  Constitutional:       General: He is not in acute distress.     Appearance: He is obese. He is ill-appearing. He is not toxic-appearing.   HENT:      Head: Normocephalic and atraumatic.      Nose:      Comments: On BiPAP.   Eyes:      General: No scleral icterus.     Extraocular Movements: Extraocular movements intact.      Pupils: Pupils are equal, round, and reactive to light.   Cardiovascular:      Rate and Rhythm: Normal rate and regular rhythm.      Heart sounds: No murmur heard.     No friction rub. No gallop.   Pulmonary:      Effort: Pulmonary effort is normal. No respiratory distress.      Breath sounds: No wheezing or rales.      Comments: Clear lung fields b/l with fair air entry.  Abdominal:      General: Bowel sounds are normal. There is no distension.      Palpations: Abdomen is soft.      Tenderness: There is no abdominal tenderness.   Musculoskeletal:      Cervical back: Normal range of motion and neck supple. No rigidity or tenderness.      Right lower leg: Edema (2+) present.      Left lower leg: Edema (1+) present.   Lymphadenopathy:      Cervical: No cervical adenopathy.   Skin:     General: Skin is warm and dry.      Coloration: Skin is not jaundiced.      Comments: Chronic discoloration b/l LE edema.     Neurological:      Cranial Nerves: No cranial nerve deficit.      Motor: Weakness (generalized weakness now is improved.) present.      Comments: Drowsy but can be aroused.    Psychiatric:      Comments: Drowsy but can be aroused. Does not answer questions.      Last Recorded Vitals  Blood pressure 119/78, pulse 95, temperature 36.6 °C (97.9 °F), temperature source Temporal, resp. rate 21, height 1.803 m (5' 10.98\"), weight 127 kg (279 lb 15.8 oz), SpO2 98%.  Intake/Output last 3 Shifts:  I/O last 3 completed shifts:  In: 120 (0.9 mL/kg) [P.O.:120]  Out: 4525 (35.6 mL/kg) [Urine:4525 (1 mL/kg/hr)]  Weight: 127 kg   Relevant Results  Latest labs and imaging reviewed. "     Assessment/Plan   Assessment & Plan  Acute respiratory failure with hypoxia and hypercapnia (Multi)    69 YOM with h/o CHF, aortic valve stenosis, DLP, T2DM, COPD, MELISSA, who was brought in by EMS after being found unresponsiveness at home. Work up revealed respiratory failure with hypoxia and hypercapnia, also hypotensive. Received IVF, antibiotics, but given persistent hypotension started on Levophed and and admitted to ICU. In the ICU found to have coffee ground emesis, subsequently was intubated. CT chest showed R loculated pleural effusion and multiple nodules. Sputum culture +ve for stenotrophomonas. Patient overall improved and was subsequently extubated. Post extubation initially had high oxygen requirements, but now is improving. Pulmonary is consulted to assist with his care.  .     Respiratory failure: acute with hypoxia and hypercarbia, requiring MV, now overall improved but significantly worsened today.        Placed on BiPAP, will change the setting to 18/8 with repeat gas in one hour       Continue supplemental O2, wean off as tolerates       Home O2 evaluation before DC       BPH with IS, Acapella       Management of the individual causes as below     COPD: PFT in 2019 showed 54%, FEV1 32-->46%, RV/TLC 63%, TLC 85%, and DLCO 77%, overall associated with stage III COPD. On triple therapy as outpatient        Continue Prednisone taper       Continue Duo-neb       FU with pulmonary after DC. Patient needs to make an appointment with a new pulmonologist as currently does not have a pulmonary provider.        Resume Trelegy and albuterol on DC     MELISSA: +/- OHS       Continue BiPAP at nights       Resume APAP 10-15 at nights on DC     Pleural effusion: large R sided, likely parapneumonic, S/p chest tube placement. Analysis showed neutrophilic predominant effusion, cultures negative. CT is now removed. CXR today stable to slightly progressed.       Will monitor for now     Pneumonia: RML,         Continue  Levaquin.      DVT prophylaxis: Lovenox     Pulmonary will FU while in house.     Marta Thurston MD

## 2024-09-18 NOTE — PROGRESS NOTES
"Alo Glass is a 69 y.o. male on day 14 of admission presenting with Acute respiratory failure with hypoxia and hypercapnia (Multi).    Subjective   Patient seen and examined.  Last night patient was very agitated and had significant CO2 retention and had to be given Ativan 1 mg IV to calm him down and then was to be placed on BiPAP.  However 31 patient became quite sleepy and this morning although he did open eyes to verbal,.  Seen by pulmonary and patient had significant CO2 retention and was placed on BiPAP with which she has improved now his pH has improved from 7.23 to normal pH now and advised to continue with BiPAP as needed.  He does have a hacking cough but not bringing up any phlegm and currently being continued on Levaquin and Zosyn.  He does have an indwelling Live catheter and urine is bloody today and a urology consultation has been requested.       Objective     Physical Exam  GENERAL:   Sleepy but does open eyes to verbal command, currently on BiPAP with a setting of 18/8  SKIN:  Skin color, texture, turgor normal. No rashes or lesions.  OROPHARYNX:  Lips, mucosa, and tongue are normal.Teeth and gums, normal. Oropharynx normal.  NECK:  No jugulovenous distention, No carotid bruits, Carotid pulse normal contour,   LUNGS: There was diminished air exchange with crackles and wheezes right mid and lower lung fields,. decreased diaphragmatic excursion.  CARDIAC: Rate and rhythm is regular, normal S1 and S2; no rubs, murmurs, or gallops  ABDOMEN:  Abdomen soft, large and obese, non-tender, BS normal, No masses or organomegaly  EXTREMETIES:  Extremities normal, no deformities, 1+ edema, no clubbing or skin discoloration. Good capillary refill., No ulcers  NEURO: Nonfocal  PULSES:  2+ radial, 2+ carotid    Last Recorded Vitals  Blood pressure 108/77, pulse 64, temperature 36.4 °C (97.5 °F), temperature source Oral, resp. rate 24, height 1.803 m (5' 10.98\"), weight 127 kg (279 lb 15.8 oz), SpO2 " 100%.  Intake/Output last 3 Shifts:  I/O last 3 completed shifts:  In: 120 (0.9 mL/kg) [P.O.:120]  Out: 4525 (35.6 mL/kg) [Urine:4525 (1 mL/kg/hr)]  Weight: 127 kg     Relevant Results  Scheduled medications  aspirin, 81 mg, oral, Daily  enoxaparin, 40 mg, subcutaneous, q12h SHARRON  ezetimibe, 10 mg, oral, Daily  folic acid, 1 mg, oral, Daily  guaiFENesin, 200 mg, oral, TID  insulin lispro, 0-10 Units, subcutaneous, q4h  ipratropium-albuteroL, 3 mL, nebulization, 4x daily  levoFLOXacin, 750 mg, intravenous, q24h  levothyroxine, 125 mcg, oral, Daily  multivitamin with minerals, 1 tablet, oral, Daily  nystatin, 5 mL, Swish & Swallow, 4x daily  pantoprazole, 40 mg, intravenous, Daily  piperacillin-tazobactam, 3.375 g, intravenous, q6h  polyethylene glycol, 17 g, nasogastric tube, Daily  predniSONE, 20 mg, oral, Daily  sennosides-docusate sodium, 2 tablet, oral, BID  tamsulosin, 0.4 mg, oral, Daily  thiamine, 100 mg, oral, Daily      Continuous medications     PRN medications  PRN medications: dextrose, dextrose, glucagon, glucagon, ipratropium-albuteroL, oxygen, oxygen         This patient has a urinary catheter   Reason for the urinary catheter remaining today? critically ill patient who need accurate urinary output measurements    Results for orders placed or performed during the hospital encounter of 09/04/24 (from the past 96 hour(s))   POCT GLUCOSE   Result Value Ref Range    POCT Glucose 268 (H) 74 - 99 mg/dL   Magnesium   Result Value Ref Range    Magnesium 2.20 1.60 - 2.40 mg/dL   POCT GLUCOSE   Result Value Ref Range    POCT Glucose 159 (H) 74 - 99 mg/dL   POCT GLUCOSE   Result Value Ref Range    POCT Glucose 108 (H) 74 - 99 mg/dL   POCT GLUCOSE   Result Value Ref Range    POCT Glucose 130 (H) 74 - 99 mg/dL   POCT GLUCOSE   Result Value Ref Range    POCT Glucose 148 (H) 74 - 99 mg/dL   POCT GLUCOSE   Result Value Ref Range    POCT Glucose 175 (H) 74 - 99 mg/dL   POCT GLUCOSE   Result Value Ref Range    POCT  Glucose 201 (H) 74 - 99 mg/dL   POCT GLUCOSE   Result Value Ref Range    POCT Glucose 140 (H) 74 - 99 mg/dL   POCT GLUCOSE   Result Value Ref Range    POCT Glucose 141 (H) 74 - 99 mg/dL   Renal Function Panel   Result Value Ref Range    Glucose 131 (H) 74 - 99 mg/dL    Sodium 141 136 - 145 mmol/L    Potassium 3.6 3.5 - 5.3 mmol/L    Chloride 98 98 - 107 mmol/L    Bicarbonate 40 (HH) 21 - 32 mmol/L    Anion Gap 7 (L) 10 - 20 mmol/L    Urea Nitrogen 17 6 - 23 mg/dL    Creatinine 0.48 (L) 0.50 - 1.30 mg/dL    eGFR >90 >60 mL/min/1.73m*2    Calcium 7.8 (L) 8.6 - 10.3 mg/dL    Phosphorus 1.7 (L) 2.5 - 4.9 mg/dL    Albumin 2.5 (L) 3.4 - 5.0 g/dL   CBC   Result Value Ref Range    WBC 10.0 4.4 - 11.3 x10*3/uL    nRBC 0.0 0.0 - 0.0 /100 WBCs    RBC 5.34 4.50 - 5.90 x10*6/uL    Hemoglobin 17.2 13.5 - 17.5 g/dL    Hematocrit 54.8 (H) 41.0 - 52.0 %     (H) 80 - 100 fL    MCH 32.2 26.0 - 34.0 pg    MCHC 31.4 (L) 32.0 - 36.0 g/dL    RDW 13.2 11.5 - 14.5 %    Platelets 191 150 - 450 x10*3/uL   POCT GLUCOSE   Result Value Ref Range    POCT Glucose 147 (H) 74 - 99 mg/dL   POCT GLUCOSE   Result Value Ref Range    POCT Glucose 159 (H) 74 - 99 mg/dL   POCT GLUCOSE   Result Value Ref Range    POCT Glucose 227 (H) 74 - 99 mg/dL   POCT GLUCOSE   Result Value Ref Range    POCT Glucose 207 (H) 74 - 99 mg/dL   POCT GLUCOSE   Result Value Ref Range    POCT Glucose 216 (H) 74 - 99 mg/dL   Lactate   Result Value Ref Range    Lactate 1.1 0.4 - 2.0 mmol/L   POCT GLUCOSE   Result Value Ref Range    POCT Glucose 114 (H) 74 - 99 mg/dL   Renal Function Panel   Result Value Ref Range    Glucose 100 (H) 74 - 99 mg/dL    Sodium 143 136 - 145 mmol/L    Potassium 3.7 3.5 - 5.3 mmol/L    Chloride 99 98 - 107 mmol/L    Bicarbonate 42 (HH) 21 - 32 mmol/L    Anion Gap <7 (L) 10 - 20 mmol/L    Urea Nitrogen 16 6 - 23 mg/dL    Creatinine 0.46 (L) 0.50 - 1.30 mg/dL    eGFR >90 >60 mL/min/1.73m*2    Calcium 7.5 (L) 8.6 - 10.3 mg/dL    Phosphorus 1.9 (L)  2.5 - 4.9 mg/dL    Albumin 2.3 (L) 3.4 - 5.0 g/dL   CBC   Result Value Ref Range    WBC 9.1 4.4 - 11.3 x10*3/uL    nRBC 0.0 0.0 - 0.0 /100 WBCs    RBC 4.89 4.50 - 5.90 x10*6/uL    Hemoglobin 15.9 13.5 - 17.5 g/dL    Hematocrit 50.7 41.0 - 52.0 %     (H) 80 - 100 fL    MCH 32.5 26.0 - 34.0 pg    MCHC 31.4 (L) 32.0 - 36.0 g/dL    RDW 13.2 11.5 - 14.5 %    Platelets 185 150 - 450 x10*3/uL   POCT GLUCOSE   Result Value Ref Range    POCT Glucose 136 (H) 74 - 99 mg/dL   POCT GLUCOSE   Result Value Ref Range    POCT Glucose 231 (H) 74 - 99 mg/dL   POCT GLUCOSE   Result Value Ref Range    POCT Glucose 191 (H) 74 - 99 mg/dL   Electrocardiogram, 12-lead PRN ACS symptoms   Result Value Ref Range    Ventricular Rate 91 BPM    Atrial Rate 91 BPM    HI Interval 234 ms    QRS Duration 162 ms    QT Interval 426 ms    QTC Calculation(Bazett) 523 ms    P Axis -10 degrees    R Axis -83 degrees    T Axis 62 degrees    QRS Count 15 beats    Q Onset 205 ms    P Onset 88 ms    P Offset 158 ms    T Offset 418 ms    QTC Fredericia 489 ms   POCT GLUCOSE   Result Value Ref Range    POCT Glucose 251 (H) 74 - 99 mg/dL   POCT GLUCOSE   Result Value Ref Range    POCT Glucose 209 (H) 74 - 99 mg/dL   POCT GLUCOSE   Result Value Ref Range    POCT Glucose 106 (H) 74 - 99 mg/dL   Renal Function Panel   Result Value Ref Range    Glucose 104 (H) 74 - 99 mg/dL    Sodium 141 136 - 145 mmol/L    Potassium 3.8 3.5 - 5.3 mmol/L    Chloride 101 98 - 107 mmol/L    Bicarbonate 36 (H) 21 - 32 mmol/L    Anion Gap 8 (L) 10 - 20 mmol/L    Urea Nitrogen 16 6 - 23 mg/dL    Creatinine 0.41 (L) 0.50 - 1.30 mg/dL    eGFR >90 >60 mL/min/1.73m*2    Calcium 8.0 (L) 8.6 - 10.3 mg/dL    Phosphorus 2.9 2.5 - 4.9 mg/dL    Albumin 3.0 (L) 3.4 - 5.0 g/dL   CBC   Result Value Ref Range    WBC 11.1 4.4 - 11.3 x10*3/uL    nRBC 0.0 0.0 - 0.0 /100 WBCs    RBC 5.26 4.50 - 5.90 x10*6/uL    Hemoglobin 17.1 13.5 - 17.5 g/dL    Hematocrit 55.7 (H) 41.0 - 52.0 %     (H) 80 -  100 fL    MCH 32.5 26.0 - 34.0 pg    MCHC 30.7 (L) 32.0 - 36.0 g/dL    RDW 13.3 11.5 - 14.5 %    Platelets 214 150 - 450 x10*3/uL   POCT GLUCOSE   Result Value Ref Range    POCT Glucose 127 (H) 74 - 99 mg/dL   Blood Gas Arterial Full Panel   Result Value Ref Range    POCT pH, Arterial 7.23 (LL) 7.38 - 7.42 pH    POCT pCO2, Arterial 84 (HH) 38 - 42 mm Hg    POCT pO2, Arterial 114 (H) 85 - 95 mm Hg    POCT SO2, Arterial 98 94 - 100 %    POCT Oxy Hemoglobin, Arterial 95.3 94.0 - 98.0 %    POCT Hematocrit Calculated, Arterial 53.0 (H) 41.0 - 52.0 %    POCT Sodium, Arterial 133 (L) 136 - 145 mmol/L    POCT Potassium, Arterial 3.7 3.5 - 5.3 mmol/L    POCT Chloride, Arterial 99 98 - 107 mmol/L    POCT Ionized Calcium, Arterial 1.19 1.10 - 1.33 mmol/L    POCT Glucose, Arterial 134 (H) 74 - 99 mg/dL    POCT Lactate, Arterial 0.7 0.4 - 2.0 mmol/L    POCT Base Excess, Arterial 3.7 (H) -2.0 - 3.0 mmol/L    POCT HCO3 Calculated, Arterial 35.2 (H) 22.0 - 26.0 mmol/L    POCT Hemoglobin, Arterial 17.5 13.5 - 17.5 g/dL    POCT Anion Gap, Arterial 3 (L) 10 - 25 mmo/L    Patient Temperature 37.0 degrees Celsius    FiO2 60 %   BLOOD GAS VENOUS FULL PANEL   Result Value Ref Range    POCT pH, Venous 7.33 7.33 - 7.43 pH    POCT pCO2, Venous 64 (H) 41 - 51 mm Hg    POCT pO2, Venous 39 35 - 45 mm Hg    POCT SO2, Venous 76 (H) 45 - 75 %    POCT Oxy Hemoglobin, Venous 73.7 45.0 - 75.0 %    POCT Hematocrit Calculated, Venous 50.0 41.0 - 52.0 %    POCT Sodium, Venous 130 (L) 136 - 145 mmol/L    POCT Potassium, Venous 3.7 3.5 - 5.3 mmol/L    POCT Chloride, Venous 97 (L) 98 - 107 mmol/L    POCT Ionized Calicum, Venous 1.10 1.10 - 1.33 mmol/L    POCT Glucose, Venous 135 (H) 74 - 99 mg/dL    POCT Lactate, Venous 1.4 0.4 - 2.0 mmol/L    POCT Base Excess, Venous 5.2 (H) -2.0 - 3.0 mmol/L    POCT HCO3 Calculated, Venous 33.7 (H) 22.0 - 26.0 mmol/L    POCT Hemoglobin, Venous 16.5 13.5 - 17.5 g/dL    POCT Anion Gap, Venous 3.0 (L) 10.0 - 25.0 mmol/L     Patient Temperature 37.0 degrees Celsius    FiO2 60 %      Electrocardiogram, 12-lead PRN ACS symptoms    Result Date: 9/18/2024  Sinus rhythm with 1st degree AV block with Premature atrial complexes with Aberrant conduction Right bundle branch block Left anterior fascicular block , Bifascicular block  Anteroseptal infarct (cited on or before 23-APR-2024) Abnormal ECG Confirmed by Triston Moore ([id number]) on 9/18/2024 10:19:51 AM    XR chest 1 view    Result Date: 9/17/2024  Interpreted By:  Ishaan Vernon, STUDY: XR CHEST 1 VIEW;  9/17/2024 5:41 am   INDICATION: Signs/Symptoms:Increase O2 requirements. Tnx..   COMPARISON: CT scan from 09/04/2024.   ACCESSION NUMBER(S): [id number]   ORDERING CLINICIAN: PARISA ZARAGOZA   TECHNIQUE: Single AP portable view of the chest was obtained.   FINDINGS: MEDIASTINUM/ LUNGS/ ARELIS:   Stable cardiomegaly. No gross vascular congestion. Small stable left pleural effusion. Medial left basilar atelectasis/infiltrate, stable to slightly progressed. Pleural and parenchymal opacities at the right base, mildly progressed. No pneumothorax. No tracheal deviation. No abnormal hilar fullness or gross mass on either side.   BONES: No lytic or blastic destructive bone lesion.   UPPER ABDOMEN: Grossly intact.       Cardiomegaly.   Stable small left pleural effusion with left basilar atelectasis/pneumonia, stable to slightly progressed.   Pleural and parenchymal opacities at the right base, mildly progressed.   MACRO: None   Signed by: Ishaan Vernon 9/17/2024 8:16 AM Dictation workstation:   [id number]    Electrocardiogram, 12-lead PRN ACS symptoms    Result Date: 9/16/2024  Sinus rhythm with 1st degree AV block with Premature atrial complexes Right bundle branch block Left anterior fascicular block  Bifascicular block  Anteroseptal infarct (cited on or before 23-APR-2024) Abnormal ECG When compared with ECG of 13-SEP-2024 02:49, (unconfirmed) Premature ventricular complexes are no longer  Present QT has lengthened    ECG 12 lead    Result Date: 9/15/2024  Sinus rhythm with 1st degree AV block Left axis deviation Right bundle branch block Inferior infarct (cited on or before 04-SEP-2024) Abnormal ECG When compared with ECG of 04-SEP-2024 16:24, Premature ventricular complexes are no longer Present Right bundle branch block is now Present Confirmed by Dimitri Murillo ([id number]) on 9/15/2024 2:23:29 PM    XR chest 1 view    Result Date: 9/13/2024  Interpreted By:  Tania Knowles, STUDY: XR CHEST 1 VIEW;  9/13/2024 4:11 pm   INDICATION: Signs/Symptoms:post chest tube removal.   COMPARISON: 09/13/2024   ACCESSION NUMBER(S): [id number]   ORDERING CLINICIAN: RODOLFO LOPEZ   FINDINGS: The study is limited due to patient's body habitus and poor inspiration previously seen right-sided chest tube has been removed. No gross pneumothorax is noted.   Blunting of both costophrenic angles is seen, which most likely is related to pleural effusions. The heart is enlarged. Bibasilar streaky densities are seen.       No gross pneumothorax.   Bilateral pleural effusions.   Cardiomegaly.   Bibasilar streaky densities, which may be related to atelectasis or infiltrates.       MACRO: None   Signed by: Tania Knowles 9/13/2024 4:40 PM Dictation workstation:   [id number]    Electrocardiogram, 12-lead PRN ACS symptoms    Result Date: 9/13/2024  Sinus rhythm with 1st degree AV block with occasional Premature ventricular complexes and Premature atrial complexes Left axis deviation Right bundle branch block Inferior infarct (cited on or before 04-SEP-2024) Anteroseptal infarct (cited on or before 23-APR-2024) Abnormal ECG When compared with ECG of 06-SEP-2024 00:20, Premature ventricular complexes are now Present Vent. rate has decreased BY  31 BPM Questionable change in initial forces of Septal leads T wave inversion less evident in Anterolateral leads    XR chest 1 view    Result Date: 9/13/2024  Interpreted By:  Rosa Hemphill,  STUDY: XR CHEST 1 VIEW;  9/13/2024 4:31 am   INDICATION: Signs/Symptoms:CT in place.   COMPARISON: Multiple prior studies, the most recent 09/12/2024   ACCESSION NUMBER(S): [id number]   ORDERING CLINICIAN: RODOLFO LOPEZ   FINDINGS:     CARDIOMEDIASTINAL SILHOUETTE: Stable enlarged cardiac silhouette.   LUNGS: Stable position of pigtail catheter overlying the right lung base. There is improved aeration of the right lung base. Mild residual hazy right basilar opacity, likely combination of small residual pleural effusion and basilar consolidation. Limited evaluation of the left lung base due to underpenetration. This is not significantly changed and left pleural effusion or basilar consolidation not excluded. No pneumothorax.   ABDOMEN: No remarkable upper abdominal findings.   BONES: No acute osseous abnormality.       Improved aeration of the right lung base. Residual hazy right basilar opacities likely a combination of residual pleural effusion and basilar atelectasis.   Limited evaluation of the left lung base due to underpenetration.   MACRO: None   Signed by: Rosa Hemphill 9/13/2024 4:48 AM Dictation workstation:   [id number]    XR chest 1 view    Result Date: 9/12/2024  Interpreted By:  Donte Shoemaker, STUDY: XR CHEST 1 VIEW; 9/12/2024 5:23 am   INDICATION: Signs/Symptoms:CT in place.   COMPARISON: None   ACCESSION NUMBER(S): [id number]   ORDERING CLINICIAN: RODOLFO LOPEZ   TECHNIQUE: 1 view of the chest was performed.   FINDINGS: Right pigtail catheter in similar location. Stable layering bilateral pleural effusion with surrounding atelectasis. Stable cardiomegaly. Bilateral shoulder joint degenerative changes.       1. No significant interval change from 09/11/2024 chest x-ray.   Signed by: Donte Shoemaker 9/12/2024 8:18 AM Dictation workstation:   [id number]    XR chest 1 view    Result Date: 9/11/2024  Interpreted By:  Klaus Villafana, STUDY: XR CHEST 1 VIEW; 9/11/2024 5:42 am   INDICATION:  Signs/Symptoms:CT in place   COMPARISON: Radiographs 09/10/2024   ACCESSION NUMBER(S): [id number]   ORDERING CLINICIAN: RODOLFO LOPEZ   TECHNIQUE: Single frontal view of the chest performed.   FINDINGS: LINES AND DEVICES: Remote endotracheal tube. Stable right pigtail pleural catheter near right lung base.   LUNGS: Stable hazy opacities at the lung base and additional airspace opacities extending to right mid lung, stable to mildly increased. No pneumothorax. Left lung is clear.   CARDIOMEDIASTINAL SILHOUETTE: Stable cardiomegaly.       Stable to slightly increased right pleural effusion with atelectasis. Stable right pleural catheter.   MACRO None   Signed by: Klaus Villafana 9/11/2024 8:21 AM Dictation workstation:   [id number]    XR chest 1 view    Result Date: 9/10/2024  Interpreted By:  Ishaan Vernon, STUDY: XR CHEST 1 VIEW;  9/10/2024 5:34 am   INDICATION: Signs/Symptoms:Eval chest tube placement, Rt effusion.   COMPARISON: CT scan chest from 09/04/2024. Chest x-ray from 09/09/2024.   ACCESSION NUMBER(S): [id number]   ORDERING CLINICIAN: RICH KAPLAN   TECHNIQUE: Single AP portable view of the chest was obtained.   FINDINGS: MEDIASTINUM/ LUNGS/ ARELIS: Previous ET tube has been removed. There is an NG tube in place with distal port just beyond the GE junction and distal tip in the proximal stomach. Pigtail pleural catheter at the right base is stable. There is a small grossly stable lateral right basilar pneumothorax. There is mild bibasilar atelectasis. Cardiomegaly without gross vascular congestion. No pneumothorax on the left. No tracheal deviation. No abnormal hilar fullness or gross mass on either side.   BONES: No lytic or blastic destructive bone lesion.   UPPER ABDOMEN: Grossly intact.       Cardiomegaly.   NG tube in place. ET tube has been removed.   Stable pleural drainage catheter at the base of the right chest. Small stable lateral right basilar pneumothorax.   Mild bibasilar atelectasis.    MACRO: None   Signed by: Ishaan Vernon 9/10/2024 8:13 AM Dictation workstation:   [id number]    XR chest 1 view    Result Date: 9/9/2024  Interpreted By:  Daisy Sosa, STUDY: XR CHEST 1 VIEW;  9/9/2024 4:39 pm   INDICATION: Signs/Symptoms:s/p lytic therapy.     COMPARISON: 5:21 a.m. the same day   ACCESSION NUMBER(S): [id number]   ORDERING CLINICIAN: RODOLFO LOPEZ   FINDINGS: ETT, NG tube and right lower chest pigtail catheter remain in place. Additional presumed overlying monitoring/support devices again seen. Small right basilar pneumothorax decreased in size. Persistent bibasilar opacities. The cardiomediastinal silhouette remains enlarged.       Decreased size of right basilar pneumothorax since 5:21 a.m. the same day with pigtail catheter in place.   MACRO: None.   Signed by: Daisy Sosa 9/9/2024 4:44 PM Dictation workstation:   [id number]    XR chest 1 view    Result Date: 9/9/2024  Interpreted By:  Klaus Villafana, STUDY: XR CHEST 1 VIEW; 9/9/2024 5:32 am   INDICATION: Signs/Symptoms:Eval chest tube placement, Rt effusion   COMPARISON: Radiographs 09/08/2024   ACCESSION NUMBER(S): [id number]   ORDERING CLINICIAN: RICH KAPLAN   TECHNIQUE: Single frontal view of the chest performed.   FINDINGS: LINES AND DEVICES: The pigtail of right-sided pleural drainage catheter overlies the right infrahilar region, grossly unchanged. Tip of ETT approximately 6.6 cm above the kathie, previously 7.7 cm. NG tube courses below the diaphragm, limited assessment.   LUNGS: Moderate-size right-sided hydropneumothorax appears grossly unchanged. Stable to slightly increased interstitial opacities on the left. Question new trace left pleural effusion with atelectasis.   CARDIOMEDIASTINAL SILHOUETTE: Stable cardiomegaly.       Right-sided infrahilar pigtail catheter and moderate hydropneumothorax, grossly unchanged.   Suspected worsening CHF with stable to mildly increased interstitial edema and possible new trace  left pleural effusion.   MACRO None   Signed by: Klaus Villafana 9/9/2024 8:17 AM Dictation workstation:   [id number]    XR chest 1 view    Result Date: 9/8/2024  Interpreted By:  Corrina Reyes, STUDY: XR CHEST 1 VIEW;  9/8/2024 6:39 am   INDICATION: Signs/Symptoms:Eval chest tube placement, Rt effusion.     COMPARISON: 09/07/2024   ACCESSION NUMBER(S): [id number]   ORDERING CLINICIAN: RICH KAPLAN   TECHNIQUE: Portable AP semi upright   FINDINGS: Endotracheal tube tip projects between 7 and 8 cm above the kathie. Nasogastric tube is no longer identified. Right pleural pigtail catheter projects at mid right hemithorax and is similar in position to the prior study. Relative volume of basilar pneumothorax and pleural fluid appears similar to the prior study. Right lower lobe and right middle lobe are collapsed. Right upper lobe variation is similar to the prior study. Left lung shows some vascular congestion. Minimal atelectasis is present at the left lung base. No change in the osseous structures is noted.       Basilar pneumothorax on the right appears similar to the prior study in the relative volume of pleural fluid and pneumothorax appears unchanged.   Collapse of the right lower lobe and right middle lobe   Pulmonary vascular congestion is most notable in the left lung   MACRO: None.   Signed by: Corrina Reyes 9/8/2024 12:32 PM Dictation workstation:   [id number]    XR chest 1 view    Result Date: 9/8/2024  Interpreted By:  Corrina Reyes, STUDY: XR CHEST 1 VIEW;  9/7/2024 5:28 pm   INDICATION: Signs/Symptoms:Eval chest tube placement, Rt pleural effusion.     COMPARISON: 09/06/2024   ACCESSION NUMBER(S): [id number]   ORDERING CLINICIAN: RICH KAPLAN   TECHNIQUE: Portable AP upright   FINDINGS: Endotracheal tube tip projects about 6 cm above the kathie. NG tube extends below the diaphragm and beyond the image. Right pleural pigtail catheter projects at mid right hemithorax and appears  unchanged in position.   Pneumothorax at the right thoracic base appears larger than on the prior study. The amount of pleural fluid appears decreased. Mild atelectasis left lung base is slightly improved. Heart is unchanged in size. No changes noted in the osseous structures.       Right pleural effusion is decreased from the prior study but the right basilar pneumothorax appears increased.   MACRO: None.   Signed by: Corrina Reyse 9/8/2024 12:30 PM Dictation workstation:   [id number]    Electrocardiogram, 12-lead PRN ACS symptoms    Result Date: 9/6/2024  Sinus rhythm with 1st degree AV block with Premature atrial complexes Left axis deviation Right bundle branch block Inferior infarct (cited on or before 04-SEP-2024) Anterior infarct , age undetermined T wave abnormality, consider lateral ischemia Abnormal ECG When compared with ECG of 05-SEP-2024 10:29, (unconfirmed) Premature atrial complexes are now Present Confirmed by Shady Bill ([id number]) on 9/6/2024 1:59:56 PM    XR chest 1 view    Result Date: 9/6/2024  Interpreted By:  Eloisa Landa, STUDY: XR CHEST 1 VIEW 9/6/2024 9:05 am   INDICATION: Signs/Symptoms:R pleural effusion   COMPARISON: 09/05/2024   ACCESSION NUMBER(S): [id number]   ORDERING CLINICIAN: PERRI DELACRUZ   TECHNIQUE: AP view of the chest at bedside in the erect position   FINDINGS: A right chest tube is once again noted within the mid right hemithorax with a stable right hydropneumothorax seen within the mid and lower right hemithorax.   The tip of the endotracheal tube remains satisfactorily positioned at a distance 3 cm above kathie with nasogastric tube descending below diaphragm.   There is small left pleural effusion and left basilar atelectasis. The heart remains enlarged.       No change in the right hydropneumothorax which may be loculated with chest tube on the right in the mid hemithorax.   Small left pleural effusion with left basilar infiltrate/atelectasis.   Signed by: Eloisa Landa  9/6/2024 9:20 AM Dictation workstation:   [id number]    ECG 12 lead    Result Date: 9/5/2024  Sinus rhythm with 1st degree AV block with frequent Premature ventricular complexes Left axis deviation Left ventricular hypertrophy with QRS widening and repolarization abnormality ( R in aVL , Faheem product ) Inferior infarct , age undetermined Abnormal ECG When compared with ECG of 23-APR-2024 09:01, Premature ventricular complexes are now Present SD interval has increased (RBBB and left anterior fascicular block) is no longer Present Inferior infarct is now Present Confirmed by Andrea Bentley ([id number]) on 9/5/2024 10:01:05 AM    XR chest 1 view    Result Date: 9/5/2024  Interpreted By:  Saad Cronin, STUDY: XR CHEST 1 VIEW; 9/5/2024 4:57 am   INDICATION: CLINICAL INFORMATION: Signs/Symptoms:Pig tail Chest Tube replacement.   COMPARISON: 09/04/2024   ACCESSION NUMBER(S): [id number]   ORDERING CLINICIAN: ANDRY ABRAHAM   TECHNIQUE: Portable chest one view.   FINDINGS: The cardiac silhouette is quite prominent suggesting cardiomegaly. The tube and line placement is unchanged.  There is a decrease in the right-sided pleural-based density is well as the right-sided parenchymal densities. The pulmonary vasculature is slightly indistinct suggesting mild pulmonary vascular congestion.       There appears to be increased aeration at the right base suggesting diminishing pleural density within the right hemithorax in association with the chest tube. Infiltrate persists on the right.   MACRO: none   Signed by: Saad Cronin 9/5/2024 8:18 AM Dictation workstation:   [id number]    XR chest 1 view    Result Date: 9/5/2024  Interpreted By:  Cande Keating, STUDY: XR CHEST 1 VIEW;  9/4/2024 11:10 pm   INDICATION: Signs/Symptoms:Chest tube placement.     COMPARISON: Radiographs of the chest dated 09/04/2024; CT of the chest dated 09/04/2024   ACCESSION NUMBER(S): [id number]   ORDERING CLINICIAN: ANDRY ABRAHAM   FINDINGS: AP  radiograph of the chest was provided.   Endotracheal tube projects 4.4 cm superior to the kathie. Enteric tube appears to course along the midline expected course of the esophagus, although the distal tip is not well visualized due to underpenetration and overlying structures.   CARDIOMEDIASTINAL SILHOUETTE: Cardiomediastinal silhouette is enlarged, similar to prior exam.   LUNGS: Redemonstration of the large right-sided pleural effusion with associated atelectasis/consolidation, similar in appearance to prior radiographs. No new consolidation or pleural effusion is present in the left lung.   ABDOMEN: No remarkable upper abdominal findings.   BONES: No acute osseous changes.       1.  Enteric tube is coursing along the midline of the mediastinum in the expected course of the esophagus, although the distal tip is not well visualized due to overlying cardiomediastinal silhouette and underpenetration, and may be outside of the field-of-view of the study. Endotracheal tube projects 4.5 cm superior to the kathie. 2. Redemonstration of the large loculated right-sided pleural effusion with the associated atelectasis/consolidation, similar in appearance to prior exam.       MACRO: None   Signed by: Cande Keating 9/5/2024 2:08 AM Dictation workstation:   [id number]    CT chest abdomen pelvis w IV contrast    Result Date: 9/4/2024  Interpreted By:  Ishaan Banda, STUDY: CT CHEST ABDOMEN PELVIS W IV CONTRAST;  9/4/2024 5:52 pm   INDICATION: Signs/Symptoms:unresponsive.     COMPARISON: None.   ACCESSION NUMBER(S): [id number]   ORDERING CLINICIAN: GURPREET COPPOLA   TECHNIQUE: Contiguous axial images of the chest, abdomen, and pelvis were obtained after the intravenous administration of iodinated contrast. Coronal and sagittal reformatted images were reconstructed from the axial data.   FINDINGS: CT CHEST:   MEDIASTINUM AND LYMPH NODES: NG/OG tube noted with small amount of fluid in the esophagus. The esophageal  wall appears within normal limits.  No enlarged intrathoracic or axillary lymph nodes by imaging criteria. No pneumomediastinum.   VESSELS:  Normal caliber thoracic aorta without dissection. Mild aortic atherosclerosis.   HEART: Enlarged. Severe aortic valvular calcifications. Moderate coronary artery calcifications. No significant pericardial effusion.   LUNG, AIRWAYS, PLEURA: There is a large loculated right pleural effusion causing compressive atelectasis on the right lung. As a result, there is complete collapse of the right lower lobe, complete collapse of the right middle lobe, and partial collapse of the right upper lobe. There are multiple loculated compartments largest of which is seen at the right lung base. There is a small amount of mucus in the mid to distal trachea. There is small amount of layering debris in the mainstem bronchi. The majority of the right middle and lower lobe bronchi are collapsed and opacified with low-density debris. There is a trace left pleural effusion with subsegmental left basilar atelectasis. There is emphysema. There are new scattered subcentimeter bilateral centrilobular nodules that are likely infectious/inflammatory in nature from bronchiolitis.   CHEST WALL SOFT TISSUES: No discernible acute abnormality. There is a 5.5 cm x 1.7 cm lipoma in the right infraspinatus muscle.   OSSEOUS STRUCTURES: No acute osseous abnormality.     CT ABDOMEN/PELVIS:   ABDOMINAL WALL: No significant abnormality.   LIVER: No significant parenchymal abnormality.   BILE DUCTS: No significant intrahepatic or extrahepatic dilatation.   GALLBLADDER: No significant abnormality.   PANCREAS: No significant abnormality.   SPLEEN: No significant abnormality.   ADRENALS: There is a 1.7 cm low-density left adrenal nodule likely representing a adenoma.   KIDNEYS, URETERS, BLADDER: There is a heterogeneously enhancing solid mass in the upper pole the left kidney measuring 2.4 cm x 1.9 cm consistent with  renal cell carcinoma. Additionally, a 0.8 cm likely enhancing lesion in the upper pole the right kidney is concerning for renal cell carcinoma. No hydronephrosis or calculi. Urinary bladder is decompressed with Live catheter in place.   REPRODUCTIVE ORGANS: No significant abnormality.   VESSELS: Moderate aortic atherosclerosis without AAA.   RETROPERITONEUM/LYMPH NODES: No acute retroperitoneal abnormality. No enlarged lymph nodes.   BOWEL/MESENTERY/PERITONEUM: Colonic diverticulosis without acute diverticulitis. No inflammatory bowel wall thickening or dilatation. Normal appendix.   No ascites, free air, or fluid collection.     MUSCULOSKELETAL: No acute osseous abnormality.       CT CHEST: Large loculated right pleural effusion causing complete compressive collapse of the right middle lobe and right lower lobe and partial compressive collapse of the right upper lobe. The largest likely compartments located at the inferior right hemithorax. The cause of the effusion is not apparent as there is only a small amount of debris in the distal trachea and right mainstem bronchus but the distal-most right-sided bronchi are occluded due to combination of compressive collapse and small low-density fluid.   Numerous new subcentimeter centrilobular pulmonary nodules that are most likely infectious/inflammatory in as can be seen with bronchiolitis or fungal infection.   Aortic valve calcification to the extent that would likely result in aortic stenosis.   CT ABDOMEN/PELVIS: Left kidney solid heterogeneously enhancing mass suspicious for renal cell carcinoma (2.4 cm x 1.9 cm) and of suspected 0.8 cm right upper pole renal cell carcinoma measuring 0.8 cm. Recommend urological follow-up/also taken and dedicated MRI kidneys to further evaluate these lesions. YELLOW ALERT: An incidental finding alert was sent per protocol.   No acute abnormality in the abdomen or pelvis.   Colonic diverticulosis without acute diverticulitis.    MACRO: Critical Finding:  New mass in the kidney. Notification was initiated on 9/4/2024 at 6:25 pm by  Ishaan Banda.  (**-YCF-**) Instructions:  MR Abdomen w and wo IV contrast. 1 week.   Signed by: Ishaan Banda 9/4/2024 6:26 PM Dictation workstation:   [id number]    CT head wo IV contrast    Result Date: 9/4/2024  Interpreted By:  Ishaan Banda, STUDY: CT HEAD WO IV CONTRAST;  9/4/2024 5:52 pm   INDICATION: Signs/Symptoms:unrespoonsive.     COMPARISON: None.   ACCESSION NUMBER(S): [id number]   ORDERING CLINICIAN: GURPREET COPPOLA   TECHNIQUE: Noncontrast axial CT images of head were obtained with coronal and sagittal reconstructed images.   FINDINGS: BRAIN PARENCHYMA: Mild periventricular and subcortical hemispheric white matter hypodensities are most compatible with chronic small vessel ischemic disease. No acute intraparenchymal hemorrhage or parenchymal evidence of acute large territory ischemic infarct. Gray-white matter distinction is preserved. No mass-effect.   VENTRICLES and EXTRA-AXIAL SPACES:  No acute extra-axial or intraventricular hemorrhage. No effacement of cerebral sulci. The ventricles and sulci are age-concordant.   PARANASAL SINUSES/MASTOIDS:  No hemorrhage or air-fluid levels within the visualized paranasal sinuses. The mastoids are well aerated.   CALVARIUM/ORBITS:  No skull fracture.  The orbits and globes are intact to the extent visualized.   EXTRACRANIAL SOFT TISSUES: No discernible abnormality.       No acute intracranial abnormality.     MACRO: None.   Signed by: Ishaan Banda 9/4/2024 6:07 PM Dictation workstation:   [id number]    XR chest 1 view    Result Date: 9/4/2024  Interpreted By:  Rosalie Ellison, STUDY: XR CHEST 1 VIEW;  9/4/2024 4:33 pm   INDICATION: Signs/Symptoms:intubation.   COMPARISON: 04/08/2024   ACCESSION NUMBER(S): [id number]   ORDERING CLINICIAN: GURPREET COPPOLA   TECHNIQUE: A single portable image of the chest was obtained.   FINDINGS: Resolution is  limited. The right portion of the chest is not entirely included.   The patient is rotated. The heart size is uncertain.   There appears to be elevated right hemidiaphragm and right-sided infiltration.   An endotracheal tube terminates above the kathie. A nasogastric tube terminates below the diaphragm.   COMPARISON OF FINDING: The nasogastric tube was placed in the interval. The endotracheal tube was placed in the interval.       Interval placement of an endotracheal tube. The tip is above the kathie. Interval placement of a nasogastric tube. It appears to terminate below the diaphragm.   The exam is extremely limited.   MACRO: none   Signed by: Rosalie Ellison 9/4/2024 4:43 PM Dictation workstation:   [id number]           Assessment/Plan   Assessment & Plan  Acute respiratory failure with hypoxia and hypercapnia (Multi)  67-year-old gentleman with history of chronic diastolic heart failure, COPD, obstructive sleep apnea, obesity, type 2 diabetes mellitus, hypertension, hypothyroidism and hyperlipidemia was admitted with acute respiratory failure with hypoxia and hypercapnia and noted to have a right loculated pleural effusion and bilateral lower lobe lung infiltrate and right hydropneumothorax.  He was hypotensive on admission and required 3 L of fluid to resuscitate and admitted to intensive care unit and was intubated because of acute respiratory failure and remained intubated for several days.  Currently he is extubated and on high flow oxygen as mentioned above.  He had significant leukocytosis of 22,000 on admission and his sputum culture was positive for stenotrophomonas.  Patient is being continued on IV Zosyn for next few days.     1.  Acute hypoxic hypercapnic respiratory failure with hypotension and right hydropneumothorax and bilateral infiltrate and positive for pneumonia.Sputum culture was positive for stenotrophomonas.   Initially intubated for several days and since then he has been extubated and is  on high flow Airvo 50% / 15 L oxygen.  Improving and currently is on 6 L nasal cannula.  Continued on IV Zosyn and Levaquin for another 5 days  Leukocytosis is improving.  Patient was severely hypercapnic last night and was very agitated and had to be given 1 mg of IV Ativan and then was placed on BiPAP.  Patient currently on BiPAP and seen by pulmonary.    2.  Patient had hydropneumothorax on admission and had chest tube, chest tube has been removed since then..    3.  Hypotension has resolved    4.  COPD and obstructive sleep apnea aerosol treatment and currently on Airvo with 50% oxygen/15 L    Continue aerosol treatment and currently patient on prednisone 20 mg daily.    5.  Diabetes mellitus type 2, continue sliding scale insulin, patient was on Mounjaro at home prior to admission.    6.  Patient  DNR/DNI.    7.  Hematuria possibly secondary to trauma supplement for the catheter, will observe closely and a urology consultation has been requested.    Reviewed all labs patient's medical record and imaging studies and discussed the plan of treatment with the patient..         I spent 35 minutes in the professional and overall care of this patient.      Noble Gatica MD

## 2024-09-18 NOTE — PROGRESS NOTES
"  INFECTIOUS DISEASE DAILY PROGRESS NOTE    SUBJECTIVE:    On BiPAP this morning still from overnight. He seems more lethargic today. Is on 10L NC otherwise when not on overnight BiPAP. No fevers. WBC is normal.    OBJECTIVE:  VITALS (Last 24 Hours)  /78 (BP Location: Right arm, Patient Position: Lying)   Pulse 95   Temp 36.6 °C (97.9 °F) (Temporal)   Resp 21   Ht 1.803 m (5' 10.98\")   Wt 127 kg (279 lb 15.8 oz)   SpO2 98%   BMI 39.07 kg/m²     PHYSICAL EXAM:  Gen - BiPAP, seems quite sleepy  Lungs - coarse lower lungs, no wheezing  Abd - soft, no ttp, BS present  Skin - no rashes    ABX: IV Zosyn and PO Levofloxacin    LABS:  Lab Results   Component Value Date    WBC 11.1 09/18/2024    HGB 17.1 09/18/2024    HCT 55.7 (H) 09/18/2024     (H) 09/18/2024     09/18/2024     Lab Results   Component Value Date    GLUCOSE 104 (H) 09/18/2024    CALCIUM 8.0 (L) 09/18/2024     09/18/2024    K 3.8 09/18/2024    CO2 36 (H) 09/18/2024     09/18/2024    BUN 16 09/18/2024    CREATININE 0.41 (L) 09/18/2024     Results from last 72 hours   Lab Units 09/18/24  0439   ALBUMIN g/dL 3.0*     Estimated Creatinine Clearance: 125 mL/min (A) (by C-G formula based on SCr of 0.41 mg/dL (L)).    IMAGING:  CXR 9/13  Impression:     Improved aeration of the right lung base. Residual hazy right basilar  opacities likely a combination of residual pleural effusion and  basilar atelectasis.    Limited evaluation of the left lung base due to underpenetration.     ASSESSMENT/PLAN:     PNA due to Stenotrophomonas maltophilia. Resolving  Right Loculated Pleural Effusion - s/p chest tube 9/4, 71K WBCs neutrophilic with pH 7.9. Glucose <10 and exudate by Light's criteria. Consistent with an empyema. No growth. Resolving and chest tube out.     IV Zosyn and PO Levofloxacin today still. Hoping to stop soon but I would like to see some more stable improvement in his overall respiratory status.    Monitoring for adverse " effects of abx such as rash/itching/diarrhea - none present.    Will follow. Thanks!     Elijah Busby MD  [facility]  Office #[phone]

## 2024-09-18 NOTE — PROGRESS NOTES
09/18/24 1530   Discharge Planning   Expected Discharge Disposition Long Term     Per sister, [facility] is [facility]. Auth started.

## 2024-09-18 NOTE — ASSESSMENT & PLAN NOTE
67-year-old gentleman with history of chronic diastolic heart failure, COPD, obstructive sleep apnea, obesity, type 2 diabetes mellitus, hypertension, hypothyroidism and hyperlipidemia was admitted with acute respiratory failure with hypoxia and hypercapnia and noted to have a right loculated pleural effusion and bilateral lower lobe lung infiltrate and right hydropneumothorax.  He was hypotensive on admission and required 3 L of fluid to resuscitate and admitted to intensive care unit and was intubated because of acute respiratory failure and remained intubated for several days.  Currently he is extubated and on high flow oxygen as mentioned above.  He had significant leukocytosis of 22,000 on admission and his sputum culture was positive for stenotrophomonas.  Patient is being continued on IV Zosyn for next few days.     1.  Acute hypoxic hypercapnic respiratory failure with hypotension and right hydropneumothorax and bilateral infiltrate and positive for pneumonia.Sputum culture was positive for stenotrophomonas.   Initially intubated for several days and since then he has been extubated and is on high flow Airvo 50% / 15 L oxygen.  Improving and currently is on 6 L nasal cannula.  Continued on IV Zosyn and Levaquin for another 5 days  Leukocytosis is improving.  Patient was severely hypercapnic last night and was very agitated and had to be given 1 mg of IV Ativan and then was placed on BiPAP.  Patient currently on BiPAP and seen by pulmonary.    2.  Patient had hydropneumothorax on admission and had chest tube, chest tube has been removed since then..    3.  Hypotension has resolved    4.  COPD and obstructive sleep apnea aerosol treatment and currently on Airvo with 50% oxygen/15 L    Continue aerosol treatment and currently patient on prednisone 20 mg daily.    5.  Diabetes mellitus type 2, continue sliding scale insulin, patient was on Mounjaro at home prior to admission.    6.  Patient  DNR/DNI.    7.   Hematuria possibly secondary to trauma supplement for the catheter, will observe closely and a urology consultation has been requested.    Reviewed all labs patient's medical record and imaging studies and discussed the plan of treatment with the patient..

## 2024-09-19 ENCOUNTER — APPOINTMENT (OUTPATIENT)
Dept: UROLOGY | Facility: CLINIC | Age: 69
End: 2024-09-19
Payer: MEDICARE

## 2024-09-19 ENCOUNTER — APPOINTMENT (OUTPATIENT)
Dept: CARDIOLOGY | Facility: HOSPITAL | Age: 69
DRG: 870 | End: 2024-09-19
Payer: MEDICARE

## 2024-09-19 LAB
ALBUMIN SERPL BCP-MCNC: 2.5 G/DL (ref 3.4–5)
ANION GAP SERPL CALC-SCNC: 9 MMOL/L (ref 10–20)
BUN SERPL-MCNC: 21 MG/DL (ref 6–23)
CALCIUM SERPL-MCNC: 7.6 MG/DL (ref 8.6–10.3)
CHLORIDE SERPL-SCNC: 104 MMOL/L (ref 98–107)
CO2 SERPL-SCNC: 32 MMOL/L (ref 21–32)
CREAT SERPL-MCNC: 0.46 MG/DL (ref 0.5–1.3)
EGFRCR SERPLBLD CKD-EPI 2021: >90 ML/MIN/1.73M*2
EJECTION FRACTION APICAL 4 CHAMBER: 20.6
EJECTION FRACTION: 23 %
ERYTHROCYTE [DISTWIDTH] IN BLOOD BY AUTOMATED COUNT: 13.3 % (ref 11.5–14.5)
GLUCOSE BLD MANUAL STRIP-MCNC: 111 MG/DL (ref 74–99)
GLUCOSE BLD MANUAL STRIP-MCNC: 118 MG/DL (ref 74–99)
GLUCOSE BLD MANUAL STRIP-MCNC: 146 MG/DL (ref 74–99)
GLUCOSE BLD MANUAL STRIP-MCNC: 162 MG/DL (ref 74–99)
GLUCOSE BLD MANUAL STRIP-MCNC: 190 MG/DL (ref 74–99)
GLUCOSE BLD MANUAL STRIP-MCNC: 239 MG/DL (ref 74–99)
GLUCOSE SERPL-MCNC: 107 MG/DL (ref 74–99)
HCT VFR BLD AUTO: 45.3 % (ref 41–52)
HGB BLD-MCNC: 14.3 G/DL (ref 13.5–17.5)
MAGNESIUM SERPL-MCNC: 2.2 MG/DL (ref 1.6–2.4)
MCH RBC QN AUTO: 32.2 PG (ref 26–34)
MCHC RBC AUTO-ENTMCNC: 31.6 G/DL (ref 32–36)
MCV RBC AUTO: 102 FL (ref 80–100)
NRBC BLD-RTO: 0 /100 WBCS (ref 0–0)
PHOSPHATE SERPL-MCNC: 1.8 MG/DL (ref 2.5–4.9)
PLATELET # BLD AUTO: 210 X10*3/UL (ref 150–450)
POTASSIUM SERPL-SCNC: 3.8 MMOL/L (ref 3.5–5.3)
RBC # BLD AUTO: 4.44 X10*6/UL (ref 4.5–5.9)
RIGHT VENTRICLE FREE WALL PEAK S': 9 CM/S
SODIUM SERPL-SCNC: 141 MMOL/L (ref 136–145)
WBC # BLD AUTO: 8.7 X10*3/UL (ref 4.4–11.3)

## 2024-09-19 PROCEDURE — 2500000004 HC RX 250 GENERAL PHARMACY W/ HCPCS (ALT 636 FOR OP/ED): Performed by: INTERNAL MEDICINE

## 2024-09-19 PROCEDURE — 2500000002 HC RX 250 W HCPCS SELF ADMINISTERED DRUGS (ALT 637 FOR MEDICARE OP, ALT 636 FOR OP/ED): Performed by: SURGERY

## 2024-09-19 PROCEDURE — 80069 RENAL FUNCTION PANEL: CPT | Performed by: NURSE PRACTITIONER

## 2024-09-19 PROCEDURE — 82947 ASSAY GLUCOSE BLOOD QUANT: CPT

## 2024-09-19 PROCEDURE — 99232 SBSQ HOSP IP/OBS MODERATE 35: CPT | Performed by: NURSE PRACTITIONER

## 2024-09-19 PROCEDURE — 99223 1ST HOSP IP/OBS HIGH 75: CPT | Performed by: INTERNAL MEDICINE

## 2024-09-19 PROCEDURE — 2500000004 HC RX 250 GENERAL PHARMACY W/ HCPCS (ALT 636 FOR OP/ED): Performed by: NURSE PRACTITIONER

## 2024-09-19 PROCEDURE — 92610 EVALUATE SWALLOWING FUNCTION: CPT | Mod: GN

## 2024-09-19 PROCEDURE — 94640 AIRWAY INHALATION TREATMENT: CPT

## 2024-09-19 PROCEDURE — 99232 SBSQ HOSP IP/OBS MODERATE 35: CPT | Performed by: INTERNAL MEDICINE

## 2024-09-19 PROCEDURE — 2500000004 HC RX 250 GENERAL PHARMACY W/ HCPCS (ALT 636 FOR OP/ED): Performed by: SURGERY

## 2024-09-19 PROCEDURE — 97535 SELF CARE MNGMENT TRAINING: CPT | Mod: GO

## 2024-09-19 PROCEDURE — 2060000001 HC INTERMEDIATE ICU ROOM DAILY

## 2024-09-19 PROCEDURE — 83735 ASSAY OF MAGNESIUM: CPT | Performed by: NURSE PRACTITIONER

## 2024-09-19 PROCEDURE — 93308 TTE F-UP OR LMTD: CPT

## 2024-09-19 PROCEDURE — 2500000001 HC RX 250 WO HCPCS SELF ADMINISTERED DRUGS (ALT 637 FOR MEDICARE OP): Performed by: SURGERY

## 2024-09-19 PROCEDURE — 94660 CPAP INITIATION&MGMT: CPT

## 2024-09-19 PROCEDURE — 36415 COLL VENOUS BLD VENIPUNCTURE: CPT | Performed by: NURSE PRACTITIONER

## 2024-09-19 PROCEDURE — 93308 TTE F-UP OR LMTD: CPT | Performed by: STUDENT IN AN ORGANIZED HEALTH CARE EDUCATION/TRAINING PROGRAM

## 2024-09-19 PROCEDURE — 2500000005 HC RX 250 GENERAL PHARMACY W/O HCPCS: Performed by: INTERNAL MEDICINE

## 2024-09-19 PROCEDURE — 94669 MECHANICAL CHEST WALL OSCILL: CPT

## 2024-09-19 PROCEDURE — 2500000005 HC RX 250 GENERAL PHARMACY W/O HCPCS: Performed by: NURSE PRACTITIONER

## 2024-09-19 PROCEDURE — 99233 SBSQ HOSP IP/OBS HIGH 50: CPT | Performed by: INTERNAL MEDICINE

## 2024-09-19 PROCEDURE — 94668 MNPJ CHEST WALL SBSQ: CPT

## 2024-09-19 PROCEDURE — 85027 COMPLETE CBC AUTOMATED: CPT | Performed by: NURSE PRACTITIONER

## 2024-09-19 RX ORDER — LEVOTHYROXINE SODIUM 125 UG/1
125 TABLET ORAL DAILY
Qty: 30 TABLET | Refills: 1 | OUTPATIENT
Start: 2024-09-19

## 2024-09-19 RX ORDER — FUROSEMIDE 10 MG/ML
60 INJECTION INTRAMUSCULAR; INTRAVENOUS ONCE
Status: COMPLETED | OUTPATIENT
Start: 2024-09-19 | End: 2024-09-19

## 2024-09-19 RX ORDER — ACETAMINOPHEN 325 MG/1
650 TABLET ORAL EVERY 4 HOURS PRN
Status: DISCONTINUED | OUTPATIENT
Start: 2024-09-19 | End: 2024-09-25 | Stop reason: HOSPADM

## 2024-09-19 RX ORDER — FUROSEMIDE 10 MG/ML
80 INJECTION INTRAMUSCULAR; INTRAVENOUS EVERY 12 HOURS
Status: DISCONTINUED | OUTPATIENT
Start: 2024-09-19 | End: 2024-09-23

## 2024-09-19 RX ORDER — CALCIUM CARBONATE 200(500)MG
1000 TABLET,CHEWABLE ORAL
Status: ACTIVE | OUTPATIENT
Start: 2024-09-19 | End: 2024-09-19

## 2024-09-19 RX ORDER — ACETAMINOPHEN 650 MG/1
650 SUPPOSITORY RECTAL EVERY 4 HOURS PRN
Status: DISCONTINUED | OUTPATIENT
Start: 2024-09-19 | End: 2024-09-25 | Stop reason: HOSPADM

## 2024-09-19 ASSESSMENT — COGNITIVE AND FUNCTIONAL STATUS - GENERAL
STANDING UP FROM CHAIR USING ARMS: TOTAL
CLIMB 3 TO 5 STEPS WITH RAILING: TOTAL
PERSONAL GROOMING: A LOT
PERSONAL GROOMING: A LOT
TOILETING: TOTAL
DRESSING REGULAR LOWER BODY CLOTHING: TOTAL
HELP NEEDED FOR BATHING: A LOT
CLIMB 3 TO 5 STEPS WITH RAILING: TOTAL
EATING MEALS: A LITTLE
MOBILITY SCORE: 8
WALKING IN HOSPITAL ROOM: TOTAL
DRESSING REGULAR UPPER BODY CLOTHING: A LOT
DRESSING REGULAR LOWER BODY CLOTHING: A LOT
EATING MEALS: A LITTLE
TOILETING: TOTAL
TOILETING: TOTAL
MOBILITY SCORE: 8
MOVING FROM LYING ON BACK TO SITTING ON SIDE OF FLAT BED WITH BEDRAILS: A LOT
WALKING IN HOSPITAL ROOM: TOTAL
MOVING TO AND FROM BED TO CHAIR: TOTAL
TURNING FROM BACK TO SIDE WHILE IN FLAT BAD: A LOT
DAILY ACTIVITIY SCORE: 12
HELP NEEDED FOR BATHING: TOTAL
STANDING UP FROM CHAIR USING ARMS: TOTAL
PERSONAL GROOMING: A LITTLE
TURNING FROM BACK TO SIDE WHILE IN FLAT BAD: A LOT
MOVING FROM LYING ON BACK TO SITTING ON SIDE OF FLAT BED WITH BEDRAILS: A LOT
DAILY ACTIVITIY SCORE: 11
DAILY ACTIVITIY SCORE: 11
HELP NEEDED FOR BATHING: TOTAL
DRESSING REGULAR UPPER BODY CLOTHING: A LOT
MOVING TO AND FROM BED TO CHAIR: TOTAL
DRESSING REGULAR LOWER BODY CLOTHING: A LOT
DRESSING REGULAR UPPER BODY CLOTHING: A LOT
EATING MEALS: A LITTLE

## 2024-09-19 ASSESSMENT — PAIN SCALES - GENERAL
PAINLEVEL_OUTOF10: 5 - MODERATE PAIN
PAINLEVEL_OUTOF10: 0 - NO PAIN

## 2024-09-19 ASSESSMENT — PAIN - FUNCTIONAL ASSESSMENT
PAIN_FUNCTIONAL_ASSESSMENT: 0-10

## 2024-09-19 ASSESSMENT — ACTIVITIES OF DAILY LIVING (ADL): HOME_MANAGEMENT_TIME_ENTRY: 21

## 2024-09-19 ASSESSMENT — PAIN DESCRIPTION - DESCRIPTORS: DESCRIPTORS: ACHING

## 2024-09-19 NOTE — PROGRESS NOTES
Alo Glass is a 69 y.o. male on day 15 of admission presenting with Acute respiratory failure with hypoxia and hypercapnia (Multi).    Subjective   Patient seen and examined.  He is fully awake and alert, last night patient had acute respiratory distress with hypercapnia and required BiPAP and was very agitated and was given 0.5 mg of Ativan IV 1 time.  He has also had 11 beat run of ventricular tachycardia this morning, was asymptomatic had no chest pain, his breathing seems to be improved and currently is on 12 L nasal cannula high flow oxygen.  In view of recurrent short run of ventricular tachycardia and previous history of moderate aortic stenosis and low ejection fraction will seek cardiology consultation.  He does have some pedal edema and I have started him on IV Lasix today.  Regarding his right pulmonary infiltrate and effusion repeat chest x-ray does still reveal some parapneumonic effusion and patient is being continued on IV antibiotics including Levaquin and Zosyn.  Pulmonary and infectious disease on board.  Patient has expressed that he wanted to be DO NOT RESUSCITATE and he does not want any intubation.       Objective     Physical Exam  GENERAL:   Awake and alert, moderate respiratory distress but able to speak in full sentences.  Currently on 12 L nasal cannula high flow oxygen.  SKIN:  Skin color, texture, turgor normal. No rashes or lesions.  OROPHARYNX:  Lips, mucosa, and tongue are normal.Teeth and gums, normal. Oropharynx normal.  NECK: No lymphadenopathy, difficult to elicit any JVD.  LUNGS: There was diminished air exchange with crackles and wheezes right mid and lower lung fields,. decreased diaphragmatic excursion.  CARDIAC: Rate and rhythm is regular, normal S1 and S2; no rubs, murmurs, or gallops, 2+ pedal edema and 1+ lower extremity edema.  ABDOMEN:  Abdomen soft, large and obese, non-tender, BS normal, No masses or organomegaly  EXTREMETIES:  Extremities normal, no  "deformities, 1+ edema, no clubbing or skin discoloration. Good capillary refill., No ulcers  NEURO: Nonfocal  PULSES:  2+ radial, 2+ carotid  Last Recorded Vitals  Blood pressure 107/65, pulse 84, temperature 36.5 °C (97.7 °F), temperature source Temporal, resp. rate 20, height 1.803 m (5' 10.98\"), weight 126 kg (278 lb 7.1 oz), SpO2 92%.  Intake/Output last 3 Shifts:  I/O last 3 completed shifts:  In: 630 (5 mL/kg) [P.O.:480; IV Piggyback:150]  Out: 2725 (21.6 mL/kg) [Urine:2725 (0.6 mL/kg/hr)]  Weight: 126.3 kg     Relevant Results   Scheduled medications  aspirin, 81 mg, oral, Daily  calcium carbonate, 1,000 mg, oral, q2h  enoxaparin, 40 mg, subcutaneous, q12h SHARRON  ezetimibe, 10 mg, oral, Daily  folic acid, 1 mg, oral, Daily  insulin lispro, 0-10 Units, subcutaneous, q4h  ipratropium-albuteroL, 3 mL, nebulization, 4x daily  levoFLOXacin, 750 mg, intravenous, q24h  levothyroxine, 125 mcg, oral, Daily  multivitamin with minerals, 1 tablet, oral, Daily  nystatin, 5 mL, Swish & Swallow, 4x daily  pantoprazole, 40 mg, intravenous, Daily  piperacillin-tazobactam, 3.375 g, intravenous, q6h  polyethylene glycol, 17 g, nasogastric tube, Daily  potassium phosphate, 21 mmol, intravenous, Once  predniSONE, 20 mg, oral, Daily  sennosides-docusate sodium, 2 tablet, oral, BID  tamsulosin, 0.4 mg, oral, Daily  thiamine, 100 mg, oral, Daily      Continuous medications     PRN medications  PRN medications: dextrose, dextrose, glucagon, glucagon, ipratropium-albuteroL, oxygen, oxygen         Results for orders placed or performed during the hospital encounter of 09/04/24 (from the past 96 hour(s))   POCT GLUCOSE   Result Value Ref Range    POCT Glucose 175 (H) 74 - 99 mg/dL   POCT GLUCOSE   Result Value Ref Range    POCT Glucose 201 (H) 74 - 99 mg/dL   POCT GLUCOSE   Result Value Ref Range    POCT Glucose 140 (H) 74 - 99 mg/dL   POCT GLUCOSE   Result Value Ref Range    POCT Glucose 141 (H) 74 - 99 mg/dL   Renal Function Panel "   Result Value Ref Range    Glucose 131 (H) 74 - 99 mg/dL    Sodium 141 136 - 145 mmol/L    Potassium 3.6 3.5 - 5.3 mmol/L    Chloride 98 98 - 107 mmol/L    Bicarbonate 40 (HH) 21 - 32 mmol/L    Anion Gap 7 (L) 10 - 20 mmol/L    Urea Nitrogen 17 6 - 23 mg/dL    Creatinine 0.48 (L) 0.50 - 1.30 mg/dL    eGFR >90 >60 mL/min/1.73m*2    Calcium 7.8 (L) 8.6 - 10.3 mg/dL    Phosphorus 1.7 (L) 2.5 - 4.9 mg/dL    Albumin 2.5 (L) 3.4 - 5.0 g/dL   CBC   Result Value Ref Range    WBC 10.0 4.4 - 11.3 x10*3/uL    nRBC 0.0 0.0 - 0.0 /100 WBCs    RBC 5.34 4.50 - 5.90 x10*6/uL    Hemoglobin 17.2 13.5 - 17.5 g/dL    Hematocrit 54.8 (H) 41.0 - 52.0 %     (H) 80 - 100 fL    MCH 32.2 26.0 - 34.0 pg    MCHC 31.4 (L) 32.0 - 36.0 g/dL    RDW 13.2 11.5 - 14.5 %    Platelets 191 150 - 450 x10*3/uL   POCT GLUCOSE   Result Value Ref Range    POCT Glucose 147 (H) 74 - 99 mg/dL   POCT GLUCOSE   Result Value Ref Range    POCT Glucose 159 (H) 74 - 99 mg/dL   POCT GLUCOSE   Result Value Ref Range    POCT Glucose 227 (H) 74 - 99 mg/dL   POCT GLUCOSE   Result Value Ref Range    POCT Glucose 207 (H) 74 - 99 mg/dL   POCT GLUCOSE   Result Value Ref Range    POCT Glucose 216 (H) 74 - 99 mg/dL   Lactate   Result Value Ref Range    Lactate 1.1 0.4 - 2.0 mmol/L   POCT GLUCOSE   Result Value Ref Range    POCT Glucose 114 (H) 74 - 99 mg/dL   Renal Function Panel   Result Value Ref Range    Glucose 100 (H) 74 - 99 mg/dL    Sodium 143 136 - 145 mmol/L    Potassium 3.7 3.5 - 5.3 mmol/L    Chloride 99 98 - 107 mmol/L    Bicarbonate 42 (HH) 21 - 32 mmol/L    Anion Gap <7 (L) 10 - 20 mmol/L    Urea Nitrogen 16 6 - 23 mg/dL    Creatinine 0.46 (L) 0.50 - 1.30 mg/dL    eGFR >90 >60 mL/min/1.73m*2    Calcium 7.5 (L) 8.6 - 10.3 mg/dL    Phosphorus 1.9 (L) 2.5 - 4.9 mg/dL    Albumin 2.3 (L) 3.4 - 5.0 g/dL   CBC   Result Value Ref Range    WBC 9.1 4.4 - 11.3 x10*3/uL    nRBC 0.0 0.0 - 0.0 /100 WBCs    RBC 4.89 4.50 - 5.90 x10*6/uL    Hemoglobin 15.9 13.5 - 17.5  g/dL    Hematocrit 50.7 41.0 - 52.0 %     (H) 80 - 100 fL    MCH 32.5 26.0 - 34.0 pg    MCHC 31.4 (L) 32.0 - 36.0 g/dL    RDW 13.2 11.5 - 14.5 %    Platelets 185 150 - 450 x10*3/uL   POCT GLUCOSE   Result Value Ref Range    POCT Glucose 136 (H) 74 - 99 mg/dL   POCT GLUCOSE   Result Value Ref Range    POCT Glucose 231 (H) 74 - 99 mg/dL   POCT GLUCOSE   Result Value Ref Range    POCT Glucose 191 (H) 74 - 99 mg/dL   Electrocardiogram, 12-lead PRN ACS symptoms   Result Value Ref Range    Ventricular Rate 91 BPM    Atrial Rate 91 BPM    IA Interval 234 ms    QRS Duration 162 ms    QT Interval 426 ms    QTC Calculation(Bazett) 523 ms    P Axis -10 degrees    R Axis -83 degrees    T Axis 62 degrees    QRS Count 15 beats    Q Onset 205 ms    P Onset 88 ms    P Offset 158 ms    T Offset 418 ms    QTC Fredericia 489 ms   POCT GLUCOSE   Result Value Ref Range    POCT Glucose 251 (H) 74 - 99 mg/dL   POCT GLUCOSE   Result Value Ref Range    POCT Glucose 209 (H) 74 - 99 mg/dL   POCT GLUCOSE   Result Value Ref Range    POCT Glucose 106 (H) 74 - 99 mg/dL   Renal Function Panel   Result Value Ref Range    Glucose 104 (H) 74 - 99 mg/dL    Sodium 141 136 - 145 mmol/L    Potassium 3.8 3.5 - 5.3 mmol/L    Chloride 101 98 - 107 mmol/L    Bicarbonate 36 (H) 21 - 32 mmol/L    Anion Gap 8 (L) 10 - 20 mmol/L    Urea Nitrogen 16 6 - 23 mg/dL    Creatinine 0.41 (L) 0.50 - 1.30 mg/dL    eGFR >90 >60 mL/min/1.73m*2    Calcium 8.0 (L) 8.6 - 10.3 mg/dL    Phosphorus 2.9 2.5 - 4.9 mg/dL    Albumin 3.0 (L) 3.4 - 5.0 g/dL   CBC   Result Value Ref Range    WBC 11.1 4.4 - 11.3 x10*3/uL    nRBC 0.0 0.0 - 0.0 /100 WBCs    RBC 5.26 4.50 - 5.90 x10*6/uL    Hemoglobin 17.1 13.5 - 17.5 g/dL    Hematocrit 55.7 (H) 41.0 - 52.0 %     (H) 80 - 100 fL    MCH 32.5 26.0 - 34.0 pg    MCHC 30.7 (L) 32.0 - 36.0 g/dL    RDW 13.3 11.5 - 14.5 %    Platelets 214 150 - 450 x10*3/uL   B-type natriuretic peptide   Result Value Ref Range     (H) 0 - 99  pg/mL   POCT GLUCOSE   Result Value Ref Range    POCT Glucose 127 (H) 74 - 99 mg/dL   Blood Gas Arterial Full Panel   Result Value Ref Range    POCT pH, Arterial 7.23 (LL) 7.38 - 7.42 pH    POCT pCO2, Arterial 84 (HH) 38 - 42 mm Hg    POCT pO2, Arterial 114 (H) 85 - 95 mm Hg    POCT SO2, Arterial 98 94 - 100 %    POCT Oxy Hemoglobin, Arterial 95.3 94.0 - 98.0 %    POCT Hematocrit Calculated, Arterial 53.0 (H) 41.0 - 52.0 %    POCT Sodium, Arterial 133 (L) 136 - 145 mmol/L    POCT Potassium, Arterial 3.7 3.5 - 5.3 mmol/L    POCT Chloride, Arterial 99 98 - 107 mmol/L    POCT Ionized Calcium, Arterial 1.19 1.10 - 1.33 mmol/L    POCT Glucose, Arterial 134 (H) 74 - 99 mg/dL    POCT Lactate, Arterial 0.7 0.4 - 2.0 mmol/L    POCT Base Excess, Arterial 3.7 (H) -2.0 - 3.0 mmol/L    POCT HCO3 Calculated, Arterial 35.2 (H) 22.0 - 26.0 mmol/L    POCT Hemoglobin, Arterial 17.5 13.5 - 17.5 g/dL    POCT Anion Gap, Arterial 3 (L) 10 - 25 mmo/L    Patient Temperature 37.0 degrees Celsius    FiO2 60 %   BLOOD GAS VENOUS FULL PANEL   Result Value Ref Range    POCT pH, Venous 7.33 7.33 - 7.43 pH    POCT pCO2, Venous 64 (H) 41 - 51 mm Hg    POCT pO2, Venous 39 35 - 45 mm Hg    POCT SO2, Venous 76 (H) 45 - 75 %    POCT Oxy Hemoglobin, Venous 73.7 45.0 - 75.0 %    POCT Hematocrit Calculated, Venous 50.0 41.0 - 52.0 %    POCT Sodium, Venous 130 (L) 136 - 145 mmol/L    POCT Potassium, Venous 3.7 3.5 - 5.3 mmol/L    POCT Chloride, Venous 97 (L) 98 - 107 mmol/L    POCT Ionized Calicum, Venous 1.10 1.10 - 1.33 mmol/L    POCT Glucose, Venous 135 (H) 74 - 99 mg/dL    POCT Lactate, Venous 1.4 0.4 - 2.0 mmol/L    POCT Base Excess, Venous 5.2 (H) -2.0 - 3.0 mmol/L    POCT HCO3 Calculated, Venous 33.7 (H) 22.0 - 26.0 mmol/L    POCT Hemoglobin, Venous 16.5 13.5 - 17.5 g/dL    POCT Anion Gap, Venous 3.0 (L) 10.0 - 25.0 mmol/L    Patient Temperature 37.0 degrees Celsius    FiO2 60 %   POCT GLUCOSE   Result Value Ref Range    POCT Glucose 122 (H) 74 -  99 mg/dL   POCT GLUCOSE   Result Value Ref Range    POCT Glucose 243 (H) 74 - 99 mg/dL   POCT GLUCOSE   Result Value Ref Range    POCT Glucose 374 (H) 74 - 99 mg/dL   POCT GLUCOSE   Result Value Ref Range    POCT Glucose 239 (H) 74 - 99 mg/dL   POCT GLUCOSE   Result Value Ref Range    POCT Glucose 118 (H) 74 - 99 mg/dL   Renal Function Panel   Result Value Ref Range    Glucose 107 (H) 74 - 99 mg/dL    Sodium 141 136 - 145 mmol/L    Potassium 3.8 3.5 - 5.3 mmol/L    Chloride 104 98 - 107 mmol/L    Bicarbonate 32 21 - 32 mmol/L    Anion Gap 9 (L) 10 - 20 mmol/L    Urea Nitrogen 21 6 - 23 mg/dL    Creatinine 0.46 (L) 0.50 - 1.30 mg/dL    eGFR >90 >60 mL/min/1.73m*2    Calcium 7.6 (L) 8.6 - 10.3 mg/dL    Phosphorus 1.8 (L) 2.5 - 4.9 mg/dL    Albumin 2.5 (L) 3.4 - 5.0 g/dL   CBC   Result Value Ref Range    WBC 8.7 4.4 - 11.3 x10*3/uL    nRBC 0.0 0.0 - 0.0 /100 WBCs    RBC 4.44 (L) 4.50 - 5.90 x10*6/uL    Hemoglobin 14.3 13.5 - 17.5 g/dL    Hematocrit 45.3 41.0 - 52.0 %     (H) 80 - 100 fL    MCH 32.2 26.0 - 34.0 pg    MCHC 31.6 (L) 32.0 - 36.0 g/dL    RDW 13.3 11.5 - 14.5 %    Platelets 210 150 - 450 x10*3/uL   POCT GLUCOSE   Result Value Ref Range    POCT Glucose 111 (H) 74 - 99 mg/dL      Electrocardiogram, 12-lead PRN ACS symptoms    Result Date: 9/18/2024  Sinus rhythm with 1st degree AV block with Premature atrial complexes with Aberrant conduction Right bundle branch block Left anterior fascicular block  Bifascicular block  Anteroseptal infarct (cited on or before 23-APR-2024) Abnormal ECG Confirmed by Triston Moore ([id number]) on 9/18/2024 10:19:51 AM    XR chest 1 view    Result Date: 9/17/2024  Interpreted By:  Ishaan Vernon, STUDY: XR CHEST 1 VIEW;  9/17/2024 5:41 am   INDICATION: Signs/Symptoms:Increase O2 requirements. Tnx..   COMPARISON: CT scan from 09/04/2024.   ACCESSION NUMBER(S): [id number]   ORDERING CLINICIAN: PARISA ZARAGOZA   TECHNIQUE: Single AP portable view of the chest was obtained.    FINDINGS: MEDIASTINUM/ LUNGS/ ARELIS:   Stable cardiomegaly. No gross vascular congestion. Small stable left pleural effusion. Medial left basilar atelectasis/infiltrate, stable to slightly progressed. Pleural and parenchymal opacities at the right base, mildly progressed. No pneumothorax. No tracheal deviation. No abnormal hilar fullness or gross mass on either side.   BONES: No lytic or blastic destructive bone lesion.   UPPER ABDOMEN: Grossly intact.       Cardiomegaly.   Stable small left pleural effusion with left basilar atelectasis/pneumonia, stable to slightly progressed.   Pleural and parenchymal opacities at the right base, mildly progressed.   MACRO: None   Signed by: Ishaan Vernon 9/17/2024 8:16 AM Dictation workstation:   [id number]    Electrocardiogram, 12-lead PRN ACS symptoms    Result Date: 9/16/2024  Sinus rhythm with 1st degree AV block with Premature atrial complexes Right bundle branch block Left anterior fascicular block  Bifascicular block  Anteroseptal infarct (cited on or before 23-APR-2024) Abnormal ECG When compared with ECG of 13-SEP-2024 02:49, (unconfirmed) Premature ventricular complexes are no longer Present QT has lengthened    ECG 12 lead    Result Date: 9/15/2024  Sinus rhythm with 1st degree AV block Left axis deviation Right bundle branch block Inferior infarct (cited on or before 04-SEP-2024) Abnormal ECG When compared with ECG of 04-SEP-2024 16:24, Premature ventricular complexes are no longer Present Right bundle branch block is now Present Confirmed by Dimitri Murillo ([id number]) on 9/15/2024 2:23:29 PM    XR chest 1 view    Result Date: 9/13/2024  Interpreted By:  Tania Knowles, STUDY: XR CHEST 1 VIEW;  9/13/2024 4:11 pm   INDICATION: Signs/Symptoms:post chest tube removal.   COMPARISON: 09/13/2024   ACCESSION NUMBER(S): [id number]   ORDERING CLINICIAN: RODOLFO LOPEZ   FINDINGS: The study is limited due to patient's body habitus and poor inspiration previously seen right-sided  chest tube has been removed. No gross pneumothorax is noted.   Blunting of both costophrenic angles is seen, which most likely is related to pleural effusions. The heart is enlarged. Bibasilar streaky densities are seen.       No gross pneumothorax.   Bilateral pleural effusions.   Cardiomegaly.   Bibasilar streaky densities, which may be related to atelectasis or infiltrates.       MACRO: None   Signed by: Tania Knowles 9/13/2024 4:40 PM Dictation workstation:   [id number]    Electrocardiogram, 12-lead PRN ACS symptoms    Result Date: 9/13/2024  Sinus rhythm with 1st degree AV block with occasional Premature ventricular complexes and Premature atrial complexes Left axis deviation Right bundle branch block Inferior infarct (cited on or before 04-SEP-2024) Anteroseptal infarct (cited on or before 23-APR-2024) Abnormal ECG When compared with ECG of 06-SEP-2024 00:20, Premature ventricular complexes are now Present Vent. rate has decreased BY  31 BPM Questionable change in initial forces of Septal leads T wave inversion less evident in Anterolateral leads    XR chest 1 view    Result Date: 9/13/2024  Interpreted By:  Rosa Hemphill, STUDY: XR CHEST 1 VIEW;  9/13/2024 4:31 am   INDICATION: Signs/Symptoms:CT in place.   COMPARISON: Multiple prior studies, the most recent 09/12/2024   ACCESSION NUMBER(S): [id number]   ORDERING CLINICIAN: RODOLFO LOPEZ   FINDINGS:     CARDIOMEDIASTINAL SILHOUETTE: Stable enlarged cardiac silhouette.   LUNGS: Stable position of pigtail catheter overlying the right lung base. There is improved aeration of the right lung base. Mild residual hazy right basilar opacity, likely combination of small residual pleural effusion and basilar consolidation. Limited evaluation of the left lung base due to underpenetration. This is not significantly changed and left pleural effusion or basilar consolidation not excluded. No pneumothorax.   ABDOMEN: No remarkable upper abdominal findings.   BONES:  No acute osseous abnormality.       Improved aeration of the right lung base. Residual hazy right basilar opacities likely a combination of residual pleural effusion and basilar atelectasis.   Limited evaluation of the left lung base due to underpenetration.   MACRO: None   Signed by: Rosa Hemphill 9/13/2024 4:48 AM Dictation workstation:   [id number]    XR chest 1 view    Result Date: 9/12/2024  Interpreted By:  Donte Shoemaker, STUDY: XR CHEST 1 VIEW; 9/12/2024 5:23 am   INDICATION: Signs/Symptoms:CT in place.   COMPARISON: None   ACCESSION NUMBER(S): [id number]   ORDERING CLINICIAN: RODOLFO LOPEZ   TECHNIQUE: 1 view of the chest was performed.   FINDINGS: Right pigtail catheter in similar location. Stable layering bilateral pleural effusion with surrounding atelectasis. Stable cardiomegaly. Bilateral shoulder joint degenerative changes.       1. No significant interval change from 09/11/2024 chest x-ray.   Signed by: Donte Shoemaker 9/12/2024 8:18 AM Dictation workstation:   [id number]    XR chest 1 view    Result Date: 9/11/2024  Interpreted By:  Klaus Villafana, STUDY: XR CHEST 1 VIEW; 9/11/2024 5:42 am   INDICATION: Signs/Symptoms:CT in place   COMPARISON: Radiographs 09/10/2024   ACCESSION NUMBER(S): [id number]   ORDERING CLINICIAN: RODOLFO LOPEZ   TECHNIQUE: Single frontal view of the chest performed.   FINDINGS: LINES AND DEVICES: Remote endotracheal tube. Stable right pigtail pleural catheter near right lung base.   LUNGS: Stable hazy opacities at the lung base and additional airspace opacities extending to right mid lung, stable to mildly increased. No pneumothorax. Left lung is clear.   CARDIOMEDIASTINAL SILHOUETTE: Stable cardiomegaly.       Stable to slightly increased right pleural effusion with atelectasis. Stable right pleural catheter.   MACRO None   Signed by: Klaus Villafana 9/11/2024 8:21 AM Dictation workstation:   [id number]    XR chest 1 view    Result Date: 9/10/2024  Interpreted  By:  Ishaan Vernon, STUDY: XR CHEST 1 VIEW;  9/10/2024 5:34 am   INDICATION: Signs/Symptoms:Eval chest tube placement, Rt effusion.   COMPARISON: CT scan chest from 09/04/2024. Chest x-ray from 09/09/2024.   ACCESSION NUMBER(S): [id number]   ORDERING CLINICIAN: RICH KAPLAN   TECHNIQUE: Single AP portable view of the chest was obtained.   FINDINGS: MEDIASTINUM/ LUNGS/ ARELIS: Previous ET tube has been removed. There is an NG tube in place with distal port just beyond the GE junction and distal tip in the proximal stomach. Pigtail pleural catheter at the right base is stable. There is a small grossly stable lateral right basilar pneumothorax. There is mild bibasilar atelectasis. Cardiomegaly without gross vascular congestion. No pneumothorax on the left. No tracheal deviation. No abnormal hilar fullness or gross mass on either side.   BONES: No lytic or blastic destructive bone lesion.   UPPER ABDOMEN: Grossly intact.       Cardiomegaly.   NG tube in place. ET tube has been removed.   Stable pleural drainage catheter at the base of the right chest. Small stable lateral right basilar pneumothorax.   Mild bibasilar atelectasis.   MACRO: None   Signed by: Ishaan Vernon 9/10/2024 8:13 AM Dictation workstation:   [id number]    XR chest 1 view    Result Date: 9/9/2024  Interpreted By:  Daisy Sosa, STUDY: XR CHEST 1 VIEW;  9/9/2024 4:39 pm   INDICATION: Signs/Symptoms:s/p lytic therapy.     COMPARISON: 5:21 a.m. the same day   ACCESSION NUMBER(S): [id number]   ORDERING CLINICIAN: RODOLFO LOPEZ   FINDINGS: ETT, NG tube and right lower chest pigtail catheter remain in place. Additional presumed overlying monitoring/support devices again seen. Small right basilar pneumothorax decreased in size. Persistent bibasilar opacities. The cardiomediastinal silhouette remains enlarged.       Decreased size of right basilar pneumothorax since 5:21 a.m. the same day with pigtail catheter in place.   MACRO: None.   Signed by:  Daisy Diggsedyta 9/9/2024 4:44 PM Dictation workstation:   [id number]    XR chest 1 view    Result Date: 9/9/2024  Interpreted By:  Klaus Villafana, STUDY: XR CHEST 1 VIEW; 9/9/2024 5:32 am   INDICATION: Signs/Symptoms:Eval chest tube placement, Rt effusion   COMPARISON: Radiographs 09/08/2024   ACCESSION NUMBER(S): [id number]   ORDERING CLINICIAN: RICH KAPLAN   TECHNIQUE: Single frontal view of the chest performed.   FINDINGS: LINES AND DEVICES: The pigtail of right-sided pleural drainage catheter overlies the right infrahilar region, grossly unchanged. Tip of ETT approximately 6.6 cm above the kathie, previously 7.7 cm. NG tube courses below the diaphragm, limited assessment.   LUNGS: Moderate-size right-sided hydropneumothorax appears grossly unchanged. Stable to slightly increased interstitial opacities on the left. Question new trace left pleural effusion with atelectasis.   CARDIOMEDIASTINAL SILHOUETTE: Stable cardiomegaly.       Right-sided infrahilar pigtail catheter and moderate hydropneumothorax, grossly unchanged.   Suspected worsening CHF with stable to mildly increased interstitial edema and possible new trace left pleural effusion.   MACRO None   Signed by: Klaus Villafana 9/9/2024 8:17 AM Dictation workstation:   [id number]    XR chest 1 view    Result Date: 9/8/2024  Interpreted By:  Corrina Reyes, STUDY: XR CHEST 1 VIEW;  9/8/2024 6:39 am   INDICATION: Signs/Symptoms:Eval chest tube placement, Rt effusion.     COMPARISON: 09/07/2024   ACCESSION NUMBER(S): [id number]   ORDERING CLINICIAN: RICH KAPLAN   TECHNIQUE: Portable AP semi upright   FINDINGS: Endotracheal tube tip projects between 7 and 8 cm above the kathie. Nasogastric tube is no longer identified. Right pleural pigtail catheter projects at mid right hemithorax and is similar in position to the prior study. Relative volume of basilar pneumothorax and pleural fluid appears similar to the prior study. Right lower lobe and right  middle lobe are collapsed. Right upper lobe variation is similar to the prior study. Left lung shows some vascular congestion. Minimal atelectasis is present at the left lung base. No change in the osseous structures is noted.       Basilar pneumothorax on the right appears similar to the prior study in the relative volume of pleural fluid and pneumothorax appears unchanged.   Collapse of the right lower lobe and right middle lobe   Pulmonary vascular congestion is most notable in the left lung   MACRO: None.   Signed by: Corrina Reyes 9/8/2024 12:32 PM Dictation workstation:   [id number]    XR chest 1 view    Result Date: 9/8/2024  Interpreted By:  Corrina Reyes, STUDY: XR CHEST 1 VIEW;  9/7/2024 5:28 pm   INDICATION: Signs/Symptoms:Eval chest tube placement, Rt pleural effusion.     COMPARISON: 09/06/2024   ACCESSION NUMBER(S): [id number]   ORDERING CLINICIAN: RICH KAPLAN   TECHNIQUE: Portable AP upright   FINDINGS: Endotracheal tube tip projects about 6 cm above the kathie. NG tube extends below the diaphragm and beyond the image. Right pleural pigtail catheter projects at mid right hemithorax and appears unchanged in position.   Pneumothorax at the right thoracic base appears larger than on the prior study. The amount of pleural fluid appears decreased. Mild atelectasis left lung base is slightly improved. Heart is unchanged in size. No changes noted in the osseous structures.       Right pleural effusion is decreased from the prior study but the right basilar pneumothorax appears increased.   MACRO: None.   Signed by: Corrina Reyes 9/8/2024 12:30 PM Dictation workstation:   [id number]    Electrocardiogram, 12-lead PRN ACS symptoms    Result Date: 9/6/2024  Sinus rhythm with 1st degree AV block with Premature atrial complexes Left axis deviation Right bundle branch block Inferior infarct (cited on or before 04-SEP-2024) Anterior infarct , age undetermined T wave abnormality, consider lateral  ischemia Abnormal ECG When compared with ECG of 05-SEP-2024 10:29, (unconfirmed) Premature atrial complexes are now Present Confirmed by Shady Bill ([id number]) on 9/6/2024 1:59:56 PM    XR chest 1 view    Result Date: 9/6/2024  Interpreted By:  Eloisa Landa, STUDY: XR CHEST 1 VIEW 9/6/2024 9:05 am   INDICATION: Signs/Symptoms:R pleural effusion   COMPARISON: 09/05/2024   ACCESSION NUMBER(S): [id number]   ORDERING CLINICIAN: PERRI DELACRUZ   TECHNIQUE: AP view of the chest at bedside in the erect position   FINDINGS: A right chest tube is once again noted within the mid right hemithorax with a stable right hydropneumothorax seen within the mid and lower right hemithorax.   The tip of the endotracheal tube remains satisfactorily positioned at a distance 3 cm above kathie with nasogastric tube descending below diaphragm.   There is small left pleural effusion and left basilar atelectasis. The heart remains enlarged.       No change in the right hydropneumothorax which may be loculated with chest tube on the right in the mid hemithorax.   Small left pleural effusion with left basilar infiltrate/atelectasis.   Signed by: Eloisa Landa 9/6/2024 9:20 AM Dictation workstation:   [id number]    ECG 12 lead    Result Date: 9/5/2024  Sinus rhythm with 1st degree AV block with frequent Premature ventricular complexes Left axis deviation Left ventricular hypertrophy with QRS widening and repolarization abnormality ( R in aVL , Faheem product ) Inferior infarct , age undetermined Abnormal ECG When compared with ECG of 23-APR-2024 09:01, Premature ventricular complexes are now Present [place] interval has increased (RBBB and left anterior fascicular block) is no longer Present Inferior infarct is now Present Confirmed by Andrea Bentley ([id number]) on 9/5/2024 10:01:05 AM    XR chest 1 view    Result Date: 9/5/2024  Interpreted By:  Saad Cronin, STUDY: XR CHEST 1 VIEW; 9/5/2024 4:57 am   INDICATION: CLINICAL INFORMATION: Signs/Symptoms:Pig  tail Chest Tube replacement.   COMPARISON: 09/04/2024   ACCESSION NUMBER(S): [id number]   ORDERING CLINICIAN: ANDRY ABRAHAM   TECHNIQUE: Portable chest one view.   FINDINGS: The cardiac silhouette is quite prominent suggesting cardiomegaly. The tube and line placement is unchanged.  There is a decrease in the right-sided pleural-based density is well as the right-sided parenchymal densities. The pulmonary vasculature is slightly indistinct suggesting mild pulmonary vascular congestion.       There appears to be increased aeration at the right base suggesting diminishing pleural density within the right hemithorax in association with the chest tube. Infiltrate persists on the right.   MACRO: none   Signed by: Saad Cronin 9/5/2024 8:18 AM Dictation workstation:   [id number]    XR chest 1 view    Result Date: 9/5/2024  Interpreted By:  Cande Keating, STUDY: XR CHEST 1 VIEW;  9/4/2024 11:10 pm   INDICATION: Signs/Symptoms:Chest tube placement.     COMPARISON: Radiographs of the chest dated 09/04/2024; CT of the chest dated 09/04/2024   ACCESSION NUMBER(S): [id number]   ORDERING CLINICIAN: ANDRY ABRAHAM   FINDINGS: AP radiograph of the chest was provided.   Endotracheal tube projects 4.4 cm superior to the kathie. Enteric tube appears to course along the midline expected course of the esophagus, although the distal tip is not well visualized due to underpenetration and overlying structures.   CARDIOMEDIASTINAL SILHOUETTE: Cardiomediastinal silhouette is enlarged, similar to prior exam.   LUNGS: Redemonstration of the large right-sided pleural effusion with associated atelectasis/consolidation, similar in appearance to prior radiographs. No new consolidation or pleural effusion is present in the left lung.   ABDOMEN: No remarkable upper abdominal findings.   BONES: No acute osseous changes.       1.  Enteric tube is coursing along the midline of the mediastinum in the expected course of the esophagus, although  the distal tip is not well visualized due to overlying cardiomediastinal silhouette and underpenetration, and may be outside of the field-of-view of the study. Endotracheal tube projects 4.5 cm superior to the kathie. 2. Redemonstration of the large loculated right-sided pleural effusion with the associated atelectasis/consolidation, similar in appearance to prior exam.       MACRO: None   Signed by: Cande Keating 9/5/2024 2:08 AM Dictation workstation:   [id number]    CT chest abdomen pelvis w IV contrast    Result Date: 9/4/2024  Interpreted By:  Ishaan Banda, STUDY: CT CHEST ABDOMEN PELVIS W IV CONTRAST;  9/4/2024 5:52 pm   INDICATION: Signs/Symptoms:unresponsive.     COMPARISON: None.   ACCESSION NUMBER(S): [id number]   ORDERING CLINICIAN: GURPREET COPPOLA   TECHNIQUE: Contiguous axial images of the chest, abdomen, and pelvis were obtained after the intravenous administration of iodinated contrast. Coronal and sagittal reformatted images were reconstructed from the axial data.   FINDINGS: CT CHEST:   MEDIASTINUM AND LYMPH NODES: NG/OG tube noted with small amount of fluid in the esophagus. The esophageal wall appears within normal limits.  No enlarged intrathoracic or axillary lymph nodes by imaging criteria. No pneumomediastinum.   VESSELS:  Normal caliber thoracic aorta without dissection. Mild aortic atherosclerosis.   HEART: Enlarged. Severe aortic valvular calcifications. Moderate coronary artery calcifications. No significant pericardial effusion.   LUNG, AIRWAYS, PLEURA: There is a large loculated right pleural effusion causing compressive atelectasis on the right lung. As a result, there is complete collapse of the right lower lobe, complete collapse of the right middle lobe, and partial collapse of the right upper lobe. There are multiple loculated compartments largest of which is seen at the right lung base. There is a small amount of mucus in the mid to distal trachea. There is small  amount of layering debris in the mainstem bronchi. The majority of the right middle and lower lobe bronchi are collapsed and opacified with low-density debris. There is a trace left pleural effusion with subsegmental left basilar atelectasis. There is emphysema. There are new scattered subcentimeter bilateral centrilobular nodules that are likely infectious/inflammatory in nature from bronchiolitis.   CHEST WALL SOFT TISSUES: No discernible acute abnormality. There is a 5.5 cm x 1.7 cm lipoma in the right infraspinatus muscle.   OSSEOUS STRUCTURES: No acute osseous abnormality.     CT ABDOMEN/PELVIS:   ABDOMINAL WALL: No significant abnormality.   LIVER: No significant parenchymal abnormality.   BILE DUCTS: No significant intrahepatic or extrahepatic dilatation.   GALLBLADDER: No significant abnormality.   PANCREAS: No significant abnormality.   SPLEEN: No significant abnormality.   ADRENALS: There is a 1.7 cm low-density left adrenal nodule likely representing a adenoma.   KIDNEYS, URETERS, BLADDER: There is a heterogeneously enhancing solid mass in the upper pole the left kidney measuring 2.4 cm x 1.9 cm consistent with renal cell carcinoma. Additionally, a 0.8 cm likely enhancing lesion in the upper pole the right kidney is concerning for renal cell carcinoma. No hydronephrosis or calculi. Urinary bladder is decompressed with Live catheter in place.   REPRODUCTIVE ORGANS: No significant abnormality.   VESSELS: Moderate aortic atherosclerosis without AAA.   RETROPERITONEUM/LYMPH NODES: No acute retroperitoneal abnormality. No enlarged lymph nodes.   BOWEL/MESENTERY/PERITONEUM: Colonic diverticulosis without acute diverticulitis. No inflammatory bowel wall thickening or dilatation. Normal appendix.   No ascites, free air, or fluid collection.     MUSCULOSKELETAL: No acute osseous abnormality.       CT CHEST: Large loculated right pleural effusion causing complete compressive collapse of the right middle lobe and  right lower lobe and partial compressive collapse of the right upper lobe. The largest likely compartments located at the inferior right hemithorax. The cause of the effusion is not apparent as there is only a small amount of debris in the distal trachea and right mainstem bronchus but the distal-most right-sided bronchi are occluded due to combination of compressive collapse and small low-density fluid.   Numerous new subcentimeter centrilobular pulmonary nodules that are most likely infectious/inflammatory in as can be seen with bronchiolitis or fungal infection.   Aortic valve calcification to the extent that would likely result in aortic stenosis.   CT ABDOMEN/PELVIS: Left kidney solid heterogeneously enhancing mass suspicious for renal cell carcinoma (2.4 cm x 1.9 cm) and of suspected 0.8 cm right upper pole renal cell carcinoma measuring 0.8 cm. Recommend urological follow-up/also taken and dedicated MRI kidneys to further evaluate these lesions. YELLOW ALERT: An incidental finding alert was sent per protocol.   No acute abnormality in the abdomen or pelvis.   Colonic diverticulosis without acute diverticulitis.   MACRO: Critical Finding:  New mass in the kidney. Notification was initiated on 9/4/2024 at 6:25 pm by  Ishaan Banda.  (**-YCF-**) Instructions:  MR Abdomen w and wo IV contrast. 1 week.   Signed by: Ishaan Banda 9/4/2024 6:26 PM Dictation workstation:   [id number]    CT head wo IV contrast    Result Date: 9/4/2024  Interpreted By:  Ishaan Banda, STUDY: CT HEAD WO IV CONTRAST;  9/4/2024 5:52 pm   INDICATION: Signs/Symptoms:unrespoonsive.     COMPARISON: None.   ACCESSION NUMBER(S): [id number]   ORDERING CLINICIAN: GURPREET COPPOLA   TECHNIQUE: Noncontrast axial CT images of head were obtained with coronal and sagittal reconstructed images.   FINDINGS: BRAIN PARENCHYMA: Mild periventricular and subcortical hemispheric white matter hypodensities are most compatible with chronic small  vessel ischemic disease. No acute intraparenchymal hemorrhage or parenchymal evidence of acute large territory ischemic infarct. Gray-white matter distinction is preserved. No mass-effect.   VENTRICLES and EXTRA-AXIAL SPACES:  No acute extra-axial or intraventricular hemorrhage. No effacement of cerebral sulci. The ventricles and sulci are age-concordant.   PARANASAL SINUSES/MASTOIDS:  No hemorrhage or air-fluid levels within the visualized paranasal sinuses. The mastoids are well aerated.   CALVARIUM/ORBITS:  No skull fracture.  The orbits and globes are intact to the extent visualized.   EXTRACRANIAL SOFT TISSUES: No discernible abnormality.       No acute intracranial abnormality.     MACRO: None.   Signed by: Ishaan Banda 9/4/2024 6:07 PM Dictation workstation:   [id number]    XR chest 1 view    Result Date: 9/4/2024  Interpreted By:  Rosalie Ellison, STUDY: XR CHEST 1 VIEW;  9/4/2024 4:33 pm   INDICATION: Signs/Symptoms:intubation.   COMPARISON: 04/08/2024   ACCESSION NUMBER(S): [id number]   ORDERING CLINICIAN: GURPREET COPPOLA   TECHNIQUE: A single portable image of the chest was obtained.   FINDINGS: Resolution is limited. The right portion of the chest is not entirely included.   The patient is rotated. The heart size is uncertain.   There appears to be elevated right hemidiaphragm and right-sided infiltration.   An endotracheal tube terminates above the kathie. A nasogastric tube terminates below the diaphragm.   COMPARISON OF FINDING: The nasogastric tube was placed in the interval. The endotracheal tube was placed in the interval.       Interval placement of an endotracheal tube. The tip is above the kathie. Interval placement of a nasogastric tube. It appears to terminate below the diaphragm.   The exam is extremely limited.   MACRO: none   Signed by: Rosalie Ellison 9/4/2024 4:43 PM Dictation workstation:   [id number]                 Assessment/Plan   Assessment & Plan  Acute respiratory failure with  hypoxia and hypercapnia (Multi)  67-year-old gentleman with history of chronic diastolic heart failure, COPD, obstructive sleep apnea, obesity, type 2 diabetes mellitus, hypertension, hypothyroidism and hyperlipidemia was admitted with acute respiratory failure with hypoxia and hypercapnia and noted to have a right loculated pleural effusion and bilateral lower lobe lung infiltrate and right hydropneumothorax.  He was hypotensive on admission and required 3 L of fluid to resuscitate and admitted to intensive care unit and was intubated because of acute respiratory failure and remained intubated for several days.  Currently he is extubated and on high flow oxygen as mentioned above.  He had significant leukocytosis of 22,000 on admission and his sputum culture was positive for stenotrophomonas.  Patient is being continued on IV Zosyn and IV Levaquin, pulmonary and infectious disease on board.    1.  Acute hypoxic hypercapnic respiratory failure with hypotension and right hydropneumothorax and bilateral infiltrate and positive for pneumonia.Sputum culture was positive for stenotrophomonas.   Initially intubated for several days and since then he has been extubated and is on high flow Airvo 50% / 15 L oxygen.  Improving and currently is on 6 L nasal cannula.  Continued on IV Zosyn and Levaquin Patient had severe respiratory distress last night and was very agitated and had to be given 0.5 mg of IV Ativan and then was placed on BiPAP.  Patient currently on nasal cannula high flow 12 L.    2.  Patient had hydropneumothorax on admission and had chest tube, chest tube has been removed since then..    3.  Hypotension has resolved    4.  COPD and obstructive sleep apnea.  Aerosol treatment and steroids.  Currently his prednisone has been changed to IV Solu-Medrol 40 mg daily since patient failed swallow evaluation.        5.  Diabetes mellitus type 2, continue sliding scale insulin, patient was on Mounjaro at home prior to  admission.    6.  Patient  DNR/DNI.    7.  Hematuria possibly secondary to trauma secondary to Live catheter which has been removed today.    8.  Recurrent episodes of brief nonsustained ventricular tachycardia, also moderate systolic heart failure, previous echocardiogram revealed LV ejection fraction of 30 to 35% with moderate aortic stenosis, patient is started on IV Lasix this morning and will seek cardiology consult.    Reviewed all labs patient's medical record and imaging studies and discussed the plan of treatment with the patient..    Continue PT OT and processing for LTAC.       I spent 35 minutes in the professional and overall care of this patient.      Noble Gatica MD

## 2024-09-19 NOTE — PROGRESS NOTES
Alo Glass is a 69 y.o. male on day 15 of admission presenting with Acute respiratory failure with hypoxia and hypercapnia (Multi).  Patient with h/o CHF, aortic valve stenosis, DLP, T2DM, COPD, MELISSA, who was brought in by EMS after being found unresponsiveness at home. Work up revealed respiratory failure with hypoxia and hypercapnia, also hypotensive. Received IVF, antibiotics, but given persistent hypotension started on Levophed and and admitted to ICU. In the ICU found to have coffee ground emesis, subsequently was intubated. CT chest showed R loculated pleural effusion and multiple nodules. Sputum culture +ve for stenotrophomonas. Patient overall improved and was subsequently extubated. Post extubation initially had high oxygen requirements, but now is improving. Pulmonary is consulted to assist with his care.   Subjective   No acute overnight events. O2 requirements improved to 10L. 2D echo done.     Overall is feeling tired and weak. Also complains of discomfort from Live. Denies CP, SOB, cough, sputum. No other complaints.   Objective   Scheduled medications  aspirin, 81 mg, oral, Daily  enoxaparin, 40 mg, subcutaneous, q12h SHARRON  ezetimibe, 10 mg, oral, Daily  folic acid, 1 mg, oral, Daily  guaiFENesin, 200 mg, oral, TID  insulin lispro, 0-10 Units, subcutaneous, q4h  ipratropium-albuteroL, 3 mL, nebulization, 4x daily  levoFLOXacin, 750 mg, oral, q24h SHARRON  levothyroxine, 125 mcg, oral, Daily  multivitamin with minerals, 1 tablet, oral, Daily  nystatin, 5 mL, Swish & Swallow, 4x daily  pantoprazole, 40 mg, intravenous, Daily  piperacillin-tazobactam, 3.375 g, intravenous, q6h  polyethylene glycol, 17 g, nasogastric tube, Daily  predniSONE, 20 mg, oral, Daily  sennosides-docusate sodium, 2 tablet, oral, BID  thiamine, 100 mg, oral, Daily  Continuous medications  PRN medications  PRN medications: dextrose, dextrose, glucagon, glucagon, ipratropium-albuteroL, oxygen, oxygen   Physical  "Exam  Constitutional:       General: He is not in acute distress.     Appearance: He is obese. He is ill-appearing. He is not toxic-appearing.   HENT:      Head: Normocephalic and atraumatic.      Nose:      Comments: On 10L NC  Eyes:      General: No scleral icterus.     Extraocular Movements: Extraocular movements intact.      Pupils: Pupils are equal, round, and reactive to light.   Cardiovascular:      Rate and Rhythm: Normal rate and regular rhythm.      Heart sounds: No murmur heard.     No friction rub. No gallop.   Pulmonary:      Effort: Pulmonary effort is normal. No respiratory distress.      Breath sounds: No wheezing or rales.      Comments: Clear lung fields b/l with fair air entry.  Abdominal:      General: Bowel sounds are normal. There is no distension.      Palpations: Abdomen is soft.      Tenderness: There is no abdominal tenderness.   Musculoskeletal:      Cervical back: Normal range of motion and neck supple. No rigidity or tenderness.      Right lower leg: Edema (2+) present.      Left lower leg: Edema (1+) present.   Lymphadenopathy:      Cervical: No cervical adenopathy.   Skin:     General: Skin is warm and dry.      Coloration: Skin is not jaundiced.      Comments: Chronic discoloration b/l LE edema.     Neurological:      General: No focal deficit present.      Mental Status: He is alert and oriented to person, place, and time.      Cranial Nerves: No cranial nerve deficit.      Motor: Weakness (generalized weakness now is improved.) present.   Psychiatric:         Mood and Affect: Mood normal.         Behavior: Behavior normal.     Last Recorded Vitals  Blood pressure 105/75, pulse 85, temperature 36.6 °C (97.8 °F), temperature source Oral, resp. rate 22, height 1.803 m (5' 10.98\"), weight 126 kg (278 lb 7.1 oz), SpO2 92%.  Intake/Output last 3 Shifts:  I/O last 3 completed shifts:  In: 630 (5 mL/kg) [P.O.:480; IV Piggyback:150]  Out: 2725 (21.6 mL/kg) [Urine:2725 (0.6 " mL/kg/hr)]  Weight: 126.3 kg   Relevant Results  Latest labs and imaging reviewed.     Assessment/Plan   Assessment & Plan  Acute respiratory failure with hypoxia and hypercapnia (Multi)    69 YOM with h/o CHF, aortic valve stenosis, DLP, T2DM, COPD, MELISSA, who was brought in by EMS after being found unresponsiveness at home. Work up revealed respiratory failure with hypoxia and hypercapnia, also hypotensive. Received IVF, antibiotics, but given persistent hypotension started on Levophed and and admitted to ICU. In the ICU found to have coffee ground emesis, subsequently was intubated. CT chest showed R loculated pleural effusion and multiple nodules. Sputum culture +ve for stenotrophomonas. Patient overall improved and was subsequently extubated. Post extubation initially had high oxygen requirements, but now is improving. Pulmonary is consulted to assist with his care.  .     Respiratory failure: acute with hypoxia and hypercarbia, requiring MV, now overall improved. Echo showed: HFrEF 25%, mildly reduced RVSF, enlarged RV.       CHF management as per cardiology       BiPAP 18/8 at nights       Continue supplemental O2, wean off as tolerates       Home O2 evaluation before DC       BPH with IS, Acapella       Management of the individual causes as below     COPD: PFT in 2019 showed 54%, FEV1 32-->46%, RV/TLC 63%, TLC 85%, and DLCO 77%, overall associated with stage III COPD. On triple therapy as outpatient        Continue Prednisone taper       Continue Duo-neb       FU with pulmonary after DC. Patient needs to make an appointment with a new pulmonologist as currently does not have a pulmonary provider.        Resume Trelegy and albuterol on DC     MELISSA: +/- OHS       Continue BiPAP at nights       Resume APAP 10-15 at nights on DC     Pleural effusion: large R sided, likely parapneumonic, S/p chest tube placement. Analysis showed neutrophilic predominant effusion, cultures negative. CT is now removed.        Will  monitor for now     Pneumonia: RML,         Completed a course of Levaquin.      DVT prophylaxis: Lovenox     Pulmonary will FU while in house.     Marta Thurston MD

## 2024-09-19 NOTE — PROGRESS NOTES
Occupational Therapy    OT Treatment    Patient Name: Alo Glass  MRN: [id number]  Department: [facility] A   Room: [facility]414-A  Today's Date: 9/19/2024  Time Calculation  Start Time: 1450  Stop Time: 1511  Time Calculation (min): 21 min        Assessment:  OT Assessment: Pt presenting with fatigue limiting participation in session. Pt stating he wants to work on getting up to the chair next session.  Prognosis: Good  Barriers to Discharge: Decreased caregiver support  Evaluation/Treatment Tolerance: Patient tolerated treatment well, Patient limited by fatigue  Medical Staff Made Aware: Yes  End of Session Communication: Bedside nurse  End of Session Patient Position: Bed, 3 rail up, Alarm on  OT Assessment Results: Decreased ADL status, Decreased upper extremity range of motion, Decreased upper extremity strength, Decreased safe judgment during ADL, Decreased cognition, Decreased endurance, Decreased sensation, Decreased IADLs  Prognosis: Good  Barriers to Discharge: Decreased caregiver support  Evaluation/Treatment Tolerance: Patient tolerated treatment well, Patient limited by fatigue  Medical Staff Made Aware: Yes  Strengths: Ability to acquire knowledge, Access to adaptive/assistive products  Barriers to Participation: Support of extended family/friends, Support and attitude of living partners, Capable of completing ADLs semi/independent  Plan:  Treatment Interventions: ADL retraining, Functional transfer training, UE strengthening/ROM, Endurance training, Fine motor coordination activities, Compensatory technique education  OT Frequency: 3 times per week  OT Discharge Recommendations: Moderate intensity level of continued care  Equipment Recommended upon Discharge: Other (comment) (TBD)  OT Recommended Transfer Status: Dependent, Assist of 2  OT - OK to Discharge: Yes  Treatment Interventions: ADL retraining, Functional transfer training, UE strengthening/ROM, Endurance training, Fine motor coordination  activities, Compensatory technique education    Subjective   Previous Visit Info:  OT Last Visit  OT Received On: 09/19/24  General:  General  Reason for Referral: Impaired Mobility: Pt is a 69 y.o. male found non responsive at home by his neighbor and EMS was called.  Referred By: Mónica Johns MD  Past Medical History Relevant to Rehab:   Past Medical History:   Diagnosis Date    CHF (congestive heart failure) (Multi)     COPD (chronic obstructive pulmonary disease) (Multi)     Diabetes mellitus (Multi)     Hypertension     Obesity     MELISSA (obstructive sleep apnea)     Personal history of other specified conditions 01/08/2023    History of impaired glucose tolerance       Prior to Session Communication: Bedside nurse  Patient Position Received: Bed, 3 rail up, Alarm on  Preferred Learning Style: auditory, kinesthetic, verbal, written  General Comment: Pt agreeable to OT, requesting to reposition to R side in bed  Precautions:  Medical Precautions: Fall precautions, Oxygen therapy device and L/min (10L high flow)    Pain:  Pain Assessment  Pain Assessment: 0-10  0-10 (Numeric) Pain Score: 5 - Moderate pain  Pain Type: Chronic pain  Pain Location: Knee  Pain Orientation: Left  Pain Descriptors: Aching  Pain Onset: Gradual  Clinical Progression: Not changed  Pain Interventions: Repositioned (RN aware)  Response to Interventions: OT to tolerance    Objective    Cognition:  Cognition  Overall Cognitive Status: Within Functional Limits  Coordination:     Activities of Daily Living: Grooming  Grooming Level of Assistance: Minimum assistance  Grooming Where Assessed: Bed level  Grooming Comments: Pt completed washing and brushing hair in bed with min A    UE Bathing  UE Bathing Level of Assistance: Moderate assistance  UE Bathing Where Assessed: Bed level  UE Bathing Comments: Pt completed UE bathing tasks while in bed with assist to wash under B arms  Functional Standing Tolerance:     Bed Mobility/Transfers: Bed  Mobility  Bed Mobility: Yes  Bed Mobility 1  Bed Mobility 1: Rolling left  Level of Assistance 1: Dependent, +2    Transfers  Transfer: No (Pt stating he will get out of bed tomorrow)     SANDHYA ARTHUR : Exceptions to WFL and GABINO LLOYD: Exceptions to WFL    Outcome Measures:[facility] Daily Activity  Putting on and taking off regular lower body clothing: Total  Bathing (including washing, rinsing, drying): A lot  Putting on and taking off regular upper body clothing: A lot  Toileting, which includes using toilet, bedpan or urinal: Total  Taking care of personal grooming such as brushing teeth: A little  Eating Meals: A little  Daily Activity - Total Score: 12    Education Documentation  Body Mechanics, taught by Antonia Barber OT at 9/19/2024  3:24 PM.  Learner: Patient  Readiness: Acceptance  Method: Explanation, Demonstration  Response: Verbalizes Understanding, Needs Reinforcement    ADL Training, taught by Antonia Barber OT at 9/19/2024  3:24 PM.  Learner: Patient  Readiness: Acceptance  Method: Explanation, Demonstration  Response: Verbalizes Understanding, Needs Reinforcement    Education Comments  No comments found.           Goals:  Encounter Problems       Encounter Problems (Active)       ADLs       Patient will perform UB and LB bathing with modified independent level of assistance. (Progressing)       Start:  09/15/24    Expected End:  09/29/24            Patient with complete upper body dressing with modified independent level of assistance donning and doffing all UE clothes with PRN adaptive equipment. (Progressing)       Start:  09/15/24    Expected End:  09/29/24            Patient with complete lower body dressing with modified independent level of assistance donning and doffing all LE clothes  with PRN adaptive equipment. (Progressing)       Start:  09/15/24    Expected End:  09/29/24            Patient will feed self with modified independent level of assistance. (Progressing)       Start:  09/15/24     Expected End:  09/29/24            Patient will complete daily grooming tasks brushing teeth and washing face/hair with modified independent level of assistance. (Progressing)       Start:  09/15/24    Expected End:  09/29/24            Patient will complete toileting including hygiene clothing management/hygiene with modified independent level of assistance. (Progressing)       Start:  09/15/24    Expected End:  09/29/24               COGNITION/SAFETY       Patient will demonstrated orientation x 4. (Progressing)       Start:  09/15/24    Expected End:  09/29/24       ORIENTATION            TRANSFERS       Patient will perform bed mobility modified independent level of assistance in order to improve safety and independence with mobility (Progressing)       Start:  09/15/24    Expected End:  09/29/24

## 2024-09-19 NOTE — ASSESSMENT & PLAN NOTE
67-year-old gentleman with history of chronic diastolic heart failure, COPD, obstructive sleep apnea, obesity, type 2 diabetes mellitus, hypertension, hypothyroidism and hyperlipidemia was admitted with acute respiratory failure with hypoxia and hypercapnia and noted to have a right loculated pleural effusion and bilateral lower lobe lung infiltrate and right hydropneumothorax.  He was hypotensive on admission and required 3 L of fluid to resuscitate and admitted to intensive care unit and was intubated because of acute respiratory failure and remained intubated for several days.  Currently he is extubated and on high flow oxygen as mentioned above.  He had significant leukocytosis of 22,000 on admission and his sputum culture was positive for stenotrophomonas.  Patient is being continued on IV Zosyn and IV Levaquin, pulmonary and infectious disease on board.    1.  Acute hypoxic hypercapnic respiratory failure with hypotension and right hydropneumothorax and bilateral infiltrate and positive for pneumonia.Sputum culture was positive for stenotrophomonas.   Initially intubated for several days and since then he has been extubated and is on high flow Airvo 50% / 15 L oxygen.  Improving and currently is on 6 L nasal cannula.  Continued on IV Zosyn and Levaquin Patient had severe respiratory distress last night and was very agitated and had to be given 0.5 mg of IV Ativan and then was placed on BiPAP.  Patient currently on nasal cannula high flow 12 L.    2.  Patient had hydropneumothorax on admission and had chest tube, chest tube has been removed since then..    3.  Hypotension has resolved    4.  COPD and obstructive sleep apnea.  Aerosol treatment and steroids.  Currently his prednisone has been changed to IV Solu-Medrol 40 mg daily since patient failed swallow evaluation.        5.  Diabetes mellitus type 2, continue sliding scale insulin, patient was on Mounjaro at home prior to admission.    6.  Patient   DNR/DNI.    7.  Hematuria possibly secondary to trauma secondary to Live catheter which has been removed today.    8.  Recurrent episodes of brief nonsustained ventricular tachycardia, also moderate systolic heart failure, previous echocardiogram revealed LV ejection fraction of 30 to 35% with moderate aortic stenosis, patient is started on IV Lasix this morning and will seek cardiology consult.    Reviewed all labs patient's medical record and imaging studies and discussed the plan of treatment with the patient..    Continue PT OT and processing for LTAC.

## 2024-09-19 NOTE — PROGRESS NOTES
09/19/24 1451   Discharge Planning   Expected Discharge Disposition Long Term       Patient is on 10L HF oxygen. ID is following and patient completed course of IV abx. Possible MBS to r/o aspiration. Patient is waiting on auth approval to go [facility].

## 2024-09-19 NOTE — CONSULTS
"Inpatient consult to Cardiology  Consult performed by: Alexus Keene, FABIOLA  Consult ordered by: FABIOLA Calero  Reason for consult: \"NSVT, HF\"        Cards: Teresa   History Of Present Illness:    69 year old male with history MELISSA on CPAP, COPD stage III triple therapy as outpatient, nicotine dependence, T2DM, severe obesity, hypertension, hyperlipidemia (intolerant to statins due to myalgias), lymphedema, coronary calcification by chest CT, moderate aortic stenosis (6/2019) and newly diagnosed CHF with an EF of 30-35% (3/2024) originally admitted on 9/4/24 here at [facility].    Cardiology consult now placed for \"NSVT, HF.    He was non responsive at home by his neighbor-> admitted with hypoxic/hypercapnic respiratory failure->  intubated, placed on pressor, vent support, started on ATB, large R sided likely parapneumonic s/p chest tube placement (Analysis showed neutrophilic predominant effusion, cultures negative. CT is now removed) and PNA due to Stenotrophomonas maltophilia which is resolving. Overnight patient had significant CO2 retention and had to be given Ativan 1 mg IV to calm him down and then was to be placed on BiPAP->CO2 84.  Pulmonary and ID have been following the patient. Currently on 10L HF and lethargic-> hypervolemic on exam.  Telemetry at bedside showed SR with 1st degree AV block with a 8 beat run of NSVT overnight.     Afebrile, heart rate 88, blood pressure 91/62, 92% on high flow nasal cannula 10 L.  Notable labs today BUNs/CR 21/0.46, H&H 14.3/45.3, ( on admit 9/4 1155), Chest xray cardiomegaly  Stable small left pleural effusion with left basilar atelectasis/pneumonia, stable to slightly progressed.  He has been getting spot diuresed with Lasix IV push.      9/18/24 Chest Xray       Home cardiology meds include aspirin 81 mg daily, [id number] mg daily, Lasix 120 mg daily, metolazone 2.5 mg daily, and valsartan 40 mg daily.    Past Cardiology Tests (Last 3 " Years):  TTE 3/2024  1. Left ventricular systolic function is moderately decreased with a 30-35% estimated ejection fraction.   2. Basal inferolateral segment is abnormal.   3. Poorly visualized anatomical structures due to suboptimal image quality.   4. Moderate aortic valve stenosis.   5. There is global hypokinesis of the left ventricle with minor regional variations.         Past Medical History:  He has a past medical history of CHF (congestive heart failure) (Multi), COPD (chronic obstructive pulmonary disease) (Multi), Diabetes mellitus (Multi), Hypertension, Obesity, MELISSA (obstructive sleep apnea), and Personal history of other specified conditions (2023).    Past Surgical History:  He has a past surgical history that includes Hernia repair (2013) and Other surgical history (2013).      Social History:  He reports that he has been smoking cigarettes. He has never used smokeless tobacco. He reports current alcohol use of about 7.0 standard drinks of alcohol per week. He reports that he does not use drugs.    Family History:  No family history on file.     Allergies:  Patient has no known allergies.    ROS:  10 point review of systems including (Constitutional, Eyes, ENMT, Respiratory, Cardiac, Gastrointestinal, Neurological, Psychiatric, and Hematologic) was performed and is otherwise negative.    Objective Data:  Last Recorded Vitals:  Vitals:    24 1117 24 1132 24 1200 24 1228   BP:   100/66 91/62   BP Location:   Left arm Left arm   Patient Position:   Lying Sitting   Pulse: 92  85 88   Resp:   24 22   Temp:   36.7 °C (98 °F) 36.6 °C (97.9 °F)   TempSrc:   Oral Temporal   SpO2:  94% 91% 92%   Weight:       Height:         Medical Gas Therapy: Supplemental oxygen  Medical Gas Delivery Method: High flow nasal cannula  Weight  Av kg (290 lb 13.8 oz)  Min: 125 kg (275 lb 9.2 oz)  Max: 140 kg (307 lb 12.2 oz)      LABS:  CMP:  Results from last 7 days   Lab Units  "09/19/24  0435 09/18/24  0439 09/17/24  0502 09/16/24  0647 09/14/24  1824 09/13/24  0854   SODIUM mmol/L 141 141 143 141  --  142   POTASSIUM mmol/L 3.8 3.8 3.7 3.6  --  4.4   CHLORIDE mmol/L 104 101 99 98  --  99   CO2 mmol/L 32 36* 42* 40*  --  37*   ANION GAP mmol/L 9* 8* <7* 7*  --  10   BUN mg/dL 21 16 16 17  --  41*   CREATININE mg/dL 0.46* 0.41* 0.46* 0.48*  --  0.87   EGFR mL/min/1.73m*2 >90 >90 >90 >90  --  >90   MAGNESIUM mg/dL 2.20  --   --   --  2.20  --    ALBUMIN g/dL 2.5* 3.0* 2.3* 2.5*  --  2.3*     CBC:  Results from last 7 days   Lab Units 09/19/24 0435 09/18/24 0439 09/17/24  0502 09/16/24  0647 09/13/24  0854   WBC AUTO x10*3/uL 8.7 11.1 9.1 10.0 11.4*   HEMOGLOBIN g/dL 14.3 17.1 15.9 17.2 18.2*   HEMATOCRIT % 45.3 55.7* 50.7 54.8* 57.6*   PLATELETS AUTO x10*3/uL 210 214 185 191 195   MCV fL 102* 106* 104* 103* 103*     COAG:     ABO: No results found for: \"ABO\"  HEME/ENDO:     CARDIAC:   Results from last 7 days   Lab Units 09/18/24  0439   BNP pg/mL 639*             Last I/O:    Intake/Output Summary (Last 24 hours) at 9/19/2024 1239  Last data filed at 9/19/2024 1207  Gross per 24 hour   Intake 780 ml   Output 2000 ml   Net -1220 ml     Net IO Since Admission: -17,842.46 mL [09/19/24 1239]      Imaging Results:  ECG 12 lead    Result Date: 9/15/2024  Sinus rhythm with 1st degree AV block Left axis deviation Right bundle branch block Inferior infarct (cited on or before 04-SEP-2024) Abnormal ECG When compared with ECG of 04-SEP-2024 16:24, Premature ventricular complexes are no longer Present Right bundle branch block is now Present Confirmed by Dimitri Murillo ([id number]) on 9/15/2024 2:23:29 PM    ECG 12 lead    Result Date: 9/5/2024  Sinus rhythm with 1st degree AV block with frequent Premature ventricular complexes Left axis deviation Left ventricular hypertrophy with QRS widening and repolarization abnormality ( R in aVL , Faheem product ) Inferior infarct , age undetermined Abnormal ECG When " compared with ECG of 23-APR-2024 09:01, Premature ventricular complexes are now Present FL interval has increased (RBBB and left anterior fascicular block) is no longer Present Inferior infarct is now Present Confirmed by Andrea Bentley ([id number]) on 9/5/2024 10:01:05 AM    XR chest 1 view    Result Date: 9/5/2024  Interpreted By:  Saad Cronin, STUDY: XR CHEST 1 VIEW; 9/5/2024 4:57 am   INDICATION: CLINICAL INFORMATION: Signs/Symptoms:Pig tail Chest Tube replacement.   COMPARISON: 09/04/2024   ACCESSION NUMBER(S): [id number]   ORDERING CLINICIAN: ANDRY ABRAHAM   TECHNIQUE: Portable chest one view.   FINDINGS: The cardiac silhouette is quite prominent suggesting cardiomegaly. The tube and line placement is unchanged.  There is a decrease in the right-sided pleural-based density is well as the right-sided parenchymal densities. The pulmonary vasculature is slightly indistinct suggesting mild pulmonary vascular congestion.       There appears to be increased aeration at the right base suggesting diminishing pleural density within the right hemithorax in association with the chest tube. Infiltrate persists on the right.   MACRO: none   Signed by: Saad Cronin 9/5/2024 8:18 AM Dictation workstation:   [id number]    XR chest 1 view    Result Date: 9/5/2024  Interpreted By:  Cande Keating, STUDY: XR CHEST 1 VIEW;  9/4/2024 11:10 pm   INDICATION: Signs/Symptoms:Chest tube placement.     COMPARISON: Radiographs of the chest dated 09/04/2024; CT of the chest dated 09/04/2024   ACCESSION NUMBER(S): [id number]   ORDERING CLINICIAN: ANDRY ABRAHAM   FINDINGS: AP radiograph of the chest was provided.   Endotracheal tube projects 4.4 cm superior to the kathie. Enteric tube appears to course along the midline expected course of the esophagus, although the distal tip is not well visualized due to underpenetration and overlying structures.   CARDIOMEDIASTINAL SILHOUETTE: Cardiomediastinal silhouette is enlarged, similar to  prior exam.   LUNGS: Redemonstration of the large right-sided pleural effusion with associated atelectasis/consolidation, similar in appearance to prior radiographs. No new consolidation or pleural effusion is present in the left lung.   ABDOMEN: No remarkable upper abdominal findings.   BONES: No acute osseous changes.       1.  Enteric tube is coursing along the midline of the mediastinum in the expected course of the esophagus, although the distal tip is not well visualized due to overlying cardiomediastinal silhouette and underpenetration, and may be outside of the field-of-view of the study. Endotracheal tube projects 4.5 cm superior to the kathie. 2. Redemonstration of the large loculated right-sided pleural effusion with the associated atelectasis/consolidation, similar in appearance to prior exam.       MACRO: None   Signed by: Cande Keating 9/5/2024 2:08 AM Dictation workstation:   [id number]    CT chest abdomen pelvis w IV contrast    Result Date: 9/4/2024  Interpreted By:  Ishaan Banda, STUDY: CT CHEST ABDOMEN PELVIS W IV CONTRAST;  9/4/2024 5:52 pm   INDICATION: Signs/Symptoms:unresponsive.     COMPARISON: None.   ACCESSION NUMBER(S): [id number]   ORDERING CLINICIAN: GURPREET COPPOLA   TECHNIQUE: Contiguous axial images of the chest, abdomen, and pelvis were obtained after the intravenous administration of iodinated contrast. Coronal and sagittal reformatted images were reconstructed from the axial data.   FINDINGS: CT CHEST:   MEDIASTINUM AND LYMPH NODES: NG/OG tube noted with small amount of fluid in the esophagus. The esophageal wall appears within normal limits.  No enlarged intrathoracic or axillary lymph nodes by imaging criteria. No pneumomediastinum.   VESSELS:  Normal caliber thoracic aorta without dissection. Mild aortic atherosclerosis.   HEART: Enlarged. Severe aortic valvular calcifications. Moderate coronary artery calcifications. No significant pericardial effusion.   LUNG,  AIRWAYS, PLEURA: There is a large loculated right pleural effusion causing compressive atelectasis on the right lung. As a result, there is complete collapse of the right lower lobe, complete collapse of the right middle lobe, and partial collapse of the right upper lobe. There are multiple loculated compartments largest of which is seen at the right lung base. There is a small amount of mucus in the mid to distal trachea. There is small amount of layering debris in the mainstem bronchi. The majority of the right middle and lower lobe bronchi are collapsed and opacified with low-density debris. There is a trace left pleural effusion with subsegmental left basilar atelectasis. There is emphysema. There are new scattered subcentimeter bilateral centrilobular nodules that are likely infectious/inflammatory in nature from bronchiolitis.   CHEST WALL SOFT TISSUES: No discernible acute abnormality. There is a 5.5 cm x 1.7 cm lipoma in the right infraspinatus muscle.   OSSEOUS STRUCTURES: No acute osseous abnormality.     CT ABDOMEN/PELVIS:   ABDOMINAL WALL: No significant abnormality.   LIVER: No significant parenchymal abnormality.   BILE DUCTS: No significant intrahepatic or extrahepatic dilatation.   GALLBLADDER: No significant abnormality.   PANCREAS: No significant abnormality.   SPLEEN: No significant abnormality.   ADRENALS: There is a 1.7 cm low-density left adrenal nodule likely representing a adenoma.   KIDNEYS, URETERS, BLADDER: There is a heterogeneously enhancing solid mass in the upper pole the left kidney measuring 2.4 cm x 1.9 cm consistent with renal cell carcinoma. Additionally, a 0.8 cm likely enhancing lesion in the upper pole the right kidney is concerning for renal cell carcinoma. No hydronephrosis or calculi. Urinary bladder is decompressed with Live catheter in place.   REPRODUCTIVE ORGANS: No significant abnormality.   VESSELS: Moderate aortic atherosclerosis without AAA.   RETROPERITONEUM/LYMPH  NODES: No acute retroperitoneal abnormality. No enlarged lymph nodes.   BOWEL/MESENTERY/PERITONEUM: Colonic diverticulosis without acute diverticulitis. No inflammatory bowel wall thickening or dilatation. Normal appendix.   No ascites, free air, or fluid collection.     MUSCULOSKELETAL: No acute osseous abnormality.       CT CHEST: Large loculated right pleural effusion causing complete compressive collapse of the right middle lobe and right lower lobe and partial compressive collapse of the right upper lobe. The largest likely compartments located at the inferior right hemithorax. The cause of the effusion is not apparent as there is only a small amount of debris in the distal trachea and right mainstem bronchus but the distal-most right-sided bronchi are occluded due to combination of compressive collapse and small low-density fluid.   Numerous new subcentimeter centrilobular pulmonary nodules that are most likely infectious/inflammatory in as can be seen with bronchiolitis or fungal infection.   Aortic valve calcification to the extent that would likely result in aortic stenosis.   CT ABDOMEN/PELVIS: Left kidney solid heterogeneously enhancing mass suspicious for renal cell carcinoma (2.4 cm x 1.9 cm) and of suspected 0.8 cm right upper pole renal cell carcinoma measuring 0.8 cm. Recommend urological follow-up/also taken and dedicated MRI kidneys to further evaluate these lesions. YELLOW ALERT: An incidental finding alert was sent per protocol.   No acute abnormality in the abdomen or pelvis.   Colonic diverticulosis without acute diverticulitis.   MACRO: Critical Finding:  New mass in the kidney. Notification was initiated on 9/4/2024 at 6:25 pm by  Ishaan Banda.  (**-YCF-**) Instructions:  MR Abdomen w and wo IV contrast. 1 week.   Signed by: Ishaan Banda 9/4/2024 6:26 PM Dictation workstation:   [id number]    CT head wo IV contrast    Result Date: 9/4/2024  Interpreted By:  Ishaan Banda, STUDY:  CT HEAD WO IV CONTRAST;  9/4/2024 5:52 pm   INDICATION: Signs/Symptoms:unrespoonsive.     COMPARISON: None.   ACCESSION NUMBER(S): [id number]   ORDERING CLINICIAN: GURPREET COPPOLA   TECHNIQUE: Noncontrast axial CT images of head were obtained with coronal and sagittal reconstructed images.   FINDINGS: BRAIN PARENCHYMA: Mild periventricular and subcortical hemispheric white matter hypodensities are most compatible with chronic small vessel ischemic disease. No acute intraparenchymal hemorrhage or parenchymal evidence of acute large territory ischemic infarct. Gray-white matter distinction is preserved. No mass-effect.   VENTRICLES and EXTRA-AXIAL SPACES:  No acute extra-axial or intraventricular hemorrhage. No effacement of cerebral sulci. The ventricles and sulci are age-concordant.   PARANASAL SINUSES/MASTOIDS:  No hemorrhage or air-fluid levels within the visualized paranasal sinuses. The mastoids are well aerated.   CALVARIUM/ORBITS:  No skull fracture.  The orbits and globes are intact to the extent visualized.   EXTRACRANIAL SOFT TISSUES: No discernible abnormality.       No acute intracranial abnormality.     MACRO: None.   Signed by: Ishaan Banda 9/4/2024 6:07 PM Dictation workstation:   [id number]    XR chest 1 view    Result Date: 9/4/2024  Interpreted By:  Rosalie Ellison, STUDY: XR CHEST 1 VIEW;  9/4/2024 4:33 pm   INDICATION: Signs/Symptoms:intubation.   COMPARISON: 04/08/2024   ACCESSION NUMBER(S): [id number]   ORDERING CLINICIAN: GURPREET COPPOLA   TECHNIQUE: A single portable image of the chest was obtained.   FINDINGS: Resolution is limited. The right portion of the chest is not entirely included.   The patient is rotated. The heart size is uncertain.   There appears to be elevated right hemidiaphragm and right-sided infiltration.   An endotracheal tube terminates above the kathie. A nasogastric tube terminates below the diaphragm.   COMPARISON OF FINDING: The nasogastric tube was placed in the  interval. The endotracheal tube was placed in the interval.       Interval placement of an endotracheal tube. The tip is above the kathie. Interval placement of a nasogastric tube. It appears to terminate below the diaphragm.   The exam is extremely limited.   MACRO: none   Signed by: Rosalie Ellisno 9/4/2024 4:43 PM Dictation workstation:   [id number]      Inpatient Medications:  Scheduled medications   Medication Dose Route Frequency    aspirin  81 mg oral Daily    calcium carbonate  1,000 mg oral q2h    enoxaparin  40 mg subcutaneous q12h SHARRON    ezetimibe  10 mg oral Daily    folic acid  1 mg oral Daily    furosemide  60 mg intravenous Once    insulin lispro  0-10 Units subcutaneous q4h    ipratropium-albuteroL  3 mL nebulization 4x daily    levoFLOXacin  750 mg intravenous q24h    levothyroxine  125 mcg oral Daily    methylPREDNISolone sodium succinate (PF)  20 mg intravenous q24h    multivitamin with minerals  1 tablet oral Daily    nystatin  5 mL Swish & Swallow 4x daily    pantoprazole  40 mg intravenous Daily    piperacillin-tazobactam  3.375 g intravenous q6h    polyethylene glycol  17 g nasogastric tube Daily    potassium phosphate  21 mmol intravenous Once    [Held by provider] predniSONE  20 mg oral Daily    sennosides-docusate sodium  2 tablet oral BID    tamsulosin  0.4 mg oral Daily    thiamine  100 mg oral Daily     PRN medications   Medication    dextrose    dextrose    glucagon    glucagon    ipratropium-albuteroL    oxygen    oxygen     Continuous Medications   Medication Dose Last Rate       Outpatient Medications:  Current Outpatient Medications   Medication Instructions    albuterol 90 mcg/actuation inhaler 2 puffs, inhalation, Every 4 hours PRN    allopurinol (ZYLOPRIM) 300 mg, oral, Daily    aspirin 81 mg, oral, Daily    Dexcom G7  misc Use as instructed    Dexcom G7 Sensor device Use as directed    empagliflozin (JARDIANCE) 10 mg, oral, Daily    ezetimibe (ZETIA) 10 mg, oral, Daily     fluticasone-umeclidin-vilanter (Trelegy Ellipta) 100-62.5-25 mcg blister with device one puff per day    folic acid (FOLVITE) 1 mg, oral, Daily    furosemide (LASIX) 80 mg, oral, Daily    ipratropium-albuteroL (Duo-Neb) 0.5-2.5 mg/3 mL nebulizer solution 3 mL, nebulization, 4 times daily RT    levothyroxine (SYNTHROID, LEVOXYL) 125 mcg, oral, Daily    Mounjaro 2.5 mg, subcutaneous, Once Weekly    multivitamin with minerals tablet 1 tablet, oral, Daily    oxygen (O2) 2 L/min, inhalation, Continuous, 2L nc at rest and 4L nc with ambulation    potassium chloride CR (Klor-Con M20) 20 mEq ER tablet 20 mEq, oral, Daily, Do not crush or chew.    thiamine (VITAMIN B-1) 100 mg, oral, Daily    valsartan (DIOVAN) 40 mg, oral, Daily       Physical Exam:  General: lethargic but oriented x 2-3  HEENT:  Pupils equal and reactive.  Normocephalic.  Moist mucosa.    Neck:  No thyromegaly.  Normal Jugular Venous Pressure.  Cardiovascular:  Regular rate and rhythm.  Normal S1 and S2.  Pulmonary:  Clear to auscultation bilaterally.  Abdomen:  Soft. Non-tender.  Slightly distended.  Positive bowel sounds.  Lower Extremities:  2+ pedal pulses. Trace LE edema  Neurologic:  Cranial nerves intact.  No focal deficit.   Skin: Skin warm and dry, normal skin turgor.   Psychiatric: Normal affect.     Assessment/Plan   Cards: [place]     69 year old male with history MELISSA on CPAP, COPD stage III triple therapy as outpatient, nicotine dependence, T2DM, severe obesity, hypertension, hyperlipidemia (intolerant to statins due to myalgias), lymphedema, coronary calcification by chest CT, moderate aortic stenosis (6/2019) and newly diagnosed CHF with an EF of 30-35% (3/2024) originally admitted on 9/4/24 here at [facility].  He was non responsive at home by his neighbor-> admitted with hypoxic/hypercapnic respiratory failure->  intubated, placed on pressor, vent support, started on ATB, large R sided likely parapneumonic s/p chest tube placement (Analysis  "showed neutrophilic predominant effusion, cultures negative. CT is now removed) and PNA due to Stenotrophomonas maltophilia which is resolving. Overnight patient had significant CO2 retention and had to be given Ativan 1 mg IV to calm him down and then was to be placed on BiPAP->CO2 84. Cardiology consult now placed for \"NSVT, HF\"  Pulmonary and ID have been following the patient.    Home cardiology meds include aspirin 81 mg daily, Jardiance 10mg daily, Lasix 120 mg daily, metolazone 2.5 mg daily, and valsartan 40 mg daily.    #Acute on Chronic HEFrEF 30 to 35%-, chest xray with Stable small left pleural effusion with left basilar atelectasis/pneumonia  #Hyperlipidemia-intolerant to statins   #CAD  #Moderate AS    RECs:  - We will obtain a transthoracic echocardiogram for structural evaluation including ejection fraction, assessment of regional wall motion abnormalities or valvular disease, and further evaluation of hemodynamics.    -Lasix 80mg BID   -add home Jardiance on 10mg daily   -re-introduce GDMT when hemos allow       Code Status:  DNR and No Intubation    I spent 30 minutes in the professional and overall care of this patient.        Alexus Keene, APRN-CNP     STAFF ADDENDUM:    Both the DOUG and I have had a face to face encounter with the patient today. I have examined the patient and edited the documented physical examination as necessary.  I personally reviewed the patient's vital signs, telemetry, recent labs, medications, orders, EKGs, and pertinent cardiac imaging/ echocardiography.  I have reviewed the DOUG's encounter note, approve the DOUG's documentation and have edited the note to reflect my diagnostic and therapeutic plan.      69-year-old male with known cardiomyopathy EF 30-35%, moderate aortic stenosis, obesity, chronic systolic and diastolic heart failure, diabetes, COPD admitted with unresponsive episode and found to have empyema and positive sputum culture of stenotrophomonus " "Hospitalized 2 weeks thus far.  He remains on 10 L nasal cannula.  He is somewhat confused and lethargic.  Able to answer some questions today but does seem a little encephalopathic.  He reports taking furosemide 120 mg daily and an occasional metolazone 2.5 at home.  Here chest x-ray with bilateral pleural effusions.  Blood pressures are now soft with systolics 90s to low 100s.    On review of prior cardiology notes he has not wanted aggressive care and had deferred invasive evaluation with catheterization previously.  Is unclear to me whether DNR/DNI as stated in the chart or according to him he wanted \"everything done.\"    #Acute on chronic systolic and diastolic heart failure  #Empyema s/p chest tube  #CAD  #Dyslipidemia    Recommendations:  IV diuresis as tolerates to try and wean oxygen  Repeat echocardiogram  Resume Jardiance 10 mg  We will try and initiate GDMT as BP allows going forward      Dimitri Murillo, DO    "

## 2024-09-19 NOTE — PROGRESS NOTES
Speech-Language Pathology    Inpatient Speech-Language Pathology Clinical Swallow Evaluation    Patient Name: Alo Glass  MRN: [id number]  : 1955  Today's Date: 24   Time Calculation  Start Time: 845  Stop Time: 914  Time Calculation (min): 29 min        RECOMMENDATIONS:    Solid Diet Recommendations : NPO  2.   Liquid Diet Recommendations: NPO  3.   Medication Administration Recommendations: Non Oral  4.    SLP REASSESSMENT TO DETERMINE READINESS TO PROCEED WITH MBSS    Assessment:  Assessment Results: Patient seen for swallow evaluation. Breakfast tray items completed prior to SLP arrival with nearly entire cup of coffee and cold cereal consumed. Patient seated upright in bed for assessment. SPO2 at 89% at start of session (no alarms active). Patient verbal and communicating with SLP. Ice chip and water trials presented initially by tsp. Bolus formation slowed with adequate oral clearance achieved. Intermittent tachypnea noted with RR >/=32 bpm appearing more pronounced during bolus trials. Single water boluses consumed via straw as no coughing or throat clearing identified. Patient proceeded to consume 2 continuous straw sips on 2 separate occasions with delayed cough elicited on final bolus. Discontinued further bolus trials due to aspiration concerns. SPO2 ranged from 88-89% during session. Nursing notified. Patient not safe for an oral diet at this time. Concerned with high aspiration potential given periodic tachypnea exacerbated with oral intake.  Will need to perform instrumental swallowing assessment once RR normalizes.     Baseline Assessment:  Respiratory Status: Oxygen via nasal cannula  History of Intubation: Yes  Length of Intubations (days): 5 days  Date extubated: 24  Behavior/Cognition: Alert, Cooperative (irritable)  Patient Positioning: Upright in Bed  Baseline Vocal Quality: Normal    Oral-Motor Assessment:  Dentition: Adequate/Natural  Oral Motor:  (DNT formally  "assess; speech clear.)    Plan:  SLP Plan: Skilled SLP  SLP Frequency: 3x per week  Duration: 2 weeks  SLP Discharge Recommendations: Continue skilled speech therapy services post discharge  Discussed POC: Patient  Discussed Risks/Benefits: Yes  Patient/Caregiver Agreeable: Yes    Goals:   Patient will tolerate prescribed diet without aspiration on 90% of trials.    General Visit Information:  Patient admitted: 9/4/24    Past Medical History: \"chronic diastolic heart failure, COPD, obstructive sleep apnea, obesity, type 2 diabetes mellitus, hypertension, hypothyroidism and hyperlipidemia \"    Chief Complaint/Reason for admission: Admitted with acute respiratory failure with hypoxia and hypercapnia. Intubated 9/4-9/9.    Relevant Imaging Results: infiltrates noted bilaterally on CXR    Living Environment: Home  Reason for Referral: concern for aspiration  Ordering Physician: Ainsley Wesley CNP  Current Diet : regular/thin diet ordered    Pain:  Leg pain reported- not rated. Nursing notified.    Treatment:    N/A    Inpatient Education:  Patient educated on current swallow function, recommendation of Modified Barium swallow study and procedure  Patient education results: gave verbal understanding        "

## 2024-09-19 NOTE — CARE PLAN
The patient's goals for the shift include Pt. will have a safe, restful and uneventful evening    The clinical goals for the shift include Pt. will remain HDS throughout shift      Problem: Skin  Goal: Decreased wound size/increased tissue granulation at next dressing change  Outcome: Progressing  Goal: Prevent/manage excess moisture  Outcome: Progressing  Goal: Prevent/minimize sheer/friction injuries  Outcome: Progressing  Goal: Promote/optimize nutrition  Outcome: Progressing     Problem: Nutrition  Goal: Oral intake greater than 50%  Outcome: Progressing  Goal: Oral intake greater 75%  Outcome: Progressing  Goal: Consume prescribed supplement  Outcome: Progressing  Goal: Nutrition support goals are met within 48 hrs  Outcome: Progressing  Goal: Nutrition support is meeting 75% of nutrient needs  Outcome: Progressing  Goal: Tube feed tolerance  Outcome: Progressing  Goal: BG  mg/dL  Outcome: Progressing  Goal: Lab values WNL  Outcome: Progressing  Goal: Electrolytes WNL  Outcome: Progressing  Goal: Promote healing  Outcome: Progressing  Goal: Maintain stable weight  Outcome: Progressing  Goal: Reduce weight from edema/fluid  Outcome: Progressing  Goal: Gradual weight gain  Outcome: Progressing  Goal: Improve ostomy output  Outcome: Progressing     Problem: Discharge Planning  Goal: Discharge to home or other facility with appropriate resources  Outcome: Progressing     Problem: Chronic Conditions and Co-morbidities  Goal: Patient's chronic conditions and co-morbidity symptoms are monitored and maintained or improved  Outcome: Progressing     Problem: Diabetes  Goal: Achieve decreasing blood glucose levels by end of shift  Outcome: Progressing  Goal: Increase stability of blood glucose readings by end of shift  Outcome: Progressing  Goal: Decrease in ketones present in urine by end of shift  Outcome: Progressing  Goal: Maintain electrolyte levels within acceptable range throughout shift  Outcome:  Progressing  Goal: Maintain glucose levels >70mg/dl to <250mg/dl throughout shift  Outcome: Progressing  Goal: No changes in neurological exam by end of shift  Outcome: Progressing  Goal: Learn about and adhere to nutrition recommendations by end of shift  Outcome: Progressing  Goal: Vital signs within normal range for age by end of shift  Outcome: Progressing  Goal: Increase self care and/or family involovement by end of shift  Outcome: Progressing  Goal: Receive DSME education by end of shift  Outcome: Progressing     Problem: Knowledge Deficit  Goal: Patient/family/caregiver demonstrates understanding of disease process, treatment plan, medications, and discharge instructions  Outcome: Progressing     Problem: Mechanical Ventilation  Goal: Oral health is maintained or improved  Outcome: Progressing  Goal: Ability to express needs and understand communication  Outcome: Progressing  Goal: Mobility/activity is maintained at optimum level for patient  Outcome: Progressing     Problem: Fall/Injury  Goal: Not fall by end of shift  Outcome: Progressing  Goal: Be free from injury by end of the shift  Outcome: Progressing  Goal: Verbalize understanding of personal risk factors for fall in the hospital  Outcome: Progressing  Goal: Verbalize understanding of risk factor reduction measures to prevent injury from fall in the home  Outcome: Progressing  Goal: Use assistive devices by end of the shift  Outcome: Progressing  Goal: Pace activities to prevent fatigue by end of the shift  Outcome: Progressing

## 2024-09-19 NOTE — PROGRESS NOTES
"  INFECTIOUS DISEASE DAILY PROGRESS NOTE    SUBJECTIVE:    Off BiPAP this morning. On 13L NC. No fevers.    OBJECTIVE:  VITALS (Last 24 Hours)  BP 91/62 (BP Location: Left arm, Patient Position: Sitting)   Pulse 88   Temp 36.6 °C (97.9 °F) (Temporal)   Resp 22   Ht 1.803 m (5' 10.98\")   Wt 126 kg (278 lb 7.1 oz)   SpO2 92%   BMI 38.85 kg/m²     PHYSICAL EXAM:  Gen - 13L NC, in bed  Lungs - coarse lower lungs, no wheezing  Abd - soft, no ttp, BS present  Skin - no rashes    ABX: IV Zosyn/Levofloxacin    LABS:  Lab Results   Component Value Date    WBC 8.7 09/19/2024    HGB 14.3 09/19/2024    HCT 45.3 09/19/2024     (H) 09/19/2024     09/19/2024     Lab Results   Component Value Date    GLUCOSE 107 (H) 09/19/2024    CALCIUM 7.6 (L) 09/19/2024     09/19/2024    K 3.8 09/19/2024    CO2 32 09/19/2024     09/19/2024    BUN 21 09/19/2024    CREATININE 0.46 (L) 09/19/2024     Results from last 72 hours   Lab Units 09/19/24  0435   ALBUMIN g/dL 2.5*     Estimated Creatinine Clearance: 125 mL/min (A) (by C-G formula based on SCr of 0.46 mg/dL (L)).    IMAGING:  CXR 9/13  Impression:     Improved aeration of the right lung base. Residual hazy right basilar  opacities likely a combination of residual pleural effusion and  basilar atelectasis.    Limited evaluation of the left lung base due to underpenetration.     ASSESSMENT/PLAN:     PNA due to Stenotrophomonas maltophilia. Resolving  Right Loculated Pleural Effusion - s/p chest tube 9/4, 71K WBCs neutrophilic with pH 7.9. Glucose <10 and exudate by Light's criteria. Consistent with an empyema. No growth. Resolving and chest tube out.     I suspect his infection is pretty much treated at this point. Will stop abx and observe further. I believe his other comorbidities are causing his hypoxia/hypercapnia at this time - COPD, MELISSA, heart failure, aortic stenosis, pulmHTN.    Monitoring for adverse effects of abx such as rash/itching/diarrhea - none " present.    Will follow. Thanks! D/w Dr. En Busby MD  [facility]  Office #[phone]

## 2024-09-20 ENCOUNTER — APPOINTMENT (OUTPATIENT)
Dept: RADIOLOGY | Facility: HOSPITAL | Age: 69
DRG: 870 | End: 2024-09-20
Payer: MEDICARE

## 2024-09-20 LAB
ALBUMIN SERPL BCP-MCNC: 2.8 G/DL (ref 3.4–5)
ANION GAP SERPL CALC-SCNC: 8 MMOL/L (ref 10–20)
BUN SERPL-MCNC: 18 MG/DL (ref 6–23)
CALCIUM SERPL-MCNC: 7.8 MG/DL (ref 8.6–10.3)
CHLORIDE SERPL-SCNC: 102 MMOL/L (ref 98–107)
CO2 SERPL-SCNC: 35 MMOL/L (ref 21–32)
CREAT SERPL-MCNC: 0.43 MG/DL (ref 0.5–1.3)
EGFRCR SERPLBLD CKD-EPI 2021: >90 ML/MIN/1.73M*2
ERYTHROCYTE [DISTWIDTH] IN BLOOD BY AUTOMATED COUNT: 13.4 % (ref 11.5–14.5)
GLUCOSE BLD MANUAL STRIP-MCNC: 110 MG/DL (ref 74–99)
GLUCOSE BLD MANUAL STRIP-MCNC: 123 MG/DL (ref 74–99)
GLUCOSE BLD MANUAL STRIP-MCNC: 125 MG/DL (ref 74–99)
GLUCOSE BLD MANUAL STRIP-MCNC: 133 MG/DL (ref 74–99)
GLUCOSE BLD MANUAL STRIP-MCNC: 157 MG/DL (ref 74–99)
GLUCOSE BLD MANUAL STRIP-MCNC: 246 MG/DL (ref 74–99)
GLUCOSE BLD MANUAL STRIP-MCNC: 255 MG/DL (ref 74–99)
GLUCOSE SERPL-MCNC: 120 MG/DL (ref 74–99)
HCT VFR BLD AUTO: 50.4 % (ref 41–52)
HGB BLD-MCNC: 15.9 G/DL (ref 13.5–17.5)
MCH RBC QN AUTO: 31.5 PG (ref 26–34)
MCHC RBC AUTO-ENTMCNC: 31.5 G/DL (ref 32–36)
MCV RBC AUTO: 100 FL (ref 80–100)
NRBC BLD-RTO: 0 /100 WBCS (ref 0–0)
PHOSPHATE SERPL-MCNC: 2.6 MG/DL (ref 2.5–4.9)
PLATELET # BLD AUTO: 243 X10*3/UL (ref 150–450)
POTASSIUM SERPL-SCNC: 3.6 MMOL/L (ref 3.5–5.3)
RBC # BLD AUTO: 5.04 X10*6/UL (ref 4.5–5.9)
SODIUM SERPL-SCNC: 141 MMOL/L (ref 136–145)
WBC # BLD AUTO: 8.6 X10*3/UL (ref 4.4–11.3)

## 2024-09-20 PROCEDURE — 2500000002 HC RX 250 W HCPCS SELF ADMINISTERED DRUGS (ALT 637 FOR MEDICARE OP, ALT 636 FOR OP/ED): Performed by: SURGERY

## 2024-09-20 PROCEDURE — 2500000002 HC RX 250 W HCPCS SELF ADMINISTERED DRUGS (ALT 637 FOR MEDICARE OP, ALT 636 FOR OP/ED): Performed by: INTERNAL MEDICINE

## 2024-09-20 PROCEDURE — 85027 COMPLETE CBC AUTOMATED: CPT | Performed by: NURSE PRACTITIONER

## 2024-09-20 PROCEDURE — 2500000001 HC RX 250 WO HCPCS SELF ADMINISTERED DRUGS (ALT 637 FOR MEDICARE OP): Performed by: INTERNAL MEDICINE

## 2024-09-20 PROCEDURE — 2500000004 HC RX 250 GENERAL PHARMACY W/ HCPCS (ALT 636 FOR OP/ED): Performed by: NURSE PRACTITIONER

## 2024-09-20 PROCEDURE — 74230 X-RAY XM SWLNG FUNCJ C+: CPT

## 2024-09-20 PROCEDURE — 36415 COLL VENOUS BLD VENIPUNCTURE: CPT | Performed by: NURSE PRACTITIONER

## 2024-09-20 PROCEDURE — 2500000004 HC RX 250 GENERAL PHARMACY W/ HCPCS (ALT 636 FOR OP/ED): Performed by: SURGERY

## 2024-09-20 PROCEDURE — 2500000001 HC RX 250 WO HCPCS SELF ADMINISTERED DRUGS (ALT 637 FOR MEDICARE OP): Performed by: SURGERY

## 2024-09-20 PROCEDURE — 84100 ASSAY OF PHOSPHORUS: CPT | Performed by: NURSE PRACTITIONER

## 2024-09-20 PROCEDURE — 99222 1ST HOSP IP/OBS MODERATE 55: CPT | Performed by: NURSE PRACTITIONER

## 2024-09-20 PROCEDURE — 2060000001 HC INTERMEDIATE ICU ROOM DAILY

## 2024-09-20 PROCEDURE — 2500000001 HC RX 250 WO HCPCS SELF ADMINISTERED DRUGS (ALT 637 FOR MEDICARE OP)

## 2024-09-20 PROCEDURE — 99233 SBSQ HOSP IP/OBS HIGH 50: CPT | Performed by: INTERNAL MEDICINE

## 2024-09-20 PROCEDURE — 74230 X-RAY XM SWLNG FUNCJ C+: CPT | Performed by: RADIOLOGY

## 2024-09-20 PROCEDURE — 94640 AIRWAY INHALATION TREATMENT: CPT

## 2024-09-20 PROCEDURE — 82947 ASSAY GLUCOSE BLOOD QUANT: CPT

## 2024-09-20 PROCEDURE — 2500000001 HC RX 250 WO HCPCS SELF ADMINISTERED DRUGS (ALT 637 FOR MEDICARE OP): Performed by: NURSE PRACTITIONER

## 2024-09-20 PROCEDURE — 2500000005 HC RX 250 GENERAL PHARMACY W/O HCPCS: Performed by: INTERNAL MEDICINE

## 2024-09-20 PROCEDURE — 92611 MOTION FLUOROSCOPY/SWALLOW: CPT | Mod: GN

## 2024-09-20 PROCEDURE — 99232 SBSQ HOSP IP/OBS MODERATE 35: CPT | Performed by: INTERNAL MEDICINE

## 2024-09-20 RX ORDER — NYSTATIN 100000 U/G
1 OINTMENT TOPICAL 2 TIMES DAILY
Status: DISCONTINUED | OUTPATIENT
Start: 2024-09-20 | End: 2024-09-25 | Stop reason: HOSPADM

## 2024-09-20 ASSESSMENT — COGNITIVE AND FUNCTIONAL STATUS - GENERAL
CLIMB 3 TO 5 STEPS WITH RAILING: TOTAL
MOVING FROM LYING ON BACK TO SITTING ON SIDE OF FLAT BED WITH BEDRAILS: A LOT
TURNING FROM BACK TO SIDE WHILE IN FLAT BAD: A LOT
EATING MEALS: A LITTLE
MOBILITY SCORE: 8
DAILY ACTIVITIY SCORE: 11
DRESSING REGULAR UPPER BODY CLOTHING: A LOT
PERSONAL GROOMING: A LOT
HELP NEEDED FOR BATHING: TOTAL
DRESSING REGULAR LOWER BODY CLOTHING: A LOT
TOILETING: TOTAL
WALKING IN HOSPITAL ROOM: TOTAL
MOVING TO AND FROM BED TO CHAIR: TOTAL
STANDING UP FROM CHAIR USING ARMS: TOTAL

## 2024-09-20 ASSESSMENT — PAIN - FUNCTIONAL ASSESSMENT
PAIN_FUNCTIONAL_ASSESSMENT: 0-10

## 2024-09-20 ASSESSMENT — PAIN SCALES - GENERAL
PAINLEVEL_OUTOF10: 0 - NO PAIN
PAINLEVEL_OUTOF10: 3

## 2024-09-20 NOTE — PROGRESS NOTES
"  INFECTIOUS DISEASE DAILY PROGRESS NOTE    SUBJECTIVE:    On 10-12L NC. Seems more awake today. No specific complaints. He wants to try to be more mobile - feels weak.    OBJECTIVE:  VITALS (Last 24 Hours)  BP 92/59 (Patient Position: Lying)   Pulse 82   Temp 36.3 °C (97.4 °F) (Oral)   Resp 18   Ht 1.803 m (5' 10.98\")   Wt 121 kg (267 lb 3.2 oz)   SpO2 98%   BMI 37.28 kg/m²     PHYSICAL EXAM:  Gen - 13L NC, in bed  Lungs - coarse lower lungs, no wheezing  Abd - soft, no ttp, BS present  Skin - no rashes    ABX: None    LABS:  Lab Results   Component Value Date    WBC 8.6 09/20/2024    HGB 15.9 09/20/2024    HCT 50.4 09/20/2024     09/20/2024     09/20/2024     Lab Results   Component Value Date    GLUCOSE 120 (H) 09/20/2024    CALCIUM 7.8 (L) 09/20/2024     09/20/2024    K 3.6 09/20/2024    CO2 35 (H) 09/20/2024     09/20/2024    BUN 18 09/20/2024    CREATININE 0.43 (L) 09/20/2024     Results from last 72 hours   Lab Units 09/20/24  0534   ALBUMIN g/dL 2.8*     Estimated Creatinine Clearance: 125 mL/min (A) (by C-G formula based on SCr of 0.43 mg/dL (L)).    IMAGING:  CXR 9/13  Impression:     Improved aeration of the right lung base. Residual hazy right basilar  opacities likely a combination of residual pleural effusion and  basilar atelectasis.    Limited evaluation of the left lung base due to underpenetration.     ASSESSMENT/PLAN:     PNA due to Stenotrophomonas maltophilia - treated/resolved  Right Loculated Pleural Effusion - s/p chest tube 9/4, 71K WBCs neutrophilic with pH 7.9. Glucose <10 and exudate by Light's criteria. Consistent with an empyema. No growth. Treated/resolved.     I suspect his infection is pretty much treated at this point. I believe his other comorbidities are causing his hypoxia/hypercapnia at this time - COPD, MELISSA, heart failure, aortic stenosis, pulmHTN.    Seems stable off abx for the time being. Please let me know if there is any fever or worsening " leukocytosis, signs of infection.    Monitoring for adverse effects of abx such as rash/itching/diarrhea - none present.    Will sign off. Please call back with questions. Thanks!     Elijah Busby MD  [facility]  Office #[phone]

## 2024-09-20 NOTE — PROGRESS NOTES
"   09/20/24 1015   Discharge Planning   Expected Discharge Disposition Long Term        Patient is still on 13L HF. He refused bi-pap at night time. KRISTY Schmitz did P2p with insurance for Ltach and answer was: \"for now ltach is denied while we are still diuresing and getting MBS.  once his volume status is optimized, we can resubmit\". Will continue to follow for discharge needs.   "

## 2024-09-20 NOTE — ASSESSMENT & PLAN NOTE
67-year-old gentleman with history of chronic diastolic heart failure, COPD, obstructive sleep apnea, obesity, type 2 diabetes mellitus, hypertension, hypothyroidism and hyperlipidemia was admitted with acute respiratory failure with hypoxia and hypercapnia and noted to have a right loculated pleural effusion and bilateral lower lobe lung infiltrate and right hydropneumothorax.  He was hypotensive on admission and required 3 L of fluid to resuscitate and admitted to intensive care unit and was intubated because of acute respiratory failure and remained intubated for several days.  Currently he is extubated and on high flow oxygen as mentioned above.  He had significant leukocytosis of 22,000 on admission and his sputum culture was positive for stenotrophomonas.  Patient is being continued on IV Zosyn and IV Levaquin, pulmonary and infectious disease on board.    1.  Acute hypoxic hypercapnic respiratory failure with hypotension and right hydropneumothorax and bilateral infiltrate and positive for pneumonia.Sputum culture was positive for stenotrophomonas.   Initially intubated for several days and since then he has been extubated and is on Improving and currently is on 13 L nasal cannula.  Continued on IV Zosyn and Levaquin for another 24 hours.    2.  Patient had hydropneumothorax on admission and had chest tube, chest tube has been removed since then..    3.  Hypotension has resolved    4.  COPD and obstructive sleep apnea.  Aerosol treatment and steroids.  Currently his prednisone has been changed to IV Solu-Medrol 40 mg daily since patient failed swallow evaluation.        5.  Diabetes mellitus type 2, continue sliding scale insulin, patient was on Mounjaro at home prior to admission.    6.  Patient  DNR/DNI.    7.  Hematuria possibly secondary to trauma secondary to Live catheter which has been removed today.    8.  Recurrent episodes of brief nonsustained ventricular tachycardia, also moderate systolic heart  failure, previous echocardiogram revealed LV ejection fraction of 30 to 35% with moderate aortic stenosis, patient is started on IV Lasix this morning and cardiology consult appreciated and repeat echocardiogram quality was suboptimal he did reveal LV ejection fraction of 23% with global hypokinesia and moderate aortic cusp calcification and moderate right ventricular dysfunction.  Patient has been started on IV Lasix and Jardiance and consider starting GDMT once condition is stable.    9.  Patient had a modified barium swallow and has been recommended to have a puréed diet with honey thickened and nectar thickened liquids.    Add nystatin cream to both groin area for inguinal intertrigo.    Reviewed all labs patient's medical record and imaging studies and discussed the plan of treatment with the patient..    Continue PT OT and processing for LTAC.

## 2024-09-20 NOTE — CARE PLAN
The patient's goals for the shift include Pt. will have a safe, restful and uneventful evening    The clinical goals for the shift include Patient will remain hemodynamically stable      Problem: Skin  Goal: Decreased wound size/increased tissue granulation at next dressing change  Outcome: Progressing  Goal: Prevent/manage excess moisture  Outcome: Progressing  Goal: Prevent/minimize sheer/friction injuries  Outcome: Progressing  Goal: Promote/optimize nutrition  Outcome: Progressing     Problem: Nutrition  Goal: Oral intake greater than 50%  Outcome: Progressing  Goal: Oral intake greater 75%  Outcome: Progressing  Goal: Consume prescribed supplement  Outcome: Progressing  Goal: Nutrition support goals are met within 48 hrs  Outcome: Progressing  Goal: Nutrition support is meeting 75% of nutrient needs  Outcome: Progressing  Goal: Tube feed tolerance  Outcome: Progressing  Goal: BG  mg/dL  Outcome: Progressing  Goal: Lab values WNL  Outcome: Progressing  Goal: Electrolytes WNL  Outcome: Progressing  Goal: Promote healing  Outcome: Progressing  Goal: Maintain stable weight  Outcome: Progressing  Goal: Reduce weight from edema/fluid  Outcome: Progressing  Goal: Gradual weight gain  Outcome: Progressing  Goal: Improve ostomy output  Outcome: Progressing     Problem: Fall/Injury  Goal: Not fall by end of shift  Outcome: Progressing  Goal: Be free from injury by end of the shift  Outcome: Progressing  Goal: Verbalize understanding of personal risk factors for fall in the hospital  Outcome: Progressing  Goal: Verbalize understanding of risk factor reduction measures to prevent injury from fall in the home  Outcome: Progressing  Goal: Use assistive devices by end of the shift  Outcome: Progressing  Goal: Pace activities to prevent fatigue by end of the shift  Outcome: Progressing

## 2024-09-20 NOTE — CARE PLAN
Problem: Skin  Goal: Decreased wound size/increased tissue granulation at next dressing change  Outcome: Progressing  Goal: Prevent/manage excess moisture  Outcome: Progressing  Goal: Prevent/minimize sheer/friction injuries  Outcome: Progressing  Goal: Promote/optimize nutrition  Outcome: Progressing     Problem: Nutrition  Goal: Oral intake greater than 50%  Outcome: Progressing  Goal: Oral intake greater 75%  Outcome: Progressing  Goal: Consume prescribed supplement  Outcome: Progressing  Goal: Nutrition support goals are met within 48 hrs  Outcome: Progressing  Goal: Nutrition support is meeting 75% of nutrient needs  Outcome: Progressing  Goal: Tube feed tolerance  Outcome: Progressing  Goal: BG  mg/dL  Outcome: Progressing  Goal: Lab values WNL  Outcome: Progressing  Goal: Electrolytes WNL  Outcome: Progressing  Goal: Promote healing  Outcome: Progressing  Goal: Maintain stable weight  Outcome: Progressing  Goal: Reduce weight from edema/fluid  Outcome: Progressing  Goal: Gradual weight gain  Outcome: Progressing  Goal: Improve ostomy output  Outcome: Progressing     Problem: Discharge Planning  Goal: Discharge to home or other facility with appropriate resources  Outcome: Progressing     Problem: Chronic Conditions and Co-morbidities  Goal: Patient's chronic conditions and co-morbidity symptoms are monitored and maintained or improved  Outcome: Progressing     Problem: Diabetes  Goal: Achieve decreasing blood glucose levels by end of shift  Outcome: Progressing  Goal: Increase stability of blood glucose readings by end of shift  Outcome: Progressing  Goal: Decrease in ketones present in urine by end of shift  Outcome: Progressing  Goal: Maintain electrolyte levels within acceptable range throughout shift  Outcome: Progressing  Goal: Maintain glucose levels >70mg/dl to <250mg/dl throughout shift  Outcome: Progressing  Goal: No changes in neurological exam by end of shift  Outcome: Progressing  Goal:  Learn about and adhere to nutrition recommendations by end of shift  Outcome: Progressing  Goal: Vital signs within normal range for age by end of shift  Outcome: Progressing  Goal: Increase self care and/or family involovement by end of shift  Outcome: Progressing  Goal: Receive DSME education by end of shift  Outcome: Progressing     Problem: Knowledge Deficit  Goal: Patient/family/caregiver demonstrates understanding of disease process, treatment plan, medications, and discharge instructions  Outcome: Progressing     Problem: Mechanical Ventilation  Goal: Oral health is maintained or improved  Outcome: Progressing  Goal: Ability to express needs and understand communication  Outcome: Progressing  Goal: Mobility/activity is maintained at optimum level for patient  Outcome: Progressing     Problem: Fall/Injury  Goal: Not fall by end of shift  Outcome: Progressing  Goal: Be free from injury by end of the shift  Outcome: Progressing  Goal: Verbalize understanding of personal risk factors for fall in the hospital  Outcome: Progressing  Goal: Verbalize understanding of risk factor reduction measures to prevent injury from fall in the home  Outcome: Progressing  Goal: Use assistive devices by end of the shift  Outcome: Progressing  Goal: Pace activities to prevent fatigue by end of the shift  Outcome: Progressing   The patient's goals for the shift include Pt. will have a safe, restful and uneventful evening    The clinical goals for the shift include Pt. will remain HDS throughout shift    Over the shift, the patient did not make progress toward the following goals. Barriers to progression include . Recommendations to address these barriers include .

## 2024-09-20 NOTE — PROGRESS NOTES
Alo Glass is a 69 y.o. male on day 16 of admission presenting with Acute respiratory failure with hypoxia and hypercapnia (Multi).    Subjective   Patient is awake and alert, currently on 13 L nasal cannula and pulse ox is satisfactory.  No chest pain and breathing seems to have improved, diuresed good with IV Lasix and pedal edema is decreasing.  Seen by cardiology other day had echocardiogram which revealed LV ejection fraction of 23% and moderate right ventricular systolic dysfunction and aortic cusp calcification, aortic stenosis could not be evaluated because of suboptimal study.  No chest pain and urinating adequately.  No fever or chills.  Has been IV antibiotics for more than 10 days could be discontinued in next 24 to 48 hours.       Objective     Physical Exam  GENERAL:  Alert, no distress, cooperative, afebrile  SKIN:  Skin color, texture, turgor normal. No rashes or lesions.  OROPHARYNX:  Lips, mucosa, and tongue are normal.Teeth and gums, normal. Oropharynx normal.  NECK:  No jugulovenous distention, No carotid bruits, Carotid pulse normal contour, Supple  LUNGS: Generalized diminished air exchange however improvement since other day, occasional wheezes, no crackles.  Decreased diaphragmatic excursion.  CARDIAC: Rate and rhythm is regular, normal S1 and S2; no rubs,  or gallops, 2/6 systolic ejection murmur left sternal border.  ABDOMEN:  Abdomen soft, large and obese, non-tender, BS normal, No masses or organomegaly, inguinal intertrigo,  EXTREMETIES:  Extremities normal, no deformities, 2+ pedal edema, and 1+ lower extremity edema, no clubbing or skin discoloration.   Good capillary refill., No ulcers  NEURO:  Alert, oriented X 3, Gait not examined Non-focal. Reflexes normal and symmetric. Sensation grossly intact., Cranial nerves II-XII intact  PULSES:  2+ radial, 2+ carotid    Last Recorded Vitals  Blood pressure 106/58, pulse 82, temperature 36.3 °C (97.4 °F), temperature source Oral, resp.  "rate 22, height 1.803 m (5' 10.98\"), weight 121 kg (267 lb 3.2 oz), SpO2 94%.  Intake/Output last 3 Shifts:  I/O last 3 completed shifts:  In: 750 (6.2 mL/kg) [P.O.:600; IV Piggyback:150]  Out: 6600 (54.5 mL/kg) [Urine:6600 (1.5 mL/kg/hr)]  Weight: 121.2 kg     Relevant Results  Scheduled medications  aspirin, 81 mg, oral, Daily  empagliflozin, 10 mg, oral, Daily  enoxaparin, 40 mg, subcutaneous, q12h SHARRON  ezetimibe, 10 mg, oral, Daily  folic acid, 1 mg, oral, Daily  furosemide, 80 mg, intravenous, q12h  insulin lispro, 0-10 Units, subcutaneous, q4h  ipratropium-albuteroL, 3 mL, nebulization, 4x daily  levothyroxine, 125 mcg, oral, Daily  methylPREDNISolone sodium succinate (PF), 20 mg, intravenous, q24h  multivitamin with minerals, 1 tablet, oral, Daily  nystatin, 5 mL, Swish & Swallow, 4x daily  pantoprazole, 40 mg, intravenous, Daily  polyethylene glycol, 17 g, nasogastric tube, Daily  [Held by provider] predniSONE, 20 mg, oral, Daily  sennosides-docusate sodium, 2 tablet, oral, BID  tamsulosin, 0.4 mg, oral, Daily  thiamine, 100 mg, oral, Daily      Continuous medications     PRN medications  PRN medications: acetaminophen, acetaminophen, dextrose, dextrose, glucagon, glucagon, ipratropium-albuteroL, oxygen, oxygen       Results for orders placed or performed during the hospital encounter of 09/04/24 (from the past 96 hour(s))   POCT GLUCOSE   Result Value Ref Range    POCT Glucose 159 (H) 74 - 99 mg/dL   POCT GLUCOSE   Result Value Ref Range    POCT Glucose 227 (H) 74 - 99 mg/dL   POCT GLUCOSE   Result Value Ref Range    POCT Glucose 207 (H) 74 - 99 mg/dL   POCT GLUCOSE   Result Value Ref Range    POCT Glucose 216 (H) 74 - 99 mg/dL   Lactate   Result Value Ref Range    Lactate 1.1 0.4 - 2.0 mmol/L   POCT GLUCOSE   Result Value Ref Range    POCT Glucose 114 (H) 74 - 99 mg/dL   Renal Function Panel   Result Value Ref Range    Glucose 100 (H) 74 - 99 mg/dL    Sodium 143 136 - 145 mmol/L    Potassium 3.7 3.5 - 5.3 " mmol/L    Chloride 99 98 - 107 mmol/L    Bicarbonate 42 (HH) 21 - 32 mmol/L    Anion Gap <7 (L) 10 - 20 mmol/L    Urea Nitrogen 16 6 - 23 mg/dL    Creatinine 0.46 (L) 0.50 - 1.30 mg/dL    eGFR >90 >60 mL/min/1.73m*2    Calcium 7.5 (L) 8.6 - 10.3 mg/dL    Phosphorus 1.9 (L) 2.5 - 4.9 mg/dL    Albumin 2.3 (L) 3.4 - 5.0 g/dL   CBC   Result Value Ref Range    WBC 9.1 4.4 - 11.3 x10*3/uL    nRBC 0.0 0.0 - 0.0 /100 WBCs    RBC 4.89 4.50 - 5.90 x10*6/uL    Hemoglobin 15.9 13.5 - 17.5 g/dL    Hematocrit 50.7 41.0 - 52.0 %     (H) 80 - 100 fL    MCH 32.5 26.0 - 34.0 pg    MCHC 31.4 (L) 32.0 - 36.0 g/dL    RDW 13.2 11.5 - 14.5 %    Platelets 185 150 - 450 x10*3/uL   POCT GLUCOSE   Result Value Ref Range    POCT Glucose 136 (H) 74 - 99 mg/dL   POCT GLUCOSE   Result Value Ref Range    POCT Glucose 231 (H) 74 - 99 mg/dL   POCT GLUCOSE   Result Value Ref Range    POCT Glucose 191 (H) 74 - 99 mg/dL   Electrocardiogram, 12-lead PRN ACS symptoms   Result Value Ref Range    Ventricular Rate 91 BPM    Atrial Rate 91 BPM    UT Interval 234 ms    QRS Duration 162 ms    QT Interval 426 ms    QTC Calculation(Bazett) 523 ms    P Axis -10 degrees    R Axis -83 degrees    T Axis 62 degrees    QRS Count 15 beats    Q Onset 205 ms    P Onset 88 ms    P Offset 158 ms    T Offset 418 ms    QTC Fredericia 489 ms   POCT GLUCOSE   Result Value Ref Range    POCT Glucose 251 (H) 74 - 99 mg/dL   POCT GLUCOSE   Result Value Ref Range    POCT Glucose 209 (H) 74 - 99 mg/dL   POCT GLUCOSE   Result Value Ref Range    POCT Glucose 106 (H) 74 - 99 mg/dL   Renal Function Panel   Result Value Ref Range    Glucose 104 (H) 74 - 99 mg/dL    Sodium 141 136 - 145 mmol/L    Potassium 3.8 3.5 - 5.3 mmol/L    Chloride 101 98 - 107 mmol/L    Bicarbonate 36 (H) 21 - 32 mmol/L    Anion Gap 8 (L) 10 - 20 mmol/L    Urea Nitrogen 16 6 - 23 mg/dL    Creatinine 0.41 (L) 0.50 - 1.30 mg/dL    eGFR >90 >60 mL/min/1.73m*2    Calcium 8.0 (L) 8.6 - 10.3 mg/dL    Phosphorus  2.9 2.5 - 4.9 mg/dL    Albumin 3.0 (L) 3.4 - 5.0 g/dL   CBC   Result Value Ref Range    WBC 11.1 4.4 - 11.3 x10*3/uL    nRBC 0.0 0.0 - 0.0 /100 WBCs    RBC 5.26 4.50 - 5.90 x10*6/uL    Hemoglobin 17.1 13.5 - 17.5 g/dL    Hematocrit 55.7 (H) 41.0 - 52.0 %     (H) 80 - 100 fL    MCH 32.5 26.0 - 34.0 pg    MCHC 30.7 (L) 32.0 - 36.0 g/dL    RDW 13.3 11.5 - 14.5 %    Platelets 214 150 - 450 x10*3/uL   B-type natriuretic peptide   Result Value Ref Range     (H) 0 - 99 pg/mL   POCT GLUCOSE   Result Value Ref Range    POCT Glucose 127 (H) 74 - 99 mg/dL   Blood Gas Arterial Full Panel   Result Value Ref Range    POCT pH, Arterial 7.23 (LL) 7.38 - 7.42 pH    POCT pCO2, Arterial 84 (HH) 38 - 42 mm Hg    POCT pO2, Arterial 114 (H) 85 - 95 mm Hg    POCT SO2, Arterial 98 94 - 100 %    POCT Oxy Hemoglobin, Arterial 95.3 94.0 - 98.0 %    POCT Hematocrit Calculated, Arterial 53.0 (H) 41.0 - 52.0 %    POCT Sodium, Arterial 133 (L) 136 - 145 mmol/L    POCT Potassium, Arterial 3.7 3.5 - 5.3 mmol/L    POCT Chloride, Arterial 99 98 - 107 mmol/L    POCT Ionized Calcium, Arterial 1.19 1.10 - 1.33 mmol/L    POCT Glucose, Arterial 134 (H) 74 - 99 mg/dL    POCT Lactate, Arterial 0.7 0.4 - 2.0 mmol/L    POCT Base Excess, Arterial 3.7 (H) -2.0 - 3.0 mmol/L    POCT HCO3 Calculated, Arterial 35.2 (H) 22.0 - 26.0 mmol/L    POCT Hemoglobin, Arterial 17.5 13.5 - 17.5 g/dL    POCT Anion Gap, Arterial 3 (L) 10 - 25 mmo/L    Patient Temperature 37.0 degrees Celsius    FiO2 60 %   BLOOD GAS VENOUS FULL PANEL   Result Value Ref Range    POCT pH, Venous 7.33 7.33 - 7.43 pH    POCT pCO2, Venous 64 (H) 41 - 51 mm Hg    POCT pO2, Venous 39 35 - 45 mm Hg    POCT SO2, Venous 76 (H) 45 - 75 %    POCT Oxy Hemoglobin, Venous 73.7 45.0 - 75.0 %    POCT Hematocrit Calculated, Venous 50.0 41.0 - 52.0 %    POCT Sodium, Venous 130 (L) 136 - 145 mmol/L    POCT Potassium, Venous 3.7 3.5 - 5.3 mmol/L    POCT Chloride, Venous 97 (L) 98 - 107 mmol/L    POCT  Ionized Calicum, Venous 1.10 1.10 - 1.33 mmol/L    POCT Glucose, Venous 135 (H) 74 - 99 mg/dL    POCT Lactate, Venous 1.4 0.4 - 2.0 mmol/L    POCT Base Excess, Venous 5.2 (H) -2.0 - 3.0 mmol/L    POCT HCO3 Calculated, Venous 33.7 (H) 22.0 - 26.0 mmol/L    POCT Hemoglobin, Venous 16.5 13.5 - 17.5 g/dL    POCT Anion Gap, Venous 3.0 (L) 10.0 - 25.0 mmol/L    Patient Temperature 37.0 degrees Celsius    FiO2 60 %   POCT GLUCOSE   Result Value Ref Range    POCT Glucose 122 (H) 74 - 99 mg/dL   POCT GLUCOSE   Result Value Ref Range    POCT Glucose 243 (H) 74 - 99 mg/dL   POCT GLUCOSE   Result Value Ref Range    POCT Glucose 374 (H) 74 - 99 mg/dL   POCT GLUCOSE   Result Value Ref Range    POCT Glucose 239 (H) 74 - 99 mg/dL   POCT GLUCOSE   Result Value Ref Range    POCT Glucose 118 (H) 74 - 99 mg/dL   Renal Function Panel   Result Value Ref Range    Glucose 107 (H) 74 - 99 mg/dL    Sodium 141 136 - 145 mmol/L    Potassium 3.8 3.5 - 5.3 mmol/L    Chloride 104 98 - 107 mmol/L    Bicarbonate 32 21 - 32 mmol/L    Anion Gap 9 (L) 10 - 20 mmol/L    Urea Nitrogen 21 6 - 23 mg/dL    Creatinine 0.46 (L) 0.50 - 1.30 mg/dL    eGFR >90 >60 mL/min/1.73m*2    Calcium 7.6 (L) 8.6 - 10.3 mg/dL    Phosphorus 1.8 (L) 2.5 - 4.9 mg/dL    Albumin 2.5 (L) 3.4 - 5.0 g/dL   CBC   Result Value Ref Range    WBC 8.7 4.4 - 11.3 x10*3/uL    nRBC 0.0 0.0 - 0.0 /100 WBCs    RBC 4.44 (L) 4.50 - 5.90 x10*6/uL    Hemoglobin 14.3 13.5 - 17.5 g/dL    Hematocrit 45.3 41.0 - 52.0 %     (H) 80 - 100 fL    MCH 32.2 26.0 - 34.0 pg    MCHC 31.6 (L) 32.0 - 36.0 g/dL    RDW 13.3 11.5 - 14.5 %    Platelets 210 150 - 450 x10*3/uL   Magnesium   Result Value Ref Range    Magnesium 2.20 1.60 - 2.40 mg/dL   POCT GLUCOSE   Result Value Ref Range    POCT Glucose 111 (H) 74 - 99 mg/dL   POCT GLUCOSE   Result Value Ref Range    POCT Glucose 146 (H) 74 - 99 mg/dL   POCT GLUCOSE   Result Value Ref Range    POCT Glucose 190 (H) 74 - 99 mg/dL   Transthoracic Echo (TTE)  Limited   Result Value Ref Range    LV EF 23 %    RV free wall pk S' 9.00 cm/s    LV A4C EF 20.6    POCT GLUCOSE   Result Value Ref Range    POCT Glucose 162 (H) 74 - 99 mg/dL   POCT GLUCOSE   Result Value Ref Range    POCT Glucose 125 (H) 74 - 99 mg/dL   POCT GLUCOSE   Result Value Ref Range    POCT Glucose 123 (H) 74 - 99 mg/dL   Renal Function Panel   Result Value Ref Range    Glucose 120 (H) 74 - 99 mg/dL    Sodium 141 136 - 145 mmol/L    Potassium 3.6 3.5 - 5.3 mmol/L    Chloride 102 98 - 107 mmol/L    Bicarbonate 35 (H) 21 - 32 mmol/L    Anion Gap 8 (L) 10 - 20 mmol/L    Urea Nitrogen 18 6 - 23 mg/dL    Creatinine 0.43 (L) 0.50 - 1.30 mg/dL    eGFR >90 >60 mL/min/1.73m*2    Calcium 7.8 (L) 8.6 - 10.3 mg/dL    Phosphorus 2.6 2.5 - 4.9 mg/dL    Albumin 2.8 (L) 3.4 - 5.0 g/dL   CBC   Result Value Ref Range    WBC 8.6 4.4 - 11.3 x10*3/uL    nRBC 0.0 0.0 - 0.0 /100 WBCs    RBC 5.04 4.50 - 5.90 x10*6/uL    Hemoglobin 15.9 13.5 - 17.5 g/dL    Hematocrit 50.4 41.0 - 52.0 %     80 - 100 fL    MCH 31.5 26.0 - 34.0 pg    MCHC 31.5 (L) 32.0 - 36.0 g/dL    RDW 13.4 11.5 - 14.5 %    Platelets 243 150 - 450 x10*3/uL   POCT GLUCOSE   Result Value Ref Range    POCT Glucose 110 (H) 74 - 99 mg/dL   POCT GLUCOSE   Result Value Ref Range    POCT Glucose 133 (H) 74 - 99 mg/dL   Transthoracic Echo (TTE) Limited    Result Date: 9/19/2024   [facility], [address] [place] [address]              Tel [phone] and Fax [phone] TRANSTHORACIC ECHOCARDIOGRAM REPORT  Patient Name:      BRAYAN Key Physician:    [id number] Andrea Bentley MD Study Date:        9/19/2024            Ordering Provider:    [id number] ABAD SCHNEIDER MRN/PID:           [id number]             Fellow: Accession#:        [id number]         Nurse: Date of Birth/Age: 1955 / 69 years Sonographer:           Gustavo Naqvi [facility] Gender:            M                    Additional Staff: Height:            177.80 cm            Admit Date:           9/4/2024 Weight:            126.10 kg            Admission Status:     Inpatient -                                                               Routine BSA / BMI:         2.40 m2 / 39.89      Encounter#:           [id number]                    kg/m2 Blood Pressure:    105/75 mmHg          Department Location:  [facility] Non                                                               Invasive Study Type:    TRANSTHORACIC ECHO (TTE) LIMITED Diagnosis/ICD: Encounter for screening for cardiovascular disorders-Z13.6 Indication:    SOB and NSVT CPT Code:      Echo Limited-30645; Color Doppler-53495; Doppler Limited-29490 Patient History: Pertinent History: LE Edema, COPD, Dyspnea and HTN. Elevated trop. Study Detail: The following Echo studies were performed: 2D, M-Mode, Doppler and               color flow. Technically challenging study due to body habitus.  PHYSICIAN INTERPRETATION: Left Ventricle: Left ventricular ejection fraction is severely decreased, calculated by Green's biplane at 23%. There is global hypokinesis of the left ventricle with minor regional variations. The left ventricular cavity size is normal. Spectral Doppler shows an impaired relaxation pattern of left ventricular diastolic filling. Left Atrium: The left atrium is normal in size. Right Ventricle: The right ventricle is moderately enlarged. There is mildly reduced right ventricular systolic function. TAPSE 13mm. Right Atrium: The right atrium is normal in size. Aortic Valve: The aortic valve is trileaflet. There is moderate aortic valve cusp calcification. There is no evidence of aortic valve regurgitation. Mitral Valve: The mitral valve is mildly thickened. There is mild to moderate mitral annular calcification. There is no evidence of mitral valve regurgitation. Tricuspid Valve: The tricuspid valve  is structurally normal. No evidence of tricuspid regurgitation. The right ventricular systolic pressure is unable to be estimated. Pulmonic Valve: The pulmonic valve was not assessed. Pulmonic valve regurgitation was not assessed. Pericardium: There is no pericardial effusion noted. Aorta: The aortic root is normal. Systemic Veins: The inferior vena cava appears normal in size, with IVC inspiratory collapse greater than 50%. In comparison to the previous echocardiogram(s): Compared with study dated 3/11/2024, there is RV dilation and reduced function.  CONCLUSIONS:  1. Left ventricular ejection fraction is severely decreased, calculated by Green's biplane at 23%.  2. There is global hypokinesis of the left ventricle with minor regional variations.  3. Poorly visualized anatomical structures due to suboptimal image quality.  4. Spectral Doppler shows an impaired relaxation pattern of left ventricular diastolic filling.  5. There is mildly reduced right ventricular systolic function.  6. Moderately enlarged right ventricle.  7. There is moderate aortic valve cusp calcification. The was no assesment of aortic stenosis in this limited study.  8. Compared with study dated 3/11/2024, there is RV dilation and reduced function. QUANTITATIVE DATA SUMMARY:  2D MEASUREMENTS:          Normal Ranges: LVEDV Index:     81 ml/m2  LV SYSTOLIC FUNCTION BY 2D PLANIMETRY (MOD):                      Normal Ranges: EF-A4C View:    21 % (>=55%) EF-A2C View:    24 % EF-Biplane:     23 % LV EF Reported: 23 %  RIGHT VENTRICLE: RV s' 0.09 m/s  TRICUSPID VALVE/RVSP:         Normal Ranges: Est. RA Pressure:     3 mmHg IVC Diam:             2.10 cm  [id number] Andrea Bentley MD Electronically signed on 9/19/2024 at 5:31:57 PM  ** Final **     Electrocardiogram, 12-lead PRN ACS symptoms    Result Date: 9/18/2024  Sinus rhythm with 1st degree AV block with Premature atrial complexes with Aberrant conduction Right bundle branch block Left anterior  fascicular block  Bifascicular block  Anteroseptal infarct (cited on or before 23-APR-2024) Abnormal ECG Confirmed by Triston Moore ([id number]) on 9/18/2024 10:19:51 AM    XR chest 1 view    Result Date: 9/17/2024  Interpreted By:  Ishaan Vernon, STUDY: XR CHEST 1 VIEW;  9/17/2024 5:41 am   INDICATION: Signs/Symptoms:Increase O2 requirements. Tnx..   COMPARISON: CT scan from 09/04/2024.   ACCESSION NUMBER(S): [id number]   ORDERING CLINICIAN: PARISA ZARAGOZA   TECHNIQUE: Single AP portable view of the chest was obtained.   FINDINGS: MEDIASTINUM/ LUNGS/ ARELIS:   Stable cardiomegaly. No gross vascular congestion. Small stable left pleural effusion. Medial left basilar atelectasis/infiltrate, stable to slightly progressed. Pleural and parenchymal opacities at the right base, mildly progressed. No pneumothorax. No tracheal deviation. No abnormal hilar fullness or gross mass on either side.   BONES: No lytic or blastic destructive bone lesion.   UPPER ABDOMEN: Grossly intact.       Cardiomegaly.   Stable small left pleural effusion with left basilar atelectasis/pneumonia, stable to slightly progressed.   Pleural and parenchymal opacities at the right base, mildly progressed.   MACRO: None   Signed by: Ishaan Vernon 9/17/2024 8:16 AM Dictation workstation:   [id number]    Electrocardiogram, 12-lead PRN ACS symptoms    Result Date: 9/16/2024  Sinus rhythm with 1st degree AV block with Premature atrial complexes Right bundle branch block Left anterior fascicular block  Bifascicular block  Anteroseptal infarct (cited on or before 23-APR-2024) Abnormal ECG When compared with ECG of 13-SEP-2024 02:49, (unconfirmed) Premature ventricular complexes are no longer Present QT has lengthened    ECG 12 lead    Result Date: 9/15/2024  Sinus rhythm with 1st degree AV block Left axis deviation Right bundle branch block Inferior infarct (cited on or before 04-SEP-2024) Abnormal ECG When compared with ECG of 04-SEP-2024 16:24,  Premature ventricular complexes are no longer Present Right bundle branch block is now Present Confirmed by Dimitri Murillo ([id number]) on 9/15/2024 2:23:29 PM    XR chest 1 view    Result Date: 9/13/2024  Interpreted By:  Tania Knowles, STUDY: XR CHEST 1 VIEW;  9/13/2024 4:11 pm   INDICATION: Signs/Symptoms:post chest tube removal.   COMPARISON: 09/13/2024   ACCESSION NUMBER(S): [id number]   ORDERING CLINICIAN: RODOLFO LOPEZ   FINDINGS: The study is limited due to patient's body habitus and poor inspiration previously seen right-sided chest tube has been removed. No gross pneumothorax is noted.   Blunting of both costophrenic angles is seen, which most likely is related to pleural effusions. The heart is enlarged. Bibasilar streaky densities are seen.       No gross pneumothorax.   Bilateral pleural effusions.   Cardiomegaly.   Bibasilar streaky densities, which may be related to atelectasis or infiltrates.       MACRO: None   Signed by: Tania Knowles 9/13/2024 4:40 PM Dictation workstation:   [id number]    Electrocardiogram, 12-lead PRN ACS symptoms    Result Date: 9/13/2024  Sinus rhythm with 1st degree AV block with occasional Premature ventricular complexes and Premature atrial complexes Left axis deviation Right bundle branch block Inferior infarct (cited on or before 04-SEP-2024) Anteroseptal infarct (cited on or before 23-APR-2024) Abnormal ECG When compared with ECG of 06-SEP-2024 00:20, Premature ventricular complexes are now Present Vent. rate has decreased BY  31 BPM Questionable change in initial forces of Septal leads T wave inversion less evident in Anterolateral leads    XR chest 1 view    Result Date: 9/13/2024  Interpreted By:  Rosa Hemphill, STUDY: XR CHEST 1 VIEW;  9/13/2024 4:31 am   INDICATION: Signs/Symptoms:CT in place.   COMPARISON: Multiple prior studies, the most recent 09/12/2024   ACCESSION NUMBER(S): [id number]   ORDERING CLINICIAN: RODOLFO LOPEZ   FINDINGS:     CARDIOMEDIASTINAL  SILHOUETTE: Stable enlarged cardiac silhouette.   LUNGS: Stable position of pigtail catheter overlying the right lung base. There is improved aeration of the right lung base. Mild residual hazy right basilar opacity, likely combination of small residual pleural effusion and basilar consolidation. Limited evaluation of the left lung base due to underpenetration. This is not significantly changed and left pleural effusion or basilar consolidation not excluded. No pneumothorax.   ABDOMEN: No remarkable upper abdominal findings.   BONES: No acute osseous abnormality.       Improved aeration of the right lung base. Residual hazy right basilar opacities likely a combination of residual pleural effusion and basilar atelectasis.   Limited evaluation of the left lung base due to underpenetration.   MACRO: None   Signed by: Rosa Hemphill 9/13/2024 4:48 AM Dictation workstation:   [id number]    XR chest 1 view    Result Date: 9/12/2024  Interpreted By:  Donte Shoemaker, STUDY: XR CHEST 1 VIEW; 9/12/2024 5:23 am   INDICATION: Signs/Symptoms:CT in place.   COMPARISON: None   ACCESSION NUMBER(S): [id number]   ORDERING CLINICIAN: RODOLFO LOPEZ   TECHNIQUE: 1 view of the chest was performed.   FINDINGS: Right pigtail catheter in similar location. Stable layering bilateral pleural effusion with surrounding atelectasis. Stable cardiomegaly. Bilateral shoulder joint degenerative changes.       1. No significant interval change from 09/11/2024 chest x-ray.   Signed by: Donte Shoemaker 9/12/2024 8:18 AM Dictation workstation:   [id number]    XR chest 1 view    Result Date: 9/11/2024  Interpreted By:  Klaus Villafana, STUDY: XR CHEST 1 VIEW; 9/11/2024 5:42 am   INDICATION: Signs/Symptoms:CT in place   COMPARISON: Radiographs 09/10/2024   ACCESSION NUMBER(S): [id number]   ORDERING CLINICIAN: RODOLFO LOPEZ   TECHNIQUE: Single frontal view of the chest performed.   FINDINGS: LINES AND DEVICES: Remote endotracheal tube. Stable right  pigtail pleural catheter near right lung base.   LUNGS: Stable hazy opacities at the lung base and additional airspace opacities extending to right mid lung, stable to mildly increased. No pneumothorax. Left lung is clear.   CARDIOMEDIASTINAL SILHOUETTE: Stable cardiomegaly.       Stable to slightly increased right pleural effusion with atelectasis. Stable right pleural catheter.   MACRO None   Signed by: Klaus Villafana 9/11/2024 8:21 AM Dictation workstation:   [id number]    XR chest 1 view    Result Date: 9/10/2024  Interpreted By:  Ishaan Vernon, STUDY: XR CHEST 1 VIEW;  9/10/2024 5:34 am   INDICATION: Signs/Symptoms:Eval chest tube placement, Rt effusion.   COMPARISON: CT scan chest from 09/04/2024. Chest x-ray from 09/09/2024.   ACCESSION NUMBER(S): [id number]   ORDERING CLINICIAN: RICH KAPLAN   TECHNIQUE: Single AP portable view of the chest was obtained.   FINDINGS: MEDIASTINUM/ LUNGS/ ARELIS: Previous ET tube has been removed. There is an NG tube in place with distal port just beyond the GE junction and distal tip in the proximal stomach. Pigtail pleural catheter at the right base is stable. There is a small grossly stable lateral right basilar pneumothorax. There is mild bibasilar atelectasis. Cardiomegaly without gross vascular congestion. No pneumothorax on the left. No tracheal deviation. No abnormal hilar fullness or gross mass on either side.   BONES: No lytic or blastic destructive bone lesion.   UPPER ABDOMEN: Grossly intact.       Cardiomegaly.   NG tube in place. ET tube has been removed.   Stable pleural drainage catheter at the base of the right chest. Small stable lateral right basilar pneumothorax.   Mild bibasilar atelectasis.   MACRO: None   Signed by: Ishaan Vernon 9/10/2024 8:13 AM Dictation workstation:   [id number]    XR chest 1 view    Result Date: 9/9/2024  Interpreted By:  Daisy Sosa, STUDY: XR CHEST 1 VIEW;  9/9/2024 4:39 pm   INDICATION: Signs/Symptoms:s/p lytic therapy.      COMPARISON: 5:21 a.m. the same day   ACCESSION NUMBER(S): [id number]   ORDERING CLINICIAN: RODOLFO LOPEZ   FINDINGS: ETT, NG tube and right lower chest pigtail catheter remain in place. Additional presumed overlying monitoring/support devices again seen. Small right basilar pneumothorax decreased in size. Persistent bibasilar opacities. The cardiomediastinal silhouette remains enlarged.       Decreased size of right basilar pneumothorax since 5:21 a.m. the same day with pigtail catheter in place.   MACRO: None.   Signed by: Daisy Sosa 9/9/2024 4:44 PM Dictation workstation:   [id number]    XR chest 1 view    Result Date: 9/9/2024  Interpreted By:  Klaus Villafana, STUDY: XR CHEST 1 VIEW; 9/9/2024 5:32 am   INDICATION: Signs/Symptoms:Eval chest tube placement, Rt effusion   COMPARISON: Radiographs 09/08/2024   ACCESSION NUMBER(S): [id number]   ORDERING CLINICIAN: RICH KAPLAN   TECHNIQUE: Single frontal view of the chest performed.   FINDINGS: LINES AND DEVICES: The pigtail of right-sided pleural drainage catheter overlies the right infrahilar region, grossly unchanged. Tip of ETT approximately 6.6 cm above the kathie, previously 7.7 cm. NG tube courses below the diaphragm, limited assessment.   LUNGS: Moderate-size right-sided hydropneumothorax appears grossly unchanged. Stable to slightly increased interstitial opacities on the left. Question new trace left pleural effusion with atelectasis.   CARDIOMEDIASTINAL SILHOUETTE: Stable cardiomegaly.       Right-sided infrahilar pigtail catheter and moderate hydropneumothorax, grossly unchanged.   Suspected worsening CHF with stable to mildly increased interstitial edema and possible new trace left pleural effusion.   MACRO None   Signed by: Klaus Villafana 9/9/2024 8:17 AM Dictation workstation:   [id number]    XR chest 1 view    Result Date: 9/8/2024  Interpreted By:  Corrina Reyes, STUDY: XR CHEST 1 VIEW;  9/8/2024 6:39 am   INDICATION:  Signs/Symptoms:Eval chest tube placement, Rt effusion.     COMPARISON: 09/07/2024   ACCESSION NUMBER(S): [id number]   ORDERING CLINICIAN: RICH KAPLAN   TECHNIQUE: Portable AP semi upright   FINDINGS: Endotracheal tube tip projects between 7 and 8 cm above the kathie. Nasogastric tube is no longer identified. Right pleural pigtail catheter projects at mid right hemithorax and is similar in position to the prior study. Relative volume of basilar pneumothorax and pleural fluid appears similar to the prior study. Right lower lobe and right middle lobe are collapsed. Right upper lobe variation is similar to the prior study. Left lung shows some vascular congestion. Minimal atelectasis is present at the left lung base. No change in the osseous structures is noted.       Basilar pneumothorax on the right appears similar to the prior study in the relative volume of pleural fluid and pneumothorax appears unchanged.   Collapse of the right lower lobe and right middle lobe   Pulmonary vascular congestion is most notable in the left lung   MACRO: None.   Signed by: Corrina Reyes 9/8/2024 12:32 PM Dictation workstation:   [id number]    XR chest 1 view    Result Date: 9/8/2024  Interpreted By:  Corrina Reyes, STUDY: XR CHEST 1 VIEW;  9/7/2024 5:28 pm   INDICATION: Signs/Symptoms:Eval chest tube placement, Rt pleural effusion.     COMPARISON: 09/06/2024   ACCESSION NUMBER(S): [id number]   ORDERING CLINICIAN: RICH KAPLAN   TECHNIQUE: Portable AP upright   FINDINGS: Endotracheal tube tip projects about 6 cm above the kathie. NG tube extends below the diaphragm and beyond the image. Right pleural pigtail catheter projects at mid right hemithorax and appears unchanged in position.   Pneumothorax at the right thoracic base appears larger than on the prior study. The amount of pleural fluid appears decreased. Mild atelectasis left lung base is slightly improved. Heart is unchanged in size. No changes noted in the  osseous structures.       Right pleural effusion is decreased from the prior study but the right basilar pneumothorax appears increased.   MACRO: None.   Signed by: Corrina Reyes 9/8/2024 12:30 PM Dictation workstation:   [id number]    Electrocardiogram, 12-lead PRN ACS symptoms    Result Date: 9/6/2024  Sinus rhythm with 1st degree AV block with Premature atrial complexes Left axis deviation Right bundle branch block Inferior infarct (cited on or before 04-SEP-2024) Anterior infarct , age undetermined T wave abnormality, consider lateral ischemia Abnormal ECG When compared with ECG of 05-SEP-2024 10:29, (unconfirmed) Premature atrial complexes are now Present Confirmed by Shady Bill ([id number]) on 9/6/2024 1:59:56 PM    XR chest 1 view    Result Date: 9/6/2024  Interpreted By:  Eloisa Landa, STUDY: XR CHEST 1 VIEW 9/6/2024 9:05 am   INDICATION: Signs/Symptoms:R pleural effusion   COMPARISON: 09/05/2024   ACCESSION NUMBER(S): [id number]   ORDERING CLINICIAN: PERRI DELACRUZ   TECHNIQUE: AP view of the chest at bedside in the erect position   FINDINGS: A right chest tube is once again noted within the mid right hemithorax with a stable right hydropneumothorax seen within the mid and lower right hemithorax.   The tip of the endotracheal tube remains satisfactorily positioned at a distance 3 cm above kathie with nasogastric tube descending below diaphragm.   There is small left pleural effusion and left basilar atelectasis. The heart remains enlarged.       No change in the right hydropneumothorax which may be loculated with chest tube on the right in the mid hemithorax.   Small left pleural effusion with left basilar infiltrate/atelectasis.   Signed by: Eloisa Landa 9/6/2024 9:20 AM Dictation workstation:   [id number]    ECG 12 lead    Result Date: 9/5/2024  Sinus rhythm with 1st degree AV block with frequent Premature ventricular complexes Left axis deviation Left ventricular hypertrophy with QRS widening and  repolarization abnormality ( R in aVL , [employer] product ) Inferior infarct , age undetermined Abnormal ECG When compared with ECG of 23-APR-2024 09:01, Premature ventricular complexes are now Present SD interval has increased (RBBB and left anterior fascicular block) is no longer Present Inferior infarct is now Present Confirmed by Andrea Bentley ([id number]) on 9/5/2024 10:01:05 AM    XR chest 1 view    Result Date: 9/5/2024  Interpreted By:  Saad Cronin, STUDY: XR CHEST 1 VIEW; 9/5/2024 4:57 am   INDICATION: CLINICAL INFORMATION: Signs/Symptoms:Pig tail Chest Tube replacement.   COMPARISON: 09/04/2024   ACCESSION NUMBER(S): [id number]   ORDERING CLINICIAN: ANDRY ABRAHAM   TECHNIQUE: Portable chest one view.   FINDINGS: The cardiac silhouette is quite prominent suggesting cardiomegaly. The tube and line placement is unchanged.  There is a decrease in the right-sided pleural-based density is well as the right-sided parenchymal densities. The pulmonary vasculature is slightly indistinct suggesting mild pulmonary vascular congestion.       There appears to be increased aeration at the right base suggesting diminishing pleural density within the right hemithorax in association with the chest tube. Infiltrate persists on the right.   MACRO: none   Signed by: Saad Cronin 9/5/2024 8:18 AM Dictation workstation:   [id number]    XR chest 1 view    Result Date: 9/5/2024  Interpreted By:  Cande Keating, STUDY: XR CHEST 1 VIEW;  9/4/2024 11:10 pm   INDICATION: Signs/Symptoms:Chest tube placement.     COMPARISON: Radiographs of the chest dated 09/04/2024; CT of the chest dated 09/04/2024   ACCESSION NUMBER(S): [id number]   ORDERING CLINICIAN: ANDRY ABRAHAM   FINDINGS: AP radiograph of the chest was provided.   Endotracheal tube projects 4.4 cm superior to the kathie. Enteric tube appears to course along the midline expected course of the esophagus, although the distal tip is not well visualized due to underpenetration  and overlying structures.   CARDIOMEDIASTINAL SILHOUETTE: Cardiomediastinal silhouette is enlarged, similar to prior exam.   LUNGS: Redemonstration of the large right-sided pleural effusion with associated atelectasis/consolidation, similar in appearance to prior radiographs. No new consolidation or pleural effusion is present in the left lung.   ABDOMEN: No remarkable upper abdominal findings.   BONES: No acute osseous changes.       1.  Enteric tube is coursing along the midline of the mediastinum in the expected course of the esophagus, although the distal tip is not well visualized due to overlying cardiomediastinal silhouette and underpenetration, and may be outside of the field-of-view of the study. Endotracheal tube projects 4.5 cm superior to the kathie. 2. Redemonstration of the large loculated right-sided pleural effusion with the associated atelectasis/consolidation, similar in appearance to prior exam.       MACRO: None   Signed by: Cande Keating 9/5/2024 2:08 AM Dictation workstation:   [id number]    CT chest abdomen pelvis w IV contrast    Result Date: 9/4/2024  Interpreted By:  Ishaan Banda, STUDY: CT CHEST ABDOMEN PELVIS W IV CONTRAST;  9/4/2024 5:52 pm   INDICATION: Signs/Symptoms:unresponsive.     COMPARISON: None.   ACCESSION NUMBER(S): [id number]   ORDERING CLINICIAN: GURPREET COPPOLA   TECHNIQUE: Contiguous axial images of the chest, abdomen, and pelvis were obtained after the intravenous administration of iodinated contrast. Coronal and sagittal reformatted images were reconstructed from the axial data.   FINDINGS: CT CHEST:   MEDIASTINUM AND LYMPH NODES: NG/OG tube noted with small amount of fluid in the esophagus. The esophageal wall appears within normal limits.  No enlarged intrathoracic or axillary lymph nodes by imaging criteria. No pneumomediastinum.   VESSELS:  Normal caliber thoracic aorta without dissection. Mild aortic atherosclerosis.   HEART: Enlarged. Severe aortic  valvular calcifications. Moderate coronary artery calcifications. No significant pericardial effusion.   LUNG, AIRWAYS, PLEURA: There is a large loculated right pleural effusion causing compressive atelectasis on the right lung. As a result, there is complete collapse of the right lower lobe, complete collapse of the right middle lobe, and partial collapse of the right upper lobe. There are multiple loculated compartments largest of which is seen at the right lung base. There is a small amount of mucus in the mid to distal trachea. There is small amount of layering debris in the mainstem bronchi. The majority of the right middle and lower lobe bronchi are collapsed and opacified with low-density debris. There is a trace left pleural effusion with subsegmental left basilar atelectasis. There is emphysema. There are new scattered subcentimeter bilateral centrilobular nodules that are likely infectious/inflammatory in nature from bronchiolitis.   CHEST WALL SOFT TISSUES: No discernible acute abnormality. There is a 5.5 cm x 1.7 cm lipoma in the right infraspinatus muscle.   OSSEOUS STRUCTURES: No acute osseous abnormality.     CT ABDOMEN/PELVIS:   ABDOMINAL WALL: No significant abnormality.   LIVER: No significant parenchymal abnormality.   BILE DUCTS: No significant intrahepatic or extrahepatic dilatation.   GALLBLADDER: No significant abnormality.   PANCREAS: No significant abnormality.   SPLEEN: No significant abnormality.   ADRENALS: There is a 1.7 cm low-density left adrenal nodule likely representing a adenoma.   KIDNEYS, URETERS, BLADDER: There is a heterogeneously enhancing solid mass in the upper pole the left kidney measuring 2.4 cm x 1.9 cm consistent with renal cell carcinoma. Additionally, a 0.8 cm likely enhancing lesion in the upper pole the right kidney is concerning for renal cell carcinoma. No hydronephrosis or calculi. Urinary bladder is decompressed with Live catheter in place.   REPRODUCTIVE  ORGANS: No significant abnormality.   VESSELS: Moderate aortic atherosclerosis without AAA.   RETROPERITONEUM/LYMPH NODES: No acute retroperitoneal abnormality. No enlarged lymph nodes.   BOWEL/MESENTERY/PERITONEUM: Colonic diverticulosis without acute diverticulitis. No inflammatory bowel wall thickening or dilatation. Normal appendix.   No ascites, free air, or fluid collection.     MUSCULOSKELETAL: No acute osseous abnormality.       CT CHEST: Large loculated right pleural effusion causing complete compressive collapse of the right middle lobe and right lower lobe and partial compressive collapse of the right upper lobe. The largest likely compartments located at the inferior right hemithorax. The cause of the effusion is not apparent as there is only a small amount of debris in the distal trachea and right mainstem bronchus but the distal-most right-sided bronchi are occluded due to combination of compressive collapse and small low-density fluid.   Numerous new subcentimeter centrilobular pulmonary nodules that are most likely infectious/inflammatory in as can be seen with bronchiolitis or fungal infection.   Aortic valve calcification to the extent that would likely result in aortic stenosis.   CT ABDOMEN/PELVIS: Left kidney solid heterogeneously enhancing mass suspicious for renal cell carcinoma (2.4 cm x 1.9 cm) and of suspected 0.8 cm right upper pole renal cell carcinoma measuring 0.8 cm. Recommend urological follow-up/also taken and dedicated MRI kidneys to further evaluate these lesions. YELLOW ALERT: An incidental finding alert was sent per protocol.   No acute abnormality in the abdomen or pelvis.   Colonic diverticulosis without acute diverticulitis.   MACRO: Critical Finding:  New mass in the kidney. Notification was initiated on 9/4/2024 at 6:25 pm by  Ishaan Banda.  (**-YCF-**) Instructions:  MR Abdomen w and wo IV contrast. 1 week.   Signed by: Ishaan Banda 9/4/2024 6:26 PM Dictation  workstation:   [id number]    CT head wo IV contrast    Result Date: 9/4/2024  Interpreted By:  Ishaan Banda, STUDY: CT HEAD WO IV CONTRAST;  9/4/2024 5:52 pm   INDICATION: Signs/Symptoms:unrespoonsive.     COMPARISON: None.   ACCESSION NUMBER(S): [id number]   ORDERING CLINICIAN: GURPREET COPPOLA   TECHNIQUE: Noncontrast axial CT images of head were obtained with coronal and sagittal reconstructed images.   FINDINGS: BRAIN PARENCHYMA: Mild periventricular and subcortical hemispheric white matter hypodensities are most compatible with chronic small vessel ischemic disease. No acute intraparenchymal hemorrhage or parenchymal evidence of acute large territory ischemic infarct. Gray-white matter distinction is preserved. No mass-effect.   VENTRICLES and EXTRA-AXIAL SPACES:  No acute extra-axial or intraventricular hemorrhage. No effacement of cerebral sulci. The ventricles and sulci are age-concordant.   PARANASAL SINUSES/MASTOIDS:  No hemorrhage or air-fluid levels within the visualized paranasal sinuses. The mastoids are well aerated.   CALVARIUM/ORBITS:  No skull fracture.  The orbits and globes are intact to the extent visualized.   EXTRACRANIAL SOFT TISSUES: No discernible abnormality.       No acute intracranial abnormality.     MACRO: None.   Signed by: Ishaan Banda 9/4/2024 6:07 PM Dictation workstation:   [id number]    XR chest 1 view    Result Date: 9/4/2024  Interpreted By:  Rosalie Ellison, STUDY: XR CHEST 1 VIEW;  9/4/2024 4:33 pm   INDICATION: Signs/Symptoms:intubation.   COMPARISON: 04/08/2024   ACCESSION NUMBER(S): [id number]   ORDERING CLINICIAN: GURPREET COPPOLA   TECHNIQUE: A single portable image of the chest was obtained.   FINDINGS: Resolution is limited. The right portion of the chest is not entirely included.   The patient is rotated. The heart size is uncertain.   There appears to be elevated right hemidiaphragm and right-sided infiltration.   An endotracheal tube terminates above the  kathie. A nasogastric tube terminates below the diaphragm.   COMPARISON OF FINDING: The nasogastric tube was placed in the interval. The endotracheal tube was placed in the interval.       Interval placement of an endotracheal tube. The tip is above the kathie. Interval placement of a nasogastric tube. It appears to terminate below the diaphragm.   The exam is extremely limited.   MACRO: none   Signed by: Rosalie Ellison 9/4/2024 4:43 PM Dictation workstation:   [id number]                  Assessment/Plan   Assessment & Plan  Acute respiratory failure with hypoxia and hypercapnia (Multi)  67-year-old gentleman with history of chronic diastolic heart failure, COPD, obstructive sleep apnea, obesity, type 2 diabetes mellitus, hypertension, hypothyroidism and hyperlipidemia was admitted with acute respiratory failure with hypoxia and hypercapnia and noted to have a right loculated pleural effusion and bilateral lower lobe lung infiltrate and right hydropneumothorax.  He was hypotensive on admission and required 3 L of fluid to resuscitate and admitted to intensive care unit and was intubated because of acute respiratory failure and remained intubated for several days.  Currently he is extubated and on high flow oxygen as mentioned above.  He had significant leukocytosis of 22,000 on admission and his sputum culture was positive for stenotrophomonas.  Patient is being continued on IV Zosyn and IV Levaquin, pulmonary and infectious disease on board.    1.  Acute hypoxic hypercapnic respiratory failure with hypotension and right hydropneumothorax and bilateral infiltrate and positive for pneumonia.Sputum culture was positive for stenotrophomonas.   Initially intubated for several days and since then he has been extubated and is on Improving and currently is on 13 L nasal cannula.  Continued on IV Zosyn and Levaquin for another 24 hours.    2.  Patient had hydropneumothorax on admission and had chest tube, chest tube has been  removed since then..    3.  Hypotension has resolved    4.  COPD and obstructive sleep apnea.  Aerosol treatment and steroids.  Currently his prednisone has been changed to IV Solu-Medrol 40 mg daily since patient failed swallow evaluation.        5.  Diabetes mellitus type 2, continue sliding scale insulin, patient was on Mounjaro at home prior to admission.    6.  Patient  DNR/DNI.    7.  Hematuria possibly secondary to trauma secondary to Live catheter which has been removed today.    8.  Recurrent episodes of brief nonsustained ventricular tachycardia, also moderate systolic heart failure, previous echocardiogram revealed LV ejection fraction of 30 to 35% with moderate aortic stenosis, patient is started on IV Lasix this morning and cardiology consult appreciated and repeat echocardiogram quality was suboptimal he did reveal LV ejection fraction of 23% with global hypokinesia and moderate aortic cusp calcification and moderate right ventricular dysfunction.  Patient has been started on IV Lasix and Jardiance and consider starting GDMT once condition is stable.    9.  Patient had a modified barium swallow and has been recommended to have a puréed diet with honey thickened and nectar thickened liquids.    Add nystatin cream to both groin area for inguinal intertrigo.    Reviewed all labs patient's medical record and imaging studies and discussed the plan of treatment with the patient..    Continue PT OT and processing for LTAC.       I spent 35 minutes in the professional and overall care of this patient.      Noble Gatica MD

## 2024-09-20 NOTE — PROCEDURES
"Speech-Language Pathology    Speech-Language Pathology    Inpatient Modified Barium Swallow Study    Patient Name: Alo Glass  MRN: [id number]  : 1955  Today's Date: 24  Time Calculation  Start Time: 1020  Stop Time: 1050  Time Calculation (min): 30 min          Modified Barium Swallow Study completed. Informed verbal consent obtained prior to completion of exam. Trials of thin, nectar/mildly thick liquid, honey/moderately thick liquid, puree, and solids were administered.     SLP: Sharon Seals, SLP   Contact info: [employer].      Reason for Referral: post prandial cough with thin liquids  Patient Hx:  \"chronic diastolic heart failure, COPD, obstructive sleep apnea, obesity, type 2 diabetes mellitus, hypertension, hypothyroidism and hyperlipidemia \"   Respiratory Status: O2 via NC  Current diet: NPO    Pain:  Pain Scale: not reported    FINAL SPEECH RECOMMENDATIONS    DIET RECOMMENDED:   - Pureed (IDDSI Level 4)  - Mildly/nectar thick liquids (IDDSI Level 2)    Patient to initiate a modified diet with implementation of the following swallow strategies listed below:    STRATEGIES:  - Small bites  - Small, single sips  - Upright for all PO intake  - Swallow 2-3 times per bite/sip  - Medications whole in puree or as best tolerated  - Slow rate    Plan:  Treatment/Interventions: Pharyngeal exercises, Patient/family education, Bolus trials, Compensatory strategy training, Determination of diet tolerance/advancement potential.  SLP Plan: Skilled SLP warranted  SLP Frequency: 4x per week  Duration: 2 weeks    Discussed POC: Patient  Discussed Risks/Benefits: Yes  Patient/Caregiver Agreeable: Yes    Short term goals established 24:   - Patient will tolerate baseline/recommended PO diet without overt s/s of aspiration, further difficulty, or concern for aspiration-related complications in 90% of observed therapeutic trials.      Long term goals 24:   Patient will tolerate the " least restrictive diet without overt difficulty or further pulmonary compromise by time of discharge.    Education Provided: Reviewed results and recommendations of MBSS with patient following. Discussed recommendations and reviewed POC at time of assessment. Verbal understanding confirmed with agreement of all information presented being acknowledged by patient.     Treatment Provided Today:  N/A     Additional consult suggested: N/A    Repeat study/ dc plan: TBD         SLP Impressions with Severity Rating:   Pt presents with moderate  oropharyngeal dysphagia characterized by reduced bolus formation, swallow delay, decreased TB retraction/PPW contraction and reduced hyolaryngeal elevation/ epiglottic deflection inconsistently. Oral/vallecular/piriform sinus residue noted with thin/nectar/honey liquids, which cleared partially following spontaneous reswallows. Improved pharyngeal clearance achieved with puree and solid boluses likely due to increased pharyngeal pressure spontaneously generated by patient during deglutition. Laryngeal penetration viewed with thin liquids, however extent of material descending into upper laryngeal vestibule could not be visually confirmed due to clavicular shadowing and body habitus partially obscuring view of glottic/subglottic region. Aspiration not seen, however cannot be excluded as silently occurring due to visual obstruction of airway. No airway entry viewed with nectar/honey liquids or puree/solid boluses. Modification of diet required for safety and energy conservation purposes. Concerned with exacerbation of tachypnea during mastication of course solid boluses.     Strategies attempted- N/A      OUTCOME MEASURES:  Functional Oral Intake Scale  Functional Oral Intake Scale: Level 5        total oral diet with multiple consistencies, but requires special preparations and compensations       Eating Assessment Tool (EAT-10)   EAT 10 not utilized due to patient inability to  complete.     Rosenbek's Penetration Aspiration Scale  Thin Liquids: 3. PENETRATION with LOW ASPIRATION risk - contrast remains above vocal cords, visible residue]     Nectar Thick Liquids: 1. NO ASPIRATION & NO PENETRATION - no aspiration, contrast does not enter airway    Honey Thick Liquids: 1. NO ASPIRATION & NO PENETRATION - no aspiration, contrast does not enter airway    Puree: 1. NO ASPIRATION & NO PENETRATION - no aspiration, contrast does not enter airway    Solids: 1. NO ASPIRATION & NO PENETRATION - no aspiration, contrast does not enter airway    *Anatomical marker not used 2/2 inability to maintain placement due to presence of facial hair.

## 2024-09-20 NOTE — PROGRESS NOTES
"Subjective Data:  -4L overnight  BUN/CR stable 18/.43  Still on 13L HFNC     Objective Data:  Last Recorded Vitals:  Vitals:    24 0353 24 0604 24 0757 24   BP: 106/73  106/58    BP Location: Right arm  Left arm    Patient Position: Lying  Lying    Pulse: 73  82    Resp: 17  22    Temp: 36.7 °C (98.1 °F)  36.3 °C (97.4 °F)    TempSrc: Temporal  Oral    SpO2: 93%  93% 94%   Weight:  121 kg (267 lb 3.2 oz)     Height:         Medical Gas Therapy: Supplemental oxygen  Medical Gas Delivery Method: High flow nasal cannula  Weight  Av kg (289 lb 14.6 oz)  Min: 121 kg (267 lb 3.2 oz)  Max: 140 kg (307 lb 12.2 oz)    LABS:  CMP:  Results from last 7 days   Lab Units 24  0534 24  04324  04324  0502 24  0647 24  1824   SODIUM mmol/L 141 141 141 143 141  --    POTASSIUM mmol/L 3.6 3.8 3.8 3.7 3.6  --    CHLORIDE mmol/L 102 104 101 99 98  --    CO2 mmol/L 35* 32 36* 42* 40*  --    ANION GAP mmol/L 8* 9* 8* <7* 7*  --    BUN mg/dL 18 21 16 16 17  --    CREATININE mg/dL 0.43* 0.46* 0.41* 0.46* 0.48*  --    EGFR mL/min/1.73m*2 >90 >90 >90 >90 >90  --    MAGNESIUM mg/dL  --  2.20  --   --   --  2.20   ALBUMIN g/dL 2.8* 2.5* 3.0* 2.3* 2.5*  --      CBC:  Results from last 7 days   Lab Units 24  0534 24  0435 24  0502 24  0647   WBC AUTO x10*3/uL 8.6 8.7 11.1 9.1 10.0   HEMOGLOBIN g/dL 15.9 14.3 17.1 15.9 17.2   HEMATOCRIT % 50.4 45.3 55.7* 50.7 54.8*   PLATELETS AUTO x10*3/uL 243 210 214 185 191   MCV fL 100 102* 106* 104* 103*     COAG:     ABO: No results found for: \"ABO\"  HEME/ENDO:     CARDIAC:   Results from last 7 days   Lab Units 24  0439   BNP pg/mL 639*             Last I/O:    Intake/Output Summary (Last 24 hours) at 2024 1103  Last data filed at 2024 0618  Gross per 24 hour   Intake 150 ml   Output 4600 ml   Net -4450 ml     Net IO Since Admission: -22,322.46 mL [24 1103]      Imaging " Results:  ECG 12 lead    Result Date: 9/15/2024  Sinus rhythm with 1st degree AV block Left axis deviation Right bundle branch block Inferior infarct (cited on or before 04-SEP-2024) Abnormal ECG When compared with ECG of 04-SEP-2024 16:24, Premature ventricular complexes are no longer Present Right bundle branch block is now Present Confirmed by Dimitri Murillo ([id number]) on 9/15/2024 2:23:29 PM    ECG 12 lead    Result Date: 9/5/2024  Sinus rhythm with 1st degree AV block with frequent Premature ventricular complexes Left axis deviation Left ventricular hypertrophy with QRS widening and repolarization abnormality ( R in aVL , [place] product ) Inferior infarct , age undetermined Abnormal ECG When compared with ECG of 23-APR-2024 09:01, Premature ventricular complexes are now Present MA interval has increased (RBBB and left anterior fascicular block) is no longer Present Inferior infarct is now Present Confirmed by Andrea Bentley ([id number]) on 9/5/2024 10:01:05 AM    XR chest 1 view    Result Date: 9/5/2024  Interpreted By:  Saad Cronin, STUDY: XR CHEST 1 VIEW; 9/5/2024 4:57 am   INDICATION: CLINICAL INFORMATION: Signs/Symptoms:Pig tail Chest Tube replacement.   COMPARISON: 09/04/2024   ACCESSION NUMBER(S): [id number]   ORDERING CLINICIAN: ANDRY ABRAHAM   TECHNIQUE: Portable chest one view.   FINDINGS: The cardiac silhouette is quite prominent suggesting cardiomegaly. The tube and line placement is unchanged.  There is a decrease in the right-sided pleural-based density is well as the right-sided parenchymal densities. The pulmonary vasculature is slightly indistinct suggesting mild pulmonary vascular congestion.       There appears to be increased aeration at the right base suggesting diminishing pleural density within the right hemithorax in association with the chest tube. Infiltrate persists on the right.   MACRO: none   Signed by: Saad Cronin 9/5/2024 8:18 AM Dictation workstation:   [id number]    XR chest 1  view    Result Date: 9/5/2024  Interpreted By:  Cande Keating, STUDY: XR CHEST 1 VIEW;  9/4/2024 11:10 pm   INDICATION: Signs/Symptoms:Chest tube placement.     COMPARISON: Radiographs of the chest dated 09/04/2024; CT of the chest dated 09/04/2024   ACCESSION NUMBER(S): [id number]   ORDERING CLINICIAN: ANDRY ABRAHAM   FINDINGS: AP radiograph of the chest was provided.   Endotracheal tube projects 4.4 cm superior to the kathie. Enteric tube appears to course along the midline expected course of the esophagus, although the distal tip is not well visualized due to underpenetration and overlying structures.   CARDIOMEDIASTINAL SILHOUETTE: Cardiomediastinal silhouette is enlarged, similar to prior exam.   LUNGS: Redemonstration of the large right-sided pleural effusion with associated atelectasis/consolidation, similar in appearance to prior radiographs. No new consolidation or pleural effusion is present in the left lung.   ABDOMEN: No remarkable upper abdominal findings.   BONES: No acute osseous changes.       1.  Enteric tube is coursing along the midline of the mediastinum in the expected course of the esophagus, although the distal tip is not well visualized due to overlying cardiomediastinal silhouette and underpenetration, and may be outside of the field-of-view of the study. Endotracheal tube projects 4.5 cm superior to the kathie. 2. Redemonstration of the large loculated right-sided pleural effusion with the associated atelectasis/consolidation, similar in appearance to prior exam.       MACRO: None   Signed by: Cande Keating 9/5/2024 2:08 AM Dictation workstation:   [id number]    CT chest abdomen pelvis w IV contrast    Result Date: 9/4/2024  Interpreted By:  Ishaan Banda, STUDY: CT CHEST ABDOMEN PELVIS W IV CONTRAST;  9/4/2024 5:52 pm   INDICATION: Signs/Symptoms:unresponsive.     COMPARISON: None.   ACCESSION NUMBER(S): [id number]   ORDERING CLINICIAN: GURPREET COPPOLA   TECHNIQUE:  Contiguous axial images of the chest, abdomen, and pelvis were obtained after the intravenous administration of iodinated contrast. Coronal and sagittal reformatted images were reconstructed from the axial data.   FINDINGS: CT CHEST:   MEDIASTINUM AND LYMPH NODES: NG/OG tube noted with small amount of fluid in the esophagus. The esophageal wall appears within normal limits.  No enlarged intrathoracic or axillary lymph nodes by imaging criteria. No pneumomediastinum.   VESSELS:  Normal caliber thoracic aorta without dissection. Mild aortic atherosclerosis.   HEART: Enlarged. Severe aortic valvular calcifications. Moderate coronary artery calcifications. No significant pericardial effusion.   LUNG, AIRWAYS, PLEURA: There is a large loculated right pleural effusion causing compressive atelectasis on the right lung. As a result, there is complete collapse of the right lower lobe, complete collapse of the right middle lobe, and partial collapse of the right upper lobe. There are multiple loculated compartments largest of which is seen at the right lung base. There is a small amount of mucus in the mid to distal trachea. There is small amount of layering debris in the mainstem bronchi. The majority of the right middle and lower lobe bronchi are collapsed and opacified with low-density debris. There is a trace left pleural effusion with subsegmental left basilar atelectasis. There is emphysema. There are new scattered subcentimeter bilateral centrilobular nodules that are likely infectious/inflammatory in nature from bronchiolitis.   CHEST WALL SOFT TISSUES: No discernible acute abnormality. There is a 5.5 cm x 1.7 cm lipoma in the right infraspinatus muscle.   OSSEOUS STRUCTURES: No acute osseous abnormality.     CT ABDOMEN/PELVIS:   ABDOMINAL WALL: No significant abnormality.   LIVER: No significant parenchymal abnormality.   BILE DUCTS: No significant intrahepatic or extrahepatic dilatation.   GALLBLADDER: No  significant abnormality.   PANCREAS: No significant abnormality.   SPLEEN: No significant abnormality.   ADRENALS: There is a 1.7 cm low-density left adrenal nodule likely representing a adenoma.   KIDNEYS, URETERS, BLADDER: There is a heterogeneously enhancing solid mass in the upper pole the left kidney measuring 2.4 cm x 1.9 cm consistent with renal cell carcinoma. Additionally, a 0.8 cm likely enhancing lesion in the upper pole the right kidney is concerning for renal cell carcinoma. No hydronephrosis or calculi. Urinary bladder is decompressed with Live catheter in place.   REPRODUCTIVE ORGANS: No significant abnormality.   VESSELS: Moderate aortic atherosclerosis without AAA.   RETROPERITONEUM/LYMPH NODES: No acute retroperitoneal abnormality. No enlarged lymph nodes.   BOWEL/MESENTERY/PERITONEUM: Colonic diverticulosis without acute diverticulitis. No inflammatory bowel wall thickening or dilatation. Normal appendix.   No ascites, free air, or fluid collection.     MUSCULOSKELETAL: No acute osseous abnormality.       CT CHEST: Large loculated right pleural effusion causing complete compressive collapse of the right middle lobe and right lower lobe and partial compressive collapse of the right upper lobe. The largest likely compartments located at the inferior right hemithorax. The cause of the effusion is not apparent as there is only a small amount of debris in the distal trachea and right mainstem bronchus but the distal-most right-sided bronchi are occluded due to combination of compressive collapse and small low-density fluid.   Numerous new subcentimeter centrilobular pulmonary nodules that are most likely infectious/inflammatory in as can be seen with bronchiolitis or fungal infection.   Aortic valve calcification to the extent that would likely result in aortic stenosis.   CT ABDOMEN/PELVIS: Left kidney solid heterogeneously enhancing mass suspicious for renal cell carcinoma (2.4 cm x 1.9 cm) and of  suspected 0.8 cm right upper pole renal cell carcinoma measuring 0.8 cm. Recommend urological follow-up/also taken and dedicated MRI kidneys to further evaluate these lesions. YELLOW ALERT: An incidental finding alert was sent per protocol.   No acute abnormality in the abdomen or pelvis.   Colonic diverticulosis without acute diverticulitis.   MACRO: Critical Finding:  New mass in the kidney. Notification was initiated on 9/4/2024 at 6:25 pm by  Ishaan Banda.  (**-YCF-**) Instructions:  MR Abdomen w and wo IV contrast. 1 week.   Signed by: Ishaan Banda 9/4/2024 6:26 PM Dictation workstation:   [id number]    CT head wo IV contrast    Result Date: 9/4/2024  Interpreted By:  Ishaan Banda, STUDY: CT HEAD WO IV CONTRAST;  9/4/2024 5:52 pm   INDICATION: Signs/Symptoms:unrespoonsive.     COMPARISON: None.   ACCESSION NUMBER(S): [id number]   ORDERING CLINICIAN: GURPREET COPPOLA   TECHNIQUE: Noncontrast axial CT images of head were obtained with coronal and sagittal reconstructed images.   FINDINGS: BRAIN PARENCHYMA: Mild periventricular and subcortical hemispheric white matter hypodensities are most compatible with chronic small vessel ischemic disease. No acute intraparenchymal hemorrhage or parenchymal evidence of acute large territory ischemic infarct. Gray-white matter distinction is preserved. No mass-effect.   VENTRICLES and EXTRA-AXIAL SPACES:  No acute extra-axial or intraventricular hemorrhage. No effacement of cerebral sulci. The ventricles and sulci are age-concordant.   PARANASAL SINUSES/MASTOIDS:  No hemorrhage or air-fluid levels within the visualized paranasal sinuses. The mastoids are well aerated.   CALVARIUM/ORBITS:  No skull fracture.  The orbits and globes are intact to the extent visualized.   EXTRACRANIAL SOFT TISSUES: No discernible abnormality.       No acute intracranial abnormality.     MACRO: None.   Signed by: Ishaan Banda 9/4/2024 6:07 PM Dictation workstation:    [id number]    XR chest 1 view    Result Date: 9/4/2024  Interpreted By:  Rosalie Ellison, STUDY: XR CHEST 1 VIEW;  9/4/2024 4:33 pm   INDICATION: Signs/Symptoms:intubation.   COMPARISON: 04/08/2024   ACCESSION NUMBER(S): [id number]   ORDERING CLINICIAN: GURPREET COPPOLA   TECHNIQUE: A single portable image of the chest was obtained.   FINDINGS: Resolution is limited. The right portion of the chest is not entirely included.   The patient is rotated. The heart size is uncertain.   There appears to be elevated right hemidiaphragm and right-sided infiltration.   An endotracheal tube terminates above the kathie. A nasogastric tube terminates below the diaphragm.   COMPARISON OF FINDING: The nasogastric tube was placed in the interval. The endotracheal tube was placed in the interval.       Interval placement of an endotracheal tube. The tip is above the kathie. Interval placement of a nasogastric tube. It appears to terminate below the diaphragm.   The exam is extremely limited.   MACRO: none   Signed by: Rosalie Ellison 9/4/2024 4:43 PM Dictation workstation:   [id number]          Past Cardiology Tests (Last 3 Years):  EKG:  Results for orders placed during the hospital encounter of 09/04/24    Electrocardiogram, 12-lead PRN ACS symptoms    Narrative  Sinus rhythm with 1st degree AV block with Premature atrial complexes with Aberrant conduction  Right bundle branch block  Left anterior fascicular block   Bifascicular block   Anteroseptal infarct (cited on or before 23-APR-2024)  Abnormal ECG  Confirmed by Triston Moore ([id number]) on 9/18/2024 10:19:51 AM      Electrocardiogram, 12-lead PRN ACS symptoms (Preliminary)  This result has not been signed. Information might be incomplete.    Narrative  Sinus rhythm with 1st degree AV block with Premature atrial complexes  Right bundle branch block  Left anterior fascicular block   Bifascicular block   Anteroseptal infarct (cited on or before 23-APR-2024)  Abnormal ECG  When  compared with ECG of 13-SEP-2024 02:49, (unconfirmed)  Premature ventricular complexes are no longer Present  QT has lengthened      Electrocardiogram, 12-lead PRN ACS symptoms (Preliminary)  This result has not been signed. Information might be incomplete.    Narrative  Sinus rhythm with 1st degree AV block with occasional Premature ventricular complexes and Premature atrial complexes  Left axis deviation  Right bundle branch block  Inferior infarct (cited on or before 04-SEP-2024)  Anteroseptal infarct (cited on or before 23-APR-2024)  Abnormal ECG  When compared with ECG of 06-SEP-2024 00:20,  Premature ventricular complexes are now Present  Vent. rate has decreased BY  31 BPM  Questionable change in initial forces of Septal leads  T wave inversion less evident in Anterolateral leads      Electrocardiogram, 12-lead PRN ACS symptoms    Narrative  Sinus rhythm with 1st degree AV block with Premature atrial complexes  Left axis deviation  Right bundle branch block  Inferior infarct (cited on or before 04-SEP-2024)  Anterior infarct , age undetermined  T wave abnormality, consider lateral ischemia  Abnormal ECG  When compared with ECG of 05-SEP-2024 10:29, (unconfirmed)  Premature atrial complexes are now Present  Confirmed by Shady Bill ([id number]) on 9/6/2024 1:59:56 PM      ECG 12 lead    Narrative  Sinus rhythm with 1st degree AV block  Left axis deviation  Right bundle branch block  Inferior infarct (cited on or before 04-SEP-2024)  Abnormal ECG  When compared with ECG of 04-SEP-2024 16:24,  Premature ventricular complexes are no longer Present  Right bundle branch block is now Present  Confirmed by Dimitri Murillo ([id number]) on 9/15/2024 2:23:29 PM      ECG 12 lead    Narrative  Sinus rhythm with 1st degree AV block with frequent Premature ventricular complexes  Left axis deviation  Left ventricular hypertrophy with QRS widening and repolarization abnormality ( R in aVL , [place] product )  Inferior infarct , age  undetermined  Abnormal ECG  When compared with ECG of 23-APR-2024 09:01,  Premature ventricular complexes are now Present  VA interval has increased  (RBBB and left anterior fascicular block) is no longer Present  Inferior infarct is now Present  Confirmed by Andrea Bentley ([id number]) on 9/5/2024 10:01:05 AM      Results for orders placed in visit on 04/23/24    ECG 12 lead (Clinic Performed)    Narrative  Normal sinus rhythm  Right bundle branch block  Left anterior fascicular block   Bifascicular block   Moderate voltage criteria for LVH, may be normal variant  Cannot rule out Septal infarct , age undetermined  Abnormal ECG  Confirmed by Triston Moore ([id number]) on 4/23/2024 11:12:21 AM      Results for orders placed during the hospital encounter of 04/04/24    Electrocardiogram, 12-lead PRN ACS symptoms    Narrative  Atrial fibrillation with rapid ventricular response  Left axis deviation  Right bundle branch block  Abnormal ECG  When compared with ECG of 04-APR-2024 17:02,  Previous ECG has undetermined rhythm, needs review  See ED provider note for full interpretation and clinical correlation  Confirmed by Jenny Spence ([id number]) on 4/10/2024 10:16:13 AM      ECG 12 lead    Narrative  See ED provider note for full interpretation and clinical correlation  Confirmed by Karolina Freeman ([id number]) on 4/6/2024 10:10:27 AM      Results for orders placed during the hospital encounter of 03/08/24    Electrocardiogram, 12-lead PRN ACS symptoms    Narrative  Sinus rhythm with occasional Premature ventricular complexes  Right bundle branch block  T wave abnormality, consider lateral ischemia  Abnormal ECG  When compared with ECG of 12-MAR-2024 04:06, (unconfirmed)  Premature atrial complexes are no longer Present  T wave inversion now evident in Lateral leads  Confirmed by Heather Barrett ([id number]) on 3/12/2024 10:49:25 AM      ECG 12 lead    Narrative  Sinus rhythm with 1st degree AV block with occasional Premature ventricular  complexes and Premature atrial complexes  Right bundle branch block  Abnormal ECG  When compared with ECG of 12-MAR-2024 00:18, (unconfirmed)  Premature ventricular complexes are now Present  IA interval has increased  Questionable change in QRS axis  T wave inversion now evident in Inferior leads  T wave inversion more evident in Anterior leads  Confirmed by Heather Barrtet ([id number]) on 3/12/2024 10:49:36 AM      ECG 12 lead    Narrative  Sinus tachycardia with short IA with Premature atrial complexes with Aberrant conduction  Left axis deviation  Right bundle branch block  Septal infarct , age undetermined  Abnormal ECG  When compared with ECG of 11-MAR-2024 17:37, (unconfirmed)  No significant change was found  Confirmed by Heather Barrett ([id number]) on 3/12/2024 10:49:46 AM      Electrocardiogram, 12-lead PRN ACS symtpoms    Narrative  Sinus rhythm with Premature atrial complexes  Left axis deviation  Right bundle branch block  Abnormal ECG  When compared with ECG of 10-MAR-2024 10:10, (unconfirmed)  Criteria for Septal infarct are no longer Present  T wave inversion less evident in Anterior leads  Confirmed by Andrew Pearson ([id number]) on 3/12/2024 4:00:58 PM      ECG 12 Lead    Narrative  Sinus rhythm with 1st degree AV block with Premature atrial complexes  Left axis deviation  Right bundle branch block  Septal infarct , age undetermined  Abnormal ECG  When compared with ECG of 08-MAR-2024 17:20, (unconfirmed)  Septal infarct is now Present  Nonspecific T wave abnormality, worse in Anterior leads  Confirmed by Andrew Pearson ([id number]) on 3/12/2024 8:43:29 AM      ECG 12 lead    Narrative  Sinus rhythm with 1st degree AV block with Premature atrial complexes  Left axis deviation  Right bundle branch block  Abnormal ECG  When compared with ECG of 13-MAY-2019 16:22,  Premature atrial complexes are now Present  Right bundle branch block is now Present  See ED provider note for full interpretation and clinical  correlation  Confirmed by Jenny Spence ([id number]) on 3/12/2024 10:59:24 AM    Echo:  Results for orders placed during the hospital encounter of 09/04/24    Transthoracic Echo (TTE) Limited    Narrative  [facility] [place], [address] [place] [address]  Tel [phone] and Fax [phone]    TRANSTHORACIC ECHOCARDIOGRAM REPORT      Patient Name:      BRAYAN Key Physician:    [id number] Andrea Bentley MD  Study Date:        9/19/2024            Ordering Provider:    [id number] ABAD SCHNEIDER  MRN/PID:           [id number]             Fellow:  Accession#:        [id number]         Nurse:  Date of Birth/Age: 1955 / 69 years Sonographer:          Gustavo Naqvi RDCS  Gender:            M                    Additional Staff:  Height:            177.80 cm            Admit Date:           9/4/2024  Weight:            126.10 kg            Admission Status:     Inpatient -  Routine  BSA / BMI:         2.40 m2 / 39.89      Encounter#:           [id number]  kg/m2  Blood Pressure:    105/75 mmHg          Department Location:  [facility] Non  Invasive    Study Type:    TRANSTHORACIC ECHO (TTE) LIMITED  Diagnosis/ICD: Encounter for screening for cardiovascular disorders-Z13.6  Indication:    SOB and NSVT  CPT Code:      Echo Limited-16195; Color Doppler-72335; Doppler Limited-79420    Patient History:  Pertinent History: LE Edema, COPD, Dyspnea and HTN. Elevated trop.    Study Detail: The following Echo studies were performed: 2D, M-Mode, Doppler and  color flow. Technically challenging study due to body habitus.      PHYSICIAN INTERPRETATION:  Left Ventricle: Left ventricular ejection fraction is severely decreased, calculated by Green's biplane at 23%. There is global hypokinesis of the left ventricle with minor regional variations. The left ventricular cavity size is normal. Spectral Doppler shows an impaired relaxation pattern of left ventricular diastolic filling.  Left Atrium: The left  atrium is normal in size.  Right Ventricle: The right ventricle is moderately enlarged. There is mildly reduced right ventricular systolic function. TAPSE 13mm.  Right Atrium: The right atrium is normal in size.  Aortic Valve: The aortic valve is trileaflet. There is moderate aortic valve cusp calcification. There is no evidence of aortic valve regurgitation.  Mitral Valve: The mitral valve is mildly thickened. There is mild to moderate mitral annular calcification. There is no evidence of mitral valve regurgitation.  Tricuspid Valve: The tricuspid valve is structurally normal. No evidence of tricuspid regurgitation. The right ventricular systolic pressure is unable to be estimated.  Pulmonic Valve: The pulmonic valve was not assessed. Pulmonic valve regurgitation was not assessed.  Pericardium: There is no pericardial effusion noted.  Aorta: The aortic root is normal.  Systemic Veins: The inferior vena cava appears normal in size, with IVC inspiratory collapse greater than 50%.  In comparison to the previous echocardiogram(s): Compared with study dated 3/11/2024, there is RV dilation and reduced function.      CONCLUSIONS:  1. Left ventricular ejection fraction is severely decreased, calculated by Green's biplane at 23%.  2. There is global hypokinesis of the left ventricle with minor regional variations.  3. Poorly visualized anatomical structures due to suboptimal image quality.  4. Spectral Doppler shows an impaired relaxation pattern of left ventricular diastolic filling.  5. There is mildly reduced right ventricular systolic function.  6. Moderately enlarged right ventricle.  7. There is moderate aortic valve cusp calcification. The was no assesment of aortic stenosis in this limited study.  8. Compared with study dated 3/11/2024, there is RV dilation and reduced function.    QUANTITATIVE DATA SUMMARY:    2D MEASUREMENTS:          Normal Ranges:  LVEDV Index:     81 ml/m2      LV SYSTOLIC FUNCTION BY 2D  PLANIMETRY (MOD):  Normal Ranges:  EF-A4C View:    21 % (>=55%)  EF-A2C View:    24 %  EF-Biplane:     23 %  LV EF Reported: 23 %      RIGHT VENTRICLE:  RV s' 0.09 m/s      TRICUSPID VALVE/RVSP:         Normal Ranges:  Est. RA Pressure:     3 mmHg  IVC Diam:             2.10 cm      [id number] Andrea Bentley MD  Electronically signed on 9/19/2024 at 5:31:57 PM        ** Final **      Results for orders placed during the hospital encounter of 03/08/24    Transthoracic echo (TTE) complete    Narrative  [facility], [address] [place] [address]  Tel [phone] and Fax [phone]    TRANSTHORACIC ECHOCARDIOGRAM REPORT      Patient Name:      BRAYAN Key Physician:    [id number] Barrett MD  Study Date:        3/11/2024            Ordering Provider:    [id number] RORY GRAVES  MRN/PID:           [id number]             Fellow:  Accession#:        [id number]         Nurse:                Khushbu Holman RN  Date of Birth/Age: 1955 / 68 years Sonographer:          Goldie Woods RDCS  Gender:            M                    Additional Staff:  Height:            177.80 cm            Admit Date:           3/8/2024  Weight:            147.42 kg            Admission Status:     Inpatient -  Routine  BSA / BMI:         2.57 m2 / 46.63      Encounter#:           [id number]  kg/m2  Department Location:  [facility] Non  Invasive  Blood Pressure: 107 /70 mmHg    Study Type:    TRANSTHORACIC ECHO (TTE) COMPLETE  Diagnosis/ICD: Heart failure, unspecified-I50.9; Acute systolic (congestive)  heart failure (CHF)-I50.21  Indication:    Congestive Heart Failure  CPT Code:      Echo Complete w Full Doppler-02804    Patient History:  Pertinent History: COPD, HTN, LE Edema, Dyspnea and Elev trop., Elev. BNP,  Smoker,.    Study Detail: The following Echo studies were performed: 2D, M-Mode, Doppler and  color flow. Technically challenging study due to body habitus.  Definity used as a contrast  agent for endocardial border  definition. Total contrast used for this procedure was 1.5 mL via  IV push. The patient was awake.      PHYSICIAN INTERPRETATION:  Left Ventricle: The left ventricular systolic function is moderately decreased, with an estimated ejection fraction of 30-35%. There is global hypokinesis of the left ventricle with minor regional variations. The left ventricular cavity size is normal. Left ventricular diastolic filling was indeterminate.  LV Wall Scoring:  The basal inferolateral segment is akinetic.    Left Atrium: The left atrium is mildly dilated.  Right Ventricle: The right ventricle is normal in size. There is normal right ventricular global systolic function.  Right Atrium: The right atrium is normal in size.  Aortic Valve: The aortic valve was not well visualized. There is evidence of moderate aortic valve stenosis.  There is no evidence of aortic valve regurgitation. The peak instantaneous gradient of the aortic valve is 52.7 mmHg. The mean gradient of the aortic valve is 26.0 mmHg.  Mitral Valve: The mitral valve is mild to moderately thickened. There is mild to moderate mitral annular calcification. There is mild mitral valve regurgitation.  Tricuspid Valve: The tricuspid valve was not well visualized. There is trace to mild tricuspid regurgitation. The right ventricular systolic pressure is unable to be estimated.  Pulmonic Valve: The pulmonic valve is not well visualized. There is physiologic pulmonic valve regurgitation.  Pericardium: There is no pericardial effusion noted.  Aorta: The aortic root is normal.  Systemic Veins: The inferior vena cava appears dilated. There is less than 50% IVC collapse with inspiration.      CONCLUSIONS:  1. Left ventricular systolic function is moderately decreased with a 30-35% estimated ejection fraction.  2. Basal inferolateral segment is abnormal.  3. Poorly visualized anatomical structures due to suboptimal image quality.  4. Moderate aortic  valve stenosis.  5. There is global hypokinesis of the left ventricle with minor regional variations.    QUANTITATIVE DATA SUMMARY:  2D MEASUREMENTS:  Normal Ranges:  IVSd:          1.14 cm    (0.6-1.1cm)  LVPWd:         1.57 cm    (0.6-1.1cm)  LVIDd:         5.26 cm    (3.9-5.9cm)  LVIDs:         4.41 cm  LV Mass Index: 117.2 g/m2  LV % FS        16.2 %    LA VOLUME:  Normal Ranges:  LA Vol A4C:        77.9 ml    (22+/-6mL/m2)  LA Vol A2C:        84.8 ml  LA Vol BP:         83.7 ml  LA Vol Index A4C:  30.4ml/m2  LA Vol Index A2C:  33.0 ml/m2  LA Vol Index BP:   32.6 ml/m2  LA Area A4C:       24.1 cm2  LA Area A2C:       24.4 cm2  LA Major Axis A4C: 6.3 cm  LA Major Axis A2C: 6.0 cm  LA Volume Index:   30.5 ml/m2  LA Vol A4C:        71.4 ml  LA Vol A2C:        78.4 ml    RA VOLUME BY A/L METHOD:  Normal Ranges:  RA Area A4C: 17.0 cm2    AORTA MEASUREMENTS:  Normal Ranges:  Asc Ao, d: 3.10 cm (2.1-3.4cm)    LV SYSTOLIC FUNCTION BY 2D PLANIMETRY (MOD):  Normal Ranges:  EF-A4C View: 33.6 % (>=55%)  EF-A2C View: 29.1 %  EF-Biplane:  33.1 %    LV DIASTOLIC FUNCTION:  Normal Ranges:  MV Peak E: 0.90 m/s (0.7-1.2 m/s)  MV Peak A: 0.39 m/s (0.42-0.7 m/s)  E/A Ratio: 2.34     (1.0-2.2)    MITRAL VALVE:  Normal Ranges:  MV DT: 99 msec (150-240msec)    AORTIC VALVE:  Normal Ranges:  AoV Vmax:                3.63 m/s  (<=1.7m/s)  AoV Peak P.7 mmHg (<20mmHg)  AoV Mean P.0 mmHg (1.7-11.5mmHg)  LVOT Max Jero:            0.69 m/s  (<=1.1m/s)  AoV VTI:                 60.70 cm  (18-25cm)  LVOT VTI:                12.80 cm  LVOT Diameter:           2.60 cm   (1.8-2.4cm)  AoV Area, VTI:           1.12 cm2  (2.5-5.5cm2)  AoV Area,Vmax:           1.01 cm2  (2.5-4.5cm2)  AoV Dimensionless Index: 0.21      RIGHT VENTRICLE:  RV Basal 3.71 cm  RV Mid   2.98 cm  RV Major 9.7 cm  TAPSE:   22.4 mm  RV s'    0.13 m/s    TRICUSPID VALVE/RVSP:  Normal Ranges:  IVC Diam: 2.32 cm    PULMONIC VALVE:  Normal  Ranges:  PV Max Jero: 0.7 m/s  (0.6-0.9m/s)  PV Max P.7 mmHg      [id number] Heather Barrett MD  Electronically signed on 3/11/2024 at 4:04:49 PM      Wall Scoring        ** Final (Updated) **    Ejection Fractions:  LV EF   Date/Time Value Ref Range Status   2024 05:05 PM 23 %    2024 02:04 PM 33 %      Cath:  No results found for this or any previous visit.    Stress Test:  No results found for this or any previous visit.    Cardiac Imaging:  No results found for this or any previous visit.      Inpatient Medications:  Scheduled medications   Medication Dose Route Frequency    aspirin  81 mg oral Daily    empagliflozin  10 mg oral Daily    enoxaparin  40 mg subcutaneous q12h SHARRON    ezetimibe  10 mg oral Daily    folic acid  1 mg oral Daily    furosemide  80 mg intravenous q12h    insulin lispro  0-10 Units subcutaneous q4h    ipratropium-albuteroL  3 mL nebulization 4x daily    levothyroxine  125 mcg oral Daily    methylPREDNISolone sodium succinate (PF)  20 mg intravenous q24h    multivitamin with minerals  1 tablet oral Daily    nystatin  5 mL Swish & Swallow 4x daily    pantoprazole  40 mg intravenous Daily    polyethylene glycol  17 g nasogastric tube Daily    [Held by provider] predniSONE  20 mg oral Daily    sennosides-docusate sodium  2 tablet oral BID    tamsulosin  0.4 mg oral Daily    thiamine  100 mg oral Daily     PRN medications   Medication    acetaminophen    acetaminophen    dextrose    dextrose    glucagon    glucagon    ipratropium-albuteroL    oxygen    oxygen     Continuous Medications   Medication Dose Last Rate       Physical Exam:  General:  Patient is awake, alert, and oriented.  Patient is in no acute distress.  HEENT:  Pupils equal and reactive.  Normocephalic.  Moist mucosa.    Neck:  No thyromegaly.  Normal Jugular Venous Pressure.  Cardiovascular:  Regular rate and rhythm.  Normal S1 and S2.  Pulmonary:  Clear to auscultation bilaterally.  Abdomen:  Soft. Non-tender.    "Non-distended.  Positive bowel sounds.  Lower Extremities:  2+ pedal pulses. No LE edema.  Neurologic:  Cranial nerves intact.  No focal deficit.   Skin: Skin warm and dry, normal skin turgor.   Psychiatric: Normal affect.     Assessment/Plan   Cards: Teresa      69 year old male with history MELISSA on CPAP, COPD stage III triple therapy as outpatient, nicotine dependence, T2DM, severe obesity, hypertension, hyperlipidemia (intolerant to statins due to myalgias), lymphedema, coronary calcification by chest CT, moderate aortic stenosis (6/2019) and newly diagnosed CHF with an EF of 30-35% (3/2024) originally admitted on 9/4/24 [facility] at [facility].  He was non responsive at home by his neighbor-> admitted with hypoxic/hypercapnic respiratory failure->  intubated, placed on pressor, vent support, started on ATB, large R sided likely parapneumonic s/p chest tube placement (Analysis showed neutrophilic predominant effusion, cultures negative. CT is now removed) and PNA due to Stenotrophomonas maltophilia which is resolving. Overnight patient had significant CO2 retention and had to be given Ativan 1 mg IV to calm him down and then was to be placed on BiPAP->CO2 84. Cardiology consult now placed for \"NSVT, HF\"  Pulmonary and ID have been following the patient.     Home cardiology meds include aspirin 81 mg daily, Jardiance 10mg daily, Lasix 120 mg daily, metolazone 2.5 mg daily, and valsartan 40 mg daily.    TTE 9/19/24   1. Left ventricular ejection fraction is severely decreased, calculated by Green's biplane at 23%.   2. There is global hypokinesis of the left ventricle with minor regional variations.   3. Poorly visualized anatomical structures due to suboptimal image quality.   4. Spectral Doppler shows an impaired relaxation pattern of left ventricular diastolic filling.   5. There is mildly reduced right ventricular systolic function.   6. Moderately enlarged right ventricle.   7. There is moderate aortic valve cusp " calcification. The was no assesment of aortic stenosis in this limited study.   8. Compared with study dated 3/11/2024, there is RV dilation and reduced function.      #Acute on Chronic HEFrEF now 23%-, chest xray with stable small left pleural effusion with left basilar atelectasis/pneumonia  #Hyperlipidemia-intolerant to statins   #CAD  #Moderate AS     RECs:  -c/w Lasix 80mg BID ->BUN/ CR currently stable 18/.43  -c/w home Jardiance on 10mg daily   -re-introduce GDMT when hemos allow      Code Status:  Full Code    I spent 40 minutes in the professional and overall care of this patient.        Alexus Keene, APRN-CNP

## 2024-09-20 NOTE — SIGNIFICANT EVENT
Patient is refusing all respiratory txs this am until food and drink restriction is resolved.  Explained importance about not refusing respiratory txs, he did not change his mind.  NSG aware

## 2024-09-20 NOTE — PROGRESS NOTES
Alo Glass is a 69 y.o. male on day 16 of admission presenting with Acute respiratory failure with hypoxia and hypercapnia (Multi).  Patient with h/o CHF, aortic valve stenosis, DLP, T2DM, COPD, MELISSA, who was brought in by EMS after being found unresponsiveness at home. Work up revealed respiratory failure with hypoxia and hypercapnia, also hypotensive. Received IVF, antibiotics, but given persistent hypotension started on Levophed and and admitted to ICU. In the ICU found to have coffee ground emesis, subsequently was intubated. CT chest showed R loculated pleural effusion and multiple nodules. Sputum culture +ve for stenotrophomonas. Patient overall improved and was subsequently extubated. Post extubation initially had high oxygen requirements, but now is improving. Pulmonary is consulted to assist with his care.   Subjective   No acute overnight events. O2 requirements stable at 10L.     Overall is feeling the same as yesterday. SOB OK, denies wheezing or any pains. Still has some cough productive of brown sputum. No other complaints.   Objective   Scheduled medications  aspirin, 81 mg, oral, Daily  enoxaparin, 40 mg, subcutaneous, q12h SHARRON  ezetimibe, 10 mg, oral, Daily  folic acid, 1 mg, oral, Daily  guaiFENesin, 200 mg, oral, TID  insulin lispro, 0-10 Units, subcutaneous, q4h  ipratropium-albuteroL, 3 mL, nebulization, 4x daily  levoFLOXacin, 750 mg, oral, q24h SHARRON  levothyroxine, 125 mcg, oral, Daily  multivitamin with minerals, 1 tablet, oral, Daily  nystatin, 5 mL, Swish & Swallow, 4x daily  pantoprazole, 40 mg, intravenous, Daily  piperacillin-tazobactam, 3.375 g, intravenous, q6h  polyethylene glycol, 17 g, nasogastric tube, Daily  predniSONE, 20 mg, oral, Daily  sennosides-docusate sodium, 2 tablet, oral, BID  thiamine, 100 mg, oral, Daily  Continuous medications  PRN medications  PRN medications: dextrose, dextrose, glucagon, glucagon, ipratropium-albuteroL, oxygen, oxygen   Physical  "Exam  Constitutional:       General: He is not in acute distress.     Appearance: He is obese. He is ill-appearing. He is not toxic-appearing.   HENT:      Head: Normocephalic and atraumatic.      Nose:      Comments: On 10L NC  Eyes:      General: No scleral icterus.     Extraocular Movements: Extraocular movements intact.      Pupils: Pupils are equal, round, and reactive to light.   Cardiovascular:      Rate and Rhythm: Normal rate and regular rhythm.      Heart sounds: No murmur heard.     No friction rub. No gallop.   Pulmonary:      Effort: Pulmonary effort is normal. No respiratory distress.      Breath sounds: No wheezing or rales.      Comments: Clear lung fields b/l with fair air entry.  Abdominal:      General: Bowel sounds are normal. There is no distension.      Palpations: Abdomen is soft.      Tenderness: There is no abdominal tenderness.   Musculoskeletal:      Cervical back: Normal range of motion and neck supple. No rigidity or tenderness.      Right lower leg: Edema (2+) present.      Left lower leg: Edema (1+) present.   Lymphadenopathy:      Cervical: No cervical adenopathy.   Skin:     General: Skin is warm and dry.      Coloration: Skin is not jaundiced.      Comments: Chronic discoloration b/l LE edema.     Neurological:      General: No focal deficit present.      Mental Status: He is alert and oriented to person, place, and time.      Cranial Nerves: No cranial nerve deficit.      Motor: Weakness (generalized weakness now is improved.) present.   Psychiatric:         Mood and Affect: Mood normal.         Behavior: Behavior normal.     Last Recorded Vitals  Blood pressure 110/75, pulse 87, temperature 36.3 °C (97.4 °F), temperature source Oral, resp. rate 22, height 1.803 m (5' 10.98\"), weight 121 kg (267 lb 3.2 oz), SpO2 96%.  Intake/Output last 3 Shifts:  I/O last 3 completed shifts:  In: 750 (6.2 mL/kg) [P.O.:600; IV Piggyback:150]  Out: 6600 (54.5 mL/kg) [Urine:6600 (1.5 " mL/kg/hr)]  Weight: 121.2 kg   Relevant Results  Latest labs and imaging reviewed.     Assessment/Plan   Assessment & Plan  Acute respiratory failure with hypoxia and hypercapnia (Multi)    69 YOM with h/o CHF, aortic valve stenosis, DLP, T2DM, COPD, MELISSA, who was brought in by EMS after being found unresponsiveness at home. Work up revealed respiratory failure with hypoxia and hypercapnia, also hypotensive. Received IVF, antibiotics, but given persistent hypotension started on Levophed and and admitted to ICU. In the ICU found to have coffee ground emesis, subsequently was intubated. CT chest showed R loculated pleural effusion and multiple nodules. Sputum culture +ve for stenotrophomonas. Patient overall improved and was subsequently extubated. Post extubation initially had high oxygen requirements, but now is improving. Pulmonary is consulted to assist with his care.  .     Respiratory failure: acute with hypoxia and hypercarbia, requiring MV, now overall improved. Echo showed: HFrEF 25%, mildly reduced RVSF, enlarged RV.       CHF management as per cardiology       BiPAP 18/8 at nights       Continue supplemental O2, wean off as tolerates       Home O2 evaluation before DC       BPH with IS, Acapella       Management of the individual causes as below     COPD: PFT in 2019 showed 54%, FEV1 32-->46%, RV/TLC 63%, TLC 85%, and DLCO 77%, overall associated with stage III COPD. On triple therapy as outpatient        Continue Prednisone will change to 10 mg daily       Continue Duo-neb       FU with pulmonary after DC. Patient needs to make an appointment with a new pulmonologist as currently does not have a pulmonary provider.        Resume Trelegy and albuterol on DC     MELISSA: +/- OHS       Continue BiPAP at nights       Resume APAP 10-15 at nights on DC     Pleural effusion: large R sided, likely parapneumonic, S/p chest tube placement. Analysis showed neutrophilic predominant effusion, cultures negative. CT is now  removed.        Will monitor for now     Pneumonia: RML,         Completed a course of Levaquin.      DVT prophylaxis: Lovenox     Pulmonary will FU while in house.     Marta Thurston MD

## 2024-09-20 NOTE — PROGRESS NOTES
09/20/24 1254   Discharge Planning   Expected Discharge Disposition Long Term     Returned call to Alia lynn. Discussed updates- auth for LTACH was denied and per P2p, auth can be resubmitted once patient is medically stable. Will continue to follow up with sister on discharge planning updates.

## 2024-09-21 LAB
ALBUMIN SERPL BCP-MCNC: 3 G/DL (ref 3.4–5)
ANION GAP SERPL CALC-SCNC: 9 MMOL/L (ref 10–20)
BUN SERPL-MCNC: 18 MG/DL (ref 6–23)
CALCIUM SERPL-MCNC: 8.3 MG/DL (ref 8.6–10.3)
CHLORIDE SERPL-SCNC: 97 MMOL/L (ref 98–107)
CO2 SERPL-SCNC: 37 MMOL/L (ref 21–32)
CREAT SERPL-MCNC: 0.4 MG/DL (ref 0.5–1.3)
EGFRCR SERPLBLD CKD-EPI 2021: >90 ML/MIN/1.73M*2
ERYTHROCYTE [DISTWIDTH] IN BLOOD BY AUTOMATED COUNT: 13.7 % (ref 11.5–14.5)
GLUCOSE BLD MANUAL STRIP-MCNC: 112 MG/DL (ref 74–99)
GLUCOSE BLD MANUAL STRIP-MCNC: 123 MG/DL (ref 74–99)
GLUCOSE BLD MANUAL STRIP-MCNC: 154 MG/DL (ref 74–99)
GLUCOSE BLD MANUAL STRIP-MCNC: 156 MG/DL (ref 74–99)
GLUCOSE BLD MANUAL STRIP-MCNC: 208 MG/DL (ref 74–99)
GLUCOSE SERPL-MCNC: 181 MG/DL (ref 74–99)
HCT VFR BLD AUTO: 53.3 % (ref 41–52)
HGB BLD-MCNC: 17 G/DL (ref 13.5–17.5)
MCH RBC QN AUTO: 31.7 PG (ref 26–34)
MCHC RBC AUTO-ENTMCNC: 31.9 G/DL (ref 32–36)
MCV RBC AUTO: 99 FL (ref 80–100)
NRBC BLD-RTO: 0 /100 WBCS (ref 0–0)
PHOSPHATE SERPL-MCNC: 2.4 MG/DL (ref 2.5–4.9)
PLATELET # BLD AUTO: 245 X10*3/UL (ref 150–450)
POTASSIUM SERPL-SCNC: 3.4 MMOL/L (ref 3.5–5.3)
RBC # BLD AUTO: 5.37 X10*6/UL (ref 4.5–5.9)
SODIUM SERPL-SCNC: 140 MMOL/L (ref 136–145)
WBC # BLD AUTO: 11 X10*3/UL (ref 4.4–11.3)

## 2024-09-21 PROCEDURE — 2500000004 HC RX 250 GENERAL PHARMACY W/ HCPCS (ALT 636 FOR OP/ED): Performed by: NURSE PRACTITIONER

## 2024-09-21 PROCEDURE — 2500000001 HC RX 250 WO HCPCS SELF ADMINISTERED DRUGS (ALT 637 FOR MEDICARE OP): Performed by: SURGERY

## 2024-09-21 PROCEDURE — 84295 ASSAY OF SERUM SODIUM: CPT | Performed by: NURSE PRACTITIONER

## 2024-09-21 PROCEDURE — 2500000004 HC RX 250 GENERAL PHARMACY W/ HCPCS (ALT 636 FOR OP/ED): Performed by: SURGERY

## 2024-09-21 PROCEDURE — 2500000002 HC RX 250 W HCPCS SELF ADMINISTERED DRUGS (ALT 637 FOR MEDICARE OP, ALT 636 FOR OP/ED): Performed by: SURGERY

## 2024-09-21 PROCEDURE — 82947 ASSAY GLUCOSE BLOOD QUANT: CPT

## 2024-09-21 PROCEDURE — 2500000005 HC RX 250 GENERAL PHARMACY W/O HCPCS: Performed by: SURGERY

## 2024-09-21 PROCEDURE — 2500000002 HC RX 250 W HCPCS SELF ADMINISTERED DRUGS (ALT 637 FOR MEDICARE OP, ALT 636 FOR OP/ED): Performed by: INTERNAL MEDICINE

## 2024-09-21 PROCEDURE — 2060000001 HC INTERMEDIATE ICU ROOM DAILY

## 2024-09-21 PROCEDURE — 36415 COLL VENOUS BLD VENIPUNCTURE: CPT | Performed by: NURSE PRACTITIONER

## 2024-09-21 PROCEDURE — 94640 AIRWAY INHALATION TREATMENT: CPT

## 2024-09-21 PROCEDURE — 94660 CPAP INITIATION&MGMT: CPT

## 2024-09-21 PROCEDURE — 99233 SBSQ HOSP IP/OBS HIGH 50: CPT | Performed by: INTERNAL MEDICINE

## 2024-09-21 PROCEDURE — 99232 SBSQ HOSP IP/OBS MODERATE 35: CPT | Performed by: INTERNAL MEDICINE

## 2024-09-21 PROCEDURE — 2500000005 HC RX 250 GENERAL PHARMACY W/O HCPCS: Performed by: INTERNAL MEDICINE

## 2024-09-21 PROCEDURE — 85027 COMPLETE CBC AUTOMATED: CPT | Performed by: NURSE PRACTITIONER

## 2024-09-21 PROCEDURE — 2500000001 HC RX 250 WO HCPCS SELF ADMINISTERED DRUGS (ALT 637 FOR MEDICARE OP)

## 2024-09-21 PROCEDURE — 2500000001 HC RX 250 WO HCPCS SELF ADMINISTERED DRUGS (ALT 637 FOR MEDICARE OP): Performed by: NURSE PRACTITIONER

## 2024-09-21 PROCEDURE — 99232 SBSQ HOSP IP/OBS MODERATE 35: CPT | Performed by: NURSE PRACTITIONER

## 2024-09-21 RX ORDER — PREDNISONE 10 MG/1
10 TABLET ORAL DAILY
Status: DISCONTINUED | OUTPATIENT
Start: 2024-09-22 | End: 2024-09-25 | Stop reason: HOSPADM

## 2024-09-21 ASSESSMENT — COGNITIVE AND FUNCTIONAL STATUS - GENERAL
EATING MEALS: A LITTLE
STANDING UP FROM CHAIR USING ARMS: TOTAL
MOBILITY SCORE: 8
DAILY ACTIVITIY SCORE: 11
TURNING FROM BACK TO SIDE WHILE IN FLAT BAD: A LOT
TOILETING: A LITTLE
DRESSING REGULAR UPPER BODY CLOTHING: TOTAL
TOILETING: TOTAL
MOBILITY SCORE: 8
TOILETING: TOTAL
DRESSING REGULAR LOWER BODY CLOTHING: A LITTLE
PERSONAL GROOMING: A LITTLE
WALKING IN HOSPITAL ROOM: TOTAL
CLIMB 3 TO 5 STEPS WITH RAILING: A LOT
TURNING FROM BACK TO SIDE WHILE IN FLAT BAD: A LOT
MOBILITY SCORE: 15
HELP NEEDED FOR BATHING: TOTAL
MOVING FROM LYING ON BACK TO SITTING ON SIDE OF FLAT BED WITH BEDRAILS: A LOT
HELP NEEDED FOR BATHING: A LITTLE
DRESSING REGULAR UPPER BODY CLOTHING: A LITTLE
DRESSING REGULAR UPPER BODY CLOTHING: TOTAL
WALKING IN HOSPITAL ROOM: A LOT
DAILY ACTIVITIY SCORE: 19
CLIMB 3 TO 5 STEPS WITH RAILING: TOTAL
WALKING IN HOSPITAL ROOM: TOTAL
DRESSING REGULAR LOWER BODY CLOTHING: A LOT
EATING MEALS: A LITTLE
STANDING UP FROM CHAIR USING ARMS: A LOT
CLIMB 3 TO 5 STEPS WITH RAILING: TOTAL
PERSONAL GROOMING: A LITTLE
MOVING TO AND FROM BED TO CHAIR: TOTAL
DRESSING REGULAR LOWER BODY CLOTHING: A LOT
DAILY ACTIVITIY SCORE: 11
MOVING TO AND FROM BED TO CHAIR: A LITTLE
HELP NEEDED FOR BATHING: TOTAL
MOVING FROM LYING ON BACK TO SITTING ON SIDE OF FLAT BED WITH BEDRAILS: A LOT
STANDING UP FROM CHAIR USING ARMS: TOTAL
MOVING TO AND FROM BED TO CHAIR: TOTAL
PERSONAL GROOMING: A LITTLE
MOVING FROM LYING ON BACK TO SITTING ON SIDE OF FLAT BED WITH BEDRAILS: A LITTLE
TURNING FROM BACK TO SIDE WHILE IN FLAT BAD: A LITTLE

## 2024-09-21 ASSESSMENT — PAIN - FUNCTIONAL ASSESSMENT
PAIN_FUNCTIONAL_ASSESSMENT: 0-10

## 2024-09-21 ASSESSMENT — PAIN SCALES - GENERAL
PAINLEVEL_OUTOF10: 0 - NO PAIN
PAINLEVEL_OUTOF10: 0 - NO PAIN
PAINLEVEL_OUTOF10: 4
PAINLEVEL_OUTOF10: 0 - NO PAIN
PAINLEVEL_OUTOF10: 7

## 2024-09-21 ASSESSMENT — PAIN DESCRIPTION - ORIENTATION: ORIENTATION: POSTERIOR

## 2024-09-21 ASSESSMENT — PAIN DESCRIPTION - LOCATION: LOCATION: COCCYX

## 2024-09-21 NOTE — ASSESSMENT & PLAN NOTE
67-year-old gentleman with history of chronic diastolic heart failure, COPD, obstructive sleep apnea, obesity, type 2 diabetes mellitus, hypertension, hypothyroidism and hyperlipidemia was admitted with acute respiratory failure with hypoxia and hypercapnia and noted to have a right loculated pleural effusion and bilateral lower lobe lung infiltrate and right hydropneumothorax.  He was hypotensive on admission and required 3 L of fluid to resuscitate and admitted to intensive care unit and was intubated because of acute respiratory failure and remained intubated for several days.  Currently he is extubated and on high flow oxygen as mentioned above.  He had significant leukocytosis of 22,000 on admission and his sputum culture was positive for stenotrophomonas.  Patient is being continued on IV Zosyn and IV Levaquin, pulmonary and infectious disease on board.    1.  Acute hypoxic hypercapnic respiratory failure with hypotension and right hydropneumothorax and bilateral infiltrate and positive for pneumonia.Sputum culture was positive for stenotrophomonas.   Initially intubated for several days and since then he has been extubated and is on Improving and currently is on 13 L nasal cannula.  Continued on IV Zosyn and Levaquin.  Paresh santoro    2.  Patient had hydropneumothorax on admission and had chest tube, chest tube has been removed since then..    3.  Hypotension has resolved    4.  COPD and obstructive sleep apnea.  Aerosol treatment and steroids.  Currently on Prednisone 20 mg daily.    5.  Diabetes mellitus type 2, continue sliding scale insulin, patient was on Mounjaro at home prior to admission.    6.  Patient  DNR/DNI.    7.  Hematuria possibly secondary to trauma secondary to Live catheter which has resolved after irrigation.    8.  Recurrent episodes of brief nonsustained ventricular tachycardia, also moderate systolic heart failure, previous echocardiogram revealed LV ejection fraction of 30 to 35% with  moderate aortic stenosis, patient is started on IV Lasix this morning and cardiology consult appreciated and repeat echocardiogram quality was suboptimal he did reveal LV ejection fraction of 23% with global hypokinesia and moderate aortic cusp calcification and moderate right ventricular dysfunction.  Patient has been started on IV Lasix and Jardiance and consider starting GDMT once condition is stable.    9.  Patient had a modified barium swallow and has been recommended to have a puréed diet with honey thickened and nectar thickened liquids.    Nystatin cream to both groin area for inguinal intertrigo.    Reviewed all labs patient's medical record and imaging studies and discussed the plan of treatment with the patient..    Continue PT OT and processing for LTAC.

## 2024-09-21 NOTE — PROGRESS NOTES
Alo Glass is a 69 y.o. male on day 17 of admission presenting with Acute respiratory failure with hypoxia and hypercapnia (Multi).    Subjective   Seen and examined, fully awake and alert looks better, currently on 11 L nasal cannula oxygen.  Had an episode of hematuria which has cleared since Live catheter was flushed.  Leg edema has improved significantly and his breathing is better.  However he is rather frustrated that he has not received any physical therapy and he would like to sit out in the chair.  Will discuss this with physical therapy however because of significant hypercapnia and hypoxia-still requiring high oxygen it may not be possible for him to sit out in a chair.  Blood sugars are stable       Objective     Physical Exam  GENERAL:  Alert, mild distress, cooperative, afebrile  SKIN:  Skin color, texture, turgor normal. No rashes or lesions.  OROPHARYNX:  Lips, mucosa, and tongue are normal.Teeth and gums, normal. Oropharynx normal.  NECK:  No jugulovenous distention, No carotid bruits, Carotid pulse normal contour, Supple  LUNGS: Generalized diminished air exchange however improvement since other day, occasional wheezes, no crackles.  Decreased diaphragmatic excursion.  CARDIAC: Rate and rhythm is regular, normal S1 and S2; no rubs,  or gallops, 2/6 systolic ejection murmur left sternal border.  ABDOMEN:  Abdomen soft, large and obese, non-tender, BS normal, No masses or organomegaly, inguinal intertrigo,  EXTREMETIES:  Extremities normal, no deformities, 1+ pedal edema, and 1+ lower extremity edema, no clubbing or skin discoloration.   Good capillary refill., No ulcers  NEURO:  Alert, oriented X 3, Gait not examined Non-focal. Reflexes normal and symmetric. Sensation grossly intact., Cranial nerves II-XII intact  PULSES:  2+ radial, 2+ carotid  Last Recorded Vitals  Blood pressure 109/63, pulse 80, temperature 36.7 °C (98.1 °F), temperature source Temporal, resp. rate 17, height 1.803 m (5'  "10.98\"), weight 118 kg (260 lb 12.9 oz), SpO2 96%.  Intake/Output last 3 Shifts:  I/O last 3 completed shifts:  In: - (0 mL/kg)   Out: 5075 (42.9 mL/kg) [Urine:5075 (1.2 mL/kg/hr)]  Weight: 118.3 kg     Relevant Results   Scheduled medications  aspirin, 81 mg, oral, Daily  empagliflozin, 10 mg, oral, Daily  enoxaparin, 40 mg, subcutaneous, q12h SHARRON  ezetimibe, 10 mg, oral, Daily  folic acid, 1 mg, oral, Daily  furosemide, 80 mg, intravenous, q12h  insulin lispro, 0-10 Units, subcutaneous, q4h  ipratropium-albuteroL, 3 mL, nebulization, 4x daily  levothyroxine, 125 mcg, oral, Daily  multivitamin with minerals, 1 tablet, oral, Daily  nystatin, 5 mL, Swish & Swallow, 4x daily  nystatin, 1 Application, Topical, BID  pantoprazole, 40 mg, intravenous, Daily  polyethylene glycol, 17 g, nasogastric tube, Daily  predniSONE, 20 mg, oral, Daily  sennosides-docusate sodium, 2 tablet, oral, BID  tamsulosin, 0.4 mg, oral, Daily  thiamine, 100 mg, oral, Daily      Continuous medications     PRN medications  PRN medications: acetaminophen, acetaminophen, dextrose, dextrose, glucagon, glucagon, ipratropium-albuteroL, oxygen, oxygen   Results for orders placed or performed during the hospital encounter of 09/04/24 (from the past 96 hour(s))   POCT GLUCOSE   Result Value Ref Range    POCT Glucose 231 (H) 74 - 99 mg/dL   POCT GLUCOSE   Result Value Ref Range    POCT Glucose 191 (H) 74 - 99 mg/dL   Electrocardiogram, 12-lead PRN ACS symptoms   Result Value Ref Range    Ventricular Rate 91 BPM    Atrial Rate 91 BPM    FL Interval 234 ms    QRS Duration 162 ms    QT Interval 426 ms    QTC Calculation(Bazett) 523 ms    P Axis -10 degrees    R Axis -83 degrees    T Axis 62 degrees    QRS Count 15 beats    Q Onset 205 ms    P Onset 88 ms    P Offset 158 ms    T Offset 418 ms    QTC Fredericia 489 ms   POCT GLUCOSE   Result Value Ref Range    POCT Glucose 251 (H) 74 - 99 mg/dL   POCT GLUCOSE   Result Value Ref Range    POCT Glucose 209 (H) 74 " - 99 mg/dL   POCT GLUCOSE   Result Value Ref Range    POCT Glucose 106 (H) 74 - 99 mg/dL   Renal Function Panel   Result Value Ref Range    Glucose 104 (H) 74 - 99 mg/dL    Sodium 141 136 - 145 mmol/L    Potassium 3.8 3.5 - 5.3 mmol/L    Chloride 101 98 - 107 mmol/L    Bicarbonate 36 (H) 21 - 32 mmol/L    Anion Gap 8 (L) 10 - 20 mmol/L    Urea Nitrogen 16 6 - 23 mg/dL    Creatinine 0.41 (L) 0.50 - 1.30 mg/dL    eGFR >90 >60 mL/min/1.73m*2    Calcium 8.0 (L) 8.6 - 10.3 mg/dL    Phosphorus 2.9 2.5 - 4.9 mg/dL    Albumin 3.0 (L) 3.4 - 5.0 g/dL   CBC   Result Value Ref Range    WBC 11.1 4.4 - 11.3 x10*3/uL    nRBC 0.0 0.0 - 0.0 /100 WBCs    RBC 5.26 4.50 - 5.90 x10*6/uL    Hemoglobin 17.1 13.5 - 17.5 g/dL    Hematocrit 55.7 (H) 41.0 - 52.0 %     (H) 80 - 100 fL    MCH 32.5 26.0 - 34.0 pg    MCHC 30.7 (L) 32.0 - 36.0 g/dL    RDW 13.3 11.5 - 14.5 %    Platelets 214 150 - 450 x10*3/uL   B-type natriuretic peptide   Result Value Ref Range     (H) 0 - 99 pg/mL   POCT GLUCOSE   Result Value Ref Range    POCT Glucose 127 (H) 74 - 99 mg/dL   Blood Gas Arterial Full Panel   Result Value Ref Range    POCT pH, Arterial 7.23 (LL) 7.38 - 7.42 pH    POCT pCO2, Arterial 84 (HH) 38 - 42 mm Hg    POCT pO2, Arterial 114 (H) 85 - 95 mm Hg    POCT SO2, Arterial 98 94 - 100 %    POCT Oxy Hemoglobin, Arterial 95.3 94.0 - 98.0 %    POCT Hematocrit Calculated, Arterial 53.0 (H) 41.0 - 52.0 %    POCT Sodium, Arterial 133 (L) 136 - 145 mmol/L    POCT Potassium, Arterial 3.7 3.5 - 5.3 mmol/L    POCT Chloride, Arterial 99 98 - 107 mmol/L    POCT Ionized Calcium, Arterial 1.19 1.10 - 1.33 mmol/L    POCT Glucose, Arterial 134 (H) 74 - 99 mg/dL    POCT Lactate, Arterial 0.7 0.4 - 2.0 mmol/L    POCT Base Excess, Arterial 3.7 (H) -2.0 - 3.0 mmol/L    POCT HCO3 Calculated, Arterial 35.2 (H) 22.0 - 26.0 mmol/L    POCT Hemoglobin, Arterial 17.5 13.5 - 17.5 g/dL    POCT Anion Gap, Arterial 3 (L) 10 - 25 mmo/L    Patient Temperature 37.0  degrees Celsius    FiO2 60 %   BLOOD GAS VENOUS FULL PANEL   Result Value Ref Range    POCT pH, Venous 7.33 7.33 - 7.43 pH    POCT pCO2, Venous 64 (H) 41 - 51 mm Hg    POCT pO2, Venous 39 35 - 45 mm Hg    POCT SO2, Venous 76 (H) 45 - 75 %    POCT Oxy Hemoglobin, Venous 73.7 45.0 - 75.0 %    POCT Hematocrit Calculated, Venous 50.0 41.0 - 52.0 %    POCT Sodium, Venous 130 (L) 136 - 145 mmol/L    POCT Potassium, Venous 3.7 3.5 - 5.3 mmol/L    POCT Chloride, Venous 97 (L) 98 - 107 mmol/L    POCT Ionized Calicum, Venous 1.10 1.10 - 1.33 mmol/L    POCT Glucose, Venous 135 (H) 74 - 99 mg/dL    POCT Lactate, Venous 1.4 0.4 - 2.0 mmol/L    POCT Base Excess, Venous 5.2 (H) -2.0 - 3.0 mmol/L    POCT HCO3 Calculated, Venous 33.7 (H) 22.0 - 26.0 mmol/L    POCT Hemoglobin, Venous 16.5 13.5 - 17.5 g/dL    POCT Anion Gap, Venous 3.0 (L) 10.0 - 25.0 mmol/L    Patient Temperature 37.0 degrees Celsius    FiO2 60 %   POCT GLUCOSE   Result Value Ref Range    POCT Glucose 122 (H) 74 - 99 mg/dL   POCT GLUCOSE   Result Value Ref Range    POCT Glucose 243 (H) 74 - 99 mg/dL   POCT GLUCOSE   Result Value Ref Range    POCT Glucose 374 (H) 74 - 99 mg/dL   POCT GLUCOSE   Result Value Ref Range    POCT Glucose 239 (H) 74 - 99 mg/dL   POCT GLUCOSE   Result Value Ref Range    POCT Glucose 118 (H) 74 - 99 mg/dL   Renal Function Panel   Result Value Ref Range    Glucose 107 (H) 74 - 99 mg/dL    Sodium 141 136 - 145 mmol/L    Potassium 3.8 3.5 - 5.3 mmol/L    Chloride 104 98 - 107 mmol/L    Bicarbonate 32 21 - 32 mmol/L    Anion Gap 9 (L) 10 - 20 mmol/L    Urea Nitrogen 21 6 - 23 mg/dL    Creatinine 0.46 (L) 0.50 - 1.30 mg/dL    eGFR >90 >60 mL/min/1.73m*2    Calcium 7.6 (L) 8.6 - 10.3 mg/dL    Phosphorus 1.8 (L) 2.5 - 4.9 mg/dL    Albumin 2.5 (L) 3.4 - 5.0 g/dL   CBC   Result Value Ref Range    WBC 8.7 4.4 - 11.3 x10*3/uL    nRBC 0.0 0.0 - 0.0 /100 WBCs    RBC 4.44 (L) 4.50 - 5.90 x10*6/uL    Hemoglobin 14.3 13.5 - 17.5 g/dL    Hematocrit 45.3 41.0 -  52.0 %     (H) 80 - 100 fL    MCH 32.2 26.0 - 34.0 pg    MCHC 31.6 (L) 32.0 - 36.0 g/dL    RDW 13.3 11.5 - 14.5 %    Platelets 210 150 - 450 x10*3/uL   Magnesium   Result Value Ref Range    Magnesium 2.20 1.60 - 2.40 mg/dL   POCT GLUCOSE   Result Value Ref Range    POCT Glucose 111 (H) 74 - 99 mg/dL   POCT GLUCOSE   Result Value Ref Range    POCT Glucose 146 (H) 74 - 99 mg/dL   POCT GLUCOSE   Result Value Ref Range    POCT Glucose 190 (H) 74 - 99 mg/dL   Transthoracic Echo (TTE) Limited   Result Value Ref Range    LV EF 23 %    RV free wall pk S' 9.00 cm/s    LV A4C EF 20.6    POCT GLUCOSE   Result Value Ref Range    POCT Glucose 162 (H) 74 - 99 mg/dL   POCT GLUCOSE   Result Value Ref Range    POCT Glucose 125 (H) 74 - 99 mg/dL   POCT GLUCOSE   Result Value Ref Range    POCT Glucose 123 (H) 74 - 99 mg/dL   Renal Function Panel   Result Value Ref Range    Glucose 120 (H) 74 - 99 mg/dL    Sodium 141 136 - 145 mmol/L    Potassium 3.6 3.5 - 5.3 mmol/L    Chloride 102 98 - 107 mmol/L    Bicarbonate 35 (H) 21 - 32 mmol/L    Anion Gap 8 (L) 10 - 20 mmol/L    Urea Nitrogen 18 6 - 23 mg/dL    Creatinine 0.43 (L) 0.50 - 1.30 mg/dL    eGFR >90 >60 mL/min/1.73m*2    Calcium 7.8 (L) 8.6 - 10.3 mg/dL    Phosphorus 2.6 2.5 - 4.9 mg/dL    Albumin 2.8 (L) 3.4 - 5.0 g/dL   CBC   Result Value Ref Range    WBC 8.6 4.4 - 11.3 x10*3/uL    nRBC 0.0 0.0 - 0.0 /100 WBCs    RBC 5.04 4.50 - 5.90 x10*6/uL    Hemoglobin 15.9 13.5 - 17.5 g/dL    Hematocrit 50.4 41.0 - 52.0 %     80 - 100 fL    MCH 31.5 26.0 - 34.0 pg    MCHC 31.5 (L) 32.0 - 36.0 g/dL    RDW 13.4 11.5 - 14.5 %    Platelets 243 150 - 450 x10*3/uL   POCT GLUCOSE   Result Value Ref Range    POCT Glucose 110 (H) 74 - 99 mg/dL   POCT GLUCOSE   Result Value Ref Range    POCT Glucose 133 (H) 74 - 99 mg/dL   POCT GLUCOSE   Result Value Ref Range    POCT Glucose 255 (H) 74 - 99 mg/dL   POCT GLUCOSE   Result Value Ref Range    POCT Glucose 246 (H) 74 - 99 mg/dL   POCT GLUCOSE    Result Value Ref Range    POCT Glucose 157 (H) 74 - 99 mg/dL   POCT GLUCOSE   Result Value Ref Range    POCT Glucose 112 (H) 74 - 99 mg/dL   POCT GLUCOSE   Result Value Ref Range    POCT Glucose 156 (H) 74 - 99 mg/dL    Transthoracic Echo (TTE) Limited    Result Date: 9/19/2024   [facility], [address] [place] [address]              Tel [phone] and Fax [phone] TRANSTHORACIC ECHOCARDIOGRAM REPORT  Patient Name:      BRAYAN Key Physician:    [id number] Andrea Bentley MD Study Date:        9/19/2024            Ordering Provider:    [id number] ABAD SCHNEIDER MRN/PID:           [id number]             Fellow: Accession#:        [id number]         Nurse: Date of Birth/Age: 1955 / 69 years Sonographer:          Gustavo Naqvi RDCS Gender:            M                    Additional Staff: Height:            177.80 cm            Admit Date:           9/4/2024 Weight:            126.10 kg            Admission Status:     Inpatient -                                                               Routine BSA / BMI:         2.40 m2 / 39.89      Encounter#:           [id number]                    kg/m2 Blood Pressure:    105/75 mmHg          Department Location:  [facility] Non                                                               Invasive Study Type:    TRANSTHORACIC ECHO (TTE) LIMITED Diagnosis/ICD: Encounter for screening for cardiovascular disorders-Z13.6 Indication:    SOB and NSVT CPT Code:      Echo Limited-61253; Color Doppler-26826; Doppler Limited-02632 Patient History: Pertinent History: LE Edema, COPD, Dyspnea and HTN. Elevated trop. Study Detail: The following Echo studies were performed: 2D, M-Mode, Doppler and               color flow. Technically challenging study due to body habitus.  PHYSICIAN INTERPRETATION: Left Ventricle: Left  ventricular ejection fraction is severely decreased, calculated by Green's biplane at 23%. There is global hypokinesis of the left ventricle with minor regional variations. The left ventricular cavity size is normal. Spectral Doppler shows an impaired relaxation pattern of left ventricular diastolic filling. Left Atrium: The left atrium is normal in size. Right Ventricle: The right ventricle is moderately enlarged. There is mildly reduced right ventricular systolic function. TAPSE 13mm. Right Atrium: The right atrium is normal in size. Aortic Valve: The aortic valve is trileaflet. There is moderate aortic valve cusp calcification. There is no evidence of aortic valve regurgitation. Mitral Valve: The mitral valve is mildly thickened. There is mild to moderate mitral annular calcification. There is no evidence of mitral valve regurgitation. Tricuspid Valve: The tricuspid valve is structurally normal. No evidence of tricuspid regurgitation. The right ventricular systolic pressure is unable to be estimated. Pulmonic Valve: The pulmonic valve was not assessed. Pulmonic valve regurgitation was not assessed. Pericardium: There is no pericardial effusion noted. Aorta: The aortic root is normal. Systemic Veins: The inferior vena cava appears normal in size, with IVC inspiratory collapse greater than 50%. In comparison to the previous echocardiogram(s): Compared with study dated 3/11/2024, there is RV dilation and reduced function.  CONCLUSIONS:  1. Left ventricular ejection fraction is severely decreased, calculated by Green's biplane at 23%.  2. There is global hypokinesis of the left ventricle with minor regional variations.  3. Poorly visualized anatomical structures due to suboptimal image quality.  4. Spectral Doppler shows an impaired relaxation pattern of left ventricular diastolic filling.  5. There is mildly reduced right ventricular systolic function.  6. Moderately enlarged right ventricle.  7. There is  moderate aortic valve cusp calcification. The was no assesment of aortic stenosis in this limited study.  8. Compared with study dated 3/11/2024, there is RV dilation and reduced function. QUANTITATIVE DATA SUMMARY:  2D MEASUREMENTS:          Normal Ranges: LVEDV Index:     81 ml/m2  LV SYSTOLIC FUNCTION BY 2D PLANIMETRY (MOD):                      Normal Ranges: EF-A4C View:    21 % (>=55%) EF-A2C View:    24 % EF-Biplane:     23 % LV EF Reported: 23 %  RIGHT VENTRICLE: RV s' 0.09 m/s  TRICUSPID VALVE/RVSP:         Normal Ranges: Est. RA Pressure:     3 mmHg IVC Diam:             2.10 cm  [id number] Andrea Bentley MD Electronically signed on 9/19/2024 at 5:31:57 PM  ** Final **     Electrocardiogram, 12-lead PRN ACS symptoms    Result Date: 9/18/2024  Sinus rhythm with 1st degree AV block with Premature atrial complexes with Aberrant conduction Right bundle branch block Left anterior fascicular block  Bifascicular block  Anteroseptal infarct (cited on or before 23-APR-2024) Abnormal ECG Confirmed by Triston Moore ([id number]) on 9/18/2024 10:19:51 AM    XR chest 1 view    Result Date: 9/17/2024  Interpreted By:  Ishaan Vernon, STUDY: XR CHEST 1 VIEW;  9/17/2024 5:41 am   INDICATION: Signs/Symptoms:Increase O2 requirements. Tnx..   COMPARISON: CT scan from 09/04/2024.   ACCESSION NUMBER(S): [id number]   ORDERING CLINICIAN: PARISA ZARAGOZA   TECHNIQUE: Single AP portable view of the chest was obtained.   FINDINGS: MEDIASTINUM/ LUNGS/ ARELIS:   Stable cardiomegaly. No gross vascular congestion. Small stable left pleural effusion. Medial left basilar atelectasis/infiltrate, stable to slightly progressed. Pleural and parenchymal opacities at the right base, mildly progressed. No pneumothorax. No tracheal deviation. No abnormal hilar fullness or gross mass on either side.   BONES: No lytic or blastic destructive bone lesion.   UPPER ABDOMEN: Grossly intact.       Cardiomegaly.   Stable small left pleural effusion with left  basilar atelectasis/pneumonia, stable to slightly progressed.   Pleural and parenchymal opacities at the right base, mildly progressed.   MACRO: None   Signed by: Ishaan Vernon 9/17/2024 8:16 AM Dictation workstation:   [id number]    Electrocardiogram, 12-lead PRN ACS symptoms    Result Date: 9/16/2024  Sinus rhythm with 1st degree AV block with Premature atrial complexes Right bundle branch block Left anterior fascicular block  Bifascicular block  Anteroseptal infarct (cited on or before 23-APR-2024) Abnormal ECG When compared with ECG of 13-SEP-2024 02:49, (unconfirmed) Premature ventricular complexes are no longer Present QT has lengthened    ECG 12 lead    Result Date: 9/15/2024  Sinus rhythm with 1st degree AV block Left axis deviation Right bundle branch block Inferior infarct (cited on or before 04-SEP-2024) Abnormal ECG When compared with ECG of 04-SEP-2024 16:24, Premature ventricular complexes are no longer Present Right bundle branch block is now Present Confirmed by Dimitri Murillo ([id number]) on 9/15/2024 2:23:29 PM    XR chest 1 view    Result Date: 9/13/2024  Interpreted By:  Tania Knowles, STUDY: XR CHEST 1 VIEW;  9/13/2024 4:11 pm   INDICATION: Signs/Symptoms:post chest tube removal.   COMPARISON: 09/13/2024   ACCESSION NUMBER(S): [id number]   ORDERING CLINICIAN: RODOLFO LOPEZ   FINDINGS: The study is limited due to patient's body habitus and poor inspiration previously seen right-sided chest tube has been removed. No gross pneumothorax is noted.   Blunting of both costophrenic angles is seen, which most likely is related to pleural effusions. The heart is enlarged. Bibasilar streaky densities are seen.       No gross pneumothorax.   Bilateral pleural effusions.   Cardiomegaly.   Bibasilar streaky densities, which may be related to atelectasis or infiltrates.       MACRO: None   Signed by: Tania Knowles 9/13/2024 4:40 PM Dictation workstation:   [id number]    Electrocardiogram, 12-lead PRN ACS  symptoms    Result Date: 9/13/2024  Sinus rhythm with 1st degree AV block with occasional Premature ventricular complexes and Premature atrial complexes Left axis deviation Right bundle branch block Inferior infarct (cited on or before 04-SEP-2024) Anteroseptal infarct (cited on or before 23-APR-2024) Abnormal ECG When compared with ECG of 06-SEP-2024 00:20, Premature ventricular complexes are now Present Vent. rate has decreased BY  31 BPM Questionable change in initial forces of Septal leads T wave inversion less evident in Anterolateral leads    XR chest 1 view    Result Date: 9/13/2024  Interpreted By:  Rosa Hemphill, STUDY: XR CHEST 1 VIEW;  9/13/2024 4:31 am   INDICATION: Signs/Symptoms:CT in place.   COMPARISON: Multiple prior studies, the most recent 09/12/2024   ACCESSION NUMBER(S): [id number]   ORDERING CLINICIAN: RODOLFO LOPEZ   FINDINGS:     CARDIOMEDIASTINAL SILHOUETTE: Stable enlarged cardiac silhouette.   LUNGS: Stable position of pigtail catheter overlying the right lung base. There is improved aeration of the right lung base. Mild residual hazy right basilar opacity, likely combination of small residual pleural effusion and basilar consolidation. Limited evaluation of the left lung base due to underpenetration. This is not significantly changed and left pleural effusion or basilar consolidation not excluded. No pneumothorax.   ABDOMEN: No remarkable upper abdominal findings.   BONES: No acute osseous abnormality.       Improved aeration of the right lung base. Residual hazy right basilar opacities likely a combination of residual pleural effusion and basilar atelectasis.   Limited evaluation of the left lung base due to underpenetration.   MACRO: None   Signed by: Rosa Hemphill 9/13/2024 4:48 AM Dictation workstation:   [id number]    XR chest 1 view    Result Date: 9/12/2024  Interpreted By:  Donte Shoemaker, STUDY: XR CHEST 1 VIEW; 9/12/2024 5:23 am   INDICATION: Signs/Symptoms:CT in place.    COMPARISON: None   ACCESSION NUMBER(S): [id number]   ORDERING CLINICIAN: RODOLFO LOPEZ   TECHNIQUE: 1 view of the chest was performed.   FINDINGS: Right pigtail catheter in similar location. Stable layering bilateral pleural effusion with surrounding atelectasis. Stable cardiomegaly. Bilateral shoulder joint degenerative changes.       1. No significant interval change from 09/11/2024 chest x-ray.   Signed by: Donte Shoemaker 9/12/2024 8:18 AM Dictation workstation:   [id number]    XR chest 1 view    Result Date: 9/11/2024  Interpreted By:  Klaus Villafana, STUDY: XR CHEST 1 VIEW; 9/11/2024 5:42 am   INDICATION: Signs/Symptoms:CT in place   COMPARISON: Radiographs 09/10/2024   ACCESSION NUMBER(S): [id number]   ORDERING CLINICIAN: RODOLFO LOPEZ   TECHNIQUE: Single frontal view of the chest performed.   FINDINGS: LINES AND DEVICES: Remote endotracheal tube. Stable right pigtail pleural catheter near right lung base.   LUNGS: Stable hazy opacities at the lung base and additional airspace opacities extending to right mid lung, stable to mildly increased. No pneumothorax. Left lung is clear.   CARDIOMEDIASTINAL SILHOUETTE: Stable cardiomegaly.       Stable to slightly increased right pleural effusion with atelectasis. Stable right pleural catheter.   MACRO None   Signed by: Klaus Villafana 9/11/2024 8:21 AM Dictation workstation:   [id number]    XR chest 1 view    Result Date: 9/10/2024  Interpreted By:  Ishaan Vernon, STUDY: XR CHEST 1 VIEW;  9/10/2024 5:34 am   INDICATION: Signs/Symptoms:Eval chest tube placement, Rt effusion.   COMPARISON: CT scan chest from 09/04/2024. Chest x-ray from 09/09/2024.   ACCESSION NUMBER(S): [id number]   ORDERING CLINICIAN: RICH KAPLAN   TECHNIQUE: Single AP portable view of the chest was obtained.   FINDINGS: MEDIASTINUM/ LUNGS/ ARELIS: Previous ET tube has been removed. There is an NG tube in place with distal port just beyond the GE junction and distal tip in the proximal  stomach. Pigtail pleural catheter at the right base is stable. There is a small grossly stable lateral right basilar pneumothorax. There is mild bibasilar atelectasis. Cardiomegaly without gross vascular congestion. No pneumothorax on the left. No tracheal deviation. No abnormal hilar fullness or gross mass on either side.   BONES: No lytic or blastic destructive bone lesion.   UPPER ABDOMEN: Grossly intact.       Cardiomegaly.   NG tube in place. ET tube has been removed.   Stable pleural drainage catheter at the base of the right chest. Small stable lateral right basilar pneumothorax.   Mild bibasilar atelectasis.   MACRO: None   Signed by: Ishaan Vernon 9/10/2024 8:13 AM Dictation workstation:   [id number]    XR chest 1 view    Result Date: 9/9/2024  Interpreted By:  Daisy Sosa, STUDY: XR CHEST 1 VIEW;  9/9/2024 4:39 pm   INDICATION: Signs/Symptoms:s/p lytic therapy.     COMPARISON: 5:21 a.m. the same day   ACCESSION NUMBER(S): [id number]   ORDERING CLINICIAN: RODOLFO LOPEZ   FINDINGS: ETT, NG tube and right lower chest pigtail catheter remain in place. Additional presumed overlying monitoring/support devices again seen. Small right basilar pneumothorax decreased in size. Persistent bibasilar opacities. The cardiomediastinal silhouette remains enlarged.       Decreased size of right basilar pneumothorax since 5:21 a.m. the same day with pigtail catheter in place.   MACRO: None.   Signed by: aDisy Sosa 9/9/2024 4:44 PM Dictation workstation:   [id number]    XR chest 1 view    Result Date: 9/9/2024  Interpreted By:  Klaus Villafana, STUDY: XR CHEST 1 VIEW; 9/9/2024 5:32 am   INDICATION: Signs/Symptoms:Eval chest tube placement, Rt effusion   COMPARISON: Radiographs 09/08/2024   ACCESSION NUMBER(S): [id number]   ORDERING CLINICIAN: RICH KAPLAN   TECHNIQUE: Single frontal view of the chest performed.   FINDINGS: LINES AND DEVICES: The pigtail of right-sided pleural drainage catheter overlies the  right infrahilar region, grossly unchanged. Tip of ETT approximately 6.6 cm above the kathie, previously 7.7 cm. NG tube courses below the diaphragm, limited assessment.   LUNGS: Moderate-size right-sided hydropneumothorax appears grossly unchanged. Stable to slightly increased interstitial opacities on the left. Question new trace left pleural effusion with atelectasis.   CARDIOMEDIASTINAL SILHOUETTE: Stable cardiomegaly.       Right-sided infrahilar pigtail catheter and moderate hydropneumothorax, grossly unchanged.   Suspected worsening CHF with stable to mildly increased interstitial edema and possible new trace left pleural effusion.   MACRO None   Signed by: Klaus Villafana 9/9/2024 8:17 AM Dictation workstation:   [id number]    XR chest 1 view    Result Date: 9/8/2024  Interpreted By:  Corrina Reyes, STUDY: XR CHEST 1 VIEW;  9/8/2024 6:39 am   INDICATION: Signs/Symptoms:Eval chest tube placement, Rt effusion.     COMPARISON: 09/07/2024   ACCESSION NUMBER(S): [id number]   ORDERING CLINICIAN: RICH KAPLAN   TECHNIQUE: Portable AP semi upright   FINDINGS: Endotracheal tube tip projects between 7 and 8 cm above the kathie. Nasogastric tube is no longer identified. Right pleural pigtail catheter projects at mid right hemithorax and is similar in position to the prior study. Relative volume of basilar pneumothorax and pleural fluid appears similar to the prior study. Right lower lobe and right middle lobe are collapsed. Right upper lobe variation is similar to the prior study. Left lung shows some vascular congestion. Minimal atelectasis is present at the left lung base. No change in the osseous structures is noted.       Basilar pneumothorax on the right appears similar to the prior study in the relative volume of pleural fluid and pneumothorax appears unchanged.   Collapse of the right lower lobe and right middle lobe   Pulmonary vascular congestion is most notable in the left lung   MACRO: None.    Signed by: Corrina Reyes 9/8/2024 12:32 PM Dictation workstation:   [id number]    XR chest 1 view    Result Date: 9/8/2024  Interpreted By:  Corrina Reyes, STUDY: XR CHEST 1 VIEW;  9/7/2024 5:28 pm   INDICATION: Signs/Symptoms:Eval chest tube placement, Rt pleural effusion.     COMPARISON: 09/06/2024   ACCESSION NUMBER(S): [id number]   ORDERING CLINICIAN: RICH KAPLAN   TECHNIQUE: Portable AP upright   FINDINGS: Endotracheal tube tip projects about 6 cm above the kathie. NG tube extends below the diaphragm and beyond the image. Right pleural pigtail catheter projects at mid right hemithorax and appears unchanged in position.   Pneumothorax at the right thoracic base appears larger than on the prior study. The amount of pleural fluid appears decreased. Mild atelectasis left lung base is slightly improved. Heart is unchanged in size. No changes noted in the osseous structures.       Right pleural effusion is decreased from the prior study but the right basilar pneumothorax appears increased.   MACRO: None.   Signed by: Corrina Reyes 9/8/2024 12:30 PM Dictation workstation:   [id number]    Electrocardiogram, 12-lead PRN ACS symptoms    Result Date: 9/6/2024  Sinus rhythm with 1st degree AV block with Premature atrial complexes Left axis deviation Right bundle branch block Inferior infarct (cited on or before 04-SEP-2024) Anterior infarct , age undetermined T wave abnormality, consider lateral ischemia Abnormal ECG When compared with ECG of 05-SEP-2024 10:29, (unconfirmed) Premature atrial complexes are now Present Confirmed by Shady Bill ([id number]) on 9/6/2024 1:59:56 PM    XR chest 1 view    Result Date: 9/6/2024  Interpreted By:  Eloisa Landa, STUDY: XR CHEST 1 VIEW 9/6/2024 9:05 am   INDICATION: Signs/Symptoms:R pleural effusion   COMPARISON: 09/05/2024   ACCESSION NUMBER(S): [id number]   ORDERING CLINICIAN: PERRI DELACRUZ   TECHNIQUE: AP view of the chest at bedside in the erect position   FINDINGS: A  right chest tube is once again noted within the mid right hemithorax with a stable right hydropneumothorax seen within the mid and lower right hemithorax.   The tip of the endotracheal tube remains satisfactorily positioned at a distance 3 cm above kathie with nasogastric tube descending below diaphragm.   There is small left pleural effusion and left basilar atelectasis. The heart remains enlarged.       No change in the right hydropneumothorax which may be loculated with chest tube on the right in the mid hemithorax.   Small left pleural effusion with left basilar infiltrate/atelectasis.   Signed by: Eloisa Landa 9/6/2024 9:20 AM Dictation workstation:   [id number]    ECG 12 lead    Result Date: 9/5/2024  Sinus rhythm with 1st degree AV block with frequent Premature ventricular complexes Left axis deviation Left ventricular hypertrophy with QRS widening and repolarization abnormality ( R in aVL , Faheem product ) Inferior infarct , age undetermined Abnormal ECG When compared with ECG of 23-APR-2024 09:01, Premature ventricular complexes are now Present CO interval has increased (RBBB and left anterior fascicular block) is no longer Present Inferior infarct is now Present Confirmed by Andrea Bentley ([id number]) on 9/5/2024 10:01:05 AM    XR chest 1 view    Result Date: 9/5/2024  Interpreted By:  Saad Cronin, STUDY: XR CHEST 1 VIEW; 9/5/2024 4:57 am   INDICATION: CLINICAL INFORMATION: Signs/Symptoms:Pig tail Chest Tube replacement.   COMPARISON: 09/04/2024   ACCESSION NUMBER(S): [id number]   ORDERING CLINICIAN: ANDRY ABRAHAM   TECHNIQUE: Portable chest one view.   FINDINGS: The cardiac silhouette is quite prominent suggesting cardiomegaly. The tube and line placement is unchanged.  There is a decrease in the right-sided pleural-based density is well as the right-sided parenchymal densities. The pulmonary vasculature is slightly indistinct suggesting mild pulmonary vascular congestion.       There appears to be  increased aeration at the right base suggesting diminishing pleural density within the right hemithorax in association with the chest tube. Infiltrate persists on the right.   MACRO: none   Signed by: Saad Cronin 9/5/2024 8:18 AM Dictation workstation:   [id number]    XR chest 1 view    Result Date: 9/5/2024  Interpreted By:  Cande Keating, STUDY: XR CHEST 1 VIEW;  9/4/2024 11:10 pm   INDICATION: Signs/Symptoms:Chest tube placement.     COMPARISON: Radiographs of the chest dated 09/04/2024; CT of the chest dated 09/04/2024   ACCESSION NUMBER(S): [id number]   ORDERING CLINICIAN: ANDRY ABRAHAM   FINDINGS: AP radiograph of the chest was provided.   Endotracheal tube projects 4.4 cm superior to the kathie. Enteric tube appears to course along the midline expected course of the esophagus, although the distal tip is not well visualized due to underpenetration and overlying structures.   CARDIOMEDIASTINAL SILHOUETTE: Cardiomediastinal silhouette is enlarged, similar to prior exam.   LUNGS: Redemonstration of the large right-sided pleural effusion with associated atelectasis/consolidation, similar in appearance to prior radiographs. No new consolidation or pleural effusion is present in the left lung.   ABDOMEN: No remarkable upper abdominal findings.   BONES: No acute osseous changes.       1.  Enteric tube is coursing along the midline of the mediastinum in the expected course of the esophagus, although the distal tip is not well visualized due to overlying cardiomediastinal silhouette and underpenetration, and may be outside of the field-of-view of the study. Endotracheal tube projects 4.5 cm superior to the kathie. 2. Redemonstration of the large loculated right-sided pleural effusion with the associated atelectasis/consolidation, similar in appearance to prior exam.       MACRO: None   Signed by: Cande Keating 9/5/2024 2:08 AM Dictation workstation:   [id number]    CT chest abdomen pelvis w IV  contrast    Result Date: 9/4/2024  Interpreted By:  Ishaan Banda, STUDY: CT CHEST ABDOMEN PELVIS W IV CONTRAST;  9/4/2024 5:52 pm   INDICATION: Signs/Symptoms:unresponsive.     COMPARISON: None.   ACCESSION NUMBER(S): [id number]   ORDERING CLINICIAN: GURPREET COPPOLA   TECHNIQUE: Contiguous axial images of the chest, abdomen, and pelvis were obtained after the intravenous administration of iodinated contrast. Coronal and sagittal reformatted images were reconstructed from the axial data.   FINDINGS: CT CHEST:   MEDIASTINUM AND LYMPH NODES: NG/OG tube noted with small amount of fluid in the esophagus. The esophageal wall appears within normal limits.  No enlarged intrathoracic or axillary lymph nodes by imaging criteria. No pneumomediastinum.   VESSELS:  Normal caliber thoracic aorta without dissection. Mild aortic atherosclerosis.   HEART: Enlarged. Severe aortic valvular calcifications. Moderate coronary artery calcifications. No significant pericardial effusion.   LUNG, AIRWAYS, PLEURA: There is a large loculated right pleural effusion causing compressive atelectasis on the right lung. As a result, there is complete collapse of the right lower lobe, complete collapse of the right middle lobe, and partial collapse of the right upper lobe. There are multiple loculated compartments largest of which is seen at the right lung base. There is a small amount of mucus in the mid to distal trachea. There is small amount of layering debris in the mainstem bronchi. The majority of the right middle and lower lobe bronchi are collapsed and opacified with low-density debris. There is a trace left pleural effusion with subsegmental left basilar atelectasis. There is emphysema. There are new scattered subcentimeter bilateral centrilobular nodules that are likely infectious/inflammatory in nature from bronchiolitis.   CHEST WALL SOFT TISSUES: No discernible acute abnormality. There is a 5.5 cm x 1.7 cm lipoma in the right  infraspinatus muscle.   OSSEOUS STRUCTURES: No acute osseous abnormality.     CT ABDOMEN/PELVIS:   ABDOMINAL WALL: No significant abnormality.   LIVER: No significant parenchymal abnormality.   BILE DUCTS: No significant intrahepatic or extrahepatic dilatation.   GALLBLADDER: No significant abnormality.   PANCREAS: No significant abnormality.   SPLEEN: No significant abnormality.   ADRENALS: There is a 1.7 cm low-density left adrenal nodule likely representing a adenoma.   KIDNEYS, URETERS, BLADDER: There is a heterogeneously enhancing solid mass in the upper pole the left kidney measuring 2.4 cm x 1.9 cm consistent with renal cell carcinoma. Additionally, a 0.8 cm likely enhancing lesion in the upper pole the right kidney is concerning for renal cell carcinoma. No hydronephrosis or calculi. Urinary bladder is decompressed with Live catheter in place.   REPRODUCTIVE ORGANS: No significant abnormality.   VESSELS: Moderate aortic atherosclerosis without AAA.   RETROPERITONEUM/LYMPH NODES: No acute retroperitoneal abnormality. No enlarged lymph nodes.   BOWEL/MESENTERY/PERITONEUM: Colonic diverticulosis without acute diverticulitis. No inflammatory bowel wall thickening or dilatation. Normal appendix.   No ascites, free air, or fluid collection.     MUSCULOSKELETAL: No acute osseous abnormality.       CT CHEST: Large loculated right pleural effusion causing complete compressive collapse of the right middle lobe and right lower lobe and partial compressive collapse of the right upper lobe. The largest likely compartments located at the inferior right hemithorax. The cause of the effusion is not apparent as there is only a small amount of debris in the distal trachea and right mainstem bronchus but the distal-most right-sided bronchi are occluded due to combination of compressive collapse and small low-density fluid.   Numerous new subcentimeter centrilobular pulmonary nodules that are most likely  infectious/inflammatory in as can be seen with bronchiolitis or fungal infection.   Aortic valve calcification to the extent that would likely result in aortic stenosis.   CT ABDOMEN/PELVIS: Left kidney solid heterogeneously enhancing mass suspicious for renal cell carcinoma (2.4 cm x 1.9 cm) and of suspected 0.8 cm right upper pole renal cell carcinoma measuring 0.8 cm. Recommend urological follow-up/also taken and dedicated MRI kidneys to further evaluate these lesions. YELLOW ALERT: An incidental finding alert was sent per protocol.   No acute abnormality in the abdomen or pelvis.   Colonic diverticulosis without acute diverticulitis.   MACRO: Critical Finding:  New mass in the kidney. Notification was initiated on 9/4/2024 at 6:25 pm by  Ishaan Banda.  (**-YCF-**) Instructions:  MR Abdomen w and wo IV contrast. 1 week.   Signed by: Ishaan Banda 9/4/2024 6:26 PM Dictation workstation:   [id number]    CT head wo IV contrast    Result Date: 9/4/2024  Interpreted By:  Ishaan Banda, STUDY: CT HEAD WO IV CONTRAST;  9/4/2024 5:52 pm   INDICATION: Signs/Symptoms:unrespoonsive.     COMPARISON: None.   ACCESSION NUMBER(S): [id number]   ORDERING CLINICIAN: GURPREET COPPOLA   TECHNIQUE: Noncontrast axial CT images of head were obtained with coronal and sagittal reconstructed images.   FINDINGS: BRAIN PARENCHYMA: Mild periventricular and subcortical hemispheric white matter hypodensities are most compatible with chronic small vessel ischemic disease. No acute intraparenchymal hemorrhage or parenchymal evidence of acute large territory ischemic infarct. Gray-white matter distinction is preserved. No mass-effect.   VENTRICLES and EXTRA-AXIAL SPACES:  No acute extra-axial or intraventricular hemorrhage. No effacement of cerebral sulci. The ventricles and sulci are age-concordant.   PARANASAL SINUSES/MASTOIDS:  No hemorrhage or air-fluid levels within the visualized paranasal sinuses. The mastoids are well  aerated.   CALVARIUM/ORBITS:  No skull fracture.  The orbits and globes are intact to the extent visualized.   EXTRACRANIAL SOFT TISSUES: No discernible abnormality.       No acute intracranial abnormality.     MACRO: None.   Signed by: Ishaan Banda 9/4/2024 6:07 PM Dictation workstation:   [id number]    XR chest 1 view    Result Date: 9/4/2024  Interpreted By:  Rosalie Ellison, STUDY: XR CHEST 1 VIEW;  9/4/2024 4:33 pm   INDICATION: Signs/Symptoms:intubation.   COMPARISON: 04/08/2024   ACCESSION NUMBER(S): [id number]   ORDERING CLINICIAN: GURPREET COPPOLA   TECHNIQUE: A single portable image of the chest was obtained.   FINDINGS: Resolution is limited. The right portion of the chest is not entirely included.   The patient is rotated. The heart size is uncertain.   There appears to be elevated right hemidiaphragm and right-sided infiltration.   An endotracheal tube terminates above the kathie. A nasogastric tube terminates below the diaphragm.   COMPARISON OF FINDING: The nasogastric tube was placed in the interval. The endotracheal tube was placed in the interval.       Interval placement of an endotracheal tube. The tip is above the kathie. Interval placement of a nasogastric tube. It appears to terminate below the diaphragm.   The exam is extremely limited.   MACRO: none   Signed by: Rosalie Ellison 9/4/2024 4:43 PM Dictation workstation:   [id number]    This patient has a urinary catheter   Reason for the urinary catheter remaining today? critically ill patient who need accurate urinary output measurements               Assessment/Plan   Assessment & Plan  Acute respiratory failure with hypoxia and hypercapnia (Multi)  67-year-old gentleman with history of chronic diastolic heart failure, COPD, obstructive sleep apnea, obesity, type 2 diabetes mellitus, hypertension, hypothyroidism and hyperlipidemia was admitted with acute respiratory failure with hypoxia and hypercapnia and noted to have a right loculated  pleural effusion and bilateral lower lobe lung infiltrate and right hydropneumothorax.  He was hypotensive on admission and required 3 L of fluid to resuscitate and admitted to intensive care unit and was intubated because of acute respiratory failure and remained intubated for several days.  Currently he is extubated and on high flow oxygen as mentioned above.  He had significant leukocytosis of 22,000 on admission and his sputum culture was positive for stenotrophomonas.  Patient is being continued on IV Zosyn and IV Levaquin, pulmonary and infectious disease on board.    1.  Acute hypoxic hypercapnic respiratory failure with hypotension and right hydropneumothorax and bilateral infiltrate and positive for pneumonia.Sputum culture was positive for stenotrophomonas.   Initially intubated for several days and since then he has been extubated and is on Improving and currently is on 13 L nasal cannula.  Continued on IV Zosyn and Levaquin.  Paresh santoro    2.  Patient had hydropneumothorax on admission and had chest tube, chest tube has been removed since then..    3.  Hypotension has resolved    4.  COPD and obstructive sleep apnea.  Aerosol treatment and steroids.  Currently on Prednisone 20 mg daily.    5.  Diabetes mellitus type 2, continue sliding scale insulin, patient was on Mounjaro at home prior to admission.    6.  Patient  DNR/DNI.    7.  Hematuria possibly secondary to trauma secondary to Live catheter which has resolved after irrigation.    8.  Recurrent episodes of brief nonsustained ventricular tachycardia, also moderate systolic heart failure, previous echocardiogram revealed LV ejection fraction of 30 to 35% with moderate aortic stenosis, patient is started on IV Lasix this morning and cardiology consult appreciated and repeat echocardiogram quality was suboptimal he did reveal LV ejection fraction of 23% with global hypokinesia and moderate aortic cusp calcification and moderate right ventricular  dysfunction.  Patient has been started on IV Lasix and Jardiance and consider starting GDMT once condition is stable.    9.  Patient had a modified barium swallow and has been recommended to have a puréed diet with honey thickened and nectar thickened liquids.    Nystatin cream to both groin area for inguinal intertrigo.    Reviewed all labs patient's medical record and imaging studies and discussed the plan of treatment with the patient..    Continue PT OT and processing for LTAC.       I spent 35 minutes in the professional and overall care of this patient.      Noble Gatica MD

## 2024-09-21 NOTE — PROGRESS NOTES
Alo Glass is a 69 y.o. male on day 17 of admission presenting with Acute respiratory failure with hypoxia and hypercapnia (Multi).  Patient with h/o CHF, aortic valve stenosis, DLP, T2DM, COPD, MELISSA, who was brought in by EMS after being found unresponsiveness at home. Work up revealed respiratory failure with hypoxia and hypercapnia, also hypotensive. Received IVF, antibiotics, but given persistent hypotension started on Levophed and and admitted to ICU. In the ICU found to have coffee ground emesis, subsequently was intubated. CT chest showed R loculated pleural effusion and multiple nodules. Sputum culture +ve for stenotrophomonas. Patient overall improved and was subsequently extubated. Post extubation initially had high oxygen requirements, but now is improving. Pulmonary is consulted to assist with his care.   Subjective   No acute overnight events. O2 requirements stable at 10L. Refused Duo-Neb, BPH and BiPAP last night.    States he not receiving any physical therapy. SOB stable. Denies cough, sputum, wheezing or any pains. Overall feels worse than yesterday.   Objective   Scheduled medications  aspirin, 81 mg, oral, Daily  enoxaparin, 40 mg, subcutaneous, q12h SHARRON  ezetimibe, 10 mg, oral, Daily  folic acid, 1 mg, oral, Daily  guaiFENesin, 200 mg, oral, TID  insulin lispro, 0-10 Units, subcutaneous, q4h  ipratropium-albuteroL, 3 mL, nebulization, 4x daily  levoFLOXacin, 750 mg, oral, q24h SHARRON  levothyroxine, 125 mcg, oral, Daily  multivitamin with minerals, 1 tablet, oral, Daily  nystatin, 5 mL, Swish & Swallow, 4x daily  pantoprazole, 40 mg, intravenous, Daily  piperacillin-tazobactam, 3.375 g, intravenous, q6h  polyethylene glycol, 17 g, nasogastric tube, Daily  predniSONE, 20 mg, oral, Daily  sennosides-docusate sodium, 2 tablet, oral, BID  thiamine, 100 mg, oral, Daily  Continuous medications  PRN medications  PRN medications: dextrose, dextrose, glucagon, glucagon, ipratropium-albuteroL, oxygen,  "oxygen   Physical Exam  Constitutional:       General: He is not in acute distress.     Appearance: He is obese. He is ill-appearing. He is not toxic-appearing.   HENT:      Head: Normocephalic and atraumatic.      Nose:      Comments: On 10L NC  Eyes:      General: No scleral icterus.     Extraocular Movements: Extraocular movements intact.      Pupils: Pupils are equal, round, and reactive to light.   Cardiovascular:      Rate and Rhythm: Normal rate and regular rhythm.      Heart sounds: No murmur heard.     No friction rub. No gallop.   Pulmonary:      Effort: Pulmonary effort is normal. No respiratory distress.      Breath sounds: No wheezing or rales.      Comments: Clear lung fields b/l with fair air entry.  Abdominal:      General: Bowel sounds are normal. There is no distension.      Palpations: Abdomen is soft.      Tenderness: There is no abdominal tenderness.   Musculoskeletal:      Cervical back: Normal range of motion and neck supple. No rigidity or tenderness.      Right lower leg: Edema (2+) present.      Left lower leg: Edema (1+) present.   Lymphadenopathy:      Cervical: No cervical adenopathy.   Skin:     General: Skin is warm and dry.      Coloration: Skin is not jaundiced.      Comments: Chronic discoloration b/l LE edema.     Neurological:      General: No focal deficit present.      Mental Status: He is alert and oriented to person, place, and time.      Cranial Nerves: No cranial nerve deficit.      Motor: Weakness (generalized weakness now is improved.) present.   Psychiatric:         Mood and Affect: Mood normal.         Behavior: Behavior normal.     Last Recorded Vitals  Blood pressure 109/63, pulse 80, temperature 36.7 °C (98.1 °F), temperature source Temporal, resp. rate 17, height 1.803 m (5' 10.98\"), weight 118 kg (260 lb 12.9 oz), SpO2 94%.  Intake/Output last 3 Shifts:  I/O last 3 completed shifts:  In: - (0 mL/kg)   Out: 5075 (42.9 mL/kg) [Urine:5075 (1.2 mL/kg/hr)]  Weight: 118.3 " kg   Relevant Results  Latest labs and imaging reviewed.     Assessment/Plan   Assessment & Plan  Acute respiratory failure with hypoxia and hypercapnia (Multi)    69 YOM with h/o CHF, aortic valve stenosis, DLP, T2DM, COPD, MELISSA, who was brought in by EMS after being found unresponsiveness at home. Work up revealed respiratory failure with hypoxia and hypercapnia, also hypotensive. Received IVF, antibiotics, but given persistent hypotension started on Levophed and and admitted to ICU. In the ICU found to have coffee ground emesis, subsequently was intubated. CT chest showed R loculated pleural effusion and multiple nodules. Sputum culture +ve for stenotrophomonas. Patient overall improved and was subsequently extubated. Post extubation initially had high oxygen requirements, but now is improving. Pulmonary is consulted to assist with his care.  .     Respiratory failure: acute with hypoxia and hypercarbia, requiring MV, now overall improved. Echo showed: HFrEF 25%, mildly reduced RVSF, enlarged RV.       CHF management as per cardiology       BiPAP 18/8 at nights       Continue supplemental O2, wean off as tolerates       Home O2 evaluation before DC       BPH with IS, Acapella       Management of the individual causes as below     COPD: PFT in 2019 showed 54%, FEV1 32-->46%, RV/TLC 63%, TLC 85%, and DLCO 77%, overall associated with stage III COPD. On triple therapy as outpatient        Continue Prednisone will change to 10 mg daily       Continue Duo-neb       FU with pulmonary after DC. Patient needs to make an appointment with a new pulmonologist as currently does not have a pulmonary provider.        Resume Trelegy and albuterol on DC     MELISSA: +/- OHS       Continue BiPAP at nights       Resume APAP 10-15 at nights on DC     Pleural effusion: large R sided, likely parapneumonic, S/p chest tube placement. Analysis showed neutrophilic predominant effusion, cultures negative. CT is now removed.        Will monitor  for now     Pneumonia: RML,         Completed a course of Levaquin.      DVT prophylaxis: Lovenox     Pulmonary will FU while in house.     Marta Thurston MD

## 2024-09-21 NOTE — PROGRESS NOTES
Subjective Data:  Diuresing well but unhappy with the Lasix  Reports that breathing would be better if he was getting out of bed more  Remains on HiFlow oxygen  -4L yesetrday but no intake recorded so inaccurate    Tele: NSR with occasional PVCs, HR 80s, 10 beat run of NSVT x1     Objective Data:  Last Recorded Vitals:  Vitals:    24 0330 24 0600 24 0824 24 1136   BP:   109/63    BP Location:   Right arm    Patient Position:   Lying    Pulse: 80      Resp:   17    Temp:   36.7 °C (98.1 °F)    TempSrc:   Temporal    SpO2: 94%  96% 94%   Weight:  118 kg (260 lb 12.9 oz)     Height:         Medical Gas Therapy: Supplemental oxygen  Medical Gas Delivery Method: High flow nasal cannula  Weight  Av kg (288 lb 12.7 oz)  Min: 118 kg (260 lb 12.9 oz)  Max: 140 kg (307 lb 12.2 oz)    Last I/O:    Intake/Output Summary (Last 24 hours) at 2024 1159  Last data filed at 2024 0700  Gross per 24 hour   Intake --   Output 3975 ml   Net -3975 ml     Net IO Since Admission: -26,997.46 mL [24 1159]     Diagnostic Results   Labs  CBC- 2024:  5:34 AM  8.6 15.9 243    50.4      BMP- 2024:  5:34 AM  141 18 102 120   3.6 0.43 35    Estimated Creatinine Clearance: 125 mL/min (A) (by C-G formula based on SCr of 0.43 mg/dL (L)).     CA: 7.8 PROTIEN: 4.9 ALT: 21 Total Bili: 1.0 M.20   PHOS: 2.6 ALBUMIN: 2.8 AST: 15   Alk Phos: 69      COAGS- 2024:  5:02 PM  1.1   12.4 _     CV Labs  Troponin I, High Sensitivity   Date/Time Value Ref Range Status   2024 06:03 PM 69 (HH) 0 - 20 ng/L Final     Comment:     Previous result verified on 2024 1734 on specimen/case [id number] called with component [facility] for procedure Troponin I, High Sensitivity, Initial with value 99 ng/L.   2024 05:01 PM 99 (HH) 0 - 20 ng/L Final   2024 06:46 PM 67 (HH) 0 - 20 ng/L Final     Comment:     Previous result verified on 2024 1757 on specimen/case [id number] called with  component [facility] for procedure Troponin I, High Sensitivity with value 62 ng/L.   04/04/2024 05:20 PM 62 (HH) 0 - 20 ng/L Final   03/12/2024 07:07  (HH) 0 - 20 ng/L Final     Comment:     Previous result verified on 3/12/2024 0048 on specimen/case [id number] called with component [facility] for procedure Troponin I, High Sensitivity with value 89 ng/L.   03/12/2024 03:08  (HH) 0 - 20 ng/L Final     Comment:     Previous result verified on 3/12/2024 0048 on specimen/case [id number] called with component [facility] for procedure Troponin I, High Sensitivity with value 89 ng/L.     BNP   Date/Time Value Ref Range Status   09/18/2024 04:39  (H) 0 - 99 pg/mL Final   09/04/2024 05:01 PM 1,155 (H) 0 - 99 pg/mL Final   04/08/2024 10:12  (H) 0 - 99 pg/mL Final     Hemoglobin A1C   Date/Time Value Ref Range Status   06/25/2024 04:56 PM 5.5 see below % Final   02/20/2024 10:35 AM 5.8 (H) see below % Final     LDL Calculated   Date/Time Value Ref Range Status   02/20/2024 10:35  (H) <=99 mg/dL Final     Comment:                                 Near   Borderline      AGE      Desirable  Optimal    High     High     Very High     0-19 Y     0 - 109     ---    110-129   >/= 130     ----    20-24 Y     0 - 119     ---    120-159   >/= 160     ----      >24 Y     0 -  99   100-129  130-159   160-189     >/=190       VLDL   Date/Time Value Ref Range Status   02/20/2024 10:35 AM 16 0 - 40 mg/dL Final   09/10/2021 01:29 PM 27 0 - 40 mg/dL Final   09/18/2018 02:26 PM 22 0 - 40 mg/dL Final       Pertinent Imaging  XR chest 1 view 09/17/2024  Cardiomegaly.  Stable small left pleural effusion with left basilar atelectasis/pneumonia, stable to slightly progressed.  Pleural and parenchymal opacities at the right base, mildly progressed.    CT chest abdomen pelvis w IV contrast 09/04/2024  Large loculated right pleural effusion causing complete compressive collapse of the right middle lobe and right lower lobe and  partial compressive collapse of the right upper lobe. The largest likely compartments located at the inferior right hemithorax. The cause of the effusion is not apparent as there is only a small amount of debris in the distal trachea and right mainstem bronchus but the distal-most right-sided bronchi are occluded due to combination of compressive collapse and small low-density fluid.    Numerous new subcentimeter centrilobular pulmonary nodules that are most likely infectious/inflammatory in as can be seen with bronchiolitis or fungal infection.    Aortic valve calcification to the extent that would likely result inaortic stenosis.    Left kidney solid heterogeneously enhancing mass suspicious for renal cell carcinoma (2.4 cm x 1.9 cm) and of suspected 0.8 cm right upper pole renal cell carcinoma measuring 0.8 cm. Recommend urological follow-up/also taken and dedicated MRI kidneys to further evaluate these lesions. YELLOW ALERT: An incidental finding alert was sent per protocol.    No acute abnormality in the abdomen or pelvis.    Colonic diverticulosis without acute diverticulitis.    CT head wo IV contrast 09/04/2024  No acute intracranial abnormality.           Past Cardiology Tests (Last 3 Years):  EKG:  Electrocardiogram 9/4/24  Sinus rhythm with 1st degree AV block with Premature atrial complexes with Aberrant conduction  Right bundle branch block  Left anterior fascicular block  Bifascicular block   Anteroseptal infarct (cited on or before 23-APR-2024)    ECG 12 lead 4/23/24  Normal sinus rhythm  Right bundle branch block  Left anterior fascicular block   Bifascicular block   Moderate voltage criteria for LVH, may be normal variant  Cannot rule out Septal infarct , age undetermined    Electrocardiogram 3/8/24  Sinus rhythm with occasional Premature ventricular complexes  Right bundle branch block  T wave abnormality, consider lateral ischemia  Abnormal ECG    Echo:  Transthoracic Echo (TTE) Limited  9/19/24  1. Left ventricular ejection fraction is severely decreased, calculated by Green's biplane at 23%.  2. There is global hypokinesis of the left ventricle with minor regional variations.  3. Poorly visualized anatomical structures due to suboptimal image quality.  4. Spectral Doppler shows an impaired relaxation pattern of left ventricular diastolic filling.  5. There is mildly reduced right ventricular systolic function.  6. Moderately enlarged right ventricle.  7. There is moderate aortic valve cusp calcification. The was no assesment of aortic stenosis in this limited study.  8. Compared with study dated 3/11/2024, there is RV dilation and reduced function.    Transthoracic echo (TTE) complete 3/11/24  1. Left ventricular systolic function is moderately decreased with a 30-35% estimated ejection fraction.  2. Basal inferolateral segment is abnormal.  3. Poorly visualized anatomical structures due to suboptimal image quality.  4. Moderate aortic valve stenosis.  5. There is global hypokinesis of the left ventricle with minor regional variations.    Inpatient Medications:  Scheduled medications   Medication Dose Route Frequency    aspirin  81 mg oral Daily    empagliflozin  10 mg oral Daily    enoxaparin  40 mg subcutaneous q12h SHARRON    ezetimibe  10 mg oral Daily    folic acid  1 mg oral Daily    furosemide  80 mg intravenous q12h    insulin lispro  0-10 Units subcutaneous q4h    ipratropium-albuteroL  3 mL nebulization 4x daily    levothyroxine  125 mcg oral Daily    multivitamin with minerals  1 tablet oral Daily    nystatin  5 mL Swish & Swallow 4x daily    nystatin  1 Application Topical BID    pantoprazole  40 mg intravenous Daily    polyethylene glycol  17 g nasogastric tube Daily    [START ON 9/22/2024] predniSONE  10 mg oral Daily    sennosides-docusate sodium  2 tablet oral BID    tamsulosin  0.4 mg oral Daily    thiamine  100 mg oral Daily   PRN medications: acetaminophen, acetaminophen,  "dextrose, dextrose, glucagon, glucagon, ipratropium-albuteroL, oxygen, oxygen       Physical Exam:  Vitals and nursing notes reviewed.  /63 (BP Location: Right arm, Patient Position: Lying)   Pulse 80   Temp 36.7 °C (98.1 °F) (Temporal)   Resp 17   Ht 1.803 m (5' 10.98\")   Wt 118 kg (260 lb 12.9 oz)   SpO2 94%   BMI 36.39 kg/m²   GENERAL: Alert and awake; in no acute distress  SKIN: Warm and dry, cap refill <2  HEENT: Normocephalic, PEERL, mucous membranes pink and moist  NECK: No JVD or hepatojugular reflex  CARDIAC: Regular rate and rhythm, S1S2, +murmur  CHEST: Increased respiratory effort, diminished breath sounds throughout  ABDOMEN: soft, non-distended, non-tender with palpation  EXTREMITIES: 1+ pitting BL lower extremity edema, normal pulses all 4 extremities  NEURO: Alert and oriented, mental status at baseline, no focal deficits  PSYCH: Behavior and affect as expected        Assessment/Plan   Cards: Teresa      69 year old male with history MELISSA on CPAP, COPD stage III triple therapy as outpatient, nicotine dependence, T2DM, severe obesity, hypertension, hyperlipidemia (intolerant to statins due to myalgias), lymphedema, coronary calcification by chest CT, moderate aortic stenosis (6/2019) and newly diagnosed CHF with an EF of 30-35% (3/2024) originally admitted on 9/4/24 here at [facility].  He was non responsive at home by his neighbor-> admitted with hypoxic/hypercapnic respiratory failure->  intubated, placed on pressor, vent support, started on ATB, large R sided likely parapneumonic s/p chest tube placement (Analysis showed neutrophilic predominant effusion, cultures negative. CT is now removed) and PNA due to Stenotrophomonas maltophilia which is resolving. Overnight patient had significant CO2 retention and had to be given Ativan 1 mg IV to calm him down and then was to be placed on BiPAP->CO2 84. Cardiology consult now placed for \"NSVT, HF\"  Pulmonary and ID have been following the " patient.     Home Cardiology Meds: ASA 81 mg daily, Jardiance 10mg daily, Lasix 120 mg daily, metolazone 2.5 mg daily, valsartan 40 mg daily, Zetia 10mg daily    TTE 9/19/24   1. Left ventricular ejection fraction is severely decreased, calculated by Green's biplane at 23%.   2. There is global hypokinesis of the left ventricle with minor regional variations.   3. Poorly visualized anatomical structures due to suboptimal image quality.   4. Spectral Doppler shows an impaired relaxation pattern of left ventricular diastolic filling.   5. There is mildly reduced right ventricular systolic function.   6. Moderately enlarged right ventricle.   7. There is moderate aortic valve cusp calcification. The was no assesment of aortic stenosis in this limited study.   8. Compared with study dated 3/11/2024, there is RV dilation and reduced function.    #Acute Hypoxic Respiratory Failure, large R pleural effusion- Sputum cx +stenotrophomonas, completed IV abx  -Oxygen status improving  #Acute on Chronic HFrEF- EF now 23%  -, CXR with stable small left pleural effusion with left basilar atelectasis/pneumonia  GDMT  --BB: None d/t hx of bradycardia  --ACE/ARB/ARNI: Valsartan 40mg daily  --SGLT2i: Jardiance 10mg daily  --MRA: None  --Diuretic: Lasix 120mg + Metolazone 2.5mg daily  -Currently diuresing with IV Lasix 80mg BID, robust response  #HLD- Intolerant to statins, on Ezetimibe 10mg daily  #CAD- c/w ASA daily  #Moderate AS- Patient not interested in intervention       RECOMMENDATIONS:  -Robust response with IV Lasix 80mg BID, continue current dose  -Resume home GDMT as tolerated, BP currently soft in the setting of diuresis  -Wean oxygen as tolerated  -Continuous telemetry monitoring  -Monitor electrolytes, replete for K <4 and Mg <2  -Daily wts, strict I&Os, low sodium diet      Code Status: Full Code    PARAM Tenorio-CNP   Advanced Practice Provider  Cardiology  [facility]  09/21/24 12:17 PM

## 2024-09-21 NOTE — CARE PLAN
The patient's goals for the shift include Pt. will have a safe, restful and uneventful evening    The clinical goals for the shift include Patient will remain hemodynamically stable      Problem: Skin  Goal: Decreased wound size/increased tissue granulation at next dressing change  Outcome: Progressing  Goal: Prevent/manage excess moisture  Outcome: Progressing  Goal: Prevent/minimize sheer/friction injuries  Outcome: Progressing  Goal: Promote/optimize nutrition  Outcome: Progressing     Problem: Nutrition  Goal: Oral intake greater than 50%  Outcome: Progressing     Problem: Nutrition  Goal: Consume prescribed supplement  Outcome: Progressing     Problem: Nutrition  Goal: Nutrition support goals are met within 48 hrs  Outcome: Progressing     Problem: Diabetes  Goal: Increase stability of blood glucose readings by end of shift  Outcome: Progressing     Problem: Fall/Injury  Goal: Not fall by end of shift  Outcome: Progressing     Problem: Fall/Injury  Goal: Be free from injury by end of the shift  Outcome: Progressing

## 2024-09-22 VITALS
RESPIRATION RATE: 21 BRPM | DIASTOLIC BLOOD PRESSURE: 65 MMHG | TEMPERATURE: 98.4 F | WEIGHT: 260.8 LBS | SYSTOLIC BLOOD PRESSURE: 96 MMHG | HEART RATE: 68 BPM | HEIGHT: 71 IN | BODY MASS INDEX: 36.51 KG/M2 | OXYGEN SATURATION: 98 %

## 2024-09-22 LAB
ALBUMIN SERPL BCP-MCNC: 2.7 G/DL (ref 3.4–5)
ANION GAP SERPL CALC-SCNC: 8 MMOL/L (ref 10–20)
ATRIAL RATE: 63 BPM
ATRIAL RATE: 87 BPM
BUN SERPL-MCNC: 21 MG/DL (ref 6–23)
CALCIUM SERPL-MCNC: 8 MG/DL (ref 8.6–10.3)
CHLORIDE SERPL-SCNC: 95 MMOL/L (ref 98–107)
CO2 SERPL-SCNC: 41 MMOL/L (ref 21–32)
CREAT SERPL-MCNC: 0.4 MG/DL (ref 0.5–1.3)
EGFRCR SERPLBLD CKD-EPI 2021: >90 ML/MIN/1.73M*2
ERYTHROCYTE [DISTWIDTH] IN BLOOD BY AUTOMATED COUNT: 13.5 % (ref 11.5–14.5)
GLUCOSE BLD MANUAL STRIP-MCNC: 105 MG/DL (ref 74–99)
GLUCOSE BLD MANUAL STRIP-MCNC: 132 MG/DL (ref 74–99)
GLUCOSE BLD MANUAL STRIP-MCNC: 143 MG/DL (ref 74–99)
GLUCOSE BLD MANUAL STRIP-MCNC: 193 MG/DL (ref 74–99)
GLUCOSE BLD MANUAL STRIP-MCNC: 242 MG/DL (ref 74–99)
GLUCOSE SERPL-MCNC: 183 MG/DL (ref 74–99)
HCT VFR BLD AUTO: 51.3 % (ref 41–52)
HGB BLD-MCNC: 16.4 G/DL (ref 13.5–17.5)
MCH RBC QN AUTO: 31.7 PG (ref 26–34)
MCHC RBC AUTO-ENTMCNC: 32 G/DL (ref 32–36)
MCV RBC AUTO: 99 FL (ref 80–100)
NRBC BLD-RTO: 0 /100 WBCS (ref 0–0)
P AXIS: -13 DEGREES
P AXIS: -23 DEGREES
P OFFSET: 150 MS
P OFFSET: 186 MS
P ONSET: 109 MS
P ONSET: 71 MS
PHOSPHATE SERPL-MCNC: 2.9 MG/DL (ref 2.5–4.9)
PLATELET # BLD AUTO: 241 X10*3/UL (ref 150–450)
POTASSIUM SERPL-SCNC: 3.9 MMOL/L (ref 3.5–5.3)
PR INTERVAL: 228 MS
PR INTERVAL: 250 MS
Q ONSET: 196 MS
Q ONSET: 223 MS
QRS COUNT: 11 BEATS
QRS COUNT: 14 BEATS
QRS DURATION: 156 MS
QRS DURATION: 158 MS
QT INTERVAL: 416 MS
QT INTERVAL: 438 MS
QTC CALCULATION(BAZETT): 448 MS
QTC CALCULATION(BAZETT): 500 MS
QTC FREDERICIA: 445 MS
QTC FREDERICIA: 470 MS
R AXIS: -74 DEGREES
R AXIS: -81 DEGREES
RBC # BLD AUTO: 5.17 X10*6/UL (ref 4.5–5.9)
SODIUM SERPL-SCNC: 140 MMOL/L (ref 136–145)
T AXIS: 38 DEGREES
T AXIS: 51 DEGREES
T OFFSET: 415 MS
T OFFSET: 431 MS
VENTRICULAR RATE: 63 BPM
VENTRICULAR RATE: 87 BPM
WBC # BLD AUTO: 11 X10*3/UL (ref 4.4–11.3)

## 2024-09-22 PROCEDURE — 2500000004 HC RX 250 GENERAL PHARMACY W/ HCPCS (ALT 636 FOR OP/ED): Performed by: NURSE PRACTITIONER

## 2024-09-22 PROCEDURE — 85027 COMPLETE CBC AUTOMATED: CPT | Performed by: NURSE PRACTITIONER

## 2024-09-22 PROCEDURE — 2500000001 HC RX 250 WO HCPCS SELF ADMINISTERED DRUGS (ALT 637 FOR MEDICARE OP): Performed by: SURGERY

## 2024-09-22 PROCEDURE — 2500000002 HC RX 250 W HCPCS SELF ADMINISTERED DRUGS (ALT 637 FOR MEDICARE OP, ALT 636 FOR OP/ED): Performed by: SURGERY

## 2024-09-22 PROCEDURE — 2500000004 HC RX 250 GENERAL PHARMACY W/ HCPCS (ALT 636 FOR OP/ED): Performed by: SURGERY

## 2024-09-22 PROCEDURE — 2500000004 HC RX 250 GENERAL PHARMACY W/ HCPCS (ALT 636 FOR OP/ED): Performed by: INTERNAL MEDICINE

## 2024-09-22 PROCEDURE — 2500000001 HC RX 250 WO HCPCS SELF ADMINISTERED DRUGS (ALT 637 FOR MEDICARE OP): Performed by: NURSE PRACTITIONER

## 2024-09-22 PROCEDURE — 94640 AIRWAY INHALATION TREATMENT: CPT

## 2024-09-22 PROCEDURE — 2500000005 HC RX 250 GENERAL PHARMACY W/O HCPCS: Performed by: SURGERY

## 2024-09-22 PROCEDURE — 2500000002 HC RX 250 W HCPCS SELF ADMINISTERED DRUGS (ALT 637 FOR MEDICARE OP, ALT 636 FOR OP/ED): Performed by: INTERNAL MEDICINE

## 2024-09-22 PROCEDURE — 80069 RENAL FUNCTION PANEL: CPT | Performed by: NURSE PRACTITIONER

## 2024-09-22 PROCEDURE — 2500000005 HC RX 250 GENERAL PHARMACY W/O HCPCS: Performed by: INTERNAL MEDICINE

## 2024-09-22 PROCEDURE — 36415 COLL VENOUS BLD VENIPUNCTURE: CPT | Performed by: NURSE PRACTITIONER

## 2024-09-22 PROCEDURE — 2060000001 HC INTERMEDIATE ICU ROOM DAILY

## 2024-09-22 PROCEDURE — 2500000001 HC RX 250 WO HCPCS SELF ADMINISTERED DRUGS (ALT 637 FOR MEDICARE OP)

## 2024-09-22 PROCEDURE — 2500000001 HC RX 250 WO HCPCS SELF ADMINISTERED DRUGS (ALT 637 FOR MEDICARE OP): Performed by: INTERNAL MEDICINE

## 2024-09-22 PROCEDURE — 99232 SBSQ HOSP IP/OBS MODERATE 35: CPT | Performed by: INTERNAL MEDICINE

## 2024-09-22 PROCEDURE — 99232 SBSQ HOSP IP/OBS MODERATE 35: CPT | Performed by: NURSE PRACTITIONER

## 2024-09-22 PROCEDURE — 82947 ASSAY GLUCOSE BLOOD QUANT: CPT

## 2024-09-22 RX ORDER — TRAMADOL HYDROCHLORIDE 50 MG/1
50 TABLET ORAL ONCE
Status: COMPLETED | OUTPATIENT
Start: 2024-09-22 | End: 2024-09-22

## 2024-09-22 ASSESSMENT — COGNITIVE AND FUNCTIONAL STATUS - GENERAL
WALKING IN HOSPITAL ROOM: TOTAL
DRESSING REGULAR LOWER BODY CLOTHING: A LOT
MOVING FROM LYING ON BACK TO SITTING ON SIDE OF FLAT BED WITH BEDRAILS: A LOT
DRESSING REGULAR UPPER BODY CLOTHING: A LOT
MOVING TO AND FROM BED TO CHAIR: A LOT
STANDING UP FROM CHAIR USING ARMS: TOTAL
TOILETING: TOTAL
TURNING FROM BACK TO SIDE WHILE IN FLAT BAD: A LOT
CLIMB 3 TO 5 STEPS WITH RAILING: TOTAL
DAILY ACTIVITIY SCORE: 15
HELP NEEDED FOR BATHING: A LOT
MOBILITY SCORE: 9

## 2024-09-22 ASSESSMENT — PAIN - FUNCTIONAL ASSESSMENT
PAIN_FUNCTIONAL_ASSESSMENT: 0-10

## 2024-09-22 ASSESSMENT — PAIN SCALES - GENERAL
PAINLEVEL_OUTOF10: 0 - NO PAIN
PAINLEVEL_OUTOF10: 8
PAINLEVEL_OUTOF10: 0 - NO PAIN
PAINLEVEL_OUTOF10: 8
PAINLEVEL_OUTOF10: 2

## 2024-09-22 ASSESSMENT — PAIN DESCRIPTION - ORIENTATION: ORIENTATION: MID;RIGHT;LEFT

## 2024-09-22 ASSESSMENT — PAIN DESCRIPTION - LOCATION: LOCATION: BUTTOCKS

## 2024-09-22 NOTE — PROGRESS NOTES
"Alo Glass is a 69 y.o. male on day 18 of admission presenting with Acute respiratory failure with hypoxia and hypercapnia (Multi).    Subjective   Denies shortness of breath, pain or cough.  On 11 L nasal cannula oxygen with an oxygen saturation of 99%.  Reportedly refused vest therapy and only used CPAP last night for 2 hours.  He is dissatisfied with the care at [facility] and is requesting physical therapy.     Objective   Physical Exam  Vitals  Blood pressure 117/75, pulse 89, temperature 36.7 °C (98.1 °F), temperature source Temporal, resp. rate 17, height 1.803 m (5' 10.98\"), weight 118 kg (260 lb 12.9 oz), SpO2 99%.  Intake/Output last 3 Shifts:  I/O last 3 completed shifts:  In: - (0 mL/kg)   Out: 4975 (42.1 mL/kg) [Urine:4975 (1.2 mL/kg/hr)]  Weight: 118.3 kg     General-awake, alert and in no acute distress.  Lungs-clear, without wheezes, rales or rhonchi.  Heart-tachycardic with a regular rhythm.  Abdomen-positive bowel sounds.  Extremities-trace lower extremity edema.  Skin-no rashes.    Scheduled medications  aspirin, 81 mg, oral, Daily  empagliflozin, 10 mg, oral, Daily  enoxaparin, 40 mg, subcutaneous, q12h SHARRON  ezetimibe, 10 mg, oral, Daily  folic acid, 1 mg, oral, Daily  furosemide, 80 mg, intravenous, q12h  insulin lispro, 0-10 Units, subcutaneous, q4h  ipratropium-albuteroL, 3 mL, nebulization, 4x daily  levothyroxine, 125 mcg, oral, Daily  multivitamin with minerals, 1 tablet, oral, Daily  nystatin, 5 mL, Swish & Swallow, 4x daily  nystatin, 1 Application, Topical, BID  oxygen, , inhalation, q PM  pantoprazole, 40 mg, intravenous, Daily  polyethylene glycol, 17 g, nasogastric tube, Daily  predniSONE, 10 mg, oral, Daily  sennosides-docusate sodium, 2 tablet, oral, BID  tamsulosin, 0.4 mg, oral, Daily  thiamine, 100 mg, oral, Daily      Continuous medications     PRN medications  PRN medications: acetaminophen, acetaminophen, dextrose, dextrose, glucagon, glucagon, ipratropium-albuteroL, oxygen, " oxygen     Results for orders placed or performed during the hospital encounter of 09/04/24 (from the past 24 hour(s))   Renal Function Panel   Result Value Ref Range    Glucose 181 (H) 74 - 99 mg/dL    Sodium 140 136 - 145 mmol/L    Potassium 3.4 (L) 3.5 - 5.3 mmol/L    Chloride 97 (L) 98 - 107 mmol/L    Bicarbonate 37 (H) 21 - 32 mmol/L    Anion Gap 9 (L) 10 - 20 mmol/L    Urea Nitrogen 18 6 - 23 mg/dL    Creatinine 0.40 (L) 0.50 - 1.30 mg/dL    eGFR >90 >60 mL/min/1.73m*2    Calcium 8.3 (L) 8.6 - 10.3 mg/dL    Phosphorus 2.4 (L) 2.5 - 4.9 mg/dL    Albumin 3.0 (L) 3.4 - 5.0 g/dL   CBC   Result Value Ref Range    WBC 11.0 4.4 - 11.3 x10*3/uL    nRBC 0.0 0.0 - 0.0 /100 WBCs    RBC 5.37 4.50 - 5.90 x10*6/uL    Hemoglobin 17.0 13.5 - 17.5 g/dL    Hematocrit 53.3 (H) 41.0 - 52.0 %    MCV 99 80 - 100 fL    MCH 31.7 26.0 - 34.0 pg    MCHC 31.9 (L) 32.0 - 36.0 g/dL    RDW 13.7 11.5 - 14.5 %    Platelets 245 150 - 450 x10*3/uL   POCT GLUCOSE   Result Value Ref Range    POCT Glucose 154 (H) 74 - 99 mg/dL   POCT GLUCOSE   Result Value Ref Range    POCT Glucose 208 (H) 74 - 99 mg/dL   POCT GLUCOSE   Result Value Ref Range    POCT Glucose 123 (H) 74 - 99 mg/dL   POCT GLUCOSE   Result Value Ref Range    POCT Glucose 143 (H) 74 - 99 mg/dL   POCT GLUCOSE   Result Value Ref Range    POCT Glucose 105 (H) 74 - 99 mg/dL      Assessment:  69-year-old man with a history of congestive heart failure, aortic stenosis, diabetes, COPD and obstructive sleep apnea, admitted with hypoxic and hypercapnic respiratory failure, hypotension, unresponsiveness and an upper GI bleed and found to have a loculated right pleural effusion, pulmonary nodules, right middle lobe pneumonia, cardiomyopathy by echocardiogram and a sputum culture positive for Stenotrophomonas.  He has underlying COPD, but there is no bronchospasm at the present time.  He is noncompliant with CPAP for sleep apnea.  He modified barium swallow is pending.  Overall, he appears to be  clinically stable, except for persistent significant hypoxia.    Recommend:  1.  Continue DuoNeb, prednisone, Lasix, Synthroid and Lovenox.  2.  Wean FiO2 to keep oxygen saturation approximately 92 to 95%; home oxygen evaluation prior to discharge.  3.  Incentive spirometry and Acapella treatment.  4.  Encourage use of nocturnal CPAP while admitted; resume home APAP after discharge..  5.  Check modified barium swallow--pending.  6.  Would discharge on Trelegy and albuterol.  7.  Follow-up with pulmonary medicine after discharge.    Giovanni Padilla MD

## 2024-09-22 NOTE — CARE PLAN
The patient's goals for the shift include Pt. will have a safe, restful and uneventful evening    The clinical goals for the shift include patient will be able to remain hemodynamically stable by the end of the shift    Problem: Skin  Goal: Decreased wound size/increased tissue granulation at next dressing change  Outcome: Progressing  Goal: Prevent/manage excess moisture  Outcome: Progressing  Goal: Prevent/minimize sheer/friction injuries  Outcome: Progressing  Goal: Promote/optimize nutrition  Outcome: Progressing  Flowsheets (Taken 9/21/2024 5063)  Promote/optimize nutrition:   Consume > 50% meals/supplements   Monitor/record intake including meals     Problem: Nutrition  Goal: Oral intake greater than 50%  Outcome: Progressing  Goal: Oral intake greater 75%  Outcome: Progressing  Goal: Consume prescribed supplement  Outcome: Progressing  Goal: Nutrition support goals are met within 48 hrs  Outcome: Progressing  Goal: Nutrition support is meeting 75% of nutrient needs  Outcome: Progressing  Goal: Tube feed tolerance  Outcome: Progressing  Goal: BG  mg/dL  Outcome: Progressing  Goal: Lab values WNL  Outcome: Progressing  Goal: Electrolytes WNL  Outcome: Progressing  Goal: Promote healing  Outcome: Progressing  Goal: Maintain stable weight  Outcome: Progressing  Goal: Reduce weight from edema/fluid  Outcome: Progressing  Goal: Gradual weight gain  Outcome: Progressing  Goal: Improve ostomy output  Outcome: Progressing     Problem: Fall/Injury  Goal: Not fall by end of shift  Outcome: Progressing  Goal: Be free from injury by end of the shift  Outcome: Progressing  Goal: Verbalize understanding of personal risk factors for fall in the hospital  Outcome: Progressing  Goal: Verbalize understanding of risk factor reduction measures to prevent injury from fall in the home  Outcome: Progressing  Goal: Use assistive devices by end of the shift  Outcome: Progressing  Goal: Pace activities to prevent fatigue by end  of the shift  Outcome: Progressing

## 2024-09-22 NOTE — PROGRESS NOTES
09/22/24 1349   Discharge Planning   Expected Discharge Disposition Home Healt     Received voicemail from sister, Alia looking for an update on medical update. RN aware. Per Alia, she will be at bedside tomorrow or Wednesday.   patient reaction

## 2024-09-22 NOTE — PROGRESS NOTES
Subjective Data:  SOB this AM and feeling worse  Refusing care, including Lasix last night  No labs this morning  Requesting PT, unable to see until tomorrow  3L UO yesterday, no intake recorded    Tele reviewed, NSR HR 70-80s     Objective Data:  Last Recorded Vitals:  Vitals:    24 0746 24 0800 24 1100 24 1123   BP:  116/79     BP Location:       Patient Position:       Pulse: 87 89 96    Resp:  22     Temp:  36.7 °C (98.1 °F)     TempSrc:  Temporal     SpO2: 96% 99%  94%   Weight:       Height:         Medical Gas Therapy: Supplemental oxygen  Medical Gas Delivery Method: High flow nasal cannula  Weight  Av kg (288 lb 12.7 oz)  Min: 118 kg (260 lb 12.9 oz)  Max: 140 kg (307 lb 12.2 oz)    Last I/O:    Intake/Output Summary (Last 24 hours) at 2024 1128  Last data filed at 2024 0800  Gross per 24 hour   Intake --   Output 2915 ml   Net -2915 ml     Net IO Since Admission: -30,462.46 mL [24 1128]     Diagnostic Results   Labs  CBC- 2024: 11:44 AM  11.0 17.0 245    53.3      BMP- 2024: 11:44 AM  140 18 97 181   3.4 0.40 37    Estimated Creatinine Clearance: 125 mL/min (A) (by C-G formula based on SCr of 0.4 mg/dL (L)).     CA: 8.3 PROTIEN: 4.9 ALT: 21 Total Bili: 1.0 M.20   PHOS: 2.4 ALBUMIN: 3.0 AST: 15   Alk Phos: 69      COAGS- 2024:  5:02 PM  1.1   12.4 _     CV Labs  Troponin I, High Sensitivity   Date/Time Value Ref Range Status   2024 06:03 PM 69 (HH) 0 - 20 ng/L Final     Comment:     Previous result verified on 2024 1734 on specimen/case [id number] called with component [facility] for procedure Troponin I, High Sensitivity, Initial with value 99 ng/L.   2024 05:01 PM 99 (HH) 0 - 20 ng/L Final   2024 06:46 PM 67 (HH) 0 - 20 ng/L Final     Comment:     Previous result verified on 2024 1757 on specimen/case [id number] called with component [facility] for procedure Troponin I, High Sensitivity with value 62 ng/L.    04/04/2024 05:20 PM 62 (HH) 0 - 20 ng/L Final   03/12/2024 07:07  (HH) 0 - 20 ng/L Final     Comment:     Previous result verified on 3/12/2024 0048 on specimen/case [id number] called with component [facility] for procedure Troponin I, High Sensitivity with value 89 ng/L.   03/12/2024 03:08  (HH) 0 - 20 ng/L Final     Comment:     Previous result verified on 3/12/2024 0048 on specimen/case [id number] called with component [facility] for procedure Troponin I, High Sensitivity with value 89 ng/L.     BNP   Date/Time Value Ref Range Status   09/18/2024 04:39  (H) 0 - 99 pg/mL Final   09/04/2024 05:01 PM 1,155 (H) 0 - 99 pg/mL Final   04/08/2024 10:12  (H) 0 - 99 pg/mL Final     Hemoglobin A1C   Date/Time Value Ref Range Status   06/25/2024 04:56 PM 5.5 see below % Final   02/20/2024 10:35 AM 5.8 (H) see below % Final     LDL Calculated   Date/Time Value Ref Range Status   02/20/2024 10:35  (H) <=99 mg/dL Final     Comment:                                 Near   Borderline      AGE      Desirable  Optimal    High     High     Very High     0-19 Y     0 - 109     ---    110-129   >/= 130     ----    20-24 Y     0 - 119     ---    120-159   >/= 160     ----      >24 Y     0 -  99   100-129  130-159   160-189     >/=190       VLDL   Date/Time Value Ref Range Status   02/20/2024 10:35 AM 16 0 - 40 mg/dL Final   09/10/2021 01:29 PM 27 0 - 40 mg/dL Final   09/18/2018 02:26 PM 22 0 - 40 mg/dL Final       Pertinent Imaging  XR chest 1 view 09/17/2024  Cardiomegaly.  Stable small left pleural effusion with left basilar atelectasis/pneumonia, stable to slightly progressed.  Pleural and parenchymal opacities at the right base, mildly progressed.    CT chest abdomen pelvis w IV contrast 09/04/2024  Large loculated right pleural effusion causing complete compressive collapse of the right middle lobe and right lower lobe and partial compressive collapse of the right upper lobe. The largest likely  compartments located at the inferior right hemithorax. The cause of the effusion is not apparent as there is only a small amount of debris in the distal trachea and right mainstem bronchus but the distal-most right-sided bronchi are occluded due to combination of compressive collapse and small low-density fluid.    Numerous new subcentimeter centrilobular pulmonary nodules that are most likely infectious/inflammatory in as can be seen with bronchiolitis or fungal infection.    Aortic valve calcification to the extent that would likely result inaortic stenosis.    Left kidney solid heterogeneously enhancing mass suspicious for renal cell carcinoma (2.4 cm x 1.9 cm) and of suspected 0.8 cm right upper pole renal cell carcinoma measuring 0.8 cm. Recommend urological follow-up/also taken and dedicated MRI kidneys to further evaluate these lesions. YELLOW ALERT: An incidental finding alert was sent per protocol.    No acute abnormality in the abdomen or pelvis.    Colonic diverticulosis without acute diverticulitis.    CT head wo IV contrast 09/04/2024  No acute intracranial abnormality.           Past Cardiology Tests (Last 3 Years):  EKG:  Electrocardiogram 9/4/24  Sinus rhythm with 1st degree AV block with Premature atrial complexes with Aberrant conduction  Right bundle branch block  Left anterior fascicular block  Bifascicular block   Anteroseptal infarct (cited on or before 23-APR-2024)    ECG 12 lead 4/23/24  Normal sinus rhythm  Right bundle branch block  Left anterior fascicular block   Bifascicular block   Moderate voltage criteria for LVH, may be normal variant  Cannot rule out Septal infarct , age undetermined    Electrocardiogram 3/8/24  Sinus rhythm with occasional Premature ventricular complexes  Right bundle branch block  T wave abnormality, consider lateral ischemia  Abnormal ECG    Echo:  Transthoracic Echo (TTE) Limited 9/19/24  1. Left ventricular ejection fraction is severely decreased, calculated by  Green's biplane at 23%.  2. There is global hypokinesis of the left ventricle with minor regional variations.  3. Poorly visualized anatomical structures due to suboptimal image quality.  4. Spectral Doppler shows an impaired relaxation pattern of left ventricular diastolic filling.  5. There is mildly reduced right ventricular systolic function.  6. Moderately enlarged right ventricle.  7. There is moderate aortic valve cusp calcification. The was no assesment of aortic stenosis in this limited study.  8. Compared with study dated 3/11/2024, there is RV dilation and reduced function.    Transthoracic echo (TTE) complete 3/11/24  1. Left ventricular systolic function is moderately decreased with a 30-35% estimated ejection fraction.  2. Basal inferolateral segment is abnormal.  3. Poorly visualized anatomical structures due to suboptimal image quality.  4. Moderate aortic valve stenosis.  5. There is global hypokinesis of the left ventricle with minor regional variations.    Inpatient Medications:  Scheduled medications   Medication Dose Route Frequency    aspirin  81 mg oral Daily    empagliflozin  10 mg oral Daily    enoxaparin  40 mg subcutaneous q12h SHARRON    ezetimibe  10 mg oral Daily    folic acid  1 mg oral Daily    furosemide  80 mg intravenous q12h    insulin lispro  0-10 Units subcutaneous q4h    ipratropium-albuteroL  3 mL nebulization 4x daily    levothyroxine  125 mcg oral Daily    multivitamin with minerals  1 tablet oral Daily    nystatin  5 mL Swish & Swallow 4x daily    nystatin  1 Application Topical BID    oxygen   inhalation q PM    pantoprazole  40 mg intravenous Daily    polyethylene glycol  17 g nasogastric tube Daily    predniSONE  10 mg oral Daily    sennosides-docusate sodium  2 tablet oral BID    tamsulosin  0.4 mg oral Daily    thiamine  100 mg oral Daily   PRN medications: acetaminophen, acetaminophen, dextrose, dextrose, glucagon, glucagon, ipratropium-albuteroL, oxygen, oxygen  "      Physical Exam:  Vitals and nursing notes reviewed.  /79   Pulse 96   Temp 36.7 °C (98.1 °F) (Temporal)   Resp 22   Ht 1.803 m (5' 10.98\")   Wt 118 kg (260 lb 12.9 oz)   SpO2 94%   BMI 36.39 kg/m²   GENERAL: Alert and awake; in no acute distress  SKIN: Warm and dry, cap refill <2  HEENT: Normocephalic, PEERL, mucous membranes pink and moist  NECK: No JVD or hepatojugular reflex  CARDIAC: Regular rate and rhythm, S1S2, +murmur  CHEST: Increased respiratory effort, diminished breath sounds throughout  ABDOMEN: soft, non-distended, non-tender with palpation  EXTREMITIES: 1+ pitting BL lower extremity edema, normal pulses all 4 extremities  NEURO: Alert and oriented, mental status at baseline, no focal deficits  PSYCH: Behavior and affect as expected        Assessment/Plan   Cards: Teresa      69 year old male with history MELISSA on CPAP, COPD stage III triple therapy as outpatient, nicotine dependence, T2DM, severe obesity, hypertension, hyperlipidemia (intolerant to statins due to myalgias), lymphedema, coronary calcification by chest CT, moderate aortic stenosis (6/2019) and newly diagnosed CHF with an EF of 30-35% (3/2024) originally admitted on 9/4/24 here at [facility].  He was non responsive at home by his neighbor-> admitted with hypoxic/hypercapnic respiratory failure->  intubated, placed on pressor, vent support, started on ATB, large R sided likely parapneumonic s/p chest tube placement (Analysis showed neutrophilic predominant effusion, cultures negative. CT is now removed) and PNA due to Stenotrophomonas maltophilia which is resolving. Overnight patient had significant CO2 retention and had to be given Ativan 1 mg IV to calm him down and then was to be placed on BiPAP->CO2 84. Cardiology consult now placed for \"NSVT, HF\"  Pulmonary and ID have been following the patient.     Home Cardiology Meds: ASA 81 mg daily, Jardiance 10mg daily, Lasix 120 mg daily, metolazone 2.5 mg daily, valsartan 40 " mg daily, Zetia 10mg daily    TTE 9/19/24   1. Left ventricular ejection fraction is severely decreased, calculated by Green's biplane at 23%.   2. There is global hypokinesis of the left ventricle with minor regional variations.   3. Poorly visualized anatomical structures due to suboptimal image quality.   4. Spectral Doppler shows an impaired relaxation pattern of left ventricular diastolic filling.   5. There is mildly reduced right ventricular systolic function.   6. Moderately enlarged right ventricle.   7. There is moderate aortic valve cusp calcification. The was no assesment of aortic stenosis in this limited study.   8. Compared with study dated 3/11/2024, there is RV dilation and reduced function.    #Acute Hypoxic Respiratory Failure, large R pleural effusion- Sputum cx +stenotrophomonas, completed IV abx  -Oxygen status improving  #Acute on Chronic HFrEF- EF now 23%  -, CXR with stable small left pleural effusion with left basilar atelectasis/pneumonia  GDMT  --BB: None d/t hx of bradycardia  --ACE/ARB/ARNI: Valsartan 40mg daily  --SGLT2i: Jardiance 10mg daily  --MRA: None  --Diuretic: Lasix 120mg + Metolazone 2.5mg daily  -Currently diuresing with IV Lasix 80mg BID, robust response  #HLD- Intolerant to statins, on Ezetimibe 10mg daily  #CAD- c/w ASA daily  #Moderate AS- Patient not interested in intervention       RECOMMENDATIONS:  -Continue IV Lasix 80mg BID as patient allows  -Resume home GDMT as tolerated, BP currently soft in the setting of diuresis  -Wean oxygen as tolerated  -Continuous telemetry monitoring  -Monitor electrolytes, replete for K <4 and Mg <2  -Daily wts, strict I&Os, low sodium diet  -Would establish GOC with patient, very tenuous/guarded prognosis given his refusal of care and medications     Code Status: Full Code    PARAM Tenorio-CNP   Advanced Practice Provider  Cardiology  [facility]  09/22/24 11:28 AM

## 2024-09-23 ENCOUNTER — APPOINTMENT (OUTPATIENT)
Dept: RADIOLOGY | Facility: HOSPITAL | Age: 69
DRG: 870 | End: 2024-09-23
Payer: MEDICARE

## 2024-09-23 LAB
ALBUMIN SERPL BCP-MCNC: 2.7 G/DL (ref 3.4–5)
ANION GAP SERPL CALC-SCNC: 11 MMOL/L (ref 10–20)
BUN SERPL-MCNC: 22 MG/DL (ref 6–23)
CALCIUM SERPL-MCNC: 8 MG/DL (ref 8.6–10.3)
CHLORIDE SERPL-SCNC: 93 MMOL/L (ref 98–107)
CO2 SERPL-SCNC: 38 MMOL/L (ref 21–32)
CREAT SERPL-MCNC: 0.4 MG/DL (ref 0.5–1.3)
EGFRCR SERPLBLD CKD-EPI 2021: >90 ML/MIN/1.73M*2
ERYTHROCYTE [DISTWIDTH] IN BLOOD BY AUTOMATED COUNT: 13.6 % (ref 11.5–14.5)
FUNGUS SPEC CULT: NORMAL
FUNGUS SPEC FUNGUS STN: NORMAL
GLUCOSE BLD MANUAL STRIP-MCNC: 112 MG/DL (ref 74–99)
GLUCOSE BLD MANUAL STRIP-MCNC: 115 MG/DL (ref 74–99)
GLUCOSE BLD MANUAL STRIP-MCNC: 146 MG/DL (ref 74–99)
GLUCOSE BLD MANUAL STRIP-MCNC: 195 MG/DL (ref 74–99)
GLUCOSE BLD MANUAL STRIP-MCNC: 278 MG/DL (ref 74–99)
GLUCOSE SERPL-MCNC: 119 MG/DL (ref 74–99)
HCT VFR BLD AUTO: 49.4 % (ref 41–52)
HGB BLD-MCNC: 16 G/DL (ref 13.5–17.5)
MCH RBC QN AUTO: 32.4 PG (ref 26–34)
MCHC RBC AUTO-ENTMCNC: 32.4 G/DL (ref 32–36)
MCV RBC AUTO: 100 FL (ref 80–100)
NRBC BLD-RTO: 0 /100 WBCS (ref 0–0)
PHOSPHATE SERPL-MCNC: 2.9 MG/DL (ref 2.5–4.9)
PLATELET # BLD AUTO: 232 X10*3/UL (ref 150–450)
POTASSIUM SERPL-SCNC: 3.6 MMOL/L (ref 3.5–5.3)
RBC # BLD AUTO: 4.94 X10*6/UL (ref 4.5–5.9)
SODIUM SERPL-SCNC: 138 MMOL/L (ref 136–145)
WBC # BLD AUTO: 8.4 X10*3/UL (ref 4.4–11.3)

## 2024-09-23 PROCEDURE — 36415 COLL VENOUS BLD VENIPUNCTURE: CPT | Performed by: NURSE PRACTITIONER

## 2024-09-23 PROCEDURE — 82947 ASSAY GLUCOSE BLOOD QUANT: CPT

## 2024-09-23 PROCEDURE — 2500000002 HC RX 250 W HCPCS SELF ADMINISTERED DRUGS (ALT 637 FOR MEDICARE OP, ALT 636 FOR OP/ED): Performed by: INTERNAL MEDICINE

## 2024-09-23 PROCEDURE — 97530 THERAPEUTIC ACTIVITIES: CPT | Mod: GP,CQ

## 2024-09-23 PROCEDURE — 2500000004 HC RX 250 GENERAL PHARMACY W/ HCPCS (ALT 636 FOR OP/ED): Performed by: INTERNAL MEDICINE

## 2024-09-23 PROCEDURE — 2060000001 HC INTERMEDIATE ICU ROOM DAILY

## 2024-09-23 PROCEDURE — 80069 RENAL FUNCTION PANEL: CPT | Performed by: NURSE PRACTITIONER

## 2024-09-23 PROCEDURE — 2500000004 HC RX 250 GENERAL PHARMACY W/ HCPCS (ALT 636 FOR OP/ED): Performed by: SURGERY

## 2024-09-23 PROCEDURE — 2500000001 HC RX 250 WO HCPCS SELF ADMINISTERED DRUGS (ALT 637 FOR MEDICARE OP): Performed by: SURGERY

## 2024-09-23 PROCEDURE — 2500000005 HC RX 250 GENERAL PHARMACY W/O HCPCS: Performed by: SURGERY

## 2024-09-23 PROCEDURE — 97110 THERAPEUTIC EXERCISES: CPT | Mod: GP,CQ

## 2024-09-23 PROCEDURE — 71045 X-RAY EXAM CHEST 1 VIEW: CPT

## 2024-09-23 PROCEDURE — 92526 ORAL FUNCTION THERAPY: CPT | Mod: GN

## 2024-09-23 PROCEDURE — 2500000002 HC RX 250 W HCPCS SELF ADMINISTERED DRUGS (ALT 637 FOR MEDICARE OP, ALT 636 FOR OP/ED): Performed by: SURGERY

## 2024-09-23 PROCEDURE — 99233 SBSQ HOSP IP/OBS HIGH 50: CPT | Performed by: INTERNAL MEDICINE

## 2024-09-23 PROCEDURE — 2500000005 HC RX 250 GENERAL PHARMACY W/O HCPCS: Performed by: INTERNAL MEDICINE

## 2024-09-23 PROCEDURE — 2500000001 HC RX 250 WO HCPCS SELF ADMINISTERED DRUGS (ALT 637 FOR MEDICARE OP)

## 2024-09-23 PROCEDURE — 97535 SELF CARE MNGMENT TRAINING: CPT | Mod: GO

## 2024-09-23 PROCEDURE — 71045 X-RAY EXAM CHEST 1 VIEW: CPT | Performed by: RADIOLOGY

## 2024-09-23 PROCEDURE — 85027 COMPLETE CBC AUTOMATED: CPT | Performed by: NURSE PRACTITIONER

## 2024-09-23 PROCEDURE — 97530 THERAPEUTIC ACTIVITIES: CPT | Mod: GO

## 2024-09-23 PROCEDURE — 94640 AIRWAY INHALATION TREATMENT: CPT

## 2024-09-23 PROCEDURE — 2500000001 HC RX 250 WO HCPCS SELF ADMINISTERED DRUGS (ALT 637 FOR MEDICARE OP): Performed by: NURSE PRACTITIONER

## 2024-09-23 PROCEDURE — 99232 SBSQ HOSP IP/OBS MODERATE 35: CPT | Performed by: INTERNAL MEDICINE

## 2024-09-23 RX ORDER — FUROSEMIDE 10 MG/ML
80 INJECTION INTRAMUSCULAR; INTRAVENOUS DAILY
Status: DISCONTINUED | OUTPATIENT
Start: 2024-09-23 | End: 2024-09-25

## 2024-09-23 RX ORDER — FUROSEMIDE 10 MG/ML
80 INJECTION INTRAMUSCULAR; INTRAVENOUS DAILY
Status: DISCONTINUED | OUTPATIENT
Start: 2024-09-24 | End: 2024-09-23

## 2024-09-23 ASSESSMENT — COGNITIVE AND FUNCTIONAL STATUS - GENERAL
PERSONAL GROOMING: A LITTLE
WALKING IN HOSPITAL ROOM: TOTAL
DAILY ACTIVITIY SCORE: 15
MOVING TO AND FROM BED TO CHAIR: A LOT
WALKING IN HOSPITAL ROOM: TOTAL
TURNING FROM BACK TO SIDE WHILE IN FLAT BAD: A LOT
TOILETING: TOTAL
MOVING TO AND FROM BED TO CHAIR: A LOT
STANDING UP FROM CHAIR USING ARMS: TOTAL
MOBILITY SCORE: 9
DRESSING REGULAR LOWER BODY CLOTHING: A LOT
MOVING FROM LYING ON BACK TO SITTING ON SIDE OF FLAT BED WITH BEDRAILS: A LOT
TOILETING: TOTAL
EATING MEALS: A LITTLE
DRESSING REGULAR LOWER BODY CLOTHING: A LOT
DRESSING REGULAR UPPER BODY CLOTHING: A LOT
MOBILITY SCORE: 9
MOVING FROM LYING ON BACK TO SITTING ON SIDE OF FLAT BED WITH BEDRAILS: A LOT
MOBILITY SCORE: 7
CLIMB 3 TO 5 STEPS WITH RAILING: TOTAL
WALKING IN HOSPITAL ROOM: TOTAL
DRESSING REGULAR UPPER BODY CLOTHING: A LOT
STANDING UP FROM CHAIR USING ARMS: TOTAL
DAILY ACTIVITIY SCORE: 13
DAILY ACTIVITIY SCORE: 15
HELP NEEDED FOR BATHING: A LOT
DRESSING REGULAR UPPER BODY CLOTHING: A LOT
TURNING FROM BACK TO SIDE WHILE IN FLAT BAD: A LOT
TOILETING: TOTAL
MOVING FROM LYING ON BACK TO SITTING ON SIDE OF FLAT BED WITH BEDRAILS: A LOT
CLIMB 3 TO 5 STEPS WITH RAILING: TOTAL
DRESSING REGULAR LOWER BODY CLOTHING: A LOT
MOVING TO AND FROM BED TO CHAIR: TOTAL
STANDING UP FROM CHAIR USING ARMS: TOTAL
TURNING FROM BACK TO SIDE WHILE IN FLAT BAD: TOTAL
HELP NEEDED FOR BATHING: A LOT
HELP NEEDED FOR BATHING: A LOT
CLIMB 3 TO 5 STEPS WITH RAILING: TOTAL

## 2024-09-23 ASSESSMENT — PAIN - FUNCTIONAL ASSESSMENT
PAIN_FUNCTIONAL_ASSESSMENT: 0-10

## 2024-09-23 ASSESSMENT — PAIN DESCRIPTION - LOCATION: LOCATION: GENERALIZED

## 2024-09-23 ASSESSMENT — PAIN SCALES - GENERAL
PAINLEVEL_OUTOF10: 0 - NO PAIN
PAINLEVEL_OUTOF10: 3
PAINLEVEL_OUTOF10: 4
PAINLEVEL_OUTOF10: 0 - NO PAIN
PAINLEVEL_OUTOF10: 6
PAINLEVEL_OUTOF10: 0 - NO PAIN
PAINLEVEL_OUTOF10: 7

## 2024-09-23 ASSESSMENT — ACTIVITIES OF DAILY LIVING (ADL): HOME_MANAGEMENT_TIME_ENTRY: 15

## 2024-09-23 NOTE — PROGRESS NOTES
"Alo Glass is a 69 y.o. male on day 19 of admission presenting with Acute respiratory failure with hypoxia and hypercapnia (Multi).    Subjective   Feels \"okay\".  Denies shortness of breath, pain or cough.  Currently on CPAP +10 L oxygen bleed in with an oxygen saturation of 93%.  On 10 L high flow nasal cannula oxygen when off of CPAP.  Not using VEST therapy or EZ Pap.     Objective   Physical Exam  Vitals  Blood pressure 91/62, pulse 74, temperature 36.5 °C (97.7 °F), temperature source Skin, resp. rate 20, height 1.803 m (5' 10.98\"), weight 118 kg (260 lb 12.9 oz), SpO2 96%.  Intake/Output last 3 Shifts:  I/O last 3 completed shifts:  In: 800 (6.8 mL/kg) [P.O.:800]  Out: 6240 (52.7 mL/kg) [Urine:6240 (1.5 mL/kg/hr)]  Weight: 118.3 kg     General-awake, alert and in no acute distress.  Lungs-clear, without wheezes, rales or rhonchi.  Heart-regular rate and rhythm, with ectopic beats.  Abdomen-positive bowel sounds.  Extremities-trace lower extremity edema.  Skin-no rashes.    Scheduled medications  aspirin, 81 mg, oral, Daily  empagliflozin, 10 mg, oral, Daily  enoxaparin, 40 mg, subcutaneous, q12h SHARRON  ezetimibe, 10 mg, oral, Daily  folic acid, 1 mg, oral, Daily  [START ON 9/24/2024] furosemide, 80 mg, intravenous, Daily  insulin lispro, 0-10 Units, subcutaneous, q4h  ipratropium-albuteroL, 3 mL, nebulization, 4x daily  levothyroxine, 125 mcg, oral, Daily  multivitamin with minerals, 1 tablet, oral, Daily  nystatin, 5 mL, Swish & Swallow, 4x daily  nystatin, 1 Application, Topical, BID  oxygen, , inhalation, q PM  pantoprazole, 40 mg, intravenous, Daily  polyethylene glycol, 17 g, nasogastric tube, Daily  predniSONE, 10 mg, oral, Daily  sennosides-docusate sodium, 2 tablet, oral, BID  tamsulosin, 0.4 mg, oral, Daily  thiamine, 100 mg, oral, Daily      Continuous medications     PRN medications  PRN medications: acetaminophen, acetaminophen, dextrose, dextrose, glucagon, glucagon, ipratropium-albuteroL, " oxygen, oxygen     Results for orders placed or performed during the hospital encounter of 09/04/24 (from the past 24 hour(s))   POCT GLUCOSE   Result Value Ref Range    POCT Glucose 242 (H) 74 - 99 mg/dL   Renal Function Panel   Result Value Ref Range    Glucose 183 (H) 74 - 99 mg/dL    Sodium 140 136 - 145 mmol/L    Potassium 3.9 3.5 - 5.3 mmol/L    Chloride 95 (L) 98 - 107 mmol/L    Bicarbonate 41 (HH) 21 - 32 mmol/L    Anion Gap 8 (L) 10 - 20 mmol/L    Urea Nitrogen 21 6 - 23 mg/dL    Creatinine 0.40 (L) 0.50 - 1.30 mg/dL    eGFR >90 >60 mL/min/1.73m*2    Calcium 8.0 (L) 8.6 - 10.3 mg/dL    Phosphorus 2.9 2.5 - 4.9 mg/dL    Albumin 2.7 (L) 3.4 - 5.0 g/dL   CBC   Result Value Ref Range    WBC 11.0 4.4 - 11.3 x10*3/uL    nRBC 0.0 0.0 - 0.0 /100 WBCs    RBC 5.17 4.50 - 5.90 x10*6/uL    Hemoglobin 16.4 13.5 - 17.5 g/dL    Hematocrit 51.3 41.0 - 52.0 %    MCV 99 80 - 100 fL    MCH 31.7 26.0 - 34.0 pg    MCHC 32.0 32.0 - 36.0 g/dL    RDW 13.5 11.5 - 14.5 %    Platelets 241 150 - 450 x10*3/uL   POCT GLUCOSE   Result Value Ref Range    POCT Glucose 132 (H) 74 - 99 mg/dL   POCT GLUCOSE   Result Value Ref Range    POCT Glucose 193 (H) 74 - 99 mg/dL   POCT GLUCOSE   Result Value Ref Range    POCT Glucose 115 (H) 74 - 99 mg/dL   Renal Function Panel   Result Value Ref Range    Glucose 119 (H) 74 - 99 mg/dL    Sodium 138 136 - 145 mmol/L    Potassium 3.6 3.5 - 5.3 mmol/L    Chloride 93 (L) 98 - 107 mmol/L    Bicarbonate 38 (H) 21 - 32 mmol/L    Anion Gap 11 10 - 20 mmol/L    Urea Nitrogen 22 6 - 23 mg/dL    Creatinine 0.40 (L) 0.50 - 1.30 mg/dL    eGFR >90 >60 mL/min/1.73m*2    Calcium 8.0 (L) 8.6 - 10.3 mg/dL    Phosphorus 2.9 2.5 - 4.9 mg/dL    Albumin 2.7 (L) 3.4 - 5.0 g/dL   CBC   Result Value Ref Range    WBC 8.4 4.4 - 11.3 x10*3/uL    nRBC 0.0 0.0 - 0.0 /100 WBCs    RBC 4.94 4.50 - 5.90 x10*6/uL    Hemoglobin 16.0 13.5 - 17.5 g/dL    Hematocrit 49.4 41.0 - 52.0 %     80 - 100 fL    MCH 32.4 26.0 - 34.0 pg     MCHC 32.4 32.0 - 36.0 g/dL    RDW 13.6 11.5 - 14.5 %    Platelets 232 150 - 450 x10*3/uL   POCT GLUCOSE   Result Value Ref Range    POCT Glucose 112 (H) 74 - 99 mg/dL      Assessment:  69-year-old man with a loculated right pleural effusion, pulmonary nodules, right middle lobe pneumonia and a positive sputum culture for stenotrophomonas, superimposed on underlying COPD without exacerbation and obstructive sleep apnea, who appears to be clinically stable with the exception of persistent hypoxia.  There is no bronchospasm.    Recommend:  1.  Continue DuoNeb, prednisone, Synthroid and Lovenox.  2.  Diuresis as blood pressure and kidney function allow.  3.  Wean FiO2 to keep oxygen saturation approximately 92 to 95%; home oxygen evaluation prior to discharge.  3.  Incentive spirometry and Acapella treatment.  4.  Continue nocturnal CPAP.  5.  Out of bed and to a chair.  6.  Follow-up with pulmonary medicine after discharge.  7.  Erect portable chest x-ray today.    Giovanni Padilla MD

## 2024-09-23 NOTE — PROGRESS NOTES
Subjective Data:  Reports breathing about the same  Wants to get up and moving out of bed.  Reportedly waiting on PT.  Hopefully can get out of bed to chair today as well.  Continues to ask why blood pressure on the low side and feels like it is not being measured correctly.  Getting high flow nasal cannula oxygen    Tele reviewed, NSR HR 70-80s with frequent PACs.  Not A-fib as is recorded on the monitor     Objective Data:  Last Recorded Vitals:  Vitals:    24 2300 24 0252 24 0300 24 0501   BP: 104/71   105/68   BP Location: Left arm   Right arm   Patient Position: Lying   Lying   Pulse:    85   Resp:    Temp: 36 °C (96.8 °F)  (!) 28.6 °C (83.5 °F) 36.2 °C (97.2 °F)   TempSrc: Temporal   Oral   SpO2: 93% 94%  94%   Weight:       Height:         Medical Gas Therapy: Supplemental oxygen  Medical Gas Delivery Method: High flow nasal cannula  Weight  Av kg (288 lb 12.7 oz)  Min: 118 kg (260 lb 12.9 oz)  Max: 140 kg (307 lb 12.2 oz)    Last I/O:    Intake/Output Summary (Last 24 hours) at 2024 0835  Last data filed at 2024 0600  Gross per 24 hour   Intake 800 ml   Output 4550 ml   Net -3750 ml     Net IO Since Admission: -34,212.46 mL [24 0835]     Diagnostic Results   Labs  CBC- 2024:  7:04 AM  8.4 16.0 232    49.4      BMP- 2024:  7:03 AM  138 22 93 119   3.6 0.40 38    Estimated Creatinine Clearance: 125 mL/min (A) (by C-G formula based on SCr of 0.4 mg/dL (L)).     CA: 8.0 PROTIEN: 4.9 ALT: 21 Total Bili: 1.0 M.20   PHOS: 2.9 ALBUMIN: 2.7 AST: 15   Alk Phos: 69      COAGS- 2024:  5:02 PM  1.1   12.4 _     CV Labs  Troponin I, High Sensitivity   Date/Time Value Ref Range Status   2024 06:03 PM 69 (HH) 0 - 20 ng/L Final     Comment:     Previous result verified on 2024 1734 on specimen/case [id number] called with component [facility] for procedure Troponin I, High Sensitivity, Initial with value 99 ng/L.   2024 05:01 PM 99 (HH) 0  - 20 ng/L Final   04/04/2024 06:46 PM 67 (HH) 0 - 20 ng/L Final     Comment:     Previous result verified on 4/4/2024 1757 on specimen/case [id number] called with component [facility] for procedure Troponin I, High Sensitivity with value 62 ng/L.   04/04/2024 05:20 PM 62 (HH) 0 - 20 ng/L Final   03/12/2024 07:07  (HH) 0 - 20 ng/L Final     Comment:     Previous result verified on 3/12/2024 0048 on specimen/case [id number] called with component [facility] for procedure Troponin I, High Sensitivity with value 89 ng/L.   03/12/2024 03:08  (HH) 0 - 20 ng/L Final     Comment:     Previous result verified on 3/12/2024 0048 on specimen/case [id number] called with component [facility] for procedure Troponin I, High Sensitivity with value 89 ng/L.     BNP   Date/Time Value Ref Range Status   09/18/2024 04:39  (H) 0 - 99 pg/mL Final   09/04/2024 05:01 PM 1,155 (H) 0 - 99 pg/mL Final   04/08/2024 10:12  (H) 0 - 99 pg/mL Final     Hemoglobin A1C   Date/Time Value Ref Range Status   06/25/2024 04:56 PM 5.5 see below % Final   02/20/2024 10:35 AM 5.8 (H) see below % Final     LDL Calculated   Date/Time Value Ref Range Status   02/20/2024 10:35  (H) <=99 mg/dL Final     Comment:                                 Near   Borderline      AGE      Desirable  Optimal    High     High     Very High     0-19 Y     0 - 109     ---    110-129   >/= 130     ----    20-24 Y     0 - 119     ---    120-159   >/= 160     ----      >24 Y     0 -  99   100-129  130-159   160-189     >/=190       VLDL   Date/Time Value Ref Range Status   02/20/2024 10:35 AM 16 0 - 40 mg/dL Final   09/10/2021 01:29 PM 27 0 - 40 mg/dL Final   09/18/2018 02:26 PM 22 0 - 40 mg/dL Final       Pertinent Imaging  XR chest 1 view 09/17/2024  Cardiomegaly.  Stable small left pleural effusion with left basilar atelectasis/pneumonia, stable to slightly progressed.  Pleural and parenchymal opacities at the right base, mildly progressed.    CT chest  abdomen pelvis w IV contrast 09/04/2024  Large loculated right pleural effusion causing complete compressive collapse of the right middle lobe and right lower lobe and partial compressive collapse of the right upper lobe. The largest likely compartments located at the inferior right hemithorax. The cause of the effusion is not apparent as there is only a small amount of debris in the distal trachea and right mainstem bronchus but the distal-most right-sided bronchi are occluded due to combination of compressive collapse and small low-density fluid.    Numerous new subcentimeter centrilobular pulmonary nodules that are most likely infectious/inflammatory in as can be seen with bronchiolitis or fungal infection.    Aortic valve calcification to the extent that would likely result inaortic stenosis.    Left kidney solid heterogeneously enhancing mass suspicious for renal cell carcinoma (2.4 cm x 1.9 cm) and of suspected 0.8 cm right upper pole renal cell carcinoma measuring 0.8 cm. Recommend urological follow-up/also taken and dedicated MRI kidneys to further evaluate these lesions. YELLOW ALERT: An incidental finding alert was sent per protocol.    No acute abnormality in the abdomen or pelvis.    Colonic diverticulosis without acute diverticulitis.    CT head wo IV contrast 09/04/2024  No acute intracranial abnormality.           Past Cardiology Tests (Last 3 Years):  EKG:  Electrocardiogram 9/4/24  Sinus rhythm with 1st degree AV block with Premature atrial complexes with Aberrant conduction  Right bundle branch block  Left anterior fascicular block  Bifascicular block   Anteroseptal infarct (cited on or before 23-APR-2024)    ECG 12 lead 4/23/24  Normal sinus rhythm  Right bundle branch block  Left anterior fascicular block   Bifascicular block   Moderate voltage criteria for LVH, may be normal variant  Cannot rule out Septal infarct , age undetermined    Electrocardiogram 3/8/24  Sinus rhythm with occasional  Premature ventricular complexes  Right bundle branch block  T wave abnormality, consider lateral ischemia  Abnormal ECG    Echo:  Transthoracic Echo (TTE) Limited 9/19/24  1. Left ventricular ejection fraction is severely decreased, calculated by Green's biplane at 23%.  2. There is global hypokinesis of the left ventricle with minor regional variations.  3. Poorly visualized anatomical structures due to suboptimal image quality.  4. Spectral Doppler shows an impaired relaxation pattern of left ventricular diastolic filling.  5. There is mildly reduced right ventricular systolic function.  6. Moderately enlarged right ventricle.  7. There is moderate aortic valve cusp calcification. The was no assesment of aortic stenosis in this limited study.  8. Compared with study dated 3/11/2024, there is RV dilation and reduced function.    Transthoracic echo (TTE) complete 3/11/24  1. Left ventricular systolic function is moderately decreased with a 30-35% estimated ejection fraction.  2. Basal inferolateral segment is abnormal.  3. Poorly visualized anatomical structures due to suboptimal image quality.  4. Moderate aortic valve stenosis.  5. There is global hypokinesis of the left ventricle with minor regional variations.    Inpatient Medications:  Scheduled medications   Medication Dose Route Frequency    aspirin  81 mg oral Daily    empagliflozin  10 mg oral Daily    enoxaparin  40 mg subcutaneous q12h [facility]    ezetimibe  10 mg oral Daily    folic acid  1 mg oral Daily    furosemide  80 mg intravenous q12h    insulin lispro  0-10 Units subcutaneous q4h    ipratropium-albuteroL  3 mL nebulization 4x daily    levothyroxine  125 mcg oral Daily    multivitamin with minerals  1 tablet oral Daily    nystatin  5 mL Swish & Swallow 4x daily    nystatin  1 Application Topical BID    oxygen   inhalation q PM    pantoprazole  40 mg intravenous Daily    polyethylene glycol  17 g nasogastric tube Daily    predniSONE  10 mg oral Daily  "   sennosides-docusate sodium  2 tablet oral BID    tamsulosin  0.4 mg oral Daily    thiamine  100 mg oral Daily   PRN medications: acetaminophen, acetaminophen, dextrose, dextrose, glucagon, glucagon, ipratropium-albuteroL, oxygen, oxygen       Physical Exam:  Vitals and nursing notes reviewed.  /68 (BP Location: Right arm, Patient Position: Lying)   Pulse 85   Temp 36.2 °C (97.2 °F) (Oral)   Resp 21   Ht 1.803 m (5' 10.98\")   Wt 118 kg (260 lb 12.9 oz)   SpO2 94%   BMI 36.39 kg/m²   GENERAL: Alert and awake; in no acute distress  SKIN: Warm and dry, cap refill <2  NECK: No JVD or hepatojugular reflex  CARDIAC: Regular rate and rhythm, S1S2, 2/6 systolic murmur  CHEST: Increased respiratory effort, diminished breath sounds throughout  ABDOMEN: soft, non-distended, non-tender with palpation  EXTREMITIES: 1+ pitting BL lower extremity edema, normal pulses all 4 extremities  NEURO: Alert and oriented, mental status at baseline, no focal deficits  PSYCH: Behavior and affect as expected        Assessment/Plan       69 year old male with history MELISSA on CPAP, COPD stage III triple therapy as outpatient, nicotine dependence, T2DM, severe obesity, hypertension, hyperlipidemia (intolerant to statins due to myalgias), lymphedema, coronary calcification by chest CT, moderate aortic stenosis and  Chronic systolic HF (refused cath previously) originally admitted on 9/4/24 here at [facility].  He was non responsive at home by his neighbor-> admitted with hypoxic/hypercapnic respiratory failure->  intubated, placed on pressor, vent support, started on ATB, large R sided likely parapneumonic s/p chest tube placement (Analysis showed neutrophilic predominant effusion, cultures negative. CT is now removed) and PNA due to Stenotrophomonas maltophilia which is resolving.      Home Cardiology Meds: ASA 81 mg daily, Jardiance 10mg daily, Lasix 120 mg daily, metolazone 2.5 mg PRN, valsartan 40 mg daily, Zetia 10mg daily    TTE " 9/19/24   1. Left ventricular ejection fraction is severely decreased, calculated by Green's biplane at 23%.   2. There is global hypokinesis of the left ventricle with minor regional variations.   3. Poorly visualized anatomical structures due to suboptimal image quality.   4. Spectral Doppler shows an impaired relaxation pattern of left ventricular diastolic filling.   5. There is mildly reduced right ventricular systolic function.   6. Moderately enlarged right ventricle.   7. There is moderate aortic valve cusp calcification. The was no assesment of aortic stenosis in this limited study.   8. Compared with study dated 3/11/2024, there is RV dilation and reduced function.    #Acute Hypoxic Respiratory Failure, large R pleural effusion- Sputum cx +stenotrophomonas, completed IV abx  -Oxygen status improving    #Acute on Chronic systolic HF  - EF now 23%  --BB: None d/t hx of bradycardia  --ACE/ARB/ARNI: none d/t low BP (valsartan 40 mg at home previously)  --SGLT2i: Jardiance 10mg daily  --MRA: None  --Diuretic: IV lasix  -Currently diuresing with IV Lasix 80mg BID, robust response  #HLD- Intolerant to statins, on Ezetimibe 10mg daily  #CAD- c/w ASA daily  #Moderate AS- Patient not interested in intervention       RECOMMENDATIONS:  -Will decrease IV lasix to once daily and see if gives some more BP room to add on ARB  -Resume home GDMT as tolerated, BP currently soft in the setting of diuresis  -Wean oxygen as tolerated  -Continuous telemetry monitoring  -Monitor electrolytes, replete for K <4 and Mg <2  -Daily wts, strict I&Os, low sodium diet  -OOB as tolerates with PT     Dimitri Murillo,

## 2024-09-23 NOTE — PROGRESS NOTES
09/23/24 1701   Discharge Planning   Expected Discharge Disposition Long Term        Patient is still diuresing with IV Lasix, but it was decreased. Per KRISTY Schmitz ok to request auth for [facility]. Will continue to follow for discharge needs.

## 2024-09-23 NOTE — PROGRESS NOTES
"Alo Glass is a 69 y.o. male on day 18 of admission presenting with Acute respiratory failure with hypoxia and hypercapnia (Multi).    Subjective   Patient seen and examined, awake and alert currently on CPAP and on 10 L oxygen saturating above 92%.  He says he is quite frustrated because she has not been able to get out of bed and sit in a chair, physical therapy is not available over the weekend we will communicate with them tomorrow.  His leg edema has improved significantly and he had almost 3 L of urine output other day.  His breathing is better and he has no chest pain, hematuria has resolved.  Overall general condition is stable now.   Patient did refuse lab draw and Lasix this morning.    Objective     Physical Exam  GENERAL:  Alert, no distress, cooperative, obese  SKIN:  Skin color, texture, turgor normal. No rashes or lesions.  OROPHARYNX:  Lips, mucosa, and tongue are normal.Teeth and gums, normal. Oropharynx normal.  NECK:  No jugulovenous distention, No carotid bruits, Carotid pulse normal contour, Supple  LUNGS: Improved air exchange, few scattered wheezes, no crackles.  CARDIAC: Rate and rhythm is regular, normal S1 and S2; no rubs, murmurs, or gallops  ABDOMEN:  Abdomen soft, large, non-tender, BS normal, No masses or organomegaly  EXTREMETIES:  Extremities normal, no deformities, 1+ edema, clubbing or skin discoloration. Good capillary refill., No ulcers  NEURO:  Alert, oriented X 3, Gait not examined Non-focal. Reflexes normal and symmetric. Sensation grossly intact., Cranial nerves II-XII intact  PULSES:  2+ radial, 2+ carotid    Last Recorded Vitals  Blood pressure 96/65, pulse 68, temperature 36.9 °C (98.4 °F), temperature source Oral, resp. rate 21, height 1.803 m (5' 10.98\"), weight 118 kg (260 lb 12.9 oz), SpO2 98%.  Intake/Output last 3 Shifts:  I/O last 3 completed shifts:  In: - (0 mL/kg)   Out: 7415 (62.7 mL/kg) [Urine:7415 (1.7 mL/kg/hr)]  Weight: 118.3 kg     Relevant " Results  Scheduled medications  aspirin, 81 mg, oral, Daily  empagliflozin, 10 mg, oral, Daily  enoxaparin, 40 mg, subcutaneous, q12h [facility]  ezetimibe, 10 mg, oral, Daily  folic acid, 1 mg, oral, Daily  furosemide, 80 mg, intravenous, q12h  insulin lispro, 0-10 Units, subcutaneous, q4h  ipratropium-albuteroL, 3 mL, nebulization, 4x daily  levothyroxine, 125 mcg, oral, Daily  multivitamin with minerals, 1 tablet, oral, Daily  nystatin, 5 mL, Swish & Swallow, 4x daily  nystatin, 1 Application, Topical, BID  oxygen, , inhalation, q PM  pantoprazole, 40 mg, intravenous, Daily  polyethylene glycol, 17 g, nasogastric tube, Daily  predniSONE, 10 mg, oral, Daily  sennosides-docusate sodium, 2 tablet, oral, BID  tamsulosin, 0.4 mg, oral, Daily  thiamine, 100 mg, oral, Daily      Continuous medications     PRN medications  PRN medications: acetaminophen, acetaminophen, dextrose, dextrose, glucagon, glucagon, ipratropium-albuteroL, oxygen, oxygen         Results for orders placed or performed during the hospital encounter of 09/04/24 (from the past 96 hour(s))   POCT GLUCOSE   Result Value Ref Range    POCT Glucose 239 (H) 74 - 99 mg/dL   POCT GLUCOSE   Result Value Ref Range    POCT Glucose 118 (H) 74 - 99 mg/dL   Renal Function Panel   Result Value Ref Range    Glucose 107 (H) 74 - 99 mg/dL    Sodium 141 136 - 145 mmol/L    Potassium 3.8 3.5 - 5.3 mmol/L    Chloride 104 98 - 107 mmol/L    Bicarbonate 32 21 - 32 mmol/L    Anion Gap 9 (L) 10 - 20 mmol/L    Urea Nitrogen 21 6 - 23 mg/dL    Creatinine 0.46 (L) 0.50 - 1.30 mg/dL    eGFR >90 >60 mL/min/1.73m*2    Calcium 7.6 (L) 8.6 - 10.3 mg/dL    Phosphorus 1.8 (L) 2.5 - 4.9 mg/dL    Albumin 2.5 (L) 3.4 - 5.0 g/dL   CBC   Result Value Ref Range    WBC 8.7 4.4 - 11.3 x10*3/uL    nRBC 0.0 0.0 - 0.0 /100 WBCs    RBC 4.44 (L) 4.50 - 5.90 x10*6/uL    Hemoglobin 14.3 13.5 - 17.5 g/dL    Hematocrit 45.3 41.0 - 52.0 %     (H) 80 - 100 fL    MCH 32.2 26.0 - 34.0 pg    MCHC 31.6 (L)  32.0 - 36.0 g/dL    RDW 13.3 11.5 - 14.5 %    Platelets 210 150 - 450 x10*3/uL   Magnesium   Result Value Ref Range    Magnesium 2.20 1.60 - 2.40 mg/dL   POCT GLUCOSE   Result Value Ref Range    POCT Glucose 111 (H) 74 - 99 mg/dL   POCT GLUCOSE   Result Value Ref Range    POCT Glucose 146 (H) 74 - 99 mg/dL   POCT GLUCOSE   Result Value Ref Range    POCT Glucose 190 (H) 74 - 99 mg/dL   Transthoracic Echo (TTE) Limited   Result Value Ref Range    LV EF 23 %    RV free wall pk S' 9.00 cm/s    LV A4C EF 20.6    POCT GLUCOSE   Result Value Ref Range    POCT Glucose 162 (H) 74 - 99 mg/dL   POCT GLUCOSE   Result Value Ref Range    POCT Glucose 125 (H) 74 - 99 mg/dL   POCT GLUCOSE   Result Value Ref Range    POCT Glucose 123 (H) 74 - 99 mg/dL   Renal Function Panel   Result Value Ref Range    Glucose 120 (H) 74 - 99 mg/dL    Sodium 141 136 - 145 mmol/L    Potassium 3.6 3.5 - 5.3 mmol/L    Chloride 102 98 - 107 mmol/L    Bicarbonate 35 (H) 21 - 32 mmol/L    Anion Gap 8 (L) 10 - 20 mmol/L    Urea Nitrogen 18 6 - 23 mg/dL    Creatinine 0.43 (L) 0.50 - 1.30 mg/dL    eGFR >90 >60 mL/min/1.73m*2    Calcium 7.8 (L) 8.6 - 10.3 mg/dL    Phosphorus 2.6 2.5 - 4.9 mg/dL    Albumin 2.8 (L) 3.4 - 5.0 g/dL   CBC   Result Value Ref Range    WBC 8.6 4.4 - 11.3 x10*3/uL    nRBC 0.0 0.0 - 0.0 /100 WBCs    RBC 5.04 4.50 - 5.90 x10*6/uL    Hemoglobin 15.9 13.5 - 17.5 g/dL    Hematocrit 50.4 41.0 - 52.0 %     80 - 100 fL    MCH 31.5 26.0 - 34.0 pg    MCHC 31.5 (L) 32.0 - 36.0 g/dL    RDW 13.4 11.5 - 14.5 %    Platelets 243 150 - 450 x10*3/uL   POCT GLUCOSE   Result Value Ref Range    POCT Glucose 110 (H) 74 - 99 mg/dL   POCT GLUCOSE   Result Value Ref Range    POCT Glucose 133 (H) 74 - 99 mg/dL   POCT GLUCOSE   Result Value Ref Range    POCT Glucose 255 (H) 74 - 99 mg/dL   POCT GLUCOSE   Result Value Ref Range    POCT Glucose 246 (H) 74 - 99 mg/dL   POCT GLUCOSE   Result Value Ref Range    POCT Glucose 157 (H) 74 - 99 mg/dL   POCT GLUCOSE    Result Value Ref Range    POCT Glucose 112 (H) 74 - 99 mg/dL   POCT GLUCOSE   Result Value Ref Range    POCT Glucose 156 (H) 74 - 99 mg/dL   Renal Function Panel   Result Value Ref Range    Glucose 181 (H) 74 - 99 mg/dL    Sodium 140 136 - 145 mmol/L    Potassium 3.4 (L) 3.5 - 5.3 mmol/L    Chloride 97 (L) 98 - 107 mmol/L    Bicarbonate 37 (H) 21 - 32 mmol/L    Anion Gap 9 (L) 10 - 20 mmol/L    Urea Nitrogen 18 6 - 23 mg/dL    Creatinine 0.40 (L) 0.50 - 1.30 mg/dL    eGFR >90 >60 mL/min/1.73m*2    Calcium 8.3 (L) 8.6 - 10.3 mg/dL    Phosphorus 2.4 (L) 2.5 - 4.9 mg/dL    Albumin 3.0 (L) 3.4 - 5.0 g/dL   CBC   Result Value Ref Range    WBC 11.0 4.4 - 11.3 x10*3/uL    nRBC 0.0 0.0 - 0.0 /100 WBCs    RBC 5.37 4.50 - 5.90 x10*6/uL    Hemoglobin 17.0 13.5 - 17.5 g/dL    Hematocrit 53.3 (H) 41.0 - 52.0 %    MCV 99 80 - 100 fL    MCH 31.7 26.0 - 34.0 pg    MCHC 31.9 (L) 32.0 - 36.0 g/dL    RDW 13.7 11.5 - 14.5 %    Platelets 245 150 - 450 x10*3/uL   POCT GLUCOSE   Result Value Ref Range    POCT Glucose 154 (H) 74 - 99 mg/dL   POCT GLUCOSE   Result Value Ref Range    POCT Glucose 208 (H) 74 - 99 mg/dL   POCT GLUCOSE   Result Value Ref Range    POCT Glucose 123 (H) 74 - 99 mg/dL   POCT GLUCOSE   Result Value Ref Range    POCT Glucose 143 (H) 74 - 99 mg/dL   POCT GLUCOSE   Result Value Ref Range    POCT Glucose 105 (H) 74 - 99 mg/dL   POCT GLUCOSE   Result Value Ref Range    POCT Glucose 242 (H) 74 - 99 mg/dL   Renal Function Panel   Result Value Ref Range    Glucose 183 (H) 74 - 99 mg/dL    Sodium 140 136 - 145 mmol/L    Potassium 3.9 3.5 - 5.3 mmol/L    Chloride 95 (L) 98 - 107 mmol/L    Bicarbonate 41 (HH) 21 - 32 mmol/L    Anion Gap 8 (L) 10 - 20 mmol/L    Urea Nitrogen 21 6 - 23 mg/dL    Creatinine 0.40 (L) 0.50 - 1.30 mg/dL    eGFR >90 >60 mL/min/1.73m*2    Calcium 8.0 (L) 8.6 - 10.3 mg/dL    Phosphorus 2.9 2.5 - 4.9 mg/dL    Albumin 2.7 (L) 3.4 - 5.0 g/dL   CBC   Result Value Ref Range    WBC 11.0 4.4 - 11.3 x10*3/uL     nRBC 0.0 0.0 - 0.0 /100 WBCs    RBC 5.17 4.50 - 5.90 x10*6/uL    Hemoglobin 16.4 13.5 - 17.5 g/dL    Hematocrit 51.3 41.0 - 52.0 %    MCV 99 80 - 100 fL    MCH 31.7 26.0 - 34.0 pg    MCHC 32.0 32.0 - 36.0 g/dL    RDW 13.5 11.5 - 14.5 %    Platelets 241 150 - 450 x10*3/uL   POCT GLUCOSE   Result Value Ref Range    POCT Glucose 132 (H) 74 - 99 mg/dL                     Assessment/Plan   Assessment & Plan  Acute respiratory failure with hypoxia and hypercapnia (Multi)  67-year-old gentleman with history of chronic diastolic heart failure, COPD, obstructive sleep apnea, obesity, type 2 diabetes mellitus, hypertension, hypothyroidism and hyperlipidemia was admitted with acute respiratory failure with hypoxia and hypercapnia and noted to have a right loculated pleural effusion and bilateral lower lobe lung infiltrate and right hydropneumothorax.  He was hypotensive on admission and required 3 L of fluid to resuscitate and admitted to intensive care unit and was intubated because of acute respiratory failure and remained intubated for several days.  Currently he is extubated and on high flow oxygen as mentioned above.  He had significant leukocytosis of 22,000 on admission and his sputum culture was positive for stenotrophomonas.  Patient is being continued on IV Zosyn and IV Levaquin, pulmonary and infectious disease on board.  He has had IV antibiotics for 10 days, will discuss with infectious disease and possible discontinue IV antibiotics tomorrow.    1.  Acute hypoxic hypercapnic respiratory failure with hypotension and right hydropneumothorax and bilateral infiltrate and positive for pneumonia.Sputum culture was positive for stenotrophomonas.   Initially intubated for several days and since then he has been extubated and is on Improving and currently is on 13 L nasal cannula.  Continued on IV Zosyn and Levaquin.    Discontinue IV antibiotics tomorrow.    2.  Patient had hydropneumothorax on admission and had chest  tube, chest tube has been removed since then..    3.  Hypotension has resolved    4.  COPD and obstructive sleep apnea.  Aerosol treatment and steroids.  Currently on Prednisone 20 mg daily.,  Reduced to 10 mg from today.  5.  Diabetes mellitus type 2, continue sliding scale insulin, patient was on Mounjaro at home prior to admission.    6.  Patient  DNR/DNI.    7.  Hematuria possibly secondary to trauma secondary to Live catheter which has resolved after irrigation.    8.  Recurrent episodes of brief nonsustained ventricular tachycardia, also moderate systolic heart failure, previous echocardiogram revealed LV ejection fraction of 30 to 35% with moderate aortic stenosis, patient is started on IV Lasix this morning and cardiology consult appreciated and repeat echocardiogram quality was suboptimal he did reveal LV ejection fraction of 23% with global hypokinesia and moderate aortic cusp calcification and moderate right ventricular dysfunction.  Patient has been started on IV Lasix and Jardiance and consider starting GDMT once condition is stable.    9.  Patient had a modified barium swallow and has been recommended to have a puréed diet with honey thickened and nectar thickened liquids.    Nystatin cream to both groin area for inguinal intertrigo.    Reviewed all labs patient's medical record and imaging studies and discussed the plan of treatment with the patient..    Continue PT OT and processing for LTAC.         I spent 35 minutes in the professional and overall care of this patient.      Noble Gatica MD

## 2024-09-23 NOTE — PROGRESS NOTES
Alo Glass is a 69 y.o. male on day 19 of admission presenting with Acute respiratory failure with hypoxia and hypercapnia (Multi).    Subjective   Patient seen and examined.  He is slowly improving, physical therapy tried to work on him and try to get him out of the bed but patient was very weak and it could not be done.  His leg edema has improved significantly and breathing is better and his blood pressures are rather soft therefore dose of IV Lasix has been decreased to 80 mg daily by cardiology.  Still has Live catheter which has been draining clear urine, current oxygen requirement is 10 to 12 L nasal cannula and saturating above 95%.  Overall general condition stable with slow improvement.  Patient would need to go to LTAC when arrangements could be made and approved by his insurance.  Patient expressed that he is full code and would like to be intubated if necessary.  This has been documented in the chart.       Objective     Physical Exam    GENERAL:  Alert, no distress, cooperative, obese, depressed and anxious  SKIN:  Skin color, texture, turgor normal. No rashes or lesions.  OROPHARYNX:  Lips, mucosa, and tongue are normal.Teeth and gums, normal. Oropharynx normal.  NECK:  No jugulovenous distention, No carotid bruits, Carotid pulse normal contour, Supple  LUNGS: Improved air exchange, few scattered wheezes, no crackles.  CARDIAC: Rate and rhythm is regular, normal S1 and S2; no rubs, murmurs, or gallops  ABDOMEN:  Abdomen soft, large, non-tender, BS normal, No masses or organomegaly  EXTREMETIES:  Extremities normal, no deformities, 1+ edema, clubbing or skin discoloration. Good capillary refill., No ulcers  NEURO:  Alert, oriented X 3, Gait not examined Non-focal. Reflexes normal and symmetric. Sensation grossly intact., Cranial nerves II-XII intact  PULSES:  2+ radial, 2+ carotid  Last Recorded Vitals  Blood pressure 91/62, pulse 74, temperature 36.5 °C (97.7 °F), temperature source Skin,  "resp. rate 20, height 1.803 m (5' 10.98\"), weight 118 kg (260 lb 12.9 oz), SpO2 96%.  Intake/Output last 3 Shifts:  I/O last 3 completed shifts:  In: 800 (6.8 mL/kg) [P.O.:800]  Out: 6240 (52.7 mL/kg) [Urine:6240 (1.5 mL/kg/hr)]  Weight: 118.3 kg     Relevant Results          This patient has a urinary catheter   Reason for the urinary catheter remaining today? critically ill patient who need accurate urinary output measurements               Assessment/Plan   Assessment & Plan  Acute respiratory failure with hypoxia and hypercapnia (Multi)  67-year-old gentleman with history of chronic diastolic heart failure, COPD, obstructive sleep apnea, obesity, type 2 diabetes mellitus, hypertension, hypothyroidism and hyperlipidemia was admitted with acute respiratory failure with hypoxia and hypercapnia and noted to have a right loculated pleural effusion and bilateral lower lobe lung infiltrate and right hydropneumothorax.  He was hypotensive on admission and required 3 L of fluid to resuscitate and admitted to intensive care unit and was intubated because of acute respiratory failure and remained intubated for several days.  Currently he is extubated and on high flow oxygen as mentioned above.  He had significant leukocytosis of 22,000 on admission and his sputum culture was positive for stenotrophomonas.  Patient is being continued on IV Zosyn and IV Levaquin, pulmonary and infectious disease on board.  He has had IV antibiotics for 10 days,  discontinue dIV antibiotics.    1.  Acute hypoxic hypercapnic respiratory failure with hypotension and right hydropneumothorax and bilateral infiltrate and positive for pneumonia.Sputum culture was positive for stenotrophomonas.   Initially intubated for several days and since then he has been extubated and is on Improving and currently is on 13 L nasal cannula.   Discontinued IV antibiotics,    2.  Acute on chronic systolic and diastolic heart failure with LV ejection fraction of " 35% and moderate aortic stenosis and pulmonary hypertension.  Patient is being diuresed with IV Lasix 80 mg twice daily however his blood pressures are rather soft therefore Lasix has been reduced to 80 mg once a day and once blood pressures are stable then he could be resumed on GDMT therapy for heart failure.    3.  Patient had hydropneumothorax on admission and had chest tube, chest tube has been removed since then..    4.  Hypotension has resolved    6.  COPD and obstructive sleep apnea.  Aerosol treatment and steroids.  Currently on Prednisone 20 mg daily.,  Reduced to 10 mg from today.  7.  Diabetes mellitus type 2, continue sliding scale insulin, patient was on Mounjaro at home prior to admission.    8.  Patient  DNR/DNI.    9.  Hematuria possibly secondary to trauma secondary to Live catheter which has resolved after irrigation.    10.  Recurrent episodes of brief nonsustained ventricular tachycardia, also moderate systolic heart failure, previous echocardiogram revealed LV ejection fraction of 30 to 35% with moderate aortic stenosis, patient is started on IV Lasix this morning and cardiology consult appreciated and repeat echocardiogram quality was suboptimal he did reveal LV ejection fraction of 23% with global hypokinesia and moderate aortic cusp calcification and moderate right ventricular dysfunction.  Patient has been started on IV Lasix and Jardiance and consider starting GDMT once condition is stable.    11.  Patient had a modified barium swallow and has been recommended to have a puréed diet with honey thickened and nectar thickened liquids.    Nystatin cream to both groin area for inguinal intertrigo.    Reviewed all labs patient's medical record and imaging studies and discussed the plan of treatment with the patient..    Continue PT OT and  for LTAC.       I spent 35 minutes in the professional and overall care of this patient.      Noble Gatica MD

## 2024-09-23 NOTE — ASSESSMENT & PLAN NOTE
67-year-old gentleman with history of chronic diastolic heart failure, COPD, obstructive sleep apnea, obesity, type 2 diabetes mellitus, hypertension, hypothyroidism and hyperlipidemia was admitted with acute respiratory failure with hypoxia and hypercapnia and noted to have a right loculated pleural effusion and bilateral lower lobe lung infiltrate and right hydropneumothorax.  He was hypotensive on admission and required 3 L of fluid to resuscitate and admitted to intensive care unit and was intubated because of acute respiratory failure and remained intubated for several days.  Currently he is extubated and on high flow oxygen as mentioned above.  He had significant leukocytosis of 22,000 on admission and his sputum culture was positive for stenotrophomonas.  Patient is being continued on IV Zosyn and IV Levaquin, pulmonary and infectious disease on board.  He has had IV antibiotics for 10 days,  discontinue dIV antibiotics.    1.  Acute hypoxic hypercapnic respiratory failure with hypotension and right hydropneumothorax and bilateral infiltrate and positive for pneumonia.Sputum culture was positive for stenotrophomonas.   Initially intubated for several days and since then he has been extubated and is on Improving and currently is on 13 L nasal cannula.   Discontinued IV antibiotics,    2.  Acute on chronic systolic and diastolic heart failure with LV ejection fraction of 35% and moderate aortic stenosis and pulmonary hypertension.  Patient is being diuresed with IV Lasix 80 mg twice daily however his blood pressures are rather soft therefore Lasix has been reduced to 80 mg once a day and once blood pressures are stable then he could be resumed on GDMT therapy for heart failure.    3.  Patient had hydropneumothorax on admission and had chest tube, chest tube has been removed since then..    4.  Hypotension has resolved    6.  COPD and obstructive sleep apnea.  Aerosol treatment and steroids.  Currently on  Prednisone 20 mg daily.,  Reduced to 10 mg from today.  7.  Diabetes mellitus type 2, continue sliding scale insulin, patient was on Mounjaro at home prior to admission.    8.  Patient  DNR/DNI.    9.  Hematuria possibly secondary to trauma secondary to Live catheter which has resolved after irrigation.    10.  Recurrent episodes of brief nonsustained ventricular tachycardia, also moderate systolic heart failure, previous echocardiogram revealed LV ejection fraction of 30 to 35% with moderate aortic stenosis, patient is started on IV Lasix this morning and cardiology consult appreciated and repeat echocardiogram quality was suboptimal he did reveal LV ejection fraction of 23% with global hypokinesia and moderate aortic cusp calcification and moderate right ventricular dysfunction.  Patient has been started on IV Lasix and Jardiance and consider starting GDMT once condition is stable.    11.  Patient had a modified barium swallow and has been recommended to have a puréed diet with honey thickened and nectar thickened liquids.    Nystatin cream to both groin area for inguinal intertrigo.    Reviewed all labs patient's medical record and imaging studies and discussed the plan of treatment with the patient..    Continue PT OT and  for LTAC.

## 2024-09-23 NOTE — ASSESSMENT & PLAN NOTE
67-year-old gentleman with history of chronic diastolic heart failure, COPD, obstructive sleep apnea, obesity, type 2 diabetes mellitus, hypertension, hypothyroidism and hyperlipidemia was admitted with acute respiratory failure with hypoxia and hypercapnia and noted to have a right loculated pleural effusion and bilateral lower lobe lung infiltrate and right hydropneumothorax.  He was hypotensive on admission and required 3 L of fluid to resuscitate and admitted to intensive care unit and was intubated because of acute respiratory failure and remained intubated for several days.  Currently he is extubated and on high flow oxygen as mentioned above.  He had significant leukocytosis of 22,000 on admission and his sputum culture was positive for stenotrophomonas.  Patient is being continued on IV Zosyn and IV Levaquin, pulmonary and infectious disease on board.  He has had IV antibiotics for 10 days, will discuss with infectious disease and possible discontinue IV antibiotics tomorrow.    1.  Acute hypoxic hypercapnic respiratory failure with hypotension and right hydropneumothorax and bilateral infiltrate and positive for pneumonia.Sputum culture was positive for stenotrophomonas.   Initially intubated for several days and since then he has been extubated and is on Improving and currently is on 13 L nasal cannula.  Continued on IV Zosyn and Levaquin.    Discontinue IV antibiotics tomorrow.    2.  Patient had hydropneumothorax on admission and had chest tube, chest tube has been removed since then..    3.  Hypotension has resolved    4.  COPD and obstructive sleep apnea.  Aerosol treatment and steroids.  Currently on Prednisone 20 mg daily.,  Reduced to 10 mg from today.  5.  Diabetes mellitus type 2, continue sliding scale insulin, patient was on Mounjaro at home prior to admission.    6.  Patient  DNR/DNI.    7.  Hematuria possibly secondary to trauma secondary to Live catheter which has resolved after  irrigation.    8.  Recurrent episodes of brief nonsustained ventricular tachycardia, also moderate systolic heart failure, previous echocardiogram revealed LV ejection fraction of 30 to 35% with moderate aortic stenosis, patient is started on IV Lasix this morning and cardiology consult appreciated and repeat echocardiogram quality was suboptimal he did reveal LV ejection fraction of 23% with global hypokinesia and moderate aortic cusp calcification and moderate right ventricular dysfunction.  Patient has been started on IV Lasix and Jardiance and consider starting GDMT once condition is stable.    9.  Patient had a modified barium swallow and has been recommended to have a puréed diet with honey thickened and nectar thickened liquids.    Nystatin cream to both groin area for inguinal intertrigo.    Reviewed all labs patient's medical record and imaging studies and discussed the plan of treatment with the patient..    Continue PT OT and processing for LTAC.

## 2024-09-23 NOTE — PROGRESS NOTES
09/23/24 1603   Discharge Planning   Expected Discharge Disposition Long Term      Spoke with patient's sister,Alia via phone. Provided update on discharge planning. Alia and  will be at bedside tomorrow afternoon and are hoping to meet with np/md.    Carol started for [facility].

## 2024-09-23 NOTE — PROGRESS NOTES
Speech-Language Pathology    Inpatient  Speech-Language Pathology Dysphagia Treatment    Patient Name: Alo Glass  MRN: [id number]  : 1955  Today's Date: 24   Time Calculation  Start Time: 1430  Stop Time: 1504  Time Calculation (min): 34 min        Subjective:   Alert with multiple complaints.    Objective:  Goals:   Patient will tolerate baseline/recommended PO diet without overt s/s of aspiration, further difficulty, or concern for aspiration-related complications in 90% of observed therapeutic trials.     START DATE: 24      Progress: Patient seen to determine ongoing diet tolerance. Upon arriving to room, thin liquids at bedside with 2-3, 4oz cups of thin liquids consumed prior to SLP arrival. Advised patient of need for thickened liquids with rationale explained. Nursing notified. Signage posted at bedside to remind staff of modified diet consistencies required for safety with oral intake. Patient reporting decreased oral intake due to disliking pureed foods. Nectar/Mildly thick liquids presented. Patient with tendency to consume large bolus sizes. SLP provided cues to reduce bolus size and utilize slow rate of oral intake to decrease aspiration risk. Patient with no overt s/s aspiration on 100% of boluses consumed (4oz total). Voice and respirations remained clear. POC reviewed with need to continue modified diet until medical condition improves in order to determine safety for diet advancement.    Solids presented to assess tolerance. Patient feeding self with active mastication and timely consumption of solids presented. Adequate oral clearance achieved with no overt difficulty with 100% of solid boluses consumed.     STATUS: Progressing towards goal.    Assessment:  SLP TX Intervention Outcome: Making Progress Towards Goals    Patient with adequate tolerance of nectar/mildly thick liquids. Mastication of solids active with no difficulty exhibited during visit this date. Patient also  reporting distaste for puree foods. Feel patient ready to advance diet to bite size with ongoing therapy to determine potential for further diet advancement. Patient remains a good candidate for ongoing therapy.    Plan:    ADVANCE TO BITE SIZE DIET WITH CONTINUATION OF NECTAR/MILDLY THICKENED LIQUIDS  SLOW RATE AND SMALL BOLUS SIZE  SLP FOR DYSPHAGIA MANAGEMENT.    SLP TX Plan: Continue Plan of Care  SLP Plan: Skilled SLP  SLP Frequency: 3x per week  Duration: 2 weeks  SLP Discharge Recommendations: Continue skilled speech therapy services post discharge  Discussed POC: Patient  Discussed Risks/Benefits: Yes  Patient/Caregiver Agreeable: Yes    Following completion of session:  *Bed position: lowered

## 2024-09-23 NOTE — PROGRESS NOTES
"Spiritual Care Visit    Clinical Encounter Type  Visited With: Patient  Routine Visit: Introduction  Continue Visiting: Yes  Referral From: Verbal  Referral To:     Restorationist Encounters  Restorationist Needs: Prayer    Pt stated that he was frustrated because he wants to get up and moving.  There was prayer for understanding what is going on with his body as well as getting the physicians to share what they are understating. \"I would like for you come back.\"  There will be another visit scheduled.    Chaplain Alexander Mclaughlin                                         "

## 2024-09-23 NOTE — CARE PLAN
The patient's goals for the shift include Pt. will have a safe, restful and uneventful evening    The clinical goals for the shift include patient will be able to remain hemodynamic stability by the end of the shift    Problem: Skin  Goal: Decreased wound size/increased tissue granulation at next dressing change  Outcome: Progressing  Goal: Prevent/manage excess moisture  Outcome: Progressing  Goal: Prevent/minimize sheer/friction injuries  Outcome: Progressing  Goal: Promote/optimize nutrition  Outcome: Progressing  Flowsheets (Taken 9/22/2024 2244)  Promote/optimize nutrition:   Consume > 50% meals/supplements   Assist with feeding     Problem: Nutrition  Goal: Oral intake greater than 50%  Outcome: Progressing  Goal: Oral intake greater 75%  Outcome: Progressing  Goal: Consume prescribed supplement  Outcome: Progressing  Goal: Nutrition support goals are met within 48 hrs  Outcome: Progressing  Goal: Nutrition support is meeting 75% of nutrient needs  Outcome: Progressing  Goal: Tube feed tolerance  Outcome: Progressing  Goal: BG  mg/dL  Outcome: Progressing  Goal: Lab values WNL  Outcome: Progressing  Goal: Electrolytes WNL  Outcome: Progressing  Goal: Promote healing  Outcome: Progressing  Goal: Maintain stable weight  Outcome: Progressing  Goal: Reduce weight from edema/fluid  Outcome: Progressing  Goal: Gradual weight gain  Outcome: Progressing  Goal: Improve ostomy output  Outcome: Progressing     Problem: Fall/Injury  Goal: Not fall by end of shift  Outcome: Progressing  Goal: Be free from injury by end of the shift  Outcome: Progressing  Goal: Verbalize understanding of personal risk factors for fall in the hospital  Outcome: Progressing  Goal: Verbalize understanding of risk factor reduction measures to prevent injury from fall in the home  Outcome: Progressing  Goal: Use assistive devices by end of the shift  Outcome: Progressing  Goal: Pace activities to prevent fatigue by end of the  shift  Outcome: Progressing

## 2024-09-23 NOTE — PROGRESS NOTES
Occupational Therapy    OT Treatment    Patient Name: Alo Glass  MRN: [id number]  Department: [facility]   Room: [facility]414-A  Today's Date: 9/23/2024  Time Calculation  Start Time: 1006  Stop Time: 1039  Time Calculation (min): 33 min        Assessment:  OT Assessment: Pt able to sit EOB during session for ADLs and sit<>stand attempts. Pt limited by fatigue and motivation.  Prognosis: Good  Barriers to Discharge: Decreased caregiver support  Evaluation/Treatment Tolerance: Patient tolerated treatment well, Patient limited by fatigue  Medical Staff Made Aware: Yes  End of Session Communication: Bedside nurse  End of Session Patient Position: Bed, 3 rail up, Alarm on  OT Assessment Results: Decreased ADL status, Decreased upper extremity range of motion, Decreased upper extremity strength, Decreased safe judgment during ADL, Decreased cognition, Decreased endurance, Decreased sensation, Decreased IADLs  Prognosis: Good  Barriers to Discharge: Decreased caregiver support  Evaluation/Treatment Tolerance: Patient tolerated treatment well, Patient limited by fatigue  Medical Staff Made Aware: Yes  Strengths: Attitude of self  Barriers to Participation: Ability to acquire knowledge, Capable of completing ADLs semi/independent, Comorbidities  Plan:  Treatment Interventions: ADL retraining, UE strengthening/ROM, Functional transfer training, Endurance training, Compensatory technique education  OT Frequency: 3 times per week  OT Discharge Recommendations: Moderate intensity level of continued care  Equipment Recommended upon Discharge: Other (comment) (TBD)  OT Recommended Transfer Status: Dependent  OT - OK to Discharge: Yes  Treatment Interventions: ADL retraining, UE strengthening/ROM, Functional transfer training, Endurance training, Compensatory technique education    Subjective   Previous Visit Info:  OT Last Visit  OT Received On: 09/23/24  General:  General  Reason for Referral: Impaired Mobility: Pt is a 69 y.o.  male found non responsive at home by his neighbor and EMS was called.  Referred By: Mónica Johns MD  Past Medical History Relevant to Rehab:   Past Medical History:   Diagnosis Date    CHF (congestive heart failure) (Multi)     COPD (chronic obstructive pulmonary disease) (Multi)     Diabetes mellitus (Multi)     Hypertension     Obesity     MELISSA (obstructive sleep apnea)     Personal history of other specified conditions 01/08/2023    History of impaired glucose tolerance     Family/Caregiver Present: No  Co-Treatment: PT  Co-Treatment Reason: Cotx with PT required for skilled technique needed for safe functional transfers and to facilitate maximum participation with skilled intervention.  Prior to Session Communication: Bedside nurse  Patient Position Received: Bed, 3 rail up, Alarm on  Preferred Learning Style: auditory, kinesthetic, verbal, written  General Comment: Pt received semi-supine in bed at start of session, pleasant and agreeable to participate in PT tx  Precautions:  Medical Precautions: Fall precautions, Oxygen therapy device and L/min (10L high flow)  Precautions Comment: Confusion, hx of falls    Pain:  Pain Assessment  Pain Assessment: 0-10  0-10 (Numeric) Pain Score: 0 - No pain    Objective    Cognition:  Cognition  Overall Cognitive Status: Within Functional Limits  Safety Judgment: Decreased awareness of need for assistance     Activities of Daily Living: Grooming  Grooming Level of Assistance: Setup  Grooming Where Assessed: Edge of bed  Grooming Comments: Pt able to apply tooth paste and brush teeth sitting EOB, good balance throughout    LE Dressing  LE Dressing: Yes  LE Dressing Comments: anticipate max A, pt unable to fully reach sock sitting EOB  Functional Standing Tolerance:     Bed Mobility/Transfers: Bed Mobility  Bed Mobility: Yes  Bed Mobility 1  Bed Mobility 1: Scooting  Level of Assistance 1: Dependent, +2  Bed Mobility Comments 1: 2-person dependent scoot toward HOB for better  positioning in supine via TAPS.  Bed Mobility 2  Bed Mobility  2: Supine to sitting  Level of Assistance 2: Moderate assistance, +2  Bed Mobility Comments 2: HOB elevated, cues for hand placement on bed rails  Bed Mobility 3  Bed Mobility 3: Sitting to supine  Level of Assistance 3: Maximum assistance, +2  Bed Mobility Comments 3: assist at trunk and B LE, cues for hand placement    Transfers  Transfer: Yes  Transfer 1  Transfer Device 1: Walker, Gait belt  Transfer Level of Assistance 1: Maximum assistance, +2  Trials/Comments 1: 1/2 sit<>stand trials completed. Partial stand achieved on trial 1 with maxA x2 and vebal cuing for strategic hand and LE placement and rocking for FWD momentum, trial duration was less than 3s. Trial 2 was unsuccessful d/t poor hand placement. Pt adamently wanted to use FWW for pull to stand, however pt was unable to stand using this method. Pt declined 3rd trial 2/2 fatigue.     Sitting Balance:  Dynamic Sitting Balance  Dynamic Sitting-Balance Support: Feet supported (intermittent B UE support)  Dynamic Sitting-Level of Assistance: Close supervision, Minimum assistance  Dynamic Sitting-Balance: Lateral lean, Forward lean  Dynamic Sitting-Comments: Pt able to maintain sitting balance EOB, min A for posture     Therapy/Activity: Therapeutic Activity  Therapeutic Activity Performed: Yes  Therapeutic Activity 1: Pt completed sit<>stand attempts with assist, educated pt on current level of function and POC, pt asking lots of questions regarding therapy. Pt able to sit EOB throughout session in order to address deficits in sitting tolerance, strength, and balance.     RUE   RUE : Within Functional Limits and LUE   LUE: Within Functional Limits    Outcome Measures:[facility] Daily Activity  Putting on and taking off regular lower body clothing: A lot  Bathing (including washing, rinsing, drying): A lot  Putting on and taking off regular upper body clothing: A lot  Toileting, which includes using  toilet, bedpan or urinal: Total  Taking care of personal grooming such as brushing teeth: A little  Eating Meals: A little  Daily Activity - Total Score: 13  Education Documentation  Body Mechanics, taught by Antonia Barber OT at 9/23/2024 12:33 PM.  Learner: Patient  Readiness: Acceptance  Method: Explanation  Response: Verbalizes Understanding, Needs Reinforcement    ADL Training, taught by Antonia Barber OT at 9/23/2024 12:33 PM.  Learner: Patient  Readiness: Acceptance  Method: Explanation  Response: Verbalizes Understanding, Needs Reinforcement    Education Comments  No comments found.           Goals:  Encounter Problems       Encounter Problems (Active)       ADLs       Patient will perform UB and LB bathing with modified independent level of assistance. (Progressing)       Start:  09/15/24    Expected End:  09/29/24            Patient with complete upper body dressing with modified independent level of assistance donning and doffing all UE clothes with PRN adaptive equipment. (Progressing)       Start:  09/15/24    Expected End:  09/29/24            Patient with complete lower body dressing with modified independent level of assistance donning and doffing all LE clothes  with PRN adaptive equipment. (Progressing)       Start:  09/15/24    Expected End:  09/29/24            Patient will feed self with modified independent level of assistance. (Progressing)       Start:  09/15/24    Expected End:  09/29/24            Patient will complete daily grooming tasks brushing teeth and washing face/hair with modified independent level of assistance. (Progressing)       Start:  09/15/24    Expected End:  09/29/24            Patient will complete toileting including hygiene clothing management/hygiene with modified independent level of assistance. (Progressing)       Start:  09/15/24    Expected End:  09/29/24               COGNITION/SAFETY       Patient will demonstrated orientation x 4. (Progressing)       Start:   09/15/24    Expected End:  09/29/24       ORIENTATION            TRANSFERS       Patient will perform bed mobility modified independent level of assistance in order to improve safety and independence with mobility (Progressing)       Start:  09/15/24    Expected End:  09/29/24

## 2024-09-23 NOTE — PROGRESS NOTES
Physical Therapy    Physical Therapy Treatment    Patient Name: Alo Glass  MRN: [id number]  Department: [place] [address]  Room: [address] [place] [address]  Today's Date: 9/23/2024  Time Calculation  Start Time: 1005  Stop Time: 1039  Time Calculation (min): 34 min         Assessment/Plan   PT Assessment  End of Session Communication: Bedside nurse  Assessment Comment: Pt voiced desire for W/C throughout session, this therapist provided education on need for increased B LE/UE strength and ability to perform stand pivot. Pt demo's improved supine>sit ability and able to initiate sit<>stand trials. Pt fatigues quickly and needs frequent rest breaks throughout session to manage fatigue and FERRELL.  End of Session Patient Position: Bed, 3 rail up, Alarm on (call light and needs in reach)  PT Plan  Inpatient/Swing Bed or Outpatient: Inpatient  PT Plan  Treatment/Interventions: Bed mobility, Transfer training, Therapeutic exercise  PT Plan: Ongoing PT  PT Frequency: 4 times per week  PT Discharge Recommendations: Moderate intensity level of continued care  Equipment Recommended upon Discharge: Other (comment) (TBD)  PT Recommended Transfer Status: Total assist  PT - OK to Discharge: Yes (per POC)      General Visit Information:   PT  Visit  PT Received On: 09/23/24  General  Reason for Referral: Impaired Mobility: Pt is a 69 y.o. male found non responsive at home by his neighbor and EMS was called.  Referred By: Mónica Johns MD  Past Medical History Relevant to Rehab:   Past Medical History:   Diagnosis Date    CHF (congestive heart failure) (Multi)     COPD (chronic obstructive pulmonary disease) (Multi)     Diabetes mellitus (Multi)     Hypertension     Obesity     MELISSA (obstructive sleep apnea)     Personal history of other specified conditions 01/08/2023    History of impaired glucose tolerance       Family/Caregiver Present: No  Co-Treatment: OT  Co-Treatment Reason: Cotx with OT required for skilled technique needed for safe functional  transfers and to facilitate maximum participation with skilled intervention.  Prior to Session Communication: Bedside nurse  Patient Position Received: Bed, 3 rail up, Alarm on  Preferred Learning Style: auditory, kinesthetic, verbal, written  General Comment: Pt received semi-supine in bed at start of session, pleasant and agreeable to participate in PT tx    Subjective   Precautions:  Precautions  Medical Precautions: Fall precautions, Oxygen therapy device and L/min (10L supplemental humidified O2 high flow via NC)  Precautions Comment: Confusion, hx of falls        Objective   Pain:  Pain Assessment  Pain Assessment: 0-10  0-10 (Numeric) Pain Score: 0 - No pain  Cognition:  Cognition  Overall Cognitive Status: Within Functional Limits  Coordination:  Movements are Fluid and Coordinated: Yes  Postural Control:  Postural Control  Postural Control: Within Functional Limits  Static Sitting Balance  Static Sitting-Balance Support: Feet supported, Bilateral upper extremity supported  Static Sitting-Level of Assistance: Close supervision  Static Sitting-Comment/Number of Minutes: x~15 min duration at EOB    Activity Tolerance:  Activity Tolerance  Endurance: Tolerates 10 - 20 min exercise with multiple rests  Treatments:  Therapeutic Exercise  Therapeutic Exercise Performed: Yes  Therapeutic Exercise Activity 1: Pt instructed in B LE ther-ex: supine AP and QS x10 reps. Seated LAQ and Hip flexion x10 reps. Rest as needed to manage fatigue. Done to improve muscular strength and endurance to facilitate increased independence with balance, transfers, ambulation, and participation in ADLs.  Verbal/nonverbal (demo/gestures) cuing for execution of exercises and for proper form to obtain maximal benefits.    Therapeutic Activity  Therapeutic Activity Performed: Yes  Therapeutic Activity 1: To increase sitting balance, core strength and functional endurance needed for increased independence with mobility and transfers, pt  performs static/dynamic sitting at EOB x15 min duration. SBA x1 for static sitting balance and CGA x1 for dynamic sitting balance.    Bed Mobility  Bed Mobility: Yes  Bed Mobility 1  Bed Mobility 1: Scooting  Level of Assistance 1: Dependent, +2  Bed Mobility Comments 1: 2-person dependent scoot toward HOB for better positioning in supine via TAPS.  Bed Mobility 2  Bed Mobility  2: Supine to sitting  Level of Assistance 2: Moderate assistance, +2  Bed Mobility Comments 2: HOB elevated  Bed Mobility 3  Bed Mobility 3: Sitting to supine  Level of Assistance 3: Maximum assistance, +2  Bed Mobility Comments 3: bed flat  Bed Mobility 4  Bed Mobility 4: Scooting  Level of Assistance 4: Moderate assistance, +2  Bed Mobility Comments 4: posterior scooting in sitting at EOB    Transfers  Transfer: Yes  Transfer 1  Transfer From 1:  (sit<>stand from EOB)  Transfer Device 1: Walker, Gait belt  Transfer Level of Assistance 1: Maximum assistance, +2  Trials/Comments 1: 1/2 sit<>stand trials completed. Partial stand achieved on trial 1 with maxA x2 and vebal cuing for strategic hand and LE placement and rocking for FWD momentum, trial duration was less than 3s. Trial 2 was unsuccessful d/t poor hand placement. Pt adamently wanted to use FWW for pull to stand, however pt was unable to stand using this method. Pt declined 3rd trial 2/2 fatigue.    Outcome Measures:  [facility] Basic Mobility  Turning from your back to your side while in a flat bed without using bedrails: A lot  Moving from lying on your back to sitting on the side of a flat bed without using bedrails: Total  Moving to and from bed to chair (including a wheelchair): Total  Standing up from a chair using your arms (e.g. wheelchair or bedside chair): Total  To walk in hospital room: Total  Climbing 3-5 steps with railing: Total  Basic Mobility - Total Score: 7        Encounter Problems       Encounter Problems (Active)       Balance       STG - Maintains static sitting  balance with upper extremity support At CGA, safely, without LOB, device PRN  (Progressing)       Start:  09/15/24    Expected End:  09/29/24       INTERVENTIONS:  1. Practice sitting on the edge of a bed/mat with minimal support.  2. Educate patient about maintining total hip precautions while maintaining balance.  3. Educate patient about pressure relief.  4. Educate patient about use of assistive device.         STG - Maintains dynamic sitting balance with upper extremity support At Min Ax1, safely, without LOB, device PRN  (Progressing)       Start:  09/15/24    Expected End:  09/29/24       INTERVENTIONS:  1. Practice sitting on the edge of a bed/mat with minimal support.  2. Educate patient about maintining total hip precautions while maintaining balance.  3. Educate patient about pressure relief.  4. Educate patient about use of assistive device.            Mobility       Endurance - Pt to tolerate >/= 20 minutes therex, theract, gait and/or NMR with </= 5 minutes of rest breaks  (Progressing)       Start:  09/15/24    Expected End:  09/29/24               PT Transfers       STG - Patient will perform bed mobility  At CGA, safely, without LOB, device PRN  (Progressing)       Start:  09/15/24    Expected End:  09/29/24            STG - Patient will transfer sit to and from stand At Mod Ax1, safely, without LOB, device PRN  (Progressing)       Start:  09/15/24    Expected End:  09/29/24               Pain - Adult          Safety       Pt will verbalize and apply safety awareness and precautions 100% of time throughout entire session   (Not Progressing)       Start:  09/15/24    Expected End:  09/29/24

## 2024-09-24 LAB
ALBUMIN SERPL BCP-MCNC: 2.8 G/DL (ref 3.4–5)
ANION GAP SERPL CALC-SCNC: 10 MMOL/L (ref 10–20)
BNP SERPL-MCNC: 287 PG/ML (ref 0–99)
BUN SERPL-MCNC: 21 MG/DL (ref 6–23)
CALCIUM SERPL-MCNC: 8 MG/DL (ref 8.6–10.3)
CHLORIDE SERPL-SCNC: 93 MMOL/L (ref 98–107)
CO2 SERPL-SCNC: 40 MMOL/L (ref 21–32)
CREAT SERPL-MCNC: 0.35 MG/DL (ref 0.5–1.3)
EGFRCR SERPLBLD CKD-EPI 2021: >90 ML/MIN/1.73M*2
ERYTHROCYTE [DISTWIDTH] IN BLOOD BY AUTOMATED COUNT: 13.5 % (ref 11.5–14.5)
ERYTHROCYTE [DISTWIDTH] IN BLOOD BY AUTOMATED COUNT: 21.6 % (ref 11.5–14.5)
GLUCOSE BLD MANUAL STRIP-MCNC: 165 MG/DL (ref 74–99)
GLUCOSE BLD MANUAL STRIP-MCNC: 174 MG/DL (ref 74–99)
GLUCOSE BLD MANUAL STRIP-MCNC: 178 MG/DL (ref 74–99)
GLUCOSE BLD MANUAL STRIP-MCNC: 200 MG/DL (ref 74–99)
GLUCOSE BLD MANUAL STRIP-MCNC: 202 MG/DL (ref 74–99)
GLUCOSE SERPL-MCNC: 99 MG/DL (ref 74–99)
HCT VFR BLD AUTO: 20.5 % (ref 41–52)
HCT VFR BLD AUTO: 50.3 % (ref 41–52)
HGB BLD-MCNC: 16 G/DL (ref 13.5–17.5)
HGB BLD-MCNC: 6.9 G/DL (ref 13.5–17.5)
MCH RBC QN AUTO: 31.2 PG (ref 26–34)
MCH RBC QN AUTO: 31.4 PG (ref 26–34)
MCHC RBC AUTO-ENTMCNC: 31.8 G/DL (ref 32–36)
MCHC RBC AUTO-ENTMCNC: 33.7 G/DL (ref 32–36)
MCV RBC AUTO: 93 FL (ref 80–100)
MCV RBC AUTO: 98 FL (ref 80–100)
NRBC BLD-RTO: 0 /100 WBCS (ref 0–0)
NRBC BLD-RTO: 0 /100 WBCS (ref 0–0)
PHOSPHATE SERPL-MCNC: 3.2 MG/DL (ref 2.5–4.9)
PLATELET # BLD AUTO: 232 X10*3/UL (ref 150–450)
PLATELET # BLD AUTO: 68 X10*3/UL (ref 150–450)
POTASSIUM SERPL-SCNC: 4.1 MMOL/L (ref 3.5–5.3)
RBC # BLD AUTO: 2.2 X10*6/UL (ref 4.5–5.9)
RBC # BLD AUTO: 5.13 X10*6/UL (ref 4.5–5.9)
SODIUM SERPL-SCNC: 139 MMOL/L (ref 136–145)
WBC # BLD AUTO: 2.9 X10*3/UL (ref 4.4–11.3)
WBC # BLD AUTO: 8 X10*3/UL (ref 4.4–11.3)

## 2024-09-24 PROCEDURE — 80069 RENAL FUNCTION PANEL: CPT | Performed by: NURSE PRACTITIONER

## 2024-09-24 PROCEDURE — 97535 SELF CARE MNGMENT TRAINING: CPT | Mod: GO

## 2024-09-24 PROCEDURE — 2500000001 HC RX 250 WO HCPCS SELF ADMINISTERED DRUGS (ALT 637 FOR MEDICARE OP): Performed by: SURGERY

## 2024-09-24 PROCEDURE — 2500000005 HC RX 250 GENERAL PHARMACY W/O HCPCS: Performed by: INTERNAL MEDICINE

## 2024-09-24 PROCEDURE — 2060000001 HC INTERMEDIATE ICU ROOM DAILY

## 2024-09-24 PROCEDURE — 2500000004 HC RX 250 GENERAL PHARMACY W/ HCPCS (ALT 636 FOR OP/ED): Performed by: SURGERY

## 2024-09-24 PROCEDURE — 99222 1ST HOSP IP/OBS MODERATE 55: CPT | Performed by: NURSE PRACTITIONER

## 2024-09-24 PROCEDURE — 85027 COMPLETE CBC AUTOMATED: CPT

## 2024-09-24 PROCEDURE — 2500000001 HC RX 250 WO HCPCS SELF ADMINISTERED DRUGS (ALT 637 FOR MEDICARE OP): Performed by: NURSE PRACTITIONER

## 2024-09-24 PROCEDURE — 36415 COLL VENOUS BLD VENIPUNCTURE: CPT | Performed by: NURSE PRACTITIONER

## 2024-09-24 PROCEDURE — 2500000002 HC RX 250 W HCPCS SELF ADMINISTERED DRUGS (ALT 637 FOR MEDICARE OP, ALT 636 FOR OP/ED): Performed by: SURGERY

## 2024-09-24 PROCEDURE — 94640 AIRWAY INHALATION TREATMENT: CPT

## 2024-09-24 PROCEDURE — 2500000001 HC RX 250 WO HCPCS SELF ADMINISTERED DRUGS (ALT 637 FOR MEDICARE OP)

## 2024-09-24 PROCEDURE — 2500000002 HC RX 250 W HCPCS SELF ADMINISTERED DRUGS (ALT 637 FOR MEDICARE OP, ALT 636 FOR OP/ED): Performed by: INTERNAL MEDICINE

## 2024-09-24 PROCEDURE — 92526 ORAL FUNCTION THERAPY: CPT | Mod: GN | Performed by: SPEECH-LANGUAGE PATHOLOGIST

## 2024-09-24 PROCEDURE — 99232 SBSQ HOSP IP/OBS MODERATE 35: CPT | Performed by: INTERNAL MEDICINE

## 2024-09-24 PROCEDURE — 97110 THERAPEUTIC EXERCISES: CPT | Mod: GP,CQ

## 2024-09-24 PROCEDURE — 2500000004 HC RX 250 GENERAL PHARMACY W/ HCPCS (ALT 636 FOR OP/ED): Performed by: INTERNAL MEDICINE

## 2024-09-24 PROCEDURE — 83880 ASSAY OF NATRIURETIC PEPTIDE: CPT | Performed by: INTERNAL MEDICINE

## 2024-09-24 PROCEDURE — 36415 COLL VENOUS BLD VENIPUNCTURE: CPT

## 2024-09-24 PROCEDURE — 97530 THERAPEUTIC ACTIVITIES: CPT | Mod: GP,CQ

## 2024-09-24 PROCEDURE — 82947 ASSAY GLUCOSE BLOOD QUANT: CPT

## 2024-09-24 PROCEDURE — 85027 COMPLETE CBC AUTOMATED: CPT | Performed by: NURSE PRACTITIONER

## 2024-09-24 PROCEDURE — 2500000001 HC RX 250 WO HCPCS SELF ADMINISTERED DRUGS (ALT 637 FOR MEDICARE OP): Performed by: INTERNAL MEDICINE

## 2024-09-24 ASSESSMENT — COGNITIVE AND FUNCTIONAL STATUS - GENERAL
DRESSING REGULAR LOWER BODY CLOTHING: A LOT
MOVING TO AND FROM BED TO CHAIR: A LOT
HELP NEEDED FOR BATHING: A LOT
WALKING IN HOSPITAL ROOM: TOTAL
MOBILITY SCORE: 9
DRESSING REGULAR UPPER BODY CLOTHING: A LOT
WALKING IN HOSPITAL ROOM: TOTAL
DRESSING REGULAR UPPER BODY CLOTHING: A LITTLE
MOBILITY SCORE: 8
CLIMB 3 TO 5 STEPS WITH RAILING: TOTAL
HELP NEEDED FOR BATHING: A LOT
CLIMB 3 TO 5 STEPS WITH RAILING: TOTAL
DAILY ACTIVITIY SCORE: 16
STANDING UP FROM CHAIR USING ARMS: TOTAL
MOVING FROM LYING ON BACK TO SITTING ON SIDE OF FLAT BED WITH BEDRAILS: A LOT
DAILY ACTIVITIY SCORE: 15
DRESSING REGULAR LOWER BODY CLOTHING: A LOT
TOILETING: TOTAL
PERSONAL GROOMING: A LITTLE
MOVING TO AND FROM BED TO CHAIR: TOTAL
STANDING UP FROM CHAIR USING ARMS: TOTAL
MOVING FROM LYING ON BACK TO SITTING ON SIDE OF FLAT BED WITH BEDRAILS: A LOT
TURNING FROM BACK TO SIDE WHILE IN FLAT BAD: A LOT
TURNING FROM BACK TO SIDE WHILE IN FLAT BAD: A LOT
TOILETING: A LOT

## 2024-09-24 ASSESSMENT — ACTIVITIES OF DAILY LIVING (ADL)
BATHING_WHERE_ASSESSED: EDGE OF BED
BATHING_LEVEL_OF_ASSISTANCE: MODERATE ASSISTANCE
HOME_MANAGEMENT_TIME_ENTRY: 28

## 2024-09-24 ASSESSMENT — PAIN SCALES - GENERAL
PAINLEVEL_OUTOF10: 0 - NO PAIN

## 2024-09-24 ASSESSMENT — PAIN - FUNCTIONAL ASSESSMENT
PAIN_FUNCTIONAL_ASSESSMENT: 0-10

## 2024-09-24 NOTE — CARE PLAN
The patient's goals for the shift include Pt. will have a safe, restful and uneventful evening    The clinical goals for the shift include patient will be able to maintain hemodynamic stability by the end of the shift      Problem: Skin  Goal: Decreased wound size/increased tissue granulation at next dressing change  Outcome: Progressing  Goal: Prevent/manage excess moisture  Outcome: Progressing  Goal: Prevent/minimize sheer/friction injuries  Outcome: Progressing  Goal: Promote/optimize nutrition  Outcome: Progressing     Problem: Nutrition  Goal: Oral intake greater than 50%  Outcome: Progressing  Goal: Oral intake greater 75%  Outcome: Progressing  Goal: Consume prescribed supplement  Outcome: Progressing  Goal: Nutrition support goals are met within 48 hrs  Outcome: Progressing  Goal: Nutrition support is meeting 75% of nutrient needs  Outcome: Progressing  Goal: Tube feed tolerance  Outcome: Progressing  Goal: BG  mg/dL  Outcome: Progressing  Goal: Lab values WNL  Outcome: Progressing  Goal: Electrolytes WNL  Outcome: Progressing  Goal: Promote healing  Outcome: Progressing  Goal: Maintain stable weight  Outcome: Progressing  Goal: Reduce weight from edema/fluid  Outcome: Progressing  Goal: Gradual weight gain  Outcome: Progressing  Goal: Improve ostomy output  Outcome: Progressing     Problem: Discharge Planning  Goal: Discharge to home or other facility with appropriate resources  Outcome: Progressing     Problem: Chronic Conditions and Co-morbidities  Goal: Patient's chronic conditions and co-morbidity symptoms are monitored and maintained or improved  Outcome: Progressing     Problem: Diabetes  Goal: Achieve decreasing blood glucose levels by end of shift  Outcome: Progressing  Goal: Increase stability of blood glucose readings by end of shift  Outcome: Progressing  Goal: Decrease in ketones present in urine by end of shift  Outcome: Progressing  Goal: Maintain electrolyte levels within acceptable  range throughout shift  Outcome: Progressing  Goal: Maintain glucose levels >70mg/dl to <250mg/dl throughout shift  Outcome: Progressing  Goal: No changes in neurological exam by end of shift  Outcome: Progressing  Goal: Learn about and adhere to nutrition recommendations by end of shift  Outcome: Progressing  Goal: Vital signs within normal range for age by end of shift  Outcome: Progressing  Goal: Increase self care and/or family involovement by end of shift  Outcome: Progressing  Goal: Receive DSME education by end of shift  Outcome: Progressing     Problem: Knowledge Deficit  Goal: Patient/family/caregiver demonstrates understanding of disease process, treatment plan, medications, and discharge instructions  Outcome: Progressing     Problem: Mechanical Ventilation  Goal: Oral health is maintained or improved  Outcome: Progressing  Goal: Ability to express needs and understand communication  Outcome: Progressing  Goal: Mobility/activity is maintained at optimum level for patient  Outcome: Progressing     Problem: Fall/Injury  Goal: Not fall by end of shift  Outcome: Progressing  Goal: Be free from injury by end of the shift  Outcome: Progressing  Goal: Verbalize understanding of personal risk factors for fall in the hospital  Outcome: Progressing  Goal: Verbalize understanding of risk factor reduction measures to prevent injury from fall in the home  Outcome: Progressing  Goal: Use assistive devices by end of the shift  Outcome: Progressing  Goal: Pace activities to prevent fatigue by end of the shift  Outcome: Progressing

## 2024-09-24 NOTE — PROGRESS NOTES
24 1007   Discharge Planning   Expected Discharge Disposition Long Term         Patient continues with diuresing. His oxygen demand decreased and he is on 7L HF oxygen. Patient is waiting on auth for [facility]. Will continue to follow for discharge needs.    [facility]    Insurance is offering p2p for provider. All info sent to KRISTY Celis and Dr. Gatica:     Ralph has offered a P2P for Quan's LTACH request. Deadline is tomorrow () at 9:00am. Phone # [phone] Option #5    Precert Status: Pending  [facility] Pending Auth ID # [id number]  Dates: 2024    Luis Manuel  [address] Alo Glass MRN [id number]   1955  [facility] # [id number]  [facility]

## 2024-09-24 NOTE — PROGRESS NOTES
"Nutrition Follow-up Note    Late entry:   RDN reconsulted for nutrition assessment and recommendations.   Initial RDN assessment completed 9/6 with follow up 9/12 post extubation.     Attempted to meet with pt earlier today, working with rehab.        History:  Food and Nutrient History  Energy Intake: Fair 50-75 %  Food and Nutrient History: SLP on consult , recommending current soft and bite sized with NTL       Dietary Orders (From admission, onward)       Start     Ordered    09/23/24 1506  Adult diet Consistent Carb; CCD 75 gm/meal; Soft and bite sized 6; Mild thick 2  Diet effective now        Comments: MEDICATION WHOLE IN PUDDING OR APPLE SAUCE   Question Answer Comment   Diet type Consistent Carb    Carb diet selection: CCD 75 gm/meal    Texture Soft and bite sized 6    Fluid consistency Mild thick 2        09/23/24 1505    09/12/24 1716  Oral nutritional supplements  Until discontinued        Question Answer Comment   Deliver with Breakfast    Deliver with Lunch    Deliver with Dinner    Select supplement: Ensure Plus High Protein        09/12/24 1715                      Anthropometrics:  Height: 180.3 cm (5' 10.98\")  Weight: 118 kg (260 lb 12.9 oz)  BMI (Calculated): 36.39    Weight Change: 0    Weight Change  Weight History / % Weight Change: 129 kg 9/12/24; 136 kg 9/6/24 per RDN consult; 159 kg 3/8/24, 145 kg 3/12/24,  132 kg 4/23/24, 133 kg 6/25/24(pt on lasix PTA, noted 3/8/24 admit to OSH for HF with IV diuresis this admission, on oral lasix at home PTA)  Significant Weight Loss: Yes (wt decrease most likely r/t poor oral intake and aggressive diuresis)    IBW/kg (Dietitian Calculated): 78.2 kg    Scheduled medications  aspirin, 81 mg, oral, Daily  empagliflozin, 10 mg, oral, Daily  enoxaparin, 40 mg, subcutaneous, q12h SHARRON  ezetimibe, 10 mg, oral, Daily  folic acid, 1 mg, oral, Daily  furosemide, 80 mg, intravenous, Daily  insulin lispro, 0-10 Units, subcutaneous, q4h  ipratropium-albuteroL, 3 mL, " "nebulization, 4x daily  levothyroxine, 125 mcg, oral, Daily  multivitamin with minerals, 1 tablet, oral, Daily  nystatin, 5 mL, Swish & Swallow, 4x daily  nystatin, 1 Application, Topical, BID  oxygen, , inhalation, q PM  pantoprazole, 40 mg, intravenous, Daily  polyethylene glycol, 17 g, nasogastric tube, Daily  predniSONE, 10 mg, oral, Daily  sennosides-docusate sodium, 2 tablet, oral, BID  tamsulosin, 0.4 mg, oral, Daily  thiamine, 100 mg, oral, Daily      Continuous medications     PRN medications  PRN medications: acetaminophen, acetaminophen, dextrose, dextrose, glucagon, glucagon, ipratropium-albuteroL, oxygen, oxygen    I/O last 3 completed shifts:  In: 1400 (11.8 mL/kg) [P.O.:1400]  Out: 6125 (51.8 mL/kg) [Urine:6125 (1.4 mL/kg/hr)]  Weight: 118.3 kg   I/O this shift:  In: -   Out: 1300 [Urine:1300]  Last  9/21       Latest Reference Range & Units 09/24/24 05:06   GLUCOSE 74 - 99 mg/dL 99   SODIUM 136 - 145 mmol/L 139   POTASSIUM 3.5 - 5.3 mmol/L 4.1   CHLORIDE 98 - 107 mmol/L 93 (L)   Bicarbonate 21 - 32 mmol/L 40 (HH)   Anion Gap 10 - 20 mmol/L 10   Blood Urea Nitrogen 6 - 23 mg/dL 21   Creatinine 0.50 - 1.30 mg/dL 0.35 (L)   EGFR >60 mL/min/1.73m*2 >90   Calcium 8.6 - 10.3 mg/dL 8.0 (L)   PHOSPHORUS 2.5 - 4.9 mg/dL 3.2   Albumin 3.4 - 5.0 g/dL 2.8 (L)   (HH): Data is critically high  (L): Data is abnormally low      Energy Needs:  Calculated Energy Needs Using Equations  Height: 180.3 cm (5' 10.98\")  Weight Used for Equation Calculations: 129 kg (284 lb 6.3 oz)  [place] Equation (Critically Ill Patients): 2138  Minute Ventilation (L/min): 11.2 L/min  [place] Equation (Overweight or Obese Patients): 2077  Equation Chosen to Use by RD: Myla Crow  Activity Factor: 1.2  Stress Factor: 1.2  Total Energy Needs: 3000  Temp: 36.5 °C (97.7 °F)    Estimated Energy Needs  Method for Estimating Needs: 2027-4064 kcal/day (22-25 kcal/kg)    Estimated Protein Needs  Total Protein Estimated Needs " (g): 155 g  Total Protein Estimated Needs (g/kg): 2 g/kg (IBW)  Method for Estimating Needs: 115-155 g/day protein (1.5-2.0 g/kg IBW)    Estimated Fluid Needs  Method for Estimating Needs: per MD         Nutrition Focused Physical Findings:  Subcutaneous Fat Loss  Orbital Fat Pads: Mild-Moderate (slight dark circles and slight hollowing)  Buccal Fat Pads: Well nourished (full, rounded cheeks)  Triceps: Well nourished (ample fat tissue)  Ribs: Defer    Muscle Wasting  Temporalis: Mild-Moderate (slight depression)  Pectoralis (Clavicular Region): Mild-Moderate (some protrusion of clavicle)  Deltoid/Trapezius: Mild-Moderate (slight protrusion of acromion process)  Interosseous: Well nourished (muscle bulges)  Trapezius/Infraspinatus/Supraspinatus (Scapular Region): Defer  Quadriceps: Defer  Gastrocnemius: Defer    Edema  Edema: +1 trace  Edema Location: generalized         Physical Findings (Nutrition Deficiency/Toxicity)  Skin: Negative (bruising BUE)       Nutrition Diagnosis   Malnutrition Diagnosis  Patient has Malnutrition Diagnosis: Yes  Diagnosis Status: New  Malnutrition Diagnosis: Severe malnutrition related to acute disease or injury  As Evidenced by: oral intake less than 50% of estimated energy requirements for greater than five days, genererlized edema, approximately 9% weight loss over past two weeks r/t combination of suboptimal oral intake and aggressive diuresis    Patient has Nutrition Diagnosis: Yes  Nutrition Diagnosis 1: Inadequate oral intake  Diagnosis Status (1): Ongoing  Related to (1): respiratory status, increased oxygen demands  As Evidenced by (1): oral intake less than 50% of meals per reporte  Additional Assessment Information (1): pt was receiving vital 1.5 @ 65 ml/hr while intubated, extubated 9/9 overnight                                         Nutrition Interventions/Recommendations   Nutrition Prescription  Individualized Nutrition Prescription Provided for : oral diet consistency  as per SLP recommendations  - change supplement to mighty shakes (which are nectar thick consistency)   - music therapy consult   - continue MVI once daily   - encourage oral intake   - monitor weight trend closely     Food and/or Nutrient Delivery Interventions  Interventions: Meals and snacks, Medical food supplement    Meals and Snacks: Texture-modified diet  Goal: soft and bite sized , NTL                                Medical Food Supplement: Commercial beverage  Goal: mighty shakes TID                                       Coordination of Nutrition Care by a Nutrition Professional  Collaboration and Referral of Nutrition Care: Collaboration by nutrition professional with other providers    Education Documentation  No documentation found.           Nutrition Monitoring and Evaluation   Food and Nutrient Related History  Energy Intake: Estimated energy intake  Criteria: greater than 75% of meals    Fluid Intake: Estimated fluid intake  Criteria: monitor closely, at least 75% of daily fluid recommendations                                  Anthropometrics: Body Composition/Growth/Weight History  Weight: Weight change  Criteria: daily wts, monitor trend                   Biochemical Data, Medical Tests and Procedures  Electrolyte and Renal Panel: BUN, Sodium, Calcium, serum, Chloride, Creatinine, Magnesium, Phosphorus, Potassium  Criteria: daily    Gastrointestinal Profile: Alanine aminotransferase (ALT), Alkaline phosphatase, Bilirubin, total, Aspartate aminotransferase (AST)  Criteria: as per MD    Glucose/Endocrine Profile: Glucose, casual  Criteria: daily, or as per MD    Nutritional Anemia Profile: Hematocrit, Hemoglobin, Iron, serum  Criteria: as per MD    Vitamin Profile: Vitamin D, 25 hydroxy  Criteria: as indicated    Nutrition Focused Physical Findings  Adipose: Other (Comment)  Criteria: monitor NFPE    Bones: Other (Comment)  Criteria: monitor skeletal integrity    Digestive System: Abdominal  distension, Increased appetite, Nausea, Vomiting, Anorexia, Ascites, Constipation, Decrease in appetite, Diarrhea, Early satiety, Other (Comment)  Criteria: monitor GI function closely    Muscles: Muscle atrophy  Criteria: monitor NFPE    Skin: Other (Comment)  Criteria: monitor skin integrity closely              Follow Up  Time Spent (min): 45 minutes  Last Date of Nutrition Visit: 09/23/24  Nutrition Follow-Up Needed?: Dietitian to reassess per policy  Follow up Comment: svr acute maln, JLM   Nutrition Progress Note

## 2024-09-24 NOTE — ASSESSMENT & PLAN NOTE
67-year-old gentleman with history of chronic diastolic heart failure, COPD, obstructive sleep apnea, obesity, type 2 diabetes mellitus, hypertension, hypothyroidism and hyperlipidemia was admitted with acute respiratory failure with hypoxia and hypercapnia and noted to have a right loculated pleural effusion and bilateral lower lobe lung infiltrate and right hydropneumothorax.  He was hypotensive on admission and required 3 L of fluid to resuscitate and admitted to intensive care unit and was intubated because of acute respiratory failure and remained intubated for several days.  Currently he is extubated and on high flow oxygen as mentioned above.  He had significant leukocytosis of 22,000 on admission and his sputum culture was positive for stenotrophomonas.  Patient is being continued on IV Zosyn and IV Levaquin, pulmonary and infectious disease on board.  He has had IV antibiotics for 14 days.    1.  Acute hypoxic hypercapnic respiratory failure with hypotension and right hydropneumothorax and bilateral infiltrate and positive for pneumonia.Sputum culture was positive for stenotrophomonas.   Initially intubated for several days and since then he has been extubated and is on Improving and currently is on 10 L nasal cannula.   Discontinued IV antibiotics,  Currently also on tapering dose.  Prednisone which has been decreased to 10 mg daily now.    2.  Acute on chronic systolic and diastolic heart failure with LV ejection fraction of 35% and moderate aortic stenosis and pulmonary hypertension.  Patient is being diuresed with IV Lasix 80 mg , and he is CHF is improved significantly and is and is well compensated now currently edema has improved significantly, will change Lasix to p.o. 80 mg daily.  3.  Patient had hydropneumothorax on admission and had chest tube, chest tube has been removed since then..    4.  Hypotension has resolved blood pressures are 110/70 mmHg    6.  COPD and obstructive sleep  apnea.  Aerosol treatment and steroids.  Currently on Prednisone 20 mg daily.,  Reduced to 10 mg daily.  .  7.  Diabetes mellitus type 2, continue sliding scale insulin, patient was on Mounjaro at home prior to admission.    8.  Patient  DNR/DNI.    9.  Hematuria possibly secondary to trauma secondary to Live catheter which has resolved after irrigation.    10.  Recurrent episodes of brief nonsustained ventricular tachycardia, also moderate systolic heart failure, previous echocardiogram revealed LV ejection fraction of 30 to 35% with moderate aortic stenosis, patient is started on IV Lasix this morning and cardiology consult appreciated and repeat echocardiogram quality was suboptimal he did reveal LV ejection fraction of 23% with global hypokinesia and moderate aortic cusp calcification and moderate right ventricular dysfunction.  Patient responded very well to IV Lasix and has been diuresed and now CHF is compensated.  He is on Jardiance and consider starting GDMT.    11.  Patient had a modified barium swallow and has been recommended to have a puréed diet with honey thickened and nectar thickened liquids.    Nystatin cream to both groin area for inguinal intertrigo.    Reviewed all labs patient's medical record and imaging studies and discussed the plan of treatment with the patient..    Continue PT OT and anticipate possible discharge to LTAC.  Pending authorization.

## 2024-09-24 NOTE — PROGRESS NOTES
Physical Therapy                 Therapy Communication Note    Patient Name: Alo Glass  MRN: [id number]  Department: [place] [address]  Room: [address] [place], [place] [address]  Today's Date: 9/24/2024     Discipline: Physical Therapy    Missed Visit Reason: Missed Visit Reason: Patient placed on medical hold (per RN pt with hemoglobin 6.9 g/dL, requesting to hold PT at this time.)    Missed Time: Attempt    Comment:

## 2024-09-24 NOTE — PROGRESS NOTES
Subjective Data:  7L HF NC   In acc is and os   Soft Bps still 90s to 100s       Objective Data:  Last Recorded Vitals:  Vitals:    24 0310 24 0428 24 0740 24 0837   BP:  101/54 94/58    BP Location:  Left arm Left arm    Patient Position:   Lying    Pulse:  85 82    Resp:  20 20    Temp:  36 °C (96.8 °F) 36.5 °C (97.7 °F)    TempSrc:  Temporal Oral    SpO2: 94% 90% 92% 92%   Weight:       Height:         Medical Gas Therapy: Supplemental oxygen  Medical Gas Delivery Method: High flow nasal cannula  Weight  Av kg (288 lb 12.7 oz)  Min: 118 kg (260 lb 12.9 oz)  Max: 140 kg (307 lb 12.2 oz)    Last I/O:    Intake/Output Summary (Last 24 hours) at 2024 1047  Last data filed at 2024 0758  Gross per 24 hour   Intake 600 ml   Output 3500 ml   Net -2900 ml     Net IO Since Admission: -37,337.46 mL [24 1047]     Diagnostic Results   Labs  CBC- 2024:  8:52 AM  8.0 16.0 232    50.3      BMP- 2024:  5:06 AM  139 21 93 99   4.1 0.35 40    Estimated Creatinine Clearance: 125 mL/min (A) (by C-G formula based on SCr of 0.35 mg/dL (L)).     CA: 8.0 PROTIEN: 4.9 ALT: 21 Total Bili: 1.0 M.20   PHOS: 3.2 ALBUMIN: 2.8 AST: 15   Alk Phos: 69      COAGS- 2024:  5:02 PM  1.1   12.4 _     CV Labs  Troponin I, High Sensitivity   Date/Time Value Ref Range Status   2024 06:03 PM 69 (HH) 0 - 20 ng/L Final     Comment:     Previous result verified on 2024 1734 on specimen/case [id number] called with component [facility] for procedure Troponin I, High Sensitivity, Initial with value 99 ng/L.   2024 05:01 PM 99 (HH) 0 - 20 ng/L Final   2024 06:46 PM 67 (HH) 0 - 20 ng/L Final     Comment:     Previous result verified on 2024 1757 on specimen/case [id number] called with component [facility] for procedure Troponin I, High Sensitivity with value 62 ng/L.   2024 05:20 PM 62 (HH) 0 - 20 ng/L Final   2024 07:07  (HH) 0 - 20 ng/L Final     Comment:      Previous result verified on 3/12/2024 0048 on specimen/case [id number] called with component [facility] for procedure Troponin I, High Sensitivity with value 89 ng/L.   03/12/2024 03:08  (HH) 0 - 20 ng/L Final     Comment:     Previous result verified on 3/12/2024 0048 on specimen/case [id number] called with component [facility] for procedure Troponin I, High Sensitivity with value 89 ng/L.     BNP   Date/Time Value Ref Range Status   09/24/2024 08:52  (H) 0 - 99 pg/mL Final   09/18/2024 04:39  (H) 0 - 99 pg/mL Final   09/04/2024 05:01 PM 1,155 (H) 0 - 99 pg/mL Final     Hemoglobin A1C   Date/Time Value Ref Range Status   06/25/2024 04:56 PM 5.5 see below % Final   02/20/2024 10:35 AM 5.8 (H) see below % Final     LDL Calculated   Date/Time Value Ref Range Status   02/20/2024 10:35  (H) <=99 mg/dL Final     Comment:                                 Near   Borderline      AGE      Desirable  Optimal    High     High     Very High     0-19 Y     0 - 109     ---    110-129   >/= 130     ----    20-24 Y     0 - 119     ---    120-159   >/= 160     ----      >24 Y     0 -  99   100-129  130-159   160-189     >/=190       VLDL   Date/Time Value Ref Range Status   02/20/2024 10:35 AM 16 0 - 40 mg/dL Final   09/10/2021 01:29 PM 27 0 - 40 mg/dL Final   09/18/2018 02:26 PM 22 0 - 40 mg/dL Final       Pertinent Imaging  XR chest 1 view 09/17/2024  Cardiomegaly.  Stable small left pleural effusion with left basilar atelectasis/pneumonia, stable to slightly progressed.  Pleural and parenchymal opacities at the right base, mildly progressed.    CT chest abdomen pelvis w IV contrast 09/04/2024  Large loculated right pleural effusion causing complete compressive collapse of the right middle lobe and right lower lobe and partial compressive collapse of the right upper lobe. The largest likely compartments located at the inferior right hemithorax. The cause of the effusion is not apparent as there is only a  small amount of debris in the distal trachea and right mainstem bronchus but the distal-most right-sided bronchi are occluded due to combination of compressive collapse and small low-density fluid.    Numerous new subcentimeter centrilobular pulmonary nodules that are most likely infectious/inflammatory in as can be seen with bronchiolitis or fungal infection.    Aortic valve calcification to the extent that would likely result inaortic stenosis.    Left kidney solid heterogeneously enhancing mass suspicious for renal cell carcinoma (2.4 cm x 1.9 cm) and of suspected 0.8 cm right upper pole renal cell carcinoma measuring 0.8 cm. Recommend urological follow-up/also taken and dedicated MRI kidneys to further evaluate these lesions. YELLOW ALERT: An incidental finding alert was sent per protocol.    No acute abnormality in the abdomen or pelvis.    Colonic diverticulosis without acute diverticulitis.    CT head wo IV contrast 09/04/2024  No acute intracranial abnormality.           Past Cardiology Tests (Last 3 Years):  EKG:  Electrocardiogram 9/4/24  Sinus rhythm with 1st degree AV block with Premature atrial complexes with Aberrant conduction  Right bundle branch block  Left anterior fascicular block  Bifascicular block   Anteroseptal infarct (cited on or before 23-APR-2024)    ECG 12 lead 4/23/24  Normal sinus rhythm  Right bundle branch block  Left anterior fascicular block   Bifascicular block   Moderate voltage criteria for LVH, may be normal variant  Cannot rule out Septal infarct , age undetermined    Electrocardiogram 3/8/24  Sinus rhythm with occasional Premature ventricular complexes  Right bundle branch block  T wave abnormality, consider lateral ischemia  Abnormal ECG    Echo:  Transthoracic Echo (TTE) Limited 9/19/24  1. Left ventricular ejection fraction is severely decreased, calculated by Green's biplane at 23%.  2. There is global hypokinesis of the left ventricle with minor regional variations.  3.  Poorly visualized anatomical structures due to suboptimal image quality.  4. Spectral Doppler shows an impaired relaxation pattern of left ventricular diastolic filling.  5. There is mildly reduced right ventricular systolic function.  6. Moderately enlarged right ventricle.  7. There is moderate aortic valve cusp calcification. The was no assesment of aortic stenosis in this limited study.  8. Compared with study dated 3/11/2024, there is RV dilation and reduced function.    Transthoracic echo (TTE) complete 3/11/24  1. Left ventricular systolic function is moderately decreased with a 30-35% estimated ejection fraction.  2. Basal inferolateral segment is abnormal.  3. Poorly visualized anatomical structures due to suboptimal image quality.  4. Moderate aortic valve stenosis.  5. There is global hypokinesis of the left ventricle with minor regional variations.    Inpatient Medications:  Scheduled medications   Medication Dose Route Frequency    aspirin  81 mg oral Daily    empagliflozin  10 mg oral Daily    enoxaparin  40 mg subcutaneous q12h SHARRON    ezetimibe  10 mg oral Daily    folic acid  1 mg oral Daily    furosemide  80 mg intravenous Daily    insulin lispro  0-10 Units subcutaneous q4h    ipratropium-albuteroL  3 mL nebulization 4x daily    levothyroxine  125 mcg oral Daily    multivitamin with minerals  1 tablet oral Daily    nystatin  5 mL Swish & Swallow 4x daily    nystatin  1 Application Topical BID    oxygen   inhalation q PM    pantoprazole  40 mg intravenous Daily    polyethylene glycol  17 g nasogastric tube Daily    predniSONE  10 mg oral Daily    sennosides-docusate sodium  2 tablet oral BID    tamsulosin  0.4 mg oral Daily    thiamine  100 mg oral Daily   PRN medications: acetaminophen, acetaminophen, dextrose, dextrose, glucagon, glucagon, ipratropium-albuteroL, oxygen, oxygen       Physical Exam:  Vitals and nursing notes reviewed.  BP 94/58 (BP Location: Left arm, Patient Position: Lying)    "Pulse 82   Temp 36.5 °C (97.7 °F) (Oral)   Resp 20   Ht 1.803 m (5' 10.98\")   Wt 118 kg (260 lb 12.9 oz)   SpO2 92%   BMI 36.39 kg/m²   GENERAL: Alert and awake; in no acute distress  SKIN: Warm and dry, cap refill <2  NECK: No JVD or hepatojugular reflex  CARDIAC: Regular rate and rhythm, S1S2, 2/6 systolic murmur  CHEST: Increased respiratory effort, diminished breath sounds throughout  ABDOMEN: soft, non-distended, non-tender with palpation  EXTREMITIES: 1+ pitting BL lower extremity edema, normal pulses all 4 extremities  NEURO: Alert and oriented, mental status at baseline, no focal deficits  PSYCH: Behavior and affect as expected        Assessment/Plan       69 year old male with history MELISSA on CPAP, COPD stage III triple therapy as outpatient, nicotine dependence, T2DM, severe obesity, hypertension, hyperlipidemia (intolerant to statins due to myalgias), lymphedema, coronary calcification by chest CT, moderate aortic stenosis and  Chronic systolic HF (refused cath previously) originally admitted on 9/4/24 here at [facility].  He was non responsive at home by his neighbor-> admitted with hypoxic/hypercapnic respiratory failure->  intubated, placed on pressor, vent support, started on ATB, large R sided likely parapneumonic s/p chest tube placement (Analysis showed neutrophilic predominant effusion, cultures negative. CT is now removed) and PNA due to Stenotrophomonas maltophilia which is resolving.      Home Cardiology Meds: ASA 81 mg daily, Jardiance 10mg daily, Lasix 120 mg daily, metolazone 2.5 mg PRN, valsartan 40 mg daily, Zetia 10mg daily    TTE 9/19/24   1. Left ventricular ejection fraction is severely decreased, calculated by Green's biplane at 23%.   2. There is global hypokinesis of the left ventricle with minor regional variations.   3. Poorly visualized anatomical structures due to suboptimal image quality.   4. Spectral Doppler shows an impaired relaxation pattern of left ventricular diastolic " filling.   5. There is mildly reduced right ventricular systolic function.   6. Moderately enlarged right ventricle.   7. There is moderate aortic valve cusp calcification. The was no assesment of aortic stenosis in this limited study.   8. Compared with study dated 3/11/2024, there is RV dilation and reduced function.    #Acute Hypoxic Respiratory Failure, large R pleural effusion- Sputum cx +stenotrophomonas, completed IV abx  -Oxygen status improving    #Acute on Chronic systolic HF  - EF now 23%  --BB: None d/t hx of bradycardia  --ACE/ARB/ARNI: none d/t low BP (valsartan 40 mg at home previously)  --SGLT2i: Jardiance 10mg daily  --MRA: None  --Diuretic: IV lasix  -Currently diuresing with IV Lasix 80mg BID, robust response  #HLD- Intolerant to statins, on Ezetimibe 10mg daily  #CAD- c/w ASA daily  #Moderate AS- Patient not interested in intervention       RECOMMENDATIONS:  -c/w IV lasix to once daily and see if gives some more BP room to add on ARB  -Resume home GDMT as tolerated, BP currently soft in the setting of diuresis  -Wean oxygen as tolerated  -Continuous telemetry monitoring  -Monitor electrolytes, replete for K <4 and Mg <2  -Daily wts, strict I&Os, low sodium diet  -OOB as tolerates with PT     Alexus Keene, PARAM-CNP

## 2024-09-24 NOTE — PROGRESS NOTES
"Alo Glass is a 69 y.o. male on day 20 of admission presenting with Acute respiratory failure with hypoxia and hypercapnia (Multi).    Subjective   Feels \"okay\".  Denies shortness of breath, pain or cough.  On 10 L nasal cannula oxygen with an oxygen saturation of 92%.  States that he used CPAP last night.  Chest x-ray yesterday showed cardiomegaly with bilateral pleural effusions and bilateral lower lobe infiltrates consistent with congestive heart failure/pulmonary edema.  Which was similar to a prior chest x-ray from September 17, 2024.     Objective   Physical Exam  Vitals  Blood pressure 94/58, pulse 82, temperature 36.5 °C (97.7 °F), temperature source Oral, resp. rate 20, height 1.803 m (5' 10.98\"), weight 118 kg (260 lb 12.9 oz), SpO2 92%.  Intake/Output last 3 Shifts:  I/O last 3 completed shifts:  In: 1400 (11.8 mL/kg) [P.O.:1400]  Out: 6125 (51.8 mL/kg) [Urine:6125 (1.4 mL/kg/hr)]  Weight: 118.3 kg     General-awake, alert and in no acute distress.  Lungs-clear, without wheezes, rales or rhonchi.    Heart-regular rate and rhythm with ectopic beats.  Abdomen-positive bowel sounds.  Extremities-trace lower extremity edema.  Skin-venous stasis changes of the legs.    Scheduled medications  aspirin, 81 mg, oral, Daily  empagliflozin, 10 mg, oral, Daily  enoxaparin, 40 mg, subcutaneous, q12h SHARRON  ezetimibe, 10 mg, oral, Daily  folic acid, 1 mg, oral, Daily  furosemide, 80 mg, intravenous, Daily  insulin lispro, 0-10 Units, subcutaneous, q4h  ipratropium-albuteroL, 3 mL, nebulization, 4x daily  levothyroxine, 125 mcg, oral, Daily  multivitamin with minerals, 1 tablet, oral, Daily  nystatin, 5 mL, Swish & Swallow, 4x daily  nystatin, 1 Application, Topical, BID  oxygen, , inhalation, q PM  pantoprazole, 40 mg, intravenous, Daily  polyethylene glycol, 17 g, nasogastric tube, Daily  predniSONE, 10 mg, oral, Daily  sennosides-docusate sodium, 2 tablet, oral, BID  tamsulosin, 0.4 mg, oral, Daily  thiamine, " 100 mg, oral, Daily      Continuous medications     PRN medications  PRN medications: acetaminophen, acetaminophen, dextrose, dextrose, glucagon, glucagon, ipratropium-albuteroL, oxygen, oxygen     Results for orders placed or performed during the hospital encounter of 09/04/24 (from the past 24 hour(s))   POCT GLUCOSE   Result Value Ref Range    POCT Glucose 146 (H) 74 - 99 mg/dL   POCT GLUCOSE   Result Value Ref Range    POCT Glucose 278 (H) 74 - 99 mg/dL   POCT GLUCOSE   Result Value Ref Range    POCT Glucose 195 (H) 74 - 99 mg/dL   Renal Function Panel   Result Value Ref Range    Glucose 99 74 - 99 mg/dL    Sodium 139 136 - 145 mmol/L    Potassium 4.1 3.5 - 5.3 mmol/L    Chloride 93 (L) 98 - 107 mmol/L    Bicarbonate 40 (HH) 21 - 32 mmol/L    Anion Gap 10 10 - 20 mmol/L    Urea Nitrogen 21 6 - 23 mg/dL    Creatinine 0.35 (L) 0.50 - 1.30 mg/dL    eGFR >90 >60 mL/min/1.73m*2    Calcium 8.0 (L) 8.6 - 10.3 mg/dL    Phosphorus 3.2 2.5 - 4.9 mg/dL    Albumin 2.8 (L) 3.4 - 5.0 g/dL   CBC   Result Value Ref Range    WBC 2.9 (L) 4.4 - 11.3 x10*3/uL    nRBC 0.0 0.0 - 0.0 /100 WBCs    RBC 2.20 (L) 4.50 - 5.90 x10*6/uL    Hemoglobin 6.9 (L) 13.5 - 17.5 g/dL    Hematocrit 20.5 (L) 41.0 - 52.0 %    MCV 93 80 - 100 fL    MCH 31.4 26.0 - 34.0 pg    MCHC 33.7 32.0 - 36.0 g/dL    RDW 21.6 (H) 11.5 - 14.5 %    Platelets 68 (L) 150 - 450 x10*3/uL   POCT GLUCOSE   Result Value Ref Range    POCT Glucose 200 (H) 74 - 99 mg/dL      Assessment:   Patient with a loculated right pleural effusion, pulmonary nodules, right middle lobe pneumonia and a positive sputum culture for Stenotrophomonas, superimposed on underlying COPD without exacerbation, obstructive sleep apnea and probable congestive heart failure/pulmonary edema, who remains moderately hypoxic.  There is no bronchospasm.    Recommend:  1.  Continue DuoNeb, prednisone, Synthroid and Lovenox  2.  Aggressive diuresis as blood pressure and kidney function allow.  3.  Wean FiO2 to  keep oxygen saturation approximately 92 to 95%; home oxygen evaluation prior to discharge; continue nocturnal CPAP for sleep apnea.  4.  Incentive spirometry and Acapella treatment.  5.  Check BNP.  6.  Follow-up with pulmonary medicine after discharge.  7.  The patient is DNR/DNI.    Giovanni Padilla MD

## 2024-09-24 NOTE — PROGRESS NOTES
Occupational Therapy    OT Treatment    Patient Name: Alo Glass  MRN: [id number]  Department: [place] [address]  Room: [facility]  Today's Date: 9/24/2024  Time Calculation  Start Time: 1039  Stop Time: 1107  Time Calculation (min): 28 min        Assessment:  OT Assessment: Pt progressing slowly towards goals, completed bathing sitting EOB this date with set-up and supervision-mod assist. Pt reports light headedness while sitting EOB, did not resolve with time. Pt requirng cues for rest breaks throughout. Continue to recommend continued therapy at discharge.  Prognosis: Good  Barriers to Discharge: Decreased caregiver support  Evaluation/Treatment Tolerance: Patient tolerated treatment well, Patient limited by fatigue  Medical Staff Made Aware: Yes  End of Session Communication: Bedside nurse  End of Session Patient Position:  (seated at EOB with respiratory therapist and RN present. Call light in reach)  OT Assessment Results: Decreased ADL status, Decreased upper extremity range of motion, Decreased upper extremity strength, Decreased safe judgment during ADL, Decreased cognition, Decreased endurance, Decreased sensation, Decreased IADLs  Prognosis: Good  Barriers to Discharge: Decreased caregiver support  Evaluation/Treatment Tolerance: Patient tolerated treatment well, Patient limited by fatigue  Medical Staff Made Aware: Yes  Strengths: Ability to acquire knowledge, Attitude of self, Housing layout, Insight into problems  Barriers to Participation: Comorbidities  Plan:  Treatment Interventions: ADL retraining, Functional transfer training, UE strengthening/ROM, Endurance training, Cognitive reorientation, Compensatory technique education  OT Frequency: 3 times per week  OT Discharge Recommendations: Moderate intensity level of continued care  Equipment Recommended upon Discharge: Other (comment) (TBD)  OT Recommended Transfer Status: Dependent  OT - OK to Discharge: Yes  Treatment Interventions: ADL retraining,  Functional transfer training, UE strengthening/ROM, Endurance training, Cognitive reorientation, Compensatory technique education    Subjective   Previous Visit Info:  OT Last Visit  OT Received On: 09/24/24  General:  General  Reason for Referral: Impaired Mobility: Pt is a 69 y.o. male found non responsive at home by his neighbor and EMS was called.  Past Medical History Relevant to Rehab:   Past Medical History:   Diagnosis Date    CHF (congestive heart failure) (Multi)     COPD (chronic obstructive pulmonary disease) (Multi)     Diabetes mellitus (Multi)     Hypertension     Obesity     MELISSA (obstructive sleep apnea)     Personal history of other specified conditions 01/08/2023    History of impaired glucose tolerance       Co-Treatment: PT  Co-Treatment Reason: Cotx with PT required for skilled technique needed for safe functional transfers and to facilitate maximum participation with skilled intervention.  Prior to Session Communication: Bedside nurse  Patient Position Received: Bed, 3 rail up, Alarm on  Preferred Learning Style: auditory, kinesthetic, verbal, written  General Comment: Pt supine in bed upon entry, agreeable to therapy  Precautions:  Medical Precautions: Fall precautions, Oxygen therapy device and L/min  Precautions Comment: Confusion, hx of falls    Vital Signs (Past 2hrs)        Date/Time Vitals Session Patient Position Pulse Resp SpO2 BP MAP (mmHg)    09/24/24 1106 --  --  --  --  90 %  --  --     09/24/24 1145 --  --  70  19  93 %  94/58  --                         Pain:  Pain Assessment  Pain Assessment: 0-10  0-10 (Numeric) Pain Score: 0 - No pain    Objective    Cognition:  Cognition  Overall Cognitive Status: Within Functional Limits  Coordination:  Movements are Fluid and Coordinated: Yes  Activities of Daily Living: Grooming  Grooming Level of Assistance: Setup  Grooming Where Assessed: Edge of bed  Grooming Comments: washed face provided with washcloth while sitting EOB    UE  Bathing  UE Bathing Level of Assistance: Setup  UE Bathing Where Assessed: Edge of bed  UE Bathing Comments: pt completed while sitting EOB with close supervision due to pt reporting light headedness    LE Bathing  LE Bathing Level of Assistance: Moderate assistance  LE Bathing Where Assessed: Edge of bed  LE Bathing Comments: pt able to complete proximal aspects of LE bathing, assist needed for latoya-care and distal aspects of LE.    UE Dressing  UE Dressing Level of Assistance: Minimum assistance  UE Dressing Where Assessed: Edge of bed  UE Dressing Comments: min A to don new gown  Functional Standing Tolerance:     Bed Mobility/Transfers: Bed Mobility  Bed Mobility: Yes  Bed Mobility 1  Bed Mobility 1: Supine to sitting  Level of Assistance 1: Moderate assistance, +2  Bed Mobility Comments 1: VC for sequencing    Transfers  Transfer: Yes  Transfer 1  Transfer Device 1: Walker, Gait belt  Transfer Level of Assistance 1: Maximum assistance, +2  Trials/Comments 1: attempted sit<>stand from EOB, completed partial stand for 3 seconds. verbal cues for hand placement, pt declining additional trials due to fatigue and BLE weakness. educated pt on benefits however pt continues to decline.        Outcome Measures:[facility] Daily Activity  Putting on and taking off regular lower body clothing: A lot  Bathing (including washing, rinsing, drying): A lot  Putting on and taking off regular upper body clothing: A little  Toileting, which includes using toilet, bedpan or urinal: A lot  Taking care of personal grooming such as brushing teeth: A little  Eating Meals: None  Daily Activity - Total Score: 16        Education Documentation  Body Mechanics, taught by Angy Wright OT at 9/24/2024 12:54 PM.  Learner: Patient  Readiness: Acceptance  Method: Explanation  Response: Verbalizes Understanding    Precautions, taught by Angy Wright OT at 9/24/2024 12:54 PM.  Learner: Patient  Readiness: Acceptance  Method:  Explanation  Response: Verbalizes Understanding    ADL Training, taught by Angy Wright OT at 9/24/2024 12:54 PM.  Learner: Patient  Readiness: Acceptance  Method: Explanation  Response: Verbalizes Understanding    Education Comments  No comments found.        OP EDUCATION:       Goals:  Encounter Problems       Encounter Problems (Active)       ADLs       Patient will perform UB and LB bathing with modified independent level of assistance. (Progressing)       Start:  09/15/24    Expected End:  09/29/24            Patient with complete upper body dressing with modified independent level of assistance donning and doffing all UE clothes with PRN adaptive equipment. (Progressing)       Start:  09/15/24    Expected End:  09/29/24            Patient with complete lower body dressing with modified independent level of assistance donning and doffing all LE clothes  with PRN adaptive equipment. (Progressing)       Start:  09/15/24    Expected End:  09/29/24            Patient will feed self with modified independent level of assistance. (Progressing)       Start:  09/15/24    Expected End:  09/29/24            Patient will complete daily grooming tasks brushing teeth and washing face/hair with modified independent level of assistance. (Progressing)       Start:  09/15/24    Expected End:  09/29/24            Patient will complete toileting including hygiene clothing management/hygiene with modified independent level of assistance. (Progressing)       Start:  09/15/24    Expected End:  09/29/24               COGNITION/SAFETY       Patient will demonstrated orientation x 4. (Progressing)       Start:  09/15/24    Expected End:  09/29/24       ORIENTATION            TRANSFERS       Patient will perform bed mobility modified independent level of assistance in order to improve safety and independence with mobility (Progressing)       Start:  09/15/24    Expected End:  09/29/24

## 2024-09-24 NOTE — PROGRESS NOTES
Speech-Language Pathology  Adult Inpatient Swallow Treatment    Patient Name: Alo Glass  MRN: [id number]  Today's Date: 9/24/2024   Start Time: 0936  Stop Time: 0949  Time Calculation (min): 13    Impression: Pt seen for dysphagia follow.  Breakfast tray present for skilled meal analysis.  Functional mastication and manipulation of soft diet with posterior transfer.  No lingual stasis remained.  Functional coordination and control with straw sips of mildly thick liquids.  No overt s/s aspiraiton.  Recommend continue currently prescribed diet.       Recommendations:  Soft and bite-sized diet/mildly thick liquids  Upright for all PO intake  Small bites/sips  Slow rate of consumption  Medication crushed in purees    Goal: 1. Patient will consume prescribed diet with no overt s/s aspiration 100% of the time.  2. Patient will implement safe swallowing strategies to reduce risk of aspiration in 90% of trials given caregiver assistance/cueing as needed.             Plan:  SLP Services Indicated: Yes  Discussed POC with patient  SLP - OK to Discharge    Pain:   0-10  0 = No pain.     Inpatient Education:  Extensive education provided to patient regarding current swallow function, recommendations/results, and POC.      Consultations/Referrals/Coordination of Services:   N/A

## 2024-09-24 NOTE — PROGRESS NOTES
Spiritual Care Visit    Clinical Encounter Type  Visited With: Patient  Routine Visit: Follow-up  Continue Visiting: Yes  Referral From: Verbal, Patient  Referral To:     The pt was sleeping/resting and not disturbed.    There will be another visit scheduled.    Chaplain Alexander Mclaughlin

## 2024-09-24 NOTE — PROGRESS NOTES
Alo Glass is a 69 y.o. male on day 20 of admission presenting with Acute respiratory failure with hypoxia and hypercapnia (Multi).    Subjective   Patient seen and examined.  Very slow improvement.  Physical therapy was working on it this morning and they were trying to get him out of bed and sitting out in the chair however patient is very weak and was not able to get out of bed he just sat on the edge of the bed and got very exhausted.  His breathing is improved significantly and currently he is in about 10 L nasal cannula high flow oxygen.  His leg edema has improved significantly and he has been diuresed and CHF is compensated now.  His blood pressures are stable, blood sugars are stable, has no fever and antibiotics have been discontinued.  He does require CPAP/BiPAP at nighttime.  He has been in hospital for almost 20 days and would benefit if he could go to long-term acute care for recovery.  Patient did have bilateral infiltrate on admission and required intubation for several days in the intensive care unit and was treated effectively with IV antibiotics, lung infiltrates have resolved now.  Patient is currently full code       Objective     Physical Exam      Last Recorded Vitals  GENERAL:  Alert, moderate distress, cooperative, obese, depressed and anxious  SKIN:  Skin color, texture, turgor normal. No rashes or lesions.  OROPHARYNX:  Lips, mucosa, and tongue are normal.Teeth and gums, normal. Oropharynx normal.  NECK:  No jugulovenous distention, No carotid bruits, Carotid pulse normal contour,   LUNGS: Improved air exchange, few scattered wheezes, no crackles.  CARDIAC: Rate and rhythm is regular, normal S1 and S2; no rubs, murmurs, or gallops  ABDOMEN:  Abdomen soft, large, non-tender, BS normal, No masses or organomegaly  EXTREMETIES:  Extremities normal, no deformities, trace edema, noclubbing or skin discoloration. Good capillary refill., No ulcers  NEURO:  Alert, oriented X 3, Gait not  examined Non-focal. Reflexes normal and symmetric. Sensation grossly intact., Cranial nerves II-XII intact  PULSES: 2+ radial, 2+ carotid   Intake/Output last 3 Shifts:  I/O last 3 completed shifts:  In: 1400 (11.8 mL/kg) [P.O.:1400]  Out: 6125 (51.8 mL/kg) [Urine:6125 (1.4 mL/kg/hr)]  Weight: 118.3 kg     Relevant Results  Scheduled medications  aspirin, 81 mg, oral, Daily  empagliflozin, 10 mg, oral, Daily  enoxaparin, 40 mg, subcutaneous, q12h SHARRON  ezetimibe, 10 mg, oral, Daily  folic acid, 1 mg, oral, Daily  furosemide, 80 mg, intravenous, Daily  insulin lispro, 0-10 Units, subcutaneous, q4h  ipratropium-albuteroL, 3 mL, nebulization, 4x daily  levothyroxine, 125 mcg, oral, Daily  multivitamin with minerals, 1 tablet, oral, Daily  nystatin, 5 mL, Swish & Swallow, 4x daily  nystatin, 1 Application, Topical, BID  oxygen, , inhalation, q PM  pantoprazole, 40 mg, intravenous, Daily  polyethylene glycol, 17 g, nasogastric tube, Daily  predniSONE, 10 mg, oral, Daily  sennosides-docusate sodium, 2 tablet, oral, BID  tamsulosin, 0.4 mg, oral, Daily  thiamine, 100 mg, oral, Daily      Continuous medications     PRN medications  PRN medications: acetaminophen, acetaminophen, dextrose, dextrose, glucagon, glucagon, ipratropium-albuteroL, oxygen, oxygen      Results for orders placed or performed during the hospital encounter of 09/04/24 (from the past 96 hour(s))   POCT GLUCOSE   Result Value Ref Range    POCT Glucose 255 (H) 74 - 99 mg/dL   POCT GLUCOSE   Result Value Ref Range    POCT Glucose 246 (H) 74 - 99 mg/dL   POCT GLUCOSE   Result Value Ref Range    POCT Glucose 157 (H) 74 - 99 mg/dL   POCT GLUCOSE   Result Value Ref Range    POCT Glucose 112 (H) 74 - 99 mg/dL   POCT GLUCOSE   Result Value Ref Range    POCT Glucose 156 (H) 74 - 99 mg/dL   Renal Function Panel   Result Value Ref Range    Glucose 181 (H) 74 - 99 mg/dL    Sodium 140 136 - 145 mmol/L    Potassium 3.4 (L) 3.5 - 5.3 mmol/L    Chloride 97 (L) 98 - 107  mmol/L    Bicarbonate 37 (H) 21 - 32 mmol/L    Anion Gap 9 (L) 10 - 20 mmol/L    Urea Nitrogen 18 6 - 23 mg/dL    Creatinine 0.40 (L) 0.50 - 1.30 mg/dL    eGFR >90 >60 mL/min/1.73m*2    Calcium 8.3 (L) 8.6 - 10.3 mg/dL    Phosphorus 2.4 (L) 2.5 - 4.9 mg/dL    Albumin 3.0 (L) 3.4 - 5.0 g/dL   CBC   Result Value Ref Range    WBC 11.0 4.4 - 11.3 x10*3/uL    nRBC 0.0 0.0 - 0.0 /100 WBCs    RBC 5.37 4.50 - 5.90 x10*6/uL    Hemoglobin 17.0 13.5 - 17.5 g/dL    Hematocrit 53.3 (H) 41.0 - 52.0 %    MCV 99 80 - 100 fL    MCH 31.7 26.0 - 34.0 pg    MCHC 31.9 (L) 32.0 - 36.0 g/dL    RDW 13.7 11.5 - 14.5 %    Platelets 245 150 - 450 x10*3/uL   POCT GLUCOSE   Result Value Ref Range    POCT Glucose 154 (H) 74 - 99 mg/dL   POCT GLUCOSE   Result Value Ref Range    POCT Glucose 208 (H) 74 - 99 mg/dL   POCT GLUCOSE   Result Value Ref Range    POCT Glucose 123 (H) 74 - 99 mg/dL   POCT GLUCOSE   Result Value Ref Range    POCT Glucose 143 (H) 74 - 99 mg/dL   POCT GLUCOSE   Result Value Ref Range    POCT Glucose 105 (H) 74 - 99 mg/dL   POCT GLUCOSE   Result Value Ref Range    POCT Glucose 242 (H) 74 - 99 mg/dL   Renal Function Panel   Result Value Ref Range    Glucose 183 (H) 74 - 99 mg/dL    Sodium 140 136 - 145 mmol/L    Potassium 3.9 3.5 - 5.3 mmol/L    Chloride 95 (L) 98 - 107 mmol/L    Bicarbonate 41 (HH) 21 - 32 mmol/L    Anion Gap 8 (L) 10 - 20 mmol/L    Urea Nitrogen 21 6 - 23 mg/dL    Creatinine 0.40 (L) 0.50 - 1.30 mg/dL    eGFR >90 >60 mL/min/1.73m*2    Calcium 8.0 (L) 8.6 - 10.3 mg/dL    Phosphorus 2.9 2.5 - 4.9 mg/dL    Albumin 2.7 (L) 3.4 - 5.0 g/dL   CBC   Result Value Ref Range    WBC 11.0 4.4 - 11.3 x10*3/uL    nRBC 0.0 0.0 - 0.0 /100 WBCs    RBC 5.17 4.50 - 5.90 x10*6/uL    Hemoglobin 16.4 13.5 - 17.5 g/dL    Hematocrit 51.3 41.0 - 52.0 %    MCV 99 80 - 100 fL    MCH 31.7 26.0 - 34.0 pg    MCHC 32.0 32.0 - 36.0 g/dL    RDW 13.5 11.5 - 14.5 %    Platelets 241 150 - 450 x10*3/uL   POCT GLUCOSE   Result Value Ref Range     POCT Glucose 132 (H) 74 - 99 mg/dL   POCT GLUCOSE   Result Value Ref Range    POCT Glucose 193 (H) 74 - 99 mg/dL   POCT GLUCOSE   Result Value Ref Range    POCT Glucose 115 (H) 74 - 99 mg/dL   Renal Function Panel   Result Value Ref Range    Glucose 119 (H) 74 - 99 mg/dL    Sodium 138 136 - 145 mmol/L    Potassium 3.6 3.5 - 5.3 mmol/L    Chloride 93 (L) 98 - 107 mmol/L    Bicarbonate 38 (H) 21 - 32 mmol/L    Anion Gap 11 10 - 20 mmol/L    Urea Nitrogen 22 6 - 23 mg/dL    Creatinine 0.40 (L) 0.50 - 1.30 mg/dL    eGFR >90 >60 mL/min/1.73m*2    Calcium 8.0 (L) 8.6 - 10.3 mg/dL    Phosphorus 2.9 2.5 - 4.9 mg/dL    Albumin 2.7 (L) 3.4 - 5.0 g/dL   CBC   Result Value Ref Range    WBC 8.4 4.4 - 11.3 x10*3/uL    nRBC 0.0 0.0 - 0.0 /100 WBCs    RBC 4.94 4.50 - 5.90 x10*6/uL    Hemoglobin 16.0 13.5 - 17.5 g/dL    Hematocrit 49.4 41.0 - 52.0 %     80 - 100 fL    MCH 32.4 26.0 - 34.0 pg    MCHC 32.4 32.0 - 36.0 g/dL    RDW 13.6 11.5 - 14.5 %    Platelets 232 150 - 450 x10*3/uL   POCT GLUCOSE   Result Value Ref Range    POCT Glucose 112 (H) 74 - 99 mg/dL   POCT GLUCOSE   Result Value Ref Range    POCT Glucose 146 (H) 74 - 99 mg/dL   POCT GLUCOSE   Result Value Ref Range    POCT Glucose 278 (H) 74 - 99 mg/dL   POCT GLUCOSE   Result Value Ref Range    POCT Glucose 195 (H) 74 - 99 mg/dL   Renal Function Panel   Result Value Ref Range    Glucose 99 74 - 99 mg/dL    Sodium 139 136 - 145 mmol/L    Potassium 4.1 3.5 - 5.3 mmol/L    Chloride 93 (L) 98 - 107 mmol/L    Bicarbonate 40 (HH) 21 - 32 mmol/L    Anion Gap 10 10 - 20 mmol/L    Urea Nitrogen 21 6 - 23 mg/dL    Creatinine 0.35 (L) 0.50 - 1.30 mg/dL    eGFR >90 >60 mL/min/1.73m*2    Calcium 8.0 (L) 8.6 - 10.3 mg/dL    Phosphorus 3.2 2.5 - 4.9 mg/dL    Albumin 2.8 (L) 3.4 - 5.0 g/dL   CBC   Result Value Ref Range    WBC 2.9 (L) 4.4 - 11.3 x10*3/uL    nRBC 0.0 0.0 - 0.0 /100 WBCs    RBC 2.20 (L) 4.50 - 5.90 x10*6/uL    Hemoglobin 6.9 (L) 13.5 - 17.5 g/dL    Hematocrit 20.5 (L)  41.0 - 52.0 %    MCV 93 80 - 100 fL    MCH 31.4 26.0 - 34.0 pg    MCHC 33.7 32.0 - 36.0 g/dL    RDW 21.6 (H) 11.5 - 14.5 %    Platelets 68 (L) 150 - 450 x10*3/uL   POCT GLUCOSE   Result Value Ref Range    POCT Glucose 200 (H) 74 - 99 mg/dL   CBC   Result Value Ref Range    WBC 8.0 4.4 - 11.3 x10*3/uL    nRBC 0.0 0.0 - 0.0 /100 WBCs    RBC 5.13 4.50 - 5.90 x10*6/uL    Hemoglobin 16.0 13.5 - 17.5 g/dL    Hematocrit 50.3 41.0 - 52.0 %    MCV 98 80 - 100 fL    MCH 31.2 26.0 - 34.0 pg    MCHC 31.8 (L) 32.0 - 36.0 g/dL    RDW 13.5 11.5 - 14.5 %    Platelets 232 150 - 450 x10*3/uL   B-type natriuretic peptide   Result Value Ref Range     (H) 0 - 99 pg/mL   POCT GLUCOSE   Result Value Ref Range    POCT Glucose 178 (H) 74 - 99 mg/dL    XR chest 1 view    Result Date: 9/23/2024  Interpreted By:  Ishaan Vernon, STUDY: XR CHEST 1 VIEW;  9/23/2024 10:52 am   INDICATION: Signs/Symptoms:Assess for resolution of R pneumonia..   COMPARISON: CT scan chest from 09/04/2024. Chest x-ray from 09/17/2024.   ACCESSION NUMBER(S): [id number]   ORDERING CLINICIAN: DEEPA HINOJOSA   TECHNIQUE: Single AP portable view of the chest was obtained.   FINDINGS: MEDIASTINUM/ LUNGS/ ARELIS: Moderate cardiomegaly. Bilateral pleural effusions. Hazy opacities at the lung bases. Horizontal linear opacity in the left lingula, not present previously. Central vasculature is prominent and indistinct.   No pneumothorax. No tracheal deviation. No abnormal hilar fullness or gross mass on either side.   BONES: No lytic or blastic destructive bone lesion.   UPPER ABDOMEN: Grossly intact.       Findings consistent with ongoing CHF with mild interval worsening.   MACRO: None   Signed by: Ishaan Vernon 9/23/2024 11:18 AM Dictation workstation:   [id number]    FL modified barium swallow study    Result Date: 9/23/2024  Interpreted By:  Freda Fonseca and Lovelace Elizabeth STUDY: FL MODIFIED BARIUM SWALLOW STUDY;; 9/20/2024 10:48 am   INDICATION:  "Signs/Symptoms:post prandial cough.     COMPARISON: None.   ACCESSION NUMBER(S): [id number]   ORDERING CLINICIAN: ABAD SCHNEIDER   TECHNIQUE: Modified Barium Swallow Study completed. Informed verbal consent obtained prior to completion of exam. Trials of thin, nectar/mildly thick liquid, honey/moderately thick liquid, puree, and solids were administered.   SLP: Sharon Seals SLP Contact info: [employer] secure chat.   SPEECH FINDINGS:   Reason for Referral: post prandial cough with thin liquids Patient Hx:  \"chronic diastolic heart failure, COPD, obstructive sleep apnea, obesity, type 2 diabetes mellitus, hypertension, hypothyroidism and hyperlipidemia \" Respiratory Status: O2 via NC Current diet: NPO   Pain: Pain Scale: not reported   FINAL SPEECH RECOMMENDATIONS   DIET RECOMMENDED: - Pureed (IDDSI Level 4) - Mildly/nectar thick liquids (IDDSI Level 2)   Patient to initiate a modified diet with implementation of the following swallow strategies listed below:   STRATEGIES: - Small bites - Small, single sips - Upright for all PO intake - Swallow 2-3 times per bite/sip - Medications whole in puree or as best tolerated - Slow rate   Plan: Treatment/Interventions: Pharyngeal exercises, Patient/family education, Bolus trials, Compensatory strategy training, Determination of diet tolerance/advancement potential. SLP Plan: Skilled SLP warranted SLP Frequency: 4x per week Duration: 2 weeks   Discussed POC: Patient Discussed Risks/Benefits: Yes Patient/Caregiver Agreeable: Yes   Short term goals established 09/20/24: - Patient will tolerate baseline/recommended PO diet without overt s/s of aspiration, further difficulty, or concern for aspiration-related complications in 90% of observed therapeutic trials.     Long term goals 09/20/24: Patient will tolerate the least restrictive diet without overt difficulty or further pulmonary compromise by time of discharge.   Education Provided: Reviewed results and recommendations " of MBSS with patient following. Discussed recommendations and reviewed POC at time of assessment. Verbal understanding confirmed with agreement of all information presented being acknowledged by patient.   Treatment Provided Today:  N/A   Additional consult suggested: N/A   Repeat study/ dc plan: TBD       SLP Impressions with Severity Rating: Pt presents with moderate  oropharyngeal dysphagia characterized by reduced bolus formation, swallow delay, decreased TB retraction/PPW contraction and reduced hyolaryngeal elevation/ epiglottic deflection inconsistently. Oral/vallecular/piriform sinus residue noted with thin/nectar/honey liquids, which cleared partially following spontaneous reswallows. Improved pharyngeal clearance achieved with puree and solid boluses likely due to increased pharyngeal pressure spontaneously generated by patient during deglutition. Laryngeal penetration viewed with thin liquids, however extent of material descending into upper laryngeal vestibule could not be visually confirmed due to clavicular shadowing and body habitus partially obscuring view of glottic/subglottic region. Aspiration not seen, however cannot be excluded as silently occurring due to visual obstruction of airway. No airway entry viewed with nectar/honey liquids or puree/solid boluses. Modification of diet required for safety and energy conservation purposes. Concerned with exacerbation of tachypnea during mastication of course solid boluses.   Strategies attempted- N/A     OUTCOME MEASURES: Functional Oral Intake Scale Functional Oral Intake Scale: Level 5        total oral diet with multiple consistencies, but requires special preparations and compensations     Eating Assessment Tool (EAT-10) EAT 10 not utilized due to patient inability to complete.   Rosenbek's Penetration Aspiration Scale Thin Liquids: 3. PENETRATION with LOW ASPIRATION risk - contrast remains above vocal cords, visible residue   Nectar Thick Liquids:  1. NO ASPIRATION & NO PENETRATION - no aspiration, contrast does not enter airway   Honey Thick Liquids: 1. NO ASPIRATION & NO PENETRATION - no aspiration, contrast does not enter airway   Puree: 1. NO ASPIRATION & NO PENETRATION - no aspiration, contrast does not enter airway   Solids: 1. NO ASPIRATION & NO PENETRATION - no aspiration, contrast does not enter airway   *Anatomical marker not used 2/2 inability to maintain placement due to presence of facial hair.   Speech Therapy section of this report signed by Sharon Seals MACKEYANNA/SLP on 9/20/2024 at 11:33 am.   RADIOLOGY FINDINGS: Aspiration with thin liquids. No evidence of aspiration or penetration with thicker consistencies.       No evidence of aspiration.   MACRO: None   Signed by: Freda Fonseca 9/23/2024 8:48 AM Dictation workstation:   [id number]    Electrocardiogram, 12-lead PRN ACS symptoms    Result Date: 9/22/2024  Sinus rhythm with 1st degree AV block with Premature atrial complexes Right bundle branch block Left anterior fascicular block  Bifascicular block  Anteroseptal infarct (cited on or before 23-APR-2024) Abnormal ECG When compared with ECG of 13-SEP-2024 02:49, (unconfirmed) Premature ventricular complexes are no longer Present QT has lengthened Confirmed by Shady Bill ([id number]) on 9/22/2024 10:28:37 AM    Electrocardiogram, 12-lead PRN ACS symptoms    Result Date: 9/22/2024  Sinus rhythm with 1st degree AV block with occasional Premature ventricular complexes and Premature atrial complexes Left axis deviation Right bundle branch block Inferior infarct (cited on or before 04-SEP-2024) Anteroseptal infarct (cited on or before 23-APR-2024) Abnormal ECG Confirmed by Shady Bill ([id number]) on 9/22/2024 9:20:59 AM    Transthoracic Echo (TTE) Limited    Result Date: 9/19/2024   [facility], [address] [place] [address]              Tel [phone] and Fax [phone] TRANSTHORACIC ECHOCARDIOGRAM REPORT  Patient Name:       BRAYAN Key Physician:    [id number] Andrea Bentley MD Study Date:        9/19/2024            Ordering Provider:    [id number] ABAD SCHNEIDER MRN/PID:           [id number]             Fellow: Accession#:        [id number]         Nurse: Date of Birth/Age: 1955 / 69 years Sonographer:          Gustavo Naqvi RDCS Gender:            M                    Additional Staff: Height:            177.80 cm            Admit Date:           9/4/2024 Weight:            126.10 kg            Admission Status:     Inpatient -                                                               Routine BSA / BMI:         2.40 m2 / 39.89      Encounter#:           [id number]                    kg/m2 Blood Pressure:    105/75 mmHg          Department Location:  [facility] Non                                                               Invasive Study Type:    TRANSTHORACIC ECHO (TTE) LIMITED Diagnosis/ICD: Encounter for screening for cardiovascular disorders-Z13.6 Indication:    SOB and NSVT CPT Code:      Echo Limited-15830; Color Doppler-20561; Doppler Limited-42029 Patient History: Pertinent History: LE Edema, COPD, Dyspnea and HTN. Elevated trop. Study Detail: The following Echo studies were performed: 2D, M-Mode, Doppler and               color flow. Technically challenging study due to body habitus.  PHYSICIAN INTERPRETATION: Left Ventricle: Left ventricular ejection fraction is severely decreased, calculated by Green's biplane at 23%. There is global hypokinesis of the left ventricle with minor regional variations. The left ventricular cavity size is normal. Spectral Doppler shows an impaired relaxation pattern of left ventricular diastolic filling. Left Atrium: The left atrium is normal in size. Right Ventricle: The right ventricle is moderately enlarged. There is mildly reduced right  ventricular systolic function. TAPSE 13mm. Right Atrium: The right atrium is normal in size. Aortic Valve: The aortic valve is trileaflet. There is moderate aortic valve cusp calcification. There is no evidence of aortic valve regurgitation. Mitral Valve: The mitral valve is mildly thickened. There is mild to moderate mitral annular calcification. There is no evidence of mitral valve regurgitation. Tricuspid Valve: The tricuspid valve is structurally normal. No evidence of tricuspid regurgitation. The right ventricular systolic pressure is unable to be estimated. Pulmonic Valve: The pulmonic valve was not assessed. Pulmonic valve regurgitation was not assessed. Pericardium: There is no pericardial effusion noted. Aorta: The aortic root is normal. Systemic Veins: The inferior vena cava appears normal in size, with IVC inspiratory collapse greater than 50%. In comparison to the previous echocardiogram(s): Compared with study dated 3/11/2024, there is RV dilation and reduced function.  CONCLUSIONS:  1. Left ventricular ejection fraction is severely decreased, calculated by Green's biplane at 23%.  2. There is global hypokinesis of the left ventricle with minor regional variations.  3. Poorly visualized anatomical structures due to suboptimal image quality.  4. Spectral Doppler shows an impaired relaxation pattern of left ventricular diastolic filling.  5. There is mildly reduced right ventricular systolic function.  6. Moderately enlarged right ventricle.  7. There is moderate aortic valve cusp calcification. The was no assesment of aortic stenosis in this limited study.  8. Compared with study dated 3/11/2024, there is RV dilation and reduced function. QUANTITATIVE DATA SUMMARY:  2D MEASUREMENTS:          Normal Ranges: LVEDV Index:     81 ml/m2  LV SYSTOLIC FUNCTION BY 2D PLANIMETRY (MOD):                      Normal Ranges: EF-A4C View:    21 % (>=55%) EF-A2C View:    24 % EF-Biplane:     23 % LV EF Reported: 23 %   RIGHT VENTRICLE: RV s' 0.09 m/s  TRICUSPID VALVE/RVSP:         Normal Ranges: Est. RA Pressure:     3 mmHg IVC Diam:             2.10 cm  [id number] Andrea Bentley MD Electronically signed on 9/19/2024 at 5:31:57 PM  ** Final **     Electrocardiogram, 12-lead PRN ACS symptoms    Result Date: 9/18/2024  Sinus rhythm with 1st degree AV block with Premature atrial complexes with Aberrant conduction Right bundle branch block Left anterior fascicular block  Bifascicular block  Anteroseptal infarct (cited on or before 23-APR-2024) Abnormal ECG Confirmed by Triston Moore ([id number]) on 9/18/2024 10:19:51 AM    XR chest 1 view    Result Date: 9/17/2024  Interpreted By:  Ishaan Vernon, STUDY: XR CHEST 1 VIEW;  9/17/2024 5:41 am   INDICATION: Signs/Symptoms:Increase O2 requirements. Tnx..   COMPARISON: CT scan from 09/04/2024.   ACCESSION NUMBER(S): [id number]   ORDERING CLINICIAN: PARISA ZARAGOZA   TECHNIQUE: Single AP portable view of the chest was obtained.   FINDINGS: MEDIASTINUM/ LUNGS/ ARELIS:   Stable cardiomegaly. No gross vascular congestion. Small stable left pleural effusion. Medial left basilar atelectasis/infiltrate, stable to slightly progressed. Pleural and parenchymal opacities at the right base, mildly progressed. No pneumothorax. No tracheal deviation. No abnormal hilar fullness or gross mass on either side.   BONES: No lytic or blastic destructive bone lesion.   UPPER ABDOMEN: Grossly intact.       Cardiomegaly.   Stable small left pleural effusion with left basilar atelectasis/pneumonia, stable to slightly progressed.   Pleural and parenchymal opacities at the right base, mildly progressed.   MACRO: None   Signed by: Ishaan Vernon 9/17/2024 8:16 AM Dictation workstation:   [id number]    ECG 12 lead    Result Date: 9/15/2024  Sinus rhythm with 1st degree AV block Left axis deviation Right bundle branch block Inferior infarct (cited on or before 04-SEP-2024) Abnormal ECG When compared with ECG of 04-SEP-2024  16:24, Premature ventricular complexes are no longer Present Right bundle branch block is now Present Confirmed by Dimitri Murillo ([id number]) on 9/15/2024 2:23:29 PM    XR chest 1 view    Result Date: 9/13/2024  Interpreted By:  Tania Knowles, STUDY: XR CHEST 1 VIEW;  9/13/2024 4:11 pm   INDICATION: Signs/Symptoms:post chest tube removal.   COMPARISON: 09/13/2024   ACCESSION NUMBER(S): [id number]   ORDERING CLINICIAN: RODOLFO LOPEZ   FINDINGS: The study is limited due to patient's body habitus and poor inspiration previously seen right-sided chest tube has been removed. No gross pneumothorax is noted.   Blunting of both costophrenic angles is seen, which most likely is related to pleural effusions. The heart is enlarged. Bibasilar streaky densities are seen.       No gross pneumothorax.   Bilateral pleural effusions.   Cardiomegaly.   Bibasilar streaky densities, which may be related to atelectasis or infiltrates.       MACRO: None   Signed by: Tania Knowles 9/13/2024 4:40 PM Dictation workstation:   [id number]    XR chest 1 view    Result Date: 9/13/2024  Interpreted By:  Rosa Hemphill, STUDY: XR CHEST 1 VIEW;  9/13/2024 4:31 am   INDICATION: Signs/Symptoms:CT in place.   COMPARISON: Multiple prior studies, the most recent 09/12/2024   ACCESSION NUMBER(S): [id number]   ORDERING CLINICIAN: RODOLFO LOPEZ   FINDINGS:     CARDIOMEDIASTINAL SILHOUETTE: Stable enlarged cardiac silhouette.   LUNGS: Stable position of pigtail catheter overlying the right lung base. There is improved aeration of the right lung base. Mild residual hazy right basilar opacity, likely combination of small residual pleural effusion and basilar consolidation. Limited evaluation of the left lung base due to underpenetration. This is not significantly changed and left pleural effusion or basilar consolidation not excluded. No pneumothorax.   ABDOMEN: No remarkable upper abdominal findings.   BONES: No acute osseous abnormality.       Improved  aeration of the right lung base. Residual hazy right basilar opacities likely a combination of residual pleural effusion and basilar atelectasis.   Limited evaluation of the left lung base due to underpenetration.   MACRO: None   Signed by: Rosa Hemphill 9/13/2024 4:48 AM Dictation workstation:   [id number]    XR chest 1 view    Result Date: 9/12/2024  Interpreted By:  Donte Shoemaker, STUDY: XR CHEST 1 VIEW; 9/12/2024 5:23 am   INDICATION: Signs/Symptoms:CT in place.   COMPARISON: None   ACCESSION NUMBER(S): [id number]   ORDERING CLINICIAN: RODOLFO LOPEZ   TECHNIQUE: 1 view of the chest was performed.   FINDINGS: Right pigtail catheter in similar location. Stable layering bilateral pleural effusion with surrounding atelectasis. Stable cardiomegaly. Bilateral shoulder joint degenerative changes.       1. No significant interval change from 09/11/2024 chest x-ray.   Signed by: Donte Shoemaker 9/12/2024 8:18 AM Dictation workstation:   [id number]    XR chest 1 view    Result Date: 9/11/2024  Interpreted By:  Klaus Villafana, STUDY: XR CHEST 1 VIEW; 9/11/2024 5:42 am   INDICATION: Signs/Symptoms:CT in place   COMPARISON: Radiographs 09/10/2024   ACCESSION NUMBER(S): [id number]   ORDERING CLINICIAN: RODOLFO LOPEZ   TECHNIQUE: Single frontal view of the chest performed.   FINDINGS: LINES AND DEVICES: Remote endotracheal tube. Stable right pigtail pleural catheter near right lung base.   LUNGS: Stable hazy opacities at the lung base and additional airspace opacities extending to right mid lung, stable to mildly increased. No pneumothorax. Left lung is clear.   CARDIOMEDIASTINAL SILHOUETTE: Stable cardiomegaly.       Stable to slightly increased right pleural effusion with atelectasis. Stable right pleural catheter.   MACRO None   Signed by: Klaus Villafana 9/11/2024 8:21 AM Dictation workstation:   [id number]    XR chest 1 view    Result Date: 9/10/2024  Interpreted By:  Ishaan Vernon, STUDY: XR CHEST 1 VIEW;   9/10/2024 5:34 am   INDICATION: Signs/Symptoms:Eval chest tube placement, Rt effusion.   COMPARISON: CT scan chest from 09/04/2024. Chest x-ray from 09/09/2024.   ACCESSION NUMBER(S): [id number]   ORDERING CLINICIAN: RICH KAPLAN   TECHNIQUE: Single AP portable view of the chest was obtained.   FINDINGS: MEDIASTINUM/ LUNGS/ ARELIS: Previous ET tube has been removed. There is an NG tube in place with distal port just beyond the GE junction and distal tip in the proximal stomach. Pigtail pleural catheter at the right base is stable. There is a small grossly stable lateral right basilar pneumothorax. There is mild bibasilar atelectasis. Cardiomegaly without gross vascular congestion. No pneumothorax on the left. No tracheal deviation. No abnormal hilar fullness or gross mass on either side.   BONES: No lytic or blastic destructive bone lesion.   UPPER ABDOMEN: Grossly intact.       Cardiomegaly.   NG tube in place. ET tube has been removed.   Stable pleural drainage catheter at the base of the right chest. Small stable lateral right basilar pneumothorax.   Mild bibasilar atelectasis.   MACRO: None   Signed by: Ishaan Vernon 9/10/2024 8:13 AM Dictation workstation:   [id number]    XR chest 1 view    Result Date: 9/9/2024  Interpreted By:  Daisy Sosa, STUDY: XR CHEST 1 VIEW;  9/9/2024 4:39 pm   INDICATION: Signs/Symptoms:s/p lytic therapy.     COMPARISON: 5:21 a.m. the same day   ACCESSION NUMBER(S): [id number]   ORDERING CLINICIAN: RODOLFO LOPEZ   FINDINGS: ETT, NG tube and right lower chest pigtail catheter remain in place. Additional presumed overlying monitoring/support devices again seen. Small right basilar pneumothorax decreased in size. Persistent bibasilar opacities. The cardiomediastinal silhouette remains enlarged.       Decreased size of right basilar pneumothorax since 5:21 a.m. the same day with pigtail catheter in place.   MACRO: None.   Signed by: Daisy Sosa 9/9/2024 4:44 PM Dictation  workstation:   [id number]    XR chest 1 view    Result Date: 9/9/2024  Interpreted By:  Klaus Villafana, STUDY: XR CHEST 1 VIEW; 9/9/2024 5:32 am   INDICATION: Signs/Symptoms:Eval chest tube placement, Rt effusion   COMPARISON: Radiographs 09/08/2024   ACCESSION NUMBER(S): [id number]   ORDERING CLINICIAN: RICH KAPLAN   TECHNIQUE: Single frontal view of the chest performed.   FINDINGS: LINES AND DEVICES: The pigtail of right-sided pleural drainage catheter overlies the right infrahilar region, grossly unchanged. Tip of ETT approximately 6.6 cm above the kathie, previously 7.7 cm. NG tube courses below the diaphragm, limited assessment.   LUNGS: Moderate-size right-sided hydropneumothorax appears grossly unchanged. Stable to slightly increased interstitial opacities on the left. Question new trace left pleural effusion with atelectasis.   CARDIOMEDIASTINAL SILHOUETTE: Stable cardiomegaly.       Right-sided infrahilar pigtail catheter and moderate hydropneumothorax, grossly unchanged.   Suspected worsening CHF with stable to mildly increased interstitial edema and possible new trace left pleural effusion.   MACRO None   Signed by: Klaus Villafana 9/9/2024 8:17 AM Dictation workstation:   [id number]    XR chest 1 view    Result Date: 9/8/2024  Interpreted By:  Corrina Reyes, STUDY: XR CHEST 1 VIEW;  9/8/2024 6:39 am   INDICATION: Signs/Symptoms:Eval chest tube placement, Rt effusion.     COMPARISON: 09/07/2024   ACCESSION NUMBER(S): [id number]   ORDERING CLINICIAN: RICH KAPLAN   TECHNIQUE: Portable AP semi upright   FINDINGS: Endotracheal tube tip projects between 7 and 8 cm above the kathie. Nasogastric tube is no longer identified. Right pleural pigtail catheter projects at mid right hemithorax and is similar in position to the prior study. Relative volume of basilar pneumothorax and pleural fluid appears similar to the prior study. Right lower lobe and right middle lobe are collapsed. Right upper  lobe variation is similar to the prior study. Left lung shows some vascular congestion. Minimal atelectasis is present at the left lung base. No change in the osseous structures is noted.       Basilar pneumothorax on the right appears similar to the prior study in the relative volume of pleural fluid and pneumothorax appears unchanged.   Collapse of the right lower lobe and right middle lobe   Pulmonary vascular congestion is most notable in the left lung   MACRO: None.   Signed by: Corrina Reyes 9/8/2024 12:32 PM Dictation workstation:   [id number]    XR chest 1 view    Result Date: 9/8/2024  Interpreted By:  Corrina Reyes, STUDY: XR CHEST 1 VIEW;  9/7/2024 5:28 pm   INDICATION: Signs/Symptoms:Eval chest tube placement, Rt pleural effusion.     COMPARISON: 09/06/2024   ACCESSION NUMBER(S): [id number]   ORDERING CLINICIAN: RICH KAPLAN   TECHNIQUE: Portable AP upright   FINDINGS: Endotracheal tube tip projects about 6 cm above the kathie. NG tube extends below the diaphragm and beyond the image. Right pleural pigtail catheter projects at mid right hemithorax and appears unchanged in position.   Pneumothorax at the right thoracic base appears larger than on the prior study. The amount of pleural fluid appears decreased. Mild atelectasis left lung base is slightly improved. Heart is unchanged in size. No changes noted in the osseous structures.       Right pleural effusion is decreased from the prior study but the right basilar pneumothorax appears increased.   MACRO: None.   Signed by: Corrina Reyes 9/8/2024 12:30 PM Dictation workstation:   [id number]    Electrocardiogram, 12-lead PRN ACS symptoms    Result Date: 9/6/2024  Sinus rhythm with 1st degree AV block with Premature atrial complexes Left axis deviation Right bundle branch block Inferior infarct (cited on or before 04-SEP-2024) Anterior infarct , age undetermined T wave abnormality, consider lateral ischemia Abnormal ECG When compared with  ECG of 05-SEP-2024 10:29, (unconfirmed) Premature atrial complexes are now Present Confirmed by Shady Bill ([id number]) on 9/6/2024 1:59:56 PM    XR chest 1 view    Result Date: 9/6/2024  Interpreted By:  Eloisa Landa, STUDY: XR CHEST 1 VIEW 9/6/2024 9:05 am   INDICATION: Signs/Symptoms:R pleural effusion   COMPARISON: 09/05/2024   ACCESSION NUMBER(S): [id number]   ORDERING CLINICIAN: PERRI DELACRUZ   TECHNIQUE: AP view of the chest at bedside in the erect position   FINDINGS: A right chest tube is once again noted within the mid right hemithorax with a stable right hydropneumothorax seen within the mid and lower right hemithorax.   The tip of the endotracheal tube remains satisfactorily positioned at a distance 3 cm above kathie with nasogastric tube descending below diaphragm.   There is small left pleural effusion and left basilar atelectasis. The heart remains enlarged.       No change in the right hydropneumothorax which may be loculated with chest tube on the right in the mid hemithorax.   Small left pleural effusion with left basilar infiltrate/atelectasis.   Signed by: Eloisa Landa 9/6/2024 9:20 AM Dictation workstation:   [id number]    ECG 12 lead    Result Date: 9/5/2024  Sinus rhythm with 1st degree AV block with frequent Premature ventricular complexes Left axis deviation Left ventricular hypertrophy with QRS widening and repolarization abnormality ( R in aVL , [place] product ) Inferior infarct , age undetermined Abnormal ECG When compared with ECG of 23-APR-2024 09:01, Premature ventricular complexes are now Present W[place] interval has increased (RBBB and left anterior fascicular block) is no longer Present Inferior infarct is now Present Confirmed by Andrea Bentley ([id number]) on 9/5/2024 10:01:05 AM    XR chest 1 view    Result Date: 9/5/2024  Interpreted By:  Saad Cronin, STUDY: XR CHEST 1 VIEW; 9/5/2024 4:57 am   INDICATION: CLINICAL INFORMATION: Signs/Symptoms:Pig tail Chest Tube replacement.   COMPARISON:  09/04/2024   ACCESSION NUMBER(S): [id number]   ORDERING CLINICIAN: ANDRY ABRAHAM   TECHNIQUE: Portable chest one view.   FINDINGS: The cardiac silhouette is quite prominent suggesting cardiomegaly. The tube and line placement is unchanged.  There is a decrease in the right-sided pleural-based density is well as the right-sided parenchymal densities. The pulmonary vasculature is slightly indistinct suggesting mild pulmonary vascular congestion.       There appears to be increased aeration at the right base suggesting diminishing pleural density within the right hemithorax in association with the chest tube. Infiltrate persists on the right.   MACRO: none   Signed by: Saad Cronin 9/5/2024 8:18 AM Dictation workstation:   [id number]    XR chest 1 view    Result Date: 9/5/2024  Interpreted By:  Cande Keating, STUDY: XR CHEST 1 VIEW;  9/4/2024 11:10 pm   INDICATION: Signs/Symptoms:Chest tube placement.     COMPARISON: Radiographs of the chest dated 09/04/2024; CT of the chest dated 09/04/2024   ACCESSION NUMBER(S): [id number]   ORDERING CLINICIAN: ANDRY ABRAHAM   FINDINGS: AP radiograph of the chest was provided.   Endotracheal tube projects 4.4 cm superior to the kathie. Enteric tube appears to course along the midline expected course of the esophagus, although the distal tip is not well visualized due to underpenetration and overlying structures.   CARDIOMEDIASTINAL SILHOUETTE: Cardiomediastinal silhouette is enlarged, similar to prior exam.   LUNGS: Redemonstration of the large right-sided pleural effusion with associated atelectasis/consolidation, similar in appearance to prior radiographs. No new consolidation or pleural effusion is present in the left lung.   ABDOMEN: No remarkable upper abdominal findings.   BONES: No acute osseous changes.       1.  Enteric tube is coursing along the midline of the mediastinum in the expected course of the esophagus, although the distal tip is not well visualized due to  overlying cardiomediastinal silhouette and underpenetration, and may be outside of the field-of-view of the study. Endotracheal tube projects 4.5 cm superior to the kathie. 2. Redemonstration of the large loculated right-sided pleural effusion with the associated atelectasis/consolidation, similar in appearance to prior exam.       MACRO: None   Signed by: Cande Keating 9/5/2024 2:08 AM Dictation workstation:   [id number]    CT chest abdomen pelvis w IV contrast    Result Date: 9/4/2024  Interpreted By:  Ishaan Banda, STUDY: CT CHEST ABDOMEN PELVIS W IV CONTRAST;  9/4/2024 5:52 pm   INDICATION: Signs/Symptoms:unresponsive.     COMPARISON: None.   ACCESSION NUMBER(S): [id number]   ORDERING CLINICIAN: GURPREET COPPOLA   TECHNIQUE: Contiguous axial images of the chest, abdomen, and pelvis were obtained after the intravenous administration of iodinated contrast. Coronal and sagittal reformatted images were reconstructed from the axial data.   FINDINGS: CT CHEST:   MEDIASTINUM AND LYMPH NODES: NG/OG tube noted with small amount of fluid in the esophagus. The esophageal wall appears within normal limits.  No enlarged intrathoracic or axillary lymph nodes by imaging criteria. No pneumomediastinum.   VESSELS:  Normal caliber thoracic aorta without dissection. Mild aortic atherosclerosis.   HEART: Enlarged. Severe aortic valvular calcifications. Moderate coronary artery calcifications. No significant pericardial effusion.   LUNG, AIRWAYS, PLEURA: There is a large loculated right pleural effusion causing compressive atelectasis on the right lung. As a result, there is complete collapse of the right lower lobe, complete collapse of the right middle lobe, and partial collapse of the right upper lobe. There are multiple loculated compartments largest of which is seen at the right lung base. There is a small amount of mucus in the mid to distal trachea. There is small amount of layering debris in the mainstem  bronchi. The majority of the right middle and lower lobe bronchi are collapsed and opacified with low-density debris. There is a trace left pleural effusion with subsegmental left basilar atelectasis. There is emphysema. There are new scattered subcentimeter bilateral centrilobular nodules that are likely infectious/inflammatory in nature from bronchiolitis.   CHEST WALL SOFT TISSUES: No discernible acute abnormality. There is a 5.5 cm x 1.7 cm lipoma in the right infraspinatus muscle.   OSSEOUS STRUCTURES: No acute osseous abnormality.     CT ABDOMEN/PELVIS:   ABDOMINAL WALL: No significant abnormality.   LIVER: No significant parenchymal abnormality.   BILE DUCTS: No significant intrahepatic or extrahepatic dilatation.   GALLBLADDER: No significant abnormality.   PANCREAS: No significant abnormality.   SPLEEN: No significant abnormality.   ADRENALS: There is a 1.7 cm low-density left adrenal nodule likely representing a adenoma.   KIDNEYS, URETERS, BLADDER: There is a heterogeneously enhancing solid mass in the upper pole the left kidney measuring 2.4 cm x 1.9 cm consistent with renal cell carcinoma. Additionally, a 0.8 cm likely enhancing lesion in the upper pole the right kidney is concerning for renal cell carcinoma. No hydronephrosis or calculi. Urinary bladder is decompressed with Live catheter in place.   REPRODUCTIVE ORGANS: No significant abnormality.   VESSELS: Moderate aortic atherosclerosis without AAA.   RETROPERITONEUM/LYMPH NODES: No acute retroperitoneal abnormality. No enlarged lymph nodes.   BOWEL/MESENTERY/PERITONEUM: Colonic diverticulosis without acute diverticulitis. No inflammatory bowel wall thickening or dilatation. Normal appendix.   No ascites, free air, or fluid collection.     MUSCULOSKELETAL: No acute osseous abnormality.       CT CHEST: Large loculated right pleural effusion causing complete compressive collapse of the right middle lobe and right lower lobe and partial compressive  collapse of the right upper lobe. The largest likely compartments located at the inferior right hemithorax. The cause of the effusion is not apparent as there is only a small amount of debris in the distal trachea and right mainstem bronchus but the distal-most right-sided bronchi are occluded due to combination of compressive collapse and small low-density fluid.   Numerous new subcentimeter centrilobular pulmonary nodules that are most likely infectious/inflammatory in as can be seen with bronchiolitis or fungal infection.   Aortic valve calcification to the extent that would likely result in aortic stenosis.   CT ABDOMEN/PELVIS: Left kidney solid heterogeneously enhancing mass suspicious for renal cell carcinoma (2.4 cm x 1.9 cm) and of suspected 0.8 cm right upper pole renal cell carcinoma measuring 0.8 cm. Recommend urological follow-up/also taken and dedicated MRI kidneys to further evaluate these lesions. YELLOW ALERT: An incidental finding alert was sent per protocol.   No acute abnormality in the abdomen or pelvis.   Colonic diverticulosis without acute diverticulitis.   MACRO: Critical Finding:  New mass in the kidney. Notification was initiated on 9/4/2024 at 6:25 pm by  Ishaan Banda.  (**-YCF-**) Instructions:  MR Abdomen w and wo IV contrast. 1 week.   Signed by: Ishaan Banda 9/4/2024 6:26 PM Dictation workstation:   [id number]    CT head wo IV contrast    Result Date: 9/4/2024  Interpreted By:  Ishaan Banda, STUDY: CT HEAD WO IV CONTRAST;  9/4/2024 5:52 pm   INDICATION: Signs/Symptoms:unrespoonsive.     COMPARISON: None.   ACCESSION NUMBER(S): [id number]   ORDERING CLINICIAN: GURPREET COPPOLA   TECHNIQUE: Noncontrast axial CT images of head were obtained with coronal and sagittal reconstructed images.   FINDINGS: BRAIN PARENCHYMA: Mild periventricular and subcortical hemispheric white matter hypodensities are most compatible with chronic small vessel ischemic disease. No acute  intraparenchymal hemorrhage or parenchymal evidence of acute large territory ischemic infarct. Gray-white matter distinction is preserved. No mass-effect.   VENTRICLES and EXTRA-AXIAL SPACES:  No acute extra-axial or intraventricular hemorrhage. No effacement of cerebral sulci. The ventricles and sulci are age-concordant.   PARANASAL SINUSES/MASTOIDS:  No hemorrhage or air-fluid levels within the visualized paranasal sinuses. The mastoids are well aerated.   CALVARIUM/ORBITS:  No skull fracture.  The orbits and globes are intact to the extent visualized.   EXTRACRANIAL SOFT TISSUES: No discernible abnormality.       No acute intracranial abnormality.     MACRO: None.   Signed by: Ishaan Banda 9/4/2024 6:07 PM Dictation workstation:   [id number]    XR chest 1 view    Result Date: 9/4/2024  Interpreted By:  Rosalie Ellison, STUDY: XR CHEST 1 VIEW;  9/4/2024 4:33 pm   INDICATION: Signs/Symptoms:intubation.   COMPARISON: 04/08/2024   ACCESSION NUMBER(S): [id number]   ORDERING CLINICIAN: GURPREET COPPOLA   TECHNIQUE: A single portable image of the chest was obtained.   FINDINGS: Resolution is limited. The right portion of the chest is not entirely included.   The patient is rotated. The heart size is uncertain.   There appears to be elevated right hemidiaphragm and right-sided infiltration.   An endotracheal tube terminates above the kathie. A nasogastric tube terminates below the diaphragm.   COMPARISON OF FINDING: The nasogastric tube was placed in the interval. The endotracheal tube was placed in the interval.       Interval placement of an endotracheal tube. The tip is above the kathie. Interval placement of a nasogastric tube. It appears to terminate below the diaphragm.   The exam is extremely limited.   MACRO: none   Signed by: Rosalie Ellison 9/4/2024 4:43 PM Dictation workstation:   [id number]      Assessment & Plan  Acute respiratory failure with hypoxia and hypercapnia (Multi)  67-year-old gentleman with  history of chronic diastolic heart failure, COPD, obstructive sleep apnea, obesity, type 2 diabetes mellitus, hypertension, hypothyroidism and hyperlipidemia was admitted with acute respiratory failure with hypoxia and hypercapnia and noted to have a right loculated pleural effusion and bilateral lower lobe lung infiltrate and right hydropneumothorax.  He was hypotensive on admission and required 3 L of fluid to resuscitate and admitted to intensive care unit and was intubated because of acute respiratory failure and remained intubated for several days.  Currently he is extubated and on high flow oxygen as mentioned above.  He had significant leukocytosis of 22,000 on admission and his sputum culture was positive for stenotrophomonas.  Patient is being continued on IV Zosyn and IV Levaquin, pulmonary and infectious disease on board.  He has had IV antibiotics for 14 days.    1.  Acute hypoxic hypercapnic respiratory failure with hypotension and right hydropneumothorax and bilateral infiltrate and positive for pneumonia.Sputum culture was positive for stenotrophomonas.   Initially intubated for several days and since then he has been extubated and is on Improving and currently is on 10 L nasal cannula.   Discontinued IV antibiotics,  Currently also on tapering dose.  Prednisone which has been decreased to 10 mg daily now.    2.  Acute on chronic systolic and diastolic heart failure with LV ejection fraction of 35% and moderate aortic stenosis and pulmonary hypertension.  Patient is being diuresed with IV Lasix 80 mg , and he is CHF is improved significantly and is and is well compensated now currently edema has improved significantly, will change Lasix to p.o. 80 mg daily.  3.  Patient had hydropneumothorax on admission and had chest tube, chest tube has been removed since then..    4.  Hypotension has resolved blood pressures are 110/70 mmHg    6.  COPD and obstructive sleep apnea.  Aerosol treatment and steroids.   Currently on Prednisone 20 mg daily.,  Reduced to 10 mg daily.  .  7.  Diabetes mellitus type 2, continue sliding scale insulin, patient was on Mounjaro at home prior to admission.    8.  Patient  DNR/DNI.    9.  Hematuria possibly secondary to trauma secondary to Live catheter which has resolved after irrigation.    10.  Recurrent episodes of brief nonsustained ventricular tachycardia, also moderate systolic heart failure, previous echocardiogram revealed LV ejection fraction of 30 to 35% with moderate aortic stenosis, patient is started on IV Lasix this morning and cardiology consult appreciated and repeat echocardiogram quality was suboptimal he did reveal LV ejection fraction of 23% with global hypokinesia and moderate aortic cusp calcification and moderate right ventricular dysfunction.  Patient responded very well to IV Lasix and has been diuresed and now CHF is compensated.  He is on Jardiance and consider starting GDMT.    11.  Patient had a modified barium swallow and has been recommended to have a puréed diet with honey thickened and nectar thickened liquids.    Nystatin cream to both groin area for inguinal intertrigo.    Reviewed all labs patient's medical record and imaging studies and discussed the plan of treatment with the patient..    Continue PT OT and anticipate possible discharge to LTAC.  Pending authorization.       I spent 35 minutes in the professional and overall care of this patient.      Noble Gatica MD

## 2024-09-24 NOTE — NURSING NOTE
Pt and family has been educated on why the pt can not have thin liquids. Pt's sister fed the pt soup even after I spoke with them. The family was educated once again. Pt is in no distress and is maintaining o2 sats above 95%.

## 2024-09-24 NOTE — PROGRESS NOTES
Occupational Therapy                 Therapy Communication Note    Patient Name: Alo Glass  MRN: [id number]  Department: [facility] [address]  Room: [address] [place] [address]  Today's Date: 9/24/2024     Discipline: Occupational Therapy    Missed Visit Reason: Missed Visit Reason: Reschedule (per RN pt with hemoglobin 6.9, requesting to hold OT at this time.)    Missed Time: Attempt

## 2024-09-24 NOTE — PROGRESS NOTES
"Medicine PA follow up note    Subjective:  Patient was sitting up in bed when I examined him. He expressed numerous concerns that were addressed thoroughly.     Vitals (Last 24 Hours):  Heart Rate:  [70-95]   Temp:  [36 °C (96.8 °F)-36.6 °C (97.9 °F)]   Resp:  [19-22]   BP: ()/(54-77)   Height:  [180.3 cm (5' 10.98\")]   Weight:  [118 kg (260 lb 12.9 oz)]   SpO2:  [88 %-95 %]       I have reviewed all imaging reports and labs pertinent to this visit / presenting problem    PHYSICAL EXAM:  Constitutional: NAD, alert and cooperative  Eyes: no icterus  ENMT: mucous membranes moist, no lesions  Head/Neck: supple  Respiratory/Thorax: Improved air exchange, few scattered wheezes, 7 L NC  Cardiovascular: RRR, no murmurs heard  Gastrointestinal: ND/S/NT  : no Live, no SP/flank discomfort  Musculoskeletal: no joint swelling, ROM intact  Extremities: edema 1+  Neurological: non-focal  Skin: warm and dry  Psych: calm, stable mood     MEDS:  Scheduled meds  aspirin, 81 mg, oral, Daily  empagliflozin, 10 mg, oral, Daily  enoxaparin, 40 mg, subcutaneous, q12h SHARRON  ezetimibe, 10 mg, oral, Daily  folic acid, 1 mg, oral, Daily  furosemide, 80 mg, intravenous, Daily  insulin lispro, 0-10 Units, subcutaneous, q4h  ipratropium-albuteroL, 3 mL, nebulization, 4x daily  levothyroxine, 125 mcg, oral, Daily  multivitamin with minerals, 1 tablet, oral, Daily  nystatin, 5 mL, Swish & Swallow, 4x daily  nystatin, 1 Application, Topical, BID  oxygen, , inhalation, q PM  pantoprazole, 40 mg, intravenous, Daily  polyethylene glycol, 17 g, nasogastric tube, Daily  predniSONE, 10 mg, oral, Daily  sennosides-docusate sodium, 2 tablet, oral, BID  tamsulosin, 0.4 mg, oral, Daily  thiamine, 100 mg, oral, Daily        Continuous meds       PRN meds  PRN medications: acetaminophen, acetaminophen, dextrose, dextrose, glucagon, glucagon, ipratropium-albuteroL, oxygen, oxygen      ASSESSMENT/PLAN:  67-year-old gentleman with history of chronic " diastolic heart failure, COPD, obstructive sleep apnea, obesity, type 2 diabetes mellitus, hypertension, hypothyroidism and hyperlipidemia was admitted with acute respiratory failure with hypoxia and hypercapnia and noted to have a right loculated pleural effusion and bilateral lower lobe lung infiltrate and right hydropneumothorax.  He was hypotensive on admission and required 3 L of fluid to resuscitate and admitted to intensive care unit and was intubated because of acute respiratory failure and remained intubated for several days.  Currently he is extubated and on high flow oxygen as mentioned above.  He had significant leukocytosis of 22,000 on admission and his sputum culture was positive for stenotrophomonas. Pulmonary and infectious disease on board.  He has had IV antibiotics for 10 days,  discontinue dIV antibiotics.     Respiratory failure: acute with hypoxia and hypercarbia   Right hydropneumothorax   Bilateral PNA  -Sputum culture was positive for stenotrophomonas   -initially intubated for several days, since extubation breathing has improved, currently on 7 L of NC  -ABX course completed after 10 days, discontinued antibiotics  -Continue supplemental O2, wean off as tolerates   -Incentive spirometry and Acapella treatment  -pulmonary following    COPD  -continue prednisone  -Continue Duo-neb   -Resume Trelegy and albuterol on DC   -follow up outpatient with pulmonology outpatient     MELISSA  -continue nocturnal CPAP   -Resume APAP 10-15 at nights on DC     Acute on Chronic systolic HF   -TTE completed 9/19/24 showing EF 23%  -Reduce lasix 80 mg IV daily instead of BID due to bp being soft   -Jardiance 10 mg daily  -Daily wts, strict I&Os, low sodium diet   -Resume home GDMT as tolerated, BP currently low in the setting of diuresis   -tele    Hypotension  -low bp in the setting of diuresis  -monitor     Dysphagia  -MBS: recommended to have a puréed diet with honey thickened and nectar thickened  liquids    DM  -continue sliding scale insulin     Hypothyroidism  -Continue Synthroid     Inguinal intertrigo  -nystatin cream    Other comorbidities as above  -continue medications as ordered and adjust based on clinical course     VTE / GI prophylaxis   -subcutaneous Lovenox, PPI, bowel regimen in place     Discharge planning  -SNF when medically stable     Discussed with Dr. Gatica and the interdisciplinary team     Johanny Cowart PA-C

## 2024-09-24 NOTE — PROGRESS NOTES
Physical Therapy    Physical Therapy Treatment    Patient Name: Alo Glass  MRN: [id number]  Department: [facility] A   Room: [facility]414-  Today's Date: 9/24/2024  Time Calculation  Start Time: 1038  Stop Time: 1106  Time Calculation (min): 28 min         Assessment/Plan   PT Assessment  End of Session Communication: Bedside nurse  Assessment Comment: Pt voiced desire for W/C throughout session, this therapist provided education on need for increased B LE/UE strength and ability to perform stand pivot. Pt demo's self-limiting tendencies this date, requiring encouragement for participation and continue effort. Pt fatigues quickly and needs frequent rest breaks throughout session to manage fatigue and FERRELL.  End of Session Patient Position:  (seated at EOB, respiratory therapist and RN present, call light and needs in reach)  PT Plan  Inpatient/Swing Bed or Outpatient: Inpatient  PT Plan  Treatment/Interventions: Bed mobility, Transfer training, Therapeutic activity  PT Plan: Ongoing PT  PT Frequency: 4 times per week  PT Discharge Recommendations: Moderate intensity level of continued care  Equipment Recommended upon Discharge: Other (comment) (TBD)  PT Recommended Transfer Status: Assist x2  PT - OK to Discharge: Yes (per POC)      General Visit Information:   PT  Visit  PT Received On: 09/24/24  General  Reason for Referral: Impaired Mobility: Pt is a 69 y.o. male found non responsive at home by his neighbor and EMS was called.  Referred By: Mónica Johns MD  Past Medical History Relevant to Rehab:   Past Medical History:   Diagnosis Date    CHF (congestive heart failure) (Multi)     COPD (chronic obstructive pulmonary disease) (Multi)     Diabetes mellitus (Multi)     Hypertension     Obesity     MELISSA (obstructive sleep apnea)     Personal history of other specified conditions 01/08/2023    History of impaired glucose tolerance       Missed Visit: Yes  Missed Visit Reason: Patient placed on medical hold (per RN pt  with hemoglobin 6.9 g/dL, requesting to hold PT at this time.)  Family/Caregiver Present: No  Co-Treatment: OT  Co-Treatment Reason: Cotx with OT required for skilled technique needed for safe functional transfers and to facilitate maximum participation with skilled intervention.  Prior to Session Communication: Bedside nurse  Patient Position Received: Bed, 3 rail up, Alarm on  Preferred Learning Style: auditory, kinesthetic, verbal, written  General Comment: Pt received semi-supine in bed at start of session    Subjective   Precautions:  Precautions  Medical Precautions: Fall precautions, Oxygen therapy device and L/min (10L high flow)  Precautions Comment: Confusion, hx of falls      Objective   Pain:  Pain Assessment  Pain Assessment: 0-10  0-10 (Numeric) Pain Score: 0 - No pain  Cognition:  Cognition  Overall Cognitive Status: Within Functional Limits  Coordination:  Movements are Fluid and Coordinated: Yes  Postural Control:  Postural Control  Postural Control: Within Functional Limits  Static Sitting Balance  Static Sitting-Balance Support: Feet supported, Bilateral upper extremity supported  Static Sitting-Level of Assistance: Distant supervision  Static Sitting-Comment/Number of Minutes: x20 min at EOB    Activity Tolerance:  Activity Tolerance  Endurance: Tolerates 10 - 20 min exercise with multiple rests  Treatments:  Therapeutic Exercise  Therapeutic Exercise Performed: Yes  Therapeutic Exercise Activity 1: Pt instructed in B LE ther-ex: supine AP and SAQ x10 reps. Rest as needed to manage fatigue. Done to improve muscular strength and endurance to facilitate increased independence with balance, transfers, ambulation, and participation in ADLs.  Verbal/nonverbal (demo/gestures) cuing for execution of exercises and for proper form to obtain maximal benefits.    Therapeutic Activity  Therapeutic Activity Performed: Yes  Therapeutic Activity 1: To increase sitting balance, core strength and functional  endurance needed for increased independence with mobility and transfers, pt. performs static/dynamic sitting at EOB x20 min duration. Close supervision for all dynamic reaching tasks for safety.  Therapeutic Activity 2: Pt education regarding current POC    Bed Mobility  Bed Mobility: Yes  Bed Mobility 1  Bed Mobility 1: Supine to sitting  Level of Assistance 1: Moderate assistance, +2  Bed Mobility Comments 1: verbal cuing throughout for sequencing and assist at trunk and L LE    Ambulation/Gait Training  Ambulation/Gait Training Performed: No  Transfers  Transfer: Yes  Transfer 1  Transfer From 1:  (sit<>stand from EOB x1 trial)  Transfer Device 1: Walker, Gait belt  Transfer Level of Assistance 1: Maximum assistance, +2  Trials/Comments 1: Partial stand completed for 3s duration. Verbal cuing for strategic hand placement, pt declines additional trials, citing fatigue and B LE weakness. Pt educated on benefits of continue effort and additional trials, however pt continues to decline, requesting to remain sitting at EOB.    Outcome Measures:  [facility] Basic Mobility  Turning from your back to your side while in a flat bed without using bedrails: A lot  Moving from lying on your back to sitting on the side of a flat bed without using bedrails: A lot  Moving to and from bed to chair (including a wheelchair): Total  Standing up from a chair using your arms (e.g. wheelchair or bedside chair): Total  To walk in hospital room: Total  Climbing 3-5 steps with railing: Total  Basic Mobility - Total Score: 8      Encounter Problems       Encounter Problems (Active)       Balance       STG - Maintains static sitting balance with upper extremity support At CGA, safely, without LOB, device PRN  (Progressing)       Start:  09/15/24    Expected End:  09/29/24       INTERVENTIONS:  1. Practice sitting on the edge of a bed/mat with minimal support.  2. Educate patient about maintining total hip precautions while maintaining  balance.  3. Educate patient about pressure relief.  4. Educate patient about use of assistive device.         STG - Maintains dynamic sitting balance with upper extremity support At Min Ax1, safely, without LOB, device PRN  (Progressing)       Start:  09/15/24    Expected End:  09/29/24       INTERVENTIONS:  1. Practice sitting on the edge of a bed/mat with minimal support.  2. Educate patient about maintining total hip precautions while maintaining balance.  3. Educate patient about pressure relief.  4. Educate patient about use of assistive device.            Mobility       Endurance - Pt to tolerate >/= 20 minutes therex, theract, gait and/or NMR with </= 5 minutes of rest breaks  (Progressing)       Start:  09/15/24    Expected End:  09/29/24               PT Transfers       STG - Patient will perform bed mobility  At CGA, safely, without LOB, device PRN  (Progressing)       Start:  09/15/24    Expected End:  09/29/24            STG - Patient will transfer sit to and from stand At Mod Ax1, safely, without LOB, device PRN  (Progressing)       Start:  09/15/24    Expected End:  09/29/24               Pain - Adult          Safety       Pt will verbalize and apply safety awareness and precautions 100% of time throughout entire session   (Not Progressing)       Start:  09/15/24    Expected End:  09/29/24

## 2024-09-24 NOTE — CARE PLAN
The patient's goals for the shift include Pt. will have a safe, restful and uneventful evening    The clinical goals for the shift include patient will be able to maintain hemodynamic stability by the end of the shift    Problem: Skin  Goal: Decreased wound size/increased tissue granulation at next dressing change  Outcome: Progressing  Goal: Prevent/manage excess moisture  Outcome: Progressing  Goal: Prevent/minimize sheer/friction injuries  Outcome: Progressing  Flowsheets (Taken 9/23/2024 2304)  Prevent/minimize sheer/friction injuries:   HOB 30 degrees or less   Turn/reposition every 2 hours/use positioning/transfer devices  Goal: Promote/optimize nutrition  Outcome: Progressing       Problem: Fall/Injury  Goal: Not fall by end of shift  Outcome: Progressing  Goal: Be free from injury by end of the shift  Outcome: Progressing  Goal: Verbalize understanding of personal risk factors for fall in the hospital  Outcome: Progressing  Goal: Verbalize understanding of risk factor reduction measures to prevent injury from fall in the home  Outcome: Progressing  Goal: Use assistive devices by end of the shift  Outcome: Progressing  Goal: Pace activities to prevent fatigue by end of the shift  Outcome: Progressing

## 2024-09-25 VITALS
HEART RATE: 93 BPM | HEIGHT: 71 IN | SYSTOLIC BLOOD PRESSURE: 100 MMHG | WEIGHT: 260.14 LBS | OXYGEN SATURATION: 93 % | DIASTOLIC BLOOD PRESSURE: 68 MMHG | TEMPERATURE: 98.1 F | RESPIRATION RATE: 22 BRPM | BODY MASS INDEX: 36.42 KG/M2

## 2024-09-25 LAB
ALBUMIN SERPL BCP-MCNC: 2.8 G/DL (ref 3.4–5)
ANION GAP SERPL CALC-SCNC: 11 MMOL/L (ref 10–20)
BUN SERPL-MCNC: 22 MG/DL (ref 6–23)
CALCIUM SERPL-MCNC: 8.3 MG/DL (ref 8.6–10.3)
CHLORIDE SERPL-SCNC: 95 MMOL/L (ref 98–107)
CO2 SERPL-SCNC: 39 MMOL/L (ref 21–32)
CREAT SERPL-MCNC: 0.4 MG/DL (ref 0.5–1.3)
EGFRCR SERPLBLD CKD-EPI 2021: >90 ML/MIN/1.73M*2
ERYTHROCYTE [DISTWIDTH] IN BLOOD BY AUTOMATED COUNT: 13.6 % (ref 11.5–14.5)
GLUCOSE BLD MANUAL STRIP-MCNC: 105 MG/DL (ref 74–99)
GLUCOSE BLD MANUAL STRIP-MCNC: 105 MG/DL (ref 74–99)
GLUCOSE BLD MANUAL STRIP-MCNC: 130 MG/DL (ref 74–99)
GLUCOSE BLD MANUAL STRIP-MCNC: 166 MG/DL (ref 74–99)
GLUCOSE BLD MANUAL STRIP-MCNC: 182 MG/DL (ref 74–99)
GLUCOSE BLD MANUAL STRIP-MCNC: 87 MG/DL (ref 74–99)
GLUCOSE SERPL-MCNC: 98 MG/DL (ref 74–99)
HCT VFR BLD AUTO: 52.4 % (ref 41–52)
HGB BLD-MCNC: 16.4 G/DL (ref 13.5–17.5)
MCH RBC QN AUTO: 31 PG (ref 26–34)
MCHC RBC AUTO-ENTMCNC: 31.3 G/DL (ref 32–36)
MCV RBC AUTO: 99 FL (ref 80–100)
NRBC BLD-RTO: 0 /100 WBCS (ref 0–0)
PHOSPHATE SERPL-MCNC: 3 MG/DL (ref 2.5–4.9)
PLATELET # BLD AUTO: 208 X10*3/UL (ref 150–450)
POTASSIUM SERPL-SCNC: 3.7 MMOL/L (ref 3.5–5.3)
RBC # BLD AUTO: 5.29 X10*6/UL (ref 4.5–5.9)
SODIUM SERPL-SCNC: 141 MMOL/L (ref 136–145)
WBC # BLD AUTO: 7.7 X10*3/UL (ref 4.4–11.3)

## 2024-09-25 PROCEDURE — 2500000001 HC RX 250 WO HCPCS SELF ADMINISTERED DRUGS (ALT 637 FOR MEDICARE OP): Performed by: SURGERY

## 2024-09-25 PROCEDURE — 2500000002 HC RX 250 W HCPCS SELF ADMINISTERED DRUGS (ALT 637 FOR MEDICARE OP, ALT 636 FOR OP/ED): Performed by: SURGERY

## 2024-09-25 PROCEDURE — 2500000004 HC RX 250 GENERAL PHARMACY W/ HCPCS (ALT 636 FOR OP/ED): Performed by: SURGERY

## 2024-09-25 PROCEDURE — 2500000004 HC RX 250 GENERAL PHARMACY W/ HCPCS (ALT 636 FOR OP/ED): Performed by: INTERNAL MEDICINE

## 2024-09-25 PROCEDURE — 82947 ASSAY GLUCOSE BLOOD QUANT: CPT

## 2024-09-25 PROCEDURE — 99232 SBSQ HOSP IP/OBS MODERATE 35: CPT | Performed by: INTERNAL MEDICINE

## 2024-09-25 PROCEDURE — 2500000001 HC RX 250 WO HCPCS SELF ADMINISTERED DRUGS (ALT 637 FOR MEDICARE OP)

## 2024-09-25 PROCEDURE — 2500000001 HC RX 250 WO HCPCS SELF ADMINISTERED DRUGS (ALT 637 FOR MEDICARE OP): Performed by: NURSE PRACTITIONER

## 2024-09-25 PROCEDURE — 2500000002 HC RX 250 W HCPCS SELF ADMINISTERED DRUGS (ALT 637 FOR MEDICARE OP, ALT 636 FOR OP/ED): Performed by: INTERNAL MEDICINE

## 2024-09-25 PROCEDURE — 85027 COMPLETE CBC AUTOMATED: CPT | Performed by: NURSE PRACTITIONER

## 2024-09-25 PROCEDURE — 80069 RENAL FUNCTION PANEL: CPT | Performed by: NURSE PRACTITIONER

## 2024-09-25 PROCEDURE — 94640 AIRWAY INHALATION TREATMENT: CPT

## 2024-09-25 PROCEDURE — 2500000005 HC RX 250 GENERAL PHARMACY W/O HCPCS: Performed by: INTERNAL MEDICINE

## 2024-09-25 PROCEDURE — 36415 COLL VENOUS BLD VENIPUNCTURE: CPT | Performed by: NURSE PRACTITIONER

## 2024-09-25 RX ORDER — PREDNISONE 10 MG/1
10 TABLET ORAL DAILY
Start: 2024-09-26 | End: 2024-10-26

## 2024-09-25 RX ORDER — FUROSEMIDE 80 MG/1
80 TABLET ORAL DAILY
Status: DISCONTINUED | OUTPATIENT
Start: 2024-09-26 | End: 2024-09-25 | Stop reason: HOSPADM

## 2024-09-25 RX ORDER — TAMSULOSIN HYDROCHLORIDE 0.4 MG/1
0.4 CAPSULE ORAL DAILY
Start: 2024-09-26 | End: 2024-10-26

## 2024-09-25 RX ORDER — PANTOPRAZOLE SODIUM 40 MG/1
40 TABLET, DELAYED RELEASE ORAL
Start: 2024-09-25 | End: 2024-10-25

## 2024-09-25 ASSESSMENT — COGNITIVE AND FUNCTIONAL STATUS - GENERAL
HELP NEEDED FOR BATHING: A LOT
MOVING FROM LYING ON BACK TO SITTING ON SIDE OF FLAT BED WITH BEDRAILS: A LOT
CLIMB 3 TO 5 STEPS WITH RAILING: TOTAL
MOVING TO AND FROM BED TO CHAIR: A LOT
MOVING FROM LYING ON BACK TO SITTING ON SIDE OF FLAT BED WITH BEDRAILS: A LOT
DRESSING REGULAR LOWER BODY CLOTHING: A LOT
TOILETING: TOTAL
TURNING FROM BACK TO SIDE WHILE IN FLAT BAD: A LOT
STANDING UP FROM CHAIR USING ARMS: TOTAL
STANDING UP FROM CHAIR USING ARMS: TOTAL
TURNING FROM BACK TO SIDE WHILE IN FLAT BAD: A LOT
EATING MEALS: A LOT
PERSONAL GROOMING: A LOT
MOVING TO AND FROM BED TO CHAIR: A LOT
TOILETING: A LOT
CLIMB 3 TO 5 STEPS WITH RAILING: TOTAL
WALKING IN HOSPITAL ROOM: TOTAL
MOBILITY SCORE: 9
DAILY ACTIVITIY SCORE: 12
DAILY ACTIVITIY SCORE: 15
DRESSING REGULAR LOWER BODY CLOTHING: A LOT
DRESSING REGULAR UPPER BODY CLOTHING: A LOT
DRESSING REGULAR UPPER BODY CLOTHING: A LOT
MOBILITY SCORE: 9
HELP NEEDED FOR BATHING: A LOT
WALKING IN HOSPITAL ROOM: TOTAL

## 2024-09-25 ASSESSMENT — PAIN - FUNCTIONAL ASSESSMENT
PAIN_FUNCTIONAL_ASSESSMENT: 0-10
PAIN_FUNCTIONAL_ASSESSMENT: 0-10

## 2024-09-25 ASSESSMENT — PAIN SCALES - GENERAL
PAINLEVEL_OUTOF10: 0 - NO PAIN

## 2024-09-25 ASSESSMENT — PAIN SCALES - WONG BAKER: WONGBAKER_NUMERICALRESPONSE: NO HURT

## 2024-09-25 NOTE — PROGRESS NOTES
"Alo Glass is a 69 y.o. male on day 21 of admission presenting with Acute respiratory failure with hypoxia and hypercapnia (Multi).    Subjective   Admits to mild shortness of breath and to bilateral leg pain, but denies cough.  On 6 L high flow nasal cannula oxygen with an oxygen saturation of 94%.  Using CPAP at night.  BNP was 287.     Objective   Physical Exam  Vitals  Blood pressure 94/54, pulse 89, temperature 36.4 °C (97.5 °F), temperature source Temporal, resp. rate 22, height 1.803 m (5' 10.98\"), weight 118 kg (260 lb 2.3 oz), SpO2 94%.  Intake/Output last 3 Shifts:  I/O last 3 completed shifts:  In: 840 (7.1 mL/kg) [P.O.:840]  Out: 5200 (44.1 mL/kg) [Urine:5200 (1.2 mL/kg/hr)]  Weight: 118 kg     General-awake, alert and in no acute distress.  Lungs-clear, without wheezes, rales or rhonchi.  Heart-regular rate and rhythm  Abdomen-positive bowel sounds.  Extremities-trace lower extremity edema.  Skin-venous stasis changes of the legs.  Continue DuoNeb, prednisone, Lasix, Synthroid and Lovenox.  Scheduled medications  aspirin, 81 mg, oral, Daily  empagliflozin, 10 mg, oral, Daily  enoxaparin, 40 mg, subcutaneous, q12h SHARRON  ezetimibe, 10 mg, oral, Daily  folic acid, 1 mg, oral, Daily  furosemide, 80 mg, intravenous, Daily  insulin lispro, 0-10 Units, subcutaneous, q4h  ipratropium-albuteroL, 3 mL, nebulization, 4x daily  levothyroxine, 125 mcg, oral, Daily  multivitamin with minerals, 1 tablet, oral, Daily  nystatin, 5 mL, Swish & Swallow, 4x daily  nystatin, 1 Application, Topical, BID  oxygen, , inhalation, q PM  pantoprazole, 40 mg, intravenous, Daily  polyethylene glycol, 17 g, nasogastric tube, Daily  predniSONE, 10 mg, oral, Daily  sennosides-docusate sodium, 2 tablet, oral, BID  tamsulosin, 0.4 mg, oral, Daily  thiamine, 100 mg, oral, Daily      Continuous medications     PRN medications  PRN medications: acetaminophen, acetaminophen, dextrose, dextrose, glucagon, glucagon, " ipratropium-albuteroL, oxygen, oxygen     Results for orders placed or performed during the hospital encounter of 09/04/24 (from the past 24 hour(s))   CBC   Result Value Ref Range    WBC 8.0 4.4 - 11.3 x10*3/uL    nRBC 0.0 0.0 - 0.0 /100 WBCs    RBC 5.13 4.50 - 5.90 x10*6/uL    Hemoglobin 16.0 13.5 - 17.5 g/dL    Hematocrit 50.3 41.0 - 52.0 %    MCV 98 80 - 100 fL    MCH 31.2 26.0 - 34.0 pg    MCHC 31.8 (L) 32.0 - 36.0 g/dL    RDW 13.5 11.5 - 14.5 %    Platelets 232 150 - 450 x10*3/uL   B-type natriuretic peptide   Result Value Ref Range     (H) 0 - 99 pg/mL   POCT GLUCOSE   Result Value Ref Range    POCT Glucose 178 (H) 74 - 99 mg/dL   POCT GLUCOSE   Result Value Ref Range    POCT Glucose 165 (H) 74 - 99 mg/dL   POCT GLUCOSE   Result Value Ref Range    POCT Glucose 202 (H) 74 - 99 mg/dL   POCT GLUCOSE   Result Value Ref Range    POCT Glucose 174 (H) 74 - 99 mg/dL   POCT GLUCOSE   Result Value Ref Range    POCT Glucose 87 74 - 99 mg/dL   POCT GLUCOSE   Result Value Ref Range    POCT Glucose 105 (H) 74 - 99 mg/dL   Renal Function Panel   Result Value Ref Range    Glucose 98 74 - 99 mg/dL    Sodium 141 136 - 145 mmol/L    Potassium 3.7 3.5 - 5.3 mmol/L    Chloride 95 (L) 98 - 107 mmol/L    Bicarbonate 39 (H) 21 - 32 mmol/L    Anion Gap 11 10 - 20 mmol/L    Urea Nitrogen 22 6 - 23 mg/dL    Creatinine 0.40 (L) 0.50 - 1.30 mg/dL    eGFR >90 >60 mL/min/1.73m*2    Calcium 8.3 (L) 8.6 - 10.3 mg/dL    Phosphorus 3.0 2.5 - 4.9 mg/dL    Albumin 2.8 (L) 3.4 - 5.0 g/dL   CBC   Result Value Ref Range    WBC 7.7 4.4 - 11.3 x10*3/uL    nRBC 0.0 0.0 - 0.0 /100 WBCs    RBC 5.29 4.50 - 5.90 x10*6/uL    Hemoglobin 16.4 13.5 - 17.5 g/dL    Hematocrit 52.4 (H) 41.0 - 52.0 %    MCV 99 80 - 100 fL    MCH 31.0 26.0 - 34.0 pg    MCHC 31.3 (L) 32.0 - 36.0 g/dL    RDW 13.6 11.5 - 14.5 %    Platelets 208 150 - 450 x10*3/uL   POCT GLUCOSE   Result Value Ref Range    POCT Glucose 105 (H) 74 - 99 mg/dL      Assessment:  Loculated right  pleural effusion, stable pulmonary nodules, improved right middle lobe pneumonia and a positive sputum culture for Stenotrophomonas, superimposed on underlying COPD without exacerbation, obstructive sleep apnea and mild congestive heart failure/pulmonary edema.  Oxygenation is slowly improving.  There is no bronchospasm.    Recommend:  1.  Continue DuoNeb, prednisone, Synthroid and Lovenox.  2.  Aggressive diuresis with Lasix as blood pressure and kidney function allow.  3.  Wean FiO2 to keep oxygen saturation approximately 92 to 95%; home oxygen evaluation prior to discharge; continue nocturnal CPAP.  4.  Incentive spirometry and Acapella treatment.  5.  Follow-up with pulmonary medicine after discharge.  6.  The patient is DNR/DNI.    Giovanni Padilla MD

## 2024-09-25 NOTE — PROGRESS NOTES
09/25/24 1230   Discharge Planning   Expected Discharge Disposition SNF       Patient's auth was denied for [facility]. Messaged Brooklynn at [facility] to see if able to accept patient and answer was yes. Also facility made aware patient has his own c-pap and will be taking it to the facility. Spoke to patient's sister and patient at bedside and made them aware of plan and acceptance. Patient is still on HF 7L oxygen. He is still diuresing. Messaged KRISTY Babb to see if patient is medically ready and she will let me know. Will continue to follow for discharge needs.

## 2024-09-25 NOTE — PROGRESS NOTES
"Alo Glass is a 69 y.o. male on day 21 of admission presenting with Acute respiratory failure with hypoxia and hypercapnia (Multi).    Subjective   Patient seen and examined, he is coming along fine currently has been using CPAP, breathing satisfactory, afebrile leg edema has improved significantly.  He is tolerating puréed diet with honey thickened liquid.  Requiring 7 to 10 L oxygen nasal cannula.  Stable for discharge to skilled nursing facility today.       Objective     Physical Exam  GENERAL:  Alert, no distress, cooperative, obese, depressed and anxious  SKIN:  Skin color, texture, turgor normal. No rashes or lesions.  OROPHARYNX:  Lips, mucosa, and tongue are normal.Teeth and gums, normal. Oropharynx normal.  NECK:  No jugulovenous distention, No carotid bruits, Carotid pulse normal contour,   LUNGS: Improved air exchange, few scattered wheezes, no crackles.  CARDIAC: Rate and rhythm is regular, normal S1 and S2; no rubs, murmurs, or gallops  ABDOMEN:  Abdomen soft, large, non-tender, BS normal, No masses or organomegaly  EXTREMETIES:  Extremities normal, no deformities, trace edema, noclubbing or skin discoloration. Good capillary refill., No ulcers  NEURO:  Alert, oriented X 3, Gait not examined Non-focal. Reflexes normal and symmetric. Sensation grossly intact., Cranial nerves II-XII intact  PULSES: 2+ radial, 2+ carotid     Last Recorded Vitals  Blood pressure 99/74, pulse 95, temperature 36.6 °C (97.8 °F), temperature source Oral, resp. rate (!) 30, height 1.803 m (5' 10.98\"), weight 118 kg (260 lb 2.3 oz), SpO2 94%.  Intake/Output last 3 Shifts:  I/O last 3 completed shifts:  In: 840 (7.1 mL/kg) [P.O.:840]  Out: 5200 (44.1 mL/kg) [Urine:5200 (1.2 mL/kg/hr)]  Weight: 118 kg     Relevant Results  Scheduled medications  aspirin, 81 mg, oral, Daily  empagliflozin, 10 mg, oral, Daily  enoxaparin, 40 mg, subcutaneous, q12h SHARRON  ezetimibe, 10 mg, oral, Daily  folic acid, 1 mg, oral, Daily  [START ON " 9/26/2024] furosemide, 80 mg, oral, Daily  insulin lispro, 0-10 Units, subcutaneous, q4h  ipratropium-albuteroL, 3 mL, nebulization, 4x daily  levothyroxine, 125 mcg, oral, Daily  multivitamin with minerals, 1 tablet, oral, Daily  nystatin, 5 mL, Swish & Swallow, 4x daily  nystatin, 1 Application, Topical, BID  oxygen, , inhalation, q PM  pantoprazole, 40 mg, intravenous, Daily  polyethylene glycol, 17 g, nasogastric tube, Daily  predniSONE, 10 mg, oral, Daily  sennosides-docusate sodium, 2 tablet, oral, BID  tamsulosin, 0.4 mg, oral, Daily  thiamine, 100 mg, oral, Daily      Continuous medications     PRN medications  PRN medications: acetaminophen, acetaminophen, dextrose, dextrose, glucagon, glucagon, ipratropium-albuteroL, oxygen, oxygen      Results for orders placed or performed during the hospital encounter of 09/04/24 (from the past 96 hour(s))   POCT GLUCOSE   Result Value Ref Range    POCT Glucose 208 (H) 74 - 99 mg/dL   POCT GLUCOSE   Result Value Ref Range    POCT Glucose 123 (H) 74 - 99 mg/dL   POCT GLUCOSE   Result Value Ref Range    POCT Glucose 143 (H) 74 - 99 mg/dL   POCT GLUCOSE   Result Value Ref Range    POCT Glucose 105 (H) 74 - 99 mg/dL   POCT GLUCOSE   Result Value Ref Range    POCT Glucose 242 (H) 74 - 99 mg/dL   Renal Function Panel   Result Value Ref Range    Glucose 183 (H) 74 - 99 mg/dL    Sodium 140 136 - 145 mmol/L    Potassium 3.9 3.5 - 5.3 mmol/L    Chloride 95 (L) 98 - 107 mmol/L    Bicarbonate 41 (HH) 21 - 32 mmol/L    Anion Gap 8 (L) 10 - 20 mmol/L    Urea Nitrogen 21 6 - 23 mg/dL    Creatinine 0.40 (L) 0.50 - 1.30 mg/dL    eGFR >90 >60 mL/min/1.73m*2    Calcium 8.0 (L) 8.6 - 10.3 mg/dL    Phosphorus 2.9 2.5 - 4.9 mg/dL    Albumin 2.7 (L) 3.4 - 5.0 g/dL   CBC   Result Value Ref Range    WBC 11.0 4.4 - 11.3 x10*3/uL    nRBC 0.0 0.0 - 0.0 /100 WBCs    RBC 5.17 4.50 - 5.90 x10*6/uL    Hemoglobin 16.4 13.5 - 17.5 g/dL    Hematocrit 51.3 41.0 - 52.0 %    MCV 99 80 - 100 fL    MCH 31.7  26.0 - 34.0 pg    MCHC 32.0 32.0 - 36.0 g/dL    RDW 13.5 11.5 - 14.5 %    Platelets 241 150 - 450 x10*3/uL   POCT GLUCOSE   Result Value Ref Range    POCT Glucose 132 (H) 74 - 99 mg/dL   POCT GLUCOSE   Result Value Ref Range    POCT Glucose 193 (H) 74 - 99 mg/dL   POCT GLUCOSE   Result Value Ref Range    POCT Glucose 115 (H) 74 - 99 mg/dL   Renal Function Panel   Result Value Ref Range    Glucose 119 (H) 74 - 99 mg/dL    Sodium 138 136 - 145 mmol/L    Potassium 3.6 3.5 - 5.3 mmol/L    Chloride 93 (L) 98 - 107 mmol/L    Bicarbonate 38 (H) 21 - 32 mmol/L    Anion Gap 11 10 - 20 mmol/L    Urea Nitrogen 22 6 - 23 mg/dL    Creatinine 0.40 (L) 0.50 - 1.30 mg/dL    eGFR >90 >60 mL/min/1.73m*2    Calcium 8.0 (L) 8.6 - 10.3 mg/dL    Phosphorus 2.9 2.5 - 4.9 mg/dL    Albumin 2.7 (L) 3.4 - 5.0 g/dL   CBC   Result Value Ref Range    WBC 8.4 4.4 - 11.3 x10*3/uL    nRBC 0.0 0.0 - 0.0 /100 WBCs    RBC 4.94 4.50 - 5.90 x10*6/uL    Hemoglobin 16.0 13.5 - 17.5 g/dL    Hematocrit 49.4 41.0 - 52.0 %     80 - 100 fL    MCH 32.4 26.0 - 34.0 pg    MCHC 32.4 32.0 - 36.0 g/dL    RDW 13.6 11.5 - 14.5 %    Platelets 232 150 - 450 x10*3/uL   POCT GLUCOSE   Result Value Ref Range    POCT Glucose 112 (H) 74 - 99 mg/dL   POCT GLUCOSE   Result Value Ref Range    POCT Glucose 146 (H) 74 - 99 mg/dL   POCT GLUCOSE   Result Value Ref Range    POCT Glucose 278 (H) 74 - 99 mg/dL   POCT GLUCOSE   Result Value Ref Range    POCT Glucose 195 (H) 74 - 99 mg/dL   Renal Function Panel   Result Value Ref Range    Glucose 99 74 - 99 mg/dL    Sodium 139 136 - 145 mmol/L    Potassium 4.1 3.5 - 5.3 mmol/L    Chloride 93 (L) 98 - 107 mmol/L    Bicarbonate 40 (HH) 21 - 32 mmol/L    Anion Gap 10 10 - 20 mmol/L    Urea Nitrogen 21 6 - 23 mg/dL    Creatinine 0.35 (L) 0.50 - 1.30 mg/dL    eGFR >90 >60 mL/min/1.73m*2    Calcium 8.0 (L) 8.6 - 10.3 mg/dL    Phosphorus 3.2 2.5 - 4.9 mg/dL    Albumin 2.8 (L) 3.4 - 5.0 g/dL   CBC   Result Value Ref Range    WBC 2.9 (L)  4.4 - 11.3 x10*3/uL    nRBC 0.0 0.0 - 0.0 /100 WBCs    RBC 2.20 (L) 4.50 - 5.90 x10*6/uL    Hemoglobin 6.9 (L) 13.5 - 17.5 g/dL    Hematocrit 20.5 (L) 41.0 - 52.0 %    MCV 93 80 - 100 fL    MCH 31.4 26.0 - 34.0 pg    MCHC 33.7 32.0 - 36.0 g/dL    RDW 21.6 (H) 11.5 - 14.5 %    Platelets 68 (L) 150 - 450 x10*3/uL   POCT GLUCOSE   Result Value Ref Range    POCT Glucose 200 (H) 74 - 99 mg/dL   CBC   Result Value Ref Range    WBC 8.0 4.4 - 11.3 x10*3/uL    nRBC 0.0 0.0 - 0.0 /100 WBCs    RBC 5.13 4.50 - 5.90 x10*6/uL    Hemoglobin 16.0 13.5 - 17.5 g/dL    Hematocrit 50.3 41.0 - 52.0 %    MCV 98 80 - 100 fL    MCH 31.2 26.0 - 34.0 pg    MCHC 31.8 (L) 32.0 - 36.0 g/dL    RDW 13.5 11.5 - 14.5 %    Platelets 232 150 - 450 x10*3/uL   B-type natriuretic peptide   Result Value Ref Range     (H) 0 - 99 pg/mL   POCT GLUCOSE   Result Value Ref Range    POCT Glucose 178 (H) 74 - 99 mg/dL   POCT GLUCOSE   Result Value Ref Range    POCT Glucose 165 (H) 74 - 99 mg/dL   POCT GLUCOSE   Result Value Ref Range    POCT Glucose 202 (H) 74 - 99 mg/dL   POCT GLUCOSE   Result Value Ref Range    POCT Glucose 174 (H) 74 - 99 mg/dL   POCT GLUCOSE   Result Value Ref Range    POCT Glucose 87 74 - 99 mg/dL   POCT GLUCOSE   Result Value Ref Range    POCT Glucose 105 (H) 74 - 99 mg/dL   Renal Function Panel   Result Value Ref Range    Glucose 98 74 - 99 mg/dL    Sodium 141 136 - 145 mmol/L    Potassium 3.7 3.5 - 5.3 mmol/L    Chloride 95 (L) 98 - 107 mmol/L    Bicarbonate 39 (H) 21 - 32 mmol/L    Anion Gap 11 10 - 20 mmol/L    Urea Nitrogen 22 6 - 23 mg/dL    Creatinine 0.40 (L) 0.50 - 1.30 mg/dL    eGFR >90 >60 mL/min/1.73m*2    Calcium 8.3 (L) 8.6 - 10.3 mg/dL    Phosphorus 3.0 2.5 - 4.9 mg/dL    Albumin 2.8 (L) 3.4 - 5.0 g/dL   CBC   Result Value Ref Range    WBC 7.7 4.4 - 11.3 x10*3/uL    nRBC 0.0 0.0 - 0.0 /100 WBCs    RBC 5.29 4.50 - 5.90 x10*6/uL    Hemoglobin 16.4 13.5 - 17.5 g/dL    Hematocrit 52.4 (H) 41.0 - 52.0 %    MCV 99 80 - 100  fL    MCH 31.0 26.0 - 34.0 pg    MCHC 31.3 (L) 32.0 - 36.0 g/dL    RDW 13.6 11.5 - 14.5 %    Platelets 208 150 - 450 x10*3/uL   POCT GLUCOSE   Result Value Ref Range    POCT Glucose 105 (H) 74 - 99 mg/dL   POCT GLUCOSE   Result Value Ref Range    POCT Glucose 166 (H) 74 - 99 mg/dL    XR chest 1 view    Result Date: 9/23/2024  Interpreted By:  Ishaan Vernon, STUDY: XR CHEST 1 VIEW;  9/23/2024 10:52 am   INDICATION: Signs/Symptoms:Assess for resolution of R pneumonia..   COMPARISON: CT scan chest from 09/04/2024. Chest x-ray from 09/17/2024.   ACCESSION NUMBER(S): [id number]   ORDERING CLINICIAN: DEEPA HINOJOSA   TECHNIQUE: Single AP portable view of the chest was obtained.   FINDINGS: MEDIASTINUM/ LUNGS/ ARELIS: Moderate cardiomegaly. Bilateral pleural effusions. Hazy opacities at the lung bases. Horizontal linear opacity in the left lingula, not present previously. Central vasculature is prominent and indistinct.   No pneumothorax. No tracheal deviation. No abnormal hilar fullness or gross mass on either side.   BONES: No lytic or blastic destructive bone lesion.   UPPER ABDOMEN: Grossly intact.       Findings consistent with ongoing CHF with mild interval worsening.   MACRO: None   Signed by: Ishaan Vernon 9/23/2024 11:18 AM Dictation workstation:   [id number]    FL modified barium swallow study    Result Date: 9/23/2024  Interpreted By:  Freda Fonseca and Lovelace Elizabeth STUDY: FL MODIFIED BARIUM SWALLOW STUDY;; 9/20/2024 10:48 am   INDICATION: Signs/Symptoms:post prandial cough.     COMPARISON: None.   ACCESSION NUMBER(S): [id number]   ORDERING CLINICIAN: ABAD SCHNEIDER   TECHNIQUE: Modified Barium Swallow Study completed. Informed verbal consent obtained prior to completion of exam. Trials of thin, nectar/mildly thick liquid, honey/moderately thick liquid, puree, and solids were administered.   SLP: BOGDAN Guzmán [facility] info[facility].   SPEECH FINDINGS:   Reason for Referral: post  "prandial cough with thin liquids Patient Hx:  \"chronic diastolic heart failure, COPD, obstructive sleep apnea, obesity, type 2 diabetes mellitus, hypertension, hypothyroidism and hyperlipidemia \" Respiratory Status: O2 via NC Current diet: NPO   Pain: Pain Scale: not reported   FINAL SPEECH RECOMMENDATIONS   DIET RECOMMENDED: - Pureed (IDDSI Level 4) - Mildly/nectar thick liquids (IDDSI Level 2)   Patient to initiate a modified diet with implementation of the following swallow strategies listed below:   STRATEGIES: - Small bites - Small, single sips - Upright for all PO intake - Swallow 2-3 times per bite/sip - Medications whole in puree or as best tolerated - Slow rate   Plan: Treatment/Interventions: Pharyngeal exercises, Patient/family education, Bolus trials, Compensatory strategy training, Determination of diet tolerance/advancement potential. SLP Plan: Skilled SLP warranted SLP Frequency: 4x per week Duration: 2 weeks   Discussed POC: Patient Discussed Risks/Benefits: Yes Patient/Caregiver Agreeable: Yes   Short term goals established 09/20/24: - Patient will tolerate baseline/recommended PO diet without overt s/s of aspiration, further difficulty, or concern for aspiration-related complications in 90% of observed therapeutic trials.     Long term goals 09/20/24: Patient will tolerate the least restrictive diet without overt difficulty or further pulmonary compromise by time of discharge.   Education Provided: Reviewed results and recommendations of MBSS with patient following. Discussed recommendations and reviewed POC at time of assessment. Verbal understanding confirmed with agreement of all information presented being acknowledged by patient.   Treatment Provided Today:  N/A   Additional consult suggested: N/A   Repeat study/ dc plan: TBD       SLP Impressions with Severity Rating: Pt presents with moderate  oropharyngeal dysphagia characterized by reduced bolus formation, swallow delay, decreased TB " retraction/PPW contraction and reduced hyolaryngeal elevation/ epiglottic deflection inconsistently. Oral/vallecular/piriform sinus residue noted with thin/nectar/honey liquids, which cleared partially following spontaneous reswallows. Improved pharyngeal clearance achieved with puree and solid boluses likely due to increased pharyngeal pressure spontaneously generated by patient during deglutition. Laryngeal penetration viewed with thin liquids, however extent of material descending into upper laryngeal vestibule could not be visually confirmed due to clavicular shadowing and body habitus partially obscuring view of glottic/subglottic region. Aspiration not seen, however cannot be excluded as silently occurring due to visual obstruction of airway. No airway entry viewed with nectar/honey liquids or puree/solid boluses. Modification of diet required for safety and energy conservation purposes. Concerned with exacerbation of tachypnea during mastication of course solid boluses.   Strategies attempted- N/A     OUTCOME MEASURES: Functional Oral Intake Scale Functional Oral Intake Scale: Level 5        total oral diet with multiple consistencies, but requires special preparations and compensations     Eating Assessment Tool (EAT-10) EAT 10 not utilized due to patient inability to complete.   Rosenbek's Penetration Aspiration Scale Thin Liquids: 3. PENETRATION with LOW ASPIRATION risk - contrast remains above vocal cords, visible residue   Nectar Thick Liquids: 1. NO ASPIRATION & NO PENETRATION - no aspiration, contrast does not enter airway   Honey Thick Liquids: 1. NO ASPIRATION & NO PENETRATION - no aspiration, contrast does not enter airway   Puree: 1. NO ASPIRATION & NO PENETRATION - no aspiration, contrast does not enter airway   Solids: 1. NO ASPIRATION & NO PENETRATION - no aspiration, contrast does not enter airway   *Anatomical marker not used 2/2 inability to maintain placement due to presence of facial hair.    Speech Therapy section of this report signed by LINDA Carmen/SLP on 9/20/2024 at 11:33 am.   RADIOLOGY FINDINGS: Aspiration with thin liquids. No evidence of aspiration or penetration with thicker consistencies.       No evidence of aspiration.   MACRO: None   Signed by: Freda Fonseca 9/23/2024 8:48 AM Dictation workstation:   [id number]    Electrocardiogram, 12-lead PRN ACS symptoms    Result Date: 9/22/2024  Sinus rhythm with 1st degree AV block with Premature atrial complexes Right bundle branch block Left anterior fascicular block  Bifascicular block  Anteroseptal infarct (cited on or before 23-APR-2024) Abnormal ECG When compared with ECG of 13-SEP-2024 02:49, (unconfirmed) Premature ventricular complexes are no longer Present QT has lengthened Confirmed by Shady Bill ([id number]) on 9/22/2024 10:28:37 AM    Electrocardiogram, 12-lead PRN ACS symptoms    Result Date: 9/22/2024  Sinus rhythm with 1st degree AV block with occasional Premature ventricular complexes and Premature atrial complexes Left axis deviation Right bundle branch block Inferior infarct (cited on or before 04-SEP-2024) Anteroseptal infarct (cited on or before 23-APR-2024) Abnormal ECG Confirmed by Shady Bill ([id number]) on 9/22/2024 9:20:59 AM    Transthoracic Echo (TTE) Limited    Result Date: 9/19/2024   [facility], [address] [place] [address]              Tel [phone] and Fax [phone] TRANSTHORACIC ECHOCARDIOGRAM REPORT  Patient Name:      BRAYAN Key Physician:    [id number] Andrea Bentley MD Study Date:        9/19/2024            Ordering Provider:    [id number] ABAD SCHNEIDER MRN/PID:           [id number]             Fellow: Accession#:        [id number]         Nurse: Date of Birth/Age: 1955 / 69 years Sonographer:          Gustavo Naqvi RDCS Gender:             M                    Additional Staff: Height:            177.80 cm            Admit Date:           9/4/2024 Weight:            126.10 kg            Admission Status:     Inpatient -                                                               Routine BSA / BMI:         2.40 m2 / 39.89      Encounter#:           [id number]                    kg/m2 Blood Pressure:    105/75 mmHg          Department Location:  [facility]                                                               Invasive Study Type:    TRANSTHORACIC ECHO (TTE) LIMITED Diagnosis/ICD: Encounter for screening for cardiovascular disorders-Z13.6 Indication:    SOB and NSVT CPT Code:      Echo Limited-39853; Color Doppler-14048; Doppler Limited-60706 Patient History: Pertinent History: LE Edema, COPD, Dyspnea and HTN. Elevated trop. Study Detail: The following Echo studies were performed: 2D, M-Mode, Doppler and               color flow. Technically challenging study due to body habitus.  PHYSICIAN INTERPRETATION: Left Ventricle: Left ventricular ejection fraction is severely decreased, calculated by Green's biplane at 23%. There is global hypokinesis of the left ventricle with minor regional variations. The left ventricular cavity size is normal. Spectral Doppler shows an impaired relaxation pattern of left ventricular diastolic filling. Left Atrium: The left atrium is normal in size. Right Ventricle: The right ventricle is moderately enlarged. There is mildly reduced right ventricular systolic function. TAPSE 13mm. Right Atrium: The right atrium is normal in size. Aortic Valve: The aortic valve is trileaflet. There is moderate aortic valve cusp calcification. There is no evidence of aortic valve regurgitation. Mitral Valve: The mitral valve is mildly thickened. There is mild to moderate mitral annular calcification. There is no evidence of mitral valve regurgitation. Tricuspid Valve: The tricuspid valve is structurally normal. No evidence of  tricuspid regurgitation. The right ventricular systolic pressure is unable to be estimated. Pulmonic Valve: The pulmonic valve was not assessed. Pulmonic valve regurgitation was not assessed. Pericardium: There is no pericardial effusion noted. Aorta: The aortic root is normal. Systemic Veins: The inferior vena cava appears normal in size, with IVC inspiratory collapse greater than 50%. In comparison to the previous echocardiogram(s): Compared with study dated 3/11/2024, there is RV dilation and reduced function.  CONCLUSIONS:  1. Left ventricular ejection fraction is severely decreased, calculated by Green's biplane at 23%.  2. There is global hypokinesis of the left ventricle with minor regional variations.  3. Poorly visualized anatomical structures due to suboptimal image quality.  4. Spectral Doppler shows an impaired relaxation pattern of left ventricular diastolic filling.  5. There is mildly reduced right ventricular systolic function.  6. Moderately enlarged right ventricle.  7. There is moderate aortic valve cusp calcification. The was no assesment of aortic stenosis in this limited study.  8. Compared with study dated 3/11/2024, there is RV dilation and reduced function. QUANTITATIVE DATA SUMMARY:  2D MEASUREMENTS:          Normal Ranges: LVEDV Index:     81 ml/m2  LV SYSTOLIC FUNCTION BY 2D PLANIMETRY (MOD):                      Normal Ranges: EF-A4C View:    21 % (>=55%) EF-A2C View:    24 % EF-Biplane:     23 % LV EF Reported: 23 %  RIGHT VENTRICLE: RV s' 0.09 m/s  TRICUSPID VALVE/RVSP:         Normal Ranges: Est. RA Pressure:     3 mmHg IVC Diam:             2.10 cm  [id number] Andrea Bentley MD Electronically signed on 9/19/2024 at 5:31:57 PM  ** Final **     Electrocardiogram, 12-lead PRN ACS symptoms    Result Date: 9/18/2024  Sinus rhythm with 1st degree AV block with Premature atrial complexes with Aberrant conduction Right bundle branch block Left anterior fascicular block  Bifascicular block   Anteroseptal infarct (cited on or before 23-APR-2024) Abnormal ECG Confirmed by Triston Moore ([id number]) on 9/18/2024 10:19:51 AM    XR chest 1 view    Result Date: 9/17/2024  Interpreted By:  Ishaan Vernon, STUDY: XR CHEST 1 VIEW;  9/17/2024 5:41 am   INDICATION: Signs/Symptoms:Increase O2 requirements. Tnx..   COMPARISON: CT scan from 09/04/2024.   ACCESSION NUMBER(S): [id number]   ORDERING CLINICIAN: PARISA ZARAGOZA   TECHNIQUE: Single AP portable view of the chest was obtained.   FINDINGS: MEDIASTINUM/ LUNGS/ ARELIS:   Stable cardiomegaly. No gross vascular congestion. Small stable left pleural effusion. Medial left basilar atelectasis/infiltrate, stable to slightly progressed. Pleural and parenchymal opacities at the right base, mildly progressed. No pneumothorax. No tracheal deviation. No abnormal hilar fullness or gross mass on either side.   BONES: No lytic or blastic destructive bone lesion.   UPPER ABDOMEN: Grossly intact.       Cardiomegaly.   Stable small left pleural effusion with left basilar atelectasis/pneumonia, stable to slightly progressed.   Pleural and parenchymal opacities at the right base, mildly progressed.   MACRO: None   Signed by: Ishaan Vernon 9/17/2024 8:16 AM Dictation workstation:   [id number]    ECG 12 lead    Result Date: 9/15/2024  Sinus rhythm with 1st degree AV block Left axis deviation Right bundle branch block Inferior infarct (cited on or before 04-SEP-2024) Abnormal ECG When compared with ECG of 04-SEP-2024 16:24, Premature ventricular complexes are no longer Present Right bundle branch block is now Present Confirmed by Dimitri Murillo ([id number]) on 9/15/2024 2:23:29 PM    XR chest 1 view    Result Date: 9/13/2024  Interpreted By:  Tania Knowles, STUDY: XR CHEST 1 VIEW;  9/13/2024 4:11 pm   INDICATION: Signs/Symptoms:post chest tube removal.   COMPARISON: 09/13/2024   ACCESSION NUMBER(S): [id number]   ORDERING CLINICIAN: RODOLFO LOPEZ   FINDINGS: The study is limited due  to patient's body habitus and poor inspiration previously seen right-sided chest tube has been removed. No gross pneumothorax is noted.   Blunting of both costophrenic angles is seen, which most likely is related to pleural effusions. The heart is enlarged. Bibasilar streaky densities are seen.       No gross pneumothorax.   Bilateral pleural effusions.   Cardiomegaly.   Bibasilar streaky densities, which may be related to atelectasis or infiltrates.       MACRO: None   Signed by: Tania Knowles 9/13/2024 4:40 PM Dictation workstation:   [id number]    XR chest 1 view    Result Date: 9/13/2024  Interpreted By:  Rosa Hemphill, STUDY: XR CHEST 1 VIEW;  9/13/2024 4:31 am   INDICATION: Signs/Symptoms:CT in place.   COMPARISON: Multiple prior studies, the most recent 09/12/2024   ACCESSION NUMBER(S): [id number]   ORDERING CLINICIAN: RODOLFO LOPEZ   FINDINGS:     CARDIOMEDIASTINAL SILHOUETTE: Stable enlarged cardiac silhouette.   LUNGS: Stable position of pigtail catheter overlying the right lung base. There is improved aeration of the right lung base. Mild residual hazy right basilar opacity, likely combination of small residual pleural effusion and basilar consolidation. Limited evaluation of the left lung base due to underpenetration. This is not significantly changed and left pleural effusion or basilar consolidation not excluded. No pneumothorax.   ABDOMEN: No remarkable upper abdominal findings.   BONES: No acute osseous abnormality.       Improved aeration of the right lung base. Residual hazy right basilar opacities likely a combination of residual pleural effusion and basilar atelectasis.   Limited evaluation of the left lung base due to underpenetration.   MACRO: None   Signed by: Rosa Hemphill 9/13/2024 4:48 AM Dictation workstation:   [id number]    XR chest 1 view    Result Date: 9/12/2024  Interpreted By:  Donte Shoemaker, STUDY: XR CHEST 1 VIEW; 9/12/2024 5:23 am   INDICATION: Signs/Symptoms:CT in  place.   COMPARISON: None   ACCESSION NUMBER(S): [id number]   ORDERING CLINICIAN: RODOLFO LOPEZ   TECHNIQUE: 1 view of the chest was performed.   FINDINGS: Right pigtail catheter in similar location. Stable layering bilateral pleural effusion with surrounding atelectasis. Stable cardiomegaly. Bilateral shoulder joint degenerative changes.       1. No significant interval change from 09/11/2024 chest x-ray.   Signed by: Donte Shoemaker 9/12/2024 8:18 AM Dictation workstation:   [id number]    XR chest 1 view    Result Date: 9/11/2024  Interpreted By:  Klaus Villafana, STUDY: XR CHEST 1 VIEW; 9/11/2024 5:42 am   INDICATION: Signs/Symptoms:CT in place   COMPARISON: Radiographs 09/10/2024   ACCESSION NUMBER(S): [id number]   ORDERING CLINICIAN: RODOLFO LOPEZ   TECHNIQUE: Single frontal view of the chest performed.   FINDINGS: LINES AND DEVICES: Remote endotracheal tube. Stable right pigtail pleural catheter near right lung base.   LUNGS: Stable hazy opacities at the lung base and additional airspace opacities extending to right mid lung, stable to mildly increased. No pneumothorax. Left lung is clear.   CARDIOMEDIASTINAL SILHOUETTE: Stable cardiomegaly.       Stable to slightly increased right pleural effusion with atelectasis. Stable right pleural catheter.   MACRO None   Signed by: Klaus Villafana 9/11/2024 8:21 AM Dictation workstation:   [id number]    XR chest 1 view    Result Date: 9/10/2024  Interpreted By:  Ishaan Vernon, STUDY: XR CHEST 1 VIEW;  9/10/2024 5:34 am   INDICATION: Signs/Symptoms:Eval chest tube placement, Rt effusion.   COMPARISON: CT scan chest from 09/04/2024. Chest x-ray from 09/09/2024.   ACCESSION NUMBER(S): [id number]   ORDERING CLINICIAN: RICH KAPLAN   TECHNIQUE: Single AP portable view of the chest was obtained.   FINDINGS: MEDIASTINUM/ LUNGS/ ARELIS: Previous ET tube has been removed. There is an NG tube in place with distal port just beyond the GE junction and distal tip in the  proximal stomach. Pigtail pleural catheter at the right base is stable. There is a small grossly stable lateral right basilar pneumothorax. There is mild bibasilar atelectasis. Cardiomegaly without gross vascular congestion. No pneumothorax on the left. No tracheal deviation. No abnormal hilar fullness or gross mass on either side.   BONES: No lytic or blastic destructive bone lesion.   UPPER ABDOMEN: Grossly intact.       Cardiomegaly.   NG tube in place. ET tube has been removed.   Stable pleural drainage catheter at the base of the right chest. Small stable lateral right basilar pneumothorax.   Mild bibasilar atelectasis.   MACRO: None   Signed by: Ishaan Vernon 9/10/2024 8:13 AM Dictation workstation:   [id number]    XR chest 1 view    Result Date: 9/9/2024  Interpreted By:  Daisy Sosa, STUDY: XR CHEST 1 VIEW;  9/9/2024 4:39 pm   INDICATION: Signs/Symptoms:s/p lytic therapy.     COMPARISON: 5:21 a.m. the same day   ACCESSION NUMBER(S): [id number]   ORDERING CLINICIAN: RODOLFO LOPEZ   FINDINGS: ETT, NG tube and right lower chest pigtail catheter remain in place. Additional presumed overlying monitoring/support devices again seen. Small right basilar pneumothorax decreased in size. Persistent bibasilar opacities. The cardiomediastinal silhouette remains enlarged.       Decreased size of right basilar pneumothorax since 5:21 a.m. the same day with pigtail catheter in place.   MACRO: None.   Signed by: Daisy Sosa 9/9/2024 4:44 PM Dictation workstation:   [id number]    XR chest 1 view    Result Date: 9/9/2024  Interpreted By:  Klaus Villafana, STUDY: XR CHEST 1 VIEW; 9/9/2024 5:32 am   INDICATION: Signs/Symptoms:Eval chest tube placement, Rt effusion   COMPARISON: Radiographs 09/08/2024   ACCESSION NUMBER(S): [id number]   ORDERING CLINICIAN: RICH KAPLAN   TECHNIQUE: Single frontal view of the chest performed.   FINDINGS: LINES AND DEVICES: The pigtail of right-sided pleural drainage catheter  overlies the right infrahilar region, grossly unchanged. Tip of ETT approximately 6.6 cm above the kathie, previously 7.7 cm. NG tube courses below the diaphragm, limited assessment.   LUNGS: Moderate-size right-sided hydropneumothorax appears grossly unchanged. Stable to slightly increased interstitial opacities on the left. Question new trace left pleural effusion with atelectasis.   CARDIOMEDIASTINAL SILHOUETTE: Stable cardiomegaly.       Right-sided infrahilar pigtail catheter and moderate hydropneumothorax, grossly unchanged.   Suspected worsening CHF with stable to mildly increased interstitial edema and possible new trace left pleural effusion.   MACRO None   Signed by: Klaus Villafana 9/9/2024 8:17 AM Dictation workstation:   [id number]    XR chest 1 view    Result Date: 9/8/2024  Interpreted By:  Corrina Reyes, STUDY: XR CHEST 1 VIEW;  9/8/2024 6:39 am   INDICATION: Signs/Symptoms:Eval chest tube placement, Rt effusion.     COMPARISON: 09/07/2024   ACCESSION NUMBER(S): [id number]   ORDERING CLINICIAN: RICH KAPLAN   TECHNIQUE: Portable AP semi upright   FINDINGS: Endotracheal tube tip projects between 7 and 8 cm above the kathie. Nasogastric tube is no longer identified. Right pleural pigtail catheter projects at mid right hemithorax and is similar in position to the prior study. Relative volume of basilar pneumothorax and pleural fluid appears similar to the prior study. Right lower lobe and right middle lobe are collapsed. Right upper lobe variation is similar to the prior study. Left lung shows some vascular congestion. Minimal atelectasis is present at the left lung base. No change in the osseous structures is noted.       Basilar pneumothorax on the right appears similar to the prior study in the relative volume of pleural fluid and pneumothorax appears unchanged.   Collapse of the right lower lobe and right middle lobe   Pulmonary vascular congestion is most notable in the left lung   MACRO:  None.   Signed by: Corrina Reyes 9/8/2024 12:32 PM Dictation workstation:   [id number]    XR chest 1 view    Result Date: 9/8/2024  Interpreted By:  Corrina Reyes, STUDY: XR CHEST 1 VIEW;  9/7/2024 5:28 pm   INDICATION: Signs/Symptoms:Eval chest tube placement, Rt pleural effusion.     COMPARISON: 09/06/2024   ACCESSION NUMBER(S): [id number]   ORDERING CLINICIAN: RICH KAPLAN   TECHNIQUE: Portable AP upright   FINDINGS: Endotracheal tube tip projects about 6 cm above the kathie. NG tube extends below the diaphragm and beyond the image. Right pleural pigtail catheter projects at mid right hemithorax and appears unchanged in position.   Pneumothorax at the right thoracic base appears larger than on the prior study. The amount of pleural fluid appears decreased. Mild atelectasis left lung base is slightly improved. Heart is unchanged in size. No changes noted in the osseous structures.       Right pleural effusion is decreased from the prior study but the right basilar pneumothorax appears increased.   MACRO: None.   Signed by: Corrina Reyes 9/8/2024 12:30 PM Dictation workstation:   [id number]    Electrocardiogram, 12-lead PRN ACS symptoms    Result Date: 9/6/2024  Sinus rhythm with 1st degree AV block with Premature atrial complexes Left axis deviation Right bundle branch block Inferior infarct (cited on or before 04-SEP-2024) Anterior infarct , age undetermined T wave abnormality, consider lateral ischemia Abnormal ECG When compared with ECG of 05-SEP-2024 10:29, (unconfirmed) Premature atrial complexes are now Present Confirmed by Shady Bill ([id number]) on 9/6/2024 1:59:56 PM    XR chest 1 view    Result Date: 9/6/2024  Interpreted By:  Eloisa Landa, STUDY: XR CHEST 1 VIEW 9/6/2024 9:05 am   INDICATION: Signs/Symptoms:R pleural effusion   COMPARISON: 09/05/2024   ACCESSION NUMBER(S): [id number]   ORDERING CLINICIAN: PERRI DELACRUZ   TECHNIQUE: AP view of the chest at bedside in the erect position    FINDINGS: A right chest tube is once again noted within the mid right hemithorax with a stable right hydropneumothorax seen within the mid and lower right hemithorax.   The tip of the endotracheal tube remains satisfactorily positioned at a distance 3 cm above kathie with nasogastric tube descending below diaphragm.   There is small left pleural effusion and left basilar atelectasis. The heart remains enlarged.       No change in the right hydropneumothorax which may be loculated with chest tube on the right in the mid hemithorax.   Small left pleural effusion with left basilar infiltrate/atelectasis.   Signed by: Eloisa Landa 9/6/2024 9:20 AM Dictation workstation:   [id number]    ECG 12 lead    Result Date: 9/5/2024  Sinus rhythm with 1st degree AV block with frequent Premature ventricular complexes Left axis deviation Left ventricular hypertrophy with QRS widening and repolarization abnormality ( R in aVL , Faheem product ) Inferior infarct , age undetermined Abnormal ECG When compared with ECG of 23-APR-2024 09:01, Premature ventricular complexes are now Present OH interval has increased (RBBB and left anterior fascicular block) is no longer Present Inferior infarct is now Present Confirmed by Andrea Bentley ([id number]) on 9/5/2024 10:01:05 AM    XR chest 1 view    Result Date: 9/5/2024  Interpreted By:  Saad Cronin, STUDY: XR CHEST 1 VIEW; 9/5/2024 4:57 am   INDICATION: CLINICAL INFORMATION: Signs/Symptoms:Pig tail Chest Tube replacement.   COMPARISON: 09/04/2024   ACCESSION NUMBER(S): [id number]   ORDERING CLINICIAN: ANDRY ABRAHAM   TECHNIQUE: Portable chest one view.   FINDINGS: The cardiac silhouette is quite prominent suggesting cardiomegaly. The tube and line placement is unchanged.  There is a decrease in the right-sided pleural-based density is well as the right-sided parenchymal densities. The pulmonary vasculature is slightly indistinct suggesting mild pulmonary vascular congestion.       There appears  to be increased aeration at the right base suggesting diminishing pleural density within the right hemithorax in association with the chest tube. Infiltrate persists on the right.   MACRO: none   Signed by: Saad Cronin 9/5/2024 8:18 AM Dictation workstation:   [id number]    XR chest 1 view    Result Date: 9/5/2024  Interpreted By:  Cande Keating, STUDY: XR CHEST 1 VIEW;  9/4/2024 11:10 pm   INDICATION: Signs/Symptoms:Chest tube placement.     COMPARISON: Radiographs of the chest dated 09/04/2024; CT of the chest dated 09/04/2024   ACCESSION NUMBER(S): [id number]   ORDERING CLINICIAN: ANDRY ABRAHAM   FINDINGS: AP radiograph of the chest was provided.   Endotracheal tube projects 4.4 cm superior to the kathie. Enteric tube appears to course along the midline expected course of the esophagus, although the distal tip is not well visualized due to underpenetration and overlying structures.   CARDIOMEDIASTINAL SILHOUETTE: Cardiomediastinal silhouette is enlarged, similar to prior exam.   LUNGS: Redemonstration of the large right-sided pleural effusion with associated atelectasis/consolidation, similar in appearance to prior radiographs. No new consolidation or pleural effusion is present in the left lung.   ABDOMEN: No remarkable upper abdominal findings.   BONES: No acute osseous changes.       1.  Enteric tube is coursing along the midline of the mediastinum in the expected course of the esophagus, although the distal tip is not well visualized due to overlying cardiomediastinal silhouette and underpenetration, and may be outside of the field-of-view of the study. Endotracheal tube projects 4.5 cm superior to the kathie. 2. Redemonstration of the large loculated right-sided pleural effusion with the associated atelectasis/consolidation, similar in appearance to prior exam.       MACRO: None   Signed by: Cande Keating 9/5/2024 2:08 AM Dictation workstation:   [id number]    CT chest abdomen pelvis w IV  contrast    Result Date: 9/4/2024  Interpreted By:  Ishaan Banda, STUDY: CT CHEST ABDOMEN PELVIS W IV CONTRAST;  9/4/2024 5:52 pm   INDICATION: Signs/Symptoms:unresponsive.     COMPARISON: None.   ACCESSION NUMBER(S): [id number]   ORDERING CLINICIAN: GURPREET COPPOLA   TECHNIQUE: Contiguous axial images of the chest, abdomen, and pelvis were obtained after the intravenous administration of iodinated contrast. Coronal and sagittal reformatted images were reconstructed from the axial data.   FINDINGS: CT CHEST:   MEDIASTINUM AND LYMPH NODES: NG/OG tube noted with small amount of fluid in the esophagus. The esophageal wall appears within normal limits.  No enlarged intrathoracic or axillary lymph nodes by imaging criteria. No pneumomediastinum.   VESSELS:  Normal caliber thoracic aorta without dissection. Mild aortic atherosclerosis.   HEART: Enlarged. Severe aortic valvular calcifications. Moderate coronary artery calcifications. No significant pericardial effusion.   LUNG, AIRWAYS, PLEURA: There is a large loculated right pleural effusion causing compressive atelectasis on the right lung. As a result, there is complete collapse of the right lower lobe, complete collapse of the right middle lobe, and partial collapse of the right upper lobe. There are multiple loculated compartments largest of which is seen at the right lung base. There is a small amount of mucus in the mid to distal trachea. There is small amount of layering debris in the mainstem bronchi. The majority of the right middle and lower lobe bronchi are collapsed and opacified with low-density debris. There is a trace left pleural effusion with subsegmental left basilar atelectasis. There is emphysema. There are new scattered subcentimeter bilateral centrilobular nodules that are likely infectious/inflammatory in nature from bronchiolitis.   CHEST WALL SOFT TISSUES: No discernible acute abnormality. There is a 5.5 cm x 1.7 cm lipoma in the right  infraspinatus muscle.   OSSEOUS STRUCTURES: No acute osseous abnormality.     CT ABDOMEN/PELVIS:   ABDOMINAL WALL: No significant abnormality.   LIVER: No significant parenchymal abnormality.   BILE DUCTS: No significant intrahepatic or extrahepatic dilatation.   GALLBLADDER: No significant abnormality.   PANCREAS: No significant abnormality.   SPLEEN: No significant abnormality.   ADRENALS: There is a 1.7 cm low-density left adrenal nodule likely representing a adenoma.   KIDNEYS, URETERS, BLADDER: There is a heterogeneously enhancing solid mass in the upper pole the left kidney measuring 2.4 cm x 1.9 cm consistent with renal cell carcinoma. Additionally, a 0.8 cm likely enhancing lesion in the upper pole the right kidney is concerning for renal cell carcinoma. No hydronephrosis or calculi. Urinary bladder is decompressed with Live catheter in place.   REPRODUCTIVE ORGANS: No significant abnormality.   VESSELS: Moderate aortic atherosclerosis without AAA.   RETROPERITONEUM/LYMPH NODES: No acute retroperitoneal abnormality. No enlarged lymph nodes.   BOWEL/MESENTERY/PERITONEUM: Colonic diverticulosis without acute diverticulitis. No inflammatory bowel wall thickening or dilatation. Normal appendix.   No ascites, free air, or fluid collection.     MUSCULOSKELETAL: No acute osseous abnormality.       CT CHEST: Large loculated right pleural effusion causing complete compressive collapse of the right middle lobe and right lower lobe and partial compressive collapse of the right upper lobe. The largest likely compartments located at the inferior right hemithorax. The cause of the effusion is not apparent as there is only a small amount of debris in the distal trachea and right mainstem bronchus but the distal-most right-sided bronchi are occluded due to combination of compressive collapse and small low-density fluid.   Numerous new subcentimeter centrilobular pulmonary nodules that are most likely  infectious/inflammatory in as can be seen with bronchiolitis or fungal infection.   Aortic valve calcification to the extent that would likely result in aortic stenosis.   CT ABDOMEN/PELVIS: Left kidney solid heterogeneously enhancing mass suspicious for renal cell carcinoma (2.4 cm x 1.9 cm) and of suspected 0.8 cm right upper pole renal cell carcinoma measuring 0.8 cm. Recommend urological follow-up/also taken and dedicated MRI kidneys to further evaluate these lesions. YELLOW ALERT: An incidental finding alert was sent per protocol.   No acute abnormality in the abdomen or pelvis.   Colonic diverticulosis without acute diverticulitis.   MACRO: Critical Finding:  New mass in the kidney. Notification was initiated on 9/4/2024 at 6:25 pm by  Ishaan Banda.  (**-YCF-**) Instructions:  MR Abdomen w and wo IV contrast. 1 week.   Signed by: Ishaan Banda 9/4/2024 6:26 PM Dictation workstation:   [id number]    CT head wo IV contrast    Result Date: 9/4/2024  Interpreted By:  Ishaan Banda, STUDY: CT HEAD WO IV CONTRAST;  9/4/2024 5:52 pm   INDICATION: Signs/Symptoms:unrespoonsive.     COMPARISON: None.   ACCESSION NUMBER(S): [id number]   ORDERING CLINICIAN: GURPREET COPPOLA   TECHNIQUE: Noncontrast axial CT images of head were obtained with coronal and sagittal reconstructed images.   FINDINGS: BRAIN PARENCHYMA: Mild periventricular and subcortical hemispheric white matter hypodensities are most compatible with chronic small vessel ischemic disease. No acute intraparenchymal hemorrhage or parenchymal evidence of acute large territory ischemic infarct. Gray-white matter distinction is preserved. No mass-effect.   VENTRICLES and EXTRA-AXIAL SPACES:  No acute extra-axial or intraventricular hemorrhage. No effacement of cerebral sulci. The ventricles and sulci are age-concordant.   PARANASAL SINUSES/MASTOIDS:  No hemorrhage or air-fluid levels within the visualized paranasal sinuses. The mastoids are well  aerated.   CALVARIUM/ORBITS:  No skull fracture.  The orbits and globes are intact to the extent visualized.   EXTRACRANIAL SOFT TISSUES: No discernible abnormality.       No acute intracranial abnormality.     MACRO: None.   Signed by: Ishaan Banda 9/4/2024 6:07 PM Dictation workstation:   [id number]    XR chest 1 view    Result Date: 9/4/2024  Interpreted By:  Rosalie Ellison, STUDY: XR CHEST 1 VIEW;  9/4/2024 4:33 pm   INDICATION: Signs/Symptoms:intubation.   COMPARISON: 04/08/2024   ACCESSION NUMBER(S): [id number]   ORDERING CLINICIAN: GURPREET COPPOLA   TECHNIQUE: A single portable image of the chest was obtained.   FINDINGS: Resolution is limited. The right portion of the chest is not entirely included.   The patient is rotated. The heart size is uncertain.   There appears to be elevated right hemidiaphragm and right-sided infiltration.   An endotracheal tube terminates above the kathie. A nasogastric tube terminates below the diaphragm.   COMPARISON OF FINDING: The nasogastric tube was placed in the interval. The endotracheal tube was placed in the interval.       Interval placement of an endotracheal tube. The tip is above the kathie. Interval placement of a nasogastric tube. It appears to terminate below the diaphragm.   The exam is extremely limited.   MACRO: none   Signed by: Rosalei Ellison 9/4/2024 4:43 PM Dictation workstation:   [id number]      This patient has a urinary catheter   Reason for the urinary catheter remaining today? urinary retention/bladder outlet obstruction, acute or chronic               Assessment/Plan   Assessment & Plan  Acute respiratory failure with hypoxia and hypercapnia (Multi)  67-year-old gentleman with history of chronic diastolic heart failure, COPD, obstructive sleep apnea, obesity, type 2 diabetes mellitus, hypertension, hypothyroidism and hyperlipidemia was admitted with acute respiratory failure with hypoxia and hypercapnia and noted to have a right loculated pleural  effusion and bilateral lower lobe lung infiltrate and right hydropneumothorax.  He was hypotensive on admission and required 3 L of fluid to resuscitate and admitted to intensive care unit and was intubated because of acute respiratory failure and remained intubated for several days.  Currently he is extubated and on high flow oxygen as mentioned above.  He had significant leukocytosis of 22,000 on admission and his sputum culture was positive for stenotrophomonas.  Patient is being continued on IV Zosyn and IV Levaquin, pulmonary and infectious disease on board.  He has had IV antibiotics for 14 days.    1.  Acute hypoxic hypercapnic respiratory failure with hypotension and right hydropneumothorax and bilateral infiltrate and positive for pneumonia.Sputum culture was positive for stenotrophomonas.   Initially intubated for several days and since then he has been extubated and is on Improving and currently is on 10 L nasal cannula.   Discontinued IV antibiotics,  Currently also on tapering dose.  Prednisone which has been decreased to 10 mg daily now.    2.  Acute on chronic systolic and diastolic heart failure with LV ejection fraction of 35% and moderate aortic stenosis and pulmonary hypertension.  Patient is being diuresed with IV Lasix 80 mg , and he is CHF is improved significantly and is and is well compensated now currently edema has improved significantly, changed Lasix to p.o. 80 mg daily.  3.  Patient had hydropneumothorax on admission and had chest tube, chest tube has been removed since then..    4.  Hypotension has resolved blood pressures are 110/70 mmHg    6.  COPD and obstructive sleep apnea.  Aerosol treatment and steroids.  Currently on Prednisone 20 mg daily.,  Reduced to 10 mg daily.  .  7.  Diabetes mellitus type 2, continue sliding scale insulin, patient was on Mounjaro at home prior to admission.    8.  Patient  DNR/DNI.    9.  Hematuria possibly secondary to trauma secondary to Live catheter  which has resolved after irrigation.    10.  Recurrent episodes of brief nonsustained ventricular tachycardia, also moderate systolic heart failure, previous echocardiogram revealed LV ejection fraction of 30 to 35% with moderate aortic stenosis, patient is started on IV Lasix this morning and cardiology consult appreciated and repeat echocardiogram quality was suboptimal he did reveal LV ejection fraction of 23% with global hypokinesia and moderate aortic cusp calcification and moderate right ventricular dysfunction.  Patient responded very well to IV Lasix and has been diuresed and now CHF is compensated.  He is on Jardiance and consider starting GDMT.    11.  Patient had a modified barium swallow and has been recommended to have a puréed diet with honey thickened and nectar thickened liquids.    Nystatin cream to both groin area for inguinal intertrigo.    Reviewed all labs patient's medical record and imaging studies and discussed the plan of treatment with the patient..    Continue PT OT and anticipate possible discharge to LTAC.  Pending authorization.     Stable for discharge to skilled nursing facility today.  I spent 35 minutes in the professional and overall care of this patient.      Noble Gatica MD

## 2024-09-25 NOTE — CARE PLAN
Problem: Skin  Goal: Decreased wound size/increased tissue granulation at next dressing change  Outcome: Progressing  Goal: Prevent/manage excess moisture  Outcome: Progressing  Goal: Prevent/minimize sheer/friction injuries  Outcome: Progressing  Goal: Promote/optimize nutrition  Outcome: Progressing   The patient's goals for the shift include Pt. will have a safe, restful and uneventful evening    The clinical goals for the shift include decrease oxygen demand    Over the shift, the patient did not make progress toward the following goals. Barriers to progression include . Recommendations to address these barriers include .

## 2024-09-25 NOTE — CARE PLAN
Problem: Skin  Goal: Decreased wound size/increased tissue granulation at next dressing change  Outcome: Progressing  Goal: Prevent/manage excess moisture  Outcome: Progressing  Goal: Prevent/minimize sheer/friction injuries  9/25/2024 0147 by Shaila Bond RN  Outcome: Progressing  9/25/2024 0147 by Shaila Bond RN  Flowsheets (Taken 9/25/2024 0147)  Prevent/minimize sheer/friction injuries:   HOB 30 degrees or less   Use pull sheet  Goal: Promote/optimize nutrition  Outcome: Progressing     Problem: Nutrition  Goal: Oral intake greater than 50%  Outcome: Progressing  Goal: Oral intake greater 75%  Outcome: Progressing  Goal: Consume prescribed supplement  Outcome: Progressing  Goal: Nutrition support goals are met within 48 hrs  Outcome: Progressing  Goal: Nutrition support is meeting 75% of nutrient needs  Outcome: Progressing  Goal: Tube feed tolerance  Outcome: Progressing  Goal: BG  mg/dL  Outcome: Progressing  Goal: Lab values WNL  Outcome: Progressing  Goal: Electrolytes WNL  Outcome: Progressing  Goal: Promote healing  Outcome: Progressing  Goal: Maintain stable weight  Outcome: Progressing  Goal: Reduce weight from edema/fluid  Outcome: Progressing  Goal: Gradual weight gain  Outcome: Progressing  Goal: Improve ostomy output  Outcome: Progressing     Problem: Discharge Planning  Goal: Discharge to home or other facility with appropriate resources  Outcome: Progressing     Problem: Chronic Conditions and Co-morbidities  Goal: Patient's chronic conditions and co-morbidity symptoms are monitored and maintained or improved  Outcome: Progressing     Problem: Diabetes  Goal: Achieve decreasing blood glucose levels by end of shift  Outcome: Progressing  Goal: Increase stability of blood glucose readings by end of shift  Outcome: Progressing  Goal: Decrease in ketones present in urine by end of shift  Outcome: Progressing  Goal: Maintain electrolyte levels within acceptable range throughout  shift  Outcome: Progressing  Goal: Maintain glucose levels >70mg/dl to <250mg/dl throughout shift  Outcome: Progressing  Goal: No changes in neurological exam by end of shift  Outcome: Progressing  Goal: Learn about and adhere to nutrition recommendations by end of shift  Outcome: Progressing  Goal: Vital signs within normal range for age by end of shift  Outcome: Progressing  Goal: Increase self care and/or family involovement by end of shift  Outcome: Progressing  Goal: Receive DSME education by end of shift  Outcome: Progressing     Problem: Knowledge Deficit  Goal: Patient/family/caregiver demonstrates understanding of disease process, treatment plan, medications, and discharge instructions  Outcome: Progressing     Problem: Mechanical Ventilation  Goal: Oral health is maintained or improved  Outcome: Progressing  Goal: Ability to express needs and understand communication  Outcome: Progressing  Goal: Mobility/activity is maintained at optimum level for patient  Outcome: Progressing     Problem: Fall/Injury  Goal: Not fall by end of shift  Outcome: Progressing  Goal: Be free from injury by end of the shift  Outcome: Progressing  Goal: Verbalize understanding of personal risk factors for fall in the hospital  Outcome: Progressing  Goal: Verbalize understanding of risk factor reduction measures to prevent injury from fall in the home  Outcome: Progressing  Goal: Use assistive devices by end of the shift  Outcome: Progressing  Goal: Pace activities to prevent fatigue by end of the shift  Outcome: Progressing     Problem: Respiratory  Goal: Clear secretions with interventions this shift  Outcome: Progressing  Goal: Minimize anxiety/maximize coping throughout shift  Outcome: Progressing  Goal: Minimal/no exertional discomfort or dyspnea this shift  Outcome: Progressing  Goal: No signs of respiratory distress (eg. Use of accessory muscles. Peds grunting)  Outcome: Progressing  Goal: Patent airway maintained this  shift  Outcome: Progressing  Goal: Verbalize decreased shortness of breath this shift  Outcome: Progressing  Goal: Wean oxygen to maintain O2 saturation per order/standard this shift  Outcome: Progressing  Goal: Increase self care and/or family involvement in next 24 hours  Outcome: Progressing   The patient's goals for the shift include Pt. will have a safe, restful and uneventful evening    The clinical goals for the shift include decrease oxygen demand

## 2024-09-25 NOTE — ASSESSMENT & PLAN NOTE
67-year-old gentleman with history of chronic diastolic heart failure, COPD, obstructive sleep apnea, obesity, type 2 diabetes mellitus, hypertension, hypothyroidism and hyperlipidemia was admitted with acute respiratory failure with hypoxia and hypercapnia and noted to have a right loculated pleural effusion and bilateral lower lobe lung infiltrate and right hydropneumothorax.  He was hypotensive on admission and required 3 L of fluid to resuscitate and admitted to intensive care unit and was intubated because of acute respiratory failure and remained intubated for several days.  Currently he is extubated and on high flow oxygen as mentioned above.  He had significant leukocytosis of 22,000 on admission and his sputum culture was positive for stenotrophomonas.  Patient is being continued on IV Zosyn and IV Levaquin, pulmonary and infectious disease on board.  He has had IV antibiotics for 14 days.    1.  Acute hypoxic hypercapnic respiratory failure with hypotension and right hydropneumothorax and bilateral infiltrate and positive for pneumonia.Sputum culture was positive for stenotrophomonas.   Initially intubated for several days and since then he has been extubated and is on Improving and currently is on 10 L nasal cannula.   Discontinued IV antibiotics,  Currently also on tapering dose.  Prednisone which has been decreased to 10 mg daily now.    2.  Acute on chronic systolic and diastolic heart failure with LV ejection fraction of 35% and moderate aortic stenosis and pulmonary hypertension.  Patient is being diuresed with IV Lasix 80 mg , and he is CHF is improved significantly and is and is well compensated now currently edema has improved significantly, changed Lasix to p.o. 80 mg daily.  3.  Patient had hydropneumothorax on admission and had chest tube, chest tube has been removed since then..    4.  Hypotension has resolved blood pressures are 110/70 mmHg    6.  COPD and obstructive sleep apnea.  Aerosol  treatment and steroids.  Currently on Prednisone 20 mg daily.,  Reduced to 10 mg daily.  .  7.  Diabetes mellitus type 2, continue sliding scale insulin, patient was on Mounjaro at home prior to admission.    8.  Patient  DNR/DNI.    9.  Hematuria possibly secondary to trauma secondary to Live catheter which has resolved after irrigation.    10.  Recurrent episodes of brief nonsustained ventricular tachycardia, also moderate systolic heart failure, previous echocardiogram revealed LV ejection fraction of 30 to 35% with moderate aortic stenosis, patient is started on IV Lasix this morning and cardiology consult appreciated and repeat echocardiogram quality was suboptimal he did reveal LV ejection fraction of 23% with global hypokinesia and moderate aortic cusp calcification and moderate right ventricular dysfunction.  Patient responded very well to IV Lasix and has been diuresed and now CHF is compensated.  He is on Jardiance and consider starting GDMT.    11.  Patient had a modified barium swallow and has been recommended to have a puréed diet with honey thickened and nectar thickened liquids.    Nystatin cream to both groin area for inguinal intertrigo.    Reviewed all labs patient's medical record and imaging studies and discussed the plan of treatment with the patient..    Continue PT OT and anticipate possible discharge to LTAC.  Pending authorization.

## 2024-09-25 NOTE — PROGRESS NOTES
Subjective Data:  6L HF NC (currently on his home CPAP sleeping)  -2L   yesterday down from 1155 on admit   Soft Bps still 90s to 100s       Objective Data:  Last Recorded Vitals:  Vitals:    24 0440 24 0600 24 0700 24   BP: 111/79  94/54    BP Location: Left arm  Left arm    Patient Position: Lying  Lying    Pulse: 89      Resp:     Temp: 36.4 °C (97.5 °F)      TempSrc: Temporal      SpO2: 93%  94% 94%   Weight:  118 kg (260 lb 2.3 oz)     Height:         Medical Gas Therapy: Supplemental oxygen  Medical Gas Delivery Method: High flow nasal cannula  Weight  Av kg (286 lb 12.3 oz)  Min: 118 kg (260 lb 2.3 oz)  Max: 140 kg (307 lb 12.2 oz)    Last I/O:    Intake/Output Summary (Last 24 hours) at 2024  Last data filed at 2024 0637  Gross per 24 hour   Intake 240 ml   Output 3500 ml   Net -3260 ml     Net IO Since Admission: -40,597.46 mL [24]     Diagnostic Results   Labs  CBC- 2024:  5:02 AM  7.7 16.4 208    52.4      BMP- 2024:  5:02 AM  141 22 95 98   3.7 0.40 39    Estimated Creatinine Clearance: 125 mL/min (A) (by C-G formula based on SCr of 0.4 mg/dL (L)).     CA: 8.3 PROTIEN: 4.9 ALT: 21 Total Bili: 1.0 M.20   PHOS: 3.0 ALBUMIN: 2.8 AST: 15   Alk Phos: 69      COAGS- 2024:  5:02 PM  1.1   12.4 _     CV Labs  Troponin I, High Sensitivity   Date/Time Value Ref Range Status   2024 06:03 PM 69 (HH) 0 - 20 ng/L Final     Comment:     Previous result verified on 2024 1734 on specimen/case [id number] called with component [facility] for procedure Troponin I, High Sensitivity, Initial with value 99 ng/L.   2024 05:01 PM 99 (HH) 0 - 20 ng/L Final   2024 06:46 PM 67 (HH) 0 - 20 ng/L Final     Comment:     Previous result verified on 2024 on specimen/case [id number] called with component [facility] for procedure Troponin I, High Sensitivity with value 62 ng/L.   2024 05:20 PM 62 (HH) 0 - 20 ng/L  Final   03/12/2024 07:07  (HH) 0 - 20 ng/L Final     Comment:     Previous result verified on 3/12/2024 0048 on specimen/case [id number] called with component [facility] for procedure Troponin I, High Sensitivity with value 89 ng/L.   03/12/2024 03:08  (HH) 0 - 20 ng/L Final     Comment:     Previous result verified on 3/12/2024 0048 on specimen/case [id number] called with component [facility] for procedure Troponin I, High Sensitivity with value 89 ng/L.     BNP   Date/Time Value Ref Range Status   09/24/2024 08:52  (H) 0 - 99 pg/mL Final   09/18/2024 04:39  (H) 0 - 99 pg/mL Final   09/04/2024 05:01 PM 1,155 (H) 0 - 99 pg/mL Final     Hemoglobin A1C   Date/Time Value Ref Range Status   06/25/2024 04:56 PM 5.5 see below % Final   02/20/2024 10:35 AM 5.8 (H) see below % Final     LDL Calculated   Date/Time Value Ref Range Status   02/20/2024 10:35  (H) <=99 mg/dL Final     Comment:                                 Near   Borderline      AGE      Desirable  Optimal    High     High     Very High     0-19 Y     0 - 109     ---    110-129   >/= 130     ----    20-24 Y     0 - 119     ---    120-159   >/= 160     ----      >24 Y     0 -  99   100-129  130-159   160-189     >/=190       VLDL   Date/Time Value Ref Range Status   02/20/2024 10:35 AM 16 0 - 40 mg/dL Final   09/10/2021 01:29 PM 27 0 - 40 mg/dL Final   09/18/2018 02:26 PM 22 0 - 40 mg/dL Final       Pertinent Imaging  XR chest 1 view 09/17/2024  Cardiomegaly.  Stable small left pleural effusion with left basilar atelectasis/pneumonia, stable to slightly progressed.  Pleural and parenchymal opacities at the right base, mildly progressed.    CT chest abdomen pelvis w IV contrast 09/04/2024  Large loculated right pleural effusion causing complete compressive collapse of the right middle lobe and right lower lobe and partial compressive collapse of the right upper lobe. The largest likely compartments located at the inferior right  hemithorax. The cause of the effusion is not apparent as there is only a small amount of debris in the distal trachea and right mainstem bronchus but the distal-most right-sided bronchi are occluded due to combination of compressive collapse and small low-density fluid.    Numerous new subcentimeter centrilobular pulmonary nodules that are most likely infectious/inflammatory in as can be seen with bronchiolitis or fungal infection.    Aortic valve calcification to the extent that would likely result inaortic stenosis.    Left kidney solid heterogeneously enhancing mass suspicious for renal cell carcinoma (2.4 cm x 1.9 cm) and of suspected 0.8 cm right upper pole renal cell carcinoma measuring 0.8 cm. Recommend urological follow-up/also taken and dedicated MRI kidneys to further evaluate these lesions. YELLOW ALERT: An incidental finding alert was sent per protocol.    No acute abnormality in the abdomen or pelvis.    Colonic diverticulosis without acute diverticulitis.    CT head wo IV contrast 09/04/2024  No acute intracranial abnormality.           Past Cardiology Tests (Last 3 Years):  EKG:  Electrocardiogram 9/4/24  Sinus rhythm with 1st degree AV block with Premature atrial complexes with Aberrant conduction  Right bundle branch block  Left anterior fascicular block  Bifascicular block   Anteroseptal infarct (cited on or before 23-APR-2024)    ECG 12 lead 4/23/24  Normal sinus rhythm  Right bundle branch block  Left anterior fascicular block   Bifascicular block   Moderate voltage criteria for LVH, may be normal variant  Cannot rule out Septal infarct , age undetermined    Electrocardiogram 3/8/24  Sinus rhythm with occasional Premature ventricular complexes  Right bundle branch block  T wave abnormality, consider lateral ischemia  Abnormal ECG    Echo:  Transthoracic Echo (TTE) Limited 9/19/24  1. Left ventricular ejection fraction is severely decreased, calculated by Green's biplane at 23%.  2. There is  global hypokinesis of the left ventricle with minor regional variations.  3. Poorly visualized anatomical structures due to suboptimal image quality.  4. Spectral Doppler shows an impaired relaxation pattern of left ventricular diastolic filling.  5. There is mildly reduced right ventricular systolic function.  6. Moderately enlarged right ventricle.  7. There is moderate aortic valve cusp calcification. The was no assesment of aortic stenosis in this limited study.  8. Compared with study dated 3/11/2024, there is RV dilation and reduced function.    Transthoracic echo (TTE) complete 3/11/24  1. Left ventricular systolic function is moderately decreased with a 30-35% estimated ejection fraction.  2. Basal inferolateral segment is abnormal.  3. Poorly visualized anatomical structures due to suboptimal image quality.  4. Moderate aortic valve stenosis.  5. There is global hypokinesis of the left ventricle with minor regional variations.    Inpatient Medications:  Scheduled medications   Medication Dose Route Frequency    aspirin  81 mg oral Daily    empagliflozin  10 mg oral Daily    enoxaparin  40 mg subcutaneous q12h SHARRON    ezetimibe  10 mg oral Daily    folic acid  1 mg oral Daily    furosemide  80 mg intravenous Daily    insulin lispro  0-10 Units subcutaneous q4h    ipratropium-albuteroL  3 mL nebulization 4x daily    levothyroxine  125 mcg oral Daily    multivitamin with minerals  1 tablet oral Daily    nystatin  5 mL Swish & Swallow 4x daily    nystatin  1 Application Topical BID    oxygen   inhalation q PM    pantoprazole  40 mg intravenous Daily    polyethylene glycol  17 g nasogastric tube Daily    predniSONE  10 mg oral Daily    sennosides-docusate sodium  2 tablet oral BID    tamsulosin  0.4 mg oral Daily    thiamine  100 mg oral Daily   PRN medications: acetaminophen, acetaminophen, dextrose, dextrose, glucagon, glucagon, ipratropium-albuteroL, oxygen, oxygen       Physical Exam:  Vitals and nursing  "notes reviewed.  BP 94/54 (BP Location: Left arm, Patient Position: Lying)   Pulse 89   Temp 36.4 °C (97.5 °F) (Temporal)   Resp 22   Ht 1.803 m (5' 10.98\")   Wt 118 kg (260 lb 2.3 oz)   SpO2 94%   BMI 36.30 kg/m²   GENERAL: Alert and awake; in no acute distress  SKIN: Warm and dry, cap refill <2  NECK: No JVD or hepatojugular reflex  CARDIAC: Regular rate and rhythm, S1S2, 2/6 systolic murmur  CHEST: Increased respiratory effort, diminished breath sounds throughout  ABDOMEN: soft, non-distended, non-tender with palpation  EXTREMITIES: 1+ pitting BL lower extremity edema, normal pulses all 4 extremities  NEURO: Alert and oriented, mental status at baseline, no focal deficits  PSYCH: Behavior and affect as expected        Assessment/Plan       69 year old male with history MELISSA on CPAP, COPD stage III triple therapy as outpatient, nicotine dependence, T2DM, severe obesity, hypertension, hyperlipidemia (intolerant to statins due to myalgias), lymphedema, coronary calcification by chest CT, moderate aortic stenosis and  Chronic systolic HF (refused cath previously) originally admitted on 9/4/24 [facility] at [facility].  He was non responsive at home by his neighbor-> admitted with hypoxic/hypercapnic respiratory failure->  intubated, placed on pressor, vent support, started on ATB, large R sided likely parapneumonic s/p chest tube placement (Analysis showed neutrophilic predominant effusion, cultures negative. CT is now removed) and PNA due to Stenotrophomonas maltophilia which is resolving.      Home Cardiology Meds: ASA 81 mg daily, Jardiance 10mg daily, Lasix 120 mg daily, metolazone 2.5 mg PRN, valsartan 40 mg daily, Zetia 10mg daily    TTE 9/19/24   1. Left ventricular ejection fraction is severely decreased, calculated by Green's biplane at 23%.   2. There is global hypokinesis of the left ventricle with minor regional variations.   3. Poorly visualized anatomical structures due to suboptimal image quality.   4. " Spectral Doppler shows an impaired relaxation pattern of left ventricular diastolic filling.   5. There is mildly reduced right ventricular systolic function.   6. Moderately enlarged right ventricle.   7. There is moderate aortic valve cusp calcification. The was no assesment of aortic stenosis in this limited study.   8. Compared with study dated 3/11/2024, there is RV dilation and reduced function.    #Acute Hypoxic Respiratory Failure, large R pleural effusion- Sputum cx +stenotrophomonas, completed IV abx  -Oxygen status improving    #Acute on Chronic systolic HF  - EF now 23%  --BB: None d/t hx of bradycardia  --ACE/ARB/ARNI: none d/t low BP (valsartan 40 mg at home previously)  --SGLT2i: Jardiance 10mg daily  --MRA: None  --Diuretic: IV lasix  -Currently diuresing with IV Lasix 80mg BID, robust response  #HLD- Intolerant to statins, on Ezetimibe 10mg daily  #CAD- c/w ASA daily  #Moderate AS- Patient not interested in intervention       RECOMMENDATIONS:  -c/w IV lasix once daily   -Resume home GDMT as tolerated, BP currently soft in the setting of diuresis  -Wean oxygen as tolerated  -Continuous telemetry monitoring  -Monitor electrolytes, replete for K <4 and Mg <2  -Daily wts, strict I&Os, low sodium diet  -OOB as tolerates with PT     PARAM Garrido-CNP

## 2024-09-25 NOTE — CARE PLAN
Problem: Skin  Goal: Decreased wound size/increased tissue granulation at next dressing change  9/25/2024 1041 by Nichole Singh RN  Outcome: Progressing  9/25/2024 1032 by Nichole Singh RN  Outcome: Progressing  Goal: Prevent/manage excess moisture  9/25/2024 1041 by Nichole Singh RN  Outcome: Progressing  Flowsheets (Taken 9/25/2024 1041)  Prevent/manage excess moisture: Cleanse incontinence/protect with barrier cream  9/25/2024 1032 by Nichole Singh RN  Outcome: Progressing  Goal: Prevent/minimize sheer/friction injuries  9/25/2024 1041 by Nichole Singh RN  Outcome: Progressing  9/25/2024 1032 by Nichole Singh RN  Outcome: Progressing  Goal: Promote/optimize nutrition  9/25/2024 1041 by Nichole Singh RN  Outcome: Progressing  9/25/2024 1032 by Nichole Singh RN  Outcome: Progressing   The patient's goals for the shift include Pt. will have a safe, restful and uneventful evening    The clinical goals for the shift include keep HDS and comfortable    Over the shift, the patient did not make progress toward the following goals. Barriers to progression include . Recommendations to address these barriers include .

## 2024-09-26 NOTE — DISCHARGE SUMMARY
ADMISSION DATE: 9/4/2024     DISCHARGE DATE:  09/25/24     Discharge Diagnosis  Acute respiratory failure with hypoxia and hypercapnia (Multi)  Bilateral pneumonia  Of severe sleep apnea/COPD  Acute on chronic systolic and diastolic heart failure with LV ejection fraction of 35%  Moderate pulmonary hypertension  Moderate aortic stenosis  Morbid obesity    Issues Requiring Follow-Up  Discharge to skilled nursing facility        Discharge Meds     Your medication list        START taking these medications        Instructions Last Dose Given Next Dose Due   oxygen gas therapy  Commonly known as: O2      Inhale 7 L/min continuously.       oxygen gas therapy  Commonly known as: O2      Inhale 1 each once daily at bedtime. NOCTURNAL CPAP AUTOTITRATING, auto-titrating range: 4-20       pantoprazole 40 mg EC tablet  Commonly known as: ProtoNix      Take 1 tablet (40 mg) by mouth once daily in the morning. Take before meals. Do not crush, chew, or split.       predniSONE 10 mg tablet  Commonly known as: Deltasone  Start taking on: September 26, 2024      Take 1 tablet (10 mg) by mouth once daily. Follow up with PCP for refills       tamsulosin 0.4 mg 24 hr capsule  Commonly known as: Flomax  Start taking on: September 26, 2024      Take 1 capsule (0.4 mg) by mouth once daily.              CONTINUE taking these medications        Instructions Last Dose Given Next Dose Due   albuterol 90 mcg/actuation inhaler      INHALE 2 PUFFS EVERY 4-6 HOURS AS NEEDED.       allopurinol 300 mg tablet  Commonly known as: Zyloprim      Take 1 tablet (300 mg) by mouth once daily.       aspirin 81 mg EC tablet      Take 1 tablet (81 mg) by mouth once daily.       Dexcom G7  misc  Generic drug: blood-glucose meter,continuous      Use as instructed       Dexcom G7 Sensor device  Generic drug: blood-glucose sensor      Use as directed       empagliflozin 10 mg  Commonly known as: Jardiance      Take 1 tablet (10 mg) by mouth once daily.        ezetimibe 10 mg tablet  Commonly known as: Zetia      Take 1 tablet (10 mg) by mouth once daily.       folic acid 1 mg tablet  Commonly known as: Folvite      Take 1 tablet (1 mg) by mouth once daily.       furosemide 80 mg tablet  Commonly known as: Lasix      Take 1 tablet (80 mg) by mouth once daily.       ipratropium-albuteroL 0.5-2.5 mg/3 mL nebulizer solution  Commonly known as: Duo-Neb      Take 3 mL by nebulization 4 times a day.       levothyroxine 125 mcg tablet  Commonly known as: Synthroid, Levoxyl      Take 1 Tablet by mouth every day       Mounjaro 2.5 mg/0.5 mL pen injector  Generic drug: tirzepatide      Inject 2.5 mg under the skin 1 (one) time per week.       multivitamin with minerals tablet      Take 1 tablet by mouth once daily.       oxygen gas therapy  Commonly known as: O2      Inhale 2 L/min continuously. 2L nc at rest and 4L nc with ambulation       potassium chloride CR 20 mEq ER tablet  Commonly known as: Klor-Con M20      Take 1 tablet (20 mEq) by mouth once daily. Do not crush or chew.       thiamine 100 mg tablet  Commonly known as: Vitamin B-1      Take 1 tablet (100 mg) by mouth once daily.       Trelegy Ellipta 100-62.5-25 mcg blister with device  Generic drug: fluticasone-umeclidin-vilanter      one puff per day              STOP taking these medications      amoxicillin-pot clavulanate 875-125 mg tablet  Commonly known as: Augmentin        valsartan 40 mg tablet  Commonly known as: Diovan                  Where to Get Your Medications        Information about where to get these medications is not yet available    Ask your nurse or doctor about these medications  oxygen gas therapy  oxygen gas therapy  oxygen gas therapy  pantoprazole 40 mg EC tablet  predniSONE 10 mg tablet  tamsulosin 0.4 mg 24 hr capsule       Results for orders placed or performed during the hospital encounter of 09/04/24 (from the past 96 hour(s))   POCT GLUCOSE   Result Value Ref Range    POCT Glucose 193  (H) 74 - 99 mg/dL   POCT GLUCOSE   Result Value Ref Range    POCT Glucose 115 (H) 74 - 99 mg/dL   Renal Function Panel   Result Value Ref Range    Glucose 119 (H) 74 - 99 mg/dL    Sodium 138 136 - 145 mmol/L    Potassium 3.6 3.5 - 5.3 mmol/L    Chloride 93 (L) 98 - 107 mmol/L    Bicarbonate 38 (H) 21 - 32 mmol/L    Anion Gap 11 10 - 20 mmol/L    Urea Nitrogen 22 6 - 23 mg/dL    Creatinine 0.40 (L) 0.50 - 1.30 mg/dL    eGFR >90 >60 mL/min/1.73m*2    Calcium 8.0 (L) 8.6 - 10.3 mg/dL    Phosphorus 2.9 2.5 - 4.9 mg/dL    Albumin 2.7 (L) 3.4 - 5.0 g/dL   CBC   Result Value Ref Range    WBC 8.4 4.4 - 11.3 x10*3/uL    nRBC 0.0 0.0 - 0.0 /100 WBCs    RBC 4.94 4.50 - 5.90 x10*6/uL    Hemoglobin 16.0 13.5 - 17.5 g/dL    Hematocrit 49.4 41.0 - 52.0 %     80 - 100 fL    MCH 32.4 26.0 - 34.0 pg    MCHC 32.4 32.0 - 36.0 g/dL    RDW 13.6 11.5 - 14.5 %    Platelets 232 150 - 450 x10*3/uL   POCT GLUCOSE   Result Value Ref Range    POCT Glucose 112 (H) 74 - 99 mg/dL   POCT GLUCOSE   Result Value Ref Range    POCT Glucose 146 (H) 74 - 99 mg/dL   POCT GLUCOSE   Result Value Ref Range    POCT Glucose 278 (H) 74 - 99 mg/dL   POCT GLUCOSE   Result Value Ref Range    POCT Glucose 195 (H) 74 - 99 mg/dL   Renal Function Panel   Result Value Ref Range    Glucose 99 74 - 99 mg/dL    Sodium 139 136 - 145 mmol/L    Potassium 4.1 3.5 - 5.3 mmol/L    Chloride 93 (L) 98 - 107 mmol/L    Bicarbonate 40 (HH) 21 - 32 mmol/L    Anion Gap 10 10 - 20 mmol/L    Urea Nitrogen 21 6 - 23 mg/dL    Creatinine 0.35 (L) 0.50 - 1.30 mg/dL    eGFR >90 >60 mL/min/1.73m*2    Calcium 8.0 (L) 8.6 - 10.3 mg/dL    Phosphorus 3.2 2.5 - 4.9 mg/dL    Albumin 2.8 (L) 3.4 - 5.0 g/dL   CBC   Result Value Ref Range    WBC 2.9 (L) 4.4 - 11.3 x10*3/uL    nRBC 0.0 0.0 - 0.0 /100 WBCs    RBC 2.20 (L) 4.50 - 5.90 x10*6/uL    Hemoglobin 6.9 (L) 13.5 - 17.5 g/dL    Hematocrit 20.5 (L) 41.0 - 52.0 %    MCV 93 80 - 100 fL    MCH 31.4 26.0 - 34.0 pg    MCHC 33.7 32.0 - 36.0 g/dL     RDW 21.6 (H) 11.5 - 14.5 %    Platelets 68 (L) 150 - 450 x10*3/uL   POCT GLUCOSE   Result Value Ref Range    POCT Glucose 200 (H) 74 - 99 mg/dL   CBC   Result Value Ref Range    WBC 8.0 4.4 - 11.3 x10*3/uL    nRBC 0.0 0.0 - 0.0 /100 WBCs    RBC 5.13 4.50 - 5.90 x10*6/uL    Hemoglobin 16.0 13.5 - 17.5 g/dL    Hematocrit 50.3 41.0 - 52.0 %    MCV 98 80 - 100 fL    MCH 31.2 26.0 - 34.0 pg    MCHC 31.8 (L) 32.0 - 36.0 g/dL    RDW 13.5 11.5 - 14.5 %    Platelets 232 150 - 450 x10*3/uL   B-type natriuretic peptide   Result Value Ref Range     (H) 0 - 99 pg/mL   POCT GLUCOSE   Result Value Ref Range    POCT Glucose 178 (H) 74 - 99 mg/dL   POCT GLUCOSE   Result Value Ref Range    POCT Glucose 165 (H) 74 - 99 mg/dL   POCT GLUCOSE   Result Value Ref Range    POCT Glucose 202 (H) 74 - 99 mg/dL   POCT GLUCOSE   Result Value Ref Range    POCT Glucose 174 (H) 74 - 99 mg/dL   POCT GLUCOSE   Result Value Ref Range    POCT Glucose 87 74 - 99 mg/dL   POCT GLUCOSE   Result Value Ref Range    POCT Glucose 105 (H) 74 - 99 mg/dL   Renal Function Panel   Result Value Ref Range    Glucose 98 74 - 99 mg/dL    Sodium 141 136 - 145 mmol/L    Potassium 3.7 3.5 - 5.3 mmol/L    Chloride 95 (L) 98 - 107 mmol/L    Bicarbonate 39 (H) 21 - 32 mmol/L    Anion Gap 11 10 - 20 mmol/L    Urea Nitrogen 22 6 - 23 mg/dL    Creatinine 0.40 (L) 0.50 - 1.30 mg/dL    eGFR >90 >60 mL/min/1.73m*2    Calcium 8.3 (L) 8.6 - 10.3 mg/dL    Phosphorus 3.0 2.5 - 4.9 mg/dL    Albumin 2.8 (L) 3.4 - 5.0 g/dL   CBC   Result Value Ref Range    WBC 7.7 4.4 - 11.3 x10*3/uL    nRBC 0.0 0.0 - 0.0 /100 WBCs    RBC 5.29 4.50 - 5.90 x10*6/uL    Hemoglobin 16.4 13.5 - 17.5 g/dL    Hematocrit 52.4 (H) 41.0 - 52.0 %    MCV 99 80 - 100 fL    MCH 31.0 26.0 - 34.0 pg    MCHC 31.3 (L) 32.0 - 36.0 g/dL    RDW 13.6 11.5 - 14.5 %    Platelets 208 150 - 450 x10*3/uL   POCT GLUCOSE   Result Value Ref Range    POCT Glucose 105 (H) 74 - 99 mg/dL   POCT GLUCOSE   Result Value Ref Range  "   POCT Glucose 166 (H) 74 - 99 mg/dL   POCT GLUCOSE   Result Value Ref Range    POCT Glucose 130 (H) 74 - 99 mg/dL   POCT GLUCOSE   Result Value Ref Range    POCT Glucose 182 (H) 74 - 99 mg/dL    XR chest 1 view    Result Date: 9/23/2024  Interpreted By:  Ishaan Vernon, STUDY: XR CHEST 1 VIEW;  9/23/2024 10:52 am   INDICATION: Signs/Symptoms:Assess for resolution of R pneumonia..   COMPARISON: CT scan chest from 09/04/2024. Chest x-ray from 09/17/2024.   ACCESSION NUMBER(S): [id number]   ORDERING CLINICIAN: DEEPA HINOJOSA   TECHNIQUE: Single AP portable view of the chest was obtained.   FINDINGS: MEDIASTINUM/ LUNGS/ ARELIS: Moderate cardiomegaly. Bilateral pleural effusions. Hazy opacities at the lung bases. Horizontal linear opacity in the left lingula, not present previously. Central vasculature is prominent and indistinct.   No pneumothorax. No tracheal deviation. No abnormal hilar fullness or gross mass on either side.   BONES: No lytic or blastic destructive bone lesion.   UPPER ABDOMEN: Grossly intact.       Findings consistent with ongoing CHF with mild interval worsening.   MACRO: None   Signed by: Ishaan Vernon 9/23/2024 11:18 AM Dictation workstation:   [id number]    FL modified barium swallow study    Result Date: 9/23/2024  Interpreted By:  Freda Fonseca and Lovelace Elizabeth STUDY: FL MODIFIED BARIUM SWALLOW STUDY;; 9/20/2024 10:48 am   INDICATION: Signs/Symptoms:post prandial cough.     COMPARISON: None.   ACCESSION NUMBER(S): [id number]   ORDERING CLINICIAN: ABAD SCHNEIDER   TECHNIQUE: Modified Barium Swallow Study completed. Informed verbal consent obtained prior to completion of exam. Trials of thin, nectar/mildly thick liquid, honey/moderately thick liquid, puree, and solids were administered.   SLP: BOGDAN Guzmán Contact info: [employer] chat.   SPEECH FINDINGS:   Reason for Referral: post prandial cough with thin liquids Patient Hx:  \"chronic diastolic heart failure, COPD, " "obstructive sleep apnea, obesity, type 2 diabetes mellitus, hypertension, hypothyroidism and hyperlipidemia \" Respiratory Status: O2 via NC Current diet: NPO   Pain: Pain Scale: not reported   FINAL SPEECH RECOMMENDATIONS   DIET RECOMMENDED: - Pureed (IDDSI Level 4) - Mildly/nectar thick liquids (IDDSI Level 2)   Patient to initiate a modified diet with implementation of the following swallow strategies listed below:   STRATEGIES: - Small bites - Small, single sips - Upright for all PO intake - Swallow 2-3 times per bite/sip - Medications whole in puree or as best tolerated - Slow rate   Plan: Treatment/Interventions: Pharyngeal exercises, Patient/family education, Bolus trials, Compensatory strategy training, Determination of diet tolerance/advancement potential. SLP Plan: Skilled SLP warranted SLP Frequency: 4x per week Duration: 2 weeks   Discussed POC: Patient Discussed Risks/Benefits: Yes Patient/Caregiver Agreeable: Yes   Short term goals established 09/20/24: - Patient will tolerate baseline/recommended PO diet without overt s/s of aspiration, further difficulty, or concern for aspiration-related complications in 90% of observed therapeutic trials.     Long term goals 09/20/24: Patient will tolerate the least restrictive diet without overt difficulty or further pulmonary compromise by time of discharge.   Education Provided: Reviewed results and recommendations of MBSS with patient following. Discussed recommendations and reviewed POC at time of assessment. Verbal understanding confirmed with agreement of all information presented being acknowledged by patient.   Treatment Provided Today:  N/A   Additional consult suggested: N/A   Repeat study/ dc plan: TBD       SLP Impressions with Severity Rating: Pt presents with moderate  oropharyngeal dysphagia characterized by reduced bolus formation, swallow delay, decreased TB retraction/PPW contraction and reduced hyolaryngeal elevation/ epiglottic deflection " inconsistently. Oral/vallecular/piriform sinus residue noted with thin/nectar/honey liquids, which cleared partially following spontaneous reswallows. Improved pharyngeal clearance achieved with puree and solid boluses likely due to increased pharyngeal pressure spontaneously generated by patient during deglutition. Laryngeal penetration viewed with thin liquids, however extent of material descending into upper laryngeal vestibule could not be visually confirmed due to clavicular shadowing and body habitus partially obscuring view of glottic/subglottic region. Aspiration not seen, however cannot be excluded as silently occurring due to visual obstruction of airway. No airway entry viewed with nectar/honey liquids or puree/solid boluses. Modification of diet required for safety and energy conservation purposes. Concerned with exacerbation of tachypnea during mastication of course solid boluses.   Strategies attempted- N/A     OUTCOME MEASURES: Functional Oral Intake Scale Functional Oral Intake Scale: Level 5        total oral diet with multiple consistencies, but requires special preparations and compensations     Eating Assessment Tool (EAT-10) EAT 10 not utilized due to patient inability to complete.   Rosenbek's Penetration Aspiration Scale Thin Liquids: 3. PENETRATION with LOW ASPIRATION risk - contrast remains above vocal cords, visible residue   Nectar Thick Liquids: 1. NO ASPIRATION & NO PENETRATION - no aspiration, contrast does not enter airway   Honey Thick Liquids: 1. NO ASPIRATION & NO PENETRATION - no aspiration, contrast does not enter airway   Puree: 1. NO ASPIRATION & NO PENETRATION - no aspiration, contrast does not enter airway   Solids: 1. NO ASPIRATION & NO PENETRATION - no aspiration, contrast does not enter airway   *Anatomical marker not used 2/2 inability to maintain placement due to presence of facial hair.   Speech Therapy section of this report signed by Sharon Seals MACCC/SLP on  9/20/2024 at 11:33 am.   RADIOLOGY FINDINGS: Aspiration with thin liquids. No evidence of aspiration or penetration with thicker consistencies.       No evidence of aspiration.   MACRO: None   Signed by: Freda Fonseca 9/23/2024 8:48 AM Dictation workstation:   [id number]    Electrocardiogram, 12-lead PRN ACS symptoms    Result Date: 9/22/2024  Sinus rhythm with 1st degree AV block with Premature atrial complexes Right bundle branch block Left anterior fascicular block  Bifascicular block  Anteroseptal infarct (cited on or before 23-APR-2024) Abnormal ECG When compared with ECG of 13-SEP-2024 02:49, (unconfirmed) Premature ventricular complexes are no longer Present QT has lengthened Confirmed by Shady Bill ([id number]) on 9/22/2024 10:28:37 AM    Electrocardiogram, 12-lead PRN ACS symptoms    Result Date: 9/22/2024  Sinus rhythm with 1st degree AV block with occasional Premature ventricular complexes and Premature atrial complexes Left axis deviation Right bundle branch block Inferior infarct (cited on or before 04-SEP-2024) Anteroseptal infarct (cited on or before 23-APR-2024) Abnormal ECG Confirmed by Shady Bill ([id number]) on 9/22/2024 9:20:59 AM    Transthoracic Echo (TTE) Limited    Result Date: 9/19/2024   [facility], [address] [place] [address]              Tel [phone] and Fax [phone] TRANSTHORACIC ECHOCARDIOGRAM REPORT  Patient Name:      BRAYAN Key Physician:    [id number] Andrea Bentley MD Study Date:        9/19/2024            Ordering Provider:    [id number] ABAD SCHNEIDER MRN/PID:           [id number]             Fellow: Accession#:        [id number]         Nurse: Date of Birth/Age: 1955 / 69 years Sonographer:          Gustavo Naqvi RDCS Gender:            M                    Additional Staff: Height:            177.80 cm             Admit Date:           9/4/2024 Weight:            126.10 kg            Admission Status:     Inpatient -                                                               Routine BSA / BMI:         2.40 m2 / 39.89      Encounter#:           [id number]                    kg/m2 Blood Pressure:    105/75 mmHg          Department Location:  [facility]                                                               Invasive Study Type:    TRANSTHORACIC ECHO (TTE) LIMITED Diagnosis/ICD: Encounter for screening for cardiovascular disorders-Z13.6 Indication:    SOB and NSVT CPT Code:      Echo Limited-78062; Color Doppler-67593; Doppler Limited-33870 Patient History: Pertinent History: LE Edema, COPD, Dyspnea and HTN. Elevated trop. Study Detail: The following Echo studies were performed: 2D, M-Mode, Doppler and               color flow. Technically challenging study due to body habitus.  PHYSICIAN INTERPRETATION: Left Ventricle: Left ventricular ejection fraction is severely decreased, calculated by Green's biplane at 23%. There is global hypokinesis of the left ventricle with minor regional variations. The left ventricular cavity size is normal. Spectral Doppler shows an impaired relaxation pattern of left ventricular diastolic filling. Left Atrium: The left atrium is normal in size. Right Ventricle: The right ventricle is moderately enlarged. There is mildly reduced right ventricular systolic function. TAPSE 13mm. Right Atrium: The right atrium is normal in size. Aortic Valve: The aortic valve is trileaflet. There is moderate aortic valve cusp calcification. There is no evidence of aortic valve regurgitation. Mitral Valve: The mitral valve is mildly thickened. There is mild to moderate mitral annular calcification. There is no evidence of mitral valve regurgitation. Tricuspid Valve: The tricuspid valve is structurally normal. No evidence of tricuspid regurgitation. The right ventricular systolic pressure is unable to be  estimated. Pulmonic Valve: The pulmonic valve was not assessed. Pulmonic valve regurgitation was not assessed. Pericardium: There is no pericardial effusion noted. Aorta: The aortic root is normal. Systemic Veins: The inferior vena cava appears normal in size, with IVC inspiratory collapse greater than 50%. In comparison to the previous echocardiogram(s): Compared with study dated 3/11/2024, there is RV dilation and reduced function.  CONCLUSIONS:  1. Left ventricular ejection fraction is severely decreased, calculated by Green's biplane at 23%.  2. There is global hypokinesis of the left ventricle with minor regional variations.  3. Poorly visualized anatomical structures due to suboptimal image quality.  4. Spectral Doppler shows an impaired relaxation pattern of left ventricular diastolic filling.  5. There is mildly reduced right ventricular systolic function.  6. Moderately enlarged right ventricle.  7. There is moderate aortic valve cusp calcification. The was no assesment of aortic stenosis in this limited study.  8. Compared with study dated 3/11/2024, there is RV dilation and reduced function. QUANTITATIVE DATA SUMMARY:  2D MEASUREMENTS:          Normal Ranges: LVEDV Index:     81 ml/m2  LV SYSTOLIC FUNCTION BY 2D PLANIMETRY (MOD):                      Normal Ranges: EF-A4C View:    21 % (>=55%) EF-A2C View:    24 % EF-Biplane:     23 % LV EF Reported: 23 %  RIGHT VENTRICLE: RV s' 0.09 m/s  TRICUSPID VALVE/RVSP:         Normal Ranges: Est. RA Pressure:     3 mmHg IVC Diam:             2.10 cm  [id number] Andrea Bentley MD Electronically signed on 9/19/2024 at 5:31:57 PM  ** Final **     Electrocardiogram, 12-lead PRN ACS symptoms    Result Date: 9/18/2024  Sinus rhythm with 1st degree AV block with Premature atrial complexes with Aberrant conduction Right bundle branch block Left anterior fascicular block  Bifascicular block  Anteroseptal infarct (cited on or before 23-APR-2024) Abnormal ECG Confirmed by  Triston Moore ([id number]) on 9/18/2024 10:19:51 AM    XR chest 1 view    Result Date: 9/17/2024  Interpreted By:  Ishaan Vernon, STUDY: XR CHEST 1 VIEW;  9/17/2024 5:41 am   INDICATION: Signs/Symptoms:Increase O2 requirements. Tnx..   COMPARISON: CT scan from 09/04/2024.   ACCESSION NUMBER(S): [id number]   ORDERING CLINICIAN: PARISA ZARAGOZA   TECHNIQUE: Single AP portable view of the chest was obtained.   FINDINGS: MEDIASTINUM/ LUNGS/ ARELIS:   Stable cardiomegaly. No gross vascular congestion. Small stable left pleural effusion. Medial left basilar atelectasis/infiltrate, stable to slightly progressed. Pleural and parenchymal opacities at the right base, mildly progressed. No pneumothorax. No tracheal deviation. No abnormal hilar fullness or gross mass on either side.   BONES: No lytic or blastic destructive bone lesion.   UPPER ABDOMEN: Grossly intact.       Cardiomegaly.   Stable small left pleural effusion with left basilar atelectasis/pneumonia, stable to slightly progressed.   Pleural and parenchymal opacities at the right base, mildly progressed.   MACRO: None   Signed by: Ishaan Vernon 9/17/2024 8:16 AM Dictation workstation:   [id number]    ECG 12 lead    Result Date: 9/15/2024  Sinus rhythm with 1st degree AV block Left axis deviation Right bundle branch block Inferior infarct (cited on or before 04-SEP-2024) Abnormal ECG When compared with ECG of 04-SEP-2024 16:24, Premature ventricular complexes are no longer Present Right bundle branch block is now Present Confirmed by Dimitri Murillo ([id number]) on 9/15/2024 2:23:29 PM    XR chest 1 view    Result Date: 9/13/2024  Interpreted By:  Tania Knowles, STUDY: XR CHEST 1 VIEW;  9/13/2024 4:11 pm   INDICATION: Signs/Symptoms:post chest tube removal.   COMPARISON: 09/13/2024   ACCESSION NUMBER(S): [id number]   ORDERING CLINICIAN: RODOLFO LOPEZ   FINDINGS: The study is limited due to patient's body habitus and poor inspiration previously seen right-sided chest  tube has been removed. No gross pneumothorax is noted.   Blunting of both costophrenic angles is seen, which most likely is related to pleural effusions. The heart is enlarged. Bibasilar streaky densities are seen.       No gross pneumothorax.   Bilateral pleural effusions.   Cardiomegaly.   Bibasilar streaky densities, which may be related to atelectasis or infiltrates.       MACRO: None   Signed by: Tania Knowles 9/13/2024 4:40 PM Dictation workstation:   [id number]    XR chest 1 view    Result Date: 9/13/2024  Interpreted By:  Rosa Hemphill, STUDY: XR CHEST 1 VIEW;  9/13/2024 4:31 am   INDICATION: Signs/Symptoms:CT in place.   COMPARISON: Multiple prior studies, the most recent 09/12/2024   ACCESSION NUMBER(S): [id number]   ORDERING CLINICIAN: RODOLFO LOPEZ   FINDINGS:     CARDIOMEDIASTINAL SILHOUETTE: Stable enlarged cardiac silhouette.   LUNGS: Stable position of pigtail catheter overlying the right lung base. There is improved aeration of the right lung base. Mild residual hazy right basilar opacity, likely combination of small residual pleural effusion and basilar consolidation. Limited evaluation of the left lung base due to underpenetration. This is not significantly changed and left pleural effusion or basilar consolidation not excluded. No pneumothorax.   ABDOMEN: No remarkable upper abdominal findings.   BONES: No acute osseous abnormality.       Improved aeration of the right lung base. Residual hazy right basilar opacities likely a combination of residual pleural effusion and basilar atelectasis.   Limited evaluation of the left lung base due to underpenetration.   MACRO: None   Signed by: Rosa Hemphill 9/13/2024 4:48 AM Dictation workstation:   [id number]    XR chest 1 view    Result Date: 9/12/2024  Interpreted By:  Donte Shoemaker, STUDY: XR CHEST 1 VIEW; 9/12/2024 5:23 am   INDICATION: Signs/Symptoms:CT in place.   COMPARISON: None   ACCESSION NUMBER(S): [id number]   ORDERING CLINICIAN:  RODOLFO LOPEZ   TECHNIQUE: 1 view of the chest was performed.   FINDINGS: Right pigtail catheter in similar location. Stable layering bilateral pleural effusion with surrounding atelectasis. Stable cardiomegaly. Bilateral shoulder joint degenerative changes.       1. No significant interval change from 09/11/2024 chest x-ray.   Signed by: Donte Shoemaker 9/12/2024 8:18 AM Dictation workstation:   [id number]    XR chest 1 view    Result Date: 9/11/2024  Interpreted By:  Klaus Villafana, STUDY: XR CHEST 1 VIEW; 9/11/2024 5:42 am   INDICATION: Signs/Symptoms:CT in place   COMPARISON: Radiographs 09/10/2024   ACCESSION NUMBER(S): [id number]   ORDERING CLINICIAN: RODOLFO LOPEZ   TECHNIQUE: Single frontal view of the chest performed.   FINDINGS: LINES AND DEVICES: Remote endotracheal tube. Stable right pigtail pleural catheter near right lung base.   LUNGS: Stable hazy opacities at the lung base and additional airspace opacities extending to right mid lung, stable to mildly increased. No pneumothorax. Left lung is clear.   CARDIOMEDIASTINAL SILHOUETTE: Stable cardiomegaly.       Stable to slightly increased right pleural effusion with atelectasis. Stable right pleural catheter.   MACRO None   Signed by: Klaus Villafana 9/11/2024 8:21 AM Dictation workstation:   [id number]    XR chest 1 view    Result Date: 9/10/2024  Interpreted By:  Ishaan Vernon, STUDY: XR CHEST 1 VIEW;  9/10/2024 5:34 am   INDICATION: Signs/Symptoms:Eval chest tube placement, Rt effusion.   COMPARISON: CT scan chest from 09/04/2024. Chest x-ray from 09/09/2024.   ACCESSION NUMBER(S): [id number]   ORDERING CLINICIAN: RICH KAPLAN   TECHNIQUE: Single AP portable view of the chest was obtained.   FINDINGS: MEDIASTINUM/ LUNGS/ ARELIS: Previous ET tube has been removed. There is an NG tube in place with distal port just beyond the GE junction and distal tip in the proximal stomach. Pigtail pleural catheter at the right base is stable. There is a  small grossly stable lateral right basilar pneumothorax. There is mild bibasilar atelectasis. Cardiomegaly without gross vascular congestion. No pneumothorax on the left. No tracheal deviation. No abnormal hilar fullness or gross mass on either side.   BONES: No lytic or blastic destructive bone lesion.   UPPER ABDOMEN: Grossly intact.       Cardiomegaly.   NG tube in place. ET tube has been removed.   Stable pleural drainage catheter at the base of the right chest. Small stable lateral right basilar pneumothorax.   Mild bibasilar atelectasis.   MACRO: None   Signed by: Ishaan Vernon 9/10/2024 8:13 AM Dictation workstation:   [id number]    XR chest 1 view    Result Date: 9/9/2024  Interpreted By:  Daisy Sosa, STUDY: XR CHEST 1 VIEW;  9/9/2024 4:39 pm   INDICATION: Signs/Symptoms:s/p lytic therapy.     COMPARISON: 5:21 a.m. the same day   ACCESSION NUMBER(S): [id number]   ORDERING CLINICIAN: RODOLFO LOPEZ   FINDINGS: ETT, NG tube and right lower chest pigtail catheter remain in place. Additional presumed overlying monitoring/support devices again seen. Small right basilar pneumothorax decreased in size. Persistent bibasilar opacities. The cardiomediastinal silhouette remains enlarged.       Decreased size of right basilar pneumothorax since 5:21 a.m. the same day with pigtail catheter in place.   MACRO: None.   Signed by: Daisy Sosa 9/9/2024 4:44 PM Dictation workstation:   [id number]    XR chest 1 view    Result Date: 9/9/2024  Interpreted By:  Klaus Villafana, STUDY: XR CHEST 1 VIEW; 9/9/2024 5:32 am   INDICATION: Signs/Symptoms:Eval chest tube placement, Rt effusion   COMPARISON: Radiographs 09/08/2024   ACCESSION NUMBER(S): [id number]   ORDERING CLINICIAN: RICH KAPLAN   TECHNIQUE: Single frontal view of the chest performed.   FINDINGS: LINES AND DEVICES: The pigtail of right-sided pleural drainage catheter overlies the right infrahilar region, grossly unchanged. Tip of ETT approximately 6.6 cm  above the kathie, previously 7.7 cm. NG tube courses below the diaphragm, limited assessment.   LUNGS: Moderate-size right-sided hydropneumothorax appears grossly unchanged. Stable to slightly increased interstitial opacities on the left. Question new trace left pleural effusion with atelectasis.   CARDIOMEDIASTINAL SILHOUETTE: Stable cardiomegaly.       Right-sided infrahilar pigtail catheter and moderate hydropneumothorax, grossly unchanged.   Suspected worsening CHF with stable to mildly increased interstitial edema and possible new trace left pleural effusion.   MACRO None   Signed by: Klaus Villafana 9/9/2024 8:17 AM Dictation workstation:   [id number]    XR chest 1 view    Result Date: 9/8/2024  Interpreted By:  Corrina Reyes, STUDY: XR CHEST 1 VIEW;  9/8/2024 6:39 am   INDICATION: Signs/Symptoms:Eval chest tube placement, Rt effusion.     COMPARISON: 09/07/2024   ACCESSION NUMBER(S): [id number]   ORDERING CLINICIAN: RICH KAPLAN   TECHNIQUE: Portable AP semi upright   FINDINGS: Endotracheal tube tip projects between 7 and 8 cm above the kathie. Nasogastric tube is no longer identified. Right pleural pigtail catheter projects at mid right hemithorax and is similar in position to the prior study. Relative volume of basilar pneumothorax and pleural fluid appears similar to the prior study. Right lower lobe and right middle lobe are collapsed. Right upper lobe variation is similar to the prior study. Left lung shows some vascular congestion. Minimal atelectasis is present at the left lung base. No change in the osseous structures is noted.       Basilar pneumothorax on the right appears similar to the prior study in the relative volume of pleural fluid and pneumothorax appears unchanged.   Collapse of the right lower lobe and right middle lobe   Pulmonary vascular congestion is most notable in the left lung   MACRO: None.   Signed by: Corrina Reyes 9/8/2024 12:32 PM Dictation workstation:    [id number]    XR chest 1 view    Result Date: 9/8/2024  Interpreted By:  Corrina Reyes, STUDY: XR CHEST 1 VIEW;  9/7/2024 5:28 pm   INDICATION: Signs/Symptoms:Eval chest tube placement, Rt pleural effusion.     COMPARISON: 09/06/2024   ACCESSION NUMBER(S): [id number]   ORDERING CLINICIAN: RICH KAPLAN   TECHNIQUE: Portable AP upright   FINDINGS: Endotracheal tube tip projects about 6 cm above the kathie. NG tube extends below the diaphragm and beyond the image. Right pleural pigtail catheter projects at mid right hemithorax and appears unchanged in position.   Pneumothorax at the right thoracic base appears larger than on the prior study. The amount of pleural fluid appears decreased. Mild atelectasis left lung base is slightly improved. Heart is unchanged in size. No changes noted in the osseous structures.       Right pleural effusion is decreased from the prior study but the right basilar pneumothorax appears increased.   MACRO: None.   Signed by: Corrina Reyes 9/8/2024 12:30 PM Dictation workstation:   [id number]    Electrocardiogram, 12-lead PRN ACS symptoms    Result Date: 9/6/2024  Sinus rhythm with 1st degree AV block with Premature atrial complexes Left axis deviation Right bundle branch block Inferior infarct (cited on or before 04-SEP-2024) Anterior infarct , age undetermined T wave abnormality, consider lateral ischemia Abnormal ECG When compared with ECG of 05-SEP-2024 10:29, (unconfirmed) Premature atrial complexes are now Present Confirmed by Shady Bill ([id number]) on 9/6/2024 1:59:56 PM    XR chest 1 view    Result Date: 9/6/2024  Interpreted By:  Eloisa Landa, STUDY: XR CHEST 1 VIEW 9/6/2024 9:05 am   INDICATION: Signs/Symptoms:R pleural effusion   COMPARISON: 09/05/2024   ACCESSION NUMBER(S): [id number]   ORDERING CLINICIAN: PERRI DELACRUZ   TECHNIQUE: AP view of the chest at bedside in the erect position   FINDINGS: A right chest tube is once again noted within the mid right hemithorax  with a stable right hydropneumothorax seen within the mid and lower right hemithorax.   The tip of the endotracheal tube remains satisfactorily positioned at a distance 3 cm above kathie with nasogastric tube descending below diaphragm.   There is small left pleural effusion and left basilar atelectasis. The heart remains enlarged.       No change in the right hydropneumothorax which may be loculated with chest tube on the right in the mid hemithorax.   Small left pleural effusion with left basilar infiltrate/atelectasis.   Signed by: Eloisa Landa 9/6/2024 9:20 AM Dictation workstation:   [id number]    ECG 12 lead    Result Date: 9/5/2024  Sinus rhythm with 1st degree AV block with frequent Premature ventricular complexes Left axis deviation Left ventricular hypertrophy with QRS widening and repolarization abnormality ( R in aVL , Faheem product ) Inferior infarct , age undetermined Abnormal ECG When compared with ECG of 23-APR-2024 09:01, Premature ventricular complexes are now Present CT interval has increased (RBBB and left anterior fascicular block) is no longer Present Inferior infarct is now Present Confirmed by Andrea Bentley ([id number]) on 9/5/2024 10:01:05 AM    XR chest 1 view    Result Date: 9/5/2024  Interpreted By:  Saad Cronin, STUDY: XR CHEST 1 VIEW; 9/5/2024 4:57 am   INDICATION: CLINICAL INFORMATION: Signs/Symptoms:Pig tail Chest Tube replacement.   COMPARISON: 09/04/2024   ACCESSION NUMBER(S): [id number]   ORDERING CLINICIAN: ANDRY ABRAHAM   TECHNIQUE: Portable chest one view.   FINDINGS: The cardiac silhouette is quite prominent suggesting cardiomegaly. The tube and line placement is unchanged.  There is a decrease in the right-sided pleural-based density is well as the right-sided parenchymal densities. The pulmonary vasculature is slightly indistinct suggesting mild pulmonary vascular congestion.       There appears to be increased aeration at the right base suggesting diminishing pleural density  within the right hemithorax in association with the chest tube. Infiltrate persists on the right.   MACRO: none   Signed by: Saad Cronin 9/5/2024 8:18 AM Dictation workstation:   [id number]    XR chest 1 view    Result Date: 9/5/2024  Interpreted By:  Cande Keating, STUDY: XR CHEST 1 VIEW;  9/4/2024 11:10 pm   INDICATION: Signs/Symptoms:Chest tube placement.     COMPARISON: Radiographs of the chest dated 09/04/2024; CT of the chest dated 09/04/2024   ACCESSION NUMBER(S): [id number]   ORDERING CLINICIAN: ANDRY ABRAHAM   FINDINGS: AP radiograph of the chest was provided.   Endotracheal tube projects 4.4 cm superior to the kathie. Enteric tube appears to course along the midline expected course of the esophagus, although the distal tip is not well visualized due to underpenetration and overlying structures.   CARDIOMEDIASTINAL SILHOUETTE: Cardiomediastinal silhouette is enlarged, similar to prior exam.   LUNGS: Redemonstration of the large right-sided pleural effusion with associated atelectasis/consolidation, similar in appearance to prior radiographs. No new consolidation or pleural effusion is present in the left lung.   ABDOMEN: No remarkable upper abdominal findings.   BONES: No acute osseous changes.       1.  Enteric tube is coursing along the midline of the mediastinum in the expected course of the esophagus, although the distal tip is not well visualized due to overlying cardiomediastinal silhouette and underpenetration, and may be outside of the field-of-view of the study. Endotracheal tube projects 4.5 cm superior to the kathie. 2. Redemonstration of the large loculated right-sided pleural effusion with the associated atelectasis/consolidation, similar in appearance to prior exam.       MACRO: None   Signed by: Cande Keating 9/5/2024 2:08 AM Dictation workstation:   [id number]    CT chest abdomen pelvis w IV contrast    Result Date: 9/4/2024  Interpreted By:  Ishaan Banda, STUDY: CT  CHEST ABDOMEN PELVIS W IV CONTRAST;  9/4/2024 5:52 pm   INDICATION: Signs/Symptoms:unresponsive.     COMPARISON: None.   ACCESSION NUMBER(S): [id number]   ORDERING CLINICIAN: GURPREET COPPOLA   TECHNIQUE: Contiguous axial images of the chest, abdomen, and pelvis were obtained after the intravenous administration of iodinated contrast. Coronal and sagittal reformatted images were reconstructed from the axial data.   FINDINGS: CT CHEST:   MEDIASTINUM AND LYMPH NODES: NG/OG tube noted with small amount of fluid in the esophagus. The esophageal wall appears within normal limits.  No enlarged intrathoracic or axillary lymph nodes by imaging criteria. No pneumomediastinum.   VESSELS:  Normal caliber thoracic aorta without dissection. Mild aortic atherosclerosis.   HEART: Enlarged. Severe aortic valvular calcifications. Moderate coronary artery calcifications. No significant pericardial effusion.   LUNG, AIRWAYS, PLEURA: There is a large loculated right pleural effusion causing compressive atelectasis on the right lung. As a result, there is complete collapse of the right lower lobe, complete collapse of the right middle lobe, and partial collapse of the right upper lobe. There are multiple loculated compartments largest of which is seen at the right lung base. There is a small amount of mucus in the mid to distal trachea. There is small amount of layering debris in the mainstem bronchi. The majority of the right middle and lower lobe bronchi are collapsed and opacified with low-density debris. There is a trace left pleural effusion with subsegmental left basilar atelectasis. There is emphysema. There are new scattered subcentimeter bilateral centrilobular nodules that are likely infectious/inflammatory in nature from bronchiolitis.   CHEST WALL SOFT TISSUES: No discernible acute abnormality. There is a 5.5 cm x 1.7 cm lipoma in the right infraspinatus muscle.   OSSEOUS STRUCTURES: No acute osseous abnormality.     CT  ABDOMEN/PELVIS:   ABDOMINAL WALL: No significant abnormality.   LIVER: No significant parenchymal abnormality.   BILE DUCTS: No significant intrahepatic or extrahepatic dilatation.   GALLBLADDER: No significant abnormality.   PANCREAS: No significant abnormality.   SPLEEN: No significant abnormality.   ADRENALS: There is a 1.7 cm low-density left adrenal nodule likely representing a adenoma.   KIDNEYS, URETERS, BLADDER: There is a heterogeneously enhancing solid mass in the upper pole the left kidney measuring 2.4 cm x 1.9 cm consistent with renal cell carcinoma. Additionally, a 0.8 cm likely enhancing lesion in the upper pole the right kidney is concerning for renal cell carcinoma. No hydronephrosis or calculi. Urinary bladder is decompressed with Live catheter in place.   REPRODUCTIVE ORGANS: No significant abnormality.   VESSELS: Moderate aortic atherosclerosis without AAA.   RETROPERITONEUM/LYMPH NODES: No acute retroperitoneal abnormality. No enlarged lymph nodes.   BOWEL/MESENTERY/PERITONEUM: Colonic diverticulosis without acute diverticulitis. No inflammatory bowel wall thickening or dilatation. Normal appendix.   No ascites, free air, or fluid collection.     MUSCULOSKELETAL: No acute osseous abnormality.       CT CHEST: Large loculated right pleural effusion causing complete compressive collapse of the right middle lobe and right lower lobe and partial compressive collapse of the right upper lobe. The largest likely compartments located at the inferior right hemithorax. The cause of the effusion is not apparent as there is only a small amount of debris in the distal trachea and right mainstem bronchus but the distal-most right-sided bronchi are occluded due to combination of compressive collapse and small low-density fluid.   Numerous new subcentimeter centrilobular pulmonary nodules that are most likely infectious/inflammatory in as can be seen with bronchiolitis or fungal infection.   Aortic valve  calcification to the extent that would likely result in aortic stenosis.   CT ABDOMEN/PELVIS: Left kidney solid heterogeneously enhancing mass suspicious for renal cell carcinoma (2.4 cm x 1.9 cm) and of suspected 0.8 cm right upper pole renal cell carcinoma measuring 0.8 cm. Recommend urological follow-up/also taken and dedicated MRI kidneys to further evaluate these lesions. YELLOW ALERT: An incidental finding alert was sent per protocol.   No acute abnormality in the abdomen or pelvis.   Colonic diverticulosis without acute diverticulitis.   MACRO: Critical Finding:  New mass in the kidney. Notification was initiated on 9/4/2024 at 6:25 pm by  Ishaan Banda.  (**-YCF-**) Instructions:  MR Abdomen w and wo IV contrast. 1 week.   Signed by: Ishaan Banda 9/4/2024 6:26 PM Dictation workstation:   [id number]    CT head wo IV contrast    Result Date: 9/4/2024  Interpreted By:  Ishaan Banda, STUDY: CT HEAD WO IV CONTRAST;  9/4/2024 5:52 pm   INDICATION: Signs/Symptoms:unrespoonsive.     COMPARISON: None.   ACCESSION NUMBER(S): [id number]   ORDERING CLINICIAN: GURPREET COPPOLA   TECHNIQUE: Noncontrast axial CT images of head were obtained with coronal and sagittal reconstructed images.   FINDINGS: BRAIN PARENCHYMA: Mild periventricular and subcortical hemispheric white matter hypodensities are most compatible with chronic small vessel ischemic disease. No acute intraparenchymal hemorrhage or parenchymal evidence of acute large territory ischemic infarct. Gray-white matter distinction is preserved. No mass-effect.   VENTRICLES and EXTRA-AXIAL SPACES:  No acute extra-axial or intraventricular hemorrhage. No effacement of cerebral sulci. The ventricles and sulci are age-concordant.   PARANASAL SINUSES/MASTOIDS:  No hemorrhage or air-fluid levels within the visualized paranasal sinuses. The mastoids are well aerated.   CALVARIUM/ORBITS:  No skull fracture.  The orbits and globes are intact to the extent  visualized.   EXTRACRANIAL SOFT TISSUES: No discernible abnormality.       No acute intracranial abnormality.     MACRO: None.   Signed by: Ishaan Banda 9/4/2024 6:07 PM Dictation workstation:   [id number]    XR chest 1 view    Result Date: 9/4/2024  Interpreted By:  Rosalie Ellison, STUDY: XR CHEST 1 VIEW;  9/4/2024 4:33 pm   INDICATION: Signs/Symptoms:intubation.   COMPARISON: 04/08/2024   ACCESSION NUMBER(S): [id number]   ORDERING CLINICIAN: GURPREET COPPOLA   TECHNIQUE: A single portable image of the chest was obtained.   FINDINGS: Resolution is limited. The right portion of the chest is not entirely included.   The patient is rotated. The heart size is uncertain.   There appears to be elevated right hemidiaphragm and right-sided infiltration.   An endotracheal tube terminates above the kathie. A nasogastric tube terminates below the diaphragm.   COMPARISON OF FINDING: The nasogastric tube was placed in the interval. The endotracheal tube was placed in the interval.       Interval placement of an endotracheal tube. The tip is above the kathie. Interval placement of a nasogastric tube. It appears to terminate below the diaphragm.   The exam is extremely limited.   MACRO: none   Signed by: Rosalie Ellison 9/4/2024 4:43 PM Dictation workstation:   [id number]          Hospital Course   Assessment & Plan  Acute respiratory failure with hypoxia and hypercapnia (Multi)  67-year-old gentleman with history of chronic diastolic heart failure, COPD, obstructive sleep apnea, obesity, type 2 diabetes mellitus, hypertension, hypothyroidism and hyperlipidemia was admitted with acute respiratory failure with hypoxia and hypercapnia and noted to have a right loculated pleural effusion and bilateral lower lobe lung infiltrate and right hydropneumothorax.  He was hypotensive on admission and required 3 L of fluid to resuscitate and admitted to intensive care unit and was intubated because of acute respiratory failure and remained  intubated for several days.  Currently he is extubated and on high flow oxygen as mentioned above.  He had significant leukocytosis of 22,000 on admission and his sputum culture was positive for stenotrophomonas.  Patient is being continued on IV Zosyn and IV Levaquin, pulmonary and infectious disease on board.  He has had IV antibiotics for 14 days.     1.  Acute hypoxic hypercapnic respiratory failure with hypotension and right hydropneumothorax and bilateral infiltrate and positive for pneumonia.Sputum culture was positive for stenotrophomonas.   Initially intubated for several days and since then he has been extubated and is on Improving and currently is on 10 L nasal cannula.   Discontinued IV antibiotics,  Currently also on tapering dose.  Prednisone which has been decreased to 10 mg daily now.     2.  Acute on chronic systolic and diastolic heart failure with LV ejection fraction of 35% and moderate aortic stenosis and pulmonary hypertension.  Patient is being diuresed with IV Lasix 80 mg , and he is CHF is improved significantly and is and is well compensated now currently edema has improved significantly, changed Lasix to p.o. 80 mg daily.  3.  Patient had hydropneumothorax on admission and had chest tube, chest tube has been removed since then..     4.  Hypotension has resolved blood pressures are 110/70 mmHg     6.  COPD and obstructive sleep apnea.  Aerosol treatment and steroids.  Currently on Prednisone 20 mg daily.,  Reduced to 10 mg daily.  .  7.  Diabetes mellitus type 2, continue sliding scale insulin, patient was on Mounjaro at home prior to admission.     8.  Patient  DNR/DNI.     9.  Hematuria possibly secondary to trauma secondary to Live catheter which has resolved after irrigation.     10.  Recurrent episodes of brief nonsustained ventricular tachycardia, also moderate systolic heart failure, previous echocardiogram revealed LV ejection fraction of 30 to 35% with moderate aortic stenosis,  patient is started on IV Lasix this morning and cardiology consult appreciated and repeat echocardiogram quality was suboptimal he did reveal LV ejection fraction of 23% with global hypokinesia and moderate aortic cusp calcification and moderate right ventricular dysfunction.  Patient responded very well to IV Lasix and has been diuresed and now CHF is compensated.  He is on Jardiance and consider starting GDMT.     11.  Patient had a modified barium swallow and has been recommended to have a puréed diet with honey thickened and nectar thickened liquids.     Nystatin cream to both groin area for inguinal intertrigo.     Reviewed all labs patient's medical record and imaging studies and discussed the plan of treatment with the patient..     Continue PT OT and anticipate possible discharge to LTAC.  Pending authorization.      Stable for discharge to skilled nursing facility today.  I spent 35 minutes in the professional and overall care of this patient.           Pertinent Physical Exam At Time of Discharge    GENERAL:  Alert, no distress, cooperative, obese, depressed and anxious  SKIN:  Skin color, texture, turgor normal. No rashes or lesions.  OROPHARYNX:  Lips, mucosa, and tongue are normal.Teeth and gums, normal. Oropharynx normal.  NECK:  No jugulovenous distention, No carotid bruits, Carotid pulse normal contour,   LUNGS: Improved air exchange, few scattered wheezes, no crackles.  CARDIAC: Rate and rhythm is regular, normal S1 and S2; no rubs, murmurs, or gallops  ABDOMEN:  Abdomen soft, large, non-tender, BS normal, No masses or organomegaly  EXTREMETIES:  Extremities normal, no deformities, trace edema, noclubbing or skin discoloration. Good capillary refill., No ulcers  NEURO:  Alert, oriented X 3, Gait not examined Non-focal. Reflexes normal and symmetric. Sensation grossly intact., Cranial nerves II-XII intact  PULSES: 2+ radial, 2+ carotid      Last Recorded Vitals  Blood pressure 99/74, pulse 95,  "temperature 36.6 °C (97.8 °F), temperature source Oral, resp. rate (!) 30, height 1.803 m (5' 10.98\"), weight 118 kg (260 lb 2.3 oz), SpO2 94%.  Intake/Output last 3 Shifts:  I/O last 3 completed shifts:  In: 840 (7.1 mL/kg) [P.O.:840]  Out: 5200 (44.1 mL/kg) [Urine:5200 (1.2 mL/kg/hr)]  Weight: 118 kg          Outpatient Follow-Up  Future Appointments   Date Time Provider Department Center   10/15/2024  2:30 PM Triston Moore MD [id number]         Noble Gatica MD  "

## 2024-09-30 ENCOUNTER — TELEPHONE (OUTPATIENT)
Dept: CARDIOLOGY | Facility: CLINIC | Age: 69
End: 2024-09-30
Payer: MEDICARE

## 2024-10-11 ENCOUNTER — TELEPHONE (OUTPATIENT)
Dept: CARDIOLOGY | Facility: CLINIC | Age: 69
End: 2024-10-11
Payer: MEDICARE

## 2024-10-15 ENCOUNTER — APPOINTMENT (OUTPATIENT)
Dept: CARDIOLOGY | Facility: HOSPITAL | Age: 69
End: 2024-10-15
Payer: MEDICARE

## 2024-10-21 ENCOUNTER — APPOINTMENT (OUTPATIENT)
Dept: RADIOLOGY | Facility: HOSPITAL | Age: 69
End: 2024-10-21
Payer: MEDICARE

## 2024-10-23 ENCOUNTER — HOME HEALTH ADMISSION (OUTPATIENT)
Dept: HOME HEALTH SERVICES | Facility: HOME HEALTH | Age: 69
End: 2024-10-23
Payer: MEDICARE

## 2024-10-23 PROBLEM — L72.3 SEBACEOUS CYST: Status: ACTIVE | Noted: 2018-04-02

## 2024-10-23 PROBLEM — B96.89 ACUTE BACTERIAL BRONCHITIS: Status: ACTIVE | Noted: 2024-10-23

## 2024-10-23 PROBLEM — G47.30 SLEEP APNEA: Status: ACTIVE | Noted: 2021-11-09

## 2024-10-23 PROBLEM — R26.9 GAIT DIFFICULTY: Status: ACTIVE | Noted: 2024-10-23

## 2024-10-23 PROBLEM — L82.0 INFLAMED SEBORRHEIC KERATOSIS: Status: ACTIVE | Noted: 2018-04-02

## 2024-10-23 PROBLEM — J42 CHRONIC BRONCHITIS (MULTI): Status: ACTIVE | Noted: 2024-10-23

## 2024-10-23 PROBLEM — D75.1 POLYCYTHEMIA: Status: ACTIVE | Noted: 2024-10-23

## 2024-10-23 PROBLEM — R93.1 ELEVATED CORONARY ARTERY CALCIUM SCORE: Status: ACTIVE | Noted: 2024-04-23

## 2024-10-23 PROBLEM — J20.8 ACUTE BACTERIAL BRONCHITIS: Status: ACTIVE | Noted: 2024-10-23

## 2024-10-23 PROBLEM — K13.79 PAINFUL MOUTH: Status: ACTIVE | Noted: 2024-10-23

## 2024-10-23 PROBLEM — K76.0 STEATOSIS OF LIVER: Status: ACTIVE | Noted: 2024-10-23

## 2024-10-23 PROBLEM — M54.50 LOW BACK PAIN WITH RADIATION: Status: ACTIVE | Noted: 2024-10-23

## 2024-10-23 PROBLEM — J30.1 HAYFEVER: Status: ACTIVE | Noted: 2021-11-09

## 2024-10-23 PROBLEM — M25.559 ARTHRALGIA OF HIP: Status: ACTIVE | Noted: 2024-10-23

## 2024-10-23 PROBLEM — J31.0 RHINITIS MEDICAMENTOSA: Status: ACTIVE | Noted: 2024-10-23

## 2024-10-23 PROBLEM — K13.70 DISEASE OF THE ORAL SOFT TISSUES: Status: ACTIVE | Noted: 2024-10-23

## 2024-10-23 PROBLEM — T48.5X5A RHINITIS MEDICAMENTOSA: Status: ACTIVE | Noted: 2024-10-23

## 2024-10-23 PROBLEM — K21.9 ACID REFLUX DISEASE: Status: ACTIVE | Noted: 2024-10-23

## 2024-10-23 PROBLEM — M79.10 MYALGIA: Status: ACTIVE | Noted: 2024-10-23

## 2024-10-23 PROBLEM — E78.2 MIXED HYPERLIPIDEMIA: Status: ACTIVE | Noted: 2021-11-09

## 2024-10-23 PROBLEM — E66.9 OBESITY: Status: ACTIVE | Noted: 2021-11-09

## 2024-10-23 PROBLEM — R06.02 SOB (SHORTNESS OF BREATH) ON EXERTION: Status: ACTIVE | Noted: 2024-04-23

## 2024-10-23 PROBLEM — K59.01 SLOW TRANSIT CONSTIPATION: Status: ACTIVE | Noted: 2024-10-23

## 2024-10-23 PROBLEM — E03.9 HYPOTHYROIDISM: Status: ACTIVE | Noted: 2021-11-09

## 2024-10-23 PROBLEM — J44.9 CHRONIC OBSTRUCTIVE PULMONARY DISEASE (MULTI): Status: ACTIVE | Noted: 2021-11-09

## 2024-10-23 PROBLEM — M79.661 RIGHT CALF PAIN: Status: ACTIVE | Noted: 2024-10-23

## 2024-10-23 PROBLEM — L21.8 OTHER SEBORRHEIC DERMATITIS: Status: ACTIVE | Noted: 2018-04-02

## 2024-10-23 PROBLEM — B35.6 TINEA CRURIS: Status: ACTIVE | Noted: 2024-10-23

## 2024-10-23 PROBLEM — R73.02 GLUCOSE INTOLERANCE (IMPAIRED GLUCOSE TOLERANCE): Status: ACTIVE | Noted: 2024-10-23

## 2024-10-23 PROBLEM — K58.9 SPASM OF BOWEL: Status: ACTIVE | Noted: 2024-10-23

## 2024-10-23 PROBLEM — I71.20 THORACIC AORTIC ANEURYSM (CMS-HCC): Status: ACTIVE | Noted: 2024-10-23

## 2024-10-23 PROBLEM — M10.9 GOUT: Status: ACTIVE | Noted: 2021-11-09

## 2024-10-23 PROBLEM — L91.8 OTHER HYPERTROPHIC DISORDERS OF THE SKIN: Status: ACTIVE | Noted: 2018-04-02

## 2024-10-23 PROBLEM — F17.200 SMOKER: Status: ACTIVE | Noted: 2021-11-09

## 2024-10-23 PROBLEM — F17.200 NICOTINE DEPENDENCE: Status: ACTIVE | Noted: 2024-10-23

## 2024-10-23 PROBLEM — M10.9 ARTHRITIS, GOUTY: Status: ACTIVE | Noted: 2024-10-23

## 2024-10-23 PROBLEM — R07.89 ATYPICAL CHEST PAIN: Status: ACTIVE | Noted: 2024-10-23

## 2024-10-23 RX ORDER — GABAPENTIN 100 MG/1
CAPSULE ORAL
COMMUNITY
Start: 2024-10-19

## 2024-10-23 RX ORDER — AMOXICILLIN 500 MG/1
TABLET, FILM COATED ORAL
COMMUNITY
Start: 2024-10-19

## 2024-10-24 ENCOUNTER — DOCUMENTATION (OUTPATIENT)
Dept: HOME HEALTH SERVICES | Facility: HOME HEALTH | Age: 69
End: 2024-10-24
Payer: MEDICARE

## 2024-10-24 NOTE — HH CARE COORDINATION
Home Care received a Referral for Nursing, Physical Therapy, and Occupational Therapy. We have processed the referral for a Start of Care on 10/26-10/27.     If you have any questions or concerns, please feel free to contact us at [phone]. Follow the prompts, enter your five digit zip code, and you will be directed to your care team on [facility].

## 2024-10-27 ENCOUNTER — HOME CARE VISIT (OUTPATIENT)
Dept: HOME HEALTH SERVICES | Facility: HOME HEALTH | Age: 69
End: 2024-10-27
Payer: MEDICARE

## 2024-10-27 VITALS
DIASTOLIC BLOOD PRESSURE: 52 MMHG | RESPIRATION RATE: 18 BRPM | OXYGEN SATURATION: 94 % | HEART RATE: 54 BPM | SYSTOLIC BLOOD PRESSURE: 102 MMHG | TEMPERATURE: 99 F

## 2024-10-27 PROCEDURE — G0299 HHS/HOSPICE OF RN EA 15 MIN: HCPCS

## 2024-10-27 ASSESSMENT — ENCOUNTER SYMPTOMS
LOWER EXTREMITY EDEMA: 1
LAST BOWEL MOVEMENT: 67140
PERSON REPORTING PAIN: PATIENT
CHANGE IN APPETITE: UNCHANGED
APPETITE LEVEL: GOOD
DENIES PAIN: 1
CONSTIPATION: 1
MUSCLE WEAKNESS: 1

## 2024-10-27 ASSESSMENT — LIFESTYLE VARIABLES: SMOKING_STATUS: 1

## 2024-10-27 ASSESSMENT — ACTIVITIES OF DAILY LIVING (ADL)
OASIS_M1830: 03
ENTERING_EXITING_HOME: MODERATE ASSIST

## 2024-10-28 ENCOUNTER — HOME CARE VISIT (OUTPATIENT)
Dept: HOME HEALTH SERVICES | Facility: HOME HEALTH | Age: 69
End: 2024-10-28
Payer: MEDICARE

## 2024-10-28 VITALS
HEART RATE: 66 BPM | RESPIRATION RATE: 16 BRPM | TEMPERATURE: 98.5 F | DIASTOLIC BLOOD PRESSURE: 70 MMHG | SYSTOLIC BLOOD PRESSURE: 128 MMHG | OXYGEN SATURATION: 93 %

## 2024-10-28 PROCEDURE — G0151 HHCP-SERV OF PT,EA 15 MIN: HCPCS

## 2024-10-28 SDOH — HEALTH STABILITY: PHYSICAL HEALTH: EXERCISE COMMENTS: B HIPS 2+, B KNEES 3+

## 2024-10-28 ASSESSMENT — ENCOUNTER SYMPTOMS
PAIN: 1
PERSON REPORTING PAIN: PATIENT
MUSCLE WEAKNESS: 1

## 2024-10-28 ASSESSMENT — ACTIVITIES OF DAILY LIVING (ADL)
CURRENT_FUNCTION: TWO PERSON
PHYSICAL TRANSFERS ASSESSED: 1
AMBULATION_DISTANCE/DURATION_TOLERATED: NONAMBULATORY

## 2024-10-29 ENCOUNTER — APPOINTMENT (OUTPATIENT)
Dept: CARDIOLOGY | Facility: HOSPITAL | Age: 69
End: 2024-10-29
Payer: MEDICARE

## 2024-10-29 ENCOUNTER — HOME CARE VISIT (OUTPATIENT)
Dept: HOME HEALTH SERVICES | Facility: HOME HEALTH | Age: 69
End: 2024-10-29
Payer: MEDICARE

## 2024-10-29 PROCEDURE — G0152 HHCP-SERV OF OT,EA 15 MIN: HCPCS

## 2024-10-30 ENCOUNTER — APPOINTMENT (OUTPATIENT)
Dept: PRIMARY CARE | Facility: CLINIC | Age: 69
End: 2024-10-30
Payer: MEDICARE

## 2024-10-30 VITALS — OXYGEN SATURATION: 93 % | DIASTOLIC BLOOD PRESSURE: 65 MMHG | SYSTOLIC BLOOD PRESSURE: 124 MMHG | HEART RATE: 54 BPM

## 2024-10-30 SDOH — ECONOMIC STABILITY: HOUSING INSECURITY: EVIDENCE OF SMOKING MATERIAL: 0

## 2024-10-30 SDOH — HEALTH STABILITY: MENTAL HEALTH: SMOKING IN HOME: 0

## 2024-10-30 SDOH — ECONOMIC STABILITY: HOUSING INSECURITY
HOME SAFETY: HOME REPAIRS BEING MADE AT THIS TIME WHICH MAY IMPROVE HOME /ENVT SAFETY ESP IN BATHROOM   ROUGH FLOORING WITH AREA CARPETS ON AT THIS TIME WHICH HAS BEEN FOR SOME TIME AS WELL

## 2024-10-30 ASSESSMENT — ENCOUNTER SYMPTOMS
HIGHEST PAIN SEVERITY IN PAST 24 HOURS: 1/10
PERSON REPORTING PAIN: PATIENT
PAIN LOCATION: RIGHT ARM
SUBJECTIVE PAIN PROGRESSION: WAXING AND WANING
PAIN: 1
LOWEST PAIN SEVERITY IN PAST 24 HOURS: 0/10

## 2024-10-30 ASSESSMENT — ACTIVITIES OF DAILY LIVING (ADL)
PREPARING MEALS: NEEDS ASSISTANCE
BATHING_CURRENT_FUNCTION: MAXIMUM ASSIST
LIGHT HOUSEKEEPING: NEEDS ASSISTANCE
GROOMING ASSESSED: 1
FEEDING ASSESSED: 1
DRESSING_UB_CURRENT_FUNCTION: STAND BY ASSIST
BATHING ASSESSED: 1
TOILETING: 1
HOUSEKEEPING ASSESSED: 1
FEEDING: INDEPENDENT
GROOMING_CURRENT_FUNCTION: STAND BY ASSIST
TOILETING: MODERATE ASSIST
DRESSING_LB_CURRENT_FUNCTION: MODERATE ASSIST
TOILETING: MAXIMUM ASSIST
BATHING_CURRENT_FUNCTION: TWO PERSON
FEEDING EQUIPMENT USED: SET UP

## 2024-10-31 ENCOUNTER — HOME CARE VISIT (OUTPATIENT)
Dept: HOME HEALTH SERVICES | Facility: HOME HEALTH | Age: 69
End: 2024-10-31
Payer: MEDICARE

## 2024-10-31 VITALS
OXYGEN SATURATION: 91 % | TEMPERATURE: 98.2 F | DIASTOLIC BLOOD PRESSURE: 66 MMHG | RESPIRATION RATE: 16 BRPM | HEART RATE: 53 BPM | SYSTOLIC BLOOD PRESSURE: 110 MMHG

## 2024-10-31 PROCEDURE — G0151 HHCP-SERV OF PT,EA 15 MIN: HCPCS

## 2024-10-31 SDOH — ECONOMIC STABILITY: HOUSING INSECURITY: EVIDENCE OF SMOKING MATERIAL: 1

## 2024-10-31 SDOH — HEALTH STABILITY: PHYSICAL HEALTH
EXERCISE COMMENTS: SITTING ANKLE PUMPS 15 REPS, LAQ 10 REPS, HIP FLEXION AND HIP ABDUCTION PATIENT STARTED BUT STOPPED DUE TO FOLEY PINCHING HIM. INSTRUCTED PATIENT TO DO EXERCISES 2X A DAY.

## 2024-10-31 SDOH — HEALTH STABILITY: MENTAL HEALTH: SMOKING IN HOME: 1

## 2024-10-31 ASSESSMENT — ACTIVITIES OF DAILY LIVING (ADL)
AMBULATION ASSISTANCE: NON-AMBULATORY
AMBULATION_DISTANCE/DURATION_TOLERATED: NONAMBULATORY

## 2024-10-31 ASSESSMENT — ENCOUNTER SYMPTOMS
PERSON REPORTING PAIN: PATIENT
DENIES PAIN: 1
MUSCLE WEAKNESS: 1

## 2024-11-02 ENCOUNTER — HOME CARE VISIT (OUTPATIENT)
Dept: HOME HEALTH SERVICES | Facility: HOME HEALTH | Age: 69
End: 2024-11-02
Payer: MEDICARE

## 2024-11-02 PROCEDURE — G0158 HHC OT ASSISTANT EA 15: HCPCS | Mod: CO

## 2024-11-03 VITALS
TEMPERATURE: 97.8 F | DIASTOLIC BLOOD PRESSURE: 78 MMHG | OXYGEN SATURATION: 97 % | RESPIRATION RATE: 19 BRPM | HEART RATE: 92 BPM | SYSTOLIC BLOOD PRESSURE: 107 MMHG

## 2024-11-05 ENCOUNTER — APPOINTMENT (OUTPATIENT)
Dept: CARDIOLOGY | Facility: HOSPITAL | Age: 69
End: 2024-11-05
Payer: MEDICARE

## 2024-11-05 ENCOUNTER — HOME CARE VISIT (OUTPATIENT)
Dept: HOME HEALTH SERVICES | Facility: HOME HEALTH | Age: 69
End: 2024-11-05
Payer: MEDICARE

## 2024-11-05 VITALS
BODY MASS INDEX: 31.26 KG/M2 | DIASTOLIC BLOOD PRESSURE: 62 MMHG | WEIGHT: 224 LBS | TEMPERATURE: 97.2 F | RESPIRATION RATE: 16 BRPM | SYSTOLIC BLOOD PRESSURE: 92 MMHG | HEART RATE: 104 BPM | OXYGEN SATURATION: 94 %

## 2024-11-05 VITALS
RESPIRATION RATE: 16 BRPM | SYSTOLIC BLOOD PRESSURE: 92 MMHG | HEART RATE: 107 BPM | DIASTOLIC BLOOD PRESSURE: 62 MMHG | OXYGEN SATURATION: 94 %

## 2024-11-05 PROCEDURE — G0300 HHS/HOSPICE OF LPN EA 15 MIN: HCPCS

## 2024-11-05 PROCEDURE — G0151 HHCP-SERV OF PT,EA 15 MIN: HCPCS

## 2024-11-05 SDOH — HEALTH STABILITY: PHYSICAL HEALTH: EXERCISE COMMENTS: SITTING WITH BLUE THERABAND LAQ, HIP ABDUCTION, KNEE FLEXION, HIP FLEXION 10 REPS

## 2024-11-05 ASSESSMENT — ENCOUNTER SYMPTOMS
DENIES PAIN: 1
PERSON REPORTING PAIN: PATIENT
MUSCLE WEAKNESS: 1

## 2024-11-05 ASSESSMENT — ACTIVITIES OF DAILY LIVING (ADL)
CURRENT_FUNCTION: TWO PERSON
PHYSICAL TRANSFERS ASSESSED: 1
AMBULATION ASSISTANCE: 1
AMBULATION ASSISTANCE ON FLAT SURFACES: 1
AMBULATION ASSISTANCE: TWO PERSON

## 2024-11-06 ENCOUNTER — HOME CARE VISIT (OUTPATIENT)
Dept: HOME HEALTH SERVICES | Facility: HOME HEALTH | Age: 69
End: 2024-11-06
Payer: MEDICARE

## 2024-11-06 VITALS
HEART RATE: 75 BPM | TEMPERATURE: 97.5 F | DIASTOLIC BLOOD PRESSURE: 74 MMHG | RESPIRATION RATE: 19 BRPM | OXYGEN SATURATION: 97 % | SYSTOLIC BLOOD PRESSURE: 107 MMHG

## 2024-11-06 PROCEDURE — G0158 HHC OT ASSISTANT EA 15: HCPCS | Mod: CO

## 2024-11-07 ENCOUNTER — HOME CARE VISIT (OUTPATIENT)
Dept: HOME HEALTH SERVICES | Facility: HOME HEALTH | Age: 69
End: 2024-11-07
Payer: MEDICARE

## 2024-11-07 VITALS
SYSTOLIC BLOOD PRESSURE: 110 MMHG | HEART RATE: 87 BPM | OXYGEN SATURATION: 92 % | WEIGHT: 261 LBS | BODY MASS INDEX: 36.42 KG/M2 | TEMPERATURE: 97.9 F | DIASTOLIC BLOOD PRESSURE: 60 MMHG

## 2024-11-07 PROCEDURE — G0151 HHCP-SERV OF PT,EA 15 MIN: HCPCS

## 2024-11-07 SDOH — ECONOMIC STABILITY: HOUSING INSECURITY: EVIDENCE OF SMOKING MATERIAL: 1

## 2024-11-07 SDOH — HEALTH STABILITY: PHYSICAL HEALTH: EXERCISE COMMENTS: NO EXERCISES DUE TO FOCUS ON TRANSFERS AND WALKING

## 2024-11-07 SDOH — HEALTH STABILITY: MENTAL HEALTH: SMOKING IN HOME: 1

## 2024-11-07 ASSESSMENT — ACTIVITIES OF DAILY LIVING (ADL)
CURRENT_FUNCTION: MINIMUM ASSIST
AMBULATION ASSISTANCE: 1
AMBULATION ASSISTANCE: MINIMUM ASSIST
AMBULATION ASSISTANCE: TWO PERSON
PHYSICAL TRANSFERS ASSESSED: 1
AMBULATION ASSISTANCE ON FLAT SURFACES: 1
AMBULATION ASSISTANCE: 1
AMBULATION_DISTANCE/DURATION_TOLERATED: 20 FEET X 2

## 2024-11-07 ASSESSMENT — ENCOUNTER SYMPTOMS
MUSCLE WEAKNESS: 1
CHANGE IN APPETITE: UNCHANGED
PERSON REPORTING PAIN: PATIENT
LOWER EXTREMITY EDEMA: 1
DENIES PAIN: 1
DENIES PAIN: 1

## 2024-11-08 ENCOUNTER — HOME CARE VISIT (OUTPATIENT)
Dept: HOME HEALTH SERVICES | Facility: HOME HEALTH | Age: 69
End: 2024-11-08
Payer: MEDICARE

## 2024-11-08 VITALS
OXYGEN SATURATION: 98 % | HEART RATE: 67 BPM | SYSTOLIC BLOOD PRESSURE: 107 MMHG | RESPIRATION RATE: 19 BRPM | DIASTOLIC BLOOD PRESSURE: 81 MMHG | TEMPERATURE: 97.5 F

## 2024-11-08 DIAGNOSIS — Z00.00 HEALTH CARE MAINTENANCE: Primary | ICD-10-CM

## 2024-11-08 DIAGNOSIS — E11.9 TYPE 2 DIABETES MELLITUS WITHOUT COMPLICATION, WITHOUT LONG-TERM CURRENT USE OF INSULIN (MULTI): ICD-10-CM

## 2024-11-08 PROCEDURE — G0158 HHC OT ASSISTANT EA 15: HCPCS | Mod: CO

## 2024-11-09 ENCOUNTER — HOME CARE VISIT (OUTPATIENT)
Dept: HOME HEALTH SERVICES | Facility: HOME HEALTH | Age: 69
End: 2024-11-09
Payer: MEDICARE

## 2024-11-09 VITALS
HEART RATE: 100 BPM | SYSTOLIC BLOOD PRESSURE: 118 MMHG | DIASTOLIC BLOOD PRESSURE: 82 MMHG | TEMPERATURE: 97.5 F | OXYGEN SATURATION: 96 % | RESPIRATION RATE: 18 BRPM

## 2024-11-09 PROCEDURE — G0300 HHS/HOSPICE OF LPN EA 15 MIN: HCPCS

## 2024-11-09 SDOH — ECONOMIC STABILITY: HOUSING INSECURITY: EVIDENCE OF SMOKING MATERIAL: 0

## 2024-11-09 SDOH — HEALTH STABILITY: MENTAL HEALTH: SMOKING IN HOME: 0

## 2024-11-09 ASSESSMENT — ENCOUNTER SYMPTOMS
DENIES PAIN: 1
LAST BOWEL MOVEMENT: 67152
APPETITE LEVEL: GOOD
LOWER EXTREMITY EDEMA: 1
DYSPNEA ACTIVITY LEVEL: AFTER AMBULATING MORE THAN 20 FT
PERSON REPORTING PAIN: PATIENT
SHORTNESS OF BREATH: 1
MUSCLE WEAKNESS: 1

## 2024-11-09 ASSESSMENT — PAIN SCALES - PAIN ASSESSMENT IN ADVANCED DEMENTIA (PAINAD)
TOTALSCORE: 0
NEGVOCALIZATION: 0
BODYLANGUAGE: 0 - RELAXED.
CONSOLABILITY: 0
BREATHING: 0
FACIALEXPRESSION: 0 - SMILING OR INEXPRESSIVE.
NEGVOCALIZATION: 0 - NONE.
BODYLANGUAGE: 0
CONSOLABILITY: 0 - NO NEED TO CONSOLE.
FACIALEXPRESSION: 0

## 2024-11-11 ENCOUNTER — HOME CARE VISIT (OUTPATIENT)
Dept: HOME HEALTH SERVICES | Facility: HOME HEALTH | Age: 69
End: 2024-11-11
Payer: MEDICARE

## 2024-11-11 VITALS
SYSTOLIC BLOOD PRESSURE: 110 MMHG | OXYGEN SATURATION: 96 % | DIASTOLIC BLOOD PRESSURE: 60 MMHG | TEMPERATURE: 98 F | HEART RATE: 102 BPM

## 2024-11-11 PROCEDURE — G0151 HHCP-SERV OF PT,EA 15 MIN: HCPCS

## 2024-11-11 SDOH — HEALTH STABILITY: PHYSICAL HEALTH: EXERCISE COMMENTS: NO EXERCISES TODAY

## 2024-11-11 ASSESSMENT — ACTIVITIES OF DAILY LIVING (ADL)
PHYSICAL TRANSFERS ASSESSED: 1
AMBULATION ASSISTANCE: 1
AMBULATION ASSISTANCE: STAND BY ASSIST
AMBULATION ASSISTANCE ON FLAT SURFACES: 1
CURRENT_FUNCTION: STAND BY ASSIST

## 2024-11-11 ASSESSMENT — ENCOUNTER SYMPTOMS
PERSON REPORTING PAIN: PATIENT
MUSCLE WEAKNESS: 1
DENIES PAIN: 1

## 2024-11-12 ENCOUNTER — APPOINTMENT (OUTPATIENT)
Dept: PULMONOLOGY | Facility: CLINIC | Age: 69
End: 2024-11-12
Payer: MEDICARE

## 2024-11-12 ENCOUNTER — HOME CARE VISIT (OUTPATIENT)
Dept: HOME HEALTH SERVICES | Facility: HOME HEALTH | Age: 69
End: 2024-11-12
Payer: MEDICARE

## 2024-11-13 ENCOUNTER — HOME CARE VISIT (OUTPATIENT)
Dept: HOME HEALTH SERVICES | Facility: HOME HEALTH | Age: 69
End: 2024-11-13
Payer: MEDICARE

## 2024-11-13 VITALS
TEMPERATURE: 97.6 F | RESPIRATION RATE: 19 BRPM | HEART RATE: 89 BPM | SYSTOLIC BLOOD PRESSURE: 108 MMHG | DIASTOLIC BLOOD PRESSURE: 86 MMHG | OXYGEN SATURATION: 96 %

## 2024-11-13 PROCEDURE — G0158 HHC OT ASSISTANT EA 15: HCPCS | Mod: CO

## 2024-11-14 ENCOUNTER — HOME CARE VISIT (OUTPATIENT)
Dept: HOME HEALTH SERVICES | Facility: HOME HEALTH | Age: 69
End: 2024-11-14
Payer: MEDICARE

## 2024-11-15 ENCOUNTER — HOME CARE VISIT (OUTPATIENT)
Dept: HOME HEALTH SERVICES | Facility: HOME HEALTH | Age: 69
End: 2024-11-15
Payer: MEDICARE

## 2024-11-15 PROCEDURE — G0158 HHC OT ASSISTANT EA 15: HCPCS | Mod: CO

## 2024-11-16 ENCOUNTER — HOME CARE VISIT (OUTPATIENT)
Dept: HOME HEALTH SERVICES | Facility: HOME HEALTH | Age: 69
End: 2024-11-16
Payer: MEDICARE

## 2024-11-16 VITALS
SYSTOLIC BLOOD PRESSURE: 98 MMHG | OXYGEN SATURATION: 95 % | DIASTOLIC BLOOD PRESSURE: 60 MMHG | HEART RATE: 100 BPM | TEMPERATURE: 99.1 F | RESPIRATION RATE: 18 BRPM

## 2024-11-16 PROCEDURE — G0299 HHS/HOSPICE OF RN EA 15 MIN: HCPCS

## 2024-11-16 ASSESSMENT — ENCOUNTER SYMPTOMS
APPETITE LEVEL: GOOD
MUSCLE WEAKNESS: 1
DENIES PAIN: 1
PERSON REPORTING PAIN: PATIENT
LOWER EXTREMITY EDEMA: 1
CHANGE IN APPETITE: UNCHANGED

## 2024-11-17 ENCOUNTER — HOME CARE VISIT (OUTPATIENT)
Dept: HOME HEALTH SERVICES | Facility: HOME HEALTH | Age: 69
End: 2024-11-17
Payer: MEDICARE

## 2024-11-17 VITALS
OXYGEN SATURATION: 97 % | HEART RATE: 67 BPM | RESPIRATION RATE: 18 BRPM | SYSTOLIC BLOOD PRESSURE: 107 MMHG | DIASTOLIC BLOOD PRESSURE: 65 MMHG | TEMPERATURE: 97.8 F

## 2024-11-17 PROCEDURE — G0158 HHC OT ASSISTANT EA 15: HCPCS | Mod: CO

## 2024-11-18 ENCOUNTER — TELEPHONE (OUTPATIENT)
Dept: PRIMARY CARE | Facility: CLINIC | Age: 69
End: 2024-11-18

## 2024-11-18 ENCOUNTER — APPOINTMENT (OUTPATIENT)
Dept: PRIMARY CARE | Facility: CLINIC | Age: 69
End: 2024-11-18
Payer: MEDICARE

## 2024-11-18 ENCOUNTER — HOME CARE VISIT (OUTPATIENT)
Dept: HOME HEALTH SERVICES | Facility: HOME HEALTH | Age: 69
End: 2024-11-18
Payer: MEDICARE

## 2024-11-18 VITALS
OXYGEN SATURATION: 94 % | SYSTOLIC BLOOD PRESSURE: 120 MMHG | DIASTOLIC BLOOD PRESSURE: 60 MMHG | TEMPERATURE: 97.4 F | RESPIRATION RATE: 16 BRPM | HEART RATE: 72 BPM

## 2024-11-18 PROCEDURE — G0151 HHCP-SERV OF PT,EA 15 MIN: HCPCS

## 2024-11-18 SDOH — HEALTH STABILITY: PHYSICAL HEALTH
EXERCISE COMMENTS: SITTING WITH BLUE THERABAND HIP ABDUCTION, KNEE FLEXION, LAQ 10 REPS, HIP FLEXION 2 REPS AND THEN PATIENT WAS TIRED.

## 2024-11-18 ASSESSMENT — ENCOUNTER SYMPTOMS
PERSON REPORTING PAIN: PATIENT
DENIES PAIN: 1
MUSCLE WEAKNESS: 1
DENIES PAIN: 1

## 2024-11-18 ASSESSMENT — ACTIVITIES OF DAILY LIVING (ADL)
AMBULATION ASSISTANCE: SUPERVISION
AMBULATION_DISTANCE/DURATION_TOLERATED: 50
CURRENT_FUNCTION: SUPERVISION
AMBULATION ASSISTANCE ON FLAT SURFACES: 1
PHYSICAL TRANSFERS ASSESSED: 1
AMBULATION ASSISTANCE: 1

## 2024-11-19 ENCOUNTER — TELEPHONE (OUTPATIENT)
Dept: PRIMARY CARE | Facility: CLINIC | Age: 69
End: 2024-11-19

## 2024-11-19 DIAGNOSIS — R30.0 DYSURIA: ICD-10-CM

## 2024-11-19 DIAGNOSIS — I50.21 SYSTOLIC CHF, ACUTE (MULTI): ICD-10-CM

## 2024-11-19 DIAGNOSIS — E11.9 TYPE 2 DIABETES MELLITUS WITHOUT COMPLICATION, WITHOUT LONG-TERM CURRENT USE OF INSULIN (MULTI): ICD-10-CM

## 2024-11-19 DIAGNOSIS — Z00.00 HEALTH CARE MAINTENANCE: Primary | ICD-10-CM

## 2024-11-19 DIAGNOSIS — G47.33 OSA ON CPAP: ICD-10-CM

## 2024-11-20 ENCOUNTER — LAB REQUISITION (OUTPATIENT)
Dept: LAB | Facility: HOSPITAL | Age: 69
End: 2024-11-20
Payer: MEDICARE

## 2024-11-20 ENCOUNTER — HOME CARE VISIT (OUTPATIENT)
Dept: HOME HEALTH SERVICES | Facility: HOME HEALTH | Age: 69
End: 2024-11-20
Payer: MEDICARE

## 2024-11-20 VITALS
OXYGEN SATURATION: 97 % | HEART RATE: 60 BPM | DIASTOLIC BLOOD PRESSURE: 80 MMHG | SYSTOLIC BLOOD PRESSURE: 112 MMHG | TEMPERATURE: 98.1 F | RESPIRATION RATE: 18 BRPM

## 2024-11-20 DIAGNOSIS — J96.11 CHRONIC RESPIRATORY FAILURE WITH HYPOXIA (MULTI): ICD-10-CM

## 2024-11-20 LAB
ALBUMIN SERPL BCP-MCNC: 3.7 G/DL (ref 3.4–5)
ALP SERPL-CCNC: 81 U/L (ref 33–136)
ALT SERPL W P-5'-P-CCNC: 10 U/L (ref 10–52)
ANION GAP SERPL CALC-SCNC: 13 MMOL/L (ref 10–20)
APPEARANCE UR: ABNORMAL
APPEARANCE UR: ABNORMAL
AST SERPL W P-5'-P-CCNC: 13 U/L (ref 9–39)
BILIRUB SERPL-MCNC: 0.8 MG/DL (ref 0–1.2)
BILIRUB UR STRIP.AUTO-MCNC: NEGATIVE MG/DL
BILIRUB UR STRIP.AUTO-MCNC: NEGATIVE MG/DL
BUN SERPL-MCNC: 15 MG/DL (ref 6–23)
CALCIUM SERPL-MCNC: 8.8 MG/DL (ref 8.6–10.3)
CHLORIDE SERPL-SCNC: 104 MMOL/L (ref 98–107)
CO2 SERPL-SCNC: 26 MMOL/L (ref 21–32)
COLOR UR: ABNORMAL
COLOR UR: ABNORMAL
CREAT SERPL-MCNC: 0.63 MG/DL (ref 0.5–1.3)
EGFRCR SERPLBLD CKD-EPI 2021: >90 ML/MIN/1.73M*2
ERYTHROCYTE [DISTWIDTH] IN BLOOD BY AUTOMATED COUNT: 15 % (ref 11.5–14.5)
GLUCOSE SERPL-MCNC: 128 MG/DL (ref 74–99)
GLUCOSE UR STRIP.AUTO-MCNC: ABNORMAL MG/DL
GLUCOSE UR STRIP.AUTO-MCNC: ABNORMAL MG/DL
HCT VFR BLD AUTO: 52.8 % (ref 41–52)
HGB BLD-MCNC: 17 G/DL (ref 13.5–17.5)
KETONES UR STRIP.AUTO-MCNC: NEGATIVE MG/DL
KETONES UR STRIP.AUTO-MCNC: NEGATIVE MG/DL
LEUKOCYTE ESTERASE UR QL STRIP.AUTO: ABNORMAL
LEUKOCYTE ESTERASE UR QL STRIP.AUTO: ABNORMAL
MCH RBC QN AUTO: 32.4 PG (ref 26–34)
MCHC RBC AUTO-ENTMCNC: 32.2 G/DL (ref 32–36)
MCV RBC AUTO: 101 FL (ref 80–100)
NITRITE UR QL STRIP.AUTO: ABNORMAL
NITRITE UR QL STRIP.AUTO: ABNORMAL
NRBC BLD-RTO: 0 /100 WBCS (ref 0–0)
PH UR STRIP.AUTO: 6 [PH]
PH UR STRIP.AUTO: 6 [PH]
PLATELET # BLD AUTO: 232 X10*3/UL (ref 150–450)
POTASSIUM SERPL-SCNC: 4.3 MMOL/L (ref 3.5–5.3)
PROT SERPL-MCNC: 5.5 G/DL (ref 6.4–8.2)
PROT UR STRIP.AUTO-MCNC: ABNORMAL MG/DL
PROT UR STRIP.AUTO-MCNC: ABNORMAL MG/DL
RBC # BLD AUTO: 5.25 X10*6/UL (ref 4.5–5.9)
RBC # UR STRIP.AUTO: ABNORMAL /UL
RBC # UR STRIP.AUTO: ABNORMAL /UL
SODIUM SERPL-SCNC: 139 MMOL/L (ref 136–145)
SP GR UR STRIP.AUTO: 1.03
SP GR UR STRIP.AUTO: 1.03
UROBILINOGEN UR STRIP.AUTO-MCNC: NORMAL MG/DL
UROBILINOGEN UR STRIP.AUTO-MCNC: NORMAL MG/DL
WBC # BLD AUTO: 12.1 X10*3/UL (ref 4.4–11.3)

## 2024-11-20 PROCEDURE — 81003 URINALYSIS AUTO W/O SCOPE: CPT

## 2024-11-20 PROCEDURE — G0152 HHCP-SERV OF OT,EA 15 MIN: HCPCS

## 2024-11-20 PROCEDURE — 85027 COMPLETE CBC AUTOMATED: CPT

## 2024-11-20 PROCEDURE — G0299 HHS/HOSPICE OF RN EA 15 MIN: HCPCS

## 2024-11-20 PROCEDURE — 80053 COMPREHEN METABOLIC PANEL: CPT

## 2024-11-20 SDOH — ECONOMIC STABILITY: HOUSING INSECURITY: EVIDENCE OF SMOKING MATERIAL: 1

## 2024-11-20 SDOH — HEALTH STABILITY: MENTAL HEALTH: SMOKING IN HOME: 1

## 2024-11-20 SDOH — ECONOMIC STABILITY: GENERAL

## 2024-11-20 ASSESSMENT — ENCOUNTER SYMPTOMS
COUGH CHARACTERISTICS: NON-PRODUCTIVE
PAIN LOCATION: PENIS
LAST BOWEL MOVEMENT: 67163
PAIN LOCATION - PAIN DURATION: 1 HOUR
FATIGUES EASILY: 1
PERSON REPORTING PAIN: PATIENT
PAIN SEVERITY GOAL: 0/10
PERSON REPORTING PAIN: PATIENT
PAIN LOCATION - PAIN SEVERITY: 6/10
HEMATURIA: 1
PAIN: 1
APPETITE LEVEL: GOOD
LOWEST PAIN SEVERITY IN PAST 24 HOURS: 2/10
PAIN LOCATION - PAIN FREQUENCY: INTERMITTENT
HIGHEST PAIN SEVERITY IN PAST 24 HOURS: 6/10
COUGH: 1
COUGH CHARACTERISTICS: DRY
PAIN LOCATION - PAIN QUALITY: ACHING
HYPOTENSION: 1
DENIES PAIN: 1
PAIN LOCATION - EXACERBATING FACTORS: URINATING

## 2024-11-20 ASSESSMENT — ACTIVITIES OF DAILY LIVING (ADL): MONEY MANAGEMENT (EXPENSES/BILLS): INDEPENDENT

## 2024-11-21 ENCOUNTER — HOME CARE VISIT (OUTPATIENT)
Dept: HOME HEALTH SERVICES | Facility: HOME HEALTH | Age: 69
End: 2024-11-21
Payer: MEDICARE

## 2024-11-21 LAB
FOLATE SERPL-MCNC: 14.3 NG/ML
HOLD SPECIMEN: NORMAL

## 2024-11-23 LAB — BACTERIA UR CULT: ABNORMAL

## 2024-11-27 DIAGNOSIS — I11.0 HYPERTENSIVE HEART DISEASE WITH CONGESTIVE HEART FAILURE, UNSPECIFIED HEART FAILURE TYPE: ICD-10-CM

## 2024-11-27 DIAGNOSIS — Z00.00 HEALTH CARE MAINTENANCE: Primary | ICD-10-CM

## 2024-11-28 RX ORDER — POTASSIUM CHLORIDE 20 MEQ/1
20 TABLET, EXTENDED RELEASE ORAL
Qty: 30 TABLET | Refills: 0 | Status: SHIPPED | OUTPATIENT
Start: 2024-11-28

## 2024-11-28 RX ORDER — GABAPENTIN 100 MG/1
CAPSULE ORAL
Qty: 30 CAPSULE | Refills: 0 | Status: SHIPPED | OUTPATIENT
Start: 2024-11-28

## 2024-11-28 RX ORDER — PREDNISONE 10 MG/1
10 TABLET ORAL
Qty: 30 TABLET | Refills: 0 | Status: SHIPPED | OUTPATIENT
Start: 2024-11-28

## 2024-11-28 RX ORDER — MIDODRINE HYDROCHLORIDE 2.5 MG/1
TABLET ORAL
Qty: 60 TABLET | Refills: 0 | Status: SHIPPED | OUTPATIENT
Start: 2024-11-28

## 2024-11-28 RX ORDER — TAMSULOSIN HYDROCHLORIDE 0.4 MG/1
0.4 CAPSULE ORAL EVERY EVENING
Qty: 30 CAPSULE | Refills: 0 | Status: SHIPPED | OUTPATIENT
Start: 2024-11-28

## 2024-11-29 ENCOUNTER — HOME CARE VISIT (OUTPATIENT)
Dept: HOME HEALTH SERVICES | Facility: HOME HEALTH | Age: 69
End: 2024-11-29
Payer: MEDICARE

## 2024-11-29 ASSESSMENT — ACTIVITIES OF DAILY LIVING (ADL)
OASIS_M1830: 02
HOME_HEALTH_OASIS: 01

## 2024-12-07 DIAGNOSIS — N39.0 URINARY TRACT INFECTION WITHOUT HEMATURIA, SITE UNSPECIFIED: Primary | ICD-10-CM

## 2024-12-07 RX ORDER — NITROFURANTOIN 25; 75 MG/1; MG/1
100 CAPSULE ORAL 2 TIMES DAILY
Qty: 14 CAPSULE | Refills: 0 | Status: SHIPPED | OUTPATIENT
Start: 2024-12-07 | End: 2024-12-14

## 2024-12-10 ENCOUNTER — TELEPHONE (OUTPATIENT)
Dept: CARDIOLOGY | Facility: HOSPITAL | Age: 69
End: 2024-12-10
Payer: MEDICARE

## 2024-12-10 NOTE — TELEPHONE ENCOUNTER
I spoke to patient regarding his message below. He has been monitoring his HR via his BP machine and pule ox. He denies any racing spells or shortness of breath. I offered he come in for a walk in EKG, though given he feels well, he would like to wait to have an EKG at his appointment on 12/30.     His monitor from 5/2024 showed a <1% VE burden and a 2% SVE. There was no evidence of atrial fibrillation. He is going to look into getting a [employer] mobile device to better track his HR.    Lastly, he complains of 80 mg lasix being too much for him, and at times only takes a 1/2 tablet (40 mg). I explained the importance of remaining compliant with the prescribed dose in order to prevent another CHF exacerbation.       He will continue all medications unchanged and follow up with us in clinic on 12/30/24.       From my chart message:  Dear Ms. Dickson,     In kowalski contrast to my barely perceptible and at times dangerously low pulse rate after my hospitalization last March, I have for the past several days had a pretty much continuous heart rate between 125-135 bpm;  which is high for my normal of about 100 bpm.       My oxygen levels and BP seem to be good and I do not get a reading of irregular heartbeat as often at these higher numbers.     This notwithstanding, I stopped the Beta blocker Metropolol last Easter when my heart rate was off the charts low.  I still have some.  Is this something that would be appropriate now that my heart rate seems to be unusually high?  Please advise.     Also, am I ever going to get a debriefing on the results of my heart monitor study?  I would like to get some idea of what your and Dr. Moore's plan is.  Thanks.     Tapan Glass  [phone]

## 2024-12-11 NOTE — PROGRESS NOTES
Subjective   Patient ID: Alo Glass is a 69 y.o. male    HPI  69 y.o. male who [facility]. He was referred by his PCP Dr. Schumacher. He reports having 3 lipscomb catheterizations since March for urinary retention. He reports urinary frequency and does not feel as if he is emptying his bladder. He states passing blood clouts in his previous lipscomb which is why it was removed.    The most recent Urine Culture, conducted on 11/20/2024, revealed:  >100,000 Methicillin Resistant Staphylococcus aureus (MRSA) Abnormal      The most recent Urinalysis with Reflex Microscopic, conducted on 11/20/2024, revealed:  Blood, Urine                             Leukocyte Esterase, Urine  OVER (3+)                            500 Roberto/µL Abnormal     The most recent CT chest abdomen pelvis w IV contrast, conducted on 09/04/2024, revealed:  Left kidney solid heterogeneously enhancing mass suspicious for renal  cell carcinoma (2.4 cm x 1.9 cm) and of suspected 0.8 cm right upper  pole renal cell carcinoma measuring 0.8 cm. Recommend urological  follow-up/also taken and dedicated MRI kidneys to further evaluate  these lesions. YELLOW ALERT: An incidental finding alert was sent per  protocol.      No acute abnormality in the abdomen or pelvis.      Colonic diverticulosis without acute diverticulitis.                                    Review of Systems    All systems were reviewed. Anything negative was noted in the HPI.    Objective   Physical Exam    General: Well developed, well nourished, alert and cooperative, appears in no acute distress   Eyes: Non-injected conjunctiva, sclera clear, no proptosis   Cardiac: Extremities are warm and well perfused. No edema, cyanosis or pallor   Lungs: Breathing is easy, non-labored. Speaking in clear and complete sentences. Normal diaphragmatic movement   MSK: Ambulatory with steady gait, unassisted   Neuro: Alert and oriented to person, place, and time   Psych: Demonstrates good  judgment and reason, without hallucinations, abnormal affect or abnormal behaviors   Skin: No obvious lesions, no rashes       No CVA tenderness bilaterally   No suprapubic pain or discomfort       Past Medical History:   Diagnosis Date    CHF (congestive heart failure) (Multi)     COPD (chronic obstructive pulmonary disease) (Multi)     Diabetes mellitus (Multi)     Hypertension     Obesity     MELISSA (obstructive sleep apnea)     Personal history of other specified conditions 01/08/2023    History of impaired glucose tolerance         Past Surgical History:   Procedure Laterality Date    HERNIA REPAIR  11/07/2013    Hernia Repair    OTHER SURGICAL HISTORY  11/07/2013    Oral Surgery         Assessment/Plan   Dysuria, Kidney mass    69 y.o. male who presents for the above condition, We had a very long and extensive discussion with the patient regarding the pathophysiology, differential diagnosis, risk factor, management, natural history, incidence and diagnostic work-up of the condition.      Post-void residual volume revealed 147 ml    Plan:  - Refer to Interventional Radiologist for renal cell carcinoma    E&M visit today is associated with current or anticipated ongoing medical care services related to a patient's single, serious condition or a complex condition.     12/12/2024    Scribe Attestation  By signing my name below, IKaren Scribe attest that this documentation has been prepared under the direction and in the presence of Dr. Adair Burks.

## 2024-12-12 ENCOUNTER — OFFICE VISIT (OUTPATIENT)
Dept: UROLOGY | Facility: HOSPITAL | Age: 69
End: 2024-12-12
Payer: MEDICARE

## 2024-12-12 DIAGNOSIS — R30.0 DYSURIA: ICD-10-CM

## 2024-12-12 DIAGNOSIS — N28.89 MASS OF KIDNEY: Primary | ICD-10-CM

## 2024-12-12 PROCEDURE — 99214 OFFICE O/P EST MOD 30 MIN: CPT | Performed by: STUDENT IN AN ORGANIZED HEALTH CARE EDUCATION/TRAINING PROGRAM

## 2024-12-12 PROCEDURE — 1157F ADVNC CARE PLAN IN RCRD: CPT | Performed by: STUDENT IN AN ORGANIZED HEALTH CARE EDUCATION/TRAINING PROGRAM

## 2024-12-12 PROCEDURE — 3049F LDL-C 100-129 MG/DL: CPT | Performed by: STUDENT IN AN ORGANIZED HEALTH CARE EDUCATION/TRAINING PROGRAM

## 2024-12-12 PROCEDURE — G2211 COMPLEX E/M VISIT ADD ON: HCPCS | Performed by: STUDENT IN AN ORGANIZED HEALTH CARE EDUCATION/TRAINING PROGRAM

## 2024-12-12 PROCEDURE — 3044F HG A1C LEVEL LT 7.0%: CPT | Performed by: STUDENT IN AN ORGANIZED HEALTH CARE EDUCATION/TRAINING PROGRAM

## 2024-12-12 PROCEDURE — 99204 OFFICE O/P NEW MOD 45 MIN: CPT | Performed by: STUDENT IN AN ORGANIZED HEALTH CARE EDUCATION/TRAINING PROGRAM

## 2024-12-17 ENCOUNTER — TELEPHONE (OUTPATIENT)
Dept: PRIMARY CARE | Facility: CLINIC | Age: 69
End: 2024-12-17

## 2024-12-17 DIAGNOSIS — R06.02 SOB (SHORTNESS OF BREATH) ON EXERTION: Primary | ICD-10-CM

## 2024-12-18 ENCOUNTER — HOSPITAL ENCOUNTER (OUTPATIENT)
Dept: RADIOLOGY | Facility: CLINIC | Age: 69
Discharge: HOME | End: 2024-12-18
Payer: MEDICARE

## 2024-12-18 DIAGNOSIS — R06.02 SOB (SHORTNESS OF BREATH) ON EXERTION: ICD-10-CM

## 2024-12-18 PROCEDURE — 71046 X-RAY EXAM CHEST 2 VIEWS: CPT

## 2024-12-18 PROCEDURE — 71046 X-RAY EXAM CHEST 2 VIEWS: CPT | Performed by: RADIOLOGY

## 2024-12-19 DIAGNOSIS — Z00.00 HEALTH CARE MAINTENANCE: ICD-10-CM

## 2024-12-20 RX ORDER — GABAPENTIN 100 MG/1
CAPSULE ORAL
Qty: 30 CAPSULE | Refills: 0 | Status: SHIPPED | OUTPATIENT
Start: 2024-12-20

## 2024-12-20 RX ORDER — PREDNISONE 10 MG/1
10 TABLET ORAL
Qty: 30 TABLET | Refills: 0 | Status: SHIPPED | OUTPATIENT
Start: 2024-12-20

## 2024-12-20 RX ORDER — TAMSULOSIN HYDROCHLORIDE 0.4 MG/1
0.4 CAPSULE ORAL EVERY EVENING
Qty: 30 CAPSULE | Refills: 0 | Status: SHIPPED | OUTPATIENT
Start: 2024-12-20

## 2024-12-20 RX ORDER — MIDODRINE HYDROCHLORIDE 2.5 MG/1
TABLET ORAL
Qty: 60 TABLET | Refills: 0 | Status: SHIPPED | OUTPATIENT
Start: 2024-12-20

## 2024-12-28 ENCOUNTER — APPOINTMENT (OUTPATIENT)
Dept: CARDIOLOGY | Facility: HOSPITAL | Age: 69
End: 2024-12-28
Payer: MEDICARE

## 2024-12-28 ENCOUNTER — APPOINTMENT (OUTPATIENT)
Dept: RADIOLOGY | Facility: HOSPITAL | Age: 69
End: 2024-12-28
Payer: MEDICARE

## 2024-12-28 ENCOUNTER — HOSPITAL ENCOUNTER (EMERGENCY)
Facility: HOSPITAL | Age: 69
Discharge: OTHER INSTITUTION | End: 2024-12-29
Attending: EMERGENCY MEDICINE
Payer: MEDICARE

## 2024-12-28 DIAGNOSIS — I50.21 ACUTE SYSTOLIC CONGESTIVE HEART FAILURE: ICD-10-CM

## 2024-12-28 DIAGNOSIS — I21.3 STEMI (ST ELEVATION MYOCARDIAL INFARCTION) (MULTI): ICD-10-CM

## 2024-12-28 DIAGNOSIS — A41.9 SEPTIC SHOCK (MULTI): Primary | ICD-10-CM

## 2024-12-28 DIAGNOSIS — R57.0 CARDIOGENIC SHOCK (MULTI): ICD-10-CM

## 2024-12-28 DIAGNOSIS — R65.21 SEPTIC SHOCK (MULTI): Primary | ICD-10-CM

## 2024-12-28 DIAGNOSIS — I21.4 NSTEMI (NON-ST ELEVATED MYOCARDIAL INFARCTION) (MULTI): ICD-10-CM

## 2024-12-28 LAB
ABO GROUP (TYPE) IN BLOOD: NORMAL
ALBUMIN SERPL BCP-MCNC: 2.7 G/DL (ref 3.4–5)
ALP SERPL-CCNC: 142 U/L (ref 33–136)
ALT SERPL W P-5'-P-CCNC: 20 U/L (ref 10–52)
ANION GAP BLDA CALCULATED.4IONS-SCNC: 12 MMO/L (ref 10–25)
ANION GAP BLDA CALCULATED.4IONS-SCNC: 13 MMO/L (ref 10–25)
ANION GAP BLDV CALCULATED.4IONS-SCNC: 13 MMOL/L (ref 10–25)
ANION GAP SERPL CALC-SCNC: 18 MMOL/L (ref 10–20)
ANTIBODY SCREEN: NORMAL
APPEARANCE UR: ABNORMAL
AST SERPL W P-5'-P-CCNC: 17 U/L (ref 9–39)
BASE EXCESS BLDA CALC-SCNC: -2.4 MMOL/L (ref -2–3)
BASE EXCESS BLDA CALC-SCNC: -2.6 MMOL/L (ref -2–3)
BASE EXCESS BLDV CALC-SCNC: 1.1 MMOL/L (ref -2–3)
BASOPHILS # BLD MANUAL: 0 X10*3/UL (ref 0–0.1)
BASOPHILS NFR BLD MANUAL: 0 %
BILIRUB SERPL-MCNC: 1.3 MG/DL (ref 0–1.2)
BILIRUB UR STRIP.AUTO-MCNC: NEGATIVE MG/DL
BNP SERPL-MCNC: 1114 PG/ML (ref 0–99)
BODY TEMPERATURE: 37 DEGREES CELSIUS
BUN SERPL-MCNC: 59 MG/DL (ref 6–23)
CA-I BLDA-SCNC: 1.08 MMOL/L (ref 1.1–1.33)
CA-I BLDA-SCNC: 1.1 MMOL/L (ref 1.1–1.33)
CA-I BLDV-SCNC: 1.04 MMOL/L (ref 1.1–1.33)
CALCIUM SERPL-MCNC: 8.3 MG/DL (ref 8.6–10.3)
CARDIAC TROPONIN I PNL SERPL HS: 61 NG/L (ref 0–20)
CARDIAC TROPONIN I PNL SERPL HS: 93 NG/L (ref 0–20)
CHLORIDE BLDA-SCNC: 87 MMOL/L (ref 98–107)
CHLORIDE BLDA-SCNC: 87 MMOL/L (ref 98–107)
CHLORIDE BLDV-SCNC: 84 MMOL/L (ref 98–107)
CHLORIDE SERPL-SCNC: 89 MMOL/L (ref 98–107)
CK SERPL-CCNC: 119 U/L (ref 0–325)
CO2 SERPL-SCNC: 27 MMOL/L (ref 21–32)
COLOR UR: YELLOW
CREAT SERPL-MCNC: 1.38 MG/DL (ref 0.5–1.3)
CRP SERPL-MCNC: 29.18 MG/DL
EGFRCR SERPLBLD CKD-EPI 2021: 55 ML/MIN/1.73M*2
EOSINOPHIL # BLD MANUAL: 0 X10*3/UL (ref 0–0.7)
EOSINOPHIL NFR BLD MANUAL: 0 %
ERYTHROCYTE [DISTWIDTH] IN BLOOD BY AUTOMATED COUNT: 13.6 % (ref 11.5–14.5)
GLUCOSE BLDA-MCNC: 154 MG/DL (ref 74–99)
GLUCOSE BLDA-MCNC: 182 MG/DL (ref 74–99)
GLUCOSE BLDV-MCNC: 149 MG/DL (ref 74–99)
GLUCOSE SERPL-MCNC: 134 MG/DL (ref 74–99)
GLUCOSE UR STRIP.AUTO-MCNC: ABNORMAL MG/DL
HCO3 BLDA-SCNC: 23.7 MMOL/L (ref 22–26)
HCO3 BLDA-SCNC: 23.7 MMOL/L (ref 22–26)
HCO3 BLDV-SCNC: 27.7 MMOL/L (ref 22–26)
HCT VFR BLD AUTO: 57.5 % (ref 41–52)
HCT VFR BLD EST: 57 % (ref 41–52)
HCT VFR BLD EST: 57 % (ref 41–52)
HCT VFR BLD EST: 60 % (ref 41–52)
HGB BLD-MCNC: 19.5 G/DL (ref 13.5–17.5)
HGB BLDA-MCNC: 19.1 G/DL (ref 13.5–17.5)
HGB BLDA-MCNC: 19.9 G/DL (ref 13.5–17.5)
HGB BLDV-MCNC: 19.1 G/DL (ref 13.5–17.5)
HYALINE CASTS #/AREA URNS AUTO: ABNORMAL /LPF
IMM GRANULOCYTES # BLD AUTO: 0.14 X10*3/UL (ref 0–0.7)
IMM GRANULOCYTES NFR BLD AUTO: 0.9 % (ref 0–0.9)
INHALED O2 CONCENTRATION: 21 %
INHALED O2 CONCENTRATION: 32 %
INHALED O2 CONCENTRATION: 44 %
KETONES UR STRIP.AUTO-MCNC: NEGATIVE MG/DL
LACTATE BLDA-SCNC: 2.3 MMOL/L (ref 0.4–2)
LACTATE BLDA-SCNC: 2.6 MMOL/L (ref 0.4–2)
LACTATE BLDV-SCNC: 5 MMOL/L (ref 0.4–2)
LACTATE SERPL-SCNC: 4.2 MMOL/L (ref 0.4–2)
LEUKOCYTE ESTERASE UR QL STRIP.AUTO: ABNORMAL
LYMPHOCYTES # BLD MANUAL: 0.16 X10*3/UL (ref 1.2–4.8)
LYMPHOCYTES NFR BLD MANUAL: 1 %
MCH RBC QN AUTO: 31.8 PG (ref 26–34)
MCHC RBC AUTO-ENTMCNC: 33.9 G/DL (ref 32–36)
MCV RBC AUTO: 94 FL (ref 80–100)
MONOCYTES # BLD MANUAL: 0.49 X10*3/UL (ref 0.1–1)
MONOCYTES NFR BLD MANUAL: 3 %
MUCOUS THREADS #/AREA URNS AUTO: ABNORMAL /LPF
NEUTROPHILS # BLD MANUAL: 15.65 X10*3/UL (ref 1.2–7.7)
NEUTS BAND # BLD MANUAL: 3.75 X10*3/UL (ref 0–0.7)
NEUTS BAND NFR BLD MANUAL: 23 %
NEUTS SEG # BLD MANUAL: 11.9 X10*3/UL (ref 1.2–7)
NEUTS SEG NFR BLD MANUAL: 73 %
NITRITE UR QL STRIP.AUTO: NEGATIVE
NRBC BLD-RTO: 0 /100 WBCS (ref 0–0)
OXYHGB MFR BLDA: 87.7 % (ref 94–98)
OXYHGB MFR BLDA: 88.5 % (ref 94–98)
OXYHGB MFR BLDV: 40.5 % (ref 45–75)
PCO2 BLDA: 44 MM HG (ref 38–42)
PCO2 BLDA: 45 MM HG (ref 38–42)
PCO2 BLDV: 49 MM HG (ref 41–51)
PH BLDA: 7.33 PH (ref 7.38–7.42)
PH BLDA: 7.34 PH (ref 7.38–7.42)
PH BLDV: 7.36 PH (ref 7.33–7.43)
PH UR STRIP.AUTO: 5 [PH]
PLATELET # BLD AUTO: 172 X10*3/UL (ref 150–450)
PO2 BLDA: 63 MM HG (ref 85–95)
PO2 BLDA: 71 MM HG (ref 85–95)
PO2 BLDV: 29 MM HG (ref 35–45)
POTASSIUM BLDA-SCNC: 4.4 MMOL/L (ref 3.5–5.3)
POTASSIUM BLDA-SCNC: 4.6 MMOL/L (ref 3.5–5.3)
POTASSIUM BLDV-SCNC: 5.6 MMOL/L (ref 3.5–5.3)
POTASSIUM SERPL-SCNC: 5.4 MMOL/L (ref 3.5–5.3)
PROT SERPL-MCNC: 4.9 G/DL (ref 6.4–8.2)
PROT UR STRIP.AUTO-MCNC: ABNORMAL MG/DL
RBC # BLD AUTO: 6.13 X10*6/UL (ref 4.5–5.9)
RBC # UR STRIP.AUTO: ABNORMAL /UL
RBC #/AREA URNS AUTO: ABNORMAL /HPF
RBC MORPH BLD: ABNORMAL
RH FACTOR (ANTIGEN D): NORMAL
SAO2 % BLDA: 92 % (ref 94–100)
SAO2 % BLDA: 94 % (ref 94–100)
SAO2 % BLDV: 43 % (ref 45–75)
SODIUM BLDA-SCNC: 118 MMOL/L (ref 136–145)
SODIUM BLDA-SCNC: 119 MMOL/L (ref 136–145)
SODIUM BLDV-SCNC: 119 MMOL/L (ref 136–145)
SODIUM SERPL-SCNC: 129 MMOL/L (ref 136–145)
SP GR UR STRIP.AUTO: 1.02
SPECIMEN DRAWN FROM PATIENT: ABNORMAL
SPECIMEN DRAWN FROM PATIENT: ABNORMAL
SQUAMOUS #/AREA URNS AUTO: ABNORMAL /HPF
TOTAL CELLS COUNTED BLD: 100
TSH SERPL-ACNC: 2.48 MIU/L (ref 0.44–3.98)
UROBILINOGEN UR STRIP.AUTO-MCNC: ABNORMAL MG/DL
WBC # BLD AUTO: 16.3 X10*3/UL (ref 4.4–11.3)
WBC #/AREA URNS AUTO: ABNORMAL /HPF

## 2024-12-28 PROCEDURE — 87449 NOS EACH ORGANISM AG IA: CPT | Mod: AHULAB | Performed by: EMERGENCY MEDICINE

## 2024-12-28 PROCEDURE — 82550 ASSAY OF CK (CPK): CPT | Performed by: EMERGENCY MEDICINE

## 2024-12-28 PROCEDURE — 36620 INSERTION CATHETER ARTERY: CPT | Performed by: EMERGENCY MEDICINE

## 2024-12-28 PROCEDURE — 71045 X-RAY EXAM CHEST 1 VIEW: CPT

## 2024-12-28 PROCEDURE — 83605 ASSAY OF LACTIC ACID: CPT | Performed by: EMERGENCY MEDICINE

## 2024-12-28 PROCEDURE — 93005 ELECTROCARDIOGRAM TRACING: CPT

## 2024-12-28 PROCEDURE — 84132 ASSAY OF SERUM POTASSIUM: CPT | Mod: 59 | Performed by: EMERGENCY MEDICINE

## 2024-12-28 PROCEDURE — 96366 THER/PROPH/DIAG IV INF ADDON: CPT | Mod: 59

## 2024-12-28 PROCEDURE — 2500000004 HC RX 250 GENERAL PHARMACY W/ HCPCS (ALT 636 FOR OP/ED)

## 2024-12-28 PROCEDURE — 87040 BLOOD CULTURE FOR BACTERIA: CPT | Mod: AHULAB | Performed by: EMERGENCY MEDICINE

## 2024-12-28 PROCEDURE — 87899 AGENT NOS ASSAY W/OPTIC: CPT | Mod: AHULAB | Performed by: EMERGENCY MEDICINE

## 2024-12-28 PROCEDURE — 2500000004 HC RX 250 GENERAL PHARMACY W/ HCPCS (ALT 636 FOR OP/ED): Performed by: PHARMACIST

## 2024-12-28 PROCEDURE — 36415 COLL VENOUS BLD VENIPUNCTURE: CPT | Performed by: EMERGENCY MEDICINE

## 2024-12-28 PROCEDURE — 84484 ASSAY OF TROPONIN QUANT: CPT | Performed by: EMERGENCY MEDICINE

## 2024-12-28 PROCEDURE — 96367 TX/PROPH/DG ADDL SEQ IV INF: CPT | Mod: 59

## 2024-12-28 PROCEDURE — 86140 C-REACTIVE PROTEIN: CPT | Performed by: EMERGENCY MEDICINE

## 2024-12-28 PROCEDURE — 85027 COMPLETE CBC AUTOMATED: CPT | Mod: 59 | Performed by: EMERGENCY MEDICINE

## 2024-12-28 PROCEDURE — 96375 TX/PRO/DX INJ NEW DRUG ADDON: CPT | Mod: 59

## 2024-12-28 PROCEDURE — 2500000004 HC RX 250 GENERAL PHARMACY W/ HCPCS (ALT 636 FOR OP/ED): Performed by: EMERGENCY MEDICINE

## 2024-12-28 PROCEDURE — 96376 TX/PRO/DX INJ SAME DRUG ADON: CPT | Mod: 59

## 2024-12-28 PROCEDURE — 83880 ASSAY OF NATRIURETIC PEPTIDE: CPT | Performed by: EMERGENCY MEDICINE

## 2024-12-28 PROCEDURE — 85007 BL SMEAR W/DIFF WBC COUNT: CPT | Performed by: EMERGENCY MEDICINE

## 2024-12-28 PROCEDURE — 2550000001 HC RX 255 CONTRASTS: Performed by: EMERGENCY MEDICINE

## 2024-12-28 PROCEDURE — 36556 INSERT NON-TUNNEL CV CATH: CPT

## 2024-12-28 PROCEDURE — 71045 X-RAY EXAM CHEST 1 VIEW: CPT | Performed by: RADIOLOGY

## 2024-12-28 PROCEDURE — 75635 CT ANGIO ABDOMINAL ARTERIES: CPT | Performed by: RADIOLOGY

## 2024-12-28 PROCEDURE — 37799 UNLISTED PX VASCULAR SURGERY: CPT | Performed by: EMERGENCY MEDICINE

## 2024-12-28 PROCEDURE — 81001 URINALYSIS AUTO W/SCOPE: CPT | Performed by: EMERGENCY MEDICINE

## 2024-12-28 PROCEDURE — 96365 THER/PROPH/DIAG IV INF INIT: CPT | Mod: 59

## 2024-12-28 PROCEDURE — 75635 CT ANGIO ABDOMINAL ARTERIES: CPT

## 2024-12-28 PROCEDURE — 2500000002 HC RX 250 W HCPCS SELF ADMINISTERED DRUGS (ALT 637 FOR MEDICARE OP, ALT 636 FOR OP/ED): Mod: MUE | Performed by: EMERGENCY MEDICINE

## 2024-12-28 PROCEDURE — 96374 THER/PROPH/DIAG INJ IV PUSH: CPT | Mod: 59

## 2024-12-28 PROCEDURE — 99222 1ST HOSP IP/OBS MODERATE 55: CPT

## 2024-12-28 PROCEDURE — 84132 ASSAY OF SERUM POTASSIUM: CPT | Performed by: EMERGENCY MEDICINE

## 2024-12-28 PROCEDURE — 99292 CRITICAL CARE ADDL 30 MIN: CPT | Performed by: EMERGENCY MEDICINE

## 2024-12-28 PROCEDURE — 87086 URINE CULTURE/COLONY COUNT: CPT | Mod: AHULAB | Performed by: EMERGENCY MEDICINE

## 2024-12-28 PROCEDURE — 99291 CRITICAL CARE FIRST HOUR: CPT

## 2024-12-28 PROCEDURE — 84443 ASSAY THYROID STIM HORMONE: CPT | Performed by: EMERGENCY MEDICINE

## 2024-12-28 PROCEDURE — 86901 BLOOD TYPING SEROLOGIC RH(D): CPT | Performed by: EMERGENCY MEDICINE

## 2024-12-28 PROCEDURE — 2500000005 HC RX 250 GENERAL PHARMACY W/O HCPCS

## 2024-12-28 PROCEDURE — 2500000005 HC RX 250 GENERAL PHARMACY W/O HCPCS: Performed by: EMERGENCY MEDICINE

## 2024-12-28 RX ORDER — FUROSEMIDE 10 MG/ML
INJECTION INTRAMUSCULAR; INTRAVENOUS
Status: COMPLETED
Start: 2024-12-28 | End: 2024-12-28

## 2024-12-28 RX ORDER — NOREPINEPHRINE BITARTRATE/D5W 8 MG/250ML
PLASTIC BAG, INJECTION (ML) INTRAVENOUS
Status: COMPLETED
Start: 2024-12-28 | End: 2024-12-28

## 2024-12-28 RX ORDER — FUROSEMIDE 10 MG/ML
100 INJECTION INTRAMUSCULAR; INTRAVENOUS ONCE
Status: COMPLETED | OUTPATIENT
Start: 2024-12-28 | End: 2024-12-28

## 2024-12-28 RX ORDER — VASOPRESSIN 20 UNIT/100 ML (0.2 UNIT/ML) IN 0.9 % SODIUM CHLORIDE IV
0.04 SOLUTION CONTINUOUS
Status: DISCONTINUED | OUTPATIENT
Start: 2024-12-28 | End: 2024-12-28

## 2024-12-28 RX ORDER — CLINDAMYCIN PHOSPHATE 600 MG/50ML
600 INJECTION, SOLUTION INTRAVENOUS ONCE
Status: COMPLETED | OUTPATIENT
Start: 2024-12-28 | End: 2024-12-28

## 2024-12-28 RX ORDER — IPRATROPIUM BROMIDE AND ALBUTEROL SULFATE 2.5; .5 MG/3ML; MG/3ML
3 SOLUTION RESPIRATORY (INHALATION) ONCE
Status: COMPLETED | OUTPATIENT
Start: 2024-12-28 | End: 2024-12-28

## 2024-12-28 RX ORDER — CLINDAMYCIN PHOSPHATE 600 MG/50ML
600 INJECTION, SOLUTION INTRAVENOUS EVERY 8 HOURS
Status: DISCONTINUED | OUTPATIENT
Start: 2024-12-29 | End: 2024-12-29 | Stop reason: HOSPADM

## 2024-12-28 RX ORDER — NOREPINEPHRINE BITARTRATE/D5W 8 MG/250ML
0-.2 PLASTIC BAG, INJECTION (ML) INTRAVENOUS CONTINUOUS
Status: DISCONTINUED | OUTPATIENT
Start: 2024-12-28 | End: 2024-12-29 | Stop reason: HOSPADM

## 2024-12-28 RX ORDER — VANCOMYCIN 2 GRAM/500 ML IN 0.9 % SODIUM CHLORIDE INTRAVENOUS
2000 ONCE
Status: COMPLETED | OUTPATIENT
Start: 2024-12-28 | End: 2024-12-28

## 2024-12-28 RX ADMIN — IPRATROPIUM BROMIDE AND ALBUTEROL SULFATE 3 ML: 2.5; .5 SOLUTION RESPIRATORY (INHALATION) at 22:31

## 2024-12-28 RX ADMIN — CLINDAMYCIN PHOSPHATE 600 MG: 600 INJECTION, SOLUTION INTRAVENOUS at 19:05

## 2024-12-28 RX ADMIN — FUROSEMIDE 100 MG: 10 INJECTION INTRAMUSCULAR; INTRAVENOUS at 16:08

## 2024-12-28 RX ADMIN — NOREPINEPHRINE BITARTRATE 0.15 MCG/KG/MIN: 8 INJECTION, SOLUTION INTRAVENOUS at 22:40

## 2024-12-28 RX ADMIN — PIPERACILLIN SODIUM AND TAZOBACTAM SODIUM 4.5 G: 4; .5 INJECTION, SOLUTION INTRAVENOUS at 15:40

## 2024-12-28 RX ADMIN — FUROSEMIDE 100 MG: 10 INJECTION, SOLUTION INTRAMUSCULAR; INTRAVENOUS at 16:08

## 2024-12-28 RX ADMIN — NOREPINEPHRINE BITARTRATE 8000 MCG: 8 INJECTION, SOLUTION INTRAVENOUS at 16:08

## 2024-12-28 RX ADMIN — VASOPRESSIN 0.03 UNITS/MIN: 0.2 INJECTION INTRAVENOUS at 21:03

## 2024-12-28 RX ADMIN — IOHEXOL 90 ML: 350 INJECTION, SOLUTION INTRAVENOUS at 16:43

## 2024-12-28 RX ADMIN — METHYLPREDNISOLONE SODIUM SUCCINATE 125 MG: 125 INJECTION, POWDER, FOR SOLUTION INTRAMUSCULAR; INTRAVENOUS at 22:39

## 2024-12-28 RX ADMIN — Medication 2000 MG: at 16:55

## 2024-12-28 ASSESSMENT — PAIN - FUNCTIONAL ASSESSMENT: PAIN_FUNCTIONAL_ASSESSMENT: 0-10

## 2024-12-28 ASSESSMENT — PAIN DESCRIPTION - PROGRESSION: CLINICAL_PROGRESSION: NOT CHANGED

## 2024-12-28 ASSESSMENT — PAIN SCALES - GENERAL: PAINLEVEL_OUTOF10: 0 - NO PAIN

## 2024-12-28 NOTE — CONSULTS
Reason For Consult  Lower extremity Cellulitis    History Of Present Illness  Alo Glass is a 69 y.o. male with a history of CHF on Lasix daily and Diabetes, presented to [facility] [place] with increased pain and swelling in his bilateral lower extremities. He reports that he is always somewhat swollen but it has been worse lately and he noticed a black spot on the medial aspect of his right shin this morning. Lower extremities are very edematous and are leaking fluid. He also reports some shortness of breath    In ED he has found to be hypotensive requiring pressors, lactate elevated to 5, WBC 16.3. BNP and Troponin elevated.      Past Medical History  He has a past medical history of CHF (congestive heart failure), COPD (chronic obstructive pulmonary disease) (Multi), Diabetes mellitus (Multi), Hypertension, Obesity, MELISSA (obstructive sleep apnea), and Personal history of other specified conditions (01/08/2023).    Surgical History  He has a past surgical history that includes Hernia repair (11/07/2013) and Other surgical history (11/07/2013).     Social History  He reports that he has been smoking cigarettes. He has never used smokeless tobacco. He reports current alcohol use of about 7.0 standard drinks of alcohol per week. He reports that he does not use drugs.    Family History  No family history on file.     Allergies  Patient has no known allergies.    Review of Systems  A full 10 point ROS was completed. Nothing positive other than what was mentioned in the HPI.      Physical Exam  PE:  Constitutional: A&Ox3, calm and cooperative  Head/Neck: Neck supple, no JVD  Cardiovascular: Hypotensive, regular rate  Respiratory/Thorax: Labored breathing on supplemental O2 via NCr  Genitourinary: Live catheter in place  Extremities: Bilateral lower extremity edema R > L. Right medial shin with area or black skin and skin abrasion with some minor bleeding. Sensation intact, tender to palpation bilaterally. Erythema to the knee  "bilaterally.  Neurological: No focal deficits   Psychological: Appropriate mood and behavior      Last Recorded Vitals  Blood pressure (!) 152/105, pulse 99, temperature (!) 33.7 °C (92.6 °F), temperature source Rectal, resp. rate (!) 25, height 1.803 m (5' 10.98\"), weight 118 kg (261 lb), SpO2 96%.    Relevant Results  Results for orders placed or performed during the hospital encounter of 12/28/24 (from the past 24 hours)   CBC and Auto Differential   Result Value Ref Range    WBC 16.3 (H) 4.4 - 11.3 x10*3/uL    nRBC 0.0 0.0 - 0.0 /100 WBCs    RBC 6.13 (H) 4.50 - 5.90 x10*6/uL    Hemoglobin 19.5 (H) 13.5 - 17.5 g/dL    Hematocrit 57.5 (H) 41.0 - 52.0 %    MCV 94 80 - 100 fL    MCH 31.8 26.0 - 34.0 pg    MCHC 33.9 32.0 - 36.0 g/dL    RDW 13.6 11.5 - 14.5 %    Platelets 172 150 - 450 x10*3/uL    Immature Granulocytes %, Automated 0.9 0.0 - 0.9 %    Immature Granulocytes Absolute, Automated 0.14 0.00 - 0.70 x10*3/uL   Comprehensive Metabolic Panel   Result Value Ref Range    Glucose 134 (H) 74 - 99 mg/dL    Sodium 129 (L) 136 - 145 mmol/L    Potassium 5.4 (H) 3.5 - 5.3 mmol/L    Chloride 89 (L) 98 - 107 mmol/L    Bicarbonate 27 21 - 32 mmol/L    Anion Gap 18 10 - 20 mmol/L    Urea Nitrogen 59 (H) 6 - 23 mg/dL    Creatinine 1.38 (H) 0.50 - 1.30 mg/dL    eGFR 55 (L) >60 mL/min/1.73m*2    Calcium 8.3 (L) 8.6 - 10.3 mg/dL    Albumin 2.7 (L) 3.4 - 5.0 g/dL    Alkaline Phosphatase 142 (H) 33 - 136 U/L    Total Protein 4.9 (L) 6.4 - 8.2 g/dL    AST 17 9 - 39 U/L    Bilirubin, Total 1.3 (H) 0.0 - 1.2 mg/dL    ALT 20 10 - 52 U/L   Lactate   Result Value Ref Range    Lactate 4.2 (HH) 0.4 - 2.0 mmol/L   Troponin I, High Sensitivity   Result Value Ref Range    Troponin I, High Sensitivity 93 (HH) 0 - 20 ng/L   Blood Gas Venous Full Panel   Result Value Ref Range    POCT pH, Venous 7.36 7.33 - 7.43 pH    POCT pCO2, Venous 49 41 - 51 mm Hg    POCT pO2, Venous 29 (L) 35 - 45 mm Hg    POCT SO2, Venous 43 (L) 45 - 75 %    POCT Oxy " Hemoglobin, Venous 40.5 (L) 45.0 - 75.0 %    POCT Hematocrit Calculated, Venous 57.0 (H) 41.0 - 52.0 %    POCT Sodium, Venous 119 (LL) 136 - 145 mmol/L    POCT Potassium, Venous 5.6 (H) 3.5 - 5.3 mmol/L    POCT Chloride, Venous 84 (L) 98 - 107 mmol/L    POCT Ionized Calicum, Venous 1.04 (L) 1.10 - 1.33 mmol/L    POCT Glucose, Venous 149 (H) 74 - 99 mg/dL    POCT Lactate, Venous 5.0 (HH) 0.4 - 2.0 mmol/L    POCT Base Excess, Venous 1.1 -2.0 - 3.0 mmol/L    POCT HCO3 Calculated, Venous 27.7 (H) 22.0 - 26.0 mmol/L    POCT Hemoglobin, Venous 19.1 (H) 13.5 - 17.5 g/dL    POCT Anion Gap, Venous 13.0 10.0 - 25.0 mmol/L    Patient Temperature 37.0 degrees Celsius    FiO2 21 %   B-type natriuretic peptide   Result Value Ref Range    BNP 1,114 (H) 0 - 99 pg/mL   Type and Screen   Result Value Ref Range    ABO TYPE O     Rh TYPE NEG     ANTIBODY SCREEN NEG    TSH with reflex to Free T4 if abnormal   Result Value Ref Range    Thyroid Stimulating Hormone 2.48 0.44 - 3.98 mIU/L   C-reactive protein   Result Value Ref Range    C-Reactive Protein 29.18 (H) <1.00 mg/dL   Manual Differential   Result Value Ref Range    Neutrophils %, Manual 73.0 40.0 - 80.0 %    Bands %, Manual 23.0 0.0 - 5.0 %    Lymphocytes %, Manual 1.0 13.0 - 44.0 %    Monocytes %, Manual 3.0 2.0 - 10.0 %    Eosinophils %, Manual 0.0 0.0 - 6.0 %    Basophils %, Manual 0.0 0.0 - 2.0 %    Seg Neutrophils Absolute, Manual 11.90 (H) 1.20 - 7.00 x10*3/uL    Bands Absolute, Manual 3.75 (H) 0.00 - 0.70 x10*3/uL    Lymphocytes Absolute, Manual 0.16 (L) 1.20 - 4.80 x10*3/uL    Monocytes Absolute, Manual 0.49 0.10 - 1.00 x10*3/uL    Eosinophils Absolute, Manual 0.00 0.00 - 0.70 x10*3/uL    Basophils Absolute, Manual 0.00 0.00 - 0.10 x10*3/uL    Total Cells Counted 100     Neutrophils Absolute, Manual 15.65 (H) 1.20 - 7.70 x10*3/uL    RBC Morphology No significant RBC morphology present    Urinalysis with Reflex Culture and Microscopic   Result Value Ref Range    Color,  Urine Yellow Light-Yellow, Yellow, Dark-Yellow    Appearance, Urine Turbid (N) Clear    Specific Gravity, Urine 1.024 1.005 - 1.035    pH, Urine 5.0 5.0, 5.5, 6.0, 6.5, 7.0, 7.5, 8.0    Protein, Urine 10 (TRACE) NEGATIVE, 10 (TRACE), 20 (TRACE) mg/dL    Glucose, Urine 300 (3+) (A) Normal mg/dL    Blood, Urine 0.06 (1+) (A) NEGATIVE    Ketones, Urine NEGATIVE NEGATIVE mg/dL    Bilirubin, Urine NEGATIVE NEGATIVE    Urobilinogen, Urine 2 (1+) (A) Normal mg/dL    Nitrite, Urine NEGATIVE NEGATIVE    Leukocyte Esterase, Urine 25 Roberto/µL (A) NEGATIVE   Microscopic Only, Urine   Result Value Ref Range    WBC, Urine 11-20 (A) 1-5, NONE /HPF    RBC, Urine 6-10 (A) NONE, 1-2, 3-5 /HPF    Squamous Epithelial Cells, Urine 1-9 (SPARSE) Reference range not established. /HPF    Mucus, Urine FEW Reference range not established. /LPF    Hyaline Casts, Urine 4+ (A) NONE /LPF   BLOOD GAS ARTERIAL FULL PANEL   Result Value Ref Range    POCT pH, Arterial 7.33 (L) 7.38 - 7.42 pH    POCT pCO2, Arterial 45 (H) 38 - 42 mm Hg    POCT pO2, Arterial 71 (L) 85 - 95 mm Hg    POCT SO2, Arterial 94 94 - 100 %    POCT Oxy Hemoglobin, Arterial 88.5 (L) 94.0 - 98.0 %    POCT Hematocrit Calculated, Arterial 57.0 (H) 41.0 - 52.0 %    POCT Sodium, Arterial 118 (LL) 136 - 145 mmol/L    POCT Potassium, Arterial 4.4 3.5 - 5.3 mmol/L    POCT Chloride, Arterial 87 (L) 98 - 107 mmol/L    POCT Ionized Calcium, Arterial 1.10 1.10 - 1.33 mmol/L    POCT Glucose, Arterial 154 (H) 74 - 99 mg/dL    POCT Lactate, Arterial 2.6 (H) 0.4 - 2.0 mmol/L    POCT Base Excess, Arterial -2.6 (L) -2.0 - 3.0 mmol/L    POCT HCO3 Calculated, Arterial 23.7 22.0 - 26.0 mmol/L    POCT Hemoglobin, Arterial 19.1 (H) 13.5 - 17.5 g/dL    POCT Anion Gap, Arterial 12 10 - 25 mmo/L    Patient Temperature 37.0 degrees Celsius    FiO2 32 %    Site of Arterial Puncture Arterial Line       XR tibia fibula left 2 views    (Results Pending)   XR tibia fibula right 2 views    (Results Pending)   CT  angio aorta and bilateral iliofemoral runoff including without contrast if performed    (Results Pending)         Assessment/Plan     Plan: Cellulitis  - Admit to ICU  - Bhargav erythema  - CT lower extremities   - Start broad spectrum IV ABX, consult ID    Discussed with Dr. Talamantes, CT scan doesn't appear to show any signs of air to be concerned for nec fasc at this time, waiting official read.  Okay to stay at [facility], unlikely to need surgical intervention at this time. Will continue to follow.    I spent 60 minutes in the professional and overall care of this patient.      Giuliano Dela Cruz PA-C

## 2024-12-28 NOTE — ED PROVIDER NOTES
HPI   No chief complaint on file.      The patient is a 69-year-old male past medical history of CHF, COPD, diabetes, morbid obesity presenting with cough, chills, bilateral lower extremity edema, ulceration over the right medial calf, right greater than left extremity erythema.  Patient notes that he began to develop worsening lower extremity edema over the last week, has been refractory to taking Lasix at home, notes erythema and warmth over his bilateral lower extremities at this time as well right greater than left.  Symptoms progressively worsened since onset over the last day noted an area of darkish discoloration around the medial calf.  Also notes progressive dyspnea over the last day, denies any chest pain.  Denies any productive cough.  Per EMR review, patient with reduced ejection fraction in the 20s on last echo.  EMS called head had conveyed concern for ST elevations on ECG.  Also noted the patient was saturating in the high 80s on room air when they found him.  Patient denies any history of MI in the past.  Denies any recent changes in his medications.              Patient History   Past Medical History:   Diagnosis Date    CHF (congestive heart failure) (Multi)     COPD (chronic obstructive pulmonary disease) (Multi)     Diabetes mellitus (Multi)     Hypertension     Obesity     MELISSA (obstructive sleep apnea)     Personal history of other specified conditions 01/08/2023    History of impaired glucose tolerance     Past Surgical History:   Procedure Laterality Date    HERNIA REPAIR  11/07/2013    Hernia Repair    OTHER SURGICAL HISTORY  11/07/2013    Oral Surgery     No family history on file.  Social History     Tobacco Use    Smoking status: Every Day     Types: Cigarettes    Smokeless tobacco: Never   Substance Use Topics    Alcohol use: Yes     Alcohol/week: 7.0 standard drinks of alcohol     Types: 7 Standard drinks or equivalent per week    Drug use: Never       Physical Exam   ED Triage Vitals    Temp Pulse Resp BP   -- -- -- --      SpO2 Temp src Heart Rate Source Patient Position   -- -- -- --      BP Location FiO2 (%)     -- --       Physical Exam  Vitals and nursing note reviewed.   Constitutional:       General: He is in acute distress.      Appearance: Normal appearance. He is ill-appearing, toxic-appearing and diaphoretic.      Interventions: Nasal cannula in place.   HENT:      Head: Normocephalic and atraumatic.      Nose: No congestion or rhinorrhea.      Mouth/Throat:      Mouth: Mucous membranes are dry.      Pharynx: Oropharynx is clear. No oropharyngeal exudate or posterior oropharyngeal erythema.   Eyes:      General: Lids are normal.      Extraocular Movements: Extraocular movements intact.      Right eye: Normal extraocular motion and no nystagmus.      Left eye: Normal extraocular motion and no nystagmus.      Conjunctiva/sclera: Conjunctivae normal.      Pupils: Pupils are equal, round, and reactive to light.   Cardiovascular:      Rate and Rhythm: Regular rhythm. Tachycardia present.      Pulses:           Radial pulses are 1+ on the right side and 1+ on the left side.      Heart sounds: Normal heart sounds, S1 normal and S2 normal. No murmur heard.     No friction rub. No gallop.   Pulmonary:      Effort: Pulmonary effort is normal. Tachypnea present. No respiratory distress.      Breath sounds: No stridor. Examination of the right-lower field reveals wheezing and rales. Examination of the left-lower field reveals wheezing and rales. Wheezing and rales present. No rhonchi.   Abdominal:      General: Abdomen is flat. Bowel sounds are normal. There is no distension.      Palpations: Abdomen is soft.      Tenderness: There is no abdominal tenderness. There is no right CVA tenderness, left CVA tenderness, guarding or rebound.   Musculoskeletal:      Cervical back: Full passive range of motion without pain, normal range of motion and neck supple.      Right lower leg: 3+ Edema present.       Left lower leg: 3+ Edema present.   Skin:     General: Skin is warm.      Capillary Refill: Capillary refill takes 2 to 3 seconds.          Neurological:      General: No focal deficit present.      Mental Status: He is alert and oriented to person, place, and time.      GCS: GCS eye subscore is 4. GCS verbal subscore is 5. GCS motor subscore is 6.      Cranial Nerves: No cranial nerve deficit.      Sensory: No sensory deficit.      Motor: No weakness, tremor or abnormal muscle tone.   Psychiatric:         Mood and Affect: Mood normal.         Behavior: Behavior is cooperative.           ED Course & MDM   ED Course as of 12/28/24 2223   Sat Dec 28, 2024   1825 Discussed case with intensivist on-call, conveys that no ICU bed available currently safe for crash bed, requesting transfer to another facility. [BR]   1856 Patient discussed with intensivist at [facility], they convey no bed availability at this facility and request engage in transfer center.  Discussed the case with Dr. Murillo of medical ICU at [facility] who agrees to accept the patient in transfer. [BR]      ED Course User Index  [BR] Miky Regan MD         Diagnoses as of 12/28/24 2223   Septic shock (Multi)   Cardiogenic shock (Multi)   NSTEMI (non-ST elevated myocardial infarction) (Multi)   Acute systolic congestive heart failure                 No data recorded                                 Medical Decision Making  ECG: Sinus tachycardia, right bundle branch block, ST elevations in lead II, 3.  No reciprocal changes.  New from September 2024 ECG.    Patient presenting with tachycardia, dyspnea, bilateral lower extremity edema, given degree of erythema/induration over right calf concern for developing sepsis secondary to soft tissue infectious source of lower extremity.  Patient hypothermic on initial presentation.  Normal range blood pressure reported by EMS with patient's BP noted to be in the 60s on initial presentation.  Did  attempt fluid challenge initially with slight improvement in BP with 500 mL fluid bolus, bedside ultrasound shows grossly reduced EF and a plethoric IVC with B-lines noted on lung ultrasound concerning for significant fluid overload, still hypotensive at this point therefore started on IV Levophed with improved BPs.  Did place radial arterial line for close hemodynamic monitoring at this time which did confirm patient hypotensive into the 60s prior to initiation of Levophed.  Patient's ECG does show concerning elevations in inferior leads although no reciprocal changes, difficult to discern given chronic right bundle branch block/poor baseline/significant tachycardia but given concern for cardiogenic element of shock did activate STEMI, patient was assessed by interventional cardiology at the bedside who performed their own echo, reviewed patient's ECG and felt this seems less consistent with an acute occlusive myocardial infarction, did consider taking patient to the Cath Lab for angiography/[place] placement given concern for element of cardiogenic shock however given higher suspicion for primary infectious etiology elected against Cath Lab at this time.  Did consult acute care surgery via the surgical DOUG out of concern for potential developing necrotizing fasciitis given significant erythema/single area of bulla noted on the medial calf on the right lower extremity, patient was taken emergently for CT angiography of the bilateral lower extremities also to assess for acute vascular pathology which demonstrated no free air within the soft tissues of bilateral lower extremities; CT imaging was reviewed with surgical DOUG who discussed the case with on-call general surgeon who felt this seems less consistent with necrotizing fasciitis and felt the patient did not merit emergent surgical intervention, recommended ID consult with IV antibiotics.  As patient is requiring vasopressor agents, did place central line,  initially attempted right internal jugular triple-lumen but diverted into the right subclavian vein, attempted to readjust this but met resistance with passage of the wire therefore elected against right IJ placement and placed the central line at the left groin (I was present for the critical portions of the procedure, procedure itself performed by Dr. Adam Rogers, please see separate procedure documentation for details).  Placement was confirmed with ultrasound, good blood flow through all 3 ports.  Patient's hemodynamics continue to improve with titration of vasopressor agents, did add vasopressin as well as patient is currently tachycardic suggest against need for inotropic/chronotropic from epinephrine.  Patient's lactate initially 5, downtrending into the twos.  Patient is hypoxemic but improved with nasal cannula placement, patient is moderately wheezing on reassessment therefore will administer dose of IV steroids as well as inhaled bronchodilators after concern for potential COPD component although seems more consistent with fluid overload.  Per discussion with interventional cardiology at the bedside, they did recommend large IV Lasix bolus given significant fluid overload on exam, did administer 100 mg of IV Lasix with subsequent good urine output.  Did discuss case with ICU at [facility] who conveyed that we do not have beds available at this facility, did engage Dr. Murillo at [facility] MICU who agrees to accept the patient in transfer.  Obtained verbal consent for transfer from the patient.  Will call monitor closely pending transfer.        Procedure  Critical Care    Performed by: Miky Regan MD  Authorized by: Miky Regan MD    Critical care provider statement:     Critical care time (minutes):  80    Critical care time was exclusive of:  Teaching time and separately billable procedures and treating other patients    Critical care was necessary to treat or prevent imminent or life-threatening  deterioration of the following conditions:  Renal failure, respiratory failure, sepsis, shock, cardiac failure, circulatory failure, endocrine crisis and metabolic crisis    Critical care was time spent personally by me on the following activities:  Blood draw for specimens, development of treatment plan with patient or surrogate, discussions with consultants, discussions with primary provider, evaluation of patient's response to treatment, examination of patient, interpretation of cardiac output measurements, obtaining history from patient or surrogate, ordering and performing treatments and interventions, ordering and review of laboratory studies, ordering and review of radiographic studies, pulse oximetry, re-evaluation of patient's condition, review of old charts and vascular access procedures    Care discussed with: admitting provider and accepting provider at another facility         Miky Regan MD  12/28/24 9520

## 2024-12-28 NOTE — ED PROCEDURE NOTE
Procedure  Central Line    Performed by: Adam Rogers MD  Authorized by: Miky Regan MD    Consent:     Consent obtained:  Written and verbal (Reports paper consent by Dr. Regan, verbally consented by Dr. Rogers with reiteration of all risks, benefits and indications)    Consent given by:  Patient    Risks, benefits, and alternatives were discussed: yes      Risks discussed:  Arterial puncture, incorrect placement, nerve damage, pneumothorax, infection and bleeding    Alternatives discussed:  No treatment and alternative treatment  Universal protocol:     Procedure explained and questions answered to patient or proxy's satisfaction: yes      Relevant documents present and verified: yes      Test results available: yes      Imaging studies available: yes      Required blood products, implants, devices, and special equipment available: yes      Site/side marked: yes      Immediately prior to procedure, a time out was called: yes      Patient identity confirmed:  Verbally with patient and arm band  Pre-procedure details:     Indication(s): central venous access      Hand hygiene: Hand hygiene performed prior to insertion      Sterile barrier technique: All elements of maximal sterile technique followed      Skin preparation:  Chlorhexidine    Skin preparation agent: Skin preparation agent completely dried prior to procedure    Sedation:     Sedation type:  None  Anesthesia:     Anesthesia method:  None  Procedure details:     Location:  R internal jugular    Patient position:  Trendelenburg    Procedural supplies:  Triple lumen    Catheter size:  7 Fr    Landmarks identified: yes      Ultrasound guidance: yes      Ultrasound guidance timing: real time      Sterile ultrasound techniques: Sterile gel and sterile probe covers were used      Number of attempts:  1    Successful placement: no (Takes turn from IJ into R subclavian)    Post-procedure details:     Post-procedure:  Line sutured and dressing applied     Assessment:  Blood return through all ports and free fluid flow    Procedure completion:  Tolerated               Adam Rogers MD  Resident  12/28/24 1802       Adam Rogers MD  Resident  12/28/24 1814

## 2024-12-29 ENCOUNTER — APPOINTMENT (OUTPATIENT)
Dept: CARDIOLOGY | Facility: HOSPITAL | Age: 69
End: 2024-12-29
Payer: MEDICARE

## 2024-12-29 ENCOUNTER — APPOINTMENT (OUTPATIENT)
Dept: CARDIOLOGY | Facility: HOSPITAL | Age: 69
DRG: 871 | End: 2024-12-29
Payer: MEDICARE

## 2024-12-29 ENCOUNTER — HOSPITAL ENCOUNTER (INPATIENT)
Facility: HOSPITAL | Age: 69
End: 2024-12-29
Attending: INTERNAL MEDICINE | Admitting: INTERNAL MEDICINE
Payer: MEDICARE

## 2024-12-29 VITALS
DIASTOLIC BLOOD PRESSURE: 87 MMHG | BODY MASS INDEX: 36.54 KG/M2 | HEART RATE: 112 BPM | RESPIRATION RATE: 22 BRPM | OXYGEN SATURATION: 95 % | HEIGHT: 71 IN | TEMPERATURE: 98.6 F | WEIGHT: 261 LBS | SYSTOLIC BLOOD PRESSURE: 100 MMHG

## 2024-12-29 DIAGNOSIS — K59.01 SLOW TRANSIT CONSTIPATION: ICD-10-CM

## 2024-12-29 DIAGNOSIS — K13.79 PAINFUL MOUTH: ICD-10-CM

## 2024-12-29 DIAGNOSIS — M54.50 LOW BACK PAIN WITH RADIATION: ICD-10-CM

## 2024-12-29 DIAGNOSIS — J44.9 CHRONIC OBSTRUCTIVE PULMONARY DISEASE, UNSPECIFIED COPD TYPE (MULTI): ICD-10-CM

## 2024-12-29 DIAGNOSIS — R65.21 SEPTIC SHOCK DUE TO UNDETERMINED ORGANISM (MULTI): Primary | ICD-10-CM

## 2024-12-29 DIAGNOSIS — J43.9 PULMONARY EMPHYSEMA, UNSPECIFIED EMPHYSEMA TYPE (MULTI): ICD-10-CM

## 2024-12-29 DIAGNOSIS — R06.09 OTHER FORMS OF DYSPNEA: ICD-10-CM

## 2024-12-29 DIAGNOSIS — I50.20 HEART FAILURE WITH REDUCED EJECTION FRACTION: ICD-10-CM

## 2024-12-29 DIAGNOSIS — L82.0 INFLAMED SEBORRHEIC KERATOSIS: ICD-10-CM

## 2024-12-29 DIAGNOSIS — A41.9 SEPTIC SHOCK DUE TO UNDETERMINED ORGANISM (MULTI): Primary | ICD-10-CM

## 2024-12-29 LAB
ALBUMIN SERPL BCP-MCNC: 2.7 G/DL (ref 3.4–5)
ALBUMIN SERPL BCP-MCNC: 2.9 G/DL (ref 3.4–5)
ALBUMIN SERPL BCP-MCNC: 3.2 G/DL (ref 3.4–5)
ALBUMIN SERPL BCP-MCNC: 3.2 G/DL (ref 3.4–5)
ALP SERPL-CCNC: 141 U/L (ref 33–136)
ALT SERPL W P-5'-P-CCNC: 18 U/L (ref 10–52)
ANION GAP BLDA CALCULATED.4IONS-SCNC: 14 MMO/L (ref 10–25)
ANION GAP BLDV CALCULATED.4IONS-SCNC: 12 MMOL/L (ref 10–25)
ANION GAP BLDV CALCULATED.4IONS-SCNC: 14 MMOL/L (ref 10–25)
ANION GAP SERPL CALC-SCNC: 18 MMOL/L (ref 10–20)
ANION GAP SERPL CALC-SCNC: 18 MMOL/L (ref 10–20)
ANION GAP SERPL CALC-SCNC: 21 MMOL/L (ref 10–20)
ANION GAP SERPL CALC-SCNC: 21 MMOL/L (ref 10–20)
ANION GAP SERPL CALC-SCNC: 23 MMOL/L (ref 10–20)
APTT PPP: 36 SECONDS (ref 27–38)
APTT PPP: 43 SECONDS (ref 27–38)
AST SERPL W P-5'-P-CCNC: 22 U/L (ref 9–39)
BACTERIA BLD CULT: NORMAL
BACTERIA BLD CULT: NORMAL
BASE EXCESS BLDA CALC-SCNC: -2.9 MMOL/L (ref -2–3)
BASE EXCESS BLDV CALC-SCNC: -2.6 MMOL/L (ref -2–3)
BASE EXCESS BLDV CALC-SCNC: -4 MMOL/L (ref -2–3)
BASO STIPL BLD QL SMEAR: PRESENT
BASOPHILS # BLD MANUAL: 0 X10*3/UL (ref 0–0.1)
BASOPHILS NFR BLD MANUAL: 0 %
BILIRUB SERPL-MCNC: 1.4 MG/DL (ref 0–1.2)
BNP SERPL-MCNC: 948 PG/ML (ref 0–99)
BODY TEMPERATURE: 37 DEGREES CELSIUS
BUN SERPL-MCNC: 59 MG/DL (ref 6–23)
BUN SERPL-MCNC: 60 MG/DL (ref 6–23)
BUN SERPL-MCNC: 61 MG/DL (ref 6–23)
BUN SERPL-MCNC: 64 MG/DL (ref 6–23)
BUN SERPL-MCNC: 65 MG/DL (ref 6–23)
BURR CELLS BLD QL SMEAR: ABNORMAL
CA-I BLDA-SCNC: 1.07 MMOL/L (ref 1.1–1.33)
CA-I BLDV-SCNC: 1.06 MMOL/L (ref 1.1–1.33)
CA-I BLDV-SCNC: 1.07 MMOL/L (ref 1.1–1.33)
CALCIUM SERPL-MCNC: 8.1 MG/DL (ref 8.6–10.6)
CALCIUM SERPL-MCNC: 8.5 MG/DL (ref 8.6–10.6)
CALCIUM SERPL-MCNC: 8.6 MG/DL (ref 8.6–10.6)
CALCIUM SERPL-MCNC: 8.8 MG/DL (ref 8.6–10.6)
CALCIUM SERPL-MCNC: 8.9 MG/DL (ref 8.6–10.6)
CHLORIDE BLDA-SCNC: 87 MMOL/L (ref 98–107)
CHLORIDE BLDV-SCNC: 85 MMOL/L (ref 98–107)
CHLORIDE BLDV-SCNC: 86 MMOL/L (ref 98–107)
CHLORIDE SERPL-SCNC: 86 MMOL/L (ref 98–107)
CHLORIDE SERPL-SCNC: 87 MMOL/L (ref 98–107)
CHLORIDE SERPL-SCNC: 88 MMOL/L (ref 98–107)
CHLORIDE SERPL-SCNC: 88 MMOL/L (ref 98–107)
CHLORIDE SERPL-SCNC: 89 MMOL/L (ref 98–107)
CHLORIDE UR-SCNC: 16 MMOL/L
CHLORIDE/CREATININE (MMOL/G) IN URINE: 19 MMOL/G CREAT (ref 23–275)
CO2 SERPL-SCNC: 20 MMOL/L (ref 21–32)
CO2 SERPL-SCNC: 23 MMOL/L (ref 21–32)
CO2 SERPL-SCNC: 23 MMOL/L (ref 21–32)
CO2 SERPL-SCNC: 24 MMOL/L (ref 21–32)
CO2 SERPL-SCNC: 25 MMOL/L (ref 21–32)
CREAT SERPL-MCNC: 1.24 MG/DL (ref 0.5–1.3)
CREAT SERPL-MCNC: 1.44 MG/DL (ref 0.5–1.3)
CREAT SERPL-MCNC: 1.49 MG/DL (ref 0.5–1.3)
CREAT SERPL-MCNC: 1.5 MG/DL (ref 0.5–1.3)
CREAT SERPL-MCNC: 1.5 MG/DL (ref 0.5–1.3)
CREAT UR-MCNC: 83.8 MG/DL (ref 20–370)
CREAT UR-MCNC: 83.8 MG/DL (ref 20–370)
EGFRCR SERPLBLD CKD-EPI 2021: 50 ML/MIN/1.73M*2
EGFRCR SERPLBLD CKD-EPI 2021: 53 ML/MIN/1.73M*2
EGFRCR SERPLBLD CKD-EPI 2021: 63 ML/MIN/1.73M*2
EOSINOPHIL # BLD MANUAL: 0 X10*3/UL (ref 0–0.7)
EOSINOPHIL NFR BLD MANUAL: 0 %
ERYTHROCYTE [DISTWIDTH] IN BLOOD BY AUTOMATED COUNT: 13.4 % (ref 11.5–14.5)
EST. AVERAGE GLUCOSE BLD GHB EST-MCNC: 131 MG/DL
GLUCOSE BLD MANUAL STRIP-MCNC: 184 MG/DL (ref 74–99)
GLUCOSE BLD MANUAL STRIP-MCNC: 186 MG/DL (ref 74–99)
GLUCOSE BLD MANUAL STRIP-MCNC: 228 MG/DL (ref 74–99)
GLUCOSE BLD MANUAL STRIP-MCNC: 241 MG/DL (ref 74–99)
GLUCOSE BLD MANUAL STRIP-MCNC: 242 MG/DL (ref 74–99)
GLUCOSE BLD MANUAL STRIP-MCNC: 249 MG/DL (ref 74–99)
GLUCOSE BLD MANUAL STRIP-MCNC: 280 MG/DL (ref 74–99)
GLUCOSE BLDA-MCNC: 252 MG/DL (ref 74–99)
GLUCOSE BLDV-MCNC: 256 MG/DL (ref 74–99)
GLUCOSE BLDV-MCNC: 272 MG/DL (ref 74–99)
GLUCOSE SERPL-MCNC: 191 MG/DL (ref 74–99)
GLUCOSE SERPL-MCNC: 210 MG/DL (ref 74–99)
GLUCOSE SERPL-MCNC: 217 MG/DL (ref 74–99)
GLUCOSE SERPL-MCNC: 221 MG/DL (ref 74–99)
GLUCOSE SERPL-MCNC: 239 MG/DL (ref 74–99)
HBA1C MFR BLD: 6.2 %
HCO3 BLDA-SCNC: 22.6 MMOL/L (ref 22–26)
HCO3 BLDV-SCNC: 23.5 MMOL/L (ref 22–26)
HCO3 BLDV-SCNC: 25.4 MMOL/L (ref 22–26)
HCT VFR BLD AUTO: 56.4 % (ref 41–52)
HCT VFR BLD EST: 57 % (ref 41–52)
HCT VFR BLD EST: 58 % (ref 41–52)
HCT VFR BLD EST: 59 % (ref 41–52)
HGB BLD-MCNC: 19.1 G/DL (ref 13.5–17.5)
HGB BLDA-MCNC: 19.6 G/DL (ref 13.5–17.5)
HGB BLDV-MCNC: 19 G/DL (ref 13.5–17.5)
HGB BLDV-MCNC: 19.2 G/DL (ref 13.5–17.5)
HGB RETIC QN: 34 PG (ref 28–38)
HOLD SPECIMEN: NORMAL
IMM GRANULOCYTES # BLD AUTO: 0.59 X10*3/UL (ref 0–0.7)
IMM GRANULOCYTES NFR BLD AUTO: 3.1 % (ref 0–0.9)
IMMATURE RETIC FRACTION: 19.8 %
INHALED O2 CONCENTRATION: 40 %
INHALED O2 CONCENTRATION: 40 %
INHALED O2 CONCENTRATION: 44 %
INR PPP: 1.5 (ref 0.9–1.1)
INR PPP: 1.6 (ref 0.9–1.1)
LACTATE BLDA-SCNC: 3.5 MMOL/L (ref 0.4–2)
LACTATE BLDV-SCNC: 3.5 MMOL/L (ref 0.4–2)
LACTATE BLDV-SCNC: 3.5 MMOL/L (ref 0.4–2)
LACTATE BLDV-SCNC: 4.1 MMOL/L (ref 0.4–2)
LACTATE BLDV-SCNC: 4.5 MMOL/L (ref 0.4–2)
LACTATE SERPL-SCNC: 1.8 MMOL/L (ref 0.4–2)
LACTATE SERPL-SCNC: 1.8 MMOL/L (ref 0.4–2)
LEGIONELLA AG UR QL: NEGATIVE
LYMPHOCYTES # BLD MANUAL: 0 X10*3/UL (ref 1.2–4.8)
LYMPHOCYTES NFR BLD MANUAL: 0 %
MAGNESIUM SERPL-MCNC: 2.49 MG/DL (ref 1.6–2.4)
MAGNESIUM SERPL-MCNC: 2.6 MG/DL (ref 1.6–2.4)
MCH RBC QN AUTO: 31.7 PG (ref 26–34)
MCHC RBC AUTO-ENTMCNC: 33.9 G/DL (ref 32–36)
MCV RBC AUTO: 94 FL (ref 80–100)
METAMYELOCYTES # BLD MANUAL: 0.15 X10*3/UL
METAMYELOCYTES NFR BLD MANUAL: 0.8 %
MONOCYTES # BLD MANUAL: 1.13 X10*3/UL (ref 0.1–1)
MONOCYTES NFR BLD MANUAL: 5.9 %
NEUTROPHILS # BLD MANUAL: 17.91 X10*3/UL (ref 1.2–7.7)
NEUTS BAND # BLD MANUAL: 3.07 X10*3/UL (ref 0–0.7)
NEUTS BAND NFR BLD MANUAL: 16 %
NEUTS SEG # BLD MANUAL: 14.84 X10*3/UL (ref 1.2–7)
NEUTS SEG NFR BLD MANUAL: 77.3 %
NRBC BLD-RTO: 0 /100 WBCS (ref 0–0)
OSMOLALITY SERPL: 289 MOSM/KG (ref 280–300)
OSMOLALITY SERPL: 292 MOSM/KG (ref 280–300)
OSMOLALITY UR: 425 MOSM/KG (ref 200–1200)
OXYHGB MFR BLDA: 91.5 % (ref 94–98)
OXYHGB MFR BLDV: 58.5 % (ref 45–75)
OXYHGB MFR BLDV: 67.8 % (ref 45–75)
PCO2 BLDA: 41 MM HG (ref 38–42)
PCO2 BLDV: 50 MM HG (ref 41–51)
PCO2 BLDV: 54 MM HG (ref 41–51)
PH BLDA: 7.35 PH (ref 7.38–7.42)
PH BLDV: 7.28 PH (ref 7.33–7.43)
PH BLDV: 7.28 PH (ref 7.33–7.43)
PHOSPHATE SERPL-MCNC: 7.3 MG/DL (ref 2.5–4.9)
PHOSPHATE SERPL-MCNC: 7.4 MG/DL (ref 2.5–4.9)
PHOSPHATE SERPL-MCNC: 7.5 MG/DL (ref 2.5–4.9)
PHOSPHATE SERPL-MCNC: 7.7 MG/DL (ref 2.5–4.9)
PLATELET # BLD AUTO: 220 X10*3/UL (ref 150–450)
PO2 BLDA: 78 MM HG (ref 85–95)
PO2 BLDV: 41 MM HG (ref 35–45)
PO2 BLDV: 46 MM HG (ref 35–45)
POLYCHROMASIA BLD QL SMEAR: ABNORMAL
POTASSIUM BLDA-SCNC: 5.5 MMOL/L (ref 3.5–5.3)
POTASSIUM BLDV-SCNC: 5 MMOL/L (ref 3.5–5.3)
POTASSIUM BLDV-SCNC: 5.8 MMOL/L (ref 3.5–5.3)
POTASSIUM SERPL-SCNC: 3.8 MMOL/L (ref 3.5–5.3)
POTASSIUM SERPL-SCNC: 4.3 MMOL/L (ref 3.5–5.3)
POTASSIUM SERPL-SCNC: 4.9 MMOL/L (ref 3.5–5.3)
POTASSIUM SERPL-SCNC: 5.4 MMOL/L (ref 3.5–5.3)
POTASSIUM SERPL-SCNC: 6.5 MMOL/L (ref 3.5–5.3)
POTASSIUM UR-SCNC: 57 MMOL/L
POTASSIUM/CREAT UR-RTO: 68 MMOL/G CREAT
PROT SERPL-MCNC: 5.1 G/DL (ref 6.4–8.2)
PROTHROMBIN TIME: 16.9 SECONDS (ref 9.8–12.8)
PROTHROMBIN TIME: 17.8 SECONDS (ref 9.8–12.8)
RBC # BLD AUTO: 6.02 X10*6/UL (ref 4.5–5.9)
RBC MORPH BLD: ABNORMAL
RETICS #: 0.14 X10*6/UL (ref 0.02–0.11)
RETICS/RBC NFR AUTO: 2.4 % (ref 0.5–2)
S PNEUM AG UR QL: NEGATIVE
SAO2 % BLDA: 96 % (ref 94–100)
SAO2 % BLDV: 60 % (ref 45–75)
SAO2 % BLDV: 70 % (ref 45–75)
SODIUM BLDA-SCNC: 118 MMOL/L (ref 136–145)
SODIUM BLDV-SCNC: 117 MMOL/L (ref 136–145)
SODIUM BLDV-SCNC: 118 MMOL/L (ref 136–145)
SODIUM SERPL-SCNC: 125 MMOL/L (ref 136–145)
SODIUM SERPL-SCNC: 125 MMOL/L (ref 136–145)
SODIUM SERPL-SCNC: 126 MMOL/L (ref 136–145)
SODIUM SERPL-SCNC: 126 MMOL/L (ref 136–145)
SODIUM SERPL-SCNC: 127 MMOL/L (ref 136–145)
SODIUM UR-SCNC: <10 MMOL/L
SODIUM/CREAT UR-RTO: ABNORMAL
TOTAL CELLS COUNTED BLD: 119
UFH PPP CHRO-ACNC: 0.5 IU/ML
UFH PPP CHRO-ACNC: 1 IU/ML
UREA/CREAT UR-SRTO: 5 G/G CREAT
UUN UR-MCNC: 417 MG/DL
VANCOMYCIN SERPL-MCNC: 10.3 UG/ML (ref 5–20)
WBC # BLD AUTO: 19.2 X10*3/UL (ref 4.4–11.3)

## 2024-12-29 PROCEDURE — 80202 ASSAY OF VANCOMYCIN: CPT

## 2024-12-29 PROCEDURE — 82570 ASSAY OF URINE CREATININE: CPT

## 2024-12-29 PROCEDURE — 82435 ASSAY OF BLOOD CHLORIDE: CPT

## 2024-12-29 PROCEDURE — 83735 ASSAY OF MAGNESIUM: CPT

## 2024-12-29 PROCEDURE — 82330 ASSAY OF CALCIUM: CPT

## 2024-12-29 PROCEDURE — 83605 ASSAY OF LACTIC ACID: CPT

## 2024-12-29 PROCEDURE — 82436 ASSAY OF URINE CHLORIDE: CPT

## 2024-12-29 PROCEDURE — 94640 AIRWAY INHALATION TREATMENT: CPT

## 2024-12-29 PROCEDURE — S4991 NICOTINE PATCH NONLEGEND: HCPCS

## 2024-12-29 PROCEDURE — 83036 HEMOGLOBIN GLYCOSYLATED A1C: CPT

## 2024-12-29 PROCEDURE — 2500000002 HC RX 250 W HCPCS SELF ADMINISTERED DRUGS (ALT 637 FOR MEDICARE OP, ALT 636 FOR OP/ED)

## 2024-12-29 PROCEDURE — 82947 ASSAY GLUCOSE BLOOD QUANT: CPT

## 2024-12-29 PROCEDURE — 99291 CRITICAL CARE FIRST HOUR: CPT | Performed by: STUDENT IN AN ORGANIZED HEALTH CARE EDUCATION/TRAINING PROGRAM

## 2024-12-29 PROCEDURE — 83880 ASSAY OF NATRIURETIC PEPTIDE: CPT | Performed by: STUDENT IN AN ORGANIZED HEALTH CARE EDUCATION/TRAINING PROGRAM

## 2024-12-29 PROCEDURE — 84295 ASSAY OF SERUM SODIUM: CPT

## 2024-12-29 PROCEDURE — 99223 1ST HOSP IP/OBS HIGH 75: CPT | Performed by: STUDENT IN AN ORGANIZED HEALTH CARE EDUCATION/TRAINING PROGRAM

## 2024-12-29 PROCEDURE — 85007 BL SMEAR W/DIFF WBC COUNT: CPT

## 2024-12-29 PROCEDURE — 2500000004 HC RX 250 GENERAL PHARMACY W/ HCPCS (ALT 636 FOR OP/ED)

## 2024-12-29 PROCEDURE — 85045 AUTOMATED RETICULOCYTE COUNT: CPT | Performed by: STUDENT IN AN ORGANIZED HEALTH CARE EDUCATION/TRAINING PROGRAM

## 2024-12-29 PROCEDURE — 93010 ELECTROCARDIOGRAM REPORT: CPT | Performed by: INTERNAL MEDICINE

## 2024-12-29 PROCEDURE — 2020000001 HC ICU ROOM DAILY

## 2024-12-29 PROCEDURE — 85027 COMPLETE CBC AUTOMATED: CPT

## 2024-12-29 PROCEDURE — 85730 THROMBOPLASTIN TIME PARTIAL: CPT

## 2024-12-29 PROCEDURE — 99223 1ST HOSP IP/OBS HIGH 75: CPT | Performed by: INTERNAL MEDICINE

## 2024-12-29 PROCEDURE — 82810 BLOOD GASES O2 SAT ONLY: CPT

## 2024-12-29 PROCEDURE — 2500000005 HC RX 250 GENERAL PHARMACY W/O HCPCS: Performed by: INTERNAL MEDICINE

## 2024-12-29 PROCEDURE — 2500000001 HC RX 250 WO HCPCS SELF ADMINISTERED DRUGS (ALT 637 FOR MEDICARE OP)

## 2024-12-29 PROCEDURE — 37799 UNLISTED PX VASCULAR SURGERY: CPT

## 2024-12-29 PROCEDURE — 85520 HEPARIN ASSAY: CPT

## 2024-12-29 PROCEDURE — 37799 UNLISTED PX VASCULAR SURGERY: CPT | Performed by: INTERNAL MEDICINE

## 2024-12-29 PROCEDURE — 2500000005 HC RX 250 GENERAL PHARMACY W/O HCPCS

## 2024-12-29 PROCEDURE — 83930 ASSAY OF BLOOD OSMOLALITY: CPT

## 2024-12-29 PROCEDURE — 80069 RENAL FUNCTION PANEL: CPT | Mod: CCI

## 2024-12-29 PROCEDURE — 93005 ELECTROCARDIOGRAM TRACING: CPT

## 2024-12-29 PROCEDURE — 83935 ASSAY OF URINE OSMOLALITY: CPT

## 2024-12-29 PROCEDURE — 5A09357 ASSISTANCE WITH RESPIRATORY VENTILATION, LESS THAN 24 CONSECUTIVE HOURS, CONTINUOUS POSITIVE AIRWAY PRESSURE: ICD-10-PCS | Performed by: INTERNAL MEDICINE

## 2024-12-29 PROCEDURE — 84100 ASSAY OF PHOSPHORUS: CPT

## 2024-12-29 PROCEDURE — 85610 PROTHROMBIN TIME: CPT | Performed by: INTERNAL MEDICINE

## 2024-12-29 RX ORDER — DEXTROSE 50 % IN WATER (D50W) INTRAVENOUS SYRINGE
12.5
Status: DISCONTINUED | OUTPATIENT
Start: 2024-12-29 | End: 2025-01-12 | Stop reason: HOSPADM

## 2024-12-29 RX ORDER — NOREPINEPHRINE BITARTRATE 0.06 MG/ML
0-.5 INJECTION, SOLUTION INTRAVENOUS CONTINUOUS
Status: DISCONTINUED | OUTPATIENT
Start: 2024-12-29 | End: 2024-12-30

## 2024-12-29 RX ORDER — PREDNISONE 10 MG/1
10 TABLET ORAL
Status: DISCONTINUED | OUTPATIENT
Start: 2024-12-29 | End: 2024-12-30

## 2024-12-29 RX ORDER — VANCOMYCIN HYDROCHLORIDE 1 G/200ML
1000 INJECTION, SOLUTION INTRAVENOUS EVERY 12 HOURS
Status: DISCONTINUED | OUTPATIENT
Start: 2024-12-29 | End: 2024-12-29

## 2024-12-29 RX ORDER — ALLOPURINOL 300 MG/1
300 TABLET ORAL DAILY
Status: DISCONTINUED | OUTPATIENT
Start: 2024-12-29 | End: 2025-01-07

## 2024-12-29 RX ORDER — IPRATROPIUM BROMIDE AND ALBUTEROL SULFATE 2.5; .5 MG/3ML; MG/3ML
3 SOLUTION RESPIRATORY (INHALATION)
Status: DISCONTINUED | OUTPATIENT
Start: 2024-12-29 | End: 2025-01-10

## 2024-12-29 RX ORDER — VANCOMYCIN HYDROCHLORIDE 1 G/200ML
1000 INJECTION, SOLUTION INTRAVENOUS ONCE
Status: COMPLETED | OUTPATIENT
Start: 2024-12-29 | End: 2024-12-29

## 2024-12-29 RX ORDER — HEPARIN SODIUM 10000 [USP'U]/100ML
0-4500 INJECTION, SOLUTION INTRAVENOUS CONTINUOUS
Status: DISCONTINUED | OUTPATIENT
Start: 2024-12-29 | End: 2025-01-12 | Stop reason: HOSPADM

## 2024-12-29 RX ORDER — BUMETANIDE 0.25 MG/ML
2 INJECTION, SOLUTION INTRAMUSCULAR; INTRAVENOUS ONCE
Status: DISCONTINUED | OUTPATIENT
Start: 2024-12-29 | End: 2024-12-29

## 2024-12-29 RX ORDER — INSULIN LISPRO 100 [IU]/ML
0-5 INJECTION, SOLUTION INTRAVENOUS; SUBCUTANEOUS
Status: DISCONTINUED | OUTPATIENT
Start: 2024-12-29 | End: 2024-12-29

## 2024-12-29 RX ORDER — VANCOMYCIN HYDROCHLORIDE 1 G/20ML
INJECTION, POWDER, LYOPHILIZED, FOR SOLUTION INTRAVENOUS DAILY PRN
Status: DISCONTINUED | OUTPATIENT
Start: 2024-12-29 | End: 2025-01-06

## 2024-12-29 RX ORDER — IBUPROFEN 200 MG
1 TABLET ORAL DAILY
Status: DISCONTINUED | OUTPATIENT
Start: 2024-12-29 | End: 2025-01-12 | Stop reason: HOSPADM

## 2024-12-29 RX ORDER — INSULIN LISPRO 100 [IU]/ML
0-10 INJECTION, SOLUTION INTRAVENOUS; SUBCUTANEOUS
Status: DISCONTINUED | OUTPATIENT
Start: 2024-12-29 | End: 2025-01-12 | Stop reason: HOSPADM

## 2024-12-29 RX ORDER — DEXTROSE 50 % IN WATER (D50W) INTRAVENOUS SYRINGE
25
Status: DISCONTINUED | OUTPATIENT
Start: 2024-12-29 | End: 2025-01-12 | Stop reason: HOSPADM

## 2024-12-29 RX ORDER — EZETIMIBE 10 MG/1
10 TABLET ORAL DAILY
Status: DISCONTINUED | OUTPATIENT
Start: 2024-12-29 | End: 2025-01-12 | Stop reason: HOSPADM

## 2024-12-29 RX ORDER — DIGOXIN 0.25 MG/ML
500 INJECTION INTRAMUSCULAR; INTRAVENOUS ONCE
Status: COMPLETED | OUTPATIENT
Start: 2024-12-29 | End: 2024-12-29

## 2024-12-29 RX ORDER — BUMETANIDE 0.25 MG/ML
4 INJECTION, SOLUTION INTRAMUSCULAR; INTRAVENOUS ONCE
Status: COMPLETED | OUTPATIENT
Start: 2024-12-29 | End: 2024-12-29

## 2024-12-29 RX ORDER — CEPHALEXIN 500 MG/1
500 CAPSULE ORAL EVERY 6 HOURS
COMMUNITY
Start: 2024-12-21 | End: 2025-01-12 | Stop reason: HOSPADM

## 2024-12-29 RX ORDER — DIGOXIN 0.25 MG/ML
250 INJECTION INTRAMUSCULAR; INTRAVENOUS ONCE
Status: COMPLETED | OUTPATIENT
Start: 2024-12-29 | End: 2024-12-29

## 2024-12-29 RX ORDER — ALBUTEROL SULFATE 90 UG/1
2 INHALANT RESPIRATORY (INHALATION) EVERY 4 HOURS PRN
Status: DISCONTINUED | OUTPATIENT
Start: 2024-12-29 | End: 2025-01-12 | Stop reason: HOSPADM

## 2024-12-29 RX ORDER — CHLOROTHIAZIDE SODIUM 500 MG/1
500 INJECTION INTRAVENOUS ONCE
Status: COMPLETED | OUTPATIENT
Start: 2024-12-29 | End: 2024-12-29

## 2024-12-29 RX ORDER — DIGOXIN 0.25 MG/ML
250 INJECTION INTRAMUSCULAR; INTRAVENOUS ONCE
Status: COMPLETED | OUTPATIENT
Start: 2024-12-30 | End: 2024-12-30

## 2024-12-29 RX ORDER — DEXTROSE 50 % IN WATER (D50W) INTRAVENOUS SYRINGE
25
Status: DISCONTINUED | OUTPATIENT
Start: 2024-12-29 | End: 2024-12-29

## 2024-12-29 RX ORDER — HEPARIN SODIUM 5000 [USP'U]/ML
5000 INJECTION, SOLUTION INTRAVENOUS; SUBCUTANEOUS EVERY 8 HOURS
Status: DISCONTINUED | OUTPATIENT
Start: 2024-12-29 | End: 2024-12-29

## 2024-12-29 RX ORDER — FUROSEMIDE 10 MG/ML
100 INJECTION INTRAMUSCULAR; INTRAVENOUS ONCE
Status: COMPLETED | OUTPATIENT
Start: 2024-12-29 | End: 2024-12-29

## 2024-12-29 RX ORDER — GABAPENTIN 100 MG/1
100 CAPSULE ORAL DAILY
Status: DISCONTINUED | OUTPATIENT
Start: 2024-12-29 | End: 2025-01-12 | Stop reason: HOSPADM

## 2024-12-29 RX ORDER — PANTOPRAZOLE SODIUM 40 MG/1
40 TABLET, DELAYED RELEASE ORAL
Status: DISCONTINUED | OUTPATIENT
Start: 2024-12-29 | End: 2025-01-12 | Stop reason: HOSPADM

## 2024-12-29 RX ORDER — IPRATROPIUM BROMIDE AND ALBUTEROL SULFATE 2.5; .5 MG/3ML; MG/3ML
3 SOLUTION RESPIRATORY (INHALATION)
Status: DISCONTINUED | OUTPATIENT
Start: 2024-12-29 | End: 2024-12-29

## 2024-12-29 RX ORDER — POLYETHYLENE GLYCOL 3350 17 G/17G
17 POWDER, FOR SOLUTION ORAL DAILY PRN
Status: DISCONTINUED | OUTPATIENT
Start: 2024-12-29 | End: 2025-01-12 | Stop reason: HOSPADM

## 2024-12-29 RX ORDER — DEXTROSE 50 % IN WATER (D50W) INTRAVENOUS SYRINGE
12.5
Status: DISCONTINUED | OUTPATIENT
Start: 2024-12-29 | End: 2024-12-29

## 2024-12-29 RX ADMIN — HEPARIN SODIUM 2000 UNITS/HR: 10000 INJECTION, SOLUTION INTRAVENOUS at 13:33

## 2024-12-29 RX ADMIN — INSULIN LISPRO 2 UNITS: 100 INJECTION, SOLUTION INTRAVENOUS; SUBCUTANEOUS at 15:33

## 2024-12-29 RX ADMIN — INSULIN LISPRO 3 UNITS: 100 INJECTION, SOLUTION INTRAVENOUS; SUBCUTANEOUS at 11:37

## 2024-12-29 RX ADMIN — VASOPRESSIN 0.03 UNITS/MIN: 0.2 INJECTION INTRAVENOUS at 15:32

## 2024-12-29 RX ADMIN — PREDNISONE 10 MG: 10 TABLET ORAL at 07:50

## 2024-12-29 RX ADMIN — VANCOMYCIN HYDROCHLORIDE 1000 MG: 1 INJECTION, SOLUTION INTRAVENOUS at 18:38

## 2024-12-29 RX ADMIN — BUMETANIDE 1 MG/HR: 0.25 INJECTION INTRAMUSCULAR; INTRAVENOUS at 14:42

## 2024-12-29 RX ADMIN — SODIUM ZIRCONIUM CYCLOSILICATE 10 G: 10 POWDER, FOR SUSPENSION ORAL at 07:49

## 2024-12-29 RX ADMIN — CHLOROTHIAZIDE SODIUM 500 MG: 500 INJECTION, POWDER, LYOPHILIZED, FOR SOLUTION INTRAVENOUS at 12:23

## 2024-12-29 RX ADMIN — INSULIN LISPRO 4 UNITS: 100 INJECTION, SOLUTION INTRAVENOUS; SUBCUTANEOUS at 17:16

## 2024-12-29 RX ADMIN — PIPERACILLIN SODIUM AND TAZOBACTAM SODIUM 3.38 G: 3; .375 INJECTION, SOLUTION INTRAVENOUS at 15:37

## 2024-12-29 RX ADMIN — PIPERACILLIN SODIUM AND TAZOBACTAM SODIUM 3.38 G: 3; .375 INJECTION, SOLUTION INTRAVENOUS at 04:24

## 2024-12-29 RX ADMIN — Medication 5 L/MIN: at 04:19

## 2024-12-29 RX ADMIN — ALLOPURINOL 300 MG: 300 TABLET ORAL at 08:14

## 2024-12-29 RX ADMIN — IPRATROPIUM BROMIDE AND ALBUTEROL SULFATE 3 ML: 2.5; .5 SOLUTION RESPIRATORY (INHALATION) at 23:58

## 2024-12-29 RX ADMIN — DIGOXIN 250 MCG: 0.25 INJECTION INTRAMUSCULAR; INTRAVENOUS at 20:10

## 2024-12-29 RX ADMIN — HYDROCORTISONE SODIUM SUCCINATE 50 MG: 100 INJECTION, POWDER, FOR SOLUTION INTRAMUSCULAR; INTRAVENOUS at 22:06

## 2024-12-29 RX ADMIN — NOREPINEPHRINE BITARTRATE 0.1 MCG/KG/MIN: 0.06 INJECTION, SOLUTION INTRAVENOUS at 17:20

## 2024-12-29 RX ADMIN — EZETIMIBE 10 MG: 10 TABLET ORAL at 08:14

## 2024-12-29 RX ADMIN — PANTOPRAZOLE SODIUM 40 MG: 40 TABLET, DELAYED RELEASE ORAL at 07:50

## 2024-12-29 RX ADMIN — FUROSEMIDE 100 MG: 10 INJECTION, SOLUTION INTRAMUSCULAR; INTRAVENOUS at 09:28

## 2024-12-29 RX ADMIN — NICOTINE 1 PATCH: 14 PATCH, EXTENDED RELEASE TRANSDERMAL at 14:42

## 2024-12-29 RX ADMIN — INSULIN LISPRO 2 UNITS: 100 INJECTION, SOLUTION INTRAVENOUS; SUBCUTANEOUS at 08:55

## 2024-12-29 RX ADMIN — DIGOXIN 500 MCG: 0.25 INJECTION INTRAMUSCULAR; INTRAVENOUS at 14:32

## 2024-12-29 RX ADMIN — HEPARIN SODIUM 1800 UNITS/HR: 10000 INJECTION, SOLUTION INTRAVENOUS at 20:10

## 2024-12-29 RX ADMIN — HEPARIN SODIUM 5000 UNITS: 5000 INJECTION, SOLUTION INTRAVENOUS; SUBCUTANEOUS at 08:14

## 2024-12-29 RX ADMIN — LEVOTHYROXINE SODIUM 125 MCG: 25 TABLET ORAL at 08:50

## 2024-12-29 RX ADMIN — IPRATROPIUM BROMIDE AND ALBUTEROL SULFATE 3 ML: 2.5; .5 SOLUTION RESPIRATORY (INHALATION) at 14:27

## 2024-12-29 RX ADMIN — BUMETANIDE 4 MG: 0.25 INJECTION INTRAMUSCULAR; INTRAVENOUS at 12:25

## 2024-12-29 RX ADMIN — Medication 5 L/MIN: at 19:29

## 2024-12-29 RX ADMIN — NOREPINEPHRINE BITARTRATE 0.13 MCG/KG/MIN: 0.06 INJECTION, SOLUTION INTRAVENOUS at 07:19

## 2024-12-29 RX ADMIN — PIPERACILLIN SODIUM AND TAZOBACTAM SODIUM 3.38 G: 3; .375 INJECTION, SOLUTION INTRAVENOUS at 22:00

## 2024-12-29 RX ADMIN — GABAPENTIN 100 MG: 100 CAPSULE ORAL at 08:14

## 2024-12-29 RX ADMIN — IPRATROPIUM BROMIDE AND ALBUTEROL SULFATE 3 ML: 2.5; .5 SOLUTION RESPIRATORY (INHALATION) at 19:29

## 2024-12-29 RX ADMIN — VASOPRESSIN 0.03 UNITS/MIN: 0.2 INJECTION INTRAVENOUS at 04:24

## 2024-12-29 RX ADMIN — PIPERACILLIN SODIUM AND TAZOBACTAM SODIUM 3.38 G: 3; .375 INJECTION, SOLUTION INTRAVENOUS at 09:57

## 2024-12-29 SDOH — ECONOMIC STABILITY: FOOD INSECURITY
WITHIN THE PAST 12 MONTHS, YOU WORRIED THAT YOUR FOOD WOULD RUN OUT BEFORE YOU GOT THE MONEY TO BUY MORE.: PATIENT DECLINED

## 2024-12-29 SDOH — SOCIAL STABILITY: SOCIAL INSECURITY
WITHIN THE LAST YEAR, HAVE YOU BEEN KICKED, HIT, SLAPPED, OR OTHERWISE PHYSICALLY HURT BY YOUR PARTNER OR EX-PARTNER?: PATIENT DECLINED

## 2024-12-29 SDOH — SOCIAL STABILITY: SOCIAL INSECURITY: ARE YOU OR HAVE YOU BEEN THREATENED OR ABUSED PHYSICALLY, EMOTIONALLY, OR SEXUALLY BY ANYONE?: NO

## 2024-12-29 SDOH — SOCIAL STABILITY: SOCIAL INSECURITY: ARE THERE ANY APPARENT SIGNS OF INJURIES/BEHAVIORS THAT COULD BE RELATED TO ABUSE/NEGLECT?: NO

## 2024-12-29 SDOH — SOCIAL STABILITY: SOCIAL INSECURITY: WERE YOU ABLE TO COMPLETE ALL THE BEHAVIORAL HEALTH SCREENINGS?: YES

## 2024-12-29 SDOH — SOCIAL STABILITY: SOCIAL INSECURITY
WITHIN THE LAST YEAR, HAVE YOU BEEN RAPED OR FORCED TO HAVE ANY KIND OF SEXUAL ACTIVITY BY YOUR PARTNER OR EX-PARTNER?: PATIENT DECLINED

## 2024-12-29 SDOH — HEALTH STABILITY: MENTAL HEALTH: HOW OFTEN DO YOU HAVE SIX OR MORE DRINKS ON ONE OCCASION?: LESS THAN MONTHLY

## 2024-12-29 SDOH — ECONOMIC STABILITY: FOOD INSECURITY: HOW HARD IS IT FOR YOU TO PAY FOR THE VERY BASICS LIKE FOOD, HOUSING, MEDICAL CARE, AND HEATING?: PATIENT DECLINED

## 2024-12-29 SDOH — SOCIAL STABILITY: SOCIAL INSECURITY: ABUSE: ADULT

## 2024-12-29 SDOH — SOCIAL STABILITY: SOCIAL INSECURITY: HAVE YOU HAD THOUGHTS OF HARMING ANYONE ELSE?: NO

## 2024-12-29 SDOH — HEALTH STABILITY: MENTAL HEALTH: HOW MANY DRINKS CONTAINING ALCOHOL DO YOU HAVE ON A TYPICAL DAY WHEN YOU ARE DRINKING?: 1 OR 2

## 2024-12-29 SDOH — SOCIAL STABILITY: SOCIAL INSECURITY: WITHIN THE LAST YEAR, HAVE YOU BEEN AFRAID OF YOUR PARTNER OR EX-PARTNER?: PATIENT DECLINED

## 2024-12-29 SDOH — ECONOMIC STABILITY: HOUSING INSECURITY: IN THE PAST 12 MONTHS, HOW MANY TIMES HAVE YOU MOVED WHERE YOU WERE LIVING?: 1

## 2024-12-29 SDOH — ECONOMIC STABILITY: FOOD INSECURITY: WITHIN THE PAST 12 MONTHS, THE FOOD YOU BOUGHT JUST DIDN'T LAST AND YOU DIDN'T HAVE MONEY TO GET MORE.: PATIENT DECLINED

## 2024-12-29 SDOH — ECONOMIC STABILITY: HOUSING INSECURITY: IN THE LAST 12 MONTHS, WAS THERE A TIME WHEN YOU WERE NOT ABLE TO PAY THE MORTGAGE OR RENT ON TIME?: PATIENT DECLINED

## 2024-12-29 SDOH — SOCIAL STABILITY: SOCIAL INSECURITY
WITHIN THE LAST YEAR, HAVE YOU BEEN HUMILIATED OR EMOTIONALLY ABUSED IN OTHER WAYS BY YOUR PARTNER OR EX-PARTNER?: PATIENT DECLINED

## 2024-12-29 SDOH — ECONOMIC STABILITY: HOUSING INSECURITY: AT ANY TIME IN THE PAST 12 MONTHS, WERE YOU HOMELESS OR LIVING IN A SHELTER (INCLUDING NOW)?: PATIENT DECLINED

## 2024-12-29 SDOH — ECONOMIC STABILITY: INCOME INSECURITY
IN THE PAST 12 MONTHS HAS THE ELECTRIC, GAS, OIL, OR WATER COMPANY THREATENED TO SHUT OFF SERVICES IN YOUR HOME?: PATIENT DECLINED

## 2024-12-29 SDOH — SOCIAL STABILITY: SOCIAL INSECURITY: DOES ANYONE TRY TO KEEP YOU FROM HAVING/CONTACTING OTHER FRIENDS OR DOING THINGS OUTSIDE YOUR HOME?: NO

## 2024-12-29 SDOH — HEALTH STABILITY: MENTAL HEALTH: HOW OFTEN DO YOU HAVE A DRINK CONTAINING ALCOHOL?: MONTHLY OR LESS

## 2024-12-29 SDOH — SOCIAL STABILITY: SOCIAL INSECURITY: HAVE YOU HAD ANY THOUGHTS OF HARMING ANYONE ELSE?: NO

## 2024-12-29 SDOH — SOCIAL STABILITY: SOCIAL INSECURITY: HAS ANYONE EVER THREATENED TO HURT YOUR FAMILY OR YOUR PETS?: NO

## 2024-12-29 SDOH — SOCIAL STABILITY: SOCIAL INSECURITY: DO YOU FEEL UNSAFE GOING BACK TO THE PLACE WHERE YOU ARE LIVING?: NO

## 2024-12-29 SDOH — SOCIAL STABILITY: SOCIAL INSECURITY: DO YOU FEEL ANYONE HAS EXPLOITED OR TAKEN ADVANTAGE OF YOU FINANCIALLY OR OF YOUR PERSONAL PROPERTY?: NO

## 2024-12-29 SDOH — ECONOMIC STABILITY: TRANSPORTATION INSECURITY
IN THE PAST 12 MONTHS, HAS LACK OF TRANSPORTATION KEPT YOU FROM MEDICAL APPOINTMENTS OR FROM GETTING MEDICATIONS?: PATIENT DECLINED

## 2024-12-29 ASSESSMENT — PAIN - FUNCTIONAL ASSESSMENT
PAIN_FUNCTIONAL_ASSESSMENT: 0-10

## 2024-12-29 ASSESSMENT — COLUMBIA-SUICIDE SEVERITY RATING SCALE - C-SSRS
6. HAVE YOU EVER DONE ANYTHING, STARTED TO DO ANYTHING, OR PREPARED TO DO ANYTHING TO END YOUR LIFE?: NO
1. IN THE PAST MONTH, HAVE YOU WISHED YOU WERE DEAD OR WISHED YOU COULD GO TO SLEEP AND NOT WAKE UP?: NO
2. HAVE YOU ACTUALLY HAD ANY THOUGHTS OF KILLING YOURSELF?: NO

## 2024-12-29 ASSESSMENT — ACTIVITIES OF DAILY LIVING (ADL)
BATHING: NEEDS ASSISTANCE
PATIENT'S MEMORY ADEQUATE TO SAFELY COMPLETE DAILY ACTIVITIES?: YES
FEEDING YOURSELF: NEEDS ASSISTANCE
TOILETING: NEEDS ASSISTANCE
ASSISTIVE_DEVICE: WHEELCHAIR;WALKER
WALKS IN HOME: NEEDS ASSISTANCE
HEARING - LEFT EAR: FUNCTIONAL
GROOMING: NEEDS ASSISTANCE
HEARING - RIGHT EAR: FUNCTIONAL
LACK_OF_TRANSPORTATION: PATIENT DECLINED
ADEQUATE_TO_COMPLETE_ADL: YES
LACK_OF_TRANSPORTATION: PATIENT DECLINED
DRESSING YOURSELF: NEEDS ASSISTANCE
JUDGMENT_ADEQUATE_SAFELY_COMPLETE_DAILY_ACTIVITIES: YES

## 2024-12-29 ASSESSMENT — ENCOUNTER SYMPTOMS
SHORTNESS OF BREATH: 1
GASTROINTESTINAL NEGATIVE: 1
CARDIOVASCULAR NEGATIVE: 1
PSYCHIATRIC NEGATIVE: 1
ACTIVITY CHANGE: 1
EYES NEGATIVE: 1
ENDOCRINE NEGATIVE: 1
ALLERGIC/IMMUNOLOGIC NEGATIVE: 1

## 2024-12-29 ASSESSMENT — LIFESTYLE VARIABLES
AUDIT-C TOTAL SCORE: 2
SKIP TO QUESTIONS 9-10: 0

## 2024-12-29 ASSESSMENT — PATIENT HEALTH QUESTIONNAIRE - PHQ9
1. LITTLE INTEREST OR PLEASURE IN DOING THINGS: NOT AT ALL
SUM OF ALL RESPONSES TO PHQ9 QUESTIONS 1 & 2: 0
2. FEELING DOWN, DEPRESSED OR HOPELESS: NOT AT ALL

## 2024-12-29 ASSESSMENT — PAIN SCALES - GENERAL
PAINLEVEL_OUTOF10: 0 - NO PAIN
PAINLEVEL_OUTOF10: 0 - NO PAIN
PAINLEVEL_OUTOF10: 10 - WORST POSSIBLE PAIN
PAINLEVEL_OUTOF10: 0 - NO PAIN

## 2024-12-29 ASSESSMENT — COGNITIVE AND FUNCTIONAL STATUS - GENERAL: PATIENT BASELINE BEDBOUND: UNABLE TO ASSESS AT THIS TIME

## 2024-12-29 NOTE — CONSULTS
"NEPHROLOGY NEW CONSULT NOTE   Alo Glass   69 y.o.    @WT@  MRN/Room: [id number]/15/15-A    Reason for consult: LEONA on CKD    HPI:  Alo Glass is a 69 y.o. male with PMHx of HFrEF (EF 23%) , COPD, MELISSA, obesity, type II DM, stenotrophomonas pneumonia w parapneumonic effusion (9/2024), HTN, hypothyroidism and HLD who was admitted to the MICU for Septic Shock & acute decompensated HF. Has Large Bullae of LE that are oozing, possibly infected.      In The ER: BP 98/68 (BP Location: Left arm, Patient Position: Sitting)   Pulse 105   Temp 36.3 °C (97.3 °F) (Temporal)   Resp 18   Ht 1.803 m (5' 11\")   Wt 129 kg (285 lb 0.9 oz)   SpO2 94%   BMI 39.76 kg/m²      Past Medical History:   Diagnosis Date    CHF (congestive heart failure)     COPD (chronic obstructive pulmonary disease) (Multi)     Diabetes mellitus (Multi)     Hypertension     Obesity     MELISSA (obstructive sleep apnea)     Personal history of other specified conditions 01/08/2023    History of impaired glucose tolerance      Past Surgical History:   Procedure Laterality Date    HERNIA REPAIR  11/07/2013    Hernia Repair    OTHER SURGICAL HISTORY  11/07/2013    Oral Surgery      No family history on file.  Social History     Socioeconomic History    Marital status: Single     Spouse name: Not on file    Number of children: Not on file    Years of education: Not on file    Highest education level: Not on file   Occupational History    Not on file   Tobacco Use    Smoking status: Every Day     Types: Cigarettes    Smokeless tobacco: Never   Substance and Sexual Activity    Alcohol use: Yes     Alcohol/week: 7.0 standard drinks of alcohol     Types: 7 Standard drinks or equivalent per week    Drug use: Never    Sexual activity: Not on file   Other Topics Concern    Not on file   Social History Narrative    Not on file     Social Drivers of Health     Financial Resource Strain: Patient Declined (12/29/2024)    Overall Financial Resource Strain " (CARDIA)     Difficulty of Paying Living Expenses: Patient declined   Food Insecurity: Patient Declined (12/29/2024)    Hunger Vital Sign     Worried About Running Out of Food in the Last Year: Patient declined     Ran Out of Food in the Last Year: Patient declined   Transportation Needs: Patient Declined (12/29/2024)    PRAPARE - Transportation     Lack of Transportation (Medical): Patient declined     Lack of Transportation (Non-Medical): Patient declined   Physical Activity: Inactive (9/5/2024)    Exercise Vital Sign     Days of Exercise per Week: 0 days     Minutes of Exercise per Session: 0 min   Stress: Not on file   Social Connections: Feeling Socially Integrated (11/29/2024)    OASIS : Social Isolation     Frequency of experiencing loneliness or isolation: Never   Intimate Partner Violence: Patient Declined (12/29/2024)    Humiliation, Afraid, Rape, and Kick questionnaire     Fear of Current or Ex-Partner: Patient declined     Emotionally Abused: Patient declined     Physically Abused: Patient declined     Sexually Abused: Patient declined   Housing Stability: Patient Declined (12/29/2024)    Housing Stability Vital Sign     Unable to Pay for Housing in the Last Year: Patient declined     Number of Times Moved in the Last Year: 1     Homeless in the Last Year: Patient declined       No Known Allergies     Medications Prior to Admission   Medication Sig Dispense Refill Last Dose/Taking    albuterol 90 mcg/actuation inhaler INHALE 2 PUFFS EVERY 4-6 HOURS AS NEEDED. 8.5 g 3     allopurinol (Zyloprim) 300 mg tablet Take 1 tablet (300 mg) by mouth once daily. 90 tablet 1     cephalexin (Keflex) 500 mg capsule Take 1 capsule (500 mg) by mouth every 6 hours.       Dexcom G7  misc Use as instructed (Patient not taking: Reported on 10/27/2024) 1 each 0     Dexcom G7 Sensor device Use as directed (Patient not taking: Reported on 10/27/2024) 3 each 0     empagliflozin (Jardiance) 10 mg Take 1 tablet (10  mg) by mouth once daily. 90 tablet 3     ezetimibe (Zetia) 10 mg tablet Take 1 tablet (10 mg) by mouth once daily. 90 tablet 3     fluticasone-umeclidin-vilanter (Trelegy Ellipta) 100-62.5-25 mcg blister with device one puff per day 28 each 1     folic acid (Folvite) 1 mg tablet Take 1 tablet (1 mg) by mouth once daily. 30 tablet 3     furosemide (Lasix) 80 mg tablet Take 1 tablet (80 mg) by mouth once daily. 90 tablet 3     gabapentin (Neurontin) 100 mg capsule Take 1 Capsule by mouth every afternoon 30 capsule 0     INDOMETHACIN ORAL Take 1 tablet by mouth 3 times a day as needed (gout pain).       ipratropium-albuteroL (Duo-Neb) 0.5-2.5 mg/3 mL nebulizer solution Take 3 mL by nebulization 4 times a day. (Patient taking differently: Take 3 mL by nebulization 4 times a day as needed for wheezing or shortness of breath.) 180 mL 1     levothyroxine (Synthroid, Levoxyl) 125 mcg tablet Take 1 Tablet by mouth every day 30 tablet 1     midodrine (Proamatine) 2.5 mg tablet Take 1 Tablet by mouth every morning and 1 Tablet every afternoon for low Blood Pressure 60 tablet 0     multivitamin with minerals tablet Take 1 tablet by mouth once daily. (Patient not taking: Reported on 10/27/2024) 30 tablet 3     oxygen (O2) gas therapy Inhale 2 L/min continuously. 2L nc at rest and 4L nc with ambulation       oxygen (O2) gas therapy Inhale 7 L/min continuously.       oxygen (O2) gas therapy Inhale 1 each once daily at bedtime. NOCTURNAL CPAP AUTOTITRATING, auto-titrating range: 4-20       pantoprazole (ProtoNix) 40 mg EC tablet Take 1 tablet (40 mg) by mouth once daily in the morning. Take before meals. Do not crush, chew, or split. (Patient not taking: Reported on 12/29/2024)   Not Taking    potassium chloride CR 20 mEq ER tablet Take 1 Tablet by mouth every morning 30 tablet 0     predniSONE (Deltasone) 10 mg tablet Take 1 Tablet by mouth every morning 30 tablet 0     tamsulosin (Flomax) 0.4 mg 24 hr capsule Take 1 Capsule by  mouth every evening 30 capsule 0     thiamine (Vitamin B-1) 100 mg tablet Take 1 tablet (100 mg) by mouth once daily. (Patient not taking: Reported on 10/27/2024) 30 tablet 3     tirzepatide (Mounjaro) 2.5 mg/0.5 mL pen injector Inject 2.5 mg under the skin 1 (one) time per week. (Patient not taking: Reported on 12/29/2024) 2 mL 0 Not Taking        Meds:   [Held by provider] allopurinol, 300 mg, Daily  digoxin, 250 mcg, Once  [START ON 12/30/2024] digoxin, 250 mcg, Once  ezetimibe, 10 mg, Daily  gabapentin, 100 mg, Daily  hydrocortisone sodium succinate, 50 mg, q6h  insulin lispro, 0-10 Units, TID AC  ipratropium-albuteroL, 3 mL, q6h  levothyroxine, 125 mcg, Daily  nicotine, 1 patch, Daily  oxygen, , Nightly  pantoprazole, 40 mg, Daily before breakfast  perflutren lipid microspheres, 0.5-10 mL of dilution, Once in imaging  perflutren protein A microsphere, 0.5 mL, Once in imaging  piperacillin-tazobactam, 3.375 g, q6h  predniSONE, 10 mg, Daily before breakfast  sulfur hexafluoride microsphr, 2 mL, Once in imaging  vancomycin, 1,000 mg, Once      bumetanide, Last Rate: 2 mg/hr (12/29/24 1800)  heparin, Last Rate: 2,000 Units/hr (12/29/24 1800)  norepinephrine, Last Rate: 0.08 mcg/kg/min (12/29/24 1800)  vasopressin, Last Rate: 0.03 Units/min (12/29/24 1800)      albuterol, 2 puff, q4h PRN  dextrose, 12.5 g, q15 min PRN  dextrose, 25 g, q15 min PRN  glucagon, 1 mg, q15 min PRN  glucagon, 1 mg, q15 min PRN  heparin, 5,000-10,000 Units, q4h PRN  oxygen, , Continuous PRN - O2/gases  polyethylene glycol, 17 g, Daily PRN  vancomycin, , Daily PRN        Vitals:    12/29/24 1800   BP:    Pulse: 105   Resp: 18   Temp:    SpO2:         12/27 1900 - 12/29 0659  In: 126.8 [I.V.:76.8]  Out: 1700 [Urine:1700]   Weight change:      General appearance: Sleepy, difficult to arouse. Goes back to sleep within seconds. On CPAP.  Eyes: non-icteric  Skin: no apparent rash  Heart: s1s2 regular. no rub. JVD visible  Lungs: CTA bilat.  no  wheezing/crackles. Scattered Rhonchi  Abdomen: soft, nt/nd  Extremities: +3 edema bilat up to thighs as well as Erythema and Warm limbs.  : has Live  Neuro: Could not be assessed   ACCESS: segundo      ASSESSMENT:  Alo Glass is a 69 y.o. male with PMHx of HFrEF (EF 23%) , COPD, MELISSA, obesity, type II DM, stenotrophomonas pneumonia w parapneumonic effusion (9/2024), HTN, hypothyroidism and HLD who was admitted to the MICU for Septic Shock & acute decompensated HF. Has Large Bullae of LE that are oozing, possibly infected.    #LEONA  - Baseline Cr 0.6  - Admission Cr 1.3  - Home meds: Empagliflozin, Lasix, Midodrine, Indomethacin.  - No imaging  - U/A: +1 Blood, L Est, WBC & RBC.  - FeUrea 12% indicating Prerenal.  - Ddx: most likely Cardiorenal Syndrome given HFrEF and Overload. Could also be Ischemic ATN. Post renal due to obstruction is certainly a possibility.    #Shock  - Septic/Cardiogenic  - Requiring 2 pressers.  - Has documented Hypotension.    #Oliguria  - Trail of Loop diuretic boluses failed. Bumex 1 mg/hour with increasing Urine output.    #HyperK  - Lokelma.    #Volume Status:  - JVD and Peripheral edema.  - IVC POCUS Distended    Recommendations:  - No indication for RRT on assessment, Will observe closely for indications for RRT  - Increase Bumex infusion to 2 mg/hour with close follow up of UOP.  - Attempted to discuss Dialysis with the patient but too sleepy/lethargic.  - Please do Renal U/S    Letitia Spicer MD  Nephrology Fellow  24 hour Renal Pager - [phone]

## 2024-12-29 NOTE — CONSULTS
Inpatient consult to Cardiology  Consult performed by: Sabra Siddiqi MD  Consult ordered by: Jenny Manjarrez MD        Inpatient Cardiology Consult Note    Reason for consult: Concern for cardiogenic shock    HPI:   Alo Glass is a 69 y.o. male with PMH of HFrEF (EF 23% 9/2024) , COPD (2019: FEV1 45% w +ve BD change; %/TLC 85%), MELISSA, obesity, type II DM (last A1c decreased to 5.5% 6/25/24), hx of stenotrophomonas pneumonia w parapneumonic effusion (9/2024), HTN, hypothyroidism and HLD who presents to the MICU for Septic Shock w acute decompensated HF.     Cardiology is consulted for concern for cardiogenic shock.  Patient is awake, he denies any chest pain, shortness of breath, palpitations, lightheadedness, dizziness, fatigue.  He is working with physical therapy walking around his room.    All system reviewed and negative unless mentioned above.     Cardiac studies:   EKG 12/28/2024  Sinus tachycardia with right bundle branch block and left axis deviation    Echo: 9/19/2024     1. Left ventricular ejection fraction is severely decreased, calculated by Green's biplane at 23%.   2. There is global hypokinesis of the left ventricle with minor regional variations.   3. Poorly visualized anatomical structures due to suboptimal image quality.   4. Spectral Doppler shows an impaired relaxation pattern of left ventricular diastolic filling.   5. There is mildly reduced right ventricular systolic function.   6. Moderately enlarged right ventricle.   7. There is moderate aortic valve cusp calcification. The was no assesment of aortic stenosis in this limited study.   8. Compared with study dated 3/11/2024, there is RV dilation and reduced function.        Current Facility-Administered Medications:     albuterol 90 mcg/actuation inhaler 2 puff, 2 puff, inhalation, q4h PRN, Whitney Olmos MD    [Held by provider] allopurinol (Zyloprim) tablet 300 mg, 300 mg, oral, Daily, Whitney Olmos MD, 300 mg at 12/29/24  0814    bumetanide (Bumex) injection 4 mg, 4 mg, intravenous, Once, John Hogue MD    chlorothiazide (Diuril) injection 500 mg, 500 mg, intravenous, Once, John Hogue MD    dextrose 50 % injection 12.5 g, 12.5 g, intravenous, q15 min PRN, Whitney Olmos MD    dextrose 50 % injection 25 g, 25 g, intravenous, q15 min PRN, Whitney Olmos MD    ezetimibe (Zetia) tablet 10 mg, 10 mg, oral, Daily, Whitney Olmos MD, 10 mg at 12/29/24 0814    gabapentin (Neurontin) capsule 100 mg, 100 mg, oral, Daily, Whitney Olmos MD, 100 mg at 12/29/24 0814    glucagon (Glucagen) injection 1 mg, 1 mg, intramuscular, q15 min PRN, Whitney Olmos MD    glucagon (Glucagen) injection 1 mg, 1 mg, intramuscular, q15 min PRN, Whitney Olmos MD    heparin (porcine) injection 5,000 Units, 5,000 Units, subcutaneous, q8h, Whitney Olmos MD, 5,000 Units at 12/29/24 0814    hydrocortisone sodium succinate (PF) (Solu-CORTEF) injection 50 mg, 50 mg, intravenous, q6h, Whitney Olmos MD    insulin lispro injection 0-5 Units, 0-5 Units, subcutaneous, TID AC, Whitney Olmos MD, 3 Units at 12/29/24 1137    ipratropium-albuteroL (Duo-Neb) 0.5-2.5 mg/3 mL nebulizer solution 3 mL, 3 mL, nebulization, q6h, Whitney Olmos MD    levothyroxine (Synthroid, Levoxyl) tablet 125 mcg, 125 mcg, oral, Daily, Whitney Olmos MD, 125 mcg at 12/29/24 0850    norepinephrine (Levophed) 16 mg in sodium chloride 0.9% 250 mL (0.064 mg/mL) infusion (premix), 0-0.5 mcg/kg/min, intravenous, Continuous, Whitney Olmos MD, Last Rate: 14.38 mL/hr at 12/29/24 1100, 0.13 mcg/kg/min at 12/29/24 1100    oxygen (O2) therapy, , inhalation, Nightly, Whitney Olmos MD    oxygen (O2) therapy, , inhalation, Continuous PRN - O2/gases, Sushma Murillo MD, 5 L/min at 12/29/24 0419    pantoprazole (ProtoNix) EC tablet 40 mg, 40 mg, oral, Daily before breakfast, Whitney Olmos MD, 40 mg at 12/29/24 0750    perflutren lipid microspheres (Definity) injection 0.5-10 mL of dilution, 0.5-10 mL of dilution, intravenous, Once in  imaging, Whitney Olmos MD    perflutren protein A microsphere (Optison) injection 0.5 mL, 0.5 mL, intravenous, Once in imaging, Whitney Olmos MD    piperacillin-tazobactam (Zosyn) 3.375 g in dextrose (iso) IV 50 mL, 3.375 g, intravenous, q6h, Whitney Olmos MD, Stopped at 12/29/24 1027    predniSONE (Deltasone) tablet 10 mg, 10 mg, oral, Daily before breakfast, Whitney Olmos MD, 10 mg at 12/29/24 0750    sulfur hexafluoride microsphr (Lumason) injection 24.28 mg, 2 mL, intravenous, Once in imaging, Whitney Olmos MD    vancomycin (Vancocin) pharmacy to dose - pharmacy monitoring, , miscellaneous, Daily PRN, Whitney Olmos MD    vasopressin (Vasostrict) 0.2 unit/mL in 5% dextrose 100 mL IV, 0-0.03 Units/min, intravenous, Continuous, Whitney Olmos MD, Last Rate: 9 mL/hr at 12/29/24 1100, 0.03 Units/min at 12/29/24 1100    Objective:    Vitals:    12/29/24 1100   BP:    Pulse: (!) 123   Resp: (!) 33   Temp:    SpO2: 95%       Exam:  General - well appearing   Neck - + JVD   Chest -bilateral crackles up to mid lung fields  Cardiac - S1, S2, no murmur heard   Lower ext -3+ lower extremity edema, but well and well-perfused extremities with strong pulses.  Appropriate pulse volume    Labs/Imaging:     Results from last 72 hours   Lab Units 12/29/24  0918 12/29/24  0531 12/29/24  0325 12/28/24  1704 12/28/24  1456   TROPHS ng/L  --   --   --  61*  --    SODIUM mmol/L 126*   < > 125*  --   --    POTASSIUM mmol/L 4.9   < > 5.4*  --   --    CO2 mmol/L 23   < > 20*  --   --    BUN mg/dL 61*   < > 59*  --   --    CREATININE mg/dL 1.49*   < > 1.24  --   --    MAGNESIUM mg/dL  --   --  2.60*  --   --    PHOSPHORUS mg/dL 7.4*  --  7.3*  --    < >    < > = values in this interval not displayed.      Results from last 72 hours   Lab Units 12/29/24  0325   WBC AUTO x10*3/uL 19.2*   HEMOGLOBIN g/dL 19.1*   PLATELETS AUTO x10*3/uL 220        Assessment and Plan:   Alo Glass is a 69 y.o. male with PMH of HFrEF (EF 23% 9/2024) , COPD (2019:  FEV1 45% w +ve BD change; %/TLC 85%), MELISSA, obesity, type II DM (last A1c decreased to 5.5% 6/25/24), hx of stenotrophomonas pneumonia w parapneumonic effusion (9/2024), HTN, hypothyroidism and HLD who presents to the MICU for Septic Shock w acute decompensated HF.     Cardiology is consulted for concern for cardiogenic shock.      Impression   #HFrEF (EF 23% 9/2024)  #Septic shock   #A flutter with variable conduction  -While patient does not currently appear to have florid cardiogenic shock, he is certainly at risk for it, due to significant volume overload and unfavorable Gerardo Starling hemodynamics.  Complicating this is atrial flutter as well with loss of atrial kick.  Moreover, he also has septic shock related to lower extremity cellulitis with infected lower extremity wounds and bullae.  -Patient is currently on Levophed and vasopressin, and appears clinically warm and wet.  His pulse pressure is narrow however his systolics are 80, therefore cannot have afterload reduction.  Moreover due to the heart rate, cannot add dobutamine or milrinone.  -At this point would prioritize digoxin loading with 500 mcg IV --> wait 6 hours--> 250 mcg--> wait 6 hours--> 250 mcg IV.  This may slow down the flutter and give him extra inotropy. Do not use beta blockers or CCB.   -Continue heparin gtt for AC.   -Attempt diuresis with Bumex 8 mg and 1 g of Diuril.  His volume status needs to be optimized either with diuresis or dialysis if diuresis does not work.  -Consider nephrology consult for oliguria.   -If the patient decompensates, can consider cardioversion for atrial kick restoration, however he was not on anticoagulation, and has known history of atrial fibrillation.  Moreover, he had been requiring pressors prior to going into atrial flutter even in sinus rhythm suggesting that the hypotension is not related to being in atrial flutter.    Thank you for this consult.  Cardiology will continue to follow.   Recommendations are preliminary until note is co-signed by an attending. Please reach out with any questions or concerns should they arise.     Sabra Siddiqi  Cardiology Fellow   [id number]

## 2024-12-29 NOTE — CARE PLAN
Problem: Skin  Goal: Decreased wound size/increased tissue granulation at next dressing change  Outcome: Not Progressing  Flowsheets (Taken 12/29/2024 1730)  Decreased wound size/increased tissue granulation at next dressing change:   Protective dressings over bony prominences   Promote sleep for wound healing  Goal: Participates in plan/prevention/treatment measures  Outcome: Progressing  Flowsheets (Taken 12/29/2024 1730)  Participates in plan/prevention/treatment measures:   Discuss with provider PT/OT consult   Elevate heels  Goal: Prevent/manage excess moisture  Outcome: Progressing  Flowsheets (Taken 12/29/2024 1730)  Prevent/manage excess moisture:   Follow provider orders for dressing changes   Cleanse incontinence/protect with barrier cream   Monitor for/manage infection if present   Moisturize dry skin  Goal: Prevent/minimize sheer/friction injuries  Outcome: Progressing  Flowsheets (Taken 12/29/2024 1730)  Prevent/minimize sheer/friction injuries:   Complete micro-shifts as needed if patient unable. Adjust patient position to relieve pressure points, not a full turn   Increase activity/out of bed for meals   Use pull sheet   HOB 30 degrees or less   Turn/reposition every 2 hours/use positioning/transfer devices  Goal: Promote/optimize nutrition  Outcome: Progressing  Flowsheets (Taken 12/29/2024 1730)  Promote/optimize nutrition:   Monitor/record intake including meals   Assist with feeding  Goal: Promote skin healing  Outcome: Progressing  Flowsheets (Taken 12/29/2024 1730)  Promote skin healing:   Assess skin/pad under line(s)/device(s)   Protective dressings over bony prominences   Turn/reposition every 2 hours/use positioning/transfer devices   Ensure correct size (line/device) and apply per  instructions   Rotate device position/do not position patient on device

## 2024-12-29 NOTE — PROGRESS NOTES
Vancomycin Dosing by Pharmacy- FOLLOW UP    Alo Glass is a 69 y.o. year old male who Pharmacy has been consulted for vancomycin dosing for cellulitis, skin and soft tissue. Based on the patient's indication and renal status this patient is being dosed based on a goal trough/random level of 10-15.     Renal function is currently LEONA    Current vancomycin dose: dose by level    Most recent random level: 10.9 mcg/mL    Visit Vitals  BP 98/68 (BP Location: Left arm, Patient Position: Sitting)   Pulse 98   Temp 36.3 °C (97.3 °F) (Temporal)   Resp 18        Lab Results   Component Value Date    CREATININE 1.50 (H) 2024    CREATININE 1.49 (H) 2024    CREATININE 1.44 (H) 2024    CREATININE 1.24 2024        Patient weight is as follows:   Vitals:    24 0308   Weight: 129 kg (285 lb 0.9 oz)       Cultures:  No results found for the encounter in last 14 days.       I/O last 3 completed shifts:  In: 126.8 (1 mL/kg) [I.V.:76.8 (0.6 mL/kg); IV Piggyback:50]  Out: 1700 (13.1 mL/kg) [Urine:1700 (0.4 mL/kg/hr)]  Weight: 129.3 kg   I/O during current shift:  I/O this shift:  In: 966.4 [P.O.:400; I.V.:466.4; IV Piggyback:100]  Out: 750 [Urine:750]    Temp (24hrs), Av.3 °C (97.3 °F), Min:36 °C (96.8 °F), Max:37 °C (98.6 °F)      Assessment/Plan    Within goal random/trough level @ 10.3 mcg/ml.  Re-dose with 1000 mg x 1 tonight  The next level will be obtained on 12 at AM labs. May be obtained sooner if clinically indicated.   Will continue to monitor renal function daily while on vancomycin and order serum creatinine at least every 48 hours if not already ordered.  Follow for continued vancomycin needs, clinical response, and signs/symptoms of toxicity.       STEFFANY JC

## 2024-12-29 NOTE — CONSULTS
[facility]  ACUTE CARE SURGERY - HISTORY AND PHYSICAL / CONSULT    Patient Name: Alo Glass  MRN: [id number]  Admit Date: 1229  : 1955  AGE: 69 y.o.   GENDER: male  ==============================================================================  TODAY'S ASSESSMENT AND PLAN OF CARE:  69 M with PMH HFREF, COPD, MELISSA, obesity, DM2, HTN, hypothyroidism, HLD, who presented to MICU for shock workup --sepsis vs decompensated HF, for whom acs was consulted with bilateral leg wounds, rule out abscess.     RECS:  - Continue broad spec empiric abx, concern for MRSA given no improvement on keflex  - No current concern for necrotizing fasciitis, infection does not appear to follow up fascial planes (on CT) and due to being bilateral, also less likely that this is NSTI. He is noted to be hyponatremic but has a lot of other competing factors that could also cause this (ie his heart failure)   - Also no concern for any fluid collections amenable to drainage identified on physical exam or CT from [facility]  - Suspect this is cellulitis on top of chronic venous stasis  - Please keep legs ELEVATED, which will help with all of the swelling   - Will cont to follow    ACS [id number]    ==============================================================================  CHIEF COMPLAINT/REASON FOR CONSULT:  69 M with PMH HFREF, COPD, MELISSA, obesity, DM2, HTN, hypothyroidism, HLD, who presented to MICU for shock workup --sepsis vs decompensated HF, for whom acs was consulted with bilateral leg wounds, rule out abscess. Patient was at [facility] and seen by general surgery there for c/f NSTI, which at the time they deemed cellulitis. He was also seen recently by his friend who is a well-known vascular surgeon, who treated his venous stasis and cellulitis--was given keflex with no improvement in his wounds. The patient noticed a large blister on his distal calf that had a lot of drainage and was red (thought it was  a blood blister). Friday patient acutely became ill, O2 desat to 70s at home (has home O2 prn), and on Saturday was told by his friend to go to the ED. He was transferred to [facility] for further care.   Wounds appear equal on both sides. CT reviewed from [facility], no signs of gas. No crepitus appreciated on exam. Cellulitis erythema extends bilaterally to mid calves. Feet are grossly swollen bilaterally and erythematous as well.       PAST MEDICAL HISTORY:   PMH:   Past Medical History:   Diagnosis Date    CHF (congestive heart failure)     COPD (chronic obstructive pulmonary disease) (Multi)     Diabetes mellitus (Multi)     Hypertension     Obesity     MELISSA (obstructive sleep apnea)     Personal history of other specified conditions 01/08/2023    History of impaired glucose tolerance         PSH:   Past Surgical History:   Procedure Laterality Date    HERNIA REPAIR  11/07/2013    Hernia Repair    OTHER SURGICAL HISTORY  11/07/2013    Oral Surgery     FH:   No family history on file.  SOCIAL HISTORY:    Smoking:   Social History     Tobacco Use   Smoking Status Every Day    Types: Cigarettes   Smokeless Tobacco Never       Alcohol:   Social History     Substance and Sexual Activity   Alcohol Use Yes    Alcohol/week: 7.0 standard drinks of alcohol    Types: 7 Standard drinks or equivalent per week         MEDICATIONS:   Prior to Admission medications    Medication Sig Start Date End Date Taking? Authorizing Provider   albuterol 90 mcg/actuation inhaler INHALE 2 PUFFS EVERY 4-6 HOURS AS NEEDED. 1/5/24   Omari Schumacher MD   allopurinol (Zyloprim) 300 mg tablet Take 1 tablet (300 mg) by mouth once daily. 5/11/24   Omari Schumacher MD   cephalexin (Keflex) 500 mg capsule Take 1 capsule (500 mg) by mouth every 6 hours. 12/21/24 1/2/25  Historical Provider, MD   Dexcom G7  misc Use as instructed  Patient not taking: Reported on 10/27/2024 6/25/24   Omari Schumacher MD   Dexcom G7 Sensor device Use as  directed  Patient not taking: Reported on 10/27/2024 6/25/24   Omari Schumacher MD   empagliflozin (Jardiance) 10 mg Take 1 tablet (10 mg) by mouth once daily. 4/23/24   FABIOLA Dawson   ezetimibe (Zetia) 10 mg tablet Take 1 tablet (10 mg) by mouth once daily. 4/23/24   FABIOLA Dawson   fluticasone-umeclidin-vilanter (Trelegy Ellipta) 100-62.5-25 mcg blister with device one puff per day 5/11/24   Omari Schumacher MD   folic acid (Folvite) 1 mg tablet Take 1 tablet (1 mg) by mouth once daily. 8/1/24   Omari Schumacher MD   furosemide (Lasix) 80 mg tablet Take 1 tablet (80 mg) by mouth once daily. 4/23/24   FABIOLA Dawson   gabapentin (Neurontin) 100 mg capsule Take 1 Capsule by mouth every afternoon 12/20/24   Omari Schumacher MD   INDOMETHACIN ORAL Take 1 tablet by mouth 3 times a day as needed (gout pain).    Historical MD Yanique   ipratropium-albuteroL (Duo-Neb) 0.5-2.5 mg/3 mL nebulizer solution Take 3 mL by nebulization 4 times a day.  Patient taking differently: Take 3 mL by nebulization 4 times a day as needed for wheezing or shortness of breath. 4/16/24   Omari Schumacher MD   levothyroxine (Synthroid, Levoxyl) 125 mcg tablet Take 1 Tablet by mouth every day 8/1/24   Omari Schumacher MD   midodrine (Proamatine) 2.5 mg tablet Take 1 Tablet by mouth every morning and 1 Tablet every afternoon for low Blood Pressure 12/20/24   Omari Schumacher MD   multivitamin with minerals tablet Take 1 tablet by mouth once daily.  Patient not taking: Reported on 10/27/2024 3/23/24   Servando Reynolds MD   oxygen (O2) gas therapy Inhale 2 L/min continuously. 2L nc at rest and 4L nc with ambulation 9/25/24   FABIOLA Mason   oxygen (O2) gas therapy Inhale 7 L/min continuously. 9/25/24   FABIOLA Mason   oxygen (O2) gas therapy Inhale 1 each once daily at bedtime. NOCTURNAL CPAP AUTOTITRATING, auto-titrating range: 4-20 9/25/24   FABIOLA Mason   pantoprazole (ProtoNix) 40 mg  EC tablet Take 1 tablet (40 mg) by mouth once daily in the morning. Take before meals. Do not crush, chew, or split.  Patient not taking: Reported on 12/29/2024 9/25/24 10/25/24  Keren Christensen, PARAM-CNP   potassium chloride CR 20 mEq ER tablet Take 1 Tablet by mouth every morning 11/28/24   Omari Schumacher MD   predniSONE (Deltasone) 10 mg tablet Take 1 Tablet by mouth every morning 12/20/24   Omari Schumacher MD   tamsulosin (Flomax) 0.4 mg 24 hr capsule Take 1 Capsule by mouth every evening 12/20/24   Omari Schumacher MD   thiamine (Vitamin B-1) 100 mg tablet Take 1 tablet (100 mg) by mouth once daily.  Patient not taking: Reported on 10/27/2024 3/23/24   Servando Reynolds MD   tirzepatide (Mounjaro) 2.5 mg/0.5 mL pen injector Inject 2.5 mg under the skin 1 (one) time per week.  Patient not taking: Reported on 12/29/2024 8/27/24   Omari Schumacher MD   amoxicillin (Amoxil) 500 mg tablet  10/19/24 12/29/24  Historical Provider, MD     ALLERGIES: No Known Allergies    REVIEW OF SYSTEMS:  Review of Systems   Constitutional:  Positive for activity change.   HENT: Negative.     Eyes: Negative.    Respiratory:  Positive for shortness of breath.    Cardiovascular: Negative.    Gastrointestinal: Negative.    Endocrine: Negative.    Genitourinary: Negative.    Musculoskeletal:         Bilateral leg swelling, erythema, pain, blister, venous stasis   Allergic/Immunologic: Negative.    Psychiatric/Behavioral: Negative.       PHYSICAL EXAM:  Physical Exam  Neurological: Awake, alert, conversive  Respiratory/Thorax: even, unlabored  Genitourinary: voiding  Gastrointestinal: soft, NT, ND.    Skin: warm, dry  Musculoskeletal: FRANKLIN  Eyes: non-icteric  Extremities: venous stasis, erythema tracking upwards to above bilateral calves, marked with pen. Old bullae, dead skin overlying from popped blister, edematous weaping legs  Psychological: appropriate mood/affect       LABS:  Results from last 7 days   Lab Units 12/29/24  3383  12/28/24  1456   WBC AUTO x10*3/uL 19.2* 16.3*   HEMOGLOBIN g/dL 19.1* 19.5*   HEMATOCRIT % 56.4* 57.5*   PLATELETS AUTO x10*3/uL 220 172   LYMPHO PCT MAN % 0.0 1.0   MONO PCT MAN % 5.9 3.0   EOSINO PCT MAN % 0.0 0.0     Results from last 7 days   Lab Units 12/29/24  0531 12/29/24  0325   APTT seconds 36 43*   INR  1.5* 1.6*     Results from last 7 days   Lab Units 12/29/24  0918 12/29/24  0531 12/29/24  0325 12/28/24  1456   SODIUM mmol/L 126* 126* 125* 129*   POTASSIUM mmol/L 4.9 6.5* 5.4* 5.4*   CHLORIDE mmol/L 87* 86* 89* 89*   CO2 mmol/L 23 24 20* 27   BUN mg/dL 61* 60* 59* 59*   CREATININE mg/dL 1.49* 1.44* 1.24 1.38*   CALCIUM mg/dL 8.9 8.6 8.5* 8.3*   PROTEIN TOTAL g/dL  --   --  5.1* 4.9*   BILIRUBIN TOTAL mg/dL  --   --  1.4* 1.3*   ALK PHOS U/L  --   --  141* 142*   ALT U/L  --   --  18 20   AST U/L  --   --  22 17   GLUCOSE mg/dL 239* 210* 221* 134*     Results from last 7 days   Lab Units 12/29/24  0325 12/28/24  1456   BILIRUBIN TOTAL mg/dL 1.4* 1.3*     Results from last 7 days   Lab Units 12/29/24  0324 12/28/24  2153 12/28/24  1703   POCT PH, ARTERIAL pH 7.35* 7.34* 7.33*   POCT PCO2, ARTERIAL mm Hg 41 44* 45*   POCT PO2, ARTERIAL mm Hg 78* 63* 71*   POCT HCO3 CALCULATED, ARTERIAL mmol/L 22.6 23.7 23.7   POCT BASE EXCESS, ARTERIAL mmol/L -2.9* -2.4* -2.6*         I have reviewed all laboratory and imaging results ordered/pertinent for this encounter.

## 2024-12-29 NOTE — HOSPITAL COURSE
A 69 year old Male with PMHx significant of HFrEF (EF 23% 2024) , COPD (2019: FEV1 45% w +ve BD change; %/TLC 85%), T2DM (last A1c decreased to 5.5% 24), hypothyroidism and HLD who presents to the MICU on 2024 for Septic Shock most likely from RLE wound. Warm extremities, leukocytosis, tachycardia and severe hypotension w high lactate. Could be urinary given pyuria w hx of MRSA UTI. Able to appreciate pulses w doppler. ECG w changes, cards saw patient in OSH ED per chart review, believed to be 2/2 sepsis. No ST segment changes, Trops down trended, CPK normal, no concerns for MI currently.  Patient's lactate initially improved with 500 ml bolus of fluids, but started to uptrend to 3.5. He is requiring Vaso and levo  and noted to have narrow pulse pressures, concerning for cardiogenic shock, likely secondary to septic shock. His kidney function is noted to have BUN 61 and Cr 1.44 which are both increased from his baseline (both WNL). Potassium also noted to be elevated 5.4 then 6.5 (although this one is hemolyzed) Lokelma ordered in addition to patient being diuresed with lasix. General surgery has been consulted for concern of b/l lower extremity cellulitis with R>L.     On 25, he was transferred to the SDU. Overnight he was more lethargic with vb.31/65/55.  He was placed on bipap at night due to hypercapnia; during the day he wears his home cpap.  He was given bumex boluses but was changed to bumex gtt as not meeting goal urinary output.  CXR showing pulmonary edema.  On , he was started on metolazone as failing to adequately diurese.   Output continued to increase and bumex gtt was held given polyuria per renal recommendations.   Electrolytes were replaced and creatinine improving.   ACS consulted; now signed off ; rec cont IV antibiotics.     A family meeting was held on 2024 regarding poor prognosis with SDU staff, HCPOA and Palliative Medicine.  Patient made the decision to  transition to DNAR/No intubation/No ICU with hospice consult as his wishes remain to discharge home with hospice.   [facility] meet with patient, friend (JR) on 1/10 and consents were signed with plan for discharge to home w/hospice today ,1/12, as friend (JR) verified that [facility] can start staffing 24 hour care Sunday and all equipment is provided.

## 2024-12-29 NOTE — PROGRESS NOTES
Pharmacy Medication History Review    Alo Galss is a 69 y.o. male admitted for Septic shock due to undetermined organism (Multi). Pharmacy reviewed the patient's [id number] medications and allergies for accuracy.    Medications ADDED:  Indomethacin oral  Keflex (acute course)  Medications CHANGED:  none  Medications REMOVED:   none     The list below reflects the updated PTA list.   Prior to Admission Medications   Prescriptions Last Dose Informant   INDOMETHACIN ORAL  Self   Sig: Take 1 tablet by mouth 3 times a day as needed (gout pain).  -->has been taking more frequently - no fill history   albuterol 90 mcg/actuation inhaler  Self   Sig: INHALE 2 PUFFS EVERY 4-6 HOURS AS NEEDED.   allopurinol (Zyloprim) 300 mg tablet  Self   Sig: Take 1 tablet (300 mg) by mouth once daily.   cephalexin (Keflex) 500 mg capsule  Self   Sig: Take 1 capsule (500 mg) by mouth every 6 hours.  --> taking for 13 day course for SSTI. Started on 12/21   empagliflozin (Jardiance) 10 mg  Self   Sig: Take 1 tablet (10 mg) by mouth once daily.   ezetimibe (Zetia) 10 mg tablet  Self   Sig: Take 1 tablet (10 mg) by mouth once daily.   fluticasone-umeclidin-vilanter (Trelegy Ellipta) 100-62.5-25 mcg blister with device  Self   Sig: one puff per day  -knows he should be taking, but has not been; last filled 5/23 (30 day supply)   folic acid (Folvite) 1 mg tablet  Self   Sig: Take 1 tablet (1 mg) by mouth once daily.  -only takes on occasion    furosemide (Lasix) 80 mg tablet  Self   Sig: Take 1 tablet (80 mg) by mouth once daily.  -stop taking since catheter was taken out. Did not want frequent bathroom usage   gabapentin (Neurontin) 100 mg capsule  Self   Sig: Take 1 Capsule by mouth every afternoon   ipratropium-albuteroL (Duo-Neb) 0.5-2.5 mg/3 mL nebulizer solution  Self   Sig: Take 3 mL by nebulization 4 times a day.   Patient taking differently: Take 3 mL by nebulization 4 times a day as needed for wheezing or shortness of  breath.   levothyroxine (Synthroid, Levoxyl) 125 mcg tablet  Self   Sig: Take 1 Tablet by mouth every day   midodrine (Proamatine) 2.5 mg tablet  Self   Sig: Take 1 Tablet by mouth every morning and 1 Tablet every afternoon for low Blood Pressure   multivitamin with minerals tablet  Self   Sig: Take 1 tablet by mouth once daily.   Patient not taking: Reported on 10/27/2024   oxygen (O2) gas therapy  Self   Sig: Inhale 2 L/min continuously. 2L nc at rest and 4L nc with ambulation   oxygen (O2) gas therapy  Self   Sig: Inhale 7 L/min continuously.   oxygen (O2) gas therapy  Self   Sig: Inhale 1 each once daily at bedtime. NOCTURNAL CPAP AUTOTITRATING, auto-titrating range: 4-20   pantoprazole (ProtoNix) 40 mg EC tablet Not Taking    Sig: Take 1 tablet (40 mg) by mouth once daily in the morning. Take before meals. Do not crush, chew, or split.   Patient not taking: Reported on 12/29/2024   potassium chloride CR 20 mEq ER tablet  Self   Sig: Take 1 Tablet by mouth every morning   predniSONE (Deltasone) 10 mg tablet  Self   Sig: Take 1 Tablet by mouth every morning   tamsulosin (Flomax) 0.4 mg 24 hr capsule  Self   Sig: Take 1 Capsule by mouth every evening   thiamine (Vitamin B-1) 100 mg tablet  Self   Sig: Take 1 tablet (100 mg) by mouth once daily.   Patient not taking: Reported on 10/27/2024   tirzepatide (Mounjaro) 2.5 mg/0.5 mL pen injector Not Taking Self   Sig: Inject 2.5 mg under the skin 1 (one) time per week.   Patient not taking: Reported on 12/29/2024      Facility-Administered Medications: None        The list below reflects the updated allergy list. Please review each documented allergy for additional clarification and justification.  Allergies  Reviewed by Radhika Calderon, PharmD on 12/29/2024   No Known Allergies         Patient accepts M2B at discharge.     Sources:   [facility]  Pharmacy dispense history  Patient interview Moderate historian  Chart Review  Urology note from 12/12  Care  "Everywhere    Additional Comments:  Pt would like to be counseled on what vitamins/minerals he should be taking.   Please review comments above in Red    Radhika Calderon, PharmD  Transitions of Care Pharmacist  12/29/24     Secure Chat preferred   If no response call [phone] or [employer] \"Med Rec\"      "

## 2024-12-29 NOTE — H&P
"Medical Intensive Care - History and Physical   Subjective    Alo Glass is a 69 y.o.  male patient admitted on 12/28 with following ICU needs: Septic shock     HPI:  Alo Glass is a 69 y.o. male with PMH of HFrEF (EF 23% 9/2024) , COPD (2019: FEV1 45% w +ve BD change; %/TLC 85%), MELISSA, obesity, type II DM (last A1c decreased to 5.5% 6/25/24), hx of stenotrophomonas pneumonia w parapneumonic effusion (9/2024), HTN, hypothyroidism and HLD who presents to the MICU for Septic Shock w acute decompensated HF.     Patient says last week he noticed a large bullous on his medial distal calf, and popped 5 days ago with \"a lot of drainage\". He says his legs are always large and erythematous but noticed it becoming more red over the past few days. Says his neighbor has been helping him dress his wound, but on Friday morning he started feeling chills, palpitations, and his O2 at home was in 70s per patient. Placed 4L O2 at home (usually on RA and uses it only as needed) and O2 sats stayed in late 80s. Patient elected to stay in wheelchair past few days because he became SOB when using his walker. His neighbor on Saturday told patient to go to the hospital because his wound is black (has been since Friday) and slowly oozing. Denies dysuria or frothy urine, but endorses \"mild discomfort\".    ED Course:  Initial Vitals:     T 36.3/ / BP 69/46/ RR 21/ SpO2 97% on 6L NC  Labs:   CBC: WBC 16.3 Hgb 19.5  plt 172   BMP: Na 129, K 5.4 Cl 89 HCO3 27 BUN 59 Cr 1.38glu 134 ; iCa2+ 1.08   LFT: Ca 8.3 tprot 4.9, alb 2.7 alkphos 142 AST17 ALT 20 tbili 1.3 dbili _    Electrolytes: PO4 _ Mg 2.20   Heme: PT 12.4, INR 1.1 aPTT _  BNP 1,114 ; Troponin 93 -> 61 and  ; TSH 2.48; CRP 29.18  VBG(14:56): 7.36/49/29 lactate 5.0 /// ABG (21:53): 7.34/44/63 lactate 2.3   BCx x2 drawn, Urine L+Strep Pneumo pending  UA: 3+ glucose, 1+ blood, 11-20 WBC   Imaging:  ECG: Sinus tachycardia, RBBB, peaked T waves in lead III  CXR: "   Moderate stable cardiomegaly. Pulmonary vasculature appears prominent and somewhat indistinct. Small right pleural effusion is stable to slightly improved. There is mild atelectasis at the lung bases, right greater than left, mildly improved.   CTA Aorta and bilateral iliofemoral runoff:  - RLE: Moderate atherosclerotic changes throughout the right lower extremity with multiple areas of hemodynamically significant stenosis  - LLE: Moderate atherosclerotic changes throughout the left lower extremity with distal anterior tibial artery occlusion. The dorsalis pedis artery reconstitutes via the peroneal artery. Additionally, there are multiple areas of hemodynamically significant stenosis  - Diffuse bilateral lower extremity edema down to the feet, right greater than left without soft tissue gas. No drainable abscess or collection.  - A solid non-calcified pulmonary nodule measuring 17 mm in the right lower lobe appears new.   - Cardiomegaly and Trace R>L pleural effusions with associated atelectasis.    Interventions:   - 100mg IV lasix;  levo and vaso; clindamycin,  Vanc+Zosyn; L fem line (unsuccessful IJ attempt), methylpred 125mg IV once  - surgery initially consult for LLE wound, recommended CTA and no signs of nec fasc, recommended ICU admission and no surgery  - called Cards for ECG findings, changes likely d/t sepsis    IN MICU: Aox3, endorses mild SOB, no chest pain. Denies any fevers, chills, sweats. Endorses some mild L groin pain (L fem insertion sit) but no other leg pain).     Meds    Home medications:  Current Outpatient Medications   Medication Instructions    albuterol 90 mcg/actuation inhaler 2 puffs, inhalation, Every 4 hours PRN    allopurinol (ZYLOPRIM) 300 mg, oral, Daily    amoxicillin (Amoxil) 500 mg tablet     Dexcom G7  misc Use as instructed    Dexcom G7 Sensor device Use as directed    empagliflozin (JARDIANCE) 10 mg, oral, Daily    ezetimibe (ZETIA) 10 mg, oral, Daily     fluticasone-umeclidin-vilanter (Trelegy Ellipta) 100-62.5-25 mcg blister with device one puff per day    folic acid (FOLVITE) 1 mg, oral, Daily    furosemide (LASIX) 80 mg, oral, Daily    gabapentin (Neurontin) 100 mg capsule Take 1 Capsule by mouth every afternoon    ipratropium-albuteroL (Duo-Neb) 0.5-2.5 mg/3 mL nebulizer solution 3 mL, nebulization, 4 times daily RT    levothyroxine (SYNTHROID, LEVOXYL) 125 mcg, oral, Daily    midodrine (Proamatine) 2.5 mg tablet Take 1 Tablet by mouth every morning and 1 Tablet every afternoon for low Blood Pressure    Mounjaro 2.5 mg, subcutaneous, Once Weekly    multivitamin with minerals tablet 1 tablet, oral, Daily    oxygen (O2) gas therapy 1 each, inhalation, Nightly, NOCTURNAL CPAP AUTOTITRATING, auto-titrating range: 4-20    oxygen (O2) 2 L/min, inhalation, Continuous, 2L nc at rest and 4L nc with ambulation    oxygen (O2) 7 L/min, inhalation, Continuous    pantoprazole (PROTONIX) 40 mg, oral, Daily before breakfast, Do not crush, chew, or split.    potassium chloride CR 20 mEq ER tablet 20 mEq, oral, Daily before breakfast    predniSONE (DELTASONE) 10 mg, oral, Daily before breakfast    tamsulosin (FLOMAX) 0.4 mg, oral, Every evening    thiamine (VITAMIN B-1) 100 mg, oral, Daily       Physical Exam  HENT:      Head: Normocephalic.      Mouth/Throat:      Mouth: Mucous membranes are dry.      Comments: Mild cyanosis  Eyes:      Pupils: Pupils are equal, round, and reactive to light.   Cardiovascular:      Rate and Rhythm: Regular rhythm. Tachycardia present.      Pulses: Normal pulses.      Heart sounds: Normal heart sounds.   Pulmonary:      Breath sounds: Wheezing present.      Comments: 6L NC  Abdominal:      General: There is distension.      Palpations: Abdomen is soft.      Tenderness: There is no abdominal tenderness.   Musculoskeletal:      Right lower leg: Edema present.      Left lower leg: Edema present.      Comments: Erythema B/L LE w black wound B/L distal  medial calves R>L w slow oozing from RLE   Skin:     General: Skin is warm.      Capillary Refill: Capillary refill takes less than 2 seconds.   Neurological:      General: No focal deficit present.      Mental Status: He is alert and oriented to person, place, and time.   Psychiatric:         Mood and Affect: Mood normal.        Assessment and Plan     Assessment:  Alo Glass is a 69 y.o. male with PMH of HFrEF (EF 23% 9/2024) , COPD (2019: FEV1 45% w +ve BD change; %/TLC 85%), T2DM (last A1c decreased to 5.5% 6/25/24), hypothyroidism and HLD who presents to the MICU on 12/28/2024 for Septic Shock most likely from RLE wound. Warm extremities, leukocytosis, tachycardia and severe hypotension w high lactate. Could be urinary given pyuria w hx of MRSA UTI. Able to appreciate pulses w doppler. ECG w changes, cards saw patient in OSH ED per chart review, believed to be 2/2 sepsis. No ST segment changes, Trops down trended, CPK normal, no concerns for MI currently.  Given lactate improvement w current regimen and mild BP improvement with 500cc bolus, shock likely mostly driven by sepsis rather cardiogenic despite clinical overload picture.  tachycardic as well - no current need for inotrope. Wheezing cardiogenic.     Mechanical Ventilation: none  Sedation/Analgesia:  none  Restraints: no    Plan:  NEUROLOGY/PSYCH:  ZENA    CARDIOVASCULAR:  #HFrEF   #Shock, Septic primarily w component of acute decompensated HF  #Baseline Mild Hypotension  #Severe PAD  #Venostasis  :: 9/2024: EF 23%; Global hypokinesis of the left ventricle with minor regional variation  :: Leukocytosis, hypothermia (admission T <36) concerning for sepsis with Skin/Soft tissue (escar wounds in LE) vs UTI (pyuria [facility] 11-20). Chronic erythema and pitting edema, black wounds and oozing from RLE new.   :: At home on lasix 40 daily (although prescribed 80 daily), 20KCl PO daily and Jardiance   :: Overload clinic picture with hypoxia+pulm vasc  congestion w cardiomegaly on CXR, evidence of pitting edema B/L LE. S/p 100 IV lasix in ED. Trops 93 -> , CPK normal. NO ECG concerns of ACS. BNP 1,114 (baseline 260-290). PaO2 71.   PLAN:  Hold SGLT-2 and midodrine  Repeat TTE  Levophed and Vasopressin via L Femoral Line  Repeat gas, trending lactate  When condition improves, consider starting GDMT. Only on lasix and SGLT-2. Per chart review, limited d/t hypotension.   Consider vascular consult, able to appreciate B/L pulses with doppler.  Consider ASH   Received 125mg methylpred, use stress dose steroids d/t levo 0.15 and vaso 0.03. hydro 50 Q6    #HLD  holding antihypertensives I/s shock. Continuing ezetimibe    PULMONARY:  #COPD GOLD 3   #Asthma  #Acute Hypoxic Respiratory Failure  On 6L NC, likely 2/2 overload given pulm vascular congesiton on CXR and B-lines on bedside POCUS.   PFTs 2019: FEV1 45% w +ve BD change; %/TLC 85%  Group E, on breo-ellipta  Wheezing cardiogenic in nature I/s overload  PLAN:  Duonebs Q6  C/w home pred 10 daily             #MELISSA  Nocturnal CPAP    RENAL/GENITOURINARY:  #LEONA  #Hyponatremia  #L Renal Mass concerning for RCC - per heme/onc section  Baseline Cr 0.4 - 0.8; 1.38 in ED. Likely Pre-renal in etiology I/s of sepsis vs Cardiorenal. Urine lytes ordered. Strict Is and Os  Added on Urine Urea, electrolytes, and osms to evaluate etiology.     GASTROENTEROLOGY:  #GERD  C/w home pantoprazole    ENDOCRINOLOGY:  #T2DM  #Hypothyroidsim  HbA1c 5.5% most recently. Has Dexcom CGM w home regimen of Mounjaro and Jardiance 10mg daily.   PLAN:  POCT AC+HS, SS#1 + hypoglycemic order set  C/w home levothyroxine    HEMATOLOGY/ONCOLOGY:  #L renal Mass concerning for RCC  2.4 cm x 1.9 cm Mass in L kidney and of suspected 0.8 cm right upper pole renal cell carcinoma measuring 0.8 cm. Saw Urology 12/12/2024 and referred to IR for biopsy   CTM, consider biopsy inpatient if prolonged hospitalization     MSK/DERM:  #Chronic B/L LE erythema and  edema  #bursted bullae in RLE 1 week ago c/b black wound and oozing blood   #LLE grey/black wound distal medial calf  Skin Failure Yes, describe: B/L balck wounds distal medial calf R>L w oozing from RLE  . Likely PAD w superimposed infection  Management:  Per ID and cards section  Wound care consult     INFECTIOUS DISEASE:  #Septic Shock  #UTI vs LE Skin/Soft tissue infections  Leukocytosis, hypothermia (admission T <36) concerning for sepsis with Skin/Soft tissue (black oozing wounds after bullae popped in RLE) +/- UTI (pyuria [facility] 11-20). Hx of MRSA in Ucx (11/20/2024)  Gen surg consulted in OSH for LE infection concern for necrotizing fascitis, r/o with CTA not showing any gas and no surgical intervention currently.   Vanc/zosyn and clinda given in ED  Management:  F/up B/C x2, Ucx, Urine Strep pneumonia+legionella  C/w Vanc/Zosyn  CTM LE, low thresholding to re-scan to assess progression if clinically appears worsening              #hx of stenotrophomonas pneumonia w parapneumonic effusion 9/2024   resolved and discharged on RA with prn O2 for COPD. Tx with levaquin and zosyn.     ICU Check List                 FEN  Fluids: replete as needed  Electrolytes: replete as needed  Nutrition: cardiac diet  Prophylaxis:  DVT ppx: hep ppx + SCDs  GI ppx: pantoprazole  Bowel care: N/A  Hardware:                Urethral Catheter Coude 20 Fr. (Active)   Placement Date/Time: 09/20/24 1857   Catheter Type: Coude  Tube Size (Fr.): 20 Fr.  Removal Reason : Other (Comment)   Number of days: 99       Social:  Code: Full Code    HPOA: Sheyla Griggs [phone]   Disposition: RAMON Olmos MD   12/28/24 at 8:25 PM     Disclaimer: Documentation completed with the information available at the time of input. The times in the chart may not be reflective of actual patient care times, interventions, or procedures. Documentation occurs after the physical care of the patient.

## 2024-12-29 NOTE — ED PROCEDURE NOTE
Procedure  Insert arterial line    Performed by: Miky Regan MD  Authorized by: Miky Reagn MD    Consent:     Consent obtained:  Verbal    Consent given by:  Patient    Risks, benefits, and alternatives were discussed: yes      Risks discussed:  Bleeding, ischemia, repeat procedure, infection and pain  Universal protocol:     Procedure explained and questions answered to patient or proxy's satisfaction: yes      Relevant documents present and verified: yes      Test results available: yes      Required blood products, implants, devices, and special equipment available: yes      Site/side marked: yes      Immediately prior to procedure, a time out was called: yes      Patient identity confirmed:  Verbally with patient and arm band  Indications:     Indications: hemodynamic monitoring and multiple ABGs    Pre-procedure details:     Skin preparation:  Chlorhexidine    Preparation: Patient was prepped and draped in sterile fashion    Sedation:     Sedation type:  None  Anesthesia:     Anesthesia method:  Local infiltration    Local anesthetic:  Lidocaine 1% w/o epi  Procedure details:     Location:  L radial    Cullen's test performed: yes      Cullen's test abnormal: no      Needle gauge:  20 G    Placement technique:  Ultrasound guided    Number of attempts:  1    Transducer: waveform confirmed    Post-procedure details:     Post-procedure:  Sutured and sterile dressing applied    CMS:  Unchanged and normal    Procedure completion:  Tolerated well, no immediate complications               Miky Regan MD  12/28/24 [id number]

## 2024-12-29 NOTE — PROGRESS NOTES
Medical Intensive Care - Daily Progress Note   Subjective    Alo Glass is a 69 y.o. year old male patient admitted on 12/29/2024 with following ICU needs: septic vs. Cardiogenic shock requiring 2 pressors    Interval History:  Patient is requiring 0.03 units/min Vaso and Levo 0.2 mcg/kg/min. He is denying SOB, CP, abdominal pain, and pain in his lower extremities with ambulation. Patient is requesting food at this time.     Meds    Scheduled medications  [Held by provider] allopurinol, 300 mg, oral, Daily  digoxin, 250 mcg, intravenous, Once  [START ON 12/30/2024] digoxin, 250 mcg, intravenous, Once  ezetimibe, 10 mg, oral, Daily  gabapentin, 100 mg, oral, Daily  hydrocortisone sodium succinate, 50 mg, intravenous, q6h  insulin lispro, 0-10 Units, subcutaneous, TID AC  ipratropium-albuteroL, 3 mL, nebulization, q6h  levothyroxine, 125 mcg, oral, Daily  nicotine, 1 patch, transdermal, Daily  oxygen, , inhalation, Nightly  pantoprazole, 40 mg, oral, Daily before breakfast  perflutren lipid microspheres, 0.5-10 mL of dilution, intravenous, Once in imaging  perflutren protein A microsphere, 0.5 mL, intravenous, Once in imaging  piperacillin-tazobactam, 3.375 g, intravenous, q6h  predniSONE, 10 mg, oral, Daily before breakfast  sulfur hexafluoride microsphr, 2 mL, intravenous, Once in imaging  vancomycin, 1,000 mg, intravenous, Once      Continuous medications  bumetanide, 2 mg/hr, Last Rate: 2 mg/hr (12/29/24 1800)  heparin, 0-4,500 Units/hr, Last Rate: 1,800 Units/hr (12/29/24 1809)  norepinephrine, 0-0.5 mcg/kg/min, Last Rate: 0.06 mcg/kg/min (12/29/24 1805)  vasopressin, 0-0.03 Units/min, Last Rate: 0.02 Units/min (12/29/24 1816)      PRN medications  PRN medications: albuterol, dextrose, dextrose, glucagon, glucagon, heparin, oxygen, polyethylene glycol, vancomycin     Objective    Blood pressure 98/68, pulse 105, temperature 36.3 °C (97.3 °F), temperature source Temporal, resp. rate 18, height 1.803 m (5'  "11\"), weight 129 kg (285 lb 0.9 oz), SpO2 94%.     Physical Exam  Constitutional:       Appearance: He is obese.   HENT:      Head: Normocephalic and atraumatic.      Nose:      Comments: Wearing CPAP     Mouth/Throat:      Mouth: Mucous membranes are dry.      Pharynx: Oropharynx is clear.   Eyes:      Extraocular Movements: Extraocular movements intact.      Conjunctiva/sclera: Conjunctivae normal.   Cardiovascular:      Rate and Rhythm: Regular rhythm. Tachycardia present.      Pulses: Normal pulses.      Heart sounds: Normal heart sounds. No murmur heard.  Pulmonary:      Effort: Pulmonary effort is normal. No respiratory distress.      Breath sounds: Wheezing and rales present.   Abdominal:      General: Bowel sounds are normal. There is no distension.      Palpations: Abdomen is soft.      Tenderness: There is no abdominal tenderness.   Musculoskeletal:         General: Swelling and tenderness present.      Comments: Erythema B/L LE w black wound B/L distal medial calves R>L w slow oozing from RLE    Skin:     Comments: Venous stasis of B/l LE with erythema extending up legs. Bullae on R medial calf covered in wet dressing.   Neurological:      General: No focal deficit present.      Mental Status: He is alert and oriented to person, place, and time.      Sensory: No sensory deficit.   Psychiatric:         Mood and Affect: Mood normal.         Behavior: Behavior normal.         Thought Content: Thought content normal.            Intake/Output Summary (Last 24 hours) at 12/29/2024 1817  Last data filed at 12/29/2024 1800  Gross per 24 hour   Intake 1093.17 ml   Output 2450 ml   Net -1356.83 ml     Labs:   Results from last 72 hours   Lab Units 12/29/24  1404 12/29/24  0918 12/29/24  0531 12/29/24  0325   SODIUM mmol/L 125* 126* 126* 125*   POTASSIUM mmol/L 4.3 4.9 6.5* 5.4*   CHLORIDE mmol/L 88* 87* 86* 89*   CO2 mmol/L 23 23 24 20*   BUN mg/dL 64* 61* 60* 59*   CREATININE mg/dL 1.50* 1.49* 1.44* 1.24   GLUCOSE " mg/dL 217* 239* 210* 221*   CALCIUM mg/dL 8.8 8.9 8.6 8.5*   ANION GAP mmol/L 18 21* 23* 21*   EGFR mL/min/1.73m*2 50* 50* 53* 63   PHOSPHORUS mg/dL 7.5* 7.4*  --  7.3*      Results from last 72 hours   Lab Units 12/29/24  0325 12/28/24  1456   WBC AUTO x10*3/uL 19.2* 16.3*   HEMOGLOBIN g/dL 19.1* 19.5*   HEMATOCRIT % 56.4* 57.5*   PLATELETS AUTO x10*3/uL 220 172   LYMPHO PCT MAN % 0.0 1.0   MONO PCT MAN % 5.9 3.0   EOSINO PCT MAN % 0.0 0.0      Results from last 72 hours   Lab Units 12/29/24  0324 12/28/24  2153 12/28/24  1703   POCT PH, ARTERIAL pH 7.35* 7.34* 7.33*   POCT PCO2, ARTERIAL mm Hg 41 44* 45*   POCT PO2, ARTERIAL mm Hg 78* 63* 71*   POCT SO2, ARTERIAL % 96 92* 94     Results from last 72 hours   Lab Units 12/29/24  1059 12/29/24  0918 12/28/24  1456   POCT PH, VENOUS pH 7.28* 7.28* 7.36   POCT PCO2, VENOUS mm Hg 54* 50 49   POCT PO2, VENOUS mm Hg 41 46* 29*        Micro/ID:     Lab Results   Component Value Date    URINECULTURE (A) 11/20/2024     >100,000 Methicillin Resistant Staphylococcus aureus (MRSA)    BLOODCULT Loaded on Instrument - Culture in progress 12/28/2024    BLOODCULT Loaded on Instrument - Culture in progress 12/28/2024       Summary of key imaging results from the last 24 hours    CT angio aorta and bilateral iliofemoral runoff 12/28/24  IMPRESSION:  1. Right lower extremity: Moderate atherosclerotic changes throughout  the right lower extremity with multiple areas of hemodynamically  significant stenosis, which are described in detail in the body of  the report.  2. Left lower extremity: Moderate atherosclerotic changes throughout  the left lower extremity with distal anterior tibial artery  occlusion. The dorsalis pedis artery reconstitutes via the peroneal  artery. Additionally, there are multiple areas of hemodynamically  significant stenosis, which are described in detail in the body of  the report.  3. Diffuse bilateral lower extremity edema down to the feet, right  greater than  left without soft tissue gas. However, the absence of  soft tissue gas does not exclude necrotizing fasciitis. No drainable  abscess or collection.  4. Again seen are bilateral renal enhancing lesions measuring 0.8 cm  in the right upper pole kidney and 2 cm in the left upper pole  kidney. Referral to urology and further evaluation with MR is  suggested.  5. Indeterminate 1.8 cm left adrenal lesion is unchanged from the  prior exam, but increased in size from 11/17/2009 and may be able to  be better characterized on the above suggested renal MR.  6. A solid non-calcified pulmonary nodule measuring 17 mm in the  right lower lobe appears new. Consider short term follow up non  contrast Chest CT at 3 months, PET/CT or tissue sampling. Please note  that negative PET-CT does not exclude low grade malignancy, FDG  uptake may be underestimated in nodules <1 cm in size, or those close  to diaphragm.(Shahab Torre et al., Guidelines for management of  incidental pulmonary nodules detected on CT images: From the  Fleischner Society 2017, Radiology. 2017 Jul;284 (1):228-243.)  [email or web address]  7. Cardiomegaly with enlargement of the bilateral cardiac atria and  left ventricle.  8. Trace right and small left pleural effusions with associated  atelectasis.  9. Additional findings are described in the body of the report.     Chest XR   12/29/24         Assessment and Plan     Assessment: Alo Glass is a 69 y.o. year old male patient admitted on 12/29/2024 with shock c/f septic vs. Cardiogenic requiring increasing pressor support.     PMH of HFrEF (EF 23% 9/2024) , COPD (2019: FEV1 45% w +ve BD change; %/TLC 85%), T2DM (last A1c decreased to 5.5% 6/25/24), hypothyroidism and HLD who presents to the MICU on 12/28/2024 for cardiogenic shock likely secondary to septic shock or infection of LE wounds. He has warm extremities, leukocytosis, tachycardia and severe hypotension w high lactate. Considered sepsis secondary  to UTIgiven pyuria w hx of MRSA UTI. Able to appreciate pulses w doppler. ECG w changes, cards saw patient in OSH ED per chart review, believed to be 2/2 sepsis. No ST segment changes, Trops down trended, CPK normal, no concerns for MI currently.  Patient's lactate initially improved with 500 ml bolus of fluids, but started to uptrend to 3.5. He is requiring Vaso and levo  and noted to have narrow pulse pressures, concerning for cardiogenic shock, likely secondary to septic shock. His kidney function is noted to have BUN 61 and Cr 1.44 which are both increased from his baseline (both WNL). Potassium also noted to be elevated 5.4 then 6.5 (although this one is hemolyzed) Lokelma ordered in addition to patient being diuresed with lasix. General surgery has been consulted for concern of b/l lower extremity cellulitis with R>L.     Summary for 12/29/24  :  Patient requiring Vaso at 0.03 and 0.18 levo with MAP goal >65.  He is tachycardic, noted to be A-fib with RVR. Patient not on home anticoagulation. Started on heparin gtt and Cardiology consulted due to patient's PMHx of EF 23%. TTE ordered.   General surgery consulted, are not recommending surgical intervention at this time, agree with continuing vanc/zosyn/clindamycin.  Cardiology consulted due to concern of cardiogenic shock and TTE ordered.   Nephrology consulted for decreased UOP despite aggressive diuresis.     Plan:  CARDIOVASCULAR:  #HFrEF   #Shock, Septic primarily w component of acute decompensated HF  #Baseline Mild Hypotension  #Severe PAD  #Venostasis  :: 9/2024: EF 23%; Global hypokinesis of the left ventricle with minor regional variation  :: Leukocytosis, hypothermia (admission T <36) concerning for sepsis with Skin/Soft tissue (escar wounds in LE) vs UTI (pyuria [facility] 11-20). Chronic erythema and pitting edema, black wounds and oozing from RLE new.   :: At home on lasix 40 daily (although prescribed 80 daily), 20KCl PO daily and Jardiance   :: Overload  clinic picture with hypoxia+pulm vasc congestion w cardiomegaly on CXR, evidence of pitting edema B/L LE. S/p 100 IV lasix in ED. Trops 93 -> , CPK normal. NO ECG concerns of ACS. BNP 1,114 (baseline 260-290). PaO2 71.   PLAN:  Hold SGLT-2 and midodrine  Repeat TTE  Levophed and Vasopressin via L Femoral Line  Repeat gas, trending lactate increasing from 2.3 to 3.5 to 4.5, continuing to trend  When condition improves, consider starting GDMT. Only on lasix and SGLT-2. Per chart review, limited d/t hypotension.   Consider vascular consult, able to appreciate B/L pulses with doppler.  Consider ASH when patient more stable  Received 125mg methylpred, use stress dose steroids d/t levo 0.15 and vaso 0.03. hydro 50 Q6  Aggressive diuresis with 100 mg lasix and if not improved add additional 100 lasix of start Bumex     #HLD  holding antihypertensives I/s shock. Continuing ezetimibe     PULMONARY:  #COPD GOLD 3   #Asthma  #Acute Hypoxic Respiratory Failure  On 6L NC, likely 2/2 overload given pulm vascular congesiton on CXR and B-lines on bedside POCUS.   PFTs 2019: FEV1 45% w +ve BD change; %/TLC 85%  Group E, on breo-ellipta  Wheezing cardiogenic in nature I/s overload  PLAN:  Duonebs Q6  C/w home pred 10 daily              #MELISSA  Nocturnal CPAP     RENAL/GENITOURINARY:  #LEONA  #Hyponatremia  #L Renal Mass concerning for RCC - per heme/onc section  Baseline Cr 0.4 - 0.8; 1.38 in ED. Likely Pre-renal in etiology I/s of sepsis vs Cardiorenal. Urine lytes ordered. Strict Is and Os  Added on Urine Urea, electrolytes, and osms to evaluate etiology of patient hyponatremia and LEONA, likely iso of      GASTROENTEROLOGY:  #GERD  C/w home pantoprazole     ENDOCRINOLOGY:  #T2DM  #Hypothyroidsim  HbA1c 5.5% most recently. Has Dexcom CGM w home regimen of Mounjaro and Jardiance 10mg daily.   PLAN:  POCT AC+HS, SS#1 + hypoglycemic order set  C/w home levothyroxine     HEMATOLOGY/ONCOLOGY:  #L renal Mass concerning for RCC  2.4 cm x  1.9 cm Mass in L kidney and of suspected 0.8 cm right upper pole renal cell carcinoma measuring 0.8 cm. Saw Urology 12/12/2024 and referred to IR for biopsy   CTM, consider biopsy inpatient if prolonged hospitalization      MSK/DERM:  #Chronic B/L LE erythema and edema  #bursted bullae in RLE 1 week ago c/b black wound and oozing blood   #LLE grey/black wound distal medial calf  Skin Failure Yes, describe: B/L balck wounds distal medial calf R>L w oozing from RLE  . Likely PAD w superimposed infection  Management:  Per ID and cards section  Wound care consult      INFECTIOUS DISEASE:  #Septic Shock  #UTI vs LE Skin/Soft tissue infections  Leukocytosis, hypothermia (admission T <36) concerning for sepsis with Skin/Soft tissue (black oozing wounds after bullae popped in RLE) +/- UTI (pyuria [facility] 11-20). Hx of MRSA in Ucx (11/20/2024)  Gen surg consulted in OSH for LE infection concern for necrotizing fascitis, r/o with CTA not showing any gas and no surgical intervention currently.   Vanc/zosyn and clinda given in ED  Management:  F/up B/C x2, Ucx, Urine Strep pneumonia+legionella  C/w Vanc/Zosyn  CTM LE, low thresholding to re-scan to assess progression if clinically appears worsening              #hx of stenotrophomonas pneumonia w parapneumonic effusion 9/2024   resolved and discharged on RA with prn O2 for COPD. Tx with levaquin and zosyn.         Updates post rounds:  - Patient evaluated by cardiology. He was noted to be having atrial flutter on EKG obtained. Recommended starting heparin gtt, adding on digoxin.  -Nephrology consulted for concern of low urine output despite aggressive diuresis. Nephrology recommending increasing Bumex gtt from 1mg/hr to 2 mg/hr. They noted if no increase in UOP, consider HD. Also recommend obtaining Renal US.    ICU Check List     FEN  Fluids: hold fluids due to fluid overload status  Electrolytes: replete prn; HyperK given lasix and Lokelma  Nutrition: NPO, sips with  meds  Prophylaxis:  DVT ppx: heparin gtt for AC 2/2 a-fib, SCDs held due to patient's b/l lower extremity cellulitis and weeping wounds  GI ppx: ppi  Bowel care: miralax prn  Hardware:         CVC 12/28/24 Triple lumen Left Femoral (Active)   Placement Date: 12/28/24   Site Prep: Chlorhexidine ;Usual sterile procedure followed  Site Prep Agent has Completely Dried Before Insertion: Yes  All 5 Sterile Barriers Used (Gloves, Gown, Cap, Mask, Large Sterile Drape): Yes  Lumen Type: Triple lume...   Number of days: 1       Arterial Line 12/28/24 Left Radial (Active)   Placement Date/Time: 12/28/24 (c) 2300   Size: 20 G  Orientation: Left  Location: Radial  Site Prep: Chlorhexidine   Local Anesthetic: Injectable  Insertion attempts: 1  Securement Method: Sutured;Transparent dressing  Patient Tolerance: Tolerated well   Number of days: 0       Urethral Catheter Temperature probe 16 Fr. (Active)   Placement Date: 12/28/24   Hand Hygiene Completed: Yes  Catheter Type: Temperature probe  Tube Size (Fr.): 16 Fr.  Catheter Balloon Size: 5 mL  Urine Returned: Yes   Number of days: 1       Social:  Code: Full Code    HPOA: Sister, Alia Styles [phone], Paperwork on file  Social work contacted, because patient is wanting JR Jesus, , to be primary decision maker, but with paperwork on file, it supercedes at the moment  Disposition: currently on 2 pressors, continue to monitor             Jacob OrozcoDO   12/29/24 at 6:17 PM     Disclaimer: Documentation completed with the information available at the time of input. The times in the chart may not be reflective of actual patient care times, interventions, or procedures. Documentation occurs after the physical care of the patient.

## 2024-12-29 NOTE — CONSULTS
Vancomycin Dosing by Pharmacy- INITIAL    Alo Glass is a 69 y.o. year old male who Pharmacy has been consulted for vancomycin dosing for cellulitis, skin and soft tissue. Based on the patient's indication and renal status this patient will be dosed based on a goal trough/random level of 15-20.     Renal function is currently declining.    Visit Vitals  BP 98/68   Pulse (!) 112   Temp 36.4 °C (97.5 °F) (Temporal)   Resp (!) 28        Lab Results   Component Value Date    CREATININE 1.38 (H) 2024    CREATININE 0.63 2024    CREATININE 0.40 (L) 2024    CREATININE 0.35 (L) 2024        Patient weight is as follows:   Vitals:    24 0308   Weight: 129 kg (285 lb 0.9 oz)       Cultures:  No results found for the encounter in last 14 days.        No intake/output data recorded.  I/O during current shift:  No intake/output data recorded.    Temp (24hrs), Av.3 °C (97.3 °F), Min:33.7 °C (92.6 °F), Max:37 °C (98.6 °F)         Assessment/Plan     Patient will be dosed by levels. He received 2 gm yesterday.  Follow-up level today at 1500.  Will continue to monitor renal function daily while on vancomycin and order serum creatinine at least every 48 hours if not already ordered.  Follow for continued vancomycin needs, clinical response, and signs/symptoms of toxicity.       Antonio Dodd, PharmD

## 2024-12-29 NOTE — ED PROCEDURE NOTE
Procedure  Central Line    Performed by: Adam Rogers MD  Authorized by: Miky Regan MD    Consent:     Consent obtained:  Written    Consent given by:  Patient    Risks, benefits, and alternatives were discussed: yes      Risks discussed:  Arterial puncture, incorrect placement, infection, bleeding and nerve damage    Alternatives discussed:  No treatment and alternative treatment  Universal protocol:     Procedure explained and questions answered to patient or proxy's satisfaction: yes      Relevant documents present and verified: yes      Test results available: yes      Imaging studies available: yes      Required blood products, implants, devices, and special equipment available: yes      Site/side marked: yes      Immediately prior to procedure, a time out was called: yes      Patient identity confirmed:  Verbally with patient  Pre-procedure details:     Indication(s): central venous access      Hand hygiene: Hand hygiene performed prior to insertion      Sterile barrier technique: All elements of maximal sterile technique followed      Skin preparation:  Chlorhexidine    Skin preparation agent: Skin preparation agent completely dried prior to procedure    Sedation:     Sedation type:  None  Anesthesia:     Anesthesia method:  Local infiltration    Local anesthetic:  Lidocaine 1% WITH epi  Procedure details:     Location:  L femoral    Site selection rationale:  Inability with multiple attempts and multiple interventions to get internal jugular to thread anywhere other than subclavian.    Patient position:  Supine    Procedural supplies:  Triple lumen    Catheter size:  7 Fr    Landmarks identified: yes      Ultrasound guidance: yes      Ultrasound guidance timing: real time      Sterile ultrasound techniques: Sterile gel and sterile probe covers were used      Number of attempts:  1    Successful placement: yes    Post-procedure details:     Post-procedure:  Dressing applied and line sutured    Assessment:   Blood return through all ports and free fluid flow    Procedure completion:  Tolerated               Adam Rogers MD  Resident  12/28/24 6443

## 2024-12-30 ENCOUNTER — APPOINTMENT (OUTPATIENT)
Dept: RADIOLOGY | Facility: HOSPITAL | Age: 69
DRG: 871 | End: 2024-12-30
Payer: MEDICARE

## 2024-12-30 ENCOUNTER — APPOINTMENT (OUTPATIENT)
Dept: CARDIOLOGY | Facility: HOSPITAL | Age: 69
End: 2024-12-30
Payer: MEDICARE

## 2024-12-30 ENCOUNTER — APPOINTMENT (OUTPATIENT)
Dept: CARDIOLOGY | Facility: HOSPITAL | Age: 69
DRG: 871 | End: 2024-12-30
Payer: MEDICARE

## 2024-12-30 VITALS
DIASTOLIC BLOOD PRESSURE: 68 MMHG | SYSTOLIC BLOOD PRESSURE: 98 MMHG | RESPIRATION RATE: 21 BRPM | HEART RATE: 106 BPM | TEMPERATURE: 97.5 F | HEIGHT: 71 IN | WEIGHT: 285.06 LBS | OXYGEN SATURATION: 97 % | BODY MASS INDEX: 39.91 KG/M2

## 2024-12-30 LAB
ALBUMIN SERPL BCP-MCNC: 2.5 G/DL (ref 3.4–5)
ANION GAP SERPL CALC-SCNC: 18 MMOL/L (ref 10–20)
APTT PPP: 166 SECONDS (ref 27–38)
APTT PPP: 43 SECONDS (ref 27–38)
BASOPHILS # BLD AUTO: 0.03 X10*3/UL (ref 0–0.1)
BASOPHILS NFR BLD AUTO: 0.2 %
BUN SERPL-MCNC: 67 MG/DL (ref 6–23)
CALCIUM SERPL-MCNC: 7.7 MG/DL (ref 8.6–10.6)
CHLORIDE SERPL-SCNC: 87 MMOL/L (ref 98–107)
CHLORIDE UR-SCNC: 94 MMOL/L
CHLORIDE/CREATININE (MMOL/G) IN URINE: 470 MMOL/G CREAT (ref 23–275)
CO2 SERPL-SCNC: 28 MMOL/L (ref 21–32)
CREAT SERPL-MCNC: 1.41 MG/DL (ref 0.5–1.3)
CREAT UR-MCNC: 20 MG/DL (ref 20–370)
EGFRCR SERPLBLD CKD-EPI 2021: 54 ML/MIN/1.73M*2
EJECTION FRACTION APICAL 4 CHAMBER: 26.5
EJECTION FRACTION: 19 %
EOSINOPHIL # BLD AUTO: 0.01 X10*3/UL (ref 0–0.7)
EOSINOPHIL NFR BLD AUTO: 0.1 %
ERYTHROCYTE [DISTWIDTH] IN BLOOD BY AUTOMATED COUNT: 13.3 % (ref 11.5–14.5)
GLUCOSE BLD MANUAL STRIP-MCNC: 134 MG/DL (ref 74–99)
GLUCOSE BLD MANUAL STRIP-MCNC: 136 MG/DL (ref 74–99)
GLUCOSE BLD MANUAL STRIP-MCNC: 155 MG/DL (ref 74–99)
GLUCOSE BLD MANUAL STRIP-MCNC: 197 MG/DL (ref 74–99)
GLUCOSE BLD MANUAL STRIP-MCNC: 228 MG/DL (ref 74–99)
GLUCOSE SERPL-MCNC: 137 MG/DL (ref 74–99)
HCT VFR BLD AUTO: 49.4 % (ref 41–52)
HGB BLD-MCNC: 16.6 G/DL (ref 13.5–17.5)
IMM GRANULOCYTES # BLD AUTO: 0.07 X10*3/UL (ref 0–0.7)
IMM GRANULOCYTES NFR BLD AUTO: 0.5 % (ref 0–0.9)
INR PPP: 1.3 (ref 0.9–1.1)
INR PPP: 1.6 (ref 0.9–1.1)
LEFT VENTRICULAR OUTFLOW TRACT DIAMETER: 2.2 CM
LYMPHOCYTES # BLD AUTO: 0.42 X10*3/UL (ref 1.2–4.8)
LYMPHOCYTES NFR BLD AUTO: 3 %
MAGNESIUM SERPL-MCNC: 2.43 MG/DL (ref 1.6–2.4)
MCH RBC QN AUTO: 31.5 PG (ref 26–34)
MCHC RBC AUTO-ENTMCNC: 33.6 G/DL (ref 32–36)
MCV RBC AUTO: 94 FL (ref 80–100)
MITRAL VALVE E/A RATIO: 1.85
MONOCYTES # BLD AUTO: 0.76 X10*3/UL (ref 0.1–1)
MONOCYTES NFR BLD AUTO: 5.4 %
NEUTROPHILS # BLD AUTO: 12.76 X10*3/UL (ref 1.2–7.7)
NEUTROPHILS NFR BLD AUTO: 90.8 %
NRBC BLD-RTO: 0 /100 WBCS (ref 0–0)
PHOSPHATE SERPL-MCNC: 7.3 MG/DL (ref 2.5–4.9)
PLATELET # BLD AUTO: 128 X10*3/UL (ref 150–450)
POTASSIUM SERPL-SCNC: 3.8 MMOL/L (ref 3.5–5.3)
POTASSIUM UR-SCNC: 49 MMOL/L
POTASSIUM/CREAT UR-RTO: 245 MMOL/G CREAT
PROTHROMBIN TIME: 14.3 SECONDS (ref 9.8–12.8)
PROTHROMBIN TIME: 17.8 SECONDS (ref 9.8–12.8)
RBC # BLD AUTO: 5.27 X10*6/UL (ref 4.5–5.9)
RIGHT VENTRICLE FREE WALL PEAK S': 10.9 CM/S
SODIUM SERPL-SCNC: 129 MMOL/L (ref 136–145)
SODIUM UR-SCNC: 46 MMOL/L
SODIUM/CREAT UR-RTO: 230 MMOL/G CREAT
UFH PPP CHRO-ACNC: 0.4 IU/ML
VANCOMYCIN SERPL-MCNC: 15.9 UG/ML (ref 5–20)
WBC # BLD AUTO: 14.1 X10*3/UL (ref 4.4–11.3)

## 2024-12-30 PROCEDURE — 80069 RENAL FUNCTION PANEL: CPT

## 2024-12-30 PROCEDURE — 99233 SBSQ HOSP IP/OBS HIGH 50: CPT | Performed by: NURSE PRACTITIONER

## 2024-12-30 PROCEDURE — 2060000001 HC INTERMEDIATE ICU ROOM DAILY

## 2024-12-30 PROCEDURE — 85025 COMPLETE CBC W/AUTO DIFF WBC: CPT

## 2024-12-30 PROCEDURE — C8924 2D TTE W OR W/O FOL W/CON,FU: HCPCS

## 2024-12-30 PROCEDURE — 2500000001 HC RX 250 WO HCPCS SELF ADMINISTERED DRUGS (ALT 637 FOR MEDICARE OP)

## 2024-12-30 PROCEDURE — 2500000004 HC RX 250 GENERAL PHARMACY W/ HCPCS (ALT 636 FOR OP/ED)

## 2024-12-30 PROCEDURE — 80202 ASSAY OF VANCOMYCIN: CPT

## 2024-12-30 PROCEDURE — 83735 ASSAY OF MAGNESIUM: CPT

## 2024-12-30 PROCEDURE — 76770 US EXAM ABDO BACK WALL COMP: CPT | Performed by: STUDENT IN AN ORGANIZED HEALTH CARE EDUCATION/TRAINING PROGRAM

## 2024-12-30 PROCEDURE — 2500000005 HC RX 250 GENERAL PHARMACY W/O HCPCS: Performed by: INTERNAL MEDICINE

## 2024-12-30 PROCEDURE — S4991 NICOTINE PATCH NONLEGEND: HCPCS

## 2024-12-30 PROCEDURE — 37799 UNLISTED PX VASCULAR SURGERY: CPT

## 2024-12-30 PROCEDURE — 71045 X-RAY EXAM CHEST 1 VIEW: CPT

## 2024-12-30 PROCEDURE — 71045 X-RAY EXAM CHEST 1 VIEW: CPT | Performed by: RADIOLOGY

## 2024-12-30 PROCEDURE — 2500000002 HC RX 250 W HCPCS SELF ADMINISTERED DRUGS (ALT 637 FOR MEDICARE OP, ALT 636 FOR OP/ED)

## 2024-12-30 PROCEDURE — 99232 SBSQ HOSP IP/OBS MODERATE 35: CPT | Performed by: SURGERY

## 2024-12-30 PROCEDURE — 97162 PT EVAL MOD COMPLEX 30 MIN: CPT | Mod: GP

## 2024-12-30 PROCEDURE — 94640 AIRWAY INHALATION TREATMENT: CPT

## 2024-12-30 PROCEDURE — 82947 ASSAY GLUCOSE BLOOD QUANT: CPT

## 2024-12-30 PROCEDURE — 93308 TTE F-UP OR LMTD: CPT | Performed by: INTERNAL MEDICINE

## 2024-12-30 PROCEDURE — 85520 HEPARIN ASSAY: CPT

## 2024-12-30 PROCEDURE — 99233 SBSQ HOSP IP/OBS HIGH 50: CPT

## 2024-12-30 PROCEDURE — 84133 ASSAY OF URINE POTASSIUM: CPT

## 2024-12-30 PROCEDURE — 2500000005 HC RX 250 GENERAL PHARMACY W/O HCPCS

## 2024-12-30 PROCEDURE — 85730 THROMBOPLASTIN TIME PARTIAL: CPT

## 2024-12-30 PROCEDURE — 76770 US EXAM ABDO BACK WALL COMP: CPT

## 2024-12-30 PROCEDURE — 99222 1ST HOSP IP/OBS MODERATE 55: CPT

## 2024-12-30 RX ORDER — TRAMADOL HYDROCHLORIDE 50 MG/1
50 TABLET ORAL ONCE
Status: COMPLETED | OUTPATIENT
Start: 2024-12-30 | End: 2024-12-30

## 2024-12-30 RX ORDER — VANCOMYCIN HYDROCHLORIDE 750 MG/150ML
750 INJECTION, SOLUTION INTRAVENOUS ONCE
Status: COMPLETED | OUTPATIENT
Start: 2024-12-30 | End: 2024-12-30

## 2024-12-30 RX ORDER — HYDROMORPHONE HYDROCHLORIDE 2 MG/1
1 TABLET ORAL ONCE
Status: COMPLETED | OUTPATIENT
Start: 2024-12-30 | End: 2024-12-30

## 2024-12-30 RX ORDER — POTASSIUM CHLORIDE 14.9 MG/ML
20 INJECTION INTRAVENOUS
Status: COMPLETED | OUTPATIENT
Start: 2024-12-30 | End: 2024-12-30

## 2024-12-30 RX ORDER — BUMETANIDE 0.25 MG/ML
1 INJECTION, SOLUTION INTRAMUSCULAR; INTRAVENOUS ONCE
Status: COMPLETED | OUTPATIENT
Start: 2024-12-30 | End: 2024-12-30

## 2024-12-30 RX ORDER — TRAMADOL HYDROCHLORIDE 50 MG/1
50 TABLET ORAL EVERY 8 HOURS PRN
Status: DISCONTINUED | OUTPATIENT
Start: 2024-12-30 | End: 2024-12-30

## 2024-12-30 RX ORDER — POTASSIUM CHLORIDE 14.9 MG/ML
20 INJECTION INTRAVENOUS ONCE
Status: COMPLETED | OUTPATIENT
Start: 2024-12-30 | End: 2024-12-30

## 2024-12-30 RX ORDER — POTASSIUM CHLORIDE 20 MEQ/1
40 TABLET, EXTENDED RELEASE ORAL ONCE
Status: DISCONTINUED | OUTPATIENT
Start: 2024-12-30 | End: 2024-12-30

## 2024-12-30 RX ORDER — ACETAMINOPHEN 325 MG/1
650 TABLET ORAL EVERY 6 HOURS
Status: DISCONTINUED | OUTPATIENT
Start: 2024-12-30 | End: 2025-01-01

## 2024-12-30 RX ORDER — NYSTATIN 100000 [USP'U]/ML
5 SUSPENSION ORAL 2 TIMES DAILY
Status: DISCONTINUED | OUTPATIENT
Start: 2024-12-30 | End: 2025-01-12 | Stop reason: HOSPADM

## 2024-12-30 RX ADMIN — HEPARIN SODIUM 1800 UNITS/HR: 10000 INJECTION, SOLUTION INTRAVENOUS at 11:44

## 2024-12-30 RX ADMIN — BUMETANIDE 1 MG: 0.25 INJECTION INTRAMUSCULAR; INTRAVENOUS at 13:05

## 2024-12-30 RX ADMIN — PIPERACILLIN SODIUM AND TAZOBACTAM SODIUM 3.38 G: 3; .375 INJECTION, SOLUTION INTRAVENOUS at 11:39

## 2024-12-30 RX ADMIN — IPRATROPIUM BROMIDE AND ALBUTEROL SULFATE 3 ML: 2.5; .5 SOLUTION RESPIRATORY (INHALATION) at 07:29

## 2024-12-30 RX ADMIN — NYSTATIN 500000 UNITS: 100000 SUSPENSION ORAL at 13:09

## 2024-12-30 RX ADMIN — POTASSIUM CHLORIDE 20 MEQ: 14.9 INJECTION, SOLUTION INTRAVENOUS at 02:00

## 2024-12-30 RX ADMIN — DIGOXIN 250 MCG: 0.25 INJECTION INTRAMUSCULAR; INTRAVENOUS at 01:17

## 2024-12-30 RX ADMIN — PIPERACILLIN SODIUM AND TAZOBACTAM SODIUM 3.38 G: 3; .375 INJECTION, SOLUTION INTRAVENOUS at 16:50

## 2024-12-30 RX ADMIN — Medication 5 L/MIN: at 13:13

## 2024-12-30 RX ADMIN — GABAPENTIN 100 MG: 100 CAPSULE ORAL at 11:45

## 2024-12-30 RX ADMIN — SALINE NASAL SPRAY 1 SPRAY: 1.5 SOLUTION NASAL at 20:48

## 2024-12-30 RX ADMIN — NYSTATIN 500000 UNITS: 100000 SUSPENSION ORAL at 20:49

## 2024-12-30 RX ADMIN — IPRATROPIUM BROMIDE AND ALBUTEROL SULFATE 3 ML: 2.5; .5 SOLUTION RESPIRATORY (INHALATION) at 19:11

## 2024-12-30 RX ADMIN — INSULIN LISPRO 2 UNITS: 100 INJECTION, SOLUTION INTRAVENOUS; SUBCUTANEOUS at 16:50

## 2024-12-30 RX ADMIN — Medication 5 L/MIN: at 07:00

## 2024-12-30 RX ADMIN — VANCOMYCIN HYDROCHLORIDE 750 MG: 750 INJECTION, SOLUTION INTRAVENOUS at 18:19

## 2024-12-30 RX ADMIN — PANTOPRAZOLE SODIUM 40 MG: 40 TABLET, DELAYED RELEASE ORAL at 11:38

## 2024-12-30 RX ADMIN — ACETAMINOPHEN 650 MG: 325 TABLET ORAL at 20:48

## 2024-12-30 RX ADMIN — IPRATROPIUM BROMIDE AND ALBUTEROL SULFATE 3 ML: 2.5; .5 SOLUTION RESPIRATORY (INHALATION) at 13:13

## 2024-12-30 RX ADMIN — BUMETANIDE 2 MG/HR: 0.25 INJECTION INTRAMUSCULAR; INTRAVENOUS at 03:00

## 2024-12-30 RX ADMIN — LEVOTHYROXINE SODIUM 125 MCG: 25 TABLET ORAL at 11:38

## 2024-12-30 RX ADMIN — PIPERACILLIN SODIUM AND TAZOBACTAM SODIUM 3.38 G: 3; .375 INJECTION, SOLUTION INTRAVENOUS at 04:00

## 2024-12-30 RX ADMIN — PERFLUTREN 3 ML OF DILUTION: 6.52 INJECTION, SUSPENSION INTRAVENOUS at 08:15

## 2024-12-30 RX ADMIN — PIPERACILLIN SODIUM AND TAZOBACTAM SODIUM 3.38 G: 3; .375 INJECTION, SOLUTION INTRAVENOUS at 22:16

## 2024-12-30 RX ADMIN — HYDROCORTISONE SODIUM SUCCINATE 50 MG: 100 INJECTION, POWDER, FOR SOLUTION INTRAMUSCULAR; INTRAVENOUS at 04:04

## 2024-12-30 RX ADMIN — POTASSIUM CHLORIDE 20 MEQ: 14.9 INJECTION, SOLUTION INTRAVENOUS at 13:30

## 2024-12-30 RX ADMIN — TRAMADOL HYDROCHLORIDE 50 MG: 50 TABLET, COATED ORAL at 11:45

## 2024-12-30 RX ADMIN — EZETIMIBE 10 MG: 10 TABLET ORAL at 11:39

## 2024-12-30 RX ADMIN — POTASSIUM CHLORIDE 20 MEQ: 14.9 INJECTION, SOLUTION INTRAVENOUS at 11:45

## 2024-12-30 RX ADMIN — NICOTINE 1 PATCH: 14 PATCH, EXTENDED RELEASE TRANSDERMAL at 09:00

## 2024-12-30 RX ADMIN — HYDROMORPHONE HYDROCHLORIDE 1 MG: 2 TABLET ORAL at 16:04

## 2024-12-30 ASSESSMENT — PAIN SCALES - GENERAL
PAINLEVEL_OUTOF10: 7
PAINLEVEL_OUTOF10: 0 - NO PAIN
PAINLEVEL_OUTOF10: 0 - NO PAIN
PAINLEVEL_OUTOF10: 7
PAINLEVEL_OUTOF10: 7

## 2024-12-30 ASSESSMENT — COGNITIVE AND FUNCTIONAL STATUS - GENERAL
STANDING UP FROM CHAIR USING ARMS: TOTAL
TURNING FROM BACK TO SIDE WHILE IN FLAT BAD: A LOT
MOVING TO AND FROM BED TO CHAIR: TOTAL
MOVING FROM LYING ON BACK TO SITTING ON SIDE OF FLAT BED WITH BEDRAILS: A LOT
MOBILITY SCORE: 8
WALKING IN HOSPITAL ROOM: TOTAL
CLIMB 3 TO 5 STEPS WITH RAILING: TOTAL

## 2024-12-30 ASSESSMENT — PAIN - FUNCTIONAL ASSESSMENT
PAIN_FUNCTIONAL_ASSESSMENT: 0-10

## 2024-12-30 ASSESSMENT — ACTIVITIES OF DAILY LIVING (ADL): ADL_ASSISTANCE: INDEPENDENT

## 2024-12-30 NOTE — CARE PLAN
Problem: Skin  Goal: Prevent/manage excess moisture  Outcome: Progressing  Goal: Prevent/minimize sheer/friction injuries  Outcome: Progressing  Flowsheets (Taken 12/30/2024 0352)  Prevent/minimize sheer/friction injuries:   Complete micro-shifts as needed if patient unable. Adjust patient position to relieve pressure points, not a full turn   HOB 30 degrees or less   Increase activity/out of bed for meals   Turn/reposition every 2 hours/use positioning/transfer devices   Use pull sheet   Utilize specialty bed per algorithm  Goal: Promote skin healing  Outcome: Progressing  Flowsheets (Taken 12/30/2024 0352)  Promote skin healing:   Assess skin/pad under line(s)/device(s)   Ensure correct size (line/device) and apply per  instructions   Protective dressings over bony prominences   Rotate device position/do not position patient on device   Turn/reposition every 2 hours/use positioning/transfer devices   The patient's goals for the shift include  Pt would like to enjoy [employer]    The clinical goals for the shift include VS within MD ordered parameters    Over the shift, the patient did not make progress toward the following goals. Barriers to progression include chronic illness. Recommendations to address these barriers include per MD order.

## 2024-12-30 NOTE — PROGRESS NOTES
"Medical Intensive Care - Daily Progress Note   Subjective    Alo Glass is a 69 y.o. year old male patient admitted on 12/29/2024 with following ICU needs: septic vs. Cardiogenic shock requiring 2 pressors    Interval History:  Patient was weaned off of pressor support yesterday. Only on bumex and heparin drip at this time.      Meds    Scheduled medications  [Held by provider] allopurinol, 300 mg, oral, Daily  bumetanide, 1 mg, intravenous, Once  ezetimibe, 10 mg, oral, Daily  gabapentin, 100 mg, oral, Daily  insulin lispro, 0-10 Units, subcutaneous, TID AC  ipratropium-albuteroL, 3 mL, nebulization, q6h  levothyroxine, 125 mcg, oral, Daily  nicotine, 1 patch, transdermal, Daily  nystatin, 5 mL, Mouth/Throat, BID  oxygen, , inhalation, Nightly  pantoprazole, 40 mg, oral, Daily before breakfast  perflutren lipid microspheres, 0.5-10 mL of dilution, intravenous, Once in imaging  perflutren protein A microsphere, 0.5 mL, intravenous, Once in imaging  perflutren protein A microsphere, 0.5 mL, intravenous, Once in imaging  piperacillin-tazobactam, 3.375 g, intravenous, q6h  potassium chloride, 20 mEq, intravenous, q2h  sulfur hexafluoride microsphr, 2 mL, intravenous, Once in imaging  sulfur hexafluoride microsphr, 2 mL, intravenous, Once in imaging  vancomycin, 750 mg, intravenous, Once      Continuous medications  heparin, 0-4,500 Units/hr, Last Rate: 1,800 Units/hr (12/30/24 1144)  norepinephrine, 0-0.5 mcg/kg/min, Last Rate: Stopped (12/30/24 0000)  vasopressin, 0-0.03 Units/min, Last Rate: Stopped (12/29/24 1840)      PRN medications  PRN medications: albuterol, dextrose, dextrose, glucagon, glucagon, heparin, oxygen, polyethylene glycol, vancomycin     Objective    Blood pressure 98/68, pulse 102, temperature 36.5 °C (97.7 °F), temperature source Temporal, resp. rate (!) 31, height 1.803 m (5' 11\"), weight 131 kg (287 lb 14.7 oz), SpO2 92%.     Physical Exam  Constitutional:       General: He is not in " acute distress.     Appearance: He is obese. He is not toxic-appearing.   HENT:      Nose:      Comments: Wearing CPAP  Cardiovascular:      Rate and Rhythm: Regular rhythm. Tachycardia present.      Pulses: Normal pulses.      Heart sounds: Normal heart sounds. No murmur heard.  Pulmonary:      Effort: Pulmonary effort is normal. No respiratory distress.      Breath sounds: Wheezing and rales present.   Abdominal:      General: There is no distension.      Palpations: Abdomen is soft.      Tenderness: There is no abdominal tenderness.   Musculoskeletal:         General: No tenderness.      Comments:     Skin:     Comments: Venous stasis of B/l LE with erythema extending up legs. Bullae on R medial calf covered in wet dressing.   Neurological:      Mental Status: He is alert and oriented to person, place, and time.      Sensory: No sensory deficit.   Psychiatric:         Mood and Affect: Mood normal.         Behavior: Behavior normal.            Intake/Output Summary (Last 24 hours) at 12/30/2024 1219  Last data filed at 12/30/2024 1200  Gross per 24 hour   Intake 1248.58 ml   Output 3925 ml   Net -2676.42 ml     Labs:   Results from last 72 hours   Lab Units 12/30/24  0403 12/29/24  2211 12/29/24  1404   SODIUM mmol/L 129* 127* 125*   POTASSIUM mmol/L 3.8 3.8 4.3   CHLORIDE mmol/L 87* 88* 88*   CO2 mmol/L 28 25 23   BUN mg/dL 67* 65* 64*   CREATININE mg/dL 1.41* 1.50* 1.50*   GLUCOSE mg/dL 137* 191* 217*   CALCIUM mg/dL 7.7* 8.1* 8.8   ANION GAP mmol/L 18 18 18   EGFR mL/min/1.73m*2 54* 50* 50*   PHOSPHORUS mg/dL 7.3* 7.7* 7.5*      Results from last 72 hours   Lab Units 12/30/24  0403 12/29/24  0325 12/28/24  1456   WBC AUTO x10*3/uL 14.1* 19.2* 16.3*   HEMOGLOBIN g/dL 16.6 19.1* 19.5*   HEMATOCRIT % 49.4 56.4* 57.5*   PLATELETS AUTO x10*3/uL 128* 220 172   NEUTROS PCT AUTO % 90.8  --   --    LYMPHO PCT MAN %  --  0.0 1.0   LYMPHS PCT AUTO % 3.0  --   --    MONO PCT MAN %  --  5.9 3.0   MONOS PCT AUTO % 5.4  --    --    EOSINO PCT MAN %  --  0.0 0.0   EOS PCT AUTO % 0.1  --   --       Results from last 72 hours   Lab Units 12/29/24  0324 12/28/24  2153 12/28/24  1703   POCT PH, ARTERIAL pH 7.35* 7.34* 7.33*   POCT PCO2, ARTERIAL mm Hg 41 44* 45*   POCT PO2, ARTERIAL mm Hg 78* 63* 71*   POCT SO2, ARTERIAL % 96 92* 94     Results from last 72 hours   Lab Units 12/29/24  1059 12/29/24  0918 12/28/24  1456   POCT PH, VENOUS pH 7.28* 7.28* 7.36   POCT PCO2, VENOUS mm Hg 54* 50 49   POCT PO2, VENOUS mm Hg 41 46* 29*        Micro/ID:     Lab Results   Component Value Date    URINECULTURE (A) 11/20/2024     >100,000 Methicillin Resistant Staphylococcus aureus (MRSA)    BLOODCULT No growth at 1 day 12/28/2024    BLOODCULT No growth at 1 day 12/28/2024       Summary of key imaging results from the last 24 hours    CT angio aorta and bilateral iliofemoral runoff 12/28/24  IMPRESSION:  1. Right lower extremity: Moderate atherosclerotic changes throughout  the right lower extremity with multiple areas of hemodynamically  significant stenosis, which are described in detail in the body of  the report.  2. Left lower extremity: Moderate atherosclerotic changes throughout  the left lower extremity with distal anterior tibial artery  occlusion. The dorsalis pedis artery reconstitutes via the peroneal  artery. Additionally, there are multiple areas of hemodynamically  significant stenosis, which are described in detail in the body of  the report.  3. Diffuse bilateral lower extremity edema down to the feet, right  greater than left without soft tissue gas. However, the absence of  soft tissue gas does not exclude necrotizing fasciitis. No drainable  abscess or collection.  4. Again seen are bilateral renal enhancing lesions measuring 0.8 cm  in the right upper pole kidney and 2 cm in the left upper pole  kidney. Referral to urology and further evaluation with MR is  suggested.  5. Indeterminate 1.8 cm left adrenal lesion is unchanged from  the  prior exam, but increased in size from 11/17/2009 and may be able to  be better characterized on the above suggested renal MR.  6. A solid non-calcified pulmonary nodule measuring 17 mm in the  right lower lobe appears new. Consider short term follow up non  contrast Chest CT at 3 months, PET/CT or tissue sampling. Please note  that negative PET-CT does not exclude low grade malignancy, FDG  uptake may be underestimated in nodules <1 cm in size, or those close  to diaphragm.(Shahab Torre et al., Guidelines for management of  incidental pulmonary nodules detected on CT images: From the  Fleischner Society 2017, Radiology. 2017 Jul;284 (1):228-243.)  [email or web address]  7. Cardiomegaly with enlargement of the bilateral cardiac atria and  left ventricle.  8. Trace right and small left pleural effusions with associated  atelectasis.  9. Additional findings are described in the body of the report.     Chest XR   12/29/24         Assessment and Plan     Assessment: Alo Glass is a 69 y.o. year old male patient admitted on 12/29/2024 with shock c/f septic vs. Cardiogenic requiring increasing pressor support.     PMH of HFrEF (EF 23% 9/2024) , COPD (2019: FEV1 45% w +ve BD change; %/TLC 85%), T2DM (last A1c decreased to 5.5% 6/25/24), hypothyroidism and HLD who presents to the MICU on 12/28/2024 for cardiogenic shock likely secondary to septic shock or infection of LE wounds. He has warm extremities, leukocytosis, tachycardia and severe hypotension w high lactate. Considered sepsis secondary to UTI given pyuria w hx of MRSA UTI. Able to appreciate pulses w doppler. ECG w changes, cards saw patient in OSH ED per chart review, believed to be 2/2 sepsis. No ST segment changes, Trops down trended, CPK normal, no concerns for MI.  Patient's lactate initially improved with 500 ml bolus of fluids, but started to uptrend to 3.5. He required Vaso and levo  and noted to have narrow pulse pressures, concerning for  cardiogenic shock, likely secondary to septic shock. His kidney function is noted to have BUN 61 and Cr 1.44 which are both increased from his baseline (both WNL). Potassium also noted to be elevated 5.4. Now normalized at 3.8. General surgery has been consulted for concern of b/l lower extremity cellulitis with R>L recommended no surgical intervention. Will continue vanc/zosyn.    Summary for 12/30/24  :  Patient no longer requiring pressor support  TTE ordered as per cardiology recs. Found less than 50% collapsible IVC. EF of 19%.  General surgery consulted, are not recommending surgical intervention at this time, agree with continuing Zosyn/vanc  Nephrology recommended increasing bumex drip and renal us.  Stopped bumex drip as patient was achey all around. Will spot dose bumex for a goal net (-1.5L)    Plan:  CARDIOVASCULAR:  #HFrEF   #Shock, Septic primarily w component of acute decompensated HF  #Baseline Mild Hypotension  #Severe PAD  #Venostasis  :: 9/2024: EF 23%; Global hypokinesis of the left ventricle with minor regional variation  :: Leukocytosis, hypothermia (admission T <36) concerning for sepsis with Skin/Soft tissue (escar wounds in LE) vs UTI (pyuria WCC 11-20). Chronic erythema and pitting edema, black wounds and oozing from RLE new.   :: At home on lasix 40 daily (although prescribed 80 daily), 20KCl PO daily and Jardiance   :: Overload clinic picture with hypoxia+pulm vasc congestion w cardiomegaly on CXR, evidence of pitting edema B/L LE. S/p 100 IV lasix in ED. Trops 93 -> , CPK normal. NO ECG concerns of ACS. BNP 1,114 (baseline 260-290). PaO2 71.     PLAN:  Hold SGLT-2 and midodrine  TTE showed 19% EF.  Weaned off pressor support  When condition improves, consider starting GDMT. Only on lasix and SGLT-2. Per chart review, limited d/t hypotension.   Currently on bumex drip  Cards recommended cont Heparin GTT and digoxin loading (500-250-250). Also given Bumex 8 mg and 1 g of Diuril.   Will  spot dose bumex for a goal net (-1.5L)  General surgery consulted. Noted this was most likely cellulitis on top of chronic venous stasis. Will continue vanc/zosyn.     #HLD  holding antihypertensives I/s shock. Continuing ezetimibe     PULMONARY:  #COPD GOLD 3   #Asthma  #Acute Hypoxic Respiratory Failure  On 6L NC, likely 2/2 overload given pulm vascular congesiton on CXR and B-lines on bedside POCUS.   PFTs 2019: FEV1 45% w +ve BD change; %/TLC 85%  Group E, on breo-ellipta  Wheezing cardiogenic in nature I/s overload  PLAN:  Duonebs Q6  Will stop prednisone and hydrocortisone.  Will reach out to RT for VPAP given heavy secretions.             #MELISSA  Nocturnal CPAP     RENAL/GENITOURINARY:  #LEONA  #Hyponatremia  #L Renal Mass concerning for RCC - per heme/onc section  Baseline Cr 0.4 - 0.8; 1.38 in ED. Likely Pre-renal in etiology I/s of sepsis vs Cardiorenal. Urine lytes ordered. Strict Is and Os. Cr largely stable at 1.4.  Added on Urine Urea, electrolytes, and osms to evaluate etiology of patient hyponatremia and LEONA, likely iso of      GASTROENTEROLOGY:  #GERD  C/w home pantoprazole     ENDOCRINOLOGY:  #T2DM  #Hypothyroidsim  HbA1c 5.5% most recently. Has Dexcom CGM w home regimen of Mounjaro and Jardiance 10mg daily.   PLAN:  POCT AC+HS, SS#1 + hypoglycemic order set  C/w home levothyroxine     HEMATOLOGY/ONCOLOGY:  #L renal Mass concerning for RCC  2.4 cm x 1.9 cm Mass in L kidney and of suspected 0.8 cm right upper pole renal cell carcinoma measuring 0.8 cm. Saw Urology 12/12/2024 and referred to IR for biopsy   CTM, consider biopsy inpatient if prolonged hospitalization        MSK/DERM:  #Chronic B/L LE erythema and edema  #bursted bullae in RLE 1 week ago c/b black wound and oozing blood   #LLE grey/black wound distal medial calf  Skin Failure Yes, describe: B/L balck wounds distal medial calf R>L w oozing from RLE  . Likely PAD w superimposed infection  Management:  Per ID and cards section  Wound  care consult      INFECTIOUS DISEASE:  #Septic Shock  #UTI vs LE Skin/Soft tissue infections  Leukocytosis, hypothermia (admission T <36) concerning for sepsis with Skin/Soft tissue (black oozing wounds after bullae popped in RLE) +/- UTI (pyuria C 11-20). Hx of MRSA in Ucx (11/20/2024)  Gen surg consulted in OSH for LE infection concern for necrotizing fascitis, r/o with CTA not showing any gas and no surgical intervention currently.   Vanc/zosyn and clinda given in ED  Management:  F/up B/C x2, Ucx, Urine Strep pneumonia+legionella  C/w Vanc/zosyn  CTM LE, low thresholding to re-scan to assess progression if clinically appears worsening  General surgery consulted. Noted lower limb redness was most likely cellulitis on top of chronic venous stasis. Will cont Vanc.zosyn  Will start patient on Tramadol given pain.              #hx of stenotrophomonas pneumonia w parapneumonic effusion 9/2024   resolved and discharged on RA with prn O2 for COPD. Tx with levaquin and zosyn.         Updates post rounds:  -     ICU Check List     FEN  Fluids: hold fluids due to fluid overload status  Electrolytes: replete prn;   Nutrition: 2-3gm NA, Card controlled diet.  Prophylaxis:  DVT ppx: heparin gtt for AC 2/2 a-fib, SCDs held due to patient's b/l lower extremity cellulitis and weeping wounds  GI ppx: ppi  Bowel care: miralax prn  Hardware:  CVC 12/28/24 Triple lumen Left Femoral (Active)   Placement Date: 12/28/24   Site Prep: Chlorhexidine ;Usual sterile procedure followed  Site Prep Agent has Completely Dried Before Insertion: Yes  All 5 Sterile Barriers Used (Gloves, Gown, Cap, Mask, Large Sterile Drape): Yes  Lumen Type: Triple lume...   Number of days: 2       Arterial Line 12/28/24 Left Radial (Active)   Placement Date/Time: 12/28/24 (c) 2300   Size: 20 G  Orientation: Left  Location: Radial  Site Prep: Chlorhexidine   Local Anesthetic: Injectable  Insertion attempts: 1  Securement Method: Sutured;Transparent dressing   Patient Tolerance: Tolerated well   Number of days: 1       Urethral Catheter Temperature probe 16 Fr. (Active)   Placement Date: 12/28/24   Hand Hygiene Completed: Yes  Catheter Type: Temperature probe  Tube Size (Fr.): 16 Fr.  Catheter Balloon Size: 5 mL  Urine Returned: Yes   Number of days: 2            Social:  Code: Full Code    HPOA: Patient requested change of POA to a separate person. Will update.   Disposition: Off pressor support             Faustino Jones MD   12/30/24 at 12:19 PM

## 2024-12-30 NOTE — PROGRESS NOTES
Subjective:  A Flutter 106-109   Denies CP/ SOB/dizziness, palpitations           Cardiac studies:   EKG 12/28/2024  Sinus tachycardia with right bundle branch block and left axis deviation    Echo: 9/19/2024     1. Left ventricular ejection fraction is severely decreased, calculated by Green's biplane at 23%.   2. There is global hypokinesis of the left ventricle with minor regional variations.   3. Poorly visualized anatomical structures due to suboptimal image quality.   4. Spectral Doppler shows an impaired relaxation pattern of left ventricular diastolic filling.   5. There is mildly reduced right ventricular systolic function.   6. Moderately enlarged right ventricle.   7. There is moderate aortic valve cusp calcification. The was no assesment of aortic stenosis in this limited study.   8. Compared with study dated 3/11/2024, there is RV dilation and reduced function.          Objective:    Vitals:    12/30/24 1500   BP:    Pulse: 103   Resp: 23   Temp:    SpO2: 94%       General: Sitting up in bed, nasal bipap, NAD  Skin:  warm and dry  HEENT: Nasal bipap. MM dry  Cardiovascular: Regular, tachy. Unable to assess JVD   Respiratory: Reduced A/E anteriorly   Gastrointestinal: obese, round  Genitourinary: lipscomb with yellow urine  Extremities: erythema up BLE, pitting edema up to chest  Neurological: no gross focal deficits        Labs/Imaging:     Results from last 72 hours   Lab Units 12/30/24  0403 12/29/24  0325 12/28/24  1704   TROPHS ng/L  --   --  61*   SODIUM mmol/L 129*   < >  --    POTASSIUM mmol/L 3.8   < >  --    CO2 mmol/L 28   < >  --    BUN mg/dL 67*   < >  --    CREATININE mg/dL 1.41*   < >  --    MAGNESIUM mg/dL 2.43*   < >  --    PHOSPHORUS mg/dL 7.3*   < >  --     < > = values in this interval not displayed.      Results from last 72 hours   Lab Units 12/30/24  0403   WBC AUTO x10*3/uL 14.1*   HEMOGLOBIN g/dL 16.6   PLATELETS AUTO x10*3/uL 128*        Assessment and Plan:   Alo Glass  is a 69 y.o. male with PMH of HFrEF (EF 23% 9/2024) , COPD (2019: FEV1 45% w +ve BD change; %/TLC 85%), MELISSA, obesity, type II DM (last A1c decreased to 5.5% 6/25/24), hx of stenotrophomonas pneumonia w parapneumonic effusion (9/2024), HTN, hypothyroidism and HLD who presents to the MICU for Septic Shock w acute decompensated HF.     Cardiology is consulted for concern for cardiogenic shock.      Impression   #HFrEF (EF 23% 9/2024)  #Septic shock   #A flutter with variable conduction  -While patient does not currently appear to have florid cardiogenic shock, he is certainly at risk for it, due to significant volume overload and unfavorable Gerardo Starling hemodynamics.  Complicating this is atrial flutter as well with loss of atrial kick.  Moreover, he also has septic shock related to lower extremity cellulitis with infected lower extremity wounds and bullae.  -Patient is currently off pressors  -s/p Dig load  -Continue heparin gtt for AC.   -continue diuresis with IV Bumex    -If the patient decompensates, can consider cardioversion for atrial kick restoration, however he was not on anticoagulation, and has known history of atrial fibrillation.  Moreover, he had been requiring pressors prior to going into atrial flutter even in sinus rhythm suggesting that the hypotension is not related to being in atrial flutter.    - He follows with Dr. Triston Moore       Cardiology will follow  Case discussed with PARAM Garg-CNP  Cardiology Consults    Please call with any questions  Pager [phone] M-F 7a-6p; Saturday 7a-2p  Pager [phone] all other times

## 2024-12-30 NOTE — PROGRESS NOTES
Social Work Note  MICU treatment plan: The patient was admitted on 12/29 for Septic shock  - Additional information: none at this time  - Payee: [employer]   - Support system: Pt has a hcpoa he has request to change the primary agent .   -Planned Disposition: Rehab evaluation - Mod intensity   - Barriers to discharge: none at this time   DAVE Sacnhez, CHELLY

## 2024-12-30 NOTE — CONSULTS
Wound Care Consult     Visit Date: 12/30/2024      Patient Name: Alo Glass         MRN: [id number]           YOB: 1955     Reason for Consult: NORMAN LE        Wound History:  69 M with PMH HFREF, COPD, MELISSA, obesity, DM2, HTN, hypothyroidism, HLD, who presented to MICU for shock workup --sepsis vs decompensated HF, for whom acs was consulted with bilateral leg wounds, rule out abscess.      Wound Assessment:   12/30/24 1420   Wound 12/29/24 Pretibial Right   Date First Assessed/Time First Assessed: 12/29/24 0321   Present on Original Admission: Yes  Location: Pretibial  Wound Location Orientation: Right   Wound Image    Wound Length (cm) 17 cm   Wound Width (cm) 13 cm   Wound Surface Area (cm^2) 221 cm^2   Wound Depth (cm) 0.2 cm   Wound Volume (cm^3) 44.2 cm^3   Drainage Description Serosanguineous   Drainage Amount Moderate   Dressing Xeroform;Foam   Dressing Changed New      12/30/24 1420   Wound 12/30/24 Other (comment) Tibial Dorsal;Right;Lateral   Date First Assessed/Time First Assessed: 12/30/24 1420   Present on Original Admission: Yes  Hand Hygiene Completed: Yes  Primary Wound Type: Other (comment)  Location: Tibial  Wound Location Orientation: Dorsal;Right;Lateral   Site Assessment Purple  (Bullous lesion)   Shape circular   Wound Length (cm) 8 cm   Wound Width (cm) 6 cm   Wound Surface Area (cm^2) 48 cm^2   State of Healing Closed wound edges   Treatments Cleansed; Site care   Drainage Description None   Drainage Amount None   Dressing Xeroform;Foam   Dressing Changed New   Wound location: RLE    Recommendations: DAILY Cover wounds with Vashe wound cleanser ([employer] order #[id number]) soaked 4 x 4 cm sterile gauze for 1-5 minutes, then gently pat dry. Apply Xeroform dressing to open wound bed, cover entire lower right leg with Mepilex transfer, loosely cover/wrap with blue premier pro chux Change when soiled and apply EHOB TruVue boots.       Wound Team Plan:  Primary provider  please review the wound care consult note and pending wound care order. If you agree with orders please file in EMR.      While inpatient, Secure chat with questions or reconsult wound care if condition worsens or changes. For urgent communications please message the group through [employer] messaging at: [facility] Wound Care Team, Thank you.      Millie Ellison RN, CWON  12/30/2024  6:21 PM

## 2024-12-30 NOTE — PROGRESS NOTES
Vancomycin Dosing by Pharmacy- FOLLOW UP    Alo Glass is a 69 y.o. year old male who Pharmacy has been consulted for vancomycin dosing for cellulitis, skin and soft tissue. Based on the patient's indication and renal status this patient is being dosed based on a goal trough/random level of 10-15.     Renal function is currently improving.    Current vancomycin dose: dose by level    Most recent random level: 15.9 mcg/mL (10 hour level)    Visit Vitals  BP 98/68 (BP Location: Left arm, Patient Position: Sitting)   Pulse 105   Temp 36.5 °C (97.7 °F) (Temporal)   Resp 22        Lab Results   Component Value Date    CREATININE 1.41 (H) 2024    CREATININE 1.50 (H) 2024    CREATININE 1.50 (H) 2024    CREATININE 1.49 (H) 2024        Patient weight is as follows:   Vitals:    24 0600   Weight: 131 kg (287 lb 14.7 oz)       Cultures:  No results found for the encounter in last 14 days.       I/O last 3 completed shifts:  In: 1778.3 (13.6 mL/kg) [P.O.:640; I.V.:788.3 (6 mL/kg); IV Piggyback:350]  Out: 5150 (39.4 mL/kg) [Urine:5150 (1.1 mL/kg/hr)]  Weight: 130.6 kg   I/O during current shift:  No intake/output data recorded.    Temp (24hrs), Av.3 °C (97.4 °F), Min:36 °C (96.8 °F), Max:36.7 °C (98.1 °F)      Assessment/Plan    Within goal random/trough level. Will redose with 750mg x 1 on 24 at 1800  The next level will be obtained on 24 at 1700. May be obtained sooner if clinically indicated.   Will continue to monitor renal function daily while on vancomycin and order serum creatinine at least every 48 hours if not already ordered.  Follow for continued vancomycin needs, clinical response, and signs/symptoms of toxicity.       Jenna Mathis, RandiD

## 2024-12-30 NOTE — PROGRESS NOTES
[facility]  ACUTE CARE SURGERY - PROGRESS NOTE    Patient Name: Alo Glass  MRN: [id number]  Admit Date: 1229  : 1955  AGE: 69 y.o.   GENDER: male  ==============================================================================  TODAY'S ASSESSMENT AND PLAN OF CARE:  69 M with PMH HFREF, COPD, MELISSA, obesity, DM2, HTN, hypothyroidism, HLD, who presented to MICU for shock workup --sepsis vs decompensated HF, for whom acs was consulted with bilateral leg wounds, rule out abscess. No current concern for necrotizing fasciitis, infection does not appear to follow up fascial planes (on CT) and due to being bilateral, also less likely that this is NSTI. He is noted to be hyponatremic but has a lot of other competing factors that could also cause this (ie his heart failure). No concern for any fluid collections amenable to drainage identified on physical exam or CT from [facility]. Suspect this is cellulitis on top of chronic venous stasis.    Continue vanc/zosyn  Elevate legs as able  ACS will continue to follow    Patient seen and discussed with attending Dr. Regan.    Jade Roberto MD  General Surgery PGY2  ACS [id number]    ==============================================================================  CHIEF COMPLAINT / EVENTS LAST 24HRS / HPI:  NAEO, no pressors this am. Patient endorses BL leg pain as well as pain in hands due to arthritis.     MEDICAL HISTORY / ROS:   Admission history and ROS reviewed.     PHYSICAL EXAM:  Heart Rate:  []   Temp:  [36.4 °C (97.5 °F)-36.7 °C (98.1 °F)]   Resp:  [16-32]   Weight:  [131 kg (287 lb 14.7 oz)]   SpO2:  [85 %-99 %]   Physical Exam  Constitutional:       General: He is not in acute distress.     Appearance: He is ill-appearing.   Cardiovascular:      Rate and Rhythm: Rhythm irregular.   Pulmonary:      Comments: Conversational dyspnea on Bipap at time of exam  Abdominal:      General: There is no distension.      Palpations: Abdomen  is soft.   Musculoskeletal:         General: Swelling present.      Comments: BL LE pitting edema to level of knees, erythema overlying edema, RLE opened blister with underlying wound   Neurological:      Mental Status: He is alert.       LABS:  Results for orders placed or performed during the hospital encounter of 12/29/24 (from the past 24 hours)   Lactate   Result Value Ref Range    Lactate 1.8 0.4 - 2.0 mmol/L   Heparin Assay, UFH   Result Value Ref Range    Heparin Unfractionated 1.0 See Comment Below for Therapeutic Ranges IU/mL   POCT GLUCOSE   Result Value Ref Range    POCT Glucose 241 (H) 74 - 99 mg/dL   POCT GLUCOSE   Result Value Ref Range    POCT Glucose 184 (H) 74 - 99 mg/dL   Renal Function Panel   Result Value Ref Range    Glucose 191 (H) 74 - 99 mg/dL    Sodium 127 (L) 136 - 145 mmol/L    Potassium 3.8 3.5 - 5.3 mmol/L    Chloride 88 (L) 98 - 107 mmol/L    Bicarbonate 25 21 - 32 mmol/L    Anion Gap 18 10 - 20 mmol/L    Urea Nitrogen 65 (H) 6 - 23 mg/dL    Creatinine 1.50 (H) 0.50 - 1.30 mg/dL    eGFR 50 (L) >60 mL/min/1.73m*2    Calcium 8.1 (L) 8.6 - 10.6 mg/dL    Phosphorus 7.7 (H) 2.5 - 4.9 mg/dL    Albumin 2.7 (L) 3.4 - 5.0 g/dL   Magnesium   Result Value Ref Range    Magnesium 2.49 (H) 1.60 - 2.40 mg/dL   Lactate   Result Value Ref Range    Lactate 1.8 0.4 - 2.0 mmol/L   Heparin Assay, UFH   Result Value Ref Range    Heparin Unfractionated 0.5 See Comment Below for Therapeutic Ranges IU/mL   POCT GLUCOSE   Result Value Ref Range    POCT Glucose 186 (H) 74 - 99 mg/dL   POCT GLUCOSE   Result Value Ref Range    POCT Glucose 155 (H) 74 - 99 mg/dL   Urine electrolytes   Result Value Ref Range    Sodium, Urine Random 46 mmol/L    Sodium/Creatinine Ratio 230 Not established. mmol/g Creat    Potassium, Urine Random 49 mmol/L    Potassium/Creatinine Ratio 245 Not established mmol/g Creat    Chloride, Urine Random 94 mmol/L    Chloride/Creatinine Ratio 470 (H) 23 - 275 mmol/g creat    Creatinine, Urine  Random 20.0 20.0 - 370.0 mg/dL   Vancomycin   Result Value Ref Range    Vancomycin 15.9 5.0 - 20.0 ug/mL   Heparin Assay, UFH   Result Value Ref Range    Heparin Unfractionated 0.4 See Comment Below for Therapeutic Ranges IU/mL   CBC and Auto Differential   Result Value Ref Range    WBC 14.1 (H) 4.4 - 11.3 x10*3/uL    nRBC 0.0 0.0 - 0.0 /100 WBCs    RBC 5.27 4.50 - 5.90 x10*6/uL    Hemoglobin 16.6 13.5 - 17.5 g/dL    Hematocrit 49.4 41.0 - 52.0 %    MCV 94 80 - 100 fL    MCH 31.5 26.0 - 34.0 pg    MCHC 33.6 32.0 - 36.0 g/dL    RDW 13.3 11.5 - 14.5 %    Platelets 128 (L) 150 - 450 x10*3/uL    Neutrophils % 90.8 40.0 - 80.0 %    Immature Granulocytes %, Automated 0.5 0.0 - 0.9 %    Lymphocytes % 3.0 13.0 - 44.0 %    Monocytes % 5.4 2.0 - 10.0 %    Eosinophils % 0.1 0.0 - 6.0 %    Basophils % 0.2 0.0 - 2.0 %    Neutrophils Absolute 12.76 (H) 1.20 - 7.70 x10*3/uL    Immature Granulocytes Absolute, Automated 0.07 0.00 - 0.70 x10*3/uL    Lymphocytes Absolute 0.42 (L) 1.20 - 4.80 x10*3/uL    Monocytes Absolute 0.76 0.10 - 1.00 x10*3/uL    Eosinophils Absolute 0.01 0.00 - 0.70 x10*3/uL    Basophils Absolute 0.03 0.00 - 0.10 x10*3/uL   Renal function panel   Result Value Ref Range    Glucose 137 (H) 74 - 99 mg/dL    Sodium 129 (L) 136 - 145 mmol/L    Potassium 3.8 3.5 - 5.3 mmol/L    Chloride 87 (L) 98 - 107 mmol/L    Bicarbonate 28 21 - 32 mmol/L    Anion Gap 18 10 - 20 mmol/L    Urea Nitrogen 67 (H) 6 - 23 mg/dL    Creatinine 1.41 (H) 0.50 - 1.30 mg/dL    eGFR 54 (L) >60 mL/min/1.73m*2    Calcium 7.7 (L) 8.6 - 10.6 mg/dL    Phosphorus 7.3 (H) 2.5 - 4.9 mg/dL    Albumin 2.5 (L) 3.4 - 5.0 g/dL   Magnesium   Result Value Ref Range    Magnesium 2.43 (H) 1.60 - 2.40 mg/dL   Coagulation Screen   Result Value Ref Range    Protime 14.3 (H) 9.8 - 12.8 seconds    INR 1.3 (H) 0.9 - 1.1    aPTT 166 (HH) 27 - 38 seconds   POCT GLUCOSE   Result Value Ref Range    POCT Glucose 134 (H) 74 - 99 mg/dL   Transthoracic Echo (TTE) Limited    Result Value Ref Range    LVOT diam 2.20 cm    MV E/A ratio 1.85     LV EF 19 %    RV free wall pk S' 10.90 cm/s    LV A4C EF 26.5    POCT GLUCOSE   Result Value Ref Range    POCT Glucose 136 (H) 74 - 99 mg/dL     MEDICATIONS:  Current Facility-Administered Medications   Medication Dose Route Frequency Provider Last Rate Last Admin    acetaminophen (Tylenol) tablet 650 mg  650 mg oral q6h Faustino Jones MD        albuterol 90 mcg/actuation inhaler 2 puff  2 puff inhalation q4h PRN Whitney Olmos MD        [Held by provider] allopurinol (Zyloprim) tablet 300 mg  300 mg oral Daily Whitney Olmos MD   300 mg at 12/29/24 0814    dextrose 50 % injection 12.5 g  12.5 g intravenous q15 min PRN Whitney Olmos MD        dextrose 50 % injection 25 g  25 g intravenous q15 min PRN Whitney Olmos MD        ezetimibe (Zetia) tablet 10 mg  10 mg oral Daily Whitney Olmos MD   10 mg at 12/30/24 1139    gabapentin (Neurontin) capsule 100 mg  100 mg oral Daily Whitney Olmos MD   100 mg at 12/30/24 1145    glucagon (Glucagen) injection 1 mg  1 mg intramuscular q15 min PRN Whitney Olmos MD        glucagon (Glucagen) injection 1 mg  1 mg intramuscular q15 min PRN Whitney Olmos MD        heparin 25,000 Units in dextrose 5% 250 mL (100 Units/mL) infusion (premix)  0-4,500 Units/hr intravenous Continuous Jacob Orozco DO 18 mL/hr at 12/30/24 1144 1,800 Units/hr at 12/30/24 1144    heparin bolus from bag 5,000-10,000 Units  5,000-10,000 Units intravenous q4h PRN Jacob Orozco DO        insulin lispro injection 0-10 Units  0-10 Units subcutaneous TID AC Jacob Orozco DO   4 Units at 12/29/24 1716    ipratropium-albuteroL (Duo-Neb) 0.5-2.5 mg/3 mL nebulizer solution 3 mL  3 mL nebulization q6h Whitney Olmos MD   3 mL at 12/30/24 1313    levothyroxine (Synthroid, Levoxyl) tablet 125 mcg  125 mcg oral Daily Whitney Olmos MD   125 mcg at 12/30/24 1138    nicotine (Nicoderm CQ) 14 mg/24 hr patch 1 patch  1 patch transdermal Daily  Jacob Orozco, DO   1 patch at 12/30/24 0900    norepinephrine (Levophed) 16 mg in sodium chloride 0.9% 250 mL (0.064 mg/mL) infusion (premix)  0-0.5 mcg/kg/min intravenous Continuous Whitney Olmos MD   Stopped at 12/30/24 0000    nystatin (Mycostatin) 100,000 unit/mL suspension 500,000 Units  5 mL Mouth/Throat BID John Hogue MD   500,000 Units at 12/30/24 1309    oxygen (O2) therapy   inhalation Nightly Whitney Olmos MD   5 L/min at 12/30/24 0700    oxygen (O2) therapy   inhalation Continuous PRN - O2/gases Sushma Murillo MD   5 L/min at 12/30/24 1313    pantoprazole (ProtoNix) EC tablet 40 mg  40 mg oral Daily before breakfast Whitney Olmos MD   40 mg at 12/30/24 1138    perflutren lipid microspheres (Definity) injection 0.5-10 mL of dilution  0.5-10 mL of dilution intravenous Once in imaging Whitney Olmos MD        perflutren protein A microsphere (Optison) injection 0.5 mL  0.5 mL intravenous Once in imaging Whitney Olmos MD        perflutren protein A microsphere (Optison) injection 0.5 mL  0.5 mL intravenous Once in imaging PARAM Nicolas-CNP        piperacillin-tazobactam (Zosyn) 3.375 g in dextrose (iso) IV 50 mL  3.375 g intravenous q6h Whitney Olmos MD   Stopped at 12/30/24 1300    polyethylene glycol (Glycolax, Miralax) packet 17 g  17 g oral Daily PRN Jacob Orozco, DO        sulfur hexafluoride microsphr (Lumason) injection 24.28 mg  2 mL intravenous Once in imaging Whitney Olmos MD        sulfur hexafluoride microsphr (Lumason) injection 24.28 mg  2 mL intravenous Once in imaging PARAM Nicolas-CNP        vancomycin (Vancocin) 750 mg in dextrose 5%  mL  750 mg intravenous Once Jenna Mathis PharmD        vancomycin (Vancocin) pharmacy to dose - pharmacy monitoring   miscellaneous Daily PRN Whitney Olmos MD        vasopressin (Vasostrict) 0.2 unit/mL in 5% dextrose 100 mL IV  0-0.03 Units/min intravenous Continuous Whitney Olmos MD   Stopped at 12/29/24 0468       IMAGING SUMMARY:  no  new imaging    I saw and evaluated the patient. I personally obtained the key and critical portions of the history and physical exam. I reviewed the resident’s documentation and discussed the patient with the resident. I agree with the resident’s medical decision making as documented in the resident’s note.    69M admitted with mixed septic and cardiogenic shock 2/2 bilateral lower extremity cellulitis. Now off pressors. Blanching cellulitis of bilateral feet, lower legs and posterior thighs (within existing marked border). Team notes some slight improvement compared to prior exam. Wbc 14 (down from 19). Remains on Vanc/Zosyn and hydrocortisone. Agree with ongoing antibiotics for severe cellulitis. Low concern for necrotizing soft tissue infection but pt does remain at risk for deeper abscess. Will continue to monitor closely. Discussed with MICU team.    Andrea Regan MD  Trauma, Critical Care, and Acute Care Surgery  Pager: [phone]

## 2024-12-30 NOTE — PROGRESS NOTES
ICU to [facility] Transfer Summary     I:  ICU Admission Reason & Brief ICU Course:    PMH of HFrEF (EF 23% 9/2024) , COPD (2019: FEV1 45% w +ve BD change; %/TLC 85%), T2DM (last A1c decreased to 5.5% 6/25/24), hypothyroidism and HLD who presents to the MICU on 12/28/2024 for Septic Shock most likely from RLE wound. Warm extremities, leukocytosis, tachycardia and severe hypotension w high lactate. C/f urinary given pyuria w hx of MRSA UTI. Was started on vancomycin/zosyn. Was Able to appreciate pulses w doppler. ECG showed afib with RVR, cards saw patient in OSH ED per chart review, believed to be 2/2 sepsis. No ST segment changes, Trops down trended, CPK normal. Cards were consulted and they recommended starting a heparin drip given his his afib and started a bumex drip given his overloaded status and his HFrEF (EF 19%).  Patient's lactate initially improved with 500 ml bolus of fluids, but started to uptrend to 3.5. He required Vaso and levo 12/28. He had narrow pulse pressures, concerning for cardiogenic shock, likely secondary to septic shock. His kidney function was noted to be BUN 61 and Cr 1.44 which are both increased from his baseline (both WNL). Potassium also noted to be elevated 5.4 then 6.5. Later improved with Lokelma. General surgery has been consulted for concern of b/l lower extremity cellulitis with R>L. Recommended continuing abx with no further surgical intervention. Nephrology were also consulted, recommended continuing diuresis. He was weaned off of levo/vaso on 12/30. His Bumex was transitioned to spot doses instead of drips. Overall, he was net negative -3.5L during his MICU stay.      C: Code Status/DPOA Info/Goals of Care/ACP Note    Full Code  DPOA/Contact Number: Social work following pending patient request for change.    U: Unprescribing & Pertinent High-Risk Medications    Changes to home meds:      Anticoagulation: Heparin GTT.    Antibiotics:   [] N/A - no current planned  antimicrobioals  [x]  Vanc/Zosyn for bilateral LE cellulitis.    P: Pending Tests at the Time of Transfer   Echo, CT angiogram lower extremities      A: Active consultants, including Rehab:   []  Subspecialty Consultants: cardiology, Nephrology, General surgery  []  PT  []  OT  []  SLP  [x]  Wound Care    U: Uncertainty Measure/Diagnostic Pause:    Working diagnosis at the time of transfer Septic shock , though ddx includes cardiogenic shock.     Diagnosis Degree of Certainty: 1. High degree of certainty about the clinical diagnosis.     S: Summary of Major Problems and To-Dos:   CARDIOVASCULAR:  #HFrEF   #Shock, Septic primarily w component of acute decompensated HF  #Baseline Mild Hypotension  #Severe PAD  #Venostasis  :: 9/2024: EF 23%; Global hypokinesis of the left ventricle with minor regional variation  :: Leukocytosis, hypothermia (admission T <36) concerning for sepsis with Skin/Soft tissue (escar wounds in LE) vs UTI (pyuria [facility] 11-20). Chronic erythema and pitting edema, black wounds and oozing from RLE new.   :: At home on lasix 40 daily (although prescribed 80 daily), 20KCl PO daily and Jardiance   :: Overload clinic picture with hypoxia+pulm vasc congestion w cardiomegaly on CXR, evidence of pitting edema B/L LE. S/p 100 IV lasix in ED. Trops 93 -> , CPK normal. NO ECG concerns of ACS. BNP 1,114 (baseline 260-290). PaO2 71.      PLAN:  Hold SGLT-2 and midodrine  TTE showed 19% EF.  Weaned off pressor support  When condition improves, consider starting GDMT. Only on lasix and SGLT-2. Per chart review, limited d/t hypotension.   Currently on bumex drip  Cards recommended cont Heparin GTT and digoxin loading (500-250-250). Also given Bumex 8 mg and 1 g of Diuril.   Will spot dose bumex for a goal net (-1.5L)  General surgery consulted. Noted this was most likely cellulitis on top of chronic venous stasis. Will continue vanc/zosyn.     #HLD  holding antihypertensives I/s shock. Continuing ezetimibe      PULMONARY:  #COPD GOLD 3   #Asthma  #Acute Hypoxic Respiratory Failure  On 6L NC, likely 2/2 overload given pulm vascular congesiton on CXR and B-lines on bedside POCUS.   PFTs 2019: FEV1 45% w +ve BD change; %/TLC 85%  Group E, on breo-ellipta  Wheezing cardiogenic in nature I/s overload  PLAN:  Duonebs Q6  Will stop prednisone and hydrocortisone.  Will reach out to RT for VPAP given heavy secretions.             #MELISSA  Nocturnal CPAP     RENAL/GENITOURINARY:  #LEONA  #Hyponatremia  #L Renal Mass concerning for RCC - per heme/onc section  Baseline Cr 0.4 - 0.8; 1.38 in ED. Likely Pre-renal in etiology I/s of sepsis vs Cardiorenal. Urine lytes ordered. Strict Is and Os. Cr largely stable at 1.4.  Added on Urine Urea, electrolytes, and osms to evaluate etiology of patient hyponatremia and LEONA, likely iso of      GASTROENTEROLOGY:  #GERD  C/w home pantoprazole     ENDOCRINOLOGY:  #T2DM  #Hypothyroidsim  HbA1c 5.5% most recently. Has Dexcom CGM w home regimen of Mounjaro and Jardiance 10mg daily.   PLAN:  POCT AC+HS, SS#1 + hypoglycemic order set  C/w home levothyroxine     HEMATOLOGY/ONCOLOGY:  #L renal Mass concerning for RCC  2.4 cm x 1.9 cm Mass in L kidney and of suspected 0.8 cm right upper pole renal cell carcinoma measuring 0.8 cm. Saw Urology 12/12/2024 and referred to IR for biopsy   CTM, consider biopsy inpatient if prolonged hospitalization         MSK/DERM:  #Chronic B/L LE erythema and edema  #bursted bullae in RLE 1 week ago c/b black wound and oozing blood   #LLE grey/black wound distal medial calf  Skin Failure Yes, describe: B/L balck wounds distal medial calf R>L w oozing from RLE  . Likely PAD w superimposed infection  Management:  Per ID and cards section  Wound care consult      INFECTIOUS DISEASE:  #Septic Shock  #UTI vs LE Skin/Soft tissue infections  Leukocytosis, hypothermia (admission T <36) concerning for sepsis with Skin/Soft tissue (black oozing wounds after bullae popped in RLE) +/-  UTI (pyuria [facility] 11-20). Hx of MRSA in Ucx (11/20/2024)  Gen surg consulted in OSH for LE infection concern for necrotizing fascitis, r/o with CTA not showing any gas and no surgical intervention currently.   Vanc/zosyn and clinda given in ED  Management:  F/up B/C x2, Ucx, Urine Strep pneumonia+legionella  C/w Vanc/zosyn  CTM LE, low thresholding to re-scan to assess progression if clinically appears worsening  General surgery consulted. Noted lower limb redness was most likely cellulitis on top of chronic venous stasis. Will cont Vanc.zosyn  Will start patient on Tramadol given pain.              #hx of stenotrophomonas pneumonia w parapneumonic effusion 9/2024   resolved and discharged on RA with prn O2 for COPD. Tx with levaquin and zosyn.         ICU Check List      FEN  Fluids: hold fluids due to fluid overload status  Electrolytes: replete prn;   Nutrition: 2-3gm NA, Card controlled diet.  Prophylaxis:  DVT ppx: heparin gtt for AC 2/2 a-fib, SCDs held due to patient's b/l lower extremity cellulitis and weeping wounds  GI ppx: ppi  Bowel care: miralax pr         Social:  Code: Full Code    HPOA: Patient requested change of POA to a separate person. Will update.   Disposition: Off pressor support    To-do list after transfer  [ ]Spot dose Bumex if needed  [ ] Follow Cardiology, Nephrology, and General surgery recs.  [ ] Follow-up social work/HPOA situation given his request to get it changed.     E: Exam, including Lines/Drains/Airways & Data Review:     Physical Exam    Constitutional:       General: He is not in acute distress.     Appearance: He is obese. He is not toxic-appearing.   HENT:      Nose:      Comments: Wearing CPAP  Cardiovascular:      Rate and Rhythm: Regular rhythm. Tachycardia present.      Pulses: Normal pulses.      Heart sounds: Normal heart sounds. No murmur heard.  Pulmonary:      Effort: Pulmonary effort is normal. No respiratory distress.      Breath sounds: Wheezing and rales present.    Abdominal:      General: There is no distension.      Palpations: Abdomen is soft.      Tenderness: There is no abdominal tenderness.   Musculoskeletal:         General: No tenderness.      Comments:     Skin:     Comments: Venous stasis of B/l LE with erythema extending up legs. Bullae on R medial calf covered in wet dressing.   Neurological:      Mental Status: He is alert and oriented to person, place, and time.      Sensory: No sensory deficit.   Psychiatric:         Mood and Affect: Mood normal.         Behavior: Behavior normal.     Hardware:  CVC 12/28/24 Triple lumen Left Femoral (Active)   Placement Date: 12/28/24   Site Prep: Chlorhexidine ;Usual sterile procedure followed  Site Prep Agent has Completely Dried Before Insertion: Yes  All 5 Sterile Barriers Used (Gloves, Gown, Cap, Mask, Large Sterile Drape): Yes  Lumen Type: Triple lume...   Number of days: 2       Arterial Line 12/28/24 Left Radial (Active)   Placement Date/Time: 12/28/24 (c) 2300   Size: 20 G  Orientation: Left  Location: Radial  Site Prep: Chlorhexidine   Local Anesthetic: Injectable  Insertion attempts: 1  Securement Method: Sutured;Transparent dressing  Patient Tolerance: Tolerated well   Number of days: 1       Urethral Catheter Temperature probe 16 Fr. (Active)   Placement Date: 12/28/24   Hand Hygiene Completed: Yes  Catheter Type: Temperature probe  Tube Size (Fr.): 16 Fr.  Catheter Balloon Size: 5 mL  Urine Returned: Yes   Number of days: 2             Within 30 minutes of the patient physically leaving the floor, a Floor Readiness Note needs to be placed with updated vitals.

## 2024-12-30 NOTE — CONSULTS
NEPHROLOGY NOTE   Alo Glass   69 y.o.    @WT@  MRN/Room: [id number]/15/15-A    Reason for consult: LEONA on CKD    SUBJECTIVE: mentioned feeling dry mouth and joint pains        Vitals:    12/30/24 1700   BP: 83/68   Pulse: 104   Resp: 24   Temp:    SpO2: 90%        12/28 1900 - 12/30 0659  In: 1778.3 [P.O.:640; I.V.:788.3]  Out: 5150 [Urine:5150]   Weight change: 1.3 kg (2 lb 13.9 oz)     General appearance: drowsy On CPAP.  Eyes: non-icteric  Skin: no apparent rash  Heart: s1s2 regular. no rub. JVD visible  Lungs: CTA bilat.  no wheezing/crackles. Scattered Rhonchi  Abdomen: soft, nt/nd  Extremities: +2 edema bilat up to thighs as well as Erythema and Warm limbs.  : has Live      ASSESSMENT:  Alo Glass is a 69 y.o. male with PMHx of HFrEF (EF 23%) , COPD, MELISSA, obesity, type II DM, stenotrophomonas pneumonia w parapneumonic effusion (9/2024), HTN, hypothyroidism and HLD who was admitted to the MICU for Septic Shock & acute decompensated HF. Has Large Bullae of LE that are oozing, possibly infected.    #LEONA  - Baseline Cr 0.6, presented with 1.4.   - Ua has hyaline cast, hematuria, pyuria and l.est +, urine sodium was <10 which increased to 46 after diuretics   - bilateral renal enhancing lesions measuring 0.8 cm in the right upper pole kidney and 2 cm in the left upper pole kidney. Referral to urology and further evaluation with MR is suggested    Etiology: hemodynamic instability in setting of Cardiorenal hemodynamics and sepsis+ contrast associated nephropathy    #Shock  - Septic/Cardiogenic  - Requiring 2 pressers stopped in AM      Recommendations:  - No indication for RRT on assessment, Will observe closely for indications for RRT  - renally dose meds  - strict Is and Os  - target to keep net negative by 1.5L   - will follow    Chichi Carey MD  Nephrology Fellow  24 hour Renal Pager - [phone]

## 2024-12-30 NOTE — PROGRESS NOTES
Physical Therapy    Physical Therapy Evaluation    Patient Name: Alo Glass  MRN: [id number]  Department: [facility], [place]  Room: 15/15A  Today's Date: 12/30/2024   Time Calculation  Start Time: 1423  Stop Time: 1456  Time Calculation (min): 33 min    Assessment/Plan   PT Assessment  PT Assessment Results: Decreased strength, Decreased endurance, Impaired balance, Decreased mobility, Pain, Obesity, Impaired sensation, Impaired judgement  Rehab Prognosis: Good  Barriers to Discharge Home: Caregiver assistance, Physical needs  Caregiver Assistance: Patient lives alone and/or does not have reliable caregiver assistance  Physical Needs: In-home setup navigation limited by function/safety, Ambulating household distances limited by function/safety, 24hr mobility assistance needed, 24hr ADL assistance needed, High falls risk due to function or environment  Evaluation/Treatment Tolerance: Other (Comment) (patient appeared self-limiting this date)  Medical Staff Made Aware: Yes  End of Session Communication: Bedside nurse  Assessment Comment: 68 y/o M who presents to the MICU with septic shock, acute decompensated heart failure and BLE leg wounds; patient demo's impaired mobility, strength, activity tolerance and would benefit from continued skilled therapy services to address impairments as stated above. Patient lives alone, is a high falls risk and requires assist for all mobility tasks.  End of Session Patient Position: Bed, 3 rail up, Alarm off, not on at start of session  IP OR SWING BED PT PLAN  Inpatient or Swing Bed: Inpatient  PT Plan  Treatment/Interventions: Bed mobility, Transfer training, Gait training, Balance training, Strengthening, Endurance training, Therapeutic exercise, Therapeutic activity, Positioning, Postural re-education  PT Plan: Ongoing PT  PT Frequency: 3 times per week  PT Discharge Recommendations: Moderate intensity level of continued care  PT Recommended Transfer Status: Assist x2  PT - OK to  Discharge: Yes    Subjective   General Visit Information:  General  Reason for Referral: admitted to the MICU on 12/28 for bilateral lower extremity cellulitis causing septic shock  Past Medical History Relevant to Rehab: HFrEF (EF 23% 9/2024) , COPD (2019: FEV1 45% w +ve BD change; %/TLC 85%), T2DM (last A1c decreased to 5.5% 6/25/24), hypothyroidism and HLD; reports x2 falls within the last 6 months  Family/Caregiver Present: No  Prior to Session Communication: Bedside nurse  Patient Position Received: Bed, 3 rail up, Alarm off, not on at start of session  General Comment: patient received supine, HOB elevated, wound RN in room changing dressing; a-line, lipscomb, tele, 6l NC  Home Living:  Home Living  Type of Home: House  Lives With: Alone  Home Adaptive Equipment: Walker rolling or standard  Home Layout: One level  Home Access: Ramped entrance  Bathroom Shower/Tub: Tub/shower unit  Bathroom Equipment: Tub transfer bench  Home Living Comments: reports initially using hospital bed on 1st floor, but then getting rid of it d/t discomfort from R shoulder injury; reports that he will sleep on an adjustable queen bed upon DC  Prior Level of Function:  Prior Function Per Pt/Caregiver Report  Level of [place]: Independent with ADLs and functional transfers, Independent with homemaking with ambulation  Receives Help From:  (has a cleaning aide)  ADL Assistance: Independent  Homemaking Assistance: Independent (gets groceries delievered)  Ambulatory Assistance: Independent (uses 2ww, reports short household distances, uses WC at times as well)  Hand Dominance: Right  Precautions:  Precautions  Hearing/Visual Limitations: hearing appears WFL; reading glasses  Medical Precautions: Fall precautions, Oxygen therapy device and L/min  Precautions Comment: keep BLEs elevated     Vital Signs     Vitals Session Pre PT During PT Post PT   Heart Rate (BPM) 101     Resp (RR) 30     SpO2 % 90     BP 94/65     MAP (mmHg) 76    "         Objective   Pain:  Pain Assessment  Pain Assessment: 0-10  0-10 (Numeric) Pain Score:  (did not rate numerically)  Pain Type: Acute pain  Pain Location: Leg  Pain Orientation: Right, Left  Clinical Progression: Not changed  Pain Interventions: Repositioned  Cognition:  Cognition  Overall Cognitive Status: Within Functional Limits  Arousal/Alertness: Appropriate responses to stimuli  Orientation Level:  (AxO x3)  Following Commands: Follows one step commands consistently (self-directed at times)  Cognition Comments: patient became irritable towards therapist when PT asked patient to mobilize to the side of the bed; patient participating in therapy evaluation during bed-level assessment then said, \"I'm not sitting up, I am resting today, I have phone calls to make\" Patient stating, \"I am going home, not a facility\"    General Assessments:      Activity Tolerance  Early Mobility/Exercise Safety Screen: Proceed with mobilization - No exclusion criteria met    Sensation  Sensation Comment: reports neuropathy in B feet    Strength  Strength Comments: BUEs:  4-/5, elbow flexion/ext 4-/5, shoulder flexion 3/5; BLEs: limited assessment d/t edema, increase pain, PF/DF 3-/5, knee ext <3-/5           Functional Assessments:  Bed Mobility  Bed Mobility: Yes (anterior lean only in DEP chair position; despite maximal encouragement and reassurance, patient adamently declined to attempt further bed mobility)  Bed Mobility 1  Bed Mobility 1: Forward lean  Level of Assistance 1: Moderate assistance, Minimal verbal cues  Bed Mobility Comments 1: x1; after PT placed patient's bed in DEP chair position for skilled assessment; vitals monitored to ensure hemodynamic stability. able to use BUEs on bedrail to assist with task, appears limited d/t body habitus and strength.    Transfers  Transfer: No  Extremity/Trunk Assessments:  SHASHAE   SANDHYA :  (AROM shoulder flexion ~90 degrees, remainder WFL AROM)  LUE   LUE:  (AROM grossly " WFL)  RLE   RLE :  (limited AROM globally d/t edema, increase pain reported)  LLE   LLE :  (limited AROM globally d/t edema, increase pain reported)  Outcome Measures:  [facility] Basic Mobility  Turning from your back to your side while in a flat bed without using bedrails: A lot  Moving from lying on your back to sitting on the side of a flat bed without using bedrails: A lot  Moving to and from bed to chair (including a wheelchair): Total  Standing up from a chair using your arms (e.g. wheelchair or bedside chair): Total  To walk in hospital room: Total  Climbing 3-5 steps with railing: Total  Basic Mobility - Total Score: 8    FSS-ICU  Ambulation: Unable to attempt due to weakness  Rolling: Unable to perform  Sitting: Unable to perform  Transfer Sit-to-Stand: Unable to perform  Transfer Supine-to-Sit: Unable to perform  Total Score: 0      Early Mobility/Exercise Safety Screen: Proceed with mobilization - No exclusion criteria met  ICU Mobility Scale: Sitting in bed, exercises in bed [1]  E = Exercise and Early Mobility  Early Mobility/Exercise Safety Screen: Proceed with mobilization - No exclusion criteria met  ICU Mobility Scale: Sitting in bed, exercises in bed    Encounter Problems       Encounter Problems (Active)       PT Problem       Patient will complete bed mobility with MINx1 with HOB elevated        Start:  12/30/24    Expected End:  01/20/25            Patient will complete STS with MODx1 using LRAD without acute LOB         Start:  12/30/24    Expected End:  01/20/25            Patient will ambulate >/=15' with LRAD with MODx1 without acute LOB        Start:  12/30/24    Expected End:  01/20/25            Patient will complete static (MINx1) and dynamic (MODx1) standing balance activities using LRAD without acute LOB, while maintaining midline posture.        Start:  12/30/24    Expected End:  01/20/25            Patient will participate in BLE there-ex program in order to assist in improving strength  and to assist with the completion of functional mobility tasks.        Start:  12/30/24    Expected End:  01/20/25                   Education Documentation  Mobility Training, taught by Eva Gomez PT at 12/30/2024  3:15 PM.  Learner: Patient  Readiness: Nonacceptance  Method: Explanation  Response: Needs Reinforcement  Comment: role of PT services, importance of attempting mobility    Education Comments  No comments found.        Eva Gomez, TITA, DPT

## 2024-12-30 NOTE — PROGRESS NOTES
Floor Readiness Note       I, personally, evaluated Alo Glass prior to transfer to the floor, including reviewing all current laboratory and imaging studies. The patient remains appropriate for transfer to the floor. Bedside nurse and respiratory therapy are also in agreement of patient's readiness for the floor.     Brief summary:  Alo Glass is a 69 y.o. male who was admitted to the MICU on 12/28 for bilateral lower extremity cellulitis causing septic shock . They have been treated with vanc/zosyn and a Bumex drip. He briefly required pressors in the form of vasopressin/Levophed, but had been weaned off completely.    Updated focused Physical Exam:  Constitutional:       General: He is not in acute distress.     Appearance: He is obese. He is not toxic-appearing.   HENT:      Nose:      Comments: Wearing CPAP  Cardiovascular:      Rate and Rhythm: Regular rhythm. Tachycardia present.      Pulses: Normal pulses.      Heart sounds: Normal heart sounds. No murmur heard.  Pulmonary:      Effort: Pulmonary effort is normal. No respiratory distress.      Breath sounds: Wheezing and rales present.   Abdominal:      General: There is no distension.      Palpations: Abdomen is soft.      Tenderness: There is no abdominal tenderness.   Musculoskeletal:         General: No tenderness.      Comments:     Skin:     Comments: Venous stasis of B/l LE with erythema extending up legs. Bullae on R medial calf covered in wet dressing.   Neurological:      Mental Status: He is alert and oriented to person, place, and time.      Sensory: No sensory deficit.   Psychiatric:         Mood and Affect: Mood normal.         Behavior: Behavior normal.     Current Vital Signs:  Heart Rate: 104 (12/30/24 1300 : User, System Default)  BP: 98/68 (12/29/24 0308 : Jesus Guan RN)  Temp: 36.7 °C (98.1 °F) (12/30/24 1200 : Angy Lopez)  Resp: (!) 28 (12/30/24 1300 : User, System Default)  SpO2: 94 % (12/30/24 1313 : Walter FERNÁNDEZ  DOREEN Issa)      Accepting team, medical step down unit was contacted, will receive verbal sign out and the. Bedside nurse will now call accepting nurse for report and patient will be transferred to [facility] step-down unit    Faustino Jones MD

## 2024-12-31 ENCOUNTER — APPOINTMENT (OUTPATIENT)
Dept: RADIOLOGY | Facility: HOSPITAL | Age: 69
DRG: 871 | End: 2024-12-31
Payer: MEDICARE

## 2024-12-31 LAB
ALBUMIN SERPL BCP-MCNC: 2.3 G/DL (ref 3.4–5)
ALBUMIN SERPL BCP-MCNC: 2.8 G/DL (ref 3.4–5)
ALP SERPL-CCNC: 101 U/L (ref 33–136)
ALT SERPL W P-5'-P-CCNC: 10 U/L (ref 10–52)
ANION GAP BLDV CALCULATED.4IONS-SCNC: 24 MMOL/L (ref 10–25)
ANION GAP SERPL CALC-SCNC: 18 MMOL/L (ref 10–20)
ANION GAP SERPL CALC-SCNC: 34 MMOL/L (ref 10–20)
AST SERPL W P-5'-P-CCNC: 8 U/L (ref 9–39)
ATRIAL RATE: 117 BPM
BACTERIA UR CULT: NO GROWTH
BASE EXCESS BLDV CALC-SCNC: -3.2 MMOL/L (ref -2–3)
BASOPHILS # BLD AUTO: 0.01 X10*3/UL (ref 0–0.1)
BASOPHILS NFR BLD AUTO: 0.1 %
BILIRUB SERPL-MCNC: 0.7 MG/DL (ref 0–1.2)
BNP SERPL-MCNC: 1741 PG/ML (ref 0–99)
BODY TEMPERATURE: 37 DEGREES CELSIUS
BUN SERPL-MCNC: 70 MG/DL (ref 6–23)
BUN SERPL-MCNC: 77 MG/DL (ref 6–23)
CA-I BLDV-SCNC: 0.13 MMOL/L (ref 1.1–1.33)
CALCIUM SERPL-MCNC: 6.5 MG/DL (ref 8.6–10.6)
CALCIUM SERPL-MCNC: 8.2 MG/DL (ref 8.6–10.6)
CHLORIDE BLDV-SCNC: 67 MMOL/L (ref 98–107)
CHLORIDE SERPL-SCNC: 67 MMOL/L (ref 98–107)
CHLORIDE SERPL-SCNC: 87 MMOL/L (ref 98–107)
CO2 SERPL-SCNC: 24 MMOL/L (ref 21–32)
CO2 SERPL-SCNC: 28 MMOL/L (ref 21–32)
CREAT SERPL-MCNC: 1.86 MG/DL (ref 0.5–1.3)
CREAT SERPL-MCNC: 1.92 MG/DL (ref 0.5–1.3)
EGFRCR SERPLBLD CKD-EPI 2021: 37 ML/MIN/1.73M*2
EGFRCR SERPLBLD CKD-EPI 2021: 39 ML/MIN/1.73M*2
EOSINOPHIL # BLD AUTO: 0.01 X10*3/UL (ref 0–0.7)
EOSINOPHIL NFR BLD AUTO: 0.1 %
ERYTHROCYTE [DISTWIDTH] IN BLOOD BY AUTOMATED COUNT: 13.2 % (ref 11.5–14.5)
GLUCOSE BLD MANUAL STRIP-MCNC: 139 MG/DL (ref 74–99)
GLUCOSE BLD MANUAL STRIP-MCNC: 144 MG/DL (ref 74–99)
GLUCOSE BLD MANUAL STRIP-MCNC: 178 MG/DL (ref 74–99)
GLUCOSE BLD MANUAL STRIP-MCNC: 194 MG/DL (ref 74–99)
GLUCOSE BLDV-MCNC: >685 MG/DL (ref 74–99)
GLUCOSE SERPL-MCNC: 150 MG/DL (ref 74–99)
GLUCOSE SERPL-MCNC: 672 MG/DL (ref 74–99)
HCO3 BLDV-SCNC: 24.7 MMOL/L (ref 22–26)
HCT VFR BLD AUTO: 49.2 % (ref 41–52)
HCT VFR BLD EST: 28 % (ref 41–52)
HGB BLD-MCNC: 16.4 G/DL (ref 13.5–17.5)
HGB BLDV-MCNC: 9.4 G/DL (ref 13.5–17.5)
IMM GRANULOCYTES # BLD AUTO: 0.06 X10*3/UL (ref 0–0.7)
IMM GRANULOCYTES NFR BLD AUTO: 0.5 % (ref 0–0.9)
INHALED O2 CONCENTRATION: 36 %
LACTATE BLDV-SCNC: 1.6 MMOL/L (ref 0.4–2)
LYMPHOCYTES # BLD AUTO: 0.48 X10*3/UL (ref 1.2–4.8)
LYMPHOCYTES NFR BLD AUTO: 4.1 %
MAGNESIUM SERPL-MCNC: 2.34 MG/DL (ref 1.6–2.4)
MCH RBC QN AUTO: 31.7 PG (ref 26–34)
MCHC RBC AUTO-ENTMCNC: 33.3 G/DL (ref 32–36)
MCV RBC AUTO: 95 FL (ref 80–100)
MONOCYTES # BLD AUTO: 0.85 X10*3/UL (ref 0.1–1)
MONOCYTES NFR BLD AUTO: 7.3 %
NEUTROPHILS # BLD AUTO: 10.29 X10*3/UL (ref 1.2–7.7)
NEUTROPHILS NFR BLD AUTO: 87.9 %
NRBC BLD-RTO: 0 /100 WBCS (ref 0–0)
OXYHGB MFR BLDV: 66.7 % (ref 45–75)
P OFFSET: 185 MS
P ONSET: 125 MS
PCO2 BLDV: 59 MM HG (ref 41–51)
PH BLDV: 7.23 PH (ref 7.33–7.43)
PHOSPHATE SERPL-MCNC: 5.8 MG/DL (ref 2.5–4.9)
PLATELET # BLD AUTO: 119 X10*3/UL (ref 150–450)
PO2 BLDV: 37 MM HG (ref 35–45)
POTASSIUM BLDV-SCNC: 3.8 MMOL/L (ref 3.5–5.3)
POTASSIUM SERPL-SCNC: 3.7 MMOL/L (ref 3.5–5.3)
POTASSIUM SERPL-SCNC: 4 MMOL/L (ref 3.5–5.3)
PR INTERVAL: 162 MS
PROT SERPL-MCNC: 5.4 G/DL (ref 6.4–8.2)
Q ONSET: 206 MS
QRS COUNT: 20 BEATS
QRS DURATION: 176 MS
QT INTERVAL: 388 MS
QTC CALCULATION(BAZETT): 541 MS
QTC FREDERICIA: 485 MS
R AXIS: -89 DEGREES
RBC # BLD AUTO: 5.18 X10*6/UL (ref 4.5–5.9)
SAO2 % BLDV: 68 % (ref 45–75)
SODIUM BLDV-SCNC: 112 MMOL/L (ref 136–145)
SODIUM SERPL-SCNC: 121 MMOL/L (ref 136–145)
SODIUM SERPL-SCNC: 129 MMOL/L (ref 136–145)
T AXIS: 99 DEGREES
T OFFSET: 400 MS
UFH PPP CHRO-ACNC: 0.3 IU/ML
VANCOMYCIN SERPL-MCNC: 14.7 UG/ML (ref 5–20)
VENTRICULAR RATE: 117 BPM
WBC # BLD AUTO: 11.7 X10*3/UL (ref 4.4–11.3)

## 2024-12-31 PROCEDURE — 2500000001 HC RX 250 WO HCPCS SELF ADMINISTERED DRUGS (ALT 637 FOR MEDICARE OP)

## 2024-12-31 PROCEDURE — 83880 ASSAY OF NATRIURETIC PEPTIDE: CPT | Performed by: STUDENT IN AN ORGANIZED HEALTH CARE EDUCATION/TRAINING PROGRAM

## 2024-12-31 PROCEDURE — 85520 HEPARIN ASSAY: CPT | Performed by: NURSE PRACTITIONER

## 2024-12-31 PROCEDURE — 2500000005 HC RX 250 GENERAL PHARMACY W/O HCPCS: Performed by: NURSE PRACTITIONER

## 2024-12-31 PROCEDURE — 2500000001 HC RX 250 WO HCPCS SELF ADMINISTERED DRUGS (ALT 637 FOR MEDICARE OP): Performed by: NURSE PRACTITIONER

## 2024-12-31 PROCEDURE — 99233 SBSQ HOSP IP/OBS HIGH 50: CPT

## 2024-12-31 PROCEDURE — 36415 COLL VENOUS BLD VENIPUNCTURE: CPT | Performed by: NURSE PRACTITIONER

## 2024-12-31 PROCEDURE — 2500000002 HC RX 250 W HCPCS SELF ADMINISTERED DRUGS (ALT 637 FOR MEDICARE OP, ALT 636 FOR OP/ED): Performed by: NURSE PRACTITIONER

## 2024-12-31 PROCEDURE — 2500000004 HC RX 250 GENERAL PHARMACY W/ HCPCS (ALT 636 FOR OP/ED): Performed by: NURSE PRACTITIONER

## 2024-12-31 PROCEDURE — 85025 COMPLETE CBC W/AUTO DIFF WBC: CPT | Performed by: NURSE PRACTITIONER

## 2024-12-31 PROCEDURE — 99232 SBSQ HOSP IP/OBS MODERATE 35: CPT | Performed by: SURGERY

## 2024-12-31 PROCEDURE — 2060000001 HC INTERMEDIATE ICU ROOM DAILY

## 2024-12-31 PROCEDURE — 99233 SBSQ HOSP IP/OBS HIGH 50: CPT | Performed by: STUDENT IN AN ORGANIZED HEALTH CARE EDUCATION/TRAINING PROGRAM

## 2024-12-31 PROCEDURE — 80053 COMPREHEN METABOLIC PANEL: CPT | Performed by: NURSE PRACTITIONER

## 2024-12-31 PROCEDURE — 82810 BLOOD GASES O2 SAT ONLY: CPT | Performed by: NURSE PRACTITIONER

## 2024-12-31 PROCEDURE — 83735 ASSAY OF MAGNESIUM: CPT | Performed by: NURSE PRACTITIONER

## 2024-12-31 PROCEDURE — 82947 ASSAY GLUCOSE BLOOD QUANT: CPT

## 2024-12-31 PROCEDURE — 99233 SBSQ HOSP IP/OBS HIGH 50: CPT | Performed by: NURSE PRACTITIONER

## 2024-12-31 PROCEDURE — 84295 ASSAY OF SERUM SODIUM: CPT | Performed by: NURSE PRACTITIONER

## 2024-12-31 PROCEDURE — 80202 ASSAY OF VANCOMYCIN: CPT | Performed by: NURSE PRACTITIONER

## 2024-12-31 PROCEDURE — 2500000004 HC RX 250 GENERAL PHARMACY W/ HCPCS (ALT 636 FOR OP/ED)

## 2024-12-31 PROCEDURE — 94640 AIRWAY INHALATION TREATMENT: CPT

## 2024-12-31 PROCEDURE — S4991 NICOTINE PATCH NONLEGEND: HCPCS | Performed by: NURSE PRACTITIONER

## 2024-12-31 RX ORDER — HYDROMORPHONE HYDROCHLORIDE 2 MG/1
1 TABLET ORAL ONCE
Status: COMPLETED | OUTPATIENT
Start: 2024-12-31 | End: 2024-12-31

## 2024-12-31 RX ORDER — BUMETANIDE 0.25 MG/ML
1 INJECTION, SOLUTION INTRAMUSCULAR; INTRAVENOUS ONCE
Status: COMPLETED | OUTPATIENT
Start: 2024-12-31 | End: 2024-12-31

## 2024-12-31 RX ORDER — BUMETANIDE 0.25 MG/ML
2 INJECTION, SOLUTION INTRAMUSCULAR; INTRAVENOUS ONCE
Status: COMPLETED | OUTPATIENT
Start: 2024-12-31 | End: 2025-01-01

## 2024-12-31 RX ORDER — BUMETANIDE 2 MG/1
2 TABLET ORAL ONCE
Status: COMPLETED | OUTPATIENT
Start: 2024-12-31 | End: 2024-12-31

## 2024-12-31 RX ORDER — MIDODRINE HYDROCHLORIDE 5 MG/1
5 TABLET ORAL 3 TIMES DAILY
Status: DISCONTINUED | OUTPATIENT
Start: 2024-12-31 | End: 2025-01-12 | Stop reason: HOSPADM

## 2024-12-31 RX ADMIN — IPRATROPIUM BROMIDE AND ALBUTEROL SULFATE 3 ML: 2.5; .5 SOLUTION RESPIRATORY (INHALATION) at 20:12

## 2024-12-31 RX ADMIN — PANTOPRAZOLE SODIUM 40 MG: 40 TABLET, DELAYED RELEASE ORAL at 10:29

## 2024-12-31 RX ADMIN — GABAPENTIN 100 MG: 100 CAPSULE ORAL at 10:30

## 2024-12-31 RX ADMIN — IPRATROPIUM BROMIDE AND ALBUTEROL SULFATE 3 ML: 2.5; .5 SOLUTION RESPIRATORY (INHALATION) at 15:57

## 2024-12-31 RX ADMIN — PIPERACILLIN SODIUM AND TAZOBACTAM SODIUM 3.38 G: 3; .375 INJECTION, SOLUTION INTRAVENOUS at 22:15

## 2024-12-31 RX ADMIN — HYDROMORPHONE HYDROCHLORIDE 1 MG: 2 TABLET ORAL at 10:28

## 2024-12-31 RX ADMIN — HEPARIN SODIUM 1800 UNITS/HR: 10000 INJECTION, SOLUTION INTRAVENOUS at 18:53

## 2024-12-31 RX ADMIN — NYSTATIN 500000 UNITS: 100000 SUSPENSION ORAL at 22:00

## 2024-12-31 RX ADMIN — LEVOTHYROXINE SODIUM 125 MCG: 25 TABLET ORAL at 10:30

## 2024-12-31 RX ADMIN — Medication 4 L/MIN: at 22:00

## 2024-12-31 RX ADMIN — HEPARIN SODIUM 1800 UNITS/HR: 10000 INJECTION, SOLUTION INTRAVENOUS at 03:34

## 2024-12-31 RX ADMIN — PIPERACILLIN SODIUM AND TAZOBACTAM SODIUM 3.38 G: 3; .375 INJECTION, SOLUTION INTRAVENOUS at 10:35

## 2024-12-31 RX ADMIN — MIDODRINE HYDROCHLORIDE 5 MG: 5 TABLET ORAL at 22:11

## 2024-12-31 RX ADMIN — ACETAMINOPHEN 650 MG: 325 TABLET ORAL at 22:14

## 2024-12-31 RX ADMIN — BUMETANIDE 2 MG: 2 TABLET ORAL at 17:04

## 2024-12-31 RX ADMIN — BUMETANIDE 1 MG: 0.25 INJECTION INTRAMUSCULAR; INTRAVENOUS at 10:30

## 2024-12-31 RX ADMIN — IPRATROPIUM BROMIDE AND ALBUTEROL SULFATE 3 ML: 2.5; .5 SOLUTION RESPIRATORY (INHALATION) at 09:18

## 2024-12-31 RX ADMIN — ACETAMINOPHEN 650 MG: 325 TABLET ORAL at 10:28

## 2024-12-31 RX ADMIN — NICOTINE 1 PATCH: 14 PATCH, EXTENDED RELEASE TRANSDERMAL at 10:30

## 2024-12-31 RX ADMIN — INSULIN LISPRO 2 UNITS: 100 INJECTION, SOLUTION INTRAVENOUS; SUBCUTANEOUS at 10:57

## 2024-12-31 RX ADMIN — ACETAMINOPHEN 650 MG: 325 TABLET ORAL at 15:59

## 2024-12-31 RX ADMIN — EZETIMIBE 10 MG: 10 TABLET ORAL at 10:42

## 2024-12-31 RX ADMIN — PIPERACILLIN SODIUM AND TAZOBACTAM SODIUM 3.38 G: 3; .375 INJECTION, SOLUTION INTRAVENOUS at 03:32

## 2024-12-31 RX ADMIN — PIPERACILLIN SODIUM AND TAZOBACTAM SODIUM 3.38 G: 3; .375 INJECTION, SOLUTION INTRAVENOUS at 15:59

## 2024-12-31 ASSESSMENT — COGNITIVE AND FUNCTIONAL STATUS - GENERAL
TURNING FROM BACK TO SIDE WHILE IN FLAT BAD: A LOT
DAILY ACTIVITIY SCORE: 12
EATING MEALS: A LOT
CLIMB 3 TO 5 STEPS WITH RAILING: TOTAL
EATING MEALS: A LOT
CLIMB 3 TO 5 STEPS WITH RAILING: TOTAL
STANDING UP FROM CHAIR USING ARMS: TOTAL
HELP NEEDED FOR BATHING: A LOT
TOILETING: A LOT
DRESSING REGULAR UPPER BODY CLOTHING: A LOT
MOVING TO AND FROM BED TO CHAIR: TOTAL
HELP NEEDED FOR BATHING: A LOT
WALKING IN HOSPITAL ROOM: TOTAL
MOVING TO AND FROM BED TO CHAIR: TOTAL
DRESSING REGULAR LOWER BODY CLOTHING: A LOT
MOBILITY SCORE: 8
DRESSING REGULAR UPPER BODY CLOTHING: A LOT
TURNING FROM BACK TO SIDE WHILE IN FLAT BAD: A LOT
DAILY ACTIVITIY SCORE: 12
MOBILITY SCORE: 8
TOILETING: A LOT
PERSONAL GROOMING: A LOT
PERSONAL GROOMING: A LOT
STANDING UP FROM CHAIR USING ARMS: TOTAL
MOVING FROM LYING ON BACK TO SITTING ON SIDE OF FLAT BED WITH BEDRAILS: A LOT
WALKING IN HOSPITAL ROOM: TOTAL
MOVING FROM LYING ON BACK TO SITTING ON SIDE OF FLAT BED WITH BEDRAILS: A LOT
DRESSING REGULAR LOWER BODY CLOTHING: A LOT

## 2024-12-31 ASSESSMENT — PAIN SCALES - GENERAL
PAINLEVEL_OUTOF10: 0 - NO PAIN
PAINLEVEL_OUTOF10: 6

## 2024-12-31 ASSESSMENT — PAIN DESCRIPTION - DESCRIPTORS: DESCRIPTORS: ACHING

## 2024-12-31 ASSESSMENT — PAIN - FUNCTIONAL ASSESSMENT
PAIN_FUNCTIONAL_ASSESSMENT: 0-10

## 2024-12-31 NOTE — SIGNIFICANT EVENT
.Rapid Response Nurse Note: RADAR alert: 8    Pager time:   Arrival time:   Event end time:   Location: [facility] 60  [] Triage by phone or secure messaging    Rapid response initiated by:  [] Rapid response RN [] Family [] Nursing Supervisor [] Physician   [] RADAR auto page [] Sepsis auto-page [] RN [] RT   [] NP/PA [] Other:     Primary reason for call:   [] BAT [] New CPAP/BiPAP [] Bleeding [] Change in mental status   [] Chest pain [] Code blue [] FiO2 >/= 50% [] HR </= 40 bpm   [] HR >/= 130 bpm [] Hyperglycemia [] Hypoglycemia [] RADAR    [] RR </= 8 bpm [] RR >/= 30 bpm [] SBP </= 90 mmHg [] SpO2 < 90%   [] Seizure [] Sepsis [] Shortness of breath  [] Staff concern: see comments     Initial VS and/or RADAR VS: T 36.3 °C; HR 97; RR 15; BP 88/69; SPO2 93%.        Interventions:  [x] None [] ABG/VBG [] Assist w/ICU transfer [] BAT paged    [] Bag mask [] Blood [] Cardioversion [] Code Blue   [] Code blue for intubation [] Code status changed [] Chest x-ray [] EKG   [] IV fluid/bolus [] KUB x-ray [] Labs/cultures [] Medication   [] Nebulizer treatment [] NIPPV (CPAP/BiPAP) [] Oxygen [] Oral airway   [] Peripheral IV [] Palliative care consult [] CT/MRI [] Sepsis protocol    [] Suctioned [] Other:     Outcome:  [] Coded and  [] Code blue for intubation [] Coded and transferred to ICU []  on division   [x] Remained on division (no change) [] Remained on division + additional monitoring [] Remained in ED [] Transferred to ED   [] Transferred to ICU [] Transferred to inpatient status [] Transferred for interventions (procedure) [] Transferred to ICU stepdown    [] Transferred to surgery [] Transferred to telemetry [] Sepsis protocol [] STEMI protocol   [] Stroke protocol [x] Bedside nurse instructed to page rapid response for any concerns or acute change in condition/VS     Additional Comments: Reviewed above VS with bedside RN.  VS within patient's current trends.  Patient denied pain,  shortness of breath, dizziness or lightheadedness.  No interventions by rapid response team indicated at this time.  Staff to page rapid response for any concerns or acute change in condition/VS.

## 2024-12-31 NOTE — PROGRESS NOTES
Subjective:  Transferred out to SDU  A Flutter , occas PVCs couplets  Denies CP/ SOB/dizziness, palpitations         Cardiac studies:   EKG 12/28/2024  Sinus tachycardia with right bundle branch block and left axis deviation    Echo: 9/19/2024     1. Left ventricular ejection fraction is severely decreased, calculated by Green's biplane at 23%.   2. There is global hypokinesis of the left ventricle with minor regional variations.   3. Poorly visualized anatomical structures due to suboptimal image quality.   4. Spectral Doppler shows an impaired relaxation pattern of left ventricular diastolic filling.   5. There is mildly reduced right ventricular systolic function.   6. Moderately enlarged right ventricle.   7. There is moderate aortic valve cusp calcification. The was no assesment of aortic stenosis in this limited study.   8. Compared with study dated 3/11/2024, there is RV dilation and reduced function.          Objective:    Vitals:    12/31/24 0741   BP: 95/74   Pulse:    Resp:    Temp: 36.4 °C (97.5 °F)   SpO2:        General: Sitting up in bed, nasal bipap, NAD  Skin:  warm and dry  HEENT: Nasal bipap. MM dry  Cardiovascular: irregular, tachy. Unable to assess JVD   Respiratory: Reduced A/E anteriorly   Gastrointestinal: obese, round  Genitourinary: lipscomb with yellow urine  Extremities: erythema up BLE, pitting edema up to chest  Neurological: no gross focal deficits        Labs/Imaging:     Results from last 72 hours   Lab Units 12/31/24  0623 12/30/24  0403 12/29/24  0325 12/28/24  1704   TROPHS ng/L  --   --   --  61*   SODIUM mmol/L 129* 129*   < >  --    POTASSIUM mmol/L 3.7 3.8   < >  --    CO2 mmol/L 28 28   < >  --    BUN mg/dL 77* 67*   < >  --    CREATININE mg/dL 1.92* 1.41*   < >  --    MAGNESIUM mg/dL  --  2.43*   < >  --    PHOSPHORUS mg/dL  --  7.3*   < >  --     < > = values in this interval not displayed.      Results from last 72 hours   Lab Units 12/31/24  0623   WBC AUTO x10*3/uL  11.7*   HEMOGLOBIN g/dL 16.4   PLATELETS AUTO x10*3/uL 119*        Assessment and Plan:   Alo Glass is a 69 y.o. male with PMH of HFrEF (EF 23% 9/2024) , COPD (2019: FEV1 45% w +ve BD change; %/TLC 85%), MELISSA, obesity, type II DM (last A1c decreased to 5.5% 6/25/24), hx of stenotrophomonas pneumonia w parapneumonic effusion (9/2024), HTN, hypothyroidism and HLD who presents to the MICU for Septic Shock w acute decompensated HF.     Cardiology is consulted for concern for cardiogenic shock.      Impression   #HFrEF (EF 23% 9/2024)  #Septic shock   #A flutter with variable conduction  -Continue treating underlying triggers for Afib/flutter (volume, pain, infection, etc.).    -s/p Dig load  -Continue heparin gtt for AC.   -continue diuresis with IV Bumex  -If the patient decompensates, can consider cardioversion for atrial kick restoration, however he was not on anticoagulation, and has known history of atrial fibrillation.  Moreover, he had been requiring pressors prior to going into atrial flutter even in sinus rhythm suggesting that the hypotension is not related to being in atrial flutter.  -prior to discharge, please arrange for follow up with Dr. Triston Moore, call [phone] schedule       Cardiology will sign off      PARAM Myles-CNP  Cardiology Consults    Please call with any questions  Pager [phone] M-F 7a-6p; Saturday 7a-2p  Pager [phone] all other times

## 2024-12-31 NOTE — PROGRESS NOTES
NEPHROLOGY NOTE   Alo Glass   69 y.o.    @WT@  MRN/Room: [id number]    Reason for consult: LEONA on CKD    SUBJECTIVE: mentioned feeling dry mouth and joint pains    Vitals:    12/31/24 1600   BP: 88/69   Pulse: 97   Resp: 15   Temp: 36.3 °C (97.3 °F)   SpO2: 93%        12/29 1900 - 12/31 0659  In: 1669 [P.O.:240; I.V.:679]  Out: 4270 [Urine:4270]   Weight change:      General appearance: drowsy On CPAP.  Eyes: non-icteric  Skin: no apparent rash  Heart: s1s2 regular. no rub. JVD visible  Lungs: CTA bilat.  no wheezing/crackles. Scattered Rhonchi  Abdomen: soft, nt/nd  Extremities: +2 edema bilat up to thighs as well as Erythema and Warm limbs.  : has Live      ASSESSMENT:  Alo Glass is a 69 y.o. male with PMHx of HFrEF (EF 23%) , COPD, MELISSA, obesity, type II DM, stenotrophomonas pneumonia w parapneumonic effusion (9/2024), HTN, hypothyroidism and HLD who was admitted to the MICU for Septic Shock & acute decompensated HF. Has Large Bullae of LE that are oozing, possibly infected.    #LEONA  - Baseline Cr 0.6, presented with 1.4.   - Ua has hyaline cast, hematuria, pyuria and l.est +, urine sodium was <10 which increased to 46 after diuretics   - bilateral renal enhancing lesions measuring 0.8 cm in the right upper pole kidney and 2 cm in the left upper pole kidney. Referral to urology and further evaluation with MR is suggested  - hypoNa likely hypervolemic    Etiology: hemodynamic instability in setting of Cardiorenal hemodynamics and sepsis+ contrast associated nephropathy      Recommendations:  - please consider midodrine 5mg TID for soft pressures otherwise low bp will worsen LEONA  - No indication for RRT on assessment, Will observe closely for indications for RRT  - renally dose meds  - strict Is and Os  - target to keep net negative by 1L   - will follow    Chichi Carey MD  Nephrology Fellow  24 hour Renal Pager - [phone]

## 2024-12-31 NOTE — PROGRESS NOTES
[facility]  ACUTE CARE SURGERY - PROGRESS NOTE    Patient Name: Alo Glass  MRN: [id number]  Admit Date: 1229  : 1955  AGE: 69 y.o.   GENDER: male  ==============================================================================  TODAY'S ASSESSMENT AND PLAN OF CARE:  69 M with PMH HFREF, COPD, MELISSA, obesity, DM2, HTN, hypothyroidism, HLD, who presented to MICU for shock workup --sepsis vs decompensated HF, for whom acs was consulted with bilateral leg wounds, rule out abscess. No current concern for necrotizing fasciitis, infection does not appear to follow up fascial planes (on CT) and due to being bilateral, also less likely that this is NSTI. He is noted to be hyponatremic but has a lot of other competing factors that could also cause this (ie his heart failure). No concern for any fluid collections amenable to drainage identified on physical exam or CT from [facility]. Suspect this is cellulitis on top of chronic venous stasis.    Continue vanc/zosyn  Elevate legs as able  ACS will continue to follow    Patient seen and discussed with attending Dr. Regan.    Jade Roberto MD  General Surgery PGY2  ACS [id number]    ==============================================================================  CHIEF COMPLAINT / EVENTS LAST 24HRS / HPI:  NAEO, no changes to exam today, appears stable     MEDICAL HISTORY / ROS:   Admission history and ROS reviewed.     PHYSICAL EXAM:  Heart Rate:  []   Temp:  [36.1 °C (97 °F)-36.5 °C (97.7 °F)]   Resp:  [14-28]   BP: (82-95)/(56-74)   SpO2:  [90 %-98 %]   Physical Exam  Constitutional:       General: He is not in acute distress.  Cardiovascular:      Rate and Rhythm: Rhythm irregular.   Pulmonary:      Comments: Conversational dyspnea on CPAP  Abdominal:      General: There is no distension.      Palpations: Abdomen is soft.   Musculoskeletal:         General: Swelling present.      Comments: BL LE pitting edema to level of knees,  erythema overlying edema, RLE opened blister with underlying wound, similar to prior exams with some improvement in edema   Neurological:      Mental Status: He is alert.   Psychiatric:         Behavior: Behavior normal.       LABS:  Results for orders placed or performed during the hospital encounter of 12/29/24 (from the past 24 hours)   POCT GLUCOSE   Result Value Ref Range    POCT Glucose 228 (H) 74 - 99 mg/dL   CBC and Auto Differential   Result Value Ref Range    WBC 11.7 (H) 4.4 - 11.3 x10*3/uL    nRBC 0.0 0.0 - 0.0 /100 WBCs    RBC 5.18 4.50 - 5.90 x10*6/uL    Hemoglobin 16.4 13.5 - 17.5 g/dL    Hematocrit 49.2 41.0 - 52.0 %    MCV 95 80 - 100 fL    MCH 31.7 26.0 - 34.0 pg    MCHC 33.3 32.0 - 36.0 g/dL    RDW 13.2 11.5 - 14.5 %    Platelets 119 (L) 150 - 450 x10*3/uL    Neutrophils % 87.9 40.0 - 80.0 %    Immature Granulocytes %, Automated 0.5 0.0 - 0.9 %    Lymphocytes % 4.1 13.0 - 44.0 %    Monocytes % 7.3 2.0 - 10.0 %    Eosinophils % 0.1 0.0 - 6.0 %    Basophils % 0.1 0.0 - 2.0 %    Neutrophils Absolute 10.29 (H) 1.20 - 7.70 x10*3/uL    Immature Granulocytes Absolute, Automated 0.06 0.00 - 0.70 x10*3/uL    Lymphocytes Absolute 0.48 (L) 1.20 - 4.80 x10*3/uL    Monocytes Absolute 0.85 0.10 - 1.00 x10*3/uL    Eosinophils Absolute 0.01 0.00 - 0.70 x10*3/uL    Basophils Absolute 0.01 0.00 - 0.10 x10*3/uL   Comprehensive metabolic panel   Result Value Ref Range    Glucose 150 (H) 74 - 99 mg/dL    Sodium 129 (L) 136 - 145 mmol/L    Potassium 3.7 3.5 - 5.3 mmol/L    Chloride 87 (L) 98 - 107 mmol/L    Bicarbonate 28 21 - 32 mmol/L    Anion Gap 18 10 - 20 mmol/L    Urea Nitrogen 77 (H) 6 - 23 mg/dL    Creatinine 1.92 (H) 0.50 - 1.30 mg/dL    eGFR 37 (L) >60 mL/min/1.73m*2    Calcium 8.2 (L) 8.6 - 10.6 mg/dL    Albumin 2.8 (L) 3.4 - 5.0 g/dL    Alkaline Phosphatase 101 33 - 136 U/L    Total Protein 5.4 (L) 6.4 - 8.2 g/dL    AST 8 (L) 9 - 39 U/L    Bilirubin, Total 0.7 0.0 - 1.2 mg/dL    ALT 10 10 - 52 U/L   Heparin  Assay, UFH   Result Value Ref Range    Heparin Unfractionated 0.3 See Comment Below for Therapeutic Ranges IU/mL   B-type natriuretic peptide   Result Value Ref Range    BNP 1,741 (H) 0 - 99 pg/mL   POCT GLUCOSE   Result Value Ref Range    POCT Glucose 178 (H) 74 - 99 mg/dL     MEDICATIONS:  Current Facility-Administered Medications   Medication Dose Route Frequency Provider Last Rate Last Admin    acetaminophen (Tylenol) tablet 650 mg  650 mg oral q6h Radha Sigala APRN-CNP   650 mg at 12/31/24 1559    albuterol 90 mcg/actuation inhaler 2 puff  2 puff inhalation q4h PRN PARAM Lu-CNP        [Held by provider] allopurinol (Zyloprim) tablet 300 mg  300 mg oral Daily Radha Sigala, APRN-CNP   300 mg at 12/29/24 0814    dextrose 50 % injection 12.5 g  12.5 g intravenous q15 min PRN Radha Sigala APRN-CNP        dextrose 50 % injection 25 g  25 g intravenous q15 min PRN Radha Sigala APRN-CNP        ezetimibe (Zetia) tablet 10 mg  10 mg oral Daily Radha Sigala, APRN-CNP   10 mg at 12/31/24 1042    gabapentin (Neurontin) capsule 100 mg  100 mg oral Daily Radha Sigala, APRN-CNP   100 mg at 12/31/24 1030    glucagon (Glucagen) injection 1 mg  1 mg intramuscular q15 min PRN PARAM Lu-CNP        glucagon (Glucagen) injection 1 mg  1 mg intramuscular q15 min PRN PARAM Lu-CNP        heparin 25,000 Units in dextrose 5% 250 mL (100 Units/mL) infusion (premix)  0-4,500 Units/hr intravenous Continuous FABIOLA Lu 18 mL/hr at 12/31/24 0708 1,800 Units/hr at 12/31/24 0708    heparin bolus from bag 5,000-10,000 Units  5,000-10,000 Units intravenous q4h PRN PARAM Lu-CNP        insulin lispro injection 0-10 Units  0-10 Units subcutaneous TID AC PARAM Lu-CNP   2 Units at 12/31/24 1057    ipratropium-albuteroL (Duo-Neb) 0.5-2.5 mg/3 mL nebulizer solution 3 mL  3 mL nebulization q6h FABIOLA Lu   3 mL at 12/31/24 1814     levothyroxine (Synthroid, Levoxyl) tablet 125 mcg  125 mcg oral Daily PARAM Lu-CNP   125 mcg at 12/31/24 1030    nicotine (Nicoderm CQ) 14 mg/24 hr patch 1 patch  1 patch transdermal Daily FABIOLA Lu   1 patch at 12/31/24 1030    nystatin (Mycostatin) 100,000 unit/mL suspension 500,000 Units  5 mL Mouth/Throat BID FABIOLA Lu   500,000 Units at 12/30/24 2049    oxygen (O2) therapy   inhalation Nightly PARAM Lu-CNP   5 L/min at 12/30/24 0700    oxygen (O2) therapy   inhalation Continuous PRN - O2/gases FABIOLA Lu   5 L/min at 12/30/24 1313    pantoprazole (ProtoNix) EC tablet 40 mg  40 mg oral Daily before breakfast PARAM Lu-CNP   40 mg at 12/31/24 1029    perflutren lipid microspheres (Definity) injection 0.5-10 mL of dilution  0.5-10 mL of dilution intravenous Once in imaging FABIOLA Lu        perflutren protein A microsphere (Optison) injection 0.5 mL  0.5 mL intravenous Once in imaging PARAM Lu-CNP        perflutren protein A microsphere (Optison) injection 0.5 mL  0.5 mL intravenous Once in imaging FABIOLA Lu        piperacillin-tazobactam (Zosyn) 3.375 g in dextrose (iso) IV 50 mL  3.375 g intravenous q6h FABIOLA Lu   Stopped at 12/31/24 1629    polyethylene glycol (Glycolax, Miralax) packet 17 g  17 g oral Daily PRN FABIOLA Lu        sodium chloride (Ocean) 0.65 % nasal spray 1 spray  1 spray Each Nostril TID PRN FABIOLA Lu   1 spray at 12/30/24 2048    sulfur hexafluoride microsphr (Lumason) injection 24.28 mg  2 mL intravenous Once in imaging FABIOLA Lu        vancomycin (Vancocin) pharmacy to dose - pharmacy monitoring   miscellaneous Daily PRN Garcia L Sigala, APRN-CNP           IMAGING SUMMARY:  no new imaging    I saw and evaluated the patient. I personally obtained the key and critical portions of the history and  physical exam. I reviewed the resident’s documentation and discussed the patient with the resident. I agree with the resident’s medical decision making as documented in the resident’s note.    Following for bilateral lower extremity cellulitis. Off vasopressors and wbc downtrending (11.7). Erythema stable to somewhat improved. Overall clinically improving. We will continue to monitor. Pt remains at risk of developing deep abscess.    Andrea Regan MD  Trauma, Critical Care, and Acute Care Surgery  Pager: [phone]

## 2024-12-31 NOTE — CARE PLAN
Problem: Skin  Goal: Decreased wound size/increased tissue granulation at next dressing change  Outcome: Progressing  Flowsheets (Taken 12/31/2024 1718)  Decreased wound size/increased tissue granulation at next dressing change:   Promote sleep for wound healing   Protective dressings over bony prominences  Goal: Participates in plan/prevention/treatment measures  Outcome: Progressing  Flowsheets (Taken 12/31/2024 1718)  Participates in plan/prevention/treatment measures:   Discuss with provider PT/OT consult   Elevate heels  Goal: Prevent/manage excess moisture  Outcome: Progressing  Flowsheets (Taken 12/31/2024 1718)  Prevent/manage excess moisture: Monitor for/manage infection if present  Goal: Prevent/minimize sheer/friction injuries  Outcome: Progressing  Flowsheets (Taken 12/31/2024 1718)  Prevent/minimize sheer/friction injuries:   Use pull sheet   Complete micro-shifts as needed if patient unable. Adjust patient position to relieve pressure points, not a full turn   HOB 30 degrees or less  Goal: Promote/optimize nutrition  Outcome: Progressing  Flowsheets (Taken 12/31/2024 1718)  Promote/optimize nutrition: Monitor/record intake including meals  Goal: Promote skin healing  Outcome: Progressing  Flowsheets (Taken 12/31/2024 1718)  Promote skin healing:   Assess skin/pad under line(s)/device(s)   Turn/reposition every 2 hours/use positioning/transfer devices     Problem: Pain - Adult  Goal: Verbalizes/displays adequate comfort level or baseline comfort level  Outcome: Progressing     Problem: Safety - Adult  Goal: Free from fall injury  Outcome: Progressing     Problem: Discharge Planning  Goal: Discharge to home or other facility with appropriate resources  Outcome: Progressing     Problem: Chronic Conditions and Co-morbidities  Goal: Patient's chronic conditions and co-morbidity symptoms are monitored and maintained or improved  Outcome: Progressing     Problem: Diabetes  Goal: Achieve decreasing blood  glucose levels by end of shift  Outcome: Progressing  Goal: Increase stability of blood glucose readings by end of shift  Outcome: Progressing  Goal: Decrease in ketones present in urine by end of shift  Outcome: Progressing  Goal: Maintain electrolyte levels within acceptable range throughout shift  Outcome: Progressing  Goal: Maintain glucose levels >70mg/dl to <250mg/dl throughout shift  Outcome: Progressing  Goal: No changes in neurological exam by end of shift  Outcome: Progressing  Goal: Learn about and adhere to nutrition recommendations by end of shift  Outcome: Progressing  Goal: Vital signs within normal range for age by end of shift  Outcome: Progressing  Goal: Increase self care and/or family involovement by end of shift  Outcome: Progressing  Goal: Receive DSME education by end of shift  Outcome: Progressing     Problem: Pain  Goal: Takes deep breaths with improved pain control throughout the shift  Outcome: Progressing  Goal: Turns in bed with improved pain control throughout the shift  Outcome: Progressing  Goal: Walks with improved pain control throughout the shift  Outcome: Progressing  Goal: Performs ADL's with improved pain control throughout shift  Outcome: Progressing  Goal: Participates in PT with improved pain control throughout the shift  Outcome: Progressing  Goal: Free from opioid side effects throughout the shift  Outcome: Progressing  Goal: Free from acute confusion related to pain meds throughout the shift  Outcome: Progressing     Problem: Fall/Injury  Goal: Not fall by end of shift  Outcome: Progressing  Goal: Be free from injury by end of the shift  Outcome: Progressing  Goal: Verbalize understanding of personal risk factors for fall in the hospital  Outcome: Progressing  Goal: Verbalize understanding of risk factor reduction measures to prevent injury from fall in the home  Outcome: Progressing  Goal: Pace activities to prevent fatigue by end of the shift  Outcome: Progressing   The  patient's goals for the shift include      The clinical goals for the shift include Pt will remain HDS this shift / MAP >60

## 2024-12-31 NOTE — CONSULTS
"Nutrition Initial Assessment:   Nutrition Assessment    --->Reason for Assessment: Provider consult order    Patient is a 69 y.o. male with h/o HF, COPD, DM2, admitted d/t septic shock with acute/decompensated HF.    Nutrition History:  This service met with pt this am, was laying in bed at time of visit with him.    Tells he has lost his sense of taste, which has affected his appetite. Finds it has been variable x at least the last several weeks with further decline in PO x ~the last week d/t not feeling well.    Denied any n/v or abdominal pains during visit. Pt mentioned he does have h/o swallowing difficulties a few mos ago, though feels that is now improved. No trouble chewing. Noted with watery BM since admit.    Does not drink any oral nutrition supplements at home, though has had them in the past (unclear as to which ones). Requesting chocolate protein shakes at present admit.    --Vitamin/Herbal Supplement Use: per review of home meds in EMR: folic acid, MVI, thiamin (though it appears pt does not consistently take these)  --Food Allergies/Intolerances: none       Anthropometrics:  Height: 180.3 cm (5' 11\")   Weight: 131 kg (287 lb 14.7 oz)   BMI (Calculated): 40.17  IBW/kg (Dietitian Calculated): 78.2 kg  Percent of IBW: 167 %       Weight History:   --Weight likely elevated at present d/t fluids/fluid retention. Is undergoing diuresis. Any potential wt losses difficult to determine at this time.      Wt Readings per review of EMR:   12/30/24 12/29/24 131 kg (287 lb 14.7 oz) (current wt)  129.3 kg   12/28/24 118 kg (261 lb)--suspected stated wt in ED   11/07/24 118 kg (261 lb)   11/05/24 102 kg (224 lb)   09/25/24 118 kg (260 lb 2.3 oz)   07/02/24 134 kg (294 lb 9.6 oz)   06/25/24 133 kg (294 lb)   05/14/24 140 kg (308 lb)   04/23/24 132 kg (292 lb)   04/17/24 134 kg (295 lb)   04/10/24 132 kg (290 lb 2 oz)   03/23/24 133 kg (292 lb 8 oz)   07/18/21 164 kg (361 lb)     Nutrition Focused Physical Exam " Findings:  Subcutaneous Fat Loss:   Orbital Fat Pads: Well nourished (slightly bulging fat pads)  Buccal Fat Pads: Well nourished (full, rounded cheeks)  Triceps: Well nourished (ample fat tissue)  Ribs: Defer  Muscle Wasting:  Temporalis: Well nourished (well-defined muscle)  Pectoralis (Clavicular Region): Well nourished (clavicle not visible)  Deltoid/Trapezius: Well nourished (rounded appearance at arm, shoulder, neck)  Interosseous: Well nourished (muscle bulges)  Trapezius/Infraspinatus/Supraspinatus (Scapular Region): Defer  Quadriceps: Defer  Gastrocnemius: Defer  Edema:  Edema: +1 trace  Edema Location: generalized  Physical Findings:  Hair: Negative  Eyes: Negative  Skin: Positive (wounds to R and L legs)    Nutrition Significant Labs:  CBC Trend:   Results from last 7 days   Lab Units 12/31/24  0623 12/30/24  0403 12/29/24  0325 12/28/24  1456   WBC AUTO x10*3/uL 11.7* 14.1* 19.2* 16.3*   RBC AUTO x10*6/uL 5.18 5.27 6.02* 6.13*   HEMOGLOBIN g/dL 16.4 16.6 19.1* 19.5*   HEMATOCRIT % 49.2 49.4 56.4* 57.5*   MCV fL 95 94 94 94   PLATELETS AUTO x10*3/uL 119* 128* 220 172   BMP Trend:   Results from last 7 days   Lab Units 12/31/24  0623 12/30/24  0403 12/29/24  2211 12/29/24  1404   GLUCOSE mg/dL 150* 137* 191* 217*   CALCIUM mg/dL 8.2* 7.7* 8.1* 8.8   SODIUM mmol/L 129* 129* 127* 125*   POTASSIUM mmol/L 3.7 3.8 3.8 4.3   CO2 mmol/L 28 28 25 23   CHLORIDE mmol/L 87* 87* 88* 88*   BUN mg/dL 77* 67* 65* 64*   CREATININE mg/dL 1.92* 1.41* 1.50* 1.50*   A1C:  Lab Results   Component Value Date    HGBA1C 6.2 (H) 12/29/2024   BG POCT trend:   Results from last 7 days   Lab Units 12/30/24 2001 12/30/24  1634 12/30/24  1140 12/30/24  0839 12/30/24  0324   POCT GLUCOSE mg/dL 228* 197* 136* 134* 155*    , Liver Function Trend:   Results from last 7 days   Lab Units 12/31/24  0623 12/29/24  0325 12/28/24  1456   ALK PHOS U/L 101 141* 142*   AST U/L 8* 22 17   ALT U/L 10 18 20   BILIRUBIN TOTAL mg/dL 0.7 1.4* 1.3*    Renal Lab Trend:   Results from last 7 days   Lab Units 12/31/24  0623 12/30/24  0403 12/29/24  2211 12/29/24  1404   POTASSIUM mmol/L 3.7 3.8 3.8 4.3   PHOSPHORUS mg/dL  --  7.3* 7.7* 7.5*   SODIUM mmol/L 129* 129* 127* 125*   MAGNESIUM mg/dL  --  2.43* 2.49*  --    EGFR mL/min/1.73m*2 37* 54* 50* 50*   BUN mg/dL 77* 67* 65* 64*   CREATININE mg/dL 1.92* 1.41* 1.50* 1.50*   Folate:   Lab Results   Component Value Date    FOLATE 14.3 11/20/2024      Medications:  Scheduled medications  acetaminophen, 650 mg, oral, q6h  [Held by provider] allopurinol, 300 mg, oral, Daily  ezetimibe, 10 mg, oral, Daily  gabapentin, 100 mg, oral, Daily  insulin lispro, 0-10 Units, subcutaneous, TID AC  ipratropium-albuteroL, 3 mL, nebulization, q6h  levothyroxine, 125 mcg, oral, Daily  nicotine, 1 patch, transdermal, Daily  nystatin, 5 mL, Mouth/Throat, BID  oxygen, , inhalation, Nightly  pantoprazole, 40 mg, oral, Daily before breakfast  perflutren lipid microspheres, 0.5-10 mL of dilution, intravenous, Once in imaging  perflutren protein A microsphere, 0.5 mL, intravenous, Once in imaging  perflutren protein A microsphere, 0.5 mL, intravenous, Once in imaging  piperacillin-tazobactam, 3.375 g, intravenous, q6h  sulfur hexafluoride microsphr, 2 mL, intravenous, Once in imaging    Continuous medications  heparin, 0-4,500 Units/hr, Last Rate: 1,800 Units/hr (12/31/24 0708)    PRN medications  PRN medications: albuterol, dextrose, dextrose, glucagon, glucagon, heparin, oxygen, polyethylene glycol, sodium chloride, vancomycin    I/O:   Last BM Date: 12/30/24; Stool Appearance: Mucous, Watery (12/30/24 2000)    Dietary Orders (From admission, onward)       Start     Ordered    12/31/24 1014  Oral nutritional supplements  Until discontinued        Comments: please provide chocolate Ensure High Protein 3 times/day   Question Answer Comment   Deliver with All meals    Select supplement: Ensure High Protein        12/31/24 1013    12/31/24  1013  Adult diet Regular, 2-3 grams sodium, Consistent Carb; CCD 90 gm/meal  Diet effective now        Question Answer Comment   Diet type Regular    Diet type 2-3 grams sodium    Diet type Consistent Carb    Carb diet selection: CCD 90 gm/meal        12/31/24 1012    12/29/24 0332  May Participate in Room Service With Assistance  ( ROOM SERVICE MAY PARTICIPATE WITH ASSISTANCE)  Once        Question:  .  Answer:  Yes    12/29/24 0331                Estimated Needs:   Total Energy Estimated Needs (kCal): 4436-6659  Method for Estimating Needs: 78 (IBW) x ~30-32  Total Protein Estimated Needs (g): ~115+  Method for Estimating Needs: 78 (IBW) x ~1.5g/kg+  Total Fluid Estimated Needs (mL): per MD/team        Nutrition Diagnosis   Malnutrition Diagnosis  Patient has Malnutrition Diagnosis--Difficult to determine at this time--pt reports decreased PO, though any potential wt losses likely being masked by fluids at this time.    Nutrition Diagnosis  Patient has Nutrition Diagnosis: Yes  Diagnosis Status (1): New  Nutrition Diagnosis 1: Increased nutrient needs  Related to (1): increased metabolic demand  As Evidenced by (1): pt with wounds to R and L legs    Additional Nutrition Diagnosis: Diagnosis 2  Diagnosis Status (2): New  Nutrition Diagnosis 2: Inadequate oral intake  Related to (2): decreased PO with HF exacerbation, shock  As Evidenced by (2): pt report of variable PO x the last several weeks with further decline in PO x ~the last week, consuming smaller amounts of food when compared to his baseline, need for oral nutrition supplements    Additional Assessment Information: Considering wounds with reports of decreased PO, do feel pt would benefit from use of oral nutrition supplements in-house; agreeable to chocolate Ensure HP TID.       Nutrition Interventions/Recommendations     1. RDN liberalized diet from 60g CCD to 90g CCD. If PO remains decreased, would discontinue 2-3gm Sodium diet restriction.  --RDN  placed order for Ensure HP TID for added nutrition, considering pt report of decreased PO + wounds.  --If any s/s of aspiration are noted during admit, recommend formal SLP eval.    2. MVI + minerals, if medically appropriate, considering wounds.    3. Check baseline 25(OH)-D level and supplements, if needed, considering wounds.    4. Daily weights.        Nutrition Prescription for Oral Nutrition: 5836-7665 kcals/day, 115+ g pro/day        Nutrition Interventions:   Interventions: Meals and snacks, Medical food supplement  Meals and Snacks: Carbohydrate-modified diet, Mineral-modified diet  Goal: 2-3 gm Na/CCD  Medical Food Supplement: Commercial beverage  Goal: Ensure HP TID (160 kcals, 16g pro each)    Nutrition Education: Discussed role of oral nutrition supplements in the daily diet. Reviewed various types offered at this facility. Pt requesting salt packets--explained why this writer cannot send. Verbalized understanding, denied any questions for RDN at this time.       Nutrition Monitoring and Evaluation   Food/Nutrient Related History Monitoring  Monitoring and Evaluation Plan: Amount of food  Amount of Food: Estimated amout of food, Medical food intake  Criteria: consume >50% of meals/snacks/supplements    Body Composition/Growth/Weight History  Monitoring and Evaluation Plan: Weight  Weight: Measured weight  Criteria: wt reducion from fluids, as needed    Biochemical Data, Medical Tests and Procedures  Monitoring and Evaluation Plan: Electrolyte/renal panel, Glucose/endocrine profile  Electrolyte and Renal Panel: Magnesium, Phosphorus, Potassium, Sodium  Criteria: WNL  Glucose/Endocrine Profile: Glucose, casual, Glucose, fasting  Criteria: WNL          Time Spent (min): 60 minutes

## 2024-12-31 NOTE — CARE PLAN
Patient wanted a HCPOA. It was completed  for the patient and he was given an original and a copy. One was also placed in his chart.    Community Resource Name:   Phone Number:   Staff Member:      Discussed the following topics on behalf of the patient:  []  Behavioral Health Assistance     []  Case Management  []   Assistance  []  Digital Equity Assistance  []  Dental Health Assistance  []  Education Assistance  []  Employment Assistance  []  Financial Strain Relief Assistance  []  Food Insecurity Assistance  []  Healthcare Coverage Assistance  []  Housing Stability Assistance  []  IP Violence Relief Assistance  []  Legal Assistance  []  Physical Activity Assistance  []  Social Connection Assistance  []  Stress Relief Assistance   []  Substance Abuse Assistance  []  Transportation Assistance  []  Utility Assistance  []  Other: [POA]    Next Steps:         Johana Leigh LCSW

## 2024-12-31 NOTE — PROGRESS NOTES
"Medical Intensive Care - Daily Progress Note   Subjective    Alo Glass is a 69 y.o. year old male patient admitted on 12/29/2024 with following ICU needs: Bilateral lower ext cellulitis causing septic shock.      Interval History:  NAEON; pt remained on his home CPAP machine.  Pt denies any CP/SOB, fevers/chills, MVD or abd pain.  It indicated he was having B/L LE pain and pain in his hands and back.      Meds    Scheduled medications  acetaminophen, 650 mg, oral, q6h  [Held by provider] allopurinol, 300 mg, oral, Daily  ezetimibe, 10 mg, oral, Daily  gabapentin, 100 mg, oral, Daily  insulin lispro, 0-10 Units, subcutaneous, TID AC  ipratropium-albuteroL, 3 mL, nebulization, q6h  levothyroxine, 125 mcg, oral, Daily  nicotine, 1 patch, transdermal, Daily  nystatin, 5 mL, Mouth/Throat, BID  oxygen, , inhalation, Nightly  pantoprazole, 40 mg, oral, Daily before breakfast  perflutren lipid microspheres, 0.5-10 mL of dilution, intravenous, Once in imaging  perflutren protein A microsphere, 0.5 mL, intravenous, Once in imaging  perflutren protein A microsphere, 0.5 mL, intravenous, Once in imaging  piperacillin-tazobactam, 3.375 g, intravenous, q6h  sulfur hexafluoride microsphr, 2 mL, intravenous, Once in imaging      Continuous medications  heparin, 0-4,500 Units/hr, Last Rate: 1,800 Units/hr (12/31/24 0334)      PRN medications  PRN medications: albuterol, dextrose, dextrose, glucagon, glucagon, heparin, oxygen, polyethylene glycol, sodium chloride, vancomycin     Objective    Blood pressure 95/68, pulse 104, temperature 36.2 °C (97.2 °F), temperature source Temporal, resp. rate 14, height 1.803 m (5' 11\"), weight 131 kg (287 lb 14.7 oz), SpO2 93%.     Physical Exam     Constitutional: Ill appearing elderly male in NAD  Eyes: Anicteric   ENMT: MMM  Head/Neck: AT/NC  Respiratory/Thorax: Lungs CTA bilaterally, on CPAP.  No advantageous sound noted.    Cardiovascular: Tachy; s1/s2 normal.  No obvious " MRG.  Gastrointestinal: And soft and protuberant.  No rebound tenderness or guarding noted.  Stooling.  +4 Hyperactive BS.  Extremities: BL \l?E cellulitis vs venous status.  Erythema noted b/l.  +2 B/L LE edema.  Neurological: alert and oriented x 3, speech clear, follows commands appropriately, no focal deficits.    Skin: Warm and dry, no lesions, no rashes      Intake/Output Summary (Last 24 hours) at 12/31/2024 0644  Last data filed at 12/31/2024 0411  Gross per 24 hour   Intake 987.89 ml   Output 1570 ml   Net -582.11 ml     Labs:   Results from last 72 hours   Lab Units 12/30/24  0403 12/29/24  2211 12/29/24  1404   SODIUM mmol/L 129* 127* 125*   POTASSIUM mmol/L 3.8 3.8 4.3   CHLORIDE mmol/L 87* 88* 88*   CO2 mmol/L 28 25 23   BUN mg/dL 67* 65* 64*   CREATININE mg/dL 1.41* 1.50* 1.50*   GLUCOSE mg/dL 137* 191* 217*   CALCIUM mg/dL 7.7* 8.1* 8.8   ANION GAP mmol/L 18 18 18   EGFR mL/min/1.73m*2 54* 50* 50*   PHOSPHORUS mg/dL 7.3* 7.7* 7.5*      Results from last 72 hours   Lab Units 12/30/24  0403 12/29/24  0325 12/28/24  1456   WBC AUTO x10*3/uL 14.1* 19.2* 16.3*   HEMOGLOBIN g/dL 16.6 19.1* 19.5*   HEMATOCRIT % 49.4 56.4* 57.5*   PLATELETS AUTO x10*3/uL 128* 220 172   NEUTROS PCT AUTO % 90.8  --   --    LYMPHO PCT MAN %  --  0.0 1.0   LYMPHS PCT AUTO % 3.0  --   --    MONO PCT MAN %  --  5.9 3.0   MONOS PCT AUTO % 5.4  --   --    EOSINO PCT MAN %  --  0.0 0.0   EOS PCT AUTO % 0.1  --   --       Results from last 72 hours   Lab Units 12/29/24  0324 12/28/24  2153 12/28/24  1703   POCT PH, ARTERIAL pH 7.35* 7.34* 7.33*   POCT PCO2, ARTERIAL mm Hg 41 44* 45*   POCT PO2, ARTERIAL mm Hg 78* 63* 71*   POCT SO2, ARTERIAL % 96 92* 94     Results from last 72 hours   Lab Units 12/29/24  1059 12/29/24  0918 12/28/24  1456   POCT PH, VENOUS pH 7.28* 7.28* 7.36   POCT PCO2, VENOUS mm Hg 54* 50 49   POCT PO2, VENOUS mm Hg 41 46* 29*        Micro/ID:     Lab Results   Component Value Date    URINECULTURE (A) 11/20/2024      >100,000 Methicillin Resistant Staphylococcus aureus (MRSA)    BLOODCULT No growth at 2 days 12/28/2024    BLOODCULT No growth at 2 days 12/28/2024       Summary of key imaging results from the last 24 hours  XR CHEST 1 VIEW; 12/30/2024 9:03 am   IMPRESSION:  1. Unchanged mild perihilar edema and bibasilar atelectasis, right more than left. Small right pleural effusion. Unchanged enlarged cardiac silhouette size    TRANSTHORACIC ECHOCARDIOGRAM REPORT  12/30/24  CONCLUSIONS:   1. Poorly visualized anatomical structures due to suboptimal image quality.   2. Left ventricular ejection fraction is severely decreased, calculated by Green's biplane at 19%.   3. There is global hypokinesis of the left ventricle with minor regional variations.   4. Left ventricular cavity size is moderately dilated.   5. Abnormal septal motion consistent with left bundle branch block.   6. No left ventricular thrombus visualized.   7. There is reduced right ventricular systolic function.   8. Moderately enlarged right ventricle.   9. The left atrium is enlarged.  10. The right atrium is moderately dilated.  11. Mild aortic valve regurgitation.  12. Compared with study dated 9/19/2024, no significant change.    Renal US 12/29/24    Assessment and Plan     Assessment: Alo Glass is a 69 y.o. year old male patient admitted on 12/29/2024 with Septic shock 2/2 bilateral lower ext cellulitis and cardiogenic shock and AHRF 2/2 volume overload and LEONA.     Summary for 12/31/24  :  Cont to diurese to a goal of -1.5L   Cont with heparin gtt for AC   Cont with IV Atbs regimen:  Zosyn and Vanco  Cards s/o on 12/31/24    Plan:  NEUROLOGY/PSYCH:  Dx:  ZENA  Management:  CTM  PT/OT   Pain regimen:   Tylenol 650 mg q6, Dilaudid 1mg PO     CARDIOVASCULAR:  Dx:  Septic shock   Cardiogenic shock  Severe PAD   Venostasis  HFrEF (LVEF 19%)  Management:  Repeat TTE on 12/30:  LVEF 19%  Holding home lasix and SGLT-2 iso hypotension  Off pressors>24 hours    Consider GDMT when able  S/p Bumex 1mg this AM; goal of -1.5L/day  Heparin gtt  Coags daily; unfract heparin 0.3-0.6  Follow up with Dr. Moore as an o/p @ [phone] (scheduling)    PULMONARY:  FiO2 (%):  [40 %] 40 %   Dx:  AHRF 2/2 volume overload   PMHx of asthma and COPD GOLD 3 (2019: FEV1 45% w +ve BD change; %/TLC 85%)   Management:  Cont Duonebs q6h, albuterol PRN  S/p steroid taper   Cont with nocturnal home CPAP    RENAL/GENITOURINARY:  Dx:  LEONA (Baseline sCr 0.65)  HypopNA  LT renal mass c/f RCC  Management:  Urine lytes consistent with prerenal  Diurese to -1.5L  RFP daily  S/p renal US on 12/30 (read pending)     GASTROENTEROLOGY:  Dx:  GERD  Management:  PPI  DM/Card diet     ENDOCRINOLOGY:  Dx:  Hypothyroidism  T2MD (HgA1C  5.5%)  Management:  Home regimen:  Mounjaro and Jardiance; pt also utilizes a Dexcom CGM)  Cont with Insuline lispro TID  Glucose goal of <180    HEMATOLOGY:  Dx:  C/f renal mass   Management:  Pt saw Urology on 12/12/2024 and referrred to IR for biopsy   Consider while inpatient  CBC daily     SKIN:  Dx:  Skin Failure Yes, describe: BL L/E cellutis vs PAD   Management:  Wound following  ID following; apprec recs    INFECTIOUS DISEASE:  Dx:  Septic Shock (resolved, off pressors)   Cellulitis   Management:  Cont with Vanco/Zosyn   ACS consulted; rec cont IV Atbs   Micro: 12/28 Legionella, Strep pneum, negative, 12/28 Urine Cx negative, 12/28 BlCx NTGD x2    ICU Check List     FEN  Fluids: PRN  Electrolytes: K>4, Phos >3, Mg> 2  Nutrition: DM/Card diet   Prophylaxis:  DVT ppx: Heparin gtt  GI ppx: PPI  Bowel care: Miralax PRN   Hardware:         Urethral Catheter Temperature probe 16 Fr. (Active)   Placement Date: 12/28/24   Hand Hygiene Completed: Yes  Catheter Type: Temperature probe  Tube Size (Fr.): 16 Fr.  Catheter Balloon Size: 5 mL  Urine Returned: Yes   Number of days: 3       Social:  Code: Full Code    HPOA: Sheyla Griggs [phone]   Disposition: SDU    A/P  discussed with the attending Dr. Loki Moore, APRN-CNP   12/31/24 at 6:44 AM     Disclaimer: Documentation completed with the information available at the time of input. The times in the chart may not be reflective of actual patient care times, interventions, or procedures. Documentation occurs after the physical care of the patient.

## 2024-12-31 NOTE — PROGRESS NOTES
Occupational Therapy                 Therapy Communication Note    Patient Name: Alo Glass  MRN: [id number]  Department: [address]  Room: 6017/6017-A  Today's Date: 12/31/2024     Discipline: Occupational Therapy    OT Missed Visit: Yes     Missed Visit Reason: Missed Visit Reason:  (Per RN, pt requesting to rest and be left alone at this time. Will follow up as schedule allows)    Missed Time: Attempt    Comment:

## 2025-01-01 ENCOUNTER — APPOINTMENT (OUTPATIENT)
Dept: RADIOLOGY | Facility: HOSPITAL | Age: 70
End: 2025-01-01
Payer: MEDICARE

## 2025-01-01 LAB
ALBUMIN SERPL BCP-MCNC: 2.7 G/DL (ref 3.4–5)
ALBUMIN SERPL BCP-MCNC: 2.9 G/DL (ref 3.4–5)
ANION GAP BLDV CALCULATED.4IONS-SCNC: 5 MMOL/L (ref 10–25)
ANION GAP SERPL CALC-SCNC: 20 MMOL/L (ref 10–20)
ANION GAP SERPL CALC-SCNC: 22 MMOL/L (ref 10–20)
ATRIAL RATE: 112 BPM
ATRIAL RATE: 124 BPM
BACTERIA SPEC RESP CULT: ABNORMAL
BASE EXCESS BLDV CALC-SCNC: 3.8 MMOL/L (ref -2–3)
BASOPHILS # BLD AUTO: 0.01 X10*3/UL (ref 0–0.1)
BASOPHILS NFR BLD AUTO: 0.1 %
BODY TEMPERATURE: 37 DEGREES CELSIUS
BUN SERPL-MCNC: 82 MG/DL (ref 6–23)
BUN SERPL-MCNC: 91 MG/DL (ref 6–23)
CA-I BLDV-SCNC: 1.08 MMOL/L (ref 1.1–1.33)
CALCIUM SERPL-MCNC: 8.1 MG/DL (ref 8.6–10.6)
CALCIUM SERPL-MCNC: 8.2 MG/DL (ref 8.6–10.6)
CHLORIDE BLDV-SCNC: 86 MMOL/L (ref 98–107)
CHLORIDE SERPL-SCNC: 82 MMOL/L (ref 98–107)
CHLORIDE SERPL-SCNC: 85 MMOL/L (ref 98–107)
CO2 SERPL-SCNC: 28 MMOL/L (ref 21–32)
CO2 SERPL-SCNC: 31 MMOL/L (ref 21–32)
CREAT SERPL-MCNC: 2.18 MG/DL (ref 0.5–1.3)
CREAT SERPL-MCNC: 2.9 MG/DL (ref 0.5–1.3)
EGFRCR SERPLBLD CKD-EPI 2021: 23 ML/MIN/1.73M*2
EGFRCR SERPLBLD CKD-EPI 2021: 32 ML/MIN/1.73M*2
EOSINOPHIL # BLD AUTO: 0.04 X10*3/UL (ref 0–0.7)
EOSINOPHIL NFR BLD AUTO: 0.4 %
ERYTHROCYTE [DISTWIDTH] IN BLOOD BY AUTOMATED COUNT: 13.3 % (ref 11.5–14.5)
GLUCOSE BLD MANUAL STRIP-MCNC: 140 MG/DL (ref 74–99)
GLUCOSE BLD MANUAL STRIP-MCNC: 144 MG/DL (ref 74–99)
GLUCOSE BLD MANUAL STRIP-MCNC: 146 MG/DL (ref 74–99)
GLUCOSE BLD MANUAL STRIP-MCNC: 210 MG/DL (ref 74–99)
GLUCOSE BLDV-MCNC: 135 MG/DL (ref 74–99)
GLUCOSE SERPL-MCNC: 123 MG/DL (ref 74–99)
GLUCOSE SERPL-MCNC: 132 MG/DL (ref 74–99)
GRAM STN SPEC: ABNORMAL
HCO3 BLDV-SCNC: 32.7 MMOL/L (ref 22–26)
HCT VFR BLD AUTO: 49.6 % (ref 41–52)
HCT VFR BLD EST: 52 % (ref 41–52)
HGB BLD-MCNC: 16.2 G/DL (ref 13.5–17.5)
HGB BLDV-MCNC: 17.3 G/DL (ref 13.5–17.5)
IMM GRANULOCYTES # BLD AUTO: 0.06 X10*3/UL (ref 0–0.7)
IMM GRANULOCYTES NFR BLD AUTO: 0.6 % (ref 0–0.9)
INHALED O2 CONCENTRATION: 36 %
LACTATE BLDV-SCNC: 1.5 MMOL/L (ref 0.4–2)
LYMPHOCYTES # BLD AUTO: 0.75 X10*3/UL (ref 1.2–4.8)
LYMPHOCYTES NFR BLD AUTO: 7.1 %
MAGNESIUM SERPL-MCNC: 2.85 MG/DL (ref 1.6–2.4)
MCH RBC QN AUTO: 31.2 PG (ref 26–34)
MCHC RBC AUTO-ENTMCNC: 32.7 G/DL (ref 32–36)
MCV RBC AUTO: 96 FL (ref 80–100)
MONOCYTES # BLD AUTO: 1.01 X10*3/UL (ref 0.1–1)
MONOCYTES NFR BLD AUTO: 9.6 %
NEUTROPHILS # BLD AUTO: 8.62 X10*3/UL (ref 1.2–7.7)
NEUTROPHILS NFR BLD AUTO: 82.2 %
NRBC BLD-RTO: 0 /100 WBCS (ref 0–0)
OXYHGB MFR BLDV: 85.3 % (ref 45–75)
P AXIS: 51 DEGREES
P OFFSET: 171 MS
P OFFSET: 175 MS
P ONSET: 113 MS
P ONSET: 134 MS
PCO2 BLDV: 65 MM HG (ref 41–51)
PH BLDV: 7.31 PH (ref 7.33–7.43)
PHOSPHATE SERPL-MCNC: 7.2 MG/DL (ref 2.5–4.9)
PHOSPHATE SERPL-MCNC: 8.5 MG/DL (ref 2.5–4.9)
PLATELET # BLD AUTO: 114 X10*3/UL (ref 150–450)
PO2 BLDV: 55 MM HG (ref 35–45)
POTASSIUM BLDV-SCNC: 4.1 MMOL/L (ref 3.5–5.3)
POTASSIUM SERPL-SCNC: 4.1 MMOL/L (ref 3.5–5.3)
POTASSIUM SERPL-SCNC: 7.4 MMOL/L (ref 3.5–5.3)
PR INTERVAL: 146 MS
PR INTERVAL: 184 MS
Q ONSET: 205 MS
Q ONSET: 207 MS
QRS COUNT: 18 BEATS
QRS COUNT: 20 BEATS
QRS DURATION: 166 MS
QRS DURATION: 170 MS
QT INTERVAL: 348 MS
QT INTERVAL: 396 MS
QTC CALCULATION(BAZETT): 499 MS
QTC CALCULATION(BAZETT): 540 MS
QTC FREDERICIA: 443 MS
QTC FREDERICIA: 487 MS
R AXIS: -86 DEGREES
R AXIS: -87 DEGREES
RBC # BLD AUTO: 5.19 X10*6/UL (ref 4.5–5.9)
SAO2 % BLDV: 87 % (ref 45–75)
SODIUM BLDV-SCNC: 120 MMOL/L (ref 136–145)
SODIUM SERPL-SCNC: 126 MMOL/L (ref 136–145)
SODIUM SERPL-SCNC: 131 MMOL/L (ref 136–145)
T AXIS: 74 DEGREES
T AXIS: 87 DEGREES
T OFFSET: 381 MS
T OFFSET: 403 MS
UFH PPP CHRO-ACNC: 0.5 IU/ML
VENTRICULAR RATE: 112 BPM
VENTRICULAR RATE: 124 BPM
WBC # BLD AUTO: 10.5 X10*3/UL (ref 4.4–11.3)

## 2025-01-01 PROCEDURE — 2500000001 HC RX 250 WO HCPCS SELF ADMINISTERED DRUGS (ALT 637 FOR MEDICARE OP): Performed by: NURSE PRACTITIONER

## 2025-01-01 PROCEDURE — 82947 ASSAY GLUCOSE BLOOD QUANT: CPT

## 2025-01-01 PROCEDURE — S4991 NICOTINE PATCH NONLEGEND: HCPCS | Performed by: NURSE PRACTITIONER

## 2025-01-01 PROCEDURE — 94640 AIRWAY INHALATION TREATMENT: CPT

## 2025-01-01 PROCEDURE — 2500000004 HC RX 250 GENERAL PHARMACY W/ HCPCS (ALT 636 FOR OP/ED): Performed by: NURSE PRACTITIONER

## 2025-01-01 PROCEDURE — 85025 COMPLETE CBC W/AUTO DIFF WBC: CPT | Performed by: NURSE PRACTITIONER

## 2025-01-01 PROCEDURE — 85520 HEPARIN ASSAY: CPT | Performed by: NURSE PRACTITIONER

## 2025-01-01 PROCEDURE — 80069 RENAL FUNCTION PANEL: CPT | Performed by: NURSE PRACTITIONER

## 2025-01-01 PROCEDURE — 2500000005 HC RX 250 GENERAL PHARMACY W/O HCPCS: Performed by: NURSE PRACTITIONER

## 2025-01-01 PROCEDURE — 2500000004 HC RX 250 GENERAL PHARMACY W/ HCPCS (ALT 636 FOR OP/ED)

## 2025-01-01 PROCEDURE — 87205 SMEAR GRAM STAIN: CPT

## 2025-01-01 PROCEDURE — 2500000002 HC RX 250 W HCPCS SELF ADMINISTERED DRUGS (ALT 637 FOR MEDICARE OP, ALT 636 FOR OP/ED): Performed by: NURSE PRACTITIONER

## 2025-01-01 PROCEDURE — 94668 MNPJ CHEST WALL SBSQ: CPT

## 2025-01-01 PROCEDURE — 71045 X-RAY EXAM CHEST 1 VIEW: CPT | Performed by: RADIOLOGY

## 2025-01-01 PROCEDURE — 99233 SBSQ HOSP IP/OBS HIGH 50: CPT

## 2025-01-01 PROCEDURE — 82330 ASSAY OF CALCIUM: CPT | Performed by: NURSE PRACTITIONER

## 2025-01-01 PROCEDURE — 94660 CPAP INITIATION&MGMT: CPT

## 2025-01-01 PROCEDURE — 2060000001 HC INTERMEDIATE ICU ROOM DAILY

## 2025-01-01 PROCEDURE — 99233 SBSQ HOSP IP/OBS HIGH 50: CPT | Performed by: STUDENT IN AN ORGANIZED HEALTH CARE EDUCATION/TRAINING PROGRAM

## 2025-01-01 PROCEDURE — 36415 COLL VENOUS BLD VENIPUNCTURE: CPT | Performed by: NURSE PRACTITIONER

## 2025-01-01 PROCEDURE — 99232 SBSQ HOSP IP/OBS MODERATE 35: CPT | Performed by: SURGERY

## 2025-01-01 PROCEDURE — 83735 ASSAY OF MAGNESIUM: CPT | Performed by: NURSE PRACTITIONER

## 2025-01-01 PROCEDURE — 71045 X-RAY EXAM CHEST 1 VIEW: CPT

## 2025-01-01 RX ORDER — BUMETANIDE 0.25 MG/ML
2 INJECTION, SOLUTION INTRAMUSCULAR; INTRAVENOUS ONCE
Status: COMPLETED | OUTPATIENT
Start: 2025-01-01 | End: 2025-01-01

## 2025-01-01 RX ORDER — HYDROMORPHONE HYDROCHLORIDE 0.2 MG/ML
0.2 INJECTION INTRAMUSCULAR; INTRAVENOUS; SUBCUTANEOUS EVERY 4 HOURS PRN
Status: DISCONTINUED | OUTPATIENT
Start: 2025-01-01 | End: 2025-01-04

## 2025-01-01 RX ORDER — ACETAMINOPHEN 325 MG/1
650 TABLET ORAL EVERY 4 HOURS PRN
Status: DISCONTINUED | OUTPATIENT
Start: 2025-01-01 | End: 2025-01-12 | Stop reason: HOSPADM

## 2025-01-01 RX ORDER — VANCOMYCIN HYDROCHLORIDE 750 MG/150ML
750 INJECTION, SOLUTION INTRAVENOUS ONCE
Status: COMPLETED | OUTPATIENT
Start: 2025-01-01 | End: 2025-01-01

## 2025-01-01 RX ORDER — BUMETANIDE 0.25 MG/ML
4 INJECTION, SOLUTION INTRAMUSCULAR; INTRAVENOUS ONCE
Status: DISCONTINUED | OUTPATIENT
Start: 2025-01-01 | End: 2025-01-01

## 2025-01-01 RX ORDER — HYDROMORPHONE HYDROCHLORIDE 0.2 MG/ML
0.2 INJECTION INTRAMUSCULAR; INTRAVENOUS; SUBCUTANEOUS EVERY 8 HOURS PRN
Status: DISCONTINUED | OUTPATIENT
Start: 2025-01-01 | End: 2025-01-01

## 2025-01-01 RX ORDER — HYDROMORPHONE HYDROCHLORIDE 0.2 MG/ML
INJECTION INTRAMUSCULAR; INTRAVENOUS; SUBCUTANEOUS
Status: COMPLETED
Start: 2025-01-01 | End: 2025-01-01

## 2025-01-01 RX ADMIN — PIPERACILLIN SODIUM AND TAZOBACTAM SODIUM 3.38 G: 3; .375 INJECTION, SOLUTION INTRAVENOUS at 10:05

## 2025-01-01 RX ADMIN — INSULIN LISPRO 4 UNITS: 100 INJECTION, SOLUTION INTRAVENOUS; SUBCUTANEOUS at 12:17

## 2025-01-01 RX ADMIN — PIPERACILLIN SODIUM AND TAZOBACTAM SODIUM 3.38 G: 3; .375 INJECTION, SOLUTION INTRAVENOUS at 04:43

## 2025-01-01 RX ADMIN — MIDODRINE HYDROCHLORIDE 5 MG: 5 TABLET ORAL at 20:50

## 2025-01-01 RX ADMIN — HEPARIN SODIUM 1800 UNITS/HR: 10000 INJECTION, SOLUTION INTRAVENOUS at 09:03

## 2025-01-01 RX ADMIN — BUMETANIDE 2 MG: 0.25 INJECTION INTRAMUSCULAR; INTRAVENOUS at 08:46

## 2025-01-01 RX ADMIN — Medication 5 L/MIN: at 20:50

## 2025-01-01 RX ADMIN — LEVOTHYROXINE SODIUM 125 MCG: 25 TABLET ORAL at 08:56

## 2025-01-01 RX ADMIN — BUMETANIDE 2 MG: 0.25 INJECTION INTRAMUSCULAR; INTRAVENOUS at 01:54

## 2025-01-01 RX ADMIN — HYDROMORPHONE HYDROCHLORIDE 0.2 MG: 0.2 INJECTION, SOLUTION INTRAMUSCULAR; INTRAVENOUS; SUBCUTANEOUS at 18:44

## 2025-01-01 RX ADMIN — BUMETANIDE 1 MG/HR: 0.25 INJECTION INTRAMUSCULAR; INTRAVENOUS at 17:06

## 2025-01-01 RX ADMIN — HYDROMORPHONE HYDROCHLORIDE 0.2 MG: 0.2 INJECTION, SOLUTION INTRAMUSCULAR; INTRAVENOUS; SUBCUTANEOUS at 10:40

## 2025-01-01 RX ADMIN — IPRATROPIUM BROMIDE AND ALBUTEROL SULFATE 3 ML: 2.5; .5 SOLUTION RESPIRATORY (INHALATION) at 13:58

## 2025-01-01 RX ADMIN — NICOTINE 1 PATCH: 14 PATCH, EXTENDED RELEASE TRANSDERMAL at 08:56

## 2025-01-01 RX ADMIN — NYSTATIN 500000 UNITS: 100000 SUSPENSION ORAL at 08:58

## 2025-01-01 RX ADMIN — PANTOPRAZOLE SODIUM 40 MG: 40 TABLET, DELAYED RELEASE ORAL at 09:10

## 2025-01-01 RX ADMIN — PIPERACILLIN SODIUM AND TAZOBACTAM SODIUM 3.38 G: 3; .375 INJECTION, SOLUTION INTRAVENOUS at 17:03

## 2025-01-01 RX ADMIN — MIDODRINE HYDROCHLORIDE 5 MG: 5 TABLET ORAL at 09:05

## 2025-01-01 RX ADMIN — VANCOMYCIN HYDROCHLORIDE 750 MG: 750 INJECTION, SOLUTION INTRAVENOUS at 09:06

## 2025-01-01 RX ADMIN — NYSTATIN 500000 UNITS: 100000 SUSPENSION ORAL at 20:50

## 2025-01-01 RX ADMIN — IPRATROPIUM BROMIDE AND ALBUTEROL SULFATE 3 ML: 2.5; .5 SOLUTION RESPIRATORY (INHALATION) at 08:22

## 2025-01-01 RX ADMIN — EZETIMIBE 10 MG: 10 TABLET ORAL at 08:56

## 2025-01-01 RX ADMIN — PIPERACILLIN SODIUM AND TAZOBACTAM SODIUM 3.38 G: 3; .375 INJECTION, SOLUTION INTRAVENOUS at 23:46

## 2025-01-01 RX ADMIN — MIDODRINE HYDROCHLORIDE 5 MG: 5 TABLET ORAL at 14:21

## 2025-01-01 RX ADMIN — IPRATROPIUM BROMIDE AND ALBUTEROL SULFATE 3 ML: 2.5; .5 SOLUTION RESPIRATORY (INHALATION) at 20:54

## 2025-01-01 RX ADMIN — GABAPENTIN 100 MG: 100 CAPSULE ORAL at 08:56

## 2025-01-01 RX ADMIN — BUMETANIDE 2 MG: 0.25 INJECTION INTRAMUSCULAR; INTRAVENOUS at 12:22

## 2025-01-01 RX ADMIN — HYDROMORPHONE HYDROCHLORIDE 0.2 MG: 0.2 INJECTION INTRAMUSCULAR; INTRAVENOUS; SUBCUTANEOUS at 10:40

## 2025-01-01 ASSESSMENT — COGNITIVE AND FUNCTIONAL STATUS - GENERAL
MOBILITY SCORE: 8
TURNING FROM BACK TO SIDE WHILE IN FLAT BAD: A LOT
MOVING FROM LYING ON BACK TO SITTING ON SIDE OF FLAT BED WITH BEDRAILS: A LOT
MOVING TO AND FROM BED TO CHAIR: TOTAL
STANDING UP FROM CHAIR USING ARMS: TOTAL
CLIMB 3 TO 5 STEPS WITH RAILING: TOTAL
DRESSING REGULAR UPPER BODY CLOTHING: A LOT
HELP NEEDED FOR BATHING: A LOT
DRESSING REGULAR LOWER BODY CLOTHING: A LOT
EATING MEALS: A LOT
DAILY ACTIVITIY SCORE: 12
WALKING IN HOSPITAL ROOM: TOTAL
TOILETING: A LOT
PERSONAL GROOMING: A LOT

## 2025-01-01 ASSESSMENT — PAIN DESCRIPTION - ORIENTATION: ORIENTATION: RIGHT;LEFT

## 2025-01-01 ASSESSMENT — PAIN - FUNCTIONAL ASSESSMENT: PAIN_FUNCTIONAL_ASSESSMENT: 0-10

## 2025-01-01 ASSESSMENT — PAIN SCALES - GENERAL
PAINLEVEL_OUTOF10: 9
PAINLEVEL_OUTOF10: 0 - NO PAIN
PAINLEVEL_OUTOF10: 6

## 2025-01-01 ASSESSMENT — PAIN DESCRIPTION - LOCATION
LOCATION: OTHER (COMMENT)
LOCATION: LEG

## 2025-01-01 NOTE — PROGRESS NOTES
Vancomycin Dosing by Pharmacy- FOLLOW UP    Alo Glass is a 69 y.o. year old male who Pharmacy has been consulted for vancomycin dosing for cellulitis, skin and soft tissue. Based on the patient's indication and renal status this patient is being dosed based on a goal trough/random level of 10-15.     Renal function is currently declining. Will dose by level.     Most recent random level: 14.7 mcg/mL    Visit Vitals  BP 93/74   Pulse 99   Temp 36.9 °C (98.4 °F) (Temporal)   Resp 18        Lab Results   Component Value Date    CREATININE 2.18 (H) 2025    CREATININE 1.86 (H) 2024    CREATININE 1.92 (H) 2024    CREATININE 1.41 (H) 2024        Patient weight is as follows:   Vitals:    24 0600   Weight: 131 kg (287 lb 14.7 oz)       Cultures:  No results found for the encounter in last 14 days.       I/O last 3 completed shifts:  In: 1385.6 (10.6 mL/kg) [I.V.:785.6 (6 mL/kg); IV Piggyback:600]  Out: 1870 (14.3 mL/kg) [Urine:1870 (0.4 mL/kg/hr)]  Weight: 130.6 kg   I/O during current shift:  I/O this shift:  In: -   Out: 300 [Urine:300]    Temp (24hrs), Av.3 °C (97.4 °F), Min:36.1 °C (97 °F), Max:36.9 °C (98.4 °F)      Assessment/Plan    Within goal random/trough level. Will redose with 750mg x1 on 25 at 0500   The next level will be obtained on 25 at 0500. May be obtained sooner if clinically indicated.   Will continue to monitor renal function daily while on vancomycin and order serum creatinine at least every 48 hours if not already ordered.  Follow for continued vancomycin needs, clinical response, and signs/symptoms of toxicity.       Jada Germain, PharmD

## 2025-01-01 NOTE — PROGRESS NOTES
NEPHROLOGY NOTE   Alo Glass   69 y.o.     MRN/Room: [id number]/6017/6017-A    Reason for consult: LEONA on CKD    SUBJECTIVE:   No major overnight events   Live found to be leaking this morning       Vitals:    01/01/25 1200   BP: 92/66   Pulse: 96   Resp: 16   Temp: 36.2 °C (97.2 °F)   SpO2: 92%        12/30 1900 - 01/01 0659  In: 864 [P.O.:300; I.V.:414]  Out: 795 [Urine:795]   Weight change:      General appearance: drowsy   Eyes: non-icteric  Skin: no apparent rash  Heart: s1s2 regular. no rub. JVD visible  Lungs: CTA bilat.  no wheezing/crackles. Scattered Rhonchi  Abdomen: soft, nt/nd  Extremities: +2 edema bilat up to thighs as well as Erythema and Warm limbs.  : has Live      ASSESSMENT:  Alo Glass is a 69 y.o. male with PMHx of HFrEF (EF 23%) , COPD, MELISSA, obesity, type II DM, stenotrophomonas pneumonia w parapneumonic effusion (9/2024), HTN, hypothyroidism and HLD who was admitted to the MICU for Septic Shock & acute decompensated HF. Has Large Bullae of LE that are oozing, possibly infected.    #LEONA  - Baseline Cr 0.6, presented with 1.4.   - Ua has hyaline cast, hematuria, pyuria and l.est +, urine sodium was <10 which increased to 46 after diuretics   - bilateral renal enhancing lesions measuring 0.8 cm in the right upper pole kidney and 2 cm in the left upper pole kidney. Referral to urology and further evaluation with MR is suggested  - hypoNa likely hypervolemic    Etiology: hemodynamic instability in setting of Cardiorenal hemodynamics and sepsis+ contrast associated nephropathy    Recommendations:  - Increase Bumex 4mg IV bid. Target to keep net negative by 1L   - daily weights  - Continue Midodrine  - Consider change in abx from Vanc + Zosyn  - No indication for RRT  -Dose medications for renal function  - strict Is and Os    SW Dr Ramsey Singh MD  Nephrology Fellow  24 hour Renal Pager - [phone]

## 2025-01-01 NOTE — PROGRESS NOTES
[facility]  ACUTE CARE SURGERY - PROGRESS NOTE    Patient Name: Alo Glass  MRN: [id number]  Admit Date: 1229  : 1955  AGE: 69 y.o.   GENDER: male  ==============================================================================  TODAY'S ASSESSMENT AND PLAN OF CARE:  69 M with PMH HFREF, COPD, MELISSA, obesity, DM2, HTN, hypothyroidism, HLD, who presented to MICU for shock workup --sepsis vs decompensated HF, for whom acs was consulted with bilateral leg wounds, rule out abscess. No current concern for necrotizing fasciitis, infection does not appear to follow up fascial planes (on CT) and due to being bilateral, also less likely that this is NSTI. He is noted to be hyponatremic but has a lot of other competing factors that could also cause this (ie his heart failure). No concern for any fluid collections amenable to drainage identified on physical exam or [facility] from [facility]. Cellulitis improving with broad spectrum antibiotics.    Continue vanc/zosyn  Elevate legs as able  If erythema does not completely resolve with antibiotic treatment would recommend re-imaging with non-contrast CT of BLE to assess for new, undrained fluid collections  ACS will sign off at this time  Please call back with any questions or concerns    Patient seen and discussed with Dr. Regan.    Kallie Carpenter MD  [facility] [id number]    ==============================================================================  CHIEF COMPLAINT / EVENTS LAST 24HRS / HPI:  NAEO. Erythema improved on exam. Leukocytosis resolved.    MEDICAL HISTORY / ROS:   Admission history and ROS reviewed.     PHYSICAL EXAM:  Heart Rate:  [96-99]   Temp:  [36.1 °C (97 °F)-36.9 °C (98.4 °F)]   Resp:  [12-21]   BP: (88-95)/(66-74)   Weight:  [131 kg (289 lb 7.4 oz)]   SpO2:  [92 %-99 %]   Physical Exam  Constitutional:       General: He is not in acute distress.  Cardiovascular:      Rate and Rhythm: Rhythm irregular.   Pulmonary:      Comments:  Conversational dyspnea on CPAP  Abdominal:      General: There is no distension.      Palpations: Abdomen is soft.   Musculoskeletal:         General: Swelling present.      Comments: BL LE pitting edema to level of knees, erythema overlying edema, RLE opened blister with underlying wound, similar to prior exams with some improvement in edema and erythema   Neurological:      Mental Status: He is alert.   Psychiatric:         Behavior: Behavior normal.       LABS:  Results for orders placed or performed during the hospital encounter of 12/29/24 (from the past 24 hours)   POCT GLUCOSE   Result Value Ref Range    POCT Glucose 139 (H) 74 - 99 mg/dL   Vancomycin   Result Value Ref Range    Vancomycin 14.7 5.0 - 20.0 ug/mL   POCT GLUCOSE   Result Value Ref Range    POCT Glucose 144 (H) 74 - 99 mg/dL   Blood Gas Venous Full Panel   Result Value Ref Range    POCT pH, Venous 7.23 (LL) 7.33 - 7.43 pH    POCT pCO2, Venous 59 (H) 41 - 51 mm Hg    POCT pO2, Venous 37 35 - 45 mm Hg    POCT SO2, Venous 68 45 - 75 %    POCT Oxy Hemoglobin, Venous 66.7 45.0 - 75.0 %    POCT Hematocrit Calculated, Venous 28.0 (L) 41.0 - 52.0 %    POCT Sodium, Venous 112 (LL) 136 - 145 mmol/L    POCT Potassium, Venous 3.8 3.5 - 5.3 mmol/L    POCT Chloride, Venous 67 (L) 98 - 107 mmol/L    POCT Ionized Calicum, Venous 0.13 (LL) 1.10 - 1.33 mmol/L    POCT Glucose, Venous >685 (HH) 74 - 99 mg/dL    POCT Lactate, Venous 1.6 0.4 - 2.0 mmol/L    POCT Base Excess, Venous -3.2 (L) -2.0 - 3.0 mmol/L    POCT HCO3 Calculated, Venous 24.7 22.0 - 26.0 mmol/L    POCT Hemoglobin, Venous 9.4 (L) 13.5 - 17.5 g/dL    POCT Anion Gap, Venous 24.0 10.0 - 25.0 mmol/L    Patient Temperature 37.0 degrees Celsius    FiO2 36 %   Renal function panel   Result Value Ref Range    Glucose 672 (HH) 74 - 99 mg/dL    Sodium 121 (L) 136 - 145 mmol/L    Potassium 4.0 3.5 - 5.3 mmol/L    Chloride 67 (L) 98 - 107 mmol/L    Bicarbonate 24 21 - 32 mmol/L    Anion Gap 34 (H) 10 - 20 mmol/L     Urea Nitrogen 70 (H) 6 - 23 mg/dL    Creatinine 1.86 (H) 0.50 - 1.30 mg/dL    eGFR 39 (L) >60 mL/min/1.73m*2    Calcium 6.5 (L) 8.6 - 10.6 mg/dL    Phosphorus 5.8 (H) 2.5 - 4.9 mg/dL    Albumin 2.3 (L) 3.4 - 5.0 g/dL   Magnesium   Result Value Ref Range    Magnesium 2.34 1.60 - 2.40 mg/dL   Renal function panel   Result Value Ref Range    Glucose 123 (H) 74 - 99 mg/dL    Sodium 131 (L) 136 - 145 mmol/L    Potassium 4.1 3.5 - 5.3 mmol/L    Chloride 85 (L) 98 - 107 mmol/L    Bicarbonate 28 21 - 32 mmol/L    Anion Gap 22 (H) 10 - 20 mmol/L    Urea Nitrogen 82 (H) 6 - 23 mg/dL    Creatinine 2.18 (H) 0.50 - 1.30 mg/dL    eGFR 32 (L) >60 mL/min/1.73m*2    Calcium 8.2 (L) 8.6 - 10.6 mg/dL    Phosphorus 7.2 (H) 2.5 - 4.9 mg/dL    Albumin 2.7 (L) 3.4 - 5.0 g/dL   CBC and Auto Differential   Result Value Ref Range    WBC 10.5 4.4 - 11.3 x10*3/uL    nRBC 0.0 0.0 - 0.0 /100 WBCs    RBC 5.19 4.50 - 5.90 x10*6/uL    Hemoglobin 16.2 13.5 - 17.5 g/dL    Hematocrit 49.6 41.0 - 52.0 %    MCV 96 80 - 100 fL    MCH 31.2 26.0 - 34.0 pg    MCHC 32.7 32.0 - 36.0 g/dL    RDW 13.3 11.5 - 14.5 %    Platelets 114 (L) 150 - 450 x10*3/uL    Neutrophils % 82.2 40.0 - 80.0 %    Immature Granulocytes %, Automated 0.6 0.0 - 0.9 %    Lymphocytes % 7.1 13.0 - 44.0 %    Monocytes % 9.6 2.0 - 10.0 %    Eosinophils % 0.4 0.0 - 6.0 %    Basophils % 0.1 0.0 - 2.0 %    Neutrophils Absolute 8.62 (H) 1.20 - 7.70 x10*3/uL    Immature Granulocytes Absolute, Automated 0.06 0.00 - 0.70 x10*3/uL    Lymphocytes Absolute 0.75 (L) 1.20 - 4.80 x10*3/uL    Monocytes Absolute 1.01 (H) 0.10 - 1.00 x10*3/uL    Eosinophils Absolute 0.04 0.00 - 0.70 x10*3/uL    Basophils Absolute 0.01 0.00 - 0.10 x10*3/uL   Blood Gas Venous Full Panel   Result Value Ref Range    POCT pH, Venous 7.31 (L) 7.33 - 7.43 pH    POCT pCO2, Venous 65 (H) 41 - 51 mm Hg    POCT pO2, Venous 55 (H) 35 - 45 mm Hg    POCT SO2, Venous 87 (H) 45 - 75 %    POCT Oxy Hemoglobin, Venous 85.3 (H) 45.0 - 75.0  %    POCT Hematocrit Calculated, Venous 52.0 41.0 - 52.0 %    POCT Sodium, Venous 120 (LL) 136 - 145 mmol/L    POCT Potassium, Venous 4.1 3.5 - 5.3 mmol/L    POCT Chloride, Venous 86 (L) 98 - 107 mmol/L    POCT Ionized Calicum, Venous 1.08 (L) 1.10 - 1.33 mmol/L    POCT Glucose, Venous 135 (H) 74 - 99 mg/dL    POCT Lactate, Venous 1.5 0.4 - 2.0 mmol/L    POCT Base Excess, Venous 3.8 (H) -2.0 - 3.0 mmol/L    POCT HCO3 Calculated, Venous 32.7 (H) 22.0 - 26.0 mmol/L    POCT Hemoglobin, Venous 17.3 13.5 - 17.5 g/dL    POCT Anion Gap, Venous 5.0 (L) 10.0 - 25.0 mmol/L    Patient Temperature 37.0 degrees Celsius    FiO2 36 %   Magnesium   Result Value Ref Range    Magnesium 2.85 (H) 1.60 - 2.40 mg/dL   Heparin Assay, UFH   Result Value Ref Range    Heparin Unfractionated 0.5 See Comment Below for Therapeutic Ranges IU/mL   POCT GLUCOSE   Result Value Ref Range    POCT Glucose 210 (H) 74 - 99 mg/dL     *Note: Due to a large number of results and/or encounters for the requested time period, some results have not been displayed. A complete set of results can be found in Results Review.     MEDICATIONS:  Current Facility-Administered Medications   Medication Dose Route Frequency Provider Last Rate Last Admin    acetaminophen (Tylenol) tablet 650 mg  650 mg oral q4h PRN FABIOLA Lu        albuterol 90 mcg/actuation inhaler 2 puff  2 puff inhalation q4h PRN FABIOLA Lu        [Held by provider] allopurinol (Zyloprim) tablet 300 mg  300 mg oral Daily FABIOLA Lu   300 mg at 12/29/24 0814    bumetanide (Bumex) injection 4 mg  4 mg intravenous Once FABIOLA Perez        dextrose 50 % injection 12.5 g  12.5 g intravenous q15 min PRN FABIOLA Lu        dextrose 50 % injection 25 g  25 g intravenous q15 min PRN FABIOLA Lu        ezetimibe (Zetia) tablet 10 mg  10 mg oral Daily FABIOLA Lu   10 mg at 01/01/25 0856    gabapentin  (Neurontin) capsule 100 mg  100 mg oral Daily FABIOLA Lu   100 mg at 01/01/25 0856    glucagon (Glucagen) injection 1 mg  1 mg intramuscular q15 min PRN FABIOLA Lu        glucagon (Glucagen) injection 1 mg  1 mg intramuscular q15 min PRN FABIOLA Lu        heparin 25,000 Units in dextrose 5% 250 mL (100 Units/mL) infusion (premix)  0-4,500 Units/hr intravenous Continuous FABIOLA Lu 18 mL/hr at 01/01/25 0903 1,800 Units/hr at 01/01/25 0903    heparin bolus from bag 5,000-10,000 Units  5,000-10,000 Units intravenous q4h PRN FABIOLA Lu        HYDROmorphone PF (Dilaudid) injection 0.2 mg  0.2 mg intravenous q8h PRN FABIOLA Perez   0.2 mg at 01/01/25 1040    insulin lispro injection 0-10 Units  0-10 Units subcutaneous TID AC FABIOLA Lu   4 Units at 01/01/25 1217    ipratropium-albuteroL (Duo-Neb) 0.5-2.5 mg/3 mL nebulizer solution 3 mL  3 mL nebulization q6h FABIOLA Lu   3 mL at 01/01/25 0822    levothyroxine (Synthroid, Levoxyl) tablet 125 mcg  125 mcg oral Daily FABIOLA Lu   125 mcg at 01/01/25 0856    midodrine (Proamatine) tablet 5 mg  5 mg oral TID FABIOLA Lu   5 mg at 01/01/25 0905    nicotine (Nicoderm CQ) 14 mg/24 hr patch 1 patch  1 patch transdermal Daily FABIOLA Lu   1 patch at 01/01/25 0856    nystatin (Mycostatin) 100,000 unit/mL suspension 500,000 Units  5 mL Mouth/Throat BID FABIOLA Lu   500,000 Units at 01/01/25 0858    oxygen (O2) therapy   inhalation Nightly FABIOLA Lu   4 L/min at 12/31/24 2200    oxygen (O2) therapy   inhalation Continuous PRN - O2/gases FABIOLA Lu   5 L/min at 12/30/24 1313    oxygen (O2) therapy   inhalation Continuous PRN - O2/gases FABIOLA Lu        pantoprazole (ProtoNix) EC tablet 40 mg  40 mg oral Daily before breakfast FABIOLA Lu   40 mg at  01/01/25 0910    piperacillin-tazobactam (Zosyn) 3.375 g in dextrose (iso) IV 50 mL  3.375 g intravenous q6h FABIOLA Lu   Stopped at 01/01/25 1034    polyethylene glycol (Glycolax, Miralax) packet 17 g  17 g oral Daily PRN FABIOLA Lu        sodium chloride (Ocean) 0.65 % nasal spray 1 spray  1 spray Each Nostril TID PRN FABIOLA Lu   1 spray at 12/30/24 2048    vancomycin (Vancocin) pharmacy to dose - pharmacy monitoring   miscellaneous Daily PRN FABIOLA Lu           IMAGING SUMMARY:  no new imaging    I saw and evaluated the patient. I personally obtained the key and critical portions of the history and physical exam. I reviewed the resident’s documentation and discussed the patient with the resident. I agree with the resident’s medical decision making as documented in the resident’s note.    Bilateral lower extremity cellulitis. BP improved on midodrine 5 tid. Cellulitis somewhat improved and wbc now normal (10.5). Pt remains on Vanc/Zosyn. Bcx no growth to date. Cellulitis appears to be responding to antibiotics. Concern for NSTI low. If erythema does not fully resolve, would consider repeat imaging of the legs to rule out development of deeper abscesses or collections. We will sign off but please don't hesitate to call with any questions or concerns.    Andrea Regan MD  Trauma, Critical Care, and Acute Care Surgery  Pager: [phone]

## 2025-01-01 NOTE — CARE PLAN
Problem: Skin  Goal: Decreased wound size/increased tissue granulation at next dressing change  Outcome: Progressing  Flowsheets (Taken 1/1/2025 1522)  Decreased wound size/increased tissue granulation at next dressing change:   Promote sleep for wound healing   Protective dressings over bony prominences  Goal: Participates in plan/prevention/treatment measures  Outcome: Progressing  Flowsheets (Taken 1/1/2025 1522)  Participates in plan/prevention/treatment measures: Elevate heels  Goal: Prevent/manage excess moisture  Outcome: Progressing  Flowsheets (Taken 1/1/2025 1522)  Prevent/manage excess moisture:   Monitor for/manage infection if present   Follow provider orders for dressing changes  Goal: Prevent/minimize sheer/friction injuries  Outcome: Progressing  Flowsheets (Taken 1/1/2025 1522)  Prevent/minimize sheer/friction injuries:   Use pull sheet   Complete micro-shifts as needed if patient unable. Adjust patient position to relieve pressure points, not a full turn   HOB 30 degrees or less   Turn/reposition every 2 hours/use positioning/transfer devices  Goal: Promote/optimize nutrition  Outcome: Progressing  Flowsheets (Taken 1/1/2025 1522)  Promote/optimize nutrition: Monitor/record intake including meals  Goal: Promote skin healing  Outcome: Progressing  Flowsheets (Taken 1/1/2025 1522)  Promote skin healing:   Assess skin/pad under line(s)/device(s)   Rotate device position/do not position patient on device   Turn/reposition every 2 hours/use positioning/transfer devices     Problem: Pain - Adult  Goal: Verbalizes/displays adequate comfort level or baseline comfort level  Outcome: Progressing     Problem: Safety - Adult  Goal: Free from fall injury  Outcome: Progressing     Problem: Discharge Planning  Goal: Discharge to home or other facility with appropriate resources  Outcome: Progressing     Problem: Chronic Conditions and Co-morbidities  Goal: Patient's chronic conditions and co-morbidity symptoms  are monitored and maintained or improved  Outcome: Progressing     Problem: Diabetes  Goal: Achieve decreasing blood glucose levels by end of shift  Outcome: Progressing  Goal: Increase stability of blood glucose readings by end of shift  Outcome: Progressing  Goal: Decrease in ketones present in urine by end of shift  Outcome: Progressing  Goal: Maintain electrolyte levels within acceptable range throughout shift  Outcome: Progressing  Goal: Maintain glucose levels >70mg/dl to <250mg/dl throughout shift  Outcome: Progressing  Goal: No changes in neurological exam by end of shift  Outcome: Progressing  Goal: Learn about and adhere to nutrition recommendations by end of shift  Outcome: Progressing  Goal: Vital signs within normal range for age by end of shift  Outcome: Progressing  Goal: Increase self care and/or family involovement by end of shift  Outcome: Progressing  Goal: Receive DSME education by end of shift  Outcome: Progressing     Problem: Pain  Goal: Takes deep breaths with improved pain control throughout the shift  Outcome: Progressing  Goal: Turns in bed with improved pain control throughout the shift  Outcome: Progressing  Goal: Walks with improved pain control throughout the shift  Outcome: Progressing  Goal: Performs ADL's with improved pain control throughout shift  Outcome: Progressing  Goal: Participates in PT with improved pain control throughout the shift  Outcome: Progressing  Goal: Free from opioid side effects throughout the shift  Outcome: Progressing  Goal: Free from acute confusion related to pain meds throughout the shift  Outcome: Progressing     Problem: Fall/Injury  Goal: Not fall by end of shift  Outcome: Progressing  Goal: Be free from injury by end of the shift  Outcome: Progressing  Goal: Verbalize understanding of personal risk factors for fall in the hospital  Outcome: Progressing  Goal: Verbalize understanding of risk factor reduction measures to prevent injury from fall in the  home  Outcome: Progressing  Goal: Pace activities to prevent fatigue by end of the shift  Outcome: Progressing   The patient's goals for the shift include      The clinical goals for the shift include Pt will remain HDS this shift

## 2025-01-01 NOTE — PROGRESS NOTES
"Medical Intensive Care - Daily Progress Note   Subjective    Alo Glass is a 69 y.o. year old male patient admitted on 12/29/2024 with following ICU needs: Bilateral lower ext cellulitis causing septic shock.      Interval History:  Overnight patient more lethargic, VBG 7.31/65/55.  Normally wears Cpap HS but switched to Bipap 16/8 d/t hypercapnia.  Started on Midodrine 5mg TID last night and given additional 2mg IV Bumex with minimal response.  This morning +50ml, redosed with 2mg IV bumex to meet net negative goal 1-2L    Meds    Scheduled medications  [Held by provider] allopurinol, 300 mg, oral, Daily  bumetanide, 2 mg, intravenous, Once  ezetimibe, 10 mg, oral, Daily  gabapentin, 100 mg, oral, Daily  insulin lispro, 0-10 Units, subcutaneous, TID AC  ipratropium-albuteroL, 3 mL, nebulization, q6h  levothyroxine, 125 mcg, oral, Daily  midodrine, 5 mg, oral, TID  nicotine, 1 patch, transdermal, Daily  nystatin, 5 mL, Mouth/Throat, BID  oxygen, , inhalation, Nightly  pantoprazole, 40 mg, oral, Daily before breakfast  piperacillin-tazobactam, 3.375 g, intravenous, q6h  vancomycin, 750 mg, intravenous, Once      Continuous medications  heparin, 0-4,500 Units/hr, Last Rate: 1,800 Units/hr (12/31/24 1853)      PRN medications  PRN medications: acetaminophen, albuterol, dextrose, dextrose, glucagon, glucagon, heparin, oxygen, oxygen, polyethylene glycol, sodium chloride, vancomycin     Objective    Blood pressure 93/73, pulse 97, temperature 36.9 °C (98.4 °F), resp. rate 12, height 1.803 m (5' 11\"), weight 131 kg (287 lb 14.7 oz), SpO2 97%.         Constitutional: chronically Ill appearing elderly male in NAD  Eyes: PERRLA, no icterus   ENMT: Mucous membranes dry   Head/Neck: atraumatic   Respiratory/Thorax: Lungs diminished R>L, productive cough with thick yellow/brown sputum, non-labored breathing   Cardiovascular: Tachy; s1/s2 normal.  No obvious MRG.  Gastrointestinal: obese, soft, non-tender, normoactive " bowel sounds   Extremities: +3 edema B/L LE, Cellulitis with erythema b/l LE, venous statis ulcer vs blister RLE  Neurological: alert and oriented x 3, speech clear, follows commands appropriately, no focal deficits.    Skin: Warm and dry      Intake/Output Summary (Last 24 hours) at 1/1/2025 0712  Last data filed at 1/1/2025 0300  Gross per 24 hour   Intake 533.3 ml   Output 650 ml   Net -116.7 ml     Labs:   Results from last 72 hours   Lab Units 01/01/25 0046 12/31/24 2157 12/31/24 0623 12/30/24  0403   SODIUM mmol/L 131* 121* 129* 129*   POTASSIUM mmol/L 4.1 4.0 3.7 3.8   CHLORIDE mmol/L 85* 67* 87* 87*   CO2 mmol/L 28 24 28 28   BUN mg/dL 82* 70* 77* 67*   CREATININE mg/dL 2.18* 1.86* 1.92* 1.41*   GLUCOSE mg/dL 123* 672* 150* 137*   CALCIUM mg/dL 8.2* 6.5* 8.2* 7.7*   ANION GAP mmol/L 22* 34* 18 18   EGFR mL/min/1.73m*2 32* 39* 37* 54*   PHOSPHORUS mg/dL 7.2* 5.8*  --  7.3*      Results from last 72 hours   Lab Units 01/01/25 0046 12/31/24 0623 12/30/24  0403   WBC AUTO x10*3/uL 10.5 11.7* 14.1*   HEMOGLOBIN g/dL 16.2 16.4 16.6   HEMATOCRIT % 49.6 49.2 49.4   PLATELETS AUTO x10*3/uL 114* 119* 128*   NEUTROS PCT AUTO % 82.2 87.9 90.8   LYMPHS PCT AUTO % 7.1 4.1 3.0   MONOS PCT AUTO % 9.6 7.3 5.4   EOS PCT AUTO % 0.4 0.1 0.1            Results from last 72 hours   Lab Units 01/01/25 0046 12/31/24 2157 12/29/24  1059   POCT PH, VENOUS pH 7.31* 7.23* 7.28*   POCT PCO2, VENOUS mm Hg 65* 59* 54*   POCT PO2, VENOUS mm Hg 55* 37 41        Micro/ID:     Lab Results   Component Value Date    URINECULTURE No growth 12/28/2024    BLOODCULT No growth at 3 days 12/28/2024    BLOODCULT No growth at 3 days 12/28/2024       Summary of key imaging results from the last 24 hours  XR CHEST 1 VIEW; 12/30/2024 9:03 am   IMPRESSION:  1. Unchanged mild perihilar edema and bibasilar atelectasis, right more than left. Small right pleural effusion. Unchanged enlarged cardiac silhouette size    TRANSTHORACIC ECHOCARDIOGRAM REPORT   12/30/24  CONCLUSIONS:   1. Poorly visualized anatomical structures due to suboptimal image quality.   2. Left ventricular ejection fraction is severely decreased, calculated by Green's biplane at 19%.   3. There is global hypokinesis of the left ventricle with minor regional variations.   4. Left ventricular cavity size is moderately dilated.   5. Abnormal septal motion consistent with left bundle branch block.   6. No left ventricular thrombus visualized.   7. There is reduced right ventricular systolic function.   8. Moderately enlarged right ventricle.   9. The left atrium is enlarged.  10. The right atrium is moderately dilated.  11. Mild aortic valve regurgitation.  12. Compared with study dated 9/19/2024, no significant change.    Renal US 12/29/24    Assessment and Plan     Assessment: Alo Glass is a 69 y.o. year old male patient admitted on 12/29/2024 with Septic shock 2/2 bilateral lower ext cellulitis and cardiogenic shock and AHRF 2/2 volume overload and LEONA.     Summary for 01/01/25  :  Cont to diurese to a goal of -1.5L with bumex gtt   Sputum cultures obtained   Cont with IV Atbs regimen:  Zosyn and Vanco  ACS signed off     Plan:  NEUROLOGY/PSYCH:  Dx: ZENA  A&Ox3  Management:  Continue gabapentin 100mg daily and Nicotine patch daily,   Pain regimen:  Tylenol 650 mg prn and Dilaudid PRN with dressing changes   PT/OT     CARDIOVASCULAR:  Dx: Hx HFrEF, HTN, HLD, severe PAD.  Septic shock, resolved.  Cardiogenic shock, resovled. Aflutter  HDS but with narrow pulse pressure  Remains off vasopressors since 12/30  TTE 12/30: LVEF 19%  Management:  Continue Ezetimibe 10mg daily   Holding home lasix and SGLT-2 iso hypotension  Consider GDMT when able  S/p Dig load 12/29-12/30  S/p Bumex 4mg IV this AM with additional dose ordered for this evening; goal of -1.5L/day  Heparin gtt  Started on Midodrine 5mg TID for renal perfusion   Cardiology consulted, signed off 12/31: Continue diuresis with IV  Bumex.  If decompensates can consider cardioversion for atrial kick restoration. Prior to discharge arrange follow up with Dr. Moore as an o/p @ [phone] (scheduling)  Tele    PULMONARY:  Dx: Hx asthma and COPD GOLD 3 (2019: FEV1 45% w +ve BD change; %/TLC 85%). AHRF 2/2 hypervolemia   Stable on 2L NC with Bipap HS and with naps  CxR : Interval increase in the size of bilateral pleural effusions with adjacent atelectasis/consolidation. Similar appearance of prominent perihilar and interstitial lung markings consistent with interstitial pulmonary edema.  VB.31/65/55/87 prior to being placed on Bipap   Management:  Transitioned from Cpap to Bipap  d/t hypercapnia   Diuresis as outlined above   Cont Duonebs q6h, albuterol PRN  S/p steroid taper   Airway clearance TID    RENAL/GENITOURINARY:  Dx: LEONA. Hyponatremia likely I/s/o volume overload.  LT renal mass c/f RCC  Worsening Cr today, 2.18 from 1.86 yesterday   Renal US : No hydronephrosis. hypoechoic lesion of the left superior pole measuring 1.9 cm which was seen on the prior CT dated 2024, further evaluation with a kidney protocol MRI is strongly recommended. There is an additional indeterminate 0.5 cm hypoechoic lesion of the left kidney.  Management:  Voiding via lipscomb which was found to be leaking, I/O not accurate due to this  Nephrology following, recs appreciated: Consider midodrine 5mg TID. No indication for RRT. Target to keep net negative by 1L  Pt saw Urology on 2024 and referrred to IR for biopsy   Urine lytes consistent with prerenal  Diurese goal net negative -1.5L  RFP daily    GASTROENTEROLOGY:  Dx: Hx GERD   BMx2  Management:  Diet: Cardiac and carb control diet  Bowel Regimen: Miralax PRN   PPI    ENDOCRINOLOGY:  Dx: Hx Hypothyroid and DMII  Glucose 130-190s  HgbA1C  5.5%  Management:  Home regimen:  Mounjaro and Jardiance; pt also utilizes a Dexcom CGM)  Cont with Insuline lispro TID  Glucose goal of  <180  Hypoglycemia protocol     HEMATOLOGY:  Dx: ZENA  H/h stable   Management:  CBC daily     SKIN:  Dx:  Skin Failure Yes, describe: BL L/E cellutis vs PAD   Management:  Wound following  ACS consulted, signed off 1/1: Elevate legs as able, continue with abx.  If erythema does not completely resolve with antibiotic treatment would recommend re-imaging with non-contrast CT of BLE to assess for new, undrained fluid collections     INFECTIOUS DISEASE:  Dx: Cellulitis   Tmax 36.9 without leukocytosis   Management:  Micro: 12/28 Legionella, Strep pneum, negative, 12/28 Urine Cx negative, 12/28 BlCx NTGD x2  Abx: Clindamycin (12/28), Zosyn (12/28 - **), Vancomycin (12/28 - **)  Cont with Vanco/Zosyn   ACS consulted signed off 1/1; rec cont IV Atbs     ICU Check List     FEN  Fluids: PRN  Electrolytes: K>4, Phos >3, Mg> 2  Nutrition: Cardiac diet and carb control diet   Prophylaxis:  DVT ppx: Heparin gtt  GI ppx: PPI  Bowel care: Miralax PRN   Hardware:         Urethral Catheter Temperature probe 16 Fr. (Active)   Placement Date: 12/28/24   Hand Hygiene Completed: Yes  Catheter Type: Temperature probe  Tube Size (Fr.): 16 Fr.  Catheter Balloon Size: 5 mL  Urine Returned: Yes   Number of days: 3       Social:  Code: Full Code    HPOA: Sheyla Griggs [phone]   Disposition: SDU    Pt discussed with Dr. Manjarrez, seen and examined. All labs, VS and previous plan of care reviewed.      Antonio Amaya, PARAM-CNP   01/01/25 at 7:12 AM     Disclaimer: Documentation completed with the information available at the time of input. The times in the chart may not be reflective of actual patient care times, interventions, or procedures. Documentation occurs after the physical care of the patient.

## 2025-01-01 NOTE — SIGNIFICANT EVENT
Rapid Response Nurse Note: RADAR alert: 7    Pager time: [phone]  Arrival time: 1350  Event end time: 1355  Location: [facility] 60  [x] Triage by phone or secure messaging    Rapid response initiated by:  [] Rapid response RN [] Family [] Nursing Supervisor [] Physician   [x] RADAR auto page [] Sepsis auto-page [] RN [] RT   [] NP/PA [] Other:     Primary reason for call:   [] BAT [] New CPAP/BiPAP [] Bleeding [] Change in mental status   [] Chest pain [] Code blue [] FiO2 >/= 50% [] HR </= 40 bpm   [] HR >/= 130 bpm [] Hyperglycemia [] Hypoglycemia [x] RADAR    [] RR </= 8 bpm [] RR >/= 30 bpm [] SBP </= 90 mmHg [] SpO2 < 90%   [] Seizure [] Sepsis [] Shortness of breath  [] Staff concern: see comments     Initial VS and/or RADAR VS: T 36.2 °C; HR 96; RR 16; BP 92/66; SPO2 92%.    Providers present at bedside (if applicable):     Name of ICU Provider contacted (if applicable):     Interventions:  [x] None [] ABG/VBG [] Assist w/ICU transfer [] BAT paged    [] Bag mask [] Blood [] Cardioversion [] Code Blue   [] Code blue for intubation [] Code status changed [] Chest x-ray [] EKG   [] IV fluid/bolus [] KUB x-ray [] Labs/cultures [] Medication   [] Nebulizer treatment [] NIPPV (CPAP/BiPAP) [] Oxygen [] Oral airway   [] Peripheral IV [] Palliative care consult [] CT/MRI [] Sepsis protocol    [] Suctioned [] Other:     Outcome:  [] Coded and  [] Code blue for intubation [] Coded and transferred to ICU []  on division   [x] Remained on division (no change) [] Remained on division + additional monitoring [] Remained in ED [] Transferred to ED   [] Transferred to ICU [] Transferred to inpatient status [] Transferred for interventions (procedure) [] Transferred to ICU stepdown    [] Transferred to surgery [] Transferred to telemetry [] Sepsis protocol [] STEMI protocol   [] Stroke protocol [x] Bedside nurse instructed to page rapid response for any concerns or acute change in condition/VS     Additional Comments:      Radar auto-page received for a radar score of 7 with the above listed vital signs.  Vital signs were confirmed and reviewed with primary RN.  He is at his current baseline and primary RN has no concerns.  There are no indications for interventions by Rapid Response at this time.  RN to contact Rapid Response with any future concerns or signs of clinical decompensation.

## 2025-01-01 NOTE — CARE PLAN
Problem: Pain  Goal: Takes deep breaths with improved pain control throughout the shift  Outcome: Progressing  Goal: Turns in bed with improved pain control throughout the shift  Outcome: Progressing  Goal: Walks with improved pain control throughout the shift  Outcome: Progressing  Goal: Performs ADL's with improved pain control throughout shift  Outcome: Progressing  Goal: Participates in PT with improved pain control throughout the shift  Outcome: Progressing     Problem: Fall/Injury  Goal: Not fall by end of shift  Outcome: Progressing  Goal: Be free from injury by end of the shift  Outcome: Progressing  Goal: Verbalize understanding of personal risk factors for fall in the hospital  Outcome: Progressing  Goal: Verbalize understanding of risk factor reduction measures to prevent injury from fall in the home  Outcome: Progressing  Goal: Pace activities to prevent fatigue by end of the shift  Outcome: Progressing     Problem: Skin  Goal: Promote skin healing  Flowsheets (Taken 1/1/2025 0352)  Promote skin healing:   Turn/reposition every 2 hours/use positioning/transfer devices   Rotate device position/do not position patient on device   Protective dressings over bony prominences   Ensure correct size (line/device) and apply per  instructions   Assess skin/pad under line(s)/device(s)   The patient's goals for the shift include      The clinical goals for the shift include Pt will remain HDS this shift / MAP >60

## 2025-01-02 LAB
ALBUMIN SERPL BCP-MCNC: 2.7 G/DL (ref 3.4–5)
ALBUMIN SERPL BCP-MCNC: 2.8 G/DL (ref 3.4–5)
ALBUMIN SERPL BCP-MCNC: 2.8 G/DL (ref 3.4–5)
ALP SERPL-CCNC: 97 U/L (ref 33–136)
ALT SERPL W P-5'-P-CCNC: 10 U/L (ref 10–52)
ANION GAP SERPL CALC-SCNC: 18 MMOL/L (ref 10–20)
ANION GAP SERPL CALC-SCNC: 22 MMOL/L (ref 10–20)
ANION GAP SERPL CALC-SCNC: 23 MMOL/L (ref 10–20)
AST SERPL W P-5'-P-CCNC: 9 U/L (ref 9–39)
BACTERIA BLD CULT: NORMAL
BACTERIA BLD CULT: NORMAL
BASOPHILS # BLD AUTO: 0.01 X10*3/UL (ref 0–0.1)
BASOPHILS NFR BLD AUTO: 0.1 %
BILIRUB SERPL-MCNC: 0.7 MG/DL (ref 0–1.2)
BUN SERPL-MCNC: 92 MG/DL (ref 6–23)
BUN SERPL-MCNC: 94 MG/DL (ref 6–23)
BUN SERPL-MCNC: 98 MG/DL (ref 6–23)
CALCIUM SERPL-MCNC: 8.1 MG/DL (ref 8.6–10.6)
CALCIUM SERPL-MCNC: 8.1 MG/DL (ref 8.6–10.6)
CALCIUM SERPL-MCNC: 8.3 MG/DL (ref 8.6–10.6)
CHLORIDE SERPL-SCNC: 83 MMOL/L (ref 98–107)
CHLORIDE SERPL-SCNC: 84 MMOL/L (ref 98–107)
CHLORIDE SERPL-SCNC: 85 MMOL/L (ref 98–107)
CO2 SERPL-SCNC: 27 MMOL/L (ref 21–32)
CO2 SERPL-SCNC: 27 MMOL/L (ref 21–32)
CO2 SERPL-SCNC: 29 MMOL/L (ref 21–32)
CREAT SERPL-MCNC: 2.94 MG/DL (ref 0.5–1.3)
CREAT SERPL-MCNC: 3.19 MG/DL (ref 0.5–1.3)
CREAT SERPL-MCNC: 3.3 MG/DL (ref 0.5–1.3)
EGFRCR SERPLBLD CKD-EPI 2021: 19 ML/MIN/1.73M*2
EGFRCR SERPLBLD CKD-EPI 2021: 20 ML/MIN/1.73M*2
EGFRCR SERPLBLD CKD-EPI 2021: 22 ML/MIN/1.73M*2
EOSINOPHIL # BLD AUTO: 0.09 X10*3/UL (ref 0–0.7)
EOSINOPHIL NFR BLD AUTO: 1.2 %
ERYTHROCYTE [DISTWIDTH] IN BLOOD BY AUTOMATED COUNT: 13.7 % (ref 11.5–14.5)
GLUCOSE BLD MANUAL STRIP-MCNC: 123 MG/DL (ref 74–99)
GLUCOSE BLD MANUAL STRIP-MCNC: 171 MG/DL (ref 74–99)
GLUCOSE BLD MANUAL STRIP-MCNC: 199 MG/DL (ref 74–99)
GLUCOSE SERPL-MCNC: 101 MG/DL (ref 74–99)
GLUCOSE SERPL-MCNC: 137 MG/DL (ref 74–99)
GLUCOSE SERPL-MCNC: 169 MG/DL (ref 74–99)
HCT VFR BLD AUTO: 52.1 % (ref 41–52)
HGB BLD-MCNC: 16.5 G/DL (ref 13.5–17.5)
IMM GRANULOCYTES # BLD AUTO: 0.07 X10*3/UL (ref 0–0.7)
IMM GRANULOCYTES NFR BLD AUTO: 0.9 % (ref 0–0.9)
LYMPHOCYTES # BLD AUTO: 0.77 X10*3/UL (ref 1.2–4.8)
LYMPHOCYTES NFR BLD AUTO: 9.8 %
MAGNESIUM SERPL-MCNC: 3.05 MG/DL (ref 1.6–2.4)
MCH RBC QN AUTO: 30.7 PG (ref 26–34)
MCHC RBC AUTO-ENTMCNC: 31.7 G/DL (ref 32–36)
MCV RBC AUTO: 97 FL (ref 80–100)
MONOCYTES # BLD AUTO: 0.88 X10*3/UL (ref 0.1–1)
MONOCYTES NFR BLD AUTO: 11.3 %
NEUTROPHILS # BLD AUTO: 6 X10*3/UL (ref 1.2–7.7)
NEUTROPHILS NFR BLD AUTO: 76.7 %
NRBC BLD-RTO: 0 /100 WBCS (ref 0–0)
PHOSPHATE SERPL-MCNC: 7 MG/DL (ref 2.5–4.9)
PHOSPHATE SERPL-MCNC: 7.8 MG/DL (ref 2.5–4.9)
PLATELET # BLD AUTO: 111 X10*3/UL (ref 150–450)
POTASSIUM SERPL-SCNC: 3.7 MMOL/L (ref 3.5–5.3)
POTASSIUM SERPL-SCNC: 3.9 MMOL/L (ref 3.5–5.3)
POTASSIUM SERPL-SCNC: 4 MMOL/L (ref 3.5–5.3)
PROT SERPL-MCNC: 5.2 G/DL (ref 6.4–8.2)
RBC # BLD AUTO: 5.37 X10*6/UL (ref 4.5–5.9)
SODIUM SERPL-SCNC: 128 MMOL/L (ref 136–145)
SODIUM SERPL-SCNC: 128 MMOL/L (ref 136–145)
SODIUM SERPL-SCNC: 130 MMOL/L (ref 136–145)
UFH PPP CHRO-ACNC: 0.6 IU/ML
VANCOMYCIN SERPL-MCNC: 18 UG/ML (ref 5–20)
WBC # BLD AUTO: 7.8 X10*3/UL (ref 4.4–11.3)

## 2025-01-02 PROCEDURE — 80069 RENAL FUNCTION PANEL: CPT | Mod: CCI | Performed by: NURSE PRACTITIONER

## 2025-01-02 PROCEDURE — 99233 SBSQ HOSP IP/OBS HIGH 50: CPT

## 2025-01-02 PROCEDURE — 97165 OT EVAL LOW COMPLEX 30 MIN: CPT | Mod: GO

## 2025-01-02 PROCEDURE — 2500000001 HC RX 250 WO HCPCS SELF ADMINISTERED DRUGS (ALT 637 FOR MEDICARE OP): Performed by: NURSE PRACTITIONER

## 2025-01-02 PROCEDURE — 97530 THERAPEUTIC ACTIVITIES: CPT | Mod: GP

## 2025-01-02 PROCEDURE — 80202 ASSAY OF VANCOMYCIN: CPT

## 2025-01-02 PROCEDURE — 85025 COMPLETE CBC W/AUTO DIFF WBC: CPT | Performed by: NURSE PRACTITIONER

## 2025-01-02 PROCEDURE — 94660 CPAP INITIATION&MGMT: CPT

## 2025-01-02 PROCEDURE — 85520 HEPARIN ASSAY: CPT | Performed by: NURSE PRACTITIONER

## 2025-01-02 PROCEDURE — 2500000004 HC RX 250 GENERAL PHARMACY W/ HCPCS (ALT 636 FOR OP/ED): Performed by: NURSE PRACTITIONER

## 2025-01-02 PROCEDURE — 82947 ASSAY GLUCOSE BLOOD QUANT: CPT

## 2025-01-02 PROCEDURE — 36415 COLL VENOUS BLD VENIPUNCTURE: CPT | Performed by: NURSE PRACTITIONER

## 2025-01-02 PROCEDURE — 94640 AIRWAY INHALATION TREATMENT: CPT

## 2025-01-02 PROCEDURE — 83735 ASSAY OF MAGNESIUM: CPT | Performed by: NURSE PRACTITIONER

## 2025-01-02 PROCEDURE — 80053 COMPREHEN METABOLIC PANEL: CPT | Performed by: NURSE PRACTITIONER

## 2025-01-02 PROCEDURE — 2500000002 HC RX 250 W HCPCS SELF ADMINISTERED DRUGS (ALT 637 FOR MEDICARE OP, ALT 636 FOR OP/ED): Performed by: NURSE PRACTITIONER

## 2025-01-02 PROCEDURE — 2500000002 HC RX 250 W HCPCS SELF ADMINISTERED DRUGS (ALT 637 FOR MEDICARE OP, ALT 636 FOR OP/ED)

## 2025-01-02 PROCEDURE — 2060000001 HC INTERMEDIATE ICU ROOM DAILY

## 2025-01-02 PROCEDURE — 2500000004 HC RX 250 GENERAL PHARMACY W/ HCPCS (ALT 636 FOR OP/ED): Performed by: PHARMACIST

## 2025-01-02 PROCEDURE — 2500000005 HC RX 250 GENERAL PHARMACY W/O HCPCS: Performed by: NURSE PRACTITIONER

## 2025-01-02 PROCEDURE — 2500000004 HC RX 250 GENERAL PHARMACY W/ HCPCS (ALT 636 FOR OP/ED)

## 2025-01-02 PROCEDURE — 99233 SBSQ HOSP IP/OBS HIGH 50: CPT | Performed by: STUDENT IN AN ORGANIZED HEALTH CARE EDUCATION/TRAINING PROGRAM

## 2025-01-02 PROCEDURE — 2500000001 HC RX 250 WO HCPCS SELF ADMINISTERED DRUGS (ALT 637 FOR MEDICARE OP)

## 2025-01-02 PROCEDURE — S4991 NICOTINE PATCH NONLEGEND: HCPCS | Performed by: NURSE PRACTITIONER

## 2025-01-02 PROCEDURE — 94668 MNPJ CHEST WALL SBSQ: CPT

## 2025-01-02 RX ORDER — HYDROMORPHONE HYDROCHLORIDE 0.2 MG/ML
0.2 INJECTION INTRAMUSCULAR; INTRAVENOUS; SUBCUTANEOUS ONCE
Status: DISCONTINUED | OUTPATIENT
Start: 2025-01-02 | End: 2025-01-07

## 2025-01-02 RX ORDER — VANCOMYCIN HYDROCHLORIDE 500 MG/100ML
500 INJECTION, SOLUTION INTRAVENOUS ONCE
Status: COMPLETED | OUTPATIENT
Start: 2025-01-02 | End: 2025-01-02

## 2025-01-02 RX ORDER — POTASSIUM CHLORIDE 20 MEQ/1
20 TABLET, EXTENDED RELEASE ORAL ONCE
Status: COMPLETED | OUTPATIENT
Start: 2025-01-02 | End: 2025-01-02

## 2025-01-02 RX ORDER — VANCOMYCIN HYDROCHLORIDE 750 MG/150ML
750 INJECTION, SOLUTION INTRAVENOUS ONCE
Status: DISCONTINUED | OUTPATIENT
Start: 2025-01-02 | End: 2025-01-02

## 2025-01-02 RX ORDER — METOLAZONE 10 MG/1
20 TABLET ORAL DAILY
Status: DISCONTINUED | OUTPATIENT
Start: 2025-01-02 | End: 2025-01-07

## 2025-01-02 RX ADMIN — PIPERACILLIN SODIUM AND TAZOBACTAM SODIUM 3.38 G: 3; .375 INJECTION, SOLUTION INTRAVENOUS at 22:00

## 2025-01-02 RX ADMIN — IPRATROPIUM BROMIDE AND ALBUTEROL SULFATE 3 ML: 2.5; .5 SOLUTION RESPIRATORY (INHALATION) at 21:09

## 2025-01-02 RX ADMIN — LEVOTHYROXINE SODIUM 125 MCG: 25 TABLET ORAL at 09:30

## 2025-01-02 RX ADMIN — IPRATROPIUM BROMIDE AND ALBUTEROL SULFATE 3 ML: 2.5; .5 SOLUTION RESPIRATORY (INHALATION) at 08:31

## 2025-01-02 RX ADMIN — MIDODRINE HYDROCHLORIDE 5 MG: 5 TABLET ORAL at 14:27

## 2025-01-02 RX ADMIN — NYSTATIN 500000 UNITS: 100000 SUSPENSION ORAL at 09:30

## 2025-01-02 RX ADMIN — BUMETANIDE 2 MG/HR: 0.25 INJECTION INTRAMUSCULAR; INTRAVENOUS at 11:45

## 2025-01-02 RX ADMIN — IPRATROPIUM BROMIDE AND ALBUTEROL SULFATE 3 ML: 2.5; .5 SOLUTION RESPIRATORY (INHALATION) at 01:11

## 2025-01-02 RX ADMIN — HYDROMORPHONE HYDROCHLORIDE 0.2 MG: 0.2 INJECTION, SOLUTION INTRAMUSCULAR; INTRAVENOUS; SUBCUTANEOUS at 23:35

## 2025-01-02 RX ADMIN — HYDROMORPHONE HYDROCHLORIDE 0.2 MG: 0.2 INJECTION, SOLUTION INTRAMUSCULAR; INTRAVENOUS; SUBCUTANEOUS at 09:30

## 2025-01-02 RX ADMIN — NYSTATIN 500000 UNITS: 100000 SUSPENSION ORAL at 21:07

## 2025-01-02 RX ADMIN — MIDODRINE HYDROCHLORIDE 5 MG: 5 TABLET ORAL at 21:00

## 2025-01-02 RX ADMIN — PIPERACILLIN SODIUM AND TAZOBACTAM SODIUM 3.38 G: 3; .375 INJECTION, SOLUTION INTRAVENOUS at 04:26

## 2025-01-02 RX ADMIN — IPRATROPIUM BROMIDE AND ALBUTEROL SULFATE 3 ML: 2.5; .5 SOLUTION RESPIRATORY (INHALATION) at 14:34

## 2025-01-02 RX ADMIN — VANCOMYCIN HYDROCHLORIDE 500 MG: 500 INJECTION, SOLUTION INTRAVENOUS at 09:28

## 2025-01-02 RX ADMIN — PIPERACILLIN SODIUM AND TAZOBACTAM SODIUM 3.38 G: 3; .375 INJECTION, SOLUTION INTRAVENOUS at 09:49

## 2025-01-02 RX ADMIN — GABAPENTIN 100 MG: 100 CAPSULE ORAL at 09:28

## 2025-01-02 RX ADMIN — HEPARIN SODIUM 1800 UNITS/HR: 10000 INJECTION, SOLUTION INTRAVENOUS at 01:20

## 2025-01-02 RX ADMIN — POTASSIUM CHLORIDE 20 MEQ: 1500 TABLET, EXTENDED RELEASE ORAL at 14:26

## 2025-01-02 RX ADMIN — Medication 4 L/MIN: at 21:08

## 2025-01-02 RX ADMIN — BUMETANIDE 2 MG/HR: 0.25 INJECTION INTRAMUSCULAR; INTRAVENOUS at 22:31

## 2025-01-02 RX ADMIN — INSULIN LISPRO 2 UNITS: 100 INJECTION, SOLUTION INTRAVENOUS; SUBCUTANEOUS at 12:02

## 2025-01-02 RX ADMIN — NICOTINE 1 PATCH: 14 PATCH, EXTENDED RELEASE TRANSDERMAL at 09:30

## 2025-01-02 RX ADMIN — ACETAMINOPHEN 650 MG: 325 TABLET ORAL at 09:28

## 2025-01-02 RX ADMIN — HEPARIN SODIUM 1800 UNITS/HR: 10000 INJECTION, SOLUTION INTRAVENOUS at 14:27

## 2025-01-02 RX ADMIN — HYDROMORPHONE HYDROCHLORIDE 0.2 MG: 0.2 INJECTION, SOLUTION INTRAMUSCULAR; INTRAVENOUS; SUBCUTANEOUS at 15:06

## 2025-01-02 RX ADMIN — METOLAZONE 20 MG: 10 TABLET ORAL at 11:45

## 2025-01-02 RX ADMIN — PIPERACILLIN SODIUM AND TAZOBACTAM SODIUM 3.38 G: 3; .375 INJECTION, SOLUTION INTRAVENOUS at 16:21

## 2025-01-02 RX ADMIN — MIDODRINE HYDROCHLORIDE 5 MG: 5 TABLET ORAL at 09:42

## 2025-01-02 RX ADMIN — PANTOPRAZOLE SODIUM 40 MG: 40 TABLET, DELAYED RELEASE ORAL at 09:44

## 2025-01-02 RX ADMIN — INSULIN LISPRO 2 UNITS: 100 INJECTION, SOLUTION INTRAVENOUS; SUBCUTANEOUS at 09:51

## 2025-01-02 RX ADMIN — EZETIMIBE 10 MG: 10 TABLET ORAL at 10:09

## 2025-01-02 RX ADMIN — ACETAMINOPHEN 650 MG: 325 TABLET ORAL at 02:54

## 2025-01-02 RX ADMIN — ACETAMINOPHEN 650 MG: 325 TABLET ORAL at 15:06

## 2025-01-02 ASSESSMENT — PAIN SCALES - GENERAL
PAINLEVEL_OUTOF10: 7
PAINLEVEL_OUTOF10: 10 - WORST POSSIBLE PAIN
PAINLEVEL_OUTOF10: 7
PAINLEVEL_OUTOF10: 0 - NO PAIN
PAINLEVEL_OUTOF10: 3
PAINLEVEL_OUTOF10: 3
PAINLEVEL_OUTOF10: 6
PAINLEVEL_OUTOF10: 10 - WORST POSSIBLE PAIN
PAINLEVEL_OUTOF10: 7

## 2025-01-02 ASSESSMENT — PAIN - FUNCTIONAL ASSESSMENT
PAIN_FUNCTIONAL_ASSESSMENT: 0-10

## 2025-01-02 ASSESSMENT — COGNITIVE AND FUNCTIONAL STATUS - GENERAL
HELP NEEDED FOR BATHING: A LOT
PERSONAL GROOMING: A LITTLE
MOBILITY SCORE: 6
CLIMB 3 TO 5 STEPS WITH RAILING: TOTAL
TOILETING: TOTAL
DRESSING REGULAR LOWER BODY CLOTHING: TOTAL
STANDING UP FROM CHAIR USING ARMS: TOTAL
TURNING FROM BACK TO SIDE WHILE IN FLAT BAD: TOTAL
MOVING TO AND FROM BED TO CHAIR: TOTAL
MOVING FROM LYING ON BACK TO SITTING ON SIDE OF FLAT BED WITH BEDRAILS: TOTAL
DAILY ACTIVITIY SCORE: 13
WALKING IN HOSPITAL ROOM: TOTAL
DRESSING REGULAR UPPER BODY CLOTHING: A LOT

## 2025-01-02 ASSESSMENT — PAIN DESCRIPTION - LOCATION
LOCATION: LEG
LOCATION: LEG

## 2025-01-02 ASSESSMENT — ACTIVITIES OF DAILY LIVING (ADL)
ADL_ASSISTANCE: INDEPENDENT
BATHING_ASSISTANCE: MAXIMAL

## 2025-01-02 NOTE — PROGRESS NOTES
Vancomycin Dosing by Pharmacy- FOLLOW UP    Alo Glass is a 69 y.o. year old male who Pharmacy has been consulted for vancomycin dosing for cellulitis, skin and soft tissue. Based on the patient's indication and renal status this patient is being dosed based on a goal trough/random level of 10-15.     Renal function is currently declining.    Current vancomycin dose: 750 mg given as one time dose    Most recent trough level: 18 mcg/mL    Visit Vitals  BP 92/74 (BP Location: Right arm, Patient Position: Lying)   Pulse 92   Temp 36.2 °C (97.2 °F)   Resp 23        Lab Results   Component Value Date    CREATININE 2.94 (H) 2025    CREATININE 2.90 (H) 2025    CREATININE 2.18 (H) 2025    CREATININE 1.86 (H) 2024        Patient weight is as follows:   Vitals:    25 1143   Weight: 131 kg (289 lb 7.4 oz)       Cultures:  No results found for the encounter in last 14 days.       I/O last 3 completed shifts:  In: 1369.9 (10.4 mL/kg) [P.O.:650; I.V.:469.9 (3.6 mL/kg); IV Piggyback:250]  Out: 780 (5.9 mL/kg) [Urine:780 (0.2 mL/kg/hr)]  Weight: 131.3 kg   I/O during current shift:  No intake/output data recorded.    Temp (24hrs), Av.3 °C (97.3 °F), Min:36.2 °C (97.2 °F), Max:36.3 °C (97.4 °F)      Assessment/Plan    Above goal random/trough level. Orders placed for new vancomycin regimen of 500 every 24 hours to begin at 0830 .    The next level will be obtained on  at AM labs. May be obtained sooner if clinically indicated.   Will continue to monitor renal function daily while on vancomycin and order serum creatinine at least every 48 hours if not already ordered.  Follow for continued vancomycin needs, clinical response, and signs/symptoms of toxicity.       Will Love, PharmD

## 2025-01-02 NOTE — PROGRESS NOTES
Occupational Therapy    Evaluation    Patient Name: Alo Glass  MRN: [id number]  Today's Date: 1/2/2025  Room: [address] [place] [address]  Time Calculation  Start Time: 1030  Stop Time: 1051  Time Calculation (min): 21 min    Assessment  IP OT Assessment  OT Assessment: Pt's abilty to complete ADLs currently limited by deficits in functional strength, activity tolerance, and dynamic balance. The pt demos the ability to complete the majority of ADL tasks with DEP - Mod A. He demos poor tolerance and effort during eval and is limited to bed level session. Pt will benefit from continued OT while admitted to increase IND and safety prior to d/c.  Prognosis: Fair  Barriers to Discharge Home: Physical needs, Caregiver assistance  Caregiver Assistance: Patient lives alone and/or does not have reliable caregiver assistance  Physical Needs: 24hr ADL assistance needed, Ambulating household distances limited by function/safety, 24hr mobility assistance needed, High falls risk due to function or environment  Evaluation/Treatment Tolerance: Patient limited by fatigue, Patient limited by pain  Medical Staff Made Aware: Yes  End of Session Communication: Bedside nurse (NP)  End of Session Patient Position: Bed, 3 rail up, Alarm off, not on at start of session  Plan:  Inpatient Plan  Treatment Interventions: ADL retraining, Functional transfer training, UE strengthening/ROM, Endurance training, Patient/family training, Equipment evaluation/education, Compensatory technique education  OT Frequency: 2 times per week  OT Discharge Recommendations: Moderate intensity level of continued care  OT Recommended Transfer Status: Assist of 2  OT - OK to Discharge: Yes  OT Assessment  OT Assessment Results: Decreased ADL status, Decreased upper extremity strength, Decreased endurance, Decreased functional mobility, Decreased IADLs  Prognosis: Fair  Barriers to Discharge: Decreased caregiver support (CLOF)  Evaluation/Treatment Tolerance: Patient limited  by fatigue, Patient limited by pain  Medical Staff Made Aware: Yes  Strengths: Housing layout  Barriers to Participation: Attitude of self, Support of Caregivers, Comorbidities    Subjective   Current Problem:  1. Septic shock due to undetermined organism (Multi)  Transthoracic Echo (TTE) Limited    Transthoracic Echo (TTE) Limited      2. Heart failure with reduced ejection fraction  CANCELED: Transthoracic Echo (TTE) Complete    CANCELED: Transthoracic Echo (TTE) Complete      3. Other forms of dyspnea  CANCELED: Transthoracic Echo (TTE) Complete    CANCELED: Transthoracic Echo (TTE) Complete        General:  Reason for Referral: Septic Shock w acute decompensated HF  Past Medical History Relevant to Rehab: HFrEF (EF 23% 9/2024) , COPD (2019: FEV1 45% w +ve BD change; %/TLC 85%), MELISSA, obesity, type II DM (last A1c decreased to 5.5% 6/25/24), hx of stenotrophomonas pneumonia w parapneumonic effusion (9/2024), HTN, hypothyroidism and HLD  Prior to Session Communication: Bedside nurse (NP)  Patient Position Received: Bed, 3 rail up, Alarm off, not on at start of session  Family/Caregiver Present: Yes  Caregiver Feedback: NP present for part of session to provide encouragement  General Comment: Pt supine in bed upon arrival. Denies moving from supine 2/2 pain in LEs despite therapist and NP encouragement.   Precautions:  Medical Precautions: Fall precautions, Oxygen therapy device and L/min  Precautions Comment: Keep BLEs elevated  Pain:  Pain Assessment  Pain Assessment: 0-10  0-10 (Numeric) Pain Score: 10 - Worst possible pain  Pain Type: Acute pain  Pain Location: Leg  Pain Orientation: Left, Right, Distal  Pain Interventions: Repositioned  Response to Interventions: Decrease in pain  Lines/Tubes/Drains:  Urethral Catheter Non-latex 16 Fr. (Active)   Number of days: 1         Objective   Cognition:  Overall Cognitive Status: Within Functional Limits  Orientation Level: Oriented X4  Insight: Mild            Home Living:  Type of Home: House  Lives With: Alone  Home Adaptive Equipment: Walker rolling or standard  Home Layout: One level  Home Access: Ramped entrance  Bathroom Shower/Tub: Tub/shower unit  Bathroom Equipment: Tub transfer bench  Home Living Comments: reports initially using hospital bed on 1st floor, but then getting rid of it d/t discomfort from R shoulder injury; reports that he will sleep on an adjustable queen bed upon DC   Prior Function:  Level of [place]: Independent with ADLs and functional transfers, Independent with homemaking with ambulation  Receives Help From:  (has a cleaning aide)  ADL Assistance: Independent  Homemaking Assistance: Independent (gets groceries delievered)  Ambulatory Assistance: Independent (uses 2ww, reports short household distances, uses WC at times as well)  Hand Dominance: Right  IADL History:  Homemaking Responsibilities: Yes  Meal Prep Responsibility: Primary  Laundry Responsibility: Primary  Cleaning Responsibility: Primary  Bill Paying/Finance Responsibility: Primary  Shopping Responsibility:  (Delivery)  Occupation: Retired  ADL:  Eating Assistance: Independent (Anticipated)  Grooming Assistance: Stand by (Seated)  Bathing Assistance: Maximal (Anticipated)  UE Dressing Assistance: Moderate (Anticipated)  LE Dressing Assistance: Total (Anticipated)  Toileting Assistance with Device: Total (Anticipated)  ADL Comments: Limited participation in eval  Activity Tolerance:  Endurance: Decreased tolerance for upright activites  Activity Tolerance Comments: Limited by pain  Balance:     Bed Mobility/Transfers: Bed Mobility  Bed Mobility: No (Refused despite encouragement) - Max A for repositioning in bed including moving LEs and trunk. Cues for improved engagement, use of bed rail.      and    IADL's:   Homemaking Responsibilities: Yes  Meal Prep Responsibility: Primary  Laundry Responsibility: Primary  Cleaning Responsibility: Primary  Bill Paying/Finance  Responsibility: Primary  Shopping Responsibility:  (Delivery)  Occupation: Retired  Vision: Vision - Basic Assessment  Current Vision: Wears glasses only for reading   and    Sensation:  Light Touch: No apparent deficits  Strength:  Strength Comments: B UEs 4/5 grossly  Perception:  Inattention/Neglect: Appears intact  Coordination:  Movements are Fluid and Coordinated: Yes   Hand Function:  Hand Function  Gross Grasp: Functional  Coordination: Functional  Extremities: RUE   RUE : Exceptions to WFL, LUE   LUE: Exceptions to WFL,  , and      Outcome Measures: [facility] Daily Activity  Putting on and taking off regular lower body clothing: Total  Bathing (including washing, rinsing, drying): A lot  Putting on and taking off regular upper body clothing: A lot  Toileting, which includes using toilet, bedpan or urinal: Total  Taking care of personal grooming such as brushing teeth: A little  Eating Meals: None  Daily Activity - Total Score: 13         ,     OT Adult Other Outcome Measures  4AT: 4 AT -    Education Documentation  Body Mechanics, taught by John Botello OT at 1/2/2025 12:40 PM.  Learner: Patient  Readiness: Acceptance  Method: Explanation, Demonstration  Response: Verbalizes Understanding, Needs Reinforcement    Precautions, taught by John Botello OT at 1/2/2025 12:40 PM.  Learner: Patient  Readiness: Acceptance  Method: Explanation, Demonstration  Response: Verbalizes Understanding, Needs Reinforcement    ADL Training, taught by John Botello OT at 1/2/2025 12:40 PM.  Learner: Patient  Readiness: Acceptance  Method: Explanation, Demonstration  Response: Verbalizes Understanding, Needs Reinforcement    Education Comments  No comments found.        Goals:     Encounter Problems       Encounter Problems (Active)       ADLs       Patient with complete upper body dressing with supervision level of assistance donning and doffing all UE clothes with PRN adaptive equipment while edge of bed  (Progressing)        Start:  01/02/25    Expected End:  01/23/25            Patient will complete daily grooming tasks brushing teeth and washing face/hair with supervision level of assistance and PRN adaptive equipment while edge of bed . (Progressing)       Start:  01/02/25    Expected End:  01/23/25            Patient will complete toileting including hygiene clothing management/hygiene with moderate assist level of assistance and bedside commode. (Progressing)       Start:  01/02/25    Expected End:  01/23/25               BALANCE       Patient will tolerate sitting unsupported for >5 minutes to supervision level of assistance in order to improve functional activity tolerance for ADL tasks. (Progressing)       Start:  01/02/25    Expected End:  01/23/25               EXERCISE/STRENGTHENING       Patient will be educated on BUE HEP for increased ADL performance. (Progressing)       Start:  01/02/25    Expected End:  01/23/25               TRANSFERS       Patient will complete functional transfers with least restrictive device with moderate assist level of assistance. (Progressing)       Start:  01/02/25    Expected End:  01/23/25 01/02/25 at 12:41 PM   John Botello OT   Rehab Office: [phone]

## 2025-01-02 NOTE — PROGRESS NOTES
Physical Therapy    Physical Therapy Treatment    Patient Name: Alo Glass  MRN: [id number]  Department: [address]  Room: 6017/6017-A  Today's Date: 1/2/2025  Time Calculation  Start Time: 1030  Stop Time: 1043  Time Calculation (min): 13 min         Assessment/Plan   PT Assessment  End of Session Communication: Bedside nurse (NP)  Assessment Comment: Patient only agreeable to repositioning in bed due to increased pain on arrival. NP in room to provided encouragement in addition to therapists. Pt refusing further mobility.  End of Session Patient Position: Bed, 3 rail up, Alarm off, not on at start of session     PT Plan  Treatment/Interventions: Bed mobility, Transfer training, Gait training, Balance training, Strengthening, Endurance training, Therapeutic exercise, Therapeutic activity, Positioning, Postural re-education  PT Plan: Ongoing PT  PT Frequency: 3 times per week  PT Discharge Recommendations: Moderate intensity level of continued care  PT Recommended Transfer Status: Assist x2  PT - OK to Discharge: Yes      General Visit Information:   PT  Visit  PT Received On: 01/02/25  Response to Previous Treatment: Patient with no complaints from previous session.  General  Prior to Session Communication: Bedside nurse (NP in room- offering additional dose of pain meds)  Patient Position Received: Bed, 3 rail up, Alarm off, not on at start of session  General Comment: pt supine in bed, pt refused OOB mobility but requesting repositioning in bed. therapists assisted in positioning in bed.    Subjective   Precautions:  Precautions  Medical Precautions: Fall precautions, Oxygen therapy device and L/min      Objective   Pain:  Pain Assessment  Pain Assessment: 0-10  0-10 (Numeric) Pain Score: 10 - Worst possible pain  Pain Type: Acute pain  Pain Location: Leg  Pain Interventions: Repositioned  Cognition:  Cognition  Overall Cognitive Status: Within Functional Limits  Orientation Level: Oriented  X4    Treatments:  Therapeutic Activity  Therapeutic Activity Performed: Yes  Therapeutic Activity 1: pt requesting repositioning in bed, assisted in BLEs and trunk. placed pillows as needed. education provided on therapists role and mobility importance.    Outcome Measures:  [facility] Basic Mobility  Turning from your back to your side while in a flat bed without using bedrails: Total  Moving from lying on your back to sitting on the side of a flat bed without using bedrails: Total  Moving to and from bed to chair (including a wheelchair): Total  Standing up from a chair using your arms (e.g. wheelchair or bedside chair): Total  To walk in hospital room: Total  Climbing 3-5 steps with railing: Total  Basic Mobility - Total Score: 6    Education Documentation  Precautions, taught by Karolina Lew PT at 1/2/2025  1:23 PM.  Learner: Patient  Readiness: Acceptance  Method: Explanation  Response: Verbalizes Understanding  Comment: edu on role of PT, importance of mobility    Mobility Training, taught by Karolina Lew PT at 1/2/2025  1:23 PM.  Learner: Patient  Readiness: Acceptance  Method: Explanation  Response: Verbalizes Understanding  Comment: edu on role of PT, importance of mobility    Education Comments  No comments found.        OP EDUCATION:       Encounter Problems       Encounter Problems (Active)       PT Problem       Patient will complete bed mobility with MINx1 with HOB elevated  (Progressing)       Start:  12/30/24    Expected End:  01/20/25            Patient will complete STS with MODx1 using LRAD without acute LOB   (Not Progressing)       Start:  12/30/24    Expected End:  01/20/25            Patient will ambulate >/=15' with LRAD with MODx1 without acute LOB  (Not Progressing)       Start:  12/30/24    Expected End:  01/20/25            Patient will complete static (MINx1) and dynamic (MODx1) standing balance activities using LRAD without acute LOB, while maintaining midline posture.  (Not Progressing)        Start:  12/30/24    Expected End:  01/20/25            Patient will participate in BLE there-ex program in order to assist in improving strength and to assist with the completion of functional mobility tasks.  (Not Progressing)       Start:  12/30/24    Expected End:  01/20/25               Pain - Adult

## 2025-01-02 NOTE — PROGRESS NOTES
"Medical Intensive Care - Daily Progress Note   Subjective    Alo Glass is a 69 y.o. year old male patient admitted on 12/29/2024 with following ICU needs: Bilateral lower ext cellulitis causing septic shock.      Interval History:  Overnight tolerated Bipap/Cpap.  Continues on Bumex gtt which was increased from 1mg/hr to 2mg/hr due to poor response.  Currently remains net positive 600ml over the last 24hrs.    Meds    Scheduled medications  [Held by provider] allopurinol, 300 mg, oral, Daily  ezetimibe, 10 mg, oral, Daily  gabapentin, 100 mg, oral, Daily  insulin lispro, 0-10 Units, subcutaneous, TID AC  ipratropium-albuteroL, 3 mL, nebulization, q6h  levothyroxine, 125 mcg, oral, Daily  midodrine, 5 mg, oral, TID  nicotine, 1 patch, transdermal, Daily  nystatin, 5 mL, Mouth/Throat, BID  oxygen, , inhalation, Nightly  pantoprazole, 40 mg, oral, Daily before breakfast  piperacillin-tazobactam, 3.375 g, intravenous, q6h      Continuous medications  bumetanide, 2 mg/hr, Last Rate: 2 mg/hr (01/02/25 0551)  heparin, 0-4,500 Units/hr, Last Rate: 1,800 Units/hr (01/02/25 0120)      PRN medications  PRN medications: acetaminophen, albuterol, dextrose, dextrose, glucagon, glucagon, heparin, HYDROmorphone, oxygen, polyethylene glycol, sodium chloride, vancomycin     Objective    Blood pressure 94/70, pulse 97, temperature 36.2 °C (97.2 °F), resp. rate 20, height 1.803 m (5' 11\"), weight 131 kg (289 lb 7.4 oz), SpO2 98%.         Constitutional: chronically Ill appearing elderly male in NAD  Eyes: PERRLA, no icterus   ENMT: Mucous membranes dry   Head/Neck: atraumatic   Respiratory/Thorax: Lungs diminished R>L, productive cough with thick yellow/brown sputum, non-labored breathing, on home cpap   Cardiovascular: Tachy; s1/s2 normal.  No obvious MRG.  Gastrointestinal: obese, soft, non-tender, normoactive bowel sounds   Extremities: +2-3 edema B/L LE, Cellulitis with erythema b/l LE, venous statis ulcer vs blister " RLE  Neurological: alert and oriented x 3, speech clear, follows commands appropriately, no focal deficits.    Skin: Warm and dry      Intake/Output Summary (Last 24 hours) at 1/2/2025 0640  Last data filed at 1/2/2025 0300  Gross per 24 hour   Intake 1069.9 ml   Output 430 ml   Net 639.9 ml     Labs:   Results from last 72 hours   Lab Units 01/02/25  0216 01/01/25 2207 01/01/25 0046 12/31/24  2157   SODIUM mmol/L 128* 126* 131* 121*   POTASSIUM mmol/L 4.0 7.4* 4.1 4.0   CHLORIDE mmol/L 83* 82* 85* 67*   CO2 mmol/L 27 31 28 24   BUN mg/dL 92* 91* 82* 70*   CREATININE mg/dL 2.94* 2.90* 2.18* 1.86*   GLUCOSE mg/dL 137* 132* 123* 672*   CALCIUM mg/dL 8.3* 8.1* 8.2* 6.5*   ANION GAP mmol/L 22* 20 22* 34*   EGFR mL/min/1.73m*2 22* 23* 32* 39*   PHOSPHORUS mg/dL  --  8.5* 7.2* 5.8*      Results from last 72 hours   Lab Units 01/02/25 0216 01/01/25 0046 12/31/24  0623   WBC AUTO x10*3/uL 7.8 10.5 11.7*   HEMOGLOBIN g/dL 16.5 16.2 16.4   HEMATOCRIT % 52.1* 49.6 49.2   PLATELETS AUTO x10*3/uL 111* 114* 119*   NEUTROS PCT AUTO % 76.7 82.2 87.9   LYMPHS PCT AUTO % 9.8 7.1 4.1   MONOS PCT AUTO % 11.3 9.6 7.3   EOS PCT AUTO % 1.2 0.4 0.1            Results from last 72 hours   Lab Units 01/01/25 0046 12/31/24 2157   POCT PH, VENOUS pH 7.31* 7.23*   POCT PCO2, VENOUS mm Hg 65* 59*   POCT PO2, VENOUS mm Hg 55* 37        Micro/ID:     Lab Results   Component Value Date    URINECULTURE No growth 12/28/2024    BLOODCULT No growth at 4 days -  FINAL REPORT 12/28/2024    BLOODCULT No growth at 4 days -  FINAL REPORT 12/28/2024       Summary of key imaging results from the last 24 hours  XR CHEST 1 VIEW; 12/30/2024 9:03 am   IMPRESSION:  1. Unchanged mild perihilar edema and bibasilar atelectasis, right more than left. Small right pleural effusion. Unchanged enlarged cardiac silhouette size    TRANSTHORACIC ECHOCARDIOGRAM REPORT  12/30/24  CONCLUSIONS:   1. Poorly visualized anatomical structures due to suboptimal image quality.    "2. Left ventricular ejection fraction is severely decreased, calculated by Green's biplane at 19%.   3. There is global hypokinesis of the left ventricle with minor regional variations.   4. Left ventricular cavity size is moderately dilated.   5. Abnormal septal motion consistent with left bundle branch block.   6. No left ventricular thrombus visualized.   7. There is reduced right ventricular systolic function.   8. Moderately enlarged right ventricle.   9. The left atrium is enlarged.  10. The right atrium is moderately dilated.  11. Mild aortic valve regurgitation.  12. Compared with study dated 9/19/2024, no significant change.    Renal US 12/29/24    Assessment and Plan     Assessment: Alo Glass is a 69 y.o. year old male patient admitted on 12/29/2024 with Septic shock 2/2 bilateral lower ext cellulitis and cardiogenic shock and AHRF 2/2 volume overload and LEONA.     Summary for 01/02/25  :  Cont to diurese to a goal of -1.5L with bumex gtt   Start Metolazone 20mg daily  Cont with IV Atbs regimen:  Zosyn and Vanco    Plan:  NEUROLOGY/PSYCH:  Dx: ZENA  A&Ox3  Management:  Continue gabapentin 100mg daily and Nicotine patch daily,   Pain regimen:  Tylenol 650 mg prn and Dilaudid PRN   PT/OT--> Refused to work with PT today due to pain and \"wanting to be left alone\". Additional pain medications offered to help facilitate PT but patient declined     CARDIOVASCULAR:  Dx: Hx HFrEF, HTN, HLD, severe PAD.  Septic shock, resolved.  Cardiogenic shock, resovled. Aflutter  HDS but with narrow pulse pressure  Remains off vasopressors since 12/30  TTE 12/30: LVEF 19%  Management:  Continue Ezetimibe 10mg daily   Holding home lasix and SGLT-2 iso hypotension  Consider GDMT when able  S/p Dig load 12/29-12/30  Bumex gtt 2mg/hr; goal of -1.5L/day  Heparin gtt  Started on Midodrine 5mg TID for renal perfusion   Cardiology consulted, signed off 12/31: Continue diuresis with IV Bumex.  If decompensates can consider " cardioversion for atrial kick restoration. Prior to discharge arrange follow up with Dr. Moore as an o/p @ [phone] (scheduling)  Tele    PULMONARY:  Dx: Hx asthma and COPD GOLD 3 (2019: FEV1 45% w +ve BD change; %/TLC 85%). AHRF 2/2 hypervolemia   Stable on 2-5L NC with Bipap/cpap HS and with naps  CxR : Interval increase in the size of bilateral pleural effusions with adjacent atelectasis/consolidation. Similar appearance of prominent perihilar and interstitial lung markings consistent with interstitial pulmonary edema.  VB./65/55/87 prior to being placed on Bipap   Management:  Transitioned from Cpap to Bipap  d/t hypercapnia   Diuresis as outlined above   Cont Duonebs q6h, albuterol PRN  S/p steroid taper   Airway clearance TID    RENAL/GENITOURINARY:  Dx: LEONA. Hyponatremia likely I/s/o volume overload.  LT renal mass c/f RCC  Worsening Cr today, 2.94 from 2.18 yesterday   Renal US : No hydronephrosis. hypoechoic lesion of the left superior pole measuring 1.9 cm which was seen on the prior CT dated 2024, further evaluation with a kidney protocol MRI is strongly recommended. There is an additional indeterminate 0.5 cm hypoechoic lesion of the left kidney.  Management:  Voiding via lipscomb   Nephrology following, recs appreciated: Continue on Bumex drip.  Start Metolazone 20mg daily.  Will consider UF tomorrow if inadequate diuresis or sooner if worsening respiratory status   Pt saw Urology on 2024 and referrred to IR for biopsy   Urine lytes consistent with prerenal  Diurese goal net negative -1.5-2L  RFP daily    GASTROENTEROLOGY:  Dx: Hx GERD   BMx2  Management:  Diet: Cardiac and carb control diet  Bowel Regimen: Miralax PRN   PPI    ENDOCRINOLOGY:  Dx: Hx Hypothyroid and DMII  Glucose 130-190s  HgbA1C  5.5%  Management:  Home regimen:  Mounjaro and Jardiance; pt also utilizes a Dexcom CGM)  Cont with Insuline lispro TID  Glucose goal of <180  Hypoglycemia protocol      HEMATOLOGY:  Dx: ZENA  H/h stable   Management:  CBC daily     SKIN:  Dx:  Skin Failure Yes, describe: BL L/E cellutis vs PAD   Management:  Wound following  ACS consulted, signed off 1/1: Elevate legs as able, continue with abx.  If erythema does not completely resolve with antibiotic treatment would recommend re-imaging with non-contrast CT of BLE to assess for new, undrained fluid collections     INFECTIOUS DISEASE:  Dx: Cellulitis   Tmax 36.3 without leukocytosis   Management:  Micro: 12/28 Legionella, Strep pneum, negative, 12/28 Urine Cx negative, 12/28 BlCx NTGD x2  Abx: Clindamycin (12/28), Zosyn (12/28 - **), Vancomycin (12/28 - **)  Cont with Vanco/Zosyn   ACS consulted signed off 1/1; rec cont IV Atbs     ICU Check List     FEN  Fluids: PRN  Electrolytes: K>4, Phos >3, Mg> 2  Nutrition: Cardiac diet and carb control diet   Prophylaxis:  DVT ppx: Heparin gtt  GI ppx: PPI  Bowel care: Miralax PRN   Hardware:         Urethral Catheter Temperature probe 16 Fr. (Active)   Placement Date: 12/28/24   Hand Hygiene Completed: Yes  Catheter Type: Temperature probe  Tube Size (Fr.): 16 Fr.  Catheter Balloon Size: 5 mL  Urine Returned: Yes   Number of days: 3       Social:  Code: Full Code    HPOA: Sheyla Griggs [phone]   Disposition: SDU    Pt discussed with Dr. Manjarrez, seen and examined. All labs, VS and previous plan of care reviewed.      Antonio Amaya, PARAM-CNP   01/02/25 at 6:40 AM     Disclaimer: Documentation completed with the information available at the time of input. The times in the chart may not be reflective of actual patient care times, interventions, or procedures. Documentation occurs after the physical care of the patient.

## 2025-01-02 NOTE — SIGNIFICANT EVENT
Rapid Response Nurse Note: RADAR alert: Score = 7    Pager time: [phone]  Arrival time: 810  Event end time: 812  Location: [address]  [x] Triage by phone or secure messaging    Rapid response initiated by:  [] Rapid response RN [] Family [] Nursing Supervisor [] Physician   [x] RADAR auto page [] Sepsis auto-page [] RN [] RT   [] NP/PA [] Other:     Primary reason for call:   [] BAT [] New CPAP/BiPAP [] Bleeding [] Change in mental status   [] Chest pain [] Code blue [] FiO2 >/= 50% [] HR </= 40 bpm   [] HR >/= 130 bpm [] Hyperglycemia [] Hypoglycemia [] RADAR    [] RR </= 8 bpm [] RR >/= 30 bpm [] SBP </= 90 mmHg [] SpO2 < 90%   [] Seizure [] Sepsis [] Shortness of breath  [] Staff concern: see comments     Initial VS and/or RADAR VS:  HR 92; RR 23; BP 92/74; SPO2 98%.    Interventions:  [x] None [] ABG/VBG [] Assist w/ICU transfer [] BAT paged    [] Bag mask [] Blood [] Cardioversion [] Code Blue   [] Code blue for intubation [] Code status changed [] Chest x-ray [] EKG   [] IV fluid/bolus [] KUB x-ray [] Labs/cultures [] Medication   [] Nebulizer treatment [] NIPPV (CPAP/BiPAP) [] Oxygen [] Oral airway   [] Peripheral IV [] Palliative care consult [] CT/MRI [] Sepsis protocol    [] Suctioned [] Other:     Outcome:  [] Coded and  [] Code blue for intubation [] Coded and transferred to ICU []  on division   [x] Remained on division (no change) [] Remained on division + additional monitoring [] Remained in ED [] Transferred to ED   [] Transferred to ICU [] Transferred to inpatient status [] Transferred for interventions (procedure) [] Transferred to ICU stepdown    [] Transferred to surgery [] Transferred to telemetry [] Sepsis protocol [] STEMI protocol   [] Stroke protocol [x] Bedside nurse instructed to page rapid response for any concerns or acute change in condition/VS     Additional Comments: RADAR auto page received for above vitals. Per bedside RN, no acute changes or concerns at this time.   Please page rapid response for any concerns for deterioration or assistance needed.

## 2025-01-02 NOTE — CARE PLAN
Problem: Pain - Adult  Goal: Verbalizes/displays adequate comfort level or baseline comfort level  Outcome: Progressing     Problem: Pain  Goal: Takes deep breaths with improved pain control throughout the shift  Outcome: Progressing  Goal: Turns in bed with improved pain control throughout the shift  Outcome: Progressing  Goal: Walks with improved pain control throughout the shift  Outcome: Progressing  Goal: Performs ADL's with improved pain control throughout shift  Outcome: Progressing  Goal: Participates in PT with improved pain control throughout the shift  Outcome: Progressing  Goal: Free from opioid side effects throughout the shift  Outcome: Progressing  Goal: Free from acute confusion related to pain meds throughout the shift  Outcome: Progressing     Problem: Skin  Goal: Promote/optimize nutrition  Flowsheets (Taken 1/2/2025 0410)  Promote/optimize nutrition: Monitor/record intake including meals  Goal: Promote skin healing  Flowsheets (Taken 1/2/2025 0410)  Promote skin healing:   Assess skin/pad under line(s)/device(s)   Ensure correct size (line/device) and apply per  instructions   Protective dressings over bony prominences   Rotate device position/do not position patient on device   Turn/reposition every 2 hours/use positioning/transfer devices   The patient's goals for the shift include      The clinical goals for the shift include Pt will remain HDS this shift

## 2025-01-02 NOTE — PROGRESS NOTES
NEPHROLOGY NOTE   Alo Glass   69 y.o.     MRN/Room: [id number]    Reason for consult: LEONA on CKD    SUBJECTIVE:   No major overnight events       Vitals:    01/02/25 1200   BP: 96/74   Pulse: 99   Resp: 25   Temp: 36.4 °C (97.5 °F)   SpO2: 91%        12/31 1900 - 01/02 0659  In: 1369.9 [P.O.:650; I.V.:469.9]  Out: 780 [Urine:780]   Weight change:      General appearance: Drowsy, not cooperative   Eyes: non-icteric  Skin: no apparent rash  Heart: S1 S2 regular  JVD visible  Lungs: CTA bilaterally  Abdomen: soft, nt/nd  Extremities: +2 edema bilat up to thighs as well as Erythema and Warm limbs.  : has Live      ASSESSMENT:  Alo Glass is a 69 y.o. male with PMHx of HFrEF (EF 23%) , COPD, MELISSA, obesity, type II DM, stenotrophomonas pneumonia w parapneumonic effusion (9/2024), HTN, hypothyroidism and HLD who was admitted to the MICU for Septic Shock & acute decompensated HF. Has Large Bullae of LE that are oozing, possibly infected.    #LEONA  - Baseline Cr 0.6, presented with 1.4.   - Ua has hyaline cast, hematuria, pyuria and l.est +, urine sodium was <10 which increased to 46 after diuretics   - bilateral renal enhancing lesions measuring 0.8 cm in the right upper pole kidney and 2 cm in the left upper pole kidney. Referral to urology and further evaluation with MR is suggested  - hypoNa likely hypervolemic    Etiology: hemodynamic instability in setting of Cardiorenal hemodynamics and sepsis+ contrast associated nephropathy    RECOMMENDATIONS:  - Continue Bumex drip  -Add Metolazone 20mg po once daily    - daily weights  - Continue Midodrine  - No indication for RRT today, but may become indicated if continued poor response to diuresis   -Dose medications for renal function  - strict Is and Os    SW Dr Ramsey Singh MD  Nephrology Fellow  24 hour Renal Pager - [phone]

## 2025-01-02 NOTE — SIGNIFICANT EVENT
Rapid Response Nurse Note: RADAR alert: Score = 8    Pager time:   Arrival time:   Event end time:   [place] [address]  [x] Triage by phone or secure messaging    Rapid response initiated by:  [] Rapid response RN [] Family [] Nursing Supervisor [] Physician   [x] RADAR auto page [] Sepsis auto-page [] RN [] RT   [] NP/PA [] Other:     Primary reason for call:   [] BAT [] New CPAP/BiPAP [] Bleeding [] Change in mental status   [] Chest pain [] Code blue [] FiO2 >/= 50% [] HR </= 40 bpm   [] HR >/= 130 bpm [] Hyperglycemia [] Hypoglycemia [x] RADAR    [] RR </= 8 bpm [] RR >/= 30 bpm [] SBP </= 90 mmHg [] SpO2 < 90%   [] Seizure [] Sepsis [] Shortness of breath  [] Staff concern: see comments     Initial VS and/or RADAR VS:  HR 99; RR 24; BP 83/66; SPO2 100%.    Providers present at bedside (if applicable): KRISTY Amaya    Interventions:  [x] None [] ABG/VBG [] Assist w/ICU transfer [] BAT paged    [] Bag mask [] Blood [] Cardioversion [] Code Blue   [] Code blue for intubation [] Code status changed [] Chest x-ray [] EKG   [] IV fluid/bolus [] KUB x-ray [] Labs/cultures [] Medication   [] Nebulizer treatment [] NIPPV (CPAP/BiPAP) [] Oxygen [] Oral airway   [] Peripheral IV [] Palliative care consult [] CT/MRI [] Sepsis protocol    [] Suctioned [] Other:     Outcome:  [] Coded and  [] Code blue for intubation [] Coded and transferred to ICU []  on division   [x] Remained on division (no change) [] Remained on division + additional monitoring [] Remained in ED [] Transferred to ED   [] Transferred to ICU [] Transferred to inpatient status [] Transferred for interventions (procedure) [] Transferred to ICU stepdown    [] Transferred to surgery [] Transferred to telemetry [] Sepsis protocol [] STEMI protocol   [] Stroke protocol [x] Bedside nurse instructed to page rapid response for any concerns or acute change in condition/VS     Additional Comments: RADAR auto page received for above  vitals. Per bedside RN, no acute changes or concerns at this time. Per primary team NP, no rapid response needs at this time.  Please page rapid response for any concerns for deterioration or assistance needed.

## 2025-01-03 LAB
ALBUMIN SERPL BCP-MCNC: 3.1 G/DL (ref 3.4–5)
ALP SERPL-CCNC: 109 U/L (ref 33–136)
ALT SERPL W P-5'-P-CCNC: 7 U/L (ref 10–52)
ANION GAP SERPL CALC-SCNC: 21 MMOL/L (ref 10–20)
ANION GAP SERPL CALC-SCNC: 24 MMOL/L (ref 10–20)
AST SERPL W P-5'-P-CCNC: 7 U/L (ref 9–39)
BASOPHILS # BLD AUTO: 0.01 X10*3/UL (ref 0–0.1)
BASOPHILS NFR BLD AUTO: 0.1 %
BILIRUB DIRECT SERPL-MCNC: 0.3 MG/DL (ref 0–0.3)
BILIRUB SERPL-MCNC: 0.7 MG/DL (ref 0–1.2)
BUN SERPL-MCNC: 101 MG/DL (ref 6–23)
BUN SERPL-MCNC: 102 MG/DL (ref 6–23)
CALCIUM SERPL-MCNC: 8.4 MG/DL (ref 8.6–10.6)
CALCIUM SERPL-MCNC: 8.7 MG/DL (ref 8.6–10.6)
CHLORIDE SERPL-SCNC: 84 MMOL/L (ref 98–107)
CHLORIDE SERPL-SCNC: 84 MMOL/L (ref 98–107)
CO2 SERPL-SCNC: 27 MMOL/L (ref 21–32)
CO2 SERPL-SCNC: 28 MMOL/L (ref 21–32)
CREAT SERPL-MCNC: 2.73 MG/DL (ref 0.5–1.3)
CREAT SERPL-MCNC: 2.85 MG/DL (ref 0.5–1.3)
EGFRCR SERPLBLD CKD-EPI 2021: 23 ML/MIN/1.73M*2
EGFRCR SERPLBLD CKD-EPI 2021: 24 ML/MIN/1.73M*2
EOSINOPHIL # BLD AUTO: 0.06 X10*3/UL (ref 0–0.7)
EOSINOPHIL NFR BLD AUTO: 0.8 %
ERYTHROCYTE [DISTWIDTH] IN BLOOD BY AUTOMATED COUNT: 13.7 % (ref 11.5–14.5)
GLUCOSE BLD MANUAL STRIP-MCNC: 116 MG/DL (ref 74–99)
GLUCOSE BLD MANUAL STRIP-MCNC: 136 MG/DL (ref 74–99)
GLUCOSE BLD MANUAL STRIP-MCNC: 152 MG/DL (ref 74–99)
GLUCOSE BLD MANUAL STRIP-MCNC: 162 MG/DL (ref 74–99)
GLUCOSE SERPL-MCNC: 128 MG/DL (ref 74–99)
GLUCOSE SERPL-MCNC: 170 MG/DL (ref 74–99)
HCT VFR BLD AUTO: 49.3 % (ref 41–52)
HGB BLD-MCNC: 16.3 G/DL (ref 13.5–17.5)
IMM GRANULOCYTES # BLD AUTO: 0.09 X10*3/UL (ref 0–0.7)
IMM GRANULOCYTES NFR BLD AUTO: 1.2 % (ref 0–0.9)
LYMPHOCYTES # BLD AUTO: 0.71 X10*3/UL (ref 1.2–4.8)
LYMPHOCYTES NFR BLD AUTO: 9.6 %
MCH RBC QN AUTO: 31 PG (ref 26–34)
MCHC RBC AUTO-ENTMCNC: 33.1 G/DL (ref 32–36)
MCV RBC AUTO: 94 FL (ref 80–100)
MONOCYTES # BLD AUTO: 0.52 X10*3/UL (ref 0.1–1)
MONOCYTES NFR BLD AUTO: 7.1 %
NEUTROPHILS # BLD AUTO: 5.97 X10*3/UL (ref 1.2–7.7)
NEUTROPHILS NFR BLD AUTO: 81.2 %
NRBC BLD-RTO: 0 /100 WBCS (ref 0–0)
PHOSPHATE SERPL-MCNC: 7.5 MG/DL (ref 2.5–4.9)
PHOSPHATE SERPL-MCNC: 7.9 MG/DL (ref 2.5–4.9)
PLATELET # BLD AUTO: 148 X10*3/UL (ref 150–450)
POTASSIUM SERPL-SCNC: 3.6 MMOL/L (ref 3.5–5.3)
POTASSIUM SERPL-SCNC: 3.6 MMOL/L (ref 3.5–5.3)
PROT SERPL-MCNC: 5.8 G/DL (ref 6.4–8.2)
RBC # BLD AUTO: 5.25 X10*6/UL (ref 4.5–5.9)
SODIUM SERPL-SCNC: 128 MMOL/L (ref 136–145)
SODIUM SERPL-SCNC: 132 MMOL/L (ref 136–145)
VANCOMYCIN SERPL-MCNC: 21.1 UG/ML (ref 5–20)
WBC # BLD AUTO: 7.4 X10*3/UL (ref 4.4–11.3)

## 2025-01-03 PROCEDURE — 2500000002 HC RX 250 W HCPCS SELF ADMINISTERED DRUGS (ALT 637 FOR MEDICARE OP, ALT 636 FOR OP/ED): Performed by: NURSE PRACTITIONER

## 2025-01-03 PROCEDURE — 36415 COLL VENOUS BLD VENIPUNCTURE: CPT | Performed by: PHARMACIST

## 2025-01-03 PROCEDURE — 36415 COLL VENOUS BLD VENIPUNCTURE: CPT | Performed by: NURSE PRACTITIONER

## 2025-01-03 PROCEDURE — 2500000002 HC RX 250 W HCPCS SELF ADMINISTERED DRUGS (ALT 637 FOR MEDICARE OP, ALT 636 FOR OP/ED)

## 2025-01-03 PROCEDURE — 99233 SBSQ HOSP IP/OBS HIGH 50: CPT

## 2025-01-03 PROCEDURE — 2500000004 HC RX 250 GENERAL PHARMACY W/ HCPCS (ALT 636 FOR OP/ED)

## 2025-01-03 PROCEDURE — 94660 CPAP INITIATION&MGMT: CPT

## 2025-01-03 PROCEDURE — 2500000001 HC RX 250 WO HCPCS SELF ADMINISTERED DRUGS (ALT 637 FOR MEDICARE OP)

## 2025-01-03 PROCEDURE — 84100 ASSAY OF PHOSPHORUS: CPT | Performed by: NURSE PRACTITIONER

## 2025-01-03 PROCEDURE — 99223 1ST HOSP IP/OBS HIGH 75: CPT

## 2025-01-03 PROCEDURE — 2500000001 HC RX 250 WO HCPCS SELF ADMINISTERED DRUGS (ALT 637 FOR MEDICARE OP): Performed by: NURSE PRACTITIONER

## 2025-01-03 PROCEDURE — 2500000004 HC RX 250 GENERAL PHARMACY W/ HCPCS (ALT 636 FOR OP/ED): Performed by: NURSE PRACTITIONER

## 2025-01-03 PROCEDURE — 80202 ASSAY OF VANCOMYCIN: CPT | Performed by: PHARMACIST

## 2025-01-03 PROCEDURE — 80076 HEPATIC FUNCTION PANEL: CPT

## 2025-01-03 PROCEDURE — 2500000005 HC RX 250 GENERAL PHARMACY W/O HCPCS: Performed by: NURSE PRACTITIONER

## 2025-01-03 PROCEDURE — S4991 NICOTINE PATCH NONLEGEND: HCPCS | Performed by: NURSE PRACTITIONER

## 2025-01-03 PROCEDURE — 99497 ADVNCD CARE PLAN 30 MIN: CPT

## 2025-01-03 PROCEDURE — 94640 AIRWAY INHALATION TREATMENT: CPT

## 2025-01-03 PROCEDURE — 2060000001 HC INTERMEDIATE ICU ROOM DAILY

## 2025-01-03 PROCEDURE — 94668 MNPJ CHEST WALL SBSQ: CPT

## 2025-01-03 PROCEDURE — 99233 SBSQ HOSP IP/OBS HIGH 50: CPT | Performed by: STUDENT IN AN ORGANIZED HEALTH CARE EDUCATION/TRAINING PROGRAM

## 2025-01-03 PROCEDURE — 85025 COMPLETE CBC W/AUTO DIFF WBC: CPT | Performed by: NURSE PRACTITIONER

## 2025-01-03 PROCEDURE — 82947 ASSAY GLUCOSE BLOOD QUANT: CPT

## 2025-01-03 RX ORDER — AMOXICILLIN 250 MG
1 CAPSULE ORAL NIGHTLY
Status: DISCONTINUED | OUTPATIENT
Start: 2025-01-03 | End: 2025-01-12 | Stop reason: HOSPADM

## 2025-01-03 RX ORDER — POTASSIUM CHLORIDE 20 MEQ/1
20 TABLET, EXTENDED RELEASE ORAL ONCE
Status: COMPLETED | OUTPATIENT
Start: 2025-01-03 | End: 2025-01-03

## 2025-01-03 RX ORDER — POTASSIUM CHLORIDE 14.9 MG/ML
20 INJECTION INTRAVENOUS ONCE
Status: COMPLETED | OUTPATIENT
Start: 2025-01-03 | End: 2025-01-04

## 2025-01-03 RX ADMIN — NICOTINE 1 PATCH: 14 PATCH, EXTENDED RELEASE TRANSDERMAL at 08:34

## 2025-01-03 RX ADMIN — MIDODRINE HYDROCHLORIDE 5 MG: 5 TABLET ORAL at 21:00

## 2025-01-03 RX ADMIN — HYDROMORPHONE HYDROCHLORIDE 0.2 MG: 0.2 INJECTION, SOLUTION INTRAMUSCULAR; INTRAVENOUS; SUBCUTANEOUS at 17:09

## 2025-01-03 RX ADMIN — HYDROMORPHONE HYDROCHLORIDE 0.2 MG: 0.2 INJECTION, SOLUTION INTRAMUSCULAR; INTRAVENOUS; SUBCUTANEOUS at 22:37

## 2025-01-03 RX ADMIN — IPRATROPIUM BROMIDE AND ALBUTEROL SULFATE 3 ML: 2.5; .5 SOLUTION RESPIRATORY (INHALATION) at 08:21

## 2025-01-03 RX ADMIN — Medication 5 L/MIN: at 21:30

## 2025-01-03 RX ADMIN — HYDROMORPHONE HYDROCHLORIDE 0.2 MG: 0.2 INJECTION, SOLUTION INTRAMUSCULAR; INTRAVENOUS; SUBCUTANEOUS at 11:05

## 2025-01-03 RX ADMIN — PIPERACILLIN SODIUM AND TAZOBACTAM SODIUM 3.38 G: 3; .375 INJECTION, SOLUTION INTRAVENOUS at 21:00

## 2025-01-03 RX ADMIN — SENNOSIDES AND DOCUSATE SODIUM 1 TABLET: 50; 8.6 TABLET ORAL at 21:36

## 2025-01-03 RX ADMIN — GABAPENTIN 100 MG: 100 CAPSULE ORAL at 08:33

## 2025-01-03 RX ADMIN — EZETIMIBE 10 MG: 10 TABLET ORAL at 08:27

## 2025-01-03 RX ADMIN — NYSTATIN 500000 UNITS: 100000 SUSPENSION ORAL at 21:29

## 2025-01-03 RX ADMIN — IPRATROPIUM BROMIDE AND ALBUTEROL SULFATE 3 ML: 2.5; .5 SOLUTION RESPIRATORY (INHALATION) at 21:44

## 2025-01-03 RX ADMIN — NYSTATIN 500000 UNITS: 100000 SUSPENSION ORAL at 08:34

## 2025-01-03 RX ADMIN — INSULIN LISPRO 2 UNITS: 100 INJECTION, SOLUTION INTRAVENOUS; SUBCUTANEOUS at 08:40

## 2025-01-03 RX ADMIN — PIPERACILLIN SODIUM AND TAZOBACTAM SODIUM 3.38 G: 3; .375 INJECTION, SOLUTION INTRAVENOUS at 15:06

## 2025-01-03 RX ADMIN — BUMETANIDE 2 MG/HR: 0.25 INJECTION INTRAMUSCULAR; INTRAVENOUS at 11:52

## 2025-01-03 RX ADMIN — PANTOPRAZOLE SODIUM 40 MG: 40 TABLET, DELAYED RELEASE ORAL at 08:33

## 2025-01-03 RX ADMIN — MIDODRINE HYDROCHLORIDE 5 MG: 5 TABLET ORAL at 13:01

## 2025-01-03 RX ADMIN — MIDODRINE HYDROCHLORIDE 5 MG: 5 TABLET ORAL at 08:27

## 2025-01-03 RX ADMIN — HEPARIN SODIUM 1800 UNITS/HR: 10000 INJECTION, SOLUTION INTRAVENOUS at 19:32

## 2025-01-03 RX ADMIN — PIPERACILLIN SODIUM AND TAZOBACTAM SODIUM 3.38 G: 3; .375 INJECTION, SOLUTION INTRAVENOUS at 08:25

## 2025-01-03 RX ADMIN — POTASSIUM CHLORIDE 20 MEQ: 1500 TABLET, EXTENDED RELEASE ORAL at 15:06

## 2025-01-03 RX ADMIN — INSULIN LISPRO 2 UNITS: 100 INJECTION, SOLUTION INTRAVENOUS; SUBCUTANEOUS at 12:58

## 2025-01-03 RX ADMIN — HEPARIN SODIUM 1800 UNITS/HR: 10000 INJECTION, SOLUTION INTRAVENOUS at 05:31

## 2025-01-03 RX ADMIN — BUMETANIDE 2 MG/HR: 0.25 INJECTION INTRAMUSCULAR; INTRAVENOUS at 21:37

## 2025-01-03 RX ADMIN — LEVOTHYROXINE SODIUM 125 MCG: 25 TABLET ORAL at 08:33

## 2025-01-03 RX ADMIN — SALINE NASAL SPRAY 1 SPRAY: 1.5 SOLUTION NASAL at 08:28

## 2025-01-03 RX ADMIN — METOLAZONE 20 MG: 10 TABLET ORAL at 08:33

## 2025-01-03 RX ADMIN — IPRATROPIUM BROMIDE AND ALBUTEROL SULFATE 3 ML: 2.5; .5 SOLUTION RESPIRATORY (INHALATION) at 13:39

## 2025-01-03 ASSESSMENT — COGNITIVE AND FUNCTIONAL STATUS - GENERAL
EATING MEALS: A LOT
CLIMB 3 TO 5 STEPS WITH RAILING: TOTAL
MOBILITY SCORE: 8
STANDING UP FROM CHAIR USING ARMS: TOTAL
DRESSING REGULAR UPPER BODY CLOTHING: A LOT
WALKING IN HOSPITAL ROOM: TOTAL
MOVING FROM LYING ON BACK TO SITTING ON SIDE OF FLAT BED WITH BEDRAILS: A LOT
DRESSING REGULAR LOWER BODY CLOTHING: A LOT
PERSONAL GROOMING: A LOT
TURNING FROM BACK TO SIDE WHILE IN FLAT BAD: A LOT
MOVING TO AND FROM BED TO CHAIR: TOTAL
HELP NEEDED FOR BATHING: A LOT
DAILY ACTIVITIY SCORE: 12
TOILETING: A LOT

## 2025-01-03 ASSESSMENT — PAIN DESCRIPTION - ORIENTATION
ORIENTATION: RIGHT;LEFT

## 2025-01-03 ASSESSMENT — PAIN SCALES - GENERAL
PAINLEVEL_OUTOF10: 0 - NO PAIN
PAINLEVEL_OUTOF10: 5 - MODERATE PAIN
PAINLEVEL_OUTOF10: 0 - NO PAIN
PAINLEVEL_OUTOF10: 7
PAINLEVEL_OUTOF10: 2
PAINLEVEL_OUTOF10: 7
PAINLEVEL_OUTOF10: 7
PAINLEVEL_OUTOF10: 0 - NO PAIN
PAINLEVEL_OUTOF10: 0 - NO PAIN

## 2025-01-03 ASSESSMENT — PAIN - FUNCTIONAL ASSESSMENT
PAIN_FUNCTIONAL_ASSESSMENT: 0-10

## 2025-01-03 ASSESSMENT — PAIN DESCRIPTION - LOCATION
LOCATION: LEG

## 2025-01-03 ASSESSMENT — PAIN SCALES - PAIN ASSESSMENT IN ADVANCED DEMENTIA (PAINAD): TOTALSCORE: MEDICATION (SEE MAR)

## 2025-01-03 NOTE — SIGNIFICANT EVENT
Rapid Response Nurse Note: RADAR alert: 9    Pager time: [phone]  Arrival time: 50  Event end time: 53  Location: [place] [address]  [x] Triage by phone or secure messaging    Rapid response initiated by:  [] Rapid response RN [] Family [] Nursing Supervisor [] Physician   [x] RADAR auto page [] Sepsis auto-page [] RN [] RT   [] NP/PA [] Other:     Primary reason for call:   [] BAT [] New CPAP/BiPAP [] Bleeding [] Change in mental status   [] Chest pain [] Code blue [] FiO2 >/= 50% [] HR </= 40 bpm   [] HR >/= 130 bpm [] Hyperglycemia [] Hypoglycemia [x] RADAR    [] RR </= 8 bpm [] RR >/= 30 bpm [] SBP </= 90 mmHg [] SpO2 < 90%   [] Seizure [] Sepsis [] Shortness of breath  [] Staff concern: see comments     Initial VS and/or RADAR VS: T 36.3 °C; ; RR 34; BP 96/80; SPO2 94%.    Providers present at bedside (if applicable):     Name of ICU Provider contacted (if applicable):     Interventions:  [x] None [] ABG/VBG [] Assist w/ICU transfer [] BAT paged    [] Bag mask [] Blood [] Cardioversion [] Code Blue   [] Code blue for intubation [] Code status changed [] Chest x-ray [] EKG   [] IV fluid/bolus [] KUB x-ray [] Labs/cultures [] Medication   [] Nebulizer treatment [] NIPPV (CPAP/BiPAP) [] Oxygen [] Oral airway   [] Peripheral IV [] Palliative care consult [] CT/MRI [] Sepsis protocol    [] Suctioned [] Other:     Outcome:  [] Coded and  [] Code blue for intubation [] Coded and transferred to ICU []  on division   [x] Remained on division (no change) [] Remained on division + additional monitoring [] Remained in ED [] Transferred to ED   [] Transferred to ICU [] Transferred to inpatient status [] Transferred for interventions (procedure) [] Transferred to ICU stepdown    [] Transferred to surgery [] Transferred to telemetry [] Sepsis protocol [] STEMI protocol   [] Stroke protocol [x] Bedside nurse instructed to page rapid response for any concerns or acute change in condition/VS     Additional  Comments: Reviewed above VS with bedside RN.  VS within patient's current trends.  No interventions by rapid response team indicated at this time.  Staff to page rapid response for any concerns or acute change in condition/VS.

## 2025-01-03 NOTE — PROGRESS NOTES
Spiritual Care Visit  Spiritual Care Request    Reason for Visit:  Routine Visit: Introduction  Continue Visiting: Yes  Crisis Visit: Critical care     Focus of Care:  Visited With: Patient     Met with patient along with Palliative CNP to discuss patient's current medical condition, medical trajectory, coping (doing everything the doctors are asking him to do), systems of support (/HCPOA Sheyla and friends), taye (identifies as Religion but doesn't desire a visit from the  at this time) and things of importance to patient. Patient has strong desire to get his financial affairs in order and doesn't want to be in pain at end of life. Provided non-anxious, supportive presence, reflective listening, naming of emotions, normalization and affirmation of experience. Will continue to follow.

## 2025-01-03 NOTE — SIGNIFICANT EVENT
Rapid Response Nurse Note: RADAR alert: 6    Pager time: [phone]  Arrival time: 1800  Event end time:   Location: [facility]  [x] Triage by phone or secure messaging    Rapid response initiated by:  [] Rapid response RN [] Family [] Nursing Supervisor [] Physician   [x] RADAR auto page [] Sepsis auto-page [] RN [] RT   [] NP/PA [] Other:     Primary reason for call:   [] BAT [] New CPAP/BiPAP [] Bleeding [] Change in mental status   [] Chest pain [] Code blue [] FiO2 >/= 50% [] HR </= 40 bpm   [] HR >/= 130 bpm [] Hyperglycemia [] Hypoglycemia [x] RADAR    [] RR </= 8 bpm [] RR >/= 30 bpm [] SBP </= 90 mmHg [] SpO2 < 90%   [] Seizure [] Sepsis [] Shortness of breath  [] Staff concern: see comments     Initial VS and/or RADAR VS: T null °C; ; RR 24; /77; SPO2 96%.    Interventions:  [x] None [] ABG/VBG [] Assist w/ICU transfer [] BAT paged    [] Bag mask [] Blood [] Cardioversion [] Code Blue   [] Code blue for intubation [] Code status changed [] Chest x-ray [] EKG   [] IV fluid/bolus [] KUB x-ray [] Labs/cultures [] Medication   [] Nebulizer treatment [] NIPPV (CPAP/BiPAP) [] Oxygen [] Oral airway   [] Peripheral IV [] Palliative care consult [] CT/MRI [] Sepsis protocol    [] Suctioned [] Other:     Outcome:  [] Coded and  [] Code blue for intubation [] Coded and transferred to ICU []  on division   [x] Remained on division (no change) [] Remained on division + additional monitoring [] Remained in ED [] Transferred to ED   [] Transferred to ICU [] Transferred to inpatient status [] Transferred for interventions (procedure) [] Transferred to ICU stepdown    [] Transferred to surgery [] Transferred to telemetry [] Sepsis protocol [] STEMI protocol   [] Stroke protocol [x] Bedside nurse instructed to page rapid response for any concerns or acute change in condition/VS     Additional Comments: RADAR auto generated page received for VS entered (see above).  Bedside RN Katelin notified and VS  reviewed.  No acute changes in patient condition.  No interventions required from Rapid Response.  Patient remains on [facility] 6017.  RN encouraged to page Rapid Response if further concerns arise in patient condition.

## 2025-01-03 NOTE — CARE PLAN
Problem: Skin  Goal: Decreased wound size/increased tissue granulation at next dressing change  Outcome: Progressing  Flowsheets (Taken 1/3/2025 0140)  Decreased wound size/increased tissue granulation at next dressing change:   Utilize specialty bed per algorithm   Protective dressings over bony prominences   Promote sleep for wound healing  Goal: Participates in plan/prevention/treatment measures  Outcome: Progressing  Flowsheets (Taken 1/3/2025 0140)  Participates in plan/prevention/treatment measures:   Discuss with provider PT/OT consult   Increase activity/out of bed for meals   Elevate heels  Goal: Prevent/manage excess moisture  Outcome: Progressing  Goal: Prevent/minimize sheer/friction injuries  Outcome: Progressing  Goal: Promote/optimize nutrition  Outcome: Progressing  Flowsheets (Taken 1/3/2025 0140)  Promote/optimize nutrition:   Monitor/record intake including meals   Offer water/supplements/favorite foods     Problem: Pain - Adult  Goal: Verbalizes/displays adequate comfort level or baseline comfort level  Outcome: Progressing     Problem: Safety - Adult  Goal: Free from fall injury  Outcome: Progressing     Problem: Pain  Goal: Takes deep breaths with improved pain control throughout the shift  Outcome: Progressing  Goal: Turns in bed with improved pain control throughout the shift  Outcome: Progressing  Goal: Walks with improved pain control throughout the shift  Outcome: Progressing  Goal: Performs ADL's with improved pain control throughout shift  Outcome: Progressing  Goal: Participates in PT with improved pain control throughout the shift  Outcome: Progressing  Goal: Free from opioid side effects throughout the shift  Outcome: Progressing  Goal: Free from acute confusion related to pain meds throughout the shift  Outcome: Progressing     Problem: Fall/Injury  Goal: Not fall by end of shift  Outcome: Progressing  Goal: Be free from injury by end of the shift  Outcome: Progressing  Goal:  Verbalize understanding of personal risk factors for fall in the hospital  Outcome: Progressing  Goal: Verbalize understanding of risk factor reduction measures to prevent injury from fall in the home  Outcome: Progressing  Goal: Pace activities to prevent fatigue by end of the shift  Outcome: Progressing     Problem: Skin  Goal: Promote skin healing  Flowsheets (Taken 1/3/2025 0140)  Promote skin healing:   Assess skin/pad under line(s)/device(s)   Ensure correct size (line/device) and apply per  instructions   Protective dressings over bony prominences   Rotate device position/do not position patient on device   Turn/reposition every 2 hours/use positioning/transfer devices   The patient's goals for the shift include      The clinical goals for the shift include pt will remain HDS thru the shift

## 2025-01-03 NOTE — CONSULTS
"Inpatient consult to Palliative Care  Consult performed by: FABIOLA Wilder  Consult ordered by: FABIOLA Perez          Palliative Medicine Consult  Complex medical decision making, symptom management, patient/family support    History obtained from chart review including ED note, H&P, patient's daily progress notes, review of lab/test results, and discussion with primary team and bedside RN.    Subjective    History of Present Illness  Alo Glass is a 69y gentleman with a pmh HFrEF (EF 20%), COPD, MELISSA, obesity, T2DM, HTN, hypothyroidism, severe PAD, and HLD.  He is admitted with septic shock from bilateral LE cellulitis. Course c/b LEONA, hypervolemia, cardiogenic shock     Introduction to Palliative Medicine  Met with pt at bedside.   Patient alert and oriented, has capacity to make their own medical decisions at this time.     Staff present: Myself and Rev. Garcia  Palliative Medicine was introduced as a specialty service for patients with serious illness to help with symptom management, improve quality of life, assist with goals of care conversations, navigate complex decision making, and provide support to patients and families. Support and empathy was provided throughout the encounter. Provided reflective listening and presence.     Symptoms  Pain: denies  Dyspnea: present with exertion  Fatigue: present  Insomnia: denies  Drowsiness: present  Nausea: denies  Appetite: poor  Anxiety: related to health    Palliative Medicine Social History:  Patient is not , no children. Lives alone in a house. Has a  named Gina. States he did not used to use assistive devices but has been very weak recently.     Objective    Last Recorded Vitals  /74   Pulse 103   Temp 37.2 °C (99 °F)   Resp (!) 28   Ht 1.803 m (5' 11\")   Wt 134 kg (294 lb 5 oz)   SpO2 98%   BMI 41.05 kg/m²      Physical Exam  Constitutional:       Appearance: He is ill-appearing.   HENT:      Head: " Normocephalic.      Mouth/Throat:      Mouth: Mucous membranes are moist.   Pulmonary:      Comments: labored  Musculoskeletal:      Right lower leg: Edema present.      Left lower leg: Edema present.   Skin:     Comments: Face flushing      Neurological:      Mental Status: He is oriented to person, place, and time.   Psychiatric:         Mood and Affect: Mood normal.          Relevant Results  Results for orders placed or performed during the hospital encounter of 12/29/24 (from the past 24 hours)   POCT GLUCOSE   Result Value Ref Range    POCT Glucose 171 (H) 74 - 99 mg/dL   POCT GLUCOSE   Result Value Ref Range    POCT Glucose 199 (H) 74 - 99 mg/dL   Renal Function Panel   Result Value Ref Range    Glucose 169 (H) 74 - 99 mg/dL    Sodium 128 (L) 136 - 145 mmol/L    Potassium 3.7 3.5 - 5.3 mmol/L    Chloride 85 (L) 98 - 107 mmol/L    Bicarbonate 29 21 - 32 mmol/L    Anion Gap 18 10 - 20 mmol/L    Urea Nitrogen 94 (HH) 6 - 23 mg/dL    Creatinine 3.19 (H) 0.50 - 1.30 mg/dL    eGFR 20 (L) >60 mL/min/1.73m*2    Calcium 8.1 (L) 8.6 - 10.6 mg/dL    Phosphorus 7.0 (H) 2.5 - 4.9 mg/dL    Albumin 2.8 (L) 3.4 - 5.0 g/dL   POCT GLUCOSE   Result Value Ref Range    POCT Glucose 123 (H) 74 - 99 mg/dL   POCT GLUCOSE   Result Value Ref Range    POCT Glucose 136 (H) 74 - 99 mg/dL   Renal Function Panel   Result Value Ref Range    Glucose 101 (H) 74 - 99 mg/dL    Sodium 130 (L) 136 - 145 mmol/L    Potassium 3.9 3.5 - 5.3 mmol/L    Chloride 84 (L) 98 - 107 mmol/L    Bicarbonate 27 21 - 32 mmol/L    Anion Gap 23 (H) 10 - 20 mmol/L    Urea Nitrogen 98 (HH) 6 - 23 mg/dL    Creatinine 3.30 (H) 0.50 - 1.30 mg/dL    eGFR 19 (L) >60 mL/min/1.73m*2    Calcium 8.1 (L) 8.6 - 10.6 mg/dL    Phosphorus 7.8 (H) 2.5 - 4.9 mg/dL    Albumin 2.8 (L) 3.4 - 5.0 g/dL   Vancomycin   Result Value Ref Range    Vancomycin 21.1 (H) 5.0 - 20.0 ug/mL   POCT GLUCOSE   Result Value Ref Range    POCT Glucose 152 (H) 74 - 99 mg/dL     *Note: Due to a large number  of results and/or encounters for the requested time period, some results have not been displayed. A complete set of results can be found in Results Review.      ECG 12 Lead  Undetermined rhythm  Left axis deviation  Right bundle branch block  Inferior infarct (cited on or before 04-SEP-2024)  Anterior infarct (cited on or before 23-APR-2024)  Abnormal ECG  When compared with ECG of 29-DEC-2024 12:17,  No significant change was found  Confirmed by Jeremiah Ruth ([id number]) on 1/1/2025 4:35:50 PM  ECG 12 Lead  Unusual P axis, possible ectopic atrial tachycardia  Left axis deviation  Right bundle branch block  Septal infarct (cited on or before 23-APR-2024)  Inferior infarct (cited on or before 04-SEP-2024)  Abnormal ECG  When compared with ECG of 28-DEC-2024 14:26,  Questionable change in initial forces of Anteroseptal leads  Confirmed by Jeremiah Ruth ([id number]) on 1/1/2025 4:33:29 PM  US renal complete  Narrative: Interpreted By:  Jennifer Shannon,  and Darnell Harrison   STUDY:  US RENAL COMPLETE;  12/30/2024 4:07 pm      INDICATION:  Signs/Symptoms:b/l kidneys, decreased urine output.          COMPARISON:  CT 12/28/2024.      ACCESSION NUMBER(S):  [id number]      ORDERING CLINICIAN:  EUGENIA MARTINS      TECHNIQUE:  Multiple images of the kidneys were obtained  .      FINDINGS:  Assessment is hindered by overlying rib shadows and poor sonographic  window. Within these limitations:      RIGHT KIDNEY:  The right kidney measures 10.6 cm in length. The renal cortical  echogenicity and thickness are within normal limits. No  hydronephrosis is present; no evidence of nephrolithiasis.      LEFT KIDNEY:  The left kidney measures 13.0 cm in length. The renal cortical  echogenicity and thickness are within normal limits. No  hydronephrosis is present; no evidence of nephrolithiasis. There is a  1.9 x 1.5 cm hypoechoic lesion of the left superior pole which was  seen the prior CTs dated 12/28/2024 and 09/04/2024. There is an  additional 1.5  x 1.3 cm hypoechoic area of the upper to mid left  kidney.      BLADDER:  The urinary bladder is decompressed, limited evaluation.      Incidentally noted cholelithiasis without evidence of acute  cholecystitis.      Impression: Assessment is hindered by overlying rib shadows and poor sonographic  window. Within these limitations:  1. Indeterminate hypoechoic lesion of the left superior pole  measuring 1.9 cm which was seen on the prior CT dated 12/28/2024,  further evaluation with a kidney protocol MRI with IV contrast is  strongly recommended to rule out neoplasm.  2. An additional indeterminate 1.5 cm hypoechoic area in the upper to  mid left kidney, which can be artifactual. This can further be  evaluated by MRI.  3. No hydronephrosis.  4. Incidentally noted cholelithiasis.      I personally reviewed the image(s) / study and I agree with the  findings as stated by Hunter Patrick MD. This study was interpreted at  [place], [place].      MACRO:  None      Signed by: Jennifer Shannon 1/1/2025 2:03 PM  Dictation workstation:   [id number]  XR chest 1 view  Narrative: Interpreted By:  Jamel Ruiz and Hofer Lindsay   STUDY:  XR CHEST 1 VIEW;  1/1/2025 1:09 am      INDICATION:  Signs/Symptoms:c/f pulmonary edema.      COMPARISON:  Chest radiograph 12/30/2024.      ACCESSION NUMBER(S):  [id number]      ORDERING CLINICIAN:  TAMI FERRARO      FINDINGS:  AP radiograph of the chest was provided.          CARDIOMEDIASTINAL SILHOUETTE:  Cardiomediastinal silhouette is stable in size and configuration.      LUNGS:  Prominent interstitial lung markings. Increased hazy opacities in the  bilateral lower lungs with blunting of the bilateral costophrenic  angles. No evidence of pneumothorax.      ABDOMEN:  No remarkable upper abdominal findings      BONES:  No acute osseous changes.      Impression: 1.  Similar appearance of prominent perihilar and interstitial lung  markings suggestive of interstitial  pulmonary edema.  2. Interval increase in bibasilar atelectasis/consolidation with or  without small bilateral pleural effusions.  3. Stable enlargement of the cardiomediastinal silhouette.      I personally reviewed the images/study and resident's interpretation  and I agree with the findings as stated by Nicole Garcia MD (resident  radiologist). This study was analyzed and interpreted at [facility], [place], [place].      MACRO:  None      Signed by: Jamel Ruiz 1/1/2025 12:02 PM  Dictation workstation:   [id number]     Encounter Date: 12/29/24   ECG 12 Lead   Result Value    Ventricular Rate 124    Atrial Rate 124    KY Interval 146    QRS Duration 166    QT Interval 348    QTC Calculation(Bazett) 499    P Axis 51    R Axis -86    T Axis 74    QRS Count 20    Q Onset 207    P Onset 134    P Offset 171    T Offset 381    QTC Fredericia 443    Narrative    Undetermined rhythm  Left axis deviation  Right bundle branch block  Inferior infarct (cited on or before 04-SEP-2024)  Anterior infarct (cited on or before 23-APR-2024)  Abnormal ECG  When compared with ECG of 29-DEC-2024 12:17,  No significant change was found  Confirmed by Jeremiah Ruth ([id number]) on 1/1/2025 4:35:50 PM        Allergies  Patient has no known allergies.    Scheduled medications  [Held by provider] allopurinol, 300 mg, oral, Daily  ezetimibe, 10 mg, oral, Daily  gabapentin, 100 mg, oral, Daily  HYDROmorphone, 0.2 mg, intravenous, Once  insulin lispro, 0-10 Units, subcutaneous, TID AC  ipratropium-albuteroL, 3 mL, nebulization, q6h  levothyroxine, 125 mcg, oral, Daily  metOLazone, 20 mg, oral, Daily  midodrine, 5 mg, oral, TID  nicotine, 1 patch, transdermal, Daily  nystatin, 5 mL, Mouth/Throat, BID  oxygen, , inhalation, Nightly  pantoprazole, 40 mg, oral, Daily before breakfast  piperacillin-tazobactam, 3.375 g, intravenous, q6h      Continuous medications  bumetanide, 2 mg/hr, Last Rate: 2 mg/hr (01/02/25  "2231)  heparin, 0-4,500 Units/hr, Last Rate: 1,800 Units/hr (01/03/25 4962)      PRN medications  PRN medications: acetaminophen, albuterol, dextrose, dextrose, glucagon, glucagon, heparin, HYDROmorphone, oxygen, polyethylene glycol, sodium chloride, vancomycin     Assessment/Plan    Alo Glass is a 69y gentleman with a pmh HFrEF (EF 20%), COPD, MELISSA, obesity, T2DM, HTN, hypothyroidism, severe PAD, and HLD.  He is admitted with septic shock from bilateral LE cellulitis. Course c/b LEONA, hypervolemia, cardiogenic shock     ----------------------------------------------------------------------------------------------------------------------------------------------------------------  Advanced Care Planning  Patient consented to a voluntary Advanced Care Planning meeting.   Serious Illness Assessment and Counseling:  Life Limiting Disease:   HF, LEONA, s/p shock posing threat to life or function.     Disease Specific Information Provided/Prognosis Discussed: Patient's current clinical condition, including diagnosis, prognosis, and management plan were discussed.   Counseling provided on possibility of dialysis intolerance long term with HF  Counseling provided on guarded prognosis and what to expect with disease progression of HF and LEONA.   Counseling provided on the irreversible and progressive nature of patient's diseases including renal failure, heart failure    Understanding/Overall Impression: Patient expressing clear understanding of overall health status and severity of illness.     Goals/Hopes: Discussion ensued about patient's goals for their medical care going forward. Allowed patient time to talk about his/her current quality of life, disease course/progression, and symptom and treatment burden. Patient's primary goal is to continue living, recovery is the goal. His primary motivating factor is getting his \"finances and affairs in order.\" He is willing to revisit goals of care after this is done. Patient " "states he has been thinking about his death. If and when it is that time, his goal is to die comfortably and \"not in pain\"  He wishes for honest and open communication regarding prognosis.     Fears/Worries/Concerns: Patient states he does not feel ready to die and is very worried at how sick he has become so fast.     Minimal Acceptable Quality of Life/Maximal [place] Tolerable for the Possibility of More Time: Counseling provided on the concept of MAO/Maximal [place]. Patient is willing to go through CPR, intubation/mechanical ventilation, and dialysis for the possibility of more time. He is willing to revisit these conversations after his \"affairs are in order.\"   Prognosis discussed at length. Discussed concerns for acute decompensation and also poor long term prognosis. Encouraged patient to make his arrangements as quickly as he can.    Health Preferences and Priorities with Disease Progression:   Patient states that his HCPOA ( Gina) IS aware of his wishes    Resuscitation Assessment: Counseling provided on the benefit versus burden of CPR in the setting of patient's overall health status. Discussed likelihood of poor prognosis in the setting of arrest due to multi organ failure. Patient wishes to remain FULL CODE at this time.      Advanced Directives:  Counseling provided on the importance of not crisis planning as disease burden progresses but to establish treatment limitations now so in the future medical team will be clear on what patient feels is an acceptable quality of life for the patient and what treatment limitations' patient would like set into place based on that.   ongoing discussions needed.    HPOA: Yes, on file   Living will: Yes, on file     I spent 16 minutes in providing separately identifiable ACP services with the patient and/or surrogate decision maker in a voluntary conversation discussing the patient's wishes and goals as detailed in the above note. "   ----------------------------------------------------------------------------------------------------------------------------------------------------------------    #Complex Medical Decision Making  #Goals of Care  #Advanced Care Planning  - Code status: FULL CODE  - Surrogate decision maker: RAHEEL Mota  - Goals are survival and time based   - Advanced Directives on file   - Will consider outpatient palliative care referral closer to discharge    #Acute Pain   -pain in legs likely r/t cellulitis, intermittent, aching, well controlled on current regimen  -home meds: gabapentin 100mg daily   -24 hour total OME = 7.5  -continue dilaudid 0.2mg IV q4 PRN for pain  -continue gabapentin 100mg every day     #Bowel Regimen   -last BM documented 1/2  -consider starting senna 1 tabs at bedtime routine for prevention of constipation. Pt at high risk due to immobility and opiate use.    #Psychosocial Support  - Spiritual Care Support    Plan of Care discussed with: Updated primary team and bedside RN on goals of care decision, medication adjustments, and code status     Medical Decision Making was high level due to high complexity of problems, extensive data review, and high risk of management/treatment.       Thank you for allowing us to participate in the care of this patient. Palliative will continue to follow as needed. Palliative medicine is available Monday-Friday, 8a-6p. Please contact team with any questions or concerns.  Team pager [phone] (weekdays)  Kelsie Jarvis DNP, APRN-CNP

## 2025-01-03 NOTE — PROGRESS NOTES
NEPHROLOGY NOTE   Alo Glass   69 y.o.     MRN/Room: [id number]    Reason for consult: LEONA on CKD    SUBJECTIVE:   No major overnight events   Urine output improving       Vitals:    01/03/25 0800   BP: 101/74   Pulse: 103   Resp: (!) 28   Temp: 37.2 °C (99 °F)   SpO2: 98%        01/01 1900 - 01/03 0659  In: 769.9 [P.O.:250; I.V.:469.9]  Out: 1195 [Urine:1195]   Weight change: 2.2 kg (4 lb 13.6 oz)     General appearance: Drowsy   Eyes: non-icteric  Skin: no apparent rash  Heart: S1 S2 regular  JVD visible  Lungs: CTA bilaterally  Abdomen: soft, nt/nd  Extremities: +2 edema bilat up to thighs  : has Live      ASSESSMENT:  Alo Glass is a 69 y.o. male with PMHx of HFrEF (EF 23%) , COPD, MELISSA, obesity, type II DM, stenotrophomonas pneumonia w parapneumonic effusion (9/2024), HTN, hypothyroidism and HLD who was admitted to the MICU for Septic Shock & acute decompensated HF. Has Large Bullae of LE that are oozing, possibly infected.    #LEONA  - Baseline Cr 0.6, presented with 1.4.   - Ua has hyaline cast, hematuria, pyuria and l.est +, urine sodium was <10 which increased to 46 after diuretics   - bilateral renal enhancing lesions measuring 0.8 cm in the right upper pole kidney and 2 cm in the left upper pole kidney. Referral to urology and further evaluation with MR is suggested  - hypoNa likely hypervolemic    Etiology:   ATN vs cardiorenal dynamics, contrast injury     Updates 01/03:  -Urine output improved yesterday with addition of thiazide       RECOMMENDATIONS:  - Continue Bumex drip  - Continue Metolazone 20mg po once daily    - daily weights  - Continue Midodrine  - No indication for RRT today    -Dose medications for renal function  - strict Is and Os    SW Dr Ramsey Singh MD  Nephrology Fellow  24 hour Renal Pager - [phone]

## 2025-01-03 NOTE — PROGRESS NOTES
"Medical Intensive Care - Daily Progress Note   Subjective    Alo Glass is a 69 y.o. year old male patient admitted on 12/29/2024 with following ICU needs: Bilateral lower ext cellulitis causing septic shock.      Interval History:  Overnight tolerated Bipap/Cpap.  Continues on Bumex gtt at 2mg/hr.  Has made 1050ml of urine in the last 24hrs and was net negative 400ml today.  Denies fever, chills, cp, abdominal pain, and n/v     Meds    Scheduled medications  [Held by provider] allopurinol, 300 mg, oral, Daily  ezetimibe, 10 mg, oral, Daily  gabapentin, 100 mg, oral, Daily  HYDROmorphone, 0.2 mg, intravenous, Once  insulin lispro, 0-10 Units, subcutaneous, TID AC  ipratropium-albuteroL, 3 mL, nebulization, q6h  levothyroxine, 125 mcg, oral, Daily  metOLazone, 20 mg, oral, Daily  midodrine, 5 mg, oral, TID  nicotine, 1 patch, transdermal, Daily  nystatin, 5 mL, Mouth/Throat, BID  oxygen, , inhalation, Nightly  pantoprazole, 40 mg, oral, Daily before breakfast  piperacillin-tazobactam, 3.375 g, intravenous, q6h      Continuous medications  bumetanide, 2 mg/hr, Last Rate: 2 mg/hr (01/02/25 2231)  heparin, 0-4,500 Units/hr, Last Rate: 1,800 Units/hr (01/03/25 0531)      PRN medications  PRN medications: acetaminophen, albuterol, dextrose, dextrose, glucagon, glucagon, heparin, HYDROmorphone, oxygen, polyethylene glycol, sodium chloride, vancomycin     Objective    Blood pressure 97/81, pulse 100, temperature 36.1 °C (97 °F), temperature source Temporal, resp. rate 11, height 1.803 m (5' 11\"), weight 134 kg (294 lb 5 oz), SpO2 90%.         Constitutional: chronically Ill appearing elderly male in NAD  Eyes: PERRLA, no icterus   ENMT: Mucous membranes dry   Head/Neck: atraumatic   Respiratory/Thorax: Lungs diminished R>L, intermittent cough with thick brown sputum production, non-labored breathing, on 5L NC  Cardiovascular: Tachy; s1/s2 normal.  No obvious MRG.  Gastrointestinal: obese, soft, non-tender, " normoactive bowel sounds   Extremities: +2-3 edema B/L LE, Cellulitis with erythema b/l LE, venous statis ulcer vs blister RLE  Neurological: alert and oriented x 3, speech clear, follows commands appropriately, no focal deficits.    Skin: Warm and dry      Intake/Output Summary (Last 24 hours) at 1/3/2025 0643  Last data filed at 1/3/2025 0542  Gross per 24 hour   Intake 250 ml   Output 1050 ml   Net -800 ml     Labs:   Results from last 72 hours   Lab Units 01/02/25  2216 01/02/25  1214 01/02/25  0216 01/01/25  2207   SODIUM mmol/L 130* 128* 128* 126*   POTASSIUM mmol/L 3.9 3.7 4.0 7.4*   CHLORIDE mmol/L 84* 85* 83* 82*   CO2 mmol/L 27 29 27 31   BUN mg/dL 98* 94* 92* 91*   CREATININE mg/dL 3.30* 3.19* 2.94* 2.90*   GLUCOSE mg/dL 101* 169* 137* 132*   CALCIUM mg/dL 8.1* 8.1* 8.3* 8.1*   ANION GAP mmol/L 23* 18 22* 20   EGFR mL/min/1.73m*2 19* 20* 22* 23*   PHOSPHORUS mg/dL 7.8* 7.0*  --  8.5*      Results from last 72 hours   Lab Units 01/02/25  0216 01/01/25  0046   WBC AUTO x10*3/uL 7.8 10.5   HEMOGLOBIN g/dL 16.5 16.2   HEMATOCRIT % 52.1* 49.6   PLATELETS AUTO x10*3/uL 111* 114*   NEUTROS PCT AUTO % 76.7 82.2   LYMPHS PCT AUTO % 9.8 7.1   MONOS PCT AUTO % 11.3 9.6   EOS PCT AUTO % 1.2 0.4            Results from last 72 hours   Lab Units 01/01/25  0046 12/31/24  2157   POCT PH, VENOUS pH 7.31* 7.23*   POCT PCO2, VENOUS mm Hg 65* 59*   POCT PO2, VENOUS mm Hg 55* 37        Micro/ID:     Lab Results   Component Value Date    URINECULTURE No growth 12/28/2024    BLOODCULT No growth at 4 days -  FINAL REPORT 12/28/2024    BLOODCULT No growth at 4 days -  FINAL REPORT 12/28/2024       Summary of key imaging results from the last 24 hours  XR CHEST 1 VIEW; 12/30/2024 9:03 am   IMPRESSION:  1. Unchanged mild perihilar edema and bibasilar atelectasis, right more than left. Small right pleural effusion. Unchanged enlarged cardiac silhouette size    TRANSTHORACIC ECHOCARDIOGRAM REPORT  12/30/24  CONCLUSIONS:   1. Poorly  "visualized anatomical structures due to suboptimal image quality.   2. Left ventricular ejection fraction is severely decreased, calculated by Green's biplane at 19%.   3. There is global hypokinesis of the left ventricle with minor regional variations.   4. Left ventricular cavity size is moderately dilated.   5. Abnormal septal motion consistent with left bundle branch block.   6. No left ventricular thrombus visualized.   7. There is reduced right ventricular systolic function.   8. Moderately enlarged right ventricle.   9. The left atrium is enlarged.  10. The right atrium is moderately dilated.  11. Mild aortic valve regurgitation.  12. Compared with study dated 9/19/2024, no significant change.    Renal US 12/29/24    Assessment and Plan     Assessment: Alo Glass is a 69 y.o. year old male patient admitted on 12/29/2024 with Septic shock 2/2 bilateral lower ext cellulitis and cardiogenic shock and AHRF 2/2 volume overload and LEONA.     Summary for 01/03/25  :  Cont to diurese to a goal of -2L with bumex gtt and Metolazone   Cont with IV Atbs regimen:  Zosyn and Vanco  Palliative care consult     Plan:  NEUROLOGY/PSYCH:  Dx: ZENA  A&Ox3  Management:  Continue gabapentin 100mg daily and Nicotine patch daily,   Pain regimen:  Tylenol 650 mg prn and Dilaudid PRN   PT/OT--> 1/2 Refused to work with PT today due to pain and \"wanting to be left alone\". Additional pain medications offered to help facilitate PT but patient declined   Palliative Care consult    CARDIOVASCULAR:  Dx: Hx HFrEF, HTN, HLD, severe PAD.  Septic shock, resolved.  Cardiogenic shock, resovled. Aflutter  HDS but with narrow pulse pressure  Remains off vasopressors since 12/30  TTE 12/30: LVEF 19%  Management:  Continue Ezetimibe 10mg daily   Holding home lasix and SGLT-2 iso hypotension  Consider GDMT when able  S/p Dig load 12/29-12/30  Bumex gtt 2mg/hr; goal of -1.5L/day  Heparin gtt  Started on Midodrine 5mg TID for renal perfusion "   Cardiology consulted, signed off : Continue diuresis with IV Bumex.  If decompensates can consider cardioversion for atrial kick restoration. Prior to discharge arrange follow up with Dr. Moore as an o/p @ [phone] (scheduling)  Tele    PULMONARY:  Dx: Hx asthma and COPD GOLD 3 (2019: FEV1 45% w +ve BD change; %/TLC 85%). AHRF 2/2 hypervolemia   Stable on 5L NC with Bipap/cpap HS and with naps  CxR : Interval increase in the size of bilateral pleural effusions with adjacent atelectasis/consolidation. Similar appearance of prominent perihilar and interstitial lung markings consistent with interstitial pulmonary edema.  VB// prior to being placed on Bipap   Management:  Transitioned from Cpap to Bipap  d/t hypercapnia   Diuresis as outlined above   Cont Duonebs q6h, albuterol PRN  S/p steroid taper   Airway clearance TID    RENAL/GENITOURINARY:  Dx: LEONA. Hyponatremia likely I/s/o volume overload.  LT renal mass c/f RCC  Worsening Cr today, 2.94 from 2.18 yesterday   Renal US : No hydronephrosis. hypoechoic lesion of the left superior pole measuring 1.9 cm which was seen on the prior CT dated 2024, further evaluation with a kidney protocol MRI is strongly recommended. There is an additional indeterminate 0.5 cm hypoechoic lesion of the left kidney.  Management:  Voiding via lipscomb   Nephrology following, recs appreciated: Continue on Bumex drip.  Start Metolazone 20mg daily.  Will consider UF tomorrow if inadequate diuresis or sooner if worsening respiratory status   Pt saw Urology on 2024 and referrred to IR for biopsy   Urine lytes consistent with prerenal  Diurese goal net negative -1.5-2L  RFP daily    GASTROENTEROLOGY:  Dx: Hx GERD. Poor oral intake   BMx2  Management:  Diet: Cardiac and carb control diet  Bowel Regimen: Miralax PRN   PPI    ENDOCRINOLOGY:  Dx: Hx Hypothyroid and DMII  Glucose 130-190s  HgbA1C  5.5%  Management:  Home regimen:  Mounjaro  and Jardiance; pt also utilizes a Dexcom CGM)  Cont with Insuline lispro TID  Glucose goal of <180  Hypoglycemia protocol     HEMATOLOGY:  Dx: ZENA  H/h stable   Management:  CBC daily     SKIN:  Dx:  Skin Failure Yes, describe: BL L/E cellutis vs PAD   Management:  Wound following  ACS consulted, signed off 1/1: Elevate legs as able, continue with abx.  If erythema does not completely resolve with antibiotic treatment would recommend re-imaging with non-contrast CT of BLE to assess for new, undrained fluid collections     INFECTIOUS DISEASE:  Dx: Cellulitis   Tmax 36.5 without leukocytosis   Management:  Micro: 12/28 Legionella, Strep pneum, negative, 12/28 Urine Cx negative, 12/28 BCx NTGD x2  Abx: Clindamycin (12/28), Zosyn (12/28 - **), Vancomycin (12/28 - **)  Cont with Vanco/Zosyn   ACS consulted signed off 1/1; rec cont IV Atbs     ICU Check List     FEN  Fluids: PRN  Electrolytes: K>4, Phos >3, Mg> 2  Nutrition: Cardiac diet and carb control diet   Prophylaxis:  DVT ppx: Heparin gtt  GI ppx: PPI  Bowel care: Miralax PRN   Hardware:         Urethral Catheter Temperature probe 16 Fr. (Active)   Placement Date: 12/28/24   Hand Hygiene Completed: Yes  Catheter Type: Temperature probe  Tube Size (Fr.): 16 Fr.  Catheter Balloon Size: 5 mL  Urine Returned: Yes   Number of days: 3       Social:  Code: Full Code    HPOA: Sheyla Griggs [phone]   Disposition: Continue care in SDU with low threshold for transfer to ICU    Pt discussed with Dr. Manjarrez, seen and examined. All labs, VS and previous plan of care reviewed.      Antonio Amaya, PARAM-CNP   01/03/25 at 6:43 AM     Disclaimer: Documentation completed with the information available at the time of input. The times in the chart may not be reflective of actual patient care times, interventions, or procedures. Documentation occurs after the physical care of the patient.

## 2025-01-04 LAB
ALBUMIN SERPL BCP-MCNC: 2.8 G/DL (ref 3.4–5)
ALBUMIN SERPL BCP-MCNC: 2.8 G/DL (ref 3.4–5)
ALBUMIN SERPL BCP-MCNC: 3 G/DL (ref 3.4–5)
ALP SERPL-CCNC: 115 U/L (ref 33–136)
ALT SERPL W P-5'-P-CCNC: 9 U/L (ref 10–52)
ANION GAP BLDV CALCULATED.4IONS-SCNC: 5 MMOL/L (ref 10–25)
ANION GAP SERPL CALC-SCNC: 21 MMOL/L (ref 10–20)
ANION GAP SERPL CALC-SCNC: 21 MMOL/L (ref 10–20)
AST SERPL W P-5'-P-CCNC: 5 U/L (ref 9–39)
BASE EXCESS BLDV CALC-SCNC: 8.5 MMOL/L (ref -2–3)
BASOPHILS # BLD AUTO: 0.01 X10*3/UL (ref 0–0.1)
BASOPHILS NFR BLD AUTO: 0.1 %
BILIRUB DIRECT SERPL-MCNC: 0.2 MG/DL (ref 0–0.3)
BILIRUB SERPL-MCNC: 0.6 MG/DL (ref 0–1.2)
BODY TEMPERATURE: 37 DEGREES CELSIUS
BUN SERPL-MCNC: 100 MG/DL (ref 6–23)
BUN SERPL-MCNC: 101 MG/DL (ref 6–23)
CA-I BLDV-SCNC: 1.05 MMOL/L (ref 1.1–1.33)
CALCIUM SERPL-MCNC: 8.6 MG/DL (ref 8.6–10.6)
CALCIUM SERPL-MCNC: 8.9 MG/DL (ref 8.6–10.6)
CHLORIDE BLDV-SCNC: 87 MMOL/L (ref 98–107)
CHLORIDE SERPL-SCNC: 82 MMOL/L (ref 98–107)
CHLORIDE SERPL-SCNC: 83 MMOL/L (ref 98–107)
CO2 SERPL-SCNC: 31 MMOL/L (ref 21–32)
CO2 SERPL-SCNC: 33 MMOL/L (ref 21–32)
CREAT SERPL-MCNC: 1.98 MG/DL (ref 0.5–1.3)
CREAT SERPL-MCNC: 2.19 MG/DL (ref 0.5–1.3)
EGFRCR SERPLBLD CKD-EPI 2021: 32 ML/MIN/1.73M*2
EGFRCR SERPLBLD CKD-EPI 2021: 36 ML/MIN/1.73M*2
EOSINOPHIL # BLD AUTO: 0.03 X10*3/UL (ref 0–0.7)
EOSINOPHIL NFR BLD AUTO: 0.4 %
ERYTHROCYTE [DISTWIDTH] IN BLOOD BY AUTOMATED COUNT: 13.7 % (ref 11.5–14.5)
GLUCOSE BLD MANUAL STRIP-MCNC: 118 MG/DL (ref 74–99)
GLUCOSE BLD MANUAL STRIP-MCNC: 156 MG/DL (ref 74–99)
GLUCOSE BLD MANUAL STRIP-MCNC: 158 MG/DL (ref 74–99)
GLUCOSE BLD MANUAL STRIP-MCNC: 178 MG/DL (ref 74–99)
GLUCOSE BLD MANUAL STRIP-MCNC: 189 MG/DL (ref 74–99)
GLUCOSE BLDV-MCNC: 157 MG/DL (ref 74–99)
GLUCOSE SERPL-MCNC: 145 MG/DL (ref 74–99)
GLUCOSE SERPL-MCNC: 150 MG/DL (ref 74–99)
HCO3 BLDV-SCNC: 35.5 MMOL/L (ref 22–26)
HCT VFR BLD AUTO: 50.5 % (ref 41–52)
HCT VFR BLD EST: 48 % (ref 41–52)
HGB BLD-MCNC: 16.5 G/DL (ref 13.5–17.5)
HGB BLDV-MCNC: 16.1 G/DL (ref 13.5–17.5)
IMM GRANULOCYTES # BLD AUTO: 0.15 X10*3/UL (ref 0–0.7)
IMM GRANULOCYTES NFR BLD AUTO: 2 % (ref 0–0.9)
INHALED O2 CONCENTRATION: 40 %
LACTATE BLDV-SCNC: 2.1 MMOL/L (ref 0.4–2)
LYMPHOCYTES # BLD AUTO: 0.88 X10*3/UL (ref 1.2–4.8)
LYMPHOCYTES NFR BLD AUTO: 11.8 %
MCH RBC QN AUTO: 30.8 PG (ref 26–34)
MCHC RBC AUTO-ENTMCNC: 32.7 G/DL (ref 32–36)
MCV RBC AUTO: 94 FL (ref 80–100)
MONOCYTES # BLD AUTO: 0.53 X10*3/UL (ref 0.1–1)
MONOCYTES NFR BLD AUTO: 7.1 %
NEUTROPHILS # BLD AUTO: 5.86 X10*3/UL (ref 1.2–7.7)
NEUTROPHILS NFR BLD AUTO: 78.6 %
NRBC BLD-RTO: 0 /100 WBCS (ref 0–0)
OXYHGB MFR BLDV: 96.3 % (ref 45–75)
PCO2 BLDV: 56 MM HG (ref 41–51)
PH BLDV: 7.41 PH (ref 7.33–7.43)
PHOSPHATE SERPL-MCNC: 6.6 MG/DL (ref 2.5–4.9)
PHOSPHATE SERPL-MCNC: 7 MG/DL (ref 2.5–4.9)
PLATELET # BLD AUTO: 149 X10*3/UL (ref 150–450)
PO2 BLDV: 91 MM HG (ref 35–45)
POTASSIUM BLDV-SCNC: 3.3 MMOL/L (ref 3.5–5.3)
POTASSIUM SERPL-SCNC: 3 MMOL/L (ref 3.5–5.3)
POTASSIUM SERPL-SCNC: 3.1 MMOL/L (ref 3.5–5.3)
PROT SERPL-MCNC: 5.6 G/DL (ref 6.4–8.2)
RBC # BLD AUTO: 5.35 X10*6/UL (ref 4.5–5.9)
SAO2 % BLDV: 99 % (ref 45–75)
SODIUM BLDV-SCNC: 124 MMOL/L (ref 136–145)
SODIUM SERPL-SCNC: 132 MMOL/L (ref 136–145)
SODIUM SERPL-SCNC: 133 MMOL/L (ref 136–145)
UFH PPP CHRO-ACNC: 0.6 IU/ML
VANCOMYCIN SERPL-MCNC: 16.8 UG/ML (ref 5–20)
WBC # BLD AUTO: 7.5 X10*3/UL (ref 4.4–11.3)

## 2025-01-04 PROCEDURE — 2500000004 HC RX 250 GENERAL PHARMACY W/ HCPCS (ALT 636 FOR OP/ED): Performed by: NURSE PRACTITIONER

## 2025-01-04 PROCEDURE — 36415 COLL VENOUS BLD VENIPUNCTURE: CPT | Performed by: NURSE PRACTITIONER

## 2025-01-04 PROCEDURE — 80202 ASSAY OF VANCOMYCIN: CPT

## 2025-01-04 PROCEDURE — 85025 COMPLETE CBC W/AUTO DIFF WBC: CPT | Performed by: NURSE PRACTITIONER

## 2025-01-04 PROCEDURE — 94669 MECHANICAL CHEST WALL OSCILL: CPT

## 2025-01-04 PROCEDURE — 82805 BLOOD GASES W/O2 SATURATION: CPT | Performed by: NURSE PRACTITIONER

## 2025-01-04 PROCEDURE — S4991 NICOTINE PATCH NONLEGEND: HCPCS | Performed by: NURSE PRACTITIONER

## 2025-01-04 PROCEDURE — 2060000001 HC INTERMEDIATE ICU ROOM DAILY

## 2025-01-04 PROCEDURE — 94640 AIRWAY INHALATION TREATMENT: CPT

## 2025-01-04 PROCEDURE — 36415 COLL VENOUS BLD VENIPUNCTURE: CPT

## 2025-01-04 PROCEDURE — 82248 BILIRUBIN DIRECT: CPT

## 2025-01-04 PROCEDURE — 2500000001 HC RX 250 WO HCPCS SELF ADMINISTERED DRUGS (ALT 637 FOR MEDICARE OP): Performed by: NURSE PRACTITIONER

## 2025-01-04 PROCEDURE — 2500000004 HC RX 250 GENERAL PHARMACY W/ HCPCS (ALT 636 FOR OP/ED): Performed by: PHARMACIST

## 2025-01-04 PROCEDURE — 2500000005 HC RX 250 GENERAL PHARMACY W/O HCPCS: Performed by: NURSE PRACTITIONER

## 2025-01-04 PROCEDURE — 85520 HEPARIN ASSAY: CPT | Performed by: NURSE PRACTITIONER

## 2025-01-04 PROCEDURE — 82947 ASSAY GLUCOSE BLOOD QUANT: CPT

## 2025-01-04 PROCEDURE — 94668 MNPJ CHEST WALL SBSQ: CPT

## 2025-01-04 PROCEDURE — 2500000002 HC RX 250 W HCPCS SELF ADMINISTERED DRUGS (ALT 637 FOR MEDICARE OP, ALT 636 FOR OP/ED): Performed by: NURSE PRACTITIONER

## 2025-01-04 PROCEDURE — 94660 CPAP INITIATION&MGMT: CPT

## 2025-01-04 PROCEDURE — 2500000001 HC RX 250 WO HCPCS SELF ADMINISTERED DRUGS (ALT 637 FOR MEDICARE OP)

## 2025-01-04 PROCEDURE — 84100 ASSAY OF PHOSPHORUS: CPT | Performed by: NURSE PRACTITIONER

## 2025-01-04 RX ORDER — HYDROMORPHONE HYDROCHLORIDE 1 MG/ML
0.4 INJECTION, SOLUTION INTRAMUSCULAR; INTRAVENOUS; SUBCUTANEOUS EVERY 4 HOURS PRN
Status: DISCONTINUED | OUTPATIENT
Start: 2025-01-04 | End: 2025-01-12 | Stop reason: HOSPADM

## 2025-01-04 RX ORDER — POTASSIUM CHLORIDE 14.9 MG/ML
20 INJECTION INTRAVENOUS
Status: COMPLETED | OUTPATIENT
Start: 2025-01-04 | End: 2025-01-05

## 2025-01-04 RX ORDER — VANCOMYCIN HYDROCHLORIDE 500 MG/100ML
500 INJECTION, SOLUTION INTRAVENOUS ONCE
Status: COMPLETED | OUTPATIENT
Start: 2025-01-04 | End: 2025-01-04

## 2025-01-04 RX ORDER — BUMETANIDE 0.25 MG/ML
4 INJECTION, SOLUTION INTRAMUSCULAR; INTRAVENOUS ONCE
Status: COMPLETED | OUTPATIENT
Start: 2025-01-04 | End: 2025-01-04

## 2025-01-04 RX ADMIN — MIDODRINE HYDROCHLORIDE 5 MG: 5 TABLET ORAL at 09:53

## 2025-01-04 RX ADMIN — Medication 60 PERCENT: at 08:24

## 2025-01-04 RX ADMIN — LEVOTHYROXINE SODIUM 125 MCG: 25 TABLET ORAL at 09:53

## 2025-01-04 RX ADMIN — NYSTATIN 500000 UNITS: 100000 SUSPENSION ORAL at 21:04

## 2025-01-04 RX ADMIN — PIPERACILLIN SODIUM AND TAZOBACTAM SODIUM 3.38 G: 3; .375 INJECTION, SOLUTION INTRAVENOUS at 10:02

## 2025-01-04 RX ADMIN — EZETIMIBE 10 MG: 10 TABLET ORAL at 09:53

## 2025-01-04 RX ADMIN — MIDODRINE HYDROCHLORIDE 5 MG: 5 TABLET ORAL at 21:00

## 2025-01-04 RX ADMIN — Medication 5 L/MIN: at 21:05

## 2025-01-04 RX ADMIN — HEPARIN SODIUM 1800 UNITS/HR: 10000 INJECTION, SOLUTION INTRAVENOUS at 12:09

## 2025-01-04 RX ADMIN — HYDROMORPHONE HYDROCHLORIDE 0.4 MG: 1 INJECTION, SOLUTION INTRAMUSCULAR; INTRAVENOUS; SUBCUTANEOUS at 12:32

## 2025-01-04 RX ADMIN — INSULIN LISPRO 2 UNITS: 100 INJECTION, SOLUTION INTRAVENOUS; SUBCUTANEOUS at 11:49

## 2025-01-04 RX ADMIN — NYSTATIN 500000 UNITS: 100000 SUSPENSION ORAL at 09:53

## 2025-01-04 RX ADMIN — POTASSIUM CHLORIDE 20 MEQ: 14.9 INJECTION, SOLUTION INTRAVENOUS at 22:55

## 2025-01-04 RX ADMIN — PIPERACILLIN SODIUM AND TAZOBACTAM SODIUM 3.38 G: 3; .375 INJECTION, SOLUTION INTRAVENOUS at 02:39

## 2025-01-04 RX ADMIN — METOLAZONE 20 MG: 10 TABLET ORAL at 09:54

## 2025-01-04 RX ADMIN — POTASSIUM CHLORIDE 20 MEQ: 14.9 INJECTION, SOLUTION INTRAVENOUS at 00:28

## 2025-01-04 RX ADMIN — NICOTINE 1 PATCH: 14 PATCH, EXTENDED RELEASE TRANSDERMAL at 09:55

## 2025-01-04 RX ADMIN — IPRATROPIUM BROMIDE AND ALBUTEROL SULFATE 3 ML: 2.5; .5 SOLUTION RESPIRATORY (INHALATION) at 08:24

## 2025-01-04 RX ADMIN — PIPERACILLIN SODIUM AND TAZOBACTAM SODIUM 3.38 G: 3; .375 INJECTION, SOLUTION INTRAVENOUS at 15:31

## 2025-01-04 RX ADMIN — PIPERACILLIN SODIUM AND TAZOBACTAM SODIUM 3.38 G: 3; .375 INJECTION, SOLUTION INTRAVENOUS at 21:04

## 2025-01-04 RX ADMIN — Medication 12 L/MIN: at 18:35

## 2025-01-04 RX ADMIN — VANCOMYCIN HYDROCHLORIDE 500 MG: 500 INJECTION, SOLUTION INTRAVENOUS at 09:55

## 2025-01-04 RX ADMIN — GABAPENTIN 100 MG: 100 CAPSULE ORAL at 09:53

## 2025-01-04 RX ADMIN — IPRATROPIUM BROMIDE AND ALBUTEROL SULFATE 3 ML: 2.5; .5 SOLUTION RESPIRATORY (INHALATION) at 20:32

## 2025-01-04 RX ADMIN — MIDODRINE HYDROCHLORIDE 5 MG: 5 TABLET ORAL at 14:34

## 2025-01-04 RX ADMIN — PANTOPRAZOLE SODIUM 40 MG: 40 TABLET, DELAYED RELEASE ORAL at 09:53

## 2025-01-04 RX ADMIN — IPRATROPIUM BROMIDE AND ALBUTEROL SULFATE 3 ML: 2.5; .5 SOLUTION RESPIRATORY (INHALATION) at 15:13

## 2025-01-04 RX ADMIN — BUMETANIDE 2 MG/HR: 0.25 INJECTION INTRAMUSCULAR; INTRAVENOUS at 11:51

## 2025-01-04 RX ADMIN — INSULIN LISPRO 2 UNITS: 100 INJECTION, SOLUTION INTRAVENOUS; SUBCUTANEOUS at 09:18

## 2025-01-04 RX ADMIN — BUMETANIDE 4 MG: 0.25 INJECTION INTRAMUSCULAR; INTRAVENOUS at 14:34

## 2025-01-04 ASSESSMENT — COGNITIVE AND FUNCTIONAL STATUS - GENERAL
EATING MEALS: A LOT
PERSONAL GROOMING: A LOT
DAILY ACTIVITIY SCORE: 12
HELP NEEDED FOR BATHING: A LOT
CLIMB 3 TO 5 STEPS WITH RAILING: TOTAL
DRESSING REGULAR LOWER BODY CLOTHING: A LOT
TOILETING: A LOT
TURNING FROM BACK TO SIDE WHILE IN FLAT BAD: A LOT
MOBILITY SCORE: 8
MOVING FROM LYING ON BACK TO SITTING ON SIDE OF FLAT BED WITH BEDRAILS: A LOT
DRESSING REGULAR UPPER BODY CLOTHING: A LOT
WALKING IN HOSPITAL ROOM: TOTAL
STANDING UP FROM CHAIR USING ARMS: TOTAL
MOVING TO AND FROM BED TO CHAIR: TOTAL

## 2025-01-04 ASSESSMENT — PAIN SCALES - GENERAL
PAINLEVEL_OUTOF10: 0 - NO PAIN

## 2025-01-04 ASSESSMENT — PAIN - FUNCTIONAL ASSESSMENT
PAIN_FUNCTIONAL_ASSESSMENT: 0-10

## 2025-01-04 NOTE — NURSING NOTE
Rapid Response Nurse Note: RADAR alert: 10    Pager time: [phone]  Arrival time: 1223  Event end time: 123  Location: [address]  [] Triage by phone or secure messaging    Rapid response initiated by:  [] Rapid response RN [] Family [] Nursing Supervisor [] Physician   [x] RADAR auto page [] Sepsis auto-page [] RN [] RT   [] NP/PA [] Other:     Primary reason for call:   [] BAT [] New CPAP/BiPAP [] Bleeding [] Change in mental status   [] Chest pain [] Code blue [] FiO2 >/= 50% [] HR </= 40 bpm   [] HR >/= 130 bpm [] Hyperglycemia [] Hypoglycemia [] RADAR    [] RR </= 8 bpm [] RR >/= 30 bpm [] SBP </= 90 mmHg [] SpO2 < 90%   [] Seizure [] Sepsis [] Shortness of breath  [] Staff concern: see comments     Initial VS and/or RADAR VS: T 36.3 °C; ; RR 25; BP 96/82; SPO2 93%.    Provider Notification: Provider Notification: Keenan    Interventions:  [x] None [] ABG/VBG [] Assist w/ICU transfer [] BAT paged    [] Bag mask [] Blood [] Cardioversion [] Code Blue   [] Code blue for intubation [] Code status changed [] Chest x-ray [] EKG   [] IV fluid/bolus [] KUB x-ray [] Labs/cultures [] Medication   [] Nebulizer treatment [] NIPPV (CPAP/BiPAP) [] Oxygen [] Oral airway   [] Peripheral IV [] Palliative care consult [] CT/MRI [] Sepsis protocol    [] Suctioned [] Other:     Outcome:  [] Coded and  [] Code blue for intubation [] Coded and transferred to ICU []  on division   [x] Remained on division (no change) [] Remained on division + additional monitoring [] Remained in ED [] Transferred to ED   [] Transferred to ICU [] Transferred to inpatient status [] Transferred for interventions (procedure) [] Transferred to ICU stepdown    [] Transferred to surgery [] Transferred to telemetry [] Sepsis protocol [] STEMI protocol   [] Stroke protocol [x] Bedside nurse instructed to page rapid response for any concerns or acute change in condition/VS     Additional Comments:    Radar autopage of 10 for above VS.  These are consistent with his current trend. Seen by Dr Hussein, spoke with bedside nurse. No issues voiced at this time.

## 2025-01-04 NOTE — SIGNIFICANT EVENT
Rapid Response Nurse Note:  [x] RADAR alert/Score 7    Pager time: 35  phone time: 35  Event end time: 37  Location: [facility]  [x] Phone triage     Rapid response initiated by:  [] Rapid Response RN [] Family [] Nursing Supervisor [] Physician   [x] RADAR auto-page [] Sepsis auto-page [] RN [] RT   [] NP/PA [] Other:     Primary reason for call:   [] BAT [] New CPAP/BiPAP [] Bleeding [] Change in mental status   [] Chest pain [] Code blue [] FiO2 >/= 50% [] HR </= 40 bpm   [] HR >/= 130 bpm [] Hyperglycemia [] Hypoglycemia [x] RADAR    [] RR </= 8 bpm [] RR >/= 30 bpm [] SBP </= 90 mmHg [] SpO2 < 90%   [] Seizure [] Sepsis [] Staff concern:     Initial VS and/or RADAR VS:  radar vitals below in bold    Vitals:    25 2144 25 2200 25 2225 25 0000   BP:    102/75   BP Location:    Right arm   Patient Position:    Lying   Pulse:  104  104   Resp: 23 25  21   Temp:    36.5 °C (97.7 °F)   TempSrc:    Temporal   SpO2: 94% (!) 87% 92% 95%   Weight:       Height:            Interventions:  [x] None [] ABG [] Assist w/ICU transfer [] BAT paged    [] Bag mask [] Blood [] Cardioversion [] Code Blue   [] Code blue for intubation [] Code status changed [] Chest x-ray [] EKG   [] IV fluid/bolus [] KUB x-ray [] Labs/cultures [] Medication   [] Nebulizer treatment [] NIPPV (CPAP/BiPAP) [] Oxygen [] Oral airway   [] Peripheral IV [] Palliative care consult [] CT/MRI [] Sepsis protocol    [] Suctioned [] Other:       Outcome:  [] Coded and  [] Code blue for intubation [] Coded and transferred to ICU []  on division   [x] Remained on division (no change) [] Remained on division + additional monitoring [] Remained in ED [] Transferred to ED   [] Transferred to ICU [] Transferred to inpatient status [] Transferred for interventions (procedure) [] Transferred to ICU stepdown    [] Transferred to surgery [] Transferred to telemetry [] Sepsis protocol [] STEMI protocol   [] Stroke protocol [x]  Bedside nurse instructed to page rapid response for any concerns or acute change in condition/VS     Additional Comments: Spoke to RN regarding vital signs.  No concerns from RN at this time.

## 2025-01-04 NOTE — CARE PLAN
Problem: Skin  Goal: Decreased wound size/increased tissue granulation at next dressing change  Outcome: Progressing  Flowsheets (Taken 1/4/2025 1756)  Decreased wound size/increased tissue granulation at next dressing change:   Promote sleep for wound healing   Utilize specialty bed per algorithm   Protective dressings over bony prominences     Problem: Skin  Goal: Participates in plan/prevention/treatment measures  Outcome: Progressing  Flowsheets (Taken 1/4/2025 1756)  Participates in plan/prevention/treatment measures:   Elevate heels   Discuss with provider PT/OT consult     Problem: Skin  Goal: Prevent/manage excess moisture  Outcome: Progressing  Flowsheets (Taken 1/4/2025 1756)  Prevent/manage excess moisture:   Cleanse incontinence/protect with barrier cream   Monitor for/manage infection if present   Moisturize dry skin     Problem: Skin  Goal: Prevent/minimize sheer/friction injuries  Outcome: Progressing  Flowsheets (Taken 1/4/2025 1756)  Prevent/minimize sheer/friction injuries:   Use pull sheet   HOB 30 degrees or less   Turn/reposition every 2 hours/use positioning/transfer devices   Complete micro-shifts as needed if patient unable. Adjust patient position to relieve pressure points, not a full turn     Problem: Skin  Goal: Participates in plan/prevention/treatment measures  Outcome: Progressing  Flowsheets (Taken 1/4/2025 1756)  Participates in plan/prevention/treatment measures:   Elevate heels   Discuss with provider PT/OT consult     Problem: Skin  Goal: Prevent/manage excess moisture  Outcome: Progressing  Flowsheets (Taken 1/4/2025 1756)  Prevent/manage excess moisture:   Cleanse incontinence/protect with barrier cream   Monitor for/manage infection if present   Moisturize dry skin     Problem: Skin  Goal: Promote/optimize nutrition  Outcome: Progressing  Flowsheets (Taken 1/4/2025 1756)  Promote/optimize nutrition: Monitor/record intake including meals     Problem: Skin  Goal: Promote skin  healing  Outcome: Progressing  Flowsheets (Taken 1/4/2025 1756)  Promote skin healing:   Assess skin/pad under line(s)/device(s)   Turn/reposition every 2 hours/use positioning/transfer devices   Rotate device position/do not position patient on device     Problem: Skin  Goal: Promote/optimize nutrition  Outcome: Progressing  Flowsheets (Taken 1/4/2025 1756)  Promote/optimize nutrition: Monitor/record intake including meals     Problem: Pain - Adult  Goal: Verbalizes/displays adequate comfort level or baseline comfort level  Outcome: Progressing     Problem: Safety - Adult  Goal: Free from fall injury  Outcome: Progressing   The patient's goals for the shift include      The clinical goals for the shift include Pt will remain HDS this shift.

## 2025-01-04 NOTE — PROGRESS NOTES
Vancomycin Dosing by Pharmacy- FOLLOW UP    Alo Glass is a 69 y.o. year old male who Pharmacy has been consulted for vancomycin dosing for cellulitis, skin and soft tissue. Based on the patient's indication and renal status this patient is being dosed based on a goal trough/random level of 10-15.     Renal function is currently unstable. Continue to dose by levels.    Visit Vitals  /77 (BP Location: Right arm, Patient Position: Lying)   Pulse 104   Temp 36.7 °C (98.1 °F) (Temporal)   Resp 24        Lab Results   Component Value Date    CREATININE 2.85 (H) 2025    CREATININE 3.30 (H) 2025    CREATININE 3.19 (H) 2025    CREATININE 2.94 (H) 2025        Patient weight is as follows:   Vitals:    25 0542   Weight: 134 kg (294 lb 5 oz)       Cultures:  No results found for the encounter in last 14 days.       I/O last 3 completed shifts:  In: 930 (7 mL/kg) [P.O.:780; IV Piggyback:150]  Out: 2950 (22.1 mL/kg) [Urine:2950 (0.6 mL/kg/hr)]  Weight: 133.5 kg   I/O during current shift:  No intake/output data recorded.    Temp (24hrs), Av.6 °C (97.9 °F), Min:36.1 °C (97 °F), Max:37.2 °C (99 °F)      Assessment/Plan    AM level was 21.1 today. No plans for RRT. No doses needed today.   The next level will be obtained on  with AM labs. May be obtained sooner if clinically indicated.   Will continue to monitor renal function daily while on vancomycin and order serum creatinine at least every 48 hours if not already ordered.  Follow for continued vancomycin needs, clinical response, and signs/symptoms of toxicity.       Lj Luna, PharmD, BCPS

## 2025-01-04 NOTE — PROGRESS NOTES
Vancomycin Dosing by Pharmacy- FOLLOW UP    Alo Glass is a 69 y.o. year old male who Pharmacy has been consulted for vancomycin dosing for cellulitis, skin and soft tissue. Based on the patient's indication and renal status this patient is being dosed based on a goal trough/random level of 10-15.     Renal function is currently unstable. Continue to dose by level    Current vancomycin dose: 500 mg given 2025.    Most recent random level: 16.8 mcg/mL    Visit Vitals  /76 (BP Location: Right arm, Patient Position: Lying)   Pulse 105   Temp 36.4 °C (97.5 °F) (Temporal)   Resp 23        Lab Results   Component Value Date    CREATININE 2.73 (H) 2025    CREATININE 2.85 (H) 2025    CREATININE 3.30 (H) 2025    CREATININE 3.19 (H) 2025        Patient weight is as follows:   Vitals:    25 0542   Weight: 134 kg (294 lb 5 oz)       Cultures:  No results found for the encounter in last 14 days.       I/O last 3 completed shifts:  In: 2384.5 (17.9 mL/kg) [P.O.:580; I.V.:1504.5 (11.3 mL/kg); IV Piggyback:300]  Out: 4100 (30.7 mL/kg) [Urine:4100 (0.9 mL/kg/hr)]  Weight: 133.5 kg   I/O during current shift:  I/O this shift:  In: -   Out: 1525 [Urine:1525]    Temp (24hrs), Av.6 °C (97.8 °F), Min:36.4 °C (97.5 °F), Max:36.8 °C (98.2 °F)      Assessment/Plan    Within goal random/trough level. Roughly 48 hour dosing.  The next level will be obtained on  with AM labs. May be obtained sooner if clinically indicated.   Will continue to monitor renal function daily while on vancomycin and order serum creatinine at least every 48 hours if not already ordered.  Follow for continued vancomycin needs, clinical response, and signs/symptoms of toxicity.       Brook Koch, PharmD

## 2025-01-04 NOTE — PROGRESS NOTES
"Medical Intensive Care - Daily Progress Note   Subjective    Alo Glass is a 69 y.o. year old male patient admitted on 12/29/2024 with following ICU needs: Bilateral lower ext cellulitis causing septic shock.      Interval History:  - Declined BIPAP overnight- agreeable to wearing home cpap with nasal mask.  - Renal function improving on bumex gtt, gave 4mg bumex bolus and increased rate to 4 mg/hour. Responded too well- with 4 Liters urine output by 1700 hours- bumex gtt placed   on hold   - Increased dilaudid as needed from 0.2 to 0.4mg for dressing change pain  - today Day 7 antibiotic (Vanco/zosyn) plan for 10 day course  - Denies fever, chills, cp, abdominal pain, and n/v     Meds    Scheduled medications  [Held by provider] allopurinol, 300 mg, oral, Daily  ezetimibe, 10 mg, oral, Daily  gabapentin, 100 mg, oral, Daily  HYDROmorphone, 0.2 mg, intravenous, Once  insulin lispro, 0-10 Units, subcutaneous, TID AC  ipratropium-albuteroL, 3 mL, nebulization, q6h  levothyroxine, 125 mcg, oral, Daily  metOLazone, 20 mg, oral, Daily  midodrine, 5 mg, oral, TID  nicotine, 1 patch, transdermal, Daily  nystatin, 5 mL, Mouth/Throat, BID  oxygen, , inhalation, Nightly  pantoprazole, 40 mg, oral, Daily before breakfast  piperacillin-tazobactam, 3.375 g, intravenous, q6h  sennosides-docusate sodium, 1 tablet, oral, Nightly      Continuous medications  [Held by provider] bumetanide, 4 mg/hr, Last Rate: Stopped (01/04/25 1705)  heparin, 0-4,500 Units/hr, Last Rate: 1,800 Units/hr (01/04/25 1209)      PRN medications  PRN medications: acetaminophen, albuterol, dextrose, dextrose, glucagon, glucagon, heparin, HYDROmorphone, oxygen, polyethylene glycol, sodium chloride, vancomycin     Objective    Blood pressure 98/77, pulse 106, temperature 36.6 °C (97.9 °F), temperature source Temporal, resp. rate 22, height 1.803 m (5' 11\"), weight 134 kg (294 lb 5 oz), SpO2 96%.         Constitutional: chronically Ill appearing elderly " male in NAD  Eyes: PERRLA, no icterus   ENMT: Mucous membranes dry   Head/Neck: atraumatic   Respiratory/Thorax: Lungs diminished R>L, intermittent cough with thick brown sputum production, non-labored breathing, on 5L NC  Cardiovascular: Tachy; s1/s2 normal.  No obvious MRG.  Gastrointestinal: obese, soft, non-tender, normoactive bowel sounds   Extremities: +2-3 edema B/L LE, Cellulitis with erythema b/l LE, venous statis ulcer vs blister RLE  Neurological: alert and oriented x 3, speech clear, follows commands appropriately, no focal deficits.    Skin: Warm and dry      Intake/Output Summary (Last 24 hours) at 1/4/2025 1817  Last data filed at 1/4/2025 1601  Gross per 24 hour   Intake 1892.6 ml   Output 5625 ml   Net -3732.4 ml     Labs:   Results from last 72 hours   Lab Units 01/04/25  1018 01/03/25  1951 01/03/25  1126   SODIUM mmol/L 132* 132* 128*   POTASSIUM mmol/L 3.1* 3.6 3.6   CHLORIDE mmol/L 83* 84* 84*   CO2 mmol/L 31 28 27   BUN mg/dL 101* 101* 102*   CREATININE mg/dL 2.19* 2.73* 2.85*   GLUCOSE mg/dL 145* 170* 128*   CALCIUM mg/dL 8.6 8.7 8.4*   ANION GAP mmol/L 21* 24* 21*   EGFR mL/min/1.73m*2 32* 24* 23*   PHOSPHORUS mg/dL 7.0* 7.9* 7.5*      Results from last 72 hours   Lab Units 01/04/25  0635 01/03/25  1126 01/02/25  0216   WBC AUTO x10*3/uL 7.5 7.4 7.8   HEMOGLOBIN g/dL 16.5 16.3 16.5   HEMATOCRIT % 50.5 49.3 52.1*   PLATELETS AUTO x10*3/uL 149* 148* 111*   NEUTROS PCT AUTO % 78.6 81.2 76.7   LYMPHS PCT AUTO % 11.8 9.6 9.8   MONOS PCT AUTO % 7.1 7.1 11.3   EOS PCT AUTO % 0.4 0.8 1.2            Results from last 72 hours   Lab Units 01/04/25  0639   POCT PH, VENOUS pH 7.41   POCT PCO2, VENOUS mm Hg 56*   POCT PO2, VENOUS mm Hg 91*        Micro/ID:     Lab Results   Component Value Date    URINECULTURE No growth 12/28/2024    BLOODCULT No growth at 4 days -  FINAL REPORT 12/28/2024    BLOODCULT No growth at 4 days -  FINAL REPORT 12/28/2024       Summary of key imaging results from the last 24  "hours  XR CHEST 1 VIEW; 12/30/2024 9:03 am   IMPRESSION:  1. Unchanged mild perihilar edema and bibasilar atelectasis, right more than left. Small right pleural effusion. Unchanged enlarged cardiac silhouette size    TRANSTHORACIC ECHOCARDIOGRAM REPORT  12/30/24  CONCLUSIONS:   1. Poorly visualized anatomical structures due to suboptimal image quality.   2. Left ventricular ejection fraction is severely decreased, calculated by Green's biplane at 19%.   3. There is global hypokinesis of the left ventricle with minor regional variations.   4. Left ventricular cavity size is moderately dilated.   5. Abnormal septal motion consistent with left bundle branch block.   6. No left ventricular thrombus visualized.   7. There is reduced right ventricular systolic function.   8. Moderately enlarged right ventricle.   9. The left atrium is enlarged.  10. The right atrium is moderately dilated.  11. Mild aortic valve regurgitation.  12. Compared with study dated 9/19/2024, no significant change.    Renal US 12/29/24    Assessment and Plan     Assessment: Alo Glass is a 69 y.o. year old male patient admitted on 12/29/2024 with Septic shock 2/2 bilateral lower ext cellulitis and cardiogenic shock and AHRF 2/2 volume overload and LEONA.     Summary for 01/04/25  :  Cont to diurese to a goal of -2L with bumex gtt and Metolazone   Cont with IV Atbs regimen:  Zosyn and Vanco  Palliative care consult     Plan:  NEUROLOGY/PSYCH:  Dx: ZENA  A&Ox3  Management:  Continue gabapentin 100mg daily and Nicotine patch daily,   Pain regimen:  Tylenol 650 mg prn and Dilaudid PRN   PT/OT--> 1/2 Refused to work with PT today due to pain and \"wanting to be left alone\". Additional pain medications offered to help facilitate PT but patient declined   Palliative Care consult    CARDIOVASCULAR:  Dx: Hx HFrEF, HTN, HLD, severe PAD.  Septic shock, resolved.  Cardiogenic shock, resovled. Aflutter  HDS but with narrow pulse pressure  Remains off " vasopressors since   TTE : LVEF 19%  Management:  Continue Ezetimibe 10mg daily   Holding home lasix and SGLT-2 iso hypotension  Consider GDMT when able  S/p Dig load -  Bumex gtt 2mg/hr; goal of -1.5L/day  Heparin gtt  Started on Midodrine 5mg TID for renal perfusion   Cardiology consulted, signed off : Continue diuresis with IV Bumex.  If decompensates can consider cardioversion for atrial kick restoration. Prior to discharge arrange follow up with Dr. Moore as an o/p @ [phone] (scheduling)  Tele    PULMONARY:  Dx: Hx asthma and COPD GOLD 3 (2019: FEV1 45% w +ve BD change; %/TLC 85%). AHRF 2/2 hypervolemia   Stable on 5L NC with Bipap/cpap HS and with naps  CxR : Interval increase in the size of bilateral pleural effusions with adjacent atelectasis/consolidation. Similar appearance of prominent perihilar and interstitial lung markings consistent with interstitial pulmonary edema.  VB.31/65/55/87 prior to being placed on Bipap   Management:  Transitioned from Cpap to Bipap  d/t hypercapnia   Diuresis as outlined above   Cont Duonebs q6h, albuterol PRN  S/p steroid taper   Airway clearance TID    RENAL/GENITOURINARY:  Dx: LEONA. Hyponatremia likely I/s/o volume overload.  LT renal mass c/f RCC  Worsening Cr today, 2.94 from 2.18 yesterday   Renal US : No hydronephrosis. hypoechoic lesion of the left superior pole measuring 1.9 cm which was seen on the prior CT dated 2024, further evaluation with a kidney protocol MRI is strongly recommended. There is an additional indeterminate 0.5 cm hypoechoic lesion of the left kidney.  Management:  Voiding via lipscomb   Nephrology following, recs appreciated: Continue on Bumex drip.  Start Metolazone 20mg daily.  Will consider UF tomorrow if inadequate diuresis or sooner if worsening respiratory status   Pt saw Urology on 2024 and referrred to IR for biopsy   Urine lytes consistent with prerenal  Diurese goal net  negative -1.5-2L  RFP daily    GASTROENTEROLOGY:  Dx: Hx GERD. Poor oral intake   BMx2  Management:  Diet: Cardiac and carb control diet  Bowel Regimen: Miralax PRN   PPI    ENDOCRINOLOGY:  Dx: Hx Hypothyroid and DMII  Glucose 130-190s  HgbA1C  5.5%  Management:  Home regimen:  Mounjaro and Jardiance; pt also utilizes a Dexcom CGM)  Cont with Insuline lispro TID  Glucose goal of <180  Hypoglycemia protocol     HEMATOLOGY:  Dx: ZENA  H/h stable   Management:  CBC daily     SKIN:  Dx:  Skin Failure Yes, describe: BL L/E cellutis vs PAD   Management:  Wound following  ACS consulted, signed off 1/1: Elevate legs as able, continue with abx.  If erythema does not completely resolve with antibiotic treatment would recommend re-imaging with non-contrast CT of BLE to assess for new, undrained fluid collections     INFECTIOUS DISEASE:  Dx: Cellulitis   Tmax 36.5 without leukocytosis   Management:  Micro: 12/28 Legionella, Strep pneum, negative, 12/28 Urine Cx negative, 12/28 BCx NTGD x2  Abx: Clindamycin (12/28), Zosyn (12/28 - **), Vancomycin (12/28 - **)  Cont with Vanco/Zosyn for 10 day course  ACS consulted signed off 1/1; rec cont IV Atbs     ICU Check List     FEN  Fluids: PRN  Electrolytes: K>4, Phos >3, Mg> 2  Nutrition: Cardiac diet and carb control diet   Prophylaxis:  DVT ppx: Heparin gtt  GI ppx: PPI  Bowel care: Miralax PRN   Hardware:         Urethral Catheter Temperature probe 16 Fr. (Active)   Placement Date: 12/28/24   Hand Hygiene Completed: Yes  Catheter Type: Temperature probe  Tube Size (Fr.): 16 Fr.  Catheter Balloon Size: 5 mL  Urine Returned: Yes   Number of days: 3       Social:  Code: Full Code    HPOA: Sheyla Griggs [phone]   Disposition: Continue care in SDU with low threshold for transfer to ICU    Pt discussed with Dr. Tran Hussein, seen and examined. All labs, VS and previous plan of care reviewed.      Arielle Maxwell, PARAM-CNP   01/04/25 at 6:17 PM     Disclaimer: Documentation completed with the  information available at the time of input. The times in the chart may not be reflective of actual patient care times, interventions, or procedures. Documentation occurs after the physical care of the patient.

## 2025-01-04 NOTE — CARE PLAN
The patient's goals for the shift include      The clinical goals for the shift include pt will remain HDS    Problem: Skin  Goal: Decreased wound size/increased tissue granulation at next dressing change  Flowsheets (Taken 1/4/2025 0323)  Decreased wound size/increased tissue granulation at next dressing change:   Utilize specialty bed per algorithm   Promote sleep for wound healing  Goal: Participates in plan/prevention/treatment measures  Flowsheets (Taken 1/4/2025 0323)  Participates in plan/prevention/treatment measures:   Discuss with provider PT/OT consult   Elevate heels  Goal: Prevent/manage excess moisture  Flowsheets (Taken 1/4/2025 0323)  Prevent/manage excess moisture:   Cleanse incontinence/protect with barrier cream   Follow provider orders for dressing changes   Moisturize dry skin   Monitor for/manage infection if present   Use wicking fabric (obtain order)  Goal: Prevent/minimize sheer/friction injuries  Flowsheets (Taken 1/4/2025 0323)  Prevent/minimize sheer/friction injuries:   Complete micro-shifts as needed if patient unable. Adjust patient position to relieve pressure points, not a full turn   HOB 30 degrees or less   Turn/reposition every 2 hours/use positioning/transfer devices   Use pull sheet

## 2025-01-05 ENCOUNTER — APPOINTMENT (OUTPATIENT)
Dept: RADIOLOGY | Facility: HOSPITAL | Age: 70
End: 2025-01-05
Payer: MEDICARE

## 2025-01-05 VITALS
SYSTOLIC BLOOD PRESSURE: 106 MMHG | DIASTOLIC BLOOD PRESSURE: 77 MMHG | TEMPERATURE: 98.6 F | HEART RATE: 73 BPM | OXYGEN SATURATION: 95 % | HEIGHT: 71 IN | WEIGHT: 293.65 LBS | RESPIRATION RATE: 18 BRPM | BODY MASS INDEX: 41.11 KG/M2

## 2025-01-05 LAB
ALBUMIN SERPL BCP-MCNC: 2.9 G/DL (ref 3.4–5)
ALBUMIN SERPL BCP-MCNC: 3 G/DL (ref 3.4–5)
ALBUMIN SERPL BCP-MCNC: 3 G/DL (ref 3.4–5)
ALP SERPL-CCNC: 128 U/L (ref 33–136)
ALT SERPL W P-5'-P-CCNC: 9 U/L (ref 10–52)
ANION GAP SERPL CALC-SCNC: 19 MMOL/L (ref 10–20)
ANION GAP SERPL CALC-SCNC: 19 MMOL/L (ref 10–20)
AST SERPL W P-5'-P-CCNC: 10 U/L (ref 9–39)
BASOPHILS # BLD AUTO: 0.02 X10*3/UL (ref 0–0.1)
BASOPHILS NFR BLD AUTO: 0.2 %
BILIRUB DIRECT SERPL-MCNC: 0.3 MG/DL (ref 0–0.3)
BILIRUB SERPL-MCNC: 0.7 MG/DL (ref 0–1.2)
BUN SERPL-MCNC: 101 MG/DL (ref 6–23)
BUN SERPL-MCNC: 103 MG/DL (ref 6–23)
CALCIUM SERPL-MCNC: 8.6 MG/DL (ref 8.6–10.6)
CALCIUM SERPL-MCNC: 8.8 MG/DL (ref 8.6–10.6)
CHLORIDE SERPL-SCNC: 79 MMOL/L (ref 98–107)
CHLORIDE SERPL-SCNC: 81 MMOL/L (ref 98–107)
CO2 SERPL-SCNC: 35 MMOL/L (ref 21–32)
CO2 SERPL-SCNC: 35 MMOL/L (ref 21–32)
CREAT SERPL-MCNC: 1.94 MG/DL (ref 0.5–1.3)
CREAT SERPL-MCNC: 2.03 MG/DL (ref 0.5–1.3)
EGFRCR SERPLBLD CKD-EPI 2021: 35 ML/MIN/1.73M*2
EGFRCR SERPLBLD CKD-EPI 2021: 37 ML/MIN/1.73M*2
EOSINOPHIL # BLD AUTO: 0.06 X10*3/UL (ref 0–0.7)
EOSINOPHIL NFR BLD AUTO: 0.7 %
ERYTHROCYTE [DISTWIDTH] IN BLOOD BY AUTOMATED COUNT: 13.5 % (ref 11.5–14.5)
GLUCOSE BLD MANUAL STRIP-MCNC: 164 MG/DL (ref 74–99)
GLUCOSE BLD MANUAL STRIP-MCNC: 182 MG/DL (ref 74–99)
GLUCOSE BLD MANUAL STRIP-MCNC: 204 MG/DL (ref 74–99)
GLUCOSE BLD MANUAL STRIP-MCNC: 207 MG/DL (ref 74–99)
GLUCOSE BLD MANUAL STRIP-MCNC: 209 MG/DL (ref 74–99)
GLUCOSE BLD MANUAL STRIP-MCNC: 239 MG/DL (ref 74–99)
GLUCOSE SERPL-MCNC: 142 MG/DL (ref 74–99)
GLUCOSE SERPL-MCNC: 159 MG/DL (ref 74–99)
HCT VFR BLD AUTO: 49.5 % (ref 41–52)
HGB BLD-MCNC: 16.6 G/DL (ref 13.5–17.5)
IMM GRANULOCYTES # BLD AUTO: 0.15 X10*3/UL (ref 0–0.7)
IMM GRANULOCYTES NFR BLD AUTO: 1.7 % (ref 0–0.9)
LYMPHOCYTES # BLD AUTO: 0.85 X10*3/UL (ref 1.2–4.8)
LYMPHOCYTES NFR BLD AUTO: 9.4 %
MAGNESIUM SERPL-MCNC: 2.74 MG/DL (ref 1.6–2.4)
MCH RBC QN AUTO: 31.3 PG (ref 26–34)
MCHC RBC AUTO-ENTMCNC: 33.5 G/DL (ref 32–36)
MCV RBC AUTO: 93 FL (ref 80–100)
MONOCYTES # BLD AUTO: 0.38 X10*3/UL (ref 0.1–1)
MONOCYTES NFR BLD AUTO: 4.2 %
NEUTROPHILS # BLD AUTO: 7.63 X10*3/UL (ref 1.2–7.7)
NEUTROPHILS NFR BLD AUTO: 83.8 %
NRBC BLD-RTO: 0 /100 WBCS (ref 0–0)
PHOSPHATE SERPL-MCNC: 5.8 MG/DL (ref 2.5–4.9)
PHOSPHATE SERPL-MCNC: 6.2 MG/DL (ref 2.5–4.9)
PLATELET # BLD AUTO: 193 X10*3/UL (ref 150–450)
POTASSIUM SERPL-SCNC: 3 MMOL/L (ref 3.5–5.3)
POTASSIUM SERPL-SCNC: 3 MMOL/L (ref 3.5–5.3)
PROT SERPL-MCNC: 5.7 G/DL (ref 6.4–8.2)
RBC # BLD AUTO: 5.31 X10*6/UL (ref 4.5–5.9)
SODIUM SERPL-SCNC: 130 MMOL/L (ref 136–145)
SODIUM SERPL-SCNC: 132 MMOL/L (ref 136–145)
UFH PPP CHRO-ACNC: 0.5 IU/ML
WBC # BLD AUTO: 9.1 X10*3/UL (ref 4.4–11.3)

## 2025-01-05 PROCEDURE — 94640 AIRWAY INHALATION TREATMENT: CPT

## 2025-01-05 PROCEDURE — 2500000001 HC RX 250 WO HCPCS SELF ADMINISTERED DRUGS (ALT 637 FOR MEDICARE OP): Performed by: NURSE PRACTITIONER

## 2025-01-05 PROCEDURE — 83735 ASSAY OF MAGNESIUM: CPT | Performed by: NURSE PRACTITIONER

## 2025-01-05 PROCEDURE — 2500000004 HC RX 250 GENERAL PHARMACY W/ HCPCS (ALT 636 FOR OP/ED): Performed by: NURSE PRACTITIONER

## 2025-01-05 PROCEDURE — 85025 COMPLETE CBC W/AUTO DIFF WBC: CPT | Performed by: NURSE PRACTITIONER

## 2025-01-05 PROCEDURE — 2060000001 HC INTERMEDIATE ICU ROOM DAILY

## 2025-01-05 PROCEDURE — 94669 MECHANICAL CHEST WALL OSCILL: CPT

## 2025-01-05 PROCEDURE — 87205 SMEAR GRAM STAIN: CPT | Performed by: NURSE PRACTITIONER

## 2025-01-05 PROCEDURE — 71045 X-RAY EXAM CHEST 1 VIEW: CPT

## 2025-01-05 PROCEDURE — 2500000001 HC RX 250 WO HCPCS SELF ADMINISTERED DRUGS (ALT 637 FOR MEDICARE OP)

## 2025-01-05 PROCEDURE — 80069 RENAL FUNCTION PANEL: CPT | Mod: CCI | Performed by: NURSE PRACTITIONER

## 2025-01-05 PROCEDURE — S4991 NICOTINE PATCH NONLEGEND: HCPCS | Performed by: NURSE PRACTITIONER

## 2025-01-05 PROCEDURE — 2500000005 HC RX 250 GENERAL PHARMACY W/O HCPCS: Performed by: NURSE PRACTITIONER

## 2025-01-05 PROCEDURE — 80069 RENAL FUNCTION PANEL: CPT | Performed by: NURSE PRACTITIONER

## 2025-01-05 PROCEDURE — 94668 MNPJ CHEST WALL SBSQ: CPT

## 2025-01-05 PROCEDURE — 2500000002 HC RX 250 W HCPCS SELF ADMINISTERED DRUGS (ALT 637 FOR MEDICARE OP, ALT 636 FOR OP/ED): Performed by: NURSE PRACTITIONER

## 2025-01-05 PROCEDURE — 71045 X-RAY EXAM CHEST 1 VIEW: CPT | Performed by: RADIOLOGY

## 2025-01-05 PROCEDURE — 94660 CPAP INITIATION&MGMT: CPT

## 2025-01-05 PROCEDURE — 36415 COLL VENOUS BLD VENIPUNCTURE: CPT | Performed by: NURSE PRACTITIONER

## 2025-01-05 PROCEDURE — 82947 ASSAY GLUCOSE BLOOD QUANT: CPT

## 2025-01-05 PROCEDURE — 99233 SBSQ HOSP IP/OBS HIGH 50: CPT | Performed by: STUDENT IN AN ORGANIZED HEALTH CARE EDUCATION/TRAINING PROGRAM

## 2025-01-05 PROCEDURE — 85520 HEPARIN ASSAY: CPT | Performed by: NURSE PRACTITIONER

## 2025-01-05 RX ADMIN — HYDROMORPHONE HYDROCHLORIDE 0.4 MG: 1 INJECTION, SOLUTION INTRAMUSCULAR; INTRAVENOUS; SUBCUTANEOUS at 21:34

## 2025-01-05 RX ADMIN — EZETIMIBE 10 MG: 10 TABLET ORAL at 09:45

## 2025-01-05 RX ADMIN — Medication 12 L/MIN: at 08:38

## 2025-01-05 RX ADMIN — MIDODRINE HYDROCHLORIDE 5 MG: 5 TABLET ORAL at 09:45

## 2025-01-05 RX ADMIN — PIPERACILLIN SODIUM AND TAZOBACTAM SODIUM 3.38 G: 3; .375 INJECTION, SOLUTION INTRAVENOUS at 17:46

## 2025-01-05 RX ADMIN — METOLAZONE 20 MG: 10 TABLET ORAL at 09:45

## 2025-01-05 RX ADMIN — POTASSIUM CHLORIDE 20 MEQ: 14.9 INJECTION, SOLUTION INTRAVENOUS at 01:19

## 2025-01-05 RX ADMIN — Medication 5 L/MIN: at 21:08

## 2025-01-05 RX ADMIN — NYSTATIN 500000 UNITS: 100000 SUSPENSION ORAL at 21:08

## 2025-01-05 RX ADMIN — PIPERACILLIN SODIUM AND TAZOBACTAM SODIUM 3.38 G: 3; .375 INJECTION, SOLUTION INTRAVENOUS at 09:53

## 2025-01-05 RX ADMIN — INSULIN LISPRO 4 UNITS: 100 INJECTION, SOLUTION INTRAVENOUS; SUBCUTANEOUS at 11:49

## 2025-01-05 RX ADMIN — NYSTATIN 500000 UNITS: 100000 SUSPENSION ORAL at 09:45

## 2025-01-05 RX ADMIN — NICOTINE 1 PATCH: 14 PATCH, EXTENDED RELEASE TRANSDERMAL at 09:46

## 2025-01-05 RX ADMIN — INSULIN LISPRO 4 UNITS: 100 INJECTION, SOLUTION INTRAVENOUS; SUBCUTANEOUS at 07:38

## 2025-01-05 RX ADMIN — PIPERACILLIN SODIUM AND TAZOBACTAM SODIUM 3.38 G: 3; .375 INJECTION, SOLUTION INTRAVENOUS at 03:23

## 2025-01-05 RX ADMIN — HEPARIN SODIUM 1800 UNITS/HR: 10000 INJECTION, SOLUTION INTRAVENOUS at 01:36

## 2025-01-05 RX ADMIN — HYDROMORPHONE HYDROCHLORIDE 0.4 MG: 1 INJECTION, SOLUTION INTRAMUSCULAR; INTRAVENOUS; SUBCUTANEOUS at 01:18

## 2025-01-05 RX ADMIN — LEVOTHYROXINE SODIUM 125 MCG: 25 TABLET ORAL at 06:49

## 2025-01-05 RX ADMIN — MIDODRINE HYDROCHLORIDE 5 MG: 5 TABLET ORAL at 13:47

## 2025-01-05 RX ADMIN — BUMETANIDE 2 MG/HR: 0.25 INJECTION INTRAMUSCULAR; INTRAVENOUS at 16:34

## 2025-01-05 RX ADMIN — IPRATROPIUM BROMIDE AND ALBUTEROL SULFATE 3 ML: 2.5; .5 SOLUTION RESPIRATORY (INHALATION) at 01:39

## 2025-01-05 RX ADMIN — IPRATROPIUM BROMIDE AND ALBUTEROL SULFATE 3 ML: 2.5; .5 SOLUTION RESPIRATORY (INHALATION) at 20:21

## 2025-01-05 RX ADMIN — MIDODRINE HYDROCHLORIDE 5 MG: 5 TABLET ORAL at 21:00

## 2025-01-05 RX ADMIN — HEPARIN SODIUM 1800 UNITS/HR: 10000 INJECTION, SOLUTION INTRAVENOUS at 18:13

## 2025-01-05 RX ADMIN — IPRATROPIUM BROMIDE AND ALBUTEROL SULFATE 3 ML: 2.5; .5 SOLUTION RESPIRATORY (INHALATION) at 14:56

## 2025-01-05 RX ADMIN — INSULIN LISPRO 2 UNITS: 100 INJECTION, SOLUTION INTRAVENOUS; SUBCUTANEOUS at 16:29

## 2025-01-05 RX ADMIN — IPRATROPIUM BROMIDE AND ALBUTEROL SULFATE 3 ML: 2.5; .5 SOLUTION RESPIRATORY (INHALATION) at 08:38

## 2025-01-05 RX ADMIN — GABAPENTIN 100 MG: 100 CAPSULE ORAL at 09:45

## 2025-01-05 RX ADMIN — PANTOPRAZOLE SODIUM 40 MG: 40 TABLET, DELAYED RELEASE ORAL at 06:49

## 2025-01-05 ASSESSMENT — PAIN DESCRIPTION - ORIENTATION
ORIENTATION: RIGHT;LEFT
ORIENTATION: RIGHT;LEFT

## 2025-01-05 ASSESSMENT — COGNITIVE AND FUNCTIONAL STATUS - GENERAL
DRESSING REGULAR LOWER BODY CLOTHING: A LOT
TURNING FROM BACK TO SIDE WHILE IN FLAT BAD: A LOT
EATING MEALS: A LOT
TOILETING: TOTAL
WALKING IN HOSPITAL ROOM: TOTAL
MOVING FROM LYING ON BACK TO SITTING ON SIDE OF FLAT BED WITH BEDRAILS: A LOT
MOVING TO AND FROM BED TO CHAIR: TOTAL
PERSONAL GROOMING: A LOT
DAILY ACTIVITIY SCORE: 10
MOBILITY SCORE: 8
DRESSING REGULAR UPPER BODY CLOTHING: A LOT
CLIMB 3 TO 5 STEPS WITH RAILING: TOTAL
HELP NEEDED FOR BATHING: TOTAL
STANDING UP FROM CHAIR USING ARMS: TOTAL

## 2025-01-05 ASSESSMENT — PAIN DESCRIPTION - LOCATION
LOCATION: LEG
LOCATION: LEG

## 2025-01-05 ASSESSMENT — PAIN SCALES - GENERAL
PAINLEVEL_OUTOF10: 0 - NO PAIN
PAINLEVEL_OUTOF10: 0 - NO PAIN
PAINLEVEL_OUTOF10: 7
PAINLEVEL_OUTOF10: 7

## 2025-01-05 ASSESSMENT — PAIN - FUNCTIONAL ASSESSMENT
PAIN_FUNCTIONAL_ASSESSMENT: 0-10

## 2025-01-05 NOTE — PROGRESS NOTES
"Medical Intensive Care Step Down- Daily Progress Note   Subjective    Alo Glass is a 69 y.o. year old male patient admitted on 12/29/2024 with following ICU needs: Bilateral lower ext cellulitis causing septic shock.      Interval History:  -tolerated overnight BIPAP, on 10 L NC this am.  Bilateral pedal edema     Meds    Scheduled medications  [Held by provider] allopurinol, 300 mg, oral, Daily  ezetimibe, 10 mg, oral, Daily  gabapentin, 100 mg, oral, Daily  HYDROmorphone, 0.2 mg, intravenous, Once  insulin lispro, 0-10 Units, subcutaneous, TID AC  ipratropium-albuteroL, 3 mL, nebulization, q6h  levothyroxine, 125 mcg, oral, Daily  metOLazone, 20 mg, oral, Daily  midodrine, 5 mg, oral, TID  nicotine, 1 patch, transdermal, Daily  nystatin, 5 mL, Mouth/Throat, BID  oxygen, , inhalation, Nightly  pantoprazole, 40 mg, oral, Daily before breakfast  piperacillin-tazobactam, 3.375 g, intravenous, q6h  sennosides-docusate sodium, 1 tablet, oral, Nightly      Continuous medications  [Held by provider] bumetanide, 4 mg/hr, Last Rate: Stopped (01/04/25 1705)  heparin, 0-4,500 Units/hr, Last Rate: 1,800 Units/hr (01/05/25 0354)      PRN medications  PRN medications: acetaminophen, albuterol, dextrose, dextrose, glucagon, glucagon, heparin, HYDROmorphone, oxygen, polyethylene glycol, sodium chloride, vancomycin     Objective    Blood pressure 94/77, pulse 109, temperature 36.9 °C (98.4 °F), temperature source Temporal, resp. rate 26, height 1.803 m (5' 11\"), weight 133 kg (293 lb 10.4 oz), SpO2 98%.       Constitutional: chronically Ill appearing elderly male in NAD  Eyes: PERRLA, no icterus   ENMT: Mucous membranes dry   Head/Neck: atraumatic   Respiratory/Thorax: Lungs diminished R>L, intermittent cough with thick brown sputum production, non-labored breathing, on 10L HF NC  Cardiovascular: HRR  Gastrointestinal: obese, soft, non-tender, normoactive bowel sounds   Extremities: +2-3 edema B/L LE, Cellulitis with " erythema b/l LE, venous statis ulcer vs blister RLE  Neurological: alert and oriented x 3, speech clear, follows commands appropriately, no focal deficits.    Skin: Warm and dry      Intake/Output Summary (Last 24 hours) at 1/5/2025 0806  Last data filed at 1/5/2025 0354  Gross per 24 hour   Intake 855.93 ml   Output 4475 ml   Net -3619.07 ml     Labs:   Results from last 72 hours   Lab Units 01/04/25  2114 01/04/25  1018 01/03/25  1951   SODIUM mmol/L 133* 132* 132*   POTASSIUM mmol/L 3.0* 3.1* 3.6   CHLORIDE mmol/L 82* 83* 84*   CO2 mmol/L 33* 31 28   BUN mg/dL 100* 101* 101*   CREATININE mg/dL 1.98* 2.19* 2.73*   GLUCOSE mg/dL 150* 145* 170*   CALCIUM mg/dL 8.9 8.6 8.7   ANION GAP mmol/L 21* 21* 24*   EGFR mL/min/1.73m*2 36* 32* 24*   PHOSPHORUS mg/dL 6.6* 7.0* 7.9*      Results from last 72 hours   Lab Units 01/05/25  0623 01/04/25  0635 01/03/25  1126   WBC AUTO x10*3/uL 9.1 7.5 7.4   HEMOGLOBIN g/dL 16.6 16.5 16.3   HEMATOCRIT % 49.5 50.5 49.3   PLATELETS AUTO x10*3/uL 193 149* 148*   NEUTROS PCT AUTO % 83.8 78.6 81.2   LYMPHS PCT AUTO % 9.4 11.8 9.6   MONOS PCT AUTO % 4.2 7.1 7.1   EOS PCT AUTO % 0.7 0.4 0.8            Results from last 72 hours   Lab Units 01/04/25  0639   POCT PH, VENOUS pH 7.41   POCT PCO2, VENOUS mm Hg 56*   POCT PO2, VENOUS mm Hg 91*        Micro/ID:     Lab Results   Component Value Date    URINECULTURE No growth 12/28/2024    BLOODCULT No growth at 4 days -  FINAL REPORT 12/28/2024    BLOODCULT No growth at 4 days -  FINAL REPORT 12/28/2024       Summary of key imaging results    US renal complete 1/1/2025  1. Indeterminate hypoechoic lesion of the left superior pole measuring 1.9 cm which was seen on the prior CT dated 12/28/2024, further evaluation with a kidney protocol MRI with IV contrast is strongly recommended to rule out neoplasm.   2. An additional indeterminate 1.5 cm hypoechoic area in the upper to mid left kidney, which can be artifactual. This can further be evaluated by  "MRI.   3. No hydronephrosis.   4. Incidentally noted cholelithiasis.       XR chest 1 view 1/1/2025  1.  Similar appearance of prominent perihilar and interstitial lung markings suggestive of interstitial pulmonary edema.   2. Interval increase in bibasilar atelectasis/consolidation with or without small bilateral pleural effusions.   3. Stable enlargement of the cardiomediastinal silhouette.      Transthoracic Echo (TTE) Limited 12/30/2024  1. Poorly visualized anatomical structures due to suboptimal image quality.    2. Left ventricular ejection fraction is severely decreased, calculated by Green's biplane at 19%.    3. There is global hypokinesis of the left ventricle with minor regional variations.    4. Left ventricular cavity size is moderately dilated.    5. Abnormal septal motion consistent with left bundle branch block.    6. No left ventricular thrombus visualized.    7. There is reduced right ventricular systolic function.    8. Moderately enlarged right ventricle.    9. The left atrium is enlarged.   10. The right atrium is moderately dilated.   11. Mild aortic valve regurgitation.   12. Compared with study dated 9/19/2024, no significant change.     Assessment and Plan     Assessment: Alo Glass is a 69 y.o. year old male patient admitted on 12/29/2024 with Septic shock 2/2 bilateral lower ext cellulitis and cardiogenic shock and AHRF 2/2 volume overload and LEONA.     Summary for 01/05/25  :  Held off on Bumex gtt on 1/4, restarting today at 2 ml/hr ,  goal ~2L negative   Continue with Metolazone   Cont with IV Atbs :  Zosyn and Vanco  Palliative care consult     Plan:  NEUROLOGY/PSYCH:  Dx: ZENA  A&Ox3  Management:  Continue gabapentin 100mg daily and Nicotine patch daily,   Pain regimen:  Tylenol 650 mg prn and Dilaudid PRN   PT/OT--> 1/2 Refused to work with PT 1/4 due to pain and \"wanting to be left alone\".  Palliative Care consult    CARDIOVASCULAR:  Dx: Hx HFrEF, HTN, HLD, severe PAD.  " Septic shock, resolved.  Cardiogenic shock, resovled. Aflutter  HDS but with narrow pulse pressure  Remains off vasopressors since   TTE : LVEF 19%  Management:  Continue Ezetimibe 10mg daily   Holding home lasix and SGLT-2 iso hypotension  Consider GDMT when able  S/p Dig load -  Bumex gtt 2mg/hr; goal of -2L negative/day  Heparin gtt  Started on Midodrine 5mg TID for renal perfusion   Cardiology consulted, signed off : Continue diuresis with IV Bumex.  If decompensates can consider cardioversion for atrial kick restoration. Prior to discharge arrange follow up with Dr. Moore as an o/p @ [phone] (scheduling)  Tele    PULMONARY:  Dx: Hx asthma and COPD GOLD 3 (2019: FEV1 45% w +ve BD change; %/TLC 85%). AHRF 2/2 hypervolemia   Today on 10L HF NC with Bipap/cpap HS and with naps  CxR : Interval increase in the size of bilateral pleural effusions with adjacent atelectasis/consolidation. Similar appearance of prominent perihilar and interstitial lung markings consistent with interstitial pulmonary edema.  VB.31/65/55/87 prior to being placed on Bipap   Management:  Transitioned from Cpap to Bipap  d/t hypercapnia   Diuresis as outlined above   Cont Duonebs q6h, albuterol PRN  S/p steroid taper   Airway clearance TID    RENAL/GENITOURINARY:  Dx: LEONA. Hyponatremia likely I/s/o volume overload.  LT renal mass c/f RCC  Worsening Cr today, 2.94 from 2.18 yesterday   Renal US : No hydronephrosis. hypoechoic lesion of the left superior pole measuring 1.9 cm which was seen on the prior CT dated 2024, further evaluation with a kidney protocol MRI is strongly recommended. There is an additional indeterminate 0.5 cm hypoechoic lesion of the left kidney.  Management:  Voiding via lipscomb   Nephrology following, recs appreciated: Continue on Bumex drip.    Continue Metolazone 20mg daily since .    No need for HD at this time  Pt saw Urology on 2024 and referrred  to IR for biopsy   Urine lytes consistent with prerenal  Diurese goal net negative -1.5-2L  RFP daily    GASTROENTEROLOGY:  Dx: Hx GERD. Poor oral intake   BMx2  Management:  Diet: Cardiac and carb control diet  Bowel Regimen: Miralax PRN   PPI    ENDOCRINOLOGY:  Dx: Hx Hypothyroid and DMII  Glucose 140s-150s  HgbA1C  5.5%  Management:  Home regimen:  Mounjaro and Jardiance; pt also utilizes a Dexcom CGM)  Cont with Insuline lispro TID  Glucose goal of <180  Hypoglycemia protocol     HEMATOLOGY:  Dx: ZENA  H/h stable   Management:  CBC daily     SKIN:  Dx:  Skin Failure Yes, describe: BL L/E cellutis vs PAD   Management:  Wound following  ACS consulted, signed off 1/1: Elevate legs as able, continue with abx.  If erythema does not completely resolve with antibiotic treatment would recommend re-imaging with non-contrast CT of BLE to assess for new, undrained fluid collections     INFECTIOUS DISEASE:  Dx: Cellulitis   Tmax 37.0, no leukocytosis   Management:  Micro: 12/28 Legionella, Strep pneum, negative, 12/28 Urine Cx negative, 12/28 BCx NTGD x2  Abx: Clindamycin (12/28), Zosyn (12/28 - **), Vancomycin (12/28 - **)  Cont with Vanco/Zosyn for 10 day course - consider  stop tomorrow 1/6  ACS consulted signed off 1/1; rec cont IV Atbs     ICU Check List     FEN  Fluids: PRN  Electrolytes: K>4, Phos >3, Mg> 2  Nutrition: Cardiac diet and carb control diet   Prophylaxis:  DVT ppx: Heparin gtt  GI ppx: PPI  Bowel care: Miralax PRN   Hardware:         Urethral Catheter Temperature probe 16 Fr. (Active)   Placement Date: 12/28/24   Hand Hygiene Completed: Yes  Catheter Type: Temperature probe  Tube Size (Fr.): 16 Fr.  Catheter Balloon Size: 5 mL  Urine Returned: Yes   Number of days: 3       Social:  Code: Full Code    HPOA: Sheyla Griggs [phone]   Disposition: Continue care in SDU       Adore PatinoPARAM herrera-CNP   01/05/25 at 8:06 AM     Pt discussed with Dr. Hussein,  seen and examined. All labs, VS and previous plan of  care reviewed.     Disclaimer: Documentation completed with the information available at the time of input. The times in the chart may not be reflective of actual patient care times, interventions, or procedures. Documentation occurs after the physical care of the patient.

## 2025-01-05 NOTE — CARE PLAN
Problem: Skin  Goal: Decreased wound size/increased tissue granulation at next dressing change  Outcome: Progressing  Flowsheets (Taken 1/5/2025 1338)  Decreased wound size/increased tissue granulation at next dressing change:   Promote sleep for wound healing   Protective dressings over bony prominences   Utilize specialty bed per algorithm  Goal: Participates in plan/prevention/treatment measures  Outcome: Progressing  Flowsheets (Taken 1/5/2025 1338)  Participates in plan/prevention/treatment measures:   Elevate heels   Discuss with provider PT/OT consult  Goal: Prevent/manage excess moisture  Outcome: Progressing  Flowsheets (Taken 1/5/2025 1338)  Prevent/manage excess moisture:   Monitor for/manage infection if present   Cleanse incontinence/protect with barrier cream   Moisturize dry skin   Use wicking fabric (obtain order)  Goal: Prevent/minimize sheer/friction injuries  Outcome: Progressing  Flowsheets (Taken 1/5/2025 1338)  Prevent/minimize sheer/friction injuries:   Use pull sheet   Complete micro-shifts as needed if patient unable. Adjust patient position to relieve pressure points, not a full turn   HOB 30 degrees or less   Utilize specialty bed per algorithm  Goal: Promote/optimize nutrition  Outcome: Progressing  Flowsheets (Taken 1/5/2025 1338)  Promote/optimize nutrition:   Assist with feeding   Monitor/record intake including meals   Consume > 50% meals/supplements  Goal: Promote skin healing  Outcome: Progressing  Flowsheets (Taken 1/5/2025 1338)  Promote skin healing:   Assess skin/pad under line(s)/device(s)   Protective dressings over bony prominences   Ensure correct size (line/device) and apply per  instructions   Rotate device position/do not position patient on device     Problem: Pain - Adult  Goal: Verbalizes/displays adequate comfort level or baseline comfort level  Outcome: Progressing     Problem: Safety - Adult  Goal: Free from fall injury  Outcome: Progressing      Problem: Discharge Planning  Goal: Discharge to home or other facility with appropriate resources  Outcome: Progressing     Problem: Chronic Conditions and Co-morbidities  Goal: Patient's chronic conditions and co-morbidity symptoms are monitored and maintained or improved  Outcome: Progressing     Problem: Diabetes  Goal: Achieve decreasing blood glucose levels by end of shift  Outcome: Progressing  Goal: Increase stability of blood glucose readings by end of shift  Outcome: Progressing  Goal: Decrease in ketones present in urine by end of shift  Outcome: Progressing  Goal: Maintain electrolyte levels within acceptable range throughout shift  Outcome: Progressing  Goal: Maintain glucose levels >70mg/dl to <250mg/dl throughout shift  Outcome: Progressing  Goal: No changes in neurological exam by end of shift  Outcome: Progressing  Goal: Learn about and adhere to nutrition recommendations by end of shift  Outcome: Progressing  Goal: Vital signs within normal range for age by end of shift  Outcome: Progressing  Goal: Increase self care and/or family involovement by end of shift  Outcome: Progressing  Goal: Receive DSME education by end of shift  Outcome: Progressing     Problem: Pain  Goal: Takes deep breaths with improved pain control throughout the shift  Outcome: Progressing  Goal: Turns in bed with improved pain control throughout the shift  Outcome: Progressing  Goal: Walks with improved pain control throughout the shift  Outcome: Progressing  Goal: Performs ADL's with improved pain control throughout shift  Outcome: Progressing  Goal: Participates in PT with improved pain control throughout the shift  Outcome: Progressing  Goal: Free from opioid side effects throughout the shift  Outcome: Progressing  Goal: Free from acute confusion related to pain meds throughout the shift  Outcome: Progressing     Problem: Fall/Injury  Goal: Not fall by end of shift  Outcome: Progressing  Goal: Be free from injury by end of  the shift  Outcome: Progressing  Goal: Verbalize understanding of personal risk factors for fall in the hospital  Outcome: Progressing  Goal: Verbalize understanding of risk factor reduction measures to prevent injury from fall in the home  Outcome: Progressing  Goal: Pace activities to prevent fatigue by end of the shift  Outcome: Progressing   The patient's goals for the shift include      The clinical goals for the shift include Pt will maintain oxygen saturation >90%.  Remain HDS this shift.

## 2025-01-05 NOTE — SIGNIFICANT EVENT
Rapid Response Nurse Note: RADAR alert: 11    Pager time: [phone]  Arrival time: 1245  Event end time: 1307  Location: [facility]  [x] Triage by phone or secure messaging    Rapid response initiated by:  [] Rapid response RN [] Family [] Nursing Supervisor [] Physician   [x] RADAR auto page [] Sepsis auto-page [] RN [] RT   [] NP/PA [] Other:     Primary reason for call:   [] BAT [] New CPAP/BiPAP [] Bleeding [] Change in mental status   [] Chest pain [] Code blue [] FiO2 >/= 50% [] HR </= 40 bpm   [] HR >/= 130 bpm [] Hyperglycemia [] Hypoglycemia [x] RADAR    [] RR </= 8 bpm [] RR >/= 30 bpm [] SBP </= 90 mmHg [] SpO2 < 90%   [] Seizure [] Sepsis [] Shortness of breath  [] Staff concern: see comments     Initial VS and/or RADAR VS: T null °C; HR 97; RR 26; BP 91/62; SPO2 90%.    Interventions:  [x] None [] ABG/VBG [] Assist w/ICU transfer [] BAT paged    [] Bag mask [] Blood [] Cardioversion [] Code Blue   [] Code blue for intubation [] Code status changed [] Chest x-ray [] EKG   [] IV fluid/bolus [] KUB x-ray [] Labs/cultures [] Medication   [] Nebulizer treatment [] NIPPV (CPAP/BiPAP) [] Oxygen [] Oral airway   [] Peripheral IV [] Palliative care consult [] CT/MRI [] Sepsis protocol    [] Suctioned [] Other:     Outcome:  [] Coded and  [] Code blue for intubation [] Coded and transferred to ICU []  on division   [x] Remained on division (no change) [] Remained on division + additional monitoring [] Remained in ED [] Transferred to ED   [] Transferred to ICU [] Transferred to inpatient status [] Transferred for interventions (procedure) [] Transferred to ICU stepdown    [] Transferred to surgery [] Transferred to telemetry [] Sepsis protocol [] STEMI protocol   [] Stroke protocol [x] Bedside nurse instructed to page rapid response for any concerns or acute change in condition/VS     Additional Comments: RADAR alert received.  VS reviewed and confirmed with Bedside CALI Mederos.  No interventions  required of Rapid Response Team RN as patient remains at expected baseline.  RN encouraged to page Rapid Response if further concerns arise in patient condition.

## 2025-01-05 NOTE — SIGNIFICANT EVENT
Rapid Response Nurse Note:  [x] RADAR alert/Score 6    Pager time: [phone]   Arrival time: 604  Event end time: 606  Location: [facility]017  [x] Phone triage     Rapid response initiated by:  [] Rapid Response RN [] Family [] Nursing Supervisor [] Physician   [x] RADAR auto-page [] Sepsis auto-page [] RN [] RT   [] NP/PA [] Other:     Primary reason for call:   [] BAT [] New CPAP/BiPAP [] Bleeding [] Change in mental status   [] Chest pain [] Code blue [] FiO2 >/= 50% [] HR </= 40 bpm   [] HR >/= 130 bpm [] Hyperglycemia [] Hypoglycemia [x] RADAR    [] RR </= 8 bpm [] RR >/= 30 bpm [] SBP </= 90 mmHg [] SpO2 < 90%   [] Seizure [] Sepsis [] Staff concern:     Initial VS and/or RADAR VS:  radar vitals below in bold    Vitals:    25 0200 25 0300 25 0400 25 0600   BP:   94/77    BP Location:   Right arm    Patient Position:   Lying    Pulse: 108  102 109   Resp: 20  22 26   Temp:  36.6 °C (97.9 °F)     TempSrc:  Temporal     SpO2: 96%  96% 98%   Weight:       Height:            Interventions:  [x] None [] ABG [] Assist w/ICU transfer [] BAT paged    [] Bag mask [] Blood [] Cardioversion [] Code Blue   [] Code blue for intubation [] Code status changed [] Chest x-ray [] EKG   [] IV fluid/bolus [] KUB x-ray [] Labs/cultures [] Medication   [] Nebulizer treatment [] NIPPV (CPAP/BiPAP) [] Oxygen [] Oral airway   [] Peripheral IV [] Palliative care consult [] CT/MRI [] Sepsis protocol    [] Suctioned [] Other:       Outcome:  [] Coded and  [] Code blue for intubation [] Coded and transferred to ICU []  on division   [x] Remained on division (no change) [] Remained on division + additional monitoring [] Remained in ED [] Transferred to ED   [] Transferred to ICU [] Transferred to inpatient status [] Transferred for interventions (procedure) [] Transferred to ICU stepdown    [] Transferred to surgery [] Transferred to telemetry [] Sepsis protocol [] STEMI protocol   [] Stroke protocol [x]  Bedside nurse instructed to page rapid response for any concerns or acute change in condition/VS     Additional Comments: Spoke to RN regarding vital signs.  Per RN, pt on his BiPAP.  No concerns from RN at this time.

## 2025-01-06 ENCOUNTER — APPOINTMENT (OUTPATIENT)
Dept: RADIOLOGY | Facility: HOSPITAL | Age: 70
End: 2025-01-06
Payer: MEDICARE

## 2025-01-06 LAB
ALBUMIN SERPL BCP-MCNC: 2.9 G/DL (ref 3.4–5)
ALP SERPL-CCNC: 121 U/L (ref 33–136)
ALT SERPL W P-5'-P-CCNC: 10 U/L (ref 10–52)
ANION GAP SERPL CALC-SCNC: 18 MMOL/L (ref 10–20)
AST SERPL W P-5'-P-CCNC: 8 U/L (ref 9–39)
BASE EXCESS BLDV CALC-SCNC: 14.7 MMOL/L (ref -2–3)
BASOPHILS # BLD AUTO: 0.01 X10*3/UL (ref 0–0.1)
BASOPHILS NFR BLD AUTO: 0.1 %
BILIRUB DIRECT SERPL-MCNC: 0.2 MG/DL (ref 0–0.3)
BILIRUB SERPL-MCNC: 0.7 MG/DL (ref 0–1.2)
BODY TEMPERATURE: 37 DEGREES CELSIUS
BUN SERPL-MCNC: 104 MG/DL (ref 6–23)
CALCIUM SERPL-MCNC: 8.6 MG/DL (ref 8.6–10.6)
CHLORIDE SERPL-SCNC: 80 MMOL/L (ref 98–107)
CO2 SERPL-SCNC: 36 MMOL/L (ref 21–32)
CREAT SERPL-MCNC: 2 MG/DL (ref 0.5–1.3)
EGFRCR SERPLBLD CKD-EPI 2021: 35 ML/MIN/1.73M*2
EOSINOPHIL # BLD AUTO: 0.1 X10*3/UL (ref 0–0.7)
EOSINOPHIL NFR BLD AUTO: 1.1 %
ERYTHROCYTE [DISTWIDTH] IN BLOOD BY AUTOMATED COUNT: 13.3 % (ref 11.5–14.5)
GLUCOSE BLD MANUAL STRIP-MCNC: 171 MG/DL (ref 74–99)
GLUCOSE BLD MANUAL STRIP-MCNC: 174 MG/DL (ref 74–99)
GLUCOSE BLD MANUAL STRIP-MCNC: 266 MG/DL (ref 74–99)
GLUCOSE SERPL-MCNC: 166 MG/DL (ref 74–99)
HCO3 BLDV-SCNC: 41.3 MMOL/L (ref 22–26)
HCT VFR BLD AUTO: 47.4 % (ref 41–52)
HGB BLD-MCNC: 15.8 G/DL (ref 13.5–17.5)
IMM GRANULOCYTES # BLD AUTO: 0.17 X10*3/UL (ref 0–0.7)
IMM GRANULOCYTES NFR BLD AUTO: 1.9 % (ref 0–0.9)
INHALED O2 CONCENTRATION: 100 %
LYMPHOCYTES # BLD AUTO: 1.37 X10*3/UL (ref 1.2–4.8)
LYMPHOCYTES NFR BLD AUTO: 15.1 %
MCH RBC QN AUTO: 30.9 PG (ref 26–34)
MCHC RBC AUTO-ENTMCNC: 33.3 G/DL (ref 32–36)
MCV RBC AUTO: 93 FL (ref 80–100)
MONOCYTES # BLD AUTO: 0.54 X10*3/UL (ref 0.1–1)
MONOCYTES NFR BLD AUTO: 6 %
NEUTROPHILS # BLD AUTO: 6.86 X10*3/UL (ref 1.2–7.7)
NEUTROPHILS NFR BLD AUTO: 75.8 %
NRBC BLD-RTO: 0 /100 WBCS (ref 0–0)
OXYHGB MFR BLDV: 97.1 % (ref 45–75)
PCO2 BLDV: 58 MM HG (ref 41–51)
PH BLDV: 7.46 PH (ref 7.33–7.43)
PHOSPHATE SERPL-MCNC: 5.8 MG/DL (ref 2.5–4.9)
PLATELET # BLD AUTO: 220 X10*3/UL (ref 150–450)
PO2 BLDV: 111 MM HG (ref 35–45)
POTASSIUM SERPL-SCNC: 3 MMOL/L (ref 3.5–5.3)
PROT SERPL-MCNC: 5.6 G/DL (ref 6.4–8.2)
RBC # BLD AUTO: 5.11 X10*6/UL (ref 4.5–5.9)
SAO2 % BLDV: 100 % (ref 45–75)
SODIUM SERPL-SCNC: 131 MMOL/L (ref 136–145)
UFH PPP CHRO-ACNC: 0.7 IU/ML
VANCOMYCIN SERPL-MCNC: 10.4 UG/ML (ref 5–20)
WBC # BLD AUTO: 9.1 X10*3/UL (ref 4.4–11.3)

## 2025-01-06 PROCEDURE — 36415 COLL VENOUS BLD VENIPUNCTURE: CPT | Performed by: NURSE PRACTITIONER

## 2025-01-06 PROCEDURE — 80202 ASSAY OF VANCOMYCIN: CPT | Performed by: PHARMACIST

## 2025-01-06 PROCEDURE — 2060000001 HC INTERMEDIATE ICU ROOM DAILY

## 2025-01-06 PROCEDURE — 84075 ASSAY ALKALINE PHOSPHATASE: CPT | Performed by: NURSE PRACTITIONER

## 2025-01-06 PROCEDURE — 2500000004 HC RX 250 GENERAL PHARMACY W/ HCPCS (ALT 636 FOR OP/ED): Performed by: NURSE PRACTITIONER

## 2025-01-06 PROCEDURE — 99233 SBSQ HOSP IP/OBS HIGH 50: CPT

## 2025-01-06 PROCEDURE — 2500000002 HC RX 250 W HCPCS SELF ADMINISTERED DRUGS (ALT 637 FOR MEDICARE OP, ALT 636 FOR OP/ED): Performed by: NURSE PRACTITIONER

## 2025-01-06 PROCEDURE — 82947 ASSAY GLUCOSE BLOOD QUANT: CPT

## 2025-01-06 PROCEDURE — 85520 HEPARIN ASSAY: CPT | Performed by: NURSE PRACTITIONER

## 2025-01-06 PROCEDURE — 94640 AIRWAY INHALATION TREATMENT: CPT

## 2025-01-06 PROCEDURE — 2500000005 HC RX 250 GENERAL PHARMACY W/O HCPCS: Performed by: NURSE PRACTITIONER

## 2025-01-06 PROCEDURE — S4991 NICOTINE PATCH NONLEGEND: HCPCS | Performed by: NURSE PRACTITIONER

## 2025-01-06 PROCEDURE — 2500000001 HC RX 250 WO HCPCS SELF ADMINISTERED DRUGS (ALT 637 FOR MEDICARE OP): Performed by: NURSE PRACTITIONER

## 2025-01-06 PROCEDURE — 71045 X-RAY EXAM CHEST 1 VIEW: CPT

## 2025-01-06 PROCEDURE — 71045 X-RAY EXAM CHEST 1 VIEW: CPT | Performed by: RADIOLOGY

## 2025-01-06 PROCEDURE — 2500000004 HC RX 250 GENERAL PHARMACY W/ HCPCS (ALT 636 FOR OP/ED)

## 2025-01-06 PROCEDURE — 2500000001 HC RX 250 WO HCPCS SELF ADMINISTERED DRUGS (ALT 637 FOR MEDICARE OP)

## 2025-01-06 PROCEDURE — 84100 ASSAY OF PHOSPHORUS: CPT | Performed by: NURSE PRACTITIONER

## 2025-01-06 PROCEDURE — 99233 SBSQ HOSP IP/OBS HIGH 50: CPT | Performed by: INTERNAL MEDICINE

## 2025-01-06 PROCEDURE — 85025 COMPLETE CBC W/AUTO DIFF WBC: CPT | Performed by: NURSE PRACTITIONER

## 2025-01-06 PROCEDURE — 94668 MNPJ CHEST WALL SBSQ: CPT

## 2025-01-06 PROCEDURE — 80069 RENAL FUNCTION PANEL: CPT | Mod: CCI | Performed by: NURSE PRACTITIONER

## 2025-01-06 PROCEDURE — 94660 CPAP INITIATION&MGMT: CPT

## 2025-01-06 PROCEDURE — 82805 BLOOD GASES W/O2 SATURATION: CPT | Performed by: NURSE PRACTITIONER

## 2025-01-06 RX ORDER — VANCOMYCIN HYDROCHLORIDE 500 MG/100ML
500 INJECTION, SOLUTION INTRAVENOUS ONCE
Status: COMPLETED | OUTPATIENT
Start: 2025-01-06 | End: 2025-01-06

## 2025-01-06 RX ORDER — POTASSIUM CHLORIDE 14.9 MG/ML
20 INJECTION INTRAVENOUS
Status: COMPLETED | OUTPATIENT
Start: 2025-01-06 | End: 2025-01-06

## 2025-01-06 RX ORDER — POTASSIUM CHLORIDE 20 MEQ/1
40 TABLET, EXTENDED RELEASE ORAL ONCE
Status: COMPLETED | OUTPATIENT
Start: 2025-01-06 | End: 2025-01-06

## 2025-01-06 RX ADMIN — LEVOTHYROXINE SODIUM 125 MCG: 25 TABLET ORAL at 06:29

## 2025-01-06 RX ADMIN — IPRATROPIUM BROMIDE AND ALBUTEROL SULFATE 3 ML: 2.5; .5 SOLUTION RESPIRATORY (INHALATION) at 20:17

## 2025-01-06 RX ADMIN — PIPERACILLIN SODIUM AND TAZOBACTAM SODIUM 3.38 G: 3; .375 INJECTION, SOLUTION INTRAVENOUS at 05:18

## 2025-01-06 RX ADMIN — PIPERACILLIN SODIUM AND TAZOBACTAM SODIUM 3.38 G: 3; .375 INJECTION, SOLUTION INTRAVENOUS at 00:13

## 2025-01-06 RX ADMIN — BUMETANIDE 2 MG/HR: 0.25 INJECTION INTRAMUSCULAR; INTRAVENOUS at 03:25

## 2025-01-06 RX ADMIN — MIDODRINE HYDROCHLORIDE 5 MG: 5 TABLET ORAL at 20:06

## 2025-01-06 RX ADMIN — INSULIN LISPRO 1 UNITS: 100 INJECTION, SOLUTION INTRAVENOUS; SUBCUTANEOUS at 08:20

## 2025-01-06 RX ADMIN — MIDODRINE HYDROCHLORIDE 5 MG: 5 TABLET ORAL at 08:20

## 2025-01-06 RX ADMIN — HEPARIN SODIUM 1800 UNITS/HR: 10000 INJECTION, SOLUTION INTRAVENOUS at 07:38

## 2025-01-06 RX ADMIN — POTASSIUM CHLORIDE 20 MEQ: 14.9 INJECTION, SOLUTION INTRAVENOUS at 03:21

## 2025-01-06 RX ADMIN — NICOTINE 1 PATCH: 14 PATCH, EXTENDED RELEASE TRANSDERMAL at 08:21

## 2025-01-06 RX ADMIN — EZETIMIBE 10 MG: 10 TABLET ORAL at 10:04

## 2025-01-06 RX ADMIN — MIDODRINE HYDROCHLORIDE 5 MG: 5 TABLET ORAL at 14:36

## 2025-01-06 RX ADMIN — INSULIN LISPRO 6 UNITS: 100 INJECTION, SOLUTION INTRAVENOUS; SUBCUTANEOUS at 12:11

## 2025-01-06 RX ADMIN — SENNOSIDES AND DOCUSATE SODIUM 1 TABLET: 50; 8.6 TABLET ORAL at 20:05

## 2025-01-06 RX ADMIN — Medication 5 L/MIN: at 20:07

## 2025-01-06 RX ADMIN — Medication 12 L/MIN: at 08:39

## 2025-01-06 RX ADMIN — NYSTATIN 500000 UNITS: 100000 SUSPENSION ORAL at 08:20

## 2025-01-06 RX ADMIN — POTASSIUM CHLORIDE 40 MEQ: 1500 TABLET, EXTENDED RELEASE ORAL at 16:52

## 2025-01-06 RX ADMIN — PANTOPRAZOLE SODIUM 40 MG: 40 TABLET, DELAYED RELEASE ORAL at 06:29

## 2025-01-06 RX ADMIN — IPRATROPIUM BROMIDE AND ALBUTEROL SULFATE 3 ML: 2.5; .5 SOLUTION RESPIRATORY (INHALATION) at 15:48

## 2025-01-06 RX ADMIN — HYDROMORPHONE HYDROCHLORIDE 0.4 MG: 1 INJECTION, SOLUTION INTRAMUSCULAR; INTRAVENOUS; SUBCUTANEOUS at 01:39

## 2025-01-06 RX ADMIN — Medication 12 L/MIN: at 15:48

## 2025-01-06 RX ADMIN — VANCOMYCIN HYDROCHLORIDE 500 MG: 500 INJECTION, SOLUTION INTRAVENOUS at 10:04

## 2025-01-06 RX ADMIN — METOLAZONE 20 MG: 10 TABLET ORAL at 08:20

## 2025-01-06 RX ADMIN — HEPARIN SODIUM 1800 UNITS/HR: 10000 INJECTION, SOLUTION INTRAVENOUS at 23:58

## 2025-01-06 RX ADMIN — INSULIN LISPRO 2 UNITS: 100 INJECTION, SOLUTION INTRAVENOUS; SUBCUTANEOUS at 16:15

## 2025-01-06 RX ADMIN — IPRATROPIUM BROMIDE AND ALBUTEROL SULFATE 3 ML: 2.5; .5 SOLUTION RESPIRATORY (INHALATION) at 08:39

## 2025-01-06 RX ADMIN — POTASSIUM CHLORIDE 20 MEQ: 14.9 INJECTION, SOLUTION INTRAVENOUS at 01:16

## 2025-01-06 RX ADMIN — NYSTATIN 500000 UNITS: 100000 SUSPENSION ORAL at 20:06

## 2025-01-06 RX ADMIN — GABAPENTIN 100 MG: 100 CAPSULE ORAL at 08:20

## 2025-01-06 RX ADMIN — PIPERACILLIN SODIUM AND TAZOBACTAM SODIUM 3.38 G: 3; .375 INJECTION, SOLUTION INTRAVENOUS at 12:09

## 2025-01-06 RX ADMIN — IPRATROPIUM BROMIDE AND ALBUTEROL SULFATE 3 ML: 2.5; .5 SOLUTION RESPIRATORY (INHALATION) at 02:07

## 2025-01-06 ASSESSMENT — COGNITIVE AND FUNCTIONAL STATUS - GENERAL
MOVING FROM LYING ON BACK TO SITTING ON SIDE OF FLAT BED WITH BEDRAILS: A LOT
CLIMB 3 TO 5 STEPS WITH RAILING: A LOT
DAILY ACTIVITIY SCORE: 12
TURNING FROM BACK TO SIDE WHILE IN FLAT BAD: A LOT
MOBILITY SCORE: 12
HELP NEEDED FOR BATHING: A LOT
DRESSING REGULAR LOWER BODY CLOTHING: A LOT
STANDING UP FROM CHAIR USING ARMS: A LOT
MOVING TO AND FROM BED TO CHAIR: A LOT
TOILETING: A LOT
WALKING IN HOSPITAL ROOM: A LOT
EATING MEALS: A LOT
DRESSING REGULAR UPPER BODY CLOTHING: A LOT
PERSONAL GROOMING: A LOT

## 2025-01-06 ASSESSMENT — PAIN - FUNCTIONAL ASSESSMENT
PAIN_FUNCTIONAL_ASSESSMENT: 0-10
PAIN_FUNCTIONAL_ASSESSMENT: 0-10

## 2025-01-06 ASSESSMENT — PAIN SCALES - GENERAL
PAINLEVEL_OUTOF10: 0 - NO PAIN
PAINLEVEL_OUTOF10: 0 - NO PAIN

## 2025-01-06 NOTE — CARE PLAN
The patient's goals for the shift include      The clinical goals for the shift include pt will be HDS during the shift      Problem: Skin  Goal: Decreased wound size/increased tissue granulation at next dressing change  Outcome: Progressing  Flowsheets (Taken 1/6/2025 1137)  Decreased wound size/increased tissue granulation at next dressing change: Protective dressings over bony prominences  Goal: Participates in plan/prevention/treatment measures  Outcome: Progressing  Flowsheets (Taken 1/6/2025 1137)  Participates in plan/prevention/treatment measures: Elevate heels  Goal: Prevent/manage excess moisture  Outcome: Progressing  Flowsheets (Taken 1/6/2025 1137)  Prevent/manage excess moisture: Monitor for/manage infection if present  Goal: Prevent/minimize sheer/friction injuries  Outcome: Progressing  Flowsheets (Taken 1/6/2025 1137)  Prevent/minimize sheer/friction injuries:   Use pull sheet   HOB 30 degrees or less   Turn/reposition every 2 hours/use positioning/transfer devices  Goal: Promote/optimize nutrition  Outcome: Progressing  Flowsheets (Taken 1/6/2025 1137)  Promote/optimize nutrition: Monitor/record intake including meals  Goal: Promote skin healing  Outcome: Progressing  Flowsheets (Taken 1/6/2025 1137)  Promote skin healing: Turn/reposition every 2 hours/use positioning/transfer devices     Problem: Pain - Adult  Goal: Verbalizes/displays adequate comfort level or baseline comfort level  Outcome: Progressing     Problem: Safety - Adult  Goal: Free from fall injury  Outcome: Progressing     Problem: Discharge Planning  Goal: Discharge to home or other facility with appropriate resources  Outcome: Progressing     Problem: Chronic Conditions and Co-morbidities  Goal: Patient's chronic conditions and co-morbidity symptoms are monitored and maintained or improved  Outcome: Progressing     Problem: Diabetes  Goal: Achieve decreasing blood glucose levels by end of shift  Outcome: Progressing  Goal: Increase  stability of blood glucose readings by end of shift  Outcome: Progressing  Goal: Decrease in ketones present in urine by end of shift  Outcome: Progressing  Goal: Maintain electrolyte levels within acceptable range throughout shift  Outcome: Progressing  Goal: Maintain glucose levels >70mg/dl to <250mg/dl throughout shift  Outcome: Progressing  Goal: No changes in neurological exam by end of shift  Outcome: Progressing  Goal: Learn about and adhere to nutrition recommendations by end of shift  Outcome: Progressing  Goal: Vital signs within normal range for age by end of shift  Outcome: Progressing  Goal: Increase self care and/or family involovement by end of shift  Outcome: Progressing  Goal: Receive DSME education by end of shift  Outcome: Progressing     Problem: Pain  Goal: Takes deep breaths with improved pain control throughout the shift  Outcome: Progressing  Goal: Turns in bed with improved pain control throughout the shift  Outcome: Progressing  Goal: Walks with improved pain control throughout the shift  Outcome: Progressing  Goal: Performs ADL's with improved pain control throughout shift  Outcome: Progressing  Goal: Participates in PT with improved pain control throughout the shift  Outcome: Progressing  Goal: Free from opioid side effects throughout the shift  Outcome: Progressing  Goal: Free from acute confusion related to pain meds throughout the shift  Outcome: Progressing     Problem: Fall/Injury  Goal: Not fall by end of shift  Outcome: Progressing  Goal: Be free from injury by end of the shift  Outcome: Progressing  Goal: Verbalize understanding of personal risk factors for fall in the hospital  Outcome: Progressing  Goal: Verbalize understanding of risk factor reduction measures to prevent injury from fall in the home  Outcome: Progressing  Goal: Pace activities to prevent fatigue by end of the shift  Outcome: Progressing

## 2025-01-06 NOTE — PROGRESS NOTES
NEPHROLOGY NOTE   Alo Glass   69 y.o.     MRN/Room: [id number]    Reason for consult: LEONA on CKD    SUBJECTIVE:   Remains on Bumex drip   Non-oliguric       Vitals:    01/06/25 1700   BP: 108/81   Pulse:    Resp:    Temp: 36.9 °C (98.5 °F)   SpO2:         01/04 1900 - 01/06 0659  In: 1602.4 [I.V.:1052.4]  Out: 3850 [Urine:3850]   Weight change: -4.1 kg (-9 lb 0.6 oz)     General appearance: Drowsy   Eyes: non-icteric  Skin: no apparent rash  Heart: S1 S2 regular  JVD visible  Lungs: CTA bilaterally  Abdomen: soft, nt/nd  Extremities: +2 edema bilat up to thighs  : has Live      ASSESSMENT:  Alo Glass is a 69 y.o. male with PMHx of HFrEF (EF 23%) , COPD, MELISSA, obesity, type II DM, stenotrophomonas pneumonia w parapneumonic effusion (9/2024), HTN, hypothyroidism and HLD who was admitted to the MICU for Septic Shock & acute decompensated HF. Has Large Bullae of LE that are oozing, possibly infected.    #LEONA  - Baseline Cr 0.6, presented with 1.4.   - bilateral renal enhancing lesions measuring 0.8 cm in the right upper pole kidney and 2 cm in the left upper pole kidney. Referral to urology and further evaluation with MR is suggested  - hypoNa likely hypervolemic    Etiology:   ATN vs cardiorenal dynamics, contrast injury     Updates 01/06:  -Noted hypokalemia, alkalosis  -Serum Cr 2.00 from 1.9    RECOMMENDATIONS:  - Decrease Bumex drip to 1mg/h   - Continue Metolazone 20mg po once daily    - Replete K  - daily weights  - Continue Midodrine  -Dose medications for renal function  - strict Is and Os    SW Dr Jacques Singh MD  Nephrology Fellow  24 hour Renal Pager - [phone]

## 2025-01-06 NOTE — PROGRESS NOTES
Vancomycin Dosing by Pharmacy- Cessation of Therapy    Consult to pharmacy for vancomycin dosing has been discontinued by the prescriber, pharmacy will sign off at this time.    Please call pharmacy if there are further questions or re-enter a consult if vancomycin is resumed.     Sharlene Deal, PharmD

## 2025-01-06 NOTE — SIGNIFICANT EVENT
Rapid Response Nurse Note: RADAR alert: Score = 6    Pager time:   Arrival time:   Event end time:   Location: [place] [address]  [x] Triage by phone or secure messaging    Rapid response initiated by:  [] Rapid response RN [] Family [] Nursing Supervisor [] Physician   [x] RADAR auto page [] Sepsis auto-page [] RN [] RT   [] NP/PA [] Other:     Primary reason for call:   [] BAT [] New CPAP/BiPAP [] Bleeding [] Change in mental status   [] Chest pain [] Code blue [] FiO2 >/= 50% [] HR </= 40 bpm   [] HR >/= 130 bpm [] Hyperglycemia [] Hypoglycemia [x] RADAR    [] RR </= 8 bpm [] RR >/= 30 bpm [] SBP </= 90 mmHg [] SpO2 < 90%   [] Seizure [] Sepsis [] Shortness of breath  [] Staff concern: see comments     Initial VS and/or RADAR VS:  HR 86; RR 18; /85; SPO2 90%.    Interventions:  [x] None [] ABG/VBG [] Assist w/ICU transfer [] BAT paged    [] Bag mask [] Blood [] Cardioversion [] Code Blue   [] Code blue for intubation [] Code status changed [] Chest x-ray [] EKG   [] IV fluid/bolus [] KUB x-ray [] Labs/cultures [] Medication   [] Nebulizer treatment [] NIPPV (CPAP/BiPAP) [] Oxygen [] Oral airway   [] Peripheral IV [] Palliative care consult [] CT/MRI [] Sepsis protocol    [] Suctioned [] Other:     Outcome:  [] Coded and  [] Code blue for intubation [] Coded and transferred to ICU []  on division   [x] Remained on division (no change) [] Remained on division + additional monitoring [] Remained in ED [] Transferred to ED   [] Transferred to ICU [] Transferred to inpatient status [] Transferred for interventions (procedure) [] Transferred to ICU stepdown    [] Transferred to surgery [] Transferred to telemetry [] Sepsis protocol [] STEMI protocol   [] Stroke protocol [x] Bedside nurse instructed to page rapid response for any concerns or acute change in condition/VS     Additional Comments: RADAR auto page received for above vitals. Per bedside RN, no acute changes or concerns at this  time.  Please page rapid response for any concerns for deterioration or assistance needed.

## 2025-01-06 NOTE — PROGRESS NOTES
Vancomycin Dosing by Pharmacy- FOLLOW UP    Alo Glass is a 69 y.o. year old male who Pharmacy has been consulted for vancomycin dosing for cellulitis, skin and soft tissue. Based on the patient's indication and renal status this patient is being dosed based on a goal trough/random level of 10-15.     Renal function is currently improving.    Current vancomycin dose: Dosing by level. Tends toward 500 mg given every 48 hours.    Most recent trough level: 10.4 mcg/mL    Visit Vitals  /74 (BP Location: Right arm, Patient Position: Lying)   Pulse 84   Temp 36.6 °C (97.9 °F) (Temporal)   Resp 19        Lab Results   Component Value Date    CREATININE 2.03 (H) 2025    CREATININE 1.94 (H) 2025    CREATININE 1.98 (H) 2025    CREATININE 2.19 (H) 2025        Patient weight is as follows:   Vitals:    25 0531   Weight: 129 kg (284 lb 9.8 oz)       Cultures:  No results found for the encounter in last 14 days.       I/O last 3 completed shifts:  In: 1602.4 (12.4 mL/kg) [I.V.:1052.4 (8.2 mL/kg); IV Piggyback:550]  Out: 3850 (29.8 mL/kg) [Urine:3850 (0.8 mL/kg/hr)]  Weight: 129.1 kg   I/O during current shift:  I/O this shift:  In: -   Out: 150 [Urine:150]    Temp (24hrs), Av.5 °C (97.7 °F), Min:36 °C (96.8 °F), Max:37 °C (98.6 °F)      Assessment/Plan    Within goal random/trough level. Will redose with vancomycin 500mg x1.   The next level will be obtained on 25 with morning labs as renal function appears to be improving. May be obtained sooner if clinically indicated.   Will continue to monitor renal function daily while on vancomycin and order serum creatinine at least every 48 hours if not already ordered.  Follow for continued vancomycin needs, clinical response, and signs/symptoms of toxicity.       Caridad Schreiber, PharmD

## 2025-01-06 NOTE — CARE PLAN
The patient's goals for the shift include      The clinical goals for the shift include pt will be HDS during the shift

## 2025-01-06 NOTE — PROGRESS NOTES
"Medical Intensive Care Step Down- Daily Progress Note   Subjective    Alo Glass is a 69 y.o. year old male patient admitted on 12/29/2024 with following ICU needs: Bilateral lower ext cellulitis causing septic shock.      Interval History:  Was using BIPAP for few ours over night. Then switched to home CPAP. On 5l NC during day. Continue  on Bumex gtt    Meds    Scheduled medications  [Held by provider] allopurinol, 300 mg, oral, Daily  ezetimibe, 10 mg, oral, Daily  gabapentin, 100 mg, oral, Daily  HYDROmorphone, 0.2 mg, intravenous, Once  insulin lispro, 0-10 Units, subcutaneous, TID AC  ipratropium-albuteroL, 3 mL, nebulization, q6h  levothyroxine, 125 mcg, oral, Daily  metOLazone, 20 mg, oral, Daily  midodrine, 5 mg, oral, TID  nicotine, 1 patch, transdermal, Daily  nystatin, 5 mL, Mouth/Throat, BID  oxygen, , inhalation, Nightly  pantoprazole, 40 mg, oral, Daily before breakfast  piperacillin-tazobactam, 3.375 g, intravenous, q6h  sennosides-docusate sodium, 1 tablet, oral, Nightly      Continuous medications  bumetanide, 2 mg/hr, Last Rate: 2 mg/hr (01/06/25 0600)  heparin, 0-4,500 Units/hr, Last Rate: 1,800 Units/hr (01/06/25 0516)      PRN medications  PRN medications: acetaminophen, albuterol, dextrose, dextrose, glucagon, glucagon, heparin, HYDROmorphone, oxygen, polyethylene glycol, sodium chloride, vancomycin     Objective    Blood pressure 100/76, pulse 84, temperature 36 °C (96.8 °F), resp. rate 25, height 1.803 m (5' 11\"), weight 129 kg (284 lb 9.8 oz), SpO2 96%.       Physical exam:  Constitutional: chronically Ill appearing elderly male in NAD  Eyes: PERRL, no icterus   ENMT: dry mucous membranes  Head/Neck: atraumatic   Respiratory/Thorax: Lungs diminished bilateral,  intermittent cough with thick brown sputum production, non-labored breathing, on 5L HF NC  Cardiovascular: HR irregular.   Gastrointestinal: obese, soft, non-tender, normoactive bowel sounds   Extremities: +2-3 edema B/L LE, " Cellulitis with erythema b/l LE, venous statis ulcer vs blister RLE  Neurological: alert and oriented x 3, speech clear, follows commands appropriately, no focal deficits.    Skin: Warm and dry, BLE cellulitis      Intake/Output Summary (Last 24 hours) at 1/6/2025 0658  Last data filed at 1/6/2025 0600  Gross per 24 hour   Intake 896.46 ml   Output 2025 ml   Net -1128.54 ml     Labs:   Results from last 72 hours   Lab Units 01/05/25  2159 01/05/25  1054 01/04/25  2114   SODIUM mmol/L 130* 132* 133*   POTASSIUM mmol/L 3.0* 3.0* 3.0*   CHLORIDE mmol/L 79* 81* 82*   CO2 mmol/L 35* 35* 33*   BUN mg/dL 103* 101* 100*   CREATININE mg/dL 2.03* 1.94* 1.98*   GLUCOSE mg/dL 142* 159* 150*   CALCIUM mg/dL 8.6 8.8 8.9   ANION GAP mmol/L 19 19 21*   EGFR mL/min/1.73m*2 35* 37* 36*   PHOSPHORUS mg/dL 6.2* 5.8* 6.6*      Results from last 72 hours   Lab Units 01/06/25  0553 01/05/25  0623 01/04/25  0635   WBC AUTO x10*3/uL 9.1 9.1 7.5   HEMOGLOBIN g/dL 15.8 16.6 16.5   HEMATOCRIT % 47.4 49.5 50.5   PLATELETS AUTO x10*3/uL 220 193 149*   NEUTROS PCT AUTO % 75.8 83.8 78.6   LYMPHS PCT AUTO % 15.1 9.4 11.8   MONOS PCT AUTO % 6.0 4.2 7.1   EOS PCT AUTO % 1.1 0.7 0.4            Results from last 72 hours   Lab Units 01/04/25  0639   POCT PH, VENOUS pH 7.41   POCT PCO2, VENOUS mm Hg 56*   POCT PO2, VENOUS mm Hg 91*        Micro/ID:     Lab Results   Component Value Date    URINECULTURE No growth 12/28/2024    BLOODCULT No growth at 4 days -  FINAL REPORT 12/28/2024    BLOODCULT No growth at 4 days -  FINAL REPORT 12/28/2024       Summary of key imaging results      XR chest 1 view 1/5/2025     1.  Interval improvement in left basilar opacities and increased right basilar opacities with small right pleural effusion.         US renal complete 1/1/2025  1. Indeterminate hypoechoic lesion of the left superior pole measuring 1.9 cm which was seen on the prior CT dated 12/28/2024, further evaluation with a kidney protocol MRI with IV contrast  is strongly recommended to rule out neoplasm.   2. An additional indeterminate 1.5 cm hypoechoic area in the upper to mid left kidney, which can be artifactual. This can further be evaluated by MRI.   3. No hydronephrosis.   4. Incidentally noted cholelithiasis.       Transthoracic Echo (TTE) Limited 12/30/2024  1. Poorly visualized anatomical structures due to suboptimal image quality.    2. Left ventricular ejection fraction is severely decreased, calculated by Green's biplane at 19%.    3. There is global hypokinesis of the left ventricle with minor regional variations.    4. Left ventricular cavity size is moderately dilated.    5. Abnormal septal motion consistent with left bundle branch block.    6. No left ventricular thrombus visualized.    7. There is reduced right ventricular systolic function.    8. Moderately enlarged right ventricle.    9. The left atrium is enlarged.   10. The right atrium is moderately dilated.   11. Mild aortic valve regurgitation.   12. Compared with study dated 9/19/2024, no significant change.     Assessment and Plan     Assessment: Alo Glass is a 69 y.o. year old male patient admitted on 12/29/2024 with Septic shock 2/2 bilateral lower ext cellulitis and cardiogenic shock and AHRF 2/2 volume overload and LEONA.     Summary for 01/06/25  :  Continue with Bumex gtt  at 2 ml/hr ,  goal ~2L negative   Continue with Metolazone   Per ACS evaluation on 1/1 no surgical intervention,  if erythema does not completely resolve with antibiotic treatment would recommend re-imaging with non-contrast CT of BLE to assess for new, undrained fluid collections   Cont with IV Atbs for cellulitis :  Zosyn/ Vanco  for 10 day course  12/28-1/6-stop today    Plan:  NEUROLOGY/PSYCH:  Dx: ZENA  A&Ox3  Management:  Continue gabapentin 100mg daily and Nicotine patch daily,   Pain regimen:  Tylenol 650 mg prn and Dilaudid PRN   PT/OT- previously refused   Palliative Care  following      CARDIOVASCULAR:  Dx: Hx HFrEF, HTN, HLD, severe PAD.  Septic shock, resolved.  Cardiogenic shock, resovled. Aflutter  HDS but with narrow pulse pressure  Remains off vasopressors since 12/30  TTE 12/30: LVEF 19%  Management:  Continue Ezetimibe 10mg daily   Holding home lasix and SGLT-2 iso hypotension  Consider GDMT when able  S/p Dig load 12/29-12/30  Bumex gtt 2mg/hr; goal of -2L negative/day  Heparin gtt  Started on Midodrine 5mg TID for renal perfusion   Cardiology consulted, signed off 12/31: Continue diuresis with IV Bumex.  If decompensates can consider cardioversion for atrial kick restoration. Prior to discharge arrange follow up with Dr. Moore as an o/p @ [phone] (scheduling)  Tele    PULMONARY:  Dx: Hx asthma and COPD GOLD 3 (2019: FEV1 45% w +ve BD change; %/TLC 85%). AHRF 2/2 hypervolemia   Today on 5L HF NC with Bipap/cpap HS and with naps  CxR 1/5 : Interval improvement in left basilar opacities and increased right basilar opacities with small right pleural effusion.       Management:  Transitioned from home Cpap to Bipap 12/31 d/t hypercapnia   Will follow up on VBG to evaluate need for BIPAP (was using partially BIPAP and then CPAP over night)   Diuresis as outlined above   Cont Duonebs q6h, albuterol PRN  S/p steroid taper   Airway clearance TID    RENAL/GENITOURINARY:  Dx: LEONA. Hyponatremia likely I/s/o volume overload.  LT renal mass c/f RCC  Has been on HD during stay  Renal US 12/30: No hydronephrosis. hypoechoic lesion of the left superior pole measuring 1.9 cm which was seen on the prior CT dated 12/28/2024, further evaluation with a kidney protocol MRI is strongly recommended. There is an additional indeterminate 0.5 cm hypoechoic lesion of the left kidney.  Management:  Voiding via lipscomb   Nephrology following, recs appreciated: Continue on Bumex drip.    Will repeat renal US to rule out  hydronephrosis   Continue Metolazone 20mg daily since 1/2.    Pt saw  Urology on 12/12/2024 and referrred to IR for biopsy   Urine lytes consistent with prerenal  Diurese goal net negative -1.5-2L  RFP daily    GASTROENTEROLOGY:  Dx: Hx GERD. Poor oral intake   BMx2  Management:  Diet: Cardiac and carb control diet  Bowel Regimen: Miralax PRN   PPI    ENDOCRINOLOGY:  Dx: Hx Hypothyroid and DMII  Glucose 140s-150s  HgbA1C  5.5%  Management:  Home regimen:  Mounjaro and Jardiance; pt also utilizes a Dexcom CGM)  Cont with Insuline lispro TID  Glucose goal of <180  Hypoglycemia protocol     HEMATOLOGY:  Dx: ZENA  H/h stable   Management:  CBC daily     SKIN:  Dx:  Skin Failure Yes, describe: BL L/E cellutis vs PAD   Management:  Wound following  ACS consulted, signed off 1/1: Elevate legs as able, continue with abx.  If erythema does not completely resolve with antibiotic treatment would recommend re-imaging with non-contrast CT of BLE to assess for new, undrained fluid collections     INFECTIOUS DISEASE:  Dx: Cellulitis   Tmax 36.0 C, WBC 9.1   Management:  Micro: 12/28 Legionella, Strep pneum, negative, 12/28 Urine Cx negative, 12/28 BCx NTGD x2  Abx: Clindamycin (12/28), Zosyn (12/28 - **), Vancomycin (12/28 - **)  Will Cont with Vanco/Zosyn for 10 day course or until resolution of cellulitis   ACS consulted signed off 1/1; rec cont IV Atbs     ICU Check List     FEN  Fluids: PRN  Electrolytes: K>4, Phos >3, Mg> 2  Nutrition: Cardiac diet and carb control diet   Prophylaxis:  DVT ppx: Heparin gtt  GI ppx: PPI  Bowel care: Miralax PRN   Hardware:         Urethral Catheter Temperature probe 16 Fr. (Active)   Placement Date: 12/28/24   Hand Hygiene Completed: Yes  Catheter Type: Temperature probe  Tube Size (Fr.): 16 Fr.  Catheter Balloon Size: 5 mL  Urine Returned: Yes   Number of days: 3       Social:  Code: Full Code    HPOA: Sheyla Griggs [phone]   Disposition: Continue care in SDU       PARAM Rizvi-CNP   01/06/25 at 6:58 AM     Pt discussed with Dr. Cam,  seen and  examined. All labs, VS and previous plan of care reviewed.     Disclaimer: Documentation completed with the information available at the time of input. The times in the chart may not be reflective of actual patient care times, interventions, or procedures. Documentation occurs after the physical care of the patient.

## 2025-01-07 LAB
ALBUMIN SERPL BCP-MCNC: 2.7 G/DL (ref 3.4–5)
ALBUMIN SERPL BCP-MCNC: 2.7 G/DL (ref 3.4–5)
ALBUMIN SERPL BCP-MCNC: 2.9 G/DL (ref 3.4–5)
ANION GAP SERPL CALC-SCNC: 16 MMOL/L (ref 10–20)
ANION GAP SERPL CALC-SCNC: 19 MMOL/L (ref 10–20)
ANION GAP SERPL CALC-SCNC: 20 MMOL/L (ref 10–20)
BASOPHILS # BLD AUTO: 0.02 X10*3/UL (ref 0–0.1)
BASOPHILS NFR BLD AUTO: 0.2 %
BUN SERPL-MCNC: 103 MG/DL (ref 6–23)
BUN SERPL-MCNC: 106 MG/DL (ref 6–23)
BUN SERPL-MCNC: 107 MG/DL (ref 6–23)
CALCIUM SERPL-MCNC: 8.4 MG/DL (ref 8.6–10.6)
CALCIUM SERPL-MCNC: 8.4 MG/DL (ref 8.6–10.6)
CALCIUM SERPL-MCNC: 8.5 MG/DL (ref 8.6–10.6)
CHLORIDE SERPL-SCNC: 79 MMOL/L (ref 98–107)
CO2 SERPL-SCNC: 36 MMOL/L (ref 21–32)
CO2 SERPL-SCNC: 37 MMOL/L (ref 21–32)
CO2 SERPL-SCNC: 38 MMOL/L (ref 21–32)
CREAT SERPL-MCNC: 1.56 MG/DL (ref 0.5–1.3)
CREAT SERPL-MCNC: 1.71 MG/DL (ref 0.5–1.3)
CREAT SERPL-MCNC: 1.89 MG/DL (ref 0.5–1.3)
EGFRCR SERPLBLD CKD-EPI 2021: 38 ML/MIN/1.73M*2
EGFRCR SERPLBLD CKD-EPI 2021: 43 ML/MIN/1.73M*2
EGFRCR SERPLBLD CKD-EPI 2021: 48 ML/MIN/1.73M*2
EOSINOPHIL # BLD AUTO: 0.15 X10*3/UL (ref 0–0.7)
EOSINOPHIL NFR BLD AUTO: 1.6 %
ERYTHROCYTE [DISTWIDTH] IN BLOOD BY AUTOMATED COUNT: 13.4 % (ref 11.5–14.5)
GLUCOSE BLD MANUAL STRIP-MCNC: 145 MG/DL (ref 74–99)
GLUCOSE BLD MANUAL STRIP-MCNC: 172 MG/DL (ref 74–99)
GLUCOSE BLD MANUAL STRIP-MCNC: 229 MG/DL (ref 74–99)
GLUCOSE BLD MANUAL STRIP-MCNC: 261 MG/DL (ref 74–99)
GLUCOSE SERPL-MCNC: 109 MG/DL (ref 74–99)
GLUCOSE SERPL-MCNC: 132 MG/DL (ref 74–99)
GLUCOSE SERPL-MCNC: 141 MG/DL (ref 74–99)
HCT VFR BLD AUTO: 48.9 % (ref 41–52)
HGB BLD-MCNC: 16 G/DL (ref 13.5–17.5)
IMM GRANULOCYTES # BLD AUTO: 0.13 X10*3/UL (ref 0–0.7)
IMM GRANULOCYTES NFR BLD AUTO: 1.4 % (ref 0–0.9)
LYMPHOCYTES # BLD AUTO: 1.36 X10*3/UL (ref 1.2–4.8)
LYMPHOCYTES NFR BLD AUTO: 14.7 %
MCH RBC QN AUTO: 30.4 PG (ref 26–34)
MCHC RBC AUTO-ENTMCNC: 32.7 G/DL (ref 32–36)
MCV RBC AUTO: 93 FL (ref 80–100)
MONOCYTES # BLD AUTO: 0.6 X10*3/UL (ref 0.1–1)
MONOCYTES NFR BLD AUTO: 6.5 %
NEUTROPHILS # BLD AUTO: 6.97 X10*3/UL (ref 1.2–7.7)
NEUTROPHILS NFR BLD AUTO: 75.6 %
NRBC BLD-RTO: 0 /100 WBCS (ref 0–0)
PHOSPHATE SERPL-MCNC: 5.2 MG/DL (ref 2.5–4.9)
PHOSPHATE SERPL-MCNC: 5.6 MG/DL (ref 2.5–4.9)
PHOSPHATE SERPL-MCNC: 5.7 MG/DL (ref 2.5–4.9)
PLATELET # BLD AUTO: 243 X10*3/UL (ref 150–450)
POTASSIUM SERPL-SCNC: 3 MMOL/L (ref 3.5–5.3)
POTASSIUM SERPL-SCNC: 3.1 MMOL/L (ref 3.5–5.3)
POTASSIUM SERPL-SCNC: 3.4 MMOL/L (ref 3.5–5.3)
RBC # BLD AUTO: 5.26 X10*6/UL (ref 4.5–5.9)
SODIUM SERPL-SCNC: 130 MMOL/L (ref 136–145)
SODIUM SERPL-SCNC: 132 MMOL/L (ref 136–145)
SODIUM SERPL-SCNC: 132 MMOL/L (ref 136–145)
UFH PPP CHRO-ACNC: 0.7 IU/ML
WBC # BLD AUTO: 9.2 X10*3/UL (ref 4.4–11.3)

## 2025-01-07 PROCEDURE — 99233 SBSQ HOSP IP/OBS HIGH 50: CPT

## 2025-01-07 PROCEDURE — 2500000001 HC RX 250 WO HCPCS SELF ADMINISTERED DRUGS (ALT 637 FOR MEDICARE OP): Performed by: NURSE PRACTITIONER

## 2025-01-07 PROCEDURE — 2500000005 HC RX 250 GENERAL PHARMACY W/O HCPCS: Performed by: NURSE PRACTITIONER

## 2025-01-07 PROCEDURE — 2500000004 HC RX 250 GENERAL PHARMACY W/ HCPCS (ALT 636 FOR OP/ED): Performed by: NURSE PRACTITIONER

## 2025-01-07 PROCEDURE — 85520 HEPARIN ASSAY: CPT | Performed by: NURSE PRACTITIONER

## 2025-01-07 PROCEDURE — 99232 SBSQ HOSP IP/OBS MODERATE 35: CPT

## 2025-01-07 PROCEDURE — 85025 COMPLETE CBC W/AUTO DIFF WBC: CPT | Performed by: NURSE PRACTITIONER

## 2025-01-07 PROCEDURE — 36415 COLL VENOUS BLD VENIPUNCTURE: CPT | Performed by: NURSE PRACTITIONER

## 2025-01-07 PROCEDURE — 80069 RENAL FUNCTION PANEL: CPT | Performed by: NURSE PRACTITIONER

## 2025-01-07 PROCEDURE — 94640 AIRWAY INHALATION TREATMENT: CPT

## 2025-01-07 PROCEDURE — 2500000002 HC RX 250 W HCPCS SELF ADMINISTERED DRUGS (ALT 637 FOR MEDICARE OP, ALT 636 FOR OP/ED): Performed by: NURSE PRACTITIONER

## 2025-01-07 PROCEDURE — 2060000001 HC INTERMEDIATE ICU ROOM DAILY

## 2025-01-07 PROCEDURE — 94668 MNPJ CHEST WALL SBSQ: CPT

## 2025-01-07 PROCEDURE — 2500000001 HC RX 250 WO HCPCS SELF ADMINISTERED DRUGS (ALT 637 FOR MEDICARE OP)

## 2025-01-07 PROCEDURE — S4991 NICOTINE PATCH NONLEGEND: HCPCS | Performed by: NURSE PRACTITIONER

## 2025-01-07 PROCEDURE — 94669 MECHANICAL CHEST WALL OSCILL: CPT

## 2025-01-07 PROCEDURE — 82947 ASSAY GLUCOSE BLOOD QUANT: CPT

## 2025-01-07 PROCEDURE — 99233 SBSQ HOSP IP/OBS HIGH 50: CPT | Performed by: INTERNAL MEDICINE

## 2025-01-07 RX ORDER — METOLAZONE 10 MG/1
10 TABLET ORAL DAILY
Status: DISCONTINUED | OUTPATIENT
Start: 2025-01-08 | End: 2025-01-12 | Stop reason: HOSPADM

## 2025-01-07 RX ORDER — POTASSIUM CHLORIDE 20 MEQ/1
40 TABLET, EXTENDED RELEASE ORAL ONCE
Status: COMPLETED | OUTPATIENT
Start: 2025-01-07 | End: 2025-01-07

## 2025-01-07 RX ORDER — POTASSIUM CHLORIDE 14.9 MG/ML
20 INJECTION INTRAVENOUS
Status: COMPLETED | OUTPATIENT
Start: 2025-01-07 | End: 2025-01-07

## 2025-01-07 RX ORDER — POTASSIUM CHLORIDE 14.9 MG/ML
20 INJECTION INTRAVENOUS ONCE
Status: COMPLETED | OUTPATIENT
Start: 2025-01-07 | End: 2025-01-08

## 2025-01-07 RX ADMIN — NICOTINE 1 PATCH: 14 PATCH, EXTENDED RELEASE TRANSDERMAL at 08:14

## 2025-01-07 RX ADMIN — IPRATROPIUM BROMIDE AND ALBUTEROL SULFATE 3 ML: 2.5; .5 SOLUTION RESPIRATORY (INHALATION) at 20:16

## 2025-01-07 RX ADMIN — POTASSIUM CHLORIDE 20 MEQ: 14.9 INJECTION, SOLUTION INTRAVENOUS at 03:45

## 2025-01-07 RX ADMIN — POTASSIUM CHLORIDE 20 MEQ: 14.9 INJECTION, SOLUTION INTRAVENOUS at 06:02

## 2025-01-07 RX ADMIN — LEVOTHYROXINE SODIUM 125 MCG: 25 TABLET ORAL at 05:38

## 2025-01-07 RX ADMIN — SENNOSIDES AND DOCUSATE SODIUM 1 TABLET: 50; 8.6 TABLET ORAL at 21:49

## 2025-01-07 RX ADMIN — INSULIN LISPRO 4 UNITS: 100 INJECTION, SOLUTION INTRAVENOUS; SUBCUTANEOUS at 11:53

## 2025-01-07 RX ADMIN — HEPARIN SODIUM 1800 UNITS/HR: 10000 INJECTION, SOLUTION INTRAVENOUS at 14:04

## 2025-01-07 RX ADMIN — MIDODRINE HYDROCHLORIDE 5 MG: 5 TABLET ORAL at 08:14

## 2025-01-07 RX ADMIN — HYDROMORPHONE HYDROCHLORIDE 0.4 MG: 1 INJECTION, SOLUTION INTRAMUSCULAR; INTRAVENOUS; SUBCUTANEOUS at 14:13

## 2025-01-07 RX ADMIN — IPRATROPIUM BROMIDE AND ALBUTEROL SULFATE 3 ML: 2.5; .5 SOLUTION RESPIRATORY (INHALATION) at 14:59

## 2025-01-07 RX ADMIN — Medication 4 L/MIN: at 21:50

## 2025-01-07 RX ADMIN — POTASSIUM CHLORIDE 40 MEQ: 1500 TABLET, EXTENDED RELEASE ORAL at 11:53

## 2025-01-07 RX ADMIN — IPRATROPIUM BROMIDE AND ALBUTEROL SULFATE 3 ML: 2.5; .5 SOLUTION RESPIRATORY (INHALATION) at 08:20

## 2025-01-07 RX ADMIN — NYSTATIN 500000 UNITS: 100000 SUSPENSION ORAL at 21:49

## 2025-01-07 RX ADMIN — HYDROMORPHONE HYDROCHLORIDE 0.4 MG: 1 INJECTION, SOLUTION INTRAMUSCULAR; INTRAVENOUS; SUBCUTANEOUS at 10:11

## 2025-01-07 RX ADMIN — POTASSIUM CHLORIDE 40 MEQ: 1500 TABLET, EXTENDED RELEASE ORAL at 22:39

## 2025-01-07 RX ADMIN — PANTOPRAZOLE SODIUM 40 MG: 40 TABLET, DELAYED RELEASE ORAL at 05:38

## 2025-01-07 RX ADMIN — INSULIN LISPRO 2 UNITS: 100 INJECTION, SOLUTION INTRAVENOUS; SUBCUTANEOUS at 16:52

## 2025-01-07 RX ADMIN — MIDODRINE HYDROCHLORIDE 5 MG: 5 TABLET ORAL at 21:00

## 2025-01-07 RX ADMIN — BUMETANIDE 1 MG/HR: 0.25 INJECTION INTRAMUSCULAR; INTRAVENOUS at 10:19

## 2025-01-07 RX ADMIN — METOLAZONE 20 MG: 10 TABLET ORAL at 08:14

## 2025-01-07 RX ADMIN — MIDODRINE HYDROCHLORIDE 5 MG: 5 TABLET ORAL at 14:13

## 2025-01-07 RX ADMIN — NYSTATIN 500000 UNITS: 100000 SUSPENSION ORAL at 08:14

## 2025-01-07 RX ADMIN — GABAPENTIN 100 MG: 100 CAPSULE ORAL at 08:14

## 2025-01-07 RX ADMIN — POTASSIUM CHLORIDE 20 MEQ: 14.9 INJECTION, SOLUTION INTRAVENOUS at 22:42

## 2025-01-07 RX ADMIN — IPRATROPIUM BROMIDE AND ALBUTEROL SULFATE 3 ML: 2.5; .5 SOLUTION RESPIRATORY (INHALATION) at 03:12

## 2025-01-07 RX ADMIN — EZETIMIBE 10 MG: 10 TABLET ORAL at 08:14

## 2025-01-07 ASSESSMENT — COGNITIVE AND FUNCTIONAL STATUS - GENERAL
PERSONAL GROOMING: A LOT
MOVING FROM LYING ON BACK TO SITTING ON SIDE OF FLAT BED WITH BEDRAILS: A LOT
DRESSING REGULAR UPPER BODY CLOTHING: A LOT
TURNING FROM BACK TO SIDE WHILE IN FLAT BAD: A LOT
HELP NEEDED FOR BATHING: A LOT
WALKING IN HOSPITAL ROOM: A LOT
DAILY ACTIVITIY SCORE: 13
DRESSING REGULAR UPPER BODY CLOTHING: A LOT
TOILETING: A LOT
WALKING IN HOSPITAL ROOM: A LOT
DRESSING REGULAR LOWER BODY CLOTHING: A LOT
MOVING TO AND FROM BED TO CHAIR: A LOT
STANDING UP FROM CHAIR USING ARMS: A LOT
DAILY ACTIVITIY SCORE: 13
EATING MEALS: A LITTLE
CLIMB 3 TO 5 STEPS WITH RAILING: A LOT
PERSONAL GROOMING: A LOT
CLIMB 3 TO 5 STEPS WITH RAILING: A LOT
MOVING FROM LYING ON BACK TO SITTING ON SIDE OF FLAT BED WITH BEDRAILS: A LOT
MOBILITY SCORE: 12
MOVING TO AND FROM BED TO CHAIR: A LOT
EATING MEALS: A LITTLE
MOBILITY SCORE: 12
DRESSING REGULAR LOWER BODY CLOTHING: A LOT
STANDING UP FROM CHAIR USING ARMS: A LOT
TURNING FROM BACK TO SIDE WHILE IN FLAT BAD: A LOT
TOILETING: A LOT
HELP NEEDED FOR BATHING: A LOT

## 2025-01-07 ASSESSMENT — PAIN SCALES - GENERAL
PAINLEVEL_OUTOF10: 9
PAINLEVEL_OUTOF10: 0 - NO PAIN

## 2025-01-07 ASSESSMENT — PAIN DESCRIPTION - LOCATION: LOCATION: HIP

## 2025-01-07 ASSESSMENT — PAIN - FUNCTIONAL ASSESSMENT: PAIN_FUNCTIONAL_ASSESSMENT: 0-10

## 2025-01-07 NOTE — SIGNIFICANT EVENT
Rapid Response Nurse Note: RADAR alert: 7    Pager time: [phone]  Arrival time: 439  Event end time: 441  Location: [facility]  [facility] 17  [x] Triage by phone or secure messaging    Rapid response initiated by:  [] Rapid response RN [] Family [] Nursing Supervisor [] Physician   [x] RADAR auto page [] Sepsis auto-page [] RN [] RT   [] NP/PA [] Other:     Primary reason for call:   [] BAT [] New CPAP/BiPAP [] Bleeding [] Change in mental status   [] Chest pain [] Code blue [] FiO2 >/= 50% [] HR </= 40 bpm   [] HR >/= 130 bpm [] Hyperglycemia [] Hypoglycemia [x] RADAR    [] RR </= 8 bpm [] RR >/= 30 bpm [] SBP </= 90 mmHg [] SpO2 < 90%   [] Seizure [] Sepsis [] Shortness of breath  [] Staff concern: see comments     Initial VS and/or RADAR VS: T 36.5 °C; HR 75; RR 24; BP 96/62; SPO2 95% on supplemental oxygen.    Interventions:  [x] None [] ABG/VBG [] Assist w/ICU transfer [] BAT paged    [] Bag mask [] Blood [] Cardioversion [] Code Blue   [] Code blue for intubation [] Code status changed [] Chest x-ray [] EKG   [] IV fluid/bolus [] KUB x-ray [] Labs/cultures [] Medication   [] Nebulizer treatment [] NIPPV (CPAP/BiPAP) [] Oxygen [] Oral airway   [] Peripheral IV [] Palliative care consult [] CT/MRI [] Sepsis protocol    [] Suctioned [] Other:     Outcome:  [] Coded and  [] Code blue for intubation [] Coded and transferred to ICU []  on division   [x] Remained on division (no change) [] Remained on division + additional monitoring [] Remained in ED [] Transferred to ED   [] Transferred to ICU [] Transferred to inpatient status [] Transferred for interventions (procedure) [] Transferred to ICU stepdown    [] Transferred to surgery [] Transferred to telemetry [] Sepsis protocol [] STEMI protocol   [] Stroke protocol [x] Bedside nurse instructed to page rapid response for any concerns or acute change in condition/VS     Additional Comments: Reviewed above VS with bedside RN via phone.  Vital signs within patient's  current trends.  No concerns per nursing.  Staff to page rapid response if any concerns or acute change in condition or VS.

## 2025-01-07 NOTE — PROGRESS NOTES
Met with pt today with Kelsie Jarvis DNP from palliative care.  Pt much more alert and awake today, and was able to participate in conversation about his medical care, symptoms, and plans moving forward.  His hope is still to get home to get his affairs in order, and states that his friend  is helping with this.  He suggested getting in touch with  and setting up a meeting so we can prepare his friends as to what care will need to be in place prior to pt being able to be at home, and to make sure that they all feel comfortable caring for him.  Per pt, he states that he has had 24 hour care arranged in the past, although he could no remember the name of the agency, but states that Gina, his HCPOA would know.  IGNACIO spoke to  and he agrees that a meeting would be best.  He will call this SW back and let us know when he is available in the next 1-2 days.    JESSENIA Srivastava

## 2025-01-07 NOTE — CARE PLAN
The patient's goals for the shift include      The clinical goals for the shift include Pt will be HDS      Problem: Skin  Goal: Decreased wound size/increased tissue granulation at next dressing change  Outcome: Progressing  Flowsheets (Taken 1/7/2025 0918)  Decreased wound size/increased tissue granulation at next dressing change: Protective dressings over bony prominences  Goal: Participates in plan/prevention/treatment measures  Outcome: Progressing  Flowsheets (Taken 1/7/2025 0918)  Participates in plan/prevention/treatment measures: Elevate heels  Goal: Prevent/manage excess moisture  Outcome: Progressing  Flowsheets (Taken 1/7/2025 0918)  Prevent/manage excess moisture: Cleanse incontinence/protect with barrier cream  Goal: Prevent/minimize sheer/friction injuries  Outcome: Progressing  Flowsheets (Taken 1/7/2025 0918)  Prevent/minimize sheer/friction injuries:   Use pull sheet   HOB 30 degrees or less   Turn/reposition every 2 hours/use positioning/transfer devices  Goal: Promote/optimize nutrition  Outcome: Progressing  Flowsheets (Taken 1/7/2025 0918)  Promote/optimize nutrition: Monitor/record intake including meals  Goal: Promote skin healing  Outcome: Progressing  Flowsheets (Taken 1/7/2025 0918)  Promote skin healing: Turn/reposition every 2 hours/use positioning/transfer devices     Problem: Pain - Adult  Goal: Verbalizes/displays adequate comfort level or baseline comfort level  Outcome: Progressing     Problem: Safety - Adult  Goal: Free from fall injury  Outcome: Progressing     Problem: Discharge Planning  Goal: Discharge to home or other facility with appropriate resources  Outcome: Progressing     Problem: Chronic Conditions and Co-morbidities  Goal: Patient's chronic conditions and co-morbidity symptoms are monitored and maintained or improved  Outcome: Progressing     Problem: Diabetes  Goal: Achieve decreasing blood glucose levels by end of shift  Outcome: Progressing  Goal: Increase  stability of blood glucose readings by end of shift  Outcome: Progressing  Goal: Decrease in ketones present in urine by end of shift  Outcome: Progressing  Goal: Maintain electrolyte levels within acceptable range throughout shift  Outcome: Progressing  Goal: Maintain glucose levels >70mg/dl to <250mg/dl throughout shift  Outcome: Progressing  Goal: No changes in neurological exam by end of shift  Outcome: Progressing  Goal: Learn about and adhere to nutrition recommendations by end of shift  Outcome: Progressing  Goal: Vital signs within normal range for age by end of shift  Outcome: Progressing  Goal: Increase self care and/or family involovement by end of shift  Outcome: Progressing  Goal: Receive DSME education by end of shift  Outcome: Progressing     Problem: Pain  Goal: Takes deep breaths with improved pain control throughout the shift  Outcome: Progressing  Goal: Turns in bed with improved pain control throughout the shift  Outcome: Progressing  Goal: Walks with improved pain control throughout the shift  Outcome: Progressing  Goal: Performs ADL's with improved pain control throughout shift  Outcome: Progressing  Goal: Participates in PT with improved pain control throughout the shift  Outcome: Progressing  Goal: Free from opioid side effects throughout the shift  Outcome: Progressing  Goal: Free from acute confusion related to pain meds throughout the shift  Outcome: Progressing     Problem: Fall/Injury  Goal: Not fall by end of shift  Outcome: Progressing  Goal: Be free from injury by end of the shift  Outcome: Progressing  Goal: Verbalize understanding of personal risk factors for fall in the hospital  Outcome: Progressing  Goal: Verbalize understanding of risk factor reduction measures to prevent injury from fall in the home  Outcome: Progressing  Goal: Pace activities to prevent fatigue by end of the shift  Outcome: Progressing

## 2025-01-07 NOTE — SIGNIFICANT EVENT
Rapid Response Nurse Note: GRICELDA score of 7    Pager time: [phone]  Arrival time: 1330  Event end time: 1340  Location: [facility]  [x] Triage by secure messaging    Rapid response initiated by:  [] Rapid response RN [] Family [] Nursing Supervisor [] Physician   [x] RADAR auto page [] Sepsis auto-page [] RN [] RT   [] NP/PA [] Other:     Primary reason for call:   [] BAT [] New CPAP/BiPAP [] Bleeding [] Change in mental status   [] Chest pain [] Code blue [] FiO2 >/= 50% [] HR </= 40 bpm   [] HR >/= 130 bpm [] Hyperglycemia [] Hypoglycemia [x] RADAR    [] RR </= 8 bpm [] RR >/= 30 bpm [] SBP </= 90 mmHg [] SpO2 < 90%   [] Seizure [] Sepsis [] Shortness of breath  [] Staff concern: see comments     Interventions:  [x] None [] ABG/VBG [] Assist w/ICU transfer [] BAT paged    [] Bag mask [] Blood [] Cardioversion [] Code Blue   [] Code blue for intubation [] Code status changed [] Chest x-ray [] EKG   [] IV fluid/bolus [] KUB x-ray [] Labs/cultures [] Medication   [] Nebulizer treatment [] NIPPV (CPAP/BiPAP) [] Oxygen [] Oral airway   [] Peripheral IV [] Palliative care consult [] CT/MRI [] Sepsis protocol    [] Suctioned [] Other:     Outcome:  [] Coded and  [] Code blue for intubation [] Coded and transferred to ICU []  on division   [x] Remained on division (no change) [] Remained on division + additional monitoring [] Remained in ED [] Transferred to ED   [] Transferred to ICU [] Transferred to inpatient status [] Transferred for interventions (procedure) [] Transferred to ICU stepdown    [] Transferred to surgery [] Transferred to telemetry [] Sepsis protocol [] STEMI protocol   [] Stroke protocol       Additional Comments:      Notified nurse of RADSKYLAR lewis received: 86 15 97/58 90.  No concerns per nurse at this time.  Encouraged nurse to call a rapid response as needed if patient status changes.

## 2025-01-07 NOTE — PROGRESS NOTES
"Medical Intensive Care Step Down- Daily Progress Note   Subjective    Alo Glass is a 69 y.o. year old male patient admitted on 12/29/2024 with following ICU needs: Bilateral lower ext cellulitis causing septic shock.      Interval History:  Wore home CPAP overnight  Denies pain, SOB, cough, N/V  Endorses small BM's    Meds    Scheduled medications  [Held by provider] allopurinol, 300 mg, oral, Daily  ezetimibe, 10 mg, oral, Daily  gabapentin, 100 mg, oral, Daily  HYDROmorphone, 0.2 mg, intravenous, Once  insulin lispro, 0-10 Units, subcutaneous, TID AC  ipratropium-albuteroL, 3 mL, nebulization, q6h  levothyroxine, 125 mcg, oral, Daily  metOLazone, 20 mg, oral, Daily  midodrine, 5 mg, oral, TID  nicotine, 1 patch, transdermal, Daily  nystatin, 5 mL, Mouth/Throat, BID  oxygen, , inhalation, Nightly  pantoprazole, 40 mg, oral, Daily before breakfast  potassium chloride, 20 mEq, intravenous, q2h  sennosides-docusate sodium, 1 tablet, oral, Nightly      Continuous medications  bumetanide, 1 mg/hr, Last Rate: 1 mg/hr (01/07/25 0340)  heparin, 0-4,500 Units/hr, Last Rate: 1,800 Units/hr (01/07/25 0340)      PRN medications  PRN medications: acetaminophen, albuterol, dextrose, dextrose, glucagon, glucagon, heparin, HYDROmorphone, oxygen, polyethylene glycol, sodium chloride     Objective    Blood pressure 98/62, pulse 75, temperature 36.5 °C (97.7 °F), temperature source Temporal, resp. rate 24, height 1.803 m (5' 11\"), weight 129 kg (283 lb 15.2 oz), SpO2 95%.       Physical exam:  Constitutional: pt in NAD, alert and cooperative  Eyes: PERRL, EOMI, no icterus   ENMT: mucous membranes moist, no apparent injury, no lesions seen  Head/Neck: Neck supple, no apparent injury  Respiratory/Thorax: Lungs CTA bilaterally, diminished cesar bases, non-labored breathing, no cough, on 5 L NC  Cardiovascular: Regular, rate and rhythm, no murmurs, normal S1 and S2  Gastrointestinal: Nondistended, soft, non-tender, BS present x " 4  Musculoskeletal: ROM intact, no joint swelling, normal strength  Extremities: normal extremities, no edema, contusions or wounds  Neurological: alert and oriented x 3, speech clear, follows commands appropriately, cr. n. II-XII intact, sensation grossly intact, motor 5/5 throughout  Skin: Warm and dry, BLE redness/kerlix applied      Intake/Output Summary (Last 24 hours) at 1/7/2025 0747  Last data filed at 1/7/2025 0559  Gross per 24 hour   Intake 1241 ml   Output 1925 ml   Net -684 ml     Labs:   Results from last 72 hours   Lab Units 01/06/25  2205 01/06/25  1016 01/05/25  2159   SODIUM mmol/L 132* 131* 130*   POTASSIUM mmol/L 3.1* 3.0* 3.0*   CHLORIDE mmol/L 79* 80* 79*   CO2 mmol/L 36* 36* 35*   BUN mg/dL 103* 104* 103*   CREATININE mg/dL 1.89* 2.00* 2.03*   GLUCOSE mg/dL 132* 166* 142*   CALCIUM mg/dL 8.4* 8.6 8.6   ANION GAP mmol/L 20 18 19   EGFR mL/min/1.73m*2 38* 35* 35*   PHOSPHORUS mg/dL 5.7* 5.8* 6.2*      Results from last 72 hours   Lab Units 01/07/25  0453 01/06/25  0553 01/05/25  0623   WBC AUTO x10*3/uL 9.2 9.1 9.1   HEMOGLOBIN g/dL 16.0 15.8 16.6   HEMATOCRIT % 48.9 47.4 49.5   PLATELETS AUTO x10*3/uL 243 220 193   NEUTROS PCT AUTO % 75.6 75.8 83.8   LYMPHS PCT AUTO % 14.7 15.1 9.4   MONOS PCT AUTO % 6.5 6.0 4.2   EOS PCT AUTO % 1.6 1.1 0.7            Results from last 72 hours   Lab Units 01/06/25  1016   POCT PH, VENOUS pH 7.46*   POCT PCO2, VENOUS mm Hg 58*   POCT PO2, VENOUS mm Hg 111*        Micro/ID:     Lab Results   Component Value Date    URINECULTURE No growth 12/28/2024    BLOODCULT No growth at 4 days -  FINAL REPORT 12/28/2024    BLOODCULT No growth at 4 days -  FINAL REPORT 12/28/2024       Summary of key imaging results      XR chest 1 view 1/5/2025     1.  Interval improvement in left basilar opacities and increased right basilar opacities with small right pleural effusion.         US renal complete 1/1/2025  1. Indeterminate hypoechoic lesion of the left superior pole measuring  1.9 cm which was seen on the prior CT dated 12/28/2024, further evaluation with a kidney protocol MRI with IV contrast is strongly recommended to rule out neoplasm.   2. An additional indeterminate 1.5 cm hypoechoic area in the upper to mid left kidney, which can be artifactual. This can further be evaluated by MRI.   3. No hydronephrosis.   4. Incidentally noted cholelithiasis.       Transthoracic Echo (TTE) Limited 12/30/2024  1. Poorly visualized anatomical structures due to suboptimal image quality.    2. Left ventricular ejection fraction is severely decreased, calculated by Green's biplane at 19%.    3. There is global hypokinesis of the left ventricle with minor regional variations.    4. Left ventricular cavity size is moderately dilated.    5. Abnormal septal motion consistent with left bundle branch block.    6. No left ventricular thrombus visualized.    7. There is reduced right ventricular systolic function.    8. Moderately enlarged right ventricle.    9. The left atrium is enlarged.   10. The right atrium is moderately dilated.   11. Mild aortic valve regurgitation.   12. Compared with study dated 9/19/2024, no significant change.     Assessment and Plan     Assessment: Alo Glass is a 69 y.o. year old male patient admitted on 12/29/2024 with Septic shock 2/2 bilateral lower ext cellulitis and cardiogenic shock and AHRF 2/2 volume overload and LEONA.     Summary for 01/07/25  :  Continue with Bumex gtt  at 1 ml/hr  Continue with Metolazone   Plan:  NEUROLOGY/PSYCH:  Dx: ZENA  A&Ox3  Management:  Continue gabapentin 100mg daily and Nicotine patch daily,   Pain regimen:  Tylenol 650 mg prn and Dilaudid PRN   PT/OT- previously refused   Palliative Care following      CARDIOVASCULAR:  Dx: Hx HFrEF, HTN, HLD, severe PAD.  Septic shock, resolved.  Cardiogenic shock, resovled. Aflutter  HDS but with narrow pulse pressure  Remains off vasopressors since 12/30  TTE 12/30: LVEF 19%  Management:  Continue  Ezetimibe 10mg daily   Holding home lasix and SGLT-2 iso hypotension  Consider GDMT when able  S/p Dig load 12/29-12/30  Bumex gtt 1 mg/hr; goal of -2L negative/day  Heparin gtt  Started on Midodrine 5mg TID for renal perfusion   Cardiology consulted, signed off 12/31: Continue diuresis with IV Bumex.  If decompensates can consider cardioversion for atrial kick restoration. Prior to discharge arrange follow up with Dr. Moore as an o/p @ [phone] (scheduling)  Tele    PULMONARY:  Dx: Hx asthma and COPD GOLD 3 (2019: FEV1 45% w +ve BD change; %/TLC 85%). AHRF 2/2 hypervolemia   5L HF NC& home CPAP, 8 PS  CxR 1/5 : Interval improvement in left basilar opacities and increased right basilar opacities with small right pleural effusion.   Management:  Transitioned from home Cpap to Bipap 12/31 d/t hypercapnia   Diuresis as outlined above   Cont Duonebs q6h, albuterol PRN  S/p steroid taper   Airway clearance TID    RENAL/GENITOURINARY:  Dx: LEONA. Hyponatremia likely I/s/o volume overload.  LT renal mass c/f RCC  Has been on HD during stay  Renal US 12/30: No hydronephrosis. hypoechoic lesion of the left superior pole measuring 1.9 cm which was seen on the prior CT dated 12/28/2024, further evaluation with a kidney protocol MRI is strongly recommended. There is an additional indeterminate 0.5 cm hypoechoic lesion of the left kidney.  Management:  Voiding via lipscomb   Nephrology following, recs appreciated: Continue on Bumex drip.    Will repeat renal US to rule out  hydronephrosis   Continue Metolazone 20mg daily since 1/2.    Pt saw Urology on 12/12/2024 and referrred to IR for biopsy   Urine lytes consistent with prerenal  Diurese goal net negative -1.5-2  RFP daily    GASTROENTEROLOGY:  Dx: Hx GERD. Poor oral intake   BMx1  Management:  Diet: Cardiac and carb control diet  Bowel Regimen: Miralax PRN   PPI    ENDOCRINOLOGY:  Dx: Hx Hypothyroid and DMII  Glucose 166-266  HgbA1C  5.5%  Management:  Home  regimen:  Mounjaro and Jardiance; pt also utilizes a Dexcom CGM)  Cont with Insuline lispro TID  Glucose goal of <180  Hypoglycemia protocol     HEMATOLOGY:  Dx: ZENA  H/h stable   Management:  CBC daily     SKIN:  Dx:  Skin Failure Yes, describe: BL L/E cellutis vs PAD   Management:  Wound following  ACS consulted, signed off 1/1: Elevate legs as able, continue with abx.  If erythema does not completely resolve with antibiotic treatment would recommend re-imaging with non-contrast CT of BLE to assess for new, undrained fluid collections     INFECTIOUS DISEASE:  Dx: Cellulitis   Tmax 36.9 C, WBC 9.2  Management:  Micro: 12/28 Legionella, Strep pneum, negative, 12/28 Urine Cx negative, 12/28 BCx NTGD x2  Abx: Clindamycin (12/28), Zosyn (12/28 - **), Vancomycin (12/28 - **)  Will Cont with Vanco/Zosyn for 10 day course or until resolution of cellulitis   ACS consulted signed off 1/1; rec cont IV Atbs     ICU Check List     FEN  Fluids: PRN  Electrolytes: K>4, Phos >3, Mg> 2  Nutrition: Cardiac diet and carb control diet   Prophylaxis:  DVT ppx: Heparin gtt  GI ppx: PPI  Bowel care: Miralax PRN   Hardware:         Urethral Catheter Temperature probe 16 Fr. (Active)   Placement Date: 12/28/24   Hand Hygiene Completed: Yes  Catheter Type: Temperature probe  Tube Size (Fr.): 16 Fr.  Catheter Balloon Size: 5 mL  Urine Returned: Yes   Number of days: 3       Social:  Code: Full Code    HPOA: Sheyla Griggs [phone]   Disposition: Continue care in SDU       PARAM Lozano-CNP   01/07/25 at 7:47 AM     Pt discussed with Dr. Cam,  seen and examined. All labs, VS and previous plan of care reviewed.     Disclaimer: Documentation completed with the information available at the time of input. The times in the chart may not be reflective of actual patient care times, interventions, or procedures. Documentation occurs after the physical care of the patient.

## 2025-01-07 NOTE — PROGRESS NOTES
NEPHROLOGY NOTE   Alo Glass   69 y.o.     MRN/Room: [id number]    Reason for consult: LEONA on CKD    SUBJECTIVE:   Renal function a little improved, remains on Bumex drip       Vitals:    01/07/25 1200   BP: 97/58   Pulse: 86   Resp: 15   Temp: 36.7 °C (98.1 °F)   SpO2: 90%        01/05 1900 - 01/07 0659  In: 1843.3 [P.O.:480; I.V.:813.3]  Out: 3250 [Urine:3250]   Weight change: -0.3 kg (-10.6 oz)     General appearance: Drowsy   Eyes: non-icteric  Skin: no apparent rash  Heart: S1 S2 regular  JVD visible  Lungs: CTA bilaterally  Abdomen: soft, nt/nd  Extremities: +2 edema bilat up to thighs  : has Live      ASSESSMENT:  Alo Glass is a 69 y.o. male with PMHx of HFrEF (EF 23%) , COPD, MELISSA, obesity, type II DM, stenotrophomonas pneumonia w parapneumonic effusion (9/2024), HTN, hypothyroidism and HLD who was admitted to the MICU for Septic Shock & acute decompensated HF. Has Large Bullae of LE that are oozing, possibly infected.    #LEONA  - Baseline Cr 0.6, presented with 1.4.   - bilateral renal enhancing lesions measuring 0.8 cm in the right upper pole kidney and 2 cm in the left upper pole kidney. Referral to urology and further evaluation with MR is suggested  - hypoNa likely hypervolemic    Etiology:   ATN vs cardiorenal dynamics, contrast injury     Updates 01/07:  -Serum Cr trending down, volume status appears overall improved     RECOMMENDATIONS:  - Continue Bumex drip at 1mg/h   - Decrease Metolazone to 10mg po once daily   - Replete K  - daily weights  - Continue Midodrine  -Dose medications for renal function  - strict Is and Os    SW Dr Jacques Singh MD  Nephrology Fellow  24 hour Renal Pager - [phone]

## 2025-01-07 NOTE — PROGRESS NOTES
"Palliative Medicine following for:  Complex medical decision making, symptom management, patient/family support    History obtained from chart review including ED note, H&P, patient's daily progress notes, review of lab/test results, and discussion with primary team and bedside RN.    Subjective    Supportive visit at bedside  Patient states he is feeling better than the last time I saw him last week    Symptoms  Pain: intermittent, generalized, improves with positioning   Dyspnea: intermittent  Fatigue: present  Insomnia: denies  Nausea: denies  Appetite: poor      Objective    Last Recorded Vitals  BP 97/58 (BP Location: Right arm, Patient Position: Lying)   Pulse 86   Temp 36.7 °C (98.1 °F) (Temporal)   Resp 15   Ht 1.803 m (5' 11\")   Wt 129 kg (283 lb 15.2 oz)   SpO2 90%   BMI 39.60 kg/m²      Physical Exam  Constitutional:       Appearance: He is ill-appearing.   HENT:      Head: Normocephalic.      Mouth/Throat:      Mouth: Mucous membranes are moist.   Pulmonary:      Comments: Labored with intermittent desturations  Musculoskeletal:         General: Swelling present.   Neurological:      Mental Status: He is oriented to person, place, and time.   Psychiatric:         Mood and Affect: Mood normal.          Relevant Results   Results for orders placed or performed during the hospital encounter of 12/29/24 (from the past 24 hours)   POCT GLUCOSE   Result Value Ref Range    POCT Glucose 174 (H) 74 - 99 mg/dL   Renal Function Panel   Result Value Ref Range    Glucose 132 (H) 74 - 99 mg/dL    Sodium 132 (L) 136 - 145 mmol/L    Potassium 3.1 (L) 3.5 - 5.3 mmol/L    Chloride 79 (L) 98 - 107 mmol/L    Bicarbonate 36 (H) 21 - 32 mmol/L    Anion Gap 20 10 - 20 mmol/L    Urea Nitrogen 103 (HH) 6 - 23 mg/dL    Creatinine 1.89 (H) 0.50 - 1.30 mg/dL    eGFR 38 (L) >60 mL/min/1.73m*2    Calcium 8.4 (L) 8.6 - 10.6 mg/dL    Phosphorus 5.7 (H) 2.5 - 4.9 mg/dL    Albumin 2.7 (L) 3.4 - 5.0 g/dL   CBC and Auto Differential "   Result Value Ref Range    WBC 9.2 4.4 - 11.3 x10*3/uL    nRBC 0.0 0.0 - 0.0 /100 WBCs    RBC 5.26 4.50 - 5.90 x10*6/uL    Hemoglobin 16.0 13.5 - 17.5 g/dL    Hematocrit 48.9 41.0 - 52.0 %    MCV 93 80 - 100 fL    MCH 30.4 26.0 - 34.0 pg    MCHC 32.7 32.0 - 36.0 g/dL    RDW 13.4 11.5 - 14.5 %    Platelets 243 150 - 450 x10*3/uL    Neutrophils % 75.6 40.0 - 80.0 %    Immature Granulocytes %, Automated 1.4 (H) 0.0 - 0.9 %    Lymphocytes % 14.7 13.0 - 44.0 %    Monocytes % 6.5 2.0 - 10.0 %    Eosinophils % 1.6 0.0 - 6.0 %    Basophils % 0.2 0.0 - 2.0 %    Neutrophils Absolute 6.97 1.20 - 7.70 x10*3/uL    Immature Granulocytes Absolute, Automated 0.13 0.00 - 0.70 x10*3/uL    Lymphocytes Absolute 1.36 1.20 - 4.80 x10*3/uL    Monocytes Absolute 0.60 0.10 - 1.00 x10*3/uL    Eosinophils Absolute 0.15 0.00 - 0.70 x10*3/uL    Basophils Absolute 0.02 0.00 - 0.10 x10*3/uL   Heparin Assay, UFH   Result Value Ref Range    Heparin Unfractionated 0.7 See Comment Below for Therapeutic Ranges IU/mL   POCT GLUCOSE   Result Value Ref Range    POCT Glucose 145 (H) 74 - 99 mg/dL   Renal Function Panel   Result Value Ref Range    Glucose 109 (H) 74 - 99 mg/dL    Sodium 132 (L) 136 - 145 mmol/L    Potassium 3.4 (L) 3.5 - 5.3 mmol/L    Chloride 79 (L) 98 - 107 mmol/L    Bicarbonate 37 (H) 21 - 32 mmol/L    Anion Gap 19 10 - 20 mmol/L    Urea Nitrogen 107 (HH) 6 - 23 mg/dL    Creatinine 1.71 (H) 0.50 - 1.30 mg/dL    eGFR 43 (L) >60 mL/min/1.73m*2    Calcium 8.5 (L) 8.6 - 10.6 mg/dL    Phosphorus 5.6 (H) 2.5 - 4.9 mg/dL    Albumin 2.7 (L) 3.4 - 5.0 g/dL   POCT GLUCOSE   Result Value Ref Range    POCT Glucose 229 (H) 74 - 99 mg/dL     *Note: Due to a large number of results and/or encounters for the requested time period, some results have not been displayed. A complete set of results can be found in Results Review.        Allergies  Patient has no known allergies.  Medications  Scheduled medications  ezetimibe, 10 mg, oral,  Daily  gabapentin, 100 mg, oral, Daily  insulin lispro, 0-10 Units, subcutaneous, TID AC  ipratropium-albuteroL, 3 mL, nebulization, q6h  levothyroxine, 125 mcg, oral, Daily  [START ON 1/8/2025] metOLazone, 10 mg, oral, Daily  midodrine, 5 mg, oral, TID  nicotine, 1 patch, transdermal, Daily  nystatin, 5 mL, Mouth/Throat, BID  oxygen, , inhalation, Nightly  pantoprazole, 40 mg, oral, Daily before breakfast  sennosides-docusate sodium, 1 tablet, oral, Nightly      Continuous medications  bumetanide, 1 mg/hr, Last Rate: 1 mg/hr (01/07/25 1019)  heparin, 0-4,500 Units/hr, Last Rate: 1,800 Units/hr (01/07/25 1404)      PRN medications  PRN medications: acetaminophen, albuterol, dextrose, dextrose, glucagon, glucagon, heparin, HYDROmorphone, oxygen, polyethylene glycol, sodium chloride     Assessment/Plan    Alo Glass is a 69y gentleman with a pmh HFrEF (EF 20%), COPD, MELISSA, obesity, T2DM, HTN, hypothyroidism, severe PAD, and HLD. He is admitted with septic shock from bilateral LE cellulitis. Course c/b LEONA, hypervolemia, cardiogenic shock     #Goals of Care  - Code status: FULL CODE. Treatment limitations recommended. Last discussed on 1/3/25  - [facility]   - Goals are survival and time based   - Advanced Directives on file   - pending meeting with hospitals to discuss discharge planning     #Acute Pain   -pain in legs likely r/t cellulitis, intermittent, aching, well controlled on current regimen  -home meds: gabapentin 100mg daily   -continue dilaudid 0.4mg IV q4 PRN for pain. Would recommend trying oral dilaudid regimen for home going needs -- may need to see if PCP will prescribe in outpatient setting   -continue gabapentin 100mg every day      #Bowel Regimen   -last BM documented 1/6  -consider starting senna 1 tabs at bedtime routine for prevention of constipation. Pt at high risk due to immobility and opiate use.     #Psychosocial Support  - Spiritual Care Support     Plan of  Care discussed with: Updated primary team and bedside RN on goals of care decision, medication adjustments, and code status      Medical Decision Making was high level due to high complexity of problems, extensive data review, and high risk of management/treatment.        Thank you for allowing us to participate in the care of this patient. Palliative will continue to follow as needed. Palliative medicine is available Monday-Friday, 8a-6p. Please contact team with any questions or concerns.  Team pager [phone] (weekdays)  Kelsie Jarvis DNP, APRN-CNP

## 2025-01-08 LAB
ALBUMIN SERPL BCP-MCNC: 2.8 G/DL (ref 3.4–5)
ANION GAP SERPL CALC-SCNC: 14 MMOL/L (ref 10–20)
BACTERIA SPEC RESP CULT: ABNORMAL
BACTERIA SPEC RESP CULT: ABNORMAL
BASE EXCESS BLDV CALC-SCNC: 24.5 MMOL/L (ref -2–3)
BASOPHILS # BLD AUTO: 0.03 X10*3/UL (ref 0–0.1)
BASOPHILS NFR BLD AUTO: 0.3 %
BODY TEMPERATURE: 37 DEGREES CELSIUS
BUN SERPL-MCNC: 102 MG/DL (ref 6–23)
CALCIUM SERPL-MCNC: 8.9 MG/DL (ref 8.6–10.6)
CHLORIDE SERPL-SCNC: 77 MMOL/L (ref 98–107)
CO2 SERPL-SCNC: 44 MMOL/L (ref 21–32)
CREAT SERPL-MCNC: 1.13 MG/DL (ref 0.5–1.3)
EGFRCR SERPLBLD CKD-EPI 2021: 70 ML/MIN/1.73M*2
EOSINOPHIL # BLD AUTO: 0.09 X10*3/UL (ref 0–0.7)
EOSINOPHIL NFR BLD AUTO: 1 %
ERYTHROCYTE [DISTWIDTH] IN BLOOD BY AUTOMATED COUNT: 13.8 % (ref 11.5–14.5)
GLUCOSE BLD MANUAL STRIP-MCNC: 144 MG/DL (ref 74–99)
GLUCOSE BLD MANUAL STRIP-MCNC: 155 MG/DL (ref 74–99)
GLUCOSE BLD MANUAL STRIP-MCNC: 203 MG/DL (ref 74–99)
GLUCOSE SERPL-MCNC: 165 MG/DL (ref 74–99)
GRAM STN SPEC: ABNORMAL
GRAM STN SPEC: ABNORMAL
HCO3 BLDV-SCNC: 50.4 MMOL/L (ref 22–26)
HCT VFR BLD AUTO: 49.4 % (ref 41–52)
HGB BLD-MCNC: 16.4 G/DL (ref 13.5–17.5)
IMM GRANULOCYTES # BLD AUTO: 0.08 X10*3/UL (ref 0–0.7)
IMM GRANULOCYTES NFR BLD AUTO: 0.9 % (ref 0–0.9)
INHALED O2 CONCENTRATION: 44 %
LYMPHOCYTES # BLD AUTO: 1.45 X10*3/UL (ref 1.2–4.8)
LYMPHOCYTES NFR BLD AUTO: 16 %
MCH RBC QN AUTO: 31 PG (ref 26–34)
MCHC RBC AUTO-ENTMCNC: 33.2 G/DL (ref 32–36)
MCV RBC AUTO: 93 FL (ref 80–100)
MONOCYTES # BLD AUTO: 0.72 X10*3/UL (ref 0.1–1)
MONOCYTES NFR BLD AUTO: 8 %
NEUTROPHILS # BLD AUTO: 6.67 X10*3/UL (ref 1.2–7.7)
NEUTROPHILS NFR BLD AUTO: 73.8 %
NRBC BLD-RTO: 0 /100 WBCS (ref 0–0)
OXYHGB MFR BLDV: 96 % (ref 45–75)
PCO2 BLDV: 55 MM HG (ref 41–51)
PH BLDV: 7.57 PH (ref 7.33–7.43)
PHOSPHATE SERPL-MCNC: 3.9 MG/DL (ref 2.5–4.9)
PLATELET # BLD AUTO: 247 X10*3/UL (ref 150–450)
PO2 BLDV: 84 MM HG (ref 35–45)
POTASSIUM SERPL-SCNC: 3.2 MMOL/L (ref 3.5–5.3)
RBC # BLD AUTO: 5.29 X10*6/UL (ref 4.5–5.9)
SAO2 % BLDV: 99 % (ref 45–75)
SODIUM SERPL-SCNC: 132 MMOL/L (ref 136–145)
UFH PPP CHRO-ACNC: 0.6 IU/ML
WBC # BLD AUTO: 9 X10*3/UL (ref 4.4–11.3)

## 2025-01-08 PROCEDURE — 2500000004 HC RX 250 GENERAL PHARMACY W/ HCPCS (ALT 636 FOR OP/ED): Performed by: NURSE PRACTITIONER

## 2025-01-08 PROCEDURE — 82947 ASSAY GLUCOSE BLOOD QUANT: CPT

## 2025-01-08 PROCEDURE — 2500000001 HC RX 250 WO HCPCS SELF ADMINISTERED DRUGS (ALT 637 FOR MEDICARE OP): Performed by: NURSE PRACTITIONER

## 2025-01-08 PROCEDURE — 97530 THERAPEUTIC ACTIVITIES: CPT | Mod: GP

## 2025-01-08 PROCEDURE — 2500000002 HC RX 250 W HCPCS SELF ADMINISTERED DRUGS (ALT 637 FOR MEDICARE OP, ALT 636 FOR OP/ED): Performed by: NURSE PRACTITIONER

## 2025-01-08 PROCEDURE — 94640 AIRWAY INHALATION TREATMENT: CPT

## 2025-01-08 PROCEDURE — 80069 RENAL FUNCTION PANEL: CPT | Performed by: NURSE PRACTITIONER

## 2025-01-08 PROCEDURE — 99233 SBSQ HOSP IP/OBS HIGH 50: CPT

## 2025-01-08 PROCEDURE — 2500000001 HC RX 250 WO HCPCS SELF ADMINISTERED DRUGS (ALT 637 FOR MEDICARE OP)

## 2025-01-08 PROCEDURE — 2060000001 HC INTERMEDIATE ICU ROOM DAILY

## 2025-01-08 PROCEDURE — 85520 HEPARIN ASSAY: CPT | Performed by: NURSE PRACTITIONER

## 2025-01-08 PROCEDURE — 2500000005 HC RX 250 GENERAL PHARMACY W/O HCPCS: Performed by: NURSE PRACTITIONER

## 2025-01-08 PROCEDURE — 94668 MNPJ CHEST WALL SBSQ: CPT

## 2025-01-08 PROCEDURE — 99233 SBSQ HOSP IP/OBS HIGH 50: CPT | Performed by: INTERNAL MEDICINE

## 2025-01-08 PROCEDURE — S4991 NICOTINE PATCH NONLEGEND: HCPCS | Performed by: NURSE PRACTITIONER

## 2025-01-08 PROCEDURE — 99231 SBSQ HOSP IP/OBS SF/LOW 25: CPT | Performed by: STUDENT IN AN ORGANIZED HEALTH CARE EDUCATION/TRAINING PROGRAM

## 2025-01-08 PROCEDURE — 85025 COMPLETE CBC W/AUTO DIFF WBC: CPT | Performed by: NURSE PRACTITIONER

## 2025-01-08 PROCEDURE — 36415 COLL VENOUS BLD VENIPUNCTURE: CPT | Performed by: NURSE PRACTITIONER

## 2025-01-08 PROCEDURE — 82805 BLOOD GASES W/O2 SATURATION: CPT | Performed by: NURSE PRACTITIONER

## 2025-01-08 RX ORDER — BUMETANIDE 0.25 MG/ML
4 INJECTION, SOLUTION INTRAMUSCULAR; INTRAVENOUS EVERY 12 HOURS
Status: DISCONTINUED | OUTPATIENT
Start: 2025-01-08 | End: 2025-01-12 | Stop reason: HOSPADM

## 2025-01-08 RX ORDER — POTASSIUM CHLORIDE 20 MEQ/1
40 TABLET, EXTENDED RELEASE ORAL ONCE
Status: COMPLETED | OUTPATIENT
Start: 2025-01-08 | End: 2025-01-08

## 2025-01-08 RX ADMIN — POLYETHYLENE GLYCOL 3350 17 G: 17 POWDER, FOR SOLUTION ORAL at 08:25

## 2025-01-08 RX ADMIN — INSULIN LISPRO 2 UNITS: 100 INJECTION, SOLUTION INTRAVENOUS; SUBCUTANEOUS at 08:26

## 2025-01-08 RX ADMIN — IPRATROPIUM BROMIDE AND ALBUTEROL SULFATE 3 ML: 2.5; .5 SOLUTION RESPIRATORY (INHALATION) at 07:46

## 2025-01-08 RX ADMIN — POTASSIUM CHLORIDE 40 MEQ: 1500 TABLET, EXTENDED RELEASE ORAL at 13:10

## 2025-01-08 RX ADMIN — METOLAZONE 10 MG: 10 TABLET ORAL at 08:26

## 2025-01-08 RX ADMIN — NICOTINE 1 PATCH: 14 PATCH, EXTENDED RELEASE TRANSDERMAL at 08:25

## 2025-01-08 RX ADMIN — MIDODRINE HYDROCHLORIDE 5 MG: 5 TABLET ORAL at 08:26

## 2025-01-08 RX ADMIN — IPRATROPIUM BROMIDE AND ALBUTEROL SULFATE 3 ML: 2.5; .5 SOLUTION RESPIRATORY (INHALATION) at 14:34

## 2025-01-08 RX ADMIN — EZETIMIBE 10 MG: 10 TABLET ORAL at 08:35

## 2025-01-08 RX ADMIN — NYSTATIN 500000 UNITS: 100000 SUSPENSION ORAL at 08:26

## 2025-01-08 RX ADMIN — HEPARIN SODIUM 1800 UNITS/HR: 10000 INJECTION, SOLUTION INTRAVENOUS at 18:32

## 2025-01-08 RX ADMIN — HEPARIN SODIUM 1800 UNITS/HR: 10000 INJECTION, SOLUTION INTRAVENOUS at 02:45

## 2025-01-08 RX ADMIN — PANTOPRAZOLE SODIUM 40 MG: 40 TABLET, DELAYED RELEASE ORAL at 08:25

## 2025-01-08 RX ADMIN — NYSTATIN 500000 UNITS: 100000 SUSPENSION ORAL at 21:53

## 2025-01-08 RX ADMIN — BUMETANIDE 4 MG: 0.25 INJECTION INTRAMUSCULAR; INTRAVENOUS at 17:14

## 2025-01-08 RX ADMIN — GABAPENTIN 100 MG: 100 CAPSULE ORAL at 08:25

## 2025-01-08 RX ADMIN — Medication 12 L/MIN: at 20:12

## 2025-01-08 RX ADMIN — MIDODRINE HYDROCHLORIDE 5 MG: 5 TABLET ORAL at 21:51

## 2025-01-08 RX ADMIN — SENNOSIDES AND DOCUSATE SODIUM 1 TABLET: 50; 8.6 TABLET ORAL at 21:54

## 2025-01-08 RX ADMIN — LEVOTHYROXINE SODIUM 125 MCG: 25 TABLET ORAL at 05:51

## 2025-01-08 RX ADMIN — MIDODRINE HYDROCHLORIDE 5 MG: 5 TABLET ORAL at 13:27

## 2025-01-08 RX ADMIN — IPRATROPIUM BROMIDE AND ALBUTEROL SULFATE 3 ML: 2.5; .5 SOLUTION RESPIRATORY (INHALATION) at 20:15

## 2025-01-08 RX ADMIN — IPRATROPIUM BROMIDE AND ALBUTEROL SULFATE 3 ML: 2.5; .5 SOLUTION RESPIRATORY (INHALATION) at 02:36

## 2025-01-08 RX ADMIN — SALINE NASAL SPRAY 1 SPRAY: 1.5 SOLUTION NASAL at 17:05

## 2025-01-08 RX ADMIN — BUMETANIDE 1 MG/HR: 0.25 INJECTION INTRAMUSCULAR; INTRAVENOUS at 12:04

## 2025-01-08 RX ADMIN — INSULIN LISPRO 4 UNITS: 100 INJECTION, SOLUTION INTRAVENOUS; SUBCUTANEOUS at 13:10

## 2025-01-08 ASSESSMENT — COGNITIVE AND FUNCTIONAL STATUS - GENERAL
PERSONAL GROOMING: A LOT
STANDING UP FROM CHAIR USING ARMS: TOTAL
CLIMB 3 TO 5 STEPS WITH RAILING: TOTAL
EATING MEALS: A LOT
MOVING FROM LYING ON BACK TO SITTING ON SIDE OF FLAT BED WITH BEDRAILS: TOTAL
MOVING TO AND FROM BED TO CHAIR: TOTAL
MOVING TO AND FROM BED TO CHAIR: TOTAL
HELP NEEDED FOR BATHING: A LOT
TOILETING: A LOT
WALKING IN HOSPITAL ROOM: TOTAL
WALKING IN HOSPITAL ROOM: TOTAL
TURNING FROM BACK TO SIDE WHILE IN FLAT BAD: A LOT
MOVING FROM LYING ON BACK TO SITTING ON SIDE OF FLAT BED WITH BEDRAILS: A LOT
DRESSING REGULAR LOWER BODY CLOTHING: A LOT
TURNING FROM BACK TO SIDE WHILE IN FLAT BAD: TOTAL
MOBILITY SCORE: 8
DAILY ACTIVITIY SCORE: 12
MOBILITY SCORE: 6
CLIMB 3 TO 5 STEPS WITH RAILING: TOTAL
STANDING UP FROM CHAIR USING ARMS: TOTAL
DRESSING REGULAR UPPER BODY CLOTHING: A LOT

## 2025-01-08 ASSESSMENT — PAIN - FUNCTIONAL ASSESSMENT
PAIN_FUNCTIONAL_ASSESSMENT: 0-10

## 2025-01-08 ASSESSMENT — PAIN SCALES - GENERAL
PAINLEVEL_OUTOF10: 0 - NO PAIN

## 2025-01-08 NOTE — PROGRESS NOTES
"Palliative Medicine following for:  Complex medical decision making, symptom management, patient/family support    History obtained from chart review including ED note, H&P, patient's daily progress notes, review of lab/test results, and discussion with primary team and bedside RN.    Subjective    Patient seen at bedside for supportive visit  Noted to be asleep on cpap, desatting to 83%  Called RN and RT to bedside    Objective    Last Recorded Vitals  /72 (BP Location: Right arm, Patient Position: Lying)   Pulse 91   Temp 36 °C (96.8 °F)   Resp 24   Ht 1.803 m (5' 11\")   Wt 130 kg (286 lb 6 oz)   SpO2 94%   BMI 39.94 kg/m²      Physical Exam  Constitutional:       Appearance: He is ill-appearing.   HENT:      Head: Normocephalic.      Mouth/Throat:      Mouth: Mucous membranes are dry.   Pulmonary:      Comments: Labored, desatting oxygen  Abdominal:      General: There is distension.   Musculoskeletal:         General: Swelling present.          Relevant Results   Results for orders placed or performed during the hospital encounter of 12/29/24 (from the past 24 hours)   POCT GLUCOSE   Result Value Ref Range    POCT Glucose 172 (H) 74 - 99 mg/dL   Renal Function Panel   Result Value Ref Range    Glucose 141 (H) 74 - 99 mg/dL    Sodium 130 (L) 136 - 145 mmol/L    Potassium 3.0 (L) 3.5 - 5.3 mmol/L    Chloride 79 (L) 98 - 107 mmol/L    Bicarbonate 38 (H) 21 - 32 mmol/L    Anion Gap 16 10 - 20 mmol/L    Urea Nitrogen 106 (HH) 6 - 23 mg/dL    Creatinine 1.56 (H) 0.50 - 1.30 mg/dL    eGFR 48 (L) >60 mL/min/1.73m*2    Calcium 8.4 (L) 8.6 - 10.6 mg/dL    Phosphorus 5.2 (H) 2.5 - 4.9 mg/dL    Albumin 2.9 (L) 3.4 - 5.0 g/dL   POCT GLUCOSE   Result Value Ref Range    POCT Glucose 261 (H) 74 - 99 mg/dL   CBC and Auto Differential   Result Value Ref Range    WBC 9.0 4.4 - 11.3 x10*3/uL    nRBC 0.0 0.0 - 0.0 /100 WBCs    RBC 5.29 4.50 - 5.90 x10*6/uL    Hemoglobin 16.4 13.5 - 17.5 g/dL    Hematocrit 49.4 41.0 - " 52.0 %    MCV 93 80 - 100 fL    MCH 31.0 26.0 - 34.0 pg    MCHC 33.2 32.0 - 36.0 g/dL    RDW 13.8 11.5 - 14.5 %    Platelets 247 150 - 450 x10*3/uL    Neutrophils % 73.8 40.0 - 80.0 %    Immature Granulocytes %, Automated 0.9 0.0 - 0.9 %    Lymphocytes % 16.0 13.0 - 44.0 %    Monocytes % 8.0 2.0 - 10.0 %    Eosinophils % 1.0 0.0 - 6.0 %    Basophils % 0.3 0.0 - 2.0 %    Neutrophils Absolute 6.67 1.20 - 7.70 x10*3/uL    Immature Granulocytes Absolute, Automated 0.08 0.00 - 0.70 x10*3/uL    Lymphocytes Absolute 1.45 1.20 - 4.80 x10*3/uL    Monocytes Absolute 0.72 0.10 - 1.00 x10*3/uL    Eosinophils Absolute 0.09 0.00 - 0.70 x10*3/uL    Basophils Absolute 0.03 0.00 - 0.10 x10*3/uL   POCT GLUCOSE   Result Value Ref Range    POCT Glucose 155 (H) 74 - 99 mg/dL   Renal Function Panel   Result Value Ref Range    Glucose 165 (H) 74 - 99 mg/dL    Sodium 132 (L) 136 - 145 mmol/L    Potassium 3.2 (L) 3.5 - 5.3 mmol/L    Chloride 77 (L) 98 - 107 mmol/L    Bicarbonate 44 (HH) 21 - 32 mmol/L    Anion Gap 14 10 - 20 mmol/L    Urea Nitrogen 102 (HH) 6 - 23 mg/dL    Creatinine 1.13 0.50 - 1.30 mg/dL    eGFR 70 >60 mL/min/1.73m*2    Calcium 8.9 8.6 - 10.6 mg/dL    Phosphorus 3.9 2.5 - 4.9 mg/dL    Albumin 2.8 (L) 3.4 - 5.0 g/dL   Heparin Assay, UFH   Result Value Ref Range    Heparin Unfractionated 0.6 See Comment Below for Therapeutic Ranges IU/mL   BLOOD GAS VENOUS   Result Value Ref Range    POCT pH, Venous 7.57 (H) 7.33 - 7.43 pH    POCT pCO2, Venous 55 (H) 41 - 51 mm Hg    POCT pO2, Venous 84 (H) 35 - 45 mm Hg    POCT SO2, Venous 99 (H) 45 - 75 %    POCT Oxy Hemoglobin, Venous 96.0 (H) 45.0 - 75.0 %    POCT Base Excess, Venous 24.5 (H) -2.0 - 3.0 mmol/L    POCT HCO3 Calculated, Venous 50.4 (H) 22.0 - 26.0 mmol/L    Patient Temperature 37.0 degrees Celsius    FiO2 44 %   POCT GLUCOSE   Result Value Ref Range    POCT Glucose 203 (H) 74 - 99 mg/dL     *Note: Due to a large number of results and/or encounters for the requested time  period, some results have not been displayed. A complete set of results can be found in Results Review.        Allergies  Patient has no known allergies.  Medications  Scheduled medications  bumetanide, 4 mg, intravenous, q12h  ezetimibe, 10 mg, oral, Daily  gabapentin, 100 mg, oral, Daily  insulin lispro, 0-10 Units, subcutaneous, TID AC  ipratropium-albuteroL, 3 mL, nebulization, q6h  levothyroxine, 125 mcg, oral, Daily  metOLazone, 10 mg, oral, Daily  midodrine, 5 mg, oral, TID  nicotine, 1 patch, transdermal, Daily  nystatin, 5 mL, Mouth/Throat, BID  oxygen, , inhalation, Nightly  pantoprazole, 40 mg, oral, Daily before breakfast  sennosides-docusate sodium, 1 tablet, oral, Nightly      Continuous medications  heparin, 0-4,500 Units/hr, Last Rate: 1,800 Units/hr (01/08/25 1038)      PRN medications  PRN medications: acetaminophen, albuterol, dextrose, dextrose, glucagon, glucagon, heparin, HYDROmorphone, oxygen, polyethylene glycol, sodium chloride     Assessment/Plan      Alo Glass is a 69y gentleman with a pmh HFrEF (EF 20%), COPD, MELISSA, obesity, T2DM, HTN, hypothyroidism, severe PAD, and HLD. He is admitted with septic shock from bilateral LE cellulitis. Course c/b LEONA, hypervolemia, cardiogenic shock      #Goals of Care  - Code status: FULL CODE. Treatment limitations recommended. Last discussed on 1/3/25  - Surrogate decision maker: Gina Griggs [facility]   - Goals are survival and time based   - Advanced Directives on file   - pending meeting with [facility] to discuss discharge planning on 1/9 @ 330     #Acute Pain   -pain in legs likely r/t cellulitis, intermittent, aching, well controlled on current regimen  -home meds: gabapentin 100mg daily   -continue dilaudid 0.4mg IV q4 PRN for pain. Would recommend trying oral dilaudid regimen for home going needs -- may need to see if PCP will prescribe in outpatient setting   -continue gabapentin 100mg every day      #Bowel Regimen   -last BM documented  1/8  -continue senna 1 tabs at bedtime routine for prevention of constipation. Pt at high risk due to immobility and opiate use.     #Psychosocial Support  - Spiritual Care Support     Plan of Care discussed with: Updated primary team and bedside RN on goals of care decision, medication adjustments, and code status      Medical Decision Making was high level due to high complexity of problems, extensive data review, and high risk of management/treatment.        Thank you for allowing us to participate in the care of this patient. Palliative will continue to follow as needed. Palliative medicine is available Monday-Friday, 8a-6p. Please contact team with any questions or concerns.  Team pager [phone] (weekdays)  Kelsie Jarvis DNP, APRN-CNP

## 2025-01-08 NOTE — PROGRESS NOTES
Received call from pt's friend  this morning, and family meeting is scheduled for tomorrow at 3:30 pm.   will be present at the hospital, and will call pt's friend Gina by phone to participate.  Updated team members.      JESSNEIA Srivastava

## 2025-01-08 NOTE — CONSULTS
Wound Care Consult     Visit Date: 1/8/2025      Patient Name: Alo Glass         MRN: [id number]           YOB: 1955     Reason for Consult: reassess B/L leg wounds        Wound History: Patient admitted on 12/29/2024 with following ICU needs: Bilateral lower ext cellulitis causing septic shock.       Pertinent Labs:   Albumin   Date Value Ref Range Status   01/08/2025 2.8 (L) 3.4 - 5.0 g/dL Final       Wound Assessment:  Wound 12/29/24 Other (comment) Pretibial Right (Active)   Wound Image   01/08/25 1623   Site Assessment Fragile;Red;Purple 01/07/25 2100   Shape oval 01/07/25 2100   Wound Length (cm) 16.5 cm 01/08/25 1623   Wound Width (cm) 13 cm 01/08/25 1623   Wound Surface Area (cm^2) 214.5 cm^2 01/08/25 1623   Wound Depth (cm) 0.1 cm 01/07/25 1427   Wound Volume (cm^3) 17.6 cm^3 01/07/25 1427   Wound Healing % 60 01/07/25 1427   State of Healing Healing ridge 01/07/25 2100   Treatments Cleansed 01/07/25 1427   Drainage Description Serosanguineous 01/08/25 1100   Drainage Amount Scant 01/08/25 1100   Dressing Xeroform;Foam 01/08/25 1100   Dressing Changed Changed 01/08/25 1100   Dressing Status Clean;Dry 01/07/25 2100       Wound 12/29/24 Other (comment) Pretibial Left (Active)   Wound Image   01/07/25 1424   Site Assessment Pink;Red 01/07/25 2100   Shape generalized 01/07/25 2100   State of Healing Healing ridge 01/05/25 0800   Drainage Description None 01/07/25 1424   Drainage Amount None 01/07/25 1424   Dressing Open to air 01/07/25 2100   Dressing Status Clean;Dry 01/07/25 1424       Wound 12/30/24 Other (comment) Tibial Dorsal;Right;Lateral (Active)   Wound Image   01/08/25 1625   Site Assessment Fragile;Red 01/07/25 2100   Shape round 01/07/25 2100   Wound Length (cm) 7 cm 01/08/25 1625   Wound Width (cm) 4.5 cm 01/08/25 1625   Wound Surface Area (cm^2) 31.5 cm^2 01/08/25 1625   State of Healing Closed wound edges 01/05/25 0800   Treatments Cleansed 01/07/25 1424   Drainage  Description Serosanguineous 01/08/25 1100   Drainage Amount Scant 01/08/25 1100   Dressing Xeroform;Foam 01/08/25 1100   Dressing Changed Changed 01/08/25 1100   Dressing Status Clean;Dry 01/07/25 2100       Wound Team Summary Assessment:   Cleanse wounds with vashe   Apply medihoney and xeroform   Cover with ABD dressing   Wrap with Kerlix, then apply Ace     Thank you for ACS consult.      Wound Team Plan: Please review recommendations. Wound care will reassess wound on 1/10.   Rabia Gonzalez RN CWON   1/8/2025  6:48 PM

## 2025-01-08 NOTE — CARE PLAN
Problem: Skin  Goal: Decreased wound size/increased tissue granulation at next dressing change  Outcome: Progressing  Flowsheets (Taken 1/8/2025 1854)  Decreased wound size/increased tissue granulation at next dressing change:   Promote sleep for wound healing   Protective dressings over bony prominences  Goal: Participates in plan/prevention/treatment measures  Outcome: Progressing  Flowsheets (Taken 1/8/2025 1854)  Participates in plan/prevention/treatment measures:   Discuss with provider PT/OT consult   Elevate heels  Goal: Prevent/manage excess moisture  Outcome: Progressing  Flowsheets (Taken 1/8/2025 1854)  Prevent/manage excess moisture:   Cleanse incontinence/protect with barrier cream   Follow provider orders for dressing changes   Moisturize dry skin  Goal: Prevent/minimize sheer/friction injuries  Outcome: Progressing  Flowsheets (Taken 1/8/2025 1854)  Prevent/minimize sheer/friction injuries:   Complete micro-shifts as needed if patient unable. Adjust patient position to relieve pressure points, not a full turn   HOB 30 degrees or less   Increase activity/out of bed for meals   Turn/reposition every 2 hours/use positioning/transfer devices  Goal: Promote/optimize nutrition  Outcome: Progressing  Flowsheets (Taken 1/8/2025 1854)  Promote/optimize nutrition:   Discuss with provider if NPO > 2 days   Assist with feeding  Goal: Promote skin healing  Outcome: Progressing  Flowsheets (Taken 1/8/2025 1854)  Promote skin healing:   Assess skin/pad under line(s)/device(s)   Ensure correct size (line/device) and apply per  instructions   Turn/reposition every 2 hours/use positioning/transfer devices     Problem: Pain - Adult  Goal: Verbalizes/displays adequate comfort level or baseline comfort level  Outcome: Progressing     Problem: Safety - Adult  Goal: Free from fall injury  Outcome: Progressing     Problem: Discharge Planning  Goal: Discharge to home or other facility with appropriate  resources  Outcome: Progressing     Problem: Chronic Conditions and Co-morbidities  Goal: Patient's chronic conditions and co-morbidity symptoms are monitored and maintained or improved  Outcome: Progressing     Problem: Diabetes  Goal: Achieve decreasing blood glucose levels by end of shift  Outcome: Progressing  Goal: Increase stability of blood glucose readings by end of shift  Outcome: Progressing  Goal: Decrease in ketones present in urine by end of shift  Outcome: Progressing  Goal: Maintain electrolyte levels within acceptable range throughout shift  Outcome: Progressing  Goal: Maintain glucose levels >70mg/dl to <250mg/dl throughout shift  Outcome: Progressing  Goal: No changes in neurological exam by end of shift  Outcome: Progressing  Goal: Learn about and adhere to nutrition recommendations by end of shift  Outcome: Progressing  Goal: Vital signs within normal range for age by end of shift  Outcome: Progressing  Goal: Increase self care and/or family involovement by end of shift  Outcome: Progressing  Goal: Receive DSME education by end of shift  Outcome: Progressing

## 2025-01-08 NOTE — CARE PLAN
Problem: Pain - Adult  Goal: Verbalizes/displays adequate comfort level or baseline comfort level  Outcome: Progressing     Problem: Safety - Adult  Goal: Free from fall injury  Outcome: Progressing     Problem: Pain  Goal: Takes deep breaths with improved pain control throughout the shift  Outcome: Progressing  Goal: Turns in bed with improved pain control throughout the shift  Outcome: Progressing  Goal: Walks with improved pain control throughout the shift  Outcome: Progressing  Goal: Performs ADL's with improved pain control throughout shift  Outcome: Progressing  Goal: Participates in PT with improved pain control throughout the shift  Outcome: Progressing  Goal: Free from opioid side effects throughout the shift  Outcome: Progressing  Goal: Free from acute confusion related to pain meds throughout the shift  Outcome: Progressing     Problem: Fall/Injury  Goal: Not fall by end of shift  Outcome: Progressing  Goal: Be free from injury by end of the shift  Outcome: Progressing  Goal: Verbalize understanding of personal risk factors for fall in the hospital  Outcome: Progressing  Goal: Verbalize understanding of risk factor reduction measures to prevent injury from fall in the home  Outcome: Progressing  Goal: Pace activities to prevent fatigue by end of the shift  Outcome: Progressing     Problem: Skin  Goal: Prevent/minimize sheer/friction injuries  Flowsheets (Taken 1/8/2025 0221)  Prevent/minimize sheer/friction injuries:   Utilize specialty bed per algorithm   Use pull sheet   Turn/reposition every 2 hours/use positioning/transfer devices   Increase activity/out of bed for meals   HOB 30 degrees or less   Complete micro-shifts as needed if patient unable. Adjust patient position to relieve pressure points, not a full turn  Goal: Promote skin healing  Flowsheets (Taken 1/8/2025 0221)  Promote skin healing:   Assess skin/pad under line(s)/device(s)   Ensure correct size (line/device) and apply per   instructions   Protective dressings over bony prominences   Rotate device position/do not position patient on device   Turn/reposition every 2 hours/use positioning/transfer devices   The patient's goals for the shift include      The clinical goals for the shift include pt will remain HDS for the shift

## 2025-01-08 NOTE — PROGRESS NOTES
[facility]  ACUTE CARE SURGERY - PROGRESS NOTE    Patient Name: Alo Glass  MRN: [id number]  Admit Date: 1229  : 1955  AGE: 69 y.o.   GENDER: male  ==============================================================================  TODAY'S ASSESSMENT AND PLAN OF CARE:  69 M with PMH HFREF, COPD, MELISSA, obesity, DM2, HTN, hypothyroidism, HLD, who presented to MICU for shock workup --sepsis vs decompensated HF, for whom acs was consulted with bilateral leg wounds, rule out abscess. No current concern for necrotizing fasciitis, infection does not appear to follow up fascial planes (on CT) and due to being bilateral, also less likely that this is NSTI. He is noted to be hyponatremic but has a lot of other competing factors that could also cause this (ie his heart failure). No concern for any fluid collections amenable to drainage identified on physical exam or CT from [facility]. Re-engaged , Cellulitis improved with broad spectrum antibiotics. No concerns for purulence or wounds requiring surgical intervention    Elevate legs as able, offload pressure  Wound appropriate for WOCN  Please call back with any questions or concerns    Patient seen and discussed with Chief Resident Dr. Freitas and with attending Dr. Ellison.    Cherri Abdalla MD  PGY1 General Surgery    ==============================================================================  CHIEF COMPLAINT / EVENTS LAST 24HRS / HPI:  NAEO. Erythema improved. No leukocytosis.    MEDICAL HISTORY / ROS:   Admission history and ROS reviewed.     PHYSICAL EXAM:  Heart Rate:  [73-91]   Temp:  [36 °C (96.8 °F)-36.6 °C (97.9 °F)]   Resp:  [19-26]   BP: ()/(54-75)   Weight:  [130 kg (286 lb 6 oz)]   SpO2:  [94 %-97 %]   Physical Exam  Constitutional:       General: He is not in acute distress.  Cardiovascular:      Rate and Rhythm: Rhythm irregular, on tele.   Pulmonary:      Comments: Conversational dyspnea on NC  Abdominal:       General: There is no distension.      Palpations: Abdomen is soft.   Musculoskeletal/Vascular:         General: Swelling present.      Comments: BL LE pitting edema to level of knees, erythema overlying edema, RLE wound without purulence fluctuance induration or crepitus, wound appears much improved  compared with last exam. Cellulitis additionally appears improved. Dopplerable AT, PT. Exam limited d/t patient motion and edema.   Neurological:      Mental Status: He is alert.   Psychiatric:         Behavior: Behavior normal.       LABS:  Results for orders placed or performed during the hospital encounter of 12/29/24 (from the past 24 hours)   Renal Function Panel   Result Value Ref Range    Glucose 141 (H) 74 - 99 mg/dL    Sodium 130 (L) 136 - 145 mmol/L    Potassium 3.0 (L) 3.5 - 5.3 mmol/L    Chloride 79 (L) 98 - 107 mmol/L    Bicarbonate 38 (H) 21 - 32 mmol/L    Anion Gap 16 10 - 20 mmol/L    Urea Nitrogen 106 (HH) 6 - 23 mg/dL    Creatinine 1.56 (H) 0.50 - 1.30 mg/dL    eGFR 48 (L) >60 mL/min/1.73m*2    Calcium 8.4 (L) 8.6 - 10.6 mg/dL    Phosphorus 5.2 (H) 2.5 - 4.9 mg/dL    Albumin 2.9 (L) 3.4 - 5.0 g/dL   POCT GLUCOSE   Result Value Ref Range    POCT Glucose 261 (H) 74 - 99 mg/dL   CBC and Auto Differential   Result Value Ref Range    WBC 9.0 4.4 - 11.3 x10*3/uL    nRBC 0.0 0.0 - 0.0 /100 WBCs    RBC 5.29 4.50 - 5.90 x10*6/uL    Hemoglobin 16.4 13.5 - 17.5 g/dL    Hematocrit 49.4 41.0 - 52.0 %    MCV 93 80 - 100 fL    MCH 31.0 26.0 - 34.0 pg    MCHC 33.2 32.0 - 36.0 g/dL    RDW 13.8 11.5 - 14.5 %    Platelets 247 150 - 450 x10*3/uL    Neutrophils % 73.8 40.0 - 80.0 %    Immature Granulocytes %, Automated 0.9 0.0 - 0.9 %    Lymphocytes % 16.0 13.0 - 44.0 %    Monocytes % 8.0 2.0 - 10.0 %    Eosinophils % 1.0 0.0 - 6.0 %    Basophils % 0.3 0.0 - 2.0 %    Neutrophils Absolute 6.67 1.20 - 7.70 x10*3/uL    Immature Granulocytes Absolute, Automated 0.08 0.00 - 0.70 x10*3/uL    Lymphocytes Absolute 1.45 1.20 -  4.80 x10*3/uL    Monocytes Absolute 0.72 0.10 - 1.00 x10*3/uL    Eosinophils Absolute 0.09 0.00 - 0.70 x10*3/uL    Basophils Absolute 0.03 0.00 - 0.10 x10*3/uL   POCT GLUCOSE   Result Value Ref Range    POCT Glucose 155 (H) 74 - 99 mg/dL   Renal Function Panel   Result Value Ref Range    Glucose 165 (H) 74 - 99 mg/dL    Sodium 132 (L) 136 - 145 mmol/L    Potassium 3.2 (L) 3.5 - 5.3 mmol/L    Chloride 77 (L) 98 - 107 mmol/L    Bicarbonate 44 (HH) 21 - 32 mmol/L    Anion Gap 14 10 - 20 mmol/L    Urea Nitrogen 102 (HH) 6 - 23 mg/dL    Creatinine 1.13 0.50 - 1.30 mg/dL    eGFR 70 >60 mL/min/1.73m*2    Calcium 8.9 8.6 - 10.6 mg/dL    Phosphorus 3.9 2.5 - 4.9 mg/dL    Albumin 2.8 (L) 3.4 - 5.0 g/dL   Heparin Assay, UFH   Result Value Ref Range    Heparin Unfractionated 0.6 See Comment Below for Therapeutic Ranges IU/mL   BLOOD GAS VENOUS   Result Value Ref Range    POCT pH, Venous 7.57 (H) 7.33 - 7.43 pH    POCT pCO2, Venous 55 (H) 41 - 51 mm Hg    POCT pO2, Venous 84 (H) 35 - 45 mm Hg    POCT SO2, Venous 99 (H) 45 - 75 %    POCT Oxy Hemoglobin, Venous 96.0 (H) 45.0 - 75.0 %    POCT Base Excess, Venous 24.5 (H) -2.0 - 3.0 mmol/L    POCT HCO3 Calculated, Venous 50.4 (H) 22.0 - 26.0 mmol/L    Patient Temperature 37.0 degrees Celsius    FiO2 44 %   POCT GLUCOSE   Result Value Ref Range    POCT Glucose 203 (H) 74 - 99 mg/dL   POCT GLUCOSE   Result Value Ref Range    POCT Glucose 144 (H) 74 - 99 mg/dL     *Note: Due to a large number of results and/or encounters for the requested time period, some results have not been displayed. A complete set of results can be found in Results Review.     MEDICATIONS:  Current Facility-Administered Medications   Medication Dose Route Frequency Provider Last Rate Last Admin    acetaminophen (Tylenol) tablet 650 mg  650 mg oral q4h PRN FABIOLA Lu   650 mg at 01/02/25 1506    albuterol 90 mcg/actuation inhaler 2 puff  2 puff inhalation q4h PRN PARAM Lu-CNP         bumetanide (Bumex) injection 4 mg  4 mg intravenous q12h Rocio Joel APRN-CNP   4 mg at 01/08/25 1714    dextrose 50 % injection 12.5 g  12.5 g intravenous q15 min PRN Radha Sigala APRN-CNP        dextrose 50 % injection 25 g  25 g intravenous q15 min PRN Radha Sigala APRN-CNP        ezetimibe (Zetia) tablet 10 mg  10 mg oral Daily Radha Sigala APRN-CNP   10 mg at 01/08/25 0835    gabapentin (Neurontin) capsule 100 mg  100 mg oral Daily Radha Sigala APRN-CNP   100 mg at 01/08/25 0825    glucagon (Glucagen) injection 1 mg  1 mg intramuscular q15 min PRN Radha Sigala APRN-CNP        glucagon (Glucagen) injection 1 mg  1 mg intramuscular q15 min PRN Radha Sigala APRN-CNP        heparin 25,000 Units in dextrose 5% 250 mL (100 Units/mL) infusion (premix)  0-4,500 Units/hr intravenous Continuous PARAM Lu-CNP 18 mL/hr at 01/08/25 1832 1,800 Units/hr at 01/08/25 1832    heparin bolus from bag 5,000-10,000 Units  5,000-10,000 Units intravenous q4h PRN Radha Sigala APRN-ANSHUL        HYDROmorphone (Dilaudid) injection 0.4 mg  0.4 mg intravenous q4h PRN Arielle Maxwell APRN-CNP   0.4 mg at 01/07/25 1413    insulin lispro injection 0-10 Units  0-10 Units subcutaneous TID AC Radha Sigala APRN-CNP   4 Units at 01/08/25 1310    ipratropium-albuteroL (Duo-Neb) 0.5-2.5 mg/3 mL nebulizer solution 3 mL  3 mL nebulization q6h PARAM Lu-CNP   3 mL at 01/08/25 1434    levothyroxine (Synthroid, Levoxyl) tablet 125 mcg  125 mcg oral Daily Radha Sigala APRN-CNP   125 mcg at 01/08/25 0551    metOLazone (Zaroxolyn) tablet 10 mg  10 mg oral Daily Rocio Joel APRN-CNP   10 mg at 01/08/25 0826    midodrine (Proamatine) tablet 5 mg  5 mg oral TID FABIOLA Lu   5 mg at 01/08/25 1327    nicotine (Nicoderm CQ) 14 mg/24 hr patch 1 patch  1 patch transdermal Daily FABIOLA Lu   1 patch at 01/08/25 0825    nystatin (Mycostatin) 100,000 unit/mL suspension  500,000 Units  5 mL Mouth/Throat BID FABIOLA Lu   500,000 Units at 01/08/25 0826    oxygen (O2) therapy   inhalation Continuous PRN - O2/gases FABIOLA Lu   12 L/min at 01/06/25 1548    oxygen (O2) therapy   inhalation Nightly FABIOLA Lu   4 L/min at 01/07/25 2150    pantoprazole (ProtoNix) EC tablet 40 mg  40 mg oral Daily before breakfast FABIOLA Lu   40 mg at 01/08/25 0825    polyethylene glycol (Glycolax, Miralax) packet 17 g  17 g oral Daily PRN FABIOLA Lu   17 g at 01/08/25 0825    sennosides-docusate sodium (Lydia-Colace) 8.6-50 mg per tablet 1 tablet  1 tablet oral Nightly FABIOLA Perez   1 tablet at 01/07/25 8212    sodium chloride (Ocean) 0.65 % nasal spray 1 spray  1 spray Each Nostril TID PRN FABIOLA Lu   1 spray at 01/08/25 1705       IMAGING SUMMARY:  no new imaging

## 2025-01-08 NOTE — PROGRESS NOTES
"Medical Intensive Care Step Down- Daily Progress Note   Subjective    Alo Glass is a 69 y.o. year old male patient admitted on 12/29/2024 with following ICU needs: Bilateral lower ext cellulitis causing septic shock.      Interval History:  No events overnight, rested comfortably on home CPAP.  Denies pain, SOB, cough, N/V/D.  Endorses loss of taste.    Had a long discussion with Mr Glass regarding his medical co-morbidities, his goals in addition to code status.  He confirms OA is his  Gina.  He states friend/neighbor \"JR\" can also assist with decision making if he cannot, however is not currently listed on [facility] paperwork.  He states his goal is go home with some therapy.  Not really interested in SNF but would consider it if needed.  We talked about full code vs DNR vs DNRCC.  Alo states he has \"financial\" things to sort out before making any medical decisions.  He is not , no kids.  Has siblings, but prefers his friends listed above to be a part of his medical decision making and financial planning.  I did recommend to him to see if a  would be willing to work with him to develop his will while he is in the hospital as we don't have the ability to help with estate planning.  We talked about getting music therapy on board and also Pet therapy.  I informed Alo that we are planning on meeting tomorrow with Gina and  along with Palliative Care team to continue GOC planning.    Meds    Scheduled medications  ezetimibe, 10 mg, oral, Daily  gabapentin, 100 mg, oral, Daily  insulin lispro, 0-10 Units, subcutaneous, TID AC  ipratropium-albuteroL, 3 mL, nebulization, q6h  levothyroxine, 125 mcg, oral, Daily  metOLazone, 10 mg, oral, Daily  midodrine, 5 mg, oral, TID  nicotine, 1 patch, transdermal, Daily  nystatin, 5 mL, Mouth/Throat, BID  oxygen, , inhalation, Nightly  pantoprazole, 40 mg, oral, Daily before breakfast  sennosides-docusate sodium, 1 tablet, oral, " "Nightly      Continuous medications  bumetanide, 1 mg/hr, Last Rate: 1 mg/hr (01/08/25 0223)  heparin, 0-4,500 Units/hr, Last Rate: 1,800 Units/hr (01/08/25 0734)      PRN medications  PRN medications: acetaminophen, albuterol, dextrose, dextrose, glucagon, glucagon, heparin, HYDROmorphone, oxygen, polyethylene glycol, sodium chloride     Objective    Blood pressure 92/63, pulse 88, temperature 36.4 °C (97.5 °F), temperature source Temporal, resp. rate 20, height 1.803 m (5' 11\"), weight 130 kg (286 lb 6 oz), SpO2 97%.       Physical exam:  Constitutional: elderly male, obese, pt in NAD, pleasant and cooperative  Eyes: PERRL, EOMI, no icterus   ENMT: mucous membranes moist, no apparent injury, no lesions seen  Head/Neck: Neck supple, no apparent injury  Respiratory/Thorax: Lungs CTA bilaterally, diminished cesar bases, non-labored breathing, no cough, on 5 L NC  Cardiovascular: irregular, rate and rhythm, no murmurs, normal S1 and S2  Gastrointestinal: Nondistended, soft, non-tender, BS present x 4  Musculoskeletal: ROM intact, no joint swelling, normal strength  Extremities: normal extremities, generalized edema, contusions or wounds  Neurological: alert and oriented x 3, speech clear, follows commands appropriately, cr. n. II-XII intact, sensation grossly intact, motor 5/5 throughout  Skin: Warm and dry, BLE redness/kerlix applied      Intake/Output Summary (Last 24 hours) at 1/8/2025 0904  Last data filed at 1/8/2025 0821  Gross per 24 hour   Intake 993.67 ml   Output 4900 ml   Net -3906.33 ml     Labs:   Results from last 72 hours   Lab Units 01/07/25  1945 01/07/25  0838 01/06/25  2205   SODIUM mmol/L 130* 132* 132*   POTASSIUM mmol/L 3.0* 3.4* 3.1*   CHLORIDE mmol/L 79* 79* 79*   CO2 mmol/L 38* 37* 36*   BUN mg/dL 106* 107* 103*   CREATININE mg/dL 1.56* 1.71* 1.89*   GLUCOSE mg/dL 141* 109* 132*   CALCIUM mg/dL 8.4* 8.5* 8.4*   ANION GAP mmol/L 16 19 20   EGFR mL/min/1.73m*2 48* 43* 38*   PHOSPHORUS mg/dL 5.2* " 5.6* 5.7*      Results from last 72 hours   Lab Units 01/08/25  0537 01/07/25  0453 01/06/25  0553   WBC AUTO x10*3/uL 9.0 9.2 9.1   HEMOGLOBIN g/dL 16.4 16.0 15.8   HEMATOCRIT % 49.4 48.9 47.4   PLATELETS AUTO x10*3/uL 247 243 220   NEUTROS PCT AUTO % 73.8 75.6 75.8   LYMPHS PCT AUTO % 16.0 14.7 15.1   MONOS PCT AUTO % 8.0 6.5 6.0   EOS PCT AUTO % 1.0 1.6 1.1            Results from last 72 hours   Lab Units 01/06/25  1016   POCT PH, VENOUS pH 7.46*   POCT PCO2, VENOUS mm Hg 58*   POCT PO2, VENOUS mm Hg 111*       Micro/ID:     Lab Results   Component Value Date    URINECULTURE No growth 12/28/2024    BLOODCULT No growth at 4 days -  FINAL REPORT 12/28/2024    BLOODCULT No growth at 4 days -  FINAL REPORT 12/28/2024       Assessment and Plan     Assessment: Alo Glass is a 69 y.o. year old male patient admitted on 12/29/2024 with Septic shock 2/2 bilateral lower ext cellulitis and cardiogenic shock and AHRF 2/2 volume overload and LEONA.     Summary for 01/08/25  :  Stopping bumex drip, change to 4 mg IV Bumex BID  Plan for meeting on 1/9 at 1530 with patient, Caterina Tan JR and Palliative care team.  Worked with PT today    Plan:  NEUROLOGY/PSYCH:  Dx: ZENA  A&Ox3  Management:  Continue gabapentin 100mg daily and Nicotine patch daily,   Pain regimen:  Tylenol 650 mg prn and Dilaudid PRN   PT/OT- previously refused.  PT rec Mod intensity   Palliative Care following.  Plan for meeting tomorrow at 1530  Music and pet therapy consults    CARDIOVASCULAR:  Dx: Hx HFrEF, HTN, HLD, severe PAD.  Septic shock, resolved.  Cardiogenic shock, resovled. Aflutter  HDS  Remains off vasopressors since 12/30  TTE 12/30: LVEF 19%  Management:  Continue Ezetimibe 10mg daily   Holding home lasix and SGLT-2 iso hypotension  Consider GDMT when able  S/p Dig load 12/29-12/30  Bumex gtt 1 mg/hr; goal of -2L negative/day==> stop drip and change to 4 mg IV Bumex BID  Heparin gtt  Started on Midodrine 5mg TID for renal perfusion    Cardiology consulted, signed off 12/31: Continue diuresis with IV Bumex.  If decompensates can consider cardioversion for atrial kick restoration. Prior to discharge arrange follow up with Dr. Moore as an o/p @ [phone] (scheduling)    PULMONARY:  Dx: Hx asthma and COPD GOLD 3 (2019: FEV1 45% w +ve BD change; %/TLC 85%). AHRF 2/2 hypervolemia   5L HF NC& home CPAP, 8 PS  CxR 1/5 : Interval improvement in left basilar opacities and increased right basilar opacities with small right pleural effusion.   Management:  Transitioned from home Cpap to Bipap 12/31 d/t hypercapnia   Diuresis as outlined above   Cont Duonebs q6h, albuterol PRN  S/p steroid taper   Airway clearance TID    RENAL/GENITOURINARY:  Dx: LEONA. Hyponatremia likely I/s/o volume overload.  LT renal mass c/f RCC  Has been on HD during stay  Renal US 12/30: No hydronephrosis. hypoechoic lesion of the left superior pole measuring 1.9 cm which was seen on the prior CT dated 12/28/2024, further evaluation with a kidney protocol MRI is strongly recommended. There is an additional indeterminate 0.5 cm hypoechoic lesion of the left kidney.  Management:  Voiding via lipscomb   Nephrology following, recs appreciated: change to BID Bumex  Continue Metolazone 10 mg daily   Pt saw Urology on 12/12/2024 and referrred to IR for biopsy   Urine lytes consistent with prerenal    GASTROENTEROLOGY:  Dx: Hx GERD. Poor oral intake   BMx1  Management:  Diet: Cardiac and carb control diet  Bowel Regimen: Miralax PRN   PPI    ENDOCRINOLOGY:  Dx: Hx Hypothyroid and DMII  Glucose 109-261  HgbA1C  5.5%  Management:  Home regimen:  Mounjaro and Jardiance; pt also utilizes a Dexcom CGM)  Cont with Insuline lispro TID  Glucose goal of <180  Hypoglycemia protocol     HEMATOLOGY:  Dx: ZENA  H/h stable   Management:  CBC daily     SKIN:  Dx:  Skin Failure Yes, describe: BL L/E cellutis vs PAD   Management:  Wound following  ACS consulted, signed off 1/1: Elevate legs as able,  continue with abx.  If erythema does not completely resolve with antibiotic treatment would recommend re-imaging with non-contrast CT of BLE to assess for new, undrained fluid collections     INFECTIOUS DISEASE:  Dx: Cellulitis, MRSA PNA  Tmax 36.7 C, WBC 9.0  Management:  Micro: 12/28 Legionella, Strep pneum, negative, 12/28 Urine Cx negative, 12/28 BCx NTGD x2.  1/5 sputum MRSA  Abx: Clindamycin (12/28), Zosyn (12/28 - 1/6), Vancomycin (12/28 - 1/6)  ACS consulted signed off 1/1; rec cont IV Atbs     ICU Check List     FEN  Fluids: PRN  Electrolytes: K>4, Phos >3, Mg> 2  Nutrition: Cardiac diet and carb control diet   Prophylaxis:  DVT ppx: Heparin gtt  GI ppx: PPI  Bowel care: Miralax PRN   Hardware:         Urethral Catheter Temperature probe 16 Fr. (Active)   Placement Date: 12/28/24   Hand Hygiene Completed: Yes  Catheter Type: Temperature probe  Tube Size (Fr.): 16 Fr.  Catheter Balloon Size: 5 mL  Urine Returned: Yes   Number of days: 3       Social:  Code: Full Code    HPOA: Sheyla Griggs [phone]   Disposition: Continue care in SDU.  Plan for Pall Care meeting on 1/9 at 1530 with Caterina Tan JR and Pall Care team      PARAM Lozano-CNP   01/08/25 at 9:04 AM     Pt discussed with Dr. Cam,  seen and examined. All labs, VS and previous plan of care reviewed.     Disclaimer: Documentation completed with the information available at the time of input. The times in the chart may not be reflective of actual patient care times, interventions, or procedures. Documentation occurs after the physical care of the patient.

## 2025-01-08 NOTE — PROGRESS NOTES
NEPHROLOGY NOTE   Alo Glass   69 y.o.     MRN/Room: [id number]    Reason for consult: LEONA on CKD    SUBJECTIVE:   Renal function continues to improve         Vitals:    01/08/25 0821   BP: 92/63   Pulse:    Resp:    Temp: 36.4 °C (97.5 °F)   SpO2:         01/06 1900 - 01/08 0659  In: 1746.2 [P.O.:840; I.V.:806.2]  Out: 3975 [Urine:3975]   Weight change: 1.1 kg (2 lb 6.8 oz)     General appearance: No distress   Eyes: non-icteric  Skin: no apparent rash  Heart: S1 S2 regular    Lungs: CTA bilaterally  Abdomen: soft, nt/nd  Extremities: Edema, improving  : has Live      ASSESSMENT:  Alo Glass is a 69 y.o. male with PMHx of HFrEF (EF 23%) , COPD, MELISSA, obesity, type II DM, stenotrophomonas pneumonia w parapneumonic effusion (9/2024), HTN, hypothyroidism and HLD who was admitted to the MICU for Septic Shock & acute decompensated HF. Has Large Bullae of LE that are oozing, possibly infected. Nephrology following for LEONA.     #LEONA  - Baseline Cr 0.6, presented with 1.4.   - bilateral renal enhancing lesions measuring 0.8 cm in the right upper pole kidney and 2 cm in the left upper pole kidney. Referral to urology and further evaluation with MR is suggested  - hypoNa likely hypervolemic    Etiology:   ATN vs cardiorenal dynamics, contrast injury     Updates 01/08:  -Serum Cr continues to trend down today to 1.6     RECOMMENDATIONS:  - Stop Bumex drip. Switch to Bumex 4mg IV bid   - Continue Metolazone 10mg po once daily   - Replete K  - daily weights  - Continue Midodrine  -Dose medications for renal function  - strict Is and Os    SW Dr Jacques Singh MD  Nephrology Fellow  24 hour Renal Pager - [phone]

## 2025-01-08 NOTE — PROGRESS NOTES
Physical Therapy    Physical Therapy Treatment    Patient Name: Alo Glass  MRN: [id number]  Department: [address]  Room: 60/6017-A  Today's Date: 1/8/2025  Time Calculation  Start Time: 1148  Stop Time: 1250  Time Calculation (min): 62 min    Assessment/Plan   PT Assessment  Barriers to Discharge Home: Physical needs  Physical Needs: Ambulating household distances limited by function/safety  End of Session Communication: Bedside nurse  Assessment Comment: Pt able to transfer from supine<>sit with max assist this session, completing STS with fww and max x2 obtaining ~50% full stand. Continue to recommend MOD intesnity PT after DC.  End of Session Patient Position: Bed, 3 rail up, Alarm off, caregiver present     PT Plan  Treatment/Interventions: Bed mobility, Transfer training, Gait training, Balance training, Strengthening, Endurance training, Therapeutic exercise, Therapeutic activity, Positioning, Postural re-education  PT Plan: Ongoing PT  PT Frequency: 3 times per week  PT Discharge Recommendations: Moderate intensity level of continued care  PT Recommended Transfer Status: Assist x2  PT - OK to Discharge: Yes    General Visit Information:   PT  Visit  PT Received On: 01/08/25  Response to Previous Treatment: Patient with no complaints from previous session.  General  Prior to Session Communication: Bedside nurse  Patient Position Received: Bed, 3 rail up, Alarm off, caregiver present  General Comment: Pt supine in bed upon entry to room. Pt pleasant, cooperative and willing to work with PT. Noted PIV, lipscomb, 4L via NC.    Subjective   Precautions:  Precautions  Hearing/Visual Limitations: pt with occasional [place]  Medical Precautions: Fall precautions, Oxygen therapy device and L/min    Vital Signs (Past 2hrs)      Pre session: supine 96/62 86 HR 95%  During: sitting 113 HR 95%  Post session: supine 106/72 87 HR 94%    Objective   Pain:  Pain Assessment  Pain Assessment: 0-10  0-10 (Numeric) Pain Score:   (unrated R calf pain)  Cognition:  Cognition  Overall Cognitive Status: Within Functional Limits  Orientation Level: Oriented X4    Treatments:  Therapeutic Activity  Therapeutic Activity Performed: Yes  Therapeutic Activity 1: Pt transferred from supine to sit with max x2 assist and use of TAPS. Pt sitting EOB for ~20 minutes with initially mod assist and progressing to close supervision. Pt attempting to complete STS transfers with fww and max x2 assist and bed elevated, with pt able to clear buttocks off bed ~50% 1st trial and ~25% 2nd trial. Pt attempting to complete x2 more trials, but unable to clear buttocks with max x2 assist.,    Bed Mobility  Bed Mobility: Yes  Bed Mobility 1  Bed Mobility 1: Supine to sitting, Sitting to supine  Level of Assistance 1: Maximum assistance, Moderate verbal cues, Moderate tactile cues  Bed Mobility Comments 1: x2 assist, use of TAPS  Bed Mobility 2  Bed Mobility  2: Rolling right, Rolling left  Level of Assistance 2: Moderate assistance, Maximum verbal cues, Moderate tactile cues  Bed Mobility Comments 2: x2 assist, use of TAPS    Ambulation/Gait Training  Ambulation/Gait Training Performed: No  Transfers  Transfer: Yes  Transfer 1  Technique 1: Sit to stand, Stand to sit  Transfer Device 1: Walker  Transfer Level of Assistance 1: Maximum assistance, Maximum verbal cues, Maximum tactile cues  Trials/Comments 1: x2 assist, bed elevated; 1st trial ~50% full stnd, 2nd trial ~25% of full stand. Unable to clear buttock on trial 3 and 4.    Outcome Measures:  [facility] Basic Mobility  Turning from your back to your side while in a flat bed without using bedrails: Total  Moving from lying on your back to sitting on the side of a flat bed without using bedrails: Total  Moving to and from bed to chair (including a wheelchair): Total  Standing up from a chair using your arms (e.g. wheelchair or bedside chair): Total  To walk in hospital room: Total  Climbing 3-5 steps with railing:  Total  Basic Mobility - Total Score: 6    Education Documentation  Precautions, taught by Annie Hazel PT at 1/8/2025  2:24 PM.  Learner: Patient  Readiness: Acceptance  Method: Explanation  Response: Verbalizes Understanding    Mobility Training, taught by Annie Hazel PT at 1/8/2025  2:24 PM.  Learner: Patient  Readiness: Acceptance  Method: Explanation  Response: Verbalizes Understanding    Education Comments  No comments found.      Encounter Problems       Encounter Problems (Active)       PT Problem       Patient will complete bed mobility with MINx1 with HOB elevated  (Progressing)       Start:  12/30/24    Expected End:  01/20/25            Patient will complete STS with MODx1 using LRAD without acute LOB   (Not Progressing)       Start:  12/30/24    Expected End:  01/20/25            Patient will ambulate >/=15' with LRAD with MODx1 without acute LOB  (Not Progressing)       Start:  12/30/24    Expected End:  01/20/25            Patient will complete static (MINx1) and dynamic (MODx1) standing balance activities using LRAD without acute LOB, while maintaining midline posture.  (Not Progressing)       Start:  12/30/24    Expected End:  01/20/25            Patient will participate in BLE there-ex program in order to assist in improving strength and to assist with the completion of functional mobility tasks.  (Not Progressing)       Start:  12/30/24    Expected End:  01/20/25               Pain - Adult            Annie Hazel, PT

## 2025-01-09 LAB
ALBUMIN SERPL BCP-MCNC: 2.9 G/DL (ref 3.4–5)
ANION GAP SERPL CALC-SCNC: 20 MMOL/L (ref 10–20)
ANION GAP SERPL CALC-SCNC: 20 MMOL/L (ref 10–20)
ANION GAP SERPL CALC-SCNC: ABNORMAL MMOL/L
BASOPHILS # BLD AUTO: 0.04 X10*3/UL (ref 0–0.1)
BASOPHILS NFR BLD AUTO: 0.4 %
BUN SERPL-MCNC: 83 MG/DL (ref 6–23)
BUN SERPL-MCNC: 85 MG/DL (ref 6–23)
BUN SERPL-MCNC: 93 MG/DL (ref 6–23)
CALCIUM SERPL-MCNC: 8.9 MG/DL (ref 8.6–10.6)
CALCIUM SERPL-MCNC: 9.1 MG/DL (ref 8.6–10.6)
CALCIUM SERPL-MCNC: 9.1 MG/DL (ref 8.6–10.6)
CHLORIDE SERPL-SCNC: 73 MMOL/L (ref 98–107)
CHLORIDE SERPL-SCNC: 75 MMOL/L (ref 98–107)
CHLORIDE SERPL-SCNC: 75 MMOL/L (ref 98–107)
CO2 SERPL-SCNC: 43 MMOL/L (ref 21–32)
CO2 SERPL-SCNC: 45 MMOL/L (ref 21–32)
CO2 SERPL-SCNC: >45 MMOL/L (ref 21–32)
CREAT SERPL-MCNC: 0.79 MG/DL (ref 0.5–1.3)
CREAT SERPL-MCNC: 0.84 MG/DL (ref 0.5–1.3)
CREAT SERPL-MCNC: 0.98 MG/DL (ref 0.5–1.3)
EGFRCR SERPLBLD CKD-EPI 2021: 83 ML/MIN/1.73M*2
EGFRCR SERPLBLD CKD-EPI 2021: >90 ML/MIN/1.73M*2
EGFRCR SERPLBLD CKD-EPI 2021: >90 ML/MIN/1.73M*2
EOSINOPHIL # BLD AUTO: 0.11 X10*3/UL (ref 0–0.7)
EOSINOPHIL NFR BLD AUTO: 1.2 %
ERYTHROCYTE [DISTWIDTH] IN BLOOD BY AUTOMATED COUNT: 14.1 % (ref 11.5–14.5)
GLUCOSE BLD MANUAL STRIP-MCNC: 138 MG/DL (ref 74–99)
GLUCOSE BLD MANUAL STRIP-MCNC: 139 MG/DL (ref 74–99)
GLUCOSE BLD MANUAL STRIP-MCNC: 154 MG/DL (ref 74–99)
GLUCOSE BLD MANUAL STRIP-MCNC: 224 MG/DL (ref 74–99)
GLUCOSE SERPL-MCNC: 145 MG/DL (ref 74–99)
GLUCOSE SERPL-MCNC: 150 MG/DL (ref 74–99)
GLUCOSE SERPL-MCNC: 200 MG/DL (ref 74–99)
HCT VFR BLD AUTO: 51.8 % (ref 41–52)
HGB BLD-MCNC: 16.9 G/DL (ref 13.5–17.5)
IMM GRANULOCYTES # BLD AUTO: 0.08 X10*3/UL (ref 0–0.7)
IMM GRANULOCYTES NFR BLD AUTO: 0.9 % (ref 0–0.9)
LYMPHOCYTES # BLD AUTO: 1.3 X10*3/UL (ref 1.2–4.8)
LYMPHOCYTES NFR BLD AUTO: 14.1 %
MCH RBC QN AUTO: 31 PG (ref 26–34)
MCHC RBC AUTO-ENTMCNC: 32.6 G/DL (ref 32–36)
MCV RBC AUTO: 95 FL (ref 80–100)
MONOCYTES # BLD AUTO: 0.79 X10*3/UL (ref 0.1–1)
MONOCYTES NFR BLD AUTO: 8.6 %
NEUTROPHILS # BLD AUTO: 6.88 X10*3/UL (ref 1.2–7.7)
NEUTROPHILS NFR BLD AUTO: 74.8 %
NRBC BLD-RTO: 0 /100 WBCS (ref 0–0)
PHOSPHATE SERPL-MCNC: 3.2 MG/DL (ref 2.5–4.9)
PHOSPHATE SERPL-MCNC: 3.3 MG/DL (ref 2.5–4.9)
PHOSPHATE SERPL-MCNC: 3.4 MG/DL (ref 2.5–4.9)
PLATELET # BLD AUTO: 249 X10*3/UL (ref 150–450)
POTASSIUM SERPL-SCNC: 3.1 MMOL/L (ref 3.5–5.3)
POTASSIUM SERPL-SCNC: 3.7 MMOL/L (ref 3.5–5.3)
POTASSIUM SERPL-SCNC: 4.2 MMOL/L (ref 3.5–5.3)
RBC # BLD AUTO: 5.45 X10*6/UL (ref 4.5–5.9)
SODIUM SERPL-SCNC: 134 MMOL/L (ref 136–145)
SODIUM SERPL-SCNC: 136 MMOL/L (ref 136–145)
SODIUM SERPL-SCNC: 137 MMOL/L (ref 136–145)
UFH PPP CHRO-ACNC: 0.6 IU/ML
WBC # BLD AUTO: 9.2 X10*3/UL (ref 4.4–11.3)

## 2025-01-09 PROCEDURE — 36415 COLL VENOUS BLD VENIPUNCTURE: CPT | Performed by: NURSE PRACTITIONER

## 2025-01-09 PROCEDURE — S4991 NICOTINE PATCH NONLEGEND: HCPCS | Performed by: NURSE PRACTITIONER

## 2025-01-09 PROCEDURE — 99497 ADVNCD CARE PLAN 30 MIN: CPT

## 2025-01-09 PROCEDURE — 2500000002 HC RX 250 W HCPCS SELF ADMINISTERED DRUGS (ALT 637 FOR MEDICARE OP, ALT 636 FOR OP/ED): Performed by: NURSE PRACTITIONER

## 2025-01-09 PROCEDURE — 85025 COMPLETE CBC W/AUTO DIFF WBC: CPT | Performed by: NURSE PRACTITIONER

## 2025-01-09 PROCEDURE — 2500000001 HC RX 250 WO HCPCS SELF ADMINISTERED DRUGS (ALT 637 FOR MEDICARE OP): Performed by: NURSE PRACTITIONER

## 2025-01-09 PROCEDURE — 2500000001 HC RX 250 WO HCPCS SELF ADMINISTERED DRUGS (ALT 637 FOR MEDICARE OP)

## 2025-01-09 PROCEDURE — 85520 HEPARIN ASSAY: CPT | Performed by: NURSE PRACTITIONER

## 2025-01-09 PROCEDURE — 99233 SBSQ HOSP IP/OBS HIGH 50: CPT | Performed by: INTERNAL MEDICINE

## 2025-01-09 PROCEDURE — 80069 RENAL FUNCTION PANEL: CPT | Performed by: NURSE PRACTITIONER

## 2025-01-09 PROCEDURE — 94640 AIRWAY INHALATION TREATMENT: CPT

## 2025-01-09 PROCEDURE — 94668 MNPJ CHEST WALL SBSQ: CPT

## 2025-01-09 PROCEDURE — 2060000001 HC INTERMEDIATE ICU ROOM DAILY

## 2025-01-09 PROCEDURE — 2500000005 HC RX 250 GENERAL PHARMACY W/O HCPCS: Performed by: NURSE PRACTITIONER

## 2025-01-09 PROCEDURE — 2500000004 HC RX 250 GENERAL PHARMACY W/ HCPCS (ALT 636 FOR OP/ED): Performed by: NURSE PRACTITIONER

## 2025-01-09 PROCEDURE — 99233 SBSQ HOSP IP/OBS HIGH 50: CPT

## 2025-01-09 PROCEDURE — 82947 ASSAY GLUCOSE BLOOD QUANT: CPT

## 2025-01-09 RX ORDER — POTASSIUM CHLORIDE 14.9 MG/ML
20 INJECTION INTRAVENOUS
Status: DISCONTINUED | OUTPATIENT
Start: 2025-01-09 | End: 2025-01-09

## 2025-01-09 RX ORDER — POTASSIUM CHLORIDE 14.9 MG/ML
20 INJECTION INTRAVENOUS ONCE
Status: COMPLETED | OUTPATIENT
Start: 2025-01-09 | End: 2025-01-09

## 2025-01-09 RX ORDER — POTASSIUM CHLORIDE 20 MEQ/1
20 TABLET, EXTENDED RELEASE ORAL ONCE
Status: COMPLETED | OUTPATIENT
Start: 2025-01-10 | End: 2025-01-10

## 2025-01-09 RX ORDER — POTASSIUM CHLORIDE 20 MEQ/1
40 TABLET, EXTENDED RELEASE ORAL ONCE
Status: COMPLETED | OUTPATIENT
Start: 2025-01-09 | End: 2025-01-09

## 2025-01-09 RX ADMIN — IPRATROPIUM BROMIDE AND ALBUTEROL SULFATE 3 ML: 2.5; .5 SOLUTION RESPIRATORY (INHALATION) at 09:16

## 2025-01-09 RX ADMIN — SENNOSIDES AND DOCUSATE SODIUM 1 TABLET: 50; 8.6 TABLET ORAL at 20:08

## 2025-01-09 RX ADMIN — PANTOPRAZOLE SODIUM 40 MG: 40 TABLET, DELAYED RELEASE ORAL at 08:12

## 2025-01-09 RX ADMIN — NYSTATIN 500000 UNITS: 100000 SUSPENSION ORAL at 08:11

## 2025-01-09 RX ADMIN — NYSTATIN 500000 UNITS: 100000 SUSPENSION ORAL at 20:08

## 2025-01-09 RX ADMIN — HEPARIN SODIUM 1800 UNITS/HR: 10000 INJECTION, SOLUTION INTRAVENOUS at 09:52

## 2025-01-09 RX ADMIN — GABAPENTIN 100 MG: 100 CAPSULE ORAL at 08:12

## 2025-01-09 RX ADMIN — EZETIMIBE 10 MG: 10 TABLET ORAL at 08:11

## 2025-01-09 RX ADMIN — IPRATROPIUM BROMIDE AND ALBUTEROL SULFATE 3 ML: 2.5; .5 SOLUTION RESPIRATORY (INHALATION) at 02:44

## 2025-01-09 RX ADMIN — METOLAZONE 10 MG: 10 TABLET ORAL at 08:12

## 2025-01-09 RX ADMIN — INSULIN LISPRO 2 UNITS: 100 INJECTION, SOLUTION INTRAVENOUS; SUBCUTANEOUS at 17:09

## 2025-01-09 RX ADMIN — Medication 5 L/MIN: at 21:03

## 2025-01-09 RX ADMIN — IPRATROPIUM BROMIDE AND ALBUTEROL SULFATE 3 ML: 2.5; .5 SOLUTION RESPIRATORY (INHALATION) at 21:02

## 2025-01-09 RX ADMIN — MIDODRINE HYDROCHLORIDE 5 MG: 5 TABLET ORAL at 20:08

## 2025-01-09 RX ADMIN — POTASSIUM CHLORIDE 40 MEQ: 1500 TABLET, EXTENDED RELEASE ORAL at 02:35

## 2025-01-09 RX ADMIN — IPRATROPIUM BROMIDE AND ALBUTEROL SULFATE 3 ML: 2.5; .5 SOLUTION RESPIRATORY (INHALATION) at 14:39

## 2025-01-09 RX ADMIN — LEVOTHYROXINE SODIUM 125 MCG: 25 TABLET ORAL at 04:25

## 2025-01-09 RX ADMIN — MIDODRINE HYDROCHLORIDE 5 MG: 5 TABLET ORAL at 08:11

## 2025-01-09 RX ADMIN — BUMETANIDE 4 MG: 0.25 INJECTION INTRAMUSCULAR; INTRAVENOUS at 04:24

## 2025-01-09 RX ADMIN — POTASSIUM CHLORIDE 20 MEQ: 14.9 INJECTION, SOLUTION INTRAVENOUS at 02:35

## 2025-01-09 RX ADMIN — MIDODRINE HYDROCHLORIDE 5 MG: 5 TABLET ORAL at 14:36

## 2025-01-09 RX ADMIN — NICOTINE 1 PATCH: 14 PATCH, EXTENDED RELEASE TRANSDERMAL at 08:12

## 2025-01-09 ASSESSMENT — COGNITIVE AND FUNCTIONAL STATUS - GENERAL
MOVING FROM LYING ON BACK TO SITTING ON SIDE OF FLAT BED WITH BEDRAILS: A LOT
DAILY ACTIVITIY SCORE: 12
TOILETING: A LOT
PERSONAL GROOMING: A LOT
CLIMB 3 TO 5 STEPS WITH RAILING: TOTAL
DRESSING REGULAR LOWER BODY CLOTHING: A LOT
TURNING FROM BACK TO SIDE WHILE IN FLAT BAD: A LOT
WALKING IN HOSPITAL ROOM: TOTAL
MOBILITY SCORE: 8
STANDING UP FROM CHAIR USING ARMS: TOTAL
HELP NEEDED FOR BATHING: A LOT
MOVING TO AND FROM BED TO CHAIR: TOTAL
DRESSING REGULAR UPPER BODY CLOTHING: A LOT
EATING MEALS: A LOT

## 2025-01-09 ASSESSMENT — PAIN SCALES - WONG BAKER: WONGBAKER_NUMERICALRESPONSE: NO HURT

## 2025-01-09 ASSESSMENT — PAIN - FUNCTIONAL ASSESSMENT
PAIN_FUNCTIONAL_ASSESSMENT: 0-10

## 2025-01-09 ASSESSMENT — PAIN SCALES - GENERAL
PAINLEVEL_OUTOF10: 0 - NO PAIN

## 2025-01-09 NOTE — CONSULTS
"Nutrition Follow Up Assessment and reconsult  Nutrition Assessment         Patient is a 69 y.o. male presenting with B/L LE cellulitis and septic shock.  He is now in SDU for care.  Also with an LEONA and diuresing from volume overload.  Plan is a palliative care meeting today.     This service reconsulted as he dislikes the Ensure High Protein that was ordered for him.  This RDN met with patient.  He was sometimes slow to answer questions and occasionally struggled with word-finding.  He reports a poor appetite and intake during this admission.  Dislikes the current supplement ordered for him (has about 15-20 stockpiled in his room) and is willing to try something else.  Denies any belly issues.  When asked about his weight and possible weight loss he says \"probably\".  He did attempt to ask RDN about good nutrition at home but was unable to finish the conversation.      Anthropometrics:  Height: 180.3 cm (5' 11\")   Weight: 121 kg (267 lb 6.7 oz)   BMI (Calculated): 37.31  IBW/kg (Dietitian Calculated): 78.2 kg  Percent of IBW: 167 %       Weight History:     1/9/25: 121kg  12/31/24: 131kg  11/7/24: 118kg  9/25/24: 118kg  6/25/24: 133kg  4/23/24: 132kg  3/23/24: 133kg  7/18/21: 164kg    Pt is down 9% in weight in about 7 months.  He is down 8% in the last 10 days but this is likely fluid losses.     Weight Change %:     Nutrition Focused Physical Exam Findings:    Subcutaneous Fat Loss:   Orbital Fat Pads: Mild-Moderate (slight dark circles and slight hollowing)  Buccal Fat Pads: Well nourished (full, rounded cheeks)  Triceps: Well nourished (ample fat tissue)  Muscle Wasting:  Temporalis: Well nourished (well-defined muscle)  Pectoralis (Clavicular Region): Well nourished (clavicle not visible)  Deltoid/Trapezius: Mild-Moderate (slight protrusion of acromion process)  Quadriceps: Well nourished (well developed, well rounded)  Gastrocnemius: Well nourished (well developed bulbous muscle)  Edema:  Edema: +1 " "trace  Edema Location: generalized  Physical Findings:  Skin: Positive (B/L pretibial wounds, R tibial wound)-- pics in chart    Nutrition Significant Labs:  A1C:  Lab Results   Component Value Date    HGBA1C 6.2 (H) 12/29/2024   , BG POCT trend:   Results from last 7 days   Lab Units 01/09/25  1245 01/09/25  0749 01/08/25  1700 01/08/25  1247 01/08/25  0801   POCT GLUCOSE mg/dL 138* 139* 144* 203* 155*    , Liver Function Trend:   Results from last 7 days   Lab Units 01/06/25  1016 01/05/25  1054 01/04/25  1018 01/03/25  1126   ALK PHOS U/L 121 128 115 109   AST U/L 8* 10 5* 7*   ALT U/L 10 9* 9* 7*   BILIRUBIN TOTAL mg/dL 0.7 0.7 0.6 0.7    , Renal Lab Trend:   Results from last 7 days   Lab Units 01/09/25  1005 01/08/25  2202 01/08/25  0936 01/07/25  1945 01/05/25  2159 01/05/25  1054   POTASSIUM mmol/L 4.2 3.1* 3.2* 3.0*   < > 3.0*   PHOSPHORUS mg/dL 3.3 3.4 3.9 5.2*   < > 5.8*   SODIUM mmol/L 134* 137 132* 130*   < > 132*   MAGNESIUM mg/dL  --   --   --   --   --  2.74*   EGFR mL/min/1.73m*2 >90 83 70 48*   < > 37*   BUN mg/dL 85* 93* 102* 106*   < > 101*   CREATININE mg/dL 0.84 0.98 1.13 1.56*   < > 1.94*    < > = values in this interval not displayed.    , Vit D: No results found for: \"VITD25\"     Nutrition Specific Medications:  Scheduled medications  [Held by provider] bumetanide, 4 mg, intravenous, q12h  ezetimibe, 10 mg, oral, Daily  gabapentin, 100 mg, oral, Daily  insulin lispro, 0-10 Units, subcutaneous, TID AC  ipratropium-albuteroL, 3 mL, nebulization, q6h  levothyroxine, 125 mcg, oral, Daily  [Held by provider] metOLazone, 10 mg, oral, Daily  midodrine, 5 mg, oral, TID  nicotine, 1 patch, transdermal, Daily  nystatin, 5 mL, Mouth/Throat, BID  oxygen, , inhalation, Nightly  pantoprazole, 40 mg, oral, Daily before breakfast  sennosides-docusate sodium, 1 tablet, oral, Nightly      Continuous medications  heparin, 0-4,500 Units/hr, Last Rate: 1,800 Units/hr (01/09/25 0952)      PRN medications  PRN " medications: acetaminophen, albuterol, dextrose, dextrose, glucagon, glucagon, heparin, HYDROmorphone, oxygen, polyethylene glycol, sodium chloride     I/O:   Last BM Date: 01/08/25; Stool Appearance: Loose, Soft (01/08/25 1200)        Dietary Orders (From admission, onward)       Start     Ordered    01/06/25 1701  Adult diet Regular  Diet effective now        Question:  Diet type  Answer:  Regular    01/06/25 1700    12/31/24 1014  Oral nutritional supplements  Until discontinued        Comments: please provide chocolate Ensure High Protein 3 times/day   Question Answer Comment   Deliver with All meals    Select supplement: Ensure High Protein        12/31/24 1013    12/29/24 0332  May Participate in Room Service With Assistance  ( ROOM SERVICE MAY PARTICIPATE WITH ASSISTANCE)  Once        Question:  .  Answer:  Yes    12/29/24 0331                     Estimated Needs:   Total Energy Estimated Needs (kCal):  (6375-8108)  Method for Estimating Needs: MSJ= 2001  Total Protein Estimated Needs (g):  (155)  Method for Estimating Needs: 2.0 x 78.2kg  Total Fluid Estimated Needs (mL):  (per MD/team)         Nutrition Diagnosis   Malnutrition Diagnosis  Patient has Malnutrition Diagnosis: Yes  Diagnosis Status: New  Malnutrition Diagnosis: Severe malnutrition related to acute disease or injury  As Evidenced by: pt with poor PO intake for a few weeks PTA and during the course of his 10 day hospital course thus far; he has some mild wasting to his upper body and face; has weight loss that is likely r/t fluid but may also be partly true muscle/fat loss    Nutrition Diagnosis  Patient has Nutrition Diagnosis: Yes  Diagnosis Status (1): Ongoing  Nutrition Diagnosis 1: Increased nutrient needs  Related to (1): increased metabolic demand  As Evidenced by (1): pt with wounds to BLE  Additional Nutrition Diagnosis: Diagnosis 2  Diagnosis Status (2): Ongoing  Nutrition Diagnosis 2: Inadequate oral intake  Related to (2):  decreased PO with HF exacerbation, shock  As Evidenced by (2): pt report of variable PO x the last several weeks with further decline in PO x ~the last week, consuming smaller amounts of food when compared to his baseline, need for oral nutrition supplements  Additional Assessment Information (2): Considering wounds with reports of decreased PO, do feel pt would benefit from use of oral nutrition supplements in-house; agreeable to chocolate Ensure HP TID.       Nutrition Interventions/Recommendations         Nutrition Prescription:   Continue Regular diet as tolerated  If plan is to continue at current level of care, would check Vitamin D level.          Nutrition Interventions:    RDN will discontinue Ensure HP and trial Boost VHC once daily (530kcals, 22grams protein each)       Nutrition Education:   Not appropriate       Nutrition Monitoring and Evaluation   Food/Nutrient Related History Monitoring  Monitoring and Evaluation Plan: Energy intake  Criteria: PO diet to meet >65% estimated nutrition needs       Time Spent (min): 60 minutes

## 2025-01-09 NOTE — ACP (ADVANCE CARE PLANNING)
Advance Care Planning Note     Discussion Date: 01/09/25   Discussion Participants: patient and palliative care team, CONNOR DURAND, primary NP    The patient wishes to discuss Advance Care Planning today and the following is a brief summary of our discussion.     Patient has capacity to make their own medical decisions: Yes  Health Care Agent/Surrogate Decision Maker documented in chart: Yes    Documents on file and valid:  Advance Directive/Living Will: Yes   Health Care Power of : Yes    Communication of Medical Status/Prognosis:   Discussed HF, acute on chronic respiratory failiure iSO COPD and shock, LEONA, septic shock ISO cellulitis   Poor appetite, weakness, functional decline  Day to day making small improvements such as with LEONA  However big picture prognosis is poor     Goals/Hopes: Discussion ensued about patient's goals for their medical care going forward. Allowed patient time to talk about his/her current quality of life, disease course/progression, and symptom and treatment burden. Discussed care plan to continue with aggressive hospital care despite symptom and treatment burden versus choosing to transition to comfort based plan of care that focuses on symptom management and quality of life. Patient's goal is to die at home, without pain, and to get financial affairs in order which trusted friends are helping him to do    Hospice Discussion/Eligibility: Counseling provided on the benefit of Hospice Services in the setting of patient's serious illnesses to keep patient out of the hospital while supporting patient and family by providing counseling, aggressive symptom management,  prioritizing comfort and quality of life, alleviation of suffering, and allowing patient to pass with comfort and dignity. Patient is hospice-eligible.      Advanced Directives:  Counseling provided on the importance of not crisis planning as disease burden progresses but to establish treatment limitations now so in the  future medical team will be clear on what patient feels is an acceptable quality of life for the patient and what treatment limitations' patient would like set into place based on that.       Code Status: Decision to change code status to DNR, DNI, NO ICU per patient wishes.     Plan:  Hospice consult to [place], palliative SW following  DNR/DNI/NO ICU while hospice plan is pending   Family to reach out to home aid agency  Possible discharge late weekend/early next week     Kelsie Jarvis DNP, APRN-CNP  Palliative Medicine     I spent 16 minutes in providing separately identifiable ACP services with the patient and/or surrogate decision maker in a voluntary conversation discussing the patient's wishes and goals as detailed in the above note.

## 2025-01-09 NOTE — PROGRESS NOTES
"Participated in a family meeting with pt and his two friends Gina Indu and JR Jesus with Adore Winn CNP from primary team, and Kelsie Jarvis DNP and Rev. Radha Junior from palliative care.  Reviewed pt's medical course and presented \"big Picture\" approach to his overall health needs.  Pt and friends are aware that pt is very sick and that time may be short, but ultimately pt's wishes are to be at home and to die at home.  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  Gina and  have other jobs, but are also available to assist most days and evenings to care for pt at home.  Team is recommending to provide pt the safest level of care at home at this time, to have hospice care.  Pt is in agreement with this plan.  Offered choice of hospice agency, and family felt that [facility] would provide the most support and be aligned with his goal to stay at home.  Referral made.  Pt has an appointment to meet with his  on Monday which is another one of his goals to get his affairs in order.  He has a positive attitude that everything will work out as it should, and to take things one day at a time.  Await response from [facility] as to when meeting can be scheduled.      JESSENIA Srivastava      "

## 2025-01-09 NOTE — PROGRESS NOTES
Spiritual Care Visit  Spiritual Care Request    Reason for Visit:  Routine Visit: Follow-up  Continue Visiting: Yes  Crisis Visit: Critical care     Met with patient, patient's friend/neighbor, JR and via telephone, pt's friend/POA Sheyla along with Primary CNP, Palliative CNP and SW to discuss patient's condition, wishes for continued medical treatment and discharge planning. Provided non-anxious, supportive presence, affirmation of experience, reflective listening. Will continue to follow.

## 2025-01-09 NOTE — PROGRESS NOTES
NEPHROLOGY NOTE   Alo Glass   69 y.o.     MRN/Room: [id number]    Reason for consult: LEONA on CKD    SUBJECTIVE:   Renal function continues to improve  Chart indicates urine output of 8.8 L          Vitals:    01/09/25 1200   BP: 98/75   Pulse: 94   Resp: 18   Temp:    SpO2: 99%        01/07 1900 - 01/09 0659  In: 779.4 [I.V.:779.4]  Out: 9850 [Urine:9850]   Weight change: -8.6 kg (-18 lb 15.4 oz)     General appearance: No distress   Eyes: non-icteric  Skin: no apparent rash  Heart: S1 S2 regular    Lungs: CTA bilaterally  Abdomen: soft, nt/nd  Extremities: Edema, improving  : has Live      ASSESSMENT:  Alo Glass is a 69 y.o. male with PMHx of HFrEF (EF 23%) , COPD, MELISSA, obesity, type II DM, stenotrophomonas pneumonia w parapneumonic effusion (9/2024), HTN, hypothyroidism and HLD who was admitted to the MICU for Septic Shock & acute decompensated HF. Has Large Bullae of LE that are oozing, possibly infected. Nephrology following for LEONA.     #LEONA, resolving  - Baseline Cr 0.6  - bilateral renal enhancing lesions measuring 0.8 cm in the right upper pole kidney and 2 cm in the left upper pole kidney. Referral to urology and further evaluation with MR is suggested  - hypoNa likely hypervolemic    Etiology:   ATN vs cardiorenal dynamics, contrast injury     RECOMMENDATIONS:  - Hold diuresis today given polyuria  - daily weights  - Continue Midodrine  -Dose medications for renal function  - strict Is and Os    SW Dr Jacques Singh MD  Nephrology Fellow  24 hour Renal Pager - [phone]

## 2025-01-09 NOTE — SIGNIFICANT EVENT
Rapid Response Nurse Note: RADAR alert: 6    Pager time: [phone]   Arrival time: 418  Event end time: 445  Location: [facility] MIU  [x] Triage by phone or secure messaging    Rapid response initiated by:  [] Rapid response RN [] Family [] Nursing Supervisor [] Physician   [x] RADAR auto page [] Sepsis auto-page [] RN [] RT   [] NP/PA [] Other:       Initial VS and/or RADAR VS: T 35.9 °C; ; RR 19; /78; SPO2 100%        Interventions:  [x] None [] ABG/VBG [] Assist w/ICU transfer [] BAT paged    [] Bag mask [] Blood [] Cardioversion [] Code Blue   [] Code blue for intubation [] Code status changed [] Chest x-ray [] EKG   [] IV fluid/bolus [] KUB x-ray [] Labs/cultures [] Medication   [] Nebulizer treatment [] NIPPV (CPAP/BiPAP) [] Oxygen [] Oral airway   [] Peripheral IV [] Palliative care consult [] CT/MRI [] Sepsis protocol    [] Suctioned [] Other:     Outcome:  [] Coded and  [] Code blue for intubation [] Coded and transferred to ICU []  on division   [x] Remained on division (no change) [] Remained on division + additional monitoring [] Remained in ED [] Transferred to ED   [] Transferred to ICU [] Transferred to inpatient status [] Transferred for interventions (procedure) [] Transferred to ICU stepdown    [] Transferred to surgery [] Transferred to telemetry [] Sepsis protocol [] STEMI protocol   [] Stroke protocol [x] Bedside nurse instructed to page rapid response for any concerns or acute change in condition/VS     Additional Comments: Reached out to division RN via Pzoom Chat to offer assistance and/or inquire if any concerns or acute changes in patient status this night. RN encouraged topage Rapid Response Team if patient deteriorates. Reached out to RT and RN to verify supplemental O2 amount with home CPAP orders.

## 2025-01-09 NOTE — CARE PLAN
Problem: Pain  Goal: Takes deep breaths with improved pain control throughout the shift  Outcome: Progressing  Goal: Walks with improved pain control throughout the shift  Outcome: Progressing  Goal: Performs ADL's with improved pain control throughout shift  Outcome: Progressing  Goal: Participates in PT with improved pain control throughout the shift  Problem: Skin  Goal: Promote skin healing  Outcome: Not Progressing  Flowsheets (Taken 1/9/2025 0056)  Promote skin healing:   Assess skin/pad under line(s)/device(s)   Ensure correct size (line/device) and apply per  instructions   Protective dressings over bony prominences   Rotate device position/do not position patient on device   Turn/reposition every 2 hours/use positioning/transfer devices     Outcome: Progressing  Goal: Free from acute confusion related to pain meds throughout the shift  Outcome: Progressing     Problem: Fall/Injury  Goal: Not fall by end of shift  Outcome: Progressing  Goal: Be free from injury by end of the shift  Outcome: Progressing  Goal: Verbalize understanding of personal risk factors for fall in the hospital  Outcome: Progressing  Goal: Verbalize understanding of risk factor reduction measures to prevent injury from fall in the home  Outcome: Progressing  Goal: Pace activities to prevent fatigue by end of the shift  Outcome: Progressing   The patient's goals for the shift include      The clinical goals for the shift include PT WILL REMAIN HDS FOR THE SHIFT    Pt received potassium 40 meq po and 20 meq ivpb x 1 dose. For potassium level of 3.1

## 2025-01-09 NOTE — SIGNIFICANT EVENT
.Rapid Response Nurse Note: RADAR alert: 9    Pager time:   Arrival time:   Event end time:   Location: [facility] 60  [] Triage by phone or secure messaging    Rapid response initiated by:  [] Rapid response RN [] Family [] Nursing Supervisor [] Physician   [x] RADAR auto page [] Sepsis auto-page [] RN [] RT   [] NP/PA [] Other:     Primary reason for call:   [] BAT [] New CPAP/BiPAP [] Bleeding [] Change in mental status   [] Chest pain [] Code blue [] FiO2 >/= 50% [] HR </= 40 bpm   [] HR >/= 130 bpm [] Hyperglycemia [] Hypoglycemia [x] RADAR    [] RR </= 8 bpm [] RR >/= 30 bpm [] SBP </= 90 mmHg [] SpO2 < 90%   [] Seizure [] Sepsis [] Shortness of breath  [] Staff concern: see comments     Initial VS and/or RADAR VS: T N/A °C; ; RR 22; BP 81/54; SPO2 97%.      Interventions:  [x] None [] ABG/VBG [] Assist w/ICU transfer [] BAT paged    [] Bag mask [] Blood [] Cardioversion [] Code Blue   [] Code blue for intubation [] Code status changed [] Chest x-ray [] EKG   [] IV fluid/bolus [] KUB x-ray [] Labs/cultures [] Medication   [] Nebulizer treatment [] NIPPV (CPAP/BiPAP) [] Oxygen [] Oral airway   [] Peripheral IV [] Palliative care consult [] CT/MRI [] Sepsis protocol    [] Suctioned [] Other:     Outcome:  [] Coded and  [] Code blue for intubation [] Coded and transferred to ICU []  on division   [x] Remained on division (no change) [] Remained on division + additional monitoring [] Remained in ED [] Transferred to ED   [] Transferred to ICU [] Transferred to inpatient status [] Transferred for interventions (procedure) [] Transferred to ICU stepdown    [] Transferred to surgery [] Transferred to telemetry [] Sepsis protocol [] STEMI protocol   [] Stroke protocol [x] Bedside nurse instructed to page rapid response for any concerns or acute change in condition/VS     Additional Comments: Reviewed above VS with bedside RN.  VS within patient's current trends.  Patient denied pain,  shortness of breath, dizziness or lightheadedness.  No interventions by rapid response team indicated at this time.  Staff to page rapid response for any concerns or acute change in condition/VS.

## 2025-01-09 NOTE — PROGRESS NOTES
"Medical Intensive Care Step Down- Daily Progress Note   Subjective    Alo Glass is a 69 y.o. year old male patient admitted on 12/29/2024 with following ICU needs: Bilateral lower ext cellulitis causing septic shock.      Interval History:  Continue with nocturnal home CPAP and NC during day.  This am was weaned down to 4L.      Meds    Scheduled medications  bumetanide, 4 mg, intravenous, q12h  ezetimibe, 10 mg, oral, Daily  gabapentin, 100 mg, oral, Daily  insulin lispro, 0-10 Units, subcutaneous, TID AC  ipratropium-albuteroL, 3 mL, nebulization, q6h  levothyroxine, 125 mcg, oral, Daily  metOLazone, 10 mg, oral, Daily  midodrine, 5 mg, oral, TID  nicotine, 1 patch, transdermal, Daily  nystatin, 5 mL, Mouth/Throat, BID  oxygen, , inhalation, Nightly  pantoprazole, 40 mg, oral, Daily before breakfast  sennosides-docusate sodium, 1 tablet, oral, Nightly      Continuous medications  heparin, 0-4,500 Units/hr, Last Rate: 1,800 Units/hr (01/09/25 0952)      PRN medications  PRN medications: acetaminophen, albuterol, dextrose, dextrose, glucagon, glucagon, heparin, HYDROmorphone, oxygen, polyethylene glycol, sodium chloride     Objective    Blood pressure 85/64, pulse 87, temperature 36.4 °C (97.5 °F), resp. rate 23, height 1.803 m (5' 11\"), weight 121 kg (267 lb 6.7 oz), SpO2 95%.       Physical exam:  Constitutional: chronically Ill appearing elderly male in NAD  Eyes: PERRL, no icterus   ENMT: moist mucous membranes  Head/Neck: atraumatic   Respiratory/Thorax: Lungs diminished bilateral bases, CTA, intermittent cough with thick brown sputum production, non-labored breathing, on 5L HF NC  Cardiovascular: HR irregular.   Gastrointestinal: obese, soft, non-tender, normoactive bowel sounds   Extremities: +2-3 edema B/L LE, Cellulitis with erythema b/l LE, venous statis ulcer vs blister RLE  Neurological: alert and oriented x 3, speech clear, follows commands appropriately, no focal deficits.    Skin: Warm and " dry, BLE cellulitis      Intake/Output Summary (Last 24 hours) at 1/9/2025 1003  Last data filed at 1/9/2025 0900  Gross per 24 hour   Intake 713 ml   Output 7975 ml   Net -7262 ml     Labs:   Results from last 72 hours   Lab Units 01/08/25  2202 01/08/25  0936 01/07/25  1945   SODIUM mmol/L 137 132* 130*   POTASSIUM mmol/L 3.1* 3.2* 3.0*   CHLORIDE mmol/L 75* 77* 79*   CO2 mmol/L 45* 44* 38*   BUN mg/dL 93* 102* 106*   CREATININE mg/dL 0.98 1.13 1.56*   GLUCOSE mg/dL 150* 165* 141*   CALCIUM mg/dL 9.1 8.9 8.4*   ANION GAP mmol/L 20 14 16   EGFR mL/min/1.73m*2 83 70 48*   PHOSPHORUS mg/dL 3.4 3.9 5.2*      Results from last 72 hours   Lab Units 01/09/25  0442 01/08/25  0537 01/07/25  0453   WBC AUTO x10*3/uL 9.2 9.0 9.2   HEMOGLOBIN g/dL 16.9 16.4 16.0   HEMATOCRIT % 51.8 49.4 48.9   PLATELETS AUTO x10*3/uL 249 247 243   NEUTROS PCT AUTO % 74.8 73.8 75.6   LYMPHS PCT AUTO % 14.1 16.0 14.7   MONOS PCT AUTO % 8.6 8.0 6.5   EOS PCT AUTO % 1.2 1.0 1.6            Results from last 72 hours   Lab Units 01/08/25  0936 01/06/25  1016   POCT PH, VENOUS pH 7.57* 7.46*   POCT PCO2, VENOUS mm Hg 55* 58*   POCT PO2, VENOUS mm Hg 84* 111*       Micro/ID:     Lab Results   Component Value Date    URINECULTURE No growth 12/28/2024    BLOODCULT No growth at 4 days -  FINAL REPORT 12/28/2024    BLOODCULT No growth at 4 days -  FINAL REPORT 12/28/2024       Assessment and Plan     Assessment: Alo Glass is a 69 y.o. year old male patient admitted on 12/29/2024 with Septic shock 2/2 bilateral lower ext cellulitis and cardiogenic shock and AHRF 2/2 volume overload and LEONA.     Summary for 01/09/25  :  Was on 5L NC, continue wean  On PO bumex 4 mg BID IV , has been off Bumex gtt since 1/8 , holding today both Bumex and Metolazone per nephrology  Plan for meeting today at 1530 with patient, Caterina Tan JR and Palliative care team.  Consulted nutrition to adjust nutrition supplement     Plan:  NEUROLOGY/PSYCH:  Dx:  ZENA  A&Ox3  Management:  Continue gabapentin 100mg daily and Nicotine patch daily,   Pain regimen:  Tylenol 650 mg prn and Dilaudid PRN   PT/OT- previously refused.  PT rec Mod intensity   Palliative Care following.  Plan for meeting today at 1530  WhenSoon and pet therapy consults placed    CARDIOVASCULAR:  Dx: Hx HFrEF, HTN, HLD, severe PAD.  Septic shock, resolved.  Cardiogenic shock, resovled. Aflutter  HDS  Remains off vasopressors since 12/30  TTE 12/30: LVEF 19%  Management:  Continue Ezetimibe 10mg daily   Holding home lasix and SGLT-2 iso hypotension  Consider GDMT when able  S/p Dig load 12/29-12/30  Continue with  4 mg IV Bumex BID (stopped Bumex gtt on 1/8)  Heparin gtt  Started on Midodrine 5mg TID for renal perfusion   Cardiology consulted, signed off 12/31: Continue diuresis with IV Bumex.  If decompensates can consider cardioversion for atrial kick restoration. Prior to discharge arrange follow up with Dr. Moore as an o/p @ [phone] (scheduling)    PULMONARY:  Dx: Hx asthma and COPD GOLD 3 (2019: FEV1 45% w +ve BD change; %/TLC 85%). AHRF 2/2 hypervolemia   5L HF NC& home CPAP, 8 PS  CxR 1/5 : Interval improvement in left basilar opacities and increased right basilar opacities with small right pleural effusion.   Management:  Transitioned from home Cpap to Bipap 12/31 d/t hypercapnia, now back to home CPAP  Diuresis with BID Bumex  Cont Duonebs q6h, albuterol PRN  S/p steroid taper   Airway clearance TID    RENAL/GENITOURINARY:  Dx: LEONA. Hyponatremia likely I/s/o volume overload.  LT renal mass c/f RCC  Has been on HD during stay  Renal US 12/30: No hydronephrosis. hypoechoic lesion of the left superior pole measuring 1.9 cm which was seen on the prior CT dated 12/28/2024, further evaluation with a kidney protocol MRI is strongly recommended. There is an additional indeterminate 0.5 cm hypoechoic lesion of the left kidney.  Management:  Voiding via lipscomb   Nephrology following, recs  appreciated: was changed to BID Bumex on 1/8, holding Bumex today 1/9  holding Metolazone 10 mg daily today 1/9  Pt saw Urology on 12/12/2024 and referrred to IR for biopsy   Urine lytes consistent with prerenal  Diuresing for goal 1L negative     GASTROENTEROLOGY:  Dx: Hx GERD. Poor oral intake   Most recent BM 1/8  Management:  Diet: Cardiac and carb control diet  Bowel Regimen: Miralax PRN   PPI    ENDOCRINOLOGY:  Dx: Hx Hypothyroid and DMII  Glucose 150-160  HgbA1C  5.5%  Management:  Home regimen:  Mounjaro and Jardiance; pt also utilizes a Dexcom CGM)  Cont with Insuline lispro TID  Glucose goal of <180  Hypoglycemia protocol     HEMATOLOGY:  Dx: ZENA  H/h stable   Management:  CBC daily     SKIN:  Dx:  Skin Failure Yes, describe: BL L/E cellutis vs PAD   Management:  Wound following  ACS consulted, signed off 1/1: Elevate legs as able, continue with abx.  If erythema does not completely resolve with antibiotic treatment would recommend re-imaging with non-contrast CT of BLE to assess for new, undrained fluid collections     INFECTIOUS DISEASE:  Dx: Cellulitis, MRSA PNA  Tmax 35.9 C, WBC 9.2  Management:  Micro: 12/28 Legionella, Strep pneum, negative, 12/28 Urine Cx negative, 12/28 BCx NTGD x2.  1/5 sputum MRSA  Abx: Clindamycin (12/28), Zosyn (12/28 - 1/6), Vancomycin (12/28 - 1/6)  ACS consulted signed off 1/1; rec cont IV Atbs     ICU Check List     FEN  Fluids: PRN  Electrolytes: K>4, Phos >3, Mg> 2  Nutrition: Cardiac diet and carb control diet   Prophylaxis:  DVT ppx: Heparin gtt  GI ppx: PPI  Bowel care: Miralax PRN   Hardware:         Urethral Catheter Temperature probe 16 Fr. (Active)   Placement Date: 12/28/24   Hand Hygiene Completed: Yes  Catheter Type: Temperature probe  Tube Size (Fr.): 16 Fr.  Catheter Balloon Size: 5 mL  Urine Returned: Yes   Number of days: 3       Social:  Code: Full Code    HPOA: Sheyla Griggs [phone]   Disposition: Continue care in SDU.  Plan for Pall Care meeting today  1/9  at 1530 with Caterina Tan JR and Pall Care team      Adore Winn, APRN-CNP   01/09/25 at 10:03 AM     Pt discussed with Dr. Cam,  seen and examined. All labs, VS and previous plan of care reviewed.     Disclaimer: Documentation completed with the information available at the time of input. The times in the chart may not be reflective of actual patient care times, interventions, or procedures. Documentation occurs after the physical care of the patient.

## 2025-01-10 LAB
ALBUMIN SERPL BCP-MCNC: 3 G/DL (ref 3.4–5)
ANION GAP SERPL CALC-SCNC: ABNORMAL MMOL/L
BASOPHILS # BLD AUTO: 0.06 X10*3/UL (ref 0–0.1)
BASOPHILS NFR BLD AUTO: 0.6 %
BUN SERPL-MCNC: 76 MG/DL (ref 6–23)
CALCIUM SERPL-MCNC: 9.2 MG/DL (ref 8.6–10.6)
CHLORIDE SERPL-SCNC: 73 MMOL/L (ref 98–107)
CO2 SERPL-SCNC: >45 MMOL/L (ref 21–32)
CREAT SERPL-MCNC: 0.77 MG/DL (ref 0.5–1.3)
EGFRCR SERPLBLD CKD-EPI 2021: >90 ML/MIN/1.73M*2
EOSINOPHIL # BLD AUTO: 0.11 X10*3/UL (ref 0–0.7)
EOSINOPHIL NFR BLD AUTO: 1.1 %
ERYTHROCYTE [DISTWIDTH] IN BLOOD BY AUTOMATED COUNT: 13.7 % (ref 11.5–14.5)
GLUCOSE BLD MANUAL STRIP-MCNC: 127 MG/DL (ref 74–99)
GLUCOSE BLD MANUAL STRIP-MCNC: 247 MG/DL (ref 74–99)
GLUCOSE BLD MANUAL STRIP-MCNC: 278 MG/DL (ref 74–99)
GLUCOSE SERPL-MCNC: 125 MG/DL (ref 74–99)
HCT VFR BLD AUTO: 52.5 % (ref 41–52)
HGB BLD-MCNC: 17.6 G/DL (ref 13.5–17.5)
IMM GRANULOCYTES # BLD AUTO: 0.1 X10*3/UL (ref 0–0.7)
IMM GRANULOCYTES NFR BLD AUTO: 1 % (ref 0–0.9)
LYMPHOCYTES # BLD AUTO: 1.65 X10*3/UL (ref 1.2–4.8)
LYMPHOCYTES NFR BLD AUTO: 17 %
MCH RBC QN AUTO: 31.5 PG (ref 26–34)
MCHC RBC AUTO-ENTMCNC: 33.5 G/DL (ref 32–36)
MCV RBC AUTO: 94 FL (ref 80–100)
MONOCYTES # BLD AUTO: 0.8 X10*3/UL (ref 0.1–1)
MONOCYTES NFR BLD AUTO: 8.2 %
NEUTROPHILS # BLD AUTO: 6.99 X10*3/UL (ref 1.2–7.7)
NEUTROPHILS NFR BLD AUTO: 72.1 %
NRBC BLD-RTO: 0 /100 WBCS (ref 0–0)
PHOSPHATE SERPL-MCNC: 3.4 MG/DL (ref 2.5–4.9)
PLATELET # BLD AUTO: 262 X10*3/UL (ref 150–450)
POTASSIUM SERPL-SCNC: 3.9 MMOL/L (ref 3.5–5.3)
RBC # BLD AUTO: 5.58 X10*6/UL (ref 4.5–5.9)
SODIUM SERPL-SCNC: 136 MMOL/L (ref 136–145)
UFH PPP CHRO-ACNC: 0.6 IU/ML
WBC # BLD AUTO: 9.7 X10*3/UL (ref 4.4–11.3)

## 2025-01-10 PROCEDURE — 94668 MNPJ CHEST WALL SBSQ: CPT

## 2025-01-10 PROCEDURE — 2500000002 HC RX 250 W HCPCS SELF ADMINISTERED DRUGS (ALT 637 FOR MEDICARE OP, ALT 636 FOR OP/ED): Performed by: NURSE PRACTITIONER

## 2025-01-10 PROCEDURE — 99233 SBSQ HOSP IP/OBS HIGH 50: CPT | Performed by: INTERNAL MEDICINE

## 2025-01-10 PROCEDURE — 2500000004 HC RX 250 GENERAL PHARMACY W/ HCPCS (ALT 636 FOR OP/ED): Performed by: NURSE PRACTITIONER

## 2025-01-10 PROCEDURE — 2500000001 HC RX 250 WO HCPCS SELF ADMINISTERED DRUGS (ALT 637 FOR MEDICARE OP)

## 2025-01-10 PROCEDURE — 85025 COMPLETE CBC W/AUTO DIFF WBC: CPT | Performed by: NURSE PRACTITIONER

## 2025-01-10 PROCEDURE — 2500000002 HC RX 250 W HCPCS SELF ADMINISTERED DRUGS (ALT 637 FOR MEDICARE OP, ALT 636 FOR OP/ED): Performed by: INTERNAL MEDICINE

## 2025-01-10 PROCEDURE — 94640 AIRWAY INHALATION TREATMENT: CPT

## 2025-01-10 PROCEDURE — 2500000001 HC RX 250 WO HCPCS SELF ADMINISTERED DRUGS (ALT 637 FOR MEDICARE OP): Performed by: NURSE PRACTITIONER

## 2025-01-10 PROCEDURE — 99233 SBSQ HOSP IP/OBS HIGH 50: CPT

## 2025-01-10 PROCEDURE — 82947 ASSAY GLUCOSE BLOOD QUANT: CPT

## 2025-01-10 PROCEDURE — S4991 NICOTINE PATCH NONLEGEND: HCPCS | Performed by: NURSE PRACTITIONER

## 2025-01-10 PROCEDURE — 2060000001 HC INTERMEDIATE ICU ROOM DAILY

## 2025-01-10 PROCEDURE — 85520 HEPARIN ASSAY: CPT | Performed by: NURSE PRACTITIONER

## 2025-01-10 PROCEDURE — 36415 COLL VENOUS BLD VENIPUNCTURE: CPT | Performed by: NURSE PRACTITIONER

## 2025-01-10 PROCEDURE — 80069 RENAL FUNCTION PANEL: CPT | Performed by: NURSE PRACTITIONER

## 2025-01-10 PROCEDURE — 2500000005 HC RX 250 GENERAL PHARMACY W/O HCPCS: Performed by: NURSE PRACTITIONER

## 2025-01-10 RX ORDER — IPRATROPIUM BROMIDE AND ALBUTEROL SULFATE 2.5; .5 MG/3ML; MG/3ML
3 SOLUTION RESPIRATORY (INHALATION)
Status: DISCONTINUED | OUTPATIENT
Start: 2025-01-10 | End: 2025-01-12 | Stop reason: HOSPADM

## 2025-01-10 RX ADMIN — MIDODRINE HYDROCHLORIDE 5 MG: 5 TABLET ORAL at 15:01

## 2025-01-10 RX ADMIN — HEPARIN SODIUM 1800 UNITS/HR: 10000 INJECTION, SOLUTION INTRAVENOUS at 00:28

## 2025-01-10 RX ADMIN — HEPARIN SODIUM 1800 UNITS/HR: 10000 INJECTION, SOLUTION INTRAVENOUS at 17:05

## 2025-01-10 RX ADMIN — EZETIMIBE 10 MG: 10 TABLET ORAL at 09:48

## 2025-01-10 RX ADMIN — INSULIN LISPRO 4 UNITS: 100 INJECTION, SOLUTION INTRAVENOUS; SUBCUTANEOUS at 11:31

## 2025-01-10 RX ADMIN — Medication 5 L/MIN: at 21:07

## 2025-01-10 RX ADMIN — NICOTINE 1 PATCH: 14 PATCH, EXTENDED RELEASE TRANSDERMAL at 09:48

## 2025-01-10 RX ADMIN — IPRATROPIUM BROMIDE AND ALBUTEROL SULFATE 3 ML: 2.5; .5 SOLUTION RESPIRATORY (INHALATION) at 01:34

## 2025-01-10 RX ADMIN — IPRATROPIUM BROMIDE AND ALBUTEROL SULFATE 3 ML: .5; 3 SOLUTION RESPIRATORY (INHALATION) at 20:49

## 2025-01-10 RX ADMIN — GABAPENTIN 100 MG: 100 CAPSULE ORAL at 09:48

## 2025-01-10 RX ADMIN — POTASSIUM CHLORIDE 20 MEQ: 1500 TABLET, EXTENDED RELEASE ORAL at 00:26

## 2025-01-10 RX ADMIN — SENNOSIDES AND DOCUSATE SODIUM 1 TABLET: 50; 8.6 TABLET ORAL at 20:07

## 2025-01-10 RX ADMIN — NYSTATIN 500000 UNITS: 100000 SUSPENSION ORAL at 20:08

## 2025-01-10 RX ADMIN — MIDODRINE HYDROCHLORIDE 5 MG: 5 TABLET ORAL at 20:07

## 2025-01-10 RX ADMIN — INSULIN LISPRO 6 UNITS: 100 INJECTION, SOLUTION INTRAVENOUS; SUBCUTANEOUS at 09:48

## 2025-01-10 RX ADMIN — MIDODRINE HYDROCHLORIDE 5 MG: 5 TABLET ORAL at 09:48

## 2025-01-10 RX ADMIN — LEVOTHYROXINE SODIUM 125 MCG: 25 TABLET ORAL at 06:00

## 2025-01-10 RX ADMIN — PANTOPRAZOLE SODIUM 40 MG: 40 TABLET, DELAYED RELEASE ORAL at 09:48

## 2025-01-10 RX ADMIN — NYSTATIN 500000 UNITS: 100000 SUSPENSION ORAL at 09:48

## 2025-01-10 RX ADMIN — IPRATROPIUM BROMIDE AND ALBUTEROL SULFATE 3 ML: .5; 3 SOLUTION RESPIRATORY (INHALATION) at 08:37

## 2025-01-10 ASSESSMENT — COGNITIVE AND FUNCTIONAL STATUS - GENERAL
TOILETING: A LOT
STANDING UP FROM CHAIR USING ARMS: TOTAL
STANDING UP FROM CHAIR USING ARMS: TOTAL
DRESSING REGULAR LOWER BODY CLOTHING: A LOT
DRESSING REGULAR UPPER BODY CLOTHING: A LOT
DAILY ACTIVITIY SCORE: 12
MOBILITY SCORE: 8
TOILETING: A LOT
WALKING IN HOSPITAL ROOM: TOTAL
WALKING IN HOSPITAL ROOM: TOTAL
HELP NEEDED FOR BATHING: A LOT
HELP NEEDED FOR BATHING: A LOT
DAILY ACTIVITIY SCORE: 13
EATING MEALS: A LOT
PERSONAL GROOMING: A LOT
MOVING TO AND FROM BED TO CHAIR: TOTAL
EATING MEALS: A LITTLE
MOVING FROM LYING ON BACK TO SITTING ON SIDE OF FLAT BED WITH BEDRAILS: A LOT
CLIMB 3 TO 5 STEPS WITH RAILING: TOTAL
DRESSING REGULAR LOWER BODY CLOTHING: A LOT
TURNING FROM BACK TO SIDE WHILE IN FLAT BAD: A LOT
MOVING TO AND FROM BED TO CHAIR: TOTAL
TURNING FROM BACK TO SIDE WHILE IN FLAT BAD: A LOT
CLIMB 3 TO 5 STEPS WITH RAILING: TOTAL
DRESSING REGULAR UPPER BODY CLOTHING: A LOT
MOVING FROM LYING ON BACK TO SITTING ON SIDE OF FLAT BED WITH BEDRAILS: A LOT
PERSONAL GROOMING: A LOT
MOBILITY SCORE: 8

## 2025-01-10 ASSESSMENT — PAIN SCALES - GENERAL
PAINLEVEL_OUTOF10: 0 - NO PAIN
PAINLEVEL_OUTOF10: 0 - NO PAIN

## 2025-01-10 ASSESSMENT — PAIN - FUNCTIONAL ASSESSMENT
PAIN_FUNCTIONAL_ASSESSMENT: 0-10
PAIN_FUNCTIONAL_ASSESSMENT: 0-10

## 2025-01-10 NOTE — PROGRESS NOTES
Pt and friend  met with liaison from [facility] today.  Consents were signed.  Equipment to be ordered, and hoping for discharge home on Sunday.   plans to call pt's private duty HHA agency, [employer] to make sure that they can start staffing pt's 24 hour care on Sunday.  [place] team will follow up with  to coordinate, and if there are any issues with staff coverage, we can reschedule the discharge.    JESSENIA Srivastava

## 2025-01-10 NOTE — CARE PLAN
The patient's goals for the shift include      The clinical goals for the shift include pt will remain HDS for the shift      Problem: Skin  Goal: Decreased wound size/increased tissue granulation at next dressing change  Outcome: Progressing  Flowsheets (Taken 1/10/2025 1214)  Decreased wound size/increased tissue granulation at next dressing change: Protective dressings over bony prominences  Goal: Participates in plan/prevention/treatment measures  Outcome: Progressing  Flowsheets (Taken 1/10/2025 1214)  Participates in plan/prevention/treatment measures: Elevate heels  Goal: Prevent/manage excess moisture  Outcome: Progressing  Flowsheets (Taken 1/10/2025 1214)  Prevent/manage excess moisture: Monitor for/manage infection if present  Goal: Prevent/minimize sheer/friction injuries  Outcome: Progressing  Flowsheets (Taken 1/10/2025 1214)  Prevent/minimize sheer/friction injuries:   Use pull sheet   HOB 30 degrees or less   Turn/reposition every 2 hours/use positioning/transfer devices  Goal: Promote/optimize nutrition  Outcome: Progressing  Flowsheets (Taken 1/10/2025 1214)  Promote/optimize nutrition: Monitor/record intake including meals  Goal: Promote skin healing  Outcome: Progressing  Flowsheets (Taken 1/10/2025 1214)  Promote skin healing: Turn/reposition every 2 hours/use positioning/transfer devices     Problem: Pain - Adult  Goal: Verbalizes/displays adequate comfort level or baseline comfort level  Outcome: Progressing     Problem: Safety - Adult  Goal: Free from fall injury  Outcome: Progressing     Problem: Discharge Planning  Goal: Discharge to home or other facility with appropriate resources  Outcome: Progressing     Problem: Chronic Conditions and Co-morbidities  Goal: Patient's chronic conditions and co-morbidity symptoms are monitored and maintained or improved  Outcome: Progressing     Problem: Diabetes  Goal: Achieve decreasing blood glucose levels by end of shift  Outcome: Progressing  Goal:  Increase stability of blood glucose readings by end of shift  Outcome: Progressing  Goal: Decrease in ketones present in urine by end of shift  Outcome: Progressing  Goal: Maintain electrolyte levels within acceptable range throughout shift  Outcome: Progressing  Goal: Maintain glucose levels >70mg/dl to <250mg/dl throughout shift  Outcome: Progressing  Goal: No changes in neurological exam by end of shift  Outcome: Progressing  Goal: Learn about and adhere to nutrition recommendations by end of shift  Outcome: Progressing  Goal: Vital signs within normal range for age by end of shift  Outcome: Progressing  Goal: Increase self care and/or family involovement by end of shift  Outcome: Progressing  Goal: Receive DSME education by end of shift  Outcome: Progressing     Problem: Pain  Goal: Takes deep breaths with improved pain control throughout the shift  Outcome: Progressing  Goal: Turns in bed with improved pain control throughout the shift  Outcome: Progressing  Goal: Walks with improved pain control throughout the shift  Outcome: Progressing  Goal: Performs ADL's with improved pain control throughout shift  Outcome: Progressing  Goal: Participates in PT with improved pain control throughout the shift  Outcome: Progressing  Goal: Free from opioid side effects throughout the shift  Outcome: Progressing  Goal: Free from acute confusion related to pain meds throughout the shift  Outcome: Progressing     Problem: Fall/Injury  Goal: Not fall by end of shift  Outcome: Progressing  Goal: Be free from injury by end of the shift  Outcome: Progressing  Goal: Verbalize understanding of personal risk factors for fall in the hospital  Outcome: Progressing  Goal: Verbalize understanding of risk factor reduction measures to prevent injury from fall in the home  Outcome: Progressing  Goal: Pace activities to prevent fatigue by end of the shift  Outcome: Progressing

## 2025-01-10 NOTE — PROGRESS NOTES
"Palliative Medicine following for:  Complex medical decision making, symptom management, patient/family support    History obtained from chart review including ED note, H&P, patient's daily progress notes, review of lab/test results, and discussion with primary team and bedside RN.    Subjective    Attempted to see patient this morning for supportive visit, sleeping comfortable with cpap on, did not wake pt given that goals are more comfort-focused    Objective    Last Recorded Vitals  BP 97/78 (BP Location: Right arm, Patient Position: Lying)   Pulse 95   Temp 36.2 °C (97.2 °F) (Temporal)   Resp 25   Ht 1.803 m (5' 11\")   Wt 121 kg (265 lb 10.5 oz)   SpO2 98%   BMI 37.05 kg/m²      Physical Exam  Constitutional:       Appearance: He is ill-appearing.   HENT:      Head: Normocephalic.      Mouth/Throat:      Mouth: Mucous membranes are dry.   Pulmonary:      Effort: Pulmonary effort is normal.   Abdominal:      General: There is distension.          Relevant Results   Results for orders placed or performed during the hospital encounter of 12/29/24 (from the past 24 hours)   POCT GLUCOSE   Result Value Ref Range    POCT Glucose 154 (H) 74 - 99 mg/dL   POCT GLUCOSE   Result Value Ref Range    POCT Glucose 224 (H) 74 - 99 mg/dL   Renal Function Panel   Result Value Ref Range    Glucose 200 (H) 74 - 99 mg/dL    Sodium 136 136 - 145 mmol/L    Potassium 3.7 3.5 - 5.3 mmol/L    Chloride 73 (L) 98 - 107 mmol/L    Bicarbonate >45 (HH) 21 - 32 mmol/L    Anion Gap      Urea Nitrogen 83 (H) 6 - 23 mg/dL    Creatinine 0.79 0.50 - 1.30 mg/dL    eGFR >90 >60 mL/min/1.73m*2    Calcium 9.1 8.6 - 10.6 mg/dL    Phosphorus 3.2 2.5 - 4.9 mg/dL    Albumin 2.9 (L) 3.4 - 5.0 g/dL   CBC and Auto Differential   Result Value Ref Range    WBC 9.7 4.4 - 11.3 x10*3/uL    nRBC 0.0 0.0 - 0.0 /100 WBCs    RBC 5.58 4.50 - 5.90 x10*6/uL    Hemoglobin 17.6 (H) 13.5 - 17.5 g/dL    Hematocrit 52.5 (H) 41.0 - 52.0 %    MCV 94 80 - 100 fL    MCH " 31.5 26.0 - 34.0 pg    MCHC 33.5 32.0 - 36.0 g/dL    RDW 13.7 11.5 - 14.5 %    Platelets 262 150 - 450 x10*3/uL    Neutrophils % 72.1 40.0 - 80.0 %    Immature Granulocytes %, Automated 1.0 (H) 0.0 - 0.9 %    Lymphocytes % 17.0 13.0 - 44.0 %    Monocytes % 8.2 2.0 - 10.0 %    Eosinophils % 1.1 0.0 - 6.0 %    Basophils % 0.6 0.0 - 2.0 %    Neutrophils Absolute 6.99 1.20 - 7.70 x10*3/uL    Immature Granulocytes Absolute, Automated 0.10 0.00 - 0.70 x10*3/uL    Lymphocytes Absolute 1.65 1.20 - 4.80 x10*3/uL    Monocytes Absolute 0.80 0.10 - 1.00 x10*3/uL    Eosinophils Absolute 0.11 0.00 - 0.70 x10*3/uL    Basophils Absolute 0.06 0.00 - 0.10 x10*3/uL   Renal Function Panel   Result Value Ref Range    Glucose 125 (H) 74 - 99 mg/dL    Sodium 136 136 - 145 mmol/L    Potassium 3.9 3.5 - 5.3 mmol/L    Chloride 73 (L) 98 - 107 mmol/L    Bicarbonate >45 (HH) 21 - 32 mmol/L    Anion Gap      Urea Nitrogen 76 (H) 6 - 23 mg/dL    Creatinine 0.77 0.50 - 1.30 mg/dL    eGFR >90 >60 mL/min/1.73m*2    Calcium 9.2 8.6 - 10.6 mg/dL    Phosphorus 3.4 2.5 - 4.9 mg/dL    Albumin 3.0 (L) 3.4 - 5.0 g/dL   Heparin Assay, UFH   Result Value Ref Range    Heparin Unfractionated 0.6 See Comment Below for Therapeutic Ranges IU/mL   POCT GLUCOSE   Result Value Ref Range    POCT Glucose 278 (H) 74 - 99 mg/dL   POCT GLUCOSE   Result Value Ref Range    POCT Glucose 247 (H) 74 - 99 mg/dL     *Note: Due to a large number of results and/or encounters for the requested time period, some results have not been displayed. A complete set of results can be found in Results Review.        Allergies  Patient has no known allergies.  Medications  Scheduled medications  [Held by provider] bumetanide, 4 mg, intravenous, q12h  ezetimibe, 10 mg, oral, Daily  gabapentin, 100 mg, oral, Daily  insulin lispro, 0-10 Units, subcutaneous, TID AC  ipratropium-albuteroL, 3 mL, nebulization, TID  levothyroxine, 125 mcg, oral, Daily  [Held by provider] metOLazone, 10 mg, oral,  Daily  midodrine, 5 mg, oral, TID  nicotine, 1 patch, transdermal, Daily  nystatin, 5 mL, Mouth/Throat, BID  oxygen, , inhalation, Nightly  pantoprazole, 40 mg, oral, Daily before breakfast  sennosides-docusate sodium, 1 tablet, oral, Nightly      Continuous medications  heparin, 0-4,500 Units/hr, Last Rate: 1,800 Units/hr (01/10/25 8712)      PRN medications  PRN medications: acetaminophen, albuterol, dextrose, dextrose, glucagon, glucagon, heparin, HYDROmorphone, lidocaine-diphenhydraMINE-Maalox 1:1:1, oxygen, polyethylene glycol, sodium chloride     Assessment/Plan      Alo Glass is a 69y gentleman with a pmh HFrEF (EF 20%), COPD, MELISSA, obesity, T2DM, HTN, hypothyroidism, severe PAD, and HLD. He is admitted with septic shock from bilateral LE cellulitis. Course c/b LEONA, hypervolemia, cardiogenic shock      #Goals of Care  - Code status: DNR, DNI, NO ICU  - Surrogate decision maker: RAHEEL Mota. Andrea Jesus is first alternate.   -Goals are comfort and quality of life based: consult placed to hospice services. [facility].      #Psychosocial Support  - Spiritual Care Support  -Palliative SW     Plan of Care discussed with: Updated primary team and bedside RN on goals of care decision, medication adjustments, and code status      Medical Decision Making was high level due to high complexity of problems, extensive data review, and high risk of management/treatment.        Thank you for allowing us to participate in the care of this patient. Palliative will continue to follow as needed. Palliative medicine is available Monday-Friday, 8a-6p. Please contact team with any questions or concerns.  Team pager [phone] (weekdays)  Kelsie Jarvis DNP, APRN-CNP

## 2025-01-10 NOTE — SIGNIFICANT EVENT
Nephrology entry     Noted plans for hospice discharge  Continue to hold diuresis for now  Management as per primary team  Please reach out with questions   Will sign off    Cordell Singh MD   Nephrology fellow   Nephrology pager [phone]  Available via Epic Chat

## 2025-01-10 NOTE — PROGRESS NOTES
Music Therapy Note    Alo Todd Session  Referral Type: New referral this admission  Visit Type: New visit  Session Start Time: 1413  Session End Time: 1413  Intervention Delivery: In-person  Conflict of Service: Working with other staff               Treatment/Interventions       Post-assessment  Total Session Time (min): 0 minutes    Narrative  Assessment Detail: Patient working with other staff at time of MT's arrival. MT unable to make additional attempt this date.  Follow-up: MT to reattempt at another time as applicable.    Education Documentation  No documentation found.

## 2025-01-10 NOTE — CARE PLAN
Problem: Safety - Adult  Goal: Free from fall injury  Outcome: Progressing     Problem: Pain  Goal: Takes deep breaths with improved pain control throughout the shift  Outcome: Progressing  Goal: Turns in bed with improved pain control throughout the shift  Outcome: Progressing  Goal: Walks with improved pain control throughout the shift  Outcome: Progressing  Goal: Performs ADL's with improved pain control throughout shift  Outcome: Progressing  Goal: Participates in PT with improved pain control throughout the shift  Outcome: Progressing  Goal: Free from opioid side effects throughout the shift  Outcome: Progressing     Problem: Fall/Injury  Goal: Not fall by end of shift  Outcome: Progressing  Goal: Be free from injury by end of the shift  Outcome: Progressing  Goal: Verbalize understanding of personal risk factors for fall in the hospital  Outcome: Progressing  Goal: Verbalize understanding of risk factor reduction measures to prevent injury from fall in the home  Outcome: Progressing  Goal: Pace activities to prevent fatigue by end of the shift  Outcome: Progressing     Problem: Skin  Goal: Decreased wound size/increased tissue granulation at next dressing change  Flowsheets (Taken 1/9/2025 2340)  Decreased wound size/increased tissue granulation at next dressing change:   Utilize specialty bed per algorithm   Promote sleep for wound healing   Protective dressings over bony prominences  Goal: Participates in plan/prevention/treatment measures  Flowsheets (Taken 1/9/2025 2340)  Participates in plan/prevention/treatment measures: Discuss with provider PT/OT consult  Goal: Prevent/manage excess moisture  Flowsheets (Taken 1/9/2025 2340)  Prevent/manage excess moisture:   Cleanse incontinence/protect with barrier cream   Moisturize dry skin   Use wicking fabric (obtain order)   Monitor for/manage infection if present   Follow provider orders for dressing changes  Goal: Promote skin healing  Flowsheets (Taken  1/9/2025 2340)  Promote skin healing:   Assess skin/pad under line(s)/device(s)   Ensure correct size (line/device) and apply per  instructions   Protective dressings over bony prominences   Rotate device position/do not position patient on device   Turn/reposition every 2 hours/use positioning/transfer devices     Problem: Diabetes  Goal: Maintain electrolyte levels within acceptable range throughout shift  Flowsheets (Taken 1/9/2025 2340)  Maintain electrolyte levels within acceptable range throughout shift:   Med administration/monitoring of effect   Monitor urine output  Goal: Vital signs within normal range for age by end of shift  Flowsheets (Taken 1/9/2025 2340)  Vital signs within normal range for age by end of shift: Med administration/monitoring of effect   The patient's goals for the shift include      The clinical goals for the shift include pt will remain HDS for the shift

## 2025-01-10 NOTE — PROGRESS NOTES
"Medical Intensive Care Step Down- Daily Progress Note   Subjective    Alo Glass is a 69 y.o. year old male patient admitted on 12/29/2024 with following ICU needs: Bilateral lower ext cellulitis causing septic shock.      Interval History:  Continue with nocturnal home CPAP and NC during day.  Plan for hospice meeting today     Meds    Scheduled medications  [Held by provider] bumetanide, 4 mg, intravenous, q12h  ezetimibe, 10 mg, oral, Daily  gabapentin, 100 mg, oral, Daily  insulin lispro, 0-10 Units, subcutaneous, TID AC  ipratropium-albuteroL, 3 mL, nebulization, TID  levothyroxine, 125 mcg, oral, Daily  [Held by provider] metOLazone, 10 mg, oral, Daily  midodrine, 5 mg, oral, TID  nicotine, 1 patch, transdermal, Daily  nystatin, 5 mL, Mouth/Throat, BID  oxygen, , inhalation, Nightly  pantoprazole, 40 mg, oral, Daily before breakfast  sennosides-docusate sodium, 1 tablet, oral, Nightly      Continuous medications  heparin, 0-4,500 Units/hr, Last Rate: 1,800 Units/hr (01/10/25 7064)      PRN medications  PRN medications: acetaminophen, albuterol, dextrose, dextrose, glucagon, glucagon, heparin, HYDROmorphone, lidocaine-diphenhydraMINE-Maalox 1:1:1, oxygen, polyethylene glycol, sodium chloride     Objective    Blood pressure 97/78, pulse 95, temperature 36.2 °C (97.2 °F), temperature source Temporal, resp. rate 25, height 1.803 m (5' 11\"), weight 121 kg (265 lb 10.5 oz), SpO2 98%.       Physical exam:  Constitutional: chronically Ill appearing elderly male in NAD  Eyes: PERRL, no icterus   ENMT: moist mucous membranes  Head/Neck: atraumatic   Respiratory/Thorax: Lungs diminished bilateral bases, CTA, intermittent cough with thick brown sputum production, non-labored breathing, on 5L HF NC  Cardiovascular: HR irregular.   Gastrointestinal: obese, soft, non-tender, normoactive bowel sounds   Extremities: +2-3 edema B/L LE, Cellulitis with erythema b/l LE, venous statis ulcer vs blister RLE  Neurological: " alert and oriented x 3, speech clear, follows commands appropriately, no focal deficits.    Skin: Warm and dry, BLE cellulitis      Intake/Output Summary (Last 24 hours) at 1/10/2025 1430  Last data filed at 1/10/2025 0948  Gross per 24 hour   Intake 666.5 ml   Output 4650 ml   Net -3983.5 ml     Labs:   Results from last 72 hours   Lab Units 01/10/25  0556 01/09/25  2141 01/09/25  1005 01/08/25  2202 01/08/25  0936   SODIUM mmol/L 136 136 134* 137 132*   POTASSIUM mmol/L 3.9 3.7 4.2 3.1* 3.2*   CHLORIDE mmol/L 73* 73* 75* 75* 77*   CO2 mmol/L >45* >45* 43* 45* 44*   BUN mg/dL 76* 83* 85* 93* 102*   CREATININE mg/dL 0.77 0.79 0.84 0.98 1.13   GLUCOSE mg/dL 125* 200* 145* 150* 165*   CALCIUM mg/dL 9.2 9.1 8.9 9.1 8.9   ANION GAP mmol/L  --   --  20 20 14   EGFR mL/min/1.73m*2 >90 >90 >90 83 70   PHOSPHORUS mg/dL 3.4 3.2 3.3 3.4 3.9      Results from last 72 hours   Lab Units 01/10/25  0556 01/09/25  0442 01/08/25  0537   WBC AUTO x10*3/uL 9.7 9.2 9.0   HEMOGLOBIN g/dL 17.6* 16.9 16.4   HEMATOCRIT % 52.5* 51.8 49.4   PLATELETS AUTO x10*3/uL 262 249 247   NEUTROS PCT AUTO % 72.1 74.8 73.8   LYMPHS PCT AUTO % 17.0 14.1 16.0   MONOS PCT AUTO % 8.2 8.6 8.0   EOS PCT AUTO % 1.1 1.2 1.0            Results from last 72 hours   Lab Units 01/08/25  0936   POCT PH, VENOUS pH 7.57*   POCT PCO2, VENOUS mm Hg 55*   POCT PO2, VENOUS mm Hg 84*       Micro/ID:     Lab Results   Component Value Date    URINECULTURE No growth 12/28/2024    BLOODCULT No growth at 4 days -  FINAL REPORT 12/28/2024    BLOODCULT No growth at 4 days -  FINAL REPORT 12/28/2024       Assessment and Plan     Assessment: Alo Glass is a 69 y.o. year old male patient admitted on 12/29/2024 with Septic shock 2/2 bilateral lower ext cellulitis and cardiogenic shock and AHRF 2/2 volume overload and LEONA.     Summary for 01/10/25  :  Was on 5L NC, continue wean  Off Bumex x2 days now, and holding off Metolazone per nephrology  [facility] hospice meeting today  at 1pm     Plan:  NEUROLOGY/PSYCH:  Dx: ZENA  A&Ox3  Management:  Continue gabapentin 100mg daily and Nicotine patch daily,   Pain regimen:  Tylenol 650 mg prn and Dilaudid PRN   PT/OT-  Palliative Care following. And plan for hospice meeting today    CARDIOVASCULAR:  Dx: Hx HFrEF, HTN, HLD, severe PAD.  Septic shock, resolved.  Cardiogenic shock, resovled. Aflutter  HDS  Remains off vasopressors since 12/30  TTE 12/30: LVEF 19%  Management:  Continue Ezetimibe 10mg daily   Holding home lasix and SGLT-2 iso hypotension  Consider GDMT when able  S/p Dig load 12/29-12/30  Holding off diuresis last 2 days  Heparin gtt  Started on Midodrine 5mg TID for renal perfusion       PULMONARY:  Dx: Hx asthma and COPD GOLD 3 (2019: FEV1 45% w +ve BD change; %/TLC 85%). AHRF 2/2 hypervolemia   5L HF NC& home CPAP, 8 PS  CxR 1/5 : Interval improvement in left basilar opacities and increased right basilar opacities with small right pleural effusion.   Management:  Transitioned from home Cpap to Bipap 12/31 d/t hypercapnia, now back to home CPAP  Large volume diuresis during hospital stay, now off   Cont Duonebs q6h, albuterol PRN  Airway clearance TID    RENAL/GENITOURINARY:  Dx: LEONA. Hyponatremia likely I/s/o volume overload.  LT renal mass c/f RCC  Has been on HD during stay  Renal US 12/30: No hydronephrosis. hypoechoic lesion of the left superior pole measuring 1.9 cm which was seen on the prior CT dated 12/28/2024, further evaluation with a kidney protocol MRI is strongly recommended. There is an additional indeterminate 0.5 cm hypoechoic lesion of the left kidney.  Management:  Voiding via lipscomb   Nephrology signed off today in the setting of patient transitioning to hospice   holding Metolazone 10 mg daily since 1/9  Pt saw Urology on 12/12/2024 and referrred to IR for biopsy   Urine lytes consistent with prerenal    GASTROENTEROLOGY:  Dx: Hx GERD. Poor oral intake   Most recent BM 1/10  Management:  Diet: Cardiac and  carb control diet  Bowel Regimen: Miralax PRN   PPI    ENDOCRINOLOGY:  Dx: Hx Hypothyroid and DMII  Glucose 150-160  HgbA1C  5.5%  Management:  Home regimen:  Mounjaro and Jardiance; pt also utilizes a Dexcom CGM)  Cont with Insuline lispro TID  Glucose goal of <180  Hypoglycemia protocol     HEMATOLOGY:  Dx: ZENA  H/h stable   Management:  CBC daily     SKIN:  Dx:  Skin Failure Yes, describe: BL L/E cellutis vs PAD   Management:  Wound following  ACS consulted, signed off 1/1: Elevate legs as able, continue with abx.  If erythema does not completely resolve with antibiotic treatment would recommend re-imaging with non-contrast CT of BLE to assess for new, undrained fluid collections     INFECTIOUS DISEASE:  Dx: Cellulitis, MRSA PNA  Tmax 36.9 C, WBC 9.7  Management:  Micro: 12/28 Legionella, Strep pneum, negative, 12/28 Urine Cx negative, 12/28 BCx NTGD x2.  1/5 sputum MRSA  Abx: Clindamycin (12/28), Zosyn (12/28 - 1/6), Vancomycin (12/28 - 1/6)  ACS consulted signed off 1/1; rec cont IV Atbs     ICU Check List     FEN  Fluids: PRN  Electrolytes: K>4, Phos >3, Mg> 2  Nutrition: Cardiac diet and carb control diet   Prophylaxis:  DVT ppx: Heparin gtt  GI ppx: PPI  Bowel care: Miralax PRN   Hardware:         Urethral Catheter Temperature probe 16 Fr. (Active)   Placement Date: 12/28/24   Hand Hygiene Completed: Yes  Catheter Type: Temperature probe  Tube Size (Fr.): 16 Fr.  Catheter Balloon Size: 5 mL  Urine Returned: Yes   Number of days: 3       Social:  Code: DNR and No Intubation and No ICU    HPOA: Sheyla Griggs [phone]   Disposition: Continue care in SDU. Patient is signing with [facility] hospice.  Likely will discharge home on Sunday (after all home equipment is provided)       FABIOLA Rizvi   01/10/25 at 2:30 PM     Pt discussed with Dr. Cam,  seen and examined. All labs, VS and previous plan of care reviewed.     Disclaimer: Documentation completed with the information available at the time of  input. The times in the chart may not be reflective of actual patient care times, interventions, or procedures. Documentation occurs after the physical care of the patient.

## 2025-01-10 NOTE — PROGRESS NOTES
Spiritual Care Visit  Spiritual Care Request    Reason for Visit:  Routine Visit: Follow-up  Continue Visiting: Yes  Crisis Visit: Critical care     Focus of Care:  Visited With: Patient not available (Pt sleeping at time of this visit.)

## 2025-01-11 LAB
ALBUMIN SERPL BCP-MCNC: 3.2 G/DL (ref 3.4–5)
ANION GAP SERPL CALC-SCNC: ABNORMAL MMOL/L
BASOPHILS # BLD AUTO: 0.11 X10*3/UL (ref 0–0.1)
BASOPHILS NFR BLD AUTO: 1 %
BUN SERPL-MCNC: 67 MG/DL (ref 6–23)
CALCIUM SERPL-MCNC: 9.7 MG/DL (ref 8.6–10.6)
CHLORIDE SERPL-SCNC: 75 MMOL/L (ref 98–107)
CO2 SERPL-SCNC: >45 MMOL/L (ref 21–32)
CREAT SERPL-MCNC: 0.9 MG/DL (ref 0.5–1.3)
EGFRCR SERPLBLD CKD-EPI 2021: >90 ML/MIN/1.73M*2
EOSINOPHIL # BLD AUTO: 0.1 X10*3/UL (ref 0–0.7)
EOSINOPHIL NFR BLD AUTO: 1 %
ERYTHROCYTE [DISTWIDTH] IN BLOOD BY AUTOMATED COUNT: 14.3 % (ref 11.5–14.5)
GLUCOSE BLD MANUAL STRIP-MCNC: 141 MG/DL (ref 74–99)
GLUCOSE BLD MANUAL STRIP-MCNC: 143 MG/DL (ref 74–99)
GLUCOSE BLD MANUAL STRIP-MCNC: 209 MG/DL (ref 74–99)
GLUCOSE BLD MANUAL STRIP-MCNC: 259 MG/DL (ref 74–99)
GLUCOSE SERPL-MCNC: 134 MG/DL (ref 74–99)
HCT VFR BLD AUTO: 56.4 % (ref 41–52)
HGB BLD-MCNC: 18.1 G/DL (ref 13.5–17.5)
IMM GRANULOCYTES # BLD AUTO: 0.08 X10*3/UL (ref 0–0.7)
IMM GRANULOCYTES NFR BLD AUTO: 0.8 % (ref 0–0.9)
LYMPHOCYTES # BLD AUTO: 2.14 X10*3/UL (ref 1.2–4.8)
LYMPHOCYTES NFR BLD AUTO: 20.3 %
MAGNESIUM SERPL-MCNC: 2.5 MG/DL (ref 1.6–2.4)
MCH RBC QN AUTO: 30.8 PG (ref 26–34)
MCHC RBC AUTO-ENTMCNC: 32.1 G/DL (ref 32–36)
MCV RBC AUTO: 96 FL (ref 80–100)
MONOCYTES # BLD AUTO: 1.07 X10*3/UL (ref 0.1–1)
MONOCYTES NFR BLD AUTO: 10.2 %
NEUTROPHILS # BLD AUTO: 7.02 X10*3/UL (ref 1.2–7.7)
NEUTROPHILS NFR BLD AUTO: 66.7 %
NRBC BLD-RTO: 0 /100 WBCS (ref 0–0)
PHOSPHATE SERPL-MCNC: 3.7 MG/DL (ref 2.5–4.9)
PLATELET # BLD AUTO: 248 X10*3/UL (ref 150–450)
POTASSIUM SERPL-SCNC: 4.4 MMOL/L (ref 3.5–5.3)
RBC # BLD AUTO: 5.88 X10*6/UL (ref 4.5–5.9)
SODIUM SERPL-SCNC: 136 MMOL/L (ref 136–145)
UFH PPP CHRO-ACNC: 0.6 IU/ML
UFH PPP CHRO-ACNC: NORMAL
WBC # BLD AUTO: 10.5 X10*3/UL (ref 4.4–11.3)

## 2025-01-11 PROCEDURE — 94640 AIRWAY INHALATION TREATMENT: CPT

## 2025-01-11 PROCEDURE — 94668 MNPJ CHEST WALL SBSQ: CPT

## 2025-01-11 PROCEDURE — 2500000001 HC RX 250 WO HCPCS SELF ADMINISTERED DRUGS (ALT 637 FOR MEDICARE OP): Performed by: NURSE PRACTITIONER

## 2025-01-11 PROCEDURE — 2500000002 HC RX 250 W HCPCS SELF ADMINISTERED DRUGS (ALT 637 FOR MEDICARE OP, ALT 636 FOR OP/ED): Performed by: NURSE PRACTITIONER

## 2025-01-11 PROCEDURE — 94660 CPAP INITIATION&MGMT: CPT

## 2025-01-11 PROCEDURE — 80069 RENAL FUNCTION PANEL: CPT | Performed by: NURSE PRACTITIONER

## 2025-01-11 PROCEDURE — 36415 COLL VENOUS BLD VENIPUNCTURE: CPT | Performed by: NURSE PRACTITIONER

## 2025-01-11 PROCEDURE — 2500000005 HC RX 250 GENERAL PHARMACY W/O HCPCS: Performed by: NURSE PRACTITIONER

## 2025-01-11 PROCEDURE — S4991 NICOTINE PATCH NONLEGEND: HCPCS | Performed by: NURSE PRACTITIONER

## 2025-01-11 PROCEDURE — 2500000004 HC RX 250 GENERAL PHARMACY W/ HCPCS (ALT 636 FOR OP/ED): Performed by: NURSE PRACTITIONER

## 2025-01-11 PROCEDURE — 2500000002 HC RX 250 W HCPCS SELF ADMINISTERED DRUGS (ALT 637 FOR MEDICARE OP, ALT 636 FOR OP/ED): Performed by: INTERNAL MEDICINE

## 2025-01-11 PROCEDURE — 82947 ASSAY GLUCOSE BLOOD QUANT: CPT

## 2025-01-11 PROCEDURE — 85520 HEPARIN ASSAY: CPT | Performed by: NURSE PRACTITIONER

## 2025-01-11 PROCEDURE — 99233 SBSQ HOSP IP/OBS HIGH 50: CPT | Performed by: INTERNAL MEDICINE

## 2025-01-11 PROCEDURE — 85025 COMPLETE CBC W/AUTO DIFF WBC: CPT | Performed by: NURSE PRACTITIONER

## 2025-01-11 PROCEDURE — 2060000001 HC INTERMEDIATE ICU ROOM DAILY

## 2025-01-11 PROCEDURE — 2500000001 HC RX 250 WO HCPCS SELF ADMINISTERED DRUGS (ALT 637 FOR MEDICARE OP)

## 2025-01-11 PROCEDURE — 83735 ASSAY OF MAGNESIUM: CPT | Performed by: NURSE PRACTITIONER

## 2025-01-11 RX ORDER — TRIAMCINOLONE ACETONIDE 1 MG/G
OINTMENT TOPICAL 2 TIMES DAILY
Status: DISCONTINUED | OUTPATIENT
Start: 2025-01-11 | End: 2025-01-12 | Stop reason: HOSPADM

## 2025-01-11 RX ADMIN — INSULIN LISPRO 6 UNITS: 100 INJECTION, SOLUTION INTRAVENOUS; SUBCUTANEOUS at 13:00

## 2025-01-11 RX ADMIN — IPRATROPIUM BROMIDE AND ALBUTEROL SULFATE 3 ML: .5; 3 SOLUTION RESPIRATORY (INHALATION) at 15:28

## 2025-01-11 RX ADMIN — NICOTINE 1 PATCH: 14 PATCH, EXTENDED RELEASE TRANSDERMAL at 08:38

## 2025-01-11 RX ADMIN — IPRATROPIUM BROMIDE AND ALBUTEROL SULFATE 3 ML: .5; 3 SOLUTION RESPIRATORY (INHALATION) at 08:18

## 2025-01-11 RX ADMIN — EZETIMIBE 10 MG: 10 TABLET ORAL at 08:37

## 2025-01-11 RX ADMIN — Medication 5 L/MIN: at 01:06

## 2025-01-11 RX ADMIN — MIDODRINE HYDROCHLORIDE 5 MG: 5 TABLET ORAL at 13:00

## 2025-01-11 RX ADMIN — PANTOPRAZOLE SODIUM 40 MG: 40 TABLET, DELAYED RELEASE ORAL at 06:27

## 2025-01-11 RX ADMIN — HEPARIN SODIUM 1800 UNITS/HR: 10000 INJECTION, SOLUTION INTRAVENOUS at 22:27

## 2025-01-11 RX ADMIN — Medication 5 L/MIN: at 20:05

## 2025-01-11 RX ADMIN — TRIAMCINOLONE ACETONIDE: 1 OINTMENT TOPICAL at 22:18

## 2025-01-11 RX ADMIN — SENNOSIDES AND DOCUSATE SODIUM 1 TABLET: 50; 8.6 TABLET ORAL at 20:03

## 2025-01-11 RX ADMIN — LEVOTHYROXINE SODIUM 125 MCG: 25 TABLET ORAL at 06:27

## 2025-01-11 RX ADMIN — IPRATROPIUM BROMIDE AND ALBUTEROL SULFATE 3 ML: .5; 3 SOLUTION RESPIRATORY (INHALATION) at 20:50

## 2025-01-11 RX ADMIN — NYSTATIN 500000 UNITS: 100000 SUSPENSION ORAL at 20:03

## 2025-01-11 RX ADMIN — NYSTATIN 500000 UNITS: 100000 SUSPENSION ORAL at 08:38

## 2025-01-11 RX ADMIN — MIDODRINE HYDROCHLORIDE 5 MG: 5 TABLET ORAL at 08:38

## 2025-01-11 RX ADMIN — GABAPENTIN 100 MG: 100 CAPSULE ORAL at 08:38

## 2025-01-11 RX ADMIN — HEPARIN SODIUM 1800 UNITS/HR: 10000 INJECTION, SOLUTION INTRAVENOUS at 07:29

## 2025-01-11 RX ADMIN — MIDODRINE HYDROCHLORIDE 5 MG: 5 TABLET ORAL at 20:03

## 2025-01-11 ASSESSMENT — PAIN SCALES - GENERAL
PAINLEVEL_OUTOF10: 0 - NO PAIN

## 2025-01-11 ASSESSMENT — COGNITIVE AND FUNCTIONAL STATUS - GENERAL
MOBILITY SCORE: 8
TOILETING: A LOT
PERSONAL GROOMING: A LOT
DAILY ACTIVITIY SCORE: 13
WALKING IN HOSPITAL ROOM: TOTAL
DRESSING REGULAR UPPER BODY CLOTHING: A LOT
MOVING FROM LYING ON BACK TO SITTING ON SIDE OF FLAT BED WITH BEDRAILS: A LOT
STANDING UP FROM CHAIR USING ARMS: TOTAL
EATING MEALS: A LITTLE
CLIMB 3 TO 5 STEPS WITH RAILING: TOTAL
MOVING TO AND FROM BED TO CHAIR: TOTAL
HELP NEEDED FOR BATHING: A LOT
DRESSING REGULAR LOWER BODY CLOTHING: A LOT
TURNING FROM BACK TO SIDE WHILE IN FLAT BAD: A LOT

## 2025-01-11 ASSESSMENT — PAIN - FUNCTIONAL ASSESSMENT
PAIN_FUNCTIONAL_ASSESSMENT: 0-10

## 2025-01-11 ASSESSMENT — PAIN SCALES - WONG BAKER: WONGBAKER_NUMERICALRESPONSE: NO HURT

## 2025-01-11 NOTE — PROGRESS NOTES
01/11/25 0900   Discharge Planning   Living Arrangements Alone   Support Systems Friends/neighbors;Home care staff  ([facility])   Assistance Needed total   Type of Residence Private residence   Home or Post Acute Services In home services  ([facility], 24hr [facility])   Type of Home Care Services Home health aide;Hospice  ([facility], [facility])   Expected Discharge Disposition [facility]     Consents signed for [facility].  Per pall care, pt friend/support  will confirm with home health agency [facility] that SOC for 24hr aide is Sunday.  Once confirmed, pt will discharge home with [employer].  [employer] is ordering DME for pt home.   If JR cannot confirm SOC for 24hr aides, discharge from [facility] will be postponed.  SW will follow and update team as needed.  Triston Wagner [facility]    01/11/2025 1130  IGNACIO spoke to pt friend  [phone] to follow up with HHA coordination.  reports he was told on Friday night that agency was 99% certain they had staffing for SOC [place] and that he would follow up shortly to confirm.  JR reported it was his understanding that [facility] would provide transport home. IGNACIO sent a message to [employer] in [facility] to confirm.   was relieved to hear that transport could be arranged by [facility] staff if [facility] was unable.  JR will call SW back with whether [facility]' SOC is tomorrow as planned.  SW will follow. Triston Wagner [facility]    01/11/2025 1447  IGNACIO called JR to follow up and he reports that he's called [facility] multiple times this afternoon and has not heard back. He will call again.  Transport will need to be requested for pt to go home; IGNACIO let JR know that if there's a specific time that works best, he can let the team know.  Care team updated.  SW will follow. Triston Wagner [facility]

## 2025-01-11 NOTE — SIGNIFICANT EVENT
Rapid Response Nurse Note:  [x] RADAR alert/Score 7    Pager time:   Arrival time:   Event end time:   Location: [facility] 6017 stepdown  [x] Phone triage     Rapid response initiated by:  [] Rapid Response RN [] Family [] Nursing Supervisor [] Physician   [x] RADAR auto-page [] Sepsis auto-page [] RN [] RT   [] NP/PA [] Other:     Primary reason for call:   [] BAT [] New CPAP/BiPAP [] Bleeding [] Change in mental status   [] Chest pain [] Code blue [] FiO2 >/= 50% [] HR </= 40 bpm   [] HR >/= 130 bpm [] Hyperglycemia [] Hypoglycemia [x] RADAR    [] RR </= 8 bpm [] RR >/= 30 bpm [] SBP </= 90 mmHg [] SpO2 < 90%   [] Seizure [] Sepsis [] Staff concern:     Initial VS and/or RADAR VS:  radar vitals below in bold, temp not included in radar     Vitals:    01/10/25 0858 01/10/25 1100 01/10/25 1600 01/10/25 2000   BP:  97/78 101/86 91/70   BP Location:  Right arm Right arm Right arm   Patient Position:  Lying Lying Lying   Pulse:   (!) 114 (!) 114   Resp:   23 26   Temp: 36.8 °C (98.3 °F) 36.2 °C (97.2 °F) 36.4 °C (97.5 °F) 36.2 °C (97.1 °F)   TempSrc: Temporal Temporal Temporal Temporal   SpO2:   95% 96%   Weight:       Height:            Interventions:  [x] None [] ABG [] Assist w/ICU transfer [] BAT paged    [] Bag mask [] Blood [] Cardioversion [] Code Blue   [] Code blue for intubation [] Code status changed [] Chest x-ray [] EKG   [] IV fluid/bolus [] KUB x-ray [] Labs/cultures [] Medication   [] Nebulizer treatment [] NIPPV (CPAP/BiPAP) [] Oxygen [] Oral airway   [] Peripheral IV [] Palliative care consult [] CT/MRI [] Sepsis protocol    [] Suctioned [] Other:       Outcome:  [] Coded and  [] Code blue for intubation [] Coded and transferred to ICU []  on division   [x] Remained on division (no change) [] Remained on division + additional monitoring [] Remained in ED [] Transferred to ED   [] Transferred to ICU [] Transferred to inpatient status [] Transferred for interventions (procedure) []  Transferred to ICU stepdown    [] Transferred to surgery [] Transferred to telemetry [] Sepsis protocol [] STEMI protocol   [] Stroke protocol [x] Bedside nurse instructed to page rapid response for any concerns or acute change in condition/VS     Additional Comments: Spoke to RN regarding vital signs.  Per RN, pt given Midodrine.  He was also signed on with hospice.  No concerns from RN at this time.

## 2025-01-11 NOTE — PROGRESS NOTES
01/11/25 0900   Discharge Planning   Living Arrangements Alone   Support Systems Friends/neighbors;Home care staff  ([facility])   Assistance Needed total   Type of Residence Private residence   Home or Post Acute Services In home services  ([facility], 24hr [facility])   Type of Home Care Services Home health aide;Hospice  ([facility], [facility])   Expected Discharge Disposition \[facility] [place] [facility]\""     Consents signed for [facility].  Per pall care, pt friend/support JR will confirm with home health agency [facility] that SOC for 24hr aide is Sunday.  Once confirmed, pt will [facility].  [facility] is ordering DME for pt home.   If JR cannot confirm SOC for 24hr aides, discharge from [facility] will be postponed.  SW will follow and update team as needed.  Triston Wagner [facility]    01/11/2025 1130  IGNACIO spoke to pt friend  [phone] to follow up with HHA coordination.  reports he was told on Friday night that agency was 99% certain they had staffing for SOC Sun and that he would follow up shortly to confirm.  JR reported it was his understanding that [facility] would provide transport home. IGNACIO sent a message to [facility] in [facility] to confirm.   was relieved to hear that transport could be arranged by [facility] staff if [facility] was unable.  JR will call SW back with whether [facility]' SOC is tomorrow as planned.  SW will follow. Triston Wagner [facility]    01/11/2025 1445  IGNACIO called JR to follow up and he reports that he's called [facility] multiple times this afternoon and has not heard back. He will call again.  Transport will need to be requested for pt to go home; IGNACIO let JR know that if there's a specific time that works best, he can let the team know.  Care team updated.  SW will follow. Triston Wagner [facility]    01/11/2025 1600  JR confirmed that the aide with [facility] will be at pt  residence tomorrow around noon/1300.  JR asked transport to be requested for 1400 tomorrow afternoon.  JR is grateful for the care pt has received while at [facility], particularly appreciative of the assistance as pt transitions to hospice care.  [facility] being [facility] for 1400 Sunday.  At the request of the care team, SW asked [facility] if M2Bs is preferred or if they will need pt's meds managed another way.  JR would appreciate a phone call in the morning with updates on transport time or any other news.  SW did inform care team that JR was on his way bedside this afternoon if they wanted to meet with him.   Care team was asked to fill out a DNR form for transport; they were asked to put it in pt paper chart.  SW will follow. Triston Wagner [facility]    01/11/2025 1700  Transport confirmed for 1530 tomorrow afternoon with [facility].  [facility] notified.  Care team notified and will update JR when he's bedside later today.  Care team confirms that DNR form is in the paper chart at the .  SW will follow. Triston Wagner [facility]

## 2025-01-11 NOTE — PROGRESS NOTES
"Medical Intensive Care Step Down- Daily Progress Note   Subjective    Alo Glass is a 69 y.o. year old male patient admitted on 12/29/2024 with following ICU needs: Septic shock 2/2 Bilateral LE cellulitis      Interval History:  No acute events overnight.  Plan for discharge home with hospice likely tomorrow vs Monday   Endorses mild pruritus of B/L flanks, no redness noted.  Lotion applied by nursing.  Denies pain.    Meds    Scheduled medications  [Held by provider] bumetanide, 4 mg, intravenous, q12h  ezetimibe, 10 mg, oral, Daily  gabapentin, 100 mg, oral, Daily  insulin lispro, 0-10 Units, subcutaneous, TID AC  ipratropium-albuteroL, 3 mL, nebulization, TID  levothyroxine, 125 mcg, oral, Daily  [Held by provider] metOLazone, 10 mg, oral, Daily  midodrine, 5 mg, oral, TID  nicotine, 1 patch, transdermal, Daily  nystatin, 5 mL, Mouth/Throat, BID  oxygen, , inhalation, Nightly  pantoprazole, 40 mg, oral, Daily before breakfast  sennosides-docusate sodium, 1 tablet, oral, Nightly      Continuous medications  heparin, 0-4,500 Units/hr, Last Rate: 1,800 Units/hr (01/10/25 1705)      PRN medications  PRN medications: acetaminophen, albuterol, dextrose, dextrose, glucagon, glucagon, heparin, HYDROmorphone, lidocaine-diphenhydraMINE-Maalox 1:1:1, oxygen, polyethylene glycol, sodium chloride     Objective    Blood pressure 108/71, pulse 94, temperature 36.3 °C (97.4 °F), temperature source Temporal, resp. rate 16, height 1.803 m (5' 11\"), weight 121 kg (265 lb 10.5 oz), SpO2 100%.       Physical exam:  Constitutional: chronically Ill appearing elderly male in NAD  Eyes: PERRL, no icterus   ENMT: moist mucous membranes  Head/Neck: atraumatic   Respiratory/Thorax: Lungs diminished bilateral bases, CTA, intermittent cough with thick brown sputum production, non-labored breathing, on Cpap  Cardiovascular: HR irregular.   Gastrointestinal: obese, soft, non-tender, normoactive bowel sounds   Extremities: +1 edema B/L " LE, Cellulitis with erythema b/l LE, venous statis ulcer vs blister RLE  Neurological: alert and oriented x 3, speech clear, follows commands appropriately, no focal deficits.    Skin: Warm and dry, BLE cellulitis      Intake/Output Summary (Last 24 hours) at 1/11/2025 0651  Last data filed at 1/11/2025 0000  Gross per 24 hour   Intake 1160 ml   Output 2750 ml   Net -1590 ml     Labs:   Results from last 72 hours   Lab Units 01/10/25  0556 01/09/25  2141 01/09/25  1005 01/08/25  2202 01/08/25  0936   SODIUM mmol/L 136 136 134* 137 132*   POTASSIUM mmol/L 3.9 3.7 4.2 3.1* 3.2*   CHLORIDE mmol/L 73* 73* 75* 75* 77*   CO2 mmol/L >45* >45* 43* 45* 44*   BUN mg/dL 76* 83* 85* 93* 102*   CREATININE mg/dL 0.77 0.79 0.84 0.98 1.13   GLUCOSE mg/dL 125* 200* 145* 150* 165*   CALCIUM mg/dL 9.2 9.1 8.9 9.1 8.9   ANION GAP mmol/L  --   --  20 20 14   EGFR mL/min/1.73m*2 >90 >90 >90 83 70   PHOSPHORUS mg/dL 3.4 3.2 3.3 3.4 3.9      Results from last 72 hours   Lab Units 01/10/25  0556 01/09/25  0442   WBC AUTO x10*3/uL 9.7 9.2   HEMOGLOBIN g/dL 17.6* 16.9   HEMATOCRIT % 52.5* 51.8   PLATELETS AUTO x10*3/uL 262 249   NEUTROS PCT AUTO % 72.1 74.8   LYMPHS PCT AUTO % 17.0 14.1   MONOS PCT AUTO % 8.2 8.6   EOS PCT AUTO % 1.1 1.2            Results from last 72 hours   Lab Units 01/08/25  0936   POCT PH, VENOUS pH 7.57*   POCT PCO2, VENOUS mm Hg 55*   POCT PO2, VENOUS mm Hg 84*       Micro/ID:     Lab Results   Component Value Date    URINECULTURE No growth 12/28/2024    BLOODCULT No growth at 4 days -  FINAL REPORT 12/28/2024    BLOODCULT No growth at 4 days -  FINAL REPORT 12/28/2024       Assessment and Plan     Assessment: Alo Glass is a 69 y.o. year old male patient admitted on 12/29/2024 with Septic shock 2/2 bilateral lower ext cellulitis and cardiogenic shock and AHRF 2/2 volume overload and LEONA.     Summary for 01/11/25  :  Continue on home Cpap and 5L NC  Plan for home with [facility] tomorrow vs Monday.  Pt  friend/support JR will confirm with home health agency [facility] that SOC for 24hr aide is Sunday. If unable to confirm discharge will postponed   Off Bumex x3 days now, and holding off Metolazone per nephrology    Plan:  NEUROLOGY/PSYCH:  Dx: ZENA  A&Ox3  Management:  Continue gabapentin 100mg daily and Nicotine patch daily,   Pain regimen:  Tylenol 650 mg prn and Dilaudid PRN   PT/OT-  Palliative Care following.   Patient signed consent with [facility] on 1/10, plan to dc home with hospice tomorrow vs Monday     CARDIOVASCULAR:  Dx: Hx HFrEF, HTN, HLD, severe PAD.  Septic shock, resolved.  Cardiogenic shock, resovled. Aflutter  HDS  Remains off vasopressors since 12/30  TTE 12/30: LVEF 19%  Management:  Continue Ezetimibe 10mg daily   Holding home lasix and SGLT-2 iso hypotension  Consider GDMT when able  S/p Dig load 12/29-12/30  Holding off diuresis last 2 days  Heparin gtt  Continue Midodrine 5mg TID for renal perfusion       PULMONARY:  Dx: Hx asthma and COPD GOLD 3 (2019: FEV1 45% w +ve BD change; %/TLC 85%). AHRF 2/2 hypervolemia   5L HF NC& home CPAP, 8 PS  CxR 1/5 : Interval improvement in left basilar opacities and increased right basilar opacities with small right pleural effusion.   Management:  Transitioned from home Cpap to Bipap 12/31 d/t hypercapnia, now back to home CPAP  Large volume diuresis during hospital stay, now off   Cont Duonebs q6h, albuterol PRN  Airway clearance TID    RENAL/GENITOURINARY:  Dx: LEONA. Hyponatremia likely I/s/o volume overload.  LT renal mass c/f RCC  Has been on HD during stay  Renal US 12/30: No hydronephrosis. hypoechoic lesion of the left superior pole measuring 1.9 cm which was seen on the prior CT dated 12/28/2024, further evaluation with a kidney protocol MRI is strongly recommended. There is an additional indeterminate 0.5 cm hypoechoic lesion of the left kidney.  Management:  Voiding via lipscomb   Nephrology signed off today in the setting of  patient transitioning to hospice   holding Metolazone 10 mg daily since 1/9  Pt saw Urology on 12/12/2024 and referrred to IR for biopsy   Urine lytes consistent with prerenal    GASTROENTEROLOGY:  Dx: Hx GERD. Poor oral intake   Most recent BM 1/10  Management:  Diet: Cardiac and carb control diet  Bowel Regimen: Miralax PRN   PPI    ENDOCRINOLOGY:  Dx: Hx Hypothyroid and DMII  Glucose 150-160  HgbA1C  5.5%  Management:  Home regimen:  Mounjaro and Jardiance; pt also utilizes a Dexcom CGM)  Cont with Insuline lispro TID  Glucose goal of <180  Hypoglycemia protocol     HEMATOLOGY:  Dx: ZENA  H/h stable   Management:  CBC daily     SKIN:  Dx:  Skin Failure Yes, describe: BL L/E cellutis vs PAD   Management:  Wound following  ACS consulted, signed off 1/1: Elevate legs as able, continue with abx.  If erythema does not completely resolve with antibiotic treatment would recommend re-imaging with non-contrast CT of BLE to assess for new, undrained fluid collections     INFECTIOUS DISEASE:  Dx: Cellulitis, MRSA PNA  Tmax 36.9 C, WBC 9.7  Management:  Micro: 12/28 Legionella, Strep pneum, negative, 12/28 Urine Cx negative, 12/28 BCx NTGD x2.  1/5 sputum MRSA  Abx: Clindamycin (12/28), Zosyn (12/28 - 1/6), Vancomycin (12/28 - 1/6)  ACS consulted signed off 1/1  Monitor off abx     ICU Check List     FEN  Fluids: PRN  Electrolytes: K>4, Phos >3, Mg> 2  Nutrition: Cardiac diet and carb control diet   Prophylaxis:  DVT ppx: Heparin gtt  GI ppx: PPI  Bowel care: Miralax PRN   Hardware:         Urethral Catheter Temperature probe 16 Fr. (Active)   Placement Date: 12/28/24   Hand Hygiene Completed: Yes  Catheter Type: Temperature probe  Tube Size (Fr.): 16 Fr.  Catheter Balloon Size: 5 mL  Urine Returned: Yes   Number of days: 3       Social:  Code: DNR and No Intubation and No ICU    HPOA: Sheyla Griggs [phone]   Disposition: Continue care in SDU. [facility] is signing with [facility].  Likely will discharge home on Sunday  (after all home equipment is provided)       Antonio Amaya, PARAM-CNP   01/11/25 at 6:51 AM     Pt discussed with Dr. Cam,  seen and examined. All labs, VS and previous plan of care reviewed.     Disclaimer: Documentation completed with the information available at the time of input. The times in the chart may not be reflective of actual patient care times, interventions, or procedures. Documentation occurs after the physical care of the patient.

## 2025-01-11 NOTE — CARE PLAN
Problem: Skin  Goal: Decreased wound size/increased tissue granulation at next dressing change  1/10/2025 2258 by Edith Hurley RN  Outcome: Progressing  1/10/2025 2135 by Edith Hurley RN  Outcome: Progressing  Flowsheets (Taken 1/10/2025 2135)  Decreased wound size/increased tissue granulation at next dressing change:   Promote sleep for wound healing   Protective dressings over bony prominences   Utilize specialty bed per algorithm  Goal: Participates in plan/prevention/treatment measures  1/10/2025 2258 by Edith Hurley RN  Outcome: Progressing  1/10/2025 2135 by Edith Hurley RN  Outcome: Progressing  Goal: Prevent/manage excess moisture  1/10/2025 2258 by Edith Hurley RN  Outcome: Progressing  1/10/2025 2135 by Edith Hurley RN  Outcome: Progressing  Goal: Prevent/minimize sheer/friction injuries  1/10/2025 2258 by Edith Hurley RN  Outcome: Progressing  1/10/2025 2135 by Edith Hurley RN  Outcome: Progressing  Goal: Promote/optimize nutrition  1/10/2025 2258 by Edith Hurley RN  Outcome: Progressing  1/10/2025 2135 by Edith Hurley RN  Outcome: Progressing  Goal: Promote skin healing  1/10/2025 2258 by Edith Hurley RN  Outcome: Progressing  1/10/2025 2135 by Edith Hurley RN  Outcome: Progressing     Problem: Safety - Adult  Goal: Free from fall injury  1/10/2025 2258 by Edith Hurley RN  Outcome: Progressing  1/10/2025 2135 by Edith Hurley RN  Outcome: Progressing     Problem: Discharge Planning  Goal: Discharge to home or other facility with appropriate resources  1/10/2025 2258 by Edith Hurley RN  Outcome: Progressing  1/10/2025 2135 by Edith Hurley RN  Outcome: Progressing     Problem: Chronic Conditions and Co-morbidities  Goal: Patient's chronic conditions and co-morbidity symptoms are  monitored and maintained or improved  1/10/2025 2258 by Edith Hurley RN  Outcome: Progressing  1/10/2025 2135 by Edith Hurley RN  Outcome: Progressing     Problem: Diabetes  Goal: Achieve decreasing blood glucose levels by end of shift  1/10/2025 2258 by Edith Hurley RN  Outcome: Progressing  1/10/2025 2135 by Edith Hurley RN  Outcome: Progressing  Goal: Increase stability of blood glucose readings by end of shift  1/10/2025 2258 by Edith Hurley RN  Outcome: Progressing  1/10/2025 2135 by Edith Hurley RN  Outcome: Progressing  Goal: Decrease in ketones present in urine by end of shift  1/10/2025 2258 by Edith Hurley RN  Outcome: Progressing  1/10/2025 2135 by Edith Hurley RN  Outcome: Progressing  Goal: Maintain electrolyte levels within acceptable range throughout shift  1/10/2025 2258 by Edith Hurley RN  Outcome: Progressing  1/10/2025 2135 by Edith Hurley RN  Outcome: Progressing  Goal: Maintain glucose levels >70mg/dl to <250mg/dl throughout shift  1/10/2025 2258 by Edith Hurley RN  Outcome: Progressing  1/10/2025 2135 by Edith Hurley RN  Outcome: Progressing  Goal: No changes in neurological exam by end of shift  1/10/2025 2258 by Edith Hurley RN  Outcome: Progressing  1/10/2025 2135 by Edith Hurley RN  Outcome: Progressing  Goal: Learn about and adhere to nutrition recommendations by end of shift  1/10/2025 2258 by Edith Hurley RN  Outcome: Progressing  1/10/2025 2135 by Edith Hurley RN  Outcome: Progressing  Goal: Vital signs within normal range for age by end of shift  1/10/2025 2258 by Edith Hurley RN  Outcome: Progressing  1/10/2025 2135 by Edith Hurley, RN  Outcome: Progressing  Goal: Increase self care and/or family involovement by end of shift  1/10/2025  2258 by Edith Hurley RN  Outcome: Progressing  1/10/2025 2135 by Edith Hurley RN  Outcome: Progressing  Goal: Receive DSME education by end of shift  1/10/2025 2258 by Edith Hurley RN  Outcome: Progressing  1/10/2025 2135 by Edith Hurley RN  Outcome: Progressing     Problem: Pain  Goal: Takes deep breaths with improved pain control throughout the shift  1/10/2025 2258 by Edith Hurley RN  Outcome: Progressing  1/10/2025 2135 by Edith Hurley RN  Outcome: Progressing  Goal: Turns in bed with improved pain control throughout the shift  1/10/2025 2258 by Edith Hurley RN  Outcome: Progressing  1/10/2025 2135 by Edith Hurley RN  Outcome: Progressing  Goal: Walks with improved pain control throughout the shift  1/10/2025 2258 by Edith Hurley RN  Outcome: Progressing  1/10/2025 2135 by Edith Hurley RN  Outcome: Progressing  Goal: Performs ADL's with improved pain control throughout shift  1/10/2025 2258 by Edith Hurley RN  Outcome: Progressing  1/10/2025 2135 by Edith Hurley RN  Outcome: Progressing  Goal: Participates in PT with improved pain control throughout the shift  1/10/2025 2258 by Edith Hurley RN  Outcome: Progressing  1/10/2025 2135 by Edith Hurley RN  Outcome: Progressing  Goal: Free from opioid side effects throughout the shift  1/10/2025 2258 by Edith Hurley RN  Outcome: Progressing  1/10/2025 2135 by Edith Hurley RN  Outcome: Progressing  Goal: Free from acute confusion related to pain meds throughout the shift  1/10/2025 2258 by Edith Hurley RN  Outcome: Progressing  1/10/2025 2135 by Edith Santos Mei, RN  Outcome: Progressing     Problem: Fall/Injury  Goal: Not fall by end of shift  1/10/2025 2258 by Edith Hurley RN  Outcome: Progressing  1/10/2025  2135 by Edith Hurley RN  Outcome: Progressing  Goal: Be free from injury by end of the shift  1/10/2025 2258 by Edith Hurley RN  Outcome: Progressing  1/10/2025 2135 by Edith Hurley RN  Outcome: Progressing  Goal: Verbalize understanding of personal risk factors for fall in the hospital  1/10/2025 2258 by Edith Hurley RN  Outcome: Progressing  1/10/2025 2135 by Edith Hurley RN  Outcome: Progressing  Goal: Verbalize understanding of risk factor reduction measures to prevent injury from fall in the home  1/10/2025 2258 by Edith Hurley RN  Outcome: Progressing  1/10/2025 2135 by Edith Hurley RN  Outcome: Progressing  Goal: Pace activities to prevent fatigue by end of the shift  1/10/2025 2258 by Edith Hurley RN  Outcome: Progressing  1/10/2025 2135 by Edith Hurley RN  Outcome: Progressing

## 2025-01-12 VITALS
WEIGHT: 265.65 LBS | HEART RATE: 109 BPM | RESPIRATION RATE: 21 BRPM | OXYGEN SATURATION: 93 % | HEIGHT: 71 IN | TEMPERATURE: 97.3 F | BODY MASS INDEX: 37.19 KG/M2 | DIASTOLIC BLOOD PRESSURE: 76 MMHG | SYSTOLIC BLOOD PRESSURE: 95 MMHG

## 2025-01-12 LAB
ALBUMIN SERPL BCP-MCNC: 3.3 G/DL (ref 3.4–5)
ANION GAP SERPL CALC-SCNC: 29 MMOL/L (ref 10–20)
BASOPHILS # BLD AUTO: 0.09 X10*3/UL (ref 0–0.1)
BASOPHILS NFR BLD AUTO: 1 %
BUN SERPL-MCNC: 60 MG/DL (ref 6–23)
CALCIUM SERPL-MCNC: 9.6 MG/DL (ref 8.6–10.6)
CHLORIDE SERPL-SCNC: 77 MMOL/L (ref 98–107)
CO2 SERPL-SCNC: 34 MMOL/L (ref 21–32)
CREAT SERPL-MCNC: 0.91 MG/DL (ref 0.5–1.3)
EGFRCR SERPLBLD CKD-EPI 2021: >90 ML/MIN/1.73M*2
EOSINOPHIL # BLD AUTO: 0.08 X10*3/UL (ref 0–0.7)
EOSINOPHIL NFR BLD AUTO: 0.9 %
ERYTHROCYTE [DISTWIDTH] IN BLOOD BY AUTOMATED COUNT: 14.3 % (ref 11.5–14.5)
GLUCOSE BLD MANUAL STRIP-MCNC: 100 MG/DL (ref 74–99)
GLUCOSE BLD MANUAL STRIP-MCNC: 159 MG/DL (ref 74–99)
GLUCOSE BLD MANUAL STRIP-MCNC: 171 MG/DL (ref 74–99)
GLUCOSE SERPL-MCNC: 119 MG/DL (ref 74–99)
HCT VFR BLD AUTO: 56 % (ref 41–52)
HGB BLD-MCNC: 18.2 G/DL (ref 13.5–17.5)
IMM GRANULOCYTES # BLD AUTO: 0.1 X10*3/UL (ref 0–0.7)
IMM GRANULOCYTES NFR BLD AUTO: 1.1 % (ref 0–0.9)
LYMPHOCYTES # BLD AUTO: 1.65 X10*3/UL (ref 1.2–4.8)
LYMPHOCYTES NFR BLD AUTO: 18.7 %
MCH RBC QN AUTO: 31.4 PG (ref 26–34)
MCHC RBC AUTO-ENTMCNC: 32.5 G/DL (ref 32–36)
MCV RBC AUTO: 97 FL (ref 80–100)
MONOCYTES # BLD AUTO: 0.78 X10*3/UL (ref 0.1–1)
MONOCYTES NFR BLD AUTO: 8.8 %
NEUTROPHILS # BLD AUTO: 6.12 X10*3/UL (ref 1.2–7.7)
NEUTROPHILS NFR BLD AUTO: 69.5 %
NRBC BLD-RTO: 0 /100 WBCS (ref 0–0)
PHOSPHATE SERPL-MCNC: 3.8 MG/DL (ref 2.5–4.9)
PLATELET # BLD AUTO: 244 X10*3/UL (ref 150–450)
POTASSIUM SERPL-SCNC: 4.6 MMOL/L (ref 3.5–5.3)
RBC # BLD AUTO: 5.79 X10*6/UL (ref 4.5–5.9)
SODIUM SERPL-SCNC: 135 MMOL/L (ref 136–145)
WBC # BLD AUTO: 8.8 X10*3/UL (ref 4.4–11.3)

## 2025-01-12 PROCEDURE — 82947 ASSAY GLUCOSE BLOOD QUANT: CPT

## 2025-01-12 PROCEDURE — 2500000001 HC RX 250 WO HCPCS SELF ADMINISTERED DRUGS (ALT 637 FOR MEDICARE OP): Performed by: NURSE PRACTITIONER

## 2025-01-12 PROCEDURE — 2500000004 HC RX 250 GENERAL PHARMACY W/ HCPCS (ALT 636 FOR OP/ED): Performed by: NURSE PRACTITIONER

## 2025-01-12 PROCEDURE — 2500000002 HC RX 250 W HCPCS SELF ADMINISTERED DRUGS (ALT 637 FOR MEDICARE OP, ALT 636 FOR OP/ED): Performed by: NURSE PRACTITIONER

## 2025-01-12 PROCEDURE — 94660 CPAP INITIATION&MGMT: CPT

## 2025-01-12 PROCEDURE — 94640 AIRWAY INHALATION TREATMENT: CPT

## 2025-01-12 PROCEDURE — 99233 SBSQ HOSP IP/OBS HIGH 50: CPT | Performed by: INTERNAL MEDICINE

## 2025-01-12 PROCEDURE — 80069 RENAL FUNCTION PANEL: CPT | Performed by: NURSE PRACTITIONER

## 2025-01-12 PROCEDURE — 85025 COMPLETE CBC W/AUTO DIFF WBC: CPT | Performed by: NURSE PRACTITIONER

## 2025-01-12 PROCEDURE — 36415 COLL VENOUS BLD VENIPUNCTURE: CPT | Performed by: NURSE PRACTITIONER

## 2025-01-12 PROCEDURE — S4991 NICOTINE PATCH NONLEGEND: HCPCS | Performed by: NURSE PRACTITIONER

## 2025-01-12 PROCEDURE — 2500000002 HC RX 250 W HCPCS SELF ADMINISTERED DRUGS (ALT 637 FOR MEDICARE OP, ALT 636 FOR OP/ED): Performed by: INTERNAL MEDICINE

## 2025-01-12 PROCEDURE — 2500000005 HC RX 250 GENERAL PHARMACY W/O HCPCS: Performed by: NURSE PRACTITIONER

## 2025-01-12 RX ORDER — AMOXICILLIN 250 MG
1 CAPSULE ORAL NIGHTLY
Start: 2025-01-12

## 2025-01-12 RX ORDER — POLYETHYLENE GLYCOL 3350 17 G/17G
17 POWDER, FOR SOLUTION ORAL DAILY PRN
Start: 2025-01-12

## 2025-01-12 RX ORDER — MIDODRINE HYDROCHLORIDE 5 MG/1
5 TABLET ORAL 3 TIMES DAILY
Start: 2025-01-12

## 2025-01-12 RX ORDER — ACETAMINOPHEN 325 MG/1
650 TABLET ORAL EVERY 4 HOURS PRN
Start: 2025-01-12

## 2025-01-12 RX ORDER — TRIAMCINOLONE ACETONIDE 1 MG/G
OINTMENT TOPICAL 2 TIMES DAILY
Start: 2025-01-12

## 2025-01-12 RX ORDER — IBUPROFEN 200 MG
1 TABLET ORAL DAILY
Start: 2025-01-13

## 2025-01-12 RX ORDER — NYSTATIN 100000 [USP'U]/ML
5 SUSPENSION ORAL 2 TIMES DAILY
Start: 2025-01-12

## 2025-01-12 RX ADMIN — HYDROMORPHONE HYDROCHLORIDE 0.4 MG: 1 INJECTION, SOLUTION INTRAMUSCULAR; INTRAVENOUS; SUBCUTANEOUS at 16:22

## 2025-01-12 RX ADMIN — TRIAMCINOLONE ACETONIDE: 1 OINTMENT TOPICAL at 08:57

## 2025-01-12 RX ADMIN — IPRATROPIUM BROMIDE AND ALBUTEROL SULFATE 3 ML: .5; 3 SOLUTION RESPIRATORY (INHALATION) at 08:24

## 2025-01-12 RX ADMIN — LEVOTHYROXINE SODIUM 125 MCG: 25 TABLET ORAL at 08:56

## 2025-01-12 RX ADMIN — Medication 5 L/MIN: at 08:24

## 2025-01-12 RX ADMIN — NYSTATIN 500000 UNITS: 100000 SUSPENSION ORAL at 08:57

## 2025-01-12 RX ADMIN — INSULIN LISPRO 2 UNITS: 100 INJECTION, SOLUTION INTRAVENOUS; SUBCUTANEOUS at 08:57

## 2025-01-12 RX ADMIN — GABAPENTIN 100 MG: 100 CAPSULE ORAL at 08:57

## 2025-01-12 RX ADMIN — INSULIN LISPRO 2 UNITS: 100 INJECTION, SOLUTION INTRAVENOUS; SUBCUTANEOUS at 13:10

## 2025-01-12 RX ADMIN — MIDODRINE HYDROCHLORIDE 5 MG: 5 TABLET ORAL at 13:13

## 2025-01-12 RX ADMIN — EZETIMIBE 10 MG: 10 TABLET ORAL at 08:57

## 2025-01-12 RX ADMIN — MIDODRINE HYDROCHLORIDE 5 MG: 5 TABLET ORAL at 08:57

## 2025-01-12 RX ADMIN — NICOTINE 1 PATCH: 14 PATCH, EXTENDED RELEASE TRANSDERMAL at 08:57

## 2025-01-12 ASSESSMENT — PAIN SCALES - GENERAL
PAINLEVEL_OUTOF10: 0 - NO PAIN
PAINLEVEL_OUTOF10: 7
PAINLEVEL_OUTOF10: 0 - NO PAIN

## 2025-01-12 ASSESSMENT — PAIN - FUNCTIONAL ASSESSMENT
PAIN_FUNCTIONAL_ASSESSMENT: 0-10
PAIN_FUNCTIONAL_ASSESSMENT: 0-10

## 2025-01-12 NOTE — CARE PLAN
Problem: Pain  Goal: Takes deep breaths with improved pain control throughout the shift  Outcome: Progressing  Goal: Turns in bed with improved pain control throughout the shift  Outcome: Progressing  Goal: Walks with improved pain control throughout the shift  Outcome: Progressing  Goal: Performs ADL's with improved pain control throughout shift  Outcome: Progressing  Goal: Participates in PT with improved pain control throughout the shift  Outcome: Progressing  Goal: Free from opioid side effects throughout the shift  Outcome: Progressing  Goal: Free from acute confusion related to pain meds throughout the shift  Outcome: Progressing     Problem: Fall/Injury  Goal: Not fall by end of shift  Outcome: Progressing  Goal: Be free from injury by end of the shift  Outcome: Progressing  Goal: Verbalize understanding of personal risk factors for fall in the hospital  Outcome: Progressing  Goal: Verbalize understanding of risk factor reduction measures to prevent injury from fall in the home  Outcome: Progressing  Goal: Pace activities to prevent fatigue by end of the shift  Outcome: Progressing   The patient's goals for the shift include      The clinical goals for the shift include pt will remain HDS thru the shift

## 2025-01-12 NOTE — SIGNIFICANT EVENT
Rapid Response Nurse Note: RADAR alert: 8    Pager time: [phone]  Arrival time: 1305  Event end time: 1310  Location: [facility] 60  [x] Triage by phone or secure messaging    Rapid response initiated by:  [] Rapid response RN [] Family [] Nursing Supervisor [] Physician   [x] RADAR auto page [] Sepsis auto-page [] RN [] RT   [] NP/PA [] Other:     Primary reason for call:   [] BAT [] New CPAP/BiPAP [] Bleeding [] Change in mental status   [] Chest pain [] Code blue [] FiO2 >/= 50% [] HR </= 40 bpm   [] HR >/= 130 bpm [] Hyperglycemia [] Hypoglycemia [x] RADAR    [] RR </= 8 bpm [] RR >/= 30 bpm [] SBP </= 90 mmHg [] SpO2 < 90%   [] Seizure [] Sepsis [] Shortness of breath  [] Staff concern: see comments     Initial VS and/or RADAR VS: T null °C; HR 95; RR 22; BP 98/65; SPO2 95%.    Providers present at bedside (if applicable):     Name of ICU Provider contacted (if applicable):     Interventions:  [x] None [] ABG/VBG [] Assist w/ICU transfer [] BAT paged    [] Bag mask [] Blood [] Cardioversion [] Code Blue   [] Code blue for intubation [] Code status changed [] Chest x-ray [] EKG   [] IV fluid/bolus [] KUB x-ray [] Labs/cultures [] Medication   [] Nebulizer treatment [] NIPPV (CPAP/BiPAP) [] Oxygen [] Oral airway   [] Peripheral IV [] Palliative care consult [] CT/MRI [] Sepsis protocol    [] Suctioned [] Other:     Outcome:  [] Coded and  [] Code blue for intubation [] Coded and transferred to ICU []  on division   [x] Remained on division (no change) [] Remained on division + additional monitoring [] Remained in ED [] Transferred to ED   [] Transferred to ICU [] Transferred to inpatient status [] Transferred for interventions (procedure) [] Transferred to ICU stepdown    [] Transferred to surgery [] Transferred to telemetry [] Sepsis protocol [] STEMI protocol   [] Stroke protocol [x] Bedside nurse instructed to page rapid response for any concerns or acute change in condition/VS     Additional Comments:      Radar auto-page received for a radar score of 8 with the above listed vital signs.  Vital signs were confirmed and reviewed with primary RN.  He is at his current baseline and RN has no current concerns.  There are no indications for any interventions by Rapid Response at this time.  RN to contact Rapid Response with any future concerns or signs of clinical decompensation.

## 2025-01-12 NOTE — CARE PLAN
Problem: Skin  Goal: Decreased wound size/increased tissue granulation at next dressing change  Outcome: Met  Goal: Participates in plan/prevention/treatment measures  Outcome: Met  Goal: Prevent/manage excess moisture  Outcome: Met  Goal: Prevent/minimize sheer/friction injuries  Outcome: Met  Goal: Promote/optimize nutrition  Outcome: Met  Goal: Promote skin healing  Outcome: Met     Problem: Safety - Adult  Goal: Free from fall injury  Outcome: Met     Problem: Pain - Adult  Goal: Verbalizes/displays adequate comfort level or baseline comfort level  Outcome: Met   The patient's goals for the shift include      The clinical goals for the shift include Patient will remain hemodynamically stable and free from fall/injury for entirety of shift.

## 2025-01-12 NOTE — DISCHARGE SUMMARY
Discharge Diagnosis  Septic shock due to undetermined organism (Multi)    Issues Requiring Follow-Up  Plan for discharge home with Hospice, [facility].  Verified with [facility] that no medications are needed to be sent with patient upon discharge, they will provide all medications needed. This was communicated with patient and friend .     Test Results Pending At Discharge  Pending Labs       No current pending labs.            Hospital Course   A 69 year old Male with PMHx significant of HFrEF (EF 23% 2024) , COPD (2019: FEV1 45% w +ve BD change; %/TLC 85%), T2DM (last A1c decreased to 5.5% 24), hypothyroidism and HLD who presents to the MICU on 2024 for Septic Shock most likely from RLE wound. Warm extremities, leukocytosis, tachycardia and severe hypotension w high lactate. Could be urinary given pyuria w hx of MRSA UTI. Able to appreciate pulses w doppler. ECG w changes, cards saw patient in OSH ED per chart review, believed to be 2/2 sepsis. No ST segment changes, Trops down trended, CPK normal, no concerns for MI currently.  Patient's lactate initially improved with 500 ml bolus of fluids, but started to uptrend to 3.5. He is requiring Vaso and levo  and noted to have narrow pulse pressures, concerning for cardiogenic shock, likely secondary to septic shock. His kidney function is noted to have BUN 61 and Cr 1.44 which are both increased from his baseline (both WNL). Potassium also noted to be elevated 5.4 then 6.5 (although this one is hemolyzed) Lokelma ordered in addition to patient being diuresed with lasix. General surgery has been consulted for concern of b/l lower extremity cellulitis with R>L.     On 25, he was transferred to the SDU. Overnight he was more lethargic with vb.31/65/55.  He was placed on bipap at night due to hypercapnia; during the day he wears his home cpap.  He was given bumex boluses but was changed to bumex gtt as not meeting goal urinary  output.  CXR showing pulmonary edema.  On 1/2, he was started on metolazone as failing to adequately diurese.   Output continued to increase and bumex gtt was held given polyuria per renal recommendations.   Electrolytes were replaced and creatinine improving.   ACS consulted; now signed off 1/1; rec cont IV antibiotics.     A family meeting was held on 1/09/2024 regarding poor prognosis with SDU staff, HCPOA and Palliative Medicine.  Patient made the decision to transition to DNAR/No intubation/No ICU with hospice consult as his wishes remain to discharge home with hospice.   [facility] meet with patient, friend () on 1/10 and consents were signed with plan for discharge to home w/hospice today ,1/12, as friend () verified that [facility] can start staffing 24 hour care Sunday and all equipment is provided.     Verified with [facility] that no medications are needed to be sent with patient upon discharge, they will provide all medications needed. This was communicated with patient and friend .     Pertinent Physical Exam At Time of Discharge  Physical Exam  Constitutional:       Appearance: Normal appearance. He is ill-appearing. He is not toxic-appearing.   HENT:      Mouth/Throat:      Mouth: Mucous membranes are moist.   Eyes:      Extraocular Movements: Extraocular movements intact.      Pupils: Pupils are equal, round, and reactive to light.   Cardiovascular:      Pulses: Normal pulses.   Pulmonary:      Effort: Pulmonary effort is normal. No respiratory distress.      Breath sounds: No stridor. No wheezing.      Comments: Diminished B/L  Chest:      Chest wall: No tenderness.   Abdominal:      General: Bowel sounds are normal.      Palpations: Abdomen is soft.   Musculoskeletal:      Cervical back: Normal range of motion.   Skin:     General: Skin is warm and dry.   Neurological:      Mental Status: He is alert and oriented to person, place, and time. Mental status is at  baseline.         Home Medications     Medication List      START taking these medications     acetaminophen 325 mg tablet; Commonly known as: Tylenol; Take 2 tablets   (650 mg) by mouth every 4 hours if needed for mild pain (1 - 3).   nicotine 14 mg/24 hr patch; Commonly known as: Nicoderm CQ; Place 1   patch over 24 hours on the skin once daily.; Start taking on: January 13, 2025   nystatin 100,000 unit/mL suspension; Commonly known as: Mycostatin; Use   5 mL (500,000 Units) in the mouth or throat 2 times a day.   polyethylene glycol 17 gram packet; Commonly known as: Glycolax,   Miralax; Take 17 g by mouth once daily as needed (Constipation).   sennosides-docusate sodium 8.6-50 mg tablet; Commonly known as:   Lydia-Colace; Take 1 tablet by mouth once daily at bedtime.   sodium chloride 0.65 % nasal spray; Commonly known as: Ocean; Administer   1 spray into each nostril 3 times a day as needed for congestion.   triamcinolone 0.1 % ointment; Commonly known as: Kenalog; Apply   topically 2 times a day.     CHANGE how you take these medications     ipratropium-albuteroL 0.5-2.5 mg/3 mL nebulizer solution; Commonly known   as: Duo-Neb; Take 3 mL by nebulization 4 times a day.   midodrine 5 mg tablet; Commonly known as: Proamatine; Take 1 tablet (5   mg) by mouth 3 times a day.; What changed: medication strength, See the   new instructions.     CONTINUE taking these medications     albuterol 90 mcg/actuation inhaler; INHALE 2 PUFFS EVERY 4-6 HOURS AS   NEEDED.   Dexcom G7  misc; Generic drug: blood-glucose meter,continuous;   Use as instructed   Dexcom G7 Sensor device; Generic drug: blood-glucose sensor; Use as   directed   empagliflozin 10 mg; Commonly known as: Jardiance; Take 1 tablet (10 mg)   by mouth once daily.   ezetimibe 10 mg tablet; Commonly known as: Zetia; Take 1 tablet (10 mg)   by mouth once daily.   gabapentin 100 mg capsule; Commonly known as: Neurontin; Take 1 Capsule   by mouth every  afternoon   levothyroxine 125 mcg tablet; Commonly known as: Synthroid, Levoxyl;   Take 1 Tablet by mouth every day   oxygen gas therapy; Commonly known as: O2; Inhale 2 L/min continuously.   2L nc at rest and 4L nc with ambulation   oxygen gas therapy; Commonly known as: O2; Inhale 7 L/min continuously.   oxygen gas therapy; Commonly known as: O2; Inhale 1 each once daily at   bedtime. NOCTURNAL CPAP AUTOTITRATING, auto-titrating range: 4-20   tamsulosin 0.4 mg 24 hr capsule; Commonly known as: Flomax; Take 1   Capsule by mouth every evening   Trelegy Ellipta 100-62.5-25 mcg blister with device; Generic drug:   fluticasone-umeclidin-vilanter; one puff per day     STOP taking these medications     allopurinol 300 mg tablet; Commonly known as: Zyloprim   cephalexin 500 mg capsule; Commonly known as: Keflex   folic acid 1 mg tablet; Commonly known as: Folvite   furosemide 80 mg tablet; Commonly known as: Lasix   INDOMETHACIN ORAL   Mounjaro 2.5 mg/0.5 mL pen injector; Generic drug: tirzepatide   multivitamin with minerals tablet   pantoprazole 40 mg EC tablet; Commonly known as: ProtoNix   potassium chloride CR 20 mEq ER tablet; Commonly known as: Klor-Con M20   predniSONE 10 mg tablet; Commonly known as: Deltasone   thiamine 100 mg tablet; Commonly known as: Vitamin B-1       Outpatient Follow-Up  No future appointments.    Pt discussed with Dr. Cam, seen and examined. All labs, VS and previous plan of care reviewed.      Antonio Amaya, APRN-CNP

## 2025-01-13 ENCOUNTER — TELEPHONE (OUTPATIENT)
Dept: PRIMARY CARE | Facility: CLINIC | Age: 70
End: 2025-01-13

## 2025-01-31 DIAGNOSIS — I95.9 HYPOTENSION, UNSPECIFIED HYPOTENSION TYPE: Primary | ICD-10-CM

## 2025-02-05 ENCOUNTER — TELEPHONE (OUTPATIENT)
Dept: PRIMARY CARE | Facility: CLINIC | Age: 70
End: 2025-02-05

## 2025-02-07 LAB
ALBUMIN SERPL-MCNC: 3.5 G/DL (ref 3.6–5.1)
ALP SERPL-CCNC: 105 U/L (ref 35–144)
ALT SERPL-CCNC: 8 U/L (ref 9–46)
ANION GAP SERPL CALCULATED.4IONS-SCNC: 15 MMOL/L (CALC) (ref 7–17)
AST SERPL-CCNC: 11 U/L (ref 10–35)
BILIRUB SERPL-MCNC: 0.8 MG/DL (ref 0.2–1.2)
BUN SERPL-MCNC: 57 MG/DL (ref 7–25)
CALCIUM SERPL-MCNC: 9.2 MG/DL (ref 8.6–10.3)
CHLORIDE SERPL-SCNC: 93 MMOL/L (ref 98–110)
CO2 SERPL-SCNC: 27 MMOL/L (ref 20–32)
CREAT SERPL-MCNC: 1.36 MG/DL (ref 0.7–1.35)
EGFRCR SERPLBLD CKD-EPI 2021: 56 ML/MIN/1.73M2
GLUCOSE SERPL-MCNC: 86 MG/DL (ref 65–99)
POTASSIUM SERPL-SCNC: 4.3 MMOL/L (ref 3.5–5.3)
PROT SERPL-MCNC: 6.1 G/DL (ref 6.1–8.1)
SODIUM SERPL-SCNC: 135 MMOL/L (ref 135–146)

## 2025-02-21 ENCOUNTER — APPOINTMENT (OUTPATIENT)
Dept: RADIOLOGY | Facility: HOSPITAL | Age: 70
End: 2025-02-21
Payer: MEDICARE

## 2025-02-21 ENCOUNTER — APPOINTMENT (OUTPATIENT)
Dept: CARDIOLOGY | Facility: HOSPITAL | Age: 70
DRG: 193 | End: 2025-02-21
Payer: MEDICARE

## 2025-02-21 ENCOUNTER — HOSPITAL ENCOUNTER (INPATIENT)
Facility: HOSPITAL | Age: 70
LOS: 1 days | Discharge: HOSPICE | End: 2025-02-22
Attending: EMERGENCY MEDICINE | Admitting: INTERNAL MEDICINE
Payer: MEDICARE

## 2025-02-21 ENCOUNTER — APPOINTMENT (OUTPATIENT)
Dept: RADIOLOGY | Facility: HOSPITAL | Age: 70
DRG: 193 | End: 2025-02-21
Payer: MEDICARE

## 2025-02-21 DIAGNOSIS — I50.21 ACUTE SYSTOLIC HEART FAILURE: ICD-10-CM

## 2025-02-21 DIAGNOSIS — J18.9 PNEUMONIA OF LEFT UPPER LOBE DUE TO INFECTIOUS ORGANISM: Primary | ICD-10-CM

## 2025-02-21 LAB
ABO GROUP (TYPE) IN BLOOD: NORMAL
ALBUMIN SERPL BCP-MCNC: 3.5 G/DL (ref 3.4–5)
ALP SERPL-CCNC: 340 U/L (ref 33–136)
ALT SERPL W P-5'-P-CCNC: 41 U/L (ref 10–52)
ANION GAP BLDV CALCULATED.4IONS-SCNC: 6 MMOL/L (ref 10–25)
ANION GAP SERPL CALC-SCNC: 14 MMOL/L (ref 10–20)
ANION GAP SERPL CALC-SCNC: 15 MMOL/L (ref 10–20)
ANTIBODY SCREEN: NORMAL
APTT PPP: 36 SECONDS (ref 27–38)
AST SERPL W P-5'-P-CCNC: 29 U/L (ref 9–39)
BASE EXCESS BLDV CALC-SCNC: 2.2 MMOL/L (ref -2–3)
BASOPHILS # BLD AUTO: 0.01 X10*3/UL (ref 0–0.1)
BASOPHILS NFR BLD AUTO: 0.1 %
BILIRUB SERPL-MCNC: 1 MG/DL (ref 0–1.2)
BNP SERPL-MCNC: 1837 PG/ML (ref 0–99)
BODY TEMPERATURE: 37 DEGREES CELSIUS
BUN SERPL-MCNC: 76 MG/DL (ref 6–23)
BUN SERPL-MCNC: 79 MG/DL (ref 6–23)
CA-I BLDV-SCNC: 1.16 MMOL/L (ref 1.1–1.33)
CALCIUM SERPL-MCNC: 7.5 MG/DL (ref 8.6–10.3)
CALCIUM SERPL-MCNC: 9.1 MG/DL (ref 8.6–10.3)
CARDIAC TROPONIN I PNL SERPL HS: 112 NG/L (ref 0–20)
CARDIAC TROPONIN I PNL SERPL HS: 135 NG/L (ref 0–20)
CARDIAC TROPONIN I PNL SERPL HS: 137 NG/L (ref 0–20)
CHLORIDE BLDV-SCNC: 87 MMOL/L (ref 98–107)
CHLORIDE SERPL-SCNC: 89 MMOL/L (ref 98–107)
CHLORIDE SERPL-SCNC: 94 MMOL/L (ref 98–107)
CO2 SERPL-SCNC: 22 MMOL/L (ref 21–32)
CO2 SERPL-SCNC: 28 MMOL/L (ref 21–32)
CREAT SERPL-MCNC: 0.98 MG/DL (ref 0.5–1.3)
CREAT SERPL-MCNC: 1.13 MG/DL (ref 0.5–1.3)
EGFRCR SERPLBLD CKD-EPI 2021: 70 ML/MIN/1.73M*2
EGFRCR SERPLBLD CKD-EPI 2021: 83 ML/MIN/1.73M*2
EOSINOPHIL # BLD AUTO: 0.06 X10*3/UL (ref 0–0.7)
EOSINOPHIL NFR BLD AUTO: 0.6 %
ERYTHROCYTE [DISTWIDTH] IN BLOOD BY AUTOMATED COUNT: 17.3 % (ref 11.5–14.5)
ERYTHROCYTE [DISTWIDTH] IN BLOOD BY AUTOMATED COUNT: 17.8 % (ref 11.5–14.5)
GLUCOSE BLDV-MCNC: 163 MG/DL (ref 74–99)
GLUCOSE SERPL-MCNC: 150 MG/DL (ref 74–99)
GLUCOSE SERPL-MCNC: 152 MG/DL (ref 74–99)
HCO3 BLDV-SCNC: 30.1 MMOL/L (ref 22–26)
HCT VFR BLD AUTO: 52.5 % (ref 41–52)
HCT VFR BLD AUTO: 52.9 % (ref 41–52)
HCT VFR BLD EST: 53 % (ref 41–52)
HGB BLD-MCNC: 17.3 G/DL (ref 13.5–17.5)
HGB BLD-MCNC: 17.3 G/DL (ref 13.5–17.5)
HGB BLDV-MCNC: 17.8 G/DL (ref 13.5–17.5)
IMM GRANULOCYTES # BLD AUTO: 0.04 X10*3/UL (ref 0–0.7)
IMM GRANULOCYTES NFR BLD AUTO: 0.4 % (ref 0–0.9)
INHALED O2 CONCENTRATION: 100 %
INR PPP: 1.1 (ref 0.9–1.1)
LACTATE BLDV-SCNC: 2.1 MMOL/L (ref 0.4–2)
LACTATE BLDV-SCNC: 2.4 MMOL/L (ref 0.4–2)
LACTATE SERPL-SCNC: 2 MMOL/L (ref 0.4–2)
LACTATE SERPL-SCNC: 2.3 MMOL/L (ref 0.4–2)
LYMPHOCYTES # BLD AUTO: 1.44 X10*3/UL (ref 1.2–4.8)
LYMPHOCYTES NFR BLD AUTO: 15.6 %
MCH RBC QN AUTO: 30.2 PG (ref 26–34)
MCH RBC QN AUTO: 30.7 PG (ref 26–34)
MCHC RBC AUTO-ENTMCNC: 32.7 G/DL (ref 32–36)
MCHC RBC AUTO-ENTMCNC: 33 G/DL (ref 32–36)
MCV RBC AUTO: 92 FL (ref 80–100)
MCV RBC AUTO: 94 FL (ref 80–100)
MONOCYTES # BLD AUTO: 0.85 X10*3/UL (ref 0.1–1)
MONOCYTES NFR BLD AUTO: 9.2 %
NEUTROPHILS # BLD AUTO: 6.85 X10*3/UL (ref 1.2–7.7)
NEUTROPHILS NFR BLD AUTO: 74.1 %
NRBC BLD-RTO: 0.2 /100 WBCS (ref 0–0)
NRBC BLD-RTO: 0.4 /100 WBCS (ref 0–0)
OXYHGB MFR BLDV: 36.6 % (ref 45–75)
PCO2 BLDV: 57 MM HG (ref 41–51)
PH BLDV: 7.33 PH (ref 7.33–7.43)
PLATELET # BLD AUTO: 240 X10*3/UL (ref 150–450)
PLATELET # BLD AUTO: 251 X10*3/UL (ref 150–450)
PO2 BLDV: 28 MM HG (ref 35–45)
POTASSIUM BLDV-SCNC: 4.7 MMOL/L (ref 3.5–5.3)
POTASSIUM SERPL-SCNC: 5 MMOL/L (ref 3.5–5.3)
POTASSIUM SERPL-SCNC: 5.1 MMOL/L (ref 3.5–5.3)
PROT SERPL-MCNC: 6.2 G/DL (ref 6.4–8.2)
PROTHROMBIN TIME: 12.5 SECONDS (ref 9.8–12.8)
RBC # BLD AUTO: 5.64 X10*6/UL (ref 4.5–5.9)
RBC # BLD AUTO: 5.72 X10*6/UL (ref 4.5–5.9)
RH FACTOR (ANTIGEN D): NORMAL
SAO2 % BLDV: 38 % (ref 45–75)
SODIUM BLDV-SCNC: 118 MMOL/L (ref 136–145)
SODIUM SERPL-SCNC: 126 MMOL/L (ref 136–145)
SODIUM SERPL-SCNC: 126 MMOL/L (ref 136–145)
TSH SERPL-ACNC: 2.28 MIU/L (ref 0.44–3.98)
WBC # BLD AUTO: 8 X10*3/UL (ref 4.4–11.3)
WBC # BLD AUTO: 9.3 X10*3/UL (ref 4.4–11.3)

## 2025-02-21 PROCEDURE — 85025 COMPLETE CBC W/AUTO DIFF WBC: CPT | Performed by: EMERGENCY MEDICINE

## 2025-02-21 PROCEDURE — 84484 ASSAY OF TROPONIN QUANT: CPT | Performed by: EMERGENCY MEDICINE

## 2025-02-21 PROCEDURE — 85027 COMPLETE CBC AUTOMATED: CPT | Performed by: NURSE PRACTITIONER

## 2025-02-21 PROCEDURE — 71275 CT ANGIOGRAPHY CHEST: CPT | Performed by: SURGERY

## 2025-02-21 PROCEDURE — 2500000005 HC RX 250 GENERAL PHARMACY W/O HCPCS: Performed by: EMERGENCY MEDICINE

## 2025-02-21 PROCEDURE — 2500000001 HC RX 250 WO HCPCS SELF ADMINISTERED DRUGS (ALT 637 FOR MEDICARE OP): Performed by: INTERNAL MEDICINE

## 2025-02-21 PROCEDURE — 83605 ASSAY OF LACTIC ACID: CPT | Performed by: EMERGENCY MEDICINE

## 2025-02-21 PROCEDURE — 86901 BLOOD TYPING SEROLOGIC RH(D): CPT | Performed by: EMERGENCY MEDICINE

## 2025-02-21 PROCEDURE — 99291 CRITICAL CARE FIRST HOUR: CPT | Performed by: INTERNAL MEDICINE

## 2025-02-21 PROCEDURE — 36415 COLL VENOUS BLD VENIPUNCTURE: CPT | Performed by: EMERGENCY MEDICINE

## 2025-02-21 PROCEDURE — 71045 X-RAY EXAM CHEST 1 VIEW: CPT | Performed by: RADIOLOGY

## 2025-02-21 PROCEDURE — 99498 ADVNCD CARE PLAN ADDL 30 MIN: CPT | Performed by: INTERNAL MEDICINE

## 2025-02-21 PROCEDURE — 2550000001 HC RX 255 CONTRASTS: Performed by: EMERGENCY MEDICINE

## 2025-02-21 PROCEDURE — 85610 PROTHROMBIN TIME: CPT | Performed by: EMERGENCY MEDICINE

## 2025-02-21 PROCEDURE — 2500000004 HC RX 250 GENERAL PHARMACY W/ HCPCS (ALT 636 FOR OP/ED): Performed by: INTERNAL MEDICINE

## 2025-02-21 PROCEDURE — 85730 THROMBOPLASTIN TIME PARTIAL: CPT | Performed by: EMERGENCY MEDICINE

## 2025-02-21 PROCEDURE — 84132 ASSAY OF SERUM POTASSIUM: CPT | Performed by: EMERGENCY MEDICINE

## 2025-02-21 PROCEDURE — 2060000001 HC INTERMEDIATE ICU ROOM DAILY

## 2025-02-21 PROCEDURE — S4991 NICOTINE PATCH NONLEGEND: HCPCS | Performed by: INTERNAL MEDICINE

## 2025-02-21 PROCEDURE — 99223 1ST HOSP IP/OBS HIGH 75: CPT | Performed by: INTERNAL MEDICINE

## 2025-02-21 PROCEDURE — 2500000005 HC RX 250 GENERAL PHARMACY W/O HCPCS: Performed by: INTERNAL MEDICINE

## 2025-02-21 PROCEDURE — 99497 ADVNCD CARE PLAN 30 MIN: CPT | Performed by: INTERNAL MEDICINE

## 2025-02-21 PROCEDURE — 2500000002 HC RX 250 W HCPCS SELF ADMINISTERED DRUGS (ALT 637 FOR MEDICARE OP, ALT 636 FOR OP/ED): Performed by: EMERGENCY MEDICINE

## 2025-02-21 PROCEDURE — 2500000001 HC RX 250 WO HCPCS SELF ADMINISTERED DRUGS (ALT 637 FOR MEDICARE OP): Performed by: NURSE PRACTITIONER

## 2025-02-21 PROCEDURE — 94640 AIRWAY INHALATION TREATMENT: CPT

## 2025-02-21 PROCEDURE — 93010 ELECTROCARDIOGRAM REPORT: CPT | Performed by: INTERNAL MEDICINE

## 2025-02-21 PROCEDURE — 83880 ASSAY OF NATRIURETIC PEPTIDE: CPT | Performed by: EMERGENCY MEDICINE

## 2025-02-21 PROCEDURE — 96365 THER/PROPH/DIAG IV INF INIT: CPT

## 2025-02-21 PROCEDURE — 99285 EMERGENCY DEPT VISIT HI MDM: CPT | Mod: 25 | Performed by: EMERGENCY MEDICINE

## 2025-02-21 PROCEDURE — 2500000002 HC RX 250 W HCPCS SELF ADMINISTERED DRUGS (ALT 637 FOR MEDICARE OP, ALT 636 FOR OP/ED): Performed by: INTERNAL MEDICINE

## 2025-02-21 PROCEDURE — 96375 TX/PRO/DX INJ NEW DRUG ADDON: CPT

## 2025-02-21 PROCEDURE — 87040 BLOOD CULTURE FOR BACTERIA: CPT | Mod: AHULAB | Performed by: EMERGENCY MEDICINE

## 2025-02-21 PROCEDURE — 71045 X-RAY EXAM CHEST 1 VIEW: CPT

## 2025-02-21 PROCEDURE — 71275 CT ANGIOGRAPHY CHEST: CPT

## 2025-02-21 PROCEDURE — 86850 RBC ANTIBODY SCREEN: CPT | Performed by: EMERGENCY MEDICINE

## 2025-02-21 PROCEDURE — 84484 ASSAY OF TROPONIN QUANT: CPT | Performed by: NURSE PRACTITIONER

## 2025-02-21 PROCEDURE — 96366 THER/PROPH/DIAG IV INF ADDON: CPT

## 2025-02-21 PROCEDURE — 84132 ASSAY OF SERUM POTASSIUM: CPT | Performed by: NURSE PRACTITIONER

## 2025-02-21 PROCEDURE — 93005 ELECTROCARDIOGRAM TRACING: CPT

## 2025-02-21 PROCEDURE — 2500000004 HC RX 250 GENERAL PHARMACY W/ HCPCS (ALT 636 FOR OP/ED): Performed by: EMERGENCY MEDICINE

## 2025-02-21 PROCEDURE — 86900 BLOOD TYPING SEROLOGIC ABO: CPT | Performed by: EMERGENCY MEDICINE

## 2025-02-21 PROCEDURE — 83605 ASSAY OF LACTIC ACID: CPT | Performed by: NURSE PRACTITIONER

## 2025-02-21 PROCEDURE — 99232 SBSQ HOSP IP/OBS MODERATE 35: CPT | Performed by: NURSE PRACTITIONER

## 2025-02-21 PROCEDURE — 84443 ASSAY THYROID STIM HORMONE: CPT | Performed by: EMERGENCY MEDICINE

## 2025-02-21 RX ORDER — COLLAGENASE SANTYL 250 [ARB'U]/G
OINTMENT TOPICAL
COMMUNITY
Start: 2025-02-17

## 2025-02-21 RX ORDER — DOBUTAMINE HYDROCHLORIDE 400 MG/100ML
5 INJECTION INTRAVENOUS CONTINUOUS
Status: DISCONTINUED | OUTPATIENT
Start: 2025-02-21 | End: 2025-02-22 | Stop reason: HOSPADM

## 2025-02-21 RX ORDER — FUROSEMIDE 10 MG/ML
40 INJECTION INTRAMUSCULAR; INTRAVENOUS ONCE
Status: COMPLETED | OUTPATIENT
Start: 2025-02-21 | End: 2025-02-21

## 2025-02-21 RX ORDER — LEVOTHYROXINE SODIUM 125 UG/1
125 TABLET ORAL DAILY
Status: DISCONTINUED | OUTPATIENT
Start: 2025-02-21 | End: 2025-02-22 | Stop reason: HOSPADM

## 2025-02-21 RX ORDER — GABAPENTIN 100 MG/1
100 CAPSULE ORAL DAILY
Status: DISCONTINUED | OUTPATIENT
Start: 2025-02-22 | End: 2025-02-22 | Stop reason: HOSPADM

## 2025-02-21 RX ORDER — ACETAMINOPHEN 325 MG/1
650 TABLET ORAL EVERY 4 HOURS PRN
Status: DISCONTINUED | OUTPATIENT
Start: 2025-02-21 | End: 2025-02-21

## 2025-02-21 RX ORDER — MIDODRINE HYDROCHLORIDE 5 MG/1
10 TABLET ORAL 3 TIMES DAILY
Status: DISCONTINUED | OUTPATIENT
Start: 2025-02-21 | End: 2025-02-22 | Stop reason: HOSPADM

## 2025-02-21 RX ORDER — IBUPROFEN 200 MG
1 TABLET ORAL DAILY
Status: DISCONTINUED | OUTPATIENT
Start: 2025-02-21 | End: 2025-02-22 | Stop reason: HOSPADM

## 2025-02-21 RX ORDER — ALLOPURINOL 300 MG/1
300 TABLET ORAL DAILY
COMMUNITY
Start: 2025-02-08

## 2025-02-21 RX ORDER — POLYETHYLENE GLYCOL 3350 17 G/17G
17 POWDER, FOR SOLUTION ORAL DAILY
Status: DISCONTINUED | OUTPATIENT
Start: 2025-02-21 | End: 2025-02-22 | Stop reason: HOSPADM

## 2025-02-21 RX ORDER — MORPHINE SULFATE 2 MG/ML
2 INJECTION, SOLUTION INTRAMUSCULAR; INTRAVENOUS
Status: CANCELLED | OUTPATIENT
Start: 2025-02-21

## 2025-02-21 RX ORDER — ONDANSETRON HYDROCHLORIDE 2 MG/ML
4 INJECTION, SOLUTION INTRAVENOUS EVERY 8 HOURS PRN
Status: DISCONTINUED | OUTPATIENT
Start: 2025-02-21 | End: 2025-02-22 | Stop reason: HOSPADM

## 2025-02-21 RX ORDER — DOBUTAMINE HYDROCHLORIDE 400 MG/100ML
5 INJECTION INTRAVENOUS CONTINUOUS
OUTPATIENT
Start: 2025-02-21

## 2025-02-21 RX ORDER — IPRATROPIUM BROMIDE AND ALBUTEROL SULFATE 2.5; .5 MG/3ML; MG/3ML
3 SOLUTION RESPIRATORY (INHALATION) EVERY 2 HOUR PRN
OUTPATIENT
Start: 2025-02-21

## 2025-02-21 RX ORDER — IPRATROPIUM BROMIDE AND ALBUTEROL SULFATE 2.5; .5 MG/3ML; MG/3ML
9 SOLUTION RESPIRATORY (INHALATION) ONCE
Status: COMPLETED | OUTPATIENT
Start: 2025-02-21 | End: 2025-02-21

## 2025-02-21 RX ORDER — IPRATROPIUM BROMIDE AND ALBUTEROL SULFATE 2.5; .5 MG/3ML; MG/3ML
3 SOLUTION RESPIRATORY (INHALATION) 4 TIMES DAILY PRN
Status: DISCONTINUED | OUTPATIENT
Start: 2025-02-21 | End: 2025-02-21

## 2025-02-21 RX ORDER — ONDANSETRON HYDROCHLORIDE 2 MG/ML
4 INJECTION, SOLUTION INTRAVENOUS EVERY 8 HOURS PRN
OUTPATIENT
Start: 2025-02-21

## 2025-02-21 RX ORDER — AMOXICILLIN AND CLAVULANATE POTASSIUM 500; 125 MG/1; MG/1
TABLET, FILM COATED ORAL
COMMUNITY
Start: 2025-02-14

## 2025-02-21 RX ORDER — MIDODRINE HYDROCHLORIDE 5 MG/1
10 TABLET ORAL ONCE
Status: COMPLETED | OUTPATIENT
Start: 2025-02-21 | End: 2025-02-21

## 2025-02-21 RX ORDER — GABAPENTIN 100 MG/1
100 CAPSULE ORAL DAILY
OUTPATIENT
Start: 2025-02-22

## 2025-02-21 RX ORDER — LEVOFLOXACIN 5 MG/ML
750 INJECTION, SOLUTION INTRAVENOUS EVERY 24 HOURS
Status: DISCONTINUED | OUTPATIENT
Start: 2025-02-21 | End: 2025-02-22 | Stop reason: HOSPADM

## 2025-02-21 RX ORDER — TRAZODONE HYDROCHLORIDE 50 MG/1
25 TABLET ORAL NIGHTLY PRN
OUTPATIENT
Start: 2025-02-21

## 2025-02-21 RX ORDER — HYDROMORPHONE HYDROCHLORIDE 1 MG/ML
1 INJECTION, SOLUTION INTRAMUSCULAR; INTRAVENOUS; SUBCUTANEOUS ONCE
Status: COMPLETED | OUTPATIENT
Start: 2025-02-21 | End: 2025-02-21

## 2025-02-21 RX ORDER — ONDANSETRON 4 MG/1
4 TABLET, FILM COATED ORAL EVERY 8 HOURS PRN
Status: DISCONTINUED | OUTPATIENT
Start: 2025-02-21 | End: 2025-02-22 | Stop reason: HOSPADM

## 2025-02-21 RX ORDER — IBUPROFEN 200 MG
1 TABLET ORAL DAILY
OUTPATIENT
Start: 2025-02-22

## 2025-02-21 RX ORDER — ONDANSETRON 4 MG/1
4 TABLET, FILM COATED ORAL EVERY 8 HOURS PRN
OUTPATIENT
Start: 2025-02-21

## 2025-02-21 RX ORDER — MIDODRINE HYDROCHLORIDE 5 MG/1
10 TABLET ORAL 3 TIMES DAILY
OUTPATIENT
Start: 2025-02-21

## 2025-02-21 RX ORDER — POLYETHYLENE GLYCOL 3350 17 G/17G
17 POWDER, FOR SOLUTION ORAL DAILY
OUTPATIENT
Start: 2025-02-22

## 2025-02-21 RX ORDER — ALLOPURINOL 300 MG/1
300 TABLET ORAL DAILY
Status: DISCONTINUED | OUTPATIENT
Start: 2025-02-21 | End: 2025-02-22 | Stop reason: HOSPADM

## 2025-02-21 RX ORDER — IBUPROFEN 400 MG/1
400 TABLET ORAL EVERY 8 HOURS PRN
OUTPATIENT
Start: 2025-02-21

## 2025-02-21 RX ORDER — IBUPROFEN 400 MG/1
TABLET ORAL
COMMUNITY
Start: 2025-02-14 | End: 2025-02-22 | Stop reason: HOSPADM

## 2025-02-21 RX ORDER — IBUPROFEN 400 MG/1
400 TABLET ORAL EVERY 8 HOURS PRN
Status: DISCONTINUED | OUTPATIENT
Start: 2025-02-21 | End: 2025-02-22 | Stop reason: HOSPADM

## 2025-02-21 RX ORDER — LEVOFLOXACIN 5 MG/ML
750 INJECTION, SOLUTION INTRAVENOUS EVERY 24 HOURS
OUTPATIENT
Start: 2025-02-22

## 2025-02-21 RX ORDER — IPRATROPIUM BROMIDE AND ALBUTEROL SULFATE 2.5; .5 MG/3ML; MG/3ML
3 SOLUTION RESPIRATORY (INHALATION)
Status: DISCONTINUED | OUTPATIENT
Start: 2025-02-21 | End: 2025-02-22 | Stop reason: HOSPADM

## 2025-02-21 RX ORDER — LEVOTHYROXINE SODIUM 125 UG/1
125 TABLET ORAL DAILY
OUTPATIENT
Start: 2025-02-22

## 2025-02-21 RX ORDER — MORPHINE SULFATE 2 MG/ML
2 INJECTION, SOLUTION INTRAMUSCULAR; INTRAVENOUS
Status: DISCONTINUED | OUTPATIENT
Start: 2025-02-21 | End: 2025-02-21

## 2025-02-21 RX ORDER — ACETAMINOPHEN 325 MG/1
650 TABLET ORAL EVERY 4 HOURS PRN
Status: DISCONTINUED | OUTPATIENT
Start: 2025-02-21 | End: 2025-02-22 | Stop reason: HOSPADM

## 2025-02-21 RX ORDER — IPRATROPIUM BROMIDE AND ALBUTEROL SULFATE 2.5; .5 MG/3ML; MG/3ML
3 SOLUTION RESPIRATORY (INHALATION) EVERY 2 HOUR PRN
Status: DISCONTINUED | OUTPATIENT
Start: 2025-02-21 | End: 2025-02-22 | Stop reason: HOSPADM

## 2025-02-21 RX ORDER — FUROSEMIDE 80 MG/1
80 TABLET ORAL EVERY MORNING
COMMUNITY
Start: 2025-01-25

## 2025-02-21 RX ORDER — FUROSEMIDE 10 MG/ML
80 INJECTION INTRAMUSCULAR; INTRAVENOUS EVERY 8 HOURS
Status: DISCONTINUED | OUTPATIENT
Start: 2025-02-21 | End: 2025-02-21

## 2025-02-21 RX ORDER — EZETIMIBE 10 MG/1
10 TABLET ORAL DAILY
OUTPATIENT
Start: 2025-02-22

## 2025-02-21 RX ORDER — IPRATROPIUM BROMIDE AND ALBUTEROL SULFATE 2.5; .5 MG/3ML; MG/3ML
3 SOLUTION RESPIRATORY (INHALATION)
OUTPATIENT
Start: 2025-02-21

## 2025-02-21 RX ORDER — NYSTATIN 100000 U/G
CREAM TOPICAL
COMMUNITY
Start: 2025-01-25 | End: 2025-02-22 | Stop reason: HOSPADM

## 2025-02-21 RX ORDER — ENOXAPARIN SODIUM 100 MG/ML
40 INJECTION SUBCUTANEOUS EVERY 24 HOURS
Status: DISCONTINUED | OUTPATIENT
Start: 2025-02-21 | End: 2025-02-22 | Stop reason: HOSPADM

## 2025-02-21 RX ORDER — ENOXAPARIN SODIUM 100 MG/ML
40 INJECTION SUBCUTANEOUS EVERY 24 HOURS
OUTPATIENT
Start: 2025-02-22

## 2025-02-21 RX ORDER — EZETIMIBE 10 MG/1
10 TABLET ORAL DAILY
Status: DISCONTINUED | OUTPATIENT
Start: 2025-02-21 | End: 2025-02-22 | Stop reason: HOSPADM

## 2025-02-21 RX ORDER — TRAZODONE HYDROCHLORIDE 50 MG/1
25 TABLET ORAL NIGHTLY PRN
Status: DISCONTINUED | OUTPATIENT
Start: 2025-02-21 | End: 2025-02-22 | Stop reason: HOSPADM

## 2025-02-21 RX ORDER — ALLOPURINOL 300 MG/1
300 TABLET ORAL DAILY
OUTPATIENT
Start: 2025-02-22

## 2025-02-21 RX ORDER — POTASSIUM CHLORIDE 750 MG/1
20 TABLET, EXTENDED RELEASE ORAL EVERY MORNING
COMMUNITY
Start: 2025-01-27

## 2025-02-21 RX ORDER — MIDODRINE HYDROCHLORIDE 5 MG/1
5 TABLET ORAL 3 TIMES DAILY
Status: DISCONTINUED | OUTPATIENT
Start: 2025-02-21 | End: 2025-02-21

## 2025-02-21 RX ORDER — MORPHINE SULFATE 2 MG/ML
2 INJECTION, SOLUTION INTRAMUSCULAR; INTRAVENOUS
Status: DISCONTINUED | OUTPATIENT
Start: 2025-02-21 | End: 2025-02-22 | Stop reason: HOSPADM

## 2025-02-21 RX ORDER — TRAZODONE HYDROCHLORIDE 50 MG/1
TABLET ORAL
COMMUNITY
Start: 2025-01-25 | End: 2025-02-22 | Stop reason: HOSPADM

## 2025-02-21 RX ORDER — ACETAMINOPHEN 325 MG/1
650 TABLET ORAL EVERY 4 HOURS PRN
Status: CANCELLED | OUTPATIENT
Start: 2025-02-21

## 2025-02-21 RX ADMIN — MIDODRINE HYDROCHLORIDE 10 MG: 5 TABLET ORAL at 23:06

## 2025-02-21 RX ADMIN — IOHEXOL 90 ML: 350 INJECTION, SOLUTION INTRAVENOUS at 03:14

## 2025-02-21 RX ADMIN — HYDROMORPHONE HYDROCHLORIDE 1 MG: 1 INJECTION, SOLUTION INTRAMUSCULAR; INTRAVENOUS; SUBCUTANEOUS at 05:43

## 2025-02-21 RX ADMIN — LEVOTHYROXINE SODIUM 125 MCG: 0.05 TABLET ORAL at 09:46

## 2025-02-21 RX ADMIN — Medication 4 L/MIN: at 01:57

## 2025-02-21 RX ADMIN — Medication 40 PERCENT: at 10:03

## 2025-02-21 RX ADMIN — IPRATROPIUM BROMIDE AND ALBUTEROL SULFATE 9 ML: 2.5; .5 SOLUTION RESPIRATORY (INHALATION) at 02:00

## 2025-02-21 RX ADMIN — IPRATROPIUM BROMIDE AND ALBUTEROL SULFATE 3 ML: 2.5; .5 SOLUTION RESPIRATORY (INHALATION) at 19:21

## 2025-02-21 RX ADMIN — MIDODRINE HYDROCHLORIDE 10 MG: 5 TABLET ORAL at 13:41

## 2025-02-21 RX ADMIN — MIDODRINE HYDROCHLORIDE 5 MG: 5 TABLET ORAL at 09:50

## 2025-02-21 RX ADMIN — ALLOPURINOL 300 MG: 100 TABLET ORAL at 09:46

## 2025-02-21 RX ADMIN — Medication 2 L/MIN: at 10:02

## 2025-02-21 RX ADMIN — Medication 7 L/MIN: at 10:02

## 2025-02-21 RX ADMIN — ENOXAPARIN SODIUM 40 MG: 40 INJECTION SUBCUTANEOUS at 23:06

## 2025-02-21 RX ADMIN — PIPERACILLIN SODIUM AND TAZOBACTAM SODIUM 4.5 G: 4; .5 INJECTION, SOLUTION INTRAVENOUS at 02:48

## 2025-02-21 RX ADMIN — DOBUTAMINE HYDROCHLORIDE 5 MCG/KG/MIN: 400 INJECTION INTRAVENOUS at 15:25

## 2025-02-21 RX ADMIN — VANCOMYCIN HYDROCHLORIDE 2000 MG: 5 INJECTION, POWDER, LYOPHILIZED, FOR SOLUTION INTRAVENOUS at 02:48

## 2025-02-21 RX ADMIN — FUROSEMIDE 80 MG: 10 INJECTION, SOLUTION INTRAVENOUS at 13:41

## 2025-02-21 RX ADMIN — EZETIMIBE 10 MG: 10 TABLET ORAL at 23:05

## 2025-02-21 RX ADMIN — FUROSEMIDE 40 MG: 10 INJECTION, SOLUTION INTRAVENOUS at 01:27

## 2025-02-21 RX ADMIN — NICOTINE 1 PATCH: 14 PATCH, EXTENDED RELEASE TRANSDERMAL at 09:45

## 2025-02-21 RX ADMIN — FUROSEMIDE 10 MG/HR: 10 INJECTION, SOLUTION INTRAMUSCULAR; INTRAVENOUS at 17:21

## 2025-02-21 RX ADMIN — POLYETHYLENE GLYCOL 3350 17 G: 17 POWDER, FOR SOLUTION ORAL at 09:46

## 2025-02-21 RX ADMIN — EMPAGLIFLOZIN 10 MG: 10 TABLET, FILM COATED ORAL at 09:50

## 2025-02-21 ASSESSMENT — PAIN - FUNCTIONAL ASSESSMENT
PAIN_FUNCTIONAL_ASSESSMENT: 0-10

## 2025-02-21 ASSESSMENT — PAIN SCALES - GENERAL
PAINLEVEL_OUTOF10: 0 - NO PAIN
PAINLEVEL_OUTOF10: 0 - NO PAIN
PAINLEVEL_OUTOF10: 2
PAINLEVEL_OUTOF10: 0 - NO PAIN

## 2025-02-21 ASSESSMENT — COLUMBIA-SUICIDE SEVERITY RATING SCALE - C-SSRS
1. IN THE PAST MONTH, HAVE YOU WISHED YOU WERE DEAD OR WISHED YOU COULD GO TO SLEEP AND NOT WAKE UP?: NO
2. HAVE YOU ACTUALLY HAD ANY THOUGHTS OF KILLING YOURSELF?: NO
6. HAVE YOU EVER DONE ANYTHING, STARTED TO DO ANYTHING, OR PREPARED TO DO ANYTHING TO END YOUR LIFE?: NO

## 2025-02-21 ASSESSMENT — ACTIVITIES OF DAILY LIVING (ADL): LACK_OF_TRANSPORTATION: NO

## 2025-02-21 ASSESSMENT — PAIN DESCRIPTION - LOCATION: LOCATION: ABDOMEN

## 2025-02-21 NOTE — Clinical Note
Is the patient on isolation?: No   Do you expect the patient to require telemetry (informational-only for bed management): Yes   Do you expect the patient to require observation or inpt? (informational-only for bed management): Inpatient   with patient

## 2025-02-21 NOTE — PROGRESS NOTES
02/21/25 0714   [facility] Disability Status   Are you deaf or do you have serious difficulty hearing? N   Are you blind or do you have serious difficulty seeing, even when wearing glasses? N   Because of a physical, mental, or emotional condition, do you have serious difficulty concentrating, remembering, or making decisions? (5 years old or older) N   Do you have serious difficulty walking or climbing stairs? Y   Do you have serious difficulty dressing or bathing? Y   Because of a physical, mental, or emotional condition, do you have serious difficulty doing errands alone such as visiting the doctor? Y

## 2025-02-21 NOTE — CONSULTS
Inpatient consult to Palliative Care  Consult performed by: Edwar Reveles MD  Consult ordered by: Keren Christensen, APRN-CNP          Reason For Consult  Reason for Consult: communication / medical decision making and patient/family support     History Of Present Illness  Alo Glass is a 69 y.o. male with past medical history of HFrEF (EF 19% 12/2024) , COPD (2019: FEV1 45% w +ve BD change; %/TLC 85%), T2DM (last A1c 6.2%), hypothyroidis, MELISSA i/s/o morbid obesity, severe PAD, HLD presenting to ED.  He [facility] for cardiogenic [facility] and had met with palliative care, changing his code status to DNR DNI and No ICU. He appears to have ICU need now for reasons below. He was on Hospice care with [facility].     He has soft blood pressures and is requiring supplemental oxygen. VBG with hypoxemia on FiO2 of 100%. BNP was 1837, lactate 2.3, troponins downtrended form 137 to 112. He is hyponatremic at 125.  No leukocytosis. Kidney function is intact. On review of CT, there is a irregular LL infiltrate that may be possibly pneumonia but patient reports coughing up blood clots so possibly alveolar hemorrhage.     Discussion with patient:  He tells me he has been at home with hospice.  He checks his vitals regularly and knows that he feels best when he is tachycardic and a normal O2 saturation.  Yesterday he coughed up blood and started looking online watching [employer] videos.  He discovered that this may be a sign of a blood clot. He also realized that he was feeling worse even with high heart rate and that his oxygen saturation was below normal and swelling getting worse. He wanted to come to the hospital to be evaluated for a blood clot and to feel better if there is something that could be corrected here.  He did not call his hospice agency before calling EMS.  I told him that I believe he is actively dying and that he is end-stage.  We agree that diuresing him may make him feel better but that  this will not solve the problem.  We agreed to try to help him feel better in the hospital but also agreed that he may die here and in that case we will keep him comfortable.  His goal is still to go home.  He agreed to be DNR/DNI and no ICU.     ACP note form Kelsie Jarvis is very informative regarding previous goals at last admission.   Surrogate decision maker[facility]. Andrea Jesus is first alternate.      Symptoms (0 - 10, Best to Worst)       Personal/Social History   Former  practicing in civil suits. Has support from private duty caregivers at home, also friends, housekeepers.He reports that he has been smoking cigarettes. He has never used smokeless tobacco. He reports current alcohol use of about 7.0 standard drinks of alcohol per week. He reports that he does not use drugs.    Functional Status        Caregiving/Caregiver Support  Does the patient require assistance in some or all components of his care, including coordination of medical care? Yes  If Yes, which person serves that role?  friend   Caregiver emotional or practical needs:      Past Medical History  He has a past medical history of CHF (congestive heart failure), COPD (chronic obstructive pulmonary disease) (Multi), Diabetes mellitus (Multi), Hypertension, Obesity, MELISSA (obstructive sleep apnea), and Personal history of other specified conditions (01/08/2023).    Surgical History  He has a past surgical history that includes Hernia repair (11/07/2013) and Other surgical history (11/07/2013).     Family History  No family history on file.  Allergies  Patient has no known allergies.    Review of Systems     Physical Exam  Constitutional:       General: He is not in acute distress.     Appearance: He is ill-appearing.      Comments: Somnolent.  Thin extremities and large abdomen, somewhat cushiogoid.    HENT:      Mouth/Throat:      Comments: Cyanotic lips.  Eyes:      Pupils: Pupils are equal, round, and reactive to light.    Cardiovascular:      Rate and Rhythm: Tachycardia present.   Pulmonary:      Effort: Respiratory distress present.   Abdominal:      General: There is distension.      Comments: Massively distended soft abdomen.    Skin:     Findings: Bruising, erythema and lesion present.      Comments: Diffusely plethroic skin. Excoriations on UE.   Neurological:      Comments: Able to give history. Somnolent, falls asleep at times during conversation. Oriented to self and setting, purpose of visit to ED.          Last Recorded Vitals  Blood pressure 99/62, pulse (!) 116, temperature 36.2 °C (97.2 °F), resp. rate 17, weight 120 kg (265 lb), SpO2 97%.    Relevant Results    CT angio chest for pulmonary embolism    Result Date: 2/21/2025  No acute pulmonary embolus through the segmental level.   Left upper lobe consolidation compatible with pneumonia. Clinical and radiographic follow-up to complete resolution recommended.   Small bilateral pleural effusions.   Worsened cardiomegaly. Non enhancement of the thoracic aorta markedly limiting its evaluation and denoting left ventricular systolic dysfunction. Reflux of IV contrast into the hepatic veins compatible with elevated right heart pressures/right heart dysfunction.   Atherosclerosis, including coronary artery disease.   Small volume of ascites.   MACRO: None   Signed by: Sagar Champion 2/21/2025 3:28 AM Dictation workstation:   [id number]    XR chest 1 view    Result Date: 2/21/2025  New masslike opacity in the left mid lung as described above is concerning for developing pneumonia. Underlying mass lesion not excluded. Correlate with symptomatology and continued posttreatment radiographic follow-up is recommended to ensure complete resolution. If there is high clinical concern further evaluation with contrast-enhanced chest CT can be considered.   MACRO: None   Signed by: Chica Ventura 2/21/2025 2:09 AM Dictation workstation:   [id number]       TTE 12/29/24  CONCLUSIONS:   1.  Poorly visualized anatomical structures due to suboptimal image quality.   2. Left ventricular ejection fraction is severely decreased, calculated by Green's biplane at 19%.   3. There is global hypokinesis of the left ventricle with minor regional variations.   4. Left ventricular cavity size is moderately dilated.   5. Abnormal septal motion consistent with left bundle branch block.   6. No left ventricular thrombus visualized.   7. There is reduced right ventricular systolic function.   8. Moderately enlarged right ventricle.   9. The left atrium is enlarged.  10. The right atrium is moderately dilated.  11. Mild aortic valve regurgitation.  12. Compared with study dated 9/19/2024, no significant change.        Assessment/Plan   IMP:  69 y.o. male with past medical history of HFrEF, COPD T2DM , hypothyroidism, MELISSA i/s/o morbid obesity, severe PAD, HLD presenting to ED with hemoptysis, difficulty breathing, worsening swelling. He has acutely decompensated heart failure and acute hypoxic and hypercarbic respiratory failure. He will be diuresed but may die in the hospital. His goal is to go home feeling better. He knows this may not be possible.     Discussion with patient:  He tells me he has been at home with hospice.  He checks his vitals regularly and knows that he feels best when he is tachycardic and a normal O2 saturation.  Yesterday he coughed up blood and started looking online watching YouTube videos.  He discovered that this may be a sign of a blood clot. He also realized that he was feeling worse even with high heart rate and that his oxygen saturation was below normal and swelling getting worse. He wanted to come to the hospital to be evaluated for a blood clot and to feel better if there is something that could be corrected here.  He did not call his hospice agency before calling EMS.  I told him that I believe he is actively dying and that he is end-stage.  We agree that diuresing him may make him  Yes feel better but that this will not solve the problem.  We agreed to try to help him feel better in the hospital but also agreed that he may die here and in that case we will keep him comfortable.  His goal is still to go home.  He agreed to be DNR/DNI and no ICU.     Plan:  - Friend and HCPOA Andrea Jesus was updated regarding consensus that patient is dying, that we may not be able to get him home safely, and that he may need to be made comfortable here in the hospital. They are understanding of this and coming today.   - Order placed for DNR DNI No ICU  - Agree with plan to diurese aggressively.   - I have put in a new consult to hospice. Waiting for [facility] to call me back. They should arrive around 1600.   - He may be GIP here if blood pressures do not permit diuresis.   - Prefer hydromorphone, oxycodone or fentanyl over morphine due to possibility of LEONA here and neurotoxicity.   - PRN BZD indicated if anxious.  Lorazepam 0.5mg IV q4H prn would be an option. He has a history of chronic alcohol use so may require larger doses.   - Discussed with primary team, other providers    Provider estimate of survival: hours to days  Patient Prognostic Awareness: Yes - congruent with provider estimation    Patient/proxy preference for information  Prefers full information    Goals of Care  DNR DNI and No ICU    Is the patient hospice-eligible?   Yes  Was a discussion held re hospice services?   yes  Was a decision made re hospice services?  Yes      I spent 20 minutes in the professional and overall care of this patient.  I spent 50 minutes in the professional and overall care of this patient related to ACP in addition to other time spent in assessment, chart review, and coordination of care        Edwar Reveles MD

## 2025-02-21 NOTE — NURSING NOTE
Rapid Response RN Note    The following patient has a RADAR score of 8. Unit: AHUA4, Room: 409/409-A, T: 36 °C (96.8 °F) (Temporal), P: (!) 117, R: 15, BP: 93/67, PulseOx: (!) 89%      This RN Reviewed above VS with bedside RN.

## 2025-02-21 NOTE — NURSING NOTE
Received patient from ED, family and hospice nurse in the room, pt alert and oriented though lethargic, on CPAP from home, on IV lasix and dobutamine,  abdominal distended. NP notified pt is for [facility] hospice, order placed.

## 2025-02-21 NOTE — PROGRESS NOTES
Goals of care counseling    CCM evaluation requested for possible transfer to the admission to the ICU d/t decompensated HFrEF in the setting of CAP.  Mr. Glass was receiving Hospice care via [facility].  Patient presented to the ED, d/t progressive dyspnea associated with hemoptysis.  CT Chest per PE protocol was negative for PE, however a LLL infiltrate was identified, prompting initiation of empiric abx.       BP 82/58 Comment: manual  Pulse (!) 116   Temp 36.2 °C (97.2 °F)   Resp 17   Wt 120 kg (265 lb)   SpO2 97%   BMI 36.96 kg/m²     Gen: morbidly obese, ill-appearing male on Nasal cpap  HENT: NC/AT, perioral cyanosis noted  Neck: no JVD appreciated   CV: tachycardic, s1,s2  Pulm: coarse breath sounds b/l   Abd: obese  Ext: cool, +3 dependent edema above level of the waist   Skin: valeria appearance to abdomen, face and lower extremities     Extensive goals of Care discussion between the patient, myself and Dr. Reveles. Patient expressed that his goal was to pass-on in the comfort of his home.  Explained that the most likely pathway toward achieving this goal is to optimize symptoms, principally by augmenting loop diuretic dosing and frequency.  Patient also expressed that he does not wish to be admitted to the ICU, nor does he want to be intubated or undergo ACLS in the event of cardiopulmonary arrest.     Discussed further with Dr. Reveles. We agree that the patient is presently has decompensated end-stage, HFrEF  in the setting of CAP with consequent hypoxemia and is actively dying.      Recommendations:   -  Lasix 80 mg IV q 8 Hours   - if urine output does not exceed at least 1 L or does not yield improvement in symptoms to permit discharge back home with hospice care, consider adding Diuril, or Metolazone   - Code status updated to DNR/DNI/No ICU admission    Critical care time: 40 min    [FreeTextEntry1] : HPI    Patient presents for US for pelvic pressure and chronic urgency and repeat pap denies any leakage of urine   LMP: 19   Last pap: 2024 unsatisfactory, HPV neg     --------------------------------------------------------------------------------------------------------- ASSESSMENT & PLAN:    58 y/o   25 US: uterus 6 cm anteverted. EMs 2.3 mm. No FF.  Normal adnexa.  Fibroid 1.17 subserosal fibroid  #unsatisfactory pap: -repeat pap, HPV  #subserosal fibroid -recommend repeat US in 4-6 months for stability; order placed  #pelvic pressure due to grade 2 rectocele -exam findings reviewed -refer to uro/gyn    rto 4-6 month f/u TV US and fibroid    Dr. Nicol Ball, DO, MPH, FACOG

## 2025-02-21 NOTE — PROGRESS NOTES
Pharmacy Medication History     Source of Information: PATIENT    Additional concerns with the patient's PTA list. ```PATIENT STATES THAT HE TOOK HIMSELF OFF OF JARDIANCE 10 MG AND ZETIA 10 MG AND HIS MD IS UNAWARE. PATIENT STATES THAT HE WANTS TO DISCUSS HIS MEDICATIONS WITH HIS MD BECAUSE HE FEELS THERE ARE ISSUES OCCURRING REGARDING HIS ABILITY TO TASTE  ANYTHING AND NOW HE BARELY HAS AN APPETITE~~~~    The following updates were made to the Prior to Admission medication list:     Medications ADDED:   N/A  Medications CHANGED:  N/A  Medications REMOVED:   N/A  Medications NOT TAKING:   N/A    Allergy reviewed : Yes    Meds 2 Beds : No    Outpatient pharmacy confirmed and updated in chart : Yes    Pharmacy name: [facility]    The list below reflectives the updated PTA list. Please review each medication in order reconciliation for additional clarification and justification.    Prior to Admission Medications   Prescriptions Last Dose Informant Patient Reported? Taking?   Dexcom G7  misc  Self No No   Sig: Use as instructed   Patient not taking: Reported on 10/27/2024   Dexcom G7 Sensor device  Self No No   Sig: Use as directed   Patient not taking: Reported on 10/27/2024   SantyL 250 unit/gram ointment Past Week  Yes Yes   Sig: Apply a thin layer every day to black area of wound - Wrap with ABD and ace bandage   acetaminophen (Tylenol) 325 mg tablet Past Week  No Yes   Sig: Take 2 tablets (650 mg) by mouth every 4 hours if needed for mild pain (1 - 3).   albuterol 90 mcg/actuation inhaler Past Week Self No Yes   Sig: INHALE 2 PUFFS EVERY 4-6 HOURS AS NEEDED.   allopurinol (Zyloprim) 300 mg tablet Past Week  Yes Yes   Sig: Take 1 tablet (300 mg) by mouth once daily.   amoxicillin-pot clavulanate (Augmentin) 500-125 mg tablet Past Week  Yes Yes   Sig: TAKE 1 TABLET BY MOUTH THREE TIMES DAILY FOR 10 DAYS for right lower leg wound infection   empagliflozin (Jardiance) 10 mg  Self No No    Sig: Take 1 tablet (10 mg) by mouth once daily.   ezetimibe (Zetia) 10 mg tablet  Self No No   Sig: Take 1 tablet (10 mg) by mouth once daily.   fluticasone-umeclidin-vilanter (Trelegy Ellipta) 100-62.5-25 mcg blister with device  Self No No   Sig: one puff per day   furosemide (Lasix) 80 mg tablet 2/21/2025 Morning  Yes Yes   Sig: Take 1 tablet (80 mg) by mouth once daily in the morning.   gabapentin (Neurontin) 100 mg capsule 2/21/2025 Self No Yes   Sig: Take 1 Capsule by mouth every afternoon   ibuprofen 400 mg tablet Past Week  Yes Yes   Sig: Take One tablet by mouth every 6 hours as needed for pain or inflammation   ipratropium-albuteroL (Duo-Neb) 0.5-2.5 mg/3 mL nebulizer solution 2/20/2025 Self No Yes   Sig: Take 3 mL by nebulization 4 times a day.   levothyroxine (Synthroid, Levoxyl) 125 mcg tablet 2/21/2025 Morning Self No Yes   Sig: Take 1 Tablet by mouth every day   midodrine (Proamatine) 5 mg tablet 2/21/2025 Morning  No Yes   Sig: Take 1 tablet (5 mg) by mouth 3 times a day.   nicotine (Nicoderm CQ) 14 mg/24 hr patch Past Week  No Yes   Sig: Place 1 patch over 24 hours on the skin once daily.   nystatin (Mycostatin) 100,000 unit/mL suspension Past Week  No Yes   Sig: Use 5 mL (500,000 Units) in the mouth or throat 2 times a day.   nystatin (Mycostatin) cream Past Week  Yes Yes   Sig: Apply thin layer to scrotal and groin redness twice daily until healed   oxygen (O2) gas therapy  Self No No   Sig: Inhale 2 L/min continuously. 2L nc at rest and 4L nc with ambulation   oxygen (O2) gas therapy  Self No No   Sig: Inhale 7 L/min continuously.   oxygen (O2) gas therapy  Self No No   Sig: Inhale 1 each once daily at bedtime. NOCTURNAL CPAP AUTOTITRATING, auto-titrating range: 4-20   polyethylene glycol (Glycolax, Miralax) 17 gram packet Past Month  No Yes   Sig: Take 17 g by mouth once daily as needed (Constipation).   potassium chloride CR 10 mEq ER tablet 2/21/2025 Morning  Yes Yes   Sig: Take 2 tablets  (20 mEq) by mouth once daily in the morning.   sennosides-docusate sodium (Lydia-Colace) 8.6-50 mg tablet Past Month  No Yes   Sig: Take 1 tablet by mouth once daily at bedtime.   sodium chloride (Ocean) 0.65 % nasal spray Past Week  No Yes   Sig: Administer 1 spray into each nostril 3 times a day as needed for congestion.   tamsulosin (Flomax) 0.4 mg 24 hr capsule 2/20/2025 Self No Yes   Sig: Take 1 Capsule by mouth every evening   traZODone (Desyrel) 50 mg tablet 2/20/2025 Bedtime  Yes Yes   Sig: TAKE 1/2 (ONE-HALF) OF A TABLET BY MOUTH DAILY AT BEDTIME   triamcinolone (Kenalog) 0.1 % ointment Past Week  No Yes   Sig: Apply topically 2 times a day.      Facility-Administered Medications: None       The list below reflectives the updated allergy list. Please review each documented allergy for additional clarification and justification.    No Known Allergies       02/21/25 at 11:58 AM - Shereen Koo

## 2025-02-21 NOTE — PROGRESS NOTES
PALLIATIVE CARE SOCIAL WORK NOTE     Pt presented to the ED, from home, via EMS with decompensated heart failure. He lives at home with around the clock private duty caregivers through [employer]. He is active with [facility]. Instead of calling [facility] last night, he called 9-1-1 and was brought to the hospital. He is not doing well. Have reached out to [facility] via [facility], have left telephone messages at [facility] ([phone]) requesting that someone from [facility] come to the hospital to place this gentleman in GIP status. Finally reached out to [facility] clinical liaison Edith ([phone]) to enlist her help in reaching out to [facility] staff. Continuing to follow as appropriate. Thank you    JESSENIA Garland     Addendum 3:50pm Per [facility] Clinical liaison Elo Sharma RN Rosa will be at the hospital this afternoon between 4:00-4:30pm. If pt needs to be placed in GIP, that will be the plan. If [facility] feels that they can safely get him back home, that will be the plan.

## 2025-02-21 NOTE — PROGRESS NOTES
Transitional Care Coordination Progress Note:  Plan per Medical/Surgical team: treatment of PN with On home Bipap, Chronic lipscomb care, IV ATB, duoneb, IV lasix  Status: Inpatient   Payor source: [facility]  Discharge disposition: Home alone with [facility] 24/7 aide & [facility]   Potential Barriers: , BNP 1,837, trops 135, 137  ADOD: 2/24/2025  JOHN Nuno RN, BSN Transitional Care Coordinator ED# [phone]      02/21/25 0714   Discharge Planning   Living Arrangements Alone   Support Systems Friends/neighbors   Assistance Needed [facility] 24/7 aide & [facility]   On home Bipap  Chronic lipscomb   IV ATB, duoneb, IV lasix   Type of Residence Private residence   Number of Stairs to Enter Residence 2   Number of Stairs Within Residence 10   Do you have animals or pets at home? No   Home or Post Acute Services In home services   Type of Home Care Services Home health aide;Hospice   Expected Discharge Disposition Home Health   Does the patient need discharge transport arranged? Yes   RoundTrip coordination needed? Yes   Has discharge transport been arranged? No   Financial Resource Strain   How hard is it for you to pay for the very basics like food, housing, medical care, and heating? Not hard   Housing Stability   In the last 12 months, was there a time when you were not able to pay the mortgage or rent on time? N   In the past 12 months, how many times have you moved where you were living? 1   At any time in the past 12 months, were you homeless or living in a shelter (including now)? N   Transportation Needs   In the past 12 months, has lack of transportation kept you from medical appointments or from getting medications? no   In the past 12 months, has lack of transportation kept you from meetings, work, or from getting things needed for daily living? No   Stroke Family Assessment   Stroke Family Assessment Needed No   Intensity of Service   Intensity of Service 0-30  min

## 2025-02-21 NOTE — H&P
History Of Present Illness  Alo Glass is a 69 y.o. male presenting with shortness of breath, hemoptysis and generalized edema, anasarca in a patient with severe ischemic cardiomyopathy with LV ejection fraction of 19%..  69-year-old male with history of heart failure with reduced ejection fraction 19% EF in December 2024, COPD and obstructive sleep apnea on CPAP, morbid obesity, type 2 diabetes mellitus, recent septic shock when [place] [facility] [place] January 12, 2025, hypertension, hypothyroidism, chronic atrial flutter not on anticoagulation and hyperlipidemia, peripheral arterial disease with nonhealing bilateral leg wounds was at hospice care at home however he became short of breath and he coughed up some blood and was concerned therefore he revoked his hospice and came to the hospital for further evaluation and treatment.  Chest x-ray revealed a large left upper lobe consolidation and patient has generalized anasarca and overt heart failure and is significantly hypertensive.  Multiple consults have been obtained including cardiology and a palliative care and hospice care.  Critical care was also involved in his assessment and finally patient was made DNR/DNI and no transfer to ICU however to be admitted to stepdown unit.  After cardiology had seen him they advised patient to start on dobutamine infusion at 5 mcg/min and diurese him with IV Lasix 10 mg/h Lasix infusion.  Since patient's blood pressures are low the doses will have to be adjusted accordingly.       Past Medical History  He has a past medical history of CHF (congestive heart failure), COPD (chronic obstructive pulmonary disease) (Multi), Diabetes mellitus (Multi), Hypertension, Obesity, MELISSA (obstructive sleep apnea), and Personal history of other specified conditions (01/08/2023).    Surgical History  He has a past surgical history that includes Hernia repair (11/07/2013) and Other surgical history (11/07/2013).      Social History  He reports that he has been smoking cigarettes. He has never used smokeless tobacco. He reports current alcohol use of about 7.0 standard drinks of alcohol per week. He reports that he does not use drugs.    Family History  Noncontributory.     Allergies  Patient has no known allergies.    Review of Systems  COMPLETE REVIEW OF SYSTEMS:    GENERAL: No weight loss, positive for general malaise malaise, no SEE HPI  HEENT: Negative for frequent or significant headaches, No changes in hearing or vision, no nose bleeds or other nasal problems  NECK: Negative for lumps, goiter, pain and significant neck swelling  RESPIRATORY: Positive for cough, hemoptysis and shortness of breath.  CARDIOVASCULAR: Positive for shortness of breath and generalized anasarca ascites and edema   GI: Positive for abdominal distention  : No history of dysuria, frequency or incontinence    MUSCULOSKELETAL: Positive for joint pain   Bilateral lower extremity ulcers and wound left greater than right.  SKIN: Negative for lesions, rash, and itching.  PSYCH: Anxiety and depression  HEMATOLOGY/LYMPHOLOGY: Negative for prolonged bleeding, bruising easily or swollen nodes.  ENDOCRINE: Diabetes mellitus type 2   NEURO: No history of headaches, syncope, paralysis, seizures or tremors         Physical Exam  GENERAL: Awake alert, moderate distress, obese ,has generalized anasarca  SKIN: Skin color, texture, turgor normal. No rashes or lesions.  HEAD/SINUSES: No significant findings.  EYES: PERRLA and EOMI  EARS: external ears normal.  NOSE:  Septum midline.  OROPHARYNX: Lips, mucosa, and tongue normal. Teeth and gums normal. Oropharynx normal.  NECK: difficult to assess jugulovenous distention due to Body habitus, no carotid bruits, carotid pulse normal contour.  BACK: No CVAT.  LUNGS: Generalized decreased air entry intermittent wheezing bibasilar crackles and crackles left mid and upper lung fields posteriorly.    CARDIAC: Remains in  atrial flutter fibrillation with controlled ventricular rate, distant S1 and S2; no rubs,  or gallops, 2/6 systolic ejection murmur left sternal border, mitral and tricuspid holosystolic murmurs.  ABDOMEN: Abdomen large and obese and distended with moderate ascites, unable to appreciate any organomegaly or masses.    EXTREMITIES: 4+ edema, no clubbing, bilateral lower extremity ulcers and wound right greater than left  NEURO: Limited examination nonfocal.  PULSES: 2+ radial, 2+ carotid  RECTAL: not done          Last Recorded Vitals  BP 93/72   Pulse (!) 107   Temp 36.2 °C (97.2 °F)   Resp 18   Wt 120 kg (265 lb)   SpO2 95%     Relevant Results  Scheduled medications  allopurinol, 300 mg, oral, Daily  empagliflozin, 10 mg, oral, Daily  enoxaparin, 40 mg, subcutaneous, q24h  ezetimibe, 10 mg, oral, Daily  ipratropium-albuteroL, 3 mL, nebulization, TID  levoFLOXacin, 750 mg, intravenous, q24h  levothyroxine, 125 mcg, oral, Daily  midodrine, 10 mg, oral, TID  nicotine, 1 patch, transdermal, Daily  polyethylene glycol, 17 g, oral, Daily      Continuous medications  DOBUTamine, 5 mcg/kg/min, Last Rate: 5 mcg/kg/min (02/21/25 1525)  furosemide, 10 mg/hr  oxygen, 2 L/min  oxygen, 7 L/min      PRN medications  PRN medications: acetaminophen, ipratropium-albuteroL, morphine, ondansetron **OR** ondansetron, traZODone     Results for orders placed or performed during the hospital encounter of 02/21/25 (from the past 96 hours)   Comprehensive metabolic panel   Result Value Ref Range    Glucose 150 (H) 74 - 99 mg/dL    Sodium 126 (L) 136 - 145 mmol/L    Potassium 5.0 3.5 - 5.3 mmol/L    Chloride 89 (L) 98 - 107 mmol/L    Bicarbonate 28 21 - 32 mmol/L    Anion Gap 14 10 - 20 mmol/L    Urea Nitrogen 79 (H) 6 - 23 mg/dL    Creatinine 1.13 0.50 - 1.30 mg/dL    eGFR 70 >60 mL/min/1.73m*2    Calcium 9.1 8.6 - 10.3 mg/dL    Albumin 3.5 3.4 - 5.0 g/dL    Alkaline Phosphatase 340 (H) 33 - 136 U/L    Total Protein 6.2 (L) 6.4 - 8.2  g/dL    AST 29 9 - 39 U/L    Bilirubin, Total 1.0 0.0 - 1.2 mg/dL    ALT 41 10 - 52 U/L   CBC and Auto Differential   Result Value Ref Range    WBC 9.3 4.4 - 11.3 x10*3/uL    nRBC 0.2 (H) 0.0 - 0.0 /100 WBCs    RBC 5.72 4.50 - 5.90 x10*6/uL    Hemoglobin 17.3 13.5 - 17.5 g/dL    Hematocrit 52.5 (H) 41.0 - 52.0 %    MCV 92 80 - 100 fL    MCH 30.2 26.0 - 34.0 pg    MCHC 33.0 32.0 - 36.0 g/dL    RDW 17.3 (H) 11.5 - 14.5 %    Platelets 240 150 - 450 x10*3/uL    Neutrophils % 74.1 40.0 - 80.0 %    Immature Granulocytes %, Automated 0.4 0.0 - 0.9 %    Lymphocytes % 15.6 13.0 - 44.0 %    Monocytes % 9.2 2.0 - 10.0 %    Eosinophils % 0.6 0.0 - 6.0 %    Basophils % 0.1 0.0 - 2.0 %    Neutrophils Absolute 6.85 1.20 - 7.70 x10*3/uL    Immature Granulocytes Absolute, Automated 0.04 0.00 - 0.70 x10*3/uL    Lymphocytes Absolute 1.44 1.20 - 4.80 x10*3/uL    Monocytes Absolute 0.85 0.10 - 1.00 x10*3/uL    Eosinophils Absolute 0.06 0.00 - 0.70 x10*3/uL    Basophils Absolute 0.01 0.00 - 0.10 x10*3/uL   B-Type Natriuretic Peptide   Result Value Ref Range    BNP 1,837 (H) 0 - 99 pg/mL   Lactate   Result Value Ref Range    Lactate 2.0 0.4 - 2.0 mmol/L   Type And Screen   Result Value Ref Range    ABO TYPE O     Rh TYPE NEG     ANTIBODY SCREEN NEG    Protime-INR   Result Value Ref Range    Protime 12.5 9.8 - 12.8 seconds    INR 1.1 0.9 - 1.1   APTT   Result Value Ref Range    aPTT 36 27 - 38 seconds   Blood Gas Venous Full Panel   Result Value Ref Range    POCT pH, Venous 7.33 7.33 - 7.43 pH    POCT pCO2, Venous 57 (H) 41 - 51 mm Hg    POCT pO2, Venous 28 (L) 35 - 45 mm Hg    POCT SO2, Venous 38 (L) 45 - 75 %    POCT Oxy Hemoglobin, Venous 36.6 (L) 45.0 - 75.0 %    POCT Hematocrit Calculated, Venous 53.0 (H) 41.0 - 52.0 %    POCT Sodium, Venous 118 (LL) 136 - 145 mmol/L    POCT Potassium, Venous 4.7 3.5 - 5.3 mmol/L    POCT Chloride, Venous 87 (L) 98 - 107 mmol/L    POCT Ionized Calicum, Venous 1.16 1.10 - 1.33 mmol/L    POCT Glucose,  Venous 163 (H) 74 - 99 mg/dL    POCT Lactate, Venous 2.4 (H) 0.4 - 2.0 mmol/L    POCT Base Excess, Venous 2.2 -2.0 - 3.0 mmol/L    POCT HCO3 Calculated, Venous 30.1 (H) 22.0 - 26.0 mmol/L    POCT Hemoglobin, Venous 17.8 (H) 13.5 - 17.5 g/dL    POCT Anion Gap, Venous 6.0 (L) 10.0 - 25.0 mmol/L    Patient Temperature 37.0 degrees Celsius    FiO2 100 %   Troponin I, High Sensitivity, Initial   Result Value Ref Range    Troponin I, High Sensitivity 135 (HH) 0 - 20 ng/L   Thyroid Stimulating Hormone   Result Value Ref Range    Thyroid Stimulating Hormone 2.28 0.44 - 3.98 mIU/L   Blood Gas Lactic Acid, Venous   Result Value Ref Range    POCT Lactate, Venous 2.1 (H) 0.4 - 2.0 mmol/L   Blood Culture    Specimen: Peripheral Venipuncture; Blood culture   Result Value Ref Range    Blood Culture Loaded on Instrument - Culture in progress    Blood Culture    Specimen: Peripheral Venipuncture; Blood culture   Result Value Ref Range    Blood Culture Loaded on Instrument - Culture in progress    Troponin, High Sensitivity, 1 Hour   Result Value Ref Range    Troponin I, High Sensitivity 137 (HH) 0 - 20 ng/L   Troponin I, High Sensitivity   Result Value Ref Range    Troponin I, High Sensitivity 112 (HH) 0 - 20 ng/L   Basic metabolic panel   Result Value Ref Range    Glucose 152 (H) 74 - 99 mg/dL    Sodium 126 (L) 136 - 145 mmol/L    Potassium 5.1 3.5 - 5.3 mmol/L    Chloride 94 (L) 98 - 107 mmol/L    Bicarbonate 22 21 - 32 mmol/L    Anion Gap 15 10 - 20 mmol/L    Urea Nitrogen 76 (H) 6 - 23 mg/dL    Creatinine 0.98 0.50 - 1.30 mg/dL    eGFR 83 >60 mL/min/1.73m*2    Calcium 7.5 (L) 8.6 - 10.3 mg/dL   CBC   Result Value Ref Range    WBC 8.0 4.4 - 11.3 x10*3/uL    nRBC 0.4 (H) 0.0 - 0.0 /100 WBCs    RBC 5.64 4.50 - 5.90 x10*6/uL    Hemoglobin 17.3 13.5 - 17.5 g/dL    Hematocrit 52.9 (H) 41.0 - 52.0 %    MCV 94 80 - 100 fL    MCH 30.7 26.0 - 34.0 pg    MCHC 32.7 32.0 - 36.0 g/dL    RDW 17.8 (H) 11.5 - 14.5 %    Platelets 251 150 - 450  x10*3/uL   Lactate   Result Value Ref Range    Lactate 2.3 (H) 0.4 - 2.0 mmol/L     *Note: Due to a large number of results and/or encounters for the requested time period, some results have not been displayed. A complete set of results can be found in Results Review.    CT angio chest for pulmonary embolism    Result Date: 2/21/2025  Interpreted By:  Sagar Champion, STUDY: CT ANGIO CHEST FOR PULMONARY EMBOLISM;  2/21/2025 3:13 am   INDICATION: Signs/Symptoms:Hemoptysis..     COMPARISON: None   ACCESSION NUMBER(S): [id number]   ORDERING CLINICIAN: EZRA WHEATLEY   TECHNIQUE: Helical data acquisition of the chest was obtained after intravenous administration of 90 ML Omnipaque 350, as per PE protocol. Images were reformatted in coronal and sagittal planes. Axial and coronal maximum intensity projection (MIP) images were created and reviewed.   FINDINGS: POTENTIAL LIMITATIONS OF THE STUDY: None   HEART AND VESSELS: No discrete filling defects within the main pulmonary artery or its branches to segmental level. Please note that, assessment of subsegmental branches is limited and small peripheral emboli are not entirely excluded. Main pulmonary artery is mildly dilated, suggesting pulmonary arterial hypertension.   The thoracic aorta normal in course and caliber.There is mild scattered atherosclerosis present, including calcified and noncalcified plaques.Near non-opacification of thoracic aorta precludes assessment for acute aortic pathology. Moderate coronary artery calcifications are seen. Please note,the study is not optimized for evaluation of coronary arteries.   Heart is enlarged, and increased in size relative to the prior CT.   There is no pericardial effusion seen.   MEDIASTINUM AND ARELIS, LOWER NECK AND AXILLA: The visualized thyroid gland is within normal limits. No pathologically enlarged lymph nodes. Esophagus appears within normal limits as seen.   LUNGS AND AIRWAYS: The trachea and central airways are  patent. No endobronchial lesion is seen.   There is new consolidation in the left upper lobe compatible with pneumonia. Platelike opacities in the dependent lower lungs compatible with atelectasis. Moderate emphysema. Small bilateral pleural effusions.     UPPER ABDOMEN: The visualized subdiaphragmatic structures demonstrate no acute findings. Reflux of IV contrast into the hepatic veins is consistent with elevated right heart pressures/right heart pressures. Small volume of ascites.       CHEST WALL AND OSSEOUS STRUCTURES: Chest wall is within normal limits. No acute osseous pathology.There are no suspicious osseous lesions. Severe discogenic degeneration of the imaged spine.       No acute pulmonary embolus through the segmental level.   Left upper lobe consolidation compatible with pneumonia. Clinical and radiographic follow-up to complete resolution recommended.   Small bilateral pleural effusions.   Worsened cardiomegaly. Non enhancement of the thoracic aorta markedly limiting its evaluation and denoting left ventricular systolic dysfunction. Reflux of IV contrast into the hepatic veins compatible with elevated right heart pressures/right heart dysfunction.   Atherosclerosis, including coronary artery disease.   Small volume of ascites.   MACRO: None   Signed by: Sagar Champion 2/21/2025 3:28 AM Dictation workstation:   [id number]    XR chest 1 view    Result Date: 2/21/2025  Interpreted By:  Chica Ventura, STUDY: XR CHEST 1 VIEW;  2/21/2025 1:54 am   INDICATION: Signs/Symptoms:SOB.   COMPARISON: Chest x-ray 01/06/2025.   ACCESSION NUMBER(S): [id number]   ORDERING CLINICIAN: EZRA WHEATLEY   FINDINGS: Multiple overlying leads are present.   CARDIOMEDIASTINAL SILHOUETTE: Cardiac silhouette is enlarged but stable.   LUNGS: There is new focal masslike opacity in the left mid lung concerning for developing airspace disease. This measures approximately 4.3 x 6.5 cm and is new from prior chest x-ray. Right lung is  clear. No effusion or pneumothorax.   ABDOMEN: No remarkable upper abdominal findings.   BONES: Multilevel degenerative changes of the spine       New masslike opacity in the left mid lung as described above is concerning for developing pneumonia. Underlying mass lesion not excluded. Correlate with symptomatology and continued posttreatment radiographic follow-up is recommended to ensure complete resolution. If there is high clinical concern further evaluation with contrast-enhanced chest CT can be considered.   MACRO: None   Signed by: Chica Ventura 2/21/2025 2:09 AM Dictation workstation:   [id number]         Assessment/Plan   Assessment & Plan  Pneumonia of left upper lobe due to infectious organism    This is a 69-year-old male with advanced heart failure with reduced ejection fraction of 19%, COPD, obstructive sleep apnea, hypertension, obesity, diabetes mellitus type 2, peripheral arterial disease, recent septic shock when he was admitted to [facility] [place], bilateral lower extremity wound and ulcer right greater than left was under hospice care and had developed acute shortness of breath and hemoptysis therefore he revoked his hospice and came to the hospital for further evaluation.  Chest x-ray and CT of the chest is consistent with large consolidation of the left upper lobe as well as evidence of cardiomegaly and overt heart failure with generalized anasarca on examination.  Patient is very hypotensive and difficult to diurese.  After great discussion he has been seen by several consultants including palliative care intensive care and cardiology and hospice care.  Patient to be admitted to stepdown unit and started on dobutamine infusion because of hypotension and then diuresed with IV Lasix infusion.  And continue with other cardiac medications if tolerated.  Is made DNR/DNI with no transfer to intensive care unit.    1.  Acute respiratory distress secondary to left upper lobe pneumonia and overt heart  failure.  Chest x-ray and CT of chest consistent with large pneumonia left lung, he received IV vancomycin and Zosyn in the emergency room, will continue with IV Levaquin.    2.  Acute on chronic systolic heart failure with LV ejection fraction of 19% and patient is hypotensive and has generalized anasarca  Admit to stepdown unit, started on dobutamine infusion at a fixed dose of 5 mcg/kg body weight per minute and diurese patient with Lasix infusion at 10 mg/h and watch closely.  Patient is not able to tolerate many other drugs because of significant hypotension.    3.  History of atrial flutter and fibrillation, remains in atrial flutter, not anticoagulated, rate is around 100/min.    4.  Obstructive sleep apnea morbid obesity and COPD, continue with aerosol treatment as necessary and CPAP as necessary.     Patient is DNR/DNI and not to be transferred to intensive care unit, seen by hospice care, seen by critical care, palliative care and cardiology.    Total time spent in examination counseling coordinating care for this patient was greater than 75 minutes.    Noble Gatica MD

## 2025-02-21 NOTE — CONSULTS
Inpatient consult to Cardiology  Consult performed by: Meme Reynoso, PARAM-CNP  Consult ordered by: Keren Christensen, PARAM-CNP  Reason for consult: chf        History Of Present Illness:    Alo Glass is a 69 y.o. male with past medical history of HFrEF (EF 19% 12/2024) , COPD (2019: FEV1 45% w +ve BD change; %/TLC 85%), MELISSA, obesity, type II DM,, hx of stenotrophomonas pneumonia w parapneumonic effusion (9/2024), HTN, hypothyroidism, atrial fibrillation not on AC, hyperlipidemia, severe PAC with leg wounds who presented to [facility] [place] [place] ED on 2/21/2025 with hemoptysis and shortness of breath the past few days.  Cardiology consulted for HFpEF.      Recent admit to [facility] [place] - 1/12/2025 -   for septic shock most likely from RLE wound, where he was discharged with hospice care.  Cardiology did see him at that time and did not feel he would benefit from cardioversion in the setting of sepsis.     States he came in as he was coughing up blood. He states he is chronically short of breath. He is having some chest tightness, which he attributes to difficulty breathing.  States he is chronically swollen at home.  He is hoping to pass at home with hospice care.  No abdominal pain, no nausea, or vomiting    Home CV medications: Jardiance 10mg daily, midodrine 5mg TID, furosemide 80mg daily    Follows with Dr. Moore and Azul Diaz, ANSHUL for cardiology    Past Cardiology Tests (Last 3 Years):    EKG:      Echo:  TTE 12/29/2024  1. Poorly visualized anatomical structures due to suboptimal image quality.   2. Left ventricular ejection fraction is severely decreased, calculated by Green's biplane at 19%.   3. There is global hypokinesis of the left ventricle with minor regional variations.   4. Left ventricular cavity size is moderately dilated.   5. Abnormal septal motion consistent with left bundle branch block.   6. No left ventricular thrombus visualized.   7. There is reduced right ventricular systolic  function.   8. Moderately enlarged right ventricle.   9. The left atrium is enlarged.  10. The right atrium is moderately dilated.  11. Mild aortic valve regurgitation.  12. Compared with study dated 9/19/2024, no significant change.    Echo: 9/19/2024   1. Left ventricular ejection fraction is severely decreased, calculated by Green's biplane at 23%.   2. There is global hypokinesis of the left ventricle with minor regional variations.   3. Poorly visualized anatomical structures due to suboptimal image quality.   4. Spectral Doppler shows an impaired relaxation pattern of left ventricular diastolic filling.   5. There is mildly reduced right ventricular systolic function.   6. Moderately enlarged right ventricle.   7. There is moderate aortic valve cusp calcification. The was no assesment of aortic stenosis in this limited study.   8. Compared with study dated 3/11/2024, there is RV dilation and reduced function.      Past Medical History:  He has a past medical history of CHF (congestive heart failure), COPD (chronic obstructive pulmonary disease) (Multi), Diabetes mellitus (Multi), Hypertension, Obesity, MELISSA (obstructive sleep apnea), and Personal history of other specified conditions (01/08/2023).    Past Surgical History:  He has a past surgical history that includes Hernia repair (11/07/2013) and Other surgical history (11/07/2013).      Social History:  He reports that he has been smoking cigarettes. He has never used smokeless tobacco. He reports current alcohol use of about 7.0 standard drinks of alcohol per week. He reports that he does not use drugs.    Family History:  No family history on file.     Allergies:  Patient has no known allergies.    ROS:  10 point review of systems including (Constitutional, Eyes, ENMT, Respiratory, Cardiac, Gastrointestinal, Neurological, Psychiatric, and Hematologic) was performed and is otherwise negative.    Objective Data:  Last Recorded Vitals:  Vitals:    02/21/25  0630 25 0645 25 0900 25 1003   BP:  (!) 156/92 99/62    Pulse: (!) 114 (!) 116     Resp: (!)  17     Temp:    36.2 °C (97.2 °F)   TempSrc:       SpO2: (!) 93% 97%     Weight:         Medical Gas Therapy: Supplemental oxygen  Medical Gas Delivery Method: CPAP/Bi-PAP mask  Weight  Av kg (265 lb)  Min: 120 kg (265 lb)  Max: 120 kg (265 lb)    LABS:  CMP:  Results from last 7 days   Lab Units 25  0142   SODIUM mmol/L 126*   POTASSIUM mmol/L 5.0   CHLORIDE mmol/L 89*   CO2 mmol/L 28   ANION GAP mmol/L 14   BUN mg/dL 79*   CREATININE mg/dL 1.13   EGFR mL/min/1.73m*2 70   ALBUMIN g/dL 3.5   ALT U/L 41   AST U/L 29   BILIRUBIN TOTAL mg/dL 1.0     CBC:  Results from last 7 days   Lab Units 25  1001 25  0142   WBC AUTO x10*3/uL 8.0 9.3   HEMOGLOBIN g/dL 17.3 17.3   HEMATOCRIT % 52.9* 52.5*   PLATELETS AUTO x10*3/uL 251 240   MCV fL 94 92     COAG:   Results from last 7 days   Lab Units 25  0142   INR  1.1     ABO:   ABO TYPE   Date Value Ref Range Status   2025 O  Final     HEME/ENDO:  Results from last 7 days   Lab Units 25  0142   TSH mIU/L 2.28      CARDIAC:   Results from last 7 days   Lab Units 25  0242 25  0142   TROPHS ng/L 137* 135*   BNP pg/mL  --  1,837*             Last I/O:  No intake or output data in the 24 hours ending 25 1121  Net IO Since Admission: No IO data has been entered for this period [25 1121]      Imaging Results:      Inpatient Medications:  Scheduled medications   Medication Dose Route Frequency    allopurinol  300 mg oral Daily    empagliflozin  10 mg oral Daily    enoxaparin  40 mg subcutaneous q24h    ezetimibe  10 mg oral Daily    ipratropium-albuteroL  3 mL nebulization TID    levoFLOXacin  750 mg intravenous q24h    levothyroxine  125 mcg oral Daily    midodrine  10 mg oral Once    midodrine  10 mg oral TID    nicotine  1 patch transdermal Daily    polyethylene glycol  17 g oral Daily     PRN medications    Medication    acetaminophen    ipratropium-albuteroL    ondansetron    Or    ondansetron    traZODone     Continuous Medications   Medication Dose Last Rate    oxygen  2 L/min      oxygen  7 L/min         Outpatient Medications:  Current Outpatient Medications   Medication Instructions    acetaminophen (TYLENOL) 650 mg, oral, Every 4 hours PRN    albuterol 90 mcg/actuation inhaler 2 puffs, inhalation, Every 4 hours PRN    allopurinol (ZYLOPRIM) 300 mg, Daily    amoxicillin-pot clavulanate (Augmentin) 500-125 mg tablet TAKE 1 TABLET BY MOUTH THREE TIMES DAILY FOR 10 DAYS for right lower leg wound infection    Dexcom G7  misc Use as instructed    Dexcom G7 Sensor device Use as directed    empagliflozin (JARDIANCE) 10 mg, oral, Daily    ezetimibe (ZETIA) 10 mg, oral, Daily    fluticasone-umeclidin-vilanter (Trelegy Ellipta) 100-62.5-25 mcg blister with device one puff per day    furosemide (LASIX) 80 mg, Every morning    gabapentin (Neurontin) 100 mg capsule Take 1 Capsule by mouth every afternoon    ibuprofen 400 mg tablet Take One tablet by mouth every 6 hours as needed for pain or inflammation    ipratropium-albuteroL (Duo-Neb) 0.5-2.5 mg/3 mL nebulizer solution 3 mL, nebulization, 4 times daily RT    levothyroxine (SYNTHROID, LEVOXYL) 125 mcg, oral, Daily    midodrine (PROAMATINE) 5 mg, oral, 3 times daily    nicotine (Nicoderm CQ) 14 mg/24 hr patch 1 patch, transdermal, Daily    nystatin (Mycostatin) cream Apply thin layer to scrotal and groin redness twice daily until healed    nystatin (MYCOSTATIN) 500,000 Units, Mouth/Throat, 2 times daily    oxygen (O2) gas therapy 1 each, inhalation, Nightly, NOCTURNAL CPAP AUTOTITRATING, auto-titrating range: 4-20    oxygen (O2) 2 L/min, inhalation, Continuous, 2L nc at rest and 4L nc with ambulation    oxygen (O2) 7 L/min, inhalation, Continuous    polyethylene glycol (GLYCOLAX, MIRALAX) 17 g, oral, Daily PRN    potassium chloride CR 10 mEq ER tablet 20 mEq, Every  morning    SantyL 250 unit/gram ointment Apply a thin layer every day to black area of wound - Wrap with ABD and ace bandage    sennosides-docusate sodium (Lydia-Colace) 8.6-50 mg tablet 1 tablet, oral, Nightly    sodium chloride (Ocean) 0.65 % nasal spray 1 spray, Each Nostril, 3 times daily PRN    tamsulosin (FLOMAX) 0.4 mg, oral, Every evening    traZODone (Desyrel) 50 mg tablet TAKE 1/2 (ONE-HALF) OF A TABLET BY MOUTH DAILY AT BEDTIME    triamcinolone (Kenalog) 0.1 % ointment Topical, 2 times daily       Physical Exam:    Gen: morbidly obese, ill-appearing male on Nasal cpap.  Mottled head to toe, lips blue  HENT: NC/AT,  Neck: no JVD appreciated   CV: tachycardic, s1,s2  Pulm: coarse breath sounds b/l   Abd: obese  Ext: cool extremities x 4, +3 dependent edema above level of the waist   Skin: valeria appearance to abdomen, face and lower extremities      Assessment/Plan     Assessment:    Alo Glass is a 69 y.o. male with past medical history of HFrEF (EF 19% 12/2024) , COPD (2019: FEV1 45% w +ve BD change; %/TLC 85%), MELISSA, obesity, type II DM,, hx of stenotrophomonas pneumonia w parapneumonic effusion (9/2024), HTN, hypothyroidism, atrial fibrillation not on AC, hyperlipidemia, severe PAC with leg wounds who presented to [facility] [place] ED on 2/21/2025 with hemoptysis and shortness of breath the past few days.  Cardiology consulted for HFpEF.      Home CV medications: Jardiance 10mg daily, midodrine 5mg TID, furosemide 80mg daily    # Pneumonia  # Sepsis  # Hemoptysis    - CT angio > no PE, left upper lobe solidation compatible with pneumonia.  Small bilateral pleural effusions.     # Acute on chronic systolic heart failure  # CAD  # Dyslipidemia   - Decompensated, end stage HFrEF in the setting of pneumonia, sepsis   - LVEF 19% on 12/2024   - unable to tolerate GDMT with hypotension   - BNP 1837 ( previous 1741 on 12/31/2024)    # Atrial flutter, chronic   - not on anticoaguation    # Severe PAD with bilateral  leg wounds     2/21 > massively volume up.  Mottled appearance from head to toes, cool extremities     Plan:  - IV diuresis as BP tolerates.  Currently with furosemide 80mg IV every 8 hours.   - Continue Jardiance 10mg daily  - GDMT is held due to hypotension  - Code status is DNR/DNI/No ICU admission ,    Recently revoked hospice care to come to ED - Palliative and ICU evaluated patient and recommend comfort care and continuation of hospice care; Cardiology agrees with recommendations from hospice and ICU team for comfort as the focus of his care.       Code Status:  Full Code      Meme Reynoso, APRN-CNP    STAFF ADDENDUM:    Both the DOUG and I have had a face to face encounter with the patient today. I have examined the patient and edited the documented physical examination as necessary.  I personally reviewed the patient's vital signs, telemetry, recent labs, medications, orders, EKGs, and pertinent cardiac imaging/ echocardiography.  I have reviewed the DOUG's encounter note, approve the DOUG's documentation and have edited the note to reflect my diagnostic and therapeutic plan.      Severe acute on chronic systolic and diastolic dysfunction.    Persistent atrial fibrillation  Pneumonia  Septic and cardiogenic shock    Essentially he is end-stage heart failure with recurrent sepsis and pneumonia with infiltrates.  He is markedly volume overloaded, mottled skin, respiratory failure on BiPAP.  It is difficult to ascertain what he really wants from goals of care standpoint.  He was previously hospice but his expectations were to get much more significant treatment of his wounds and his heart failure than he was getting.  Seems to understand he is in a very precarious and life-threatening scenario, essentially actively dying, however feels that he can come out of this and significantly improve if we will only help him.  Currently he is DNR/DNI and seen by ICU without any plans for ICU admission and plans for  continued palliative therapy.    After reviewing all of this I think it may be reasonable to try inotrope therapy and Lasix infusion as a last ditch effort to try and get some improvement in quality of life.  He says he would prefer to pass away at home and not in the hospital.    Recommendations:  Start dobutamine at 5 mcg/KG/min  DC IV bolus furosemide and start furosemide infusion 10 mg an hour  Okay for midodrine to assist with blood pressure  Continue goals of care    Dimitri Murillo, DO

## 2025-02-21 NOTE — ED PROVIDER NOTES
HPI   No chief complaint on file.      HPI        Patient History   Past Medical History:   Diagnosis Date    CHF (congestive heart failure)     COPD (chronic obstructive pulmonary disease) (Multi)     Diabetes mellitus (Multi)     Hypertension     Obesity     MELISSA (obstructive sleep apnea)     Personal history of other specified conditions 01/08/2023    History of impaired glucose tolerance     Past Surgical History:   Procedure Laterality Date    HERNIA REPAIR  11/07/2013    Hernia Repair    OTHER SURGICAL HISTORY  11/07/2013    Oral Surgery     No family history on file.  Social History     Tobacco Use    Smoking status: Every Day     Types: Cigarettes    Smokeless tobacco: Never   Substance Use Topics    Alcohol use: Yes     Alcohol/week: 7.0 standard drinks of alcohol     Types: 7 Standard drinks or equivalent per week    Drug use: Never       Physical Exam   ED Triage Vitals   Temp Pulse Resp BP   -- -- -- --      SpO2 Temp src Heart Rate Source Patient Position   -- -- -- --      BP Location FiO2 (%)     -- --       Physical Exam      ED Course & MDM                  No data recorded                                 Medical Decision Making      Procedure  Procedures   General: He is not in acute distress.     Appearance: He is well-developed. He is ill-appearing.   HENT:      Head: Normocephalic and atraumatic.   Eyes:      Conjunctiva/sclera: Conjunctivae normal.   Cardiovascular:      Rate and Rhythm: Tachycardia present. Rhythm irregularly irregular.      Heart sounds: No murmur heard.  Pulmonary:      Effort: Pulmonary effort is normal. Tachypnea and prolonged expiration present. No respiratory distress.      Breath sounds: Wheezing and rhonchi present.   Abdominal:      Palpations: Abdomen is soft.      Tenderness: There is no abdominal tenderness.   Musculoskeletal:         General: No swelling.      Cervical back: Neck supple.   Skin:     General: Skin is warm and dry.      Capillary Refill: Capillary refill takes less than 2 seconds.   Neurological:      Mental Status: He is alert.   Psychiatric:         Mood and Affect: Mood normal.           ED Course & MDM   Diagnoses as of 03/08/25 1853   Pneumonia of left upper lobe due to infectious organism                 No data recorded                                 Medical Decision Making  Medical Decision Making: Patient main stable during my time with him in the emergency department EC demonstrated no significant abnormalities patient's Chem-7 demonstrated a sodium of only 126.  Alk phos of 340.  LFTs remainder were within normal limits.  PTT and INR were both within normal limits BNP was 1800 patient's blood type is O-.  Troponin and delta troponin were essentially unchanged at 135 lactate is 2.1 thyroid-stimulating hormone is within normal limits.  Chest x-ray demonstrated a masslike opacity in left midlung concerning for developing pneumonia but mass possibility.  Subsequent chest demonstrated no evidence of pulmonary embolus but did demonstrate the lobe consolidation can pneumonia and worsening cardiomegaly.  The patient's EKG demonstrated A-fib with a rate of 120 bpm a left bundle branch block no evidence of ischemia  based on Sgarbossa's crazy of 429.  Patient is read she demonstrates A-fib with RVR with a rate of 109 bpm left bundle branch block no evidence of ischemia based on Sgarbossa's criteria a QTc of 506 and an occasional PVC.    Patient presents to the emergency department with complaints of hemoptysis initially presentation to the emergency department, the patient had rales and appeared to be in congestive heart failure as result the patient was given Lasix as well as DuoNebs x 3 however, the patient was found to have pneumonia on workup and as result was given vancomycin and Zosyn as having healthcare associated pneumonia.  Patient was cultured.    At this time, is apparent that the patient will require admission to the hospital I spoke with the hospitalist regarding this patient's case agreed for further evaluation and therapy.  The patient was admitted to the hospital in otherwise stable condition.    Amount and/or Complexity of Data Reviewed  Labs: ordered. Decision-making details documented in ED Course.  Radiology: ordered. Decision-making details documented in ED Course.  ECG/medicine tests: ordered and independent interpretation performed. Decision-making details documented in ED Course.        Procedure  Procedures     Armando Lazo MD  03/08/25 1921

## 2025-02-21 NOTE — SIGNIFICANT EVENT
I spoke with [facility].  The plan is to switch patient to [facility]IP (inpatient hospice).  We will continue the lasix and dobutamine drips to help optimize his fluid status and alleviate his shortness of breath, and the ultimate goal is to discharge the patient home with hospice. This will be re-evaluated tomorrow.      I spoke to the patient, and JR who is his neighbor and healthcare power of .  They are both in agreement with this plan.    Patient also requests ibuprofen to relieve his pain.  He understands that ibuprofen could worsen kidney function and accepts the risks. His goals are comfort and to get back home with hospice.     I contacted registration and placed discharge readmit orders for this transition from inpatient status to inpatient hospice.    At this time, we are pending the creation of the hospice chart per registration. I have discussed with nightshiwalter CAMACHO as well as Dr. Gatica.

## 2025-02-21 NOTE — ED TRIAGE NOTES
Pt to ed via ems from home w c/o sob worsening over the last few days. Pt also endorses coughing up blood clots. Pt has an indwelling lipscomb and per his caregiver he has been having decreased output and increased concentration. Pt endorses cp. Pt in 2nd degree heart block in triage. MDE called to bedside.

## 2025-02-21 NOTE — PROGRESS NOTES
Alo Glass is a 69 y.o. male on day 0 of admission presenting with Pneumonia of left upper lobe due to infectious organism.    Subjective     Saw patient earlier this morning.  Returned later with Dr. Gatica.    Patient on his CPAP machine.  He is oriented x 3, but appears to lack insight into his care and severity of disease.  He tells me he was discharged with [facility], but has been full code.  He says his congestive heart failure was not being managed properly and wants to treat it.  Then he talks about how he knows how severe his heart disease is, and he absolutely does not want to die in a hospital-- wants to be at home.    Blood pressures are low currently.  Pulse ox reading intermittently from ear probe- low 90s.  Fingertips are noted to be cold and cyanotic.     Discussed with ICU attending, Dr. Carvalho, palliative care Dr. Reveles, cardiology DOUG Meme, interdisciplinary team.    After the patient discussed his care with intesivist, it was decided that he does not want ICU care and does not want CPR or to be intubated.  The patient will be admitted to the step down unit for further care and ongoing discussions about his goals of care.        Objective     Physical Exam    Constitutional: chronically-ill appearing, alert and cooperative  Eyes: no icterus  ENMT: mucous membranes moist, no lesions seen  Head/Neck: Neck supple  Respiratory/Thorax: diminished, non-labored breathing, coarse nonproductive cough, on CPAP nasal mask  Cardiovascular: Regular rate and rhythm, no murmurs heard  Gastrointestinal: Nondistended, soft, non-tender, BS present x 4  : + lipscomb in place draining yellow urine, no SP discomfort  Musculoskeletal: ROM intact, no joint swelling  Extremities: BLE +3 edema extending up to the abdomen, bilateral fingertips are cyanotic   Neurological: A&O x 3, speech clear, follows commands appropriately, cr. n. grossly intact, sensation grossly intact, motor 5/5 throughout  Skin: Dry,  scattered bruises and scabs, RLE with ACE wrap  Psych: calm, stable mood      Last Recorded Vitals  Blood pressure 82/58, pulse (!) 116, temperature 36.2 °C (97.2 °F), resp. rate 17, weight 120 kg (265 lb), SpO2 97%.  Intake/Output last 3 Shifts:  No intake/output data recorded.    Relevant Results                 This patient has a urinary catheter   Reason for the urinary catheter remaining today? urinary retention/bladder outlet obstruction, acute or chronic      Scheduled medications  allopurinol, 300 mg, oral, Daily  empagliflozin, 10 mg, oral, Daily  enoxaparin, 40 mg, subcutaneous, q24h  ezetimibe, 10 mg, oral, Daily  furosemide, 80 mg, intravenous, q8h  ipratropium-albuteroL, 3 mL, nebulization, TID  levoFLOXacin, 750 mg, intravenous, q24h  levothyroxine, 125 mcg, oral, Daily  midodrine, 10 mg, oral, Once  midodrine, 10 mg, oral, TID  nicotine, 1 patch, transdermal, Daily  polyethylene glycol, 17 g, oral, Daily      Continuous medications  oxygen, 2 L/min  oxygen, 7 L/min      PRN medications  PRN medications: acetaminophen, ipratropium-albuteroL, ondansetron **OR** ondansetron, traZODone     Results for orders placed or performed during the hospital encounter of 02/21/25 (from the past 24 hours)   Comprehensive metabolic panel   Result Value Ref Range    Glucose 150 (H) 74 - 99 mg/dL    Sodium 126 (L) 136 - 145 mmol/L    Potassium 5.0 3.5 - 5.3 mmol/L    Chloride 89 (L) 98 - 107 mmol/L    Bicarbonate 28 21 - 32 mmol/L    Anion Gap 14 10 - 20 mmol/L    Urea Nitrogen 79 (H) 6 - 23 mg/dL    Creatinine 1.13 0.50 - 1.30 mg/dL    eGFR 70 >60 mL/min/1.73m*2    Calcium 9.1 8.6 - 10.3 mg/dL    Albumin 3.5 3.4 - 5.0 g/dL    Alkaline Phosphatase 340 (H) 33 - 136 U/L    Total Protein 6.2 (L) 6.4 - 8.2 g/dL    AST 29 9 - 39 U/L    Bilirubin, Total 1.0 0.0 - 1.2 mg/dL    ALT 41 10 - 52 U/L   CBC and Auto Differential   Result Value Ref Range    WBC 9.3 4.4 - 11.3 x10*3/uL    nRBC 0.2 (H) 0.0 - 0.0 /100 WBCs    RBC 5.72  4.50 - 5.90 x10*6/uL    Hemoglobin 17.3 13.5 - 17.5 g/dL    Hematocrit 52.5 (H) 41.0 - 52.0 %    MCV 92 80 - 100 fL    MCH 30.2 26.0 - 34.0 pg    MCHC 33.0 32.0 - 36.0 g/dL    RDW 17.3 (H) 11.5 - 14.5 %    Platelets 240 150 - 450 x10*3/uL    Neutrophils % 74.1 40.0 - 80.0 %    Immature Granulocytes %, Automated 0.4 0.0 - 0.9 %    Lymphocytes % 15.6 13.0 - 44.0 %    Monocytes % 9.2 2.0 - 10.0 %    Eosinophils % 0.6 0.0 - 6.0 %    Basophils % 0.1 0.0 - 2.0 %    Neutrophils Absolute 6.85 1.20 - 7.70 x10*3/uL    Immature Granulocytes Absolute, Automated 0.04 0.00 - 0.70 x10*3/uL    Lymphocytes Absolute 1.44 1.20 - 4.80 x10*3/uL    Monocytes Absolute 0.85 0.10 - 1.00 x10*3/uL    Eosinophils Absolute 0.06 0.00 - 0.70 x10*3/uL    Basophils Absolute 0.01 0.00 - 0.10 x10*3/uL   B-Type Natriuretic Peptide   Result Value Ref Range    BNP 1,837 (H) 0 - 99 pg/mL   Lactate   Result Value Ref Range    Lactate 2.0 0.4 - 2.0 mmol/L   Type And Screen   Result Value Ref Range    ABO TYPE O     Rh TYPE NEG     ANTIBODY SCREEN NEG    Protime-INR   Result Value Ref Range    Protime 12.5 9.8 - 12.8 seconds    INR 1.1 0.9 - 1.1   APTT   Result Value Ref Range    aPTT 36 27 - 38 seconds   Blood Gas Venous Full Panel   Result Value Ref Range    POCT pH, Venous 7.33 7.33 - 7.43 pH    POCT pCO2, Venous 57 (H) 41 - 51 mm Hg    POCT pO2, Venous 28 (L) 35 - 45 mm Hg    POCT SO2, Venous 38 (L) 45 - 75 %    POCT Oxy Hemoglobin, Venous 36.6 (L) 45.0 - 75.0 %    POCT Hematocrit Calculated, Venous 53.0 (H) 41.0 - 52.0 %    POCT Sodium, Venous 118 (LL) 136 - 145 mmol/L    POCT Potassium, Venous 4.7 3.5 - 5.3 mmol/L    POCT Chloride, Venous 87 (L) 98 - 107 mmol/L    POCT Ionized Calicum, Venous 1.16 1.10 - 1.33 mmol/L    POCT Glucose, Venous 163 (H) 74 - 99 mg/dL    POCT Lactate, Venous 2.4 (H) 0.4 - 2.0 mmol/L    POCT Base Excess, Venous 2.2 -2.0 - 3.0 mmol/L    POCT HCO3 Calculated, Venous 30.1 (H) 22.0 - 26.0 mmol/L    POCT Hemoglobin, Venous 17.8  (H) 13.5 - 17.5 g/dL    POCT Anion Gap, Venous 6.0 (L) 10.0 - 25.0 mmol/L    Patient Temperature 37.0 degrees Celsius    FiO2 100 %   Troponin I, High Sensitivity, Initial   Result Value Ref Range    Troponin I, High Sensitivity 135 (HH) 0 - 20 ng/L   Thyroid Stimulating Hormone   Result Value Ref Range    Thyroid Stimulating Hormone 2.28 0.44 - 3.98 mIU/L   Blood Gas Lactic Acid, Venous   Result Value Ref Range    POCT Lactate, Venous 2.1 (H) 0.4 - 2.0 mmol/L   Troponin, High Sensitivity, 1 Hour   Result Value Ref Range    Troponin I, High Sensitivity 137 (HH) 0 - 20 ng/L   Troponin I, High Sensitivity   Result Value Ref Range    Troponin I, High Sensitivity 112 (HH) 0 - 20 ng/L   Basic metabolic panel   Result Value Ref Range    Glucose 152 (H) 74 - 99 mg/dL    Sodium 126 (L) 136 - 145 mmol/L    Potassium 5.1 3.5 - 5.3 mmol/L    Chloride 94 (L) 98 - 107 mmol/L    Bicarbonate 22 21 - 32 mmol/L    Anion Gap 15 10 - 20 mmol/L    Urea Nitrogen 76 (H) 6 - 23 mg/dL    Creatinine 0.98 0.50 - 1.30 mg/dL    eGFR 83 >60 mL/min/1.73m*2    Calcium 7.5 (L) 8.6 - 10.3 mg/dL   CBC   Result Value Ref Range    WBC 8.0 4.4 - 11.3 x10*3/uL    nRBC 0.4 (H) 0.0 - 0.0 /100 WBCs    RBC 5.64 4.50 - 5.90 x10*6/uL    Hemoglobin 17.3 13.5 - 17.5 g/dL    Hematocrit 52.9 (H) 41.0 - 52.0 %    MCV 94 80 - 100 fL    MCH 30.7 26.0 - 34.0 pg    MCHC 32.7 32.0 - 36.0 g/dL    RDW 17.8 (H) 11.5 - 14.5 %    Platelets 251 150 - 450 x10*3/uL   Lactate   Result Value Ref Range    Lactate 2.3 (H) 0.4 - 2.0 mmol/L     *Note: Due to a large number of results and/or encounters for the requested time period, some results have not been displayed. A complete set of results can be found in Results Review.     CT angio chest for pulmonary embolism    Result Date: 2/21/2025  Interpreted By:  Sagar Champion, STUDY: CT ANGIO CHEST FOR PULMONARY EMBOLISM;  2/21/2025 3:13 am   INDICATION: Signs/Symptoms:Hemoptysis..     COMPARISON: None   ACCESSION NUMBER(S):  [id number]   ORDERING CLINICIAN: EZRA WHEATLEY   TECHNIQUE: Helical data acquisition of the chest was obtained after intravenous administration of 90 ML Omnipaque 350, as per PE protocol. Images were reformatted in coronal and sagittal planes. Axial and coronal maximum intensity projection (MIP) images were created and reviewed.   FINDINGS: POTENTIAL LIMITATIONS OF THE STUDY: None   HEART AND VESSELS: No discrete filling defects within the main pulmonary artery or its branches to segmental level. Please note that, assessment of subsegmental branches is limited and small peripheral emboli are not entirely excluded. Main pulmonary artery is mildly dilated, suggesting pulmonary arterial hypertension.   The thoracic aorta normal in course and caliber.There is mild scattered atherosclerosis present, including calcified and noncalcified plaques.Near non-opacification of thoracic aorta precludes assessment for acute aortic pathology. Moderate coronary artery calcifications are seen. Please note,the study is not optimized for evaluation of coronary arteries.   Heart is enlarged, and increased in size relative to the prior CT.   There is no pericardial effusion seen.   MEDIASTINUM AND ARELIS, LOWER NECK AND AXILLA: The visualized thyroid gland is within normal limits. No pathologically enlarged lymph nodes. Esophagus appears within normal limits as seen.   LUNGS AND AIRWAYS: The trachea and central airways are patent. No endobronchial lesion is seen.   There is new consolidation in the left upper lobe compatible with pneumonia. Platelike opacities in the dependent lower lungs compatible with atelectasis. Moderate emphysema. Small bilateral pleural effusions.     UPPER ABDOMEN: The visualized subdiaphragmatic structures demonstrate no acute findings. Reflux of IV contrast into the hepatic veins is consistent with elevated right heart pressures/right heart pressures. Small volume of ascites.       CHEST WALL AND OSSEOUS  STRUCTURES: Chest wall is within normal limits. No acute osseous pathology.There are no suspicious osseous lesions. Severe discogenic degeneration of the imaged spine.       No acute pulmonary embolus through the segmental level.   Left upper lobe consolidation compatible with pneumonia. Clinical and radiographic follow-up to complete resolution recommended.   Small bilateral pleural effusions.   Worsened cardiomegaly. Non enhancement of the thoracic aorta markedly limiting its evaluation and denoting left ventricular systolic dysfunction. Reflux of IV contrast into the hepatic veins compatible with elevated right heart pressures/right heart dysfunction.   Atherosclerosis, including coronary artery disease.   Small volume of ascites.   MACRO: None   Signed by: Sagar Champion 2/21/2025 3:28 AM Dictation workstation:   [id number]    XR chest 1 view    Result Date: 2/21/2025  Interpreted By:  Chica Ventura, STUDY: XR CHEST 1 VIEW;  2/21/2025 1:54 am   INDICATION: Signs/Symptoms:SOB.   COMPARISON: Chest x-ray 01/06/2025.   ACCESSION NUMBER(S): [id number]   ORDERING CLINICIAN: EZRA WHEATLEY   FINDINGS: Multiple overlying leads are present.   CARDIOMEDIASTINAL SILHOUETTE: Cardiac silhouette is enlarged but stable.   LUNGS: There is new focal masslike opacity in the left mid lung concerning for developing airspace disease. This measures approximately 4.3 x 6.5 cm and is new from prior chest x-ray. Right lung is clear. No effusion or pneumothorax.   ABDOMEN: No remarkable upper abdominal findings.   BONES: Multilevel degenerative changes of the spine       New masslike opacity in the left mid lung as described above is concerning for developing pneumonia. Underlying mass lesion not excluded. Correlate with symptomatology and continued posttreatment radiographic follow-up is recommended to ensure complete resolution. If there is high clinical concern further evaluation with contrast-enhanced chest CT can be considered.    MACRO: None   Signed by: Chica Ventura 2/21/2025 2:09 AM Dictation workstation:   [id number]            Assessment/Plan   Assessment & Plan  Pneumonia of left upper lobe due to infectious organism    Alo Glass is a 70 yo male with PMH of HFrEf (EF 19% 12/2024), COPD, DM2, hypothryoidism, HLD.  He was recently hospitalized at [facility] for septic shock secondary to RLE wound and possible UTI.  During his hospitalization he developed hypercapnic and hypoxemic respiratory failure and pulmonary edema. He was diuresed and met with palliative care due to his poor prognosis; Ultimately he was discharged home with [facility] on 1/12/25. He developed shortness of breath at home and reported coughing up mucous tinged with blood; he became concerned and asked to come to [facility] ED.    In ED, CTA chest was negative for PE but showed left upper lobe consolidation compatible with pneumonia, small bilateral pleural effusions, worsened cardiomegaly and signs of left ventricular systolic dysfunction as well as right heart dysfunction, small volume ascites.  Labs were notable for hyponatremia 126, BNP 1837, troponin elevation 135/137/112. VBG pH 7.33, pCO2 57, pO228, HCO3 30.1.      Acute on chronic hypercapnic, hypoxemic respiratory failure   Acute systolic heart failure   Left upper lobe pneumonia   - cardiology consulted   - continue IV diuresis; started on IV lasix 80 mg TID--> monitor closely with hypotension.  Midodrine increased to 10 mg TID.    - continue jardiance   - cannot tolerate other GDMT due to hypotension   - continue IV levaquin, breathing treatments, follow blood cultures   - poor prognosis;  palliative care consulted- return to hospice being discussed     Elevated troponins   - flat trend 135/137/112  - likely demand ischemia in the setting of acute heart failure, pneumonia     RLE wound   - wound RN consult     COPD   - not in exacerbation   - continue breathing treatments     Hypothryoidism   - continue  synthroid     HLD   - continue zeta, per chart review intolerant of statins due to myalgias     VTE/GI Prophylaxis   - subcutaneous lovenox   - bowel regimen in place     Discharge Planning   - palliative care consulted, discussions ongoing regarding hospice     Discussed with Dr. Gatica, Dr. Carvalho (ICU), Dr. Reveles (palliative care), Meme (cardiology), and interdisciplinary team.             I spent 60 minutes in the professional and overall care of this patient.      Keren Christensen, APRN-CNP

## 2025-02-22 VITALS
SYSTOLIC BLOOD PRESSURE: 83 MMHG | TEMPERATURE: 97.3 F | BODY MASS INDEX: 36.96 KG/M2 | DIASTOLIC BLOOD PRESSURE: 57 MMHG | WEIGHT: 265 LBS | OXYGEN SATURATION: 94 % | RESPIRATION RATE: 22 BRPM | HEART RATE: 110 BPM

## 2025-02-22 LAB
ALBUMIN SERPL BCP-MCNC: 2.8 G/DL (ref 3.4–5)
ALP SERPL-CCNC: 263 U/L (ref 33–136)
ALT SERPL W P-5'-P-CCNC: 45 U/L (ref 10–52)
ANION GAP SERPL CALC-SCNC: 16 MMOL/L (ref 10–20)
AST SERPL W P-5'-P-CCNC: 45 U/L (ref 9–39)
BILIRUB SERPL-MCNC: 0.8 MG/DL (ref 0–1.2)
BUN SERPL-MCNC: 82 MG/DL (ref 6–23)
CALCIUM SERPL-MCNC: 8.2 MG/DL (ref 8.6–10.3)
CHLORIDE SERPL-SCNC: 90 MMOL/L (ref 98–107)
CO2 SERPL-SCNC: 20 MMOL/L (ref 21–32)
CREAT SERPL-MCNC: 1.21 MG/DL (ref 0.5–1.3)
EGFRCR SERPLBLD CKD-EPI 2021: 65 ML/MIN/1.73M*2
ERYTHROCYTE [DISTWIDTH] IN BLOOD BY AUTOMATED COUNT: 16.3 % (ref 11.5–14.5)
GLUCOSE SERPL-MCNC: 150 MG/DL (ref 74–99)
HCT VFR BLD AUTO: 46.6 % (ref 41–52)
HGB BLD-MCNC: 15.9 G/DL (ref 13.5–17.5)
LACTATE SERPL-SCNC: 1.4 MMOL/L (ref 0.4–2)
MCH RBC QN AUTO: 30.1 PG (ref 26–34)
MCHC RBC AUTO-ENTMCNC: 34.1 G/DL (ref 32–36)
MCV RBC AUTO: 88 FL (ref 80–100)
NRBC BLD-RTO: 0 /100 WBCS (ref 0–0)
PLATELET # BLD AUTO: 145 X10*3/UL (ref 150–450)
POTASSIUM SERPL-SCNC: 4.7 MMOL/L (ref 3.5–5.3)
PROT SERPL-MCNC: 5.1 G/DL (ref 6.4–8.2)
RBC # BLD AUTO: 5.28 X10*6/UL (ref 4.5–5.9)
SODIUM SERPL-SCNC: 121 MMOL/L (ref 136–145)
WBC # BLD AUTO: 8.5 X10*3/UL (ref 4.4–11.3)

## 2025-02-22 PROCEDURE — 2500000004 HC RX 250 GENERAL PHARMACY W/ HCPCS (ALT 636 FOR OP/ED): Performed by: NURSE PRACTITIONER

## 2025-02-22 PROCEDURE — S4991 NICOTINE PATCH NONLEGEND: HCPCS | Performed by: INTERNAL MEDICINE

## 2025-02-22 PROCEDURE — 36415 COLL VENOUS BLD VENIPUNCTURE: CPT | Performed by: INTERNAL MEDICINE

## 2025-02-22 PROCEDURE — 85027 COMPLETE CBC AUTOMATED: CPT | Performed by: INTERNAL MEDICINE

## 2025-02-22 PROCEDURE — 2500000004 HC RX 250 GENERAL PHARMACY W/ HCPCS (ALT 636 FOR OP/ED): Performed by: INTERNAL MEDICINE

## 2025-02-22 PROCEDURE — 2500000001 HC RX 250 WO HCPCS SELF ADMINISTERED DRUGS (ALT 637 FOR MEDICARE OP): Performed by: NURSE PRACTITIONER

## 2025-02-22 PROCEDURE — 94640 AIRWAY INHALATION TREATMENT: CPT

## 2025-02-22 PROCEDURE — 99233 SBSQ HOSP IP/OBS HIGH 50: CPT | Performed by: INTERNAL MEDICINE

## 2025-02-22 PROCEDURE — 80053 COMPREHEN METABOLIC PANEL: CPT | Performed by: INTERNAL MEDICINE

## 2025-02-22 PROCEDURE — 2500000002 HC RX 250 W HCPCS SELF ADMINISTERED DRUGS (ALT 637 FOR MEDICARE OP, ALT 636 FOR OP/ED): Performed by: INTERNAL MEDICINE

## 2025-02-22 PROCEDURE — 83605 ASSAY OF LACTIC ACID: CPT | Performed by: NURSE PRACTITIONER

## 2025-02-22 PROCEDURE — 99239 HOSP IP/OBS DSCHRG MGMT >30: CPT | Performed by: INTERNAL MEDICINE

## 2025-02-22 PROCEDURE — 2500000001 HC RX 250 WO HCPCS SELF ADMINISTERED DRUGS (ALT 637 FOR MEDICARE OP): Performed by: INTERNAL MEDICINE

## 2025-02-22 RX ADMIN — IBUPROFEN 400 MG: 400 TABLET, FILM COATED ORAL at 16:04

## 2025-02-22 RX ADMIN — IPRATROPIUM BROMIDE AND ALBUTEROL SULFATE 3 ML: 2.5; .5 SOLUTION RESPIRATORY (INHALATION) at 12:03

## 2025-02-22 RX ADMIN — EZETIMIBE 10 MG: 10 TABLET ORAL at 10:00

## 2025-02-22 RX ADMIN — MIDODRINE HYDROCHLORIDE 10 MG: 5 TABLET ORAL at 10:00

## 2025-02-22 RX ADMIN — EMPAGLIFLOZIN 10 MG: 10 TABLET, FILM COATED ORAL at 10:00

## 2025-02-22 RX ADMIN — LEVOTHYROXINE SODIUM 125 MCG: 0.05 TABLET ORAL at 07:00

## 2025-02-22 RX ADMIN — LEVOFLOXACIN 750 MG: 750 INJECTION, SOLUTION INTRAVENOUS at 10:06

## 2025-02-22 RX ADMIN — NICOTINE 1 PATCH: 14 PATCH, EXTENDED RELEASE TRANSDERMAL at 10:00

## 2025-02-22 RX ADMIN — IPRATROPIUM BROMIDE AND ALBUTEROL SULFATE 3 ML: 2.5; .5 SOLUTION RESPIRATORY (INHALATION) at 07:27

## 2025-02-22 RX ADMIN — LEVOFLOXACIN 750 MG: 750 INJECTION, SOLUTION INTRAVENOUS at 01:06

## 2025-02-22 RX ADMIN — MIDODRINE HYDROCHLORIDE 10 MG: 5 TABLET ORAL at 16:15

## 2025-02-22 RX ADMIN — ALLOPURINOL 300 MG: 100 TABLET ORAL at 10:10

## 2025-02-22 RX ADMIN — MORPHINE SULFATE 2 MG: 2 INJECTION, SOLUTION INTRAMUSCULAR; INTRAVENOUS at 11:32

## 2025-02-22 RX ADMIN — ENOXAPARIN SODIUM 40 MG: 40 INJECTION SUBCUTANEOUS at 10:00

## 2025-02-22 RX ADMIN — GABAPENTIN 100 MG: 100 CAPSULE ORAL at 10:00

## 2025-02-22 ASSESSMENT — COGNITIVE AND FUNCTIONAL STATUS - GENERAL
PERSONAL GROOMING: TOTAL
MOVING FROM LYING ON BACK TO SITTING ON SIDE OF FLAT BED WITH BEDRAILS: TOTAL
DRESSING REGULAR UPPER BODY CLOTHING: TOTAL
DAILY ACTIVITIY SCORE: 6
MOVING TO AND FROM BED TO CHAIR: TOTAL
WALKING IN HOSPITAL ROOM: TOTAL
HELP NEEDED FOR BATHING: TOTAL
WALKING IN HOSPITAL ROOM: TOTAL
MOBILITY SCORE: 6
STANDING UP FROM CHAIR USING ARMS: TOTAL
PERSONAL GROOMING: TOTAL
MOVING TO AND FROM BED TO CHAIR: TOTAL
WALKING IN HOSPITAL ROOM: TOTAL
DRESSING REGULAR LOWER BODY CLOTHING: TOTAL
CLIMB 3 TO 5 STEPS WITH RAILING: TOTAL
TURNING FROM BACK TO SIDE WHILE IN FLAT BAD: TOTAL
MOBILITY SCORE: 6
CLIMB 3 TO 5 STEPS WITH RAILING: TOTAL
EATING MEALS: TOTAL
TOILETING: TOTAL
MOVING FROM LYING ON BACK TO SITTING ON SIDE OF FLAT BED WITH BEDRAILS: TOTAL
DRESSING REGULAR UPPER BODY CLOTHING: TOTAL
STANDING UP FROM CHAIR USING ARMS: TOTAL
CLIMB 3 TO 5 STEPS WITH RAILING: TOTAL
DAILY ACTIVITIY SCORE: 6
WALKING IN HOSPITAL ROOM: TOTAL
DAILY ACTIVITIY SCORE: 6
MOBILITY SCORE: 6
EATING MEALS: TOTAL
DRESSING REGULAR LOWER BODY CLOTHING: TOTAL
STANDING UP FROM CHAIR USING ARMS: TOTAL
HELP NEEDED FOR BATHING: TOTAL
TURNING FROM BACK TO SIDE WHILE IN FLAT BAD: TOTAL
EATING MEALS: TOTAL
TOILETING: TOTAL
MOVING FROM LYING ON BACK TO SITTING ON SIDE OF FLAT BED WITH BEDRAILS: TOTAL
TURNING FROM BACK TO SIDE WHILE IN FLAT BAD: TOTAL
MOBILITY SCORE: 6
DRESSING REGULAR LOWER BODY CLOTHING: TOTAL
MOVING TO AND FROM BED TO CHAIR: TOTAL
DRESSING REGULAR UPPER BODY CLOTHING: TOTAL
DRESSING REGULAR LOWER BODY CLOTHING: TOTAL
MOVING FROM LYING ON BACK TO SITTING ON SIDE OF FLAT BED WITH BEDRAILS: TOTAL
HELP NEEDED FOR BATHING: TOTAL
TOILETING: TOTAL
HELP NEEDED FOR BATHING: TOTAL
PERSONAL GROOMING: TOTAL
DRESSING REGULAR UPPER BODY CLOTHING: TOTAL
CLIMB 3 TO 5 STEPS WITH RAILING: TOTAL
PERSONAL GROOMING: TOTAL
DAILY ACTIVITIY SCORE: 6
MOVING TO AND FROM BED TO CHAIR: TOTAL
TURNING FROM BACK TO SIDE WHILE IN FLAT BAD: TOTAL
EATING MEALS: TOTAL
TOILETING: TOTAL
STANDING UP FROM CHAIR USING ARMS: TOTAL

## 2025-02-22 ASSESSMENT — PAIN - FUNCTIONAL ASSESSMENT
PAIN_FUNCTIONAL_ASSESSMENT: 0-10

## 2025-02-22 ASSESSMENT — PAIN SCALES - GENERAL
PAINLEVEL_OUTOF10: 0 - NO PAIN
PAINLEVEL_OUTOF10: 0 - NO PAIN
PAINLEVEL_OUTOF10: 6
PAINLEVEL_OUTOF10: 0 - NO PAIN
PAINLEVEL_OUTOF10: 3
PAINLEVEL_OUTOF10: 0 - NO PAIN

## 2025-02-22 NOTE — PROGRESS NOTES
02/22/25 1253   Discharge Planning   Expected Discharge Disposition [facility]        Called [facility] and spoke with Lalita ([phone]) and she stated that patient can be sent home and for bedside nurse to call her and let her know transport time and hospice will meet him at the house. Bedside nurse aware to let  know to set up transport.

## 2025-02-22 NOTE — NURSING NOTE
Rapid Response RN Note    The following patient has a RADAR score of 9. Unit: UA4, Room: 409/409-A, T: 36.3 °C (97.3 °F) (Temporal), P: 110, R: 22, BP: 83/57, PulseOx: 94%      This RN Reviewed above VS with bedside RN.

## 2025-02-22 NOTE — NURSING NOTE
Rapid Response RN Note    The following patient has a RADAR score of 8. Unit: UA4, Room: 409/409-A, T: 36.3 °C (97.4 °F) (Temporal), P: 110, R: 21, BP: 73/50, PulseOx: 97%      This RN Reviewed above VS with bedside RN.  Bedside RN did reach out to provider, pt hospice.

## 2025-02-22 NOTE — DISCHARGE SUMMARY
ADMISSION DATE: 2/21/2025     DISCHARGE DATE:  02/22/25     Discharge Diagnosis  Acute on chronic systolic heart failure with LV ejection fraction of 19%.  Severe ischemic cardiomyopathy  Pneumonia of left upper lobe due to infectious organism  Obesity  Hypotension    Issues Requiring Follow-Up  Discharge home with hospice care at patient's request.        Discharge Meds     Your medication list        CONTINUE taking these medications        Instructions Last Dose Given Next Dose Due   acetaminophen 325 mg tablet  Commonly known as: Tylenol      Take 2 tablets (650 mg) by mouth every 4 hours if needed for mild pain (1 - 3).       albuterol 90 mcg/actuation inhaler      INHALE 2 PUFFS EVERY 4-6 HOURS AS NEEDED.       allopurinol 300 mg tablet  Commonly known as: Zyloprim           amoxicillin-pot clavulanate 500-125 mg tablet  Commonly known as: Augmentin           Dexcom G7  misc  Generic drug: blood-glucose meter,continuous      Use as instructed       Dexcom G7 Sensor device  Generic drug: blood-glucose sensor      Use as directed       furosemide 80 mg tablet  Commonly known as: Lasix           gabapentin 100 mg capsule  Commonly known as: Neurontin      Take 1 Capsule by mouth every afternoon       ipratropium-albuteroL 0.5-2.5 mg/3 mL nebulizer solution  Commonly known as: Duo-Neb      Take 3 mL by nebulization 4 times a day.       levothyroxine 125 mcg tablet  Commonly known as: Synthroid, Levoxyl      Take 1 Tablet by mouth every day       midodrine 5 mg tablet  Commonly known as: Proamatine      Take 1 tablet (5 mg) by mouth 3 times a day.       nicotine 14 mg/24 hr patch  Commonly known as: Nicoderm CQ      Place 1 patch over 24 hours on the skin once daily.       oxygen gas therapy  Commonly known as: O2      Inhale 2 L/min continuously. 2L nc at rest and 4L nc with ambulation       polyethylene glycol 17 gram packet  Commonly known as: Glycolax, Miralax      Take 17 g by mouth once daily as  needed (Constipation).       potassium chloride CR 10 mEq ER tablet  Commonly known as: Klor-Con           SantyL 250 unit/gram ointment  Generic drug: collagenase           sennosides-docusate sodium 8.6-50 mg tablet  Commonly known as: Lydia-Colace      Take 1 tablet by mouth once daily at bedtime.       sodium chloride 0.65 % nasal spray  Commonly known as: Rockmart      Administer 1 spray into each nostril 3 times a day as needed for congestion.              STOP taking these medications      empagliflozin 10 mg  Commonly known as: Jardiance        ezetimibe 10 mg tablet  Commonly known as: Zetia        ibuprofen 400 mg tablet        nystatin 100,000 unit/mL suspension  Commonly known as: Mycostatin        nystatin cream  Commonly known as: Mycostatin        oxygen gas therapy  Commonly known as: O2        oxygen gas therapy  Commonly known as: O2        tamsulosin 0.4 mg 24 hr capsule  Commonly known as: Flomax        traZODone 50 mg tablet  Commonly known as: Desyrel        Trelegy Ellipta 100-62.5-25 mcg blister with device  Generic drug: fluticasone-umeclidin-vilanter        triamcinolone 0.1 % ointment  Commonly known as: Kenalog                     Results for orders placed or performed during the hospital encounter of 02/21/25 (from the past 24 hours)   Lactate   Result Value Ref Range    Lactate 1.4 0.4 - 2.0 mmol/L   CBC   Result Value Ref Range    WBC 8.5 4.4 - 11.3 x10*3/uL    nRBC 0.0 0.0 - 0.0 /100 WBCs    RBC 5.28 4.50 - 5.90 x10*6/uL    Hemoglobin 15.9 13.5 - 17.5 g/dL    Hematocrit 46.6 41.0 - 52.0 %    MCV 88 80 - 100 fL    MCH 30.1 26.0 - 34.0 pg    MCHC 34.1 32.0 - 36.0 g/dL    RDW 16.3 (H) 11.5 - 14.5 %    Platelets 145 (L) 150 - 450 x10*3/uL   Comprehensive metabolic panel   Result Value Ref Range    Glucose 150 (H) 74 - 99 mg/dL    Sodium 121 (L) 136 - 145 mmol/L    Potassium 4.7 3.5 - 5.3 mmol/L    Chloride 90 (L) 98 - 107 mmol/L    Bicarbonate 20 (L) 21 - 32 mmol/L    Anion Gap 16 10 - 20  mmol/L    Urea Nitrogen 82 (H) 6 - 23 mg/dL    Creatinine 1.21 0.50 - 1.30 mg/dL    eGFR 65 >60 mL/min/1.73m*2    Calcium 8.2 (L) 8.6 - 10.3 mg/dL    Albumin 2.8 (L) 3.4 - 5.0 g/dL    Alkaline Phosphatase 263 (H) 33 - 136 U/L    Total Protein 5.1 (L) 6.4 - 8.2 g/dL    AST 45 (H) 9 - 39 U/L    Bilirubin, Total 0.8 0.0 - 1.2 mg/dL    ALT 45 10 - 52 U/L     *Note: Due to a large number of results and/or encounters for the requested time period, some results have not been displayed. A complete set of results can be found in Results Review.      CT angio chest for pulmonary embolism    Result Date: 2/21/2025  Interpreted By:  Sagar Champion, STUDY: CT ANGIO CHEST FOR PULMONARY EMBOLISM;  2/21/2025 3:13 am   INDICATION: Signs/Symptoms:Hemoptysis..     COMPARISON: None   ACCESSION NUMBER(S): [id number]   ORDERING CLINICIAN: EZRA WHEATLEY   TECHNIQUE: Helical data acquisition of the chest was obtained after intravenous administration of 90 ML Omnipaque 350, as per PE protocol. Images were reformatted in coronal and sagittal planes. Axial and coronal maximum intensity projection (MIP) images were created and reviewed.   FINDINGS: POTENTIAL LIMITATIONS OF THE STUDY: None   HEART AND VESSELS: No discrete filling defects within the main pulmonary artery or its branches to segmental level. Please note that, assessment of subsegmental branches is limited and small peripheral emboli are not entirely excluded. Main pulmonary artery is mildly dilated, suggesting pulmonary arterial hypertension.   The thoracic aorta normal in course and caliber.There is mild scattered atherosclerosis present, including calcified and noncalcified plaques.Near non-opacification of thoracic aorta precludes assessment for acute aortic pathology. Moderate coronary artery calcifications are seen. Please note,the study is not optimized for evaluation of coronary arteries.   Heart is enlarged, and increased in size relative to the prior CT.   There is no  pericardial effusion seen.   MEDIASTINUM AND ARELIS, LOWER NECK AND AXILLA: The visualized thyroid gland is within normal limits. No pathologically enlarged lymph nodes. Esophagus appears within normal limits as seen.   LUNGS AND AIRWAYS: The trachea and central airways are patent. No endobronchial lesion is seen.   There is new consolidation in the left upper lobe compatible with pneumonia. Platelike opacities in the dependent lower lungs compatible with atelectasis. Moderate emphysema. Small bilateral pleural effusions.     UPPER ABDOMEN: The visualized subdiaphragmatic structures demonstrate no acute findings. Reflux of IV contrast into the hepatic veins is consistent with elevated right heart pressures/right heart pressures. Small volume of ascites.       CHEST WALL AND OSSEOUS STRUCTURES: Chest wall is within normal limits. No acute osseous pathology.There are no suspicious osseous lesions. Severe discogenic degeneration of the imaged spine.       No acute pulmonary embolus through the segmental level.   Left upper lobe consolidation compatible with pneumonia. Clinical and radiographic follow-up to complete resolution recommended.   Small bilateral pleural effusions.   Worsened cardiomegaly. Non enhancement of the thoracic aorta markedly limiting its evaluation and denoting left ventricular systolic dysfunction. Reflux of IV contrast into the hepatic veins compatible with elevated right heart pressures/right heart dysfunction.   Atherosclerosis, including coronary artery disease.   Small volume of ascites.   MACRO: None   Signed by: Sagar Champion 2/21/2025 3:28 AM Dictation workstation:   [id number]    XR chest 1 view    Result Date: 2/21/2025  Interpreted By:  Chica Ventura, STUDY: XR CHEST 1 VIEW;  2/21/2025 1:54 am   INDICATION: Signs/Symptoms:SOB.   COMPARISON: Chest x-ray 01/06/2025.   ACCESSION NUMBER(S): [id number]   ORDERING CLINICIAN: EZRA WHEATLEY   FINDINGS: Multiple overlying leads are present.    CARDIOMEDIASTINAL SILHOUETTE: Cardiac silhouette is enlarged but stable.   LUNGS: There is new focal masslike opacity in the left mid lung concerning for developing airspace disease. This measures approximately 4.3 x 6.5 cm and is new from prior chest x-ray. Right lung is clear. No effusion or pneumothorax.   ABDOMEN: No remarkable upper abdominal findings.   BONES: Multilevel degenerative changes of the spine       New masslike opacity in the left mid lung as described above is concerning for developing pneumonia. Underlying mass lesion not excluded. Correlate with symptomatology and continued posttreatment radiographic follow-up is recommended to ensure complete resolution. If there is high clinical concern further evaluation with contrast-enhanced chest CT can be considered.   MACRO: None   Signed by: Chica Ventura 2/21/2025 2:09 AM Dictation workstation:   [id number]        Hospital Course  Pneumonia of left upper lobe due to infectious organism     This is a 69-year-old male with advanced heart failure with reduced ejection fraction of 19%, COPD, obstructive sleep apnea, hypertension, obesity, diabetes mellitus type 2, peripheral arterial disease, recent septic shock when he was admitted to [facility] [place], bilateral lower extremity wound and ulcer right greater than left was under hospice care and had developed acute shortness of breath and hemoptysis therefore he revoked his hospice and came to the hospital for further evaluation.  Chest x-ray and CT of the chest is consistent with large consolidation of the left upper lobe as well as evidence of cardiomegaly and overt heart failure with generalized anasarca on examination.  Patient is very hypotensive and difficult to diurese.  After great discussion he has been seen by several consultants including palliative care intensive care and cardiology and hospice care.  Patient to be admitted to stepdown unit and started on dobutamine infusion because of hypotension and then  diuresed with IV Lasix infusion.  And continue with other cardiac medications if tolerated.  Is made DNR/DNI with no transfer to intensive care unit.     1.  Acute respiratory distress secondary to left upper lobe pneumonia and overt heart failure.  Chest x-ray and CT of chest consistent with large pneumonia left lung, he received IV vancomycin and Zosyn in the emergency room, will continue with IV Levaquin.     2.  Acute on chronic systolic heart failure with LV ejection fraction of 19% and patient is hypotensive and has generalized anasarca  Admit to stepdown unit, started on dobutamine infusion at a fixed dose of 5 mcg/kg body weight per minute and diurese patient with Lasix infusion at 10 mg/h and watch closely.  Patient is not able to tolerate many other drugs because of significant hypotension.     3.  History of atrial flutter and fibrillation, remains in atrial flutter, not anticoagulated, rate is around 100/min.     4.  Obstructive sleep apnea morbid obesity and COPD, continue with aerosol treatment as necessary and CPAP as necessary.      Patient is DNR/DNI and not to be transferred to intensive care unit, seen by hospice care, seen by critical care, palliative care and cardiology.         Pertinent Physical Exam At Time of Discharge   GENERAL: Awake alert, moderate distress, obese ,has generalized anasarca  SKIN: Skin color, texture, turgor normal. No rashes or lesions.  HEAD/SINUSES: No significant findings.  EYES: PERRLA and EOMI  EARS: external ears normal.  NOSE:  Septum midline.  OROPHARYNX: Lips, mucosa, and tongue normal. Teeth and gums normal. Oropharynx normal.  NECK: difficult to assess jugulovenous distention due to Body habitus, no carotid bruits, carotid pulse normal contour.  BACK: No CVAT.  LUNGS: Generalized decreased air entry intermittent wheezing bibasilar crackles and crackles left mid and upper lung fields posteriorly.    CARDIAC: Remains in atrial flutter fibrillation with controlled  ventricular rate, distant S1 and S2; no rubs,  or gallops, 2/6 systolic ejection murmur left sternal border, mitral and tricuspid holosystolic murmurs.  ABDOMEN: Abdomen large and obese and distended with moderate ascites, unable to appreciate any organomegaly or masses.    EXTREMITIES: 4+ edema, no clubbing, bilateral lower extremity ulcers and wound right greater than left  NEURO: Limited examination nonfocal.          Visit Vitals  BP 83/57 (BP Location: Right arm, Patient Position: Lying)   Pulse 110   Temp 36.3 °C (97.3 °F) (Temporal)   Resp 22   Wt 120 kg (265 lb)   SpO2 94%   BMI 36.96 kg/m²   Smoking Status Every Day   BSA 2.45 m²            Outpatient Follow-Up  No future appointments.      Noble Gatica MD

## 2025-02-22 NOTE — PROGRESS NOTES
Subjective Data:  Remains on dobutamine and furosemide infusions  Appears slightly more comfortable to me today although says he does not really feel much better  Asking when he can go home as this is his main goal  Appears inpatient hospice being involved         Objective Data:  Last Recorded Vitals:  Vitals:    02/22/25 0600 02/22/25 0700 02/22/25 0728 02/22/25 0730   BP:    87/50   BP Location:    Right arm   Patient Position:    Lying   Pulse: 107 108  102   Resp:    22   Temp:    36.4 °C (97.5 °F)   TempSrc:    Temporal   SpO2:   96% 96%   Weight:           Last Labs:  CBC - 2/22/2025:  5:33 AM  8.5 15.9 145    46.6      CMP - 2/22/2025:  5:33 AM  8.2 5.1 45 --- 0.8   3.8 2.8 45 263      PTT - 2/21/2025:  1:42 AM  1.1   12.5 36     TROPHS   Date/Time Value Ref Range Status   02/21/2025 10:01  0 - 20 ng/L Final     Comment:     Previous result verified on 2/21/2025 0237 on specimen/case [id number] called with component TRPHS for procedure Troponin I, High Sensitivity, Initial with value 135 ng/L.   02/21/2025 02:42  0 - 20 ng/L Final     Comment:     Previous result verified on 2/21/2025 0237 on specimen/case [id number] called with component TRPHS for procedure Troponin I, High Sensitivity, Initial with value 135 ng/L.   02/21/2025 01:42  0 - 20 ng/L Final     BNP   Date/Time Value Ref Range Status   02/21/2025 01:42 AM 1,837 0 - 99 pg/mL Final   12/31/2024 06:23 AM 1,741 0 - 99 pg/mL Final     HGBA1C   Date/Time Value Ref Range Status   12/29/2024 03:25 AM 6.2 See comment % Final   06/25/2024 04:56 PM 5.5 see below % Final     LDLCALC   Date/Time Value Ref Range Status   02/20/2024 10:35  <=99 mg/dL Final     Comment:                                 Near   Borderline      AGE      Desirable  Optimal    High     High     Very High     0-19 Y     0 - 109     ---    110-129   >/= 130     ----    20-24 Y     0 - 119     ---    120-159   >/= 160     ----      >24 Y     0 -  99    100-129  130-159   160-189     >/=190       VLDL   Date/Time Value Ref Range Status   02/20/2024 10:35 AM 16 0 - 40 mg/dL Final   09/10/2021 01:29 PM 27 0 - 40 mg/dL Final   09/18/2018 02:26 PM 22 0 - 40 mg/dL Final      Last I/O:  I/O last 3 completed shifts:  In: 898 (7.5 mL/kg) [P.O.:600; I.V.:148 (1.2 mL/kg); IV Piggyback:150]  Out: 1001 (8.3 mL/kg) [Urine:1000 (0.2 mL/kg/hr); Stool:1]  Weight: 120.2 kg     Past Cardiology Tests (Last 3 Years):     EKG:       Echo:  TTE 12/29/2024  1. Poorly visualized anatomical structures due to suboptimal image quality.   2. Left ventricular ejection fraction is severely decreased, calculated by Green's biplane at 19%.   3. There is global hypokinesis of the left ventricle with minor regional variations.   4. Left ventricular cavity size is moderately dilated.   5. Abnormal septal motion consistent with left bundle branch block.   6. No left ventricular thrombus visualized.   7. There is reduced right ventricular systolic function.   8. Moderately enlarged right ventricle.   9. The left atrium is enlarged.  10. The right atrium is moderately dilated.  11. Mild aortic valve regurgitation.  12. Compared with study dated 9/19/2024, no significant change.     Echo: 9/19/2024   1. Left ventricular ejection fraction is severely decreased, calculated by Green's biplane at 23%.   2. There is global hypokinesis of the left ventricle with minor regional variations.   3. Poorly visualized anatomical structures due to suboptimal image quality.   4. Spectral Doppler shows an impaired relaxation pattern of left ventricular diastolic filling.   5. There is mildly reduced right ventricular systolic function.   6. Moderately enlarged right ventricle.   7. There is moderate aortic valve cusp calcification. The was no assesment of aortic stenosis in this limited study.   8. Compared with study dated 3/11/2024, there is RV dilation and reduced function.    Ejection Fractions:  EF   Date/Time  Value Ref Range Status   12/30/2024 08:39 AM 19 %    09/19/2024 05:05 PM 23 %    03/11/2024 02:04 PM 33 %      Cath:  No results found for this or any previous visit from the past 1095 days.    Stress Test:  No results found for this or any previous visit from the past 1095 days.    Cardiac Imaging:  No results found for this or any previous visit from the past 1095 days.      Inpatient Medications:  Scheduled medications   Medication Dose Route Frequency    allopurinol  300 mg oral Daily    empagliflozin  10 mg oral Daily    enoxaparin  40 mg subcutaneous q24h    ezetimibe  10 mg oral Daily    gabapentin  100 mg oral Daily    ipratropium-albuteroL  3 mL nebulization TID    levoFLOXacin  750 mg intravenous q24h    levothyroxine  125 mcg oral Daily    midodrine  10 mg oral TID    nicotine  1 patch transdermal Daily    polyethylene glycol  17 g oral Daily     PRN medications   Medication    acetaminophen    ibuprofen    ipratropium-albuteroL    morphine    ondansetron    Or    ondansetron    traZODone     Continuous Medications   Medication Dose Last Rate    DOBUTamine  5 mcg/kg/min 5 mcg/kg/min (02/21/25 1525)    furosemide  10 mg/hr 10 mg/hr (02/21/25 1721)    oxygen  2 L/min      oxygen  7 L/min         Physical Exam:  GENERAL: alert, cooperative, pleasant, in no acute distress  SKIN: Cool, mottled skin  NECK: Unable to determine JVD  CARDIAC: Regular rate and rhythm with no rubs, murmurs, or gallops  CHEST: Normal respiratory efforts, lungs clear to auscultation bilaterally.  ABDOMEN: soft, nontender, nondistended  EXTREMITIES: 1-2+ pitting edema  NEURO: Alert and oriented x 3.  Grossly normal.  Moves all 4 extremities.       Assessment/Plan    69 y.o. male with past medical history of HFrEF (EF 19% 12/2024) , COPD (2019: FEV1 45% w +ve BD change; %/TLC 85%), MELISSA, obesity, type II DM,, hx of stenotrophomonas pneumonia w parapneumonic effusion (9/2024), HTN, hypothyroidism, atrial fibrillation not on AC,  hyperlipidemia, severe PAC with leg wounds who presented to [facility] [place] ED on 2/21/2025 with hemoptysis and shortness of breath the past few days.  Cardiology consulted for HFpEF.      Severe acute on chronic systolic and diastolic dysfunction.    Persistent atrial fibrillation  Pneumonia  Septic and cardiogenic shock     Essentially he is end-stage heart failure with recurrent sepsis and pneumonia with infiltrates.  He is markedly volume overloaded, mottled skin, respiratory failure on BiPAP.  It is difficult to ascertain what he really wants from goals of care standpoint.  This morning he says his main goal just is to go home.      He seems to be tolerating dobutamine and Lasix infusion     Recommendations:  Continue dobutamine, Lasix, midodrine at this point.  All of this can be modified based on goals of care discussion that he has with palliative and hospice  No further cardiac recommendations at this time  Will follow peripherally  Please contact us back if any further questions or need to address further goals.         Code Status:  DNR and No Intubation and No ICU          Dimitri Murillo DO

## 2025-02-23 LAB
BACTERIA BLD CULT: NORMAL
BACTERIA BLD CULT: NORMAL

## 2025-02-23 NOTE — PROGRESS NOTES
Alo Glass is a 69 y.o. male on day 1 of admission presenting with Pneumonia of left upper lobe due to infectious organism.    Subjective   Late entry note, patient was seen around 12 PM today and patient's cousin and caregiver were in the room.  Patient seen by hospice and arrangements are being for him to be discharged today home with hospice care.  Patient received overnight IV Lasix infusion and dobutamine with some diuresis, no significant change in his condition and prognosis guarded.       Objective     Physical Exam  GENERAL:  Alert, overweight with generalized anasarca  SKIN:  Skin color, texture, turgor normal. No rashes or lesions.  OROPHARYNX:  Lips, mucosa, and tongue are normal.Teeth and gums, normal. Oropharynx normal.  NECK:  No jugulovenous distention, No carotid bruits, Carotid pulse normal contour, Supple  LUNGS: Decreased breath sounds with bibasilar crackles  CARDIAC: Rate and rhythm is regular normal S1 and S2; no rubs,  or gallops, 2 or 6 systolic ejection murmur left sternal border mitral tricuspid holosystolic murmur, 4+ edema of lower extremities and pulm anasarca.  ABDOMEN:  Abdomen distended and has ascites, non-tender, BS normal, unable to appreciate any masses or organomegaly.  EXTREMETIES:  Extremities normal, no deformities, 4+edema, no clubbing bilateral leg wound right greater than left.  NEURO:  Alert, oriented X 3,. Non-focal. Reflexes PULSES:  2+ radial, 2+ carotid artery    Last Recorded Vitals  Blood pressure 83/57, pulse 110, temperature 36.3 °C (97.3 °F), temperature source Temporal, resp. rate 22, weight 120 kg (265 lb), SpO2 94%.  Intake/Output last 3 Shifts:  I/O last 3 completed shifts:  In: 898 (7.5 mL/kg) [P.O.:600; I.V.:148 (1.2 mL/kg); IV Piggyback:150]  Out: 1001 (8.3 mL/kg) [Urine:1000 (0.2 mL/kg/hr); Stool:1]  Weight: 120.2 kg     Relevant Results     Scheduled medications  allopurinol, 300 mg, oral, Daily  empagliflozin, 10 mg, oral, Daily  enoxaparin, 40  mg, subcutaneous, q24h  ezetimibe, 10 mg, oral, Daily  gabapentin, 100 mg, oral, Daily  ipratropium-albuteroL, 3 mL, nebulization, TID  levoFLOXacin, 750 mg, intravenous, q24h  levothyroxine, 125 mcg, oral, Daily  midodrine, 10 mg, oral, TID  nicotine, 1 patch, transdermal, Daily  polyethylene glycol, 17 g, oral, Daily      Continuous medications  DOBUTamine, 5 mcg/kg/min, Last Rate: 5 mcg/kg/min (02/21/25 1525)  furosemide, 10 mg/hr, Last Rate: 10 mg/hr (02/21/25 1721)  oxygen, 2 L/min  oxygen, 7 L/min      PRN medications  PRN medications: acetaminophen, ibuprofen, ipratropium-albuteroL, morphine, ondansetron **OR** ondansetron, traZODone      Results for orders placed or performed during the hospital encounter of 02/21/25 (from the past 96 hours)   Comprehensive metabolic panel   Result Value Ref Range    Glucose 150 (H) 74 - 99 mg/dL    Sodium 126 (L) 136 - 145 mmol/L    Potassium 5.0 3.5 - 5.3 mmol/L    Chloride 89 (L) 98 - 107 mmol/L    Bicarbonate 28 21 - 32 mmol/L    Anion Gap 14 10 - 20 mmol/L    Urea Nitrogen 79 (H) 6 - 23 mg/dL    Creatinine 1.13 0.50 - 1.30 mg/dL    eGFR 70 >60 mL/min/1.73m*2    Calcium 9.1 8.6 - 10.3 mg/dL    Albumin 3.5 3.4 - 5.0 g/dL    Alkaline Phosphatase 340 (H) 33 - 136 U/L    Total Protein 6.2 (L) 6.4 - 8.2 g/dL    AST 29 9 - 39 U/L    Bilirubin, Total 1.0 0.0 - 1.2 mg/dL    ALT 41 10 - 52 U/L   CBC and Auto Differential   Result Value Ref Range    WBC 9.3 4.4 - 11.3 x10*3/uL    nRBC 0.2 (H) 0.0 - 0.0 /100 WBCs    RBC 5.72 4.50 - 5.90 x10*6/uL    Hemoglobin 17.3 13.5 - 17.5 g/dL    Hematocrit 52.5 (H) 41.0 - 52.0 %    MCV 92 80 - 100 fL    MCH 30.2 26.0 - 34.0 pg    MCHC 33.0 32.0 - 36.0 g/dL    RDW 17.3 (H) 11.5 - 14.5 %    Platelets 240 150 - 450 x10*3/uL    Neutrophils % 74.1 40.0 - 80.0 %    Immature Granulocytes %, Automated 0.4 0.0 - 0.9 %    Lymphocytes % 15.6 13.0 - 44.0 %    Monocytes % 9.2 2.0 - 10.0 %    Eosinophils % 0.6 0.0 - 6.0 %    Basophils % 0.1 0.0 - 2.0 %     Neutrophils Absolute 6.85 1.20 - 7.70 x10*3/uL    Immature Granulocytes Absolute, Automated 0.04 0.00 - 0.70 x10*3/uL    Lymphocytes Absolute 1.44 1.20 - 4.80 x10*3/uL    Monocytes Absolute 0.85 0.10 - 1.00 x10*3/uL    Eosinophils Absolute 0.06 0.00 - 0.70 x10*3/uL    Basophils Absolute 0.01 0.00 - 0.10 x10*3/uL   B-Type Natriuretic Peptide   Result Value Ref Range    BNP 1,837 (H) 0 - 99 pg/mL   Lactate   Result Value Ref Range    Lactate 2.0 0.4 - 2.0 mmol/L   Type And Screen   Result Value Ref Range    ABO TYPE O     Rh TYPE NEG     ANTIBODY SCREEN NEG    Protime-INR   Result Value Ref Range    Protime 12.5 9.8 - 12.8 seconds    INR 1.1 0.9 - 1.1   APTT   Result Value Ref Range    aPTT 36 27 - 38 seconds   Blood Gas Venous Full Panel   Result Value Ref Range    POCT pH, Venous 7.33 7.33 - 7.43 pH    POCT pCO2, Venous 57 (H) 41 - 51 mm Hg    POCT pO2, Venous 28 (L) 35 - 45 mm Hg    POCT SO2, Venous 38 (L) 45 - 75 %    POCT Oxy Hemoglobin, Venous 36.6 (L) 45.0 - 75.0 %    POCT Hematocrit Calculated, Venous 53.0 (H) 41.0 - 52.0 %    POCT Sodium, Venous 118 (LL) 136 - 145 mmol/L    POCT Potassium, Venous 4.7 3.5 - 5.3 mmol/L    POCT Chloride, Venous 87 (L) 98 - 107 mmol/L    POCT Ionized Calicum, Venous 1.16 1.10 - 1.33 mmol/L    POCT Glucose, Venous 163 (H) 74 - 99 mg/dL    POCT Lactate, Venous 2.4 (H) 0.4 - 2.0 mmol/L    POCT Base Excess, Venous 2.2 -2.0 - 3.0 mmol/L    POCT HCO3 Calculated, Venous 30.1 (H) 22.0 - 26.0 mmol/L    POCT Hemoglobin, Venous 17.8 (H) 13.5 - 17.5 g/dL    POCT Anion Gap, Venous 6.0 (L) 10.0 - 25.0 mmol/L    Patient Temperature 37.0 degrees Celsius    FiO2 100 %   Troponin I, High Sensitivity, Initial   Result Value Ref Range    Troponin I, High Sensitivity 135 (HH) 0 - 20 ng/L   Thyroid Stimulating Hormone   Result Value Ref Range    Thyroid Stimulating Hormone 2.28 0.44 - 3.98 mIU/L   Blood Gas Lactic Acid, Venous   Result Value Ref Range    POCT Lactate, Venous 2.1 (H) 0.4 - 2.0 mmol/L    Blood Culture    Specimen: Peripheral Venipuncture; Blood culture   Result Value Ref Range    Blood Culture No growth at 1 day    Blood Culture    Specimen: Peripheral Venipuncture; Blood culture   Result Value Ref Range    Blood Culture No growth at 1 day    Troponin, High Sensitivity, 1 Hour   Result Value Ref Range    Troponin I, High Sensitivity 137 (HH) 0 - 20 ng/L   Troponin I, High Sensitivity   Result Value Ref Range    Troponin I, High Sensitivity 112 (HH) 0 - 20 ng/L   Basic metabolic panel   Result Value Ref Range    Glucose 152 (H) 74 - 99 mg/dL    Sodium 126 (L) 136 - 145 mmol/L    Potassium 5.1 3.5 - 5.3 mmol/L    Chloride 94 (L) 98 - 107 mmol/L    Bicarbonate 22 21 - 32 mmol/L    Anion Gap 15 10 - 20 mmol/L    Urea Nitrogen 76 (H) 6 - 23 mg/dL    Creatinine 0.98 0.50 - 1.30 mg/dL    eGFR 83 >60 mL/min/1.73m*2    Calcium 7.5 (L) 8.6 - 10.3 mg/dL   CBC   Result Value Ref Range    WBC 8.0 4.4 - 11.3 x10*3/uL    nRBC 0.4 (H) 0.0 - 0.0 /100 WBCs    RBC 5.64 4.50 - 5.90 x10*6/uL    Hemoglobin 17.3 13.5 - 17.5 g/dL    Hematocrit 52.9 (H) 41.0 - 52.0 %    MCV 94 80 - 100 fL    MCH 30.7 26.0 - 34.0 pg    MCHC 32.7 32.0 - 36.0 g/dL    RDW 17.8 (H) 11.5 - 14.5 %    Platelets 251 150 - 450 x10*3/uL   Lactate   Result Value Ref Range    Lactate 2.3 (H) 0.4 - 2.0 mmol/L   Lactate   Result Value Ref Range    Lactate 1.4 0.4 - 2.0 mmol/L   CBC   Result Value Ref Range    WBC 8.5 4.4 - 11.3 x10*3/uL    nRBC 0.0 0.0 - 0.0 /100 WBCs    RBC 5.28 4.50 - 5.90 x10*6/uL    Hemoglobin 15.9 13.5 - 17.5 g/dL    Hematocrit 46.6 41.0 - 52.0 %    MCV 88 80 - 100 fL    MCH 30.1 26.0 - 34.0 pg    MCHC 34.1 32.0 - 36.0 g/dL    RDW 16.3 (H) 11.5 - 14.5 %    Platelets 145 (L) 150 - 450 x10*3/uL   Comprehensive metabolic panel   Result Value Ref Range    Glucose 150 (H) 74 - 99 mg/dL    Sodium 121 (L) 136 - 145 mmol/L    Potassium 4.7 3.5 - 5.3 mmol/L    Chloride 90 (L) 98 - 107 mmol/L    Bicarbonate 20 (L) 21 - 32 mmol/L    Anion Gap  16 10 - 20 mmol/L    Urea Nitrogen 82 (H) 6 - 23 mg/dL    Creatinine 1.21 0.50 - 1.30 mg/dL    eGFR 65 >60 mL/min/1.73m*2    Calcium 8.2 (L) 8.6 - 10.3 mg/dL    Albumin 2.8 (L) 3.4 - 5.0 g/dL    Alkaline Phosphatase 263 (H) 33 - 136 U/L    Total Protein 5.1 (L) 6.4 - 8.2 g/dL    AST 45 (H) 9 - 39 U/L    Bilirubin, Total 0.8 0.0 - 1.2 mg/dL    ALT 45 10 - 52 U/L     *Note: Due to a large number of results and/or encounters for the requested time period, some results have not been displayed. A complete set of results can be found in Results Review.      CT angio chest for pulmonary embolism    Result Date: 2/21/2025  Interpreted By:  Sagar Champion, STUDY: CT ANGIO CHEST FOR PULMONARY EMBOLISM;  2/21/2025 3:13 am   INDICATION: Signs/Symptoms:Hemoptysis..     COMPARISON: None   ACCESSION NUMBER(S): [id number]   ORDERING CLINICIAN: EZRA WHEATLEY   TECHNIQUE: Helical data acquisition of the chest was obtained after intravenous administration of 90 ML Omnipaque 350, as per PE protocol. Images were reformatted in coronal and sagittal planes. Axial and coronal maximum intensity projection (MIP) images were created and reviewed.   FINDINGS: POTENTIAL LIMITATIONS OF THE STUDY: None   HEART AND VESSELS: No discrete filling defects within the main pulmonary artery or its branches to segmental level. Please note that, assessment of subsegmental branches is limited and small peripheral emboli are not entirely excluded. Main pulmonary artery is mildly dilated, suggesting pulmonary arterial hypertension.   The thoracic aorta normal in course and caliber.There is mild scattered atherosclerosis present, including calcified and noncalcified plaques.Near non-opacification of thoracic aorta precludes assessment for acute aortic pathology. Moderate coronary artery calcifications are seen. Please note,the study is not optimized for evaluation of coronary arteries.   Heart is enlarged, and increased in size relative to the prior CT.    There is no pericardial effusion seen.   MEDIASTINUM AND ARELIS, LOWER NECK AND AXILLA: The visualized thyroid gland is within normal limits. No pathologically enlarged lymph nodes. Esophagus appears within normal limits as seen.   LUNGS AND AIRWAYS: The trachea and central airways are patent. No endobronchial lesion is seen.   There is new consolidation in the left upper lobe compatible with pneumonia. Platelike opacities in the dependent lower lungs compatible with atelectasis. Moderate emphysema. Small bilateral pleural effusions.     UPPER ABDOMEN: The visualized subdiaphragmatic structures demonstrate no acute findings. Reflux of IV contrast into the hepatic veins is consistent with elevated right heart pressures/right heart pressures. Small volume of ascites.       CHEST WALL AND OSSEOUS STRUCTURES: Chest wall is within normal limits. No acute osseous pathology.There are no suspicious osseous lesions. Severe discogenic degeneration of the imaged spine.       No acute pulmonary embolus through the segmental level.   Left upper lobe consolidation compatible with pneumonia. Clinical and radiographic follow-up to complete resolution recommended.   Small bilateral pleural effusions.   Worsened cardiomegaly. Non enhancement of the thoracic aorta markedly limiting its evaluation and denoting left ventricular systolic dysfunction. Reflux of IV contrast into the hepatic veins compatible with elevated right heart pressures/right heart dysfunction.   Atherosclerosis, including coronary artery disease.   Small volume of ascites.   MACRO: None   Signed by: Sagar Champion 2/21/2025 3:28 AM Dictation workstation:   [id number]    XR chest 1 view    Result Date: 2/21/2025  Interpreted By:  Chica Ventura, STUDY: XR CHEST 1 VIEW;  2/21/2025 1:54 am   INDICATION: Signs/Symptoms:SOB.   COMPARISON: Chest x-ray 01/06/2025.   ACCESSION NUMBER(S): [id number]   ORDERING CLINICIAN: EZRA WHEATLEY   FINDINGS: Multiple overlying leads are  present.   CARDIOMEDIASTINAL SILHOUETTE: Cardiac silhouette is enlarged but stable.   LUNGS: There is new focal masslike opacity in the left mid lung concerning for developing airspace disease. This measures approximately 4.3 x 6.5 cm and is new from prior chest x-ray. Right lung is clear. No effusion or pneumothorax.   ABDOMEN: No remarkable upper abdominal findings.   BONES: Multilevel degenerative changes of the spine       New masslike opacity in the left mid lung as described above is concerning for developing pneumonia. Underlying mass lesion not excluded. Correlate with symptomatology and continued posttreatment radiographic follow-up is recommended to ensure complete resolution. If there is high clinical concern further evaluation with contrast-enhanced chest CT can be considered.   MACRO: None   Signed by: Chica Ventura 2/21/2025 2:09 AM Dictation workstation:   [id number]                 Assessment/Plan   Assessment & Plan  Pneumonia of left upper lobe due to infectious organism  This is a 69-year-old male with advanced heart failure with reduced ejection fraction of 19%, COPD, obstructive sleep apnea, hypertension, obesity, diabetes mellitus type 2, peripheral arterial disease, recent septic shock when he was admitted to [facility] [place], bilateral lower extremity wound and ulcer right greater than left was under hospice care and had developed acute shortness of breath and hemoptysis therefore he revoked his hospice and came to the hospital for further evaluation.  Chest x-ray and CT of the chest is consistent with large consolidation of the left upper lobe as well as evidence of cardiomegaly and overt heart failure with generalized anasarca on examination.  Patient is very hypotensive and difficult to diurese.  After great discussion he has been seen by several consultants including palliative care intensive care and cardiology and hospice care.  Patient to be admitted to stepdown unit and started on dobutamine  infusion because of hypotension and then diuresed with IV Lasix infusion.  And continue with other cardiac medications if tolerated.  Is made DNR/DNI with no transfer to intensive care unit.     1.  Acute respiratory distress secondary to left upper lobe pneumonia and overt heart failure.  Chest x-ray and CT of chest consistent with large pneumonia left lung, he received IV vancomycin and Zosyn in the emergency room, will continued with IV Levaquin.       2.  Acute on chronic systolic heart failure with LV ejection fraction of 19% and patient is hypotensive and has generalized anasarca  Admit to stepdown unit, started on dobutamine infusion at a fixed dose of 5 mcg/kg body weight per minute and diurese patient with Lasix infusion at 10 mg/h and watch closely.  Patient is not able to tolerate many other drugs because of significant hypotension.     3.  History of atrial flutter and fibrillation, remains in atrial flutter, not anticoagulated, rate is around 100/min.     4.  Obstructive sleep apnea morbid obesity and COPD, continue with aerosol treatment as necessary and CPAP as necessary.      Patient is DNR/DNI and not to be transferred to intensive care unit, seen by hospice care, seen by critical care, palliative care and cardiology.    Patient to be discharged home with hospice care later today and hospice services already seen the patient.     Patient discussed with caregiver updated.       I spent 35 minutes in the professional and overall care of this patient.      Noble Gatica MD

## 2025-02-23 NOTE — ASSESSMENT & PLAN NOTE
This is a 69-year-old male with advanced heart failure with reduced ejection fraction of 19%, COPD, obstructive sleep apnea, hypertension, obesity, diabetes mellitus type 2, peripheral arterial disease, recent septic shock when he was admitted to [facility] [place], bilateral lower extremity wound and ulcer right greater than left was under hospice care and had developed acute shortness of breath and hemoptysis therefore he revoked his hospice and came to the hospital for further evaluation.  Chest x-ray and CT of the chest is consistent with large consolidation of the left upper lobe as well as evidence of cardiomegaly and overt heart failure with generalized anasarca on examination.  Patient is very hypotensive and difficult to diurese.  After great discussion he has been seen by several consultants including palliative care intensive care and cardiology and hospice care.  Patient to be admitted to stepdown unit and started on dobutamine infusion because of hypotension and then diuresed with IV Lasix infusion.  And continue with other cardiac medications if tolerated.  Is made DNR/DNI with no transfer to intensive care unit.     1.  Acute respiratory distress secondary to left upper lobe pneumonia and overt heart failure.  Chest x-ray and CT of chest consistent with large pneumonia left lung, he received IV vancomycin and Zosyn in the emergency room, will continued with IV Levaquin.       2.  Acute on chronic systolic heart failure with LV ejection fraction of 19% and patient is hypotensive and has generalized anasarca  Admit to stepdown unit, started on dobutamine infusion at a fixed dose of 5 mcg/kg body weight per minute and diurese patient with Lasix infusion at 10 mg/h and watch closely.  Patient is not able to tolerate many other drugs because of significant hypotension.     3.  History of atrial flutter and fibrillation, remains in atrial flutter, not anticoagulated, rate is around 100/min.     4.  Obstructive sleep apnea  morbid obesity and COPD, continue with aerosol treatment as necessary and CPAP as necessary.      Patient is DNR/DNI and not to be transferred to intensive care unit, seen by hospice care, seen by critical care, palliative care and cardiology.    Patient to be discharged home with hospice care later today and hospice services already seen the patient.

## 2025-02-24 LAB
ATRIAL RATE: 109 BPM
P AXIS: 38 DEGREES
P OFFSET: 190 MS
P ONSET: 161 MS
PR INTERVAL: 94 MS
Q ONSET: 208 MS
QRS COUNT: 18 BEATS
QRS DURATION: 152 MS
QT INTERVAL: 376 MS
QTC CALCULATION(BAZETT): 506 MS
QTC FREDERICIA: 458 MS
R AXIS: -79 DEGREES
T AXIS: 80 DEGREES
T OFFSET: 396 MS
VENTRICULAR RATE: 109 BPM

## 2025-02-25 LAB
BACTERIA BLD CULT: NORMAL
BACTERIA BLD CULT: NORMAL

## 2025-02-28 LAB
ATRIAL RATE: 214 BPM
Q ONSET: 208 MS
QRS COUNT: 20 BEATS
QRS DURATION: 148 MS
QT INTERVAL: 304 MS
QTC CALCULATION(BAZETT): 429 MS
QTC FREDERICIA: 383 MS
R AXIS: -6 DEGREES
T AXIS: 164 DEGREES
T OFFSET: 360 MS
VENTRICULAR RATE: 120 BPM

## 2025-03-09 NOTE — DOCUMENTATION CLARIFICATION NOTE
"    PATIENT:               BRAYAN CACERES  ACCT #:                  [id number]  MRN:                       [id number]  :                       1955  ADMIT DATE:       2025 12:48 AM  DISCH DATE:        2025 4:30 PM  RESPONDING PROVIDER #:        [id number]          PROVIDER RESPONSE TEXT:    Sepsis 2/2 PNA with acute multisystem organ dysfunction of septic and cardiogenic shock, type 2 MI and acute on chronic hypoxic/hypercapnic respiratory failure    CDI QUERY TEXT:    Clarification        Instruction:    Based on your assessment of the patient and the clinical information, please provide the requested documentation by clicking on the appropriate radio button and enter any additional information if prompted.    Question: Please further clarify if a relationship exists between the Sepsis and acute organ dysfunction    When answering this query, please exercise your independent professional judgment. The fact that a question is being asked, does not imply that any particular answer is desired or expected.    The patient's clinical indicators include:  Clinical Information: Per cardiology consult: \" he is end-stage heart failure with recurrent sepsis and pneumonia with infiltrates.  He is markedly volume overloaded, mottled skin, respiratory failure on BiPAP. \"    Clinical Indicators:    Per cardiology consult: \"Septic and cardiogenic shock\"    Per cardiology consult: \"...ill-appearing male on Nasal cpap.  Mottled head to toe, lips blue\"    Per IM progress note : \"Acute on chronic hypercapnic, hypoxemic respiratory failure  Acute systolic heart failure  Left upper    lobe pneumonia ...Elevated troponins ....likely demand ischemia in the setting of acute heart failure, pneumonia \"      Troponin: 135, 137, 112    WBC: 9.3, 8.0, 8.5    Lactate 2.0, 2.3, 1.4    , 110, 112, 107, 121    RR  20, 19, 20, 28, 23, 26, 20    Treatment:    -O2 2L-4L NC; CPAP    Zosyn 4.5 G IV once, Vancomycin 2,000 mg IV, " "Levaquin 750 mg IV    Dobutamine 1,000 mg IV, Lasix 500mg IV    Risk Factors:    Per discharge summary: \" ...left upper lobe pneumonia ...\"  Options provided:  -- Sepsis 2/2 PNA with acute cardiac organ dysfunction of septic shock and Type 2 MI  -- -Sepsis 2/2 PNA with acute cardiac organ dysfunction of septic and cardiogenic shock with type 2 MI  -- -Sepsis 2/2 PNA with acute multisystem organ dysfunction of septic shock, Type 2 MI and acute on chronic hypoxic/hypercapnic respiratory failure  -- Sepsis 2/2 PNA with acute multisystem organ dysfunction of septic and cardiogenic shock, type 2 MI and acute on chronic hypoxic/hypercapnic respiratory failure  -- Other - I will add my own diagnosis  -- Refer to Clinical Documentation Reviewer    Query created by: Rosa Ospina on 3/8/2025 11:55 PM      Electronically signed by:  MIAN SIGALA MD 3/9/2025 12:16 AM          "